# Patient Record
Sex: FEMALE | Race: WHITE | NOT HISPANIC OR LATINO | Employment: OTHER | URBAN - METROPOLITAN AREA
[De-identification: names, ages, dates, MRNs, and addresses within clinical notes are randomized per-mention and may not be internally consistent; named-entity substitution may affect disease eponyms.]

---

## 2017-01-03 ENCOUNTER — OFFICE VISIT (OUTPATIENT)
Dept: UROLOGY | Facility: CLINIC | Age: 82
End: 2017-01-03
Payer: COMMERCIAL

## 2017-01-03 VITALS — SYSTOLIC BLOOD PRESSURE: 132 MMHG | DIASTOLIC BLOOD PRESSURE: 86 MMHG | HEART RATE: 67 BPM | OXYGEN SATURATION: 98 %

## 2017-01-03 DIAGNOSIS — Z85.51 PERSONAL HISTORY OF MALIGNANT NEOPLASM OF BLADDER: Primary | ICD-10-CM

## 2017-01-03 PROCEDURE — 52000 CYSTOURETHROSCOPY: CPT | Performed by: UROLOGY

## 2017-01-03 NOTE — PATIENT INSTRUCTIONS
Your next cystoscopy is scheduled 1/2/2018 @ 8002 Please call  if you need to reschedule this appointment.

## 2017-01-03 NOTE — MR AVS SNAPSHOT
After Visit Summary   1/3/2017    Cydney Christiansen    MRN: [id number]           Patient Information     Date Of Birth          1935        Visit Information        Provider Department      1/3/2017 9:30 AM Ruben Hammond MD; [facility] PROC ROOM [facility]        Today's Diagnoses     Personal history of malignant neoplasm of bladder    -  1       Care Instructions    Your next cystoscopy is scheduled 1/2/2018 @ 0930 Please call  if you need to reschedule this appointment.            Follow-ups after your visit        Your next 10 appointments already scheduled     Mar 24, 2017  9:30 AM   New Visit with Kvng Garcia MD   [facility] ([facility])    [address] [facility] [address] [phone]            Apr 27, 2017  3:00 PM   LAB with LAB ONC [facility])    [address] [place] [address] [phone]           Patient must bring picture ID.  Patient should be prepared to give a urine specimen  Please do not eat 10-12 hours before your appointment if you are coming in fasting for labs on lipids, cholesterol, or glucose (sugar).  Pregnant women should follow their Care Team instructions. Water with medications is okay. Do not drink coffee or other fluids.   If you have concerns about taking  your medications, please ask at office or if scheduling via Ecinityhart, send a message by clicking on Secure Messaging, Message Your Care Team.            Apr 27, 2017  3:30 PM   Return Visit with Usha Salgado MD   [facility])    [address] [facility] [address] [phone]            Jun 26, 2017 11:15 AM   Return Visit with Qi Barba MD   [facility])    [address] [place] [address] [phone]              Who to  contact     If you have questions or need follow up information about today's clinic visit or your schedule please contact [facility] directly at [phone].  Normal or non-critical lab and imaging results will be communicated to you by MyChart, letter or phone within 4 business days after the clinic has received the results. If you do not hear from us within 7 days, please contact the clinic through [employer]oelectronics[employer] or phone. If you have a critical or abnormal lab result, we will notify you by phone as soon as possible.  Submit refill requests through Noteleaf or call your pharmacy and they will forward the refill request to us. Please allow 3 business days for your refill to be completed.          Additional Information About Your Visit        Noteleaf Information     Noteleaf gives you secure access to your electronic health record. If you see a primary care provider, you can also send messages to your care team and make appointments. If you have questions, please call your primary care clinic.  If you do not have a primary care provider, please call [phone] and they will assist you.        Your Vitals Were     Pulse Pulse Oximetry                67 98%           Blood Pressure from Last 3 Encounters:   01/03/17 132/86   12/22/16 130/79   08/25/16 124/80    Weight from Last 3 Encounters:   12/22/16 64.411 kg (142 lb)   08/25/16 65.772 kg (145 lb)   06/23/16 67.132 kg (148 lb)              We Performed the Following     Cystoscopy        Primary Care Provider Office Phone # Fax #    Kadi Sanchez [phone]       [address] [address]        Thank you!     Thank you for choosing [facility]  for your care. Our goal is always to provide you with excellent care. Hearing back from our patients is one way we can continue to improve our services. Please take a few minutes to complete the written survey that you may receive  in the mail after your visit with us. Thank you!             Your Updated Medication List - Protect others around you: Learn how to safely use, store and throw away your medicines at www.disposemymeds.org.          This list is accurate as of: 1/3/17  9:39 AM.  Always use your most recent med list.                   Brand Name Dispense Instructions for use    albuterol 108 (90 BASE) MCG/ACT Inhaler    PROAIR HFA/PROVENTIL HFA/VENTOLIN HFA    1 Inhaler    Inhale 2 puffs into the lungs every 6 hours as needed for shortness of breath / dyspnea Please place on hold       alendronate 70 MG tablet    FOSAMAX    12 tablet    Take 1 tablet (70 mg) by mouth every 7 days Take with over 8 ounces water and stay upright for at least 30 minutes after dose.  Take at least 60 minutes before breakfast       B-12 500 MCG Tabs     150 tablet    Take 500 mcg by mouth daily       calcium 600 MG tablet      1 daily       dorzolamide-timolol 2-0.5 % ophthalmic solution    COSOPT    10 mL    Place 1 drop into both eyes 2 times daily       ibuprofen 200 MG tablet    ADVIL/MOTRIN     Take 200 mg by mouth every 4 hours as needed.       latanoprost 0.005 % ophthalmic solution    XALATAN    1 Bottle    Place 1 drop into both eyes At Bedtime       losartan 25 MG tablet    COZAAR    90 tablet    Take 1 tablet (25 mg) by mouth daily       meclizine 25 MG tablet    ANTIVERT    30 tablet    Take 1 tablet (25 mg) by mouth every 6 hours as needed for dizziness       simvastatin 20 MG tablet    ZOCOR    90 tablet    Take 1 tablet (20 mg) by mouth At Bedtime       tamoxifen 20 MG tablet    NOLVADEX    90 tablet    Take 1 tablet (20 mg) by mouth daily       triamcinolone 0.1 % cream    KENALOG    15 g    Apply to affected area of the face once to twice a day.       VITAMIN D-1000 MAX ST 1000 UNITS Tabs   Generic drug:  cholecalciferol      1 daily

## 2017-01-03 NOTE — PROGRESS NOTES
S: Cydney Christiansen is a 81 year old female returns for bladder cancer surveillance.    she has history of bladder cancer without any recurrence since 1997.    She received 0 courses of BCG therapy.    Patient is draped and prepped.  Flexible cystoscopy placed under direct vision.      Cysto:  The anterior urethra is normal     In the bladder there is normal mucosa.    Assessment/Plan:  (Z85.51) Personal history of malignant neoplasm of bladder  (primary encounter diagnosis)  Comment:  No evidence of recurrence  Plan: BTR in one year

## 2017-01-03 NOTE — NURSING NOTE
"Chief Complaint   Patient presents with     Cystoscopy     btr       Initial /86 mmHg  Pulse 67  SpO2 98% Estimated body mass index is 27.27 kg/(m^2) as calculated from the following:    Height as of 12/22/16: 1.537 m (5' 0.51\").    Weight as of 12/22/16: 64.411 kg (142 lb).  BP completed using cuff size: regular  Nika Preciado CMA      "

## 2017-03-28 ENCOUNTER — OFFICE VISIT (OUTPATIENT)
Dept: OPHTHALMOLOGY | Facility: CLINIC | Age: 82
End: 2017-03-28
Payer: COMMERCIAL

## 2017-03-28 DIAGNOSIS — H43.812 PVD (POSTERIOR VITREOUS DETACHMENT), LEFT: ICD-10-CM

## 2017-03-28 DIAGNOSIS — Z01.01 ENCOUNTER FOR EXAMINATION OF EYES AND VISION WITH ABNORMAL FINDINGS: Primary | ICD-10-CM

## 2017-03-28 DIAGNOSIS — H25.13 NUCLEAR SCLEROSIS OF BOTH EYES: ICD-10-CM

## 2017-03-28 DIAGNOSIS — H40.1431: ICD-10-CM

## 2017-03-28 DIAGNOSIS — H52.12 MYOPIA, LEFT: ICD-10-CM

## 2017-03-28 DIAGNOSIS — H52.4 PRESBYOPIA: ICD-10-CM

## 2017-03-28 DIAGNOSIS — H26.8 PXF (PSEUDOEXFOLIATION OF LENS CAPSULE): ICD-10-CM

## 2017-03-28 PROCEDURE — 92015 DETERMINE REFRACTIVE STATE: CPT | Performed by: STUDENT IN AN ORGANIZED HEALTH CARE EDUCATION/TRAINING PROGRAM

## 2017-03-28 PROCEDURE — 92014 COMPRE OPH EXAM EST PT 1/>: CPT | Performed by: STUDENT IN AN ORGANIZED HEALTH CARE EDUCATION/TRAINING PROGRAM

## 2017-03-28 RX ORDER — DORZOLAMIDE HYDROCHLORIDE AND TIMOLOL MALEATE 20; 5 MG/ML; MG/ML
1 SOLUTION/ DROPS OPHTHALMIC 2 TIMES DAILY
Qty: 10 ML | Refills: 11 | Status: SHIPPED | OUTPATIENT
Start: 2017-03-28 | End: 2017-04-04

## 2017-03-28 RX ORDER — LATANOPROST 50 UG/ML
1 SOLUTION/ DROPS OPHTHALMIC AT BEDTIME
Qty: 1 BOTTLE | Refills: 11 | Status: SHIPPED | OUTPATIENT
Start: 2017-03-28 | End: 2017-04-11

## 2017-03-28 ASSESSMENT — REFRACTION_WEARINGRX
OS_SPHERE: -2.50
OD_CYLINDER: +0.75
OS_AXIS: 161
OS_ADD: +3.00
SPECS_TYPE: BIFOCAL
OS_CYLINDER: +0.25
OD_ADD: +3.25
OD_SPHERE: PLANO
OD_AXIS: 146

## 2017-03-28 ASSESSMENT — VISUAL ACUITY
OS_BAT_MED: 20/125
OS_CC+: -1
OS_CC: 2-
OD_CC: 2
OD_CC: 20/30
OS_CC: 20/40
CORRECTION_TYPE: GLASSES
OD_BAT_MED: 20/70
OD_CC+: -1
METHOD: SNELLEN - LINEAR
OD_BAT_HIGH: >20/400
OS_BAT_HIGH: >20/400
OS_PH_CC: 20/30

## 2017-03-28 ASSESSMENT — REFRACTION_MANIFEST
OD_CYLINDER: +0.50
OD_AXIS: 133
OD_ADD: +3.00
OS_SPHERE: -2.50
OS_ADD: +3.00
OD_SPHERE: -0.25
OS_CYLINDER: SPHERE

## 2017-03-28 ASSESSMENT — SLIT LAMP EXAM - LIDS
COMMENTS: NORMAL
COMMENTS: NORMAL

## 2017-03-28 ASSESSMENT — TONOMETRY
OS_IOP_MMHG: 18
IOP_METHOD: APPLANATION
OD_IOP_MMHG: 17

## 2017-03-28 ASSESSMENT — CONF VISUAL FIELD
OD_NORMAL: 1
OS_NORMAL: 1

## 2017-03-28 ASSESSMENT — CUP TO DISC RATIO
OS_RATIO: 0.5
OD_RATIO: 0.6

## 2017-03-28 ASSESSMENT — EXTERNAL EXAM - LEFT EYE: OS_EXAM: NORMAL

## 2017-03-28 ASSESSMENT — EXTERNAL EXAM - RIGHT EYE: OD_EXAM: NORMAL

## 2017-03-28 NOTE — MR AVS SNAPSHOT
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    Visually significant cataract that is interfering with daily activities of living. Plan for cataract extraction and intraocular lens implant left eye, then may consider the right eye.  Risks, benefits, complications, and alternatives discussed with patient including possibility of limitations from coexistent eye disease and loss of vision. Target refraction and lens options discussed.    Offered cataract surgery left eye at anytime. Call Anika HALL @ [phone] to schedule.   Continue same glasses  Otherwise Return visit in 6 months for Intraocular pressure check and OCT    Kvng Garcia MD  [phone]            Follow-ups after your visit        Follow-up notes from your care team     Return in about 6 months (around 9/28/2017) for IOP check, OCT optic nerve.      Your next 10 appointments already scheduled     Apr 27, 2017  3:00 PM CDT   LAB with LAB ONC [facility] ([facility])    [address] [place] [address] [phone]           Patient must bring picture ID.  Patient should be prepared to give a urine specimen  Please do not eat 10-12 hours before your appointment if you are coming in fasting for labs on  lipids, cholesterol, or glucose (sugar).  Pregnant women should follow their Care Team instructions. Water with medications is okay. Do not drink coffee or other fluids.   If you have concerns about taking  your medications, please ask at office or if scheduling via Lucky Sort, send a message by clicking on Secure Messaging, Message Your Care Team.            Apr 27, 2017  3:30 PM CDT   Return Visit with Usha Salgado MD   [facility])    [address] [place] [address] [phone]            Jun 27, 2017 12:00 PM CDT   Return Visit with Qi Barba MD   [facility])    [address] [place] [address] [phone]            Jan 09, 2018  9:30 AM CST   Return Visit with Ruben Hammond MD, [facility] CYSTO [facility] ROOM   [facility] [id number] [phone]              Who to contact     If you have questions or need follow up information about today's clinic visit or your schedule please contact [facility] directly at [phone].  Normal or non-critical lab and imaging results will be communicated to you by Klinqhart, letter or phone within 4 business days after the clinic has received the results. If you do not hear from us within 7 days, please contact the clinic through MyChart or phone. If you have a critical or abnormal lab result, we will notify you by phone as soon as possible.  Submit refill requests through [employer] or call your pharmacy and they will forward the refill request to us. Please allow 3 business days for your refill to be completed.          Additional Information About Your Visit        Lucky Sort Information     Lucky Sort gives you secure access to your electronic health record. If you see a primary care provider, you can also send messages to your care team and make appointments.  If you have questions, please call your primary care clinic.  If you do not have a primary care provider, please call [phone] and they will assist you.        Care EveryWhere ID     This is your Care EveryWhere ID. This could be used by other organizations to access your [facility] medical records  [id number]         Blood Pressure from Last 3 Encounters:   01/03/17 132/86   12/22/16 130/79   08/25/16 124/80    Weight from Last 3 Encounters:   12/22/16 64.4 kg (142 lb)   08/25/16 65.8 kg (145 lb)   06/23/16 67.1 kg (148 lb)              We Performed the Following     EYE EXAM (SIMPLE-NONBILLABLE)     REFRACTIVE STATUS          Where to get your medicines      These medications [id number] [place] - [address]  [address] [place] [address]     Phone:  [phone]     dorzolamide-timolol 2-0.5 % ophthalmic solution    latanoprost 0.005 % ophthalmic solution          Primary Care Provider Office Phone # Fax #    Kadi Sanchez [phone]       [facility] [address] [facility] [address]        Thank you!     Thank you for choosing [facility]  for your care. Our goal is always to provide you with excellent care. Hearing back from our patients is one way we can continue to improve our services. Please take a few minutes to complete the written survey that you may receive in the mail after your visit with us. Thank you!             Your Updated Medication List - Protect others around you: Learn how to safely use, store and throw away your medicines at www.disposemymeds.org.          This list is accurate as of: 3/28/17 10:45 AM.  Always use your most recent med list.                   Brand Name Dispense Instructions for use    albuterol 108 (90 BASE) MCG/ACT Inhaler    PROAIR HFA/PROVENTIL HFA/VENTOLIN HFA    1 Inhaler    Inhale 2 puffs into the lungs every 6 hours as needed for shortness of breath / dyspnea  Please place on hold       alendronate 70 MG tablet    FOSAMAX    12 tablet    Take 1 tablet (70 mg) by mouth every 7 days Take with over 8 ounces water and stay upright for at least 30 minutes after dose.  Take at least 60 minutes before breakfast       B-12 500 MCG Tabs     150 tablet    Take 500 mcg by mouth daily       calcium 600 MG tablet      1 daily       dorzolamide-timolol 2-0.5 % ophthalmic solution    COSOPT    10 mL    Place 1 drop into both eyes 2 times daily       ibuprofen 200 MG tablet    ADVIL/MOTRIN     Take 200 mg by mouth every 4 hours as needed.       latanoprost 0.005 % ophthalmic solution    XALATAN    1 Bottle    Place 1 drop into both eyes At Bedtime       losartan 25 MG tablet    COZAAR    90 tablet    Take 1 tablet (25 mg) by mouth daily       meclizine 25 MG tablet    ANTIVERT    30 tablet    Take 1 tablet (25 mg) by mouth every 6 hours as needed for dizziness       simvastatin 20 MG tablet    ZOCOR    90 tablet    Take 1 tablet (20 mg) by mouth At Bedtime       tamoxifen 20 MG tablet    NOLVADEX    90 tablet    Take 1 tablet (20 mg) by mouth daily       triamcinolone 0.1 % cream    KENALOG    15 g    Apply to affected area of the face once to twice a day.       VITAMIN D-1000 MAX ST 1000 UNITS Tabs   Generic drug:  cholecalciferol      1 daily

## 2017-03-28 NOTE — PATIENT INSTRUCTIONS
Continue Cosopt both eyes twice a day and Latanoprost both eyes every evening.    Visually significant cataract that is interfering with daily activities of living. Plan for cataract extraction and intraocular lens implant left eye, then may consider the right eye.  Risks, benefits, complications, and alternatives discussed with patient including possibility of limitations from coexistent eye disease and loss of vision. Target refraction and lens options discussed.    Offered cataract surgery left eye at anytime. Call Anika HALL @ [phone] to schedule.   Continue same glasses  Otherwise Return visit in 6 months for Intraocular pressure check and OCT    Kvng Garcia MD  [phone]

## 2017-03-28 NOTE — PROGRESS NOTES
Current Eye Medications:  cosopt both eyes twice a day, Latanoprost both eyes every evening.       Subjective:  Comprehensive Eye Exam.  She feels her distance and near vision are getting worse- especially at night, or when raining.   She wears an older prescription pair of glasses to see the computer screen.       Objective:  See Ophthalmology Exam.      Assessment:  Cydney Christiansen is a 81 year old female who presents with:     PVD (posterior vitreous detachment), left      Nuclear sclerosis of both eyes Visually significant both eyes, worse in left eye.      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    Visually significant cataract that is interfering with daily activities of living. Plan for cataract extraction and intraocular lens implant left eye, then may consider the right eye.  Risks, benefits, complications, and alternatives discussed with patient including possibility of limitations from coexistent eye disease and loss of vision. Target refraction and lens options discussed.  Patient understands and wishes to proceed with surgery.    Visually significant cataract, Left eye. Risks, benefits, alternatives of cataract surgery discussed; patient wishes to proceed with surgery.    Eye: Left, then right if patient desires  Pupil: 5.5  Pseudoexfoliation: Yes  Flomax: No  Diabetes mellitus: No  Glaucoma: Yes  Guttae: No  S/p refractive surgery or other eye surgery: No  Refractive target:  emmetropia   Anticoagulation: aspirin  Anesthesia: MAC/topical  Able to lay flat:  Yes  Additional concerns: prob M. Ring, no obvious donesis, a little nervous  Difficulty: 5      Kvng Garcia MD  [phone]

## 2017-04-04 DIAGNOSIS — H26.8 PXF (PSEUDOEXFOLIATION OF LENS CAPSULE): ICD-10-CM

## 2017-04-04 RX ORDER — DORZOLAMIDE HYDROCHLORIDE AND TIMOLOL MALEATE 20; 5 MG/ML; MG/ML
1 SOLUTION/ DROPS OPHTHALMIC 2 TIMES DAILY
Qty: 10 ML | Refills: 11 | Status: SHIPPED | OUTPATIENT
Start: 2017-04-04 | End: 2018-04-17

## 2017-04-11 DIAGNOSIS — H26.8 PXF (PSEUDOEXFOLIATION OF LENS CAPSULE): ICD-10-CM

## 2017-04-11 RX ORDER — LATANOPROST 50 UG/ML
1 SOLUTION/ DROPS OPHTHALMIC AT BEDTIME
Qty: 1 BOTTLE | Refills: 11 | Status: SHIPPED | OUTPATIENT
Start: 2017-04-11 | End: 2018-04-26

## 2017-04-13 DIAGNOSIS — C50.812 MALIGNANT NEOPLASM OF OVERLAPPING SITES OF LEFT FEMALE BREAST (H): ICD-10-CM

## 2017-04-13 RX ORDER — TAMOXIFEN CITRATE 20 MG/1
20 TABLET ORAL DAILY
Qty: 90 TABLET | Refills: 1 | Status: SHIPPED | OUTPATIENT
Start: 2017-04-13 | End: 2017-09-08

## 2017-04-13 NOTE — TELEPHONE ENCOUNTER
Pending Prescriptions:                       Disp   Refills    tamoxifen (NOLVADEX) 20 MG tablet         90 tab*1            Sig: Take 1 tablet (20 mg) by mouth daily    Last filled 1/16/17    April Cagle LPN

## 2017-04-26 NOTE — PROGRESS NOTES
Oncology Follow-up visit:  Date on this visit: 4/27/2017    Primary Care Physician: Kadi Herring   Surgeon: Dr. Ifeanyi Sunshine.     Diagnosis:  1. 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 She was not recommended in favor of adjuvant radiation or chemotherapy, and has been on adjuvant Tamoxifen (AI not chosen due to OP and hx of compression fractures), since 08/24/2015.    Oncologic History:  1. Breast Cancer - The patient underwent a screening mammogram on 5/29/2015, which demonstrated a 9 mm lesion around the 1 o'clock position in the left breast. She had an ultrasound which confirmed a lesion to be about 9 mm. She underwent a core needle biopsy in [facility], which demonstrated invasive ductal cancer, grade 2, estrogen receptor positive (99%, strong) and  progesterone receptor positive (91%, strong) by immunohistochemistry and HER2 not amplified by FISH (ratio 1.0).   She then met with Dr. Sunshine and underwent L lumpectomy and axillary sentinel LN biopsy. The pathology from the surgery showed a 10 mm invasive ductal carcinoma, [place] grade 2.  There was no associated DCIS. Closest surgical margin was 3 mm from the anterior margin. There was no lymphovascular invasion and all 3 sentinel lymph nodes were negative for malignancy.   She was not recommended in favor of adjuvant radiation or chemotherapy.  Due  compression fracture history felt to be osteoporotic fractures, Tamoxifen was chosen over an AI. She started on Tamoxifen on 8/24/2015    2. Compression Fractures- Patient sustained a compression fracture of T7  in July 2015. On T spine MRI there were also old compression fractures of T3, T4 and T8 noted. She was on Fosamax for 10 years and stopped it in 2012. She developed compression fracture in 07/2015 and was restarted on Fosamax.    3. She has a  remote history of TCC of the bladder (in 1993), s/p TURBT   4. 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         History Of Present Illness:  Ms. Christiansen is a 81 year old female who presents for f/up of breast cancer. She transferred  her care to us for convenience from Dr. Walter in 05/2016. She lives in [place]. She has been on Tamoxifen since 8/24/2015 and has tolerated it reasonably well.  She has been on weekly Fosamax as well.  She has a remote history of TCC of the bladder (in 1993), s/p TURBT and follows up with Dr. Juarez annually for cystoscopy, last in 01/2017. She had a cystoscopy at that time, and it showed no e/o recurrent bladder tumor.  She was noted to have mild anemia in Aug 2016. Hb was down to 11.2 g/dl. MCV was normal. She reports there is a Hx of anemia in her sister and mother, due to iron and B12 deficiency. Her diet is not rich in meat products. She had a colonoscopy in September 2014, which showed multiple colonic polyps which were removed. There was a 8 mm serrated adenoma and a couple tubular adenomas as well. Repeat colonoscopy was recommended in 3 years. At our last visit in 12/2016 she had workup for her anemia. TSH was normal. Serum folate was normal. Iron studies were normal including ferritin of 213. She had normal LFTs and creatinine.  Peripheral blood smear on 12/23/2016 showed  slight normochromic, normocytic anemia without increased   erythrocyte regeneration. The red blood cells appeared normochromic. Poikilocytosis was minimal.   Serum protein electrophoresis and serum immunofixation showed no M protein.  We'll recommend vitamin B12 supplementation and she has been on 500 mcg orally daily for the past 4 months. Her Hb is still 11.3 g/dl today. There has been no change in her energy level. B12 level pending from today. She follows up with Dr. Barba for NMSC. She was last seen in 09/2016. Follow-up was recommended in one year.   B/l screening mammogram with  tomosynthesis was negative in 08/2016.She is feeling well and has no complaints.  She denies any melena, blood in stool or BRBPR.  In addition, a complete 12 point  review of systems is negative.      Past Medical/Surgical History:  Past Medical History:   Diagnosis Date     Actinic keratosis      Basal cell cancer 02/2011    bcc of the L back.     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     Past Surgical History:   Procedure Laterality Date     C REMOVAL GALLBLADDER      open pan     COLONOSCOPY  5/2008, 5/13, 6/14    Q 3 years for advanced adenomatous polyp     CYSTOSCOPY  12/31/2008     D & C       GENITOURINARY SURGERY      TURBT     HC TRABECULOPLASTY BY LASER SURGERY Left 4/18/07    SLT #1 OS (inf 180)     HC TRABECULOPLASTY BY LASER SURGERY Right 5/4/11    SLT #1 OD (inf 180?)     HC TRABECULOPLASTY BY LASER SURGERY Left 3/17/15    SLT #2 OS (sup 180)     HC TRABECULOPLASTY BY LASER SURGERY Right 6/23/15    SLT #2 OD (sup 180?)     LASER ARGON TREATMENT      SLT left eye x 2     LUMPECTOMY BREAST WITH SENTINEL NODE, COMBINED Left 7/29/2015    Procedure: COMBINED LUMPECTOMY BREAST WITH SENTINEL NODE;  Surgeon: Ifeanyi Sunshine MD;  Location: [facility] OR     SURGICAL HISTORY OF -   9/11    squamous cell CA excised from back     TUBAL LIGATION     FHx and SocHx reviewed     Allergies:  Allergies as of 04/27/2017 - Juan Antonio as Reviewed  "03/28/2017   Allergen Reaction Noted     Lisinopril Cough 09/16/2014     Current Medications:  Current Outpatient Prescriptions   Medication Sig Dispense Refill     tamoxifen (NOLVADEX) 20 MG tablet Take 1 tablet (20 mg) by mouth daily 90 tablet 1     latanoprost (XALATAN) 0.005 % ophthalmic solution Place 1 drop into both eyes At Bedtime 1 Bottle 11     dorzolamide-timolol (COSOPT) 2-0.5 % ophthalmic solution Place 1 drop into both eyes 2 times daily 10 mL 11     Cyanocobalamin (B-12) 500 MCG TABS Take 500 mcg by mouth daily 150 tablet 3     triamcinolone (KENALOG) 0.1 % cream Apply to affected area of the face once to twice a day. 15 g 1     alendronate (FOSAMAX) 70 MG tablet Take 1 tablet (70 mg) by mouth every 7 days Take with over 8 ounces water and stay upright for at least 30 minutes after dose.  Take at least 60 minutes before breakfast 12 tablet 3     losartan (COZAAR) 25 MG tablet Take 1 tablet (25 mg) by mouth daily 90 tablet 4     simvastatin (ZOCOR) 20 MG tablet Take 1 tablet (20 mg) by mouth At Bedtime 90 tablet 4     albuterol (PROAIR HFA, PROVENTIL HFA, VENTOLIN HFA) 108 (90 BASE) MCG/ACT inhaler Inhale 2 puffs into the lungs every 6 hours as needed for shortness of breath / dyspnea Please place on hold 1 Inhaler 1     meclizine (ANTIVERT) 25 MG tablet Take 1 tablet (25 mg) by mouth every 6 hours as needed for dizziness 30 tablet 1     ibuprofen (ADVIL,MOTRIN) 200 MG tablet Take 200 mg by mouth every 4 hours as needed.       CALCIUM 600 MG OR TABS 1 daily       VITAMIN D-1000 MAX -1000 MG-UNIT OR TABS 1 daily          Physical Exam:  /80  Pulse 60  Temp 97.8  F (36.6  C) (Oral)  Resp 20  Ht 1.537 m (5' 0.51\")  Wt 65.3 kg (144 lb)  SpO2 98%  BMI 27.65 kg/m2      GENERAL APPEARANCE: healthy, alert and no distress      NECK: no adenopathy, no asymmetry or masses     LYMPHATICS: No cervical, supraclavicular, axillary  lymphadenopathy     RESP: lungs clear to auscultation - no rales, " rhonchi or wheezes     CARDIOVASCULAR: regular rates and rhythm, normal S1 S2, no S3 or S4 and no murmur.     ABDOMEN:  soft, nontender, no HSM or masses and bowel sounds normal     MUSCULOSKELETAL: extremities normal- no gross deformities noted, no evidence of inflammation in joints, FROM in all extremities. No edema b/l LE.     SKIN: no suspicious lesions or rashes     PSYCHIATRIC: mentation appears normal and affect normal  Breast: s/p L lumpectomy and axillary SLN biopsy. Incisions healed well. No masses b/l in the breasts. No axillary lymphadenopathy bilaterally.    Laboratory/Imaging Studies  Orders Only on 04/27/2017   Component Date Value Ref Range Status     Hemoglobin 04/27/2017 11.3* 11.7 - 15.7 g/dL Final     Bilirubin Direct 04/27/2017 <0.1  0.0 - 0.2 mg/dL Final     Bilirubin Total 04/27/2017 0.2  0.2 - 1.3 mg/dL Final     Albumin 04/27/2017 3.6  3.4 - 5.0 g/dL Final     Protein Total 04/27/2017 6.8  6.8 - 8.8 g/dL Final     Alkaline Phosphatase 04/27/2017 38* 40 - 150 U/L Final     ALT 04/27/2017 20  0 - 50 U/L Final     AST 04/27/2017 16  0 - 45 U/L Final       ASSESSMENT/PLAN:    Gera is a 81 year old woman with stage Ia, [id number], grade II, ER positive, AR positive, 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 She was not recommended in favor of adjuvant radiation or chemotherapy, and has been on adjuvant Tamoxifen (AI not chosen due to OP and hx of compression fractures), since 08/24/2015.    1. Breast cancer - continue daily Tamoxifen, tolerating well. F/up with us in 4 months. She will be due for a screening mammogram at that time.  2. Hx of OP with compression Fx- continue Fosamax, calcium and vitamin D supplementation.   3. Breast cancer screening- b/l screening mammogram with tomosynthesis negative in 08/2016. Next due in 08/2017.  4. Hx of TCC of bladder- s/p  s/p TURBT in 1993 and follows up with Dr. Juarez annually for  cystoscopy, next in 01/2018.   5. Colon cancer screening- has a Hx of multiple colonic polyps (serrated and tubular adenomas) on colonoscopy in 06/2014. Repeat colonoscopy in 3 years recommended (due in 06/2017)  6. Hx of NMSC- f/up with Dr. Barba annually, next  In 09/2017.    7. Mild normocytic anemia- f/up on B12 level. Ferritin normal but we'll start PO iron supplementation at 325 mg PO daily and recheck Hb and iron studies in 4 months, on her return visit. Proceed with colonoscopy as above and will also add endoscopy given anemia. (colonoscopy and endoscopy to be done at [facility] office in [place]- orders faxed and patient will call to schedule).    At the end of our visit patient verbalized understanding and concurred with the plan.

## 2017-04-27 ENCOUNTER — ONCOLOGY VISIT (OUTPATIENT)
Dept: ONCOLOGY | Facility: CLINIC | Age: 82
End: 2017-04-27
Payer: COMMERCIAL

## 2017-04-27 VITALS
HEIGHT: 61 IN | DIASTOLIC BLOOD PRESSURE: 80 MMHG | SYSTOLIC BLOOD PRESSURE: 138 MMHG | WEIGHT: 144 LBS | TEMPERATURE: 97.8 F | RESPIRATION RATE: 20 BRPM | OXYGEN SATURATION: 98 % | HEART RATE: 60 BPM | BODY MASS INDEX: 27.19 KG/M2

## 2017-04-27 DIAGNOSIS — Z12.31 VISIT FOR SCREENING MAMMOGRAM: ICD-10-CM

## 2017-04-27 DIAGNOSIS — D64.9 NORMOCYTIC ANEMIA: Primary | ICD-10-CM

## 2017-04-27 DIAGNOSIS — C50.812 MALIGNANT NEOPLASM OF OVERLAPPING SITES OF LEFT FEMALE BREAST (H): ICD-10-CM

## 2017-04-27 DIAGNOSIS — D64.9 NORMOCYTIC ANEMIA: ICD-10-CM

## 2017-04-27 LAB
ALBUMIN SERPL-MCNC: 3.6 G/DL (ref 3.4–5)
ALP SERPL-CCNC: 38 U/L (ref 40–150)
ALT SERPL W P-5'-P-CCNC: 20 U/L (ref 0–50)
AST SERPL W P-5'-P-CCNC: 16 U/L (ref 0–45)
BILIRUB DIRECT SERPL-MCNC: <0.1 MG/DL (ref 0–0.2)
BILIRUB SERPL-MCNC: 0.2 MG/DL (ref 0.2–1.3)
HGB BLD-MCNC: 11.3 G/DL (ref 11.7–15.7)
PROT SERPL-MCNC: 6.8 G/DL (ref 6.8–8.8)
VIT B12 SERPL-MCNC: 545 PG/ML (ref 193–986)

## 2017-04-27 PROCEDURE — 82607 VITAMIN B-12: CPT | Performed by: INTERNAL MEDICINE

## 2017-04-27 PROCEDURE — 80076 HEPATIC FUNCTION PANEL: CPT | Performed by: INTERNAL MEDICINE

## 2017-04-27 PROCEDURE — 99214 OFFICE O/P EST MOD 30 MIN: CPT | Performed by: INTERNAL MEDICINE

## 2017-04-27 PROCEDURE — 85018 HEMOGLOBIN: CPT | Performed by: INTERNAL MEDICINE

## 2017-04-27 PROCEDURE — 36415 COLL VENOUS BLD VENIPUNCTURE: CPT | Performed by: INTERNAL MEDICINE

## 2017-04-27 ASSESSMENT — PAIN SCALES - GENERAL: PAINLEVEL: NO PAIN (0)

## 2017-04-27 NOTE — MR AVS SNAPSHOT
After Visit Summary   4/27/2017    Cydney Christiansen    MRN: [id number]           Patient Information     Date Of Birth          1935        Visit Information        Provider Department      4/27/2017 3:30 PM Usha Salgado MD [facility]        Today's Diagnoses     Normocytic anemia    -  1    Malignant neoplasm of overlapping sites of left female breast (H)        Visit for screening mammogram           Follow-ups after your visit        Additional Services     GASTROENTEROLOGY ADULT REF PROCEDURE ONLY       Patient has anemia - Hb 11.3 g/dl.                  Your next 10 appointments already scheduled     Jun 27, 2017 12:00 PM CDT   Return Visit with Qi Barba MD   [facility] ([facility])    [address] [place] [id number] [phone]            Aug 28, 2017 10:30 AM CDT   MA SCREENING BILATERAL W/ JAMESON with ISAIAS  [facility] ([facility])    [address] [place] [id number] [phone]           Do not use any powder, lotion or deodorant under your arms or on your breast. If you do, we will ask you to remove it before your exam.  Wear comfortable, two-piece clothing.  If you have any allergies, tell your care team.  Bring any previous mammograms from other facilities or have them mailed to the breast center.            Aug 31, 2017 11:45 AM CDT   LAB with LAB ONC [facility] ([facility])    [address] [place] [id number] [phone]           Patient must bring picture ID.  Patient should be prepared to give a urine specimen  Please do not eat 10-12 hours before your appointment if you are coming in fasting for labs on lipids, cholesterol, or glucose (sugar).  Pregnant women should follow their Care Team instructions. Water with medications is okay. Do not drink coffee or other fluids.   If  you have concerns about taking  your medications, please ask at office or if scheduling via [employer], send a message by clicking on Secure Messaging, Message Your Care Team.            Aug 31, 2017 12:30 PM CDT   Return Visit with Usha Salgado MD   [facility]    [id number] [place] [id number]   [phone]            Sep 08, 2017  9:45 AM CDT   Return Visit with Kvng Garcia MD   [facility] ([facility])    [address] [facility] [address]   [phone]            Jan 09, 2018  9:30 AM CST   Return Visit with Ruben Hammond MD, [facility] CYSTO PROC ROOM   [facility] ([facility])    [address] [place] [address]   [phone]              Future tests that were ordered for you today     Open Future Orders        Priority Expected Expires Ordered    Hemoglobin Routine  4/27/2018 4/27/2017    Iron and iron binding capacity Routine  4/27/2018 4/27/2017    Ferritin Routine  4/27/2018 4/27/2017    GASTROENTEROLOGY ADULT REF PROCEDURE ONLY Routine  9/27/2017 4/27/2017    MA Screen Bilateral w/Jose Antonio Routine  4/27/2018 4/27/2017            Who to contact     If you have questions or need follow up information about today's clinic visit or your schedule please contact [facility] directly at [phone].  Normal or non-critical lab and imaging results will be communicated to you by MyChart, letter or phone within 4 business days after the clinic has received the results. If you do not hear from us within 7 days, please contact the clinic through [employer] or phone. If you have a critical or abnormal lab result, we will notify you by phone as soon as possible.  Submit refill requests through [employer] or call your pharmacy and they will forward the refill request to us. Please allow 3 business days for your refill to be completed.          Additional  "Information About Your Visit        [employer] Information     [employer] gives you secure access to your electronic health record. If you see a primary care provider, you can also send messages to your care team and make appointments. If you have questions, please call your primary care clinic.  If you do not have a primary care provider, please call [phone] and they will assist you.      [employer] is an electronic gateway that provides easy, online access to your medical records. With [employer], you can request a clinic appointment, read your test results, renew a prescription or communicate with your care team.     To access your existing account, please contact your [facility] or call [phone] for assistance.        Care EveryWhere ID     This is your Care EveryWhere ID. This could be used by other organizations to access your [facility] medical records  [id number]        Your Vitals Were     Pulse Temperature Respirations Height Pulse Oximetry BMI (Body Mass Index)    60 97.8  F (36.6  C) (Oral) 20 1.537 m (5' 0.51\") 98% 27.65 kg/m2       Blood Pressure from Last 3 Encounters:   04/27/17 138/80   01/03/17 132/86   12/22/16 130/79    Weight from Last 3 Encounters:   04/27/17 65.3 kg (144 lb)   12/22/16 64.4 kg (142 lb)   08/25/16 65.8 kg (145 lb)               Primary Care Provider Office Phone # Fax #    Kadi Sanchez [phone]       [address] [address]        Thank you!     Thank you for choosing [facility]  for your care. Our goal is always to provide you with excellent care. Hearing back from our patients is one way we can continue to improve our services. Please take a few minutes to complete the written survey that you may receive in the mail after your visit with us. Thank you!             Your Updated Medication List - Protect others around you: Learn how to safely use, store and " throw away your medicines at www.disposemymeds.org.          This list is accurate as of: 4/27/17  4:07 PM.  Always use your most recent med list.                   Brand Name Dispense Instructions for use    albuterol 108 (90 BASE) MCG/ACT Inhaler    PROAIR HFA/PROVENTIL HFA/VENTOLIN HFA    1 Inhaler    Inhale 2 puffs into the lungs every 6 hours as needed for shortness of breath / dyspnea Please place on hold       alendronate 70 MG tablet    FOSAMAX    12 tablet    Take 1 tablet (70 mg) by mouth every 7 days Take with over 8 ounces water and stay upright for at least 30 minutes after dose.  Take at least 60 minutes before breakfast       B-12 500 MCG Tabs     150 tablet    Take 500 mcg by mouth daily       calcium 600 MG tablet      1 daily       dorzolamide-timolol 2-0.5 % ophthalmic solution    COSOPT    10 mL    Place 1 drop into both eyes 2 times daily       ibuprofen 200 MG tablet    ADVIL/MOTRIN     Take 200 mg by mouth every 4 hours as needed.       latanoprost 0.005 % ophthalmic solution    XALATAN    1 Bottle    Place 1 drop into both eyes At Bedtime       losartan 25 MG tablet    COZAAR    90 tablet    Take 1 tablet (25 mg) by mouth daily       meclizine 25 MG tablet    ANTIVERT    30 tablet    Take 1 tablet (25 mg) by mouth every 6 hours as needed for dizziness       simvastatin 20 MG tablet    ZOCOR    90 tablet    Take 1 tablet (20 mg) by mouth At Bedtime       tamoxifen 20 MG tablet    NOLVADEX    90 tablet    Take 1 tablet (20 mg) by mouth daily       triamcinolone 0.1 % cream    KENALOG    15 g    Apply to affected area of the face once to twice a day.       VITAMIN D-1000 MAX ST 1000 UNITS Tabs   Generic drug:  cholecalciferol      1 daily

## 2017-04-27 NOTE — NURSING NOTE
"Oncology Rooming Note    April 27, 2017 3:33 PM   Cydney Christiansen is a 81 year old female who presents for: Oncology Clinic Visit    Initial Vitals: /80  Pulse 60  Temp 97.8  F (36.6  C) (Oral)  Resp 20  Ht 1.537 m (5' 0.51\")  Wt 65.3 kg (144 lb)  SpO2 98%  BMI 27.65 kg/m2 Estimated body mass index is 27.65 kg/(m^2) as calculated from the following:    Height as of this encounter: 1.537 m (5' 0.51\").    Weight as of this encounter: 65.3 kg (144 lb). Body surface area is 1.67 meters squared.  No Pain (0) Comment: Data Unavailable   No LMP recorded. Patient is postmenopausal.  Allergies reviewed: Yes  Medications reviewed: Yes    Medications: Medication refills not needed today.  Pharmacy name entered into [facility]: [id number] [place] [facility], [place] - [address]    Clinical concerns    8 minutes for nursing intake (face to face time)     LIBAN NELSON LPN                "

## 2017-06-13 NOTE — PROGRESS NOTES
"  SUBJECTIVE:                                                            Cydney Christiansen is a 81 year old female who presents for Preventive Visit.  {PVP to remind patient that this is not necessarily a physical exam; physical exam may or may not be done:448715::\"click delete button to remove this line now\"}  {PVP to inform patient that additional E&M charge may apply, if additional problems addressed:798886::\"click delete button to remove this line now\"}  Are you in the first 12 months of your Medicare Part B coverage?  {No Yes:870788::\"No\"}    Healthy Habits:    Do you get at least three servings of calcium containing foods daily (dairy, green leafy vegetables, etc.)? {YES/NO, DAIRY INTAKE:724699::\"yes\"}    Amount of exercise or daily activities, outside of work: {AMOUNT EXERCISE:764874}    Problems taking medications regularly {Yes /No default:304623::\"No\"}    Medication side effects: {Yes /No default.:340763::\"No\"}    Have you had an eye exam in the past two years? {YESNOBLANK:577727}    Do you see a dentist twice per year? {YESNOBLANK:280625}    Do you have sleep apnea, excessive snoring or daytime drowsiness?{YESNOBLANK:377601}    {AWV Cognitive Screenin}    {Outside tests to abstract? :690487}    {additional problems to add:831721}    Reviewed and updated as needed this visit by clinical staff         Reviewed and updated as needed this visit by Provider        Social History   Substance Use Topics     Smoking status: Former Smoker     Packs/day: 0.50     Years: 20.00     Types: Cigarettes     Quit date: 1976     Smokeless tobacco: Never Used     Alcohol use Yes      Comment: rare       {ETOH AUDIT:358310}    Today's PHQ-2 Score:   PHQ-2 (  [employer]) 2016   Q1: Little interest or pleasure in doing things 0 0   Q2: Feeling down, depressed or hopeless 0 0   PHQ-2 Score 0 0     {PHQ-2 LOOK IN ASSESSMENTS :156030}  Do you feel safe in your environment - {YES/NO/NA:375060}    Do you " "have a Health Care Directive?: {HEALTHCARE DIRECTIVE STATUS:403640}    Current providers sharing in care for this patient include:   Patient Care Team:  Kadi Sanchez MD as PCP - General  April Mahan RN as Nurse Coordinator (Breast Oncology)  Usha Salgado MD as MD (Hematology & Oncology)      Hearing impairment: {NO/YES:767736}    Ability to successfully perform activities of daily living: {YES/NO (MEDICARE):102026::\"Yes, no assistance needed\"}     Fall risk:  {Document Fall Risk in the Assessments Section of the Navigator:408955}    Home safety:  {IPPE SAFETY CONCERNS:005364::\"none identified\"}  {If any of the above assessments are answered yes, consider ordering appropriate referrals:247871::\"click delete button to remove this line now\"}    The following health maintenance items are reviewed in Epic and correct as of today:  Health Maintenance   Topic Date Due     ADVANCE DIRECTIVE PLANNING Q5 YRS  2016     COLONOSCOPY Q3 YR  2017     FALL RISK ASSESSMENT  2017     INFLUENZA VACCINE (SYSTEM ASSIGNED)  2017     EYE EXAM Q1 YEAR  2018     LIPID SCREEN Q5 YR FEMALE (SYSTEM ASSIGNED)  2021     TETANUS IMMUNIZATION (SYSTEM ASSIGNED)  10/05/2022     DEXA SCAN SCREENING (SYSTEM ASSIGNED)  Completed     PNEUMOCOCCAL  Completed         {Decision Support:133223}     ROS:  {ROS COMP:833944}    {CHRONICPROBDATA:290643}  OBJECTIVE:                                                            There were no vitals taken for this visit. Estimated body mass index is 27.65 kg/(m^2) as calculated from the following:    Height as of 17: 5' 0.51\" (1.537 m).    Weight as of 17: 144 lb (65.3 kg).  EXAM:   {Exam :935478}    ASSESSMENT / PLAN:                                                            {Diag Picklist:496864}    End of Life Planning:  Patient currently has an advanced directive: { :567838}    COUNSELING:  {Medicare Counselin}    {Blood Pressure - " "Adult Preventive:690358}    Estimated body mass index is 27.65 kg/(m^2) as calculated from the following:    Height as of 4/27/17: 5' 0.51\" (1.537 m).    Weight as of 4/27/17: 144 lb (65.3 kg).  {Weight Management Plan -- Delete if patient has a normal BMI:451168}   reports that she quit smoking about 41 years ago. Her smoking use included Cigarettes. She has a 10.00 pack-year smoking history. She has never used smokeless tobacco.  {Tobacco Cessation -- Delete if patient is a non-smoker:924105}    Appropriate preventive services were discussed with this patient, including applicable screening as appropriate for cardiovascular disease, diabetes, osteopenia/osteoporosis, and glaucoma.  As appropriate for age/gender, discussed screening for colorectal cancer, prostate cancer, breast cancer, and cervical cancer. Checklist reviewing preventive services available has been given to the patient.    Reviewed patients plan of care and provided an AVS. The {CarePlan:093605} for Cydney meets the Care Plan requirement. This Care Plan has been established and reviewed with the {PATIENT, FAMILY MEMBER, CAREGIVER:428121}.    Counseling Resources:  ATP IV Guidelines  Pooled Cohorts Equation Calculator  Breast Cancer Risk Calculator  FRAX Risk Assessment  ICSI Preventive Guidelines  Dietary Guidelines for Americans, 2010  USDA's MyPlate  ASA Prophylaxis  Lung CA Screening    LION SPAULDING MD  [facility]  "

## 2017-06-13 NOTE — PATIENT INSTRUCTIONS
Preventive Health Recommendations  Female Ages 65 +    Yearly exam:     See your health care provider every year in order to  o Review health changes.   o Discuss preventive care.    o Review your medicines if your doctor has prescribed any.      You no longer need a yearly Pap test unless you've had an abnormal Pap test in the past 10 years. If you have vaginal symptoms, such as bleeding or discharge, be sure to talk with your provider about a Pap test.      Every 1 to 2 years, have a mammogram.  If you are over 69, talk with your health care provider about whether or not you want to continue having screening mammograms.      Every 10 years, have a colonoscopy. Or, have a yearly FIT test (stool test). These exams will check for colon cancer.       Have a cholesterol test every 5 years, or more often if your doctor advises it.       Have a diabetes test (fasting glucose) every three years. If you are at risk for diabetes, you should have this test more often.       At age 65, have a bone density scan (DEXA) to check for osteoporosis (brittle bone disease).    Shots:    Get a flu shot each year.    Get a tetanus shot every 10 years.    Talk to your doctor about your pneumonia vaccines. There are now two you should receive - Pneumovax (PPSV 23) and Prevnar (PCV 13).    Talk to your doctor about the shingles vaccine.    Talk to your doctor about the hepatitis B vaccine.    Nutrition:     Eat at least 5 servings of fruits and vegetables each day.      Eat whole-grain bread, whole-wheat pasta and brown rice instead of white grains and rice.      Talk to your provider about Calcium and Vitamin D.     Lifestyle    Exercise at least 150 minutes a week (30 minutes a day, 5 days a week). This will help you control your weight and prevent disease.      Limit alcohol to one drink per day.      No smoking.       Wear sunscreen to prevent skin cancer.       See your dentist twice a year for an exam and cleaning.      See your  eye doctor every 1 to 2 years to screen for conditions such as glaucoma, macular degeneration, cataracts, etc     [facility]    If you have any questions regarding to your visit please contact your care team:       Team Purple:   Clinic Hours Telephone Number   Dr. Kadi Austin     7am-7pm  Monday - Thursday   7am-5pm  Fridays  [phone]  (Appointment scheduling available 24/7)    Questions about your Visit?   Team Line:  [phone]   Urgent Care - [place] and [place] - 11am-9pm Monday-Friday Saturday-Sunday- 9am-5pm   [place] - 5pm-9pm Monday-Friday Saturday-Sunday- 9am-5pm  [phone] - [facility]  [phone] - [place]       What options do I have for visits at the clinic other than the traditional office visit?  To expand how we care for you, many of our providers are utilizing electronic visits (e-visits) and telephone visits, when medically appropriate, for interactions with their patients rather than a visit in the clinic.   We also offer nurse visits for many medical concerns. Just like any other service, we will bill your insurance company for this type of visit based on time spent on the phone with your provider. Not all insurance companies cover these visits. Please check with your medical insurance if this type of visit is covered. You will be responsible for any charges that are not paid by your insurance.      E-visits via [employer]:  generally incur a $35.00 fee.  Telephone visits:  Time spent on the phone: *charged based on time that is spent on the phone in increments of 10 minutes. Estimated cost:   5-10 mins $30.00   11-20 mins. $59.00   21-30 mins. $85.00     Use [employer] (secure email communication and access to your chart) to send your primary care provider a message or make an appointment. Ask someone on your Team how to sign up for [employer]ipl.  For a Price Quote for your services, please call our Consumer Price  Line at [phone].  As always, Thank you for trusting us with your health care needs!

## 2017-06-16 ENCOUNTER — TRANSFERRED RECORDS (OUTPATIENT)
Dept: HEALTH INFORMATION MANAGEMENT | Facility: CLINIC | Age: 82
End: 2017-06-16

## 2017-06-19 DIAGNOSIS — M81.0 OSTEOPOROSIS: ICD-10-CM

## 2017-06-19 NOTE — TELEPHONE ENCOUNTER
alendronate (FOSAMAX) 70 MG tablet    Last Written Prescription Date: 6/23/16  Last Fill Quantity: 12, # refills: 3  Last Office Visit with [facility] or [facility] prescribing provider: 6/26/17  Next 5 appointments (look out 90 days)     Jun 26, 2017  9:15 AM CDT   PHYSICAL with Kadi Sanchez MD   [facility]    [address] [facility] [address]   [phone]            Jun 27, 2017 12:00 PM CDT   [facility] Visit with Qi Barba MD   [facility]    [address] [place] [address]   [phone]            Aug 31, 2017 12:30 PM CDT   Return Visit with Usha Salgado MD   [facility]    [address] [place] [address]   [phone]            Sep 08, 2017  9:45 AM CDT   Return Visit with Kvng Garcia MD   [facility] ([facility])    [address] [address] [phone]                   DEXA Scan:  Last order of DX HIP/PELVIS/SPINE was found on 7/20/2015 from [facility] Appointment on 7/20/2015     No order of DX HIP/PELVIS/SPINE W LAT FRACTION ANALYSIS is found.       Creatinine   Date Value Ref Range Status   12/22/2016 0.76 0.52 - 1.04 mg/dL Final

## 2017-06-20 RX ORDER — ALENDRONATE SODIUM 70 MG/1
70 TABLET ORAL
Qty: 12 TABLET | Refills: 3 | Status: SHIPPED | OUTPATIENT
Start: 2017-06-20 | End: 2017-06-26

## 2017-06-20 NOTE — TELEPHONE ENCOUNTER
Routing refill request to provider for review/approval because:  Due for Dexa Scan      Gina Grace RN - BC

## 2017-06-26 ENCOUNTER — OFFICE VISIT (OUTPATIENT)
Dept: FAMILY MEDICINE | Facility: CLINIC | Age: 82
End: 2017-06-26
Payer: COMMERCIAL

## 2017-06-26 VITALS
TEMPERATURE: 97.5 F | HEART RATE: 63 BPM | BODY MASS INDEX: 26.62 KG/M2 | OXYGEN SATURATION: 96 % | HEIGHT: 61 IN | SYSTOLIC BLOOD PRESSURE: 124 MMHG | DIASTOLIC BLOOD PRESSURE: 64 MMHG | WEIGHT: 141 LBS

## 2017-06-26 DIAGNOSIS — Z00.00 ENCOUNTER FOR ROUTINE ADULT HEALTH EXAMINATION WITHOUT ABNORMAL FINDINGS: Primary | ICD-10-CM

## 2017-06-26 DIAGNOSIS — C50.812 MALIGNANT NEOPLASM OF OVERLAPPING SITES OF LEFT FEMALE BREAST, UNSPECIFIED ESTROGEN RECEPTOR STATUS (H): ICD-10-CM

## 2017-06-26 DIAGNOSIS — I10 BENIGN ESSENTIAL HYPERTENSION: ICD-10-CM

## 2017-06-26 DIAGNOSIS — E78.5 HYPERLIPIDEMIA LDL GOAL <160: ICD-10-CM

## 2017-06-26 DIAGNOSIS — M81.0 OSTEOPOROSIS, UNSPECIFIED OSTEOPOROSIS TYPE, UNSPECIFIED PATHOLOGICAL FRACTURE PRESENCE: ICD-10-CM

## 2017-06-26 LAB
ALBUMIN SERPL-MCNC: 3.5 G/DL (ref 3.4–5)
ALP SERPL-CCNC: 32 U/L (ref 40–150)
ALT SERPL W P-5'-P-CCNC: 19 U/L (ref 0–50)
ANION GAP SERPL CALCULATED.3IONS-SCNC: 7 MMOL/L (ref 3–14)
AST SERPL W P-5'-P-CCNC: 14 U/L (ref 0–45)
BASOPHILS # BLD AUTO: 0.1 10E9/L (ref 0–0.2)
BASOPHILS NFR BLD AUTO: 1.3 %
BILIRUB SERPL-MCNC: 0.3 MG/DL (ref 0.2–1.3)
BUN SERPL-MCNC: 14 MG/DL (ref 7–30)
CALCIUM SERPL-MCNC: 8.4 MG/DL (ref 8.5–10.1)
CHLORIDE SERPL-SCNC: 105 MMOL/L (ref 94–109)
CHOLEST SERPL-MCNC: 126 MG/DL
CO2 SERPL-SCNC: 28 MMOL/L (ref 20–32)
CREAT SERPL-MCNC: 0.74 MG/DL (ref 0.52–1.04)
DIFFERENTIAL METHOD BLD: ABNORMAL
EOSINOPHIL # BLD AUTO: 0.4 10E9/L (ref 0–0.7)
EOSINOPHIL NFR BLD AUTO: 6.7 %
ERYTHROCYTE [DISTWIDTH] IN BLOOD BY AUTOMATED COUNT: 13.1 % (ref 10–15)
GFR SERPL CREATININE-BSD FRML MDRD: 75 ML/MIN/1.7M2
GLUCOSE SERPL-MCNC: 93 MG/DL (ref 70–99)
HCT VFR BLD AUTO: 36.1 % (ref 35–47)
HDLC SERPL-MCNC: 56 MG/DL
HGB BLD-MCNC: 11.6 G/DL (ref 11.7–15.7)
LDLC SERPL CALC-MCNC: 51 MG/DL
LYMPHOCYTES # BLD AUTO: 1.4 10E9/L (ref 0.8–5.3)
LYMPHOCYTES NFR BLD AUTO: 26.7 %
MCH RBC QN AUTO: 30 PG (ref 26.5–33)
MCHC RBC AUTO-ENTMCNC: 32.1 G/DL (ref 31.5–36.5)
MCV RBC AUTO: 93 FL (ref 78–100)
MONOCYTES # BLD AUTO: 0.5 10E9/L (ref 0–1.3)
MONOCYTES NFR BLD AUTO: 9 %
NEUTROPHILS # BLD AUTO: 3 10E9/L (ref 1.6–8.3)
NEUTROPHILS NFR BLD AUTO: 56.3 %
NONHDLC SERPL-MCNC: 70 MG/DL
PLATELET # BLD AUTO: 193 10E9/L (ref 150–450)
POTASSIUM SERPL-SCNC: 4.5 MMOL/L (ref 3.4–5.3)
PROT SERPL-MCNC: 6.7 G/DL (ref 6.8–8.8)
RBC # BLD AUTO: 3.87 10E12/L (ref 3.8–5.2)
SODIUM SERPL-SCNC: 140 MMOL/L (ref 133–144)
TRIGL SERPL-MCNC: 94 MG/DL
WBC # BLD AUTO: 5.2 10E9/L (ref 4–11)

## 2017-06-26 PROCEDURE — 36415 COLL VENOUS BLD VENIPUNCTURE: CPT | Performed by: FAMILY MEDICINE

## 2017-06-26 PROCEDURE — 80053 COMPREHEN METABOLIC PANEL: CPT | Performed by: FAMILY MEDICINE

## 2017-06-26 PROCEDURE — 80061 LIPID PANEL: CPT | Performed by: FAMILY MEDICINE

## 2017-06-26 PROCEDURE — 85025 COMPLETE CBC W/AUTO DIFF WBC: CPT | Performed by: FAMILY MEDICINE

## 2017-06-26 PROCEDURE — 99397 PER PM REEVAL EST PAT 65+ YR: CPT | Performed by: FAMILY MEDICINE

## 2017-06-26 RX ORDER — LOSARTAN POTASSIUM 25 MG/1
25 TABLET ORAL DAILY
Qty: 90 TABLET | Refills: 4 | Status: SHIPPED | OUTPATIENT
Start: 2017-06-26 | End: 2018-06-28

## 2017-06-26 RX ORDER — ALENDRONATE SODIUM 70 MG/1
70 TABLET ORAL
Qty: 12 TABLET | Refills: 3 | Status: SHIPPED | OUTPATIENT
Start: 2017-06-26 | End: 2018-05-14

## 2017-06-26 RX ORDER — SIMVASTATIN 20 MG
20 TABLET ORAL AT BEDTIME
Qty: 90 TABLET | Refills: 4 | Status: SHIPPED | OUTPATIENT
Start: 2017-06-26 | End: 2018-06-28

## 2017-06-26 NOTE — LETTER
[address] [place] [address]    June 27, 2017    Cydney Christiansen  [address] [facility]  [place] [address]          Dear Cydney,  Your lab tests are looking good, but your hemoglobin is a little low and your albumin is low. This means you may not be eating enough protein. Try to eat more fish, poultry, meat, beans, nuts, peanut butter, dairy, and peanut butter. Continue your healthy lifestyle.  Enclosed is a copy of your results.     Results for orders placed or performed in visit on 06/26/17   Comprehensive metabolic panel   Result Value Ref Range    Sodium 140 133 - 144 mmol/L    Potassium 4.5 3.4 - 5.3 mmol/L    Chloride 105 94 - 109 mmol/L    Carbon Dioxide 28 20 - 32 mmol/L    Anion Gap 7 3 - 14 mmol/L    Glucose 93 70 - 99 mg/dL    Urea Nitrogen 14 7 - 30 mg/dL    Creatinine 0.74 0.52 - 1.04 mg/dL    GFR Estimate 75 >60 mL/min/1.7m2    GFR Estimate If Black >90   GFR Calc   >60 mL/min/1.7m2    Calcium 8.4 (L) 8.5 - 10.1 mg/dL    Bilirubin Total 0.3 0.2 - 1.3 mg/dL    Albumin 3.5 3.4 - 5.0 g/dL    Protein Total 6.7 (L) 6.8 - 8.8 g/dL    Alkaline Phosphatase 32 (L) 40 - 150 U/L    ALT 19 0 - 50 U/L    AST 14 0 - 45 U/L   CBC with platelets differential   Result Value Ref Range    WBC 5.2 4.0 - 11.0 10e9/L    RBC Count 3.87 3.8 - 5.2 10e12/L    Hemoglobin 11.6 (L) 11.7 - 15.7 g/dL    Hematocrit 36.1 35.0 - 47.0 %    MCV 93 78 - 100 fl    MCH 30.0 26.5 - 33.0 pg    MCHC 32.1 31.5 - 36.5 g/dL    RDW 13.1 10.0 - 15.0 %    Platelet Count 193 150 - 450 10e9/L    Diff Method Automated Method     % Neutrophils 56.3 %    % Lymphocytes 26.7 %    % Monocytes 9.0 %    % Eosinophils 6.7 %    % Basophils 1.3 %    Absolute Neutrophil 3.0 1.6 - 8.3 10e9/L    Absolute Lymphocytes 1.4 0.8 - 5.3 10e9/L    Absolute Monocytes 0.5 0.0 - 1.3 10e9/L    Absolute Eosinophils 0.4 0.0 - 0.7 10e9/L    Absolute Basophils 0.1 0.0 - 0.2  10e9/L   Lipid panel reflex to direct LDL   Result Value Ref Range    Cholesterol 126 <200 mg/dL    Triglycerides 94 <150 mg/dL    HDL Cholesterol 56 >49 mg/dL    LDL Cholesterol Calculated 51 <100 mg/dL    Non HDL Cholesterol 70 <130 mg/dL       If you have any questions or concerns, please call myself or my nurse at [phone].      Sincerely,        Kadi Sanchez MD/pb

## 2017-06-26 NOTE — NURSING NOTE
"Chief Complaint   Patient presents with     Physical     Health Maintenance     FALL RISK       Initial /64 (BP Location: Left arm, Patient Position: Chair, Cuff Size: Adult Regular)  Pulse 63  Temp 97.5  F (36.4  C)  Ht 5' 0.5\" (1.537 m)  Wt 141 lb (64 kg)  SpO2 96%  BMI 27.08 kg/m2 Estimated body mass index is 27.08 kg/(m^2) as calculated from the following:    Height as of this encounter: 5' 0.5\" (1.537 m).    Weight as of this encounter: 141 lb (64 kg).  Medication Reconciliation: complete   Kristal Cummings MA      "

## 2017-06-26 NOTE — PROGRESS NOTES
SUBJECTIVE:                                                            Cydney Christiansen is a 81 year old female who presents for Preventive Visit.      Are you in the first 12 months of your Medicare coverage?  No    Physical   Annual:     Getting at least 3 servings of Calcium per day::  Yes    Bi-annual eye exam::  Yes    Dental care twice a year::  Yes    Sleep apnea or symptoms of sleep apnea::  None and Daytime drowsiness    Diet::  Regular (no restrictions)    Frequency of exercise::  2-3 days/week    Duration of exercise::  15-30 minutes    Taking medications regularly::  Yes    Medication side effects::  Not applicable    Additional concerns today::  No      COGNITIVE SCREEN  1) Repeat 3 items (Banana, Sunrise, Chair)    2) Clock draw: NORMAL  3) 3 item recall: Recalls 3 objects  Results: 3 items recalled: COGNITIVE IMPAIRMENT LESS LIKELY    Mini-CogTM Copyright S Pierce. Licensed by the author for use in [facility]; reprinted with permission ([email or web address]@[email or web address]). All rights reserved.        Reviewed and updated as needed this visit by clinical staff  Tobacco  Allergies  Meds  Problems  Med Hx  Surg Hx  Fam Hx  Soc Hx          Reviewed and updated as needed this visit by Provider  Allergies  Meds  Problems        Social History   Substance Use Topics     Smoking status: Former Smoker     Packs/day: 0.50     Years: 20.00     Types: Cigarettes     Quit date: 2/1/1976     Smokeless tobacco: Never Used     Alcohol use Yes      Comment: rare       The patient does not drink >3 drinks per day nor >7 drinks per week.             Today's PHQ-2 Score:   PHQ-2 ( 1999 [employer]) 6/26/2017   Q1: Little interest or pleasure in doing things 0   Q2: Feeling down, depressed or hopeless 0   PHQ-2 Score 0   Q1: Little interest or pleasure in doing things -   Q2: Feeling down, depressed or hopeless -   PHQ-2 Score -       Do you feel safe in your environment - Yes    Do you have a Health Care Directive?: No:  Advance care planning was reviewed with patient; patient declined at this time.    Current providers sharing in care for this patient include:   Patient Care Team:  Kadi Sanchez MD as PCP - General  April Mahan RN as Nurse Coordinator (Breast Oncology)  Usha Salgado MD as MD (Hematology & Oncology)      Hearing impairment: No    Ability to successfully perform activities of daily living: Yes, no assistance needed     Fall risk:  Fallen 2 or more times in the past year?: No  Any fall with injury in the past year?: No    Home safety:  none identified      The following health maintenance items are reviewed in Epic and correct as of today:  Health Maintenance   Topic Date Due     ADVANCE DIRECTIVE PLANNING Q5 YRS  06/09/2016     COLONOSCOPY Q3 YR  06/09/2017     FALL RISK ASSESSMENT  06/23/2017     INFLUENZA VACCINE (SYSTEM ASSIGNED)  09/01/2017     EYE EXAM Q1 YEAR  03/28/2018     LIPID SCREEN Q5 YR FEMALE (SYSTEM ASSIGNED)  06/23/2021     TETANUS IMMUNIZATION (SYSTEM ASSIGNED)  10/05/2022     DEXA SCAN SCREENING (SYSTEM ASSIGNED)  Completed     PNEUMOCOCCAL  Completed             Immunization History   Administered Date(s) Administered     Hepatitis A Vac Ped/Adol-2 Dose 02/23/1999, 02/25/2000     Influenza (H1N1) 02/15/2010     Influenza (High Dose) 3 valent vaccine 10/07/2014, 10/28/2016     Influenza (IIV3) 10/23/2008, 12/03/2009, 11/09/2010, 11/04/2011, 10/05/2012, 10/19/2013     Pneumococcal (PCV 13) 03/02/2015     Pneumococcal 23 valent 04/17/2002, 06/04/2010     TD (ADULT, 7+) 05/06/2004     Tdap (Adacel,Boostrix) 10/05/2012     Zoster vaccine, live 05/27/2008        ROS:  This 81 year old female is here today for annual female exam. She is  and still drives. She will drive to [place], [place] to visit sister and brother in 2 weeks. Her  is buried there and she likes to visit once a year. She eats healthy diet and stays active. All other review of systems are  negative  Personal, family, and social history reviewed with patient and revised.    C: NEGATIVE for fever, chills, change in weight  I: NEGATIVE for worrisome rashes, moles or lesions  E: NEGATIVE for vision changes or irritation  E/M: NEGATIVE for ear, mouth and throat problems  R: NEGATIVE for significant cough or SOB  B: NEGATIVE for masses, tenderness or discharge  CV: NEGATIVE for chest pain, palpitations or peripheral edema  GI: NEGATIVE for nausea, abdominal pain, heartburn, or change in bowel habits  : NEGATIVE for frequency, dysuria, or hematuria  M: NEGATIVE for significant arthralgias or myalgia, but still gets occasional left buttocks pain, worse in the morning. Gets better as day goes on. She is worried about getting another vertebral compression fracture. Takes her fosamax faithfully.   N: NEGATIVE for weakness, dizziness or paresthesias  E: NEGATIVE for temperature intolerance, skin/hair changes  H: NEGATIVE for bleeding problems  P: NEGATIVE for changes in mood or affect    Problem list, Medication list, Allergies, and Medical/Social/Surgical histories reviewed in [facility] and updated as appropriate.  Labs reviewed in EPIC  BP Readings from Last 3 Encounters:   06/26/17 124/64   04/27/17 138/80   01/03/17 132/86    Wt Readings from Last 3 Encounters:   06/26/17 141 lb (64 kg)   04/27/17 144 lb (65.3 kg)   12/22/16 142 lb (64.4 kg)                  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     Hypertension goal BP (blood pressure) < 140/90     Seborrheic keratosis      Malignant neoplasm of overlapping sites of left female breast (H)     Scoliosis     Compression fracture of thoracic vertebra (H)     Mass of left hand     Nuclear sclerosis of both eyes     Primary open angle glaucoma of both eyes, unspecified glaucoma stage     Osteoporosis, unspecified osteoporosis type, unspecified pathological fracture presence     Past Surgical History:   Procedure Laterality Date     COLONOSCOPY  5/2008, 5/13, 6/14, 6/17    Q 3 years for advanced adenomatous polyp     CYSTOSCOPY  12/31/2008     D & C       GENITOURINARY SURGERY      TURBT     HC REMOVAL GALLBLADDER      open pan     HC TRABECULOPLASTY BY LASER SURGERY Left 4/18/07    SLT #1 OS (inf 180)     HC TRABECULOPLASTY BY LASER SURGERY Right 5/4/11    SLT #1 OD (inf 180?)     HC TRABECULOPLASTY BY LASER SURGERY Left 3/17/15    SLT #2 OS (sup 180)     HC TRABECULOPLASTY BY LASER SURGERY Right 6/23/15    SLT #2 OD (sup 180?)     LASER ARGON TREATMENT      SLT left eye x 2     LUMPECTOMY BREAST WITH SENTINEL NODE, COMBINED Left 7/29/2015    Procedure: COMBINED LUMPECTOMY BREAST WITH SENTINEL NODE;  Surgeon: Ifeanyi Sunshine MD;  Location: [facility] OR     SURGICAL HISTORY OF -   9/11    squamous cell CA excised from back     TUBAL LIGATION         Social History   Substance Use Topics     Smoking status: Former Smoker     Packs/day: 0.50     Years: 20.00     Types: Cigarettes     Quit date: 2/1/1976     Smokeless tobacco: Never Used     Alcohol use Yes      Comment: rare     Family History   Problem Relation Age of Onset     DIABETES Mother      Breast Cancer Mother      Eye Disorder Mother      OSTEOPOROSIS Mother      Glaucoma Mother      Macular Degeneration Mother      Prostate Cancer Father      Prostate Cancer Brother      Glaucoma Brother      Macular Degeneration Brother      Thyroid Disease Sister      Blood Disease Sister      lupus     HEART DISEASE Sister      Glaucoma Sister      Asthma Son      Prostate Cancer Brother           Current Outpatient Prescriptions   Medication Sig Dispense Refill     Ferrous Sulfate (IRON SUPPLEMENT PO) Take 325 mg by mouth daily       alendronate (FOSAMAX) 70 MG tablet Take 1 tablet (70 mg) by mouth every 7 days Take with over 8 ounces water and stay upright for at least 30 minutes after dose.  Take at least 60 minutes before breakfast 12 tablet 3     losartan (COZAAR) 25 MG tablet Take 1 tablet (25 mg) by mouth daily 90 tablet 4     simvastatin (ZOCOR) 20 MG tablet Take 1 tablet (20 mg) by mouth At Bedtime 90 tablet 4     tamoxifen (NOLVADEX) 20 MG tablet Take 1 tablet (20 mg) by mouth daily 90 tablet 1     latanoprost (XALATAN) 0.005 % ophthalmic solution Place 1 drop into both eyes At Bedtime 1 Bottle 11     dorzolamide-timolol (COSOPT) 2-0.5 % ophthalmic solution Place 1 drop into both eyes 2 times daily 10 mL 11     Cyanocobalamin (B-12) 500 MCG TABS Take 500 mcg by mouth daily 150 tablet 3     triamcinolone (KENALOG) 0.1 % cream Apply to affected area of the face once to twice a day. 15 g 1     meclizine (ANTIVERT) 25 MG tablet Take 1 tablet (25 mg) by mouth every 6 hours as needed for dizziness 30 tablet 1     ibuprofen (ADVIL,MOTRIN) 200 MG tablet Take 200 mg by mouth every 4 hours as needed.       CALCIUM 600 MG OR TABS 1 daily       VITAMIN D-1000 MAX -1000 MG-UNIT OR TABS 1 daily       [DISCONTINUED] alendronate (FOSAMAX) 70 MG tablet Take 1 tablet (70 mg) by mouth every 7 days Take with over 8 ounces water and stay upright for at least 30 minutes after dose.  Take at least 60 minutes before breakfast 12 tablet 3     [DISCONTINUED] losartan (COZAAR) 25 MG tablet Take 1 tablet (25 mg) by mouth daily 90 tablet 4     [DISCONTINUED] simvastatin (ZOCOR) 20 MG tablet Take 1 tablet (20 mg) by mouth At Bedtime 90 tablet 4     Allergies   Allergen Reactions     Lisinopril Cough     OBJECTIVE:                                                            /64 (BP Location: Left arm, Patient  "Position: Chair, Cuff Size: Adult Regular)  Pulse 63  Temp 97.5  F (36.4  C)  Ht 5' 0.5\" (1.537 m)  Wt 141 lb (64 kg)  SpO2 96%  BMI 27.08 kg/m2 Estimated body mass index is 27.08 kg/(m^2) as calculated from the following:    Height as of this encounter: 5' 0.5\" (1.537 m).    Weight as of this encounter: 141 lb (64 kg).  EXAM:   GENERAL APPEARANCE: healthy, alert and no distress  EYES: Eyes grossly normal to inspection, PERRL and conjunctivae and sclerae normal  HENT: ear canals and TM's normal, nose and mouth without ulcers or lesions, oropharynx clear and oral mucous membranes moist  NECK: no adenopathy, no asymmetry, masses, or scars and thyroid normal to palpation  RESP: lungs clear to auscultation - no rales, rhonchi or wheezes  BREAST: normal without masses, tenderness or nipple discharge and no palpable axillary masses or adenopathy  CV: regular rate and rhythm, normal S1 S2, no S3 or S4, no murmur, click or rub, no peripheral edema and peripheral pulses strong  ABDOMEN: soft, nontender, no hepatosplenomegaly, no masses and bowel sounds normal  MS: no musculoskeletal defects are noted and gait is age appropriate without ataxia  SKIN: no suspicious lesions or rashes  NEURO: Normal strength and tone, sensory exam grossly normal, mentation intact and speech normal  PSYCH: mentation appears normal and affect normal/bright    ASSESSMENT / PLAN:                                                            1. Encounter for routine adult health examination without abnormal findings  Healthy lady     2. Benign essential hypertension  Good control   - losartan (COZAAR) 25 MG tablet; Take 1 tablet (25 mg) by mouth daily  Dispense: 90 tablet; Refill: 4    3. Hyperlipidemia LDL goal <160  Good control   - simvastatin (ZOCOR) 20 MG tablet; Take 1 tablet (20 mg) by mouth At Bedtime  Dispense: 90 tablet; Refill: 4  - Lipid panel reflex to direct LDL    4. Malignant neoplasm of overlapping sites of left female breast, " "unspecified estrogen receptor status (H)  Stable   - Comprehensive metabolic panel  - CBC with platelets differential    5. Osteoporosis, unspecified osteoporosis type, unspecified pathological fracture presence  Good control   - alendronate (FOSAMAX) 70 MG tablet; Take 1 tablet (70 mg) by mouth every 7 days Take with over 8 ounces water and stay upright for at least 30 minutes after dose.  Take at least 60 minutes before breakfast  Dispense: 12 tablet; Refill: 3    End of Life Planning:  Patient currently has an advanced directive: Yes.  Practitioner is supportive of decision.    COUNSELING:  Reviewed preventive health counseling, as reflected in patient instructions       Regular exercise       Healthy diet/nutrition        Estimated body mass index is 27.08 kg/(m^2) as calculated from the following:    Height as of this encounter: 5' 0.5\" (1.537 m).    Weight as of this encounter: 141 lb (64 kg).     reports that she quit smoking about 41 years ago. Her smoking use included Cigarettes. She has a 10.00 pack-year smoking history. She has never used smokeless tobacco.      Appropriate preventive services were discussed with this patient, including applicable screening as appropriate for cardiovascular disease, diabetes, osteopenia/osteoporosis, and glaucoma.  As appropriate for age/gender, discussed screening for colorectal cancer, prostate cancer, breast cancer, and cervical cancer. Checklist reviewing preventive services available has been given to the patient.    Reviewed patients plan of care and provided an AVS. The Basic Care Plan (routine screening as documented in Health Maintenance) for Cydney meets the Care Plan requirement. This Care Plan has been established and reviewed with the Patient.    Counseling Resources:  ATP IV Guidelines  Pooled Cohorts Equation Calculator  Breast Cancer Risk Calculator  FRAX Risk Assessment  ICSI Preventive Guidelines  Dietary Guidelines for Americans, 2010  [employer]'s MyPlate  ASA " Prophylaxis  Lung CA Screening    LION SPAULDING MD  [facility]

## 2017-06-26 NOTE — MR AVS SNAPSHOT
After Visit Summary   6/26/2017    Cydney Christiansen    MRN: [id number]           Patient Information     Date Of Birth          1935        Visit Information        Provider Department      6/26/2017 9:15 AM Kadi Sanchez MD [facility]        Today's Diagnoses     Encounter for routine adult health examination without abnormal findings    -  1    Benign essential hypertension        Hyperlipidemia LDL goal <160        Malignant neoplasm of overlapping sites of left female breast, unspecified estrogen receptor status (H)        Osteoporosis, unspecified osteoporosis type, unspecified pathological fracture presence          Care Instructions      Preventive Health Recommendations  Female Ages 65 +    Yearly exam:     See your health care provider every year in order to  o Review health changes.   o Discuss preventive care.    o Review your medicines if your doctor has prescribed any.      You no longer need a yearly Pap test unless you've had an abnormal Pap test in the past 10 years. If you have vaginal symptoms, such as bleeding or discharge, be sure to talk with your provider about a Pap test.      Every 1 to 2 years, have a mammogram.  If you are over 69, talk with your health care provider about whether or not you want to continue having screening mammograms.      Every 10 years, have a colonoscopy. Or, have a yearly FIT test (stool test). These exams will check for colon cancer.       Have a cholesterol test every 5 years, or more often if your doctor advises it.       Have a diabetes test (fasting glucose) every three years. If you are at risk for diabetes, you should have this test more often.       At age 65, have a bone density scan (DEXA) to check for osteoporosis (brittle bone disease).    Shots:    Get a flu shot each year.    Get a tetanus shot every 10 years.    Talk to your doctor about your pneumonia vaccines. There are now two you should receive - Pneumovax  (PPSV 23) and Prevnar (PCV 13).    Talk to your doctor about the shingles vaccine.    Talk to your doctor about the hepatitis B vaccine.    Nutrition:     Eat at least 5 servings of fruits and vegetables each day.      Eat whole-grain bread, whole-wheat pasta and brown rice instead of white grains and rice.      Talk to your provider about Calcium and Vitamin D.     Lifestyle    Exercise at least 150 minutes a week (30 minutes a day, 5 days a week). This will help you control your weight and prevent disease.      Limit alcohol to one drink per day.      No smoking.       Wear sunscreen to prevent skin cancer.       See your dentist twice a year for an exam and cleaning.      See your eye doctor every 1 to 2 years to screen for conditions such as glaucoma, macular degeneration, cataracts, etc     [facility]    If you have any questions regarding to your visit please contact your care team:       Team Purple:   Clinic Hours Telephone Number   Dr. Kadi Austin     7am-7pm  Monday - Thursday   7am-5pm  Fridays  [phone]  (Appointment scheduling available 24/7)    Questions about your Visit?   Team Line:  [phone]   Urgent Care - [place] [facility] - 11am-9pm Monday-Friday Saturday-Sunday- 9am-5pm   [place] - 5pm-9pm Monday-Friday Saturday-Sunday- 9am-5pm  [phone] - [place]   [phone] - [place]       What options do I have for visits at the clinic other than the traditional office visit?  To expand how we care for you, many of our providers are utilizing electronic visits (e-visits) and telephone visits, when medically appropriate, for interactions with their patients rather than a visit in the clinic.   We also offer nurse visits for many medical concerns. Just like any other service, we will bill your insurance company for this type of visit based on time spent on the phone with your provider. Not all insurance companies  cover these visits. Please check with your medical insurance if this type of visit is covered. You will be responsible for any charges that are not paid by your insurance.      E-visits via Surfwax Mediahart:  generally incur a $35.00 fee.  Telephone visits:  Time spent on the phone: *charged based on time that is spent on the phone in increments of 10 minutes. Estimated cost:   5-10 mins $30.00   11-20 mins. $59.00   21-30 mins. $85.00     Use Survmetrics (secure email communication and access to your chart) to send your primary care provider a message or make an appointment. Ask someone on your Team how to sign up for Survmetrics.  For a Price Quote for your services, please call our Consumer Price Line at [phone].  As always, Thank you for trusting us with your health care needs!              Follow-ups after your visit        Your next 10 appointments already scheduled     Jun 27, 2017 12:00 PM CDT   Return Visit with Qi Barba MD   [facility])    [address] [place] [address] [phone]            Aug 28, 2017 10:30 AM CDT   MA SCREENING BILATERAL W/ JAMESON with MGSOCORRO,  [place] [facility])    [address] [place] [address] [phone]           Do not use any powder, lotion or deodorant under your arms or on your breast. If you do, we will ask you to remove it before your exam.  Wear comfortable, two-piece clothing.  If you have any allergies, tell your care team.  Bring any previous mammograms from other facilities or have them mailed to the breast center.            Aug 31, 2017 11:45 AM CDT   LAB with LAB ONC [facility])    [address] [place] [address] [phone]           Patient must bring picture ID.  Patient should be prepared to give a urine specimen  Please do not eat 10-12 hours before your  appointment if you are coming in fasting for labs on lipids, cholesterol, or glucose (sugar).  Pregnant women should follow their Care Team instructions. Water with medications is okay. Do not drink coffee or other fluids.   If you have concerns about taking  your medications, please ask at office or if scheduling via BrightRoll, send a message by clicking on Secure Messaging, Message Your Care Team.            Aug 31, 2017 12:30 PM CDT   Return Visit with Usha Salgado MD   [facility] ([facility])    [address] [place] [address]   [phone]            Sep 08, 2017  9:45 AM CDT   Return Visit with Kvng Garcia MD   [facility] ([facility])    [address] [place] [address]   [phone]            Jan 09, 2018  9:30 AM CST   Return Visit with Ruben Hammond MD, [facility] CYSTO PROC ROOM   [facility] ([facility])    [address] [place] [address]   [phone]              Who to contact     If you have questions or need follow up information about today's clinic visit or your schedule please contact [facility] directly at [phone].  Normal or non-critical lab and imaging results will be communicated to you by LineMetricshart, letter or phone within 4 business days after the clinic has received the results. If you do not hear from us within 7 days, please contact the clinic through MyChart or phone. If you have a critical or abnormal lab result, we will notify you by phone as soon as possible.  Submit refill requests through BrightRoll or call your pharmacy and they will forward the refill request to us. Please allow 3 business days for your refill to be completed.          Additional Information About Your Visit        LineMetricshart Information     BrightRoll gives you secure access to your electronic health record. If you see a primary care provider, you can also send  "messages to your care team and make appointments. If you have questions, please call your primary care clinic.  If you do not have a primary care provider, please call [phone] and they will assist you.        Care EveryWhere ID     This is your Care EveryWhere ID. This could be used by other organizations to access your Industry medical records  [id number]        Your Vitals Were     Pulse Temperature Height Pulse Oximetry BMI (Body Mass Index)       63 97.5  F (36.4  C) 5' 0.5\" (1.537 m) 96% 27.08 kg/m2        Blood Pressure from Last 3 Encounters:   06/26/17 124/64   04/27/17 138/80   01/03/17 132/86    Weight from Last 3 Encounters:   06/26/17 141 lb (64 kg)   04/27/17 144 lb (65.3 kg)   12/22/16 142 lb (64.4 kg)              We Performed the Following     CBC with platelets differential     Comprehensive metabolic panel     Lipid panel reflex to direct LDL          Where to get your medicines      [id number] [place] - [address]  [address] [place] [address]     Phone:  [phone]     losartan 25 MG tablet    simvastatin 20 MG tablet         Some of these will need a paper prescription and others can be bought over the counter.  Ask your nurse if you have questions.     Bring a paper prescription for each of these medications     alendronate 70 MG tablet          Primary Care Provider Office Phone # Fax #    Kadi Sanchez [phone]       [place] [id number] [place] [address]        Equal Access to Services     [facility] AH: Hadii aad bernard hadasho Soomaali, waaxda luqadaha, qaybta kaalmada lyric, morgan puga. So [facility] [phone].    ATENCIÓN: Si habla español, tiene a chavez disposición servicios gratuitos de asistencia lingüística. Llame al [phone].    We comply with applicable federal civil rights laws and [place] laws. We do not discriminate on the " basis of race, color, national origin, age, disability sex, sexual orientation or gender identity.            Thank you!     Thank you for choosing [facility]  for your care. Our goal is always to provide you with excellent care. Hearing back from our patients is one way we can continue to improve our services. Please take a few minutes to complete the written survey that you may receive in the mail after your visit with us. Thank you!             Your Updated Medication List - Protect others around you: Learn how to safely use, store and throw away your medicines at www.disposemymeds.org.          This list is accurate as of: 6/26/17  9:36 AM.  Always use your most recent med list.                   Brand Name Dispense Instructions for use Diagnosis    alendronate 70 MG tablet    FOSAMAX    12 tablet    Take 1 tablet (70 mg) by mouth every 7 days Take with over 8 ounces water and stay upright for at least 30 minutes after dose.  Take at least 60 minutes before breakfast    Osteoporosis, unspecified osteoporosis type, unspecified pathological fracture presence       B-12 500 MCG Tabs     150 tablet    Take 500 mcg by mouth daily    B12 deficiency       calcium 600 MG tablet      1 daily        dorzolamide-timolol 2-0.5 % ophthalmic solution    COSOPT    10 mL    Place 1 drop into both eyes 2 times daily    PXF (pseudoexfoliation of lens capsule)       ibuprofen 200 MG tablet    ADVIL/MOTRIN     Take 200 mg by mouth every 4 hours as needed.        IRON SUPPLEMENT PO      Take 325 mg by mouth daily        latanoprost 0.005 % ophthalmic solution    XALATAN    1 Bottle    Place 1 drop into both eyes At Bedtime    PXF (pseudoexfoliation of lens capsule)       losartan 25 MG tablet    COZAAR    90 tablet    Take 1 tablet (25 mg) by mouth daily    Benign essential hypertension       meclizine 25 MG tablet    ANTIVERT    30 tablet    Take 1 tablet (25 mg) by mouth every 6 hours as needed for dizziness     Vertigo       simvastatin 20 MG tablet    ZOCOR    90 tablet    Take 1 tablet (20 mg) by mouth At Bedtime    Hyperlipidemia LDL goal <160       tamoxifen 20 MG tablet    NOLVADEX    90 tablet    Take 1 tablet (20 mg) by mouth daily    Malignant neoplasm of overlapping sites of left female breast (H)       triamcinolone 0.1 % cream    KENALOG    15 g    Apply to affected area of the face once to twice a day.    Granuloma annulare       VITAMIN D-1000 MAX ST 1000 UNITS Tabs   Generic drug:  cholecalciferol      1 daily

## 2017-06-27 ENCOUNTER — OFFICE VISIT (OUTPATIENT)
Dept: DERMATOLOGY | Facility: CLINIC | Age: 82
End: 2017-06-27
Payer: COMMERCIAL

## 2017-06-27 DIAGNOSIS — D18.01 CHERRY ANGIOMA: ICD-10-CM

## 2017-06-27 DIAGNOSIS — L92.0 GRANULOMA ANNULARE: Primary | ICD-10-CM

## 2017-06-27 DIAGNOSIS — L82.1 SEBORRHEIC KERATOSES: ICD-10-CM

## 2017-06-27 DIAGNOSIS — Z85.828 H/O NONMELANOMA SKIN CANCER: ICD-10-CM

## 2017-06-27 DIAGNOSIS — L57.0 ACTINIC KERATOSIS: ICD-10-CM

## 2017-06-27 DIAGNOSIS — D22.9 MULTIPLE NEVI: ICD-10-CM

## 2017-06-27 PROCEDURE — 99214 OFFICE O/P EST MOD 30 MIN: CPT | Mod: 25 | Performed by: DERMATOLOGY

## 2017-06-27 PROCEDURE — 17003 DESTRUCT PREMALG LES 2-14: CPT | Mod: GC | Performed by: DERMATOLOGY

## 2017-06-27 PROCEDURE — 17000 DESTRUCT PREMALG LESION: CPT | Mod: GC | Performed by: DERMATOLOGY

## 2017-06-27 ASSESSMENT — PAIN SCALES - GENERAL: PAINLEVEL: NO PAIN (0)

## 2017-06-27 NOTE — MR AVS SNAPSHOT
After Visit Summary   6/27/2017    Cydney Christiansen    MRN: [id number]           Patient Information     Date Of Birth          1935        Visit Information        Provider Department      6/27/2017 12:00 PM Davin Cole MD [facility]        Today's Diagnoses     Granuloma annulare    -  1    Seborrheic keratoses        Actinic keratosis        Cherry angioma        Multiple nevi        H/O nonmelanoma skin cancer          Care Instructions    For the spot on the right cheek: use the cream twice a day for up to 3 weeks and take a 1 week break.          Cryotherapy    What is it?    Use of a very cold liquid, such as liquid nitrogen, to freeze and destroy abnormal skin cells that need to be removed    What should I expect?    Tenderness and redness    A small blister that might grow and fill with dark purple blood. There may be crusting.    More than one treatment may be needed if the lesions do not go away.    How do I care for the treated area?    Gently wash the area with your hands when bathing.    Use a thin layer of Vaseline to help with healing. You may use a Band-Aid.     The area should heal within 7-10 days and may leave behind a pink or lighter color.     Do not use an antibiotic or Neosporin ointment.     You may take acetaminophen (Tylenol) for pain.     Call your Doctor if you have:    Severe pain    Signs of infection (warmth, redness, cloudy yellow drainage, and or a bad smell)    Questions or concerns    Who should I call with questions?       [facility]: [phone]       [facility]: [phone]       For urgent needs outside of business hours call the [facility] at [phone]        and ask for the dermatology resident on call            Follow-ups after your visit        Your next 10 appointments already scheduled     Aug 28, 2017 10:30 AM CDT   MA SCREENING BILATERAL W/ JAMESON  with MGMA2, MG [facility] ([facility])    [address] [address] [place] [address] [phone]           Do not use any powder, lotion or deodorant under your arms or on your breast. If you do, we will ask you to remove it before your exam.  Wear comfortable, two-piece clothing.  If you have any allergies, tell your care team.  Bring any previous mammograms from other facilities or have them mailed to the breast center.            Aug 31, 2017 11:45 AM CDT   LAB with LAB ONC [facility] ([facility])    [address] [place] [address] [phone]           Patient must bring picture ID.  Patient should be prepared to give a urine specimen  Please do not eat 10-12 hours before your appointment if you are coming in fasting for labs on lipids, cholesterol, or glucose (sugar).  Pregnant women should follow their Care Team instructions. Water with medications is okay. Do not drink coffee or other fluids.   If you have concerns about taking  your medications, please ask at office or if scheduling via Mobyko, send a message by clicking on Secure Messaging, Message Your Care Team.            Aug 31, 2017 12:30 PM CDT   Return Visit with Usha Salgado MD   [facility])    [address] [place] [address] [phone]            Sep 08, 2017  9:45 AM CDT   Return Visit with Kvng Garcia MD   [facility]    [address]  [place] [address] [phone]            Jan 09, 2018  9:30 AM CST   Return Visit with Ruben Hammond MD, FRIJIMMY CYSTO PROC ROOM   [facility])    [address]  [place] [address] [phone]              Who to contact     If you have questions or need follow up information about today's clinic visit or your  schedule please contact [facility] directly at [phone].  Normal or non-critical lab and imaging results will be communicated to you by Regalos Y Amigoshart, letter or phone within 4 business days after the clinic has received the results. If you do not hear from us within 7 days, please contact the clinic through Regalos Y Amigoshart or phone. If you have a critical or abnormal lab result, we will notify you by phone as soon as possible.  Submit refill requests through EXENDIS or call your pharmacy and they will forward the refill request to us. Please allow 3 business days for your refill to be completed.          Additional Information About Your Visit        EXENDIS Information     EXENDIS gives you secure access to your electronic health record. If you see a primary care provider, you can also send messages to your care team and make appointments. If you have questions, please call your primary care clinic.  If you do not have a primary care provider, please call [phone] and they will assist you.      EXENDIS is an electronic gateway that provides easy, online access to your medical records. With EXENDIS, you can request a clinic appointment, read your test results, renew a prescription or communicate with your care team.     To access your existing account, please contact your [facility] or call [phone] for assistance.        Care EveryWhere ID     This is your Care EveryWhere ID. This could be used by other organizations to access your [place] medical records  [id number]         Blood Pressure from Last 3 Encounters:   06/26/17 124/64   04/27/17 138/80   01/03/17 132/86    Weight from Last 3 Encounters:   06/26/17 141 lb (64 kg)   04/27/17 144 lb (65.3 kg)   12/22/16 142 lb (64.4 kg)              We Performed the Following     DESTRUCT PREMALIGNANT LESION, 2-14     DESTRUCT PREMALIGNANT LESION, FIRST        Primary Care Provider Office Phone # Fax #    Kadi ANGELES  MD Daniel [phone]       [address] [address]        Equal Access to Services     BARBARA CORCORAN : Hadwilton josy wagner ronak Kimble, walilyda luqlew, qaybta kaalmada lyric, morgan cedillo bhavnathanh geronimo laRamanorth puga. So [facility] [phone].    ATENCIÓN: Si habla español, tiene a chavez disposición servicios gratuitos de asistencia lingüística. Llame al [phone].    We comply with applicable federal civil rights laws and [place] laws. We do not discriminate on the basis of race, color, national origin, age, disability sex, sexual orientation or gender identity.            Thank you!     Thank you for choosing [facility]  for your care. Our goal is always to provide you with excellent care. Hearing back from our patients is one way we can continue to improve our services. Please take a few minutes to complete the written survey that you may receive in the mail after your visit with us. Thank you!             Your Updated Medication List - Protect others around you: Learn how to safely use, store and throw away your medicines at www.disposemymeds.org.          This list is accurate as of: 6/27/17 12:31 PM.  Always use your most recent med list.                   Brand Name Dispense Instructions for use Diagnosis    alendronate 70 MG tablet    FOSAMAX    12 tablet    Take 1 tablet (70 mg) by mouth every 7 days Take with over 8 ounces water and stay upright for at least 30 minutes after dose.  Take at least 60 minutes before breakfast    Osteoporosis, unspecified osteoporosis type, unspecified pathological fracture presence       B-12 500 MCG Tabs     150 tablet    Take 500 mcg by mouth daily    B12 deficiency       calcium 600 MG tablet      1 daily        dorzolamide-timolol 2-0.5 % ophthalmic solution    COSOPT    10 mL    Place 1 drop into both eyes 2 times daily    PXF (pseudoexfoliation of lens capsule)       ibuprofen 200 MG  tablet    ADVIL/MOTRIN     Take 200 mg by mouth every 4 hours as needed.        IRON SUPPLEMENT PO      Take 325 mg by mouth daily        latanoprost 0.005 % ophthalmic solution    XALATAN    1 Bottle    Place 1 drop into both eyes At Bedtime    PXF (pseudoexfoliation of lens capsule)       losartan 25 MG tablet    COZAAR    90 tablet    Take 1 tablet (25 mg) by mouth daily    Benign essential hypertension       meclizine 25 MG tablet    ANTIVERT    30 tablet    Take 1 tablet (25 mg) by mouth every 6 hours as needed for dizziness    Vertigo       simvastatin 20 MG tablet    ZOCOR    90 tablet    Take 1 tablet (20 mg) by mouth At Bedtime    Hyperlipidemia LDL goal <160       tamoxifen 20 MG tablet    NOLVADEX    90 tablet    Take 1 tablet (20 mg) by mouth daily    Malignant neoplasm of overlapping sites of left female breast (H)       triamcinolone 0.1 % cream    KENALOG    15 g    Apply to affected area of the face once to twice a day.    Granuloma annulare       VITAMIN D-1000 MAX ST 1000 UNITS Tabs   Generic drug:  cholecalciferol      1 daily

## 2017-06-27 NOTE — LETTER
6/27/2017       RE: Cydney Christiansen  [address] [place] [address]  [place] [address]     Dear Colleague,    Thank you for referring your patient, Cydney Christiansen, to the [facility]. Please see a copy of my visit note below.    [facility] Dermatology Note      Dermatology Problem List:  1. NMSC  -SCC, left popliteal fossa, well differentiated with focal perineural invasion but with clear margins on path, s/p excision by Dr. 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 HAK, left mid eyebrow, base not seen, 6/2014  4. Actinic keratosis  -s/p cryotherapy  5. GA R angle of jaw: resolves with triam cream    Encounter Date: Jun 27, 2017    CC:  Chief Complaint   Patient presents with     Derm Problem     skin check and recheck spot on rt cheek        History of Present Illness:  Ms. Cydney Christiansen is a 81 year old female with a history of multiple NMSC who presents for a 1-year skin check. She was last seen 9/30/2016 when a granuloma annulare of the right chin was resolving with triamcinolone 0.1% cream. Today she states that the area is less pruritic when using the triamcinolone 0.1% cream she will use this daily for 1.5 weeks at a time, will take 1 month off between using.     There are also some lesions on the left dorsal hand. She saw a surgeon who believes it is not a ganglionic cyst. No pain or pruritus. Pt has no other lesions of concern at this time.     Past Medical History:   Patient Active Problem List   Diagnosis     History of basal cell carcinoma     PXF  OU     Personal history of malignant neoplasm of bladder     Advanced  directives, counseling/discussion     Hyperlipidemia LDL goal <160     Family history of diabetes mellitus     Health Care Home     Squamous cell carcinoma     Basal cell carcinoma     Skin lesion of left leg     Viral warts     PVD (posterior vitreous detachment), OS     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     Hypertension goal BP (blood pressure) < 140/90     Seborrheic keratosis     Malignant neoplasm of overlapping sites of left female breast (H)     Scoliosis     Compression fracture of thoracic vertebra (H)     Mass of left hand     Nuclear sclerosis of both eyes     Primary open angle glaucoma of both eyes, unspecified glaucoma stage     Osteoporosis, unspecified osteoporosis type, unspecified pathological fracture presence     Past Medical History:   Diagnosis Date     Actinic keratosis      Basal cell cancer 02/2011    bcc of the L back.     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     Past Surgical History:   Procedure Laterality Date     COLONOSCOPY  5/2008, 5/13, 6/14, 6/17    Q 3 years for advanced adenomatous polyp     CYSTOSCOPY  12/31/2008     D & C       GENITOURINARY SURGERY      TURBT     HC REMOVAL GALLBLADDER      open pan     HC TRABECULOPLASTY BY LASER SURGERY Left 4/18/07    SLT #1 OS (inf 180)     HC TRABECULOPLASTY BY LASER SURGERY Right 5/4/11    SLT #1 OD (inf 180?)     HC TRABECULOPLASTY BY LASER  SURGERY Left 3/17/15    SLT #2 OS (sup 180)     HC TRABECULOPLASTY BY LASER SURGERY Right 6/23/15    SLT #2 OD (sup 180?)     LASER ARGON TREATMENT      SLT left eye x 2     LUMPECTOMY BREAST WITH SENTINEL NODE, COMBINED Left 7/29/2015    Procedure: COMBINED LUMPECTOMY BREAST WITH SENTINEL NODE;  Surgeon: Ifeanyi Sunshine MD;  Location: [facility] OR     SURGICAL HISTORY OF -   9/11    squamous cell CA excised from back     TUBAL LIGATION         Social History:  Reviewed and unchanged    Family History:  Reviewed and unchanged     Medications:  Current Outpatient Prescriptions   Medication Sig Dispense Refill     Ferrous Sulfate (IRON SUPPLEMENT PO) Take 325 mg by mouth daily       alendronate (FOSAMAX) 70 MG tablet Take 1 tablet (70 mg) by mouth every 7 days Take with over 8 ounces water and stay upright for at least 30 minutes after dose.  Take at least 60 minutes before breakfast 12 tablet 3     losartan (COZAAR) 25 MG tablet Take 1 tablet (25 mg) by mouth daily 90 tablet 4     simvastatin (ZOCOR) 20 MG tablet Take 1 tablet (20 mg) by mouth At Bedtime 90 tablet 4     tamoxifen (NOLVADEX) 20 MG tablet Take 1 tablet (20 mg) by mouth daily 90 tablet 1     latanoprost (XALATAN) 0.005 % ophthalmic solution Place 1 drop into both eyes At Bedtime 1 Bottle 11     dorzolamide-timolol (COSOPT) 2-0.5 % ophthalmic solution Place 1 drop into both eyes 2 times daily 10 mL 11     Cyanocobalamin (B-12) 500 MCG TABS Take 500 mcg by mouth daily 150 tablet 3     triamcinolone (KENALOG) 0.1 % cream Apply to affected area of the face once to twice a day. 15 g 1     meclizine (ANTIVERT) 25 MG tablet Take 1 tablet (25 mg) by mouth every 6 hours as needed for dizziness 30 tablet 1     ibuprofen (ADVIL,MOTRIN) 200 MG tablet Take 200 mg by mouth every 4 hours as needed.       CALCIUM 600 MG OR TABS 1 daily       VITAMIN D-1000 MAX -1000 MG-UNIT OR TABS 1 daily         Allergies   Allergen Reactions     Lisinopril Cough       Review of  Systems:  -Constitutional: The patient is otherwise feeling well.   -Skin Establ Pt: The patient denies any new rash, pruritus, or lesions that are symptomatic, changing or bleeding, except as per HPI.    Physical exam:  Vitals: There were no vitals taken for this visit.  GEN: This is a well-nourished, well developed female in no acute distress  NEURO: Alert and oriented  PSYCH: in a pleasant mood, appropriate affect  SKIN: Total skin excluding the undergarment areas was performed. The exam included the head/face, neck, both arms, chest, back, abdomen, both legs, digits and/or nails.   -two AKs on the face  -there is a pink slightly orange-tinted annular papule on the right jaw line  -on the left dorsal hand there are two-three firm, almost calcified nodules  -scattered SKs on the examined surfaces  -site of prior skin cancers are well-healed without recurrence  -There are bright red some shaped papules scattered on examined surfaces.   -No other lesions of concern on areas examined.       Impression/Plan:  1. History of nonmelanoma skin cancer, no clincial evidence of recurrence    2. Actinic keratosis, left dorsal hand x 3  Cryotherapy procedure note: After verbal consent and discussion of risks and benefits including but no limited to dyspigmentation/scar, blister, and pain, 3 were treated with 1-2mm freeze border for 2 cycles with liquid nitrogen. Post cryotherapy instructions were provided.     3. Seborrheic keratoses, solar lentigines, and multiple benign nevi    Sun precaution was advised including the use of sun screens of SPF 30 or higher, sun protective clothing, and avoidance of tanning beds.    Benign nature was discussed. No further intervention required at this time.     4. 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 No treatments necessary. Can biopsy to confirm. Excision likely to remove. Pt declined at this point until it starts bothering her.  5. Granuloma annulare on right chin,  resolving    Continue using triamcinolone 0.1% cream daily or BID x 3-4 week bursts.      Follow-up in 1 year for a skin check, earlier for new or changing lesions.       Staff Involved:  Resident (Nguyen)/Scribe/Staff    Scribe Disclosure:   I, Js Glenys, am serving as a scribe to document services personally performed by Dr. Davin Cole, based on data collection and the provider's statements to me.    Provider Disclosure:   I have reviewed the documentation recorded by the scribe and have edited it as needed. I have personally performed the services documented here and the documentation accurately represents those services and the decisions made by me.     Davin Cole MD, MS    Department of Dermatology  [facility]: Phone: [phone], Fax:[phone]  [facility]: Phone: [phone], Fax: [phone]        Again, thank you for allowing me to participate in the care of your patient.      Sincerely,    Davin Cole MD

## 2017-06-27 NOTE — PATIENT INSTRUCTIONS
For the spot on the right cheek: use the cream twice a day for up to 3 weeks and take a 1 week break.          Cryotherapy    What is it?    Use of a very cold liquid, such as liquid nitrogen, to freeze and destroy abnormal skin cells that need to be removed    What should I expect?    Tenderness and redness    A small blister that might grow and fill with dark purple blood. There may be crusting.    More than one treatment may be needed if the lesions do not go away.    How do I care for the treated area?    Gently wash the area with your hands when bathing.    Use a thin layer of Vaseline to help with healing. You may use a Band-Aid.     The area should heal within 7-10 days and may leave behind a pink or lighter color.     Do not use an antibiotic or Neosporin ointment.     You may take acetaminophen (Tylenol) for pain.     Call your Doctor if you have:    Severe pain    Signs of infection (warmth, redness, cloudy yellow drainage, and or a bad smell)    Questions or concerns    Who should I call with questions?       [facility]: [phone]       [facility]: [phone]       For urgent needs outside of business hours call the [facility] at [phone]        and ask for the dermatology resident on call

## 2017-06-27 NOTE — NURSING NOTE
Dermatology Rooming Note    Cydney Christiansen's goals for this visit include:   Chief Complaint   Patient presents with     Derm Problem     skin check and recheck spot on rt cheek        Is a scribe okay for this visit:YES    Are records needed for this visit(If yes, obtain release of information): No     Vitals: There were no vitals taken for this visit.    Referring Provider:  No referring provider defined for this encounter.

## 2017-06-27 NOTE — PROGRESS NOTES
[facility] Dermatology Note      Dermatology Problem List:  1. NMSC  -SCC, left popliteal fossa, well differentiated with focal perineural invasion but with clear margins on path, s/p excision by Dr. 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 HAK, left mid eyebrow, base not seen, 6/2014  4. Actinic keratosis  -s/p cryotherapy  5. GA R angle of jaw: resolves with triam cream    Encounter Date: Jun 27, 2017    CC:  Chief Complaint   Patient presents with     Derm Problem     skin check and recheck spot on rt cheek        History of Present Illness:  Ms. Cydney Christiansen is a 81 year old female with a history of multiple NMSC who presents for a 1-year skin check. She was last seen 9/30/2016 when a granuloma annulare of the right chin was resolving with triamcinolone 0.1% cream. Today she states that the area is less pruritic when using the triamcinolone 0.1% cream she will use this daily for 1.5 weeks at a time, will take 1 month off between using.     There are also some lesions on the left dorsal hand. She saw a surgeon who believes it is not a ganglionic cyst. No pain or pruritus. Pt has no other lesions of concern at this time.     Past Medical History:   Patient Active Problem List   Diagnosis     History of basal cell carcinoma     PXF  OU     Personal history of malignant neoplasm of bladder     Advanced directives, counseling/discussion     Hyperlipidemia LDL goal <160     Family history of diabetes mellitus     Health Care Home     Squamous cell carcinoma     Basal cell carcinoma     Skin lesion of left leg     Viral warts     PVD (posterior vitreous detachment), OS     Squamous cell  carcinoma in situ of skin of lower leg     Hypertension goal BP (blood pressure) < 140/90     Seborrheic keratosis     Malignant neoplasm of overlapping sites of left female breast (H)     Scoliosis     Compression fracture of thoracic vertebra (H)     Mass of left hand     Nuclear sclerosis of both eyes     Primary open angle glaucoma of both eyes, unspecified glaucoma stage     Osteoporosis, unspecified osteoporosis type, unspecified pathological fracture presence     Past Medical History:   Diagnosis Date     Actinic keratosis      Basal cell cancer 02/2011    bcc of the L back.     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     Past Surgical History:   Procedure Laterality Date     COLONOSCOPY  5/2008, 5/13, 6/14, 6/17    Q 3 years for advanced adenomatous polyp     CYSTOSCOPY  12/31/2008     D & C       GENITOURINARY SURGERY      TURBT     HC REMOVAL GALLBLADDER      open pan     HC TRABECULOPLASTY BY LASER SURGERY Left 4/18/07    SLT #1 OS (inf 180)     HC TRABECULOPLASTY BY LASER SURGERY Right 5/4/11    SLT #1 OD (inf 180?)     HC TRABECULOPLASTY BY LASER SURGERY Left 3/17/15    SLT #2 OS (sup 180)     HC TRABECULOPLASTY BY LASER SURGERY Right 6/23/15    SLT #2 OD (sup 180?)     LASER ARGON TREATMENT      SLT left eye x 2     LUMPECTOMY BREAST WITH SENTINEL NODE, COMBINED Left 7/29/2015    Procedure: COMBINED LUMPECTOMY BREAST WITH  SENTINEL NODE;  Surgeon: Ifeanyi Sunshine MD;  Location: [facility] OR     SURGICAL HISTORY OF -   9/11    squamous cell CA excised from back     TUBAL LIGATION         Social History:  Reviewed and unchanged    Family History:  Reviewed and unchanged     Medications:  Current Outpatient Prescriptions   Medication Sig Dispense Refill     Ferrous Sulfate (IRON SUPPLEMENT PO) Take 325 mg by mouth daily       alendronate (FOSAMAX) 70 MG tablet Take 1 tablet (70 mg) by mouth every 7 days Take with over 8 ounces water and stay upright for at least 30 minutes after dose.  Take at least 60 minutes before breakfast 12 tablet 3     losartan (COZAAR) 25 MG tablet Take 1 tablet (25 mg) by mouth daily 90 tablet 4     simvastatin (ZOCOR) 20 MG tablet Take 1 tablet (20 mg) by mouth At Bedtime 90 tablet 4     tamoxifen (NOLVADEX) 20 MG tablet Take 1 tablet (20 mg) by mouth daily 90 tablet 1     latanoprost (XALATAN) 0.005 % ophthalmic solution Place 1 drop into both eyes At Bedtime 1 Bottle 11     dorzolamide-timolol (COSOPT) 2-0.5 % ophthalmic solution Place 1 drop into both eyes 2 times daily 10 mL 11     Cyanocobalamin (B-12) 500 MCG TABS Take 500 mcg by mouth daily 150 tablet 3     triamcinolone (KENALOG) 0.1 % cream Apply to affected area of the face once to twice a day. 15 g 1     meclizine (ANTIVERT) 25 MG tablet Take 1 tablet (25 mg) by mouth every 6 hours as needed for dizziness 30 tablet 1     ibuprofen (ADVIL,MOTRIN) 200 MG tablet Take 200 mg by mouth every 4 hours as needed.       CALCIUM 600 MG OR TABS 1 daily       VITAMIN D-1000 MAX -1000 MG-UNIT OR TABS 1 daily         Allergies   Allergen Reactions     Lisinopril Cough       Review of Systems:  -Constitutional: The patient is otherwise feeling well.   -Skin Establ Pt: The patient denies any new rash, pruritus, or lesions that are symptomatic, changing or bleeding, except as per HPI.    Physical exam:  Vitals: There were no vitals taken for this visit.  GEN: This is a  well-nourished, well developed female in no acute distress  NEURO: Alert and oriented  PSYCH: in a pleasant mood, appropriate affect  SKIN: Total skin excluding the undergarment areas was performed. The exam included the head/face, neck, both arms, chest, back, abdomen, both legs, digits and/or nails.   -two AKs on the face  -there is a pink slightly orange-tinted annular papule on the right jaw line  -on the left dorsal hand there are two-three firm, almost calcified nodules  -scattered SKs on the examined surfaces  -site of prior skin cancers are well-healed without recurrence  -There are bright red some shaped papules scattered on examined surfaces.   -No other lesions of concern on areas examined.       Impression/Plan:  1. History of nonmelanoma skin cancer, no clincial evidence of recurrence    2. Actinic keratosis, left dorsal hand x 3  Cryotherapy procedure note: After verbal consent and discussion of risks and benefits including but no limited to dyspigmentation/scar, blister, and pain, 3 were treated with 1-2mm freeze border for 2 cycles with liquid nitrogen. Post cryotherapy instructions were provided.     3. Seborrheic keratoses, solar lentigines, and multiple benign nevi    Sun precaution was advised including the use of sun screens of SPF 30 or higher, sun protective clothing, and avoidance of tanning beds.    Benign nature was discussed. No further intervention required at this time.     4. 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 No treatments necessary. Can biopsy to confirm. Excision likely to remove. Pt declined at this point until it starts bothering her.  5. Granuloma annulare on right chin, resolving    Continue using triamcinolone 0.1% cream daily or BID x 3-4 week bursts.      Follow-up in 1 year for a skin check, earlier for new or changing lesions.       Staff Involved:  Resident (Nguyen)/Staff    I,Davin Cole, have seen, evaluated and discussed the patient with resident physician.  I  have reviewed the resident physicians note and agree with their clinical findings, assessment and plan.  Any appropriate changes to the resident's note have been made.    I was present during the key portion of the encounter and any procedures performed during this visit and I was immediately available for the entire procedure between opening and closing.       Davin Cole MD, MS    Department of Dermatology  [facility]: Phone: [phone], Fax:[phone]  [facility]: Phone: [phone], Fax: [phone]

## 2017-08-17 DIAGNOSIS — I10 BENIGN ESSENTIAL HYPERTENSION: ICD-10-CM

## 2017-08-17 DIAGNOSIS — E78.5 HYPERLIPIDEMIA LDL GOAL <160: ICD-10-CM

## 2017-08-17 NOTE — TELEPHONE ENCOUNTER
Losartan       Last Written Prescription Date: 06/26/2017  Last Fill Quantity: 90, # refills: 4    Last Office Visit with [facility], [facility] or [facility] prescribing provider:  06/26/2017   Future Office Visit:    Next 5 appointments (look out 90 days)     Sep 08, 2017  9:45 AM CDT   Return Visit with Kvng Garcia MD   [facility] ([facility])    [address] [facility] [address] [facility] [address]   [phone]            Sep 08, 2017  3:30 PM CDT   Return Visit with Usha Salgado MD   [facility])    [address] [facility] [id number] [phone]                    BP Readings from Last 3 Encounters:   06/26/17 124/64   04/27/17 138/80   01/03/17 132/86     Simvastatin      Last Written Prescription Date: 06/26/2017  Last Fill Quantity: 90, # refills: 4  Last Office Visit with [facility], [facility] or [facility] prescribing provider: 06/26/2017  Next 5 appointments (look out 90 days)     Sep 08, 2017  9:45 AM CDT   Return Visit with Kvng Garcia MD   [facility] ([facility])    [address] [facility] [address] [phone]            Sep 08, 2017  3:30 PM CDT   Return Visit with Usha Salgado MD   [facility])    [address] [facility] [id number] [phone]                   Lab Results   Component Value Date    CHOL 126 06/26/2017     Lab Results   Component Value Date    HDL 56 06/26/2017     Lab Results   Component Value Date    LDL 51 06/26/2017     Lab Results   Component Value Date    TRIG 94 06/26/2017     Lab Results   Component Value Date    CHOLHDLRATIO 2.9 06/22/2015

## 2017-08-22 RX ORDER — SIMVASTATIN 20 MG
TABLET ORAL
Qty: 90 TABLET | Refills: 3
Start: 2017-08-22

## 2017-08-22 RX ORDER — LOSARTAN POTASSIUM 25 MG/1
TABLET ORAL
Qty: 90 TABLET | Refills: 3
Start: 2017-08-22

## 2017-08-28 ENCOUNTER — RADIANT APPOINTMENT (OUTPATIENT)
Dept: MAMMOGRAPHY | Facility: CLINIC | Age: 82
End: 2017-08-28
Attending: INTERNAL MEDICINE
Payer: COMMERCIAL

## 2017-08-28 DIAGNOSIS — D64.9 NORMOCYTIC ANEMIA: ICD-10-CM

## 2017-08-28 DIAGNOSIS — C50.812 MALIGNANT NEOPLASM OF OVERLAPPING SITES OF LEFT FEMALE BREAST (H): ICD-10-CM

## 2017-08-28 DIAGNOSIS — Z12.31 VISIT FOR SCREENING MAMMOGRAM: ICD-10-CM

## 2017-08-28 PROCEDURE — G0202 SCR MAMMO BI INCL CAD: HCPCS | Performed by: RADIOLOGY

## 2017-08-28 PROCEDURE — 77063 BREAST TOMOSYNTHESIS BI: CPT | Performed by: RADIOLOGY

## 2017-09-06 ENCOUNTER — RADIANT APPOINTMENT (OUTPATIENT)
Dept: ULTRASOUND IMAGING | Facility: CLINIC | Age: 82
End: 2017-09-06
Attending: INTERNAL MEDICINE
Payer: COMMERCIAL

## 2017-09-06 DIAGNOSIS — R92.8 ABNORMAL MAMMOGRAM: ICD-10-CM

## 2017-09-06 PROCEDURE — 76882 US LMTD JT/FCL EVL NVASC XTR: CPT | Mod: LT | Performed by: STUDENT IN AN ORGANIZED HEALTH CARE EDUCATION/TRAINING PROGRAM

## 2017-09-08 ENCOUNTER — ONCOLOGY VISIT (OUTPATIENT)
Dept: ONCOLOGY | Facility: CLINIC | Age: 82
End: 2017-09-08
Payer: COMMERCIAL

## 2017-09-08 ENCOUNTER — RADIANT APPOINTMENT (OUTPATIENT)
Dept: ULTRASOUND IMAGING | Facility: CLINIC | Age: 82
End: 2017-09-08
Attending: INTERNAL MEDICINE
Payer: COMMERCIAL

## 2017-09-08 ENCOUNTER — OFFICE VISIT (OUTPATIENT)
Dept: OPHTHALMOLOGY | Facility: CLINIC | Age: 82
End: 2017-09-08
Payer: COMMERCIAL

## 2017-09-08 VITALS
TEMPERATURE: 97 F | HEIGHT: 61 IN | BODY MASS INDEX: 26.43 KG/M2 | SYSTOLIC BLOOD PRESSURE: 130 MMHG | WEIGHT: 140 LBS | HEART RATE: 63 BPM | OXYGEN SATURATION: 97 % | RESPIRATION RATE: 15 BRPM | DIASTOLIC BLOOD PRESSURE: 76 MMHG

## 2017-09-08 DIAGNOSIS — Z17.0 MALIGNANT NEOPLASM OF OVERLAPPING SITES OF LEFT BREAST IN FEMALE, ESTROGEN RECEPTOR POSITIVE (H): ICD-10-CM

## 2017-09-08 DIAGNOSIS — C50.912 INVASIVE DUCTAL CARCINOMA OF LEFT BREAST (H): Primary | ICD-10-CM

## 2017-09-08 DIAGNOSIS — C50.812 MALIGNANT NEOPLASM OF OVERLAPPING SITES OF LEFT FEMALE BREAST (H): ICD-10-CM

## 2017-09-08 DIAGNOSIS — C50.812 MALIGNANT NEOPLASM OF OVERLAPPING SITES OF LEFT BREAST IN FEMALE, ESTROGEN RECEPTOR POSITIVE (H): ICD-10-CM

## 2017-09-08 DIAGNOSIS — Z12.31 VISIT FOR SCREENING MAMMOGRAM: ICD-10-CM

## 2017-09-08 DIAGNOSIS — H43.812 PVD (POSTERIOR VITREOUS DETACHMENT), LEFT: ICD-10-CM

## 2017-09-08 DIAGNOSIS — H40.1431 PSEUDOEXFOLIATIVE GLAUCOMA, BOTH EYES, MILD STAGE: Primary | ICD-10-CM

## 2017-09-08 DIAGNOSIS — D64.9 NORMOCYTIC ANEMIA: ICD-10-CM

## 2017-09-08 DIAGNOSIS — H26.8 PXF (PSEUDOEXFOLIATION OF LENS CAPSULE): ICD-10-CM

## 2017-09-08 DIAGNOSIS — R92.8 ABNORMAL MAMMOGRAM: ICD-10-CM

## 2017-09-08 DIAGNOSIS — H25.13 NUCLEAR SCLEROSIS OF BOTH EYES: ICD-10-CM

## 2017-09-08 LAB
FERRITIN SERPL-MCNC: 228 NG/ML (ref 8–252)
HGB BLD-MCNC: 11.7 G/DL (ref 11.7–15.7)
IRON SATN MFR SERPL: 24 % (ref 15–46)
IRON SERPL-MCNC: 76 UG/DL (ref 35–180)
TIBC SERPL-MCNC: 319 UG/DL (ref 240–430)

## 2017-09-08 PROCEDURE — 82728 ASSAY OF FERRITIN: CPT | Performed by: INTERNAL MEDICINE

## 2017-09-08 PROCEDURE — 36415 COLL VENOUS BLD VENIPUNCTURE: CPT | Performed by: INTERNAL MEDICINE

## 2017-09-08 PROCEDURE — 92133 CPTRZD OPH DX IMG PST SGM ON: CPT | Performed by: STUDENT IN AN ORGANIZED HEALTH CARE EDUCATION/TRAINING PROGRAM

## 2017-09-08 PROCEDURE — 76942 ECHO GUIDE FOR BIOPSY: CPT | Performed by: INTERNAL MEDICINE

## 2017-09-08 PROCEDURE — 19000 PUNCTURE ASPIR CYST BREAST: CPT | Performed by: INTERNAL MEDICINE

## 2017-09-08 PROCEDURE — 83550 IRON BINDING TEST: CPT | Performed by: INTERNAL MEDICINE

## 2017-09-08 PROCEDURE — 92012 INTRM OPH EXAM EST PATIENT: CPT | Performed by: STUDENT IN AN ORGANIZED HEALTH CARE EDUCATION/TRAINING PROGRAM

## 2017-09-08 PROCEDURE — 99214 OFFICE O/P EST MOD 30 MIN: CPT | Mod: 25 | Performed by: INTERNAL MEDICINE

## 2017-09-08 PROCEDURE — 83540 ASSAY OF IRON: CPT | Performed by: INTERNAL MEDICINE

## 2017-09-08 PROCEDURE — 85018 HEMOGLOBIN: CPT | Performed by: INTERNAL MEDICINE

## 2017-09-08 RX ORDER — TAMOXIFEN CITRATE 20 MG/1
20 TABLET ORAL DAILY
Qty: 90 TABLET | Refills: 3 | Status: SHIPPED | OUTPATIENT
Start: 2017-09-08 | End: 2017-10-13

## 2017-09-08 RX ADMIN — Medication 1 MEQ: at 13:33

## 2017-09-08 ASSESSMENT — VISUAL ACUITY
OS_PH_CC: 50+1
OS_CC: 20/50
OD_CC+: +2
OD_PH_CC: 40-1
OD_CC: 20/50
METHOD: SNELLEN - LINEAR
CORRECTION_TYPE: GLASSES

## 2017-09-08 ASSESSMENT — TONOMETRY
IOP_METHOD: APPLANATION
OD_IOP_MMHG: 14
OS_IOP_MMHG: 13

## 2017-09-08 ASSESSMENT — CUP TO DISC RATIO
OS_RATIO: 0.5 UD
OD_RATIO: 0.6 UD

## 2017-09-08 ASSESSMENT — SLIT LAMP EXAM - LIDS
COMMENTS: NORMAL
COMMENTS: NORMAL

## 2017-09-08 ASSESSMENT — PAIN SCALES - GENERAL: PAINLEVEL: NO PAIN (0)

## 2017-09-08 ASSESSMENT — EXTERNAL EXAM - LEFT EYE: OS_EXAM: NORMAL

## 2017-09-08 ASSESSMENT — EXTERNAL EXAM - RIGHT EYE: OD_EXAM: NORMAL

## 2017-09-08 NOTE — NURSING NOTE
"Oncology Rooming Note    September 8, 2017 3:30 PM   Cydney Christiansen is a 81 year old female who presents for:    Chief Complaint   Patient presents with     Oncology Clinic Visit     follow up      Initial Vitals: /76  Pulse 63  Temp 97  F (36.1  C)  Resp 15  Ht 1.537 m (5' 0.51\")  Wt 63.5 kg (140 lb)  SpO2 97%  BMI 26.88 kg/m2 Estimated body mass index is 26.88 kg/(m^2) as calculated from the following:    Height as of this encounter: 1.537 m (5' 0.51\").    Weight as of this encounter: 63.5 kg (140 lb). Body surface area is 1.65 meters squared.  No Pain (0) Comment: Data Unavailable   No LMP recorded. Patient is postmenopausal.  Allergies reviewed: Yes  Medications reviewed: Yes    Medications: Medication refills not needed today.  Pharmacy name entered into [employer]: [id number] - [place], [place] - [address]        5 minutes for nursing intake (face to face time)     April Cagle LPN              "

## 2017-09-08 NOTE — MR AVS SNAPSHOT
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)    [address] [address] [place] [address] [phone]           Patient must bring picture ID. Patient should be prepared to give a urine specimen  Please do not eat 10-12 hours before your appointment if you are coming in fasting for labs on lipids, cholesterol, or glucose (sugar). Pregnant women should follow their Care Team instructions. Water with medications is okay. Do not drink coffee or other fluids. If you have concerns about taking  your medications, please ask at office or if scheduling via "[employer], send a message by clicking on Secure Messaging, Message Your Care Team.            Mar 08, 2018 12:30 PM CST   Return Visit with Usha Salgado MD   [facility])    [address] [facility] [id number] [phone]            Jun 28, 2018 11:15 AM CDT   Return Visit with Qi Barba MD   [facility])    [address] [facility] [address] [phone]              Future tests that were ordered for you today      Open Future Orders        Priority Expected Expires Ordered    CBC with platelets differential Routine  9/8/2018 9/8/2017    Hepatic panel Routine  9/8/2018 9/8/2017    Creatinine Routine  9/8/2018 9/8/2017            Who to contact     If you have questions or need follow up information about today's clinic visit or your schedule please contact [facility] directly at [phone].  Normal or non-critical lab and imaging results will be communicated to you by ImageVisionhart, letter or phone within 4 business days after the clinic has received the results. If you do not hear from us within 7 days, please contact the clinic through ImageVisionhart or phone. If you have a critical or abnormal lab result, we will notify you by phone as soon as possible.  Submit refill requests through [employer] or call your pharmacy and they will forward the refill request to us. Please allow 3 business days for your refill to be completed.          Additional Information About Your Visit        ImageVisionharQBuy Information     [employer] gives you secure access to your electronic health record. If you see a primary care provider, you can also send messages to your care team and make appointments. If you have questions, please call your primary care clinic.  If you do not have a primary care provider, please call [phone] and they will assist you.      B[employer]els is an electronic gateway that provides easy, online access to your medical records. With [employer], you can request a clinic appointment, read your test results, renew a prescription or communicate with your care team.     To access your existing account, please contact your [facility] or call [phone] for assistance.        Care EveryWhere ID     This is your Care EveryWhere ID. This could be used by other organizations to access your [place] medical records  [id number]        Your Vitals Were     Pulse Temperature Respirations Height Pulse  "Oximetry BMI (Body Mass Index)    63 97  F (36.1  C) 15 1.537 m (5' 0.51\") 97% 26.88 kg/m2       Blood Pressure from Last 3 Encounters:   09/08/17 130/76   06/26/17 124/64   04/27/17 138/80    Weight from Last 3 Encounters:   09/08/17 63.5 kg (140 lb)   06/26/17 64 kg (141 lb)   04/27/17 65.3 kg (144 lb)                 Where to get your medicines      These [id number] [facility] - [address]  [address] [place] [address]     Phone:  [phone]     tamoxifen 20 MG tablet          Primary Care Provider Office Phone # Fax #    Kadi Sanchez [phone]       [facility] [address] [facility] [address]        Equal Access to Services     [facility]: Hadii aad ku hadasho Soomaali, waaxda luqadaha, qaybta kaalmada adeegyada, waxay kaushal solomon . So [facility] [phone].    ATENCIÓN: Si oly espcarrington, tiene a chavez disposición servicios gratuitos de asistencia lingüística. Llame al [phone].    We comply with applicable federal civil rights laws and [place] laws. We do not discriminate on the basis of race, color, national origin, age, disability sex, sexual orientation or gender identity.            Thank you!     Thank you for choosing [facility]  for your care. Our goal is always to provide you with excellent care. Hearing back from our patients is one way we can continue to improve our services. Please take a few minutes to complete the written survey that you may receive in the mail after your visit with us. Thank you!             Your Updated Medication List - Protect others around you: Learn how to safely use, store and throw away your medicines at www.disposemymeds.org.          This list is accurate as of: 9/8/17  3:46 PM.  Always use your most recent med list.                   Brand Name Dispense Instructions for use Diagnosis    alendronate 70 MG tablet    FOSAMAX    " 12 tablet    Take 1 tablet (70 mg) by mouth every 7 days Take with over 8 ounces water and stay upright for at least 30 minutes after dose.  Take at least 60 minutes before breakfast    Osteoporosis, unspecified osteoporosis type, unspecified pathological fracture presence       B-12 500 MCG Tabs     150 tablet    Take 500 mcg by mouth daily    B12 deficiency       calcium 600 MG tablet      1 daily        dorzolamide-timolol 2-0.5 % ophthalmic solution    COSOPT    10 mL    Place 1 drop into both eyes 2 times daily    PXF (pseudoexfoliation of lens capsule)       ibuprofen 200 MG tablet    ADVIL/MOTRIN     Take 200 mg by mouth every 4 hours as needed.        IRON SUPPLEMENT PO      Take 325 mg by mouth daily        latanoprost 0.005 % ophthalmic solution    XALATAN    1 Bottle    Place 1 drop into both eyes At Bedtime    PXF (pseudoexfoliation of lens capsule)       losartan 25 MG tablet    COZAAR    90 tablet    Take 1 tablet (25 mg) by mouth daily    Benign essential hypertension       meclizine 25 MG tablet    ANTIVERT    30 tablet    Take 1 tablet (25 mg) by mouth every 6 hours as needed for dizziness    Vertigo       simvastatin 20 MG tablet    ZOCOR    90 tablet    Take 1 tablet (20 mg) by mouth At Bedtime    Hyperlipidemia LDL goal <160       tamoxifen 20 MG tablet    NOLVADEX    90 tablet    Take 1 tablet (20 mg) by mouth daily    Malignant neoplasm of overlapping sites of left breast in female, estrogen receptor positive (H)       triamcinolone 0.1 % cream    KENALOG    15 g    Apply to affected area of the face once to twice a day.    Granuloma annulare       VITAMIN D-1000 MAX ST 1000 UNITS Tabs   Generic drug:  cholecalciferol      1 daily

## 2017-09-08 NOTE — MR AVS SNAPSHOT
After Visit Summary   9/8/2017    Cydney Christiansen    MRN: [id number]           Patient Information     Date Of Birth          1935        Visit Information        Provider Department      9/8/2017 9:45 AM Kvng Garcia MD [facility]        Today's Diagnoses     PXF (PSEUDOEXFOLIATION OF LENS CAPSULE) OU    -  1    PVD (posterior vitreous detachment), left        Nuclear sclerosis of both eyes          Care Instructions    Continue Cosopt both eyes twice a day, Latanoprost both eyes every evening.  Offered cataract surgery left eye at anytime. Call Anika HALL @ [phone] to schedule.     Kvng Garcia MD  [phone]            Follow-ups after your visit        Follow-up notes from your care team     Return in about 6 months (around 3/8/2018) for IOP check, HVF.      Your next 10 appointments already scheduled     Sep 08, 2017  1:15 PM CDT   US BREAST ASPIRATION CYST INITIAL LEFT with MGMUS1, MG US TECH, MG BREAST RAD, MG BREAST NURSE   [facility] ([facility])    [address] [place] [address] [phone]           Tell us in advance if there s any chance you may be pregnant.  Bring a list of your medicines to the exam. Include vitamins, minerals and over-the-counter drugs.  If you take blood thinners, you may need to stop taking them a few days before treatment. Talk to your doctor before stopping these medicines. You will need a blood test the morning of your exam.   Stop taking Coumadin (warfarin) 3 days before your exam. Restart the day after your exam.   If you take aspirin, you may need to stop taking it 3 days before your scan.   If you take Plavix, Ticlid, Pletal or Persantine, you may need to stop taking them 5 days before your scan. Please talk to your doctor before stopping these medicines.  If you will receive sedation for this test (medicine to help you relax):   See your family doctor for an exam  within 30 days of treatment.   Plan for an adult to drive you home and stay with you for at least 6 hours.   Follow the eating and drinking guidelines checked below:   No eating or drinking for 4 hours before your test. You may take medicine with small sips of water.   If you have diabetes:If you take insulin, call your diabetes care team. Do not take diabetes pills on the morning of your test. If you take metformin (Avandamet, Glucophage, Glucovance, Metaglip) and received contrast, wait 48 hours before re-starting this medicine.  Please call the Imaging Department at your exam site with any questions.            Sep 08, 2017  1:30 PM CDT   US BIOPSY CORE LYMPH NODE with MGMUS1, MG BREAST RAD, MG BREAST NURSE, MG US TECH   [facility] ([facility])    [address] [place] [address] [phone]           Tell us in advance if there s any chance you may be pregnant.  Bring a list of your medicines to the exam. Include vitamins, minerals and over-the-counter drugs.  If you take blood thinners, you may need to stop taking them a few days before treatment. Talk to your doctor before stopping these medicines. You will need a blood test the morning of your exam.   Stop taking Coumadin (warfarin) 3 days before your exam. Restart the day after your exam.   If you take aspirin, you may need to stop taking it 3 days before your scan.   If you take Plavix, Ticlid, Pletal or Persantine, you may need to stop taking them 5 days before your scan. Please talk to your doctor before stopping these medicines.  If you will receive sedation for this test (medicine to help you relax):   See your family doctor for an exam within 30 days of treatment.   Plan for an adult to drive you home and stay with you for at least 6 hours.   Follow the eating and drinking guidelines checked below:   No eating or drinking for 4 hours before your test. You may take medicine with small sips of water.    If you have diabetes:If you take insulin, call your diabetes care team. Do not take diabetes pills on the morning of your test. If you take metformin (Avandamet, Glucophage, Glucovance, Metaglip) and received contrast, wait 48 hours before re-starting this medicine.  Please call the Imaging Department at your exam site with any questions.            Sep 08, 2017  2:45 PM CDT   LAB with LAB ONC [facility] ([facility])    [address] [address] [place] [address] [phone]           Patient must bring picture ID. Patient should be prepared to give a urine specimen  Please do not eat 10-12 hours before your appointment if you are coming in fasting for labs on lipids, cholesterol, or glucose (sugar). Pregnant women should follow their Care Team instructions. Water with medications is okay. Do not drink coffee or other fluids. If you have concerns about taking  your medications, please ask at office or if scheduling via [employer], send a message by clicking on Secure Messaging, Message Your Care Team.            Sep 08, 2017  3:30 PM CDT   Return Visit with Usha Salgado MD   [facility]    [address] [place] [address] [phone]              Who to contact     If you have questions or need follow up information about today's clinic visit or your schedule please contact [facility] [place] [facility]"" directly at [phone].  Normal or non-critical lab and imaging results will be communicated to you by MyChart, letter or phone within 4 business days after the clinic has received the results. If you do not hear from us within 7 days, please contact the clinic through MyChart or phone. If you have a critical or abnormal lab result, we will notify you by phone as soon as possible.  Submit refill requests through [employer] or call your pharmacy and they will forward the refill request to us. Please allow 3  business days for your refill to be completed.          Additional Information About Your Visit        MyChart Information     GeoIQhart gives you secure access to your electronic health record. If you see a primary care provider, you can also send messages to your care team and make appointments. If you have questions, please call your primary care clinic.  If you do not have a primary care provider, please call [phone] and they will assist you.        Care EveryWhere ID     This is your Care EveryWhere ID. This could be used by other organizations to access your [place] medical records  [id number]         Blood Pressure from Last 3 Encounters:   06/26/17 124/64   04/27/17 138/80   01/03/17 132/86    Weight from Last 3 Encounters:   06/26/17 64 kg (141 lb)   04/27/17 65.3 kg (144 lb)   12/22/16 64.4 kg (142 lb)              Today, you had the following     No orders found for display       Primary Care Provider Office Phone # Fax #    Kadi Sanchez [phone]       [address] [address]        Equal Access to Services     ESTELA CORCORAN : Hadii aad ku hadasho Soomaali, waaxda luqadaha, qaybta kaalmada adeegyada, morgan amadon day solomon . So [facility] [phone].    ATENCIÓN: Si habla español, tiene a chavez disposición servicios gratuitos de asistencia lingüística. Llame al [phone].    We comply with applicable federal civil rights laws and [place] laws. We do not discriminate on the basis of race, color, national origin, age, disability sex, sexual orientation or gender identity.            Thank you!     Thank you for choosing [facility]  for your care. Our goal is always to provide you with excellent care. Hearing back from our patients is one way we can continue to improve our services. Please take a few minutes to complete the written survey that you may receive in the mail after your visit with us.  Thank you!             Your Updated Medication List - Protect others around you: Learn how to safely use, store and throw away your medicines at www.disposemymeds.org.          This list is accurate as of: 9/8/17 10:50 AM.  Always use your most recent med list.                   Brand Name Dispense Instructions for use Diagnosis    alendronate 70 MG tablet    FOSAMAX    12 tablet    Take 1 tablet (70 mg) by mouth every 7 days Take with over 8 ounces water and stay upright for at least 30 minutes after dose.  Take at least 60 minutes before breakfast    Osteoporosis, unspecified osteoporosis type, unspecified pathological fracture presence       B-12 500 MCG Tabs     150 tablet    Take 500 mcg by mouth daily    B12 deficiency       calcium 600 MG tablet      1 daily        dorzolamide-timolol 2-0.5 % ophthalmic solution    COSOPT    10 mL    Place 1 drop into both eyes 2 times daily    PXF (pseudoexfoliation of lens capsule)       ibuprofen 200 MG tablet    ADVIL/MOTRIN     Take 200 mg by mouth every 4 hours as needed.        IRON SUPPLEMENT PO      Take 325 mg by mouth daily        latanoprost 0.005 % ophthalmic solution    XALATAN    1 Bottle    Place 1 drop into both eyes At Bedtime    PXF (pseudoexfoliation of lens capsule)       losartan 25 MG tablet    COZAAR    90 tablet    Take 1 tablet (25 mg) by mouth daily    Benign essential hypertension       meclizine 25 MG tablet    ANTIVERT    30 tablet    Take 1 tablet (25 mg) by mouth every 6 hours as needed for dizziness    Vertigo       simvastatin 20 MG tablet    ZOCOR    90 tablet    Take 1 tablet (20 mg) by mouth At Bedtime    Hyperlipidemia LDL goal <160       tamoxifen 20 MG tablet    NOLVADEX    90 tablet    Take 1 tablet (20 mg) by mouth daily    Malignant neoplasm of overlapping sites of left female breast (H)       triamcinolone 0.1 % cream    KENALOG    15 g    Apply to affected area of the face once to twice a day.    Granuloma annulare       VITAMIN  D-1000 MAX ST 1000 UNITS Tabs   Generic drug:  cholecalciferol      1 daily

## 2017-09-08 NOTE — PROGRESS NOTES
Current Eye Medications:  cosopt both eyes twice a day, Latanoprost both eyes every evening.  Last drops:  8am.     Subjective:  Follow up Pseudoexfoliation Glaucoma.  Patient is here for a pressure check and OCT.  She is aware of the blurred vision in her left eye and thinks she might schedule cataract surgery.       Objective:  See Ophthalmology Exam.      Assessment:  Cydney Christiansen is a 81 year old female who presents with:   Encounter Diagnoses   Name Primary?     Pseudoexfoliative glaucoma, both eyes, mild stage Intraocular pressure 14/13 today.  OCT: stable right eye, slight increase in thinning (89 to 78) inferiorly left eye.       Nuclear sclerosis of both eyes      PXF (PSEUDOEXFOLIATION OF LENS CAPSULE) OU      PVD (posterior vitreous detachment), left        Plan:  Continue Cosopt both eyes twice a day, Latanoprost both eyes every evening.  Offered cataract surgery left eye at anytime. Call Anika HALL @ [phone] to schedule.     Kvng Garcia MD  [phone]

## 2017-09-08 NOTE — PROGRESS NOTES
Oncology Follow-up visit:  Date on this visit: 9/8/2017      Primary Care Physician: Kadi Herring   Surgeon: Dr. Ifeanyi Sunshine.     Diagnosis:  1. 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 She was not recommended in favor of adjuvant radiation or chemotherapy, and has been on adjuvant Tamoxifen (AI not chosen due to OP and hx of compression fractures), since 08/24/2015.    Oncologic History:  1. Breast Cancer - The patient underwent a screening mammogram on 5/29/2015, which demonstrated a 9 mm lesion around the 1 o'clock position in the left breast. She had an ultrasound which confirmed a lesion to be about 9 mm. She underwent a core needle biopsy in [facility], which demonstrated invasive ductal cancer, grade 2, estrogen receptor positive (99%, strong) and  progesterone receptor positive (91%, strong) by immunohistochemistry and HER2 not amplified by FISH (ratio 1.0).   She then met with Dr. Sunshine and underwent L lumpectomy and axillary sentinel LN biopsy. The pathology from the surgery showed a 10 mm invasive ductal carcinoma, [place] grade 2.  There was no associated DCIS. Closest surgical margin was 3 mm from the anterior margin. There was no lymphovascular invasion and all 3 sentinel lymph nodes were negative for malignancy.   She was not recommended in favor of adjuvant radiation or chemotherapy.  Due  compression fracture history felt to be osteoporotic fractures, Tamoxifen was chosen over an AI. She started on Tamoxifen on 8/24/2015    2. Compression Fractures- Patient sustained a compression fracture of T7  in July 2015. On T spine MRI there were also old compression fractures of T3, T4 and T8 noted. She was on Fosamax for 10 years and stopped it in 2012. She developed compression fracture in 07/2015 and was restarted on Fosamax.    3. She has a  remote history of TCC of the bladder (in 1993), s/p TURBT   4. 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         History Of Present Illness:  Ms. Christiansen is a 81 year old female who presents for f/up of breast cancer. She transferred  her care to us for convenience from Dr. Walter in 05/2016. She lives in [place]. She has been on Tamoxifen since 8/24/2015 and has tolerated it reasonably well.  She has been on weekly Fosamax as well.  She has a remote history of TCC of the bladder (in 1993), s/p TURBT and follows up with Dr. Juarez annually for cystoscopy, last in 01/2017. She had a cystoscopy at that time, and it showed no e/o recurrent bladder tumor.  She was noted to have mild anemia in Aug 2016. Hb was down to 11.2 g/dl. MCV was normal. She reports there is a Hx of anemia in her sister and mother, due to iron and B12 deficiency. In 12/2016 she had workup for her anemia. TSH was normal. Serum folate was normal. Iron studies were normal including ferritin of 213. She had normal LFTs and creatinine. B12 level was normal at over 500.  Peripheral blood smear on 12/23/2016 showed  slight normochromic, normocytic anemia without increased   erythrocyte regeneration. The red blood cells appeared normochromic. Poikilocytosis was minimal.   Serum protein electrophoresis and serum immunofixation showed no M protein.  We'll recommend vitamin B12 supplementation and she has been on 500 mcg orally daily, and she has been on ferrous sulfate 325 mg PO daily as well.   Since our last visit, she underwent endoscopy and colonoscopy on 6/16/2017 by Dr. Harriet Jacobs at [facility]. On colonoscopy she was noted to have three sessile polyps in the ascending colon, resected. The endoscopy showed normal esophagus, stomach and duodenum. Gastric and small bowel biopsies were normal and there was no e/o celiac disease or H.Pylori. The colonic polyps on pathology showed one tubular adenoma and two serrated adenomas.  She  follows up with Dr. Cole for NMSC. She was last seen in 06/2017 with no e/o NMSC recurrence. Follow-up was recommended in one year.   B/l screening mammogram with tomosynthesis was negative in 08/2016.She is feeling well and has no complaints.  She denies any melena, blood in stool or BRBPR.  On 8/28/2017 she underwent a bilateral digital screening mammography and bilateral digital tomosynthesis with computer aided detection. The breast tissue was noted to be heterogeneously dense. There were breast conservation therapy changes on the left.  On MLO  view there was a round circumscribed mass in the left axilla.  On 9/6/2017 she underwent L axillary US which showed a 1.7 x 1.6 x 1.7 cm cyst with posterior acousticenhancement, no internal vascularity or solid component and located just above the axillary dissection scar that correlates with  mammographic finding. Benign appearing left axillary lymph nodes were  seen during exam. She underwent axillary cyst aspiration on 9/8/2017. Ultrasound of the left axilla demonstrates a fluid collection that  measures approximately 1.6 x 1.6 x 1.7 cm without internal complexity. Benign Finding(s).  Simple fluid collection compatible with post-operative seroma. She had it aspirated and no pathology was sent due to benign appearing cyst with clear fluid. She is relieved to hear of benign findings on the axillary US. She is feeling very well and has no health related complaints.  In addition, a complete 12 point  review of systems is negative.      Past Medical/Surgical History:  Past Medical History:   Diagnosis Date     Actinic keratosis      Basal cell cancer 02/2011    bcc of the L back.     Basal cell carcinoma 04' , 06'     Basal cell carcinoma 06/2011    L neck     Breast cancer (H)      Cataract      Colon polyps     Precancer     Glaucoma (increased eye pressure)      Hypertension goal BP (blood pressure) < 140/90 12/19/2013     Invasive ductal carcinoma of breast (H) 6/2015     left     Osteoporosis      Scoliosis      Skin cancer      Skin cancer 05/2013    sccis R cheek     Squamous cell carcinoma 09/2011    R upper back     Squamous cell carcinoma 10/2012    R dorsal hand     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 7/13    left leg     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     Past Surgical History:   Procedure Laterality Date     COLONOSCOPY  5/2008, 5/13, 6/14, 6/17    Q 3 years for advanced adenomatous polyp     CYSTOSCOPY  12/31/2008     D & C       GENITOURINARY SURGERY      TURBT     HC REMOVAL GALLBLADDER      open pan     HC TRABECULOPLASTY BY LASER SURGERY Left 4/18/07    SLT #1 OS (inf 180)     HC TRABECULOPLASTY BY LASER SURGERY Right 5/4/11    SLT #1 OD (inf 180?)     HC TRABECULOPLASTY BY LASER SURGERY Left 3/17/15    SLT #2 OS (sup 180)     HC TRABECULOPLASTY BY LASER SURGERY Right 6/23/15    SLT #2 OD (sup 180?)     LASER ARGON TREATMENT      SLT left eye x 2     LUMPECTOMY BREAST WITH SENTINEL NODE, COMBINED Left 7/29/2015    Procedure: COMBINED LUMPECTOMY BREAST WITH SENTINEL NODE;  Surgeon: Ifeanyi Sunshine MD;  Location: [facility] OR     SURGICAL HISTORY OF -   9/11    squamous cell CA excised from back     TUBAL LIGATION     FHx and SocHx reviewed     Allergies:  Allergies as of 09/08/2017 - Juan Antonio as Reviewed 09/08/2017   Allergen Reaction Noted     Lisinopril Cough 09/16/2014     Current Medications:  Current Outpatient Prescriptions   Medication Sig Dispense Refill     Ferrous Sulfate (IRON SUPPLEMENT PO) Take 325 mg by mouth daily       alendronate (FOSAMAX) 70 MG tablet Take 1 tablet (70 mg) by mouth every 7 days Take with over 8 ounces water and stay upright for at least 30 minutes after dose.  Take at least 60 minutes before breakfast 12 tablet 3     losartan (COZAAR) 25 MG tablet Take 1 tablet (25 mg) by mouth daily 90 tablet 4     simvastatin (ZOCOR) 20 MG tablet Take 1 tablet (20 mg) by mouth At Bedtime 90 tablet 4     tamoxifen (NOLVADEX)  "20 MG tablet Take 1 tablet (20 mg) by mouth daily 90 tablet 1     latanoprost (XALATAN) 0.005 % ophthalmic solution Place 1 drop into both eyes At Bedtime 1 Bottle 11     dorzolamide-timolol (COSOPT) 2-0.5 % ophthalmic solution Place 1 drop into both eyes 2 times daily 10 mL 11     Cyanocobalamin (B-12) 500 MCG TABS Take 500 mcg by mouth daily 150 tablet 3     triamcinolone (KENALOG) 0.1 % cream Apply to affected area of the face once to twice a day. 15 g 1     meclizine (ANTIVERT) 25 MG tablet Take 1 tablet (25 mg) by mouth every 6 hours as needed for dizziness 30 tablet 1     ibuprofen (ADVIL,MOTRIN) 200 MG tablet Take 200 mg by mouth every 4 hours as needed.       CALCIUM 600 MG OR TABS 1 daily       VITAMIN D-1000 MAX -1000 MG-UNIT OR TABS 1 daily          Physical Exam:    /76  Pulse 63  Temp 97  F (36.1  C)  Resp 15  Ht 1.537 m (5' 0.51\")  Wt 63.5 kg (140 lb)  SpO2 97%  BMI 26.88 kg/m2      GENERAL APPEARANCE: elderly, alert and no distress      NECK: no adenopathy, no asymmetry or masses     LYMPHATICS: No cervical, supraclavicular, axillary  lymphadenopathy     RESP: lungs clear to auscultation - no rales, rhonchi or wheezes     CARDIOVASCULAR: regular rates and rhythm, normal S1 S2, no S3 or S4 and no murmur.     ABDOMEN:  soft, nontender, no HSM or masses and bowel sounds normal     MUSCULOSKELETAL: extremities normal- no gross deformities noted, no evidence of inflammation in joints, FROM in all extremities. No edema b/l LE.     SKIN: no suspicious lesions or rashes     PSYCHIATRIC: mentation appears normal and affect normal      Laboratory/Imaging Studies    Orders Only on 09/08/2017   Component Date Value Ref Range Status     Hemoglobin 09/08/2017 11.7  11.7 - 15.7 g/dL Final     Iron 09/08/2017 76  35 - 180 ug/dL Final     Iron Binding Cap 09/08/2017 319  240 - 430 ug/dL Final     Iron Saturation Index 09/08/2017 24  15 - 46 % Final     Ferritin 09/08/2017 228  8 - 252 ng/mL Final "         ASSESSMENT/PLAN:    Gera is a 81 year old woman with stage Ia, [id number], grade II, ER positive, CT positive, 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 She was not recommended in favor of adjuvant radiation or chemotherapy, and has been on adjuvant Tamoxifen (AI not chosen due to OP and hx of compression fractures), since 08/24/2015.    1. Breast cancer - continue daily Tamoxifen, tolerating well. F/up with us in 6 months, with labs.    2. Hx of OP with compression Fx- continue Fosamax, calcium and vitamin D supplementation.   3. Breast cancer screening- b/l screening mammogram with tomosynthesis due in 08/2018.    4. Hx of TCC of bladder- s/p  s/p TURBT in 1993 and follows up with Dr. Juarez annually for cystoscopy, next in 01/2018.     5.Hx of NMSC- f/up with dermatology annually, next  In 06/2018.    6. Mild normocytic anemia- Hb now low normal. B12 level within normal limits. Continue on PO ferrous sulfate 325 mg daily. EGD and colonoscopy in June 2017 negative for source of blood loss. If recurrent/worsening anemia, consider capsule endoscopy.  At the end of our visit patient verbalized understanding and concurred with the plan.

## 2017-09-14 ENCOUNTER — TELEPHONE (OUTPATIENT)
Dept: OPHTHALMOLOGY | Facility: CLINIC | Age: 82
End: 2017-09-14

## 2017-09-14 DIAGNOSIS — H25.13 NUCLEAR SCLEROSIS OF BOTH EYES: Primary | ICD-10-CM

## 2017-09-14 RX ORDER — DEXAMETHASONE ACETATE, MICRO 100 %
1 POWDER (GRAM) MISCELLANEOUS 4 TIMES DAILY
Qty: 1 BOTTLE | Refills: 0 | Status: SHIPPED | OUTPATIENT
Start: 2017-11-06 | End: 2018-01-16

## 2017-09-14 RX ORDER — MONOCHLOROACETIC ACID
1 CRYSTALS MISCELLANEOUS 4 TIMES DAILY
Qty: 1 BOTTLE | Refills: 0 | Status: SHIPPED | OUTPATIENT
Start: 2017-11-06 | End: 2018-01-16

## 2017-09-14 RX ORDER — BROMFENAC SODIUM 100 %
1 POWDER (GRAM) MISCELLANEOUS 4 TIMES DAILY
Qty: 1 BOTTLE | Refills: 0 | Status: SHIPPED | OUTPATIENT
Start: 2017-11-06 | End: 2017-10-23

## 2017-10-11 NOTE — PATIENT INSTRUCTIONS
Before Your Surgery      Call your surgeon if there is any change in your health. This includes signs of a cold or flu (such as a sore throat, runny nose, cough, rash or fever).    Do not smoke, drink alcohol or take over the counter medicine (unless your surgeon or primary care doctor tells you to) for the 24 hours before and after surgery.    If you take prescribed drugs: Follow your doctor s orders about which medicines to take and which to stop until after surgery.    Eating and drinking prior to surgery: follow the instructions from your surgeon    Take a shower or bath the night before surgery. Use the soap your surgeon gave you to gently clean your skin. If you do not have soap from your surgeon, use your regular soap. Do not shave or scrub the surgery site.  Wear clean pajamas and have clean sheets on your bed.     [facility]    If you have any questions regarding to your visit please contact your care team:       Team Purple:   Clinic Hours Telephone Number   Dr. Kadi Rivera   7am-7pm  Monday - Thursday   7am-5pm  Fridays  [phone]  (Appointment scheduling available 24/7)    Questions about your Visit?   Team Line:  [phone]   Urgent Care - [place] [facility] - 11am-9pm Monday-Friday Saturday-Sunday- 9am-5pm   [place] - 5pm-9pm Monday-Friday Saturday-Sunday- 9am-5pm  [phone] - [place]   [phone] - [place]       What options do I have for visits at the clinic other than the traditional office visit?  To expand how we care for you, many of our providers are utilizing electronic visits (e-visits) and telephone visits, when medically appropriate, for interactions with their patients rather than a visit in the clinic.   We also offer nurse visits for many medical concerns. Just like any other service, we will bill your insurance company for this type of visit based on time spent on the phone  with your provider. Not all insurance companies cover these visits. Please check with your medical insurance if this type of visit is covered. You will be responsible for any charges that are not paid by your insurance.      E-visits via [employer]:  generally incur a $35.00 fee.  Telephone visits:  Time spent on the phone: *charged based on time that is spent on the phone in increments of 10 minutes. Estimated cost:   5-10 mins $30.00   11-20 mins. $59.00   21-30 mins. $85.00     Use [employer] (secure email communication and access to your chart) to send your primary care provider a message or make an appointment. Ask someone on your Team how to sign up for [employer].  For a Price Quote for your services, please call our Consumer Price Line at [phone].  As always, Thank you for trusting us with your health care needs!

## 2017-10-11 NOTE — PROGRESS NOTES
[facility]  [address] [address]  [phone]  Dept: [phone]    PRE-OP EVALUATION:  Today's date: 10/23/2017    Cydney Christiansen (: 1935) presents for pre-operative evaluation assessment as requested by Dr. Kvng Garcia.  She requires evaluation and anesthesia risk assessment prior to undergoing surgery/procedure for treatment of left cataract .  Proposed procedure: Left Eye Cataract     Date of Surgery/ Procedure: 2017  Time of Surgery/ Procedure: 12:15 PM  Hospital/Surgical Facility: [facility]   Fax number for surgical facility:   Primary Physician: Kadi Sanchez  Type of Anesthesia Anticipated: to be determined    Patient has a Health Care Directive or Living Will:  NO    Preop Questions 10/20/2017   1.  Do you have a history of heart attack, stroke, stent, bypass or surgery on an artery in the head, neck, heart or legs? No   2.  Do you ever have any pain or discomfort in your chest? No   3.  Do you have a history of  Heart Failure? No   4.   Are you troubled by shortness of breath when:  walking on a level surface, or up a slight hill, or at night? No   5.  Do you currently have a cold, bronchitis or other respiratory infection? No   6.  Do you have a cough, shortness of breath, or wheezing? No   7.  Do you sometimes get pains in the calves of your legs when you walk? No    8. Do you or anyone in your family have previous history of blood clots? No   9.  Do you or does anyone in your family have a serious bleeding problem such as prolonged bleeding following surgeries or cuts? No   10. Have you ever had problems with anemia or been told to take iron pills? YES -  Recently since her breast cancer   11. Have you had any abnormal blood loss such as black, tarry or bloody stools, or abnormal vaginal bleeding? No   12. Have you ever had a blood transfusion? No   13. Have you or any of your relatives ever had problems with  anesthesia? YES -  Hard to wake up.    14. Do you have sleep apnea, excessive snoring or daytime drowsiness? No   15. Do you have any prosthetic heart valves? No   16. Do you have prosthetic joints? No   17. Is there any chance that you may be pregnant? No           HPI:  Vision is poor in left eye due to cataract                                                       Brief HPI related to upcoming procedure: has left cataract       HYPERTENSION - Patient has longstanding history of mod-severe HTN , currently denies any symptoms referable to elevated blood pressure. Specifically denies chest pain, palpitations, dyspnea, orthopnea, PND or peripheral edema. Blood pressure readings have been in normal range. Current medication regimen is as listed below. Patient denies any side effects of medication.                                                                                                                                                                                          .  HYPERLIPIDEMIA - Patient has a long history of significant Hyperlipidemia requiring medication for treatment with recent good control. Patient reports no problems or side effects with the medication.                                                                                                                                                       .    MEDICAL HISTORY:                                                    Patient Active Problem List    Diagnosis Date Noted     Osteoporosis, unspecified osteoporosis type, unspecified pathological fracture presence 06/26/2017     Priority: Medium     Primary open angle glaucoma of both eyes, unspecified glaucoma stage 09/23/2016     Priority: Medium     Nuclear sclerosis of both eyes 03/25/2016     Priority: Medium     Mass of left hand 02/19/2016     Priority: Medium     Compression fracture of thoracic vertebra (H) 07/21/2015     Priority: Medium     Scoliosis      Priority: Medium      Malignant neoplasm of overlapping sites of left female breast (H) 06/08/2015     Priority: Medium     Seborrheic keratosis 06/17/2014     Priority: Medium     Hypertension goal BP (blood pressure) < 140/90 12/19/2013     Priority: Medium     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      Priority: Medium     left leg       PVD (posterior vitreous detachment), OS 08/06/2013     Priority: Medium     Viral warts 07/15/2013     Priority: Medium     Diagnosis updated by automated process. Provider to review and confirm.       Skin lesion of left leg 07/08/2013     Priority: Medium     Health Care Home 01/04/2013     Priority: Medium     Mike Cummings RN,C--[phone]   A / [facility]     DX V65.8 REPLACED WITH [facility] C[facility] (04/08/2013)       Squamous cell carcinoma 10/01/2012     Priority: Medium     R dorsal hand  Left popliteal fossa with perineural involvement s/p excision 7-2013       Hyperlipidemia LDL goal <160 06/10/2011     Priority: Medium     Family history of diabetes mellitus 06/10/2011     Priority: Medium     Advanced directives, counseling/discussion 06/09/2011     Priority: Medium     Parent voices understanding and acceptance of this advice and will call back if any further questions or concerns.       Basal cell carcinoma 06/01/2011     Priority: Medium     L neck       Personal history of malignant neoplasm of bladder 12/16/2010     Priority: Medium     PXF  OU 09/23/2010     Priority: Medium     History of basal cell carcinoma 02/18/2010     Priority: Medium      Past Medical History:   Diagnosis Date     Actinic keratosis      Basal cell cancer 02/2011    bcc of the L back.     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     Past Surgical History:   Procedure Laterality Date     COLONOSCOPY  5/2008, 5/13, 6/14, 6/17    Q 3 years for advanced adenomatous polyp     CYSTOSCOPY  12/31/2008     D & C       GENITOURINARY SURGERY      TURBT     HC REMOVAL GALLBLADDER      open pan     HC TRABECULOPLASTY BY LASER SURGERY Left 4/18/07    SLT #1 OS (inf 180)     HC TRABECULOPLASTY BY LASER SURGERY Right 5/4/11    SLT #1 OD (inf 180?)     HC TRABECULOPLASTY BY LASER SURGERY Left 3/17/15    SLT #2 OS (sup 180)     HC TRABECULOPLASTY BY LASER SURGERY Right 6/23/15    SLT #2 OD (sup 180?)     LASER ARGON TREATMENT      SLT left eye x 2     LUMPECTOMY BREAST WITH SENTINEL NODE, COMBINED Left 7/29/2015    Procedure: COMBINED LUMPECTOMY BREAST WITH SENTINEL NODE;  Surgeon: Ifeanyi Sunshine MD;  Location:  OR     SURGICAL HISTORY OF -   9/11    squamous cell CA excised from back     TUBAL LIGATION       Current Outpatient Prescriptions   Medication Sig Dispense Refill     tamoxifen (NOLVADEX) 20 MG tablet Take 1 tablet (20 mg) by mouth daily 90 tablet 1     [START ON 11/6/2017] Dexamethasone Acetate POWD 1 Bottle 4 times daily 1 drop four times a day into the operative eye starting 1 day before surgery. Use until bottle runs out. 1 Bottle 0     [START ON 11/6/2017] Moxifloxacin HCl POWD 1 Bottle 4 times daily 1 drop four times a day into the operative eye starting 1 day before surgery. Use until bottle runs out. 1 Bottle 0     Ferrous Sulfate (IRON SUPPLEMENT PO) Take 325 mg by mouth daily       alendronate (FOSAMAX) 70 MG tablet Take 1 tablet (70 mg) by mouth every 7 days Take with over 8 ounces water and  "stay upright for at least 30 minutes after dose.  Take at least 60 minutes before breakfast 12 tablet 3     losartan (COZAAR) 25 MG tablet Take 1 tablet (25 mg) by mouth daily 90 tablet 4     simvastatin (ZOCOR) 20 MG tablet Take 1 tablet (20 mg) by mouth At Bedtime 90 tablet 4     latanoprost (XALATAN) 0.005 % ophthalmic solution Place 1 drop into both eyes At Bedtime 1 Bottle 11     dorzolamide-timolol (COSOPT) 2-0.5 % ophthalmic solution Place 1 drop into both eyes 2 times daily 10 mL 11     Cyanocobalamin (B-12) 500 MCG TABS Take 500 mcg by mouth daily 150 tablet 3     triamcinolone (KENALOG) 0.1 % cream Apply to affected area of the face once to twice a day. 15 g 1     meclizine (ANTIVERT) 25 MG tablet Take 1 tablet (25 mg) by mouth every 6 hours as needed for dizziness 30 tablet 1     ibuprofen (ADVIL,MOTRIN) 200 MG tablet Take 200 mg by mouth every 4 hours as needed.       CALCIUM 600 MG OR TABS 1 daily       VITAMIN D-1000 MAX -1000 MG-UNIT OR TABS 1 daily       OTC products: None, except as noted above    Allergies   Allergen Reactions     Lisinopril Cough      Latex Allergy: NO    Social History   Substance Use Topics     Smoking status: Former Smoker     Packs/day: 0.50     Years: 20.00     Types: Cigarettes     Quit date: 2/1/1976     Smokeless tobacco: Never Used     Alcohol use Yes      Comment: rare     History   Drug Use No       REVIEW OF SYSTEMS:                                                    C: NEGATIVE for fever, chills, change in weight  E/M: NEGATIVE for ear, mouth and throat problems  R: NEGATIVE for significant cough or SOB  CV: NEGATIVE for chest pain, palpitations or peripheral edema    EXAM:                                                    /64  Pulse 74  Temp 97.2  F (36.2  C) (Oral)  Ht 5' 0.51\" (1.537 m)  Wt 144 lb 8 oz (65.5 kg)  SpO2 97%  BMI 27.75 kg/m2  GENERAL APPEARANCE: healthy, alert and no distress  HENT: ear canals and TM's normal and nose and mouth " without ulcers or lesions  RESP: lungs clear to auscultation - no rales, rhonchi or wheezes  CV: regular rate and rhythm, normal S1 S2, no S3 or S4 and no murmur, click or rub   ABDOMEN: soft, nontender, no HSM or masses and bowel sounds normal  NEURO: Normal strength and tone, sensory exam grossly normal, mentation intact and speech normal    DIAGNOSTICS:                                                    No labs or EKG required for low risk surgery (cataract, skin procedure, breast biopsy, etc)    Recent Labs   Lab Test  09/08/17   1433  06/26/17   0942   12/22/16   1138   06/23/16   1111   HGB  11.7  11.6*   < >  11.4*   < >   --    PLT   --   193   --   221   < >   --    NA   --   140   --    --    --   139   POTASSIUM   --   4.5   --    --    --   4.0   CR   --   0.74   --   0.76   < >  0.75    < > = values in this interval not displayed.        IMPRESSION:                                                    Reason for surgery/procedure: left cataract   Diagnosis/reason for consult: hypertension     The proposed surgical procedure is considered LOW risk.    REVISED CARDIAC RISK INDEX  The patient has the following serious cardiovascular risks for perioperative complications such as (MI, PE, VFib and 3  AV Block):  No serious cardiac risks  INTERPRETATION: 0 risks: Class I (very low risk - 0.4% complication rate)    The patient has the following additional risks for perioperative complications:  No identified additional risks      ICD-10-CM    1. Preop general physical exam Z01.818 PAF COMPLETED       RECOMMENDATIONS:                                                      --Consult hospital rounder / IM to assist post-op medical management    --Patient is to take all scheduled medications on the day of surgery EXCEPT for modifications listed below.    APPROVAL GIVEN to proceed with proposed procedure, without further diagnostic evaluation       Signed Electronically by: LION SPAULDING MD    Copy of this  evaluation report is provided to requesting physician.    [facility] Preop Guidelines

## 2017-10-13 DIAGNOSIS — C50.812 MALIGNANT NEOPLASM OF OVERLAPPING SITES OF LEFT BREAST IN FEMALE, ESTROGEN RECEPTOR POSITIVE (H): ICD-10-CM

## 2017-10-13 DIAGNOSIS — Z17.0 MALIGNANT NEOPLASM OF OVERLAPPING SITES OF LEFT BREAST IN FEMALE, ESTROGEN RECEPTOR POSITIVE (H): ICD-10-CM

## 2017-10-13 RX ORDER — TAMOXIFEN CITRATE 20 MG/1
20 TABLET ORAL DAILY
Qty: 90 TABLET | Refills: 1 | Status: SHIPPED | OUTPATIENT
Start: 2017-10-13 | End: 2018-03-08

## 2017-10-13 NOTE — TELEPHONE ENCOUNTER
Pending Prescriptions:                       Disp   Refills    tamoxifen (NOLVADEX) 20 MG tablet         90 tab*3            Sig: Take 1 tablet (20 mg) by mouth daily    Last filled 7/9/17  Mayda Green LPN

## 2017-10-23 ENCOUNTER — OFFICE VISIT (OUTPATIENT)
Dept: FAMILY MEDICINE | Facility: CLINIC | Age: 82
End: 2017-10-23
Payer: COMMERCIAL

## 2017-10-23 ENCOUNTER — OFFICE VISIT (OUTPATIENT)
Dept: OPHTHALMOLOGY | Facility: CLINIC | Age: 82
End: 2017-10-23
Payer: COMMERCIAL

## 2017-10-23 VITALS
SYSTOLIC BLOOD PRESSURE: 124 MMHG | OXYGEN SATURATION: 97 % | HEART RATE: 74 BPM | TEMPERATURE: 97.2 F | BODY MASS INDEX: 27.28 KG/M2 | WEIGHT: 144.5 LBS | DIASTOLIC BLOOD PRESSURE: 64 MMHG | HEIGHT: 61 IN

## 2017-10-23 DIAGNOSIS — Z01.818 PREOP GENERAL PHYSICAL EXAM: Primary | ICD-10-CM

## 2017-10-23 DIAGNOSIS — H40.1431 PSEUDOEXFOLIATIVE GLAUCOMA, BOTH EYES, MILD STAGE: ICD-10-CM

## 2017-10-23 DIAGNOSIS — H43.812 PVD (POSTERIOR VITREOUS DETACHMENT), LEFT: ICD-10-CM

## 2017-10-23 DIAGNOSIS — H25.13 NUCLEAR SCLEROSIS OF BOTH EYES: Primary | ICD-10-CM

## 2017-10-23 PROCEDURE — 99214 OFFICE O/P EST MOD 30 MIN: CPT | Performed by: FAMILY MEDICINE

## 2017-10-23 PROCEDURE — 92136 OPHTHALMIC BIOMETRY: CPT | Mod: TC | Performed by: STUDENT IN AN ORGANIZED HEALTH CARE EDUCATION/TRAINING PROGRAM

## 2017-10-23 PROCEDURE — 99207 C PAF COMPLETED  NO CHARGE: CPT | Performed by: FAMILY MEDICINE

## 2017-10-23 PROCEDURE — 92012 INTRM OPH EXAM EST PATIENT: CPT | Performed by: STUDENT IN AN ORGANIZED HEALTH CARE EDUCATION/TRAINING PROGRAM

## 2017-10-23 ASSESSMENT — REFRACTION_WEARINGRX
OD_ADD: +3.25
OS_SPHERE: -2.50
OS_AXIS: 161
SPECS_TYPE: BIFOCAL
OD_SPHERE: PLANO
OD_AXIS: 146
OS_CYLINDER: +0.25
OS_ADD: +3.00
OD_CYLINDER: +0.75

## 2017-10-23 ASSESSMENT — CUP TO DISC RATIO
OS_RATIO: 0.5 UD
OD_RATIO: 0.6 UD

## 2017-10-23 ASSESSMENT — VISUAL ACUITY
OD_CC: 20/60
OD_PH_CC: 20/50-1
METHOD: SNELLEN - LINEAR
OS_CC: 20/50
OD_CC+: -2
CORRECTION_TYPE: GLASSES

## 2017-10-23 ASSESSMENT — EXTERNAL EXAM - LEFT EYE: OS_EXAM: NORMAL

## 2017-10-23 ASSESSMENT — EXTERNAL EXAM - RIGHT EYE: OD_EXAM: NORMAL

## 2017-10-23 ASSESSMENT — SLIT LAMP EXAM - LIDS
COMMENTS: NORMAL
COMMENTS: NORMAL

## 2017-10-23 NOTE — PROGRESS NOTES
Current Eye Medications:  Latanoprost QHS both eyes, Dorz/tiimolol BID both eyes. Has received Catactive3 has at home.     Subjective:  Pre-op KPE left eye 11/7/17. Vision is OK right eyes, Hazy left eye. Unable to read signs left eye while driving. Zero pain, or discomfort both eyes.     Objective:  See Ophthalmology Exam.      Assessment:  Cydney Christiansen is a 81 year old female who presents with:     Nuclear sclerosis of both eyes preop left eye. Target mild myopia left eye.      Pseudoexfoliative glaucoma, both eyes, mild stage      PVD (posterior vitreous detachment), left        PRE-OP CATARACT INSTRUCTIONS  *Use the following drops in the left eye 4 times on the day before surgery and once the morning of surgery:      CatarActive3  *Please bring all your eyedrops to the surgery center.*  *If taking more than one drop, wait five minutes between drops.  *No solid food after midnight.  *Clear liquids: coffee (no cream), tea, water, and jello are OK before 7:15 A.M.  You may take your regular pills with this.  *If you are taking glaucoma drops, continue as usual.    Kvng Garcia MD  [phone]

## 2017-10-23 NOTE — NURSING NOTE
"Chief Complaint   Patient presents with     Pre-Op Exam     DOS: 11/07/2017 with Dr. Kvng Garcia @ [facility]       Initial /64  Pulse 74  Temp 97.2  F (36.2  C) (Oral)  Ht 5' 0.51\" (1.537 m)  Wt 144 lb 8 oz (65.5 kg)  SpO2 97%  BMI 27.75 kg/m2 Estimated body mass index is 27.75 kg/(m^2) as calculated from the following:    Height as of this encounter: 5' 0.51\" (1.537 m).    Weight as of this encounter: 144 lb 8 oz (65.5 kg).  Medication Reconciliation: complete     An SHANTANU Dan    "

## 2017-10-23 NOTE — PATIENT INSTRUCTIONS
PRE-OP CATARACT INSTRUCTIONS    *Use the following drops in the left eye 4 times on the day before surgery and once the morning of surgery:                                   CatarActive3    *Please bring all your eyedrops to the surgery center.*    *If taking more than one drop, wait five minutes between drops.    *No solid food after midnight.    *Clear liquids: coffee (no cream), tea, water, and jello are OK before 6:15 A.M.  You may take your regular pills with this.    *If you are taking glaucoma drops, continue as usual.    Kvng Garcia MD  [phone]

## 2017-10-23 NOTE — MR AVS SNAPSHOT
After Visit Summary   10/23/2017    Cydney Christiansen    MRN: [id number]           Patient Information     Date Of Birth          1935        Visit Information        Provider Department      10/23/2017 2:30 PM Kadi Sanchez MD [facility]        Today's Diagnoses     Preop general physical exam    -  1      Care Instructions      Before Your Surgery      Call your surgeon if there is any change in your health. This includes signs of a cold or flu (such as a sore throat, runny nose, cough, rash or fever).    Do not smoke, drink alcohol or take over the counter medicine (unless your surgeon or primary care doctor tells you to) for the 24 hours before and after surgery.    If you take prescribed drugs: Follow your doctor s orders about which medicines to take and which to stop until after surgery.    Eating and drinking prior to surgery: follow the instructions from your surgeon    Take a shower or bath the night before surgery. Use the soap your surgeon gave you to gently clean your skin. If you do not have soap from your surgeon, use your regular soap. Do not shave or scrub the surgery site.  Wear clean pajamas and have clean sheets on your bed.     [facility]    If you have any questions regarding to your visit please contact your care team:       Team Purple:   Clinic Hours Telephone Number   Dr. Kadi Rivera   7am-7pm  Monday - Thursday   7am-5pm  Fridays  [phone]  (Appointment scheduling available 24/7)    Questions about your Visit?   Team Line:  [phone]   [facility] - [place] - 11am-9pm Monday-Friday Saturday-Sunday- 9am-5pm   [place] - 5pm-9pm Monday-Friday Saturday-Sunday- 9am-5pm  [phone] - [place]   [phone] - [place]       What options do I have for visits at the clinic other than the traditional office visit?  To expand how we care  for you, many of our providers are utilizing electronic visits (e-visits) and telephone visits, when medically appropriate, for interactions with their patients rather than a visit in the clinic.   We also offer nurse visits for many medical concerns. Just like any other service, we will bill your insurance company for this type of visit based on time spent on the phone with your provider. Not all insurance companies cover these visits. Please check with your medical insurance if this type of visit is covered. You will be responsible for any charges that are not paid by your insurance.      E-visits via Centrohart:  generally incur a $35.00 fee.  Telephone visits:  Time spent on the phone: *charged based on time that is spent on the phone in increments of 10 minutes. Estimated cost:   5-10 mins $30.00   11-20 mins. $59.00   21-30 mins. $85.00     Use [email or web address] (secure email communication and access to your chart) to send your primary care provider a message or make an appointment. Ask someone on your Team how to sign up for HerBabyShower.  For a Price Quote for your services, please call our Consumer Price Line at [phone].  As always, Thank you for trusting us with your health care needs!              Follow-ups after your visit        Your next 10 appointments already scheduled     Nov 07, 2017   Procedure with Kvng Garcia MD   [facility] (--)    [address]  [facility] [address]   [phone]            Nov 08, 2017 12:45 PM CST   Return Visit with Kvng Garcia MD   [facility] ([facility])    [address] [facility] [address]   [phone]            Nov 13, 2017 12:45 PM CST   [facility]    [address] [facility] [address]   [phone]            Dec 11, 2017 12:45 PM CST   Return Visit with Kvng Garcia MD   [facility]  [place] ([facility] [place])    [address]  [place] [address]   [phone]            Jan 09, 2018  9:30 AM CST   Return Visit with Ruben Hammond MD, [place]ST[place] PROC ROOM   [facility] [place] ([facility] [place])    [address]  [place] [address]   [phone]            Mar 08, 2018 11:45 AM CST   LAB with LAB ONC [facility] ([facility])    [address] [place] [address] [phone]           Patient must bring picture ID. Patient should be prepared to give a urine specimen  Please do not eat 10-12 hours before your appointment if you are coming in fasting for labs on lipids, cholesterol, or glucose (sugar). Pregnant women should follow their Care Team instructions. Water with medications is okay. Do not drink coffee or other fluids. If you have concerns about taking  your medications, please ask at office or if scheduling via Moveline, send a message by clicking on Secure Messaging, Message Your Care Team.            Mar 08, 2018 12:30 PM CST   Return Visit with Usha Salgado MD   [facility] 2[facility]    [address] [facility] [address] [phone]            Jun 28, 2018 11:15 AM CDT   Return Visit with Qi Barba MD   [facility] 2[facility]    [address] [facility] [address] [phone]              Who to contact     If you have questions or need follow up information about today's clinic visit or your schedule please contact [facility] [place] directly at [phone].  Normal or non-critical lab and imaging results will be communicated to you by MyChart, letter or phone within 4 business days after the clinic has received the results. If you do not hear from us within 7 days, please contact the clinic through MyChart or phone. If you have a critical or abnormal lab  "result, we will notify you by phone as soon as possible.  Submit refill requests through [employer] or call your pharmacy and they will forward the refill request to us. Please allow 3 business days for your refill to be completed.          Additional Information About Your Visit        RetentionGridhart Information     [employer] gives you secure access to your electronic health record. If you see a primary care provider, you can also send messages to your care team and make appointments. If you have questions, please call your primary care clinic.  If you do not have a primary care provider, please call [phone] and they will assist you.        Care EveryWhere ID     This is your Care EveryWhere ID. This could be used by other organizations to access your [place] medical records  [id number]        Your Vitals Were     Pulse Temperature Height Pulse Oximetry BMI (Body Mass Index)       74 97.2  F (36.2  C) (Oral) 5' 0.51\" (1.537 m) 97% 27.75 kg/m2        Blood Pressure from Last 3 Encounters:   10/23/17 124/64   09/08/17 130/76   06/26/17 124/64    Weight from Last 3 Encounters:   10/23/17 144 lb 8 oz (65.5 kg)   09/08/17 140 lb (63.5 kg)   06/26/17 141 lb (64 kg)              We Performed the Following     PAF COMPLETED        Primary Care Provider Office Phone # Fax #    Kadi Sanchez [phone]       [address] [address]        Equal Access to Services     [facility] : Hadii aad ku hadasho Soomaali, waaxda luqadaha, qaybta kaalmada adeegyada, waxay kaushal solomon . So [facility] [phone].    ATENCIÓN: Si habla danutaañol, tiene a chavez disposición servicios gratuitos de asistencia lingüística. Llame al [phone].    We comply with applicable federal civil rights laws and [place] laws. We do not discriminate on the basis of race, color, national origin, age, disability, sex, sexual orientation, or gender identity.          "   Thank you!     Thank you for choosing [facility]  for your care. Our goal is always to provide you with excellent care. Hearing back from our patients is one way we can continue to improve our services. Please take a few minutes to complete the written survey that you may receive in the mail after your visit with us. Thank you!             Your Updated Medication List - Protect others around you: Learn how to safely use, store and throw away your medicines at www.disposemymeds.org.          This list is accurate as of: 10/23/17  3:12 PM.  Always use your most recent med list.                   Brand Name Dispense Instructions for use Diagnosis    alendronate 70 MG tablet    FOSAMAX    12 tablet    Take 1 tablet (70 mg) by mouth every 7 days Take with over 8 ounces water and stay upright for at least 30 minutes after dose.  Take at least 60 minutes before breakfast    Osteoporosis, unspecified osteoporosis type, unspecified pathological fracture presence       B-12 500 MCG Tabs     150 tablet    Take 500 mcg by mouth daily    B12 deficiency       calcium 600 MG tablet      1 daily        Dexamethasone Acetate Powd   Start taking on:  11/6/2017     1 Bottle    1 Bottle 4 times daily 1 drop four times a day into the operative eye starting 1 day before surgery. Use until bottle runs out.    Nuclear sclerosis of both eyes       dorzolamide-timolol 2-0.5 % ophthalmic solution    COSOPT    10 mL    Place 1 drop into both eyes 2 times daily    PXF (pseudoexfoliation of lens capsule)       ibuprofen 200 MG tablet    ADVIL/MOTRIN     Take 200 mg by mouth every 4 hours as needed.        IRON SUPPLEMENT PO      Take 325 mg by mouth daily        latanoprost 0.005 % ophthalmic solution    XALATAN    1 Bottle    Place 1 drop into both eyes At Bedtime    PXF (pseudoexfoliation of lens capsule)       losartan 25 MG tablet    COZAAR    90 tablet    Take 1 tablet (25 mg) by mouth daily    Benign essential hypertension        meclizine 25 MG tablet    ANTIVERT    30 tablet    Take 1 tablet (25 mg) by mouth every 6 hours as needed for dizziness    Vertigo       Moxifloxacin HCl Powd   Start taking on:  11/6/2017     1 Bottle    1 Bottle 4 times daily 1 drop four times a day into the operative eye starting 1 day before surgery. Use until bottle runs out.    Nuclear sclerosis of both eyes       simvastatin 20 MG tablet    ZOCOR    90 tablet    Take 1 tablet (20 mg) by mouth At Bedtime    Hyperlipidemia LDL goal <160       tamoxifen 20 MG tablet    NOLVADEX    90 tablet    Take 1 tablet (20 mg) by mouth daily    Malignant neoplasm of overlapping sites of left breast in female, estrogen receptor positive (H)       triamcinolone 0.1 % cream    KENALOG    15 g    Apply to affected area of the face once to twice a day.    Granuloma annulare       VITAMIN D-1000 MAX ST 1000 UNITS Tabs   Generic drug:  cholecalciferol      1 daily

## 2017-10-23 NOTE — MR AVS SNAPSHOT
After Visit Summary   10/23/2017    Cydney Christiansen    MRN: [id number]           Patient Information     Date Of Birth          1935        Visit Information        Provider Department      10/23/2017 1:15 PM Kvng Garcia MD [facility]        Today's Diagnoses     Nuclear sclerosis of both eyes    -  1    Pseudoexfoliative glaucoma, both eyes, mild stage        PVD (posterior vitreous detachment), left          Care Instructions    PRE-OP CATARACT INSTRUCTIONS    *Use the following drops in the left eye 4 times on the day before surgery and once the morning of surgery:                                 CatarActive3    *Please bring all your eyedrops to the surgery center.*    *If taking more than one drop, wait five minutes between drops.    *No solid food after midnight.    *Clear liquids: coffee (no cream), tea, water, and jello are OK before 7:15 A.M.  You may take your regular pills with this.    *If you are taking glaucoma drops, continue as usual.    Kvng Garcia MD  [phone]            Follow-ups after your visit        Follow-up notes from your care team     Return for as scheduled, PO1.      Your next 10 appointments already scheduled     Oct 23, 2017  2:30 PM CDT   Pre-Op physical with Kadi Sanchez MD   [facility] [place] ([facility])    [address] [facility] [address]   [phone]            Nov 07, 2017   Procedure with Kvng Garcia MD   [facility] (--)    [address]  [facility] [address]   [phone]            Nov 08, 2017 12:45 PM CST   Return Visit with MD [facility] [place] [facility]    [address] [facility] [address] [facility] [address] [facility] [address] [phone]            Nov 13, 2017 12:45 PM CST   [facility] [place] [facility]    [address] [facility] [address] [facility] [place]  [address] [phone]            Dec 11, 2017 12:45 PM CST   Return Visit with Kvng Garcia MD   [facility] [place] ([facility])    [address] [facility]  [place] [address]   [phone]            Jan 09, 2018  9:30 AM CST   Return Visit with Ruben Hammond MD, [place] PROC ROOM   [facility] [place] ([facility])    [address] [facility]  [place] [address]   [phone]            Mar 08, 2018 11:45 AM CST   LAB [facility])    [address] [place] [address] [phone]           Patient must bring picture ID. Patient should be prepared to give a urine specimen  Please do not eat 10-12 hours before your appointment if you are coming in fasting for labs on lipids, cholesterol, or glucose (sugar). Pregnant women should follow their Care Team instructions. Water with medications is okay. Do not drink coffee or other fluids. If you have concerns about taking  your medications, please ask at office or if scheduling via Bean[employer]d, send a message by clicking on Secure Messaging, Message Your Care Team.            Mar 08, 2018 12:30 PM CST   Return Visit with Usha Salgado MD   [facility])    [address] [facility] [address] [phone]            Jun 28, 2018 11:15 AM CDT   Return Visit with Qi Barba MD   [facility])    [address] [place] [address] [phone]              Who to contact     If you have questions or need follow up information about today's clinic visit or your schedule please contact [facility] directly at [phone].  Normal or non-critical lab and imaging results will be communicated to you by MyChart, letter or phone within 4 business days after the clinic has received the results. If you do not  hear from us within 7 days, please contact the clinic through Pergunter or phone. If you have a critical or abnormal lab result, we will notify you by phone as soon as possible.  Submit refill requests through Perg[employer] or call your pharmacy and they will forward the refill request to us. Please allow 3 business days for your refill to be completed.          Additional Information About Your Visit        Ugeniehart Information     Pergunter gives you secure access to your electronic health record. If you see a primary care provider, you can also send messages to your care team and make appointments. If you have questions, please call your primary care clinic.  If you do not have a primary care provider, please call [phone] and they will assist you.        Care EveryWhere ID     This is your Care EveryWhere ID. This could be used by other organizations to access your [place] medical records  [id number]         Blood Pressure from Last 3 Encounters:   09/08/17 130/76   06/26/17 124/64   04/27/17 138/80    Weight from Last 3 Encounters:   09/08/17 63.5 kg (140 lb)   06/26/17 64 kg (141 lb)   04/27/17 65.3 kg (144 lb)              Today, you had the following     No orders found for display       Primary Care Provider Office Phone # Fax #    Kadi Sanchez [phone]       [address] [address]        Equal Access to Services     [facility] : Hadii aad ku hadasho Soomaali, waaxda luqadaha, qaybta kaalmada adeegyada, morgan solomon . So [facility] [phone].    ATENCIÓN: Si habla español, tiene a chavez disposición servicios gratuitos de asistencia lingüística. Llame al [phone].    We comply with applicable federal civil rights laws and [place] laws. We do not discriminate on the basis of race, color, national origin, age, disability, sex, sexual orientation, or gender identity.            Thank you!     Thank you for  choosing [facility]  for your care. Our goal is always to provide you with excellent care. Hearing back from our patients is one way we can continue to improve our services. Please take a few minutes to complete the written survey that you may receive in the mail after your visit with us. Thank you!             Your Updated Medication List - Protect others around you: Learn how to safely use, store and throw away your medicines at www.disposemymeds.org.          This list is accurate as of: 10/23/17  1:51 PM.  Always use your most recent med list.                   Brand Name Dispense Instructions for use Diagnosis    alendronate 70 MG tablet    FOSAMAX    12 tablet    Take 1 tablet (70 mg) by mouth every 7 days Take with over 8 ounces water and stay upright for at least 30 minutes after dose.  Take at least 60 minutes before breakfast    Osteoporosis, unspecified osteoporosis type, unspecified pathological fracture presence       B-12 500 MCG Tabs     150 tablet    Take 500 mcg by mouth daily    B12 deficiency       Bromfenac Sodium Powd   Start taking on:  11/6/2017     1 Bottle    1 Bottle 4 times daily 1 drop four times a day into the operative eye starting 1 day before surgery. Use until bottle runs out.    Nuclear sclerosis of both eyes       calcium 600 MG tablet      1 daily        Dexamethasone Acetate Powd   Start taking on:  11/6/2017     1 Bottle    1 Bottle 4 times daily 1 drop four times a day into the operative eye starting 1 day before surgery. Use until bottle runs out.    Nuclear sclerosis of both eyes       dorzolamide-timolol 2-0.5 % ophthalmic solution    COSOPT    10 mL    Place 1 drop into both eyes 2 times daily    PXF (pseudoexfoliation of lens capsule)       ibuprofen 200 MG tablet    ADVIL/MOTRIN     Take 200 mg by mouth every 4 hours as needed.        IRON SUPPLEMENT PO      Take 325 mg by mouth daily        latanoprost 0.005 % ophthalmic solution    XALATAN    1 Bottle     Place 1 drop into both eyes At Bedtime    PXF (pseudoexfoliation of lens capsule)       losartan 25 MG tablet    COZAAR    90 tablet    Take 1 tablet (25 mg) by mouth daily    Benign essential hypertension       meclizine 25 MG tablet    ANTIVERT    30 tablet    Take 1 tablet (25 mg) by mouth every 6 hours as needed for dizziness    Vertigo       Moxifloxacin HCl Powd   Start taking on:  11/6/2017     1 Bottle    1 Bottle 4 times daily 1 drop four times a day into the operative eye starting 1 day before surgery. Use until bottle runs out.    Nuclear sclerosis of both eyes       simvastatin 20 MG tablet    ZOCOR    90 tablet    Take 1 tablet (20 mg) by mouth At Bedtime    Hyperlipidemia LDL goal <160       tamoxifen 20 MG tablet    NOLVADEX    90 tablet    Take 1 tablet (20 mg) by mouth daily    Malignant neoplasm of overlapping sites of left breast in female, estrogen receptor positive (H)       triamcinolone 0.1 % cream    KENALOG    15 g    Apply to affected area of the face once to twice a day.    Granuloma annulare       VITAMIN D-1000 MAX ST 1000 UNITS Tabs   Generic drug:  cholecalciferol      1 daily

## 2017-11-07 ENCOUNTER — NURSE TRIAGE (OUTPATIENT)
Dept: NURSING | Facility: CLINIC | Age: 82
End: 2017-11-07

## 2017-11-07 ENCOUNTER — TRANSFERRED RECORDS (OUTPATIENT)
Dept: HEALTH INFORMATION MANAGEMENT | Facility: CLINIC | Age: 82
End: 2017-11-07

## 2017-11-07 NOTE — TELEPHONE ENCOUNTER
Patient calling reporting she woke at 230 a.m. With vertigo. Reporting symptoms are ongoing this morning, holding onto walls to walk.  Nausea. Denies other symptoms.     Reason for Disposition    SEVERE dizziness (vertigo) (e.g., unable to walk without assistance)    Additional Information    Negative: [1] Weakness (i.e., paralysis, loss of muscle strength) of the face, arm or leg on one side of the body AND [2] sudden onset AND [3] present now    Negative: [1] Numbness (i.e., loss of sensation) of the face, arm or leg on one side of the body AND [2] sudden onset AND [3] present now    Negative: [1] Loss of speech or garbled speech AND [2] sudden onset AND [3] present now    Negative: Difficult to awaken or acting confused  (e.g., disoriented, slurred speech)    Negative: Sounds like a life-threatening emergency to the triager    Negative: Followed a head injury    Negative: Followed an ear injury    Negative: Localized weakness or numbness is main symptom    Negative: Dizziness relates to riding in a car, going to an amusement park, etc.    Negative: [1] Dizziness is main symptom AND [2] NO spinning sensation (i.e., vertigo)    Protocols used: DIZZINESS - VERTIGO-ADULT-

## 2017-11-08 NOTE — PROGRESS NOTES
"  SUBJECTIVE:   Cydney Christiansen is a 81 year old female who presents to clinic today for the following health issues:      ED/UC Followup:    Facility[facility]   Date of visit: 11/07/2017  Reason for visit: Vertigo  Current Status: slightly better        2 days ago symptoms of vertigo went to ED, did ekg and MRI that were normal.  Was treated with meclizine, which helps.  Today had dizziness when she sat up and felt sensation that the room was spinning.    Problem list and histories reviewed & adjusted, as indicated.  Additional history: as documented    BP Readings from Last 3 Encounters:   11/09/17 120/60   10/23/17 124/64   09/08/17 130/76    Wt Readings from Last 3 Encounters:   11/09/17 144 lb (65.3 kg)   10/23/17 144 lb 8 oz (65.5 kg)   09/08/17 140 lb (63.5 kg)         Reviewed and updated as needed this visit by clinical staff  Tobacco  Allergies  Meds  Problems  Med Hx  Surg Hx  Fam Hx  Soc Hx        Reviewed and updated as needed this visit by Provider  Allergies  Meds  Problems         ROS:  Constitutional, HEENT, cardiovascular, pulmonary, gi and gu systems are negative, except as otherwise noted.      OBJECTIVE:   /60  Pulse 75  Temp 97.3  F (36.3  C) (Oral)  Ht 5' 5.51\" (1.664 m)  Wt 144 lb (65.3 kg)  SpO2 97%  BMI 23.59 kg/m2  Body mass index is 23.59 kg/(m^2).  GENERAL: healthy, alert and no distress  EYES: Eyes grossly normal to inspection, PERRL and conjunctivae and sclerae normal  HENT: ear canals and TM's normal, nose and mouth without ulcers or lesions  NECK: no adenopathy, no asymmetry, masses, or scars and thyroid normal to palpation  RESP: lungs clear to auscultation - no rales, rhonchi or wheezes  CV: regular rate and rhythm, normal S1 S2, no S3 or S4, no murmur, click or rub, no peripheral edema and peripheral pulses strong  MS: no gross musculoskeletal defects noted, no edema  NEURO: Eply maneuver carried out, Normal strength and tone, mentation intact and speech " normal  PSYCH: mentation appears normal, affect normal/bright      ASSESSMENT/PLAN:     1. Benign paroxysmal positional vertigo, unspecified laterality  Continue meclizine for now, continue to perform vertigo exercises at home.      Follow up in 2 weeks.      Ruben Rivera MD  [facility]  .

## 2017-11-09 ENCOUNTER — OFFICE VISIT (OUTPATIENT)
Dept: FAMILY MEDICINE | Facility: CLINIC | Age: 82
End: 2017-11-09
Payer: COMMERCIAL

## 2017-11-09 VITALS
DIASTOLIC BLOOD PRESSURE: 60 MMHG | BODY MASS INDEX: 23.14 KG/M2 | OXYGEN SATURATION: 97 % | HEIGHT: 66 IN | SYSTOLIC BLOOD PRESSURE: 120 MMHG | HEART RATE: 75 BPM | WEIGHT: 144 LBS | TEMPERATURE: 97.3 F

## 2017-11-09 DIAGNOSIS — H81.10 BENIGN PAROXYSMAL POSITIONAL VERTIGO, UNSPECIFIED LATERALITY: Primary | ICD-10-CM

## 2017-11-09 PROCEDURE — 99213 OFFICE O/P EST LOW 20 MIN: CPT | Performed by: FAMILY MEDICINE

## 2017-11-09 NOTE — PATIENT INSTRUCTIONS
Managing Dizziness (Vertigo) with Medicines    Although medicines can't cure your problem, they can help control symptoms. Your doctor may prescribe medicines for a few weeks and then taper them off. Always take your medicine as prescribed. Never share your medicine with others.  Contact your healthcare provider right away if you have side effects from your medicines.   How medicines can help    Treat infection or inflammation. If you have an infection caused by bacteria, your doctor can prescribe antibiotics.    Limit conflicting balance signals. These medicines are often in pill form.    Ease nausea. Suppositories, pills, or shots can reduce vomiting.    Reduce pressure in the canals. Diuretics can be used to treat Meniere's disease. These medicines help your body get rid of extra fluid.    Ease other symptoms. Other medicines can help ease depression and anxiety caused by living with dizziness or fainting.  Date Last Reviewed: 11/1/2016 2000-2017 [employer]. [address] [place], [place] [id number]. All rights reserved. This information is not intended as a substitute for professional medical care. Always follow your healthcare professional's instructions.        Benign Paroxysmal Positional Vertigo     Your health care provider may move your head in certain ways to treat your BPPV.     Benign paroxysmal positional vertigo (BPPV) is a problem with the inner ear. The inner ear contains the vestibular system. This system is what helps you keep your balance. BPPV causes a feeling of spinning. It is a common problem of the vestibular system.  Understanding the vestibular system  The vestibular system of the ear is made up of very tiny parts. They include the utricle, saccule, and semicircular canals. The utricle is a tiny organ that contains calcium crystals. In some people, the crystals can move into the semicircular canals. When this happens, the system no longer works as it should. This causes  BPPV. Benign means it is not life threatening. Paroxysmal means it happens suddenly. Positional means that it happens when you move your head. Vertigo is a feeling of spinning.  What causes BPPV?  Causes include injury to your head or neck. Other problems with the vestibular system may cause BPPV. In many people, the cause of BPPV is not known.  Symptoms of BPPV  You many have repeated feelings of spinning (vertigo). The vertigo usually lasts less than 1 minute. Some movements, such as rolling over in bed, can bring on vertigo.  Diagnosing BPPV  Your primary healthcare provider may diagnose and treat your BPPV. Or you may see an ear, nose, and throat doctor (otolaryngologist). In some cases, you may see a nervous system doctor (neurologist).  The healthcare provider will ask about your symptoms and your medical history. He or she will examine you. You may have hearing and balance tests. As part of the exam, your healthcare provider may have you move your head and body in certain ways. If you have BPPV, the movements can bring on vertigo. Your provider will also look for abnormal movements of your eyes. You may have other tests to check your vestibular or nervous systems.  Treatment for BPPV  Your healthcare provider may try to move the calcium crystals. This is done by having you move your head and neck in certain ways. This treatment is safe and often works well. You may also be told to do these movements at home. You may still have vertigo for a few weeks. Your healthcare provider will recheck your symptoms, usually in about a month. Special physical therapy may also be part of treatment. In rare cases, surgery may be needed for BPPV that does not go away.     When to call the healthcare provider  Call your healthcare provider right away if you have any of these:    Symptoms that do not go away with treatment    Symptoms that get worse    New symptoms   Date Last Reviewed: 5/1/2017 2000-2017 [facility]  [employer]. [address] [place] [address]. All rights reserved. This information is not intended as a substitute for professional medical care. Always follow your healthcare professional's instructions.      [facility]    If you have any questions regarding to your visit please contact your care team:       Team Purple:   Clinic Hours Telephone Number   Dr. Kadi Rivera   7am-7pm  Monday - Thursday   7am-5pm  Fridays  [phone]  (Appointment scheduling available 24/7)    Questions about your Visit?   Team Line:  [phone]   [facility] [place] [facility] 11am-9pm Monday-Friday Saturday-Sunday- 9am-5pm   [place] - 5pm-9pm Monday-Friday Saturday-Sunday- 9am-5pm  [phone] - [place]   [phone] - [place]       What options do I have for visits at the clinic other than the traditional office visit?  To expand how we care for you, many of our providers are utilizing electronic visits (e-visits) and telephone visits, when medically appropriate, for interactions with their patients rather than a visit in the clinic.   We also offer nurse visits for many medical concerns. Just like any other service, we will bill your insurance company for this type of visit based on time spent on the phone with your provider. Not all insurance companies cover these visits. Please check with your medical insurance if this type of visit is covered. You will be responsible for any charges that are not paid by your insurance.      E-visits via [facility]:  generally incur a $35.00 fee.  Telephone visits:  Time spent on the phone: *charged based on time that is spent on the phone in increments of 10 minutes. Estimated cost:   5-10 mins $30.00   11-20 mins. $59.00   21-30 mins. $85.00     Use [facility] (secure email communication and access to your chart) to send your primary care provider a message or make an  appointment. Ask someone on your Team how to sign up for [employer].  For a Price Quote for your services, please call our Consumer Price Line at [phone].  As always, Thank you for trusting us with your health care needs!    Audra Camara MA

## 2017-11-09 NOTE — NURSING NOTE
"Chief Complaint   Patient presents with     ER F/U     was in [place] ER on 11/07/2017 for Vertigo       Initial /60  Pulse 75  Temp 97.3  F (36.3  C) (Oral)  Ht 5' 5.51\" (1.664 m)  Wt 144 lb (65.3 kg)  SpO2 97%  BMI 23.59 kg/m2 Estimated body mass index is 23.59 kg/(m^2) as calculated from the following:    Height as of this encounter: 5' 5.51\" (1.664 m).    Weight as of this encounter: 144 lb (65.3 kg).  Medication Reconciliation: complete     An SHANTANU Dan    "

## 2017-11-09 NOTE — MR AVS SNAPSHOT
After Visit Summary   11/9/2017    Cydney Christiansen    MRN: [id number]           Patient Information     Date Of Birth          1935        Visit Information        Provider Department      11/9/2017 2:20 PM Ruben Grant MD [facility] Instructions      Managing Dizziness (Vertigo) with Medicines    Although medicines can't cure your problem, they can help control symptoms. Your doctor may prescribe medicines for a few weeks and then taper them off. Always take your medicine as prescribed. Never share your medicine with others.  Contact your healthcare provider right away if you have side effects from your medicines.   How medicines can help    Treat infection or inflammation. If you have an infection caused by bacteria, your doctor can prescribe antibiotics.    Limit conflicting balance signals. These medicines are often in pill form.    Ease nausea. Suppositories, pills, or shots can reduce vomiting.    Reduce pressure in the canals. Diuretics can be used to treat Meniere's disease. These medicines help your body get rid of extra fluid.    Ease other symptoms. Other medicines can help ease depression and anxiety caused by living with dizziness or fainting.  Date Last Reviewed: 11/1/2016 2000-2017 [employer]. [address] [place] [address]. All rights reserved. This information is not intended as a substitute for professional medical care. Always follow your healthcare professional's instructions.        Benign Paroxysmal Positional Vertigo     Your health care provider may move your head in certain ways to treat your BPPV.     Benign paroxysmal positional vertigo (BPPV) is a problem with the inner ear. The inner ear contains the vestibular system. This system is what helps you keep your balance. BPPV causes a feeling of spinning. It is a common problem of the vestibular system.  Understanding the vestibular system  The  vestibular system of the ear is made up of very tiny parts. They include the utricle, saccule, and semicircular canals. The utricle is a tiny organ that contains calcium crystals. In some people, the crystals can move into the semicircular canals. When this happens, the system no longer works as it should. This causes BPPV. Benign means it is not life threatening. Paroxysmal means it happens suddenly. Positional means that it happens when you move your head. Vertigo is a feeling of spinning.  What causes BPPV?  Causes include injury to your head or neck. Other problems with the vestibular system may cause BPPV. In many people, the cause of BPPV is not known.  Symptoms of BPPV  You many have repeated feelings of spinning (vertigo). The vertigo usually lasts less than 1 minute. Some movements, such as rolling over in bed, can bring on vertigo.  Diagnosing BPPV  Your primary healthcare provider may diagnose and treat your BPPV. Or you may see an ear, nose, and throat doctor (otolaryngologist). In some cases, you may see a nervous system doctor (neurologist).  The healthcare provider will ask about your symptoms and your medical history. He or she will examine you. You may have hearing and balance tests. As part of the exam, your healthcare provider may have you move your head and body in certain ways. If you have BPPV, the movements can bring on vertigo. Your provider will also look for abnormal movements of your eyes. You may have other tests to check your vestibular or nervous systems.  Treatment for BPPV  Your healthcare provider may try to move the calcium crystals. This is done by having you move your head and neck in certain ways. This treatment is safe and often works well. You may also be told to do these movements at home. You may still have vertigo for a few weeks. Your healthcare provider will recheck your symptoms, usually in about a month. Special physical therapy may also be part of treatment. In rare  cases, surgery may be needed for BPPV that does not go away.     When to call the healthcare provider  Call your healthcare provider right away if you have any of these:    Symptoms that do not go away with treatment    Symptoms that get worse    New symptoms   Date Last Reviewed: 5/1/2017 2000-2017 [employer]. [address] [place] [address]. All rights reserved. This information is not intended as a substitute for professional medical care. Always follow your healthcare professional's instructions.      [facility]    If you have any questions regarding to your visit please contact your care team:       Team Purple:   Clinic Hours Telephone Number   Dr. Kadi Rivera   7am-7pm  Monday - Thursday   7am-5pm  Fridays  [phone]  (Appointment scheduling available 24/7)    Questions about your Visit?   Team Line:  [phone]   Urgent Care - [place] and [facility] - 11am-9pm Monday-Friday Saturday-Sunday- 9am-5pm   [place] - 5pm-9pm Monday-Friday Saturday-Sunday- 9am-5pm  [phone] - [place]   [phone] - [place]       What options do I have for visits at the clinic other than the traditional office visit?  To expand how we care for you, many of our providers are utilizing electronic visits (e-visits) and telephone visits, when medically appropriate, for interactions with their patients rather than a visit in the clinic.   We also offer nurse visits for many medical concerns. Just like any other service, we will bill your insurance company for this type of visit based on time spent on the phone with your provider. Not all insurance companies cover these visits. Please check with your medical insurance if this type of visit is covered. You will be responsible for any charges that are not paid by your insurance.      E-visits via [employer]:  generally incur a $35.00 fee.  Telephone  visits:  Time spent on the phone: *charged based on time that is spent on the phone in increments of 10 minutes. Estimated cost:   5-10 mins $30.00   11-20 mins. $59.00   21-30 mins. $85.00     Use [employer] (secure email communication and access to your chart) to send your primary care provider a message or make an appointment. Ask someone on your Team how to sign up for [employer].  For a Price Quote for your services, please call our Consumer Price Line at [phone].  As always, Thank you for trusting us with your health care needs!    Audra Camara MA            Follow-ups after your visit        Follow-up notes from your care team     Return in about 2 weeks (around 11/23/2017) for for pre-op.      Your next 10 appointments already scheduled     Dec 04, 2017 10:00 AM CST   Pre-Op physical with Kadi Sanchez MD   [facility]    [address] [place] [id number]   [phone]            Dec 09, 2017  9:30 AM CST   Return Visit with Kvng Garcia MD   [facility] ([facility])    [address] [place] [address]   [phone]            Dec 15, 2017 10:45 AM CST   Return Visit with Kvng Garcia MD   [facility] ([facility])    [address] [place] [address]   [phone]            Jan 09, 2018  9:30 AM CST   Return Visit with Ruben Hammond MD, [facility] CYSTO PROC ROOM   [facility] ([facility])    [address] [place] [address]   [phone]            Jan 16, 2018 10:45 AM CST   Return Visit with Kvng Garcia MD   [facility] ([facility])    [address] [place] [id number]   [phone]            Mar 08, 2018 11:45 AM CST   LAB with LAB ONC [facility] ([facility])    [address] [place] [address]   [phone]            Please do not eat 10-12 hours before your appointment if you are coming in fasting for labs on lipids, cholesterol, or glucose (sugar). This does not apply to pregnant women. Water, hot tea and black coffee (with nothing added) are okay. Do not drink other fluids, diet soda or chew gum.            Mar 08, 2018 12:30 PM CST   Return Visit with Usha Salgado MD   [facility] ([facility])    [address] [place] [address] [phone]            Jun 28, 2018 11:15 AM CDT   Return Visit with Qi Barba MD   [facility] ([facility])    [address] [place] [address] [phone]              Who to contact     If you have questions or need follow up information about today's clinic visit or your schedule please contact [facility] directly at [phone].  Normal or non-critical lab and imaging results will be communicated to you by for; to (do)hart, letter or phone within 4 business days after the clinic has received the results. If you do not hear from us within 7 days, please contact the clinic through for; to (do)hart or phone. If you have a critical or abnormal lab result, we will notify you by phone as soon as possible.  Submit refill requests through [employer] or call your pharmacy and they will forward the refill request to us. Please allow 3 business days for your refill to be completed.          Additional Information About Your Visit        for; to (do)harChannel [employer] Information     [employer] gives you secure access to your electronic health record. If you see a primary care provider, you can also send messages to your care team and make appointments. If you have questions, please call your primary care clinic.  If you do not have a primary care provider, please call [phone] and they will assist you.        Care EveryWhere ID     This is your Care EveryWhere ID. This could be used by other organizations to  "access your [place] medical records  [id number]        Your Vitals Were     Pulse Temperature Height Pulse Oximetry BMI (Body Mass Index)       75 97.3  F (36.3  C) (Oral) 5' 5.51\" (1.664 m) 97% 23.59 kg/m2        Blood Pressure from Last 3 Encounters:   11/09/17 120/60   10/23/17 124/64   09/08/17 130/76    Weight from Last 3 Encounters:   11/09/17 144 lb (65.3 kg)   10/23/17 144 lb 8 oz (65.5 kg)   09/08/17 140 lb (63.5 kg)              Today, you had the following     No orders found for display       Primary Care Provider Office Phone # Fax #    Kadi Sanchez [phone]       [address] [address]        Equal Access to Services     BARBARA CORCORAN : Hadii aad ku hadasho Soomaali, waaxda luqadaha, qaybta kaalmada adeegyada, waxay rosyin nguyen solomon . So [facility] [phone].    ATENCIÓN: Si habla español, tiene a chavez disposición servicios gratuitos de asistencia lingüística. Llame al [phone].    We comply with applicable federal civil rights laws and [place] laws. We do not discriminate on the basis of race, color, national origin, age, disability, sex, sexual orientation, or gender identity.            Thank you!     Thank you for choosing [facility]  for your care. Our goal is always to provide you with excellent care. Hearing back from our patients is one way we can continue to improve our services. Please take a few minutes to complete the written survey that you may receive in the mail after your visit with us. Thank you!             Your Updated Medication List - Protect others around you: Learn how to safely use, store and throw away your medicines at www.disposemymeds.org.          This list is accurate as of: 11/9/17  3:12 PM.  Always use your most recent med list.                   Brand Name Dispense Instructions for use Diagnosis    alendronate 70 MG tablet    FOSAMAX    12 tablet    Take 1 " tablet (70 mg) by mouth every 7 days Take with over 8 ounces water and stay upright for at least 30 minutes after dose.  Take at least 60 minutes before breakfast    Osteoporosis, unspecified osteoporosis type, unspecified pathological fracture presence       B-12 500 MCG Tabs     150 tablet    Take 500 mcg by mouth daily    B12 deficiency       calcium 600 MG tablet      1 daily        Dexamethasone Acetate Powd     1 Bottle    1 Bottle 4 times daily 1 drop four times a day into the operative eye starting 1 day before surgery. Use until bottle runs out.    Nuclear sclerosis of both eyes       dorzolamide-timolol 2-0.5 % ophthalmic solution    COSOPT    10 mL    Place 1 drop into both eyes 2 times daily    PXF (pseudoexfoliation of lens capsule)       ibuprofen 200 MG tablet    ADVIL/MOTRIN     Take 200 mg by mouth every 4 hours as needed.        IRON SUPPLEMENT PO      Take 325 mg by mouth daily        latanoprost 0.005 % ophthalmic solution    XALATAN    1 Bottle    Place 1 drop into both eyes At Bedtime    PXF (pseudoexfoliation of lens capsule)       losartan 25 MG tablet    COZAAR    90 tablet    Take 1 tablet (25 mg) by mouth daily    Benign essential hypertension       meclizine 25 MG tablet    ANTIVERT    30 tablet    Take 1 tablet (25 mg) by mouth every 6 hours as needed for dizziness    Vertigo       Moxifloxacin HCl Powd     1 Bottle    1 Bottle 4 times daily 1 drop four times a day into the operative eye starting 1 day before surgery. Use until bottle runs out.    Nuclear sclerosis of both eyes       simvastatin 20 MG tablet    ZOCOR    90 tablet    Take 1 tablet (20 mg) by mouth At Bedtime    Hyperlipidemia LDL goal <160       tamoxifen 20 MG tablet    NOLVADEX    90 tablet    Take 1 tablet (20 mg) by mouth daily    Malignant neoplasm of overlapping sites of left breast in female, estrogen receptor positive (H)       triamcinolone 0.1 % cream    KENALOG    15 g    Apply to affected area of the face once  to twice a day.    Granuloma annulare       VITAMIN D-[address] 1000 UNITS Tabs   Generic drug:  cholecalciferol      1 daily

## 2017-11-27 NOTE — PROGRESS NOTES
[facility]  [address]  [place] [address]  [phone]  Dept: [phone]    PRE-OP EVALUATION:  Today's date: 2017    Cydney Christiansen (: 1935) presents for pre-operative evaluation assessment as requested by Dr. Odell.  She requires evaluation and anesthesia risk assessment prior to undergoing surgery/procedure for treatment of left cataract .  Proposed procedure: left Cataract    Date of Surgery/ Procedure: 2017  Time of Surgery/ Procedure: 12:15pm  Hospital/Surgical Facility: [facility]     Primary Physician: Kadi Sanchez  Type of Anesthesia Anticipated: to be determined    Patient has a Health Care Directive or Living Will:  NO    1. NO - Do you have a history of heart attack, stroke, stent, bypass or surgery on an artery in the head, neck, heart or legs?  2. NO - Do you ever have any pain or discomfort in your chest?  3. NO - Do you have a history of  Heart Failure?  4. NO - Are you troubled by shortness of breath when: walking on the level, up a slight hill or at night?  5. NO - Do you currently have a cold, bronchitis or other respiratory infection?  6. NO - Do you have a cough, shortness of breath or wheezing?  7. NO - Do you sometimes get pains in the calves of your legs when you walk?  8. NO - Do you or anyone in your family have previous history of blood clots?  9. NO - Do you or does anyone in your family have a serious bleeding problem such as prolonged bleeding following surgeries or cuts?  10. YES - HAVE YOU EVER HAD PROBLEMS WITH ANEMIA OR BEEN TOLD TO TAKE IRON PILLS? On Iron Pills   11. NO - Have you had any abnormal blood loss such as black, tarry or bloody stools, or abnormal vaginal bleeding?  12. NO - Have you ever had a blood transfusion?  13. YES - HAVE YOU OR ANY OF YOUR RELATIVES EVER HAD PROBLEMS WITH ANESTHESIA? Problems waking patient up   14. NO - Do you have sleep apnea, excessive snoring or daytime  drowsiness?  15. NO - Do you have any prosthetic heart valves?  16. NO - Do you have prosthetic joints?  17. NO - Is there any chance that you may be pregnant?        HPI:  Patient has left cataract that clouds her vision                                                       Brief HPI related to upcoming procedure: needs surgery for left cataract       HYPERTENSION - Patient has longstanding history of mod-severe HTN , currently denies any symptoms referable to elevated blood pressure. Specifically denies chest pain, palpitations, dyspnea, orthopnea, PND or peripheral edema. Blood pressure readings have been in normal range. Current medication regimen is as listed below. Patient denies any side effects of medication.                                                                                                                                                                                          .  HYPERLIPIDEMIA - Patient has a long history of significant Hyperlipidemia requiring medication for treatment with recent good control. Patient reports no problems or side effects with the medication.                                                                                                                                                       .    MEDICAL HISTORY:                                                    Patient Active Problem List    Diagnosis Date Noted     Osteoporosis, unspecified osteoporosis type, unspecified pathological fracture presence 06/26/2017     Priority: Medium     Primary open angle glaucoma of both eyes, unspecified glaucoma stage 09/23/2016     Priority: Medium     Nuclear sclerosis of both eyes 03/25/2016     Priority: Medium     Mass of left hand 02/19/2016     Priority: Medium     Compression fracture of thoracic vertebra (H) 07/21/2015     Priority: Medium     Scoliosis      Priority: Medium     Malignant neoplasm of overlapping sites of left female breast (H) 06/08/2015      Priority: Medium     Seborrheic keratosis 06/17/2014     Priority: Medium     Hypertension goal BP (blood pressure) < 140/90 12/19/2013     Priority: Medium     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      Priority: Medium     left leg       PVD (posterior vitreous detachment), OS 08/06/2013     Priority: Medium     Viral warts 07/15/2013     Priority: Medium     Diagnosis updated by automated process. Provider to review and confirm.       Skin lesion of left leg 07/08/2013     Priority: Medium     Health Care Home 01/04/2013     Priority: Medium     Mike Cummings RN,C--[phone]   FPA / [facility]     DX V65.8 REPLACED WITH 7[facility]ALTH CARE HOME (04/08/2013)       Squamous cell carcinoma 10/01/2012     Priority: Medium     R dorsal hand  Left popliteal fossa with perineural involvement s/p excision 7-2013       Hyperlipidemia LDL goal <160 06/10/2011     Priority: Medium     Family history of diabetes mellitus 06/10/2011     Priority: Medium     Advanced directives, counseling/discussion 06/09/2011     Priority: Medium     Parent voices understanding and acceptance of this advice and will call back if any further questions or concerns.       Basal cell carcinoma 06/01/2011     Priority: Medium     L neck       Personal history of malignant neoplasm of bladder 12/16/2010     Priority: Medium     PXF  OU 09/23/2010     Priority: Medium     History of basal cell carcinoma 02/18/2010     Priority: Medium      Past Medical History:   Diagnosis Date     Actinic keratosis      Basal cell cancer 02/2011    bcc of the L back.     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     Past Surgical History:   Procedure Laterality Date     COLONOSCOPY  5/2008, 5/13, 6/14, 6/17    Q 3 years for advanced adenomatous polyp     CYSTOSCOPY  12/31/2008     D & C       GENITOURINARY SURGERY      TURBT     HC REMOVAL GALLBLADDER      open pan     HC TRABECULOPLASTY BY LASER SURGERY Left 4/18/07    SLT #1 OS (inf 180)     HC TRABECULOPLASTY BY LASER SURGERY Right 5/4/11    SLT #1 OD (inf 180?)     HC TRABECULOPLASTY BY LASER SURGERY Left 3/17/15    SLT #2 OS (sup 180)     HC TRABECULOPLASTY BY LASER SURGERY Right 6/23/15    SLT #2 OD (sup 180?)     LASER ARGON TREATMENT      SLT left eye x 2     LUMPECTOMY BREAST WITH SENTINEL NODE, COMBINED Left 7/29/2015    Procedure: COMBINED LUMPECTOMY BREAST WITH SENTINEL NODE;  Surgeon: Ifeanyi Sunshine MD;  Location: [facility] OR     SURGICAL HISTORY OF -   9/11    squamous cell CA excised from back     TUBAL LIGATION       Current Outpatient Prescriptions   Medication Sig Dispense Refill     meclizine (ANTIVERT) 25 MG tablet Take 1 tablet (25 mg) by mouth every 6 hours as needed for dizziness 30 tablet 1     Dexamethasone Acetate POWD 1 Bottle 4 times daily 1 drop four times a day into the operative eye starting 1 day before surgery. Use until bottle runs out. 1 Bottle 0     Moxifloxacin HCl POWD 1 Bottle 4 times daily 1 drop four times a day into the operative eye starting 1 day before surgery. Use until bottle runs out. 1 Bottle 0     Ferrous Sulfate (IRON SUPPLEMENT PO) Take 325 mg by mouth daily       alendronate (FOSAMAX) 70 MG tablet Take 1 tablet (70 mg) by mouth every 7 days Take with over 8 ounces water and stay upright for at least 30 minutes after dose.  Take at least 60 minutes before  breakfast 12 tablet 3     losartan (COZAAR) 25 MG tablet Take 1 tablet (25 mg) by mouth daily 90 tablet 4     simvastatin (ZOCOR) 20 MG tablet Take 1 tablet (20 mg) by mouth At Bedtime 90 tablet 4     latanoprost (XALATAN) 0.005 % ophthalmic solution Place 1 drop into both eyes At Bedtime 1 Bottle 11     dorzolamide-timolol (COSOPT) 2-0.5 % ophthalmic solution Place 1 drop into both eyes 2 times daily 10 mL 11     Cyanocobalamin (B-12) 500 MCG TABS Take 500 mcg by mouth daily 150 tablet 3     triamcinolone (KENALOG) 0.1 % cream Apply to affected area of the face once to twice a day. 15 g 1     ibuprofen (ADVIL,MOTRIN) 200 MG tablet Take 200 mg by mouth every 4 hours as needed.       CALCIUM 600 MG OR TABS 1 daily       VITAMIN D-1000 MAX -1000 MG-UNIT OR TABS 1 daily       tamoxifen (NOLVADEX) 20 MG tablet Take 1 tablet (20 mg) by mouth daily 90 tablet 1     OTC products: None, except as noted above    Allergies   Allergen Reactions     Lisinopril Cough      Latex Allergy: NO    Social History   Substance Use Topics     Smoking status: Former Smoker     Packs/day: 0.50     Years: 20.00     Types: Cigarettes     Quit date: 2/1/1976     Smokeless tobacco: Never Used     Alcohol use Yes      Comment: rare     History   Drug Use No     Immunization History   Administered Date(s) Administered     HEPA 02/23/1999, 02/25/2000     Influenza (H1N1) 02/15/2010     Influenza (High Dose) 3 valent vaccine 10/07/2014, 10/28/2016, 10/18/2017     Influenza (IIV3) PF 10/23/2008, 12/03/2009, 11/09/2010, 11/04/2011, 10/05/2012, 10/19/2013     Pneumococcal (PCV 13) 03/02/2015     Pneumococcal 23 valent 04/17/2002, 06/04/2010     TD (ADULT, 7+) 05/06/2004     Tdap (Adacel,Boostrix) 10/05/2012     Zoster vaccine, live 05/27/2008        REVIEW OF SYSTEMS:                                                    C: NEGATIVE for fever, chills, change in weight  E/M: NEGATIVE for ear, mouth and throat problems  R: NEGATIVE for significant  "cough or SOB  CV: NEGATIVE for chest pain, palpitations or peripheral edema    EXAM:                                                    /72  Pulse 69  Temp 97.3  F (36.3  C) (Oral)  Ht 5' 5.51\" (1.664 m)  Wt 145 lb (65.8 kg)  SpO2 97%  BMI 23.75 kg/m2  GENERAL APPEARANCE: healthy, alert and no distress  HENT: ear canals and TM's normal and nose and mouth without ulcers or lesions  RESP: lungs clear to auscultation - no rales, rhonchi or wheezes  CV: regular rate and rhythm, normal S1 S2, no S3 or S4 and no murmur, click or rub   ABDOMEN: soft, nontender, no HSM or masses and bowel sounds normal  NEURO: Normal strength and tone, sensory exam grossly normal, mentation intact and speech normal    DIAGNOSTICS:                                                    No labs or EKG required for low risk surgery (cataract, skin procedure, breast biopsy, etc)    Recent Labs   Lab Test  09/08/17   1433  06/26/17   0942   12/22/16   1138   06/23/16   1111   HGB  11.7  11.6*   < >  11.4*   < >   --    PLT   --   193   --   221   < >   --    NA   --   140   --    --    --   139   POTASSIUM   --   4.5   --    --    --   4.0   CR   --   0.74   --   0.76   < >  0.75    < > = values in this interval not displayed.        IMPRESSION:                                                    Reason for surgery/procedure: left cataract  Diagnosis/reason for consult: need for clearance     The proposed surgical procedure is considered LOW risk.    REVISED CARDIAC RISK INDEX  The patient has the following serious cardiovascular risks for perioperative complications such as (MI, PE, VFib and 3  AV Block):  No serious cardiac risks  INTERPRETATION: 0 risks: Class I (very low risk - 0.4% complication rate)    The patient has the following additional risks for perioperative complications:  No identified additional risks      ICD-10-CM    1. Preop general physical exam Z01.818        RECOMMENDATIONS:                                           "            --Consult hospital rounder / IM to assist post-op medical management    --Patient is to take all scheduled medications on the day of surgery EXCEPT for modifications listed below.    APPROVAL GIVEN to proceed with proposed procedure, without further diagnostic evaluation       Signed Electronically by: LION SPAULDING MD    Copy of this evaluation report is provided to requesting physician.    [facility] Preop Guidelines

## 2017-11-27 NOTE — PATIENT INSTRUCTIONS
Before Your Surgery      Call your surgeon if there is any change in your health. This includes signs of a cold or flu (such as a sore throat, runny nose, cough, rash or fever).    Do not smoke, drink alcohol or take over the counter medicine (unless your surgeon or primary care doctor tells you to) for the 24 hours before and after surgery.    If you take prescribed drugs: Follow your doctor s orders about which medicines to take and which to stop until after surgery.    Eating and drinking prior to surgery: follow the instructions from your surgeon    Take a shower or bath the night before surgery. Use the soap your surgeon gave you to gently clean your skin. If you do not have soap from your surgeon, use your regular soap. Do not shave or scrub the surgery site.  Wear clean pajamas and have clean sheets on your bed.     [facility]    If you have any questions regarding to your visit please contact your care team:       Team Purple:   Clinic Hours Telephone Number   Dr. Kadi Rivera   7am-7pm  Monday - Thursday   7am-5pm  Fridays  [phone]  (Appointment scheduling available 24/7)    Questions about your Visit?   Team Line:  [phone]   Urgent Care - [place] [facility] - 11am-9pm Monday-Friday Saturday-Sunday- 9am-5pm   [place] - 5pm-9pm Monday-Friday Saturday-Sunday- 9am-5pm  [phone] - [place]   [phone] - [place]       What options do I have for visits at the clinic other than the traditional office visit?  To expand how we care for you, many of our providers are utilizing electronic visits (e-visits) and telephone visits, when medically appropriate, for interactions with their patients rather than a visit in the clinic.   We also offer nurse visits for many medical concerns. Just like any other service, we will bill your insurance company for this type of visit based on time spent on the phone  with your provider. Not all insurance companies cover these visits. Please check with your medical insurance if this type of visit is covered. You will be responsible for any charges that are not paid by your insurance.      E-visits via [employer]:  generally incur a $35.00 fee.  Telephone visits:  Time spent on the phone: *charged based on time that is spent on the phone in increments of 10 minutes. Estimated cost:   5-10 mins $30.00   11-20 mins. $59.00   21-30 mins. $85.00     Use [employer] (secure email communication and access to your chart) to send your primary care provider a message or make an appointment. Ask someone on your Team how to sign up for [employer].  For a Price Quote for your services, please call our Consumer Price Line at [phone].  As always, Thank you for trusting us with your health care needs!

## 2017-12-04 ENCOUNTER — OFFICE VISIT (OUTPATIENT)
Dept: FAMILY MEDICINE | Facility: CLINIC | Age: 82
End: 2017-12-04
Payer: COMMERCIAL

## 2017-12-04 VITALS
HEIGHT: 66 IN | TEMPERATURE: 97.3 F | WEIGHT: 145 LBS | HEART RATE: 69 BPM | OXYGEN SATURATION: 97 % | SYSTOLIC BLOOD PRESSURE: 128 MMHG | BODY MASS INDEX: 23.3 KG/M2 | DIASTOLIC BLOOD PRESSURE: 72 MMHG

## 2017-12-04 DIAGNOSIS — Z01.818 PREOP GENERAL PHYSICAL EXAM: Primary | ICD-10-CM

## 2017-12-04 PROCEDURE — 99214 OFFICE O/P EST MOD 30 MIN: CPT | Performed by: FAMILY MEDICINE

## 2017-12-04 ASSESSMENT — PAIN SCALES - GENERAL: PAINLEVEL: NO PAIN (0)

## 2017-12-04 NOTE — NURSING NOTE
"Chief Complaint   Patient presents with     Pre-Op Exam       Initial /72  Pulse 69  Temp 97.3  F (36.3  C) (Oral)  Ht 5' 5.51\" (1.664 m)  Wt 145 lb (65.8 kg)  SpO2 97%  BMI 23.75 kg/m2 Estimated body mass index is 23.75 kg/(m^2) as calculated from the following:    Height as of this encounter: 5' 5.51\" (1.664 m).    Weight as of this encounter: 145 lb (65.8 kg).  Medication Reconciliation: complete     Danielle Llamas MA       "

## 2017-12-04 NOTE — MR AVS SNAPSHOT
After Visit Summary   12/4/2017    Cydney Christiansen    MRN: [id number]           Patient Information     Date Of Birth          1935        Visit Information        Provider Department      12/4/2017 10:00 AM Kadi Sanchez MD [facility]        Today's Diagnoses     Preop general physical exam    -  1      Care Instructions      Before Your Surgery      Call your surgeon if there is any change in your health. This includes signs of a cold or flu (such as a sore throat, runny nose, cough, rash or fever).    Do not smoke, drink alcohol or take over the counter medicine (unless your surgeon or primary care doctor tells you to) for the 24 hours before and after surgery.    If you take prescribed drugs: Follow your doctor s orders about which medicines to take and which to stop until after surgery.    Eating and drinking prior to surgery: follow the instructions from your surgeon    Take a shower or bath the night before surgery. Use the soap your surgeon gave you to gently clean your skin. If you do not have soap from your surgeon, use your regular soap. Do not shave or scrub the surgery site.  Wear clean pajamas and have clean sheets on your bed.     [facility]    If you have any questions regarding to your visit please contact your care team:       Team Purple:   Clinic Hours Telephone Number   Dr. Kadi Rivera   7am-7pm  Monday - Thursday   7am-5pm  Fridays  [phone]  (Appointment scheduling available 24/7)    Questions about your Visit?   Team Line:  [phone]   Urgent Care - [place] - 11am-9pm Monday-Friday Saturday-Sunday- 9am-5pm   [place] - 5pm-9pm Monday-Friday Saturday-Sunday- 9am-5pm  [phone] - [place]   [phone] - [place]       What options do I have for visits at the clinic other than the traditional office visit?  To expand how we care  for you, many of our providers are utilizing electronic visits (e-visits) and telephone visits, when medically appropriate, for interactions with their patients rather than a visit in the clinic.   We also offer nurse visits for many medical concerns. Just like any other service, we will bill your insurance company for this type of visit based on time spent on the phone with your provider. Not all insurance companies cover these visits. Please check with your medical insurance if this type of visit is covered. You will be responsible for any charges that are not paid by your insurance.      E-visits via Blink for iPhone and Androidhart:  generally incur a $35.00 fee.  Telephone visits:  Time spent on the phone: *charged based on time that is spent on the phone in increments of 10 minutes. Estimated cost:   5-10 mins $30.00   11-20 mins. $59.00   21-30 mins. $85.00     Use Parkzzzt (secure email communication and access to your chart) to send your primary care provider a message or make an appointment. Ask someone on your Team how to sign up for ScanScout.  For a Price Quote for your services, please call our Consumer Price Line at [phone].  As always, Thank you for trusting us with your health care needs!              Follow-ups after your visit        Your next 10 appointments already scheduled     Dec 08, 2017   Procedure with Kvng Garcia MD   [facility] (--)    [address]  [facility] [address]   [phone]            Dec 09, 2017  9:30 AM CST   [facility] Visit with Kvng Garcia MD   [facility]    [address] [facility] [address]   [phone]            Dec 15, 2017 10:45 AM CST   [facility] Visit [facility]ng [facility] MD   [facility] ([facility]    [address] [facility] [address] [facility] [address]   [phone]            Jan 09, 2018  9:30 AM CST   Return Visit with Ruben Hammond MD, CLAUDIA SIU PROC  ROOM   [facility] ([facility]    [address] [facility]  [place] [address]   [phone]            Jan 16, 2018 10:45 AM CST   Return Visit with Kvng Garcia MD   [facility] [place] ([facility])    [address] [place] [address]   [phone]            Mar 08, 2018 11:45 AM CST   LAB with [facility] ([facility])    [id number] [place] [id number] [phone]           Please do not eat 10-12 hours before your appointment if you are coming in fasting for labs on lipids, cholesterol, or glucose (sugar). This does not apply to pregnant women. Water, hot tea and black coffee (with nothing added) are okay. Do not drink other fluids, diet soda or chew gum.            Mar 08, 2018 12:30 PM CST   Return Visit with Usha Salgado MD   [facility] ([facility])    [address] [place] [id number] [phone]            Jun 28, 2018 11:15 AM CDT   Return Visit with Qi Barba MD   [facility])    [address] [place] [id number] [phone]              Who to contact     If you have questions or need follow up information about today's clinic visit or your schedule please contact [facility] directly at [phone].  Normal or non-critical lab and imaging results will be communicated to you by MyChart, letter or phone within 4 business days after the clinic has received the results. If you do not hear from us within 7 days, please contact the clinic through MyChart or phone. If you have a critical or abnormal lab result, we will notify you by phone as soon as possible.  Submit refill requests through [employer] or call your pharmacy and they will forward the refill request to us. Please allow 3 business days for your refill to be completed.           "Additional Information About Your Visit        MyChart Information     [employer] gives you secure access to your electronic health record. If you see a primary care provider, you can also send messages to your care team and make appointments. If you have questions, please call your primary care clinic.  If you do not have a primary care provider, please call [phone] and they will assist you.        Care EveryWhere ID     This is your Care EveryWhere ID. This could be used by other organizations to access your [facility] medical records  [id number]        Your Vitals Were     Pulse Temperature Height Pulse Oximetry BMI (Body Mass Index)       69 97.3  F (36.3  C) (Oral) 5' 5.51\" (1.664 m) 97% 23.75 kg/m2        Blood Pressure from Last 3 Encounters:   12/04/17 128/72   11/09/17 120/60   10/23/17 124/64    Weight from Last 3 Encounters:   12/04/17 145 lb (65.8 kg)   11/09/17 144 lb (65.3 kg)   10/23/17 144 lb 8 oz (65.5 kg)              Today, you had the following     No orders found for display       Primary Care Provider Office Phone # Fax #    Kadi Sanchez [phone]       [address] [address]        Equal Access to Services     BARBARA CORCORAN : Hadii aad ku hadasho Soomaali, waaxda luqadaha, qaybta kaalmada adeegyada, morgan puga. So [facility] [phone].    ATENCIÓN: Si habla español, tiene a chavez disposición servicios gratuitos de asistencia lingüística. Llame al [phone].    We comply with applicable federal civil rights laws and [place] laws. We do not discriminate on the basis of race, color, national origin, age, disability, sex, sexual orientation, or gender identity.            Thank you!     Thank you for choosing [facility]  for your care. Our goal is always to provide you with excellent care. Hearing back from our patients is one way we can continue to improve our services. Please " take a few minutes to complete the written survey that you may receive in the mail after your visit with us. Thank you!             Your Updated Medication List - Protect others around you: Learn how to safely use, store and throw away your medicines at www.disposemymeds.org.          This list is accurate as of: 12/4/17 10:17 AM.  Always use your most recent med list.                   Brand Name Dispense Instructions for use Diagnosis    alendronate 70 MG tablet    FOSAMAX    12 tablet    Take 1 tablet (70 mg) by mouth every 7 days Take with over 8 ounces water and stay upright for at least 30 minutes after dose.  Take at least 60 minutes before breakfast    Osteoporosis, unspecified osteoporosis type, unspecified pathological fracture presence       B-12 500 MCG Tabs     150 tablet    Take 500 mcg by mouth daily    B12 deficiency       calcium 600 MG tablet      1 daily        Dexamethasone Acetate Powd     1 Bottle    1 Bottle 4 times daily 1 drop four times a day into the operative eye starting 1 day before surgery. Use until bottle runs out.    Nuclear sclerosis of both eyes       dorzolamide-timolol 2-0.5 % ophthalmic solution    COSOPT    10 mL    Place 1 drop into both eyes 2 times daily    PXF (pseudoexfoliation of lens capsule)       ibuprofen 200 MG tablet    ADVIL/MOTRIN     Take 200 mg by mouth every 4 hours as needed.        IRON SUPPLEMENT PO      Take 325 mg by mouth daily        latanoprost 0.005 % ophthalmic solution    XALATAN    1 Bottle    Place 1 drop into both eyes At Bedtime    PXF (pseudoexfoliation of lens capsule)       losartan 25 MG tablet    COZAAR    90 tablet    Take 1 tablet (25 mg) by mouth daily    Benign essential hypertension       meclizine 25 MG tablet    ANTIVERT    30 tablet    Take 1 tablet (25 mg) by mouth every 6 hours as needed for dizziness    Vertigo       Moxifloxacin HCl Powd     1 Bottle    1 Bottle 4 times daily 1 drop four times a day into the operative eye  starting 1 day before surgery. Use until bottle runs out.    Nuclear sclerosis of both eyes       simvastatin 20 MG tablet    ZOCOR    90 tablet    Take 1 tablet (20 mg) by mouth At Bedtime    Hyperlipidemia LDL goal <160       tamoxifen 20 MG tablet    NOLVADEX    90 tablet    Take 1 tablet (20 mg) by mouth daily    Malignant neoplasm of overlapping sites of left breast in female, estrogen receptor positive (H)       triamcinolone 0.1 % cream    KENALOG    15 g    Apply to affected area of the face once to twice a day.    Granuloma annulare       VITAMIN D-1000 MAX ST 1000 UNITS Tabs   Generic drug:  cholecalciferol      1 daily

## 2017-12-07 NOTE — H&P (VIEW-ONLY)
[facility]  [address]  [place] [address]  [phone]  Dept: [phone]    PRE-OP EVALUATION:  Today's date: 2017    Cydney Christiansen (: 1935) presents for pre-operative evaluation assessment as requested by Dr. Odell.  She requires evaluation and anesthesia risk assessment prior to undergoing surgery/procedure for treatment of left cataract .  Proposed procedure: left Cataract    Date of Surgery/ Procedure: 2017  Time of Surgery/ Procedure: 12:15pm  Hospital/Surgical Facility: [facility]     Primary Physician: Kadi Sanchez  Type of Anesthesia Anticipated: to be determined    Patient has a Health Care Directive or Living Will:  NO    1. NO - Do you have a history of heart attack, stroke, stent, bypass or surgery on an artery in the head, neck, heart or legs?  2. NO - Do you ever have any pain or discomfort in your chest?  3. NO - Do you have a history of  Heart Failure?  4. NO - Are you troubled by shortness of breath when: walking on the level, up a slight hill or at night?  5. NO - Do you currently have a cold, bronchitis or other respiratory infection?  6. NO - Do you have a cough, shortness of breath or wheezing?  7. NO - Do you sometimes get pains in the calves of your legs when you walk?  8. NO - Do you or anyone in your family have previous history of blood clots?  9. NO - Do you or does anyone in your family have a serious bleeding problem such as prolonged bleeding following surgeries or cuts?  10. YES - HAVE YOU EVER HAD PROBLEMS WITH ANEMIA OR BEEN TOLD TO TAKE IRON PILLS? On Iron Pills   11. NO - Have you had any abnormal blood loss such as black, tarry or bloody stools, or abnormal vaginal bleeding?  12. NO - Have you ever had a blood transfusion?  13. YES - HAVE YOU OR ANY OF YOUR RELATIVES EVER HAD PROBLEMS WITH ANESTHESIA? Problems waking patient up   14. NO - Do you have sleep apnea, excessive snoring or daytime  drowsiness?  15. NO - Do you have any prosthetic heart valves?  16. NO - Do you have prosthetic joints?  17. NO - Is there any chance that you may be pregnant?        HPI:  Patient has left cataract that clouds her vision                                                       Brief HPI related to upcoming procedure: needs surgery for left cataract       HYPERTENSION - Patient has longstanding history of mod-severe HTN , currently denies any symptoms referable to elevated blood pressure. Specifically denies chest pain, palpitations, dyspnea, orthopnea, PND or peripheral edema. Blood pressure readings have been in normal range. Current medication regimen is as listed below. Patient denies any side effects of medication.                                                                                                                                                                                          .  HYPERLIPIDEMIA - Patient has a long history of significant Hyperlipidemia requiring medication for treatment with recent good control. Patient reports no problems or side effects with the medication.                                                                                                                                                       .    MEDICAL HISTORY:                                                    Patient Active Problem List    Diagnosis Date Noted     Osteoporosis, unspecified osteoporosis type, unspecified pathological fracture presence 06/26/2017     Priority: Medium     Primary open angle glaucoma of both eyes, unspecified glaucoma stage 09/23/2016     Priority: Medium     Nuclear sclerosis of both eyes 03/25/2016     Priority: Medium     Mass of left hand 02/19/2016     Priority: Medium     Compression fracture of thoracic vertebra (H) 07/21/2015     Priority: Medium     Scoliosis      Priority: Medium     Malignant neoplasm of overlapping sites of left female breast (H) 06/08/2015      Priority: Medium     Seborrheic keratosis 06/17/2014     Priority: Medium     Hypertension goal BP (blood pressure) < 140/90 12/19/2013     Priority: Medium     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      Priority: Medium     left leg       PVD (posterior vitreous detachment), OS 08/06/2013     Priority: Medium     Viral warts 07/15/2013     Priority: Medium     Diagnosis updated by automated process. Provider to review and confirm.       Skin lesion of left leg 07/08/2013     Priority: Medium     Health Care Home 01/04/2013     Priority: Medium     Mike Cummings RN,C--[phone]   FPA / [facility]     DX V65.8 REPLACED WITH 209[facility] HEALTH CARE HOME (04/08/2013)       Squamous cell carcinoma 10/01/2012     Priority: Medium     R dorsal hand  Left popliteal fossa with perineural involvement s/p excision 7-2013       Hyperlipidemia LDL goal <160 06/10/2011     Priority: Medium     Family history of diabetes mellitus 06/10/2011     Priority: Medium     Advanced directives, counseling/discussion 06/09/2011     Priority: Medium     Parent voices understanding and acceptance of this advice and will call back if any further questions or concerns.       Basal cell carcinoma 06/01/2011     Priority: Medium     L neck       Personal history of malignant neoplasm of bladder 12/16/2010     Priority: Medium     PXF  OU 09/23/2010     Priority: Medium     History of basal cell carcinoma 02/18/2010     Priority: Medium      Past Medical History:   Diagnosis Date     Actinic keratosis      Basal cell cancer 02/2011    bcc of the L back.     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     Past Surgical History:   Procedure Laterality Date     COLONOSCOPY  5/2008, 5/13, 6/14, 6/17    Q 3 years for advanced adenomatous polyp     CYSTOSCOPY  12/31/2008     D & C       GENITOURINARY SURGERY      TURBT     HC REMOVAL GALLBLADDER      open pan     HC TRABECULOPLASTY BY LASER SURGERY Left 4/18/07    SLT #1 OS (inf 180)     HC TRABECULOPLASTY BY LASER SURGERY Right 5/4/11    SLT #1 OD (inf 180?)     HC TRABECULOPLASTY BY LASER SURGERY Left 3/17/15    SLT #2 OS (sup 180)     HC TRABECULOPLASTY BY LASER SURGERY Right 6/23/15    SLT #2 OD (sup 180?)     LASER ARGON TREATMENT      SLT left eye x 2     LUMPECTOMY BREAST WITH SENTINEL NODE, COMBINED Left 7/29/2015    Procedure: COMBINED LUMPECTOMY BREAST WITH SENTINEL NODE;  Surgeon: Ifeanyi Sunshine MD;  Location: [facility] OR     SURGICAL HISTORY OF -   9/11    squamous cell CA excised from back     TUBAL LIGATION       Current Outpatient Prescriptions   Medication Sig Dispense Refill     meclizine (ANTIVERT) 25 MG tablet Take 1 tablet (25 mg) by mouth every 6 hours as needed for dizziness 30 tablet 1     Dexamethasone Acetate POWD 1 Bottle 4 times daily 1 drop four times a day into the operative eye starting 1 day before surgery. Use until bottle runs out. 1 Bottle 0     Moxifloxacin HCl POWD 1 Bottle 4 times daily 1 drop four times a day into the operative eye starting 1 day before surgery. Use until bottle runs out. 1 Bottle 0     Ferrous Sulfate (IRON SUPPLEMENT PO) Take 325 mg by mouth daily       alendronate (FOSAMAX) 70 MG tablet Take 1 tablet (70 mg) by mouth every 7 days Take with over 8 ounces water and stay upright for at least 30 minutes after dose.  Take at least 60 minutes before  breakfast 12 tablet 3     losartan (COZAAR) 25 MG tablet Take 1 tablet (25 mg) by mouth daily 90 tablet 4     simvastatin (ZOCOR) 20 MG tablet Take 1 tablet (20 mg) by mouth At Bedtime 90 tablet 4     latanoprost (XALATAN) 0.005 % ophthalmic solution Place 1 drop into both eyes At Bedtime 1 Bottle 11     dorzolamide-timolol (COSOPT) 2-0.5 % ophthalmic solution Place 1 drop into both eyes 2 times daily 10 mL 11     Cyanocobalamin (B-12) 500 MCG TABS Take 500 mcg by mouth daily 150 tablet 3     triamcinolone (KENALOG) 0.1 % cream Apply to affected area of the face once to twice a day. 15 g 1     ibuprofen (ADVIL,MOTRIN) 200 MG tablet Take 200 mg by mouth every 4 hours as needed.       CALCIUM 600 MG OR TABS 1 daily       VITAMIN D-1000 MAX -1000 MG-UNIT OR TABS 1 daily       tamoxifen (NOLVADEX) 20 MG tablet Take 1 tablet (20 mg) by mouth daily 90 tablet 1     OTC products: None, except as noted above    Allergies   Allergen Reactions     Lisinopril Cough      Latex Allergy: NO    Social History   Substance Use Topics     Smoking status: Former Smoker     Packs/day: 0.50     Years: 20.00     Types: Cigarettes     Quit date: 2/1/1976     Smokeless tobacco: Never Used     Alcohol use Yes      Comment: rare     History   Drug Use No     Immunization History   Administered Date(s) Administered     HEPA 02/23/1999, 02/25/2000     Influenza (H1N1) 02/15/2010     Influenza (High Dose) 3 valent vaccine 10/07/2014, 10/28/2016, 10/18/2017     Influenza (IIV3) PF 10/23/2008, 12/03/2009, 11/09/2010, 11/04/2011, 10/05/2012, 10/19/2013     Pneumococcal (PCV 13) 03/02/2015     Pneumococcal 23 valent 04/17/2002, 06/04/2010     TD (ADULT, 7+) 05/06/2004     Tdap (Adacel,Boostrix) 10/05/2012     Zoster vaccine, live 05/27/2008        REVIEW OF SYSTEMS:                                                    C: NEGATIVE for fever, chills, change in weight  E/M: NEGATIVE for ear, mouth and throat problems  R: NEGATIVE for significant  "cough or SOB  CV: NEGATIVE for chest pain, palpitations or peripheral edema    EXAM:                                                    /72  Pulse 69  Temp 97.3  F (36.3  C) (Oral)  Ht 5' 5.51\" (1.664 m)  Wt 145 lb (65.8 kg)  SpO2 97%  BMI 23.75 kg/m2  GENERAL APPEARANCE: healthy, alert and no distress  HENT: ear canals and TM's normal and nose and mouth without ulcers or lesions  RESP: lungs clear to auscultation - no rales, rhonchi or wheezes  CV: regular rate and rhythm, normal S1 S2, no S3 or S4 and no murmur, click or rub   ABDOMEN: soft, nontender, no HSM or masses and bowel sounds normal  NEURO: Normal strength and tone, sensory exam grossly normal, mentation intact and speech normal    DIAGNOSTICS:                                                    No labs or EKG required for low risk surgery (cataract, skin procedure, breast biopsy, etc)    Recent Labs   Lab Test  09/08/17   1433  06/26/17   0942   12/22/16   1138   06/23/16   1111   HGB  11.7  11.6*   < >  11.4*   < >   --    PLT   --   193   --   221   < >   --    NA   --   140   --    --    --   139   POTASSIUM   --   4.5   --    --    --   4.0   CR   --   0.74   --   0.76   < >  0.75    < > = values in this interval not displayed.        IMPRESSION:                                                    Reason for surgery/procedure: left cataract  Diagnosis/reason for consult: need for clearance     The proposed surgical procedure is considered LOW risk.    REVISED CARDIAC RISK INDEX  The patient has the following serious cardiovascular risks for perioperative complications such as (MI, PE, VFib and 3  AV Block):  No serious cardiac risks  INTERPRETATION: 0 risks: Class I (very low risk - 0.4% complication rate)    The patient has the following additional risks for perioperative complications:  No identified additional risks      ICD-10-CM    1. Preop general physical exam Z01.818        RECOMMENDATIONS:                                           "            --Consult hospital rounder / IM to assist post-op medical management    --Patient is to take all scheduled medications on the day of surgery EXCEPT for modifications listed below.    APPROVAL GIVEN to proceed with proposed procedure, without further diagnostic evaluation       Signed Electronically by: LION SPAULDING MD    Copy of this evaluation report is provided to requesting physician.    [place] Preop Guidelines

## 2017-12-08 ENCOUNTER — SURGERY (OUTPATIENT)
Age: 82
End: 2017-12-08

## 2017-12-08 ENCOUNTER — ANESTHESIA EVENT (OUTPATIENT)
Dept: SURGERY | Facility: CLINIC | Age: 82
End: 2017-12-08
Payer: COMMERCIAL

## 2017-12-08 ENCOUNTER — HOSPITAL ENCOUNTER (OUTPATIENT)
Facility: CLINIC | Age: 82
Discharge: HOME | End: 2017-12-08
Attending: STUDENT IN AN ORGANIZED HEALTH CARE EDUCATION/TRAINING PROGRAM | Admitting: STUDENT IN AN ORGANIZED HEALTH CARE EDUCATION/TRAINING PROGRAM
Payer: COMMERCIAL

## 2017-12-08 ENCOUNTER — ANESTHESIA (OUTPATIENT)
Dept: SURGERY | Facility: CLINIC | Age: 82
End: 2017-12-08
Payer: COMMERCIAL

## 2017-12-08 VITALS
OXYGEN SATURATION: 97 % | SYSTOLIC BLOOD PRESSURE: 132 MMHG | TEMPERATURE: 96.8 F | RESPIRATION RATE: 20 BRPM | DIASTOLIC BLOOD PRESSURE: 81 MMHG

## 2017-12-08 PROCEDURE — 37000009 ZZH ANESTHESIA TECHNICAL FEE, EACH ADDTL 15 MIN: Performed by: STUDENT IN AN ORGANIZED HEALTH CARE EDUCATION/TRAINING PROGRAM

## 2017-12-08 PROCEDURE — 66982 XCAPSL CTRC RMVL CPLX WO ECP: CPT | Mod: LT | Performed by: STUDENT IN AN ORGANIZED HEALTH CARE EDUCATION/TRAINING PROGRAM

## 2017-12-08 PROCEDURE — 71000028 ZZH EYE RECOVERY PHASE 2 EACH 15 MINS: Performed by: STUDENT IN AN ORGANIZED HEALTH CARE EDUCATION/TRAINING PROGRAM

## 2017-12-08 PROCEDURE — V2632 POST CHMBR INTRAOCULAR LENS: HCPCS | Performed by: STUDENT IN AN ORGANIZED HEALTH CARE EDUCATION/TRAINING PROGRAM

## 2017-12-08 PROCEDURE — 36000102 ZZH EYE SURGERY LEVEL 3 EA 15 ADDTL MIN: Performed by: STUDENT IN AN ORGANIZED HEALTH CARE EDUCATION/TRAINING PROGRAM

## 2017-12-08 PROCEDURE — 27210794 ZZH OR GENERAL SUPPLY STERILE: Performed by: STUDENT IN AN ORGANIZED HEALTH CARE EDUCATION/TRAINING PROGRAM

## 2017-12-08 PROCEDURE — 25000128 H RX IP 250 OP 636: Performed by: NURSE ANESTHETIST, CERTIFIED REGISTERED

## 2017-12-08 PROCEDURE — 40000170 ZZH STATISTIC PRE-PROCEDURE ASSESSMENT II: Performed by: STUDENT IN AN ORGANIZED HEALTH CARE EDUCATION/TRAINING PROGRAM

## 2017-12-08 PROCEDURE — 36000101 ZZH EYE SURGERY LEVEL 3 1ST 30 MIN: Performed by: STUDENT IN AN ORGANIZED HEALTH CARE EDUCATION/TRAINING PROGRAM

## 2017-12-08 PROCEDURE — 25000125 ZZHC RX 250: Performed by: STUDENT IN AN ORGANIZED HEALTH CARE EDUCATION/TRAINING PROGRAM

## 2017-12-08 PROCEDURE — 37000008 ZZH ANESTHESIA TECHNICAL FEE, 1ST 30 MIN: Performed by: STUDENT IN AN ORGANIZED HEALTH CARE EDUCATION/TRAINING PROGRAM

## 2017-12-08 PROCEDURE — 25000128 H RX IP 250 OP 636: Performed by: ANESTHESIOLOGY

## 2017-12-08 PROCEDURE — 25000128 H RX IP 250 OP 636: Performed by: STUDENT IN AN ORGANIZED HEALTH CARE EDUCATION/TRAINING PROGRAM

## 2017-12-08 DEVICE — EYE IMP IOL AMO PCL TECNIS ZCB00 20.5: Type: IMPLANTABLE DEVICE | Site: EYE | Status: FUNCTIONAL

## 2017-12-08 RX ORDER — TETRACAINE HYDROCHLORIDE 5 MG/ML
SOLUTION OPHTHALMIC PRN
Status: DISCONTINUED | OUTPATIENT
Start: 2017-12-08 | End: 2017-12-08 | Stop reason: HOSPADM

## 2017-12-08 RX ORDER — LIDOCAINE HYDROCHLORIDE 10 MG/ML
INJECTION, SOLUTION EPIDURAL; INFILTRATION; INTRACAUDAL; PERINEURAL PRN
Status: DISCONTINUED | OUTPATIENT
Start: 2017-12-08 | End: 2017-12-08 | Stop reason: HOSPADM

## 2017-12-08 RX ORDER — SODIUM CHLORIDE, SODIUM LACTATE, POTASSIUM CHLORIDE, CALCIUM CHLORIDE 600; 310; 30; 20 MG/100ML; MG/100ML; MG/100ML; MG/100ML
500 INJECTION, SOLUTION INTRAVENOUS CONTINUOUS
Status: DISCONTINUED | OUTPATIENT
Start: 2017-12-08 | End: 2017-12-08 | Stop reason: HOSPADM

## 2017-12-08 RX ORDER — FLURBIPROFEN SODIUM 0.3 MG/ML
1 SOLUTION/ DROPS OPHTHALMIC
Status: DISCONTINUED | OUTPATIENT
Start: 2017-12-08 | End: 2017-12-08 | Stop reason: HOSPADM

## 2017-12-08 RX ORDER — TROPICAMIDE 10 MG/ML
1 SOLUTION/ DROPS OPHTHALMIC
Status: COMPLETED | OUTPATIENT
Start: 2017-12-08 | End: 2017-12-08

## 2017-12-08 RX ORDER — PROPARACAINE HYDROCHLORIDE 5 MG/ML
1 SOLUTION/ DROPS OPHTHALMIC ONCE
Status: COMPLETED | OUTPATIENT
Start: 2017-12-08 | End: 2017-12-08

## 2017-12-08 RX ORDER — CYCLOPENTOLATE HYDROCHLORIDE 10 MG/ML
1 SOLUTION/ DROPS OPHTHALMIC
Status: COMPLETED | OUTPATIENT
Start: 2017-12-08 | End: 2017-12-08

## 2017-12-08 RX ORDER — PHENYLEPHRINE HYDROCHLORIDE 25 MG/ML
1 SOLUTION/ DROPS OPHTHALMIC
Status: COMPLETED | OUTPATIENT
Start: 2017-12-08 | End: 2017-12-08

## 2017-12-08 RX ORDER — BALANCED SALT SOLUTION 6.4; .75; .48; .3; 3.9; 1.7 MG/ML; MG/ML; MG/ML; MG/ML; MG/ML; MG/ML
SOLUTION OPHTHALMIC PRN
Status: DISCONTINUED | OUTPATIENT
Start: 2017-12-08 | End: 2017-12-08 | Stop reason: HOSPADM

## 2017-12-08 RX ADMIN — BALANCED SALT SOLUTION 15 ML: 6.4; .75; .48; .3; 3.9; 1.7 SOLUTION OPHTHALMIC at 13:15

## 2017-12-08 RX ADMIN — CYCLOPENTOLATE HYDROCHLORIDE 1 DROP: 10 SOLUTION/ DROPS OPHTHALMIC at 11:39

## 2017-12-08 RX ADMIN — FLURBIPROFEN SODIUM 1 DROP: 0.3 SOLUTION/ DROPS OPHTHALMIC at 11:39

## 2017-12-08 RX ADMIN — PHENYLEPHRINE HYDROCHLORIDE 1 DROP: 2.5 SOLUTION/ DROPS OPHTHALMIC at 11:11

## 2017-12-08 RX ADMIN — CYCLOPENTOLATE HYDROCHLORIDE 1 DROP: 10 SOLUTION/ DROPS OPHTHALMIC at 11:11

## 2017-12-08 RX ADMIN — EPINEPHRINE 500 ML: 1 INJECTION, SOLUTION, CONCENTRATE INTRAVENOUS at 13:17

## 2017-12-08 RX ADMIN — FLURBIPROFEN SODIUM 1 DROP: 0.3 SOLUTION/ DROPS OPHTHALMIC at 11:11

## 2017-12-08 RX ADMIN — MIDAZOLAM 1 MG: 1 INJECTION INTRAMUSCULAR; INTRAVENOUS at 13:03

## 2017-12-08 RX ADMIN — PHENYLEPHRINE HYDROCHLORIDE 1 DROP: 2.5 SOLUTION/ DROPS OPHTHALMIC at 11:39

## 2017-12-08 RX ADMIN — CYCLOPENTOLATE HYDROCHLORIDE 1 DROP: 10 SOLUTION/ DROPS OPHTHALMIC at 11:25

## 2017-12-08 RX ADMIN — PROPARACAINE HYDROCHLORIDE 1 DROP: 5 SOLUTION/ DROPS OPHTHALMIC at 11:11

## 2017-12-08 RX ADMIN — SODIUM CHLORIDE, POTASSIUM CHLORIDE, SODIUM LACTATE AND CALCIUM CHLORIDE 500 ML: 600; 310; 30; 20 INJECTION, SOLUTION INTRAVENOUS at 11:39

## 2017-12-08 RX ADMIN — SODIUM CHONDROITIN SULFATE / SODIUM HYALURONATE 1 ML: 0.55-0.5 INJECTION INTRAOCULAR at 13:17

## 2017-12-08 RX ADMIN — TROPICAMIDE 1 DROP: 10 SOLUTION/ DROPS OPHTHALMIC at 11:25

## 2017-12-08 RX ADMIN — PHENYLEPHRINE HYDROCHLORIDE 1 DROP: 2.5 SOLUTION/ DROPS OPHTHALMIC at 11:25

## 2017-12-08 RX ADMIN — LIDOCAINE HYDROCHLORIDE 1 ML: 10 INJECTION, SOLUTION EPIDURAL; INFILTRATION; INTRACAUDAL; PERINEURAL at 13:17

## 2017-12-08 RX ADMIN — TROPICAMIDE 1 DROP: 10 SOLUTION/ DROPS OPHTHALMIC at 11:11

## 2017-12-08 RX ADMIN — TETRACAINE HYDROCHLORIDE 2 DROP: 5 SOLUTION OPHTHALMIC at 13:17

## 2017-12-08 RX ADMIN — FLURBIPROFEN SODIUM 1 DROP: 0.3 SOLUTION/ DROPS OPHTHALMIC at 11:25

## 2017-12-08 RX ADMIN — TROPICAMIDE 1 DROP: 10 SOLUTION/ DROPS OPHTHALMIC at 11:39

## 2017-12-08 ASSESSMENT — COPD QUESTIONNAIRES: COPD: 0

## 2017-12-08 ASSESSMENT — LIFESTYLE VARIABLES: TOBACCO_USE: 0

## 2017-12-08 ASSESSMENT — ENCOUNTER SYMPTOMS: SEIZURES: 0

## 2017-12-08 NOTE — IP AVS SNAPSHOT
MRN:[id number]                      After Visit Summary   12/8/2017    Cydney Christiansen    MRN: [id number]           Thank you!     Thank you for choosing [facility] for your care. Our goal is always to provide you with excellent care. Hearing back from our patients is one way we can continue to improve our services. Please take a few minutes to complete the written survey that you may receive in the mail after you visit with us. Thank you!        Patient Information     Date Of Birth          1935        About your hospital stay     You were admitted on:  December 8, 2017 You last received care in the:  [facility]    You were discharged on:  December 8, 2017       Who to Call     For medical emergencies, please call 911.  For non-urgent questions about your medical care, please call your primary care provider or clinic, [phone]  For questions related to your surgery, please call your surgery clinic        Attending Provider     Provider Specialty    Kvng Garcia MD Ophthalmology       Primary Care Provider Office Phone # Fax #    Kadi ANGELES Sanchez [phone]      Your next 10 appointments already scheduled     Dec 09, 2017  9:30 AM CST   [facility] Visit with Kvng Garcia MD   [facility]    [address] [facility] [address] [phone]            Dec 15, 2017 10:45 AM CST   Return Visit with Kvng Garcia MD   [facility] ([facility]    [address] [address] [phone]            Jan 09, 2018  9:30 AM CST   Return Visit with Ruben Hammond MD, FR[facility] CYSTO PROC ROOM   [facility]    [address] [address] [phone]            Jan 16, 2018 10:45 AM CST   Return Visit with Kvng Garcia MD   [facility] ([facility])    [address] [facility] [address]  [place] [address]   [phone]            Mar 08, 2018 11:45 AM CST   LAB with LAB ONC [facility] ([facility])    [address] [place] [id number] [phone]           Please do not eat 10-12 hours before your appointment if you are coming in fasting for labs on lipids, cholesterol, or glucose (sugar). This does not apply to pregnant women. Water, hot tea and black coffee (with nothing added) are okay. Do not drink other fluids, diet soda or chew gum.            Mar 08, 2018 12:30 PM CST   Return Visit with Usha Salgado MD   [facility] ([facility])    [address] [place] [id number] [phone]            Jun 28, 2018 11:15 AM CDT   Return Visit with Qi Barba MD   [facility] ([facility])    [address] [place] [id number] [phone]              Further instructions from your care team         CATARACT SURGERY POST-OP INSTRUCTIONS  Dr. Kvng Garcia  [phone]      *Continue using CatarActive3 four times a day in the operative eye.  *You should get 3 doses in today and 4 doses daily starting tomorrow.      Keep the eye shield taped in place unless putting drops in. We will remove it for you in the office tomorrow.      Light sensitivity may be noticed. Sunglasses may be worn for comfort.      Do not rub the operated eye.      Keep the operated eye dry. You may wash your hair, bathe or shower, but keep the operated eye closed while doing so.       No swimming, hot tub, or sauna for 2 weeks.      No make up around eye for 5 days.      No bending at the waist or lifting more than 10 pounds for one week.      May take Tylenol (per directions on bottle) for mild pain.      Call the office at [phone] and ask to speak to the on-call ophthalmologist  if any of the following should occur:  o Any sudden vision  changes  o Nausea or severe headache  o Increase in pain not controlled  o Or signs of infection (pus, increasing redness or tenderness)        [facility] Discharge  Instructions  Anesthesia ([facility])   Adult Discharge Instructions    For 24 hours after surgery    1. Get plenty of rest.  Make arrangements to have a responsible adult stay with you for at least 6 hours after you leave the hospital.  2. Do not drive or use heavy equipment for 24 hours.    3. Do not drink alcohol for 24 hours.  4. Do not sign legal documents or make important decisions for 24 hours.  5. Avoid strenuous or risky activities. You may feel lightheaded.  If so, sit for a few minutes before standing.  Have someone help you get up.   6. Conscious sedation patients may resume a regular diet..  7. Any questions of medical nature, call your physician.      Pending Results     No orders found from 12/6/2017 to 12/9/2017.            Admission Information     Date & Time Provider Department Dept. Phone    12/8/2017 Kvng Garcia MD [facility] [phone]      Your Vitals Were     Blood Pressure Temperature Respirations Pulse Oximetry          133/71 96.8  F (36  C) (Temporal) 20 96%        MyChart Information     [employer] gives you secure access to your electronic health record. If you see a primary care provider, you can also send messages to your care team and make appointments. If you have questions, please call your primary care clinic.  If you do not have a primary care provider, please call [phone] and they will assist you.        Care EveryWhere ID     This is your Care EveryWhere ID. This could be used by other organizations to access your [place] medical records  [id number]        Equal Access to Services     [facility] NILO : Zuleyma Kimble, warosamaria lusoraya, qamorgan oleary. [facility]  [phone].    ATENCIÓN: Si oly goode, tiene a chavez disposición servicios gratuitos de asistencia lingüística. Kvng canales [phone].    We comply with applicable federal civil rights laws and [place] laws. We do not discriminate on the basis of race, color, national origin, age, disability, sex, sexual orientation, or gender identity.               Review of your medicines      CONTINUE these medicines which have NOT CHANGED        Dose / Directions    alendronate 70 MG tablet   Commonly known as:  FOSAMAX   Used for:  Osteoporosis, unspecified osteoporosis type, unspecified pathological fracture presence        Dose:  70 mg   Take 1 tablet (70 mg) by mouth every 7 days Take with over 8 ounces water and stay upright for at least 30 minutes after dose.  Take at least 60 minutes before breakfast   Quantity:  12 tablet   Refills:  3       B-12 500 MCG Tabs   Used for:  B12 deficiency        Dose:  500 mcg   Take 500 mcg by mouth daily   Quantity:  150 tablet   Refills:  3       calcium 600 MG tablet        1 daily   Refills:  0       Dexamethasone Acetate Powd   Used for:  Nuclear sclerosis of both eyes        Dose:  1 Bottle   1 Bottle 4 times daily 1 drop four times a day into the operative eye starting 1 day before surgery. Use until bottle runs out.   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)        Dose:  1 drop   Place 1  drop into both eyes At Bedtime   Quantity:  1 Bottle   Refills:  11       losartan 25 MG tablet   Commonly known as:  COZAAR   Used for:  Benign essential hypertension        Dose:  25 mg   Take 1 tablet (25 mg) by mouth daily   Quantity:  90 tablet   Refills:  4       meclizine 25 MG tablet   Commonly known as:  ANTIVERT   Used for:  Vertigo        Dose:  25 mg   Take 1 tablet (25 mg) by mouth every 6 hours as needed for dizziness   Quantity:  30 tablet   Refills:  1       Moxifloxacin HCl Powd   Used for:  Nuclear sclerosis of both eyes        Dose:  1 Bottle   1 Bottle 4 times daily 1 drop four times a day into the operative eye starting 1 day before surgery. Use until bottle runs out.   Quantity:  1 Bottle   Refills:  0       simvastatin 20 MG tablet   Commonly known as:  ZOCOR   Used for:  Hyperlipidemia LDL goal <160        Dose:  20 mg   Take 1 tablet (20 mg) by mouth At Bedtime   Quantity:  90 tablet   Refills:  4       tamoxifen 20 MG tablet   Commonly known as:  NOLVADEX   Used for:  Malignant neoplasm of overlapping sites of left breast in female, estrogen receptor positive (H)        Dose:  20 mg   Take 1 tablet (20 mg) by mouth daily   Quantity:  90 tablet   Refills:  1       triamcinolone 0.1 % cream   Commonly known as:  KENALOG   Used for:  Granuloma annulare        Apply to affected area of the face once to twice a day.   Quantity:  15 g   Refills:  1       VITAMIN D-1000 MAX ST 1000 UNITS Tabs   Generic drug:  cholecalciferol        1 daily   Refills:  0                Protect others around you: Learn how to safely use, store and throw away your medicines at www.disposemymeds.org.             Medication List: This is a list of all your medications and when to take them. Check marks below indicate your daily home schedule. Keep this list as a reference.      Medications           Morning Afternoon Evening Bedtime As Needed    alendronate 70 MG tablet   Commonly known as:  FOSAMAX   Take 1 tablet (70  mg) by mouth every 7 days Take with over 8 ounces water and stay upright for at least 30 minutes after dose.  Take at least 60 minutes before breakfast                                B-12 500 MCG Tabs   Take 500 mcg by mouth daily                                calcium 600 MG tablet   1 daily                                Dexamethasone Acetate Powd   1 Bottle 4 times daily 1 drop four times a day into the operative eye starting 1 day before surgery. Use until bottle runs out.                                dorzolamide-timolol 2-0.5 % ophthalmic solution   Commonly known as:  COSOPT   Place 1 drop into both eyes 2 times daily                                ibuprofen 200 MG tablet   Commonly known as:  ADVIL/MOTRIN   Take 200 mg by mouth every 4 hours as needed.                                IRON SUPPLEMENT PO   Take 325 mg by mouth daily                                latanoprost 0.005 % ophthalmic solution   Commonly known as:  XALATAN   Place 1 drop into both eyes At Bedtime                                losartan 25 MG tablet   Commonly known as:  COZAAR   Take 1 tablet (25 mg) by mouth daily                                meclizine 25 MG tablet   Commonly known as:  ANTIVERT   Take 1 tablet (25 mg) by mouth every 6 hours as needed for dizziness                                Moxifloxacin HCl Powd   1 Bottle 4 times daily 1 drop four times a day into the operative eye starting 1 day before surgery. Use until bottle runs out.                                simvastatin 20 MG tablet   Commonly known as:  ZOCOR   Take 1 tablet (20 mg) by mouth At Bedtime                                tamoxifen 20 MG tablet   Commonly known as:  NOLVADEX   Take 1 tablet (20 mg) by mouth daily                                triamcinolone 0.1 % cream   Commonly known as:  KENALOG   Apply to affected area of the face once to twice a day.                                VITAMIN D-1000 MAX ST 1000 UNITS Tabs   1 daily   Generic drug:   cholecalciferol

## 2017-12-08 NOTE — IP AVS SNAPSHOT
[facility]    [address]    [place] [address]    Phone:  [phone]    Fax:  [phone]                                       After Visit Summary   12/8/2017    Cydney Christiansen    MRN: [id number]           After Visit Summary Signature Page     I have received my discharge instructions, and my questions have been answered. I have discussed any challenges I see with this plan with the nurse or doctor.    ..........................................................................................................................................  Patient/Patient Representative Signature      ..........................................................................................................................................  Patient Representative Print Name and Relationship to Patient    ..................................................               ................................................  Date                                            Time    ..........................................................................................................................................  Reviewed by Signature/Title    ...................................................              ..............................................  Date                                                            Time

## 2017-12-08 NOTE — ANESTHESIA POSTPROCEDURE EVALUATION
Patient: Cydney Christiansen    Procedure(s):   COMPLEX LEFT PHACOEMULSIFICATION CLEAR CORNEA WITH STANDARD INTRAOCULAR LENS IMPLANT  - Wound Class: I-Clean    Diagnosis:LEFT EYE CATARACT  Diagnosis Additional Information: No value filed.    Anesthesia Type:  MAC    Note:  Anesthesia Post Evaluation    Patient location during evaluation: PACU  Patient participation: Able to fully participate in evaluation  Level of consciousness: awake and alert  Pain management: adequate  Airway patency: patent  Cardiovascular status: acceptable, blood pressure returned to baseline and hemodynamically stable  Respiratory status: acceptable  Hydration status: acceptable  PONV: none     Anesthetic complications: None          Last vitals:  Vitals:    12/08/17 1127 12/08/17 1337 12/08/17 1350   BP: 129/78 133/71 132/81   Resp: 16 20 20   Temp: 36  C (96.8  F)     SpO2: 95% 96% 97%         Electronically Signed By: Katelyn Whitmore MD  December 8, 2017  2:27 PM

## 2017-12-08 NOTE — ANESTHESIA PREPROCEDURE EVALUATION
Procedure: Procedure(s):  PHACOEMULSIFICATION CLEAR CORNEA WITH STANDARD INTRAOCULAR LENS IMPLANT  Preop diagnosis: LEFT EYE CATARACT    Allergies   Allergen Reactions     Lisinopril Cough     Past Medical History:   Diagnosis Date     Actinic keratosis      Basal cell cancer 02/2011    bcc of the L back.     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     Past Surgical History:   Procedure Laterality Date     COLONOSCOPY  5/2008, 5/13, 6/14, 6/17    Q 3 years for advanced adenomatous polyp     CYSTOSCOPY  12/31/2008     D & C       GENITOURINARY SURGERY      TURBT     HC REMOVAL GALLBLADDER      open pan     HC TRABECULOPLASTY BY LASER SURGERY Left 4/18/07    SLT #1 OS (inf 180)     HC TRABECULOPLASTY BY LASER SURGERY Right 5/4/11    SLT #1 OD (inf 180?)     HC TRABECULOPLASTY BY LASER SURGERY Left 3/17/15    SLT #2 OS (sup 180)     HC TRABECULOPLASTY BY LASER SURGERY Right 6/23/15    SLT #2 OD (sup 180?)     LASER ARGON TREATMENT      SLT left eye x 2     LUMPECTOMY BREAST WITH SENTINEL NODE, COMBINED Left 7/29/2015    Procedure: COMBINED LUMPECTOMY BREAST WITH SENTINEL NODE;  Surgeon: Ifeanyi Sunshine MD;  Location: [facility] OR     SURGICAL HISTORY OF - 9/11    squamous cell CA excised from back     TUBAL LIGATION       Prior to Admission medications    Medication Sig Start Date End Date Taking? Authorizing Provider   meclizine (ANTIVERT) 25 MG  tablet Take 1 tablet (25 mg) by mouth every 6 hours as needed for dizziness 11/13/17   Kadi Sanchez MD   tamoxifen (NOLVADEX) 20 MG tablet Take 1 tablet (20 mg) by mouth daily 10/13/17   Regine Esteves APRN CNP   Dexamethasone Acetate POWD 1 Bottle 4 times daily 1 drop four times a day into the operative eye starting 1 day before surgery. Use until bottle runs out. 11/6/17   Kvng Garcia MD   Moxifloxacin HCl POWD 1 Bottle 4 times daily 1 drop four times a day into the operative eye starting 1 day before surgery. Use until bottle runs out. 11/6/17   Kvng Garcia MD   Ferrous Sulfate (IRON SUPPLEMENT PO) Take 325 mg by mouth daily    Reported, Patient   alendronate (FOSAMAX) 70 MG tablet Take 1 tablet (70 mg) by mouth every 7 days Take with over 8 ounces water and stay upright for at least 30 minutes after dose.  Take at least 60 minutes before breakfast 6/26/17   Kadi Sanchez MD   losartan (COZAAR) 25 MG tablet Take 1 tablet (25 mg) by mouth daily 6/26/17   Kadi Sanchez MD   simvastatin (ZOCOR) 20 MG tablet Take 1 tablet (20 mg) by mouth At Bedtime 6/26/17   Kadi Sanchez MD   latanoprost (XALATAN) 0.005 % ophthalmic solution Place 1 drop into both eyes At Bedtime 4/11/17   Kvng Garcia MD   dorzolamide-timolol (COSOPT) 2-0.5 % ophthalmic solution Place 1 drop into both eyes 2 times daily 4/4/17   Kvng Garcia MD   Cyanocobalamin (B-12) 500 MCG TABS Take 500 mcg by mouth daily 12/24/16   Usha Salgado MD   triamcinolone (KENALOG) 0.1 % cream Apply to affected area of the face once to twice a day. 9/9/16   Davin Cole MD   ibuprofen (ADVIL,MOTRIN) 200 MG tablet Take 200 mg by mouth every 4 hours as needed.    Reported, Patient   CALCIUM 600 MG OR TABS 1 daily    Reported, Patient   VITAMIN D-1000 MAX -1000 MG-UNIT OR TABS 1 daily    Reported, Patient     No current Epic-ordered facility-administered medications on file.      No current  Epic-ordered outpatient prescriptions on file.     Wt Readings from Last 1 Encounters:   12/04/17 65.8 kg (145 lb)     Temp Readings from Last 1 Encounters:   12/04/17 36.3  C (97.3  F) (Oral)     BP Readings from Last 6 Encounters:   12/04/17 128/72   11/09/17 120/60   10/23/17 124/64   09/08/17 130/76   06/26/17 124/64   04/27/17 138/80     Pulse Readings from Last 4 Encounters:   12/04/17 69   11/09/17 75   10/23/17 74   09/08/17 63     Resp Readings from Last 1 Encounters:   09/08/17 15     SpO2 Readings from Last 1 Encounters:   12/04/17 97%     Recent Labs   Lab Test  06/26/17   0942  12/22/16   1138   06/23/16   1111   NA  140   --    --   139   POTASSIUM  4.5   --    --   4.0   CHLORIDE  105   --    --   106   CO2  28   --    --   27   ANIONGAP  7   --    --   6   GLC  93   --    --   88   BUN  14   --    --   17   CR  0.74  0.76   < >  0.75   SHERYL  8.4*   --    --   8.3*    < > = values in this interval not displayed.     Recent Labs   Lab Test  09/08/17   1433  06/26/17   0942   12/22/16   1138   WBC   --   5.2   --   5.9   HGB  11.7  11.6*   < >  11.4*   PLT   --   193   --   221    < > = values in this interval not displayed.     Echo 2015: Interpretation Summary     Global and regional left ventricular function is normal with an EF of 55-60%.  Right ventricular function, chamber size, wall motion, and thickness are  normal.  No pericardial effusion is present.  Sinuses of Valsalva 4 cm.  Ascending aorta 4.8 cm.  ______________________________________________________________________________  Anesthesia Evaluation     .             ROS/MED HX    ENT/Pulmonary:      (-) tobacco use, asthma, COPD and sleep apnea   Neurologic:      (-) seizures, CVA and migraines   Cardiovascular:     (+) Dyslipidemia, hypertension----. : . . . :. valvular problems/murmurs .      (-) FREITAS   METS/Exercise Tolerance:  >4 METS   Hematologic:        (-) history of blood clots, anemia and other hematologic disorder    Musculoskeletal:   (+) , , other musculoskeletal- osteoporosis      GI/Hepatic:        (-) GERD and liver disease   Renal/Genitourinary:      (-) renal disease and nephrolithiasis   Endo:      (-) Type I DM, Type II DM and thyroid disease   Psychiatric:         Infectious Disease:        (-) Recent Fever   Malignancy:   (+) Malignancy History of Breast, Skin and Other  Breast CA Remission status post. Skin CA Remission status post, Other CA Bladder Remission status post         Other:                     Physical Exam  Normal systems: cardiovascular, pulmonary and dental    Airway   Mallampati: II  TM distance: >3 FB  Neck ROM: full    Dental     Cardiovascular   Rhythm and rate: regular and normal  (+) murmur       Pulmonary    breath sounds clear to auscultation                    Anesthesia Plan      History & Physical Review      ASA Status:  2 .    NPO Status:  > 8 hours    Plan for MAC Reason for MAC:  Deep or markedly invasive procedure (G8)  PONV prophylaxis:  Ondansetron (or other 5HT-3)  Precedex  Midazolam if needed      Postoperative Care  Postoperative pain management:  Multi-modal analgesia.      Consents  Anesthetic plan, risks, benefits and alternatives discussed with:  Patient..                          .

## 2017-12-08 NOTE — DISCHARGE INSTRUCTIONS
CATARACT SURGERY POST-OP INSTRUCTIONS  Dr. Kvng Garcia  [phone]      *Continue using CatarActive3 four times a day in the operative eye.  *You should get 3 doses in today and 4 doses daily starting tomorrow.      Keep the eye shield taped in place unless putting drops in. We will remove it for you in the office tomorrow.      Light sensitivity may be noticed. Sunglasses may be worn for comfort.      Do not rub the operated eye.      Keep the operated eye dry. You may wash your hair, bathe or shower, but keep the operated eye closed while doing so.       No swimming, hot tub, or sauna for 2 weeks.      No make up around eye for 5 days.      No bending at the waist or lifting more than 10 pounds for one week.      May take Tylenol (per directions on bottle) for mild pain.      Call the office at [phone] and ask to speak to the on-call ophthalmologist  if any of the following should occur:  o Any sudden vision changes  o Nausea or severe headache  o Increase in pain not controlled  o Or signs of infection (pus, increasing redness or tenderness)        [facility] Discharge  Instructions  Anesthesia ([facility])   Adult Discharge Instructions    For 24 hours after surgery    1. Get plenty of rest.  Make arrangements to have a responsible adult stay with you for at least 6 hours after you leave the hospital.  2. Do not drive or use heavy equipment for 24 hours.    3. Do not drink alcohol for 24 hours.  4. Do not sign legal documents or make important decisions for 24 hours.  5. Avoid strenuous or risky activities. You may feel lightheaded.  If so, sit for a few minutes before standing.  Have someone help you get up.   6. Conscious sedation patients may resume a regular diet..  7. Any questions of medical nature, call your physician.

## 2017-12-08 NOTE — ANESTHESIA CARE TRANSFER NOTE
Patient: Cydney Christiansen    Procedure(s):   COMPLEX LEFT PHACOEMULSIFICATION CLEAR CORNEA WITH STANDARD INTRAOCULAR LENS IMPLANT  - Wound Class: I-Clean    Diagnosis: LEFT EYE CATARACT  Diagnosis Additional Information: No value filed.    Anesthesia Type:   MAC     Note:  Airway :Room Air  Patient transferred to:PACU  Comments: [facility] in recliner with armrests up, spontaneous respirations, O2 saturation maintained on RA. All monitors and alarms on and functioning, clinically stable vital signs. Report given to recovery RN and questions answered. Patient alert and following verbal directions.Handoff Report: Identifed the Patient, Identified the Reponsible Provider, Reviewed the pertinent medical history, Discussed the surgical course, Reviewed Intra-OP anesthesia mangement and issues during anesthesia, Set expectations for post-procedure period and Allowed opportunity for questions and acknowledgement of understanding      Vitals: (Last set prior to Anesthesia Care Transfer)    CRNA VITALS  12/8/2017 1303 - 12/8/2017 1333      12/8/2017             Pulse: 60    Ht Rate: 60    SpO2: 100 %    Resp Rate (set): 10                Electronically Signed By: KRISTIE Baumann CRNA  December 8, 2017  1:33 PM

## 2017-12-08 NOTE — OP NOTE
PreOp Diagnosis: Visually significant nuclear sclerotic cataract left eye, miosis left eye  PostOp Diagnosis: Same  Surgeon: Kvng Garcia MD  Implant: Technis [id number] 20.5D    Procedures:   1. Review of intraocular lens calculations, both eyes   2. Phacoemulsification and extraction of lens  left eye   3. Intraocular lens implantation left eye  Anesthesia: MAC/topical  Complications: None  EBL: <1cc    Cydney Christiansen suffers from a visually significant cataract of the left eye. This has caused problems with distance and reading vision, including glare. After discussing the risks, benefits, and alternatives, the patient wishes to proceed with cataract surgery.    The patient was identified in the pre-op area where the left eye was marked. The patient was then brought to the operating room where a time out was called, identifying the patient, the procedure, and the correct site. Tetracaine drops were applied to both eyes. The operative eye was then prepped and draped in the usual sterile ophthalmic fashion. An eyelid speculum was placed into the operative eye. Additional tetracaine drops were applied. A paracentesis was made inferior with a side port blade. 1% preservative-free lidocaine was injected into the anterior chamber.  Viscoat was injected to deepen the anterior chamber. A 2.4mm clear corneal wound was created with a keratome blade temporally. Viscoelastic was injected under the pupillary margin, and a 6.25mm Malyugin ring was injected into the anterior chamber and positioned onto the iris.  A continuous curvilinear capsulorrhexis was started with a bent cystitome and completed with Utrada forceps. Hydrodissection of the lens nucleus was performed with BSS on a cannula. The lens nucleus was rotated. Phacoemulsification of the lens nucleus was accomplished in a phacoemulsification stop and chop technique. Remaining cortex was removed with irrigation and aspiration. The lens capsule was noted to be intact.  Provisc was used to inflate the capsular bag.  The lens was injected into the capsular bag. The Malyugin ring was teased off the iris and removed from the eye.   Remaining viscoelastic was removed with irrigation and aspiration. The wounds were checked and found to be watertight after hydration. The eyelid speculum was removed and CatarActive3 drops were placed into the operative eye. The patient tolerated the procedure well and was in stable condition on the way to the recovery area.     Kvng Garcia MD

## 2017-12-09 ENCOUNTER — OFFICE VISIT (OUTPATIENT)
Dept: OPHTHALMOLOGY | Facility: CLINIC | Age: 82
End: 2017-12-09
Payer: COMMERCIAL

## 2017-12-09 DIAGNOSIS — H40.1431 PSEUDOEXFOLIATIVE GLAUCOMA, BOTH EYES, MILD STAGE: ICD-10-CM

## 2017-12-09 DIAGNOSIS — Z96.1 PSEUDOPHAKIA OF LEFT EYE: Primary | ICD-10-CM

## 2017-12-09 PROCEDURE — 99024 POSTOP FOLLOW-UP VISIT: CPT | Performed by: STUDENT IN AN ORGANIZED HEALTH CARE EDUCATION/TRAINING PROGRAM

## 2017-12-09 ASSESSMENT — TONOMETRY
OS_IOP_MMHG: 21
IOP_METHOD: APPLANATION

## 2017-12-09 ASSESSMENT — VISUAL ACUITY
METHOD: SNELLEN - LINEAR
OS_SC: 20/100

## 2017-12-09 ASSESSMENT — SLIT LAMP EXAM - LIDS: COMMENTS: NORMAL

## 2017-12-09 ASSESSMENT — EXTERNAL EXAM - LEFT EYE: OS_EXAM: NORMAL

## 2017-12-09 NOTE — PROGRESS NOTES
Current Eye Medications:  CatarActive3 four times a day left eye     Subjective:  PO1 left eye. Slept well, eye is comfortable. Vision is very blurry. She feels like she is looking through the shield even though it is not there.      Objective:  See Ophthalmology Exam.       Assessment:  Cydney Christiansen is a 82 year old female who presents with:   Encounter Diagnoses   Name Primary?     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 Mod k edema with d folds and some bullae.     Pseudoexfoliative glaucoma, both eyes, mild stage        Plan:  POST-OP CATARACT INSTRUCTIONS    *   Use the following drop(s) in the LEFT EYE four times a day:        CatarActive 3  *   If you are taking glaucoma drops, continue as usual.  *   Wear eye shield when sleeping for one week.  *   No eye rubbing or lifting more than 10 pounds for one week.  *   Keep water out of eye for two weeks.  *   OK to resume aspirin and/or other blood thinners.  *   Return as scheduled in about one week.  *   If your vision worsens, eye becomes increasingly red, or becomes painful, call [phone].     Kvng Garcia M.D.

## 2017-12-09 NOTE — MR AVS SNAPSHOT
After Visit Summary   12/9/2017    Cydney Christiansen    MRN: [id number]           Patient Information     Date Of Birth          1935        Visit Information        Provider Department      12/9/2017 9:30 AM Kvng Garcia MD [facility]        Today's Diagnoses     Pseudophakia of left eye    -  1    Pseudoexfoliative glaucoma, both eyes, mild stage          Care Instructions    POST-OP CATARACT INSTRUCTIONS    *   Use the following drop(s) in the LEFT EYE four times a day:        CatarActive 3    *   If you are taking glaucoma drops, continue as usual.    *   Wear eye shield when sleeping for one week.    *   No eye rubbing or lifting more than 10 pounds for one week.    *   Keep water out of eye for two weeks.    *   OK to resume aspirin and/or other blood thinners.    *   Return as scheduled in about one week.    *   If your vision worsens, eye becomes increasingly red, or becomes painful, call [phone].     Kvng Garcia M.D.            Follow-ups after your visit        Follow-up notes from your care team     Return for PO2, as scheduled.      Your next 10 appointments already scheduled     Dec 15, 2017 10:45 AM CST   Return Visit with Kvng Garcia MD   [facility] ([facility])    [address] [facility] [address] [phone]            Jan 09, 2018  9:30 AM CST   Return Visit with Ruben Hammond MD, [facility] CYSTO PROC ROOM   [facility] ([facility])    [address] [place] [address]   [phone]            Jan 16, 2018 10:45 AM CST   Return Visit with Kvng Garcia MD   [facility] ([facility])    [address] [facility] [address]   [phone]            Mar 08, 2018 11:45 AM CST   LAB with LAB ONC [facility] ([facility])    [address] [place] [id number] [phone]            Please do not eat 10-12 hours before your appointment if you are coming in fasting for labs on lipids, cholesterol, or glucose (sugar). This does not apply to pregnant women. Water, hot tea and black coffee (with nothing added) are okay. Do not drink other fluids, diet soda or chew gum.            Mar 08, 2018 12:30 PM CST   Return Visit with Usha Salgado MD   [facility] ([facility])    [address] [place] [address] [phone]            Jun 28, 2018 11:15 AM CDT   Return Visit with Qi Barba MD   [facility] ([facility])    [address] [place] [address] [phone]              Who to contact     If you have questions or need follow up information about today's clinic visit or your schedule please contact [facility] directly at [phone].  Normal or non-critical lab and imaging results will be communicated to you by Futubank[employer], letter or phone within 4 business days after the clinic has received the results. If you do not hear from us within 7 days, please contact the clinic through Futubank[employer] or phone. If you have a critical or abnormal lab result, we will notify you by phone as soon as possible.  Submit refill requests through [employer] or call your pharmacy and they will forward the refill request to us. Please allow 3 business days for your refill to be completed.          Additional Information About Your Visit        [employer] Information     [employer] gives you secure access to your electronic health record. If you see a primary care provider, you can also send messages to your care team and make appointments. If you have questions, please call your primary care clinic.  If you do not have a primary care provider, please call [phone] and they will assist you.        Care EveryWhere ID     This is your Care EveryWhere ID. This could be used by other organizations to  access your [place] medical records  [id number]         Blood Pressure from Last 3 Encounters:   12/08/17 132/81   12/04/17 128/72   11/09/17 120/60    Weight from Last 3 Encounters:   12/04/17 65.8 kg (145 lb)   11/09/17 65.3 kg (144 lb)   10/23/17 65.5 kg (144 lb 8 oz)              Today, you had the following     No orders found for display       Primary Care Provider Office Phone # Fax #    Kadi Sanchez [phone]       [facility] [address] [facility] [address]        Equal Access to Services     BARBARA CORCORAN : Hadii josy Kimble, warosamaria loja, qaybta kaalmada lyric, morgan puga. So [facility] [phone].    ATENCIÓN: Si habla español, tiene a chavez disposición servicios gratuitos de asistencia lingüística. Llame al [phone].    We comply with applicable federal civil rights laws and [place] laws. We do not discriminate on the basis of race, color, national origin, age, disability, sex, sexual orientation, or gender identity.            Thank you!     Thank you for choosing [facility]  for your care. Our goal is always to provide you with excellent care. Hearing back from our patients is one way we can continue to improve our services. Please take a few minutes to complete the written survey that you may receive in the mail after your visit with us. Thank you!             Your Updated Medication List - Protect others around you: Learn how to safely use, store and throw away your medicines at www.disposemymeds.org.          This list is accurate as of: 12/9/17  9:53 AM.  Always use your most recent med list.                   Brand Name Dispense Instructions for use Diagnosis    alendronate 70 MG tablet    FOSAMAX    12 tablet    Take 1 tablet (70 mg) by mouth every 7 days Take with over 8 ounces water and stay upright for at least 30 minutes after dose.  Take at least 60 minutes before  breakfast    Osteoporosis, unspecified osteoporosis type, unspecified pathological fracture presence       B-12 500 MCG Tabs     150 tablet    Take 500 mcg by mouth daily    B12 deficiency       calcium 600 MG tablet      1 daily        Dexamethasone Acetate Powd     1 Bottle    1 Bottle 4 times daily 1 drop four times a day into the operative eye starting 1 day before surgery. Use until bottle runs out.    Nuclear sclerosis of both eyes       dorzolamide-timolol 2-0.5 % ophthalmic solution    COSOPT    10 mL    Place 1 drop into both eyes 2 times daily    PXF (pseudoexfoliation of lens capsule)       ibuprofen 200 MG tablet    ADVIL/MOTRIN     Take 200 mg by mouth every 4 hours as needed.        IRON SUPPLEMENT PO      Take 325 mg by mouth daily        latanoprost 0.005 % ophthalmic solution    XALATAN    1 Bottle    Place 1 drop into both eyes At Bedtime    PXF (pseudoexfoliation of lens capsule)       losartan 25 MG tablet    COZAAR    90 tablet    Take 1 tablet (25 mg) by mouth daily    Benign essential hypertension       meclizine 25 MG tablet    ANTIVERT    30 tablet    Take 1 tablet (25 mg) by mouth every 6 hours as needed for dizziness    Vertigo       Moxifloxacin HCl Powd     1 Bottle    1 Bottle 4 times daily 1 drop four times a day into the operative eye starting 1 day before surgery. Use until bottle runs out.    Nuclear sclerosis of both eyes       simvastatin 20 MG tablet    ZOCOR    90 tablet    Take 1 tablet (20 mg) by mouth At Bedtime    Hyperlipidemia LDL goal <160       tamoxifen 20 MG tablet    NOLVADEX    90 tablet    Take 1 tablet (20 mg) by mouth daily    Malignant neoplasm of overlapping sites of left breast in female, estrogen receptor positive (H)       triamcinolone 0.1 % cream    KENALOG    15 g    Apply to affected area of the face once to twice a day.    Granuloma annulare       VITAMIN D-1000 MAX ST 1000 UNITS Tabs   Generic drug:  cholecalciferol      1 daily

## 2017-12-09 NOTE — PATIENT INSTRUCTIONS
POST-OP CATARACT INSTRUCTIONS    *   Use the following drop(s) in the LEFT EYE four times a day:        CatarActive 3    *   If you are taking glaucoma drops, continue as usual.    *   Wear eye shield when sleeping for one week.    *   No eye rubbing or lifting more than 10 pounds for one week.    *   Keep water out of eye for two weeks.    *   OK to resume aspirin and/or other blood thinners.    *   Return as scheduled in about one week.    *   If your vision worsens, eye becomes increasingly red, or becomes painful, call [phone].     Kvng Garcia M.D.

## 2017-12-09 NOTE — Clinical Note
Doing well post-operatively after cataract surgery. She has some corneal edema that is making the vision blurry, but that should resolve.

## 2017-12-15 ENCOUNTER — OFFICE VISIT (OUTPATIENT)
Dept: OPHTHALMOLOGY | Facility: CLINIC | Age: 82
End: 2017-12-15
Payer: COMMERCIAL

## 2017-12-15 DIAGNOSIS — Z96.1 PSEUDOPHAKIA OF LEFT EYE: Primary | ICD-10-CM

## 2017-12-15 DIAGNOSIS — H26.8 PXF (PSEUDOEXFOLIATION OF LENS CAPSULE): ICD-10-CM

## 2017-12-15 DIAGNOSIS — H43.812 PVD (POSTERIOR VITREOUS DETACHMENT), LEFT: ICD-10-CM

## 2017-12-15 DIAGNOSIS — H40.1431 PSEUDOEXFOLIATIVE GLAUCOMA, BOTH EYES, MILD STAGE: ICD-10-CM

## 2017-12-15 PROCEDURE — 99024 POSTOP FOLLOW-UP VISIT: CPT | Performed by: STUDENT IN AN ORGANIZED HEALTH CARE EDUCATION/TRAINING PROGRAM

## 2017-12-15 ASSESSMENT — TONOMETRY
OD_IOP_MMHG: 17
IOP_METHOD: APPLANATION
OS_IOP_MMHG: 17

## 2017-12-15 ASSESSMENT — SLIT LAMP EXAM - LIDS: COMMENTS: NORMAL

## 2017-12-15 ASSESSMENT — VISUAL ACUITY
OS_SC: 20/40
OD_CC: 20/30
CORRECTION_TYPE: GLASSES
METHOD: SNELLEN - LINEAR
OD_CC+: -1
OS_SC+: -2

## 2017-12-15 ASSESSMENT — REFRACTION_MANIFEST
OS_SPHERE: -0.50
OS_AXIS: 173
OS_CYLINDER: +0.25
OS_ADD: +2.50

## 2017-12-15 ASSESSMENT — EXTERNAL EXAM - LEFT EYE: OS_EXAM: NORMAL

## 2017-12-15 NOTE — PROGRESS NOTES
Current Eye Medications:  Latanoprost QHS both eyes, Dorz/timolol BID both eyes, CatarActive3 four times a day     Subjective:  Po2, KPE left eye 12/8/17. Vision is OK right eye. Vision is brighter and clear in distance left eye. Feels like something is in left eye, since surgery. Zero pain both eyes.     Objective:  See Ophthalmology Exam.      Assessment:  Cydney Christiansen is a 82 year old female who presents with:     Pseudophakia of left eye po2 left eye, doing well     Pseudoexfoliative glaucoma, both eyes, mild stage      PVD (posterior vitreous detachment), left      PXF (PSEUDOEXFOLIATION OF LENS CAPSULE) OU      Primary open angle glaucoma of both eyes, unspecified glaucoma stage      Plan:  *   For the left eye:   Continue CatarActive3 four times a day until the bottle runs out.  *   Stop wearing eye shield.  *   No eye rubbing. May resume heavy lifting.  *   If you are taking glaucoma drops, continue as usual.  *   Return one month for final exam with glasses check.  *   If your vision worsens, eye becomes increasingly red, or becomes painful, call [phone].    Kvng Garcia M.D.

## 2017-12-15 NOTE — PATIENT INSTRUCTIONS
*   For the left eye:     Continue CatarActive3 four times a day until the bottle runs out.    *   Stop wearing eye shield.    *   No eye rubbing. May resume heavy lifting.    *   If you are taking glaucoma drops, continue as usual.    *   Return one month for final exam with glasses check.    *   If your vision worsens, eye becomes increasingly red, or becomes painful, call [phone].    Kvng Garcia M.D.

## 2017-12-15 NOTE — MR AVS SNAPSHOT
After Visit Summary   12/15/2017    Cydney Christiansen    MRN: [id number]           Patient Information     Date Of Birth          1935        Visit Information        Provider Department      12/15/2017 10:45 AM Kvng Garcia MD [facility]        Today's Diagnoses     Pseudophakia of left eye    -  1    Pseudoexfoliative glaucoma, both eyes, mild stage        PVD (posterior vitreous detachment), left        PXF (PSEUDOEXFOLIATION OF LENS CAPSULE) OU        Primary open angle glaucoma of both eyes, unspecified glaucoma stage          Care Instructions    *   For the left eye:     Continue CatarActive3 four times a day until the bottle runs out.    *   Stop wearing eye shield.    *   No eye rubbing. May resume heavy lifting.    *   If you are taking glaucoma drops, continue as usual.    *   Return one month for final exam with glasses check.    *   If your vision worsens, eye becomes increasingly red, or becomes painful, call [phone].    Kvng Garcia M.D.            Follow-ups after your visit        Follow-up notes from your care team     Return for as scheduled, Final MR.      Your next 10 appointments already scheduled     Jan 09, 2018  9:30 AM CST   Return Visit with Ruben Hammond MD, [facility] CYSTO PROC ROOM   [facility])    [address] [facility] [address]   [phone]            Jan 16, 2018 10:45 AM CST   Return Visit with Kvng Garcia MD   [facility] ([facility])    [address] [facility] [address]   [phone]            Mar 08, 2018 11:45 AM CST   LAB with LAB ONC [facility] ([facility])    [address] [place] [address] [phone]           Please do not eat 10-12 hours before your appointment if you are coming in fasting for labs on lipids, cholesterol, or glucose (sugar). This does not  apply to pregnant women. Water, hot tea and black coffee (with nothing added) are okay. Do not drink other fluids, diet soda or chew gum.            Mar 08, 2018 12:30 PM CST   Return Visit with Usha Salgado MD   [facility] ([facility])    [address] [place] [address] [phone]            Jun 28, 2018 11:15 AM CDT   Return Visit with Qi Barba MD   [facility] ([facility])    [address] [place] [address] [phone]              Who to contact     If you have questions or need follow up information about today's clinic visit or your schedule please contact [facility] directly at [phone].  Normal or non-critical lab and imaging results will be communicated to you by MyChart, letter or phone within 4 business days after the clinic has received the results. If you do not hear from us within 7 days, please contact the clinic through [employer]hart or phone. If you have a critical or abnormal lab result, we will notify you by phone as soon as possible.  Submit refill requests through [employer] or call your pharmacy and they will forward the refill request to us. Please allow 3 business days for your refill to be completed.          Additional Information About Your Visit        MyChart Information     [employer] gives you secure access to your electronic health record. If you see a primary care provider, you can also send messages to your care team and make appointments. If you have questions, please call your primary care clinic.  If you do not have a primary care provider, please call [phone] and they will assist you.        Care EveryWhere ID     This is your Care EveryWhere ID. This could be used by other organizations to access your [place] medical records  [id number]         Blood Pressure from Last 3 Encounters:   12/08/17 132/81   12/04/17 128/72   11/09/17 120/60    Weight  from Last 3 Encounters:   12/04/17 65.8 kg (145 lb)   11/09/17 65.3 kg (144 lb)   10/23/17 65.5 kg (144 lb 8 oz)              Today, you had the following     No orders found for display       Primary Care Provider Office Phone # Fax #    Kadi Sanchez [phone]       [address] [address]        Equal Access to Services     BARBARA CORCORAN : Hadii aad ku hadasho Soomaali, waaxda luqadaha, qaybta kaalmada adeegyada, waxay idiin hayaan adeeg kharasemaj laalbert . So [facility] [phone].    ATENCIÓN: Si habla español, tiene a chavez disposición servicios gratuitos de asistencia lingüística. Llame al [phone].    We comply with applicable federal civil rights laws and [place] laws. We do not discriminate on the basis of race, color, national origin, age, disability, sex, sexual orientation, or gender identity.            Thank you!     Thank you for choosing [facility]  for your care. Our goal is always to provide you with excellent care. Hearing back from our patients is one way we can continue to improve our services. Please take a few minutes to complete the written survey that you may receive in the mail after your visit with us. Thank you!             Your Updated Medication List - Protect others around you: Learn how to safely use, store and throw away your medicines at www.disposemymeds.org.          This list is accurate as of: 12/15/17 11:20 AM.  Always use your most recent med list.                   Brand Name Dispense Instructions for use Diagnosis    alendronate 70 MG tablet    FOSAMAX    12 tablet    Take 1 tablet (70 mg) by mouth every 7 days Take with over 8 ounces water and stay upright for at least 30 minutes after dose.  Take at least 60 minutes before breakfast    Osteoporosis, unspecified osteoporosis type, unspecified pathological fracture presence       B-12 500 MCG Tabs     150 tablet    Take 500 mcg by mouth  daily    B12 deficiency       calcium 600 MG tablet      1 daily        Dexamethasone Acetate Powd     1 Bottle    1 Bottle 4 times daily 1 drop four times a day into the operative eye starting 1 day before surgery. Use until bottle runs out.    Nuclear sclerosis of both eyes       dorzolamide-timolol 2-0.5 % ophthalmic solution    COSOPT    10 mL    Place 1 drop into both eyes 2 times daily    PXF (pseudoexfoliation of lens capsule)       ibuprofen 200 MG tablet    ADVIL/MOTRIN     Take 200 mg by mouth every 4 hours as needed.        IRON SUPPLEMENT PO      Take 325 mg by mouth daily        latanoprost 0.005 % ophthalmic solution    XALATAN    1 Bottle    Place 1 drop into both eyes At Bedtime    PXF (pseudoexfoliation of lens capsule)       losartan 25 MG tablet    COZAAR    90 tablet    Take 1 tablet (25 mg) by mouth daily    Benign essential hypertension       meclizine 25 MG tablet    ANTIVERT    30 tablet    Take 1 tablet (25 mg) by mouth every 6 hours as needed for dizziness    Vertigo       Moxifloxacin HCl Powd     1 Bottle    1 Bottle 4 times daily 1 drop four times a day into the operative eye starting 1 day before surgery. Use until bottle runs out.    Nuclear sclerosis of both eyes       simvastatin 20 MG tablet    ZOCOR    90 tablet    Take 1 tablet (20 mg) by mouth At Bedtime    Hyperlipidemia LDL goal <160       tamoxifen 20 MG tablet    NOLVADEX    90 tablet    Take 1 tablet (20 mg) by mouth daily    Malignant neoplasm of overlapping sites of left breast in female, estrogen receptor positive (H)       triamcinolone 0.1 % cream    KENALOG    15 g    Apply to affected area of the face once to twice a day.    Granuloma annulare       VITAMIN D-1000 MAX ST 1000 UNITS Tabs   Generic drug:  cholecalciferol      1 daily

## 2018-01-09 ENCOUNTER — OFFICE VISIT (OUTPATIENT)
Dept: UROLOGY | Facility: CLINIC | Age: 83
End: 2018-01-09
Payer: COMMERCIAL

## 2018-01-09 VITALS — OXYGEN SATURATION: 98 % | DIASTOLIC BLOOD PRESSURE: 70 MMHG | SYSTOLIC BLOOD PRESSURE: 116 MMHG | HEART RATE: 65 BPM

## 2018-01-09 DIAGNOSIS — Z85.51 PERSONAL HISTORY OF MALIGNANT NEOPLASM OF BLADDER: Primary | ICD-10-CM

## 2018-01-09 PROCEDURE — 99207 ZZC DROP WITH A PROCEDURE: CPT | Performed by: UROLOGY

## 2018-01-09 PROCEDURE — 52000 CYSTOURETHROSCOPY: CPT | Performed by: UROLOGY

## 2018-01-09 NOTE — PROGRESS NOTES
S: Cydney Christiansen is a 82 year old female returns for bladder cancer surveillance.    she has history of bladder cancer without any recurrence since 1997.    She received 0 courses of BCG therapy.    Patient is draped and prepped.  Flexible cystoscopy placed under direct vision.      Cysto:  The anterior urethra is normal     In the bladder there is normal mucosa.    Assessment/Plan:  (Z85.51) Personal history of malignant neoplasm of bladder  (primary encounter diagnosis)  Comment:  No evidence of recurrence  Plan: BTR in one year

## 2018-01-09 NOTE — MR AVS SNAPSHOT
After Visit Summary   1/9/2018    Cydney Christiansen    MRN: [id number]           Patient Information     Date Of Birth          1935        Visit Information        Provider Department      1/9/2018 9:30 AM Ruben Hammond MD; [facility] PROC ROOM [facility] [place]        Today's Diagnoses     Personal history of malignant neoplasm of bladder    -  1       Follow-ups after your visit        Your next 10 appointments already scheduled     Jan 16, 2018 10:45 AM CST   Return Visit with Kvng Garcia MD   [facility] ([facility])    [address]  [place] [address]   [phone]            Mar 08, 2018 11:45 AM CST   LAB with LAB ONC [facility] ([facility])    [address] [place] [address] [phone]           Please do not eat 10-12 hours before your appointment if you are coming in fasting for labs on lipids, cholesterol, or glucose (sugar). This does not apply to pregnant women. Water, hot tea and black coffee (with nothing added) are okay. Do not drink other fluids, diet soda or chew gum.            Mar 08, 2018 12:30 PM CST   Return Visit with Usha Salgado MD   [facility])    [address] [place] [id number] [phone]            Jun 28, 2018 11:15 AM CDT   Return Visit with Qi Barba MD   [facility])    [address] [place] [address] [phone]            Jan 08, 2019  9:30 AM CST   Return Visit with Ruben Hammond MD, [facility] PROC ROOM   [facility] [place] ([facility])    [address]  [place] [address] [phone]              Who to contact     If you have questions or need follow up information about today's clinic visit or your schedule please contact [facility] [place] directly  at [phone].  Normal or non-critical lab and imaging results will be communicated to you by MyChart, letter or phone within 4 business days after the clinic has received the results. If you do not hear from us within 7 days, please contact the clinic through Seva Coffeehart or phone. If you have a critical or abnormal lab result, we will notify you by phone as soon as possible.  Submit refill requests through Dogeo or call your pharmacy and they will forward the refill request to us. Please allow 3 business days for your refill to be completed.          Additional Information About Your Visit        Seva Coffeehart Information     Dogeo gives you secure access to your electronic health record. If you see a primary care provider, you can also send messages to your care team and make appointments. If you have questions, please call your primary care clinic.  If you do not have a primary care provider, please call [phone] and they will assist you.        Care EveryWhere ID     This is your Care EveryWhere ID. This could be used by other organizations to access your [place] medical records  [id number]        Your Vitals Were     Pulse Pulse Oximetry                65 98%           Blood Pressure from Last 3 Encounters:   01/09/18 116/70   12/08/17 132/81   12/04/17 128/72    Weight from Last 3 Encounters:   12/04/17 65.8 kg (145 lb)   11/09/17 65.3 kg (144 lb)   10/23/17 65.5 kg (144 lb 8 oz)              We Performed the Following     CYSTOURETHROSCOPY        Primary Care Provider Office Phone # Fax #    Kadi Sanchez [phone]       [address] [address]        Equal Access to Services     ESTELA CORCORAN : Hadii josy Kimble, waillyda freedom, qaybmorgan woods. So [facility] [phone].    ATENCIÓN: Si habla español, tiene a chavez disposición servicios gratuitos de asistencia lingüística.  Kvng canales [phone].    We comply with applicable federal civil rights laws and [place] laws. We do not discriminate on the basis of race, color, national origin, age, disability, sex, sexual orientation, or gender identity.            Thank you!     Thank you for choosing [facility]  for your care. Our goal is always to provide you with excellent care. Hearing back from our patients is one way we can continue to improve our services. Please take a few minutes to complete the written survey that you may receive in the mail after your visit with us. Thank you!             Your Updated Medication List - Protect others around you: Learn how to safely use, store and throw away your medicines at www.disposemymeds.org.          This list is accurate as of: 1/9/18  9:48 AM.  Always use your most recent med list.                   Brand Name Dispense Instructions for use Diagnosis    alendronate 70 MG tablet    FOSAMAX    12 tablet    Take 1 tablet (70 mg) by mouth every 7 days Take with over 8 ounces water and stay upright for at least 30 minutes after dose.  Take at least 60 minutes before breakfast    Osteoporosis, unspecified osteoporosis type, unspecified pathological fracture presence       B-12 500 MCG Tabs     150 tablet    Take 500 mcg by mouth daily    B12 deficiency       calcium 600 MG tablet      1 daily        Dexamethasone Acetate Powd     1 Bottle    1 Bottle 4 times daily 1 drop four times a day into the operative eye starting 1 day before surgery. Use until bottle runs out.    Nuclear sclerosis of both eyes       dorzolamide-timolol 2-0.5 % ophthalmic solution    COSOPT    10 mL    Place 1 drop into both eyes 2 times daily    PXF (pseudoexfoliation of lens capsule)       ibuprofen 200 MG tablet    ADVIL/MOTRIN     Take 200 mg by mouth every 4 hours as needed.        IRON SUPPLEMENT PO      Take 325 mg by mouth daily        latanoprost 0.005 % ophthalmic solution    XALATAN    1 Bottle     Place 1 drop into both eyes At Bedtime    PXF (pseudoexfoliation of lens capsule)       losartan 25 MG tablet    COZAAR    90 tablet    Take 1 tablet (25 mg) by mouth daily    Benign essential hypertension       meclizine 25 MG tablet    ANTIVERT    30 tablet    Take 1 tablet (25 mg) by mouth every 6 hours as needed for dizziness    Vertigo       Moxifloxacin HCl Powd     1 Bottle    1 Bottle 4 times daily 1 drop four times a day into the operative eye starting 1 day before surgery. Use until bottle runs out.    Nuclear sclerosis of both eyes       simvastatin 20 MG tablet    ZOCOR    90 tablet    Take 1 tablet (20 mg) by mouth At Bedtime    Hyperlipidemia LDL goal <160       tamoxifen 20 MG tablet    NOLVADEX    90 tablet    Take 1 tablet (20 mg) by mouth daily    Malignant neoplasm of overlapping sites of left breast in female, estrogen receptor positive (H)       triamcinolone 0.1 % cream    KENALOG    15 g    Apply to affected area of the face once to twice a day.    Granuloma annulare       VITAMIN D-1000 MAX ST 1000 UNITS Tabs   Generic drug:  cholecalciferol      1 daily

## 2018-01-16 ENCOUNTER — APPOINTMENT (OUTPATIENT)
Dept: OPTOMETRY | Facility: CLINIC | Age: 83
End: 2018-01-16
Payer: COMMERCIAL

## 2018-01-16 ENCOUNTER — OFFICE VISIT (OUTPATIENT)
Dept: OPHTHALMOLOGY | Facility: CLINIC | Age: 83
End: 2018-01-16
Payer: COMMERCIAL

## 2018-01-16 DIAGNOSIS — H40.1431 PSEUDOEXFOLIATIVE GLAUCOMA, BOTH EYES, MILD STAGE: ICD-10-CM

## 2018-01-16 DIAGNOSIS — H25.12 NUCLEAR SCLEROSIS OF LEFT EYE: ICD-10-CM

## 2018-01-16 DIAGNOSIS — Z96.1 PSEUDOPHAKIA OF LEFT EYE: Primary | ICD-10-CM

## 2018-01-16 DIAGNOSIS — H43.812 PVD (POSTERIOR VITREOUS DETACHMENT), LEFT: ICD-10-CM

## 2018-01-16 PROCEDURE — V2025 EYEGLASSES DELUX FRAMES: HCPCS | Mod: GY | Performed by: STUDENT IN AN ORGANIZED HEALTH CARE EDUCATION/TRAINING PROGRAM

## 2018-01-16 PROCEDURE — 99024 POSTOP FOLLOW-UP VISIT: CPT | Performed by: STUDENT IN AN ORGANIZED HEALTH CARE EDUCATION/TRAINING PROGRAM

## 2018-01-16 PROCEDURE — V2020 VISION SVCS FRAMES PURCHASES: HCPCS | Performed by: STUDENT IN AN ORGANIZED HEALTH CARE EDUCATION/TRAINING PROGRAM

## 2018-01-16 PROCEDURE — V2200 LENS SPHER BIFOC PLANO 4.00D: HCPCS | Mod: LT | Performed by: STUDENT IN AN ORGANIZED HEALTH CARE EDUCATION/TRAINING PROGRAM

## 2018-01-16 PROCEDURE — V2784 LENS POLYCARB OR EQUAL: HCPCS | Mod: RT | Performed by: STUDENT IN AN ORGANIZED HEALTH CARE EDUCATION/TRAINING PROGRAM

## 2018-01-16 PROCEDURE — V2750 ANTI-REFLECTIVE COATING: HCPCS | Mod: RT | Performed by: STUDENT IN AN ORGANIZED HEALTH CARE EDUCATION/TRAINING PROGRAM

## 2018-01-16 ASSESSMENT — SLIT LAMP EXAM - LIDS
COMMENTS: NORMAL
COMMENTS: NORMAL

## 2018-01-16 ASSESSMENT — REFRACTION_MANIFEST
OS_AXIS: 173
OS_CYLINDER: +0.50
OS_SPHERE: -1.00
OD_SPHERE: PLANO
OS_ADD: +3.00
OD_ADD: +3.00
OD_CYLINDER: +0.50
OD_AXIS: 150

## 2018-01-16 ASSESSMENT — TONOMETRY
IOP_METHOD: APPLANATION
OD_IOP_MMHG: 16
OS_IOP_MMHG: 14

## 2018-01-16 ASSESSMENT — EXTERNAL EXAM - LEFT EYE: OS_EXAM: NORMAL

## 2018-01-16 ASSESSMENT — CUP TO DISC RATIO: OS_RATIO: 0.5

## 2018-01-16 ASSESSMENT — VISUAL ACUITY
OS_SC: 20/40+2
OD_CC: 20/40+2
METHOD: SNELLEN - LINEAR
CORRECTION_TYPE: GLASSES

## 2018-01-16 ASSESSMENT — EXTERNAL EXAM - RIGHT EYE: OD_EXAM: NORMAL

## 2018-01-16 NOTE — MR AVS SNAPSHOT
After Visit Summary   1/16/2018    Cydney Christiansen    MRN: [id number]           Patient Information     Date Of Birth          1935        Visit Information        Provider Department      1/16/2018 10:45 AM Kvng Garcia MD [facility]        Today's Diagnoses     Pseudophakia of left eye    -  1    Pseudoexfoliative glaucoma, both eyes, mild stage        PVD (posterior vitreous detachment), left        Nuclear sclerosis of left eye          Care Instructions    *  Discontinue all drops, unless used prior to cataract surgery.    *  Continue Cosopt twice a day both eyes and Latanoprost at bedtime both eyes as usual.    *  Fill prescription for new glasses or drugstore readers.    Kvng Garcia M.D.  [phone]          Follow-ups after your visit        Follow-up notes from your care team     Return in about 8 months (around 9/16/2018) for IOP check, HVF, OCT optic nerve.      Your next 10 appointments already scheduled     Mar 08, 2018 11:45 AM CST   LAB with LAB ONC [facility])    [address] [place] [address]   [phone]           Please do not eat 10-12 hours before your appointment if you are coming in fasting for labs on lipids, cholesterol, or glucose (sugar). This does not apply to pregnant women. Water, hot tea and black coffee (with nothing added) are okay. Do not drink other fluids, diet soda or chew gum.            Mar 08, 2018 12:30 PM CST   Return Visit with Usha Salgado MD   [facility])    [address] [place] [address]   [phone]            Jun 28, 2018 11:15 AM CDT   Return Visit with Qi Barba MD   [facility])    [address] [place] [address]   [phone]            Jan 08, 2019  9:30 AM CST   Return Visit with Ruben Hammond MD,  [facility] PROC ROOM   [facility] ([facility])    [address] [place] [address]  [place] [address] [phone]              Who to contact     If you have questions or need follow up information about today's clinic visit or your schedule please contact [facility] directly at [phone].  Normal or non-critical lab and imaging results will be communicated to you by MyChart, letter or phone within 4 business days after the clinic has received the results. If you do not hear from us within 7 days, please contact the clinic through Skemazhart or phone. If you have a critical or abnormal lab result, we will notify you by phone as soon as possible.  Submit refill requests through Anna Lozabai or call your pharmacy and they will forward the refill request to us. Please allow 3 business days for your refill to be completed.          Additional Information About Your Visit        Skemazhart Information     Anna Lozabai gives you secure access to your electronic health record. If you see a primary care provider, you can also send messages to your care team and make appointments. If you have questions, please call your primary care clinic.  If you do not have a primary care provider, please call [phone] and they will assist you.        Care EveryWhere ID     This is your Care EveryWhere ID. This could be used by other organizations to access your [place] medical records  [id number]         Blood Pressure from Last 3 Encounters:   01/09/18 116/70   12/08/17 132/81   12/04/17 128/72    Weight from Last 3 Encounters:   12/04/17 65.8 kg (145 lb)   11/09/17 65.3 kg (144 lb)   10/23/17 65.5 kg (144 lb 8 oz)              Today, you had the following     No orders found for display         Today's Medication Changes          These changes are accurate as of: 1/16/18 12:00 PM.  If you have any questions, ask your nurse or doctor.               Stop taking these medicines if you haven't already.  Please contact your care team if you have questions.     Dexamethasone Acetate Powd           Moxifloxacin HCl Powd                    Primary Care Provider Office Phone # Fax #    Kadi Sanchez [phone]       [address] [address]        Equal Access to Services     BARBARA CORCORAN : Hadii aad ku hadasho Soomaali, waaxda luqadaha, qaybta kaalmada adeegyada, waxay rosyin hayaan adethanh khlulisemaj laalbert . So [facility] [phone].    ATENCIÓN: Si habla español, tiene a chavez disposición servicios gratuitos de asistencia lingüística. Kvng al [phone].    We comply with applicable federal civil rights laws and [place] laws. We do not discriminate on the basis of race, color, national origin, age, disability, sex, sexual orientation, or gender identity.            Thank you!     Thank you for choosing [facility]  for your care. Our goal is always to provide you with excellent care. Hearing back from our patients is one way we can continue to improve our services. Please take a few minutes to complete the written survey that you may receive in the mail after your visit with us. Thank you!             Your Updated Medication List - Protect others around you: Learn how to safely use, store and throw away your medicines at www.disposemymeds.org.          This list is accurate as of: 1/16/18 12:00 PM.  Always use your most recent med list.                   Brand Name Dispense Instructions for use Diagnosis    alendronate 70 MG tablet    FOSAMAX    12 tablet    Take 1 tablet (70 mg) by mouth every 7 days Take with over 8 ounces water and stay upright for at least 30 minutes after dose.  Take at least 60 minutes before breakfast    Osteoporosis, unspecified osteoporosis type, unspecified pathological fracture presence       B-12 500 MCG Tabs     150 tablet    Take 500 mcg by mouth daily    B12 deficiency       calcium 600 MG tablet      1  daily        dorzolamide-timolol 2-0.5 % ophthalmic solution    COSOPT    10 mL    Place 1 drop into both eyes 2 times daily    PXF (pseudoexfoliation of lens capsule)       ibuprofen 200 MG tablet    ADVIL/MOTRIN     Take 200 mg by mouth every 4 hours as needed.        IRON SUPPLEMENT PO      Take 325 mg by mouth daily        latanoprost 0.005 % ophthalmic solution    XALATAN    1 Bottle    Place 1 drop into both eyes At Bedtime    PXF (pseudoexfoliation of lens capsule)       losartan 25 MG tablet    COZAAR    90 tablet    Take 1 tablet (25 mg) by mouth daily    Benign essential hypertension       meclizine 25 MG tablet    ANTIVERT    30 tablet    Take 1 tablet (25 mg) by mouth every 6 hours as needed for dizziness    Vertigo       simvastatin 20 MG tablet    ZOCOR    90 tablet    Take 1 tablet (20 mg) by mouth At Bedtime    Hyperlipidemia LDL goal <160       tamoxifen 20 MG tablet    NOLVADEX    90 tablet    Take 1 tablet (20 mg) by mouth daily    Malignant neoplasm of overlapping sites of left breast in female, estrogen receptor positive (H)       triamcinolone 0.1 % cream    KENALOG    15 g    Apply to affected area of the face once to twice a day.    Granuloma annulare       VITAMIN D-1000 MAX ST 1000 UNITS Tabs   Generic drug:  cholecalciferol      1 daily

## 2018-01-16 NOTE — LETTER
Cydnye Christiansen   [id number]    January 18, 2018    Dear Dr. Sanchez,    Your patient, Cydney Christiansen, was seen at the [facility] ophthalmology clinic on January 18, 2018. She is doing well after cataract surgery in the left eye. We will see her again in 8 months for ongoing glaucoma testing.    If you require any questions or concerns, please feel free to contact me.      Sincerely,    Kvng Garcia  [facility]  [address] [place] [address]  [phone]

## 2018-01-16 NOTE — PROGRESS NOTES
Current Eye Medications: generic cosopt bid ou(8:15 am) and latanoprost ou qhs (10:15pm)     Subjective: Final mr left eye  Pt reports she continues to see well and reports not other eye problems. Denies eye pain.      Objective:  See Ophthalmology Exam.      Assessment:  Cydney Christiansen is a 82 year old female who presents with:   Encounter Diagnoses   Name Primary?     Pseudophakia of left eye Final MR, doing well     Pseudoexfoliative glaucoma, both eyes, mild stage      PVD (posterior vitreous detachment), left        Plan:  *  Discontinue all drops, unless used prior to cataract surgery.  *  Continue Cosopt twice a day both eyes and Latanoprost at bedtime both eyes as usual.  *  Fill prescription for new glasses or drugstore readers.    Kvng Garcia M.D.  [phone]

## 2018-01-16 NOTE — PATIENT INSTRUCTIONS
*  Discontinue all drops, unless used prior to cataract surgery.    *  Continue Cosopt twice a day both eyes and Latanoprost at bedtime both eyes as usual.    *  Fill prescription for new glasses or drugstore readers.    Kvng Garcia M.D.  [phone]

## 2018-01-16 NOTE — Clinical Note
1/16/2018         RE: Cydney Christiansen  [address]  [place] [address]        Dear Colleague,    Thank you for referring your patient, Cydney Christiansen, to the [facility]. Please see a copy of my visit note below.     Current Eye Medications:  no     Subjective: Final mr left eye  Pt reports she continues to see good and reports not other eye problems.   Objective:  See Ophthalmology Exam.       Assessment:      Plan:   See Patient Instructions.         Again, thank you for allowing me to participate in the care of your patient.        Sincerely,        Kvng Garcia MD

## 2018-03-08 ENCOUNTER — ONCOLOGY VISIT (OUTPATIENT)
Dept: ONCOLOGY | Facility: CLINIC | Age: 83
End: 2018-03-08
Payer: COMMERCIAL

## 2018-03-08 VITALS
RESPIRATION RATE: 15 BRPM | HEART RATE: 61 BPM | SYSTOLIC BLOOD PRESSURE: 122 MMHG | WEIGHT: 143 LBS | TEMPERATURE: 97.9 F | OXYGEN SATURATION: 100 % | HEIGHT: 66 IN | BODY MASS INDEX: 22.98 KG/M2 | DIASTOLIC BLOOD PRESSURE: 76 MMHG

## 2018-03-08 DIAGNOSIS — C50.812 MALIGNANT NEOPLASM OF OVERLAPPING SITES OF LEFT BREAST IN FEMALE, ESTROGEN RECEPTOR POSITIVE (H): ICD-10-CM

## 2018-03-08 DIAGNOSIS — C50.912 INVASIVE DUCTAL CARCINOMA OF LEFT BREAST (H): Primary | ICD-10-CM

## 2018-03-08 DIAGNOSIS — C50.912 INVASIVE DUCTAL CARCINOMA OF LEFT BREAST (H): ICD-10-CM

## 2018-03-08 DIAGNOSIS — Z17.0 MALIGNANT NEOPLASM OF OVERLAPPING SITES OF LEFT BREAST IN FEMALE, ESTROGEN RECEPTOR POSITIVE (H): ICD-10-CM

## 2018-03-08 LAB
ALBUMIN SERPL-MCNC: 3.7 G/DL (ref 3.4–5)
ALP SERPL-CCNC: 32 U/L (ref 40–150)
ALT SERPL W P-5'-P-CCNC: 14 U/L (ref 0–50)
AST SERPL W P-5'-P-CCNC: 19 U/L (ref 0–45)
BASOPHILS # BLD AUTO: 0.1 10E9/L (ref 0–0.2)
BASOPHILS NFR BLD AUTO: 1.5 %
BILIRUB DIRECT SERPL-MCNC: 0.1 MG/DL (ref 0–0.2)
BILIRUB SERPL-MCNC: 0.3 MG/DL (ref 0.2–1.3)
CREAT SERPL-MCNC: 0.7 MG/DL (ref 0.52–1.04)
DIFFERENTIAL METHOD BLD: ABNORMAL
EOSINOPHIL # BLD AUTO: 0.2 10E9/L (ref 0–0.7)
EOSINOPHIL NFR BLD AUTO: 4.1 %
ERYTHROCYTE [DISTWIDTH] IN BLOOD BY AUTOMATED COUNT: 13 % (ref 10–15)
GFR SERPL CREATININE-BSD FRML MDRD: 81 ML/MIN/1.7M2
HCT VFR BLD AUTO: 36.9 % (ref 35–47)
HGB BLD-MCNC: 11.6 G/DL (ref 11.7–15.7)
IMM GRANULOCYTES # BLD: 0 10E9/L (ref 0–0.4)
IMM GRANULOCYTES NFR BLD: 0.2 %
LYMPHOCYTES # BLD AUTO: 1.4 10E9/L (ref 0.8–5.3)
LYMPHOCYTES NFR BLD AUTO: 25.3 %
MCH RBC QN AUTO: 29.4 PG (ref 26.5–33)
MCHC RBC AUTO-ENTMCNC: 31.4 G/DL (ref 31.5–36.5)
MCV RBC AUTO: 93 FL (ref 78–100)
MONOCYTES # BLD AUTO: 0.5 10E9/L (ref 0–1.3)
MONOCYTES NFR BLD AUTO: 9.2 %
NEUTROPHILS # BLD AUTO: 3.2 10E9/L (ref 1.6–8.3)
NEUTROPHILS NFR BLD AUTO: 59.7 %
PLATELET # BLD AUTO: 205 10E9/L (ref 150–450)
PROT SERPL-MCNC: 6.9 G/DL (ref 6.8–8.8)
RBC # BLD AUTO: 3.95 10E12/L (ref 3.8–5.2)
WBC # BLD AUTO: 5.4 10E9/L (ref 4–11)

## 2018-03-08 PROCEDURE — 85025 COMPLETE CBC W/AUTO DIFF WBC: CPT | Performed by: INTERNAL MEDICINE

## 2018-03-08 PROCEDURE — 82565 ASSAY OF CREATININE: CPT | Performed by: INTERNAL MEDICINE

## 2018-03-08 PROCEDURE — 99214 OFFICE O/P EST MOD 30 MIN: CPT | Performed by: INTERNAL MEDICINE

## 2018-03-08 PROCEDURE — 80076 HEPATIC FUNCTION PANEL: CPT | Performed by: INTERNAL MEDICINE

## 2018-03-08 PROCEDURE — 36415 COLL VENOUS BLD VENIPUNCTURE: CPT | Performed by: INTERNAL MEDICINE

## 2018-03-08 RX ORDER — TAMOXIFEN CITRATE 20 MG/1
20 TABLET ORAL DAILY
Qty: 90 TABLET | Refills: 1 | Status: SHIPPED | OUTPATIENT
Start: 2018-03-08 | End: 2018-09-13

## 2018-03-08 ASSESSMENT — PAIN SCALES - GENERAL: PAINLEVEL: NO PAIN (0)

## 2018-03-08 NOTE — NURSING NOTE
"Oncology Rooming Note    March 8, 2018 12:28 PM   Cydney Christiansen is a 82 year old female who presents for:    Chief Complaint   Patient presents with     Oncology Clinic Visit     6 month follow up      Initial Vitals: /76  Pulse 61  Temp 97.9  F (36.6  C)  Resp 15  Ht 1.664 m (5' 5.51\")  Wt 64.9 kg (143 lb)  SpO2 100%  BMI 23.43 kg/m2 Estimated body mass index is 23.43 kg/(m^2) as calculated from the following:    Height as of this encounter: 1.664 m (5' 5.51\").    Weight as of this encounter: 64.9 kg (143 lb). Body surface area is 1.73 meters squared.  No Pain (0) Comment: Data Unavailable   No LMP recorded. Patient is postmenopausal.  Allergies reviewed: Yes  Medications reviewed: Yes    Medications: Medication refills not needed today.  Pharmacy name entered into Emotify:    [id number] [place] [place] - [address]   DRU[facility] - [facility] [address] [facility]        5 minutes for nursing intake (face to face time)     April Cagle LPN              "

## 2018-03-08 NOTE — PROGRESS NOTES
Oncology Follow-up visit:  Date on this visit: Mar 8, 2018      Primary Care Physician: Kadi Herring   Surgeon: Dr. Ifeanyi Sunshine.     Diagnosis:  1. 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 She was not recommended in favor of adjuvant radiation or chemotherapy, and has been on adjuvant Tamoxifen (AI not chosen due to OP and hx of compression fractures), since 08/24/2015.    Oncologic History:  1. Breast Cancer - The patient underwent a screening mammogram on 5/29/2015, which demonstrated a 9 mm lesion around the 1 o'clock position in the left breast. She had an ultrasound which confirmed a lesion to be about 9 mm. She underwent a core needle biopsy in [facility], which demonstrated invasive ductal cancer, grade 2, estrogen receptor positive (99%, strong) and  progesterone receptor positive (91%, strong) by immunohistochemistry and HER2 not amplified by FISH (ratio 1.0).   She then met with Dr. Sunshine and underwent L lumpectomy and axillary sentinel LN biopsy. The pathology from the surgery showed a 10 mm invasive ductal carcinoma, Sal grade 2.  There was no associated DCIS. Closest surgical margin was 3 mm from the anterior margin. There was no lymphovascular invasion and all 3 sentinel lymph nodes were negative for malignancy.   She was not recommended in favor of adjuvant radiation or chemotherapy.  Due  compression fracture history felt to be osteoporotic fractures, Tamoxifen was chosen over an AI. She started on Tamoxifen on 8/24/2015    2. Compression Fractures- Patient sustained a compression fracture of T7  in July 2015. On T spine MRI there were also old compression fractures of T3, T4 and T8 noted. She was on Fosamax for 10 years and stopped it in 2012. She developed compression fracture in 07/2015 and was restarted on Fosamax.    3. She has a  remote history of TCC of the bladder (in 1993), s/p TURBT     4. 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     5. Normocytic anemia - She was noted to have mild anemia in Aug 2016. Hb was down to 11.2 g/dl. MCV was normal. She reports there is a Hx of anemia in her sister and mother, due to iron and B12 deficiency. In 12/2016 she had workup for her anemia. TSH was normal. Serum folate was normal. Iron studies were normal including ferritin of 213. She had normal LFTs and creatinine. B12 level was normal at over 500.  Peripheral blood smear on 12/23/2016 showed  slight normochromic, normocytic anemia without increased   erythrocyte regeneration. The red blood cells appeared normochromic. Poikilocytosis was minimal.   Serum protein electrophoresis and serum immunofixation showed no M protein.  We'll recommend vitamin B12 supplementation and she has been on 500 mcg orally daily, and she has been on ferrous sulfate 325 mg PO daily as well. Her Hb has improved to low normal.  Since our last visit, she underwent endoscopy and colonoscopy on 6/16/2017 by Dr. Harriet Jacobs at [facility]. On colonoscopy she was noted to have three sessile polyps in the ascending colon, resected. The endoscopy showed normal esophagus, stomach and duodenum. Gastric and small bowel biopsies were normal and there was no e/o celiac disease or H.Pylori. The colonic polyps on pathology showed one tubular adenoma and two serrated adenomas.      6. Breast Cancer Screening - On 8/28/2017 she underwent a bilateral digital screening mammography with tomosynthesis. The breast tissue was noted to be heterogeneously dense.  On MLO  view there was a round circumscribed mass in the left axilla.  On 9/6/2017 she underwent L axillary US which showed a 1.7 x 1.6 x 1.7 cm cyst  located just above the axillary dissection scar that correlated with mammographic finding. Benign appearing left axillary lymph nodes were seen during exam. She underwent  axillary cyst aspiration on 9/8/2017. It was felt to be simple fluid collection compatible with post-operative seroma. She had it aspirated and no pathology was sent due to benign appearing cyst with clear fluid.       History Of Present Illness:  Ms. 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 She transferred  her care to us for convenience from Dr. Walter in 05/2016. She lives in [place]. She has been on Tamoxifen since 8/24/2015 and has tolerated it reasonably well.  She has been on weekly Fosamax as well.  She has a remote history of TCC of the bladder (in 1993), s/p TURBT and follows up with Dr. Hammond annually for cystoscopy, last in 01/2018. She had a cystoscopy at that time, and it showed no e/o recurrent bladder tumor.  She follows up with dermatology for NMSC. She was last seen in 06/2017 with no e/o NMSC recurrence. Follow-up was recommended in one year.   She is feeling very well and has no health related complaints. She underwent cataract surgery on her left eye on 3/8/2018. She has no new breast related complaints.  In addition, a complete 12 point  review of systems is negative.      Past Medical/Surgical History:  Past Medical History:   Diagnosis Date     Actinic keratosis      Basal cell cancer 02/2011    bcc of the L back.     Basal cell carcinoma 04' , 06'     Basal cell carcinoma 06/2011    L neck     Breast cancer (H)      Cataract      Colon polyps     Precancer     Glaucoma (increased eye pressure)      Heart murmur      HLD (hyperlipidemia)      Hypertension goal BP (blood pressure) < 140/90 12/19/2013     Invasive ductal carcinoma of breast (H) 6/2015    left     Osteoporosis      Scoliosis      Skin cancer      Skin cancer 05/2013    sccis R cheek     Squamous cell carcinoma 09/2011    R upper back     Squamous cell carcinoma 10/2012    R dorsal hand      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 7/13    left leg     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     Past Surgical History:   Procedure Laterality Date     COLONOSCOPY  5/2008, 5/13, 6/14, 6/17    Q 3 years for advanced adenomatous polyp     CYSTOSCOPY  12/31/2008     D & C       GENITOURINARY SURGERY      TURBT     HC REMOVAL GALLBLADDER      open pan     HC TRABECULOPLASTY BY LASER SURGERY Left 4/18/07    SLT #1 OS (inf 180)     HC TRABECULOPLASTY BY LASER SURGERY Right 5/4/11    SLT #1 OD (inf 180?)     HC TRABECULOPLASTY BY LASER SURGERY Left 3/17/15    SLT #2 OS (sup 180)     HC TRABECULOPLASTY BY LASER SURGERY Right 6/23/15    SLT #2 OD (sup 180?)     LASER ARGON TREATMENT      SLT left eye x 2     LUMPECTOMY BREAST WITH SENTINEL NODE, COMBINED Left 7/29/2015    Procedure: COMBINED LUMPECTOMY BREAST WITH SENTINEL NODE;  Surgeon: Ifeanyi Sunshine MD;  Location: [facility] OR     PHACOEMULSIFICATION CLEAR CORNEA WITH STANDARD INTRAOCULAR LENS IMPLANT Left 12/8/2017    Procedure: PHACOEMULSIFICATION CLEAR CORNEA WITH STANDARD INTRAOCULAR LENS IMPLANT;   COMPLEX LEFT PHACOEMULSIFICATION CLEAR CORNEA WITH STANDARD INTRAOCULAR LENS IMPLANT ;  Surgeon: Kvng Garcia MD;  Location: [facility]     SURGICAL HISTORY OF -   9/11    squamous cell CA excised from back     TUBAL LIGATION     FHx and SocHx reviewed     Allergies:  Allergies as of 03/08/2018 - Juan Antonio as Reviewed 01/16/2018   Allergen Reaction Noted     Lisinopril Cough 09/16/2014     Current Medications:  Current Outpatient Prescriptions   Medication Sig Dispense Refill     meclizine (ANTIVERT) 25 MG tablet Take 1 tablet (25 mg) by mouth every 6 hours as needed for dizziness 30 tablet 1     tamoxifen (NOLVADEX) 20 MG tablet Take 1 tablet (20 mg) by mouth daily 90 tablet 1     Ferrous Sulfate (IRON SUPPLEMENT PO) Take 325 mg by mouth daily       alendronate (FOSAMAX)  "70 MG tablet Take 1 tablet (70 mg) by mouth every 7 days Take with over 8 ounces water and stay upright for at least 30 minutes after dose.  Take at least 60 minutes before breakfast 12 tablet 3     losartan (COZAAR) 25 MG tablet Take 1 tablet (25 mg) by mouth daily 90 tablet 4     simvastatin (ZOCOR) 20 MG tablet Take 1 tablet (20 mg) by mouth At Bedtime 90 tablet 4     latanoprost (XALATAN) 0.005 % ophthalmic solution Place 1 drop into both eyes At Bedtime 1 Bottle 11     dorzolamide-timolol (COSOPT) 2-0.5 % ophthalmic solution Place 1 drop into both eyes 2 times daily 10 mL 11     Cyanocobalamin (B-12) 500 MCG TABS Take 500 mcg by mouth daily 150 tablet 3     triamcinolone (KENALOG) 0.1 % cream Apply to affected area of the face once to twice a day. 15 g 1     ibuprofen (ADVIL,MOTRIN) 200 MG tablet Take 200 mg by mouth every 4 hours as needed.       CALCIUM 600 MG OR TABS 1 daily       VITAMIN D-1000 MAX -1000 MG-UNIT OR TABS 1 daily          Physical Exam:    /76  Pulse 61  Temp 97.9  F (36.6  C)  Resp 15  Ht 1.664 m (5' 5.51\")  Wt 64.9 kg (143 lb)  SpO2 100%  BMI 23.43 kg/m2        GENERAL APPEARANCE: elderly, alert and no distress      NECK: no adenopathy, no asymmetry or masses     LYMPHATICS: No cervical, supraclavicular, axillary  lymphadenopathy     RESP: lungs clear to auscultation - no rales, rhonchi or wheezes     CARDIOVASCULAR: regular rates and rhythm, normal S1 S2, no S3 or S4 and no murmur.     ABDOMEN:  soft, nontender, no HSM or masses and bowel sounds normal     MUSCULOSKELETAL: extremities normal- no gross deformities noted, no evidence of inflammation in joints, FROM in all extremities. No edema b/l LE.     SKIN: no suspicious lesions or rashes     PSYCHIATRIC: mentation appears normal and affect normal  Breast: S/p L lumpectomy. Incision in left upper outer quadrant healed well. No breast masses b/l. No axillary lymphadenopathy b/l      Laboratory/Imaging Studies  Orders " Only on 03/08/2018   Component Date Value Ref Range Status     WBC 03/08/2018 5.4  4.0 - 11.0 10e9/L Final     RBC Count 03/08/2018 3.95  3.8 - 5.2 10e12/L Final     Hemoglobin 03/08/2018 11.6* 11.7 - 15.7 g/dL Final     Hematocrit 03/08/2018 36.9  35.0 - 47.0 % Final     MCV 03/08/2018 93  78 - 100 fl Final     MCH 03/08/2018 29.4  26.5 - 33.0 pg Final     MCHC 03/08/2018 31.4* 31.5 - 36.5 g/dL Final     RDW 03/08/2018 13.0  10.0 - 15.0 % Final     Platelet Count 03/08/2018 205  150 - 450 10e9/L Final     Diff Method 03/08/2018 Automated Method   Final     % Neutrophils 03/08/2018 59.7  % Final     % Lymphocytes 03/08/2018 25.3  % Final     % Monocytes 03/08/2018 9.2  % Final     % Eosinophils 03/08/2018 4.1  % Final     % Basophils 03/08/2018 1.5  % Final     % Immature Granulocytes 03/08/2018 0.2  % Final     Absolute Neutrophil 03/08/2018 3.2  1.6 - 8.3 10e9/L Final     Absolute Lymphocytes 03/08/2018 1.4  0.8 - 5.3 10e9/L Final     Absolute Monocytes 03/08/2018 0.5  0.0 - 1.3 10e9/L Final     Absolute Eosinophils 03/08/2018 0.2  0.0 - 0.7 10e9/L Final     Absolute Basophils 03/08/2018 0.1  0.0 - 0.2 10e9/L Final     Abs Immature Granulocytes 03/08/2018 0.0  0 - 0.4 10e9/L Final     Bilirubin Direct 03/08/2018 0.1  0.0 - 0.2 mg/dL Final     Bilirubin Total 03/08/2018 0.3  0.2 - 1.3 mg/dL Final     Albumin 03/08/2018 3.7  3.4 - 5.0 g/dL Final     Protein Total 03/08/2018 6.9  6.8 - 8.8 g/dL Final     Alkaline Phosphatase 03/08/2018 32* 40 - 150 U/L Final     ALT 03/08/2018 14  0 - 50 U/L Final     AST 03/08/2018 19  0 - 45 U/L Final     Creatinine 03/08/2018 0.70  0.52 - 1.04 mg/dL Final     GFR Estimate 03/08/2018 81  >60 mL/min/1.7m2 Final    Non  GFR Calc     GFR Estimate If Black 03/08/2018 >90  >60 mL/min/1.7m2 Final    African American GFR Calc       Orders Only on 09/08/2017   Component Date Value Ref Range Status     Hemoglobin 09/08/2017 11.7  11.7 - 15.7 g/dL Final     Iron 09/08/2017 76   35 - 180 ug/dL Final     Iron Binding Cap 09/08/2017 319  240 - 430 ug/dL Final     Iron Saturation Index 09/08/2017 24  15 - 46 % Final     Ferritin 09/08/2017 228  8 - 252 ng/mL Final         ASSESSMENT/PLAN:    Gera is a 82 year old woman with stage Ia, [id number], grade II, ER positive, IN positive, 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 She was not recommended in favor of adjuvant radiation or chemotherapy, and has been on adjuvant Tamoxifen (AI not chosen due to OP and hx of compression fractures), since 08/24/2015.    1. Breast cancer - continue daily Tamoxifen, tolerating well. F/up with us in 6 months, with labs.    2. Hx of OP with compression Fx- continue Fosamax, calcium and vitamin D supplementation.   3. Breast cancer screening- b/l screening mammogram with tomosynthesis due in 08/2019. She will schedule with her next visit.    4. Hx of TCC of bladder- s/p  s/p TURBT in 1993 and follows up with Dr. Hammond  annually for cystoscopy, next in 01/2019.     5.Hx of NMSC- f/up with dermatology annually, next  In 06/2018.    6. Mild normocytic anemia, Hb borderline low, stable- . B12 level within normal limits. Ferritin 228 in 09/2017. Continue on PO ferrous sulfate 325 mg daily to QOD. EGD and colonoscopy in June 2017 negative for source of blood loss. Repeat CBCd in 6 months. If recurrent/worsening anemia, consider capsule endoscopy.  At the end of our visit patient verbalized understanding and concurred with the plan.

## 2018-03-08 NOTE — LETTER
3/8/2018         RE: Cydney Christiansen  [address]  [place] [address]        Dear Colleague,    Thank you for referring your patient, Cydney Christiansen, to the [facility]. Please see a copy of my visit note below.    Oncology Follow-up visit:  Date on this visit: Mar 8, 2018      Primary Care Physician: Kadi Herring   Surgeon: Dr. Ifeanyi Sunshine.     Diagnosis:  1. 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 She was not recommended in favor of adjuvant radiation or chemotherapy, and has been on adjuvant Tamoxifen (AI not chosen due to OP and hx of compression fractures), since 08/24/2015.    Oncologic History:  1. Breast Cancer - The patient underwent a screening mammogram on 5/29/2015, which demonstrated a 9 mm lesion around the 1 o'clock position in the left breast. She had an ultrasound which confirmed a lesion to be about 9 mm. She underwent a core needle biopsy in [facility], which demonstrated invasive ductal cancer, grade 2, estrogen receptor positive (99%, strong) and  progesterone receptor positive (91%, strong) by immunohistochemistry and HER2 not amplified by FISH (ratio 1.0).   She then met with Dr. Sunshine and underwent L lumpectomy and axillary sentinel LN biopsy. The pathology from the surgery showed a 10 mm invasive ductal carcinoma, Hamilton grade 2.  There was no associated DCIS. Closest surgical margin was 3 mm from the anterior margin. There was no lymphovascular invasion and all 3 sentinel lymph nodes were negative for malignancy.   She was not recommended in favor of adjuvant radiation or chemotherapy.  Due  compression fracture history felt to be osteoporotic fractures, Tamoxifen was chosen over an AI. She started on Tamoxifen on 8/24/2015    2. Compression Fractures- Patient sustained a compression fracture  of T7  in July 2015. On T spine MRI there were also old compression fractures of T3, T4 and T8 noted. She was on Fosamax for 10 years and stopped it in 2012. She developed compression fracture in 07/2015 and was restarted on Fosamax.    3. She has a remote history of TCC of the bladder (in 1993), s/p TURBT     4. 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     5. Normocytic anemia - She was noted to have mild anemia in Aug 2016. Hb was down to 11.2 g/dl. MCV was normal. She reports there is a Hx of anemia in her sister and mother, due to iron and B12 deficiency. In 12/2016 she had workup for her anemia. TSH was normal. Serum folate was normal. Iron studies were normal including ferritin of 213. She had normal LFTs and creatinine. B12 level was normal at over 500.  Peripheral blood smear on 12/23/2016 showed  slight normochromic, normocytic anemia without increased   erythrocyte regeneration. The red blood cells appeared normochromic. Poikilocytosis was minimal.   Serum protein electrophoresis and serum immunofixation showed no M protein.  We'll recommend vitamin B12 supplementation and she has been on 500 mcg orally daily, and she has been on ferrous sulfate 325 mg PO daily as well. Her Hb has improved to low normal.  Since our last visit, she underwent endoscopy and colonoscopy on 6/16/2017 by Dr. Harriet Jacobs at [facility]. On colonoscopy she was noted to have three sessile polyps in the ascending colon, resected. The endoscopy showed normal esophagus, stomach and duodenum. Gastric and small bowel biopsies were normal and there was no e/o celiac disease or H.Pylori. The colonic polyps on pathology showed one tubular adenoma and two serrated adenomas.      6. Breast Cancer Screening - On 8/28/2017 she underwent a bilateral digital screening mammography with tomosynthesis. The breast tissue was noted to be heterogeneously dense.  On MLO  view there was a round circumscribed mass in the left axilla.   On 9/6/2017 she underwent L axillary US which showed a 1.7 x 1.6 x 1.7 cm cyst  located just above the axillary dissection scar that correlated with mammographic finding. Benign appearing left axillary lymph nodes were seen during exam. She underwent axillary cyst aspiration on 9/8/2017. It was felt to be simple fluid collection compatible with post-operative seroma. She had it aspirated and no pathology was sent due to benign appearing cyst with clear fluid.       History Of Present Illness:  Ms. 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 She transferred  her care to us for convenience from Dr. Walter in 05/2016. She lives in [place]. She has been on Tamoxifen since 8/24/2015 and has tolerated it reasonably well.  She has been on weekly Fosamax as well.  She has a remote history of TCC of the bladder (in 1993), s/p TURBT and follows up with Dr. Hammond annually for cystoscopy, last in 01/2018. She had a cystoscopy at that time, and it showed no e/o recurrent bladder tumor.  She follows up with dermatology for NMSC. She was last seen in 06/2017 with no e/o NMSC recurrence. Follow-up was recommended in one year.   She is feeling very well and has no health related complaints. She underwent cataract surgery on her left eye on 3/8/2018. She has no new breast related complaints.  In addition, a complete 12 point  review of systems is negative.      Past Medical/Surgical History:  Past Medical History:   Diagnosis Date     Actinic keratosis      Basal cell cancer 02/2011    bcc of the L back.     Basal cell carcinoma 04' , 06'     Basal cell carcinoma 06/2011    L neck     Breast cancer (H)      Cataract      Colon polyps     Precancer     Glaucoma (increased eye pressure)      Heart murmur      HLD (hyperlipidemia)      Hypertension goal BP (blood pressure) < 140/90 12/19/2013      Invasive ductal carcinoma of breast (H) 6/2015    left     Osteoporosis      Scoliosis      Skin cancer      Skin cancer 05/2013    sccis R cheek     Squamous cell carcinoma 09/2011    R upper back     Squamous cell carcinoma 10/2012    R dorsal hand     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 7/13    left leg     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     Past Surgical History:   Procedure Laterality Date     COLONOSCOPY  5/2008, 5/13, 6/14, 6/17    Q 3 years for advanced adenomatous polyp     CYSTOSCOPY  12/31/2008     D & C       GENITOURINARY SURGERY      TURBT     HC REMOVAL GALLBLADDER      open pan     HC TRABECULOPLASTY BY LASER SURGERY Left 4/18/07    SLT #1 OS (inf 180)     HC TRABECULOPLASTY BY LASER SURGERY Right 5/4/11    SLT #1 OD (inf 180?)     HC TRABECULOPLASTY BY LASER SURGERY Left 3/17/15    SLT #2 OS (sup 180)     HC TRABECULOPLASTY BY LASER SURGERY Right 6/23/15    SLT #2 OD (sup 180?)     LASER ARGON TREATMENT      SLT left eye x 2     LUMPECTOMY BREAST WITH SENTINEL NODE, COMBINED Left 7/29/2015    Procedure: COMBINED LUMPECTOMY BREAST WITH SENTINEL NODE;  Surgeon: Ifeanyi Sunshine MD;  Location: [facility] OR     PHACOEMULSIFICATION CLEAR CORNEA WITH STANDARD INTRAOCULAR LENS IMPLANT Left 12/8/2017    Procedure: PHACOEMULSIFICATION CLEAR CORNEA WITH STANDARD INTRAOCULAR LENS IMPLANT;   COMPLEX LEFT PHACOEMULSIFICATION CLEAR CORNEA WITH STANDARD INTRAOCULAR LENS IMPLANT ;  Surgeon: Kvng Garcia MD;  [facility]     SURGICAL HISTORY OF -   9/11    squamous cell CA excised from back     TUBAL LIGATION     FHx and SocHx reviewed     Allergies:  Allergies as of 03/08/2018 - Juan Antonio as Reviewed 01/16/2018   Allergen Reaction Noted     Lisinopril Cough 09/16/2014     Current Medications:  Current Outpatient Prescriptions   Medication Sig Dispense Refill     meclizine (ANTIVERT) 25 MG tablet Take 1  "tablet (25 mg) by mouth every 6 hours as needed for dizziness 30 tablet 1     tamoxifen (NOLVADEX) 20 MG tablet Take 1 tablet (20 mg) by mouth daily 90 tablet 1     Ferrous Sulfate (IRON SUPPLEMENT PO) Take 325 mg by mouth daily       alendronate (FOSAMAX) 70 MG tablet Take 1 tablet (70 mg) by mouth every 7 days Take with over 8 ounces water and stay upright for at least 30 minutes after dose.  Take at least 60 minutes before breakfast 12 tablet 3     losartan (COZAAR) 25 MG tablet Take 1 tablet (25 mg) by mouth daily 90 tablet 4     simvastatin (ZOCOR) 20 MG tablet Take 1 tablet (20 mg) by mouth At Bedtime 90 tablet 4     latanoprost (XALATAN) 0.005 % ophthalmic solution Place 1 drop into both eyes At Bedtime 1 Bottle 11     dorzolamide-timolol (COSOPT) 2-0.5 % ophthalmic solution Place 1 drop into both eyes 2 times daily 10 mL 11     Cyanocobalamin (B-12) 500 MCG TABS Take 500 mcg by mouth daily 150 tablet 3     triamcinolone (KENALOG) 0.1 % cream Apply to affected area of the face once to twice a day. 15 g 1     ibuprofen (ADVIL,MOTRIN) 200 MG tablet Take 200 mg by mouth every 4 hours as needed.       CALCIUM 600 MG OR TABS 1 daily       VITAMIN D-1000 MAX -1000 MG-UNIT OR TABS 1 daily          Physical Exam:    /76  Pulse 61  Temp 97.9  F (36.6  C)  Resp 15  Ht 1.664 m (5' 5.51\")  Wt 64.9 kg (143 lb)  SpO2 100%  BMI 23.43 kg/m2        GENERAL APPEARANCE: elderly, alert and no distress      NECK: no adenopathy, no asymmetry or masses     LYMPHATICS: No cervical, supraclavicular, axillary  lymphadenopathy     RESP: lungs clear to auscultation - no rales, rhonchi or wheezes     CARDIOVASCULAR: regular rates and rhythm, normal S1 S2, no S3 or S4 and no murmur.     ABDOMEN:  soft, nontender, no HSM or masses and bowel sounds normal     MUSCULOSKELETAL: extremities normal- no gross deformities noted, no evidence of inflammation in joints, FROM in all extremities. No edema b/l LE.     SKIN: no " suspicious lesions or rashes     PSYCHIATRIC: mentation appears normal and affect normal  Breast: S/p L lumpectomy. Incision in left upper outer quadrant healed well. No breast masses b/l. No axillary lymphadenopathy b/l      Laboratory/Imaging Studies  Orders Only on 03/08/2018   Component Date Value Ref Range Status     WBC 03/08/2018 5.4  4.0 - 11.0 10e9/L Final     RBC Count 03/08/2018 3.95  3.8 - 5.2 10e12/L Final     Hemoglobin 03/08/2018 11.6* 11.7 - 15.7 g/dL Final     Hematocrit 03/08/2018 36.9  35.0 - 47.0 % Final     MCV 03/08/2018 93  78 - 100 fl Final     MCH 03/08/2018 29.4  26.5 - 33.0 pg Final     MCHC 03/08/2018 31.4* 31.5 - 36.5 g/dL Final     RDW 03/08/2018 13.0  10.0 - 15.0 % Final     Platelet Count 03/08/2018 205  150 - 450 10e9/L Final     Diff Method 03/08/2018 Automated Method   Final     % Neutrophils 03/08/2018 59.7  % Final     % Lymphocytes 03/08/2018 25.3  % Final     % Monocytes 03/08/2018 9.2  % Final     % Eosinophils 03/08/2018 4.1  % Final     % Basophils 03/08/2018 1.5  % Final     % Immature Granulocytes 03/08/2018 0.2  % Final     Absolute Neutrophil 03/08/2018 3.2  1.6 - 8.3 10e9/L Final     Absolute Lymphocytes 03/08/2018 1.4  0.8 - 5.3 10e9/L Final     Absolute Monocytes 03/08/2018 0.5  0.0 - 1.3 10e9/L Final     Absolute Eosinophils 03/08/2018 0.2  0.0 - 0.7 10e9/L Final     Absolute Basophils 03/08/2018 0.1  0.0 - 0.2 10e9/L Final     Abs Immature Granulocytes 03/08/2018 0.0  0 - 0.4 10e9/L Final     Bilirubin Direct 03/08/2018 0.1  0.0 - 0.2 mg/dL Final     Bilirubin Total 03/08/2018 0.3  0.2 - 1.3 mg/dL Final     Albumin 03/08/2018 3.7  3.4 - 5.0 g/dL Final     Protein Total 03/08/2018 6.9  6.8 - 8.8 g/dL Final     Alkaline Phosphatase 03/08/2018 32* 40 - 150 U/L Final     ALT 03/08/2018 14  0 - 50 U/L Final     AST 03/08/2018 19  0 - 45 U/L Final     Creatinine 03/08/2018 0.70  0.52 - 1.04 mg/dL Final     GFR Estimate 03/08/2018 81  >60 mL/min/1.7m2 Final    Non   American GFR Calc     GFR Estimate If Black 03/08/2018 >90  >60 mL/min/1.7m2 Final    African American GFR Calc       Orders Only on 09/08/2017   Component Date Value Ref Range Status     Hemoglobin 09/08/2017 11.7  11.7 - 15.7 g/dL Final     Iron 09/08/2017 76  35 - 180 ug/dL Final     Iron Binding Cap 09/08/2017 319  240 - 430 ug/dL Final     Iron Saturation Index 09/08/2017 24  15 - 46 % Final     Ferritin 09/08/2017 228  8 - 252 ng/mL Final         ASSESSMENT/PLAN:    Gera is a 82 year old woman with stage Ia, [id number], grade II, ER positive, OK positive, 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 She was not recommended in favor of adjuvant radiation or chemotherapy, and has been on adjuvant Tamoxifen (AI not chosen due to OP and hx of compression fractures), since 08/24/2015.    1. Breast cancer - continue daily Tamoxifen, tolerating well. F/up with us in 6 months, with labs.    2. Hx of OP with compression Fx- continue Fosamax, calcium and vitamin D supplementation.   3. Breast cancer screening- b/l screening mammogram with tomosynthesis due in 08/2019. She will schedule with her next visit.    4. Hx of TCC of bladder- s/p  s/p TURBT in 1993 and follows up with Dr. Hammond  annually for cystoscopy, next in 01/2019.     5.Hx of NMSC- f/up with dermatology annually, next  In 06/2018.    6. Mild normocytic anemia, Hb borderline low, stable- . B12 level within normal limits. Ferritin 228 in 09/2017. Continue on PO ferrous sulfate 325 mg daily to QOD. EGD and colonoscopy in June 2017 negative for source of blood loss. Repeat CBCd in 6 months. If recurrent/worsening anemia, consider capsule endoscopy.  At the end of our visit patient verbalized understanding and concurred with the plan.          Again, thank you for allowing me to participate in the care of your patient.        Sincerely,        Usha Salgado MD, MD

## 2018-03-08 NOTE — MR AVS SNAPSHOT
"              After Visit Summary   3/8/2018    Cydney Christiansen    MRN: [id number]           Patient Information     Date Of Birth          1935        Visit Information        Provider Department      3/8/2018 12:30 PM Usha Salgado MD [facility]        Today's Diagnoses     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)    -  1    Malignant neoplasm of overlapping sites of left breast in female, estrogen receptor positive (H)           Follow-ups after your visit        Your next 10 appointments already scheduled     Jun 28, 2018 11:15 AM CDT   Return Visit with Qi Barba MD   [facility] ([facility]    [address] [place] [address]   [phone]            Sep 10, 2018 10:00 AM CDT   (Arrive by 9:45 AM)   MA SCREENING BILATERAL W/ JAMESON with MGMA1, MG MA TECH   [facility] ([facility])    [address] [place] [address]   [phone]           Three-dimensional (3D) mammograms are available at [facility] locations in [facility], [facility], [facility], [place], [place], and [place]. [facility] locations include [place] and [facility] in [place]. Benefits of 3D mammograms include: - Improved rate of cancer detection - Decreases your chance of having to go back for more tests, which means fewer: - \"False-positive\" results (This means that there is an abnormal area but it isn't cancer.) - Invasive testing procedures, such as a biopsy or surgery - Can provide clearer images of the breast if you have dense breast tissue. 3D mammography is an optional exam that anyone can have with a 2D mammogram. It doesn't replace or take the place of a 2D mammogram. 2D mammograms remain an effective screening test for all women.  Not all insurance companies cover the cost of a 3D mammogram. Check with your insurance.            Sep 11, 2018 10:15 AM CDT   Return " Visit with Kvng Garcia MD   [facility]    [address]  [place] [address] [phone]            Sep 13, 2018 12:30 PM CDT   Return Visit with Usha Salgado MD   [facility] ([facility])    [address] [place] [address] [phone]            Jan 08, 2019  9:30 AM CST   Return Visit with Ruben Hammond MD, [facility] CYSTO PROC ROOM   [facility] ([facility])    [address] [address]   [phone]              Future tests that were ordered for you today     Open Future Orders        Priority Expected Expires Ordered    CBC with platelets differential Routine  3/8/2018 3/8/2018    Hepatic panel Routine  3/8/2018 3/8/2018    Creatinine Routine  3/8/2018 3/8/2018    MA Screen Bilateral w/Jose Antonio Routine  3/8/2019 3/8/2018            Who to contact     If you have questions or need follow up information about today's clinic visit or your schedule please contact [facility] directly at [phone].  Normal or non-critical lab and imaging results will be communicated to you by Doc[employer]Youhart, letter or phone within 4 business days after the clinic has received the results. If you do not hear from us within 7 days, please contact the clinic through [employer] or phone. If you have a critical or abnormal lab result, we will notify you by phone as soon as possible.  Submit refill requests through [employer] or call your pharmacy and they will forward the refill request to us. Please allow 3 business days for your refill to be completed.          Additional Information About Your Visit        [employer]You[facility]agoon Information     [employer] gives you secure access to your electronic health record. If you see a primary care provider, you can also send messages to your care team and make appointments. If you have questions, please call your primary care clinic.  If you  "do not have a primary care provider, please call [phone] and they will assist you.      [employer] is an electronic gateway that provides easy, online access to your medical records. With [employer], you can request a clinic appointment, read your test results, renew a prescription or communicate with your care team.     To access your existing account, please contact your [facility] or call [phone] for assistance.        Care EveryWhere ID     This is your Care EveryWhere ID. This could be used by other organizations to access your [place] medical records  [id number]        Your Vitals Were     Pulse Temperature Respirations Height Pulse Oximetry BMI (Body Mass Index)    61 97.9  F (36.6  C) 15 1.664 m (5' 5.51\") 100% 23.43 kg/m2       Blood Pressure from Last 3 Encounters:   03/08/18 122/76   01/09/18 116/70   12/08/17 132/81    Weight from Last 3 Encounters:   03/08/18 64.9 kg (143 lb)   12/04/17 65.8 kg (145 lb)   11/09/17 65.3 kg (144 lb)                 Where to get your medicines      These medications [facility] to [id number] [place] - [address]  [address] [place] [address]     Phone:  [phone]     tamoxifen 20 MG tablet          Primary Care Provider Office Phone # Fax #    Kadi Sanchez [phone]       [facility] [address] [facility] [address]        Equal Access to Services     BARBARA CORCORAN : Hadii aad ku hadasho Soomaali, waaxda luqadaha, qaybta kaalmada morgan gunter. So [facility] [phone].    ATENCIÓN: Si habla español, tiene a chavez disposición servicios gratuitos de asistencia lingüística. Llame al [phone].    We comply with applicable federal civil rights laws and [place] laws. We do not discriminate on the basis of race, color, national origin, age, disability, sex, sexual orientation, or gender identity.            Thank you!     " Thank you for choosing [facility]  for your care. Our goal is always to provide you with excellent care. Hearing back from our patients is one way we can continue to improve our services. Please take a few minutes to complete the written survey that you may receive in the mail after your visit with us. Thank you!             Your Updated Medication List - Protect others around you: Learn how to safely use, store and throw away your medicines at www.disposemymeds.org.          This list is accurate as of 3/8/18 12:59 PM.  Always use your most recent med list.                   Brand Name Dispense Instructions for use Diagnosis    alendronate 70 MG tablet    FOSAMAX    12 tablet    Take 1 tablet (70 mg) by mouth every 7 days Take with over 8 ounces water and stay upright for at least 30 minutes after dose.  Take at least 60 minutes before breakfast    Osteoporosis, unspecified osteoporosis type, unspecified pathological fracture presence       B-12 500 MCG Tabs     150 tablet    Take 500 mcg by mouth daily    B12 deficiency       calcium 600 MG tablet      1 daily        dorzolamide-timolol 2-0.5 % ophthalmic solution    COSOPT    10 mL    Place 1 drop into both eyes 2 times daily    PXF (pseudoexfoliation of lens capsule)       ibuprofen 200 MG tablet    ADVIL/MOTRIN     Take 200 mg by mouth every 4 hours as needed.        IRON SUPPLEMENT PO      Take 325 mg by mouth daily        latanoprost 0.005 % ophthalmic solution    XALATAN    1 Bottle    Place 1 drop into both eyes At Bedtime    PXF (pseudoexfoliation of lens capsule)       losartan 25 MG tablet    COZAAR    90 tablet    Take 1 tablet (25 mg) by mouth daily    Benign essential hypertension       meclizine 25 MG tablet    ANTIVERT    30 tablet    Take 1 tablet (25 mg) by mouth every 6 hours as needed for dizziness    Vertigo       simvastatin 20 MG tablet    ZOCOR    90 tablet    Take 1 tablet (20 mg) by mouth At Bedtime    Hyperlipidemia LDL  goal <160       tamoxifen 20 MG tablet    NOLVADEX    90 tablet    Take 1 tablet (20 mg) by mouth daily    Malignant neoplasm of overlapping sites of left breast in female, estrogen receptor positive (H), 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    Granuloma annulare       VITAMIN D-1000 MAX ST 1000 UNITS Tabs   Generic drug:  cholecalciferol      1 daily

## 2018-04-03 DIAGNOSIS — E53.8 B12 DEFICIENCY: ICD-10-CM

## 2018-04-04 RX ORDER — CYANOCOBALAMIN (VITAMIN B-12) 500 MCG
500 TABLET ORAL DAILY
Qty: 150 TABLET | Refills: 3 | Status: SHIPPED | OUTPATIENT
Start: 2018-04-04 | End: 2019-07-01

## 2018-04-17 DIAGNOSIS — H26.8 PXF (PSEUDOEXFOLIATION OF LENS CAPSULE): ICD-10-CM

## 2018-04-17 RX ORDER — DORZOLAMIDE HYDROCHLORIDE AND TIMOLOL MALEATE 20; 5 MG/ML; MG/ML
1 SOLUTION/ DROPS OPHTHALMIC 2 TIMES DAILY
Qty: 10 ML | Refills: 11 | Status: SHIPPED | OUTPATIENT
Start: 2018-04-17 | End: 2019-03-08

## 2018-04-17 NOTE — TELEPHONE ENCOUNTER
Reason for call:  Medication   If this is a refill request, has the caller requested the refill from the pharmacy already? Yes  Will the patient be using a [facility]? No  Name of the pharmacy and phone number for the current request: [facility]    Name of the medication requested: dorzolamide-timolol (COSOPT) 2-0.5 % ophthalmic solution    Other request: last filled 02/27/2018    Phone number to reach patient:  [phone]    Best Time:  n/a    Can we leave a detailed message on this number?  YES

## 2018-04-26 DIAGNOSIS — H26.8 PXF (PSEUDOEXFOLIATION OF LENS CAPSULE): ICD-10-CM

## 2018-04-26 RX ORDER — LATANOPROST 50 UG/ML
1 SOLUTION/ DROPS OPHTHALMIC AT BEDTIME
Qty: 1 BOTTLE | Refills: 11 | Status: SHIPPED | OUTPATIENT
Start: 2018-04-26 | End: 2019-03-08

## 2018-04-26 NOTE — TELEPHONE ENCOUNTER
Reason for call:  Medication   If this is a refill request, has the caller requested the refill from the pharmacy already? Yes  Will the patient be using a [facility]? No  Name of the pharmacy and phone number for the current request: [facility]    Name of the medication requested: latanoprost (XALATAN) 0.005 % ophthalmic solution    Other request: last filled 03/30/2018    Phone number to reach patient:  [phone]    Best Time:  n/a    Can we leave a detailed message on this number?  YES

## 2018-05-14 DIAGNOSIS — M81.0 OSTEOPOROSIS, UNSPECIFIED OSTEOPOROSIS TYPE, UNSPECIFIED PATHOLOGICAL FRACTURE PRESENCE: ICD-10-CM

## 2018-05-16 RX ORDER — ALENDRONATE SODIUM 70 MG/1
TABLET ORAL
Qty: 12 TABLET | Refills: 3 | Status: SHIPPED | OUTPATIENT
Start: 2018-05-16 | End: 2019-04-05

## 2018-05-16 NOTE — TELEPHONE ENCOUNTER
"Routing refill request to provider for review/approval because:  Last Dexa Scan: 7/20/15      Requested Prescriptions   Pending Prescriptions Disp Refills     alendronate (FOSAMAX) 70 MG tablet [Pharmacy Med Name: ALENDRONATE SODIUM 70 MG TAB]  Last Written Prescription Date:  6/26/17  Last Fill Quantity: 12,  # refills: 3   Last office visit: 12/4/2017 with prescribing provider:     Future Office Visit:   Next 5 appointments (look out 90 days)     Jun 28, 2018 11:15 AM CDT   Return Visit with Qi Barba MD   [facility] ([facility])    [id number] [place] [id number] [phone]                  12 tablet 3     Sig: TAKE 1 TAB BY MOUTH EVERY 7 DAYS WITH 8 OZ WATER 60 MIN BEFORE FOOD. SIT UPRIGHT FOR 30 MINUTES    Bisphosphonates Failed    5/14/2018  8:06 AM       Failed - Dexa on file within past 2 years    Please review last Dexa result.          Passed - Recent (12 mo) or future (30 days) visit within the authorizing provider's specialty    Patient had office visit in the last 12 months or has a visit in the next 30 days with authorizing provider or within the authorizing provider's specialty.  See \"Patient Info\" tab in inbasket, or \"Choose Columns\" in Meds & Orders section of the refill encounter.           Passed - Patient is age 18 or older       Passed - Normal serum creatinine on file within past 12 months    Recent Labs   Lab Test  03/08/18   1143   CR  0.70             Gina Grace RN - BC      "

## 2018-06-18 NOTE — PATIENT INSTRUCTIONS
Preventive Health Recommendations  Female Ages 65 +    Yearly exam:     See your health care provider every year in order to  o Review health changes.   o Discuss preventive care.    o Review your medicines if your doctor has prescribed any.      You no longer need a yearly Pap test unless you've had an abnormal Pap test in the past 10 years. If you have vaginal symptoms, such as bleeding or discharge, be sure to talk with your provider about a Pap test.      Every 1 to 2 years, have a mammogram.  If you are over 69, talk with your health care provider about whether or not you want to continue having screening mammograms.      Every 10 years, have a colonoscopy. Or, have a yearly FIT test (stool test). These exams will check for colon cancer.       Have a cholesterol test every 5 years, or more often if your doctor advises it.       Have a diabetes test (fasting glucose) every three years. If you are at risk for diabetes, you should have this test more often.       At age 65, have a bone density scan (DEXA) to check for osteoporosis (brittle bone disease).    Shots:    Get a flu shot each year.    Get a tetanus shot every 10 years.    Talk to your doctor about your pneumonia vaccines. There are now two you should receive - Pneumovax (PPSV 23) and Prevnar (PCV 13).    Talk to your doctor about the shingles vaccine.    Talk to your doctor about the hepatitis B vaccine.    Nutrition:     Eat at least 5 servings of fruits and vegetables each day.      Eat whole-grain bread, whole-wheat pasta and brown rice instead of white grains and rice.      Talk to your provider about Calcium and Vitamin D.     Lifestyle    Exercise at least 150 minutes a week (30 minutes a day, 5 days a week). This will help you control your weight and prevent disease.      Limit alcohol to one drink per day.      No smoking.       Wear sunscreen to prevent skin cancer.       See your dentist twice a year for an exam and cleaning.      See your  eye doctor every 1 to 2 years to screen for conditions such as glaucoma, macular degeneration, cataracts, etc     [facility]    If you have any questions regarding to your visit please contact your care team:       Team Purple:   Clinic Hours Telephone Number   Dr. Kadi Rivera   7am-7pm  Monday - Thursday   7am-5pm  Fridays  [phone]  (Appointment scheduling available 24/7)    Questions about your recent visit?   Team Line:  [phone]   Urgent Care - [place] and [facility] - 11am-9pm Monday-Friday Saturday-Sunday- 9am-5pm   [place] - 5pm-9pm Monday-Friday Saturday-Sunday- 9am-5pm  [phone] - [place]  [phone] - [place]       What options do I have for a visit other than an office visit? We offer electronic visits (e-visits) and telephone visits, when medically appropriate.  Please check with your medical insurance to see if these types of visits are covered, as you will be responsible for any charges that are not paid by your insurance.      You can use [employer] (secure electronic communication) to access to your chart, send your primary care provider a message, or make an appointment. Ask a team member how to get started.     For a price quote for your services, please call our Consumer Price Line at [phone] or our Imaging Cost estimation line at [phone] (for imaging tests).

## 2018-06-28 ENCOUNTER — OFFICE VISIT (OUTPATIENT)
Dept: FAMILY MEDICINE | Facility: CLINIC | Age: 83
End: 2018-06-28
Payer: COMMERCIAL

## 2018-06-28 ENCOUNTER — OFFICE VISIT (OUTPATIENT)
Dept: DERMATOLOGY | Facility: CLINIC | Age: 83
End: 2018-06-28
Payer: COMMERCIAL

## 2018-06-28 VITALS
HEIGHT: 60 IN | WEIGHT: 142.5 LBS | HEART RATE: 60 BPM | OXYGEN SATURATION: 97 % | TEMPERATURE: 97.6 F | BODY MASS INDEX: 27.98 KG/M2 | SYSTOLIC BLOOD PRESSURE: 124 MMHG | RESPIRATION RATE: 14 BRPM | DIASTOLIC BLOOD PRESSURE: 60 MMHG

## 2018-06-28 DIAGNOSIS — L57.0 AK (ACTINIC KERATOSIS): ICD-10-CM

## 2018-06-28 DIAGNOSIS — E78.5 HYPERLIPIDEMIA LDL GOAL <160: ICD-10-CM

## 2018-06-28 DIAGNOSIS — I10 BENIGN ESSENTIAL HYPERTENSION: ICD-10-CM

## 2018-06-28 DIAGNOSIS — Z00.00 ENCOUNTER FOR ROUTINE ADULT HEALTH EXAMINATION WITHOUT ABNORMAL FINDINGS: Primary | ICD-10-CM

## 2018-06-28 DIAGNOSIS — Z85.828 HISTORY OF NONMELANOMA SKIN CANCER: Primary | ICD-10-CM

## 2018-06-28 DIAGNOSIS — D48.5 NEOPLASM OF UNCERTAIN BEHAVIOR OF SKIN: ICD-10-CM

## 2018-06-28 LAB
ANION GAP SERPL CALCULATED.3IONS-SCNC: 6 MMOL/L (ref 3–14)
BUN SERPL-MCNC: 18 MG/DL (ref 7–30)
CALCIUM SERPL-MCNC: 8.5 MG/DL (ref 8.5–10.1)
CHLORIDE SERPL-SCNC: 105 MMOL/L (ref 94–109)
CHOLEST SERPL-MCNC: 122 MG/DL
CO2 SERPL-SCNC: 28 MMOL/L (ref 20–32)
CREAT SERPL-MCNC: 0.78 MG/DL (ref 0.52–1.04)
GFR SERPL CREATININE-BSD FRML MDRD: 71 ML/MIN/1.7M2
GLUCOSE SERPL-MCNC: 95 MG/DL (ref 70–99)
HDLC SERPL-MCNC: 67 MG/DL
LDLC SERPL CALC-MCNC: 42 MG/DL
NONHDLC SERPL-MCNC: 55 MG/DL
POTASSIUM SERPL-SCNC: 4.4 MMOL/L (ref 3.4–5.3)
SODIUM SERPL-SCNC: 139 MMOL/L (ref 133–144)
TRIGL SERPL-MCNC: 67 MG/DL

## 2018-06-28 PROCEDURE — 11100 HC BIOPSY SKIN/SUBQ/MUC MEM, SINGLE LESION: CPT | Mod: 59 | Performed by: DERMATOLOGY

## 2018-06-28 PROCEDURE — G0439 PPPS, SUBSEQ VISIT: HCPCS | Performed by: FAMILY MEDICINE

## 2018-06-28 PROCEDURE — 80061 LIPID PANEL: CPT | Performed by: FAMILY MEDICINE

## 2018-06-28 PROCEDURE — 17000 DESTRUCT PREMALG LESION: CPT | Performed by: DERMATOLOGY

## 2018-06-28 PROCEDURE — 88305 TISSUE EXAM BY PATHOLOGIST: CPT | Mod: TC | Performed by: DERMATOLOGY

## 2018-06-28 PROCEDURE — 11101 HC BIOPSY SKIN/SUBQ/MUC MEM, EACH ADDTL LESION: CPT | Performed by: DERMATOLOGY

## 2018-06-28 PROCEDURE — 80048 BASIC METABOLIC PNL TOTAL CA: CPT | Performed by: FAMILY MEDICINE

## 2018-06-28 PROCEDURE — 99213 OFFICE O/P EST LOW 20 MIN: CPT | Mod: 25 | Performed by: DERMATOLOGY

## 2018-06-28 PROCEDURE — 36415 COLL VENOUS BLD VENIPUNCTURE: CPT | Performed by: FAMILY MEDICINE

## 2018-06-28 RX ORDER — LOSARTAN POTASSIUM 25 MG/1
25 TABLET ORAL DAILY
Qty: 90 TABLET | Refills: 4 | Status: SHIPPED | OUTPATIENT
Start: 2018-06-28 | End: 2019-07-01

## 2018-06-28 RX ORDER — SIMVASTATIN 20 MG
20 TABLET ORAL AT BEDTIME
Qty: 90 TABLET | Refills: 4 | Status: SHIPPED | OUTPATIENT
Start: 2018-06-28 | End: 2019-07-01

## 2018-06-28 ASSESSMENT — ACTIVITIES OF DAILY LIVING (ADL)
CURRENT_FUNCTION: NO ASSISTANCE NEEDED
I_NEED_ASSISTANCE_FOR_THE_FOLLOWING_DAILY_ACTIVITIES:: NO ASSISTANCE IS NEEDED

## 2018-06-28 NOTE — PROGRESS NOTES
[facility] Dermatology Note      Dermatology Problem List:  1. NMSC  -SCC, left popliteal fossa, well differentiated with focal perineural invasion but with clear margins on path, s/p excision by Dr. 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 HAK, left mid eyebrow, base not seen, 6/2014  4. Actinic keratosis  -s/p cryotherapy  5. GA R angle of jaw: resolves with triam cream    Encounter Date: Jun 28, 2018    CC:  Chief Complaint   Patient presents with     Skin Check     skin check, lesion on right cheek, hx of multiple NMSC       History of Present Illness:  Ms. Cydney Christiansen is a 82 year old female with a history of multiple NMSC who presents for a 1-year skin check. She was last seen 6/27/2017 when the patient had lesions on the right cheek that she treated with triamcinolone. She reports that this worked for while, but now become bothersome and pruritic.      She also has a history of Aks treated with cryo.      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     Hypertension goal BP (blood pressure) < 140/90     Seborrheic keratosis     Malignant neoplasm of overlapping sites of left female breast (H)     Scoliosis     Compression fracture  of thoracic vertebra (H)     Mass of left hand     Primary open angle glaucoma of both eyes, unspecified glaucoma stage     Osteoporosis, unspecified osteoporosis type, unspecified pathological fracture presence     Nuclear sclerosis of left eye     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)     Past Medical History:   Diagnosis Date     Actinic keratosis      Basal cell cancer 02/2011    bcc of the L back.     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    left leg     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     Past Surgical History:   Procedure Laterality Date     COLONOSCOPY  5/2008, 5/13, 6/14, 6/17    Q 3 years for advanced adenomatous polyp     CYSTOSCOPY  12/31/2008     D & C       GENITOURINARY SURGERY      TURBT     HC REMOVAL GALLBLADDER      open pan     HC TRABECULOPLASTY BY LASER SURGERY Left 4/18/07    SLT #1 OS (inf 180)     HC TRABECULOPLASTY BY LASER SURGERY Right 5/4/11    SLT #1 OD (inf 180?)     HC TRABECULOPLASTY BY LASER SURGERY Left 3/17/15    SLT #2 OS (sup 180)     HC TRABECULOPLASTY BY LASER SURGERY Right 6/23/15    SLT #2 OD (sup 180?)     LASER ARGON TREATMENT      SLT left eye x 2     LUMPECTOMY BREAST WITH SENTINEL NODE, COMBINED Left 7/29/2015    Procedure: COMBINED LUMPECTOMY BREAST WITH SENTINEL NODE;  Surgeon: Ifeanyi Sunshine MD;  Location:   OR     PHACOEMULSIFICATION CLEAR CORNEA WITH STANDARD INTRAOCULAR LENS IMPLANT Left 12/8/2017    Procedure: PHACOEMULSIFICATION CLEAR CORNEA WITH STANDARD INTRAOCULAR LENS IMPLANT;   COMPLEX LEFT PHACOEMULSIFICATION CLEAR CORNEA WITH STANDARD INTRAOCULAR LENS IMPLANT ;  Surgeon: Kvng Garcia MD;  Location: [facility]     SURGICAL HISTORY OF -   9/11    squamous cell CA excised from back     TUBAL LIGATION         Social History:    [place].     Family History:  No family history of skin cancer.     Medications:  Current Outpatient Prescriptions   Medication Sig Dispense Refill     alendronate (FOSAMAX) 70 MG tablet TAKE 1 TAB BY MOUTH EVERY 7 DAYS WITH 8 OZ WATER 60 MIN BEFORE FOOD. SIT UPRIGHT FOR 30 MINUTES 12 tablet 3     CALCIUM 600 MG OR TABS 1 daily       Cyanocobalamin (B-12) 500 MCG TABS Take 500 mcg by mouth daily 150 tablet 3     dorzolamide-timolol (COSOPT) 2-0.5 % ophthalmic solution Place 1 drop into both eyes 2 times daily 10 mL 11     Ferrous Sulfate (IRON SUPPLEMENT PO) Take 325 mg by mouth daily       ibuprofen (ADVIL,MOTRIN) 200 MG tablet Take 200 mg by mouth every 4 hours as needed.       latanoprost (XALATAN) 0.005 % ophthalmic solution Place 1 drop into both eyes At Bedtime 1 Bottle 11     losartan (COZAAR) 25 MG tablet Take 1 tablet (25 mg) by mouth daily 90 tablet 4     meclizine (ANTIVERT) 25 MG tablet Take 1 tablet (25 mg) by mouth every 6 hours as needed for dizziness 30 tablet 1     simvastatin (ZOCOR) 20 MG tablet Take 1 tablet (20 mg) by mouth At Bedtime 90 tablet 4     tamoxifen (NOLVADEX) 20 MG tablet Take 1 tablet (20 mg) by mouth daily 90 tablet 1     triamcinolone (KENALOG) 0.1 % cream Apply to affected area of the face once to twice a day. 15 g 1     VITAMIN D-1000 MAX -1000 MG-UNIT OR TABS 1 daily       [DISCONTINUED] losartan (COZAAR) 25 MG tablet Take 1 tablet (25 mg) by mouth daily 90 tablet 4     [DISCONTINUED] simvastatin (ZOCOR) 20 MG tablet Take 1 tablet (20 mg) by  mouth At Bedtime 90 tablet 4       Allergies   Allergen Reactions     Lisinopril Cough       Review of Systems:  -Constitutional: The patient is otherwise feeling well.   -Skin Establ Pt: The patient denies any new rash, pruritus, or lesions that are symptomatic, changing or bleeding, except as per HPI.    Physical exam:  Vitals: There were no vitals taken for this visit.  GEN: This is a well-nourished, well developed female in no acute distress  NEURO: Alert and oriented  PSYCH: in a pleasant mood, appropriate affect  SKIN: Total skin excluding the undergarment areas was performed. The exam included the head/face, neck, both arms, chest, back, abdomen, both legs, digits and/or nails.   - There is no erythema, telangectasias, nodularity, or pigmentation on the sites of prior skin cancer.  - There is an erythematous macules with overyling adherent scale on the left medial cheek.   - On the left dorsal hand there are 2-3 firm, almost calcified nodules.   - On the right lower chin, there is a nodule with overlying erythema.   -No other lesions of concern on areas examined.       Impression/Plan:  1. History of nonmelanoma skin cancer, no clincial evidence of recurrence    Recommend sunscreens SPF #30 or greater, protective clothing and avoidance of tanning beds.    2. Actinic keratosis, left cheek   Cryotherapy procedure note: After verbal consent and discussion of risks and benefits including but no limited to dyspigmentation/scar, blister, and pain, 1 were treated with 1-2mm freeze border for 2 cycles with liquid nitrogen. Post cryotherapy instructions were provided.     3. Red 1 cm patch one the left forearm. NUB Ddx includes SK vs BCC    After discussion of benefits and risks including but not limited to bleeding, infection, scar, incomplete removal, and non-diagnostic biopsy, written consent and photographs were obtained. The area was cleaned with isopropyl alcohol. 0.5 mL of 1% lidocaine was injected to obtain  adequate anesthesia of the lesion on the left forearm. A shave biopsy was performed. Hemostasis was achieved with aluminium chloride. Vaseline and a sterile dressing were applied. The patient tolerated the procedure and no complications were noted. The patient was provided with verbal and written post care instructions.   4. On the right upper arm thereis 5 mm macule with telangiectasis. NUB. BCC vs other     After discussion of benefits and risks including but not limited to bleeding, infection, scar, incomplete removal, recurrence, and non-diagnostic biopsy, written consent and photographs were obtained. The area was cleaned with isopropyl alcohol. 0.5 mL of 1% lidocaine with epinephrine was injected to obtain adequate anesthesia of the lesion on the right upper arm. A shave biopsy was performed. Hemostasis was achieved with aluminium chloride. Vaseline and a sterile dressing were applied. The patient tolerated the procedure and no complications were noted. The patient was provided with verbal and written post care instructions.     5. Nodule with overlying erythema on the right lower chin. NUB. GA vs tumid lupus vs Jessner's s/p triamcinolone   After discussion of benefits and risks including but not limited to bleeding, infection, scar, incomplete removal, recurrence, and non-diagnostic biopsy, written consent and photographs were obtained. The area was cleaned with isopropyl alcohol. 0.5 mL of 1% lidocaine with epinephrine was injected to obtain adequate anesthesia of the lesion on the right lower chin. A 3 mm punch biopsy was performed.  5-0 Prolene sutures were utilized to approximate the epidermal edges.  White petroleum jelly/VaselineTM and a bandage was applied to the wound.  Explicit verbal and written wound care instructions were provided.  The patient left the Dermatology Clinic in good condition.        Follow-up in 6 months, earlier for new or changing lesions.       Staff Involved:  Scribe/Staff      Scribe Disclosure:   I, Isabel Paz, am serving as a scribe to document services personally performed by Dr. Qi Barba, based on data collection and the provider's statements to me.       Provider Disclosure:   The documentation recorded by the scribe accurately reflects the services I personally performed and the decisions made by me.    Qi Barba MD    Department of Dermatology  [facility]: Phone: [phone], Fax:[phone]  [facility]: Phone: [phone], Fax: [phone]

## 2018-06-28 NOTE — LETTER
6/28/2018         RE: Cydney Christiansen  [address]  [place] [address]        Dear Colleague,    Thank you for referring your patient, Cydney Christiansen, to the [facility]. Please see a copy of my visit note below.    [facility] Dermatology Note      Dermatology Problem List:  1. NMSC  -SCC, left popliteal fossa, well differentiated with focal perineural invasion but with clear margins on path, s/p excision by Dr. 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 HAK, left mid eyebrow, base not seen, 6/2014  4. Actinic keratosis  -s/p cryotherapy  5. GA R angle of jaw: resolves with triam cream    Encounter Date: Jun 28, 2018    CC:  Chief Complaint   Patient presents with     Skin Check     skin check, lesion on right cheek, hx of multiple NMSC       History of Present Illness:  Ms. Cydney Christiansen is a 82 year old female with a history of multiple NMSC who presents for a 1-year skin check. She was last seen 6/27/2017 when the patient had lesions on the right cheek that she treated with triamcinolone. She reports that this worked for while, but now become bothersome and pruritic.      She also has a history of Aks treated with cryo.      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    left leg     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     Past Surgical History:   Procedure Laterality Date     COLONOSCOPY  5/2008, 5/13, 6/14, 6/17    Q 3 years for advanced adenomatous polyp     CYSTOSCOPY  12/31/2008     D & C       GENITOURINARY SURGERY      TURBT     HC REMOVAL GALLBLADDER      open pan     HC TRABECULOPLASTY BY LASER SURGERY Left 4/18/07    SLT #1 OS (inf 180)     HC TRABECULOPLASTY BY LASER SURGERY Right 5/4/11    SLT #1 OD (inf 180?)     HC TRABECULOPLASTY BY LASER SURGERY Left 3/17/15    SLT #2 OS (sup 180)     HC TRABECULOPLASTY BY LASER  SURGERY Right 6/23/15    SLT #2 OD (sup 180?)     LASER ARGON TREATMENT      SLT left eye x 2     LUMPECTOMY BREAST WITH SENTINEL NODE, COMBINED Left 7/29/2015    Procedure: COMBINED LUMPECTOMY BREAST WITH SENTINEL NODE;  Surgeon: Ifeanyi Sunshine MD;  Location[facility]     PHACOEMULSIFICATION CLEAR CORNEA WITH STANDARD INTRAOCULAR LENS IMPLANT Left 12/8/2017    Procedure: PHACOEMULSIFICATION CLEAR CORNEA WITH STANDARD INTRAOCULAR LENS IMPLANT;   COMPLEX LEFT PHACOEMULSIFICATION CLEAR CORNEA WITH STANDARD INTRAOCULAR LENS IMPLANT ;  Surgeon: Kvng Garcia MD;  Location: [facility]     SURGICAL HISTORY OF -   9/11    squamous cell CA excised from back     TUBAL LIGATION         Social History:    [place].     Family History:  No family history of skin cancer.     Medications:  Current Outpatient Prescriptions   Medication Sig Dispense Refill     alendronate (FOSAMAX) 70 MG tablet TAKE 1 TAB BY MOUTH EVERY 7 DAYS WITH 8 OZ WATER 60 MIN BEFORE FOOD. SIT UPRIGHT FOR 30 MINUTES 12 tablet 3     CALCIUM 600 MG OR TABS 1 daily       Cyanocobalamin (B-12) 500 MCG TABS Take 500 mcg by mouth daily 150 tablet 3     dorzolamide-timolol (COSOPT) 2-0.5 % ophthalmic solution Place 1 drop into both eyes 2 times daily 10 mL 11     Ferrous Sulfate (IRON SUPPLEMENT PO) Take 325 mg by mouth daily       ibuprofen (ADVIL,MOTRIN) 200 MG tablet Take 200 mg by mouth every 4 hours as needed.       latanoprost (XALATAN) 0.005 % ophthalmic solution Place 1 drop into both eyes At Bedtime 1 Bottle 11     losartan (COZAAR) 25 MG tablet Take 1 tablet (25 mg) by mouth daily 90 tablet 4     meclizine (ANTIVERT) 25 MG tablet Take 1 tablet (25 mg) by mouth every 6 hours as needed for dizziness 30 tablet 1     simvastatin (ZOCOR) 20 MG tablet Take 1 tablet (20 mg) by mouth At Bedtime 90 tablet 4     tamoxifen (NOLVADEX) 20 MG tablet Take 1 tablet (20 mg) by mouth daily 90 tablet 1     triamcinolone (KENALOG) 0.1 % cream Apply to affected area of the face  once to twice a day. 15 g 1     VITAMIN D-1000 MAX -1000 MG-UNIT OR TABS 1 daily       [DISCONTINUED] losartan (COZAAR) 25 MG tablet Take 1 tablet (25 mg) by mouth daily 90 tablet 4     [DISCONTINUED] simvastatin (ZOCOR) 20 MG tablet Take 1 tablet (20 mg) by mouth At Bedtime 90 tablet 4       Allergies   Allergen Reactions     Lisinopril Cough       Review of Systems:  -Constitutional: The patient is otherwise feeling well.   -Skin Establ Pt: The patient denies any new rash, pruritus, or lesions that are symptomatic, changing or bleeding, except as per HPI.    Physical exam:  Vitals: There were no vitals taken for this visit.  GEN: This is a well-nourished, well developed female in no acute distress  NEURO: Alert and oriented  PSYCH: in a pleasant mood, appropriate affect  SKIN: Total skin excluding the undergarment areas was performed. The exam included the head/face, neck, both arms, chest, back, abdomen, both legs, digits and/or nails.   - There is no erythema, telangectasias, nodularity, or pigmentation on the sites of prior skin cancer.  - There is an erythematous macules with overyling adherent scale on the left medial cheek.   - On the left dorsal hand there are 2-3 firm, almost calcified nodules.   - On the right lower chin, there is a nodule with overlying erythema.   -No other lesions of concern on areas examined.       Impression/Plan:  1. History of nonmelanoma skin cancer, no clincial evidence of recurrence    Recommend sunscreens SPF #30 or greater, protective clothing and avoidance of tanning beds.    2. Actinic keratosis, left cheek   Cryotherapy procedure note: After verbal consent and discussion of risks and benefits including but no limited to dyspigmentation/scar, blister, and pain, 1 were treated with 1-2mm freeze border for 2 cycles with liquid nitrogen. Post cryotherapy instructions were provided.     3. Red 1 cm patch one the left forearm. NUB Ddx includes SK vs BCC    After discussion  of benefits and risks including but not limited to bleeding, infection, scar, incomplete removal, and non-diagnostic biopsy, written consent and photographs were obtained. The area was cleaned with isopropyl alcohol. 0.5 mL of 1% lidocaine was injected to obtain adequate anesthesia of the lesion on the left forearm. A shave biopsy was performed. Hemostasis was achieved with aluminium chloride. Vaseline and a sterile dressing were applied. The patient tolerated the procedure and no complications were noted. The patient was provided with verbal and written post care instructions.   4. On the right upper arm thereis 5 mm macule with telangiectasis. NUB. BCC vs other     After discussion of benefits and risks including but not limited to bleeding, infection, scar, incomplete removal, recurrence, and non-diagnostic biopsy, written consent and photographs were obtained. The area was cleaned with isopropyl alcohol. 0.5 mL of 1% lidocaine with epinephrine was injected to obtain adequate anesthesia of the lesion on the right upper arm. A shave biopsy was performed. Hemostasis was achieved with aluminium chloride. Vaseline and a sterile dressing were applied. The patient tolerated the procedure and no complications were noted. The patient was provided with verbal and written post care instructions.     5. Nodule with overlying erythema on the right lower chin. NUB. GA vs tumid lupus vs Jessner's s/p triamcinolone   After discussion of benefits and risks including but not limited to bleeding, infection, scar, incomplete removal, recurrence, and non-diagnostic biopsy, written consent and photographs were obtained. The area was cleaned with isopropyl alcohol. 0.5 mL of 1% lidocaine with epinephrine was injected to obtain adequate anesthesia of the lesion on the right lower chin. A 3 mm punch biopsy was performed.  5-0 Prolene sutures were utilized to approximate the epidermal edges.  White petroleum jelly/VaselineTM and a  bandage was applied to the wound.  Explicit verbal and written wound care instructions were provided.  The patient left the Dermatology Clinic in good condition.        Follow-up in 6 months, earlier for new or changing lesions.       Staff Involved:  Scribe/Staff     Scribe Disclosure:   I, Isabel Paz, am serving as a scribe to document services personally performed by Dr. Qi Barba, based on data collection and the provider's statements to me.       Provider Disclosure:   The documentation recorded by the scribe accurately reflects the services I personally performed and the decisions made by me.    Qi Barba MD    Department of Dermatology  [facility]: Phone: [phone], Fax:[phone]  [facility] [place]: Phone: [phone], Fax: [phone]        Again, thank you for allowing me to participate in the care of your patient.        Sincerely,        Qi Barba MD

## 2018-06-28 NOTE — PROGRESS NOTES
SUBJECTIVE:   Cydney Christiansen is a 82 year old female who presents for Preventive Visit.      Are you in the first 12 months of your Medicare coverage?  No    Physical   Annual:     Getting at least 3 servings of Calcium per day:  Yes    Bi-annual eye exam:  Yes    Dental care twice a year:  Yes    Sleep apnea or symptoms of sleep apnea:  None    Diet:  Regular (no restrictions)    Frequency of exercise:  2-3 days/week    Duration of exercise:  15-30 minutes    Taking medications regularly:  Yes    Medication side effects:  Not applicable    Additional concerns today:  No    Ability to successfully perform activities of daily living: no assistance needed    Home Safety:  Lack of grab bars in the bathroom    Hearing Impairment: difficulty following a conversation in a noisy restaurant or crowded room        Fall risk:  Fallen 2 or more times in the past year?: No  Any fall with injury in the past year?: No    COGNITIVE SCREEN  1) Repeat 3 items (Leader, Season, Table)    2) Clock draw: NORMAL  3) 3 item recall: Recalls 3 objects  Results: 3 items recalled: COGNITIVE IMPAIRMENT LESS LIKELY    Mini-CogTM Copyright CHELLE Pelayo. Licensed by the author for use in [facility]; reprinted with permission (cathy@[email or web address]). All rights reserved.        Reviewed and updated as needed this visit by clinical staff  Tobacco  Allergies  Meds  Med Hx  Surg Hx  Fam Hx  Soc Hx        Reviewed and updated as needed this visit by Provider        Social History   Substance Use Topics     Smoking status: Former Smoker     Packs/day: 0.50     Years: 20.00     Types: Cigarettes     Quit date: 2/1/1976     Smokeless tobacco: Never Used     Alcohol use Yes      Comment: rare       Alcohol Use 6/28/2018   If you drink alcohol do you typically have greater than 3 drinks per day OR greater than 7 drinks per week? No           Hyperlipidemia Follow-Up      Rate your low fat/cholesterol diet?: good    Taking statin?  Yes, no muscle  aches from statin    Other lipid medications/supplements?:  none    Hypertension Follow-up      Outpatient blood pressures are not being checked.    Low Salt Diet: cutting back on salt level intake      Today's PHQ-2 Score:   PHQ-2 ( 1999 Pfizer) 6/28/2018   Q1: Little interest or pleasure in doing things 0   Q2: Feeling down, depressed or hopeless 0   PHQ-2 Score 0   Q1: Little interest or pleasure in doing things Not at all   Q2: Feeling down, depressed or hopeless Not at all   PHQ-2 Score 0       Do you feel safe in your environment - Yes    Do you have a Health Care Directive?: No: Advance care planning was reviewed with patient; patient declined at this time.    Current providers sharing in care for this patient include:   Patient Care Team:  Kadi Sanchez MD as PCP - General  April Mahan RN as Nurse Coordinator (Breast Oncology)  Usha Salgado MD as MD (Hematology & Oncology)    The following health maintenance items are reviewed in Epic and correct as of today:  Health Maintenance   Topic Date Due     ADVANCE DIRECTIVE PLANNING Q5 YRS  06/09/2016     EYE EXAM Q1 YEAR  03/28/2018     FALL RISK ASSESSMENT  06/26/2018     PHQ-2 Q1 YR  06/26/2018     COLONOSCOPY Q3 YR  06/16/2020     TETANUS IMMUNIZATION (SYSTEM ASSIGNED)  10/05/2022     DEXA SCAN SCREENING (SYSTEM ASSIGNED)  Completed     PNEUMOCOCCAL  Completed     INFLUENZA VACCINE  Completed     Labs reviewed in EPIC  BP Readings from Last 3 Encounters:   06/28/18 124/60   03/08/18 122/76   01/09/18 116/70    Wt Readings from Last 3 Encounters:   06/28/18 142 lb 8 oz (64.6 kg)   03/08/18 143 lb (64.9 kg)   12/04/17 145 lb (65.8 kg)                  Patient Active Problem List   Diagnosis     History of basal cell carcinoma     PXF  OU     Personal history of malignant neoplasm of bladder     Advanced directives, counseling/discussion     Hyperlipidemia LDL goal <160     Family history of diabetes mellitus     Health Care Home      Squamous cell carcinoma     Basal cell carcinoma     Skin lesion of left leg     Viral warts     PVD (posterior vitreous detachment), OS     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     Hypertension goal BP (blood pressure) < 140/90     Seborrheic keratosis     Malignant neoplasm of overlapping sites of left female breast (H)     Scoliosis     Compression fracture of thoracic vertebra (H)     Mass of left hand     Primary open angle glaucoma of both eyes, unspecified glaucoma stage     Osteoporosis, unspecified osteoporosis type, unspecified pathological fracture presence     Nuclear sclerosis of left eye     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)     Past Surgical History:   Procedure Laterality Date     COLONOSCOPY  5/2008, 5/13, 6/14, 6/17    Q 3 years for advanced adenomatous polyp     CYSTOSCOPY  12/31/2008     D & C       GENITOURINARY SURGERY      TURBT     HC REMOVAL GALLBLADDER      open pan     HC TRABECULOPLASTY BY LASER SURGERY Left 4/18/07    SLT #1 OS (inf 180)     HC TRABECULOPLASTY BY LASER SURGERY Right 5/4/11    SLT #1 OD (inf 180?)     HC TRABECULOPLASTY BY LASER SURGERY Left 3/17/15    SLT #2 OS (sup 180)     HC TRABECULOPLASTY BY LASER SURGERY Right 6/23/15    SLT #2 OD (sup 180?)     LASER ARGON TREATMENT      SLT left eye x 2     LUMPECTOMY BREAST WITH SENTINEL NODE, COMBINED Left 7/29/2015    Procedure: COMBINED LUMPECTOMY BREAST WITH SENTINEL NODE;  Surgeon: Ifeanyi Sunshine MD;  Location:  OR     PHACOEMULSIFICATION CLEAR CORNEA WITH STANDARD INTRAOCULAR LENS IMPLANT Left 12/8/2017    Procedure: PHACOEMULSIFICATION CLEAR CORNEA WITH STANDARD INTRAOCULAR LENS IMPLANT;   COMPLEX LEFT PHACOEMULSIFICATION CLEAR CORNEA WITH STANDARD INTRAOCULAR LENS IMPLANT ;  Surgeon: Kvng Garcia MD;  Location: [facility]     SURGICAL HISTORY OF -   9/11    squamous cell CA excised from back     TUBAL LIGATION         Social History   Substance Use Topics     Smoking status: Former  Smoker     Packs/day: 0.50     Years: 20.00     Types: Cigarettes     Quit date: 2/1/1976     Smokeless tobacco: Never Used     Alcohol use Yes      Comment: rare     Family History   Problem Relation Age of Onset     Diabetes Mother      Breast Cancer Mother      Eye Disorder Mother      Osteoperosis Mother      Glaucoma Mother      Macular Degeneration Mother      Prostate Cancer Father      Prostate Cancer Brother      Glaucoma Brother      Macular Degeneration Brother      Thyroid Disease Sister      Blood Disease Sister      lupus     HEART DISEASE Sister      Glaucoma Sister      Asthma Son      Prostate Cancer Brother          Current Outpatient Prescriptions   Medication Sig Dispense Refill     alendronate (FOSAMAX) 70 MG tablet TAKE 1 TAB BY MOUTH EVERY 7 DAYS WITH 8 OZ WATER 60 MIN BEFORE FOOD. SIT UPRIGHT FOR 30 MINUTES 12 tablet 3     CALCIUM 600 MG OR TABS 1 daily       Cyanocobalamin (B-12) 500 MCG TABS Take 500 mcg by mouth daily 150 tablet 3     dorzolamide-timolol (COSOPT) 2-0.5 % ophthalmic solution Place 1 drop into both eyes 2 times daily 10 mL 11     Ferrous Sulfate (IRON SUPPLEMENT PO) Take 325 mg by mouth daily       ibuprofen (ADVIL,MOTRIN) 200 MG tablet Take 200 mg by mouth every 4 hours as needed.       latanoprost (XALATAN) 0.005 % ophthalmic solution Place 1 drop into both eyes At Bedtime 1 Bottle 11     losartan (COZAAR) 25 MG tablet Take 1 tablet (25 mg) by mouth daily 90 tablet 4     meclizine (ANTIVERT) 25 MG tablet Take 1 tablet (25 mg) by mouth every 6 hours as needed for dizziness 30 tablet 1     simvastatin (ZOCOR) 20 MG tablet Take 1 tablet (20 mg) by mouth At Bedtime 90 tablet 4     tamoxifen (NOLVADEX) 20 MG tablet Take 1 tablet (20 mg) by mouth daily 90 tablet 1     triamcinolone (KENALOG) 0.1 % cream Apply to affected area of the face once to twice a day. 15 g 1     VITAMIN D-1000 MAX -1000 MG-UNIT OR TABS 1 daily       [DISCONTINUED] losartan (COZAAR) 25 MG tablet Take  "1 tablet (25 mg) by mouth daily 90 tablet 4     [DISCONTINUED] simvastatin (ZOCOR) 20 MG tablet Take 1 tablet (20 mg) by mouth At Bedtime 90 tablet 4     Allergies   Allergen Reactions     Lisinopril Cough          Immunization History   Administered Date(s) Administered     HEPA 02/23/1999, 02/25/2000     Influenza (H1N1) 02/15/2010     Influenza (High Dose) 3 valent vaccine 10/07/2014, 10/28/2016, 10/18/2017     Influenza (IIV3) PF 10/23/2008, 12/03/2009, 11/09/2010, 11/04/2011, 10/05/2012, 10/19/2013     Pneumo Conj 13-V (2010&after) 03/02/2015     Pneumococcal 23 valent 04/17/2002, 06/04/2010     TD (ADULT, 7+) 05/06/2004     Tdap (Adacel,Boostrix) 10/05/2012     Zoster vaccine, live 05/27/2008      This 82 year old female is here today for annual male exam. She golfs twice a week and stays very active. Just got back from driving to see her sister in [facility]. No new concerns.   Review of Systems  CONSTITUTIONAL: NEGATIVE for fever, chills, change in weight  INTEGUMENTARY/SKIN: NEGATIVE for worrisome rashes, moles or lesions  EYES: NEGATIVE for vision changes or irritation  ENT/MOUTH: NEGATIVE for ear, mouth and throat problems  RESP: NEGATIVE for significant cough or SOB  BREAST: NEGATIVE for masses, tenderness or discharge  CV: NEGATIVE for chest pain, palpitations or peripheral edema  GI: NEGATIVE for nausea, abdominal pain, heartburn, or change in bowel habits  : NEGATIVE for frequency, dysuria, or hematuria  MUSCULOSKELETAL: NEGATIVE for significant arthralgias or myalgia  NEURO: NEGATIVE for weakness, dizziness or paresthesias  ENDOCRINE: NEGATIVE for temperature intolerance, skin/hair changes  HEME: NEGATIVE for bleeding problems  PSYCHIATRIC: NEGATIVE for changes in mood or affect    OBJECTIVE:   /60  Pulse 60  Temp 97.6  F (36.4  C) (Oral)  Resp 14  Ht 5' 0.43\" (1.535 m)  Wt 142 lb 8 oz (64.6 kg)  SpO2 97%  BMI 27.43 kg/m2 Estimated body mass index is 27.43 kg/(m^2) as calculated " "from the following:    Height as of this encounter: 5' 0.43\" (1.535 m).    Weight as of this encounter: 142 lb 8 oz (64.6 kg).  Physical Exam  GENERAL APPEARANCE: healthy, alert and no distress  EYES: Eyes grossly normal to inspection, PERRL and conjunctivae and sclerae normal  HENT: ear canals and TM's normal, nose and mouth without ulcers or lesions, oropharynx clear and oral mucous membranes moist  NECK: no adenopathy, no asymmetry, masses, or scars and thyroid normal to palpation  RESP: lungs clear to auscultation - no rales, rhonchi or wheezes  BREAST: normal without masses, tenderness or nipple discharge and no palpable axillary masses or adenopathy  CV: regular rate and rhythm, normal S1 S2, no S3 or S4, no murmur, click or rub, no peripheral edema and peripheral pulses strong  ABDOMEN: soft, nontender, no hepatosplenomegaly, no masses and bowel sounds normal  MS: no musculoskeletal defects are noted and gait is age appropriate without ataxia  SKIN: no suspicious lesions or rashes  NEURO: Normal strength and tone, sensory exam grossly normal, mentation intact and speech normal  PSYCH: mentation appears normal and affect normal/bright    Diagnostic Test Results:  none     ASSESSMENT / PLAN:   1. Encounter for routine adult health examination without abnormal findings  Healthy lady     2. Benign essential hypertension  Good control   - losartan (COZAAR) 25 MG tablet; Take 1 tablet (25 mg) by mouth daily  Dispense: 90 tablet; Refill: 4  - Basic metabolic panel    3. Hyperlipidemia LDL goal <160  Good control   - simvastatin (ZOCOR) 20 MG tablet; Take 1 tablet (20 mg) by mouth At Bedtime  Dispense: 90 tablet; Refill: 4  - Lipid panel reflex to direct LDL Fasting    End of Life Planning:  Patient currently has an advanced directive: Yes.  Practitioner is supportive of decision.    COUNSELING:  Reviewed preventive health counseling, as reflected in patient instructions       Regular exercise       Healthy " "diet/nutrition    BP Readings from Last 1 Encounters:   06/28/18 124/60     Estimated body mass index is 27.43 kg/(m^2) as calculated from the following:    Height as of this encounter: 5' 0.43\" (1.535 m).    Weight as of this encounter: 142 lb 8 oz (64.6 kg).           reports that she quit smoking about 42 years ago. Her smoking use included Cigarettes. She has a 10.00 pack-year smoking history. She has never used smokeless tobacco.      Appropriate preventive services were discussed with this patient, including applicable screening as appropriate for cardiovascular disease, diabetes, osteopenia/osteoporosis, and glaucoma.  As appropriate for age/gender, discussed screening for colorectal cancer, prostate cancer, breast cancer, and cervical cancer. Checklist reviewing preventive services available has been given to the patient.    Reviewed patients plan of care and provided an AVS. The Basic Care Plan (routine screening as documented in Health Maintenance) for Cydney meets the Care Plan requirement. This Care Plan has been established and reviewed with the Patient.    Counseling Resources:  ATP IV Guidelines  Pooled Cohorts Equation Calculator  Breast Cancer Risk Calculator  FRAX Risk Assessment  ICSI Preventive Guidelines  Dietary Guidelines for Americans, 2010  USDA's MyPlate  ASA Prophylaxis  Lung CA Screening    LION SPAULDING MD  [facility]  "

## 2018-06-28 NOTE — MR AVS SNAPSHOT
After Visit Summary   6/28/2018    Cydney Christiansen    MRN: [id number]           Patient Information     Date Of Birth          1935        Visit Information        Provider Department      6/28/2018 11:15 AM Qi Barba MD [facility]        Today's Diagnoses     History of nonmelanoma skin cancer    -  1    Neoplasm of uncertain behavior of skin        AK (actinic keratosis)          Care Instructions    Wound Care After a Biopsy    What is a skin biopsy?  A skin biopsy allows the doctor to examine a very small piece of tissue under the microscope to determine the diagnosis and the best treatment for the skin condition. A local anesthetic (numbing medicine)  is injected with a very small needle into the skin area to be tested. A small piece of skin is taken from the area. Sometimes a suture (stitch) is used.     What are the risks of a skin biopsy?  I will experience scar, bleeding, swelling, pain, crusting and redness. I may experience incomplete removal or recurrence. Risks of this procedure are excessive bleeding, bruising, infection, nerve damage, numbness, thick (hypertrophic or keloidal) scar and non-diagnostic biopsy.    How should I care for my wound for the first 24 hours?    Keep the wound dry and covered for 24 hours    If it bleeds, hold direct pressure on the area for 15 minutes. If bleeding does not stop then go to the emergency room    Avoid strenuous exercise the first 1-2 days or as your doctor instructs you    How should I care for the wound after 24 hours?    After 24 hours, remove the bandage    You may bathe or shower as normal    If you had a scalp biopsy, you can shampoo as usual and can use shower water to clean the biopsy site daily    Clean the wound twice a day with gentle soap and water    Do not scrub, be gentle    Apply white petroleum/Vaseline after cleaning the wound with a cotton swab or a clean finger, and keep the site covered with a  Bandaid /bandage. Bandages are not necessary with a scalp biopsy    If you are unable to cover the site with a Bandaid /bandage, re-apply ointment 2-3 times a day to keep the site moist. Moisture will help with healing    Avoid strenuous activity for first 1-2 days    Avoid lakes, rivers, pools, and oceans until the stitches are removed or the site is healed    How do I clean my wound?    Wash hands thoroughly with soap or use hand  before all wound care    Clean the wound with gentle soap and water    Apply white petroleum/Vaseline  to wound after it is clean    Replace the Bandaid /bandage to keep the wound covered for the first few days or as instructed by your doctor    If you had a scalp biopsy, warm shower water to the area on a daily basis should suffice    What should I use to clean my wound?     Cotton-tipped applicators (Qtips )    White petroleum jelly (Vaseline ). Use a clean new container and use Q-tips to apply.    Bandaids   as needed    Gentle soap     How should I care for my wound long term?    Do not get your wound dirty    Keep up with wound care for one week or until the area is healed.    A small scab will form and fall off by itself when the area is completely healed. The area will be red and will become pink in color as it heals. Sun protection is very important for how your scar will turn out. Sunscreen with an SPF 30 or greater is recommended once the area is healed.    If you have stitches, stitches need to be removed in 5-7 days. You may return to our clinic for this or you may have it done locally at your doctor s office.    You should have some soreness but it should be mild and slowly go away over several days. Talk to your doctor about using tylenol for pain,    When should I call my doctor?  If you have increased:     Pain or swelling    Pus or drainage (clear or slightly yellow drainage is ok)    Temperature over 100F    Spreading redness or warmth around wound    When will  I hear about my results?  The biopsy results can take 2-3 weeks to come back. The clinic will call you with the results, send you a InnoCCt message, or have you schedule a follow-up clinic or phone time to discuss the results. Contact our clinics if you do not hear from us in 3 weeks.     Who should I call with questions?    [facility]: [phone]     [facility]: [phone]    For urgent needs outside of business hours call the [facility] at [phone] and ask for the dermatology resident on call    Cryotherapy    What is it?    Use of a very cold liquid, such as liquid nitrogen, to freeze and destroy abnormal skin cells that need to be removed    What should I expect?    Tenderness and redness    A small blister that might grow and fill with dark purple blood. There may be crusting.    More than one treatment may be needed if the lesions do not go away.    How do I care for the treated area?    Gently wash the area with your hands when bathing.    Use a thin layer of Vaseline to help with healing. You may use a Band-Aid.     The area should heal within 7-10 days and may leave behind a pink or lighter color.     Do not use an antibiotic or Neosporin ointment.     You may take acetaminophen (Tylenol) for pain.     Call your Doctor if you have:    Severe pain    Signs of infection (warmth, redness, cloudy yellow drainage, and or a bad smell)    Questions or concerns    Who should I call with questions?       [facility]: [phone]       [facility]: [phone]       For urgent needs outside of business hours call the [facility] at [phone]        and ask for the dermatology resident on call            Follow-ups after your visit        Follow-up notes from your care team     Return in about 6 months (around 12/28/2018) for Skin Check - Hx of NMSC.      Your next 10  "appointments already scheduled     Jul 05, 2018 10:45 AM CDT   Nurse Only with NURSE ONLY MG DERM   [facility]    [address] [place] [id number] [phone]            Sep 10, 2018 10:00 AM CDT   MA SCREENING BILATERAL W/ JAMESON with MGMA1, MG MA TECH   [facility] ([facility])    [address] [place] [id number] [phone]           Three-dimensional (3D) mammograms are available at [facility] locations in [facility], [place], [place], [facility], [place], [place], and [place]. [facility] locations include [place] and [facility] in [place]. Benefits of 3D mammograms include: - Improved rate of cancer detection - Decreases your chance of having to go back for more tests, which means fewer: - \"False-positive\" results (This means that there is an abnormal area but it isn't cancer.) - Invasive testing procedures, such as a biopsy or surgery - Can provide clearer images of the breast if you have dense breast tissue. 3D mammography is an optional exam that anyone can have with a 2D mammogram. It doesn't replace or take the place of a 2D mammogram. 2D mammograms remain an effective screening test for all women.  Not all insurance companies cover the cost of a 3D mammogram. Check with your insurance.            Sep 10, 2018 10:30 AM CDT   LAB with LAB ONC [facility] ([facility])    [address] [place] [id number] [phone]           Please do not eat 10-12 hours before your appointment if you are coming in fasting for labs on lipids, cholesterol, or glucose (sugar). This does not apply to pregnant women. Water, hot tea and black coffee (with nothing added) are okay. Do not drink other fluids, diet soda or chew gum.            Sep 11, 2018 10:15 AM CDT   Return Visit with Kvng Garcia MD   [facility] [place] ([facility])    [address]  [place] [address]   [phone]            Sep 13, 2018 12:30 PM CDT   Return Visit with Usha Salgado MD   [facility] ([facility])    [address] [place] [address]   [phone]            Jan 08, 2019  9:30 AM CST   Return Visit with Ruben Hammond MD, [facility]ST[facility] PROC ROOM   [facility] ([facility])    [address] [place] [address]  [place] [address]   [phone]              Who to contact     If you have questions or need follow up information about today's clinic visit or your schedule please contact [facility] directly at [phone].  Normal or non-critical lab and imaging results will be communicated to you by AtomShockwavehart, letter or phone within 4 business days after the clinic has received the results. If you do not hear from us within 7 days, please contact the clinic through AtomShockwavehart or phone. If you have a critical or abnormal lab result, we will notify you by phone as soon as possible.  Submit refill requests through BearTail or call your pharmacy and they will forward the refill request to us. Please allow 3 business days for your refill to be completed.          Additional Information About Your Visit        AtomShockwavehart Information     BearTail gives you secure access to your electronic health record. If you see a primary care provider, you can also send messages to your care team and make appointments. If you have questions, please call your primary care clinic.  If you do not have a primary care provider, please call [phone] and they will assist you.      BearTail is an electronic gateway that provides easy, online access to your medical records. With BearTail, you can request a clinic appointment, read your test results, renew a prescription or communicate with your care team.     To access your existing account, please contact your  [facility] or call [phone] for assistance.        Care EveryWhere ID     This is your Care EveryWhere ID. This could be used by other organizations to access your [facility] medical records  [id number]         Blood Pressure from Last 3 Encounters:   06/28/18 124/60   03/08/18 122/76   01/09/18 116/70    Weight from Last 3 Encounters:   06/28/18 64.6 kg (142 lb 8 oz)   03/08/18 64.9 kg (143 lb)   12/04/17 65.8 kg (145 lb)              We Performed the Following     BIOPSY SKIN/SUBQ/MUC MEM, EACH ADDTL LESION     BIOPSY SKIN/SUBQ/MUC MEM, SINGLE LESION     Dermatological path order and indications     DESTRUCT PREMALIGNANT LESION, FIRST          Where to get your medicines      These medications [id number] [place] - [address]  [address], [place] [address]     Phone:  [phone]     losartan 25 MG tablet    simvastatin 20 MG tablet          Primary Care Provider Office Phone # Fax #    Kadi Sanchez [phone] [address] [address]        Equal Access to Services     BARBARA CORCORAN AH: Hadii josy wagner hadasho Soomaali, waaxda luqadaha, qaybta kaalmada adeegyada, morgan puga. So [facility] [phone].    ATENCIÓN: Si habla español, tiene a chavez disposición servicios gratuitos de asistencia lingüística. [place] al [phone].    We comply with applicable federal civil rights laws and [place] laws. We do not discriminate on the basis of race, color, national origin, age, disability, sex, sexual orientation, or gender identity.            Thank you!     Thank you for choosing [facility]  for your care. Our goal is always to provide you with excellent care. Hearing back from our patients is one way we can continue to improve our services. Please take a few minutes to complete the written survey that you may receive in the mail after your visit with us.  Thank you!             Your Updated Medication List - Protect others around you: Learn how to safely use, store and throw away your medicines at www.disposemymeds.org.          This list is accurate as of 6/28/18 12:07 PM.  Always use your most recent med list.                   Brand Name Dispense Instructions for use Diagnosis    alendronate 70 MG tablet    FOSAMAX    12 tablet    TAKE 1 TAB BY MOUTH EVERY 7 DAYS WITH 8 OZ WATER 60 MIN BEFORE FOOD. SIT UPRIGHT FOR 30 MINUTES    Osteoporosis, unspecified osteoporosis type, unspecified pathological fracture presence       B-12 500 MCG Tabs     150 tablet    Take 500 mcg by mouth daily    B12 deficiency       calcium 600 MG tablet      1 daily        dorzolamide-timolol 2-0.5 % ophthalmic solution    COSOPT    10 mL    Place 1 drop into both eyes 2 times daily    PXF (pseudoexfoliation of lens capsule)       ibuprofen 200 MG tablet    ADVIL/MOTRIN     Take 200 mg by mouth every 4 hours as needed.        IRON SUPPLEMENT PO      Take 325 mg by mouth daily        latanoprost 0.005 % ophthalmic solution    XALATAN    1 Bottle    Place 1 drop into both eyes At Bedtime    PXF (pseudoexfoliation of lens capsule)       losartan 25 MG tablet    COZAAR    90 tablet    Take 1 tablet (25 mg) by mouth daily    Benign essential hypertension       meclizine 25 MG tablet    ANTIVERT    30 tablet    Take 1 tablet (25 mg) by mouth every 6 hours as needed for dizziness    Vertigo       simvastatin 20 MG tablet    ZOCOR    90 tablet    Take 1 tablet (20 mg) by mouth At Bedtime    Hyperlipidemia LDL goal <160       tamoxifen 20 MG tablet    NOLVADEX    90 tablet    Take 1 tablet (20 mg) by mouth daily    Malignant neoplasm of overlapping sites of left breast in female, estrogen receptor positive (H), 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    Granuloma annulare       VITAMIN  D-1000 MAX ST 1000 units Tabs   Generic drug:  cholecalciferol      1 daily

## 2018-06-28 NOTE — PATIENT INSTRUCTIONS
Wound Care After a Biopsy    What is a skin biopsy?  A skin biopsy allows the doctor to examine a very small piece of tissue under the microscope to determine the diagnosis and the best treatment for the skin condition. A local anesthetic (numbing medicine)  is injected with a very small needle into the skin area to be tested. A small piece of skin is taken from the area. Sometimes a suture (stitch) is used.     What are the risks of a skin biopsy?  I will experience scar, bleeding, swelling, pain, crusting and redness. I may experience incomplete removal or recurrence. Risks of this procedure are excessive bleeding, bruising, infection, nerve damage, numbness, thick (hypertrophic or keloidal) scar and non-diagnostic biopsy.    How should I care for my wound for the first 24 hours?    Keep the wound dry and covered for 24 hours    If it bleeds, hold direct pressure on the area for 15 minutes. If bleeding does not stop then go to the emergency room    Avoid strenuous exercise the first 1-2 days or as your doctor instructs you    How should I care for the wound after 24 hours?    After 24 hours, remove the bandage    You may bathe or shower as normal    If you had a scalp biopsy, you can shampoo as usual and can use shower water to clean the biopsy site daily    Clean the wound twice a day with gentle soap and water    Do not scrub, be gentle    Apply white petroleum/Vaseline after cleaning the wound with a cotton swab or a clean finger, and keep the site covered with a Bandaid /bandage. Bandages are not necessary with a scalp biopsy    If you are unable to cover the site with a Bandaid /bandage, re-apply ointment 2-3 times a day to keep the site moist. Moisture will help with healing    Avoid strenuous activity for first 1-2 days    Avoid lakes, rivers, pools, and oceans until the stitches are removed or the site is healed    How do I clean my wound?    Wash hands thoroughly with soap or use hand  before all  wound care    Clean the wound with gentle soap and water    Apply white petroleum/Vaseline  to wound after it is clean    Replace the Bandaid /bandage to keep the wound covered for the first few days or as instructed by your doctor    If you had a scalp biopsy, warm shower water to the area on a daily basis should suffice    What should I use to clean my wound?     Cotton-tipped applicators (Qtips )    White petroleum jelly (Vaseline ). Use a clean new container and use Q-tips to apply.    Bandaids   as needed    Gentle soap     How should I care for my wound long term?    Do not get your wound dirty    Keep up with wound care for one week or until the area is healed.    A small scab will form and fall off by itself when the area is completely healed. The area will be red and will become pink in color as it heals. Sun protection is very important for how your scar will turn out. Sunscreen with an SPF 30 or greater is recommended once the area is healed.    If you have stitches, stitches need to be removed in 5-7 days. You may return to our clinic for this or you may have it done locally at your doctor s office.    You should have some soreness but it should be mild and slowly go away over several days. Talk to your doctor about using tylenol for pain,    When should I call my doctor?  If you have increased:     Pain or swelling    Pus or drainage (clear or slightly yellow drainage is ok)    Temperature over 100F    Spreading redness or warmth around wound    When will I hear about my results?  The biopsy results can take 2-3 weeks to come back. The clinic will call you with the results, send you a Eyeonix message, or have you schedule a follow-up clinic or phone time to discuss the results. Contact our clinics if you do not hear from us in 3 weeks.     Who should I call with questions?    [facility]: [phone]     [facility]: [phone]    For  urgent needs outside of business hours call the [facility] at [phone] and ask for the dermatology resident on call    Cryotherapy    What is it?    Use of a very cold liquid, such as liquid nitrogen, to freeze and destroy abnormal skin cells that need to be removed    What should I expect?    Tenderness and redness    A small blister that might grow and fill with dark purple blood. There may be crusting.    More than one treatment may be needed if the lesions do not go away.    How do I care for the treated area?    Gently wash the area with your hands when bathing.    Use a thin layer of Vaseline to help with healing. You may use a Band-Aid.     The area should heal within 7-10 days and may leave behind a pink or lighter color.     Do not use an antibiotic or Neosporin ointment.     You may take acetaminophen (Tylenol) for pain.     Call your Doctor if you have:    Severe pain    Signs of infection (warmth, redness, cloudy yellow drainage, and or a bad smell)    Questions or concerns    Who should I call with questions?       [facility]: [phone]       [facility]: [phone]       For urgent needs outside of business hours call the [facility] at [phone]        and ask for the dermatology resident on call

## 2018-06-28 NOTE — MR AVS SNAPSHOT
After Visit Summary   6/28/2018    Cydney Christiansen    MRN: [id number]           Patient Information     Date Of Birth          1935        Visit Information        Provider Department      6/28/2018 8:30 AM Kadi Sanchez MD [facility]        Today's Diagnoses     Encounter for routine adult health examination without abnormal findings    -  1    Benign essential hypertension        Hyperlipidemia LDL goal <160          Care Instructions      Preventive Health Recommendations  Female Ages 65 +    Yearly exam:     See your health care provider every year in order to  o Review health changes.   o Discuss preventive care.    o Review your medicines if your doctor has prescribed any.      You no longer need a yearly Pap test unless you've had an abnormal Pap test in the past 10 years. If you have vaginal symptoms, such as bleeding or discharge, be sure to talk with your provider about a Pap test.      Every 1 to 2 years, have a mammogram.  If you are over 69, talk with your health care provider about whether or not you want to continue having screening mammograms.      Every 10 years, have a colonoscopy. Or, have a yearly FIT test (stool test). These exams will check for colon cancer.       Have a cholesterol test every 5 years, or more often if your doctor advises it.       Have a diabetes test (fasting glucose) every three years. If you are at risk for diabetes, you should have this test more often.       At age 65, have a bone density scan (DEXA) to check for osteoporosis (brittle bone disease).    Shots:    Get a flu shot each year.    Get a tetanus shot every 10 years.    Talk to your doctor about your pneumonia vaccines. There are now two you should receive - Pneumovax (PPSV 23) and Prevnar (PCV 13).    Talk to your doctor about the shingles vaccine.    Talk to your doctor about the hepatitis B vaccine.    Nutrition:     Eat at least 5 servings of fruits and vegetables  each day.      Eat whole-grain bread, whole-wheat pasta and brown rice instead of white grains and rice.      Talk to your provider about Calcium and Vitamin D.     Lifestyle    Exercise at least 150 minutes a week (30 minutes a day, 5 days a week). This will help you control your weight and prevent disease.      Limit alcohol to one drink per day.      No smoking.       Wear sunscreen to prevent skin cancer.       See your dentist twice a year for an exam and cleaning.      See your eye doctor every 1 to 2 years to screen for conditions such as glaucoma, macular degeneration, cataracts, etc     [facility]    If you have any questions regarding to your visit please contact your care team:       Team Purple:   Clinic Hours Telephone Number   Dr. Kadi Rivera   7am-7pm  Monday - Thursday   7am-5pm  Fridays  [phone]  (Appointment scheduling available 24/7)    Questions about your recent visit?   Team Line:  [phone]   Urgent Care - [place] and [facility] - 11am-9pm Monday-Friday Saturday-Sunday- 9am-5pm   [place] - 5pm-9pm Monday-Friday Saturday-Sunday- 9am-5pm  [phone] - [place]  [phone] [facility]       What options do I have for a visit other than an office visit? We offer electronic visits (e-visits) and telephone visits, when medically appropriate.  Please check with your medical insurance to see if these types of visits are covered, as you will be responsible for any charges that are not paid by your insurance.      You can use [employer] (secure electronic communication) to access to your chart, send your primary care provider a message, or make an appointment. Ask a team member how to get started.     For a price quote for your services, please call our Consumer Price Line at [phone] or our Imaging Cost estimation line at [phone] (for imaging tests).              Follow-ups after your visit       "  Your next 10 appointments already scheduled     Jun 28, 2018 11:15 AM CDT   [facility]    [address] [place] [id number] [phone]            Sep 10, 2018 10:00 AM CDT   MA SCREENING BILATERAL W/ JAMESON with MGMA1, MG MA TECH   [facility]    [address] [place] [id number] [phone]           Three-dimensional (3D) mammograms are available at [facility] [place] locations in [place], [place], [place], [place], [facility], [place], [place], and [place]. [facility] locations include [place] and [facility] in [place]. Benefits of 3D mammograms include: - Improved rate of cancer detection - Decreases your chance of having to go back for more tests, which means fewer: - \"False-positive\" results (This means that there is an abnormal area but it isn't cancer.) - Invasive testing procedures, such as a biopsy or surgery - Can provide clearer images of the breast if you have dense breast tissue. 3D mammography is an optional exam that anyone can have with a 2D mammogram. It doesn't replace or take the place of a 2D mammogram. 2D mammograms remain an effective screening test for all women.  Not all insurance companies cover the cost of a 3D mammogram. Check with your insurance.            Sep 10, 2018 10:30 AM CDT   LAB with LAB ONC [facility] ([facility])    [address] [place] [id number] [phone]           Please do not eat 10-12 hours before your appointment if you are coming in fasting for labs on lipids, cholesterol, or glucose (sugar). This does not apply to pregnant women. Water, hot tea and black coffee (with nothing added) are okay. Do not drink other fluids, diet soda or chew gum.            Sep 11, 2018 10:15 AM CDT   Return Visit with Kvng Garcia MD "   [facility]    [address] [facility] [place] [address]   [phone]            Sep 13, 2018 12:30 PM CDT   Return Visit with Usha Salgado MD   [facility]    [address] [place] [address]   [phone]            Jan 08, 2019  9:30 AM CST   Return Visit with Ruben Hammond MD, [facility] CYSTO PROC ROOM   [facility] ([facility])    [address] [place] [address]   [phone]              Who to contact     If you have questions or need follow up information about today's clinic visit or your schedule please contact [facility] directly at [phone].  Normal or non-critical lab and imaging results will be communicated to you by MyChart, letter or phone within 4 business days after the clinic has received the results. If you do not hear from us within 7 days, please contact the clinic through [employer] or phone. If you have a critical or abnormal lab result, we will notify you by phone as soon as possible.  Submit refill requests through iLyngo or call your pharmacy and they will forward the refill request to us. Please allow 3 business days for your refill to be completed.          Additional Information About Your Visit        MyChart Information     iLyngo gives you secure access to your electronic health record. If you see a primary care provider, you can also send messages to your care team and make appointments. If you have questions, please call your primary care clinic.  If you do not have a primary care provider, please call [phone] and they will assist you.        Care EveryWhere ID     This is your Care EveryWhere ID. This could be used by other organizations to access your [place] medical records  [id number]        Your Vitals Were     Pulse Temperature Respirations Height Pulse Oximetry BMI (Body Mass Index)    60 97.6  " F (36.4  C) (Oral) 14 5' 0.43\" (1.535 m) 97% 27.43 kg/m2       Blood Pressure from Last 3 Encounters:   06/28/18 124/60   03/08/18 122/76   01/09/18 116/70    Weight from Last 3 Encounters:   06/28/18 142 lb 8 oz (64.6 kg)   03/08/18 143 lb (64.9 kg)   12/04/17 145 lb (65.8 kg)              We Performed the Following     Basic metabolic panel     Lipid panel reflex to direct LDL Fasting     PAF COMPLETED          Where to get your medicines      These [id number] - [address]  [address] [place] [address]     Phone:  [phone]     losartan 25 MG tablet    simvastatin 20 MG tablet          Primary Care Provider Office Phone # Fax #    Kadi Sanchez [phone] [address] [facility] [address]        Equal Access to Services     [facility] AH: Hadii aad ku hadasho Soomaali, waaxda luqadaha, qaybta kaalmada adeegyada, waxay idiin hayamadorn day perduearasemaj solomon . So [facility] [phone].    ATENCIÓN: Si habla español, tiene a chavez disposición servicios gratuitos de asistencia lingüística. [facility] [phone].    We comply with applicable federal civil rights laws and [place] laws. We do not discriminate on the basis of race, color, national origin, age, disability, sex, sexual orientation, or gender identity.            Thank you!     Thank you for choosing [facility]  for your care. Our goal is always to provide you with excellent care. Hearing back from our patients is one way we can continue to improve our services. Please take a few minutes to complete the written survey that you may receive in the mail after your visit with us. Thank you!             Your Updated Medication List - Protect others around you: Learn how to safely use, store and throw away your medicines at www.disposemymeds.org.          This list is accurate as of 6/28/18  9:02 AM.  Always use your most recent med list.             "       Brand Name Dispense Instructions for use Diagnosis    alendronate 70 MG tablet    FOSAMAX    12 tablet    TAKE 1 TAB BY MOUTH EVERY 7 DAYS WITH 8 OZ WATER 60 MIN BEFORE FOOD. SIT UPRIGHT FOR 30 MINUTES    Osteoporosis, unspecified osteoporosis type, unspecified pathological fracture presence       B-12 500 MCG Tabs     150 tablet    Take 500 mcg by mouth daily    B12 deficiency       calcium 600 MG tablet      1 daily        dorzolamide-timolol 2-0.5 % ophthalmic solution    COSOPT    10 mL    Place 1 drop into both eyes 2 times daily    PXF (pseudoexfoliation of lens capsule)       ibuprofen 200 MG tablet    ADVIL/MOTRIN     Take 200 mg by mouth every 4 hours as needed.        IRON SUPPLEMENT PO      Take 325 mg by mouth daily        latanoprost 0.005 % ophthalmic solution    XALATAN    1 Bottle    Place 1 drop into both eyes At Bedtime    PXF (pseudoexfoliation of lens capsule)       losartan 25 MG tablet    COZAAR    90 tablet    Take 1 tablet (25 mg) by mouth daily    Benign essential hypertension       meclizine 25 MG tablet    ANTIVERT    30 tablet    Take 1 tablet (25 mg) by mouth every 6 hours as needed for dizziness    Vertigo       simvastatin 20 MG tablet    ZOCOR    90 tablet    Take 1 tablet (20 mg) by mouth At Bedtime    Hyperlipidemia LDL goal <160       tamoxifen 20 MG tablet    NOLVADEX    90 tablet    Take 1 tablet (20 mg) by mouth daily    Malignant neoplasm of overlapping sites of left breast in female, estrogen receptor positive (H), 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    Granuloma annulare       VITAMIN D-1000 MAX ST 1000 units Tabs   Generic drug:  cholecalciferol      1 daily

## 2018-06-28 NOTE — NURSING NOTE
Dermatology Rooming Note    Cydney Christiansen's goals for this visit include:   Chief Complaint   Patient presents with     Skin Check     skin check, lesion on right cheek, hx of multiple NMSC       Is a scribe okay for this visit:YES    Are records needed for this visit(If yes, obtain release of information): YES     Vitals: There were no vitals taken for this visit.    Referring Provider:  ESTABLISHED PATIENT  No address on file      Sapna Deleon LPN

## 2018-06-28 NOTE — LETTER
[facility]  [address] [place] [address]    June 28, 2018    Cydney Christiansen  [address]  [place] [address]          Dear Cydney,  All of your blood tests are normal. Continue your healthy lifestyle.   Enclosed is a copy of your results.     Results for orders placed or performed in visit on 06/28/18   Lipid panel reflex to direct LDL Fasting   Result Value Ref Range    Cholesterol 122 <200 mg/dL    Triglycerides 67 <150 mg/dL    HDL Cholesterol 67 >49 mg/dL    LDL Cholesterol Calculated 42 <100 mg/dL    Non HDL Cholesterol 55 <130 mg/dL   Basic metabolic panel   Result Value Ref Range    Sodium 139 133 - 144 mmol/L    Potassium 4.4 3.4 - 5.3 mmol/L    Chloride 105 94 - 109 mmol/L    Carbon Dioxide 28 20 - 32 mmol/L    Anion Gap 6 3 - 14 mmol/L    Glucose 95 70 - 99 mg/dL    Urea Nitrogen 18 7 - 30 mg/dL    Creatinine 0.78 0.52 - 1.04 mg/dL    GFR Estimate 71 >60 mL/min/1.7m2    GFR Estimate If Black 86 >60 mL/min/1.7m2    Calcium 8.5 8.5 - 10.1 mg/dL     If you have any questions or concerns, please call myself or my nurse at [phone].  Sincerely,  Kadi Sanchez MD/pb

## 2018-07-05 ENCOUNTER — ALLIED HEALTH/NURSE VISIT (OUTPATIENT)
Dept: NURSING | Facility: CLINIC | Age: 83
End: 2018-07-05
Payer: COMMERCIAL

## 2018-07-05 DIAGNOSIS — Z48.02 ENCOUNTER FOR REMOVAL OF SUTURES: Primary | ICD-10-CM

## 2018-07-05 LAB — COPATH REPORT: NORMAL

## 2018-07-05 PROCEDURE — 99207 ZZC NO CHARGE NURSE ONLY: CPT

## 2018-07-05 NOTE — MR AVS SNAPSHOT
"              After Visit Summary   7/5/2018    Cydnye Christiansen    MRN: [id number]           Patient Information     Date Of Birth          1935        Visit Information        Provider Department      7/5/2018 10:45 AM NURSE ONLY MG DERM [facility]        Today's Diagnoses     Encounter for removal of sutures    -  1       Follow-ups after your visit        Your next 10 appointments already scheduled     Sep 10, 2018 10:00 AM CDT   MA SCREENING BILATERAL W/ JAMESON with MGMA1, MG MA TECH   [facility] ([facility])    [address] [place] [id number] [phone]           Three-dimensional (3D) mammograms are available at [facility] locations in [facility], [facility], [facility], [place], [place], and [place]. [facility] locations include [place] and [facility] in [place]. Benefits of 3D mammograms include: - Improved rate of cancer detection - Decreases your chance of having to go back for more tests, which means fewer: - \"False-positive\" results (This means that there is an abnormal area but it isn't cancer.) - Invasive testing procedures, such as a biopsy or surgery - Can provide clearer images of the breast if you have dense breast tissue. 3D mammography is an optional exam that anyone can have with a 2D mammogram. It doesn't replace or take the place of a 2D mammogram. 2D mammograms remain an effective screening test for all women.  Not all insurance companies cover the cost of a 3D mammogram. Check with your insurance.            Sep 10, 2018 10:30 AM CDT   LAB with LAB ONC [facility] ([facility])    [address] [place] [id number] [phone]           Please do not eat 10-12 hours before your appointment if you are coming in fasting for labs on lipids, cholesterol, or glucose (sugar). This does not apply to pregnant women. Water, hot " tea and black coffee (with nothing added) are okay. Do not drink other fluids, diet soda or chew gum.            Sep 11, 2018 10:15 AM CDT   Return Visit with Kvng Garcia MD   [facility]    [address] [facility] [address] [phone]            Sep 13, 2018 12:30 PM CDT   Return Visit with Usha Salgado MD   [facility])    [address] [place] [phone]            Jan 03, 2019  1:45 PM CST   Return Visit with Qi Barba MD   [facility])    [address] [place] [phone] [phone]            Jan 08, 2019  9:30 AM CST   Return Visit with Ruben Hammond MD, [facility] ROOM   [facility] ([facility]    [address] [facility] [address]   [phone]              Who to contact     If you have questions or need follow up information about today's clinic visit or your schedule please contact [facility] directly at [phone].  Normal or non-critical lab and imaging results will be communicated to you by Theranoshart, letter or phone within 4 business days after the clinic has received the results. If you do not hear from us within 7 days, please contact the clinic through MyChart or phone. If you have a critical or abnormal lab result, we will notify you by phone as soon as possible.  Submit refill requests through [employer] or call your pharmacy and they will forward the refill request to us. Please allow 3 business days for your refill to be completed.          Additional Information About Your Visit        Theranoshart Information     [employer] gives you secure access to your electronic health record. If you see a primary care provider, you can also send messages to your care team and make appointments. If you have questions, please call your  primary care clinic.  If you do not have a primary care provider, please call [phone] and they will assist you.      [employer]visor is an electronic gateway that provides easy, online access to your medical records. With [employer], you can request a clinic appointment, read your test results, renew a prescription or communicate with your care team.     To access your existing account, please contact your [facility] or call [phone] for assistance.        Care EveryWhere ID     This is your Care EveryWhere ID. This could be used by other organizations to access your [place] medical records  [id number]         Blood Pressure from Last 3 Encounters:   06/28/18 124/60   03/08/18 122/76   01/09/18 116/70    Weight from Last 3 Encounters:   06/28/18 64.6 kg (142 lb 8 oz)   03/08/18 64.9 kg (143 lb)   12/04/17 65.8 kg (145 lb)              Today, you had the following     No orders found for display       Primary Care Provider Office Phone # Fax #    Kadi Sanchez [phone] [address] [facility] [address]        Equal Access to Services     ESTELA CORCORAN : Hadii aad bernard hadasho Soyoannaali, waaxda luqadaha, qaybta kaalmada adeegyada, morgan puga. So [facility] [phone].    ATENCIÓN: Si habla español, tiene a chavez disposición servicios gratuitos de asistencia lingüística. [facility] [phone].    We comply with applicable federal civil rights laws and [place] laws. We do not discriminate on the basis of race, color, national origin, age, disability, sex, sexual orientation, or gender identity.            Thank you!     Thank you for choosing [facility]  for your care. Our goal is always to provide you with excellent care. Hearing back from our patients is one way we can continue to improve our services. Please take a few minutes to complete the written survey that you may receive in the mail after your  visit with us. Thank you!             Your Updated Medication List - Protect others around you: Learn how to safely use, store and throw away your medicines at www.disposemymeds.org.          This list is accurate as of 7/5/18 11:06 AM.  Always use your most recent med list.                   Brand Name Dispense Instructions for use Diagnosis    alendronate 70 MG tablet    FOSAMAX    12 tablet    TAKE 1 TAB BY MOUTH EVERY 7 DAYS WITH 8 OZ WATER 60 MIN BEFORE FOOD. SIT UPRIGHT FOR 30 MINUTES    Osteoporosis, unspecified osteoporosis type, unspecified pathological fracture presence       B-12 500 MCG Tabs     150 tablet    Take 500 mcg by mouth daily    B12 deficiency       calcium 600 MG tablet      1 daily        dorzolamide-timolol 2-0.5 % ophthalmic solution    COSOPT    10 mL    Place 1 drop into both eyes 2 times daily    PXF (pseudoexfoliation of lens capsule)       ibuprofen 200 MG tablet    ADVIL/MOTRIN     Take 200 mg by mouth every 4 hours as needed.        IRON SUPPLEMENT PO      Take 325 mg by mouth daily        latanoprost 0.005 % ophthalmic solution    XALATAN    1 Bottle    Place 1 drop into both eyes At Bedtime    PXF (pseudoexfoliation of lens capsule)       losartan 25 MG tablet    COZAAR    90 tablet    Take 1 tablet (25 mg) by mouth daily    Benign essential hypertension       meclizine 25 MG tablet    ANTIVERT    30 tablet    Take 1 tablet (25 mg) by mouth every 6 hours as needed for dizziness    Vertigo       simvastatin 20 MG tablet    ZOCOR    90 tablet    Take 1 tablet (20 mg) by mouth At Bedtime    Hyperlipidemia LDL goal <160       tamoxifen 20 MG tablet    NOLVADEX    90 tablet    Take 1 tablet (20 mg) by mouth daily    Malignant neoplasm of overlapping sites of left breast in female, estrogen receptor positive (H), 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    Granuloma annulare        VITAMIN D-1000 MAX ST 1000 units Tabs   Generic drug:  cholecalciferol      1 daily

## 2018-07-05 NOTE — NURSING NOTE
Cydney Christiansen comes into clinic today at the request of Dr. Barba Ordering Provider for suture removal.    Dermatology Suture Removal Record  S: Cydney presents to the clinic today for  removal of sutures.  The patient has had the sutures in place for 7 days.    There has been no history of infection or drainage.    O: 2 sutures are seen located on the right cheek.  The wound is healing well with no signs of infection.    A: Suture removal.    P:  All sutures were easily removed today.  Routine wound care discussed.  The patient will follow up as scheduled.    This service provided today was under the supervising provider of the day Dr. Barba, who was available if needed.    Quin Webber RN

## 2018-08-08 DIAGNOSIS — I10 BENIGN ESSENTIAL HYPERTENSION: ICD-10-CM

## 2018-08-08 DIAGNOSIS — E78.5 HYPERLIPIDEMIA LDL GOAL <160: ICD-10-CM

## 2018-08-08 RX ORDER — SIMVASTATIN 20 MG
TABLET ORAL
Qty: 90 TABLET | Refills: 3 | OUTPATIENT
Start: 2018-08-08

## 2018-08-08 RX ORDER — LOSARTAN POTASSIUM 25 MG/1
TABLET ORAL
Qty: 90 TABLET | Refills: 3 | OUTPATIENT
Start: 2018-08-08

## 2018-08-08 NOTE — TELEPHONE ENCOUNTER
Spoke to pharmacy and confirmed duplicate request.  Nika MAK CMA ([facility])

## 2018-08-08 NOTE — TELEPHONE ENCOUNTER
Refused Prescriptions:                       Disp   Refills    simvastatin (ZOCOR) 20 MG tablet [Pharmacy*90 tab*3        Sig: TAKE 1 TABLET (20 MG) BY MOUTH AT BEDTIME  Refused By: ANA M QUINONES  Reason for Refusal: Duplicate    losartan (COZAAR) 25 MG tablet [Pharmacy M*90 tab*3        Sig: TAKE 1 TABLET (25 MG) BY MOUTH DAILY  Refused By: ANA M QUINONES  Reason for Refusal: Duplicate    Refused duplicate request. Closing encounter.    Ana M Quinones, RN

## 2018-08-09 ENCOUNTER — OFFICE VISIT (OUTPATIENT)
Dept: DERMATOLOGY | Facility: CLINIC | Age: 83
End: 2018-08-09
Payer: COMMERCIAL

## 2018-08-09 VITALS — DIASTOLIC BLOOD PRESSURE: 79 MMHG | HEART RATE: 72 BPM | SYSTOLIC BLOOD PRESSURE: 126 MMHG | OXYGEN SATURATION: 97 %

## 2018-08-09 DIAGNOSIS — D09.9 SQUAMOUS CELL CARCINOMA IN SITU: Primary | ICD-10-CM

## 2018-08-09 PROCEDURE — 88305 TISSUE EXAM BY PATHOLOGIST: CPT | Mod: TC | Performed by: DERMATOLOGY

## 2018-08-09 PROCEDURE — 12032 INTMD RPR S/A/T/EXT 2.6-7.5: CPT | Mod: GC | Performed by: DERMATOLOGY

## 2018-08-09 PROCEDURE — 11602 EXC TR-EXT MAL+MARG 1.1-2 CM: CPT | Mod: GC | Performed by: DERMATOLOGY

## 2018-08-09 ASSESSMENT — PAIN SCALES - GENERAL: PAINLEVEL: NO PAIN (0)

## 2018-08-09 NOTE — MR AVS SNAPSHOT
After Visit Summary   8/9/2018    Cydney Christiansen    MRN: [id number]           Patient Information     Date Of Birth          1935        Visit Information        Provider Department      8/9/2018 3:00 PM Ian Haro MD [facility]        Today's Diagnoses     Squamous cell carcinoma in situ    -  1      Care Instructions    Excision Wound Care Instructions with Steri strips      I will experience scar, altered skin color, bleeding, swelling, pain, crusting and redness. I may experience altered sensation. Risks are excessive bleeding, infection, muscle weakness, thick (hypertrophic or keloidal) scar, and recurrence. A second procedure may be recommended to obtain the best cosmetic or functional result.    Possible complications of any surgical procedure are bleeding, infection, scarring, alteration in skin color and sensation, muscle weakness in the area, wound dehiscence or seperation, or recurrence of the lesion or disease. On occasion, after healing, a secondary procedure or revision may be recommended in order to obtain the best cosmetic or functional result.       After your surgery, a pressure bandage will be placed over the area that has sutures. This will help prevent bleeding. Please, follow these instructions as they will help you to prevent complications as your wound heals.        For the First 48 hours After Surgery:    1. Leave the pressure bandage on and keep it dry. If it should come loose, you may retape it, but do not take it off.    2. Relax and take it easy. Do not do any vigorous exercise, heavy lifting, or bending forward. This could cause the wound to bleed.    3. Post-operative pain is usually mild. You may take plain or extra strength Tylenol every 4 hours as needed (do not take more than 4,000mg in one day) if you have no liver problems and your doctor says it is okay. Do not take any medicine that contains aspirin, ibuprofen or motrin unless you  have been recommended these by a doctor.  Avoid alcohol and vitamin E as these may increase your tendency to bleed.    4. You may put an ice pack around the bandaged area for 20 minutes every 2-3 hours. This may help reduce swelling, bruising, and pain. Make sure the ice pack is waterproof so that the pressure bandage does not get wet.     5. You may see a small amount of drainage or blood on your pressure bandage. This is normal. However, if drainage or bleeding continues or saturates the bandage, you will need to apply firm pressure over the bandage with a washcloth for 15 minutes. If bleeding continues after applying pressure for 15 minutes then go to the nearest emergency room.        48 Hours After Surgery:    Carefully remove the outer pressure bandage but leave the steri strips in place for these will continue to give the wound edges extra support as they are healing. You may trim the edges of the steri strip that have curled up but do not remove steri strips until they fall off. You may also now shower but do not saturate the wound or steri strips for this will cause your steri strips to fall off. You should cover the steri strips and wound with a wash cloth while showering to protect the area from water. It is ok if the steri strips become a bit damp but do not saturate.     Daily Wound Care:    1. Once the steri strips fall off wash wound with a mild soap and water.  Use caution when washing the wound, be gentle and do not let the forceful shower stream hit the wound directly. DO NOT WASH WITH HYDROGEN PEROXIDE FOR THIS MIGHT CAUSE THE SUTURES TO DISSOLVE FASTER THAN WHAT WE WANT.     2. PAT DRY.    3. Once steri strips fall off apply Vaseline (from a new container or tube) over the suture line with a Q-tip until it is completely healed. It is very important to keep the wound continuously moist, as wounds heal best in a moist environment.    4. Keep the site covered until site is healed, you can cover it  "with a Telfa (non-stick) dressing and tape or a band-aid. While your steri strips are on you can use them as your bandage.    5. Once steri strips fall off if you are unable to keep wound covered, you must apply Vaseline every 2 - 3 hours (while awake) to ensure it is being kept moist for optimal healing until completely healed. A dressing overnight is recommended to keep the area moist.        Call Us If:    1. You have pain that is not controlled with Tylenol.    2. You have signs or symptoms of an infection, such as: fever over 100 degrees F, redness, warmth, or foul-smelling or yellow/creamy drainage from the wound.        Who should I call with questions?  [facility]: [phone]   [facility]: [phone]  For urgent needs outside of business hours call the [facility] at [phone] and ask for the dermatology resident on call              Follow-ups after your visit        Follow-up notes from your care team     Return in about 6 months (around 2/9/2019) for skin check hx NMSC.      Your next 10 appointments already scheduled     Sep 10, 2018 10:00 AM CDT   MA SCREENING BILATERAL W/ JAMESON with MGMA1, MG MA TECH   [facility] ([facility])    [address] [place] [id number] [phone]           Three-dimensional (3D) mammograms are available at [place] locations in [facility], [facility], [facility], [place], [place], and [place]. [facility] locations include [place] [facility] in [place]. Benefits of 3D mammograms include: - Improved rate of cancer detection - Decreases your chance of having to go back for more tests, which means fewer: - \"False-positive\" results (This means that there is an abnormal area but it isn't cancer.) - Invasive testing procedures, such as a biopsy or surgery - Can provide clearer images of the breast if you have " dense breast tissue. 3D mammography is an optional exam that anyone can have with a 2D mammogram. It doesn't replace or take the place of a 2D mammogram. 2D mammograms remain an effective screening test for all women.  Not all insurance companies cover the cost of a 3D mammogram. Check with your insurance.            Sep 10, 2018 10:30 AM CDT   LAB with LAB ONC [facility] ([facility])    [address] [place] [id number] [phone]           Please do not eat 10-12 hours before your appointment if you are coming in fasting for labs on lipids, cholesterol, or glucose (sugar). This does not apply to pregnant women. Water, hot tea and black coffee (with nothing added) are okay. Do not drink other fluids, diet soda or chew gum.            Sep 11, 2018 10:15 AM CDT   Return Visit with Kvng Garcia MD   [facility] ([facility])    [address]  [place] [address] [phone]            Sep 13, 2018 12:30 PM CDT   Return Visit with Usha Salgado MD   [facility] ([facility])    [address] [place] [id number] [phone]            Jan 03, 2019  1:45 PM CST   Return Visit with Qi Barba MD   [facility] ([facility])    [address] [place] [address] [phone]            Jan 08, 2019  9:30 AM CST   Return Visit with Ruben Hammond MD, CLAUDIA CYSTO PROC ROOM   [facility] ([facility])    [address] [facility] [id number] [phone]              Who to contact     If you have questions or need follow up information about today's clinic visit or your schedule please contact [facility] directly at [phone].  Normal or non-critical lab and imaging results will be communicated to you by MyChart, letter or phone within 4 business days after  the clinic has received the results. If you do not hear from us within 7 days, please contact the clinic through [employer] or phone. If you have a critical or abnormal lab result, we will notify you by phone as soon as possible.  Submit refill requests through [employer] or call your pharmacy and they will forward the refill request to us. Please allow 3 business days for your refill to be completed.          Additional Information About Your Visit        [employer] Information     [employer] gives you secure access to your electronic health record. If you see a primary care provider, you can also send messages to your care team and make appointments. If you have questions, please call your primary care clinic.  If you do not have a primary care provider, please call [phone] and they will assist you.      [employer] is an electronic gateway that provides easy, online access to your medical records. With [employer], you can request a clinic appointment, read your test results, renew a prescription or communicate with your care team.     To access your existing account, please contact your [facility] or call [phone] for assistance.        Care EveryWhere ID     This is your Care EveryWhere ID. This could be used by other organizations to access your [place] medical records  [id number]        Your Vitals Were     Pulse Pulse Oximetry                72 97%           Blood Pressure from Last 3 Encounters:   08/09/18 126/79   06/28/18 124/60   03/08/18 122/76    Weight from Last 3 Encounters:   06/28/18 64.6 kg (142 lb 8 oz)   03/08/18 64.9 kg (143 lb)   12/04/17 65.8 kg (145 lb)              Today, you had the following     No orders found for display       Primary Care Provider Office Phone # Fax #    Kadi Sanchez [phone]       [address] [address]        Equal Access to Services     BARBARA CORCORAN : jay Chavez  adonisbiju hackett waxay idiin hayaan adeeg kharash la'aan ah. So [facility] [phone].    ATENCIÓN: Si oly goode, tiene a chavez disposición servicios gratuitos de asistencia lingüística. Kvng al [phone].    We comply with applicable federal civil rights laws and [place] laws. We do not discriminate on the basis of race, color, national origin, age, disability, sex, sexual orientation, or gender identity.            Thank you!     Thank you for choosing [facility]  for your care. Our goal is always to provide you with excellent care. Hearing back from our patients is one way we can continue to improve our services. Please take a few minutes to complete the written survey that you may receive in the mail after your visit with us. Thank you!             Your Updated Medication List - Protect others around you: Learn how to safely use, store and throw away your medicines at www.disposemymeds.org.          This list is accurate as of 8/9/18  3:55 PM.  Always use your most recent med list.                   Brand Name Dispense Instructions for use Diagnosis    alendronate 70 MG tablet    FOSAMAX    12 tablet    TAKE 1 TAB BY MOUTH EVERY 7 DAYS WITH 8 OZ WATER 60 MIN BEFORE FOOD. SIT UPRIGHT FOR 30 MINUTES    Osteoporosis, unspecified osteoporosis type, unspecified pathological fracture presence       calcium 600 MG tablet      1 daily        cyanocobalamin 500 MCG tablet    Vitamin B-12    150 tablet    Take 500 mcg by mouth daily    B12 deficiency       dorzolamide-timolol 2-0.5 % ophthalmic solution    COSOPT    10 mL    Place 1 drop into both eyes 2 times daily    PXF (pseudoexfoliation of lens capsule)       ibuprofen 200 MG tablet    ADVIL/MOTRIN     Take 200 mg by mouth every 4 hours as needed.        IRON SUPPLEMENT PO      Take 325 mg by mouth daily        latanoprost 0.005 % ophthalmic solution    XALATAN    1 Bottle    Place 1 drop into both eyes At Bedtime    PXF  (pseudoexfoliation of lens capsule)       losartan 25 MG tablet    COZAAR    90 tablet    Take 1 tablet (25 mg) by mouth daily    Benign essential hypertension       meclizine 25 MG tablet    ANTIVERT    30 tablet    Take 1 tablet (25 mg) by mouth every 6 hours as needed for dizziness    Vertigo       simvastatin 20 MG tablet    ZOCOR    90 tablet    Take 1 tablet (20 mg) by mouth At Bedtime    Hyperlipidemia LDL goal <160       tamoxifen 20 MG tablet    NOLVADEX    90 tablet    Take 1 tablet (20 mg) by mouth daily    Malignant neoplasm of overlapping sites of left breast in female, estrogen receptor positive (H), 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    Granuloma annulare       VITAMIN D-1000 MAX ST 1000 units Tabs   Generic drug:  cholecalciferol      1 daily

## 2018-08-09 NOTE — NURSING NOTE
Steri strips and pressure dressing applied to excision site on right upper arm.  Wound care instructions reviewed with patient and AVS provided.  Patient verbalized understanding. No further questions or concerns at this time.      April Love LPN

## 2018-08-09 NOTE — PATIENT INSTRUCTIONS
Excision Wound Care Instructions with Steri strips      I will experience scar, altered skin color, bleeding, swelling, pain, crusting and redness. I may experience altered sensation. Risks are excessive bleeding, infection, muscle weakness, thick (hypertrophic or keloidal) scar, and recurrence. A second procedure may be recommended to obtain the best cosmetic or functional result.    Possible complications of any surgical procedure are bleeding, infection, scarring, alteration in skin color and sensation, muscle weakness in the area, wound dehiscence or seperation, or recurrence of the lesion or disease. On occasion, after healing, a secondary procedure or revision may be recommended in order to obtain the best cosmetic or functional result.       After your surgery, a pressure bandage will be placed over the area that has sutures. This will help prevent bleeding. Please, follow these instructions as they will help you to prevent complications as your wound heals.        For the First 48 hours After Surgery:    1. Leave the pressure bandage on and keep it dry. If it should come loose, you may retape it, but do not take it off.    2. Relax and take it easy. Do not do any vigorous exercise, heavy lifting, or bending forward. This could cause the wound to bleed.    3. Post-operative pain is usually mild. You may take plain or extra strength Tylenol every 4 hours as needed (do not take more than 4,000mg in one day) if you have no liver problems and your doctor says it is okay. Do not take any medicine that contains aspirin, ibuprofen or motrin unless you have been recommended these by a doctor.  Avoid alcohol and vitamin E as these may increase your tendency to bleed.    4. You may put an ice pack around the bandaged area for 20 minutes every 2-3 hours. This may help reduce swelling, bruising, and pain. Make sure the ice pack is waterproof so that the pressure bandage does not get wet.     5. You may see a small amount  of drainage or blood on your pressure bandage. This is normal. However, if drainage or bleeding continues or saturates the bandage, you will need to apply firm pressure over the bandage with a washcloth for 15 minutes. If bleeding continues after applying pressure for 15 minutes then go to the nearest emergency room.        48 Hours After Surgery:    Carefully remove the outer pressure bandage but leave the steri strips in place for these will continue to give the wound edges extra support as they are healing. You may trim the edges of the steri strip that have curled up but do not remove steri strips until they fall off. You may also now shower but do not saturate the wound or steri strips for this will cause your steri strips to fall off. You should cover the steri strips and wound with a wash cloth while showering to protect the area from water. It is ok if the steri strips become a bit damp but do not saturate.     Daily Wound Care:    1. Once the steri strips fall off wash wound with a mild soap and water.  Use caution when washing the wound, be gentle and do not let the forceful shower stream hit the wound directly. DO NOT WASH WITH HYDROGEN PEROXIDE FOR THIS MIGHT CAUSE THE SUTURES TO DISSOLVE FASTER THAN WHAT WE WANT.     2. PAT DRY.    3. Once steri strips fall off apply Vaseline (from a new container or tube) over the suture line with a Q-tip until it is completely healed. It is very important to keep the wound continuously moist, as wounds heal best in a moist environment.    4. Keep the site covered until site is healed, you can cover it with a Telfa (non-stick) dressing and tape or a band-aid. While your steri strips are on you can use them as your bandage.    5. Once steri strips fall off if you are unable to keep wound covered, you must apply Vaseline every 2 - 3 hours (while awake) to ensure it is being kept moist for optimal healing until completely healed. A dressing overnight is recommended to  keep the area moist.        Call Us If:    1. You have pain that is not controlled with Tylenol.    2. You have signs or symptoms of an infection, such as: fever over 100 degrees F, redness, warmth, or foul-smelling or yellow/creamy drainage from the wound.        Who should I call with questions?  [facility]: [phone]   [facility]: [phone]  For urgent needs outside of business hours call the [facility] at [phone] and ask for the dermatology resident on call

## 2018-08-09 NOTE — NURSING NOTE
Cydney Christiansen's goals for this visit include: excision right upper arm SCCIS and growth on central chest  She requests these members of her care team be copied on today's visit information:     PCP: Kadi Sanchez    Referring Provider:  Qi Barba MD  [address]  [place] [address]    /79 (BP Location: Right arm, Patient Position: Sitting, Cuff Size: Adult Regular)  Pulse 72  SpO2 97%    Do you need any medication refills at today's visit? No    Excision Intake                                                    /79 (BP Location: Right arm, Patient Position: Sitting, Cuff Size: Adult Regular)  Pulse 72  SpO2 97%    Do you take the following medications:  Coumadin, Eliquis, Pradaxa, Xarelto:   NO  -If on Coumadin, INR should be checked within 7 days of surgery.  Range is 3.5 or less or within therapeutic range.  Antibiotic Prophylaxis:  NO    Do you have an iodine allergy:  NO    Past Medical History                                                    Do you currently or have you previously had any of the following conditions:       HIV/AIDS:  NO    Hepatitis:  NO    Suppressed Immune System:  NO    Autoimmune disorder (eg RA, Lupus):  NO    Fever blisters/herpes, cold sores:  NO    Kidney disease:  NO  Creatinine   Date Value Ref Range Status   06/28/2018 0.78 0.52 - 1.04 mg/dL Final   ]    Pacemaker or Defibrillator:  NO.      History of artificial or heart valve replacement:  NO.      Endocarditis: NO    Organ transplant: NO.      Joint replacement within past 2 years: NO    Previous prosthetic joint infection: NO    Diabetes: NO    Pregnant:: NO    Tobacco use:  NO.    History   Smoking Status     Former Smoker     Packs/day: 0.50     Years: 20.00     Types: Cigarettes     Quit date: 2/1/1976   Smokeless Tobacco     Never Used     Reviewed by:  April Love LPN

## 2018-08-09 NOTE — PROGRESS NOTES
DERMATOLOGY EXCISION PROCEDURE NOTE    Dermatology Problem List:  1. 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 HAK, left mid eyebrow, base not seen, 6/2014  4. Actinic keratosis  -s/p cryotherapy  5. GA R angle of jaw: resolves with triam cream    NAME OF PROCEDURE: Excision intermediate layered linear closure  Staff surgeon: Ian Haro DO  Resident: Lizabeth Moyer MD   Scrub Nurse: April Love LPN     PRE-OPERATIVE DIAGNOSIS:  Squamous cell carcinoma in situ   POST-OPERATIVE DIAGNOSIS: same   FINAL EXCISION SIZE(DEFECT SIZE): 1.3 x 1.4 cm, with 4 mm margin   FINAL REPAIR LENGTH: 4.4 cm     INDICATIONS: This patient presented with a 0.5 x 0.6 cm squamous cell carcinoma in situ of the right upper arm. Excision was indicated. We discussed the principles of treatment and most likely complications including scarring, bleeding, infection, incomplete excision, wound dehiscence, pain, nerve damage, and recurrence. Informed consent was obtained and the patient underwent the procedure as follows:    PROCEDURE: The patient was taken to the operative suite. Time-out was performed.  The treatment area was anesthetized with 1% lidocaine and epinephrine (1:100,000). The area was prepped with Chlorhexidine and rinsed with sterile saline and draped with sterile towels. The lesion was delineated and excised down to subcutaneous fat in a elliptical manner. Hemostasis was obtained by electrocoagulation.     REPAIR: An intermediate layered  linear closure was selected as the procedure which would maximally preserve both function and cosmesis.    After the excision of the tumor, the area was carefully undermined. Hemostasis was obtained with electrocoagulation.  Closure was oriented so that the wound was in the patient's natural skin tension lines. The subcutaneous and dermal layers were then closed with 4-0 Vicryl sutures. The epidermis was then carefully approximated along the length of the wound using 4-0 Vicryl running subcuticular sutures.     The final wound length was 4.4 cm. A total of 9.0 ml of anesthesia was administered for all surgical sites. Estimated blood loss was less than 10 ml for all surgical sites. A sterile pressure dressing was applied and wound care instructions, with a written handout, were given. The [facility] alert and ambulatory.    Follow-up as needed for evaluation, otherwise as scheduled for skin exam.    Anatomic Pathology Results: pending      Staff Involved:    Scribe Disclosure  I, Bradley Jimenez, nghia serving as a scribe to document services personally performed by Dr. Ian Haro DO, based on data collection and the provider's statements to me.     Staff Physician Comments:   I saw and evaluated the patient with the resident (Dr. Lizabeth Moyer) and I agree with the above description of the procedures.   I was physically present for all key portions of the procedure today. I was immediately available at all times.    Ian Haro DO    Department of Dermatology  [facility]: Phone: [phone], Fax:[phone]  [facility]: Phone: [phone], Fax: [phone]

## 2018-08-14 LAB — COPATH REPORT: NORMAL

## 2018-08-15 ENCOUNTER — TELEPHONE (OUTPATIENT)
Dept: DERMATOLOGY | Facility: CLINIC | Age: 83
End: 2018-08-15

## 2018-08-15 NOTE — TELEPHONE ENCOUNTER
Notes Recorded by April Love LPN on 8/15/2018 at 9:50 AM  Called and informed patient of results. Patient had no further questions or concerns.     April Love LPN    ------    Notes Recorded by Ian Cantu MD on 8/14/2018 at 5:02 PM  Please call the patient with pathology results.   Excision margins were clear. As long as the wound is healing well, no additional surgery is necessary. Thank you.        6d ago       Copath Report Patient Name: MILLI PRIETO   MR#: [id number]   Specimen #: [id number]   Collected: 8/9/2018   Received: 8/10/2018   Reported: 8/14/2018 14:23   Ordering Phy(s): IAN CANTU     For improved result formatting, select 'View Enhanced Report Format' under    Linked Documents section.     SPECIMEN(S):   Skin, right upper arm, suture at proximal tip     FINAL DIAGNOSIS:   Skin, right upper arm, suture at proximal tip:   - Scar from the prior surgical procedure, with no evidence of residual   lesion - (see description)                  April Love LPN

## 2018-09-10 ENCOUNTER — RADIANT APPOINTMENT (OUTPATIENT)
Dept: MAMMOGRAPHY | Facility: CLINIC | Age: 83
End: 2018-09-10
Attending: INTERNAL MEDICINE
Payer: COMMERCIAL

## 2018-09-10 DIAGNOSIS — Z17.0 MALIGNANT NEOPLASM OF OVERLAPPING SITES OF LEFT BREAST IN FEMALE, ESTROGEN RECEPTOR POSITIVE (H): ICD-10-CM

## 2018-09-10 DIAGNOSIS — C50.912 INVASIVE DUCTAL CARCINOMA OF LEFT BREAST (H): ICD-10-CM

## 2018-09-10 DIAGNOSIS — C50.812 MALIGNANT NEOPLASM OF OVERLAPPING SITES OF LEFT BREAST IN FEMALE, ESTROGEN RECEPTOR POSITIVE (H): ICD-10-CM

## 2018-09-10 LAB
ALBUMIN SERPL-MCNC: 3.7 G/DL (ref 3.4–5)
ALP SERPL-CCNC: 34 U/L (ref 40–150)
ALT SERPL W P-5'-P-CCNC: 20 U/L (ref 0–50)
AST SERPL W P-5'-P-CCNC: 22 U/L (ref 0–45)
BASOPHILS # BLD AUTO: 0.1 10E9/L (ref 0–0.2)
BASOPHILS NFR BLD AUTO: 1.7 %
BILIRUB DIRECT SERPL-MCNC: 0.1 MG/DL (ref 0–0.2)
BILIRUB SERPL-MCNC: 0.4 MG/DL (ref 0.2–1.3)
CREAT SERPL-MCNC: 0.75 MG/DL (ref 0.52–1.04)
DIFFERENTIAL METHOD BLD: ABNORMAL
EOSINOPHIL # BLD AUTO: 0.3 10E9/L (ref 0–0.7)
EOSINOPHIL NFR BLD AUTO: 5.5 %
ERYTHROCYTE [DISTWIDTH] IN BLOOD BY AUTOMATED COUNT: 12.9 % (ref 10–15)
GFR SERPL CREATININE-BSD FRML MDRD: 74 ML/MIN/1.7M2
HCT VFR BLD AUTO: 35.9 % (ref 35–47)
HGB BLD-MCNC: 11.4 G/DL (ref 11.7–15.7)
IMM GRANULOCYTES # BLD: 0 10E9/L (ref 0–0.4)
IMM GRANULOCYTES NFR BLD: 0.3 %
LYMPHOCYTES # BLD AUTO: 1.5 10E9/L (ref 0.8–5.3)
LYMPHOCYTES NFR BLD AUTO: 25.8 %
MCH RBC QN AUTO: 29.6 PG (ref 26.5–33)
MCHC RBC AUTO-ENTMCNC: 31.8 G/DL (ref 31.5–36.5)
MCV RBC AUTO: 93 FL (ref 78–100)
MONOCYTES # BLD AUTO: 0.6 10E9/L (ref 0–1.3)
MONOCYTES NFR BLD AUTO: 9.9 %
NEUTROPHILS # BLD AUTO: 3.4 10E9/L (ref 1.6–8.3)
NEUTROPHILS NFR BLD AUTO: 56.8 %
PLATELET # BLD AUTO: 208 10E9/L (ref 150–450)
PROT SERPL-MCNC: 6.7 G/DL (ref 6.8–8.8)
RBC # BLD AUTO: 3.85 10E12/L (ref 3.8–5.2)
WBC # BLD AUTO: 6 10E9/L (ref 4–11)

## 2018-09-10 PROCEDURE — 36415 COLL VENOUS BLD VENIPUNCTURE: CPT | Performed by: INTERNAL MEDICINE

## 2018-09-10 PROCEDURE — 80076 HEPATIC FUNCTION PANEL: CPT | Performed by: INTERNAL MEDICINE

## 2018-09-10 PROCEDURE — 77067 SCR MAMMO BI INCL CAD: CPT | Performed by: RADIOLOGY

## 2018-09-10 PROCEDURE — 82565 ASSAY OF CREATININE: CPT | Performed by: INTERNAL MEDICINE

## 2018-09-10 PROCEDURE — 85025 COMPLETE CBC W/AUTO DIFF WBC: CPT | Performed by: INTERNAL MEDICINE

## 2018-09-10 PROCEDURE — 77063 BREAST TOMOSYNTHESIS BI: CPT | Performed by: RADIOLOGY

## 2018-09-11 ENCOUNTER — OFFICE VISIT (OUTPATIENT)
Dept: OPHTHALMOLOGY | Facility: CLINIC | Age: 83
End: 2018-09-11
Payer: COMMERCIAL

## 2018-09-11 DIAGNOSIS — H26.8 PXF (PSEUDOEXFOLIATION OF LENS CAPSULE): ICD-10-CM

## 2018-09-11 DIAGNOSIS — H40.1431 PSEUDOEXFOLIATIVE GLAUCOMA, BOTH EYES, MILD STAGE: Primary | ICD-10-CM

## 2018-09-11 DIAGNOSIS — H43.812 PVD (POSTERIOR VITREOUS DETACHMENT), LEFT: ICD-10-CM

## 2018-09-11 DIAGNOSIS — H25.11 NUCLEAR SCLEROSIS OF RIGHT EYE: ICD-10-CM

## 2018-09-11 DIAGNOSIS — Z96.1 PSEUDOPHAKIA OF LEFT EYE: ICD-10-CM

## 2018-09-11 PROCEDURE — 92083 EXTENDED VISUAL FIELD XM: CPT | Performed by: STUDENT IN AN ORGANIZED HEALTH CARE EDUCATION/TRAINING PROGRAM

## 2018-09-11 PROCEDURE — 92133 CPTRZD OPH DX IMG PST SGM ON: CPT | Performed by: STUDENT IN AN ORGANIZED HEALTH CARE EDUCATION/TRAINING PROGRAM

## 2018-09-11 PROCEDURE — 92012 INTRM OPH EXAM EST PATIENT: CPT | Performed by: STUDENT IN AN ORGANIZED HEALTH CARE EDUCATION/TRAINING PROGRAM

## 2018-09-11 ASSESSMENT — VISUAL ACUITY
OD_CC+: -1+1
CORRECTION_TYPE: GLASSES
OS_CC+: -1
OS_CC: 20/20
METHOD: SNELLEN - LINEAR
OD_CC: 20/40

## 2018-09-11 ASSESSMENT — EXTERNAL EXAM - LEFT EYE: OS_EXAM: NORMAL

## 2018-09-11 ASSESSMENT — TONOMETRY
IOP_METHOD: APPLANATION
OS_IOP_MMHG: 16
OD_IOP_MMHG: 19

## 2018-09-11 ASSESSMENT — PACHYMETRY
OS_CT(UM): .657
OD_CT(UM): .614

## 2018-09-11 ASSESSMENT — SLIT LAMP EXAM - LIDS
COMMENTS: NORMAL
COMMENTS: NORMAL

## 2018-09-11 ASSESSMENT — CUP TO DISC RATIO
OS_RATIO: 0.5 UD
OD_RATIO: 0.6 UD

## 2018-09-11 ASSESSMENT — EXTERNAL EXAM - RIGHT EYE: OD_EXAM: NORMAL

## 2018-09-11 NOTE — PROGRESS NOTES
Current Eye Medications:  Cosopt twice a day both eyes and Latanoprost at bedtime both eyes     Subjective:here for HVF/OCT and IOP today for glaucoma. Hasn't had pachymetry, will do today as well.  Starting to notice that everything looks yellowish in color in the right eye and more white in the left eye. Mother and brother with AMD, she does not take any multivitamin herself.     Brother with glaucoma who had surgical procedure done on his right eye to help get the pressure down.      Objective:  See Ophthalmology Exam.       Assessment:  Cydney Christiansen is a 82 year old female who presents with:   Encounter Diagnoses   Name Primary?     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  Brock visual field (HVF): stable both eyes      PXF (PSEUDOEXFOLIATION OF LENS CAPSULE) OU      Pseudophakia of left eye      Nuclear sclerosis of right eye Assess at next dilated exam.      PVD (posterior vitreous detachment), left        Plan:  Continue Cosopt twice a day both eyes   Continue Latanoprost (teal top) at bedtime  both eyes     Kvng Garcia MD  [phone]

## 2018-09-11 NOTE — LETTER
9/11/2018         RE: Cydney Christiansen  [address]  [place] [address]        Dear Colleague,    Thank you for referring your patient, Cydney Christiansen, to the [facility].    Her eye exam is stable.   Please see a copy of my visit note below.     Current Eye Medications:  Cosopt twice a day both eyes and Latanoprost at bedtime both eyes     Subjective:here for HVF/OCT and IOP today for glaucoma. Hasn't had pachymetry, will do today as well.  Starting to notice that everything looks yellowish in color in the right eye and more white in the left eye. Mother and brother with AMD, she does not take any multivitamin herself.     Brother with glaucoma who had surgical procedure done on his right eye to help get the pressure down.      Objective:  See Ophthalmology Exam.       Assessment:  Cydney Christiansen is a 82 year old female who presents with:   Encounter Diagnoses   Name Primary?     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  Brock visual field (HVF): stable both eyes      PXF (PSEUDOEXFOLIATION OF LENS CAPSULE) OU      Pseudophakia of left eye      Nuclear sclerosis of right eye Assess at next dilated exam.      PVD (posterior vitreous detachment), left        Plan:  Continue Cosopt twice a day both eyes   Continue Latanoprost (teal top) at bedtime  both eyes     Kvng Garcia MD  [phone]          Again, thank you for allowing me to participate in the care of your patient.        Sincerely,        Kvng Garcia MD

## 2018-09-11 NOTE — MR AVS SNAPSHOT
After Visit Summary   9/11/2018    Cydney Christiansen    MRN: [id number]           Patient Information     Date Of Birth          1935        Visit Information        Provider Department      9/11/2018 10:15 AM Kvng Garcia MD [facility]        Today's Diagnoses     PXF (PSEUDOEXFOLIATION OF LENS CAPSULE) OU    -  1    Pseudoexfoliative glaucoma, both eyes, mild stage        Pseudophakia of left eye        Nuclear sclerosis of right eye        PVD (posterior vitreous detachment), left          Care Instructions    Continue Cosopt twice a day both eyes   Continue Latanoprost (teal top) at bedtime  both eyes     Kvng Garcia MD  [phone]              Follow-ups after your visit        Follow-up notes from your care team     Return in about 6 months (around 3/11/2019) for Complete Exam.      Your next 10 appointments already scheduled     Sep 13, 2018 12:30 PM CDT   Return Visit with Usha Salgado MD   [facility])    [address] [place] [id number] [phone]            Jan 03, 2019  1:45 PM CST   Return Visit with Qi Barba MD   [facility])    [address] [place] [id number] [phone]            Jan 08, 2019  9:30 AM CST   Return Visit with Ruben Hammond MD, [facility] CYSTO PROC ROOM   [facility] ([facility] [address] [phone]              Who to contact     If you have questions or need follow up information about today's clinic visit or your schedule please contact [facility] directly at [phone].  Normal or non-critical lab and imaging results will be communicated to you by MyChart, letter or phone within 4 business days after the clinic has received the results. If you do not hear from us within 7 days, please contact the clinic  through [employer] or phone. If you have a critical or abnormal lab result, we will notify you by phone as soon as possible.  Submit refill requests through [employer] or call your pharmacy and they will forward the refill request to us. Please allow 3 business days for your refill to be completed.          Additional Information About Your Visit        [employer] Information     [employer] gives you secure access to your electronic health record. If you see a primary care provider, you can also send messages to your care team and make appointments. If you have questions, please call your primary care clinic.  If you do not have a primary care provider, please call [phone] and they will assist you.        Care EveryWhere ID     This is your Care EveryWhere ID. This could be used by other organizations to access your [place] medical records  [id number]         Blood Pressure from Last 3 Encounters:   08/09/18 126/79   06/28/18 124/60   03/08/18 122/76    Weight from Last 3 Encounters:   06/28/18 64.6 kg (142 lb 8 oz)   03/08/18 64.9 kg (143 lb)   12/04/17 65.8 kg (145 lb)              We Performed the Following     HC COMPUTERIZED OPHTHALMIC IMAGING OPTIC NERVE     PACHYMETRY     VISUAL FIELDS EXAM (EXTENDED)        Primary Care Provider Office Phone # Fax #    Kadi Sanchez [phone]       [address] [facility] [address]        Equal Access to Services     [facility] : Hadii josy wagner hadasho Soomaali, waaxda luqadaha, qaybta kaalmada adeegyada, morgan solomon . So [facility] [place] [phone].    ATENCIÓN: Si habla español, tiene a chavez disposición servicios gratuitos de asistencia lingüística. Llame al [phone].    We comply with applicable federal civil rights laws and [place] laws. We do not discriminate on the basis of race, color, national origin, age, disability, sex, sexual orientation, or gender identity.            Thank you!     Thank you for  choosing [facility]  for your care. Our goal is always to provide you with excellent care. Hearing back from our patients is one way we can continue to improve our services. Please take a few minutes to complete the written survey that you may receive in the mail after your visit with us. Thank you!             Your Updated Medication List - Protect others around you: Learn how to safely use, store and throw away your medicines at www.disposemymeds.org.          This list is accurate as of 9/11/18 11:14 AM.  Always use your most recent med list.                   Brand Name Dispense Instructions for use Diagnosis    alendronate 70 MG tablet    FOSAMAX    12 tablet    TAKE 1 TAB BY MOUTH EVERY 7 DAYS WITH 8 OZ WATER 60 MIN BEFORE FOOD. SIT UPRIGHT FOR 30 MINUTES    Osteoporosis, unspecified osteoporosis type, unspecified pathological fracture presence       calcium 600 MG tablet      1 daily        cyanocobalamin 500 MCG tablet    Vitamin B-12    150 tablet    Take 500 mcg by mouth daily    B12 deficiency       dorzolamide-timolol 2-0.5 % ophthalmic solution    COSOPT    10 mL    Place 1 drop into both eyes 2 times daily    PXF (pseudoexfoliation of lens capsule)       ibuprofen 200 MG tablet    ADVIL/MOTRIN     Take 200 mg by mouth every 4 hours as needed.        IRON SUPPLEMENT PO      Take 325 mg by mouth daily        latanoprost 0.005 % ophthalmic solution    XALATAN    1 Bottle    Place 1 drop into both eyes At Bedtime    PXF (pseudoexfoliation of lens capsule)       losartan 25 MG tablet    COZAAR    90 tablet    Take 1 tablet (25 mg) by mouth daily    Benign essential hypertension       meclizine 25 MG tablet    ANTIVERT    30 tablet    Take 1 tablet (25 mg) by mouth every 6 hours as needed for dizziness    Vertigo       simvastatin 20 MG tablet    ZOCOR    90 tablet    Take 1 tablet (20 mg) by mouth At Bedtime    Hyperlipidemia LDL goal <160       tamoxifen 20 MG tablet    NOLVADEX    90 tablet     Take 1 tablet (20 mg) by mouth daily    Malignant neoplasm of overlapping sites of left breast in female, estrogen receptor positive (H), 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    Granuloma annulare       VITAMIN D-1000 MAX ST 1000 units Tabs   Generic drug:  cholecalciferol      1 daily

## 2018-09-11 NOTE — PATIENT INSTRUCTIONS
Continue Cosopt twice a day both eyes   Continue Latanoprost (teal top) at bedtime  both eyes     Kvng Garcia MD  [phone]

## 2018-09-13 ENCOUNTER — ONCOLOGY VISIT (OUTPATIENT)
Dept: ONCOLOGY | Facility: CLINIC | Age: 83
End: 2018-09-13
Payer: COMMERCIAL

## 2018-09-13 VITALS
WEIGHT: 143.6 LBS | RESPIRATION RATE: 16 BRPM | SYSTOLIC BLOOD PRESSURE: 139 MMHG | TEMPERATURE: 98.4 F | BODY MASS INDEX: 27.64 KG/M2 | DIASTOLIC BLOOD PRESSURE: 83 MMHG | OXYGEN SATURATION: 97 % | HEART RATE: 68 BPM

## 2018-09-13 DIAGNOSIS — Z17.0 MALIGNANT NEOPLASM OF OVERLAPPING SITES OF LEFT BREAST IN FEMALE, ESTROGEN RECEPTOR POSITIVE (H): Primary | ICD-10-CM

## 2018-09-13 DIAGNOSIS — C50.812 MALIGNANT NEOPLASM OF OVERLAPPING SITES OF LEFT BREAST IN FEMALE, ESTROGEN RECEPTOR POSITIVE (H): Primary | ICD-10-CM

## 2018-09-13 DIAGNOSIS — C50.912 INVASIVE DUCTAL CARCINOMA OF LEFT BREAST (H): ICD-10-CM

## 2018-09-13 PROCEDURE — 99214 OFFICE O/P EST MOD 30 MIN: CPT | Performed by: INTERNAL MEDICINE

## 2018-09-13 RX ORDER — TAMOXIFEN CITRATE 20 MG/1
20 TABLET ORAL DAILY
Qty: 90 TABLET | Refills: 1 | Status: SHIPPED | OUTPATIENT
Start: 2018-09-13 | End: 2019-03-26

## 2018-09-13 ASSESSMENT — PAIN SCALES - GENERAL: PAINLEVEL: NO PAIN (0)

## 2018-09-13 NOTE — MR AVS SNAPSHOT
After Visit Summary   9/13/2018    Cydney Christiansen    MRN: [id number]           Patient Information     Date Of Birth          1935        Visit Information        Provider Department      9/13/2018 12:30 PM Usha Salgado MD [facility]        Today's Diagnoses     Malignant neoplasm of overlapping sites of left breast in female, estrogen receptor positive (H)    -  1    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)           Follow-ups after your visit        Your next 10 appointments already scheduled     Jan 03, 2019  1:45 PM CST   Return Visit with Qi Barba MD   [facility])    [address] [place] [phone]   [phone]            Jan 08, 2019  9:30 AM CST   Return Visit with Ruben Hammond MD, [facility] CYSTO PROC ROOM   [facility])    [address] [place] [phone]   [phone]            Mar 08, 2019 10:00 AM CST   New Visit with Kvng Garcia MD   [facility])    [address] [place] [phone]   [phone]            Mar 15, 2019  1:45 PM CDT   LAB with LAB ONC [facility])    [address] [place] [id number] [phone]           Please do not eat 10-12 hours before your appointment if you are coming in fasting for labs on lipids, cholesterol, or glucose (sugar). This does not apply to pregnant women. Water, hot tea and black coffee (with nothing added) are okay. Do not drink other fluids, diet soda or chew gum.            Mar 15, 2019  2:30 PM CDT   Return Visit with Usha Salgado MD   [facility])    [address] [place] [phone]              Future tests that were ordered for you today     Open Future Orders        Priority  Expected Expires Ordered    CBC with platelets Routine  9/13/2019 9/13/2018            Who to contact     If you have questions or need follow up information about today's clinic visit or your schedule please contact [facility] directly at [phone].  Normal or non-critical lab and imaging results will be communicated to you by [employer], letter or phone within 4 business days after the clinic has received the results. If you do not hear from us within 7 days, please contact the clinic through [employer] or phone. If you have a critical or abnormal lab result, we will notify you by phone as soon as possible.  Submit refill requests through [employer] or call your pharmacy and they will forward the refill request to us. Please allow 3 business days for your refill to be completed.          Additional Information About Your Visit        [employer] Information     [employer] gives you secure access to your electronic health record. If you see a primary care provider, you can also send messages to your care team and make appointments. If you have questions, please call your primary care clinic.  If you do not have a primary care provider, please call [phone] and they will assist you.      [employer] is an electronic gateway that provides easy, online access to your medical records. With [employer], you can request a clinic appointment, read your test results, renew a prescription or communicate with your care team.     To access your existing account, please contact your [facility] or call [phone] for assistance.        Care EveryWhere ID     This is your Care EveryWhere ID. This could be used by other organizations to access your [place] medical records  [id number]        Your Vitals Were     Pulse Temperature Respirations Pulse Oximetry BMI (Body Mass Index)       68 98.4  F (36.9  C) (Oral) 16 97% 27.64 kg/m2        Blood Pressure from Last 3 Encounters:   09/13/18 139/83    08/09/18 126/79   06/28/18 124/60    Weight from Last 3 Encounters:   09/13/18 65.1 kg (143 lb 9.6 oz)   06/28/18 64.6 kg (142 lb 8 oz)   03/08/18 64.9 kg (143 lb)                 Where to get your medicines      These medications [id number] [place] - [address]  [address] [place] [address]     Phone:  [phone]     tamoxifen 20 MG tablet          Primary Care Provider Office Phone # Fax #    Kadi Sanchez [phone] [address] [facility]  [place] [address]        Equal Access to Services     [facility] NILO : Zuleyma johnsono Sojocelin, waaxda luqadaha, qaybta kaalmada adeegyada, morgan solomon . So [facility] [phone].    ATENCIÓN: Si habla español, tiene a chavez disposición servicios gratuitos de asistencia lingüística. LlMount St. [facility] Hospital [phone].    We comply with applicable federal civil rights laws and [place] laws. We do not discriminate on the basis of race, color, national origin, age, disability, sex, sexual orientation, or gender identity.            Thank you!     Thank you for choosing [facility]  for your care. Our goal is always to provide you with excellent care. Hearing back from our patients is one way we can continue to improve our services. Please take a few minutes to complete the written survey that you may receive in the mail after your visit with us. Thank you!             Your Updated Medication List - Protect others around you: Learn how to safely use, store and throw away your medicines at www.disposemymeds.org.          This list is accurate as of 9/13/18 12:56 PM.  Always use your most recent med list.                   Brand Name Dispense Instructions for use Diagnosis    alendronate 70 MG tablet    FOSAMAX    12 tablet    TAKE 1 TAB BY MOUTH EVERY 7 DAYS WITH 8 OZ WATER 60 MIN BEFORE FOOD. SIT UPRIGHT FOR 30 MINUTES    Osteoporosis, unspecified osteoporosis type,  unspecified pathological fracture presence       calcium 600 MG tablet      1 daily        cyanocobalamin 500 MCG tablet    Vitamin B-12    150 tablet    Take 500 mcg by mouth daily    B12 deficiency       dorzolamide-timolol 2-0.5 % ophthalmic solution    COSOPT    10 mL    Place 1 drop into both eyes 2 times daily    PXF (pseudoexfoliation of lens capsule)       ibuprofen 200 MG tablet    ADVIL/MOTRIN     Take 200 mg by mouth every 4 hours as needed.        IRON SUPPLEMENT PO      Take 325 mg by mouth daily        latanoprost 0.005 % ophthalmic solution    XALATAN    1 Bottle    Place 1 drop into both eyes At Bedtime    PXF (pseudoexfoliation of lens capsule)       losartan 25 MG tablet    COZAAR    90 tablet    Take 1 tablet (25 mg) by mouth daily    Benign essential hypertension       meclizine 25 MG tablet    ANTIVERT    30 tablet    Take 1 tablet (25 mg) by mouth every 6 hours as needed for dizziness    Vertigo       simvastatin 20 MG tablet    ZOCOR    90 tablet    Take 1 tablet (20 mg) by mouth At Bedtime    Hyperlipidemia LDL goal <160       tamoxifen 20 MG tablet    NOLVADEX    90 tablet    Take 1 tablet (20 mg) by mouth daily    Malignant neoplasm of overlapping sites of left breast in female, estrogen receptor positive (H), 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    Granuloma annulare       VITAMIN D-1000 MAX ST 1000 units Tabs   Generic drug:  cholecalciferol      1 daily

## 2018-09-13 NOTE — PROGRESS NOTES
Oncology Follow-up visit:  Date on this visit: Sep 13, 2018      Primary Care Physician: Kadi Herring   Surgeon: Dr. Ifeanyi Sunshine.     Diagnosis:  1. 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 She was not recommended in favor of adjuvant radiation or chemotherapy, and has been on adjuvant Tamoxifen (AI not chosen due to OP and hx of compression fractures), since 08/24/2015.    Oncologic History:  1. Breast Cancer - The patient underwent a screening mammogram on 5/29/2015, which demonstrated a 9 mm lesion around the 1 o'clock position in the left breast. She had an ultrasound which confirmed a lesion to be about 9 mm. She underwent a core needle biopsy in [facility], which demonstrated invasive ductal cancer, grade 2, estrogen receptor positive (99%, strong) and  progesterone receptor positive (91%, strong) by immunohistochemistry and HER2 not amplified by FISH (ratio 1.0).   She then met with Dr. Sunshine and underwent L lumpectomy and axillary sentinel LN biopsy. The pathology from the surgery showed a 10 mm invasive ductal carcinoma, [place] grade 2.  There was no associated DCIS. Closest surgical margin was 3 mm from the anterior margin. There was no lymphovascular invasion and all 3 sentinel lymph nodes were negative for malignancy.   She was not recommended in favor of adjuvant radiation or chemotherapy.  Due  compression fracture history felt to be osteoporotic fractures, Tamoxifen was chosen over an AI. She started on Tamoxifen on 8/24/2015    2. Compression Fractures- Patient sustained a compression fracture of T7  in July 2015. On T spine MRI there were also old compression fractures of T3, T4 and T8 noted. She was on Fosamax for 10 years and stopped it in 2012. She developed compression fracture in 07/2015 and was restarted on Fosamax.    3. She has  a remote history of TCC of the bladder (in 1993), s/p TURBT     4. 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     5. Normocytic anemia - She was noted to have mild anemia in Aug 2016. Hb was down to 11.2 g/dl. MCV was normal. She reports there is a Hx of anemia in her sister and mother, due to iron and B12 deficiency. In 12/2016 she had workup for her anemia. TSH was normal. Serum folate was normal. Iron studies were normal including ferritin of 213. She had normal LFTs and creatinine. B12 level was normal at over 500.  Peripheral blood smear on 12/23/2016 showed  slight normochromic, normocytic anemia without increased   erythrocyte regeneration. The red blood cells appeared normochromic. Poikilocytosis was minimal.   Serum protein electrophoresis and serum immunofixation showed no M protein.  We'll recommend vitamin B12 supplementation and she has been on 500 mcg orally daily, and she has been on ferrous sulfate 325 mg PO daily as well. Her Hb has improved to low normal.  Since our last visit, she underwent endoscopy and colonoscopy on 6/16/2017 by Dr. Harriet Jacobs at [facility]. On colonoscopy she was noted to have three sessile polyps in the ascending colon, resected. The endoscopy showed normal esophagus, stomach and duodenum. Gastric and small bowel biopsies were normal and there was no e/o celiac disease or H.Pylori. The colonic polyps on pathology showed one tubular adenoma and two serrated adenomas.      6. Breast Cancer Screening - On 8/28/2017 she underwent a bilateral digital screening mammography with tomosynthesis. The breast tissue was noted to be heterogeneously dense.  On MLO  view there was a round circumscribed mass in the left axilla.  On 9/6/2017 she underwent L axillary US which showed a 1.7 x 1.6 x 1.7 cm cyst  located just above the axillary dissection scar that correlated with mammographic finding. Benign appearing left axillary lymph nodes were seen during exam. She  underwent axillary cyst aspiration on 9/8/2017. It was felt to be simple fluid collection compatible with post-operative seroma. She had it aspirated and no pathology was sent due to benign appearing cyst with clear fluid.       History Of Present Illness:  Ms. 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 She transferred  her care to us for convenience from Dr. Walter in 05/2016. She lives in [place]. She has been on Tamoxifen since 8/24/2015 and has tolerated it  well.  She has been on weekly Fosamax as well.  She has a remote history of TCC of the bladder (in 1993), s/p TURBT and follows up with Dr. Hammond annually for cystoscopy, last in 01/2018 when there was no e/o recurrent bladder tumor.  She had a b/l 3D mammogram on 9/10/2018 which showed no suspicious findings.   She follows up with dermatology for NMSC earlier this summer. She was diagnosed with SCCIS, right upper arm, s/p excision 8/9/18 .Follow-up was recommended in 6 months.   She is feeling very well and has no health related complaints. She has no new breast related complaints.  She remains on oral B12 and ferrous sulfate supplementation.  In addition, a complete 12 point  review of systems is negative.      Past Medical/Surgical History:  Past Medical History:   Diagnosis Date     Actinic keratosis      Basal cell cancer 02/2011    bcc of the L back.     Basal cell carcinoma 04' , 06'     Basal cell carcinoma 06/2011    L neck     Breast cancer (H)      Cataract      Colon polyps     Precancer     Glaucoma (increased eye pressure)      Heart murmur      HLD (hyperlipidemia)      Hypertension goal BP (blood pressure) < 140/90 12/19/2013     Invasive ductal carcinoma of breast (H) 6/2015    left     Osteoporosis      Scoliosis      Skin cancer      Skin cancer 05/2013    sccis R cheek     Squamous cell carcinoma 09/2011     R upper back     Squamous cell carcinoma 10/2012    R dorsal hand     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 7/13    left leg     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     Past Surgical History:   Procedure Laterality Date     COLONOSCOPY  5/2008, 5/13, 6/14, 6/17    Q 3 years for advanced adenomatous polyp     CYSTOSCOPY  12/31/2008     D & C       GENITOURINARY SURGERY      TURBT     HC REMOVAL GALLBLADDER      open pan     HC TRABECULOPLASTY BY LASER SURGERY Left 4/18/07    SLT #1 OS (inf 180)     HC TRABECULOPLASTY BY LASER SURGERY Right 5/4/11    SLT #1 OD (inf 180?)     HC TRABECULOPLASTY BY LASER SURGERY Left 3/17/15    SLT #2 OS (sup 180)     HC TRABECULOPLASTY BY LASER SURGERY Right 6/23/15    SLT #2 OD (sup 180?)     LASER ARGON TREATMENT      SLT left eye x 2     LUMPECTOMY BREAST WITH SENTINEL NODE, COMBINED Left 7/29/2015    Procedure: COMBINED LUMPECTOMY BREAST WITH SENTINEL NODE;  Surgeon: Ifeanyi Sunshine MD;  Location: [facility] OR     PHACOEMULSIFICATION CLEAR CORNEA WITH STANDARD INTRAOCULAR LENS IMPLANT Left 12/8/2017    Procedure: PHACOEMULSIFICATION CLEAR CORNEA WITH STANDARD INTRAOCULAR LENS IMPLANT;   COMPLEX LEFT PHACOEMULSIFICATION CLEAR CORNEA WITH STANDARD INTRAOCULAR LENS IMPLANT ;  Surgeon: Kvng Garcia MD;  Location[facility]     SURGICAL HISTORY OF -   9/11    squamous cell CA excised from back     TUBAL LIGATION     FHx and SocHx reviewed     Allergies:  Allergies as of 09/13/2018 - Review Complete 09/13/2018   Allergen Reaction Noted     Lisinopril Cough 09/16/2014     Current Medications:  Current Outpatient Prescriptions   Medication Sig Dispense Refill     alendronate (FOSAMAX) 70 MG tablet TAKE 1 TAB BY MOUTH EVERY 7 DAYS WITH 8 OZ WATER 60 MIN BEFORE FOOD. SIT UPRIGHT FOR 30 MINUTES 12 tablet 3     CALCIUM 600 MG OR TABS 1 daily       Cyanocobalamin (B-12) 500 MCG TABS Take  500 mcg by mouth daily 150 tablet 3     dorzolamide-timolol (COSOPT) 2-0.5 % ophthalmic solution Place 1 drop into both eyes 2 times daily 10 mL 11     Ferrous Sulfate (IRON SUPPLEMENT PO) Take 325 mg by mouth daily       ibuprofen (ADVIL,MOTRIN) 200 MG tablet Take 200 mg by mouth every 4 hours as needed.       latanoprost (XALATAN) 0.005 % ophthalmic solution Place 1 drop into both eyes At Bedtime 1 Bottle 11     losartan (COZAAR) 25 MG tablet Take 1 tablet (25 mg) by mouth daily 90 tablet 4     meclizine (ANTIVERT) 25 MG tablet Take 1 tablet (25 mg) by mouth every 6 hours as needed for dizziness 30 tablet 1     simvastatin (ZOCOR) 20 MG tablet Take 1 tablet (20 mg) by mouth At Bedtime 90 tablet 4     tamoxifen (NOLVADEX) 20 MG tablet Take 1 tablet (20 mg) by mouth daily 90 tablet 1     triamcinolone (KENALOG) 0.1 % cream Apply to affected area of the face once to twice a day. 15 g 1     VITAMIN D-1000 MAX -1000 MG-UNIT OR TABS 1 daily          Physical Exam:    /83  Pulse 68  Temp 98.4  F (36.9  C) (Oral)  Resp 16  Wt 65.1 kg (143 lb 9.6 oz)  SpO2 97%  BMI 27.64 kg/m2        GENERAL APPEARANCE: elderly, alert and no distress      NECK: no adenopathy, no asymmetry or masses     LYMPHATICS: No cervical, supraclavicular, axillary  lymphadenopathy     RESP: lungs clear to auscultation - no rales, rhonchi or wheezes     CARDIOVASCULAR: regular rates and rhythm, normal S1 S2, no S3 or S4 and no murmur.     ABDOMEN:  soft, nontender, no HSM or masses and bowel sounds normal     MUSCULOSKELETAL: extremities normal- no gross deformities noted, no evidence of inflammation in joints, FROM in all extremities. No edema b/l LE.     SKIN: multiple benign appearing  and age related skin moles. + incision from recent SCCIS excision RUE healed well.   PsychiatRIC: mentation appears normal and affect normal  Breast: S/p L lumpectomy. Incision in left upper outer quadrant healed well. No breast masses b/l. No  axillary lymphadenopathy b/l      Laboratory/Imaging Studies  Component      Latest Ref Rng & Units 9/10/2018   WBC      4.0 - 11.0 10e9/L 6.0   RBC Count      3.8 - 5.2 10e12/L 3.85   Hemoglobin      11.7 - 15.7 g/dL 11.4 (L)   Hematocrit      35.0 - 47.0 % 35.9   MCV      78 - 100 fl 93   MCH      26.5 - 33.0 pg 29.6   MCHC      31.5 - 36.5 g/dL 31.8   RDW      10.0 - 15.0 % 12.9   Platelet Count      150 - 450 10e9/L 208   % Neutrophils      % 56.8   % Lymphocytes      % 25.8   % Monocytes      % 9.9   % Eosinophils      % 5.5   % Basophils      % 1.7   % Immature Granulocytes      % 0.3   Absolute Neutrophil      1.6 - 8.3 10e9/L 3.4   Absolute Lymphocytes      0.8 - 5.3 10e9/L 1.5   Absolute Monocytes      0.0 - 1.3 10e9/L 0.6   Absolute Eosinophils      0.0 - 0.7 10e9/L 0.3   Absolute Basophils      0.0 - 0.2 10e9/L 0.1   Abs Immature Granulocytes      0 - 0.4 10e9/L 0.0   Diff Method       Automated Method   Bilirubin Direct      0.0 - 0.2 mg/dL 0.1   Bilirubin Total      0.2 - 1.3 mg/dL 0.4   Albumin      3.4 - 5.0 g/dL 3.7   Protein Total      6.8 - 8.8 g/dL 6.7 (L)   Alkaline Phosphatase      40 - 150 U/L 34 (L)   ALT      0 - 50 U/L 20   AST      0 - 45 U/L 22   Creatinine      0.52 - 1.04 mg/dL 0.75   GFR Estimate      >60 mL/min/1.7m2 74   GFR Estimate If Black      >60 mL/min/1.7m2 89     Results for MILLI PRIETO (MRN [id number]) as of 9/13/2018 12:45   Ref. 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 She was not recommended in favor of adjuvant radiation or chemotherapy, and has been on adjuvant Tamoxifen (AI not chosen due  to OP and hx of compression fractures), since 08/24/2015.    1. Breast cancer - continue daily Tamoxifen, tolerating well. F/up with us in 6 months, with labs.    2. Hx of OP with compression Fx- continue Fosamax, calcium and vitamin D supplementation.   3. Breast cancer screening- b/l screening mammogram with tomosynthesis negative 9/10/2018, next due in 09/2019. Breast tissue was still heterogeneously dense on her mammogram on 9/10/18.    4. Hx of TCC of bladder- s/p  s/p TURBT in 1993 and follows up with Dr. Hammond  annually for cystoscopy, next in 01/2019.     5.Hx of NMSC, most recently SCCIS, right upper arm, s/p excision 8/9/18 - f/up with dermatology in 6 months.    6. Mild normocytic anemia, Hb borderline low, stable- . B12 level within normal limits. Ferritin 228 in 09/2017. Continue on PO ferrous sulfate 325 mg  QOD. EGD and colonoscopy in June 2017 negative for source of blood loss. Repeat CBC in 6 months. If recurrent/worsening anemia, consider capsule endoscopy.  At the end of our visit patient verbalized understanding and concurred with the plan.

## 2018-09-13 NOTE — NURSING NOTE
"Oncology Rooming Note    September 13, 2018 12:37 PM   Cydney Christiansen is a 82 year old female who presents for:    Chief Complaint   Patient presents with     Oncology Clinic Visit     6 month follow up     Initial Vitals: /83  Pulse 68  Temp 98.4  F (36.9  C) (Oral)  Resp 16  Wt 65.1 kg (143 lb 9.6 oz)  SpO2 97%  BMI 27.64 kg/m2 Estimated body mass index is 27.64 kg/(m^2) as calculated from the following:    Height as of 6/28/18: 1.535 m (5' 0.43\").    Weight as of this encounter: 65.1 kg (143 lb 9.6 oz). Body surface area is 1.67 meters squared.  No Pain (0) Comment: Data Unavailable   No LMP recorded. Patient is postmenopausal.  Allergies reviewed: Yes  Medications reviewed: Yes    Medications: Medication refills not needed today.  Pharmacy name entered into [employer]:    [id number] - [place], [place] - [address]    5 minutes for nursing intake (face to face time)     Mattie Meyer RN    "

## 2018-09-13 NOTE — LETTER
9/13/2018         RE: Cydney Christiansen  [address]  [place] [address]        Dear Colleague,    Thank you for referring your patient, Cydney Christiansen, to the [facility]. Please see a copy of my visit note below.    Oncology Follow-up visit:  Date on this visit: Sep 13, 2018      Primary Care Physician: Kadi Herring   Surgeon: Dr. Ifeanyi Sunshine.     Diagnosis:  1. 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 She was not recommended in favor of adjuvant radiation or chemotherapy, and has been on adjuvant Tamoxifen (AI not chosen due to OP and hx of compression fractures), since 08/24/2015.    Oncologic History:  1. Breast Cancer - The patient underwent a screening mammogram on 5/29/2015, which demonstrated a 9 mm lesion around the 1 o'clock position in the left breast. She had an ultrasound which confirmed a lesion to be about 9 mm. She underwent a core needle biopsy in [facility], which demonstrated invasive ductal cancer, grade 2, estrogen receptor positive (99%, strong) and  progesterone receptor positive (91%, strong) by immunohistochemistry and HER2 not amplified by FISH (ratio 1.0).   She then met with Dr. Sunshine and underwent L lumpectomy and axillary sentinel LN biopsy. The pathology from the surgery showed a 10 mm invasive ductal carcinoma, [place] grade 2.  There was no associated DCIS. Closest surgical margin was 3 mm from the anterior margin. There was no lymphovascular invasion and all 3 sentinel lymph nodes were negative for malignancy.   She was not recommended in favor of adjuvant radiation or chemotherapy.  Due  compression fracture history felt to be osteoporotic fractures, Tamoxifen was chosen over an AI. She started on Tamoxifen on 8/24/2015    2. Compression Fractures- Patient sustained a compression fracture  of T7  in July 2015. On T spine MRI there were also old compression fractures of T3, T4 and T8 noted. She was on Fosamax for 10 years and stopped it in 2012. She developed compression fracture in 07/2015 and was restarted on Fosamax.    3. She has a remote history of TCC of the bladder (in 1993), s/p TURBT     4. 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     5. Normocytic anemia - She was noted to have mild anemia in Aug 2016. Hb was down to 11.2 g/dl. MCV was normal. She reports there is a Hx of anemia in her sister and mother, due to iron and B12 deficiency. In 12/2016 she had workup for her anemia. TSH was normal. Serum folate was normal. Iron studies were normal including ferritin of 213. She had normal LFTs and creatinine. B12 level was normal at over 500.  Peripheral blood smear on 12/23/2016 showed  slight normochromic, normocytic anemia without increased   erythrocyte regeneration. The red blood cells appeared normochromic. Poikilocytosis was minimal.   Serum protein electrophoresis and serum immunofixation showed no M protein.  We'll recommend vitamin B12 supplementation and she has been on 500 mcg orally daily, and she has been on ferrous sulfate 325 mg PO daily as well. Her Hb has improved to low normal.  Since our last visit, she underwent endoscopy and colonoscopy on 6/16/2017 by Dr. Harriet Jacobs at [facility]. On colonoscopy she was noted to have three sessile polyps in the ascending colon, resected. The endoscopy showed normal esophagus, stomach and duodenum. Gastric and small bowel biopsies were normal and there was no e/o celiac disease or H.Pylori. The colonic polyps on pathology showed one tubular adenoma and two serrated adenomas.      6. Breast Cancer Screening - On 8/28/2017 she underwent a bilateral digital screening mammography with tomosynthesis. The breast tissue was noted to be heterogeneously dense.  On MLO  view there was a round circumscribed mass in the left axilla.   On 9/6/2017 she underwent L axillary US which showed a 1.7 x 1.6 x 1.7 cm cyst  located just above the axillary dissection scar that correlated with mammographic finding. Benign appearing left axillary lymph nodes were seen during exam. She underwent axillary cyst aspiration on 9/8/2017. It was felt to be simple fluid collection compatible with post-operative seroma. She had it aspirated and no pathology was sent due to benign appearing cyst with clear fluid.       History Of Present Illness:  Ms. 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 She transferred  her care to us for convenience from Dr. Walter in 05/2016. She lives in [place]. She has been on Tamoxifen since 8/24/2015 and has tolerated it  well.  She has been on weekly Fosamax as well.  She has a remote history of TCC of the bladder (in 1993), s/p TURBT and follows up with Dr. Hammond annually for cystoscopy, last in 01/2018 when there was no e/o recurrent bladder tumor.  She had a b/l 3D mammogram on 9/10/2018 which showed no suspicious findings.   She follows up with dermatology for NMSC earlier this summer. She was diagnosed with SCCIS, right upper arm, s/p excision 8/9/18 .Follow-up was recommended in 6 months.   She is feeling very well and has no health related complaints. She has no new breast related complaints.  She remains on oral B12 and ferrous sulfate supplementation.  In addition, a complete 12 point  review of systems is negative.      Past Medical/Surgical History:  Past Medical History:   Diagnosis Date     Actinic keratosis      Basal cell cancer 02/2011    bcc of the L back.     Basal cell carcinoma 04' , 06'     Basal cell carcinoma 06/2011    L neck     Breast cancer (H)      Cataract      Colon polyps     Precancer     Glaucoma (increased eye pressure)      Heart murmur      HLD (hyperlipidemia)       Hypertension goal BP (blood pressure) < 140/90 12/19/2013     Invasive ductal carcinoma of breast (H) 6/2015    left     Osteoporosis      Scoliosis      Skin cancer      Skin cancer 05/2013    sccis R cheek     Squamous cell carcinoma 09/2011    R upper back     Squamous cell carcinoma 10/2012    R dorsal hand     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 7/13    left leg     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     Past Surgical History:   Procedure Laterality Date     COLONOSCOPY  5/2008, 5/13, 6/14, 6/17    Q 3 years for advanced adenomatous polyp     CYSTOSCOPY  12/31/2008     D & C       GENITOURINARY SURGERY      TURBT     HC REMOVAL GALLBLADDER      open pan     HC TRABECULOPLASTY BY LASER SURGERY Left 4/18/07    SLT #1 OS (inf 180)     HC TRABECULOPLASTY BY LASER SURGERY Right 5/4/11    SLT #1 OD (inf 180?)     HC TRABECULOPLASTY BY LASER SURGERY Left 3/17/15    SLT #2 OS (sup 180)     HC TRABECULOPLASTY BY LASER SURGERY Right 6/23/15    SLT #2 OD (sup 180?)     LASER ARGON TREATMENT      SLT left eye x 2     LUMPECTOMY BREAST WITH SENTINEL NODE, COMBINED Left 7/29/2015    Procedure: COMBINED LUMPECTOMY BREAST WITH SENTINEL NODE;  Surgeon: Ifeanyi Sunshine MD;  Location: [facility] OR     PHACOEMULSIFICATION CLEAR CORNEA WITH STANDARD INTRAOCULAR LENS IMPLANT Left 12/8/2017    Procedure: PHACOEMULSIFICATION CLEAR CORNEA WITH STANDARD INTRAOCULAR LENS IMPLANT;   COMPLEX LEFT PHACOEMULSIFICATION CLEAR CORNEA WITH STANDARD INTRAOCULAR LENS IMPLANT ;  Surgeon: Kvng Garcia MD;  Location: [facility]     SURGICAL HISTORY OF -   9/11    squamous cell CA excised from back     TUBAL LIGATION     FHx and SocHx reviewed     Allergies:  Allergies as of 09/13/2018 - Review Complete 09/13/2018   Allergen Reaction Noted     Lisinopril Cough 09/16/2014     Current Medications:  Current Outpatient Prescriptions   Medication Sig  Dispense Refill     alendronate (FOSAMAX) 70 MG tablet TAKE 1 TAB BY MOUTH EVERY 7 DAYS WITH 8 OZ WATER 60 MIN BEFORE FOOD. SIT UPRIGHT FOR 30 MINUTES 12 tablet 3     CALCIUM 600 MG OR TABS 1 daily       Cyanocobalamin (B-12) 500 MCG TABS Take 500 mcg by mouth daily 150 tablet 3     dorzolamide-timolol (COSOPT) 2-0.5 % ophthalmic solution Place 1 drop into both eyes 2 times daily 10 mL 11     Ferrous Sulfate (IRON SUPPLEMENT PO) Take 325 mg by mouth daily       ibuprofen (ADVIL,MOTRIN) 200 MG tablet Take 200 mg by mouth every 4 hours as needed.       latanoprost (XALATAN) 0.005 % ophthalmic solution Place 1 drop into both eyes At Bedtime 1 Bottle 11     losartan (COZAAR) 25 MG tablet Take 1 tablet (25 mg) by mouth daily 90 tablet 4     meclizine (ANTIVERT) 25 MG tablet Take 1 tablet (25 mg) by mouth every 6 hours as needed for dizziness 30 tablet 1     simvastatin (ZOCOR) 20 MG tablet Take 1 tablet (20 mg) by mouth At Bedtime 90 tablet 4     tamoxifen (NOLVADEX) 20 MG tablet Take 1 tablet (20 mg) by mouth daily 90 tablet 1     triamcinolone (KENALOG) 0.1 % cream Apply to affected area of the face once to twice a day. 15 g 1     VITAMIN D-1000 MAX -1000 MG-UNIT OR TABS 1 daily          Physical Exam:    /83  Pulse 68  Temp 98.4  F (36.9  C) (Oral)  Resp 16  Wt 65.1 kg (143 lb 9.6 oz)  SpO2 97%  BMI 27.64 kg/m2        GENERAL APPEARANCE: elderly, alert and no distress      NECK: no adenopathy, no asymmetry or masses     LYMPHATICS: No cervical, supraclavicular, axillary  lymphadenopathy     RESP: lungs clear to auscultation - no rales, rhonchi or wheezes     CARDIOVASCULAR: regular rates and rhythm, normal S1 S2, no S3 or S4 and no murmur.     ABDOMEN:  soft, nontender, no HSM or masses and bowel sounds normal     MUSCULOSKELETAL: extremities normal- no gross deformities noted, no evidence of inflammation in joints, FROM in all extremities. No edema b/l LE.     SKIN: multiple benign appearing  and  age related skin moles. + incision from recent SCCIS excision RUE healed well.   PsychiatRIC: mentation appears normal and affect normal  Breast: S/p L lumpectomy. Incision in left upper outer quadrant healed well. No breast masses b/l. No axillary lymphadenopathy b/l      Laboratory/Imaging Studies  Component      Latest Ref Rng & Units 9/10/2018   WBC      4.0 - 11.0 10e9/L 6.0   RBC Count      3.8 - 5.2 10e12/L 3.85   Hemoglobin      11.7 - 15.7 g/dL 11.4 (L)   Hematocrit      35.0 - 47.0 % 35.9   MCV      78 - 100 fl 93   MCH      26.5 - 33.0 pg 29.6   MCHC      31.5 - 36.5 g/dL 31.8   RDW      10.0 - 15.0 % 12.9   Platelet Count      150 - 450 10e9/L 208   % Neutrophils      % 56.8   % Lymphocytes      % 25.8   % Monocytes      % 9.9   % Eosinophils      % 5.5   % Basophils      % 1.7   % Immature Granulocytes      % 0.3   Absolute Neutrophil      1.6 - 8.3 10e9/L 3.4   Absolute Lymphocytes      0.8 - 5.3 10e9/L 1.5   Absolute Monocytes      0.0 - 1.3 10e9/L 0.6   Absolute Eosinophils      0.0 - 0.7 10e9/L 0.3   Absolute Basophils      0.0 - 0.2 10e9/L 0.1   Abs Immature Granulocytes      0 - 0.4 10e9/L 0.0   Diff Method       Automated Method   Bilirubin Direct      0.0 - 0.2 mg/dL 0.1   Bilirubin Total      0.2 - 1.3 mg/dL 0.4   Albumin      3.4 - 5.0 g/dL 3.7   Protein Total      6.8 - 8.8 g/dL 6.7 (L)   Alkaline Phosphatase      40 - 150 U/L 34 (L)   ALT      0 - 50 U/L 20   AST      0 - 45 U/L 22   Creatinine      0.52 - 1.04 mg/dL 0.75   GFR Estimate      >60 mL/min/1.7m2 74   GFR Estimate If Black      >60 mL/min/1.7m2 89     Results for MILLI PRIETO (MRN [id number]) as of 9/13/2018 12:45   Ref. Range 12/22/2016 11:38 4/27/2017 14:52 6/26/2017 09:42 9/8/2017 14:33 3/8/2018 11:43 9/10/2018 10:10   Hemoglobin Latest Ref Range: 11.7 - 15.7 g/dL 11.4 (L) 11.3 (L) 11.6 (L) 11.7 11.6 (L) 11.4 (L)     ASSESSMENT/PLAN:    Gera is a 82 year old woman with stage Ia, [id number], grade II, ER positive, CT  positive, 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 She was not recommended in favor of adjuvant radiation or chemotherapy, and has been on adjuvant Tamoxifen (AI not chosen due to OP and hx of compression fractures), since 08/24/2015.    1. Breast cancer - continue daily Tamoxifen, tolerating well. F/up with us in 6 months, with labs.    2. Hx of OP with compression Fx- continue Fosamax, calcium and vitamin D supplementation.   3. Breast cancer screening- b/l screening mammogram with tomosynthesis negative 9/10/2018, next due in 09/2019. Breast tissue was still heterogeneously dense on her mammogram on 9/10/18.    4. Hx of TCC of bladder- s/p  s/p TURBT in 1993 and follows up with Dr. Hammond  annually for cystoscopy, next in 01/2019.     5.Hx of NMSC, most recently SCCIS, right upper arm, s/p excision 8/9/18 - f/up with dermatology in 6 months.    6. Mild normocytic anemia, Hb borderline low, stable- . B12 level within normal limits. Ferritin 228 in 09/2017. Continue on PO ferrous sulfate 325 mg  QOD. EGD and colonoscopy in June 2017 negative for source of blood loss. Repeat CBC in 6 months. If recurrent/worsening anemia, consider capsule endoscopy.  At the end of our visit patient verbalized understanding and concurred with the plan.          Again, thank you for allowing me to participate in the care of your patient.        Sincerely,        Usha Salgado MD, MD

## 2019-01-03 ENCOUNTER — OFFICE VISIT (OUTPATIENT)
Dept: DERMATOLOGY | Facility: CLINIC | Age: 84
End: 2019-01-03
Payer: COMMERCIAL

## 2019-01-03 DIAGNOSIS — Z85.828 HISTORY OF NONMELANOMA SKIN CANCER: ICD-10-CM

## 2019-01-03 DIAGNOSIS — L82.1 SEBORRHEIC KERATOSIS: ICD-10-CM

## 2019-01-03 DIAGNOSIS — L57.0 ACTINIC KERATOSIS: Primary | ICD-10-CM

## 2019-01-03 PROCEDURE — 99213 OFFICE O/P EST LOW 20 MIN: CPT | Mod: 25 | Performed by: DERMATOLOGY

## 2019-01-03 PROCEDURE — 17000 DESTRUCT PREMALG LESION: CPT | Performed by: DERMATOLOGY

## 2019-01-03 ASSESSMENT — PAIN SCALES - GENERAL: PAINLEVEL: NO PAIN (0)

## 2019-01-03 NOTE — NURSING NOTE
Cydney Christiansen's goals for this visit include:   Chief Complaint   Patient presents with     RECHECK     Cydney is returning for a body recheck; one area on the chest and face needing to be assessed.       She requests these members of her care team be copied on today's visit information:     PCP: Kadi Sanchez    Referring Provider:  No referring provider defined for this encounter.    There were no vitals taken for this visit.    Do you need any medication refills at today's visit? No  Yasmin Cartagena LPN

## 2019-01-03 NOTE — PATIENT INSTRUCTIONS
Cryotherapy    What is it?    Use of a very cold liquid, such as liquid nitrogen, to freeze and destroy abnormal skin cells that need to be removed    What should I expect?    Tenderness and redness    A small blister that might grow and fill with dark purple blood. There may be crusting.    More than one treatment may be needed if the lesions do not go away.    How do I care for the treated area?    Gently wash the area with your hands when bathing.    Use a thin layer of Vaseline to help with healing. You may use a Band-Aid.     The area should heal within 7-10 days and may leave behind a pink or lighter color.     Do not use an antibiotic or Neosporin ointment.     You may take acetaminophen (Tylenol) for pain.     Call your Doctor if you have:    Severe pain    Signs of infection (warmth, redness, cloudy yellow drainage, and or a bad smell)    Questions or concerns    Who should I call with questions?       [facility]: [phone]       [facility]: [phone]       For urgent needs outside of business hours call the [facility] at [phone]        and ask for the dermatology resident on call

## 2019-01-03 NOTE — PROGRESS NOTES
[facility] Dermatology Note      Dermatology Problem List:  1. 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 HAK, left mid eyebrow, base not seen, 6/2014  4. Actinic keratosis  -s/p cryotherapy  5. GA R angle of jaw: resolves with triam cream    Encounter Date: Issa 3, 2019    CC:  Chief Complaint   Patient presents with     RECHECK     Cydney is returning for a body recheck; one area on the chest and face needing to be assessed.       History of Present Illness:  Ms. Cydney Christiansen is a 83 year old female with a history of multiple NMSC who presents for a 1-year skin check. She was last seen with Dr. Haro on 8/9/18 at which time an SCCIS on the right upper arm was excised. The patient has two new areas of concern to address today: itchy lesions on the face and chest. There was an AK that we biopsied in June on the left forearm that the patient says has not resolved.     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     Hypertension goal BP (blood pressure) < 140/90      Seborrheic keratosis     Malignant neoplasm of overlapping sites of left female breast (H)     Scoliosis     Compression fracture of thoracic vertebra (H)     Mass of left hand     Primary open angle glaucoma of both eyes, unspecified glaucoma stage     Osteoporosis, unspecified osteoporosis type, unspecified pathological fracture presence     Nuclear sclerosis of left eye     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)     Past Medical History:   Diagnosis Date     Actinic keratosis      Basal cell cancer 02/2011    bcc of the L back.     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    left leg     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     Past Surgical History:   Procedure Laterality Date     COLONOSCOPY  5/2008, 5/13, 6/14, 6/17    Q 3 years for advanced adenomatous polyp     CYSTOSCOPY  12/31/2008     D & C       GENITOURINARY SURGERY      TURBT     HC REMOVAL GALLBLADDER      open pan     HC TRABECULOPLASTY BY LASER SURGERY Left 4/18/07    SLT #1 OS (inf 180)     HC TRABECULOPLASTY BY LASER SURGERY Right 5/4/11    SLT #1 OD (inf 180?)     HC TRABECULOPLASTY BY LASER SURGERY Left 3/17/15    SLT #2 OS (sup 180)     HC TRABECULOPLASTY BY LASER SURGERY Right 6/23/15    SLT #2 OD (sup 180?)     LASER ARGON TREATMENT      SLT left eye x 2     LUMPECTOMY BREAST WITH SENTINEL  NODE, COMBINED Left 7/29/2015    Procedure: COMBINED LUMPECTOMY BREAST WITH SENTINEL NODE;  Surgeon: Ifeanyi Sunshine MD;  Location: [facility] OR     PHACOEMULSIFICATION CLEAR CORNEA WITH STANDARD INTRAOCULAR LENS IMPLANT Left 12/8/2017    Procedure: PHACOEMULSIFICATION CLEAR CORNEA WITH STANDARD INTRAOCULAR LENS IMPLANT;   COMPLEX LEFT PHACOEMULSIFICATION CLEAR CORNEA WITH STANDARD INTRAOCULAR LENS IMPLANT ;  Surgeon: Kvng Garcia MD;  Location: [facility]     SURGICAL HISTORY OF -   9/11    squamous cell CA excised from back     TUBAL LIGATION         Social History:    [employer].  Upcoming wedding shower    Family History:  No family history of skin cancer.     Medications:  Current Outpatient Medications   Medication Sig Dispense Refill     alendronate (FOSAMAX) 70 MG tablet TAKE 1 TAB BY MOUTH EVERY 7 DAYS WITH 8 OZ WATER 60 MIN BEFORE FOOD. SIT UPRIGHT FOR 30 MINUTES 12 tablet 3     CALCIUM 600 MG OR TABS 1 daily       Cyanocobalamin (B-12) 500 MCG TABS Take 500 mcg by mouth daily 150 tablet 3     dorzolamide-timolol (COSOPT) 2-0.5 % ophthalmic solution Place 1 drop into both eyes 2 times daily 10 mL 11     Ferrous Sulfate (IRON SUPPLEMENT PO) Take 325 mg by mouth daily       ibuprofen (ADVIL,MOTRIN) 200 MG tablet Take 200 mg by mouth every 4 hours as needed.       latanoprost (XALATAN) 0.005 % ophthalmic solution Place 1 drop into both eyes At Bedtime 1 Bottle 11     losartan (COZAAR) 25 MG tablet Take 1 tablet (25 mg) by mouth daily 90 tablet 4     meclizine (ANTIVERT) 25 MG tablet Take 1 tablet (25 mg) by mouth every 6 hours as needed for dizziness 30 tablet 1     simvastatin (ZOCOR) 20 MG tablet Take 1 tablet (20 mg) by mouth At Bedtime 90 tablet 4     tamoxifen (NOLVADEX) 20 MG tablet Take 1 tablet (20 mg) by mouth daily 90 tablet 1     triamcinolone (KENALOG) 0.1 % cream Apply to affected area of the face once to twice a day. 15 g 1     VITAMIN D-1000 MAX -1000 MG-UNIT OR TABS 1 daily         Allergies    Allergen Reactions     Lisinopril Cough       Review of Systems:  -Constitutional: The patient is otherwise feeling well.       Physical exam:  Vitals: There were no vitals taken for this visit.  GEN: This is a well-nourished, well developed female in no acute distress  NEURO: Alert and oriented  PSYCH: in a pleasant mood, appropriate affect  SKIN: Total skin excluding the undergarment areas was performed. The exam included the head/face, neck, both arms, chest, back, abdomen, both legs, digits and/or nails.Exam included the buttocks.    - On the right nasal sidewall, there is a 6 mm erythematous macule with overlying scale.   - There are waxy stuck on tan to brown papules on the left cheek and central chest.  - Erythematous macule with overlying adherent scale on the left forearm.   - There is a tan to brown waxy stuck on papule with surrounding erythema on the mid back.   -No other lesions of concern on areas examined.       Impression/Plan:  1. History of nonmelanoma skin cancer, no clincial evidence of recurrence    Recommend sunscreens SPF #30 or greater, protective clothing and avoidance of tanning beds.    2. Actinic keratosis, left forearm s/p biopsy- still present  Cryotherapy procedure note: After verbal consent and discussion of risks and benefits including but no limited to dyspigmentation/scar, blister, and pain, 1 were treated with 1-2mm freeze border for 2 cycles with liquid nitrogen. Post cryotherapy instructions were provided.     3. Seborrheic keratoses, left cheek, face back, central chest    No further intervention needed.     4. On the right nasal sidewall, there is a 6 mm erythematous macule with overlying scale.    Will plan for biopsy in February - pt has bridal shower to attend/is travelling to [place].     5. ISK, mid back    Will clinically monitor, will recheck at the pt's next appointment.     Follow-up in February for biopsy and spot check mid back    Staff Involved:  Scribe/Staff      Scribe Disclosure  I, Osmel Morgan, am serving as a scribe to document services personally performed by Dr. Qi Barba MD, based on data collection and the provider's statements to me.     Provider Disclosure:   The documentation recorded by the scribe accurately reflects the services I personally performed and the decisions made by me.    Qi Barba MD    Department of Dermatology  [facility]: Phone: [phone], Fax:[phone]  [facility]: Phone: [phone], Fax: [phone]

## 2019-01-03 NOTE — LETTER
1/3/2019         RE: Cydney Christiansen  [address]  [place] [address]        Dear Colleague,    Thank you for referring your patient, Cydney Christiansen, to the [facility]. Please see a copy of my visit note below.    [facility] Dermatology Note      Dermatology Problem List:  1. 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 HAK, left mid eyebrow, base not seen, 6/2014  4. Actinic keratosis  -s/p cryotherapy  5. GA R angle of jaw: resolves with triam cream    Encounter Date: Issa 3, 2019    CC:  Chief Complaint   Patient presents with     RECHECK     Cydney is returning for a body recheck; one area on the chest and face needing to be assessed.       History of Present Illness:  Ms. Cydney Christiansen is a 83 year old female with a history of multiple NMSC who presents for a 1-year skin check. She was last seen with Dr. Haro on 8/9/18 at which time an SCCIS on the right upper arm was excised. The patient has two new areas of concern to address today: itchy lesions on the face and chest. There was an AK that we biopsied in June on the left forearm that the patient says has not resolved.     Past Medical History:   Patient Active Problem List   Diagnosis     History of basal cell carcinoma     PXF  OU     Personal history of malignant neoplasm of bladder     Advanced directives, counseling/discussion     Hyperlipidemia LDL goal <160     Family history of diabetes mellitus     Health Care Home      Squamous cell carcinoma     Basal cell carcinoma     Skin lesion of left leg     Viral warts     PVD (posterior vitreous detachment), OS     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     Hypertension goal BP (blood pressure) < 140/90     Seborrheic keratosis     Malignant neoplasm of overlapping sites of left female breast (H)     Scoliosis     Compression fracture of thoracic vertebra (H)     Mass of left hand     Primary open angle glaucoma of both eyes, unspecified glaucoma stage     Osteoporosis, unspecified osteoporosis type, unspecified pathological fracture presence     Nuclear sclerosis of left eye     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)     Past Medical History:   Diagnosis Date     Actinic keratosis      Basal cell cancer 02/2011    bcc of the L back.     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    left leg     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     Past Surgical History:   Procedure Laterality Date     COLONOSCOPY  5/2008, 5/13, 6/14, 6/17    Q 3 years for advanced adenomatous polyp     CYSTOSCOPY  12/31/2008     D & C       GENITOURINARY SURGERY      TURBT     HC REMOVAL GALLBLADDER      open pan     HC TRABECULOPLASTY BY LASER SURGERY Left 4/18/07    SLT #1 OS (inf 180)     HC TRABECULOPLASTY BY LASER SURGERY Right 5/4/11    SLT #1 OD  (inf 180?)     HC TRABECULOPLASTY BY LASER SURGERY Left 3/17/15    SLT #2 OS (sup 180)     HC TRABECULOPLASTY BY LASER SURGERY Right 6/23/15    SLT #2 OD (sup 180?)     LASER ARGON TREATMENT      SLT left eye x 2     LUMPECTOMY BREAST WITH SENTINEL NODE, COMBINED Left 7/29/2015    Procedure: COMBINED LUMPECTOMY BREAST WITH SENTINEL NODE;  Surgeon: Ifeanyi Sunshine MD;  Location: [facility] OR     PHACOEMULSIFICATION CLEAR CORNEA WITH STANDARD INTRAOCULAR LENS IMPLANT Left 12/8/2017    Procedure: PHACOEMULSIFICATION CLEAR CORNEA WITH STANDARD INTRAOCULAR LENS IMPLANT;   COMPLEX LEFT PHACOEMULSIFICATION CLEAR CORNEA WITH STANDARD INTRAOCULAR LENS IMPLANT ;  Surgeon: Kvng Garcia MD;  Location: [facility]     SURGICAL HISTORY OF -   9/11    squamous cell CA excised from back     TUBAL LIGATION         Social History:    [employer].  Upcoming wedding shower    Family History:  No family history of skin cancer.     Medications:  Current Outpatient Medications   Medication Sig Dispense Refill     alendronate (FOSAMAX) 70 MG tablet TAKE 1 TAB BY MOUTH EVERY 7 DAYS WITH 8 OZ WATER 60 MIN BEFORE FOOD. SIT UPRIGHT FOR 30 MINUTES 12 tablet 3     CALCIUM 600 MG OR TABS 1 daily       Cyanocobalamin (B-12) 500 MCG TABS Take 500 mcg by mouth daily 150 tablet 3     dorzolamide-timolol (COSOPT) 2-0.5 % ophthalmic solution Place 1 drop into both eyes 2 times daily 10 mL 11     Ferrous Sulfate (IRON SUPPLEMENT PO) Take 325 mg by mouth daily       ibuprofen (ADVIL,MOTRIN) 200 MG tablet Take 200 mg by mouth every 4 hours as needed.       latanoprost (XALATAN) 0.005 % ophthalmic solution Place 1 drop into both eyes At Bedtime 1 Bottle 11     losartan (COZAAR) 25 MG tablet Take 1 tablet (25 mg) by mouth daily 90 tablet 4     meclizine (ANTIVERT) 25 MG tablet Take 1 tablet (25 mg) by mouth every 6 hours as needed for dizziness 30 tablet 1     simvastatin (ZOCOR) 20 MG tablet Take 1 tablet (20 mg) by mouth At Bedtime 90 tablet 4     tamoxifen  (NOLVADEX) 20 MG tablet Take 1 tablet (20 mg) by mouth daily 90 tablet 1     triamcinolone (KENALOG) 0.1 % cream Apply to affected area of the face once to twice a day. 15 g 1     VITAMIN D-1000 MAX -1000 MG-UNIT OR TABS 1 daily         Allergies   Allergen Reactions     Lisinopril Cough       Review of Systems:  -Constitutional: The patient is otherwise feeling well.       Physical exam:  Vitals: There were no vitals taken for this visit.  GEN: This is a well-nourished, well developed female in no acute distress  NEURO: Alert and oriented  PSYCH: in a pleasant mood, appropriate affect  SKIN: Total skin excluding the undergarment areas was performed. The exam included the head/face, neck, both arms, chest, back, abdomen, both legs, digits and/or nails.Exam included the buttocks.    - On the right nasal sidewall, there is a 6 mm erythematous macule with overlying scale.   - There are waxy stuck on tan to brown papules on the left cheek and central chest.  - Erythematous macule with overlying adherent scale on the left forearm.   - There is a tan to brown waxy stuck on papule with surrounding erythema on the mid back.   -No other lesions of concern on areas examined.       Impression/Plan:  1. History of nonmelanoma skin cancer, no clincial evidence of recurrence    Recommend sunscreens SPF #30 or greater, protective clothing and avoidance of tanning beds.    2. Actinic keratosis, left forearm s/p biopsy- still present  Cryotherapy procedure note: After verbal consent and discussion of risks and benefits including but no limited to dyspigmentation/scar, blister, and pain, 1 were treated with 1-2mm freeze border for 2 cycles with liquid nitrogen. Post cryotherapy instructions were provided.     3. Seborrheic keratoses, left cheek, face back, central chest    No further intervention needed.     4. On the right nasal sidewall, there is a 6 mm erythematous macule with overlying scale.    Will plan for biopsy in  February - pt has bridal shower to attend/is travelling to [place].     5. ISK, mid back    Will clinically monitor, will recheck at the pt's next appointment.     Follow-up in February for biopsy and spot check mid back    Staff Involved:  Scribe/Staff     Scribe Disclosure  I, Osmel Morgan, am serving as a scribe to document services personally performed by Dr. Qi Barba MD, based on data collection and the provider's statements to me.     Provider Disclosure:   The documentation recorded by the scribe accurately reflects the services I personally performed and the decisions made by me.    Qi Barba MD    Department of Dermatology  [facility]: Phone: [phone], Fax:[phone]  [facility]: Phone: [phone], Fax: [phone]          Again, thank you for allowing me to participate in the care of your patient.        Sincerely,        Qi Barba MD

## 2019-01-08 ENCOUNTER — OFFICE VISIT (OUTPATIENT)
Dept: UROLOGY | Facility: CLINIC | Age: 84
End: 2019-01-08
Payer: COMMERCIAL

## 2019-01-08 DIAGNOSIS — Z85.51 PERSONAL HISTORY OF MALIGNANT NEOPLASM OF BLADDER: Primary | ICD-10-CM

## 2019-01-08 PROCEDURE — 52000 CYSTOURETHROSCOPY: CPT | Performed by: UROLOGY

## 2019-01-08 NOTE — PROGRESS NOTES
S: Cydney Christiansen is a 83 year old female returns for bladder cancer surveillance.    she has history of bladder cancer without any recurrence since 1997.    She received 0 courses of BCG therapy.    Patient is draped and prepped.  Flexible cystoscopy placed under direct vision.      Cysto:  The anterior urethra is normal     In the bladder there is normal mucosa.    Assessment/Plan:  (Z85.51) Personal history of malignant neoplasm of bladder  (primary encounter diagnosis)  Comment:  No evidence of recurrence  Plan: BTR in one year

## 2019-01-31 ENCOUNTER — TELEPHONE (OUTPATIENT)
Dept: ONCOLOGY | Facility: CLINIC | Age: 84
End: 2019-01-31

## 2019-01-31 NOTE — TELEPHONE ENCOUNTER
rescheduled from 03/15/2019 moved to 03/29/2019 provider not in clinic, I spoke to the patient and mailed a letter appt date/time change-cap-01/31/2019

## 2019-03-08 ENCOUNTER — OFFICE VISIT (OUTPATIENT)
Dept: OPHTHALMOLOGY | Facility: CLINIC | Age: 84
End: 2019-03-08
Payer: COMMERCIAL

## 2019-03-08 DIAGNOSIS — H52.01 HYPERMETROPIA OF RIGHT EYE: ICD-10-CM

## 2019-03-08 DIAGNOSIS — H52.11 MYOPIA OF RIGHT EYE: ICD-10-CM

## 2019-03-08 DIAGNOSIS — Z96.1 PSEUDOPHAKIA OF LEFT EYE: ICD-10-CM

## 2019-03-08 DIAGNOSIS — H25.11 NUCLEAR SCLEROSIS OF RIGHT EYE: ICD-10-CM

## 2019-03-08 DIAGNOSIS — H52.223 REGULAR ASTIGMATISM OF BOTH EYES: ICD-10-CM

## 2019-03-08 DIAGNOSIS — H26.8 PXF (PSEUDOEXFOLIATION OF LENS CAPSULE): ICD-10-CM

## 2019-03-08 DIAGNOSIS — H43.812 PVD (POSTERIOR VITREOUS DETACHMENT), LEFT: ICD-10-CM

## 2019-03-08 DIAGNOSIS — H52.4 PRESBYOPIA: ICD-10-CM

## 2019-03-08 DIAGNOSIS — Z01.01 ENCOUNTER FOR EXAMINATION OF EYES AND VISION WITH ABNORMAL FINDINGS: Primary | ICD-10-CM

## 2019-03-08 DIAGNOSIS — H40.1431 PSEUDOEXFOLIATIVE GLAUCOMA, BOTH EYES, MILD STAGE: ICD-10-CM

## 2019-03-08 PROCEDURE — 92015 DETERMINE REFRACTIVE STATE: CPT | Performed by: STUDENT IN AN ORGANIZED HEALTH CARE EDUCATION/TRAINING PROGRAM

## 2019-03-08 PROCEDURE — 92014 COMPRE OPH EXAM EST PT 1/>: CPT | Performed by: STUDENT IN AN ORGANIZED HEALTH CARE EDUCATION/TRAINING PROGRAM

## 2019-03-08 RX ORDER — DORZOLAMIDE HYDROCHLORIDE AND TIMOLOL MALEATE 20; 5 MG/ML; MG/ML
1 SOLUTION/ DROPS OPHTHALMIC 2 TIMES DAILY
Qty: 10 ML | Refills: 11 | Status: SHIPPED | OUTPATIENT
Start: 2019-03-08 | End: 2020-03-23

## 2019-03-08 RX ORDER — LATANOPROST 50 UG/ML
1 SOLUTION/ DROPS OPHTHALMIC AT BEDTIME
Qty: 1 BOTTLE | Refills: 11 | Status: SHIPPED | OUTPATIENT
Start: 2019-03-08 | End: 2020-03-23

## 2019-03-08 ASSESSMENT — REFRACTION_MANIFEST
OD_AXIS: 150
OD_ADD: +2.50
OS_AXIS: 180
OD_SPHERE: +0.25
OS_CYLINDER: +0.75
OS_SPHERE: -0.75
OD_CYLINDER: +0.50
OS_ADD: +2.50

## 2019-03-08 ASSESSMENT — TONOMETRY
IOP_METHOD: APPLANATION
OD_IOP_MMHG: 17
OS_IOP_MMHG: 14

## 2019-03-08 ASSESSMENT — CONF VISUAL FIELD
OD_NORMAL: 1
METHOD: COUNTING FINGERS
OS_NORMAL: 1

## 2019-03-08 ASSESSMENT — CUP TO DISC RATIO
OD_RATIO: 0.6
OS_RATIO: 0.5

## 2019-03-08 ASSESSMENT — VISUAL ACUITY
CORRECTION_TYPE: GLASSES
METHOD: SNELLEN - LINEAR
OS_CC: 20/20
OD_BAT_MED: 20/60
OD_CC: 20/50
OD_BAT_LOW: 20/50-2
OD_CC+: -1
OD_BAT_HIGH: CF@2'
OS_CC+: -2

## 2019-03-08 ASSESSMENT — REFRACTION_WEARINGRX
OD_ADD: +3.25
OD_CYLINDER: +0.75
OD_AXIS: 146
OS_AXIS: 161
OS_CYLINDER: +0.25
SPECS_TYPE: BIFOCAL
OD_SPHERE: PLANO
OS_ADD: +3.00
OS_SPHERE: -2.50

## 2019-03-08 ASSESSMENT — EXTERNAL EXAM - RIGHT EYE: OD_EXAM: NORMAL

## 2019-03-08 ASSESSMENT — SLIT LAMP EXAM - LIDS
COMMENTS: NORMAL
COMMENTS: NORMAL

## 2019-03-08 ASSESSMENT — PACHYMETRY
OS_CT(UM): .657
OD_CT(UM): .614

## 2019-03-08 ASSESSMENT — EXTERNAL EXAM - LEFT EYE: OS_EXAM: NORMAL

## 2019-03-08 NOTE — LETTER
3/8/2019         RE: Cydney Christiansen  [address]  [place] [address]        Dear Colleague,    Thank you for referring your patient, Cydney Christiansen, to the [facility]. Please see a copy of my visit note below.     Current Eye Medications: Cosopt twice a day both eyes, last took at 8 am. Latanoprost at bedtime both eyes, last took at 10:15 pm.     Subjective:  Complete eye exam. Vision is OK both eyes, sometimes things look little blurry and little yellow right eye. No eye pain or discomfort in either eye.      Objective:  See Ophthalmology Exam.       Assessment:  Cydney Christiansen is a 83 year old female who presents with:     PXF (PSEUDOEXFOLIATION OF LENS CAPSULE) OU Intraocular pressure 17/14 today.      Pseudoexfoliative glaucoma, both eyes, mild stage      Nuclear sclerosis of right eye Recommend cataract surgery right eye.      Pseudophakia of left eye      PVD (posterior vitreous detachment), left        Plan:  Offered cataract surgery for the right eye at [facility] if you wish    Continue Cosopt (dorzolamide-timolol -- dark blue top) twice a day both eyes     Continue Latanoprost (teal top) at bedtime both eyes     Kvng Garcia MD  [phone]        Again, thank you for allowing me to participate in the care of your patient.        Sincerely,        Kvng Garcia MD

## 2019-03-08 NOTE — PATIENT INSTRUCTIONS
Offered cataract surgery for the right eye at [facility] [place] [facility] if you wish    Continue Cosopt (dorzolamide-timolol -- dark blue top) twice a day both eyes     Continue Latanoprost (teal top) at bedtime both eyes     Kvng Garcia MD  [phone]

## 2019-03-08 NOTE — PROGRESS NOTES
Current Eye Medications: Cosopt twice a day both eyes, last took at 8 am. Latanoprost at bedtime both eyes, last took at 10:15 pm.     Subjective:  Complete eye exam. Vision is OK both eyes, sometimes things look little blurry and little yellow right eye. No eye pain or discomfort in either eye.      Objective:  See Ophthalmology Exam.       Assessment:  Cydney Christianesn is a 83 year old female who presents with:     PXF (PSEUDOEXFOLIATION OF LENS CAPSULE) OU Intraocular pressure 17/14 today.      Pseudoexfoliative glaucoma, both eyes, mild stage      Nuclear sclerosis of right eye Recommend cataract surgery right eye.      Pseudophakia of left eye      PVD (posterior vitreous detachment), left        Plan:  Offered cataract surgery for the right eye at [facility] if you wish    Continue Cosopt (dorzolamide-timolol -- dark blue top) twice a day both eyes     Continue Latanoprost (teal top) at bedtime both eyes     Kvng Garcia MD  [phone]

## 2019-03-15 ENCOUNTER — OFFICE VISIT (OUTPATIENT)
Dept: DERMATOLOGY | Facility: CLINIC | Age: 84
End: 2019-03-15
Payer: COMMERCIAL

## 2019-03-15 DIAGNOSIS — D48.5 NEOPLASM OF UNCERTAIN BEHAVIOR OF SKIN: Primary | ICD-10-CM

## 2019-03-15 DIAGNOSIS — L57.0 AK (ACTINIC KERATOSIS): ICD-10-CM

## 2019-03-15 PROCEDURE — 17000 DESTRUCT PREMALG LESION: CPT | Mod: 59 | Performed by: DERMATOLOGY

## 2019-03-15 PROCEDURE — 17003 DESTRUCT PREMALG LES 2-14: CPT | Performed by: DERMATOLOGY

## 2019-03-15 PROCEDURE — 11103 TANGNTL BX SKIN EA SEP/ADDL: CPT | Performed by: DERMATOLOGY

## 2019-03-15 PROCEDURE — 11102 TANGNTL BX SKIN SINGLE LES: CPT | Performed by: DERMATOLOGY

## 2019-03-15 PROCEDURE — 88305 TISSUE EXAM BY PATHOLOGIST: CPT | Mod: TC | Performed by: DERMATOLOGY

## 2019-03-15 ASSESSMENT — PAIN SCALES - GENERAL: PAINLEVEL: NO PAIN (0)

## 2019-03-15 NOTE — PROGRESS NOTES
[facility] Dermatology Note      Dermatology Problem List:  1. 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 HAK, left mid eyebrow, base not seen, 6/2014  4. Actinic keratosis  -s/p cryotherapy  5. GA R angle of jaw: resolves with triam cream    Encounter Date: Mar 15, 2019    CC:  Chief Complaint   Patient presents with     RECHECK     Cydney is visiting for a biopsy of the right nasal wall       History of Present Illness:  Ms. Cydney Christiansen is a 83 year old female with a history of multiple NMSC who presents for biopsy of a lesion on the right nasal sidewall. Last seen 1/3/19 when this lesion was identified. Today, the patient reports that this lesion has gotten smaller since January. She cannot even remember which spot on the back we were following. No other concerns.     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     Hypertension goal BP (blood pressure) < 140/90     Seborrheic keratosis     Malignant neoplasm of overlapping sites of left female breast (H)      Scoliosis     Compression fracture of thoracic vertebra (H)     Mass of left hand     Primary open angle glaucoma of both eyes, unspecified glaucoma stage     Osteoporosis, unspecified osteoporosis type, unspecified pathological fracture presence     Nuclear sclerosis of left eye     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)     Past Medical History:   Diagnosis Date     Actinic keratosis      Basal cell cancer 02/2011    bcc of the L back.     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    left leg     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     Past Surgical History:   Procedure Laterality Date     COLONOSCOPY  5/2008, 5/13, 6/14, 6/17    Q 3 years for advanced adenomatous polyp     CYSTOSCOPY  12/31/2008     D & C       GENITOURINARY SURGERY      TURBT     HC REMOVAL GALLBLADDER      open pan     HC TRABECULOPLASTY BY LASER SURGERY Left 4/18/07    SLT #1 OS (inf 180)     HC TRABECULOPLASTY BY LASER SURGERY Right 5/4/11    SLT #1 OD (inf 180?)     HC TRABECULOPLASTY BY LASER SURGERY Left 3/17/15    SLT #2 OS (sup 180)     HC TRABECULOPLASTY BY LASER SURGERY Right 6/23/15    SLT #2 OD (sup 180?)     LASER ARGON TREATMENT      SLT left eye x 2     LUMPECTOMY BREAST WITH SENTINEL NODE, COMBINED Left 7/29/2015    Procedure: COMBINED LUMPECTOMY BREAST WITH SENTINEL NODE;   Surgeon: Ifeanyi Sunshine MD;  Location:  OR     PHACOEMULSIFICATION CLEAR CORNEA WITH STANDARD INTRAOCULAR LENS IMPLANT Left 12/8/2017    Procedure: PHACOEMULSIFICATION CLEAR CORNEA WITH STANDARD INTRAOCULAR LENS IMPLANT;   COMPLEX LEFT PHACOEMULSIFICATION CLEAR CORNEA WITH STANDARD INTRAOCULAR LENS IMPLANT ;  Surgeon: Kvng Garcia MD;  Location: [facility]     SURGICAL HISTORY OF -   9/11    squamous cell CA excised from back     TUBAL LIGATION         Social History:    [employer].  Upcoming wedding shower    Family History:  No family history of skin cancer.     Medications:  Current Outpatient Medications   Medication Sig Dispense Refill     alendronate (FOSAMAX) 70 MG tablet TAKE 1 TAB BY MOUTH EVERY 7 DAYS WITH 8 OZ WATER 60 MIN BEFORE FOOD. SIT UPRIGHT FOR 30 MINUTES 12 tablet 3     CALCIUM 600 MG OR TABS 1 daily       Cyanocobalamin (B-12) 500 MCG TABS Take 500 mcg by mouth daily 150 tablet 3     dorzolamide-timolol (COSOPT) 2-0.5 % ophthalmic solution Place 1 drop into both eyes 2 times daily 10 mL 11     Ferrous Sulfate (IRON SUPPLEMENT PO) Take 325 mg by mouth daily       ibuprofen (ADVIL,MOTRIN) 200 MG tablet Take 200 mg by mouth every 4 hours as needed.       latanoprost (XALATAN) 0.005 % ophthalmic solution Place 1 drop into both eyes At Bedtime 1 Bottle 11     losartan (COZAAR) 25 MG tablet Take 1 tablet (25 mg) by mouth daily 90 tablet 4     meclizine (ANTIVERT) 25 MG tablet Take 1 tablet (25 mg) by mouth every 6 hours as needed for dizziness 30 tablet 1     simvastatin (ZOCOR) 20 MG tablet Take 1 tablet (20 mg) by mouth At Bedtime 90 tablet 4     tamoxifen (NOLVADEX) 20 MG tablet Take 1 tablet (20 mg) by mouth daily 90 tablet 1     triamcinolone (KENALOG) 0.1 % cream Apply to affected area of the face once to twice a day. 15 g 1     VITAMIN D-1000 MAX -1000 MG-UNIT OR TABS 1 daily         Allergies   Allergen Reactions     Lisinopril Cough       Review of Systems:  -Constitutional: Otherwise  feeling well, in usual state of health.   Skin: As per HPI, no other concerns.     Physical exam:  Vitals: There were no vitals taken for this visit.  GEN: This is a well-nourished, well developed female in no acute distress  NEURO: Alert and oriented  PSYCH: in a pleasant mood, appropriate affect  SKIN: Focused examination of the forearms, hands, back, face was performed.  - On the right nasal sidewall, there is a 6 mm erythematous macule with overlying scale.   - There is a tan to brown waxy stuck on papule with surrounding erythema on the mid back.   - There are erythematous macules with overyling adherent scale on the right forearm, left dorsal hand.   - Erythematous patch, 1.5 cm, on the left forearm  -No other lesions of concern on areas examined.     Impression/Plan:  1. History of nonmelanoma skin cancer - not addressed today.     2. Actinic keratosis, right forearm, left dorsal hand.   Cryotherapy procedure note: After verbal consent and discussion of risks and benefits including but no limited to dyspigmentation/scar, blister, and pain, 3 were treated with 1-2mm freeze border for 2 cycles with liquid nitrogen. Post cryotherapy instructions were provided.     3. On the right nasal sidewall, there is a 6 mm erythematous macule with overlying scale. NUB. Differential includes AK vs acanthoma fissuratum vs SCC.     Shave biopsy:  After discussion of benefits and risks including but not limited to bleeding/bruising, pain/swelling, infection, scar, incomplete removal, nerve damage/numbness, recurrence, and non-diagnostic biopsy, written consent, verbal consent and photographs were obtained. Time-out was performed. The area was cleaned with isopropyl alcohol. 0.5mL of 1% lidocaine with epinephrine was injected to obtain adequate anesthesia of the lesion on the right nasal sidewall. . A shave biopsy was performed. Hemostasis was achieved with aluminium chloride. Vaseline and a sterile dressing were applied. The  patient tolerated the procedure and no complications were noted. The patient was provided with verbal and written post care instructions.     4. ISK, mid back - unchanged since last visit.     Rechecked, no issues today.    5. Erythematous patch, 1.5 cm, on the left forearm. Previously biopsied on 6/28/18 and classified as an actinic keratosis; treated with cryotherapy on 1/3/19. Re-biopsy today.     Shave biopsy:  After discussion of benefits and risks including but not limited to bleeding/bruising, pain/swelling, infection, scar, incomplete removal, nerve damage/numbness, recurrence, and non-diagnostic biopsy, written consent, verbal consent and photographs were obtained. Time-out was performed. The area was cleaned with isopropyl alcohol. 0.5mL of 1% lidocaine with epinephrine was injected to obtain adequate anesthesia of the lesion on the left forearm. A shave biopsy was performed. Hemostasis was achieved with aluminium chloride. Vaseline and a sterile dressing were applied. The patient tolerated the procedure and no complications were noted. The patient was provided with verbal and written post care instructions.     Follow-up in 4 months.     Staff Involved:  Scribe/Staff     Scribe Disclosure  I, Osmel Morgan, am serving as a scribe to document services personally performed by Dr. Qi Barba MD, based on data collection and the provider's statements to me.     Provider Disclosure:   The documentation recorded by the scribe accurately reflects the services I personally performed and the decisions made by me.    Qi Barba MD    Department of Dermatology  [facility]: Phone: [phone], Fax:[phone]  [facility]: Phone: [phone], Fax: [phone]

## 2019-03-15 NOTE — PATIENT INSTRUCTIONS
Cryotherapy    What is it?    Use of a very cold liquid, such as liquid nitrogen, to freeze and destroy abnormal skin cells that need to be removed    What should I expect?    Tenderness and redness    A small blister that might grow and fill with dark purple blood. There may be crusting.    More than one treatment may be needed if the lesions do not go away.    How do I care for the treated area?    Gently wash the area with your hands when bathing.    Use a thin layer of Vaseline to help with healing. You may use a Band-Aid.     The area should heal within 7-10 days and may leave behind a pink or lighter color.     Do not use an antibiotic or Neosporin ointment.     You may take acetaminophen (Tylenol) for pain.     Call your Doctor if you have:    Severe pain    Signs of infection (warmth, redness, cloudy yellow drainage, and or a bad smell)    Questions or concerns    Who should I call with questions?       [facility]: [phone]       [facility]: [phone]       For urgent needs outside of business hours call the [facility] at [phone]        and ask for the dermatology resident on call    Wound Care After a Biopsy    What is a skin biopsy?  A skin biopsy allows the doctor to examine a very small piece of tissue under the microscope to determine the diagnosis and the best treatment for the skin condition. A local anesthetic (numbing medicine)  is injected with a very small needle into the skin area to be tested. A small piece of skin is taken from the area. Sometimes a suture (stitch) is used.     What are the risks of a skin biopsy?  I will experience scar, bleeding, swelling, pain, crusting and redness. I may experience incomplete removal or recurrence. Risks of this procedure are excessive bleeding, bruising, infection, nerve damage, numbness, thick (hypertrophic or keloidal) scar and non-diagnostic biopsy.    How should I care  for my wound for the first 24 hours?    Keep the wound dry and covered for 24 hours    If it bleeds, hold direct pressure on the area for 15 minutes. If bleeding does not stop then go to the emergency room    Avoid strenuous exercise the first 1-2 days or as your doctor instructs you    How should I care for the wound after 24 hours?    After 24 hours, remove the bandage    You may bathe or shower as normal    If you had a scalp biopsy, you can shampoo as usual and can use shower water to clean the biopsy site daily    Clean the wound twice a day with gentle soap and water    Do not scrub, be gentle    Apply white petroleum/Vaseline after cleaning the wound with a cotton swab or a clean finger, and keep the site covered with a Bandaid /bandage. Bandages are not necessary with a scalp biopsy    If you are unable to cover the site with a Bandaid /bandage, re-apply ointment 2-3 times a day to keep the site moist. Moisture will help with healing    Avoid strenuous activity for first 1-2 days    Avoid lakes, rivers, pools, and oceans until the stitches are removed or the site is healed    How do I clean my wound?    Wash hands thoroughly with soap or use hand  before all wound care    Clean the wound with gentle soap and water    Apply white petroleum/Vaseline  to wound after it is clean    Replace the Bandaid /bandage to keep the wound covered for the first few days or as instructed by your doctor    If you had a scalp biopsy, warm shower water to the area on a daily basis should suffice    What should I use to clean my wound?     Cotton-tipped applicators (Qtips )    White petroleum jelly (Vaseline ). Use a clean new container and use Q-tips to apply.    Bandaids   as needed    Gentle soap     How should I care for my wound long term?    Do not get your wound dirty    Keep up with wound care for one week or until the area is healed.    A small scab will form and fall off by itself when the area is completely  healed. The area will be red and will become pink in color as it heals. Sun protection is very important for how your scar will turn out. Sunscreen with an SPF 30 or greater is recommended once the area is healed.        You should have some soreness but it should be mild and slowly go away over several days. Talk to your doctor about using tylenol for pain,    When should I call my doctor?  If you have increased:     Pain or swelling    Pus or drainage (clear or slightly yellow drainage is ok)    Temperature over 100F    Spreading redness or warmth around wound    When will I hear about my results?  The biopsy results can take 2-3 weeks to come back. The clinic will call you with the results, send you a [employer] message, or have you schedule a follow-up clinic or phone time to discuss the results. Contact our clinics if you do not hear from us in 3 weeks.     Who should I call with questions?    [facility]: [phone]     [facility]: [phone]    For urgent needs outside of business hours call the [facility] at [phone] and ask for the dermatology resident on call

## 2019-03-15 NOTE — LETTER
3/15/2019         RE: Cydney Christiansen  [address]  [place] [address]        Dear Colleague,    Thank you for referring your patient, Cydney Christiansen, to the [facility]. Please see a copy of my visit note below.    [facility] Dermatology Note      Dermatology Problem List:  1. 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 HAK, left mid eyebrow, base not seen, 6/2014  4. Actinic keratosis  -s/p cryotherapy  5. GA R angle of jaw: resolves with triam cream    Encounter Date: Mar 15, 2019    CC:  Chief Complaint   Patient presents with     RECHECK     Cydney is visiting for a biopsy of the right nasal wall       History of Present Illness:  Ms. Cydney Christiansen is a 83 year old female with a history of multiple NMSC who presents for biopsy of a lesion on the right nasal sidewall. Last seen 1/3/19 when this lesion was identified. Today, the patient reports that this lesion has gotten smaller since January. She cannot even remember which spot on the back we were following. No other concerns.     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    left leg     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     Past Surgical History:   Procedure Laterality Date     COLONOSCOPY  5/2008, 5/13, 6/14, 6/17    Q 3 years for advanced adenomatous polyp     CYSTOSCOPY  12/31/2008     D & C       GENITOURINARY SURGERY      TURBT     HC REMOVAL GALLBLADDER      open pan     HC TRABECULOPLASTY BY LASER SURGERY Left 4/18/07    SLT #1 OS (inf 180)     HC TRABECULOPLASTY BY LASER SURGERY Right 5/4/11    SLT #1 OD (inf 180?)     HC TRABECULOPLASTY BY LASER SURGERY Left 3/17/15    SLT #2 OS (sup 180)     HC  TRABECULOPLASTY BY LASER SURGERY Right 6/23/15    SLT #2 OD (sup 180?)     LASER ARGON TREATMENT      SLT left eye x 2     LUMPECTOMY BREAST WITH SENTINEL NODE, COMBINED Left 7/29/2015    Procedure: COMBINED LUMPECTOMY BREAST WITH SENTINEL NODE;  Surgeon: Ifeanyi Sunshine MD;  Location:  OR     PHACOEMULSIFICATION CLEAR CORNEA WITH STANDARD INTRAOCULAR LENS IMPLANT Left 12/8/2017    Procedure: PHACOEMULSIFICATION CLEAR CORNEA WITH STANDARD INTRAOCULAR LENS IMPLANT;   COMPLEX LEFT PHACOEMULSIFICATION CLEAR CORNEA WITH STANDARD INTRAOCULAR LENS IMPLANT ;  Surgeon: Kvng Garcia MD;  Location: [facility]     SURGICAL HISTORY OF -   9/11    squamous cell CA excised from back     TUBAL LIGATION         Social History:    [employer]ol[employer].  Upcoming wedding shower    Family History:  No family history of skin cancer.     Medications:  Current Outpatient Medications   Medication Sig Dispense Refill     alendronate (FOSAMAX) 70 MG tablet TAKE 1 TAB BY MOUTH EVERY 7 DAYS WITH 8 OZ WATER 60 MIN BEFORE FOOD. SIT UPRIGHT FOR 30 MINUTES 12 tablet 3     CALCIUM 600 MG OR TABS 1 daily       Cyanocobalamin (B-12) 500 MCG TABS Take 500 mcg by mouth daily 150 tablet 3     dorzolamide-timolol (COSOPT) 2-0.5 % ophthalmic solution Place 1 drop into both eyes 2 times daily 10 mL 11     Ferrous Sulfate (IRON SUPPLEMENT PO) Take 325 mg by mouth daily       ibuprofen (ADVIL,MOTRIN) 200 MG tablet Take 200 mg by mouth every 4 hours as needed.       latanoprost (XALATAN) 0.005 % ophthalmic solution Place 1 drop into both eyes At Bedtime 1 Bottle 11     losartan (COZAAR) 25 MG tablet Take 1 tablet (25 mg) by mouth daily 90 tablet 4     meclizine (ANTIVERT) 25 MG tablet Take 1 tablet (25 mg) by mouth every 6 hours as needed for dizziness 30 tablet 1     simvastatin (ZOCOR) 20 MG tablet Take 1 tablet (20 mg) by mouth At Bedtime 90 tablet 4     tamoxifen (NOLVADEX) 20 MG tablet Take 1 tablet (20 mg) by mouth daily 90 tablet 1     triamcinolone (KENALOG)  0.1 % cream Apply to affected area of the face once to twice a day. 15 g 1     VITAMIN D-1000 MAX -1000 MG-UNIT OR TABS 1 daily         Allergies   Allergen Reactions     Lisinopril Cough       Review of Systems:  -Constitutional: Otherwise feeling well, in usual state of health.   Skin: As per HPI, no other concerns.     Physical exam:  Vitals: There were no vitals taken for this visit.  GEN: This is a well-nourished, well developed female in no acute distress  NEURO: Alert and oriented  PSYCH: in a pleasant mood, appropriate affect  SKIN: Focused examination of the forearms, hands, back, face was performed.  - On the right nasal sidewall, there is a 6 mm erythematous macule with overlying scale.   - There is a tan to brown waxy stuck on papule with surrounding erythema on the mid back.   - There are erythematous macules with overyling adherent scale on the right forearm, left dorsal hand.   - Erythematous patch, 1.5 cm, on the left forearm  -No other lesions of concern on areas examined.     Impression/Plan:  1. History of nonmelanoma skin cancer - not addressed today.     2. Actinic keratosis, right forearm, left dorsal hand.   Cryotherapy procedure note: After verbal consent and discussion of risks and benefits including but no limited to dyspigmentation/scar, blister, and pain, 3 were treated with 1-2mm freeze border for 2 cycles with liquid nitrogen. Post cryotherapy instructions were provided.     3. On the right nasal sidewall, there is a 6 mm erythematous macule with overlying scale. NUB. Differential includes AK vs acanthoma fissuratum vs SCC.     Shave biopsy:  After discussion of benefits and risks including but not limited to bleeding/bruising, pain/swelling, infection, scar, incomplete removal, nerve damage/numbness, recurrence, and non-diagnostic biopsy, written consent, verbal consent and photographs were obtained. Time-out was performed. The area was cleaned with isopropyl alcohol. 0.5mL of 1%  lidocaine with epinephrine was injected to obtain adequate anesthesia of the lesion on the right nasal sidewall. . A shave biopsy was performed. Hemostasis was achieved with aluminium chloride. Vaseline and a sterile dressing were applied. The patient tolerated the procedure and no complications were noted. The patient was provided with verbal and written post care instructions.     4. ISK, mid back - unchanged since last visit.     Rechecked, no issues today.    5. Erythematous patch, 1.5 cm, on the left forearm. Previously biopsied on 6/28/18 and classified as an actinic keratosis; treated with cryotherapy on 1/3/19. Re-biopsy today.     Shave biopsy:  After discussion of benefits and risks including but not limited to bleeding/bruising, pain/swelling, infection, scar, incomplete removal, nerve damage/numbness, recurrence, and non-diagnostic biopsy, written consent, verbal consent and photographs were obtained. Time-out was performed. The area was cleaned with isopropyl alcohol. 0.5mL of 1% lidocaine with epinephrine was injected to obtain adequate anesthesia of the lesion on the left forearm. A shave biopsy was performed. Hemostasis was achieved with aluminium chloride. Vaseline and a sterile dressing were applied. The patient tolerated the procedure and no complications were noted. The patient was provided with verbal and written post care instructions.     Follow-up in 4 months.     Staff Involved:  Scribe/Staff     Scribe Disclosure  I, Osmel Morgan, am serving as a scribe to document services personally performed by Dr. Qi Barba MD, based on data collection and the provider's statements to me.     Provider Disclosure:   The documentation recorded by the scribe accurately reflects the services I personally performed and the decisions made by me.    Qi Barba MD    Department of Dermatology  [facility]: Phone: [phone],  Fax:[phone]  [facility]: Phone: [phone], Fax: [phone]        Again, thank you for allowing me to participate in the care of your patient.        Sincerely,        Qi Barba MD

## 2019-03-15 NOTE — NURSING NOTE
Cydney Christiansen's goals for this visit include: .  Chief Complaint   Patient presents with     RECHECK     Cydney is visiting for a biopsy of the right nasal wall         She requests these members of her care team be copied on today's visit information:     PCP: Carter Herbert    Referring Provider:  No referring provider defined for this encounter.    There were no vitals taken for this visit.    Do you need any medication refills at today's visit? No  Yasmin Cartagena LPN

## 2019-03-19 LAB — COPATH REPORT: NORMAL

## 2019-03-21 ENCOUNTER — TELEPHONE (OUTPATIENT)
Dept: DERMATOLOGY | Facility: CLINIC | Age: 84
End: 2019-03-21

## 2019-03-21 NOTE — TELEPHONE ENCOUNTER
MOHS previsit information                                                    Cydney Christiansen is a 83 year old female     Patient is being referred to Dr. Haro   Referring Provider: Jameson   For treatment of: squamous cell insitu carcinoma in situ  Site(s): Right nasal sidewall    -if site is groin or below the knee send to RN to initiate antibiotic prophylaxis protocol.  Previous Records received:  N/A    Medication & Allergy Information                                                    CURRENT MEDICATIONS:   Current Outpatient Medications   Medication Sig Dispense Refill     alendronate (FOSAMAX) 70 MG tablet TAKE 1 TAB BY MOUTH EVERY 7 DAYS WITH 8 OZ WATER 60 MIN BEFORE FOOD. SIT UPRIGHT FOR 30 MINUTES 12 tablet 3     CALCIUM 600 MG OR TABS 1 daily       Cyanocobalamin (B-12) 500 MCG TABS Take 500 mcg by mouth daily 150 tablet 3     dorzolamide-timolol (COSOPT) 2-0.5 % ophthalmic solution Place 1 drop into both eyes 2 times daily 10 mL 11     Ferrous Sulfate (IRON SUPPLEMENT PO) Take 325 mg by mouth daily       ibuprofen (ADVIL,MOTRIN) 200 MG tablet Take 200 mg by mouth every 4 hours as needed.       latanoprost (XALATAN) 0.005 % ophthalmic solution Place 1 drop into both eyes At Bedtime 1 Bottle 11     losartan (COZAAR) 25 MG tablet Take 1 tablet (25 mg) by mouth daily 90 tablet 4     meclizine (ANTIVERT) 25 MG tablet Take 1 tablet (25 mg) by mouth every 6 hours as needed for dizziness 30 tablet 1     simvastatin (ZOCOR) 20 MG tablet Take 1 tablet (20 mg) by mouth At Bedtime 90 tablet 4     tamoxifen (NOLVADEX) 20 MG tablet Take 1 tablet (20 mg) by mouth daily 90 tablet 1     triamcinolone (KENALOG) 0.1 % cream Apply to affected area of the face once to twice a day. 15 g 1     VITAMIN D-1000 MAX -1000 MG-UNIT OR TABS 1 daily         Do you take the following medications:  Aspirin:  NO  Ibuprofen/Advil/Motrin, Aleve/Naproxen:   NO  Coumadin, Eliquis, Pradaxa, Xarelto:   NO   -If on Coumadin, INR should  be checked within 7 days of surgery  Vitamin E:   NO  Plavix:   NO    Dalia's wart: NO   Garlic supplement:  NO   Fish Oil: NO  Antibiotic Prophylaxis:  NO    Do you have an ALLERGIC REACTION to any medications:  Lisinopril    Past Medical History                                                    Do you currently or have you previously had any of the following conditions:       HIV/AIDS:  NO    Hepatitis:  NO    Suppressed Immune System:  NO    Autoimmune disorder (eg RA, Lupus):  NO    Prolonged bleeding or bleeding disorder:  NO    Fever blisters/herpes, cold sores:  NO    Kidney disease:  NO  Creatinine   Date Value Ref Range Status   09/10/2018 0.75 0.52 - 1.04 mg/dL Final   ]    Pacemaker or Defibrillator:  NO.      History of artificial or heart valve replacement:  NO.      Endocarditis: NO    Organ transplant: NO.      Joint replacement within past 2 years: NO    Previous prosthetic joint infection: NO    Diabetes: NO    Pregnant:: NO    Keloids or Abnormal scars: NO    Mobility device (wheelchair, transfer difficulty): NO    Tobacco use:  NO.    History   Smoking Status     Former Smoker     Packs/day: 0.50     Years: 20.00     Types: Cigarettes     Quit date: 2/1/1976   Smokeless Tobacco     Never Used       If any positives, send to RN to initiate antibiotic prophylaxis protocol and/or anticoagulation management protocol    Reviewed by:  Esperanza Lozada

## 2019-03-26 DIAGNOSIS — C50.812 MALIGNANT NEOPLASM OF OVERLAPPING SITES OF LEFT BREAST IN FEMALE, ESTROGEN RECEPTOR POSITIVE (H): ICD-10-CM

## 2019-03-26 DIAGNOSIS — C50.912 INVASIVE DUCTAL CARCINOMA OF LEFT BREAST (H): ICD-10-CM

## 2019-03-26 DIAGNOSIS — Z17.0 MALIGNANT NEOPLASM OF OVERLAPPING SITES OF LEFT BREAST IN FEMALE, ESTROGEN RECEPTOR POSITIVE (H): ICD-10-CM

## 2019-03-26 RX ORDER — TAMOXIFEN CITRATE 20 MG/1
20 TABLET ORAL DAILY
Qty: 90 TABLET | Refills: 1 | Status: SHIPPED | OUTPATIENT
Start: 2019-03-26 | End: 2019-09-17

## 2019-03-29 ENCOUNTER — ONCOLOGY VISIT (OUTPATIENT)
Dept: ONCOLOGY | Facility: CLINIC | Age: 84
End: 2019-03-29
Payer: COMMERCIAL

## 2019-03-29 VITALS
SYSTOLIC BLOOD PRESSURE: 116 MMHG | DIASTOLIC BLOOD PRESSURE: 71 MMHG | RESPIRATION RATE: 16 BRPM | WEIGHT: 143.31 LBS | OXYGEN SATURATION: 95 % | BODY MASS INDEX: 28.13 KG/M2 | HEART RATE: 72 BPM | TEMPERATURE: 98 F | HEIGHT: 60 IN

## 2019-03-29 DIAGNOSIS — Z12.31 VISIT FOR SCREENING MAMMOGRAM: Primary | ICD-10-CM

## 2019-03-29 DIAGNOSIS — Z17.0 MALIGNANT NEOPLASM OF OVERLAPPING SITES OF LEFT BREAST IN FEMALE, ESTROGEN RECEPTOR POSITIVE (H): ICD-10-CM

## 2019-03-29 DIAGNOSIS — C50.812 MALIGNANT NEOPLASM OF OVERLAPPING SITES OF LEFT BREAST IN FEMALE, ESTROGEN RECEPTOR POSITIVE (H): ICD-10-CM

## 2019-03-29 DIAGNOSIS — C50.912 INVASIVE DUCTAL CARCINOMA OF LEFT BREAST (H): ICD-10-CM

## 2019-03-29 LAB
ERYTHROCYTE [DISTWIDTH] IN BLOOD BY AUTOMATED COUNT: 12.9 % (ref 10–15)
HCT VFR BLD AUTO: 34.3 % (ref 35–47)
HGB BLD-MCNC: 11.1 G/DL (ref 11.7–15.7)
MCH RBC QN AUTO: 30.2 PG (ref 26.5–33)
MCHC RBC AUTO-ENTMCNC: 32.4 G/DL (ref 31.5–36.5)
MCV RBC AUTO: 93 FL (ref 78–100)
PLATELET # BLD AUTO: 192 10E9/L (ref 150–450)
RBC # BLD AUTO: 3.68 10E12/L (ref 3.8–5.2)
WBC # BLD AUTO: 5.5 10E9/L (ref 4–11)

## 2019-03-29 PROCEDURE — 85027 COMPLETE CBC AUTOMATED: CPT | Performed by: INTERNAL MEDICINE

## 2019-03-29 PROCEDURE — 99214 OFFICE O/P EST MOD 30 MIN: CPT | Performed by: INTERNAL MEDICINE

## 2019-03-29 PROCEDURE — 36415 COLL VENOUS BLD VENIPUNCTURE: CPT | Performed by: INTERNAL MEDICINE

## 2019-03-29 ASSESSMENT — MIFFLIN-ST. JEOR: SCORE: 1043.43

## 2019-03-29 ASSESSMENT — PAIN SCALES - GENERAL: PAINLEVEL: NO PAIN (0)

## 2019-03-29 NOTE — LETTER
3/29/2019         RE: Cydney Christiansen  [address]  [place] [address]        Dear Colleague,    Thank you for referring your patient, Cydney Christiansen, to the [facility]. Please see a copy of my visit note below.    Oncology Follow-up visit:  Date on this visit: Mar 29, 2019      Primary Care Physician: Kadi Herring   Surgeon: Dr. Ifeanyi Sunshine.     Diagnosis:  1. 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 She was not recommended in favor of adjuvant radiation or chemotherapy, and has been on adjuvant Tamoxifen (AI not chosen due to OP and hx of compression fractures), since 08/24/2015.    Oncologic History:  1. Breast Cancer - The patient underwent a screening mammogram on 5/29/2015, which demonstrated a 9 mm lesion around the 1 o'clock position in the left breast. She had an ultrasound which confirmed a lesion to be about 9 mm. She underwent a core needle biopsy in [facility], which demonstrated invasive ductal cancer, grade 2, estrogen receptor positive (99%, strong) and  progesterone receptor positive (91%, strong) by immunohistochemistry and HER2 not amplified by FISH (ratio 1.0).   She then met with Dr. Sunshine and underwent L lumpectomy and axillary sentinel LN biopsy. The pathology from the surgery showed a 10 mm invasive ductal carcinoma, Antlers grade 2.  There was no associated DCIS. Closest surgical margin was 3 mm from the anterior margin. There was no lymphovascular invasion and all 3 sentinel lymph nodes were negative for malignancy.   She was not recommended in favor of adjuvant radiation or chemotherapy.  Due  compression fracture history felt to be osteoporotic fractures, Tamoxifen was chosen over an AI. She started on Tamoxifen on 8/24/2015    2. Compression Fractures- Patient sustained a compression fracture  of T7  in July 2015. On T spine MRI there were also old compression fractures of T3, T4 and T8 noted. She was on Fosamax for 10 years and stopped it in 2012. She developed compression fracture in 07/2015 and was restarted on Fosamax.    3. She has a remote history of TCC of the bladder (in 1993), s/p TURBT     4. 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     5. Normocytic anemia - She was noted to have mild anemia in Aug 2016. Hb was down to 11.2 g/dl. MCV was normal. She reports there is a Hx of anemia in her sister and mother, due to iron and B12 deficiency. In 12/2016 she had workup for her anemia. TSH was normal. Serum folate was normal. Iron studies were normal including ferritin of 213. She had normal LFTs and creatinine. B12 level was normal at over 500.  Peripheral blood smear on 12/23/2016 showed  slight normochromic, normocytic anemia without increased   erythrocyte regeneration. The red blood cells appeared normochromic. Poikilocytosis was minimal.   Serum protein electrophoresis and serum immunofixation showed no M protein.  We'll recommend vitamin B12 supplementation and she has been on 500 mcg orally daily, and she has been on ferrous sulfate 325 mg PO daily as well. Her Hb has improved to low normal.  Since our last visit, she underwent endoscopy and colonoscopy on 6/16/2017 by Dr. Harriet Jacobs at [facility]. On colonoscopy she was noted to have three sessile polyps in the ascending colon, resected. The endoscopy showed normal esophagus, stomach and duodenum. Gastric and small bowel biopsies were normal and there was no e/o celiac disease or H.Pylori. The colonic polyps on pathology showed one tubular adenoma and two serrated adenomas.      6. Breast Cancer Screening - On 8/28/2017 she underwent a bilateral digital screening mammography with tomosynthesis. The breast tissue was noted to be heterogeneously dense.  On MLO  view there was a round circumscribed mass in the left axilla.   On 9/6/2017 she underwent L axillary US which showed a 1.7 x 1.6 x 1.7 cm cyst  located just above the axillary dissection scar that correlated with mammographic finding. Benign appearing left axillary lymph nodes were seen during exam. She underwent axillary cyst aspiration on 9/8/2017. It was felt to be simple fluid collection compatible with post-operative seroma. She had it aspirated and no pathology was sent due to benign appearing cyst with clear fluid.       History Of Present Illness:  Ms. Christiansen is a 83 year old female who presents for f/up of stage Ia, [id number], grade II, ER positive, ID positive, HER2 non-amplified invasive ductal carcinoma of the left breast. She has been on Tamoxifen since 8/24/2015 and has tolerated it  well.  She has been on weekly Fosamax as well.  She has a remote history of TCC of the bladder (in 1993), s/p TURBT and follows up with Dr. Hammond annually for cystoscopy, last in 01/2019 when there was no e/o recurrent bladder tumor.  She had a b/l 3D mammogram on 9/10/2018 which showed no suspicious findings.   She follows up with dermatology for NMSC earlier this summer. She was diagnosed with SCCIS, right upper arm, s/p excision 8/9/18. 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 Haro. She is following up with dermatology closely.   She is feeling very well and has no health related complaints. She has no new breast related complaints.  She remains on oral B12 but stopped taking oral ferrous sulfate supplementation.  In addition, a complete 12 point  review of systems is negative.      Past Medical/Surgical History:  Past Medical History:   Diagnosis Date     Actinic keratosis      Basal cell cancer 02/2011    bcc of the L back.     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    left leg     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     Past Surgical History:   Procedure Laterality Date     COLONOSCOPY  5/2008, 5/13, 6/14, 6/17    Q 3 years for advanced adenomatous polyp     CYSTOSCOPY  12/31/2008     D & C       GENITOURINARY SURGERY      TURBT     HC REMOVAL GALLBLADDER      open pan     HC TRABECULOPLASTY BY LASER SURGERY Left 4/18/07    SLT #1 OS (inf 180)     HC TRABECULOPLASTY BY LASER SURGERY Right 5/4/11    SLT #1 OD (inf 180?)     HC TRABECULOPLASTY BY LASER SURGERY Left 3/17/15    SLT #2 OS (sup 180)     HC TRABECULOPLASTY BY LASER SURGERY Right 6/23/15    SLT #2 OD (sup 180?)     LASER ARGON TREATMENT      SLT left eye x 2     LUMPECTOMY BREAST WITH SENTINEL NODE, COMBINED Left 7/29/2015    Procedure: COMBINED LUMPECTOMY BREAST WITH SENTINEL NODE;  Surgeon: Ifeayni Sunshine MD;  Location:  OR     PHACOEMULSIFICATION CLEAR CORNEA WITH STANDARD INTRAOCULAR LENS IMPLANT Left 12/8/2017    Procedure: PHACOEMULSIFICATION CLEAR CORNEA WITH STANDARD INTRAOCULAR LENS IMPLANT;   COMPLEX LEFT PHACOEMULSIFICATION CLEAR CORNEA WITH STANDARD INTRAOCULAR LENS IMPLANT ;  Surgeon: Kvng Garcia MD;  Location: [facility]     SURGICAL HISTORY OF -   9/11    squamous cell CA excised from back     TUBAL LIGATION     FHx and SocHx reviewed     Allergies:  Allergies as of 03/29/2019 - Reviewed 03/29/2019   Allergen Reaction Noted     Lisinopril Cough 09/16/2014     Current Medications:  Current Outpatient  "Medications   Medication Sig Dispense Refill     alendronate (FOSAMAX) 70 MG tablet TAKE 1 TAB BY MOUTH EVERY 7 DAYS WITH 8 OZ WATER 60 MIN BEFORE FOOD. SIT UPRIGHT FOR 30 MINUTES 12 tablet 3     CALCIUM 600 MG OR TABS 1 daily       Cyanocobalamin (B-12) 500 MCG TABS Take 500 mcg by mouth daily 150 tablet 3     dorzolamide-timolol (COSOPT) 2-0.5 % ophthalmic solution Place 1 drop into both eyes 2 times daily 10 mL 11     Ferrous Sulfate (IRON SUPPLEMENT PO) Take 325 mg by mouth daily       ibuprofen (ADVIL,MOTRIN) 200 MG tablet Take 200 mg by mouth every 4 hours as needed.       latanoprost (XALATAN) 0.005 % ophthalmic solution Place 1 drop into both eyes At Bedtime 1 Bottle 11     losartan (COZAAR) 25 MG tablet Take 1 tablet (25 mg) by mouth daily 90 tablet 4     simvastatin (ZOCOR) 20 MG tablet Take 1 tablet (20 mg) by mouth At Bedtime 90 tablet 4     tamoxifen (NOLVADEX) 20 MG tablet Take 1 tablet (20 mg) by mouth daily 90 tablet 1     VITAMIN D-1000 MAX -1000 MG-UNIT OR TABS 1 daily       meclizine (ANTIVERT) 25 MG tablet Take 1 tablet (25 mg) by mouth every 6 hours as needed for dizziness (Patient not taking: Reported on 3/29/2019) 30 tablet 1     triamcinolone (KENALOG) 0.1 % cream Apply to affected area of the face once to twice a day. (Patient not taking: Reported on 3/29/2019) 15 g 1        Physical Exam:    /71 (BP Location: Right arm)   Pulse 72   Temp 98  F (36.7  C) (Oral)   Resp 16   Ht 1.535 m (5' 0.43\")   Wt 65 kg (143 lb 5 oz)   SpO2 95%   BMI 27.59 kg/m           GENERAL APPEARANCE: elderly, alert and no distress      NECK: no adenopathy, no asymmetry or masses     LYMPHATICS: No cervical, supraclavicular, axillary  lymphadenopathy     RESP: lungs clear to auscultation - no rales, rhonchi or wheezes     CARDIOVASCULAR: regular rates and rhythm, normal S1 S2, no S3 or S4 and no murmur.     ABDOMEN:  soft, nontender, no HSM or masses and bowel sounds normal     MUSCULOSKELETAL: " extremities normal- no gross deformities noted, no evidence of inflammation in joints, FROM in all extremities. No edema b/l LE.     SKIN: multiple benign appearing  and age related skin moles. + incision from recent SCCIS excision RUE healed well.   PsychiatRIC: mentation appears normal and affect normal  Breast: S/p L lumpectomy. Incision in left upper outer quadrant healed well. No breast masses b/l. No axillary lymphadenopathy b/l      Laboratory/Imaging Studies    Orders Only on 03/29/2019   Component Date Value Ref Range Status     WBC 03/29/2019 5.5  4.0 - 11.0 10e9/L Final     RBC Count 03/29/2019 3.68* 3.8 - 5.2 10e12/L Final     Hemoglobin 03/29/2019 11.1* 11.7 - 15.7 g/dL Final     Hematocrit 03/29/2019 34.3* 35.0 - 47.0 % Final     MCV 03/29/2019 93  78 - 100 fl Final     MCH 03/29/2019 30.2  26.5 - 33.0 pg Final     MCHC 03/29/2019 32.4  31.5 - 36.5 g/dL Final     RDW 03/29/2019 12.9  10.0 - 15.0 % Final     Platelet Count 03/29/2019 192  150 - 450 10e9/L Final     Results for MILLI PRIETO (MRN [id number]) as of 3/29/2019 15:32   Ref. Range 4/27/2017 14:52 6/26/2017 09:42 9/8/2017 14:33 3/8/2018 11:43 9/10/2018 10:10 3/29/2019 14:56   Hemoglobin Latest Ref Range: 11.7 - 15.7 g/dL 11.3 (L) 11.6 (L) 11.7 11.6 (L) 11.4 (L) 11.1 (L)       ASSESSMENT/PLAN:    Gera is a 83 year old woman with stage Ia, [id number], grade II, ER positive, RI positive, 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 She was not recommended in favor of adjuvant radiation or chemotherapy, and has been on adjuvant Tamoxifen (AI not chosen due to OP and hx of compression fractures), since 08/24/2015.    1. Breast cancer - continue daily Tamoxifen, tolerating well. F/up with us in 6 months, with labs.    2. Hx of OP with compression Fx- continue Fosamax, calcium and vitamin D supplementation.   3. Breast cancer screening- b/l screening mammogram with tomosynthesis  negative 9/10/2018, next due in 09/2019. Breast tissue was still heterogeneously dense on her mammogram on 9/10/18.    4. Hx of TCC of bladder- s/p  s/p TURBT in 1993 and follows up with Dr. Hammond  annually for cystoscopy, next in 01/2020.     5.Hx of NMSC, most recently SCCIS, right upper arm, s/p excision 8/9/18 - f/up with dermatology in 6 months.    6. Mild normocytic anemia, Hb borderline low, stable-  B12 level previously within normal limits. Ferritin 228 in 09/2017. She was recommended to stay on daily oral iron containing MVI.  EGD and colonoscopy in June 2017 negative for source of blood loss. Repeat CBC, ferritin and B12 levels in 6 months.  At the end of our visit patient verbalized understanding and concurred with the plan.          Again, thank you for allowing me to participate in the care of your patient.        Sincerely,        Usha Salgado MD, MD

## 2019-03-29 NOTE — NURSING NOTE
"Oncology Rooming Note    March 29, 2019 3:26 PM   Cydney Christiansen is a 83 year old female who presents for:    Chief Complaint   Patient presents with     Oncology Clinic Visit     6 month follow up     Initial Vitals: /71 (BP Location: Right arm)   Pulse 72   Temp 98  F (36.7  C) (Oral)   Resp 16   Ht 1.535 m (5' 0.43\")   Wt 65 kg (143 lb 5 oz)   SpO2 95%   BMI 27.59 kg/m   Estimated body mass index is 27.59 kg/m  as calculated from the following:    Height as of this encounter: 1.535 m (5' 0.43\").    Weight as of this encounter: 65 kg (143 lb 5 oz). Body surface area is 1.66 meters squared.  No Pain (0) Comment: Data Unavailable   No LMP recorded. Patient is postmenopausal.  Allergies reviewed: Yes  Medications reviewed: Yes    Medications: Medication refills not needed today.  Pharmacy name entered into [facility]: [id number] [place] [facility], [place] - [address]      Falguni Navas LPN            "

## 2019-03-29 NOTE — PROGRESS NOTES
Oncology Follow-up visit:  Date on this visit: Mar 29, 2019      Primary Care Physician: Kadi Herring   Surgeon: Dr. Ifeanyi Sunshine.     Diagnosis:  1. 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 She was not recommended in favor of adjuvant radiation or chemotherapy, and has been on adjuvant Tamoxifen (AI not chosen due to OP and hx of compression fractures), since 08/24/2015.    Oncologic History:  1. Breast Cancer - The patient underwent a screening mammogram on 5/29/2015, which demonstrated a 9 mm lesion around the 1 o'clock position in the left breast. She had an ultrasound which confirmed a lesion to be about 9 mm. She underwent a core needle biopsy in [facility], which demonstrated invasive ductal cancer, grade 2, estrogen receptor positive (99%, strong) and  progesterone receptor positive (91%, strong) by immunohistochemistry and HER2 not amplified by FISH (ratio 1.0).   She then met with Dr. Sunshine and underwent L lumpectomy and axillary sentinel LN biopsy. The pathology from the surgery showed a 10 mm invasive ductal carcinoma, House grade 2.  There was no associated DCIS. Closest surgical margin was 3 mm from the anterior margin. There was no lymphovascular invasion and all 3 sentinel lymph nodes were negative for malignancy.   She was not recommended in favor of adjuvant radiation or chemotherapy.  Due  compression fracture history felt to be osteoporotic fractures, Tamoxifen was chosen over an AI. She started on Tamoxifen on 8/24/2015    2. Compression Fractures- Patient sustained a compression fracture of T7  in July 2015. On T spine MRI there were also old compression fractures of T3, T4 and T8 noted. She was on Fosamax for 10 years and stopped it in 2012. She developed compression fracture in 07/2015 and was restarted on Fosamax.    3. She has  a remote history of TCC of the bladder (in 1993), s/p TURBT     4. 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     5. Normocytic anemia - She was noted to have mild anemia in Aug 2016. Hb was down to 11.2 g/dl. MCV was normal. She reports there is a Hx of anemia in her sister and mother, due to iron and B12 deficiency. In 12/2016 she had workup for her anemia. TSH was normal. Serum folate was normal. Iron studies were normal including ferritin of 213. She had normal LFTs and creatinine. B12 level was normal at over 500.  Peripheral blood smear on 12/23/2016 showed  slight normochromic, normocytic anemia without increased   erythrocyte regeneration. The red blood cells appeared normochromic. Poikilocytosis was minimal.   Serum protein electrophoresis and serum immunofixation showed no M protein.  We'll recommend vitamin B12 supplementation and she has been on 500 mcg orally daily, and she has been on ferrous sulfate 325 mg PO daily as well. Her Hb has improved to low normal.  Since our last visit, she underwent endoscopy and colonoscopy on 6/16/2017 by Dr. Harriet Jacobs at [facility]. On colonoscopy she was noted to have three sessile polyps in the ascending colon, resected. The endoscopy showed normal esophagus, stomach and duodenum. Gastric and small bowel biopsies were normal and there was no e/o celiac disease or H.Pylori. The colonic polyps on pathology showed one tubular adenoma and two serrated adenomas.      6. Breast Cancer Screening - On 8/28/2017 she underwent a bilateral digital screening mammography with tomosynthesis. The breast tissue was noted to be heterogeneously dense.  On MLO  view there was a round circumscribed mass in the left axilla.  On 9/6/2017 she underwent L axillary US which showed a 1.7 x 1.6 x 1.7 cm cyst  located just above the axillary dissection scar that correlated with mammographic finding. Benign appearing left axillary lymph nodes were seen during exam. She  underwent axillary cyst aspiration on 9/8/2017. It was felt to be simple fluid collection compatible with post-operative seroma. She had it aspirated and no pathology was sent due to benign appearing cyst with clear fluid.       History Of Present Illness:  Ms. Christiansen is a 83 year old female who presents for f/up of stage Ia, [id number], grade II, ER positive, MO positive, HER2 non-amplified invasive ductal carcinoma of the left breast. She has been on Tamoxifen since 8/24/2015 and has tolerated it  well.  She has been on weekly Fosamax as well.  She has a remote history of TCC of the bladder (in 1993), s/p TURBT and follows up with Dr. Hammond annually for cystoscopy, last in 01/2019 when there was no e/o recurrent bladder tumor.  She had a b/l 3D mammogram on 9/10/2018 which showed no suspicious findings.   She follows up with dermatology for NMSC earlier this summer. She was diagnosed with SCCIS, right upper arm, s/p excision 8/9/18. 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 Haro. She is following up with dermatology closely.   She is feeling very well and has no health related complaints. She has no new breast related complaints.  She remains on oral B12 but stopped taking oral ferrous sulfate supplementation.  In addition, a complete 12 point  review of systems is negative.      Past Medical/Surgical History:  Past Medical History:   Diagnosis Date     Actinic keratosis      Basal cell cancer 02/2011    bcc of the L back.     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    left leg     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     Past Surgical History:   Procedure Laterality Date     COLONOSCOPY  5/2008, 5/13, 6/14, 6/17    Q 3 years for advanced adenomatous polyp     CYSTOSCOPY  12/31/2008     D & C       GENITOURINARY SURGERY      TURBT     HC REMOVAL GALLBLADDER      open pan     HC TRABECULOPLASTY BY LASER SURGERY Left 4/18/07    SLT #1 OS (inf 180)     HC TRABECULOPLASTY BY LASER SURGERY Right 5/4/11    SLT #1 OD (inf 180?)     HC TRABECULOPLASTY BY LASER SURGERY Left 3/17/15    SLT #2 OS (sup 180)     HC TRABECULOPLASTY BY LASER SURGERY Right 6/23/15    SLT #2 OD (sup 180?)     LASER ARGON TREATMENT      SLT left eye x 2     LUMPECTOMY BREAST WITH SENTINEL NODE, COMBINED Left 7/29/2015    Procedure: COMBINED LUMPECTOMY BREAST WITH SENTINEL NODE;  Surgeon: Ifeanyi Sunshine MD;  Location: [facility] OR     PHACOEMULSIFICATION CLEAR CORNEA WITH STANDARD INTRAOCULAR LENS IMPLANT Left 12/8/2017    Procedure: PHACOEMULSIFICATION CLEAR CORNEA WITH STANDARD INTRAOCULAR LENS IMPLANT;   COMPLEX LEFT PHACOEMULSIFICATION CLEAR CORNEA WITH STANDARD INTRAOCULAR LENS IMPLANT ;  Surgeon: Kvng Garcia MD;  Location: [facility]     SURGICAL HISTORY OF -   9/11    squamous cell CA excised from back     TUBAL LIGATION     FHx and SocHx reviewed     Allergies:  Allergies as of 03/29/2019 - Reviewed 03/29/2019   Allergen Reaction Noted     Lisinopril Cough 09/16/2014     Current Medications:  Current Outpatient Medications   Medication Sig Dispense Refill     alendronate (FOSAMAX) 70 MG tablet TAKE 1 TAB BY MOUTH EVERY 7 DAYS WITH 8 OZ WATER 60 MIN BEFORE FOOD. SIT UPRIGHT FOR 30 MINUTES 12 tablet 3     CALCIUM 600 MG OR TABS 1 daily       Cyanocobalamin  "(B-12) 500 MCG TABS Take 500 mcg by mouth daily 150 tablet 3     dorzolamide-timolol (COSOPT) 2-0.5 % ophthalmic solution Place 1 drop into both eyes 2 times daily 10 mL 11     Ferrous Sulfate (IRON SUPPLEMENT PO) Take 325 mg by mouth daily       ibuprofen (ADVIL,MOTRIN) 200 MG tablet Take 200 mg by mouth every 4 hours as needed.       latanoprost (XALATAN) 0.005 % ophthalmic solution Place 1 drop into both eyes At Bedtime 1 Bottle 11     losartan (COZAAR) 25 MG tablet Take 1 tablet (25 mg) by mouth daily 90 tablet 4     simvastatin (ZOCOR) 20 MG tablet Take 1 tablet (20 mg) by mouth At Bedtime 90 tablet 4     tamoxifen (NOLVADEX) 20 MG tablet Take 1 tablet (20 mg) by mouth daily 90 tablet 1     VITAMIN D-1000 MAX -1000 MG-UNIT OR TABS 1 daily       meclizine (ANTIVERT) 25 MG tablet Take 1 tablet (25 mg) by mouth every 6 hours as needed for dizziness (Patient not taking: Reported on 3/29/2019) 30 tablet 1     triamcinolone (KENALOG) 0.1 % cream Apply to affected area of the face once to twice a day. (Patient not taking: Reported on 3/29/2019) 15 g 1        Physical Exam:    /71 (BP Location: Right arm)   Pulse 72   Temp 98  F (36.7  C) (Oral)   Resp 16   Ht 1.535 m (5' 0.43\")   Wt 65 kg (143 lb 5 oz)   SpO2 95%   BMI 27.59 kg/m          GENERAL APPEARANCE: elderly, alert and no distress      NECK: no adenopathy, no asymmetry or masses     LYMPHATICS: No cervical, supraclavicular, axillary  lymphadenopathy     RESP: lungs clear to auscultation - no rales, rhonchi or wheezes     CARDIOVASCULAR: regular rates and rhythm, normal S1 S2, no S3 or S4 and no murmur.     ABDOMEN:  soft, nontender, no HSM or masses and bowel sounds normal     MUSCULOSKELETAL: extremities normal- no gross deformities noted, no evidence of inflammation in joints, FROM in all extremities. No edema b/l LE.     SKIN: multiple benign appearing  and age related skin moles. + incision from recent SCCIS excision RUE healed well. "   PsychiatRIC: mentation appears normal and affect normal  Breast: S/p L lumpectomy. Incision in left upper outer quadrant healed well. No breast masses b/l. No axillary lymphadenopathy b/l      Laboratory/Imaging Studies    Orders Only on 03/29/2019   Component Date Value Ref Range Status     WBC 03/29/2019 5.5  4.0 - 11.0 10e9/L Final     RBC Count 03/29/2019 3.68* 3.8 - 5.2 10e12/L Final     Hemoglobin 03/29/2019 11.1* 11.7 - 15.7 g/dL Final     Hematocrit 03/29/2019 34.3* 35.0 - 47.0 % Final     MCV 03/29/2019 93  78 - 100 fl Final     MCH 03/29/2019 30.2  26.5 - 33.0 pg Final     MCHC 03/29/2019 32.4  31.5 - 36.5 g/dL Final     RDW 03/29/2019 12.9  10.0 - 15.0 % Final     Platelet Count 03/29/2019 192  150 - 450 10e9/L Final     Results for MILLI PRIETO (MRN [id number]) as of 3/29/2019 15:32   Ref. Range 4/27/2017 14:52 6/26/2017 09:42 9/8/2017 14:33 3/8/2018 11:43 9/10/2018 10:10 3/29/2019 14:56   Hemoglobin Latest Ref Range: 11.7 - 15.7 g/dL 11.3 (L) 11.6 (L) 11.7 11.6 (L) 11.4 (L) 11.1 (L)       ASSESSMENT/PLAN:    Gera is a 83 year old woman with stage Ia, [id number], grade II, ER positive, NC positive, 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 She was not recommended in favor of adjuvant radiation or chemotherapy, and has been on adjuvant Tamoxifen (AI not chosen due to OP and hx of compression fractures), since 08/24/2015.    1. Breast cancer - continue daily Tamoxifen, tolerating well. F/up with us in 6 months, with labs.    2. Hx of OP with compression Fx- continue Fosamax, calcium and vitamin D supplementation.   3. Breast cancer screening- b/l screening mammogram with tomosynthesis negative 9/10/2018, next due in 09/2019. Breast tissue was still heterogeneously dense on her mammogram on 9/10/18.    4. Hx of TCC of bladder- s/p  s/p TURBT in 1993 and follows up with Dr. Hammond  annually for cystoscopy, next in 01/2020.     5.Hx of  NMSC, most recently SCCIS, right upper arm, s/p excision 8/9/18 - f/up with dermatology in 6 months.    6. Mild normocytic anemia, Hb borderline low, stable-  B12 level previously within normal limits. Ferritin 228 in 09/2017. She was recommended to stay on daily oral iron containing MVI.  EGD and colonoscopy in June 2017 negative for source of blood loss. Repeat CBC, ferritin and B12 levels in 6 months.  At the end of our visit patient verbalized understanding and concurred with the plan.

## 2019-04-01 ENCOUNTER — OFFICE VISIT (OUTPATIENT)
Dept: DERMATOLOGY | Facility: CLINIC | Age: 84
End: 2019-04-01
Payer: COMMERCIAL

## 2019-04-01 VITALS — SYSTOLIC BLOOD PRESSURE: 137 MMHG | HEART RATE: 71 BPM | OXYGEN SATURATION: 99 % | DIASTOLIC BLOOD PRESSURE: 81 MMHG

## 2019-04-01 DIAGNOSIS — D04.39 SQUAMOUS CELL CARCINOMA IN SITU OF SKIN OF NOSE: Primary | ICD-10-CM

## 2019-04-01 PROCEDURE — 14060 TIS TRNFR E/N/E/L 10 SQ CM/<: CPT | Performed by: DERMATOLOGY

## 2019-04-01 PROCEDURE — 17311 MOHS 1 STAGE H/N/HF/G: CPT | Performed by: DERMATOLOGY

## 2019-04-01 RX ORDER — MUPIROCIN 20 MG/G
OINTMENT TOPICAL
Qty: 22 G | Refills: 0 | Status: SHIPPED | OUTPATIENT
Start: 2019-04-01 | End: 2020-02-18

## 2019-04-01 ASSESSMENT — PAIN SCALES - GENERAL: PAINLEVEL: NO PAIN (0)

## 2019-04-01 NOTE — NURSING NOTE
The following medication was given:     MEDICATION:  Lidocaine with epinephrine 1% 1:[id number]  ROUTE: SQ  SITE: see procedure note  DOSE: 5ml  LOT #: -[id number]  : Amando  EXPIRATION DATE: 1/1/2020  NDC#: 0875-8965-56   Was there drug waste? No  Multi-dose vial: Yes    April Love LPN  April 1, 2019

## 2019-04-01 NOTE — NURSING NOTE
Cydney Christiansen's goals for this visit include:   Chief Complaint   Patient presents with     Procedure     MOHS right nasal sidewall         She requests these members of her care team be copied on today's visit information:     PCP: No Ref-Primary, Physician    Referring Provider:  No referring provider defined for this encounter.    /81 (BP Location: Right arm, Patient Position: Sitting, Cuff Size: Adult Regular)   Pulse 71   SpO2 99%     Do you need any medication refills at today's visit? Jennifer Love LPN

## 2019-04-01 NOTE — PATIENT INSTRUCTIONS

## 2019-04-01 NOTE — LETTER
4/1/2019         RE: Cydney Christiansen  [address]  [place] [address]        Dear Colleague,    Thank you for referring your patient, Cydney Christiansen, to the [facility]. Please see a copy of my visit note below.    [facility] Mohs Dermatologic Surgery Procedure Note      Date of Service:  Apr 1, 2019  Surgery: Mohs micrographic surgery    Case 1  Repair Type: rhombic transposition flap  Repair Size: 2.5 x 2.0 cm  Suture Material: Fast Absorbing Gut 5-0, Monocryl 5-0   Tumor Type: SCCIS - Squamous cell carcinoma in situ  Location: right nasal sidewall  Derm-Path Accession #: [id number]  PreOp Size: 1.0 x 0.6 cm  PostOp Size: 1.2 x 1.2 cm  Mohs Accession #: [id number]  Level of Defect: fat      Procedure:  We discussed the principles of treatment and most likely complications including scarring, bleeding, infection, swelling, pain, crusting, nerve damage, large wound,  incomplete excision, wound dehiscence,  nerve damage, recurrence, and a second procedure may be recommended to obtain the best cosmetic or functional result.    Informed consent was obtained and the patient underwent the procedure as follows:  The patient was placed supine on the operating table.  The cancer was identified, outlined with a marker, and verified by the patient.  The entire surgical field was prepped with Hibiclens.  The surgical site was anesthetized using Lidocaine 1% with epi 1:100,000.    The area of clinically apparent tumor was debulked. The layer of tissue was then surgically excised using a #15 blade and was then transferred onto a specimen sheet maintaining the orientation of the specimen. Hemostasis was obtained using bipolar electrocoagulation. The wound site was then covered with a dressing while the tissue samples were processed for examination.    The excised tissue was transported to the Mohs histology laboratory maintaining the tissue orientation.  The tissue specimen was  relaxed so that the entire surgical margin was in a a single horizontal plane for sectioning and inked for precise mapping.  A precise reference map was drawn to reflect the sectioning of the specimen, colored inking of the margins, and orientation on the patient. The tissue was processed using horizontal sectioning of the base and continuous peripheral margins.  The histopathologic sections were reviewed in conjunction with the reference map.    Total blocks: 1    Total slides:  2    There were no cancer cells visualized on examination, therefore Mohs surgery was complete.    PROCEDURE: RHOMBIC TRANSPOSITION FLAP    Primary Surgeon: Dr. Ian Haro    Case(s) Specific Information:    CASE 1    The patient was taken to the operative suite and placed in a supine position on the operating room table. The wound was identified and infiltrated with Lidocaine 1% with epi 1:100,000 3.5 ml. The defect was then cleansed and prepped with Hibiclens and draped with sterile drapes. Using a marker, a rhomboid transposition flap repair was planned.  The wound edges were then debeveled and the wound was undermined bluntly in all directions.  The transposition flap was incised sharply to the level of fat. The flap was undermined from all surrounding tissue. Hemostasis was obtained with bipolar electrocoagulation. The flap was transposed into the primary defect. The secondary defect and flap closed with deep dermal sutures. Epidermal tissue was carefully approximated using simple running epidermal sutures throughout the length of the flap.      Redundant skin was excised by the triangulation technique and closed in similar fashion.  The wound was cleansed with sterile saline and polysporin was applied. A sterile non-adherent pressure dressing was placed.  The patient left the operating suite in stable condition.  The patient will for wound evaluation in 1 week. Wound care was reviewed verbally and in writing. Anticipate Dermabrasion  to be used as a second stage of this reconstruction.     Repair Size: 2.5 x 2.0 cm  Sutures Used:  Deep: Monocryl 5-0 Superficial: Fast Absorbing Gut 5-0   Anesthesia Visit Total (ml): 5 ml    The Attending Physician for the entire procedure and always immediately available.            Staff involved:    Scribe Disclosure  I, Bradley Hooper, am serving as a scribe to document services personally performed by Dr. Ian Haro DO, based on data collection and the provider's statements to me.     Provider Disclosure:   The documentation recorded by the scribe accurately reflects the services I personally performed and the decisions made by me.  I personally performed the procedures today.    Ian Haro DO    Department of Dermatology  [facility]: Phone: [phone], Fax:[phone]  [facility]: Phone: [phone], Fax: [phone]    Again, thank you for allowing me to participate in the care of your patient.        Sincerely,        Ian Haro MD

## 2019-04-01 NOTE — PROGRESS NOTES
[facility] Mohs Dermatologic Surgery Procedure Note      Date of Service:  Apr 1, 2019  Surgery: Mohs micrographic surgery    Case 1  Repair Type: rhombic transposition flap  Repair Size: 2.5 x 2.0 cm  Suture Material: Fast Absorbing Gut 5-0, Monocryl 5-0   Tumor Type: SCCIS - Squamous cell carcinoma in situ  Location: right nasal sidewall  Derm-Path Accession #: [id number]  PreOp Size: 1.0 x 0.6 cm  PostOp Size: 1.2 x 1.2 cm  Mohs Accession #: [id number]  Level of Defect: fat      Procedure:  We discussed the principles of treatment and most likely complications including scarring, bleeding, infection, swelling, pain, crusting, nerve damage, large wound,  incomplete excision, wound dehiscence,  nerve damage, recurrence, and a second procedure may be recommended to obtain the best cosmetic or functional result.    Informed consent was obtained and the patient underwent the procedure as follows:  The patient was placed supine on the operating table.  The cancer was identified, outlined with a marker, and verified by the patient.  The entire surgical field was prepped with Hibiclens.  The surgical site was anesthetized using Lidocaine 1% with epi 1:100,000.    The area of clinically apparent tumor was debulked. The layer of tissue was then surgically excised using a #15 blade and was then transferred onto a specimen sheet maintaining the orientation of the specimen. Hemostasis was obtained using bipolar electrocoagulation. The wound site was then covered with a dressing while the tissue samples were processed for examination.    The excised tissue was transported to the Mohs histology laboratory maintaining the tissue orientation.  The tissue specimen was relaxed so that the entire surgical margin was in a a single horizontal plane for sectioning and inked for precise mapping.  A precise reference map was drawn to reflect the sectioning of the specimen, colored inking of the margins, and orientation  on the patient. The tissue was processed using horizontal sectioning of the base and continuous peripheral margins.  The histopathologic sections were reviewed in conjunction with the reference map.    Total blocks: 1    Total slides:  2    There were no cancer cells visualized on examination, therefore Mohs surgery was complete.    PROCEDURE: RHOMBIC TRANSPOSITION FLAP    Primary Surgeon: Dr. Ian Haro    Case(s) Specific Information:    CASE 1    The patient was taken to the operative suite and placed in a supine position on the operating room table. The wound was identified and infiltrated with Lidocaine 1% with epi 1:100,000 3.5 ml. The defect was then cleansed and prepped with Hibiclens and draped with sterile drapes. Using a marker, a rhomboid transposition flap repair was planned.  The wound edges were then debeveled and the wound was undermined bluntly in all directions.  The transposition flap was incised sharply to the level of fat. The flap was undermined from all surrounding tissue. Hemostasis was obtained with bipolar electrocoagulation. The flap was transposed into the primary defect. The secondary defect and flap closed with deep dermal sutures. Epidermal tissue was carefully approximated using simple running epidermal sutures throughout the length of the flap.      Redundant skin was excised by the triangulation technique and closed in similar fashion.  The wound was cleansed with sterile saline and polysporin was applied. A sterile non-adherent pressure dressing was placed.  The patient left the operating suite in stable condition.  The patient will for wound evaluation in 1 week. Wound care was reviewed verbally and in writing. Anticipate Dermabrasion to be used as a second stage of this reconstruction.     Repair Size: 2.5 x 2.0 cm  Sutures Used:  Deep: Monocryl 5-0 Superficial: Fast Absorbing Gut 5-0   Anesthesia Visit Total (ml): 5 ml    The Attending Physician for the entire procedure and  always immediately available.            Staff involved:    Scribe Disclosure  I, Bradley Hooper, am serving as a scribe to document services personally performed by Dr. Ian Haro DO, based on data collection and the provider's statements to me.     Provider Disclosure:   The documentation recorded by the scribe accurately reflects the services I personally performed and the decisions made by me.  I personally performed the procedures today.    Ian Haro DO    Department of Dermatology  [facility]: Phone: [phone], Fax:[phone]  [facility]: Phone: [phone], Fax: [phone]

## 2019-04-01 NOTE — NURSING NOTE
Vaseline, Mupirocin and pressure dressing applied to Mohs site on Right nasal sidewall.  Wound care instructions reviewed with patient and AVS provided.  Patient verbalized understanding.  Patient will follow up in one week for wound and suture removal.  No further questions or concerns at this time.    April Love LPN on 4/1/2019 at 10:21 AM      The following medication was given:     MEDICATION:  Lidocaine with epinephrine 1% 1:[id number]  ROUTE: SQ  SITE: right nasal sidewall  DOSE: 5cc  LOT #: -[id number]  : Hospira  EXPIRATION DATE: 1-jan-2020  NDC#: 2032-8569-94   Was there drug waste? 1cc  Multi-dose vial: Yes    April Love LPN  April 1, 2019

## 2019-04-05 DIAGNOSIS — M81.0 OSTEOPOROSIS, UNSPECIFIED OSTEOPOROSIS TYPE, UNSPECIFIED PATHOLOGICAL FRACTURE PRESENCE: ICD-10-CM

## 2019-04-05 RX ORDER — ALENDRONATE SODIUM 70 MG/1
TABLET ORAL
Qty: 12 TABLET | Refills: 0 | Status: SHIPPED | OUTPATIENT
Start: 2019-04-05 | End: 2019-06-23

## 2019-04-05 NOTE — TELEPHONE ENCOUNTER
"Pending Prescriptions:                       Disp   Refills    alendronate (FOSAMAX) 70 MG tablet        12 tab*3          Routing refill request to provider for review/approval because:  No DEXA on file in last 2 years.    Requested Prescriptions   Pending Prescriptions Disp Refills     alendronate (FOSAMAX) 70 MG tablet 12 tablet 3    Bisphosphonates Failed - 4/5/2019  9:31 AM       Failed - Recent (12 mo) or future (30 days) visit within the authorizing provider's specialty    Patient had office visit in the last 12 months or has a visit in the next 30 days with authorizing provider or within the authorizing provider's specialty.  See \"Patient Info\" tab in inbasket, or \"Choose Columns\" in Meds & Orders section of the refill encounter.             Failed - Dexa on file within past 2 years    Please review last Dexa result.          Passed - Medication is active on med list       Passed - Patient is age 18 or older       Passed - Normal serum creatinine on file within past 12 months    Recent Labs   Lab Test 09/10/18  1010   CR 0.75             Falguni Norman RN    "

## 2019-04-08 ENCOUNTER — OFFICE VISIT (OUTPATIENT)
Dept: DERMATOLOGY | Facility: CLINIC | Age: 84
End: 2019-04-08
Payer: COMMERCIAL

## 2019-04-08 DIAGNOSIS — D04.39 SQUAMOUS CELL CARCINOMA IN SITU (SCCIS) OF SKIN OF NOSE: Primary | ICD-10-CM

## 2019-04-08 PROCEDURE — 99024 POSTOP FOLLOW-UP VISIT: CPT | Performed by: DERMATOLOGY

## 2019-04-08 NOTE — PROGRESS NOTES
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 The resulting defect was repaired with a rhombic transposition flap. Today the patient says the site is doing well and denies any bleeding, purulence, or excess pain/tenderness. She is surprised the site looks so good for only having been a week since the surgery. She asks if she should continue using her antibiotic ointment. The patient is otherwise feeling well. There are no other skin concerns at this time.   Objective: Focused examination of the prior surgical site was performed.  - Appropriately healing surgical site on the right nasal sidewall with no purulence, tenderness or surrounding erythema. Sutures intact.   Assessment and Plan: Appropriately healing surgical site without any signs of infection    Instructed to continue application of mupirocin ointment for about ~1 more week.     Patient will make appointment for scar revision if she would like to discuss options.     Recommended sunscreens SPF #50 or greater and protective clothing. Risk of scar dyspigmentation discussed.     Continue periodic self skin exams and report of any new or changing lesions.     Please refer to previous clinic note for details regarding treatment.  Follow up in 4 months as scheduled with Dr. Barba for skin exam.     Staff:    Scribe Disclosure  I, Bradley Hooper am serving as a scribe to document services personally performed by Dr. Ian Haro DO, based on data collection and the provider's statements to me.     Provider Disclosure:   The documentation recorded by the scribe accurately reflects the services I personally performed and the decisions made by me.    Ian Haro DO    Department of Dermatology  [facility]  [facility]: Phone: [phone], Fax:[phone]  [facility]: Phone: [phone], Fax: [phone]

## 2019-04-08 NOTE — LETTER
4/8/2019         RE: Cydney Christiansen  [address]  [place] [address]        Dear Colleague,    Thank you for referring your patient, Cydney Christiansen, to the [facility]. Please see a copy of my visit note below.    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 The resulting defect was repaired with a rhombic transposition flap. Today the patient says the site is doing well and denies any bleeding, purulence, or excess pain/tenderness. She is surprised the site looks so good for only having been a week since the surgery. She asks if she should continue using her antibiotic ointment. The patient is otherwise feeling well. There are no other skin concerns at this time.   Objective: Focused examination of the prior surgical site was performed.  - Appropriately healing surgical site on the right nasal sidewall with no purulence, tenderness or surrounding erythema. Sutures intact.   Assessment and Plan: Appropriately healing surgical site without any signs of infection    Instructed to continue application of mupirocin ointment for about ~1 more week.     Patient will make appointment for scar revision if she would like to discuss options.     Recommended sunscreens SPF #50 or greater and protective clothing. Risk of scar dyspigmentation discussed.     Continue periodic self skin exams and report of any new or changing lesions.     Please refer to previous clinic note for details regarding treatment.  Follow up in 4 months as scheduled with Dr. Barba for skin exam.     Staff:    Scribe Disclosure  I, Bradley Hooper am serving as a scribe to document services personally performed by Dr. Ian Haro DO, based on data collection and the provider's statements to me.     Provider Disclosure:    The documentation recorded by the scribe accurately reflects the services I personally performed and the decisions made by me.    Ian Haro DO    Department of Dermatology  [facility]: Phone: [phone], Fax:[phone]  [facility] [place]: Phone: [phone], Fax: [phone]    Again, thank you for allowing me to participate in the care of your patient.        Sincerely,        Ian Haro MD

## 2019-06-23 DIAGNOSIS — M81.0 OSTEOPOROSIS, UNSPECIFIED OSTEOPOROSIS TYPE, UNSPECIFIED PATHOLOGICAL FRACTURE PRESENCE: ICD-10-CM

## 2019-06-25 RX ORDER — ALENDRONATE SODIUM 70 MG/1
TABLET ORAL
Qty: 12 TABLET | Refills: 0 | Status: SHIPPED | OUTPATIENT
Start: 2019-06-25 | End: 2019-09-01

## 2019-06-25 NOTE — TELEPHONE ENCOUNTER
"Routing refill request to provider for review/approval because:  Failed [facility] refill protocol, see below:    Requested Prescriptions   Pending Prescriptions Disp Refills     alendronate (FOSAMAX) 70 MG tablet [Pharmacy Med Name: ALENDRONATE SODIUM 70 MG TAB]  Last Written Prescription Date:  4/5/19  Last Fill Quantity: 12,  # refills: 0   Last office visit: 6/28/2018 with prescribing provider:     Future Office Visit:   Next 5 appointments (look out 90 days)    Jul 01, 2019  8:40 AM CDT  PHYSICAL with Estrellita Hernandez MD  [facility] ([facility] [address] [place] [id number]  [phone]   Aug 08, 2019 11:15 AM CDT  [facility] Visit with Qi [facility]ba MD  [facility] [address] [place] [id number] [phone]   Sep 17, 2019  3:30 PM CDT  Return Visit with Usha Salgado MD  [facility] ([facility]) [address] [place] [id number] [phone]        12 tablet 0     Sig: TAKE 1 TABLET BY MOUTH EVERY 7 DAYS WITH 8OZ WATER 60 MIN BEFORE FOOD. SIT UPRIGHT FOR 30 MINUTES.       Bisphosphonates Failed - 6/24/2019  6:44 AM        Failed - Dexa on file within past 2 years     Please review last Dexa result.           Passed - Recent (12 mo) or future (30 days) visit within the authorizing provider's specialty     Patient had office visit in the last 12 months or has a visit in the next 30 days with authorizing provider or within the authorizing provider's specialty.  See \"Patient Info\" tab in inbasket, or \"Choose Columns\" in Meds & Orders section of the refill encounter.              Passed - Medication is active on med list        Passed - Patient is age 18 or older        Passed - Normal serum creatinine on file within past 12 months     Recent Labs   Lab Test 09/10/18  1010   CR 0.75             Gina Grace RN - [facility]      "

## 2019-06-25 NOTE — TELEPHONE ENCOUNTER
Fax prescription  Signed Prescriptions:                        Disp   Refills    alendronate (FOSAMAX) 70 MG tablet         12 tab*0        Sig: TAKE 1 TABLET BY MOUTH EVERY 7 DAYS WITH 8OZ WATER 60           MIN BEFORE FOOD. SIT UPRIGHT FOR 30 MINUTES. No           further refills until follow-up appointment  Authorizing Provider: DEVONTE GALLEGOS

## 2019-07-01 ENCOUNTER — OFFICE VISIT (OUTPATIENT)
Dept: FAMILY MEDICINE | Facility: CLINIC | Age: 84
End: 2019-07-01
Payer: COMMERCIAL

## 2019-07-01 VITALS
HEART RATE: 69 BPM | HEIGHT: 64 IN | WEIGHT: 142 LBS | TEMPERATURE: 97 F | DIASTOLIC BLOOD PRESSURE: 82 MMHG | SYSTOLIC BLOOD PRESSURE: 122 MMHG | OXYGEN SATURATION: 96 % | RESPIRATION RATE: 16 BRPM | BODY MASS INDEX: 24.24 KG/M2

## 2019-07-01 DIAGNOSIS — M81.0 OSTEOPOROSIS, UNSPECIFIED OSTEOPOROSIS TYPE, UNSPECIFIED PATHOLOGICAL FRACTURE PRESENCE: ICD-10-CM

## 2019-07-01 DIAGNOSIS — I10 BENIGN ESSENTIAL HYPERTENSION: ICD-10-CM

## 2019-07-01 DIAGNOSIS — C50.812 MALIGNANT NEOPLASM OF OVERLAPPING SITES OF LEFT BREAST IN FEMALE, ESTROGEN RECEPTOR POSITIVE (H): ICD-10-CM

## 2019-07-01 DIAGNOSIS — Z17.0 MALIGNANT NEOPLASM OF OVERLAPPING SITES OF LEFT BREAST IN FEMALE, ESTROGEN RECEPTOR POSITIVE (H): ICD-10-CM

## 2019-07-01 DIAGNOSIS — I10 HYPERTENSION GOAL BP (BLOOD PRESSURE) < 140/90: ICD-10-CM

## 2019-07-01 DIAGNOSIS — Z83.3 FAMILY HISTORY OF DIABETES MELLITUS: ICD-10-CM

## 2019-07-01 DIAGNOSIS — Z85.51 PERSONAL HISTORY OF MALIGNANT NEOPLASM OF BLADDER: ICD-10-CM

## 2019-07-01 DIAGNOSIS — Z00.00 ENCOUNTER FOR MEDICARE ANNUAL WELLNESS EXAM: Primary | ICD-10-CM

## 2019-07-01 DIAGNOSIS — E53.8 B12 DEFICIENCY: ICD-10-CM

## 2019-07-01 DIAGNOSIS — E78.5 HYPERLIPIDEMIA LDL GOAL <160: ICD-10-CM

## 2019-07-01 DIAGNOSIS — C50.912 INVASIVE DUCTAL CARCINOMA OF LEFT BREAST (H): ICD-10-CM

## 2019-07-01 DIAGNOSIS — H40.1130 PRIMARY OPEN ANGLE GLAUCOMA OF BOTH EYES, UNSPECIFIED GLAUCOMA STAGE: ICD-10-CM

## 2019-07-01 LAB
ANION GAP SERPL CALCULATED.3IONS-SCNC: 5 MMOL/L (ref 3–14)
BUN SERPL-MCNC: 14 MG/DL (ref 7–30)
CALCIUM SERPL-MCNC: 8.5 MG/DL (ref 8.5–10.1)
CHLORIDE SERPL-SCNC: 108 MMOL/L (ref 94–109)
CHOLEST SERPL-MCNC: 122 MG/DL
CO2 SERPL-SCNC: 28 MMOL/L (ref 20–32)
CREAT SERPL-MCNC: 0.66 MG/DL (ref 0.52–1.04)
GFR SERPL CREATININE-BSD FRML MDRD: 83 ML/MIN/{1.73_M2}
GLUCOSE SERPL-MCNC: 100 MG/DL (ref 70–99)
HDLC SERPL-MCNC: 61 MG/DL
LDLC SERPL CALC-MCNC: 46 MG/DL
NONHDLC SERPL-MCNC: 61 MG/DL
POTASSIUM SERPL-SCNC: 4.1 MMOL/L (ref 3.4–5.3)
SODIUM SERPL-SCNC: 141 MMOL/L (ref 133–144)
TRIGL SERPL-MCNC: 73 MG/DL

## 2019-07-01 PROCEDURE — 99213 OFFICE O/P EST LOW 20 MIN: CPT | Mod: 25 | Performed by: FAMILY MEDICINE

## 2019-07-01 PROCEDURE — 99397 PER PM REEVAL EST PAT 65+ YR: CPT | Performed by: FAMILY MEDICINE

## 2019-07-01 PROCEDURE — 80061 LIPID PANEL: CPT | Performed by: FAMILY MEDICINE

## 2019-07-01 PROCEDURE — 80048 BASIC METABOLIC PNL TOTAL CA: CPT | Performed by: FAMILY MEDICINE

## 2019-07-01 PROCEDURE — 36415 COLL VENOUS BLD VENIPUNCTURE: CPT | Performed by: FAMILY MEDICINE

## 2019-07-01 RX ORDER — SIMVASTATIN 20 MG
20 TABLET ORAL AT BEDTIME
Qty: 90 TABLET | Refills: 4 | Status: SHIPPED | OUTPATIENT
Start: 2019-07-01 | End: 2020-07-15

## 2019-07-01 RX ORDER — LOSARTAN POTASSIUM 25 MG/1
25 TABLET ORAL DAILY
Qty: 90 TABLET | Refills: 4 | Status: SHIPPED | OUTPATIENT
Start: 2019-07-01 | End: 2020-07-15

## 2019-07-01 RX ORDER — CYANOCOBALAMIN (VITAMIN B-12) 500 MCG
500 TABLET ORAL DAILY
Qty: 150 TABLET | Refills: 3 | Status: SHIPPED | OUTPATIENT
Start: 2019-07-01

## 2019-07-01 ASSESSMENT — ACTIVITIES OF DAILY LIVING (ADL): CURRENT_FUNCTION: NO ASSISTANCE NEEDED

## 2019-07-01 ASSESSMENT — ENCOUNTER SYMPTOMS
EYE PAIN: 0
SHORTNESS OF BREATH: 0
DIZZINESS: 1
BREAST MASS: 0
NAUSEA: 0
HEADACHES: 0
FEVER: 0
COUGH: 0
CHILLS: 0
CONSTIPATION: 0
DIARRHEA: 0
DIZZINESS: 0
PALPITATIONS: 0
ABDOMINAL PAIN: 0
HEMATURIA: 0
NERVOUS/ANXIOUS: 0
HEARTBURN: 0
MYALGIAS: 0
PARESTHESIAS: 0
DYSURIA: 0
JOINT SWELLING: 0
FREQUENCY: 0
SORE THROAT: 0
WEAKNESS: 0
ARTHRALGIAS: 0

## 2019-07-01 ASSESSMENT — MIFFLIN-ST. JEOR: SCORE: 1094.11

## 2019-07-01 NOTE — PROGRESS NOTES
"  SUBJECTIVE:   Cydney Christiansen is a 83 year old female who presents for Preventive Visit.  {PVP to remind patient that this is not necessarily a physical exam; physical exam may or may not be done:673969::\"click delete button to remove this line now\"}  {PVP to inform patient that additional E&M charge may apply, if additional problems addressed:427887::\"click delete button to remove this line now\"}  Are you in the first 12 months of your Medicare Part B coverage?  { :723668::\"No\"}    Physical Health:    In general, how would you rate your overall physical health? { :025642}    Outside of work, how many days during the week do you exercise? { :976759}    Outside of work, approximately how many minutes a day do you exercise?{ :453065}    If you drink alcohol do you typically have >3 drinks per day or >7 drinks per week? { :653807}    Do you usually eat at least 4 servings of fruit and vegetables a day, include whole grains & fiber and avoid regularly eating high fat or \"junk\" foods? { :799678::\"Yes\"}    Do you have any problems taking medications regularly?  { :674820::\"No\"}    Do you have any side effects from medications? { :413257}    Needs assistance for the following daily activities: { :656131}    Which of the following safety concerns are present in your home?  { :228875::\"none identified\"}     Hearing impairment: { :681073}    In the past 6 months, have you been bothered by leaking of urine? { :219691}    Mental Health:    In general, how would you rate your overall mental or emotional health? { :815140}  PHQ-2 Score:      Do you feel safe in your environment? { :999941}    Do you have a Health Care Directive? { :339623}    Additional concerns to address?  {If YES, notify patient they may be responsible for a copay, coinsurance or deductible if the visit today includes services such as checking on a sore throat, having an x-ray or lab test, or treating and evaluating a new or existing condition " ":613902::\"No\"}    Fall risk:  { :722195}  {If any of the above assessments are answered yes, consider ordering appropriate referrals (Optional):088747::\"click delete button to remove this line now\"}  Cognitive Screening: { :233427}    {Do you have sleep apnea, excessive snoring or daytime drowsiness? (Optional):915671}    {Outside tests to abstract? :974934}    {additional problems to add (Optional):939969}    Reviewed and updated as needed this visit by clinical staff  Tobacco  Allergies  Meds  Med Hx  Surg Hx  Fam Hx  Soc Hx        Reviewed and updated as needed this visit by Provider        Social History     Tobacco Use     Smoking status: Former Smoker     Packs/day: 0.50     Years: 20.00     Pack years: 10.00     Types: Cigarettes     Last attempt to quit: 1976     Years since quittin.4     Smokeless tobacco: Never Used   Substance Use Topics     Alcohol use: Yes     Comment: rare                           Current providers sharing in care for this patient include: {Rooming staff:  Please update Care Team in Rooming Activity, refresh this note and then delete this statement}  Patient Care Team:  No Ref-Primary, Physician as PCP - General  April Mahan RN as Nurse Coordinator (Breast Oncology)  Usha Salgado MD as MD (Hematology & Oncology)  Kadi Sanchez MD as Assigned PCP    The following health maintenance items are reviewed in Epic and correct as of today:  Health Maintenance   Topic Date Due     ZOSTER IMMUNIZATION (2 of 3) 2008     ADVANCE CARE PLANNING  2016     PHQ-2  2019     FALL RISK ASSESSMENT  2019     MEDICARE ANNUAL WELLNESS VISIT  2019     INFLUENZA VACCINE (1) 2019     EYE EXAM  2020     COLONOSCOPY  2020     DTAP/TDAP/TD IMMUNIZATION (2 - Td) 10/05/2022     DEXA  Completed     IPV IMMUNIZATION  Aged Out     MENINGITIS IMMUNIZATION  Aged Out     {Chronicprobdata (Optional):071316}  {Decision Support " "(Optional):203277}    ROS:  {ROS COMP:425837}    OBJECTIVE:   /82   Pulse 69   Temp 97  F (36.1  C) (Oral)   Resp 16   Ht 5' 4\" (1.626 m)   Wt 142 lb (64.4 kg)   SpO2 96%   Breastfeeding? No   BMI 24.37 kg/m   Estimated body mass index is 24.37 kg/m  as calculated from the following:    Height as of this encounter: 5' 4\" (1.626 m).    Weight as of this encounter: 142 lb (64.4 kg).  EXAM:   {Exam :552527}    {Diagnostic Test Results (Optional):021856::\"Diagnostic Test Results:\",\"Labs reviewed in Epic\"}    ASSESSMENT / PLAN:   {Diag Picklist:668594}    End of Life Planning:  Patient currently has an advanced directive: { :149653}    COUNSELING:  {Medicare Counselin}    Estimated body mass index is 24.37 kg/m  as calculated from the following:    Height as of this encounter: 5' 4\" (1.626 m).    Weight as of this encounter: 142 lb (64.4 kg).    {Weight Management Plan (ACO) Complete if BMI is abnormal-  Ages 18-64  BMI >24.9.  Age 65+ with BMI <23 or >30 (Optional):089265}     reports that she quit smoking about 43 years ago. Her smoking use included cigarettes. She has a 10.00 pack-year smoking history. She has never used smokeless tobacco.  {Tobacco Cessation -- Complete if patient is a smoker (Optional):882350}    Appropriate preventive services were discussed with this patient, including applicable screening as appropriate for cardiovascular disease, diabetes, osteopenia/osteoporosis, and glaucoma.  As appropriate for age/gender, discussed screening for colorectal cancer, prostate cancer, breast cancer, and cervical cancer. Checklist reviewing preventive services available has been given to the patient.    Reviewed patients plan of care and provided an AVS. The {CarePlan:135142} for Cydney meets the Care Plan requirement. This Care Plan has been established and reviewed with the {PATIENT, FAMILY MEMBER, CAREGIVER:967090}.    Counseling Resources:  ATP IV Guidelines  Pooled Cohorts Equation " Calculator  Breast Cancer Risk Calculator  FRAX Risk Assessment  ICSI Preventive Guidelines  Dietary Guidelines for Americans, 2010  [employer]'s MyPlate  ASA Prophylaxis  Lung CA Screening    Estrellita Hernandez MD  [facility]

## 2019-07-01 NOTE — PATIENT INSTRUCTIONS
Patient Education   Personalized Prevention Plan  You are due for the preventive services outlined below.  Your care team is available to assist you in scheduling these services.  If you have already completed any of these items, please share that information with your care team to update in your medical record.  Health Maintenance Due   Topic Date Due     Zoster (Shingles) Vaccine (2 of 3) 07/22/2008     Discuss Advance Directive Planning  06/09/2016     PHQ-2  01/01/2019     FALL RISK ASSESSMENT  06/28/2019     Annual Wellness Visit  06/28/2019

## 2019-07-01 NOTE — PROGRESS NOTES
"SUBJECTIVE:   Cydney Christiansen is a 83 year old female who presents for Preventive Visit.  Are you in the first 12 months of your Medicare coverage?  No    Healthy Habits:     In general, how would you rate your overall health?  Good    Frequency of exercise:  2-3 days/week    Duration of exercise:  30-45 minutes    Do you usually eat at least 4 servings of fruit and vegetables a day, include whole grains    & fiber and avoid regularly eating high fat or \"junk\" foods?  Yes    Taking medications regularly:  Yes    Medication side effects:  None    Ability to successfully perform activities of daily living:  No assistance needed    Home Safety:  Lack of grab bars in the bathroom    Hearing Impairment:  Feel that people are mumbling or not speaking clearly and no hearing concerns    In the past 6 months, have you been bothered by leaking of urine?  No    In general, how would you rate your overall mental or emotional health?  Good      PHQ-2 Total Score: 0    Additional concerns today:  No    Do you feel safe in your environment? Yes    Do you have a Health Care Directive? No: Advance care planning reviewed with patient; information given to patient to review.      Fall risk  Fallen 2 or more times in the past year?: No  Any fall with injury in the past year?: No    Cognitive Screening   1) Repeat 3 items (Leader, Season, Table)    2) Clock draw: NORMAL  3) 3 item recall: Recalls 1 object   Results: NORMAL clock, 1-2 items recalled: COGNITIVE IMPAIRMENT LESS LIKELY    Mini-CogTM Copyright CHELLE Pelayo. Licensed by the author for use in [facility]; reprinted with permission (cathy@.[facility]). All rights reserved.      Do you have sleep apnea, excessive snoring or daytime drowsiness?: no    Reviewed and updated as needed this visit by clinical staff  Tobacco  Allergies  Meds  Med Hx  Surg Hx  Fam Hx  Soc Hx        Reviewed and updated as needed this visit by Provider        Social History     Tobacco Use     " Smoking status: Former Smoker     Packs/day: 0.50     Years: 20.00     Pack years: 10.00     Types: Cigarettes     Last attempt to quit: 1976     Years since quittin.4     Smokeless tobacco: Never Used   Substance Use Topics     Alcohol use: Yes     Comment: rare       Hyperlipidemia Follow-Up      Are you having any of the following symptoms? (Select all that apply)  No complaints of shortness of breath, chest pain or pressure.  No increased sweating or nausea with activity.  No left-sided neck or arm pain.  No complaints of pain in calves when walking 1-2 blocks.    Are you regularly taking any medication or supplement to lower your cholesterol?   No    Are you having muscle aches or other side effects that you think could be caused by your cholesterol lowering medication?  No      Hypertension Follow-up      Do you check your blood pressure regularly outside of the clinic? Yes     Are you following a low salt diet? Yes    Are your blood pressures ever more than 140 on the top number (systolic) OR more   than 90 on the bottom number (diastolic), for example 140/90? No    Pt sees Oncology for her Breast cancer follow up     Pt had Bladder cancer many years ago and sees Urology-decided last year that no more follow up     Pt has Hearing Problems but does not want a referral to audiologist yet    Alcohol Use 2019   Prescreen: >3 drinks/day or >7 drinks/week? No   Prescreen: >3 drinks/day or >7 drinks/week? -         Current providers sharing in care for this patient include:   Patient Care Team:  No Ref-Primary, Physician as PCP - April Mckeon RN as Nurse Coordinator (Breast Oncology)  Usha Salgado MD as MD (Hematology & Oncology)  Kadi Sanchez MD as Assigned PCP    The following health maintenance items are reviewed in Epic and correct as of today:  Health Maintenance   Topic Date Due     ZOSTER IMMUNIZATION (2 of 3) 2008     ADVANCE CARE PLANNING  2016      PHQ-2  01/01/2019     FALL RISK ASSESSMENT  06/28/2019     MEDICARE ANNUAL WELLNESS VISIT  06/28/2019     INFLUENZA VACCINE (1) 09/01/2019     EYE EXAM  03/08/2020     COLONOSCOPY  06/16/2020     DTAP/TDAP/TD IMMUNIZATION (2 - Td) 10/05/2022     DEXA  Completed     IPV IMMUNIZATION  Aged Out     MENINGITIS IMMUNIZATION  Aged Out     Lab work is in process  Labs reviewed in EPIC  BP Readings from Last 3 Encounters:   07/01/19 122/82   04/01/19 137/81   03/29/19 116/71    Wt Readings from Last 3 Encounters:   07/01/19 64.4 kg (142 lb)   03/29/19 65 kg (143 lb 5 oz)   09/13/18 65.1 kg (143 lb 9.6 oz)                  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     Hypertension goal BP (blood pressure) < 140/90     Seborrheic keratosis     Malignant neoplasm of overlapping sites of left female breast (H)     Scoliosis     Compression fracture of thoracic vertebra (H)     Mass of left hand     Primary open angle glaucoma of both eyes, unspecified glaucoma stage     Osteoporosis, unspecified osteoporosis type, unspecified pathological fracture presence     Nuclear sclerosis of left eye     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)     Past Surgical History:   Procedure Laterality Date     COLONOSCOPY  5/2008, 5/13, 6/14, 6/17    Q 3 years for advanced adenomatous polyp     CYSTOSCOPY  12/31/2008     D & C       GENITOURINARY SURGERY      TURBT     HC REMOVAL GALLBLADDER      open pan     HC TRABECULOPLASTY BY LASER SURGERY Left 4/18/07    SLT #1 OS (inf 180)     HC TRABECULOPLASTY BY  LASER SURGERY Right 11    SLT #1 OD (inf 180?)     HC TRABECULOPLASTY BY LASER SURGERY Left 3/17/15    SLT #2 OS (sup 180)     HC TRABECULOPLASTY BY LASER SURGERY Right 6/23/15    SLT #2 OD (sup 180?)     LASER ARGON TREATMENT      SLT left eye x 2     LUMPECTOMY BREAST WITH SENTINEL NODE, COMBINED Left 2015    Procedure: COMBINED LUMPECTOMY BREAST WITH SENTINEL NODE;  Surgeon: Ifeanyi Sunshine MD;  Location: [facility] OR     PHACOEMULSIFICATION CLEAR CORNEA WITH STANDARD INTRAOCULAR LENS IMPLANT Left 2017    Procedure: PHACOEMULSIFICATION CLEAR CORNEA WITH STANDARD INTRAOCULAR LENS IMPLANT;   COMPLEX LEFT PHACOEMULSIFICATION CLEAR CORNEA WITH STANDARD INTRAOCULAR LENS IMPLANT ;  Surgeon: Kvng Garcia MD;  Location:  [facility]     SURGICAL HISTORY OF     squamous cell CA excised from back     TUBAL LIGATION         Social History     Tobacco Use     Smoking status: Former Smoker     Packs/day: 0.50     Years: 20.00     Pack years: 10.00     Types: Cigarettes     Last attempt to quit: 1976     Years since quittin.4     Smokeless tobacco: Never Used   Substance Use Topics     Alcohol use: Yes     Comment: rare     Family History   Problem Relation Age of Onset     Diabetes Mother      Breast Cancer Mother      Eye Disorder Mother      Osteoporosis Mother      Glaucoma Mother      Macular Degeneration Mother      Prostate Cancer Father      Prostate Cancer Brother      Glaucoma Brother      Macular Degeneration Brother      Thyroid Disease Sister      Blood Disease Sister         lupus     Heart Disease Sister      Glaucoma Sister      Asthma Son      Prostate Cancer Brother      Melanoma No family hx of      Skin Cancer No family hx of          Current Outpatient Medications   Medication Sig Dispense Refill     alendronate (FOSAMAX) 70 MG tablet TAKE 1 TABLET BY MOUTH EVERY 7 DAYS WITH 8OZ WATER 60 MIN BEFORE FOOD. SIT UPRIGHT FOR 30 MINUTES. No further refills until follow-up appointment 12  tablet 0     dorzolamide-timolol (COSOPT) 2-0.5 % ophthalmic solution Place 1 drop into both eyes 2 times daily 10 mL 11     ibuprofen (ADVIL,MOTRIN) 200 MG tablet Take 200 mg by mouth every 4 hours as needed.       latanoprost (XALATAN) 0.005 % ophthalmic solution Place 1 drop into both eyes At Bedtime 1 Bottle 11     losartan (COZAAR) 25 MG tablet Take 1 tablet (25 mg) by mouth daily 90 tablet 4     mupirocin (BACTROBAN) 2 % external ointment Use daily and apply to procedure site. 22 g 0     simvastatin (ZOCOR) 20 MG tablet Take 1 tablet (20 mg) by mouth At Bedtime 90 tablet 4     tamoxifen (NOLVADEX) 20 MG tablet Take 1 tablet (20 mg) by mouth daily 90 tablet 1     triamcinolone (KENALOG) 0.1 % cream Apply to affected area of the face once to twice a day. 15 g 1     CALCIUM 600 MG OR TABS 1 daily       Cyanocobalamin (B-12) 500 MCG TABS Take 500 mcg by mouth daily (Patient not taking: Reported on 7/1/2019) 150 tablet 3     Ferrous Sulfate (IRON SUPPLEMENT PO) Take 325 mg by mouth daily       meclizine (ANTIVERT) 25 MG tablet Take 1 tablet (25 mg) by mouth every 6 hours as needed for dizziness (Patient not taking: Reported on 7/1/2019) 30 tablet 1     VITAMIN D-1000 MAX -1000 MG-UNIT OR TABS 1 daily       Allergies   Allergen Reactions     Lisinopril Cough     Recent Labs   Lab Test 09/10/18  1010 06/28/18  0916 03/08/18  1143 06/26/17  0942  12/22/16  1138  06/23/16  1111  06/10/11  0932   LDL  --  42  --  51  --   --   --  57   < > 74   HDL  --  67  --  56  --   --   --  59   < > 64   TRIG  --  67  --  94  --   --   --  77   < > 93   ALT 20  --  14 19   < > 25   < >  --    < > 7   CR 0.75 0.78 0.70 0.74  --  0.76   < > 0.75   < > 0.84   GFRESTIMATED 74 71 81 75  --  74   < > 75   < > 66   GFRESTBLACK 89 86 >90 >90   GFR Calc    --  89   < > >90   GFR Calc     < > 80   POTASSIUM  --  4.4  --  4.5  --   --   --  4.0   < >  --    TSH  --   --   --   --   --  2.98  --   --   --   "1.93    < > = values in this interval not displayed.          Review of Systems   Constitutional: Negative for chills and fever.   HENT: Negative for congestion, ear pain, hearing loss and sore throat.    Eyes: Negative for pain and visual disturbance.   Respiratory: Negative for cough and shortness of breath.    Cardiovascular: Positive for peripheral edema. Negative for chest pain and palpitations.   Gastrointestinal: Negative for abdominal pain, constipation, diarrhea, heartburn and nausea.   Breasts:  Negative for tenderness, breast mass and discharge.   Genitourinary: Negative for dysuria, frequency, genital sores, hematuria, pelvic pain, urgency, vaginal bleeding and vaginal discharge.   Musculoskeletal: Negative for arthralgias, joint swelling and myalgias.   Skin: Negative for rash.   Neurological: Negative for dizziness, weakness, headaches and paresthesias.   Psychiatric/Behavioral: Negative for mood changes. The patient is not nervous/anxious.        OBJECTIVE:   /82   Pulse 69   Temp 97  F (36.1  C) (Oral)   Resp 16   Ht 1.626 m (5' 4\")   Wt 64.4 kg (142 lb)   SpO2 96%   Breastfeeding? No   BMI 24.37 kg/m   Estimated body mass index is 24.37 kg/m  as calculated from the following:    Height as of this encounter: 1.626 m (5' 4\").    Weight as of this encounter: 64.4 kg (142 lb).  Physical Exam  GENERAL APPEARANCE: healthy, alert and no distress  EYES: Eyes grossly normal to inspection, PERRL and conjunctivae and sclerae normal  HENT: ear canals and TM's normal, nose and mouth without ulcers or lesions, oropharynx clear and oral mucous membranes moist  NECK: no adenopathy, no asymmetry, masses, or scars and thyroid normal to palpation  RESP: lungs clear to auscultation - no rales, rhonchi or wheezes  BREAST: normal without masses, tenderness or nipple discharge and no palpable axillary masses or adenopathy  CV: regular rate and rhythm, normal S1 S2, no S3 or S4, no murmur, click or rub, no " peripheral edema and peripheral pulses strong  ABDOMEN: soft, nontender, no hepatosplenomegaly, no masses and bowel sounds normal  MS: no musculoskeletal defects are noted and gait is age appropriate without ataxia  SKIN: no suspicious lesions or rashes  NEURO: Normal strength and tone, sensory exam grossly normal, mentation intact and speech normal  PSYCH: mentation appears normal and affect normal/bright    Diagnostic Test Results:  Labs reviewed in Epic  Results for orders placed or performed in visit on 03/29/19   CBC with platelets   Result Value Ref Range    WBC 5.5 4.0 - 11.0 10e9/L    RBC Count 3.68 (L) 3.8 - 5.2 10e12/L    Hemoglobin 11.1 (L) 11.7 - 15.7 g/dL    Hematocrit 34.3 (L) 35.0 - 47.0 %    MCV 93 78 - 100 fl    MCH 30.2 26.5 - 33.0 pg    MCHC 32.4 31.5 - 36.5 g/dL    RDW 12.9 10.0 - 15.0 %    Platelet Count 192 150 - 450 10e9/L       ASSESSMENT / PLAN:   1. Encounter for Medicare annual wellness exam      2. Personal history of malignant neoplasm of bladder  Sees Urology-Notes reviewed     3. Hyperlipidemia LDL goal <160  Labs pending   Doing well  - Lipid panel reflex to direct LDL Fasting  - simvastatin (ZOCOR) 20 MG tablet; Take 1 tablet (20 mg) by mouth At Bedtime  Dispense: 90 tablet; Refill: 4    4. Family history of diabetes mellitus  Labs pending     5. Hypertension goal BP (blood pressure) < 140/90  controlled  - Basic metabolic panel    6. Malignant neoplasm of overlapping sites of left breast in female, estrogen receptor positive (H)  Sees Oncology-on Tamoxifen    7. Primary open angle glaucoma of both eyes, unspecified glaucoma stage  Sees Opthamlogy q 6 months    8. Osteoporosis, unspecified osteoporosis type, unspecified pathological fracture presence  . Take Vitamin D 1000 units daily with Calcium 1200 mg daily/Exercise.      9. 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)  A brandan    10. Benign essential hypertension  controlled  - losartan (COZAAR) 25 MG tablet; Take 1 tablet (25  "mg) by mouth daily  Dispense: 90 tablet; Refill: 4    11. B12 deficiency  refilled  - cyanocobalamin (VITAMIN B-12) 500 MCG tablet; Take 1 tablet (500 mcg) by mouth daily  Dispense: 150 tablet; Refill: 3    End of Life Planning:  Patient currently has an advanced directive: No.  I have verified the patient's ablity to prepare an advanced directive/make health care decisions.  Literature was provided to assist patient in preparing an advanced directive.    COUNSELING:  Reviewed preventive health counseling, as reflected in patient instructions       Regular exercise       Healthy diet/nutrition       Vision screening       Hearing screening       Dental care       Bladder control       Fall risk prevention       Osteoporosis Prevention/Bone Health       The ASCVD Risk score (Wauseon MIGUEL Jr., et al., 2013) failed to calculate for the following reasons:    The 2013 ASCVD risk score is only valid for ages 40 to 79       Advanced Planning     Estimated body mass index is 24.37 kg/m  as calculated from the following:    Height as of this encounter: 1.626 m (5' 4\").    Weight as of this encounter: 64.4 kg (142 lb).         reports that she quit smoking about 43 years ago. Her smoking use included cigarettes. She has a 10.00 pack-year smoking history. She has never used smokeless tobacco.      Appropriate preventive services were discussed with this patient, including applicable screening as appropriate for cardiovascular disease, diabetes, osteopenia/osteoporosis, and glaucoma.  As appropriate for age/gender, discussed screening for colorectal cancer, prostate cancer, breast cancer, and cervical cancer. Checklist reviewing preventive services available has been given to the patient.    Reviewed patients plan of care and provided an AVS. The Intermediate Care Plan ( asthma action plan, low back pain action plan, and migraine action plan) for Cydney meets the Care Plan requirement. This Care Plan has been established and reviewed " with the Patient.    Counseling Resources:  ATP IV Guidelines  Pooled Cohorts Equation Calculator  Breast Cancer Risk Calculator  FRAX Risk Assessment  ICSI Preventive Guidelines  Dietary Guidelines for Americans, 2010  USDA's MyPlate  ASA Prophylaxis  Lung CA Screening    Estrellita Hernandez MD  [facility]    Identified Health Risks:

## 2019-07-08 ENCOUNTER — DOCUMENTATION ONLY (OUTPATIENT)
Dept: OPHTHALMOLOGY | Facility: CLINIC | Age: 84
End: 2019-07-08

## 2019-07-15 ENCOUNTER — OFFICE VISIT (OUTPATIENT)
Dept: FAMILY MEDICINE | Facility: CLINIC | Age: 84
End: 2019-07-15
Payer: COMMERCIAL

## 2019-07-15 VITALS
RESPIRATION RATE: 16 BRPM | DIASTOLIC BLOOD PRESSURE: 80 MMHG | TEMPERATURE: 98.2 F | OXYGEN SATURATION: 98 % | HEART RATE: 72 BPM | WEIGHT: 142.2 LBS | BODY MASS INDEX: 27.92 KG/M2 | SYSTOLIC BLOOD PRESSURE: 122 MMHG | HEIGHT: 60 IN

## 2019-07-15 DIAGNOSIS — R42 VERTIGO: ICD-10-CM

## 2019-07-15 LAB
ANION GAP SERPL CALCULATED.3IONS-SCNC: 6 MMOL/L (ref 3–14)
BUN SERPL-MCNC: 11 MG/DL (ref 7–30)
CALCIUM SERPL-MCNC: 8.4 MG/DL (ref 8.5–10.1)
CHLORIDE SERPL-SCNC: 108 MMOL/L (ref 94–109)
CO2 SERPL-SCNC: 27 MMOL/L (ref 20–32)
CREAT SERPL-MCNC: 0.69 MG/DL (ref 0.52–1.04)
ERYTHROCYTE [DISTWIDTH] IN BLOOD BY AUTOMATED COUNT: 13.4 % (ref 10–15)
GFR SERPL CREATININE-BSD FRML MDRD: 81 ML/MIN/{1.73_M2}
GLUCOSE SERPL-MCNC: 91 MG/DL (ref 70–99)
HCT VFR BLD AUTO: 35.8 % (ref 35–47)
HGB BLD-MCNC: 11.4 G/DL (ref 11.7–15.7)
MCH RBC QN AUTO: 29.8 PG (ref 26.5–33)
MCHC RBC AUTO-ENTMCNC: 31.8 G/DL (ref 31.5–36.5)
MCV RBC AUTO: 94 FL (ref 78–100)
PLATELET # BLD AUTO: 177 10E9/L (ref 150–450)
POTASSIUM SERPL-SCNC: 4.4 MMOL/L (ref 3.4–5.3)
RBC # BLD AUTO: 3.82 10E12/L (ref 3.8–5.2)
SODIUM SERPL-SCNC: 141 MMOL/L (ref 133–144)
WBC # BLD AUTO: 6.3 10E9/L (ref 4–11)

## 2019-07-15 PROCEDURE — 85027 COMPLETE CBC AUTOMATED: CPT | Performed by: FAMILY MEDICINE

## 2019-07-15 PROCEDURE — 99213 OFFICE O/P EST LOW 20 MIN: CPT | Performed by: FAMILY MEDICINE

## 2019-07-15 PROCEDURE — 80048 BASIC METABOLIC PNL TOTAL CA: CPT | Performed by: FAMILY MEDICINE

## 2019-07-15 PROCEDURE — 36415 COLL VENOUS BLD VENIPUNCTURE: CPT | Performed by: FAMILY MEDICINE

## 2019-07-15 RX ORDER — MECLIZINE HYDROCHLORIDE 25 MG/1
25 TABLET ORAL EVERY 6 HOURS PRN
Qty: 30 TABLET | Refills: 1 | Status: SHIPPED | OUTPATIENT
Start: 2019-07-15 | End: 2020-07-15

## 2019-07-15 ASSESSMENT — MIFFLIN-ST. JEOR: SCORE: 1035.26

## 2019-07-15 NOTE — PATIENT INSTRUCTIONS
GO to Emergency Room if you have  headache, uncontrolled dizziness or new neurologic symptoms such as numbness/weakness, changes in speech, or change in vision.  Estrellita Hernandez MD

## 2019-07-15 NOTE — PROGRESS NOTES
SUBJECTIVE:                                                    Cydney Christiansen is a 83 year old female who presents to clinic today for the following health issues:      Dizziness  Onset: vertigo    Description:   Do you feel faint:  no   Does it feel like the surroundings (bed, room) are moving: no   Unsteady/off balance: no   Have you passed out or fallen: no     Intensity: mild    Progression of Symptoms:  same    Accompanying Signs & Symptoms:  Heart palpitations: no   Nausea, vomiting: no   Weakness in arms or legs: no   Fatigue: YES- some  Vision or speech changes: no   Ringing in ears (Tinnitus): no   Hearing Loss: no     History:   Head trauma/concussion hx: no   Previous similar symptoms: YES=--has had similar Vertigo before  Recent bleeding history: no     Precipitating factors:   Worse with activity or head movement: YES- with movement  Any new medications (BP?): no   Alcohol/drug abuse/withdrawal: no     Alleviating factors:   Does staying in a fixed position give relief:  YES    Therapies Tried and outcome:         Problem list and histories reviewed & adjusted, as indicated.  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     Hypertension goal BP (blood pressure) < 140/90     Seborrheic keratosis     Malignant neoplasm of overlapping sites of left female breast (H)     Scoliosis     Compression fracture of thoracic vertebra (H)     Mass of left hand     Primary open angle glaucoma of both eyes, unspecified glaucoma stage      Osteoporosis, unspecified osteoporosis type, unspecified pathological fracture presence     Nuclear sclerosis of left eye     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)     Past Surgical History:   Procedure Laterality Date     COLONOSCOPY  2008, , ,     Q 3 years for advanced adenomatous polyp     CYSTOSCOPY  2008     D & C       GENITOURINARY SURGERY      TURBT     HC REMOVAL GALLBLADDER      open pan     HC TRABECULOPLASTY BY LASER SURGERY Left 07    SLT #1 OS (inf 180)     HC TRABECULOPLASTY BY LASER SURGERY Right 11    SLT #1 OD (inf 180?)     HC TRABECULOPLASTY BY LASER SURGERY Left 3/17/15    SLT #2 OS (sup 180)     HC TRABECULOPLASTY BY LASER SURGERY Right 6/23/15    SLT #2 OD (sup 180?)     LASER ARGON TREATMENT      SLT left eye x 2     LUMPECTOMY BREAST WITH SENTINEL NODE, COMBINED Left 2015    Procedure: COMBINED LUMPECTOMY BREAST WITH SENTINEL NODE;  Surgeon: Ifeanyi Sunshine MD;  Location: [facility] OR     PHACOEMULSIFICATION CLEAR CORNEA WITH STANDARD INTRAOCULAR LENS IMPLANT Left 2017    Procedure: PHACOEMULSIFICATION CLEAR CORNEA WITH STANDARD INTRAOCULAR LENS IMPLANT;   COMPLEX LEFT PHACOEMULSIFICATION CLEAR CORNEA WITH STANDARD INTRAOCULAR LENS IMPLANT ;  Surgeon: Kvng Garcia MD;  Location: [facility]     SURGICAL HISTORY OF -       squamous cell CA excised from back     TUBAL LIGATION         Social History     Tobacco Use     Smoking status: Former Smoker     Packs/day: 0.50     Years: 20.00     Pack years: 10.00     Types: Cigarettes     Last attempt to quit: 1976     Years since quittin.4     Smokeless tobacco: Never Used   Substance Use Topics     Alcohol use: Yes     Comment: rare     Family History   Problem Relation Age of Onset     Diabetes Mother      Breast Cancer Mother      Eye Disorder Mother      Osteoporosis Mother      Glaucoma Mother      Macular Degeneration Mother      Prostate Cancer Father      Prostate Cancer Brother       Glaucoma Brother      Macular Degeneration Brother      Thyroid Disease Sister      Blood Disease Sister         lupus     Heart Disease Sister      Glaucoma Sister      Asthma Son      Prostate Cancer Brother      Melanoma No family hx of      Skin Cancer No family hx of          Current Outpatient Medications   Medication Sig Dispense Refill     alendronate (FOSAMAX) 70 MG tablet TAKE 1 TABLET BY MOUTH EVERY 7 DAYS WITH 8OZ WATER 60 MIN BEFORE FOOD. SIT UPRIGHT FOR 30 MINUTES. No further refills until follow-up appointment 12 tablet 0     CALCIUM 600 MG OR TABS 1 daily       cyanocobalamin (VITAMIN B-12) 500 MCG tablet Take 1 tablet (500 mcg) by mouth daily 150 tablet 3     dorzolamide-timolol (COSOPT) 2-0.5 % ophthalmic solution Place 1 drop into both eyes 2 times daily 10 mL 11     Ferrous Sulfate (IRON SUPPLEMENT PO) Take 325 mg by mouth daily       ibuprofen (ADVIL,MOTRIN) 200 MG tablet Take 200 mg by mouth every 4 hours as needed.       latanoprost (XALATAN) 0.005 % ophthalmic solution Place 1 drop into both eyes At Bedtime 1 Bottle 11     losartan (COZAAR) 25 MG tablet Take 1 tablet (25 mg) by mouth daily 90 tablet 4     meclizine (ANTIVERT) 25 MG tablet Take 1 tablet (25 mg) by mouth every 6 hours as needed for dizziness 30 tablet 1     mupirocin (BACTROBAN) 2 % external ointment Use daily and apply to procedure site. 22 g 0     simvastatin (ZOCOR) 20 MG tablet Take 1 tablet (20 mg) by mouth At Bedtime 90 tablet 4     tamoxifen (NOLVADEX) 20 MG tablet Take 1 tablet (20 mg) by mouth daily 90 tablet 1     triamcinolone (KENALOG) 0.1 % cream Apply to affected area of the face once to twice a day. 15 g 1     VITAMIN D-1000 MAX -1000 MG-UNIT OR TABS 1 daily       Allergies   Allergen Reactions     Lisinopril Cough     Recent Labs   Lab Test 07/01/19  0941 09/10/18  1010 06/28/18  0916 03/08/18  1143 06/26/17  0942  12/22/16  1138  06/10/11  0932   LDL 46  --  42  --  51  --   --    < > 74   HDL 61  --  67   --  56  --   --    < > 64   TRIG 73  --  67  --  94  --   --    < > 93   ALT  --  20  --  14 19   < > 25   < > 7   CR 0.66 0.75 0.78 0.70 0.74  --  0.76   < > 0.84   GFRESTIMATED 83 74 71 81 75  --  74   < > 66   GFRESTBLACK >90 89 86 >90 >90   GFR Calc    --  89   < > 80   POTASSIUM 4.1  --  4.4  --  4.5  --   --    < >  --    TSH  --   --   --   --   --   --  2.98  --  1.93    < > = values in this interval not displayed.      BP Readings from Last 3 Encounters:   07/15/19 122/80   07/01/19 122/82   04/01/19 137/81    Wt Readings from Last 3 Encounters:   07/15/19 64.5 kg (142 lb 3.2 oz)   07/01/19 64.4 kg (142 lb)   03/29/19 65 kg (143 lb 5 oz)                    Assessment & Plan     1. Vertigo  Labs pending   - CBC with platelets  - Basic metabolic panel  - meclizine (ANTIVERT) 25 MG tablet; Take 1 tablet (25 mg) by mouth every 6 hours as needed for dizziness  Dispense: 30 tablet; Refill: 1   follow up 1 week if not better-sooner if worse        Estrellita Hernandez MD  [facility]

## 2019-08-08 ENCOUNTER — OFFICE VISIT (OUTPATIENT)
Dept: DERMATOLOGY | Facility: CLINIC | Age: 84
End: 2019-08-08
Payer: COMMERCIAL

## 2019-08-08 DIAGNOSIS — L57.0 ACTINIC KERATOSIS: ICD-10-CM

## 2019-08-08 DIAGNOSIS — D48.5 NEOPLASM OF UNCERTAIN BEHAVIOR OF SKIN: ICD-10-CM

## 2019-08-08 DIAGNOSIS — Z85.828 HISTORY OF NONMELANOMA SKIN CANCER: Primary | ICD-10-CM

## 2019-08-08 PROCEDURE — 11102 TANGNTL BX SKIN SINGLE LES: CPT | Performed by: DERMATOLOGY

## 2019-08-08 PROCEDURE — 11103 TANGNTL BX SKIN EA SEP/ADDL: CPT | Performed by: DERMATOLOGY

## 2019-08-08 PROCEDURE — 88305 TISSUE EXAM BY PATHOLOGIST: CPT | Mod: TC | Performed by: DERMATOLOGY

## 2019-08-08 PROCEDURE — 99213 OFFICE O/P EST LOW 20 MIN: CPT | Mod: 25 | Performed by: DERMATOLOGY

## 2019-08-08 ASSESSMENT — PAIN SCALES - GENERAL: PAINLEVEL: NO PAIN (0)

## 2019-08-08 NOTE — LETTER
8/8/2019         RE: Cydney Christiansen  [address]  [place] [address]        Dear Colleague,    Thank you for referring your patient, Cydney Christiansen, to the [facility]. Please see a copy of my visit note below.    [facility] Dermatology Note      Dermatology Problem List:  1. 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 HAK, left mid eyebrow, base not seen, 6/2014  4. Actinic keratosis  -s/p cryotherapy  -left forearm, s/p biopsy 3/15/19   5. GA, right angle of jaw: resolves with triamcinolone cream  6. ISK, mid back    Encounter Date: Aug 8, 2019    CC:  Chief Complaint   Patient presents with     Skin Check     Hx of NMSC-SCC Mohs in April       History of Present Illness:  Ms. Cydney Christiansen is a 83 year old female with a history of multiple NMSC who presents for a skin check. The patient was last seen on 4/8/19 by Dr. Haro for a wound check following Mohs surgery of a SCCIS on the right nasal sidewall. Today she reports one rough spot on the right hand. Reports that Dr. Haro did a wonderful job on her previous Mohs surgery.       Past Medical History:   Patient Active Problem List   Diagnosis     History of basal cell carcinoma     PXF  OU     Personal history of malignant neoplasm of bladder     Advanced directives, counseling/discussion     Hyperlipidemia LDL goal <160     Family history of diabetes mellitus     Health Care  Home     Squamous cell carcinoma     Basal cell carcinoma     Skin lesion of left leg     Viral warts     PVD (posterior vitreous detachment), OS     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     Hypertension goal BP (blood pressure) < 140/90     Seborrheic keratosis     Malignant neoplasm of overlapping sites of left female breast (H)     Scoliosis     Compression fracture of thoracic vertebra (H)     Mass of left hand     Primary open angle glaucoma of both eyes, unspecified glaucoma stage     Osteoporosis, unspecified osteoporosis type, unspecified pathological fracture presence     Nuclear sclerosis of left eye     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)     Past Medical History:   Diagnosis Date     Actinic keratosis      Basal cell cancer 02/2011    bcc of the L back.     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    left leg     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     Past Surgical History:   Procedure Laterality Date     COLONOSCOPY  5/2008, 5/13, 6/14, 6/17    Q 3 years for advanced adenomatous polyp     CYSTOSCOPY  12/31/2008     D & C       GENITOURINARY SURGERY      TURBT     HC REMOVAL GALLBLADDER      open pan     HC TRABECULOPLASTY BY LASER SURGERY Left 4/18/07    SLT #1 OS (inf 180)     HC TRABECULOPLASTY BY LASER SURGERY Right 5/4/11    SLT  #1 OD (inf 180?)     HC TRABECULOPLASTY BY LASER SURGERY Left 3/17/15    SLT #2 OS (sup 180)     HC TRABECULOPLASTY BY LASER SURGERY Right 6/23/15    SLT #2 OD (sup 180?)     LASER ARGON TREATMENT      SLT left eye x 2     LUMPECTOMY BREAST WITH SENTINEL NODE, COMBINED Left 7/29/2015    Procedure: COMBINED LUMPECTOMY BREAST WITH SENTINEL NODE;  Surgeon: Ifeanyi Sunshine MD;  Location: [facility] OR     PHACOEMULSIFICATION CLEAR CORNEA WITH STANDARD INTRAOCULAR LENS IMPLANT Left 12/8/2017    Procedure: PHACOEMULSIFICATION CLEAR CORNEA WITH STANDARD INTRAOCULAR LENS IMPLANT;   COMPLEX LEFT PHACOEMULSIFICATION CLEAR CORNEA WITH STANDARD INTRAOCULAR LENS IMPLANT ;  Surgeon: Kvng Garcia MD;  Location: [facility]     SURGICAL HISTORY OF -   9/11    squamous cell CA excised from back     TUBAL LIGATION         Social History:    [place]. She is this summer.   Kept in chart for convenience.       Family History:  No family history of skin cancer.   Kept in chart for convenience.       Medications:  Current Outpatient Medications   Medication Sig Dispense Refill     alendronate (FOSAMAX) 70 MG tablet TAKE 1 TABLET BY MOUTH EVERY 7 DAYS WITH 8OZ WATER 60 MIN BEFORE FOOD. SIT UPRIGHT FOR 30 MINUTES. No further refills until follow-up appointment 12 tablet 0     CALCIUM 600 MG OR TABS 1 daily       cyanocobalamin (VITAMIN B-12) 500 MCG tablet Take 1 tablet (500 mcg) by mouth daily 150 tablet 3     dorzolamide-timolol (COSOPT) 2-0.5 % ophthalmic solution Place 1 drop into both eyes 2 times daily 10 mL 11     Ferrous Sulfate (IRON SUPPLEMENT PO) Take 325 mg by mouth daily       ibuprofen (ADVIL,MOTRIN) 200 MG tablet Take 200 mg by mouth every 4 hours as needed.       latanoprost (XALATAN) 0.005 % ophthalmic solution Place 1 drop into both eyes At Bedtime 1 Bottle 11     losartan (COZAAR) 25 MG tablet Take 1 tablet (25 mg) by mouth daily 90 tablet 4     meclizine (ANTIVERT) 25 MG tablet Take 1 tablet (25 mg) by mouth every 6 hours as  needed for dizziness 30 tablet 1     mupirocin (BACTROBAN) 2 % external ointment Use daily and apply to procedure site. 22 g 0     simvastatin (ZOCOR) 20 MG tablet Take 1 tablet (20 mg) by mouth At Bedtime 90 tablet 4     tamoxifen (NOLVADEX) 20 MG tablet Take 1 tablet (20 mg) by mouth daily 90 tablet 1     triamcinolone (KENALOG) 0.1 % cream Apply to affected area of the face once to twice a day. 15 g 1     VITAMIN D-1000 MAX -1000 MG-UNIT OR TABS 1 daily         Allergies   Allergen Reactions     Lisinopril Cough       Review of Systems:  -Constitutional: Otherwise feeling well, in usual state of health.   -Skin: As per HPI, no other concerns.     Physical exam:  Vitals: There were no vitals taken for this visit.  GEN: This is a well-nourished, well developed female in no acute distress  SKIN: Total skin excluding the undergarment areas was performed. The exam included the head/face, neck, both arms, chest, back, abdomen, both legs, digits and/or nails. Including buttocks.   - There are erythematous macules with overyling adherent scale on the right temple, left forehead, left brow, left preauricular, left cheek, right cheek, right upper cutaneous lip. Including the left forearm that was previously biopsied.   - There are waxy stuck on tan to brown papules on the trunk and extremities.  - 5 mm red macule on the left superior forearm  - 6 mm tan and erythematous macule with scale on the right temple  - No other lesions of concern on areas examined.     Impression/Plan:  1.   History of nonmelanoma skin cancer     Recommend sunscreens SPF #30 or greater, protective clothing and avoidance of tanning beds.     2.   Actinic keratoses: right temple, left forehead, left brow, left preauricular, left cheek, right cheek, right upper cutaneous lip   Cryotherapy procedure note: After verbal consent and discussion of risks and benefits including but no limited to dyspigmentation/scar, blister, and pain, 6 were treated  with 1-2mm freeze border for 2 cycles with liquid nitrogen. Post cryotherapy instructions were provided.     3.   NUB, 6 mm tan and erythematous macule with scale on the temple, AK or SCCIS     Shave biopsy:  After discussion of benefits and risks including but not limited to bleeding/bruising, pain/swelling, infection, scar, incomplete removal, nerve damage/numbness, recurrence, and non-diagnostic biopsy, written consent, verbal consent and photographs were obtained. Time-out was performed. The area was cleaned with isopropyl alcohol. 0.5mL of 1% lidocaine with epinephrine was injected to obtain adequate anesthesia of the lesion on the right temple. A shave biopsy was performed. Hemostasis was achieved with aluminium chloride. Vaseline and a sterile dressing were applied. The patient tolerated the procedure and no complications were noted. The patient was provided with verbal and written post care instructions.     4.   Seborrheic keratoses, trunk and extremities     No further intervention needed.    5.   NUB, 5 mm red macule on the left superior forearm, DDx BCC or other     Shave biopsy:  After discussion of benefits and risks including but not limited to bleeding/bruising, pain/swelling, infection, scar, incomplete removal, nerve damage/numbness, recurrence, and non-diagnostic biopsy, written consent, verbal consent and photographs were obtained. Time-out was performed. The area was cleaned with isopropyl alcohol. 0.5mL of 1% lidocaine with epinephrine was injected to obtain adequate anesthesia of the lesion on the left superior forearm. A shave biopsy was performed. Hemostasis was achieved with aluminium chloride. Vaseline and a sterile dressing were applied. The patient tolerated the procedure and no complications were noted. The patient was provided with verbal and written post care instructions.     6.   AK - left forearm, biopsied x2, still present today. Treated with cryo today  Cryotherapy procedure  note: After verbal consent and discussion of risks and benefits including but no limited to dyspigmentation/scar, blister, and pain, 1 was treated with 1-2mm freeze border for 2 cycles with liquid nitrogen. Post cryotherapy instructions were provided.       Follow-up in 6 months, earlier for new or changing lesions.    Staff Involved:  Scribe/Staff     Scribe Disclosure  I, Serena Agustin, am serving as a scribe to document services personally performed by Dr. Qi Barba MD, based on data collection and the provider's statements to me.     Provider Disclosure:   The documentation recorded by the scribe accurately reflects the services I personally performed and the decisions made by me.    Qi Barba MD    Department of Dermatology  [facility]: Phone: [phone], Fax:[phone]  [facility]: Phone: [phone], Fax: [phone]            Again, thank you for allowing me to participate in the care of your patient.        Sincerely,        Qi Barba MD

## 2019-08-08 NOTE — PATIENT INSTRUCTIONS
Wound Care After a Biopsy    What is a skin biopsy?  A skin biopsy allows the doctor to examine a very small piece of tissue under the microscope to determine the diagnosis and the best treatment for the skin condition. A local anesthetic (numbing medicine)  is injected with a very small needle into the skin area to be tested. A small piece of skin is taken from the area. Sometimes a suture (stitch) is used.     What are the risks of a skin biopsy?  I will experience scar, bleeding, swelling, pain, crusting and redness. I may experience incomplete removal or recurrence. Risks of this procedure are excessive bleeding, bruising, infection, nerve damage, numbness, thick (hypertrophic or keloidal) scar and non-diagnostic biopsy.    How should I care for my wound for the first 24 hours?    Keep the wound dry and covered for 24 hours    If it bleeds, hold direct pressure on the area for 15 minutes. If bleeding does not stop then go to the emergency room    Avoid strenuous exercise the first 1-2 days or as your doctor instructs you    How should I care for the wound after 24 hours?    After 24 hours, remove the bandage    You may bathe or shower as normal    If you had a scalp biopsy, you can shampoo as usual and can use shower water to clean the biopsy site daily    Clean the wound twice a day with gentle soap and water    Do not scrub, be gentle    Apply white petroleum/Vaseline after cleaning the wound with a cotton swab or a clean finger, and keep the site covered with a Bandaid /bandage. Bandages are not necessary with a scalp biopsy    If you are unable to cover the site with a Bandaid /bandage, re-apply ointment 2-3 times a day to keep the site moist. Moisture will help with healing    Avoid strenuous activity for first 1-2 days    Avoid lakes, rivers, pools, and oceans until the stitches are removed or the site is healed    How do I clean my wound?    Wash hands thoroughly with soap or use hand  before all  wound care    Clean the wound with gentle soap and water    Apply white petroleum/Vaseline  to wound after it is clean    Replace the Bandaid /bandage to keep the wound covered for the first few days or as instructed by your doctor    If you had a scalp biopsy, warm shower water to the area on a daily basis should suffice    What should I use to clean my wound?     Cotton-tipped applicators (Qtips )    White petroleum jelly (Vaseline ). Use a clean new container and use Q-tips to apply.    Bandaids   as needed    Gentle soap     How should I care for my wound long term?    Do not get your wound dirty    Keep up with wound care for one week or until the area is healed.    A small scab will form and fall off by itself when the area is completely healed. The area will be red and will become pink in color as it heals. Sun protection is very important for how your scar will turn out. Sunscreen with an SPF 30 or greater is recommended once the area is healed.    You should have some soreness but it should be mild and slowly go away over several days. Talk to your doctor about using tylenol for pain,    When should I call my doctor?  If you have increased:     Pain or swelling    Pus or drainage (clear or slightly yellow drainage is ok)    Temperature over 100F    Spreading redness or warmth around wound    When will I hear about my results?  The biopsy results can take 2-3 weeks to come back. The clinic will call you with the results, send you a [employer] message, or have you schedule a follow-up clinic or phone time to discuss the results. Contact our clinics if you do not hear from us in 3 weeks.     Who should I call with questions?    [facility]: [phone]     [facility]: [phone]    For urgent needs outside of business hours call the [facility] at [phone] and ask for the dermatology resident on call      Cryotherapy    What is it?    Use  of a very cold liquid, such as liquid nitrogen, to freeze and destroy abnormal skin cells that need to be removed    What should I expect?    Tenderness and redness    A small blister that might grow and fill with dark purple blood. There may be crusting.    More than one treatment may be needed if the lesions do not go away.    How do I care for the treated area?    Gently wash the area with your hands when bathing.    Use a thin layer of Vaseline to help with healing. You may use a Band-Aid.     The area should heal within 7-10 days and may leave behind a pink or lighter color.     Do not use an antibiotic or Neosporin ointment.     You may take acetaminophen (Tylenol) for pain.     Call your Doctor if you have:    Severe pain    Signs of infection (warmth, redness, cloudy yellow drainage, and or a bad smell)    Questions or concerns    Who should I call with questions?       [facility]: [phone]       [facility]: [phone]       For urgent needs outside of business hours call the [facility] at [phone]        and ask for the dermatology resident on call

## 2019-08-08 NOTE — PROGRESS NOTES
[facility] Dermatology Note      Dermatology Problem List:  1. 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 HAK, left mid eyebrow, base not seen, 6/2014  4. Actinic keratosis  -s/p cryotherapy  -left forearm, s/p biopsy 3/15/19   5. GA, right angle of jaw: resolves with triamcinolone cream  6. ISK, mid back    Encounter Date: Aug 8, 2019    CC:  Chief Complaint   Patient presents with     Skin Check     Hx of NMSC-SCC Mohs in April       History of Present Illness:  Ms. Cydney Christiansen is a 83 year old female with a history of multiple NMSC who presents for a skin check. The patient was last seen on 4/8/19 by Dr. Haro for a wound check following Mohs surgery of a SCCIS on the right nasal sidewall. Today she reports one rough spot on the right hand. Reports that Dr. Haro did a wonderful job on her previous Mohs surgery.       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    left leg     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     Past Surgical History:   Procedure Laterality Date     COLONOSCOPY  5/2008, 5/13, 6/14, 6/17    Q 3 years for advanced adenomatous polyp     CYSTOSCOPY  12/31/2008     D & C       GENITOURINARY SURGERY      TURBT     HC REMOVAL GALLBLADDER      open pan     HC TRABECULOPLASTY BY LASER SURGERY Left 4/18/07    SLT #1 OS (inf 180)     HC TRABECULOPLASTY BY LASER SURGERY Right 5/4/11    SLT #1 OD (inf 180?)     HC TRABECULOPLASTY BY LASER SURGERY Left 3/17/15    SLT #2 OS (sup 180)     HC TRABECULOPLASTY BY LASER SURGERY Right 6/23/15    SLT #2 OD (sup 180?)     LASER ARGON TREATMENT      SLT left eye x 2     LUMPECTOMY BREAST WITH  SENTINEL NODE, COMBINED Left 7/29/2015    Procedure: COMBINED LUMPECTOMY BREAST WITH SENTINEL NODE;  Surgeon: Ifeanyi Sunshine MD;  Location: [facility] OR     PHACOEMULSIFICATION CLEAR CORNEA WITH STANDARD INTRAOCULAR LENS IMPLANT Left 12/8/2017    Procedure: PHACOEMULSIFICATION CLEAR CORNEA WITH STANDARD INTRAOCULAR LENS IMPLANT;   COMPLEX LEFT PHACOEMULSIFICATION CLEAR CORNEA WITH STANDARD INTRAOCULAR LENS IMPLANT ;  Surgeon: Kvng Garcia MD;  Location: [facility]     SURGICAL HISTORY OF -   9/11    squamous cell CA excised from back     TUBAL LIGATION         Social History:    [place]. She is this summer.   Kept in chart for convenience.       Family History:  No family history of skin cancer.   Kept in chart for convenience.       Medications:  Current Outpatient Medications   Medication Sig Dispense Refill     alendronate (FOSAMAX) 70 MG tablet TAKE 1 TABLET BY MOUTH EVERY 7 DAYS WITH 8OZ WATER 60 MIN BEFORE FOOD. SIT UPRIGHT FOR 30 MINUTES. No further refills until follow-up appointment 12 tablet 0     CALCIUM 600 MG OR TABS 1 daily       cyanocobalamin (VITAMIN B-12) 500 MCG tablet Take 1 tablet (500 mcg) by mouth daily 150 tablet 3     dorzolamide-timolol (COSOPT) 2-0.5 % ophthalmic solution Place 1 drop into both eyes 2 times daily 10 mL 11     Ferrous Sulfate (IRON SUPPLEMENT PO) Take 325 mg by mouth daily       ibuprofen (ADVIL,MOTRIN) 200 MG tablet Take 200 mg by mouth every 4 hours as needed.       latanoprost (XALATAN) 0.005 % ophthalmic solution Place 1 drop into both eyes At Bedtime 1 Bottle 11     losartan (COZAAR) 25 MG tablet Take 1 tablet (25 mg) by mouth daily 90 tablet 4     meclizine (ANTIVERT) 25 MG tablet Take 1 tablet (25 mg) by mouth every 6 hours as needed for dizziness 30 tablet 1     mupirocin (BACTROBAN) 2 % external ointment Use daily and apply to procedure site. 22 g 0     simvastatin (ZOCOR) 20 MG tablet Take 1 tablet (20 mg) by mouth At Bedtime 90 tablet 4     tamoxifen (NOLVADEX) 20 MG  tablet Take 1 tablet (20 mg) by mouth daily 90 tablet 1     triamcinolone (KENALOG) 0.1 % cream Apply to affected area of the face once to twice a day. 15 g 1     VITAMIN D-1000 MAX -1000 MG-UNIT OR TABS 1 daily         Allergies   Allergen Reactions     Lisinopril Cough       Review of Systems:  -Constitutional: Otherwise feeling well, in usual state of health.   -Skin: As per HPI, no other concerns.     Physical exam:  Vitals: There were no vitals taken for this visit.  GEN: This is a well-nourished, well developed female in no acute distress  SKIN: Total skin excluding the undergarment areas was performed. The exam included the head/face, neck, both arms, chest, back, abdomen, both legs, digits and/or nails. Including buttocks.   - There are erythematous macules with overyling adherent scale on the right temple, left forehead, left brow, left preauricular, left cheek, right cheek, right upper cutaneous lip. Including the left forearm that was previously biopsied.   - There are waxy stuck on tan to brown papules on the trunk and extremities.  - 5 mm red macule on the left superior forearm  - 6 mm tan and erythematous macule with scale on the right temple  - No other lesions of concern on areas examined.     Impression/Plan:  1.   History of nonmelanoma skin cancer     Recommend sunscreens SPF #30 or greater, protective clothing and avoidance of tanning beds.     2.   Actinic keratoses: right temple, left forehead, left brow, left preauricular, left cheek, right cheek, right upper cutaneous lip   Cryotherapy procedure note: After verbal consent and discussion of risks and benefits including but no limited to dyspigmentation/scar, blister, and pain, 6 were treated with 1-2mm freeze border for 2 cycles with liquid nitrogen. Post cryotherapy instructions were provided.     3.   NUB, 6 mm tan and erythematous macule with scale on the temple, AK or SCCIS     Shave biopsy:  After discussion of benefits and risks  including but not limited to bleeding/bruising, pain/swelling, infection, scar, incomplete removal, nerve damage/numbness, recurrence, and non-diagnostic biopsy, written consent, verbal consent and photographs were obtained. Time-out was performed. The area was cleaned with isopropyl alcohol. 0.5mL of 1% lidocaine with epinephrine was injected to obtain adequate anesthesia of the lesion on the right temple. A shave biopsy was performed. Hemostasis was achieved with aluminium chloride. Vaseline and a sterile dressing were applied. The patient tolerated the procedure and no complications were noted. The patient was provided with verbal and written post care instructions.     4.   Seborrheic keratoses, trunk and extremities     No further intervention needed.    5.   NUB, 5 mm red macule on the left superior forearm, DDx BCC or other     Shave biopsy:  After discussion of benefits and risks including but not limited to bleeding/bruising, pain/swelling, infection, scar, incomplete removal, nerve damage/numbness, recurrence, and non-diagnostic biopsy, written consent, verbal consent and photographs were obtained. Time-out was performed. The area was cleaned with isopropyl alcohol. 0.5mL of 1% lidocaine with epinephrine was injected to obtain adequate anesthesia of the lesion on the left superior forearm. A shave biopsy was performed. Hemostasis was achieved with aluminium chloride. Vaseline and a sterile dressing were applied. The patient tolerated the procedure and no complications were noted. The patient was provided with verbal and written post care instructions.     6.   AK - left forearm, biopsied x2, still present today. Treated with cryo today  Cryotherapy procedure note: After verbal consent and discussion of risks and benefits including but no limited to dyspigmentation/scar, blister, and pain, 1 was treated with 1-2mm freeze border for 2 cycles with liquid nitrogen. Post cryotherapy instructions were provided.        Follow-up in 6 months, earlier for new or changing lesions.    Staff Involved:  Scribe/Staff     Scribe Disclosure  I, Serena Agustin, am serving as a scribe to document services personally performed by Dr. Qi Barba MD, based on data collection and the provider's statements to me.     Provider Disclosure:   The documentation recorded by the scribe accurately reflects the services I personally performed and the decisions made by me.    Qi Barba MD    Department of Dermatology  [facility]: Phone: [phone], Fax:[phone]  [facility]: Phone: [phone], Fax: [phone]

## 2019-08-08 NOTE — NURSING NOTE
Cydney Christiansen's goals for this visit include:   Chief Complaint   Patient presents with     Skin Check     Hx of NMSC-SCC Mohs in April       She requests these members of her care team be copied on today's visit information: Yes    PCP: Estrellita Hernandez    Referring Provider:  No referring provider defined for this encounter.    There were no vitals taken for this visit.    Do you need any medication refills at today's visit? NO    Gwen Bose LPN

## 2019-08-15 LAB — COPATH REPORT: NORMAL

## 2019-08-23 ENCOUNTER — TELEPHONE (OUTPATIENT)
Dept: DERMATOLOGY | Facility: CLINIC | Age: 84
End: 2019-08-23

## 2019-08-23 NOTE — LETTER
"August 23, 2019      Cdyney Christiansen  [address]  [place] [address]        Dear Cydney,    You have an upcoming appointment with Dr. Haro for Mohs surgery. It is scheduled for 9/30/19 at 8:00 AM. Please check-in 15 minutes prior to your appointment at check-in desk \"D\" on the 2nd floor. Our address is [address], [place].  Please review these important reminders:    1) Expect to be here the majority of the morning.  The Mohs surgical procedure can be an extensive surgery requiring multiple stages. Each stage may take 1-2 hours.     2) You may eat breakfast. You may bring snacks or beverages with you.  3) Take all normal medications morning of surgery.     4) You will need a  to be with you if your surgical site is close to your eyes. You can get swelling/bruising immediately after surgery and will go home with a big bulky bandage that could obstruct your view of driving safely.     5) Wear comfortable clothing -- preferably a button down shirt or a loose fitted shirt (to avoid pulling over pressure bandage off when you get home). If your surgery site is on your leg, try wearing loose fitted pants. If your surgery site is on your arm, try wearing a short-sleeve shirt.     6) Bring reading material or other items to help pass time. We do have internet access available if you own a laptop, iPad, etc.    7) Women - do NOT wear make-up. We will be washing it off anyone needing surgery on the face     8) Do NOT stop blood thinning medication unless instructed to do so by your surgeon. This will include: Warfarin, Coumadin, Eliquis, Jantoven, Aspirin, Plavix and Pradaxa. If you are taking Warfarin or Coumadin, please have your INR blood test results sent to our office no more than 7 days prior to your surgery.     9) Tylenol is a good alternative to take for headaches and pain, and can be taken throughout your surgery and postoperative recovery.    If you need to reschedule or have any questions " or concerns, please call me at [phone].    Sincerely,      Quin Webber RN

## 2019-08-23 NOTE — TELEPHONE ENCOUNTER
Notes recorded by Quin Webber RN on 8/23/2019 at 1:42 PM CDT  I spoke with Cydney and notified her of the results.  She verbalized understanding and agreed with the plan.  She did not have a preference on excision vs ED&C.  I notified her that a decision can be made at the time of her appointment.  Mohs scheduled for 9/30/19.  See 8/23/19 tel enc for Mohs previsit.  Quin Webber RN    ------    Notes recorded by Quin Webber RN on 8/22/2019 at 10:52 AM CDT  Dr. Haro - please review and advise on excision vs ED&C for part B.  Photos in chart.  Temple measured 6mm on exam and forearms measured 5mm on exam.  Quin Webber RN    ------    Notes recorded by Qi Barba MD on 8/21/2019 at 8:42 PM CDT  Part A mohs  Part B, recommend excision. ED and C is an option but I prefer excision unless Dr. Haro disagrees thank you    Both SCC skin cancers   Dermatological path order and indications   Order: [id number]   Status:  Final result   Visible to patient:  Yes (MyChart) Dx:  Neoplasm of uncertain behavior of skin   Component 2wk ago   Copath Report Patient Name: CYDNEY PRIETO   MR#: [id number]   Specimen #: [id number]   Collected: 8/8/2019   Received: 8/9/2019   Reported: 8/15/2019 13:14   Ordering Phy(s): QI BARBA     For improved result formatting, select 'View Enhanced Report Format' under    Linked Documents section.     SPECIMEN(S):   A: Shave, right temple   B: Shave, left forearm superior     FINAL DIAGNOSIS:   A. Shave, right temple:   - Squamous cell carcinoma in situ arising within actinic keratosis - (see   description)     B. Shave, left forearm superior:   - Squamous cell carcinoma in situ - (see description)

## 2019-08-23 NOTE — TELEPHONE ENCOUNTER
[facility] Dermatologic Surgery Pre-Surgery Screening Note     Pre-screening Information:  Derm Surgery Pre-Surgery Screening 8/23/2019   Transplant No   Immunocompromised No   Hepatitis No   HIV/AIDS No   Bleeding Disorder(s) No   Pacemaker No   Defibrillator No   Brain/Nerve Stimulator No   Patient wears CPAP mask No   Prosthetic Heart Valve No   Lesion on Leg/Groin No   Prophylactic Antibiotic Needed No   Hx of Skin Cancer Yes   Hx of Mohs Surgery Yes   Current Tobacco Use No   Current Anticoagulant(s) NSAIDS       Medications/Allergies  Medications and allergies review with patient: Yes     Current Outpatient Medications   Medication Sig Dispense Refill     alendronate (FOSAMAX) 70 MG tablet TAKE 1 TABLET BY MOUTH EVERY 7 DAYS WITH 8OZ WATER 60 MIN BEFORE FOOD. SIT UPRIGHT FOR 30 MINUTES. No further refills until follow-up appointment 12 tablet 0     CALCIUM 600 MG OR TABS 1 daily       cyanocobalamin (VITAMIN B-12) 500 MCG tablet Take 1 tablet (500 mcg) by mouth daily 150 tablet 3     dorzolamide-timolol (COSOPT) 2-0.5 % ophthalmic solution Place 1 drop into both eyes 2 times daily 10 mL 11     Ferrous Sulfate (IRON SUPPLEMENT PO) Take 325 mg by mouth daily       ibuprofen (ADVIL,MOTRIN) 200 MG tablet Take 200 mg by mouth every 4 hours as needed.       latanoprost (XALATAN) 0.005 % ophthalmic solution Place 1 drop into both eyes At Bedtime 1 Bottle 11     losartan (COZAAR) 25 MG tablet Take 1 tablet (25 mg) by mouth daily 90 tablet 4     meclizine (ANTIVERT) 25 MG tablet Take 1 tablet (25 mg) by mouth every 6 hours as needed for dizziness 30 tablet 1     mupirocin (BACTROBAN) 2 % external ointment Use daily and apply to procedure site. 22 g 0     simvastatin (ZOCOR) 20 MG tablet Take 1 tablet (20 mg) by mouth At Bedtime 90 tablet 4     tamoxifen (NOLVADEX) 20 MG tablet Take 1 tablet (20 mg) by mouth daily 90 tablet 1     triamcinolone (KENALOG) 0.1 % cream Apply to affected area of the face  once to twice a day. 15 g 1     VITAMIN D-1000 MAX -1000 MG-UNIT OR TABS 1 daily       Allergies   Allergen Reactions     Lisinopril Cough       Pertinent Labs:  Last Creatine:   Creatinine   Date Value Ref Range Status   07/15/2019 0.69 0.52 - 1.04 mg/dL Final     Last INR: No results found for: INR     Other Questions:     needed? No    Do you have any mobility issues: No    Do you use any assistive devices/DME? No    Do you have any issues with lying for long periods of time? No    Do you have any other health issue that we should know about: no    Reminded patient to take any order prophylactic antibiotics 1 hour prior to appointment: n/a    If on a prescribed anticoagulant, advised patient to continue or check with prescribing provider: n/a    If on an OTC anticoagulant/supplement, advised patient to hold these medications 10-14 days prior to surgery and resume 48 hrs after surgery: Yes    Quin Webber RN

## 2019-09-13 DIAGNOSIS — Z17.0 MALIGNANT NEOPLASM OF OVERLAPPING SITES OF LEFT BREAST IN FEMALE, ESTROGEN RECEPTOR POSITIVE (H): ICD-10-CM

## 2019-09-13 DIAGNOSIS — C50.812 MALIGNANT NEOPLASM OF OVERLAPPING SITES OF LEFT BREAST IN FEMALE, ESTROGEN RECEPTOR POSITIVE (H): ICD-10-CM

## 2019-09-13 DIAGNOSIS — C50.912 INVASIVE DUCTAL CARCINOMA OF LEFT BREAST (H): ICD-10-CM

## 2019-09-13 RX ORDER — TAMOXIFEN CITRATE 20 MG/1
20 TABLET ORAL DAILY
Qty: 90 TABLET | Refills: 1 | Status: CANCELLED | OUTPATIENT
Start: 2019-09-13

## 2019-09-17 ENCOUNTER — ANCILLARY PROCEDURE (OUTPATIENT)
Dept: MAMMOGRAPHY | Facility: CLINIC | Age: 84
End: 2019-09-17
Attending: INTERNAL MEDICINE
Payer: COMMERCIAL

## 2019-09-17 ENCOUNTER — ONCOLOGY VISIT (OUTPATIENT)
Dept: ONCOLOGY | Facility: CLINIC | Age: 84
End: 2019-09-17
Payer: COMMERCIAL

## 2019-09-17 VITALS
HEIGHT: 60 IN | DIASTOLIC BLOOD PRESSURE: 67 MMHG | HEART RATE: 66 BPM | RESPIRATION RATE: 18 BRPM | WEIGHT: 140.5 LBS | TEMPERATURE: 98.2 F | BODY MASS INDEX: 27.58 KG/M2 | SYSTOLIC BLOOD PRESSURE: 110 MMHG | OXYGEN SATURATION: 96 %

## 2019-09-17 DIAGNOSIS — Z17.0 MALIGNANT NEOPLASM OF OVERLAPPING SITES OF LEFT BREAST IN FEMALE, ESTROGEN RECEPTOR POSITIVE (H): ICD-10-CM

## 2019-09-17 DIAGNOSIS — Z12.31 VISIT FOR SCREENING MAMMOGRAM: ICD-10-CM

## 2019-09-17 DIAGNOSIS — D64.9 NORMOCYTIC ANEMIA: ICD-10-CM

## 2019-09-17 DIAGNOSIS — C50.812 MALIGNANT NEOPLASM OF OVERLAPPING SITES OF LEFT BREAST IN FEMALE, ESTROGEN RECEPTOR POSITIVE (H): ICD-10-CM

## 2019-09-17 DIAGNOSIS — C50.912 INVASIVE DUCTAL CARCINOMA OF LEFT BREAST (H): ICD-10-CM

## 2019-09-17 DIAGNOSIS — D64.9 NORMOCYTIC ANEMIA: Primary | ICD-10-CM

## 2019-09-17 LAB
ALBUMIN SERPL-MCNC: 3.4 G/DL (ref 3.4–5)
ALP SERPL-CCNC: 40 U/L (ref 40–150)
ALT SERPL W P-5'-P-CCNC: 20 U/L (ref 0–50)
AST SERPL W P-5'-P-CCNC: 25 U/L (ref 0–45)
BASOPHILS # BLD AUTO: 0.1 10E9/L (ref 0–0.2)
BASOPHILS NFR BLD AUTO: 1.2 %
BILIRUB DIRECT SERPL-MCNC: <0.1 MG/DL (ref 0–0.2)
BILIRUB SERPL-MCNC: 0.2 MG/DL (ref 0.2–1.3)
CREAT SERPL-MCNC: 0.65 MG/DL (ref 0.52–1.04)
DIFFERENTIAL METHOD BLD: ABNORMAL
EOSINOPHIL # BLD AUTO: 0.2 10E9/L (ref 0–0.7)
EOSINOPHIL NFR BLD AUTO: 4.2 %
ERYTHROCYTE [DISTWIDTH] IN BLOOD BY AUTOMATED COUNT: 13.2 % (ref 10–15)
FERRITIN SERPL-MCNC: 169 NG/ML (ref 8–252)
FOLATE SERPL-MCNC: 35.6 NG/ML
GFR SERPL CREATININE-BSD FRML MDRD: 83 ML/MIN/{1.73_M2}
HCT VFR BLD AUTO: 33 % (ref 35–47)
HGB BLD-MCNC: 10.7 G/DL (ref 11.7–15.7)
IMM GRANULOCYTES # BLD: 0 10E9/L (ref 0–0.4)
IMM GRANULOCYTES NFR BLD: 0.4 %
IRON SATN MFR SERPL: 26 % (ref 15–46)
IRON SERPL-MCNC: 81 UG/DL (ref 35–180)
LYMPHOCYTES # BLD AUTO: 1.5 10E9/L (ref 0.8–5.3)
LYMPHOCYTES NFR BLD AUTO: 29.1 %
MCH RBC QN AUTO: 30.4 PG (ref 26.5–33)
MCHC RBC AUTO-ENTMCNC: 32.4 G/DL (ref 31.5–36.5)
MCV RBC AUTO: 94 FL (ref 78–100)
MONOCYTES # BLD AUTO: 0.6 10E9/L (ref 0–1.3)
MONOCYTES NFR BLD AUTO: 11.5 %
NEUTROPHILS # BLD AUTO: 2.7 10E9/L (ref 1.6–8.3)
NEUTROPHILS NFR BLD AUTO: 53.6 %
PLATELET # BLD AUTO: 178 10E9/L (ref 150–450)
PROT SERPL-MCNC: 6.4 G/DL (ref 6.8–8.8)
RBC # BLD AUTO: 3.52 10E12/L (ref 3.8–5.2)
TIBC SERPL-MCNC: 316 UG/DL (ref 240–430)
VIT B12 SERPL-MCNC: 606 PG/ML (ref 193–986)
WBC # BLD AUTO: 5.1 10E9/L (ref 4–11)

## 2019-09-17 PROCEDURE — 82746 ASSAY OF FOLIC ACID SERUM: CPT | Performed by: INTERNAL MEDICINE

## 2019-09-17 PROCEDURE — 83540 ASSAY OF IRON: CPT | Performed by: INTERNAL MEDICINE

## 2019-09-17 PROCEDURE — 82565 ASSAY OF CREATININE: CPT | Performed by: INTERNAL MEDICINE

## 2019-09-17 PROCEDURE — 82607 VITAMIN B-12: CPT | Performed by: INTERNAL MEDICINE

## 2019-09-17 PROCEDURE — 80076 HEPATIC FUNCTION PANEL: CPT | Performed by: INTERNAL MEDICINE

## 2019-09-17 PROCEDURE — 77067 SCR MAMMO BI INCL CAD: CPT | Performed by: STUDENT IN AN ORGANIZED HEALTH CARE EDUCATION/TRAINING PROGRAM

## 2019-09-17 PROCEDURE — 82728 ASSAY OF FERRITIN: CPT | Performed by: INTERNAL MEDICINE

## 2019-09-17 PROCEDURE — 85025 COMPLETE CBC W/AUTO DIFF WBC: CPT | Performed by: INTERNAL MEDICINE

## 2019-09-17 PROCEDURE — 36415 COLL VENOUS BLD VENIPUNCTURE: CPT | Performed by: INTERNAL MEDICINE

## 2019-09-17 PROCEDURE — 77063 BREAST TOMOSYNTHESIS BI: CPT | Performed by: STUDENT IN AN ORGANIZED HEALTH CARE EDUCATION/TRAINING PROGRAM

## 2019-09-17 PROCEDURE — 83550 IRON BINDING TEST: CPT | Performed by: INTERNAL MEDICINE

## 2019-09-17 PROCEDURE — 99214 OFFICE O/P EST MOD 30 MIN: CPT | Performed by: INTERNAL MEDICINE

## 2019-09-17 RX ORDER — TAMOXIFEN CITRATE 20 MG/1
20 TABLET ORAL DAILY
Qty: 90 TABLET | Refills: 1 | Status: SHIPPED | OUTPATIENT
Start: 2019-09-17 | End: 2020-03-03

## 2019-09-17 RX ORDER — FERROUS SULFATE 325(65) MG
325 TABLET ORAL
Qty: 90 TABLET | Refills: 3 | Status: SHIPPED | OUTPATIENT
Start: 2019-09-17

## 2019-09-17 RX ORDER — FOLIC ACID/MULTIVIT,IRON,MINER 0.4MG-18MG
1 TABLET ORAL EVERY OTHER DAY
COMMUNITY
End: 2023-07-25

## 2019-09-17 ASSESSMENT — MIFFLIN-ST. JEOR: SCORE: 1027.55

## 2019-09-17 ASSESSMENT — PAIN SCALES - GENERAL: PAINLEVEL: NO PAIN (0)

## 2019-09-17 NOTE — NURSING NOTE
"Oncology Rooming Note    September 17, 2019 3:33 PM   Cydney Christiansen is a 83 year old female who presents for:    Chief Complaint   Patient presents with     Oncology Clinic Visit     6 month follow up     Initial Vitals: /67 (BP Location: Right arm)   Pulse 66   Temp 98.2  F (36.8  C) (Oral)   Resp 18   Ht 1.53 m (5' 0.24\")   Wt 63.7 kg (140 lb 8 oz)   SpO2 96%   BMI 27.22 kg/m   Estimated body mass index is 27.22 kg/m  as calculated from the following:    Height as of this encounter: 1.53 m (5' 0.24\").    Weight as of this encounter: 63.7 kg (140 lb 8 oz). Body surface area is 1.65 meters squared.  No Pain (0) Comment: Data Unavailable   No LMP recorded. Patient is postmenopausal.  Allergies reviewed: Yes  Medications reviewed: Yes    Medications: MEDICATION REFILLS NEEDED TODAY. Provider was notified.  Pharmacy name entered into [employer]: [id number] - [address]    April GODFREY Aquino              "

## 2019-09-17 NOTE — PROGRESS NOTES
Oncology Follow-up visit:  Date on this visit: Sep 17, 2019      Primary Care Physician: Kadi Herring   Surgeon: Dr. Ifeanyi Sunshine.     Diagnosis:  1. 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 She was not recommended in favor of adjuvant radiation or chemotherapy, and has been on adjuvant Tamoxifen (AI not chosen due to OP and hx of compression fractures), since 08/24/2015.    Oncologic History:  1. Breast Cancer - The patient underwent a screening mammogram on 5/29/2015, which demonstrated a 9 mm lesion around the 1 o'clock position in the left breast. She had an ultrasound which confirmed a lesion to be about 9 mm. She underwent a core needle biopsy in [facility], which demonstrated invasive ductal cancer, grade 2, estrogen receptor positive (99%, strong) and  progesterone receptor positive (91%, strong) by immunohistochemistry and HER2 not amplified by FISH (ratio 1.0).   She then met with Dr. Sunshine and underwent L lumpectomy and axillary sentinel LN biopsy. The pathology from the surgery showed a 10 mm invasive ductal carcinoma, Omar grade 2.  There was no associated DCIS. Closest surgical margin was 3 mm from the anterior margin. There was no lymphovascular invasion and all 3 sentinel lymph nodes were negative for malignancy.   She was not recommended in favor of adjuvant radiation or chemotherapy.  Due  compression fracture history felt to be osteoporotic fractures, Tamoxifen was chosen over an AI. She started on Tamoxifen on 8/24/2015.     2. Compression Fractures- Patient sustained a compression fracture of T7  in July 2015. On T spine MRI there were also old compression fractures of T3, T4 and T8 noted. She was on Fosamax for 10 years and stopped it in 2012. She developed compression fracture in 07/2015 and was restarted on Fosamax.    3. She  has a remote history of TCC of the bladder (in 1993), s/p TURBT     4. 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     5. Normocytic anemia - She was noted to have mild anemia in Aug 2016. Hb was down to 11.2 g/dl. MCV was normal. She reports there is a Hx of anemia in her sister and mother, due to iron and B12 deficiency. In 12/2016 she had workup for her anemia. TSH was normal. Serum folate was normal. Iron studies were normal including ferritin of 213. She had normal LFTs and creatinine. B12 level was normal at over 500.  Peripheral blood smear on 12/23/2016 showed  slight normochromic, normocytic anemia without increased   erythrocyte regeneration. The red blood cells appeared normochromic. Poikilocytosis was minimal.   Serum protein electrophoresis and serum immunofixation showed no M protein.  We'll recommend vitamin B12 supplementation and she has been on 500 mcg orally daily, and she has been on ferrous sulfate 325 mg PO daily as well. Her Hb has improved to low normal.  Since our last visit, she underwent endoscopy and colonoscopy on 6/16/2017 by Dr. Harriet Jacobs at [facility]. On colonoscopy she was noted to have three sessile polyps in the ascending colon, resected. The endoscopy showed normal esophagus, stomach and duodenum. Gastric and small bowel biopsies were normal and there was no e/o celiac disease or H.Pylori. The colonic polyps on pathology showed one tubular adenoma and two serrated adenomas.      6. Breast Cancer Screening - On 8/28/2017 she underwent a bilateral digital screening mammography with tomosynthesis. The breast tissue was noted to be heterogeneously dense.  On MLO  view there was a round circumscribed mass in the left axilla.  On 9/6/2017 she underwent L axillary US which showed a 1.7 x 1.6 x 1.7 cm cyst  located just above the axillary dissection scar that correlated with mammographic finding. Benign appearing left axillary lymph nodes were seen during exam. She  underwent axillary cyst aspiration on 9/8/2017. It was felt to be simple fluid collection compatible with post-operative seroma. She had it aspirated and no pathology was sent due to benign appearing cyst with clear fluid.       History Of Present Illness:  Ms. 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 She has been on Tamoxifen since 8/24/2015 and has tolerated it  well.  She has been on weekly Fosamax as well.  She has a remote history of TCC of the bladder (in 1993), s/p TURBT and follows up with Dr. Hammond annually for cystoscopy, last in 01/2019 when there was no e/o recurrent bladder tumor.  She had a b/l 3D mammogram on 9/16/2019 which showed no suspicious findings. She has been on supplemental B12 orally. However, she stopped oral iron supplementation.  She is still anemic, slightly more so than previously.  Results for MILLI PRIETO (MRN [id number]) as of 9/18/2019 11:43   Ref. Range 3/8/2018 11:43 9/10/2018 10:10 3/29/2019 14:56 7/15/2019 09:30 9/17/2019 14:42   Hemoglobin Latest Ref Range: 11.7 - 15.7 g/dL 11.6 (L) 11.4 (L) 11.1 (L) 11.4 (L) 10.7 (L)     She is feeling very well and has no health related complaints. She has no new breast related complaints.  In addition, a complete 12 point  review of systems is negative.      Past Medical/Surgical History:  Past Medical History:   Diagnosis Date     Actinic keratosis      Basal cell cancer 02/2011    bcc of the L back.     Basal cell carcinoma 04' , 06'     Basal cell carcinoma 06/2011    L neck     Breast cancer (H)      Cataract      Colon polyps     Precancer     Glaucoma (increased eye pressure)      Heart murmur      HLD (hyperlipidemia)      Hypertension goal BP (blood pressure) < 140/90 12/19/2013     Invasive ductal carcinoma of breast (H) 6/2015    left     Osteoporosis      Scoliosis      Skin cancer       Skin cancer 05/2013    sccis R cheek     Squamous cell carcinoma 09/2011    R upper back     Squamous cell carcinoma 10/2012    R dorsal hand     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 7/13    left leg     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     Past Surgical History:   Procedure Laterality Date     COLONOSCOPY  5/2008, 5/13, 6/14, 6/17    Q 3 years for advanced adenomatous polyp     CYSTOSCOPY  12/31/2008     D & C       GENITOURINARY SURGERY      TURBT     HC REMOVAL GALLBLADDER      open pan     HC TRABECULOPLASTY BY LASER SURGERY Left 4/18/07    SLT #1 OS (inf 180)     HC TRABECULOPLASTY BY LASER SURGERY Right 5/4/11    SLT #1 OD (inf 180?)     HC TRABECULOPLASTY BY LASER SURGERY Left 3/17/15    SLT #2 OS (sup 180)     HC TRABECULOPLASTY BY LASER SURGERY Right 6/23/15    SLT #2 OD (sup 180?)     LASER ARGON TREATMENT      SLT left eye x 2     LUMPECTOMY BREAST WITH SENTINEL NODE, COMBINED Left 7/29/2015    Procedure: COMBINED LUMPECTOMY BREAST WITH SENTINEL NODE;  Surgeon: Ifeanyi Sunshine MD;  Location: [facility] OR     PHACOEMULSIFICATION CLEAR CORNEA WITH STANDARD INTRAOCULAR LENS IMPLANT Left 12/8/2017    Procedure: PHACOEMULSIFICATION CLEAR CORNEA WITH STANDARD INTRAOCULAR LENS IMPLANT;   COMPLEX LEFT PHACOEMULSIFICATION CLEAR CORNEA WITH STANDARD INTRAOCULAR LENS IMPLANT ;  Surgeon: Kvng Garcia MD;  Location: [facility]     SURGICAL HISTORY OF -   9/11    squamous cell CA excised from back     TUBAL LIGATION     FHx and SocHx reviewed     Allergies:  Allergies as of 09/17/2019 - Reviewed 09/17/2019   Allergen Reaction Noted     Lisinopril Cough 09/16/2014     Current Medications:  Current Outpatient Medications   Medication Sig Dispense Refill     alendronate (FOSAMAX) 70 MG tablet TAKE 1 TAB BY MOUTH EVERY 7 DAYS WITH 8OZ OF WATER,60MIN BEFORE FOOD.SIT UPRIGHT FOR 30 MINUTES. 12 tablet 0     CALCIUM 600  "MG OR TABS 1 daily       cyanocobalamin (VITAMIN B-12) 500 MCG tablet Take 1 tablet (500 mcg) by mouth daily 150 tablet 3     dorzolamide-timolol (COSOPT) 2-0.5 % ophthalmic solution Place 1 drop into both eyes 2 times daily 10 mL 11     Ferrous Sulfate (IRON SUPPLEMENT PO) Take 325 mg by mouth daily       ibuprofen (ADVIL,MOTRIN) 200 MG tablet Take 200 mg by mouth every 4 hours as needed.       latanoprost (XALATAN) 0.005 % ophthalmic solution Place 1 drop into both eyes At Bedtime 1 Bottle 11     losartan (COZAAR) 25 MG tablet Take 1 tablet (25 mg) by mouth daily 90 tablet 4     meclizine (ANTIVERT) 25 MG tablet Take 1 tablet (25 mg) by mouth every 6 hours as needed for dizziness 30 tablet 1     mupirocin (BACTROBAN) 2 % external ointment Use daily and apply to procedure site. 22 g 0     simvastatin (ZOCOR) 20 MG tablet Take 1 tablet (20 mg) by mouth At Bedtime 90 tablet 4     tamoxifen (NOLVADEX) 20 MG tablet Take 1 tablet (20 mg) by mouth daily 90 tablet 1     triamcinolone (KENALOG) 0.1 % cream Apply to affected area of the face once to twice a day. 15 g 1     VITAMIN D-1000 MAX -1000 MG-UNIT OR TABS 1 daily          Physical Exam:    /67 (BP Location: Right arm)   Pulse 66   Temp 98.2  F (36.8  C) (Oral)   Resp 18   Ht 1.53 m (5' 0.24\")   Wt 63.7 kg (140 lb 8 oz)   SpO2 96%   BMI 27.22 kg/m          GENERAL APPEARANCE: elderly, alert and no distress      NECK: no adenopathy, no asymmetry or masses     LYMPHATICS: No cervical, supraclavicular, axillary  lymphadenopathy     RESP: lungs clear to auscultation - no rales, rhonchi or wheezes     CARDIOVASCULAR: regular rates and rhythm, normal S1 S2, no S3 or S4 and no murmur.     ABDOMEN:  soft, nontender, no HSM or masses and bowel sounds normal     MUSCULOSKELETAL: extremities normal- no gross deformities noted, no evidence of inflammation in joints, FROM in all extremities. No edema b/l LE.     SKIN: multiple benign appearing  and age related " skin moles. + incision from recent SCCIS excision RUE healed well.   PsychiatRIC: mentation appears normal and affect normal  Breast: S/p L lumpectomy. Incision in left upper outer quadrant healed well. No breast masses b/l. No axillary lymphadenopathy b/l      Laboratory/Imaging Studies  Orders Only on 09/17/2019   Component Date Value Ref Range Status     Ferritin 09/17/2019 169  8 - 252 ng/mL Final     Folate 09/17/2019 35.6  >5.4 ng/mL Final     Bilirubin Direct 09/17/2019 <0.1  0.0 - 0.2 mg/dL Final     Bilirubin Total 09/17/2019 0.2  0.2 - 1.3 mg/dL Final     Albumin 09/17/2019 3.4  3.4 - 5.0 g/dL Final     Protein Total 09/17/2019 6.4* 6.8 - 8.8 g/dL Final     Alkaline Phosphatase 09/17/2019 40  40 - 150 U/L Final     ALT 09/17/2019 20  0 - 50 U/L Final     AST 09/17/2019 25  0 - 45 U/L Final     Creatinine 09/17/2019 0.65  0.52 - 1.04 mg/dL Final     GFR Estimate 09/17/2019 83  >60 mL/min/[1.73_m2] Final    Comment: Non  GFR Calc  Starting 12/18/2018, serum creatinine based estimated GFR (eGFR) will be   calculated using the Chronic Kidney Disease Epidemiology Collaboration   (CKD-EPI) equation.       GFR Estimate If Black 09/17/2019 >90  >60 mL/min/[1.73_m2] Final    Comment:  GFR Calc  Starting 12/18/2018, serum creatinine based estimated GFR (eGFR) will be   calculated using the Chronic Kidney Disease Epidemiology Collaboration   (CKD-EPI) equation.       WBC 09/17/2019 5.1  4.0 - 11.0 10e9/L Final     RBC Count 09/17/2019 3.52* 3.8 - 5.2 10e12/L Final     Hemoglobin 09/17/2019 10.7* 11.7 - 15.7 g/dL Final     Hematocrit 09/17/2019 33.0* 35.0 - 47.0 % Final     MCV 09/17/2019 94  78 - 100 fl Final     MCH 09/17/2019 30.4  26.5 - 33.0 pg Final     MCHC 09/17/2019 32.4  31.5 - 36.5 g/dL Final     RDW 09/17/2019 13.2  10.0 - 15.0 % Final     Platelet Count 09/17/2019 178  150 - 450 10e9/L Final     Diff Method 09/17/2019 Automated Method   Final     % Neutrophils 09/17/2019  53.6  % Final     % Lymphocytes 09/17/2019 29.1  % Final     % Monocytes 09/17/2019 11.5  % Final     % Eosinophils 09/17/2019 4.2  % Final     % Basophils 09/17/2019 1.2  % Final     % Immature Granulocytes 09/17/2019 0.4  % Final     Absolute Neutrophil 09/17/2019 2.7  1.6 - 8.3 10e9/L Final     Absolute Lymphocytes 09/17/2019 1.5  0.8 - 5.3 10e9/L Final     Absolute Monocytes 09/17/2019 0.6  0.0 - 1.3 10e9/L Final     Absolute Eosinophils 09/17/2019 0.2  0.0 - 0.7 10e9/L Final     Absolute Basophils 09/17/2019 0.1  0.0 - 0.2 10e9/L Final     Abs Immature Granulocytes 09/17/2019 0.0  0 - 0.4 10e9/L Final     Component      Latest Ref Rng & Units 9/17/2019   Iron      35 - 180 ug/dL 81   Iron Binding Cap      240 - 430 ug/dL 316   Iron Saturation Index      15 - 46 % 26   Ferritin      8 - 252 ng/mL 169         ASSESSMENT/PLAN:    Gera is a 83 year old woman with stage Ia, [id number], grade II, ER positive, ND positive, 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 She was not recommended in favor of adjuvant radiation or chemotherapy, and has been on adjuvant Tamoxifen (AI not chosen due to OP and hx of compression fractures), since 08/24/2015.    1. Breast cancer - continue daily Tamoxifen, tolerating well. F/up with us in 6 months, with labs.  We will consider stopping tamoxifen in August 2020, at 5 year sangita of completion of adjuvant systemic endocrine therapy, given early stage disease, postmenopausal status and advanced age.     2. Hx of OP with compression Fx- continue Fosamax, calcium and vitamin D supplementation.   3. Breast cancer screening- b/l screening mammogram with tomosynthesis negative 9/2019, next due in 09/2020. Breast tissue was still heterogeneously dense on her mammogram.    4. Hx of TCC of bladder- s/p  s/p TURBT in 1993 and follows up with Dr. Hammond  annually for cystoscopy, next in 01/2020.     5.Hx of NMSC, most recently SCCIS,  right upper arm, s/p excision 8/9/18 - f/up with dermatology in 6 months.    6. Mild normocytic anemia, Hb slightly worse;possibly secondary to her stopping iron supplementation- She denies any constipation on PO iron previously and we'll resume oral ferrous sulfate tablet once daily    B12 level checked today and within normal limits. Folate normal. Iron studies normal. She was recommended to stay on daily oral iron containing MVI.  EGD and colonoscopy in June 2017 negative for source of blood loss. Repeat CBC with next visit.    At the end of our visit patient verbalized understanding and concurred with the plan.

## 2019-09-20 ENCOUNTER — OFFICE VISIT (OUTPATIENT)
Dept: OPHTHALMOLOGY | Facility: CLINIC | Age: 84
End: 2019-09-20
Payer: COMMERCIAL

## 2019-09-20 DIAGNOSIS — H40.1431 PSEUDOEXFOLIATIVE GLAUCOMA, BOTH EYES, MILD STAGE: Primary | ICD-10-CM

## 2019-09-20 DIAGNOSIS — H25.11 NUCLEAR SCLEROSIS OF RIGHT EYE: ICD-10-CM

## 2019-09-20 DIAGNOSIS — H26.8 PXF (PSEUDOEXFOLIATION OF LENS CAPSULE): ICD-10-CM

## 2019-09-20 PROCEDURE — 92083 EXTENDED VISUAL FIELD XM: CPT | Performed by: STUDENT IN AN ORGANIZED HEALTH CARE EDUCATION/TRAINING PROGRAM

## 2019-09-20 PROCEDURE — 92012 INTRM OPH EXAM EST PATIENT: CPT | Performed by: STUDENT IN AN ORGANIZED HEALTH CARE EDUCATION/TRAINING PROGRAM

## 2019-09-20 PROCEDURE — 92133 CPTRZD OPH DX IMG PST SGM ON: CPT | Performed by: STUDENT IN AN ORGANIZED HEALTH CARE EDUCATION/TRAINING PROGRAM

## 2019-09-20 ASSESSMENT — TONOMETRY
OD_IOP_MMHG: 19
OS_IOP_MMHG: 14
IOP_METHOD: APPLANATION

## 2019-09-20 ASSESSMENT — SLIT LAMP EXAM - LIDS
COMMENTS: NORMAL
COMMENTS: NORMAL

## 2019-09-20 ASSESSMENT — VISUAL ACUITY
CORRECTION_TYPE: GLASSES
OS_CC+: +1
OD_CC: 20/50
OD_CC+: -1
OS_CC: 20/30
METHOD: SNELLEN - LINEAR

## 2019-09-20 ASSESSMENT — EXTERNAL EXAM - LEFT EYE: OS_EXAM: NORMAL

## 2019-09-20 ASSESSMENT — CUP TO DISC RATIO
OS_RATIO: 0.5 UD
OD_RATIO: 0.6 UD

## 2019-09-20 ASSESSMENT — EXTERNAL EXAM - RIGHT EYE: OD_EXAM: NORMAL

## 2019-09-20 NOTE — LETTER
"    9/20/2019         RE: Cydney Christiansen  [address]  [place] [address]        Dear Colleague,    Thank you for referring your patient, Cydney Christiansen, to the [facility]. Please see a copy of my visit note below.     Current Eye Medications:  cosopt both eyes twice a day, Latanoprost both eyes every evening.  Last drops:  7am.       Subjective:  Follow up Open Angle Glaucoma.  Patient is here for a pressure check, OCT, and visual field.  Patient is ready for cataract surgery in her right eye, but she will be having a squamous cell lesion removed from her right temple area and wonders if she should wait on cataract surgery.  Last spring she had squamous cell removed from the bridge of her nose and near her right medial canthus area.  Vision is very blurry in her right eye and the vision appears \"yellow\".     Objective:  See Ophthalmology Exam.      Assessment:  Cydney Christiansen is a 83 year old female who presents with:   Encounter Diagnoses   Name Primary?     Pseudoexfoliative glaucoma, both eyes, mild stage Brock visual field (HVF): mild inf loss right eye, scattered loss sup>inf right eye.     OCT optic nerve: poor quality due to cataract right eye, stable left eye     Intraocular pressure 19/14 today. Watch right eye closely.       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     Visually significant cataract that is interfering with daily activities of living. Plan for cataract extraction and intraocular lens implant right eye.  Risks, benefits, complications, and alternatives discussed with patient including possibility of limitations from coexistent eye disease and loss of vision. Target refraction and lens options discussed.  Patient understands and wishes to proceed with surgery.     Plan:  Offered cataract surgery for the right eye at the [facility] after your skin cancer " removal has healed well.   Call Miguel Angel RUBI @ [phone] to schedule.     Continue Cosopt (dorzolamide-timolol -- dark blue top) twice a day both eyes      Continue Latanoprost (teal top) at bedtime both eyes      Kvng Garcia MD  [phone]      Again, thank you for allowing me to participate in the care of your patient.        Sincerely,        Kvng Garcia MD

## 2019-09-20 NOTE — PROGRESS NOTES
" Current Eye Medications:  cosopt both eyes twice a day, Latanoprost both eyes every evening.  Last drops:  7am.       Subjective:  Follow up Open Angle Glaucoma.  Patient is here for a pressure check, OCT, and visual field.  Patient is ready for cataract surgery in her right eye, but she will be having a squamous cell lesion removed from her right temple area and wonders if she should wait on cataract surgery.  Last spring she had squamous cell removed from the bridge of her nose and near her right medial canthus area.  Vision is very blurry in her right eye and the vision appears \"yellow\".     Objective:  See Ophthalmology Exam.      Assessment:  Cydney Christiansen is a 83 year old female who presents with:   Encounter Diagnoses   Name Primary?     Pseudoexfoliative glaucoma, both eyes, mild stage Brock visual field (HVF): mild inf loss right eye, scattered loss sup>inf right eye.     OCT optic nerve: poor quality due to cataract right eye, stable left eye     Intraocular pressure 19/14 today. Watch right eye closely.       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     Visually significant cataract that is interfering with daily activities of living. Plan for cataract extraction and intraocular lens implant right eye.  Risks, benefits, complications, and alternatives discussed with patient including possibility of limitations from coexistent eye disease and loss of vision. Target refraction and lens options discussed.  Patient understands and wishes to proceed with surgery.     Plan:  Offered cataract surgery for the right eye at the [facility] after your skin cancer removal has healed well.   Call Miguel Angel RUBI @ [phone] to schedule.     Continue Cosopt (dorzolamide-timolol -- dark blue top) twice a day both eyes      Continue Latanoprost (teal top) at bedtime both eyes      Kvng Garcia MD  [phone] " [phone]

## 2019-09-20 NOTE — PATIENT INSTRUCTIONS
Offered cataract surgery for the right eye at the [facility] after your skin cancer removal has healed well.   Call Miguel Angel GREYSON @ [phone] to schedule.     Continue Cosopt (dorzolamide-timolol -- dark blue top) twice a day both eyes      Continue Latanoprost (teal top) at bedtime both eyes      Kvng Garcia MD  [phone]

## 2019-09-30 ENCOUNTER — OFFICE VISIT (OUTPATIENT)
Dept: DERMATOLOGY | Facility: CLINIC | Age: 84
End: 2019-09-30
Payer: COMMERCIAL

## 2019-09-30 VITALS — DIASTOLIC BLOOD PRESSURE: 79 MMHG | SYSTOLIC BLOOD PRESSURE: 149 MMHG | TEMPERATURE: 62 F | OXYGEN SATURATION: 98 %

## 2019-09-30 DIAGNOSIS — D04.39 SQUAMOUS CELL CARCINOMA IN SITU (SCCIS) OF SKIN OF RIGHT TEMPLE REGION: Primary | ICD-10-CM

## 2019-09-30 DIAGNOSIS — D04.62 SQUAMOUS CELL CARCINOMA IN SITU (SCCIS) OF SKIN OF LEFT FOREARM: ICD-10-CM

## 2019-09-30 PROCEDURE — 17311 MOHS 1 STAGE H/N/HF/G: CPT | Performed by: DERMATOLOGY

## 2019-09-30 PROCEDURE — 17262 DSTRJ MAL LES T/A/L 1.1-2.0: CPT | Mod: 59 | Performed by: DERMATOLOGY

## 2019-09-30 PROCEDURE — 13132 CMPLX RPR F/C/C/M/N/AX/G/H/F: CPT | Mod: 59 | Performed by: DERMATOLOGY

## 2019-09-30 RX ORDER — MUPIROCIN 20 MG/G
OINTMENT TOPICAL ONCE
Status: COMPLETED | OUTPATIENT
Start: 2019-09-30 | End: 2019-09-30

## 2019-09-30 RX ADMIN — MUPIROCIN: 20 OINTMENT TOPICAL at 11:00

## 2019-09-30 NOTE — PROGRESS NOTES
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    Informed consent was obtained and the patient underwent the procedure as follows:  The patient was placed supine on the operating table.  The cancer was identified, outlined with a marker, and verified by the patient.  The entire surgical field was prepped with Hibiclens.  The surgical site was anesthetized using Lidocaine 1% with epi 1:100,000.    The area of clinically apparent tumor was debulked. The layer of tissue was then surgically excised using a #15 blade and was then transferred onto a specimen sheet maintaining the orientation of the specimen. Hemostasis was obtained using bipolar electrocoagulation. The wound site was then covered with a dressing while the tissue samples were processed for examination.    The excised tissue was transported to the Mohs histology laboratory maintaining the tissue orientation.  The tissue specimen was relaxed so that the entire surgical margin was in a a single horizontal plane for sectioning and inked for precise mapping.  A precise reference map was drawn to reflect the sectioning  of the specimen, colored inking of the margins, and orientation on the patient. The tissue was processed using horizontal sectioning of the base and continuous peripheral margins.  The histopathologic sections were reviewed in conjunction with the reference map.    Total blocks: 1    Total slides:  2    There were no cancer cells visualized on examination, therefore Mohs surgery was complete.    Reconstruction: Complex Closure    Primary Surgeon: Dr. Ian Haro     The patient was taken to the operative suite and placed on the operating room table. The defect was identified. Appropriate markings were made with a marking pen to plan the repair.  The area was infiltrated with Lidocaine 1% with epi 1:100,000, prepped with Hibiclens, and draped with sterile towels.     The wound was debeveled and undermined widely. Cones were excised within relaxed skin tension lines on both sides of the defect. Hemostasis was obtained using bipolar electrocoagulation. The wound was narrowed with SMAS placation and the dermis and subcutaneous tissue were then approximated using buried vertical mattress sutures. The wound edges were then approximated additional buried sutures were placed in a similar fashion where needed.  Percutaneous sutures were carefully placed for maximum eversion and meticulous approximation.    Repair Size: 3.7 cm  Sutures Used: Fast Absorbing Gut 5-0, Monocryl 5-0       The wound was cleansed with saline and ointment was applied along the wound surface.     A sterile pressure dressing was applied. Wound care instructions were given verbally and in writing. The patient left the operating suite in stable condition. Patient was informed that additional refinement of the resulting surgical scar may be used as a second stage of this reconstruction.             ---------------------------------------------------------------------------------------------------------------------      Dermatology Procedure Note:  Electrodesiccation and Curettage    PREOPERATIVE DIAGNOSIS: Squamous cell carcinoma in situ     POSTOPERATIVE DIAGNOSIS: same    LOCATION: left forearm superior     SIZE: 0.8 x 0.5 cm     Treatment options including electrodessiccation and curettage (ED and C), excision and topicals were reviewed.  The expected cure rates, healing times and anticipated scars of each option were discussed and the patient elects to proceed with ED and C.     The risks and benefits of the procedure were described to the patient.  These include but are not limited to bleeding, infection, scar, incomplete removal, and recurrence. Written informed consent was obtained. Time-out was performed. The above site was cleansed with and injected with 2.5 mL 1% lidocaine with epinephrine. Once anesthesia was obtained, the site was prepped with Chlorhexidine and rinsed with sterile saline. The lesion was curetted in 3 directions with a 3 mm margin and this was followed by electrodessication.  This process was repeated three times. The defect measured 1.4 x 1.1 cm. Vaseline and a bandage were applied to the wound. The patient tolerated the procedure well and was given post care instructions.    Follow up for wound check as needed.         Staff Involved:    Scribe Disclosure  I, Bradley Burrell, am serving as a scribe to document services personally performed by Dr. Ian Haro DO, based on data collection and the provider's statements to me.     Provider Disclosure:   The documentation recorded by the scribe accurately reflects the services I personally performed and the decisions made by me.  I personally performed the procedures today.    Ian Haro DO    Department of Dermatology  [facility]: Phone: [phone], Fax:[phone]  [facility]: Phone: [phone], Fax: [phone]      Performed at:   [facility]    [address] [address] [place] [address] [place] [address]

## 2019-09-30 NOTE — NURSING NOTE
Mupirocin, Vaseline and pressure dressing applied to Mohs site on right temple and Vaseline and pressure dressing applied to left forearm superior post ED&C.  Wound care instructions reviewed with patient and AVS provided.  Patient verbalized understanding. No further questions or concerns at this time.      The following medication was given:     MEDICATION:  Lidocaine with epinephrine 1% 1:[id number]  ROUTE: SQ  SITE: see procedure note  DOSE: 8cc  LOT #: -[id number]  : Hospira  EXPIRATION DATE: 1-7-2020  NDC#: 8656-3588-96   Was there drug waste? 1cc  Multi-dose vial: Yes    April Love LPN  September 30, 2019

## 2019-09-30 NOTE — PATIENT INSTRUCTIONS
Mohs Wound Care Instructions  I will experience scar, altered skin color, bleeding, swelling, pain, crusting and redness. I may experience altered sensation. Risks are excessive bleeding, infection, muscle weakness, thick (hypertrophic or keloidal) scar, and recurrence,. A second procedure may be recommended to obtain the best cosmetic or functional result.  Possible complications of any surgical procedure are bleeding, infection, scarring, alteration in skin color and sensation, muscle weakness in the area, wound dehiscence or seperation, or recurrence of the lesion or disease. On occasion, after healing, a secondary procedure or revision may be recommended in order to obtain the best cosmetic or functional result.   After your surgery, a pressure bandage will be placed over the area that has sutures. This will help prevent bleeding. Please follow these instructions, as they will help you to prevent complications as your wound heals.  For the First 48 hours After Surgery:  1. Leave the pressure bandage on and keep it dry. If it should come loose, you may retape it, but do not take it off.  2. Relax and take it easy. Do not do any vigorous exercise, heavy lifting, or bending forward. This could cause the wound to bleed.  3. Post-operative pain is usually mild. You may take plain or extra strength Tylenol every 4 hours as needed (do not take more than 4,000mg in one day). Do not take any medicine that contains aspirin, ibuprofen or motrin unless you have been recommended these by a doctor.  Avoid alcohol and vitamin E as these may increase your tendency to bleed.  4. You may put an ice pack around the bandaged area for 20 minutes every 2-3 hours. This may help reduce swelling, bruising, and pain. Make sure the ice pack is waterproof so that the pressure bandage does not get wet.   5. You may see a small amount of drainage or blood on your pressure bandage. This is normal. However, if drainage or bleeding continues or  saturates the bandage, you will need to apply firm pressure over the bandage with a washcloth for 15 minutes. If bleeding continues after applying pressure for 15 minutes then go to the nearest emergency room.  48 Hours After Surgery  Carefully remove the bandage and start daily wound care and dressing changes. You may also now shower and get the wound wet. Wash wound with a mild soap and water.  Use caution when washing the wound. Be gentle and do not let the forceful shower stream hit the wound directly.  PAT dry.  Daily Wound Care:  1. Wash wound with a mild soap and water.  Use caution when washing the wound, be gentle and do not let the forceful shower stream hit the wound directly.  2. PAT DRY.  3. Apply Vaseline (from a new container or tube) over the suture line with a Q-tip. It is very important to keep the wound continuously moist, as wounds heal best in a moist environment.  4.  Keep the site covered until sutures are removed, you can cover it with a Telfa (non-stick) dressing and tape or a band-aid.    5. If you are unable to keep wound covered, you must apply Vaseline every 2 - 3 hours (while awake) to ensure it is being kept moist for optimal healing. A dressing overnight is recommended to keep the area moist.   Call Us If:  1. You have pain that is not controlled with Tylenol.  2. You have signs or symptoms of an infection, such as: fever over 100 degrees F, redness, warmth, or foul-smelling or yellow/creamy drainage from the wound.  Who should I call with questions?    [facility]: [phone]     [facility]: [phone]    For urgent needs outside of business hours call the [facility] at [phone] and ask for the dermatology resident on call          Wound Care:  Electrodesiccation and Curettage     I will experience scar, altered skin color, bleeding, swelling, pain, crusting and redness. I may experience altered sensation.  Risks are excessive bleeding, infection, muscle weakness, thick (hypertrophic or keloidal) scar, and recurrence,. A second procedure may be recommended to obtain the best cosmetic or functional result.    What is electrodesiccation and curettage ?    Scraping off tissue (curettage)    Destroy tissue using electric current or cautery (electrodessication)    How do I perform wound care?    Keep dressing in place for two days. You may shower with the dressing in place(do not get wet)    After 2 days, wash hands and remove dressing. Clean wound with cotton-swab soaked in hydrogen peroxide to remove drainage and crust    Put on a thick layer of Vaseline on the wound using a cotton-swab     Cover the wound with a Band-AidTM to protect from dust and tight clothing    If wound is draining before two days, change your dressing as described above sooner    During wound care, do not allow the area to dry out or form a scab    What do I need?    Hydrogen peroxide     Cotton-swabs     Vaseline or petroleum jelly     Band-AidsTM or dressing supplies as needed     When should I call the doctor?  [facility]: [phone]  [facility]: [phone]  For urgent needs outside of business hours call the [facility] at [phone] and ask for the dermatology resident on call

## 2019-09-30 NOTE — LETTER
9/30/2019         RE: Cydney Christiansen  [address]  [place] [address]        Dear Colleague,    Thank you for referring your patient, Cydney Christiansen, to the [email or web address] Please see a copy of my visit note below.    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    Informed consent was obtained and the patient underwent the procedure as follows:  The patient was placed supine on the operating table.  The cancer was identified, outlined with a marker, and verified by the patient.  The entire surgical field was prepped with Hibiclens.  The surgical site was anesthetized using Lidocaine 1% with epi 1:100,000.    The area of clinically apparent tumor was debulked. The layer of tissue was then surgically excised using a #15 blade and was then transferred onto a specimen sheet maintaining the orientation of the specimen. Hemostasis was obtained using bipolar electrocoagulation. The wound site was then covered with a dressing while the tissue samples were processed for examination.    The excised tissue was transported to the Mohs histology  laboratory maintaining the tissue orientation.  The tissue specimen was relaxed so that the entire surgical margin was in a a single horizontal plane for sectioning and inked for precise mapping.  A precise reference map was drawn to reflect the sectioning of the specimen, colored inking of the margins, and orientation on the patient. The tissue was processed using horizontal sectioning of the base and continuous peripheral margins.  The histopathologic sections were reviewed in conjunction with the reference map.    Total blocks: 1    Total slides:  2    There were no cancer cells visualized on examination, therefore Mohs surgery was complete.    Reconstruction: Complex Closure    Primary Surgeon: Dr. Ian Haro     The patient was taken to the operative suite and placed on the operating room table. The defect was identified. Appropriate markings were made with a marking pen to plan the repair.  The area was infiltrated with Lidocaine 1% with epi 1:100,000, prepped with Hibiclens, and draped with sterile towels.     The wound was debeveled and undermined widely. Cones were excised within relaxed skin tension lines on both sides of the defect. Hemostasis was obtained using bipolar electrocoagulation. The wound was narrowed with SMAS placation and the dermis and subcutaneous tissue were then approximated using buried vertical mattress sutures. The wound edges were then approximated additional buried sutures were placed in a similar fashion where needed.  Percutaneous sutures were carefully placed for maximum eversion and meticulous approximation.    Repair Size: 3.7 cm  Sutures Used: Fast Absorbing Gut 5-0, Monocryl 5-0       The wound was cleansed with saline and ointment was applied along the wound surface.     A sterile pressure dressing was applied. Wound care instructions were given verbally and in writing. The patient left the operating suite in stable condition. Patient was informed that additional refinement  of the resulting surgical scar may be used as a second stage of this reconstruction.             ---------------------------------------------------------------------------------------------------------------------      Dermatology Procedure Note: Electrodesiccation and Curettage    PREOPERATIVE DIAGNOSIS: Squamous cell carcinoma in situ     POSTOPERATIVE DIAGNOSIS: same    LOCATION: left forearm superior     SIZE: 0.8 x 0.5 cm     Treatment options including electrodessiccation and curettage (ED and C), excision and topicals were reviewed.  The expected cure rates, healing times and anticipated scars of each option were discussed and the patient elects to proceed with ED and C.     The risks and benefits of the procedure were described to the patient.  These include but are not limited to bleeding, infection, scar, incomplete removal, and recurrence. Written informed consent was obtained. Time-out was performed. The above site was cleansed with and injected with 2.5 mL 1% lidocaine with epinephrine. Once anesthesia was obtained, the site was prepped with Chlorhexidine and rinsed with sterile saline. The lesion was curetted in 3 directions with a 3 mm margin and this was followed by electrodessication.  This process was repeated three times. The defect measured 1.4 x 1.1 cm. Vaseline and a bandage were applied to the wound. The patient tolerated the procedure well and was given post care instructions.    Follow up for wound check as needed.         Staff Involved:    Scribe Disclosure  I, Bradley Burrell, am serving as a scribe to document services personally performed by Dr. Ian Haro DO, based on data collection and the provider's statements to me.     Provider Disclosure:   The documentation recorded by the scribe accurately reflects the services I personally performed and the decisions made by me.  I personally performed the procedures today.    Ian Haro DO    Department of  Dermatology  [facility]: Phone: [phone], Fax:[phone]  [facility]: Phone: [phone], Fax: [phone]      Performed at:   [facility]    [address] [place] [address]             Again, thank you for allowing me to participate in the care of your patient.        Sincerely,        Ian Haro MD

## 2019-09-30 NOTE — NURSING NOTE
Cydney Christiansen's goals for this visit include:   Chief Complaint   Patient presents with     Procedure     ED&C and Mohs       She requests these members of her care team be copied on today's visit information: Yes    PCP: Estrellita Hernandez    Referring Provider:  Qi Barba MD  [address] [place] [address] [address]  [place] [address]    BP (!) 149/79 (BP Location: Left arm, Patient Position: Sitting, Cuff Size: Adult Regular)   Temp (!) 62  F (16.7  C)   SpO2 98%     Do you need any medication refills at today's visit? No    Gwen Bose LPN

## 2019-10-02 ENCOUNTER — ALLIED HEALTH/NURSE VISIT (OUTPATIENT)
Dept: NURSING | Facility: CLINIC | Age: 84
End: 2019-10-02
Payer: COMMERCIAL

## 2019-10-02 ENCOUNTER — TELEPHONE (OUTPATIENT)
Dept: DERMATOLOGY | Facility: CLINIC | Age: 84
End: 2019-10-02

## 2019-10-02 DIAGNOSIS — Z51.89 VISIT FOR WOUND CHECK: Primary | ICD-10-CM

## 2019-10-02 PROCEDURE — 99207 ZZC NO CHARGE NURSE ONLY: CPT | Performed by: DERMATOLOGY

## 2019-10-02 NOTE — TELEPHONE ENCOUNTER
"Cydney is 48 hour s/p Mohs to right temple.  She hasn't removed the pressure dressing yet, but is concerned about a \"1/4\" long blister\" near the corner of her eye.      She denies any pain, drainage, redness or vision changes.    She removed the pressure bandage while on the phone with me and states the wound looks good.  She doesn't see anymore areas of concern.  I recommended she wash the area with mild soap and water, apply Vaseline and cover with a bandage.    I scheduled her for a nurse visit this morning for evaluation of lesion near eye.    Quin Webber RN    "

## 2019-10-02 NOTE — TELEPHONE ENCOUNTER
[facility] Call Center    Phone Message    May a detailed message be left on voicemail: yes    Reason for Call: Symptoms or Concerns     If patient has red-flag symptoms, warm transfer to triage line    Current symptom or concern: Patient has Moh's procedure with Dr. Haro on 09/30/19 and today discovered a blister next to incision/ pressure bandage and would like to discuss this with Dr. Haro/care team. Patient would also like schedule a post op appointment.  Please advise.     Symptoms have been present for:  1 day(s)      Action Taken: Message routed to:  Adult Clinics: Dermatology p [id number]

## 2019-10-02 NOTE — NURSING NOTE
Cydney Christiansen comes into clinic today for a wound check       Pt is s/p mohs to right temple area on 9/30/19. Pt reported via phone a possible blister next to right temple area. RN assessed area of concern and noted a raised dried callous type lesion below right eye. Upon review of previous photos same area is present in those photos. Pt states that she thought it looked like a different color and RN noted that it may be lighter due to medication that was injected during procedure or from bruising that is present post procedure. Pt states understanding and agrees with this.  RN advised pt to monitor site and update clinic if any concerning changes occur. RN also checked mohs site and site is healing nicely with no areas of concern. Clean Vaseline and band aid applied to site.  Pt denies any further question or concerns at this time.    This service provided today was under the supervising provider of the day , who was available if needed.    Ruby Luong RN

## 2019-10-11 ENCOUNTER — TELEPHONE (OUTPATIENT)
Dept: DERMATOLOGY | Facility: CLINIC | Age: 84
End: 2019-10-11

## 2019-10-11 NOTE — TELEPHONE ENCOUNTER
"Pt called and states that on Wednesday she noticed blood on the dressing. Pt states \" not a lot but there was some\". Pt then states that she took the bandaid off today and noticed \" a little bit of redness on the bandage\". Pt denies any thick foul smelling drainage. RN asked pt if the redness around the site looks like its spreading or getting bigger and pt states that she does not think that its getting bigger. No increased warmth or pain to the site. RN advised pt that the healing can take weeks. Pt states that she was not aware of this and is happy to know this as she thought it should be healed after a week so she thought something was wrong. Pt asked about using the hydrogen peroxide and RN instructed pt to keep the wound clean with mild soap and water and apply vaseline and a bandaid continuously until the site is healed with fresh, pink skin. Pt advised to call back if increased redness ,thick green/yellow foul smelling drainage or increased pain occurs. Pt thanked RN for the information...Ruby Luong RN    "

## 2019-10-11 NOTE — TELEPHONE ENCOUNTER
Description:  Wound on arm from ED&C on 9/30/19. Active bleeding on Wednesday 10/9/19. ED&C site about the size of a nickel. Has been keeping vaseline on it and has been keeping it covered. No active bleeding since Wednesday, but will notice some blood on bandage after changing it. Looking pink around the site. Not painful.   Onset/duration: 2 days  Pain scale (0-10)   0/10  Last exam/Treatment/Date of surgery:  9/30/19    Taking medication(s) as prescribed? Yes - no blood thinners  Taking over the counter medication(s?) Yes  Any medication side effects? No significant side effects    Medication(s) improving/managing symptoms?  N/A    Will route this message to MARIEL Beltran to review    Mena Goodman LPN

## 2020-02-15 DIAGNOSIS — M81.0 OSTEOPOROSIS, UNSPECIFIED OSTEOPOROSIS TYPE, UNSPECIFIED PATHOLOGICAL FRACTURE PRESENCE: ICD-10-CM

## 2020-02-17 NOTE — TELEPHONE ENCOUNTER
"Routing refill request to provider for review/approval because:  No Dexa on file within past 2 years    Requested Prescriptions   Pending Prescriptions Disp Refills     alendronate (FOSAMAX) 70 MG tablet [Pharmacy Med Name: ALENDRONATE SODIUM 70 MG TAB] 12 tablet 0     Sig: TAKE 1 TAB BY MOUTH EVERY 7 DAYS WITH 8OZ OF WATER,60MIN BEFORE FOOD.SIT UPRIGHT FOR 30 MINUTES.       Bisphosphonates Failed - 2/17/2020 10:42 AM        Failed - Dexa on file within past 2 years     Please review last Dexa result.           Passed - Recent (12 mo) or future (30 days) visit within the authorizing provider's specialty     Patient has had an office visit with the authorizing provider or a provider within the authorizing providers department within the previous 12 mos or has a future within next 30 days. See \"Patient Info\" tab in inbasket, or \"Choose Columns\" in Meds & Orders section of the refill encounter.              Passed - Medication is active on med list        Passed - Patient is age 18 or older        Passed - Normal serum creatinine on file within past 12 months     Recent Labs   Lab Test 09/17/19  1442   CR 0.65             "

## 2020-02-18 ENCOUNTER — OFFICE VISIT (OUTPATIENT)
Dept: DERMATOLOGY | Facility: CLINIC | Age: 85
End: 2020-02-18
Payer: COMMERCIAL

## 2020-02-18 DIAGNOSIS — D48.5 NEOPLASM OF UNCERTAIN BEHAVIOR OF SKIN: Primary | ICD-10-CM

## 2020-02-18 DIAGNOSIS — L30.9 DERMATITIS: ICD-10-CM

## 2020-02-18 DIAGNOSIS — L82.1 SK (SEBORRHEIC KERATOSIS): ICD-10-CM

## 2020-02-18 DIAGNOSIS — Z85.828 HISTORY OF NONMELANOMA SKIN CANCER: ICD-10-CM

## 2020-02-18 PROCEDURE — 99213 OFFICE O/P EST LOW 20 MIN: CPT | Mod: 25 | Performed by: DERMATOLOGY

## 2020-02-18 PROCEDURE — 88305 TISSUE EXAM BY PATHOLOGIST: CPT | Mod: TC | Performed by: DERMATOLOGY

## 2020-02-18 PROCEDURE — 11102 TANGNTL BX SKIN SINGLE LES: CPT | Performed by: DERMATOLOGY

## 2020-02-18 PROCEDURE — 11103 TANGNTL BX SKIN EA SEP/ADDL: CPT | Performed by: DERMATOLOGY

## 2020-02-18 RX ORDER — ALENDRONATE SODIUM 70 MG/1
TABLET ORAL
Qty: 12 TABLET | Refills: 0 | Status: SHIPPED | OUTPATIENT
Start: 2020-02-18 | End: 2020-05-09

## 2020-02-18 RX ORDER — TRIAMCINOLONE ACETONIDE 1 MG/G
CREAM TOPICAL
Qty: 30 G | Refills: 0 | Status: SHIPPED | OUTPATIENT
Start: 2020-02-18 | End: 2022-01-10

## 2020-02-18 ASSESSMENT — PAIN SCALES - GENERAL: PAINLEVEL: NO PAIN (0)

## 2020-02-18 NOTE — LETTER
2/18/2020         RE: Cydney Christiansen  [address]  [place] [address]        Dear Colleague,    Thank you for referring your patient, Cydney Christiansen, to the [facility]. Please see a copy of my visit note below.    [facility] Dermatology Note      Dermatology Problem List:  1. NMSC  -SCCis Left superior forearm, s/p: ED and C 9/30/2019  -SCCis right temple, s/p:  Mohs 9/30/2019  -SCCIS, right nasal sidewall, s/p Mohs 4/1/19   -SCCIS, right upper arm, s/p excision 8/9/18   -SCC, left popliteal fossa, well differentiated with focal perineural invasion but with clear margins on path, s/p excision by Dr. Mcgee 7/2013  -SCCIS arising from solar keratosis, right cheek, 4/2013  -SCC, right dorsal hand, s/p excision with SCCIS extending to the margin 1/7/13  -SCC, bowenoid type, upper back, s/p excision 2011  -BCC, superficial, left back, 2/2011  -BCC, superficial, left neck, 2011, elected for ED&C    2. 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 HAK, left mid eyebrow, base not seen, 6/2014  4. Actinic keratosis  -s/p cryotherapy  -left forearm, s/p biopsy 3/15/19   5. GA, right angle of jaw: resolves with triamcinolone cream  6. ISK, mid back    Encounter Date: Feb 18, 2020    CC:  Chief Complaint   Patient presents with     Skin Check     Lesion on left forearm and right dorsal hand.       History of Present Illness:  Ms. Cydney Christiansen is a 84 year old female with a history of multiple NMSC who presents for a skin check. The patient was last seen on 9/30/2019 by Dr. Haro when an SCCis on the R temple underwent Mohs surgery and the SCCis on the left forearm underwent ED and C.    Today patient reports lesions of concern on the left forearm and right dorsal hand. Spot on right hand is itchy    No other concerns.       Past Medical History:   Patient Active Problem List   Diagnosis     History of basal cell carcinoma     PXF  OU     Personal history of malignant  neoplasm of bladder     Advanced directives, counseling/discussion     Hyperlipidemia LDL goal <160     Family history of diabetes mellitus     Health Care Home     Squamous cell carcinoma     Basal cell carcinoma     Skin lesion of left leg     Viral warts     PVD (posterior vitreous detachment), OS     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     Hypertension goal BP (blood pressure) < 140/90     Seborrheic keratosis     Malignant neoplasm of overlapping sites of left female breast (H)     Scoliosis     Compression fracture of thoracic vertebra (H)     Mass of left hand     Primary open angle glaucoma of both eyes, unspecified glaucoma stage     Osteoporosis, unspecified osteoporosis type, unspecified pathological fracture presence     Nuclear sclerosis of left eye     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)     Neoplasm of uncertain behavior of skin     Actinic keratosis     History of nonmelanoma skin cancer     Past Medical History:   Diagnosis Date     Actinic keratosis      Basal cell cancer 02/2011    bcc of the L back.     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    left leg     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     Past Surgical History:   Procedure Laterality Date     COLONOSCOPY  5/2008, 5/13, 6/14, 6/17    Q 3 years for advanced adenomatous  polyp     CYSTOSCOPY  12/31/2008     D & C       GENITOURINARY SURGERY      TURBT     HC REMOVAL GALLBLADDER      open pan     HC TRABECULOPLASTY BY LASER SURGERY Left 4/18/07    SLT #1 OS (inf 180)     HC TRABECULOPLASTY BY LASER SURGERY Right 5/4/11    SLT #1 OD (inf 180?)     HC TRABECULOPLASTY BY LASER SURGERY Left 3/17/15    SLT #2 OS (sup 180)     HC TRABECULOPLASTY BY LASER SURGERY Right 6/23/15    SLT #2 OD (sup 180?)     LASER ARGON TREATMENT      SLT left eye x 2     LUMPECTOMY BREAST WITH SENTINEL NODE, COMBINED Left 7/29/2015    Procedure: COMBINED LUMPECTOMY BREAST WITH SENTINEL NODE;  Surgeon: Ifeanyi Sunshine MD;  Location: [facility] OR     PHACOEMULSIFICATION CLEAR CORNEA WITH STANDARD INTRAOCULAR LENS IMPLANT Left 12/8/2017    Procedure: PHACOEMULSIFICATION CLEAR CORNEA WITH STANDARD INTRAOCULAR LENS IMPLANT;   COMPLEX LEFT PHACOEMULSIFICATION CLEAR CORNEA WITH STANDARD INTRAOCULAR LENS IMPLANT ;  Surgeon: Kvng Garcia MD;  Location: [facility]     SURGICAL HISTORY OF -   9/11    squamous cell CA excised from back     TUBAL LIGATION         Social History:    [place]. She is this summer.   Kept in chart for convenience.       Family History:  No family history of skin cancer.   Kept in chart for convenience.       Medications:  Current Outpatient Medications   Medication Sig Dispense Refill     alendronate (FOSAMAX) 70 MG tablet TAKE 1 TAB BY MOUTH EVERY 7 DAYS WITH 8OZ OF WATER,60MIN BEFORE FOOD.SIT UPRIGHT FOR 30 MINUTES. 12 tablet 0     CALCIUM 600 MG OR TABS 1 daily       cyanocobalamin (VITAMIN B-12) 500 MCG tablet Take 1 tablet (500 mcg) by mouth daily 150 tablet 3     dorzolamide-timolol (COSOPT) 2-0.5 % ophthalmic solution Place 1 drop into both eyes 2 times daily 10 mL 11     ferrous sulfate (FEROSUL) 325 (65 Fe) MG tablet Take 1 tablet (325 mg) by mouth daily (with breakfast) 90 tablet 3     ibuprofen (ADVIL,MOTRIN) 200 MG tablet Take 200 mg by mouth every 4 hours as needed.       latanoprost  (XALATAN) 0.005 % ophthalmic solution Place 1 drop into both eyes At Bedtime 1 Bottle 11     losartan (COZAAR) 25 MG tablet Take 1 tablet (25 mg) by mouth daily 90 tablet 4     meclizine (ANTIVERT) 25 MG tablet Take 1 tablet (25 mg) by mouth every 6 hours as needed for dizziness 30 tablet 1     Multiple Vitamins-Minerals (ONE DAILY ADULTS 50+) TABS        simvastatin (ZOCOR) 20 MG tablet Take 1 tablet (20 mg) by mouth At Bedtime 90 tablet 4     tamoxifen (NOLVADEX) 20 MG tablet Take 1 tablet (20 mg) by mouth daily 90 tablet 1     triamcinolone (KENALOG) 0.1 % cream Apply to affected area of the face once to twice a day. 15 g 1     VITAMIN D-1000 MAX -1000 MG-UNIT OR TABS 1 daily         Allergies   Allergen Reactions     Lisinopril Cough       Review of Systems:  -Constitutional: Otherwise feeling well, in usual state of health.   -Skin: As per HPI, no other concerns.     Physical exam:  Vitals: There were no vitals taken for this visit.  GEN: This is a well-nourished, well developed female in no acute distress  SKIN: Total skin excluding the undergarment areas was performed. The exam included the head/face, neck, both arms, chest, back, abdomen, both legs, digits and/or nails. Including buttocks.   -nails are painted  -There are waxy stuck on tan to brown papules on the trunk.  -1.7 cm  Stuck on plaque R abdomen  -keratotic brown papule, left forearm  -Eczematous patch R dorsal hand  -There is a well healed surgical scar without erythema, nodularity or telangiectasias on the sites of previous skin cancer.   - No other lesions of concern on areas examined.     Impression/Plan:  1.   History of nonmelanoma skin cancer     Recommend sunscreens SPF #30 or greater, protective clothing and avoidance of tanning beds.         2.   NUB, keratotic brown papule, left forearm Ddx: SK vs SCC  -Shave biopsy:  After discussion of benefits and risks including but not limited to bleeding/bruising, pain/swelling, infection,  scar, incomplete removal, nerve damage/numbness, recurrence, and non-diagnostic biopsy, written consent, verbal consent and photographs were obtained. Time-out was performed. The area was cleaned with isopropyl alcohol. 0.5mL of 1% lidocaine with epinephrine was injected to obtain adequate anesthesia of the lesion on the left forearm. A shave biopsy was performed. Hemostasis was achieved with aluminium chloride. Vaseline and a sterile dressing were applied. The patient tolerated the procedure and no complications were noted. The patient was provided with verbal and written post care instructions.     3. SKs, trunk  -No further intervention needed at this time. Patient to report changes.    4. Eczematous patch R dorsal hand  -Start triamcinolone 0.01% cream. Apply to affected area twice daily for two weeks.  -Apply Vaseline or other emollient on top of triamcinolone.    5. NUB, 1.7 cm  Stuck on plaque R abdomen Ddx: SK vs other  -Shave biopsy:  After discussion of benefits and risks including but not limited to bleeding/bruising, pain/swelling, infection, scar, incomplete removal, nerve damage/numbness, recurrence, and non-diagnostic biopsy, written consent, verbal consent and photographs were obtained. Time-out was performed. The area was cleaned with isopropyl alcohol. 0.5mL of 1% lidocaine with epinephrine was injected to obtain adequate anesthesia of the lesion on the right abdomen. A shave biopsy was performed. Hemostasis was achieved with aluminium chloride. Vaseline and a sterile dressing were applied. The patient tolerated the procedure and no complications were noted. The patient was provided with verbal and written post care instructions.       Follow-up within 2 months for spot check of hand, earlier pending biopsy results.    Staff Involved:  Scribe/Staff     Scribe Disclosure  I, Montana Lewis, am serving as a scribe to document services personally performed by Dr. Qi Barba MD, based on data  collection and the provider's statements to me.    Provider Disclosure:   The documentation recorded by the scribe accurately reflects the services I personally performed and the decisions made by me.    Qi Barba MD    Department of Dermatology  [facility]: Phone: [phone], Fax:[phone]  [facility]: Phone: [phone], Fax: [phone]                  Again, thank you for allowing me to participate in the care of your patient.        Sincerely,        Qi Barba MD

## 2020-02-18 NOTE — PATIENT INSTRUCTIONS

## 2020-02-18 NOTE — NURSING NOTE
Cydney Christiansen's goals for this visit include:   Chief Complaint   Patient presents with     Skin Check     Lesion on left forearm and right dorsal hand.       She requests these members of her care team be copied on today's visit information: Estrellita Hernandez      PCP: Estrellita Hernandez    Referring Provider:  Qi Barba MD  [address] [place] [address] [id number]  [place] [address]    Quin Webber RN

## 2020-02-18 NOTE — PROGRESS NOTES
[facility] Dermatology Note      Dermatology Problem List:  1. NMSC  -SCCis Left superior forearm, s/p: ED and C 9/30/2019  -SCCis right temple, s/p:  Mohs 9/30/2019  -SCCIS, right nasal sidewall, s/p Mohs 4/1/19   -SCCIS, right upper arm, s/p excision 8/9/18   -SCC, left popliteal fossa, well differentiated with focal perineural invasion but with clear margins on path, s/p excision by Dr. Mcgee 7/2013  -SCCIS arising from solar keratosis, right cheek, 4/2013  -SCC, right dorsal hand, s/p excision with SCCIS extending to the margin 1/7/13  -SCC, bowenoid type, upper back, s/p excision 2011  -BCC, superficial, left back, 2/2011  -BCC, superficial, left neck, 2011, elected for ED&C    2. 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 HAK, left mid eyebrow, base not seen, 6/2014  4. Actinic keratosis  -s/p cryotherapy  -left forearm, s/p biopsy 3/15/19   5. GA, right angle of jaw: resolves with triamcinolone cream  6. ISK, mid back    Encounter Date: Feb 18, 2020    CC:  Chief Complaint   Patient presents with     Skin Check     Lesion on left forearm and right dorsal hand.       History of Present Illness:  Ms. Cydney Christiansen is a 84 year old female with a history of multiple NMSC who presents for a skin check. The patient was last seen on 9/30/2019 by Dr. Haro when an SCCis on the R temple underwent Mohs surgery and the SCCis on the left forearm underwent ED and C.    Today patient reports lesions of concern on the left forearm and right dorsal hand. Spot on right hand is itchy    No other concerns.       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     Hypertension goal BP (blood pressure) < 140/90     Seborrheic keratosis     Malignant neoplasm of overlapping sites of left female breast (H)     Scoliosis     Compression fracture of thoracic vertebra (H)     Mass of left hand     Primary open angle glaucoma of both eyes, unspecified glaucoma stage     Osteoporosis, unspecified osteoporosis type, unspecified pathological fracture presence     Nuclear sclerosis of left eye     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)     Neoplasm of uncertain behavior of skin     Actinic keratosis     History of nonmelanoma skin cancer     Past Medical History:   Diagnosis Date     Actinic keratosis      Basal cell cancer 02/2011    bcc of the L back.     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    left leg     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     Past Surgical History:   Procedure Laterality Date     COLONOSCOPY  5/2008, 5/13, 6/14, 6/17    Q 3 years for advanced adenomatous polyp     CYSTOSCOPY  12/31/2008     D & C       GENITOURINARY SURGERY      TURBT     HC REMOVAL GALLBLADDER      open pan     HC TRABECULOPLASTY BY LASER SURGERY Left 4/18/07    SLT #1 OS (inf 180)     HC TRABECULOPLASTY BY LASER SURGERY Right  5/4/11    SLT #1 OD (inf 180?)     HC TRABECULOPLASTY BY LASER SURGERY Left 3/17/15    SLT #2 OS (sup 180)     HC TRABECULOPLASTY BY LASER SURGERY Right 6/23/15    SLT #2 OD (sup 180?)     LASER ARGON TREATMENT      SLT left eye x 2     LUMPECTOMY BREAST WITH SENTINEL NODE, COMBINED Left 7/29/2015    Procedure: COMBINED LUMPECTOMY BREAST WITH SENTINEL NODE;  Surgeon: Ifeanyi Sunshine MD;  Location: [facility] OR     PHACOEMULSIFICATION CLEAR CORNEA WITH STANDARD INTRAOCULAR LENS IMPLANT Left 12/8/2017    Procedure: PHACOEMULSIFICATION CLEAR CORNEA WITH STANDARD INTRAOCULAR LENS IMPLANT;   COMPLEX LEFT PHACOEMULSIFICATION CLEAR CORNEA WITH STANDARD INTRAOCULAR LENS IMPLANT ;  Surgeon: Kvng Garcia MD;  Location: [facility]     SURGICAL HISTORY OF - 9/11    squamous cell CA excised from back     TUBAL LIGATION         Social History:    [place]. She is this summer.   Kept in chart for convenience.       Family History:  No family history of skin cancer.   Kept in chart for convenience.       Medications:  Current Outpatient Medications   Medication Sig Dispense Refill     alendronate (FOSAMAX) 70 MG tablet TAKE 1 TAB BY MOUTH EVERY 7 DAYS WITH 8OZ OF WATER,60MIN BEFORE FOOD.SIT UPRIGHT FOR 30 MINUTES. 12 tablet 0     CALCIUM 600 MG OR TABS 1 daily       cyanocobalamin (VITAMIN B-12) 500 MCG tablet Take 1 tablet (500 mcg) by mouth daily 150 tablet 3     dorzolamide-timolol (COSOPT) 2-0.5 % ophthalmic solution Place 1 drop into both eyes 2 times daily 10 mL 11     ferrous sulfate (FEROSUL) 325 (65 Fe) MG tablet Take 1 tablet (325 mg) by mouth daily (with breakfast) 90 tablet 3     ibuprofen (ADVIL,MOTRIN) 200 MG tablet Take 200 mg by mouth every 4 hours as needed.       latanoprost (XALATAN) 0.005 % ophthalmic solution Place 1 drop into both eyes At Bedtime 1 Bottle 11     losartan (COZAAR) 25 MG tablet Take 1 tablet (25 mg) by mouth daily 90 tablet 4     meclizine (ANTIVERT) 25 MG tablet Take 1 tablet (25 mg) by mouth every  6 hours as needed for dizziness 30 tablet 1     Multiple Vitamins-Minerals (ONE DAILY ADULTS 50+) TABS        simvastatin (ZOCOR) 20 MG tablet Take 1 tablet (20 mg) by mouth At Bedtime 90 tablet 4     tamoxifen (NOLVADEX) 20 MG tablet Take 1 tablet (20 mg) by mouth daily 90 tablet 1     triamcinolone (KENALOG) 0.1 % cream Apply to affected area of the face once to twice a day. 15 g 1     VITAMIN D-1000 MAX -1000 MG-UNIT OR TABS 1 daily         Allergies   Allergen Reactions     Lisinopril Cough       Review of Systems:  -Constitutional: Otherwise feeling well, in usual state of health.   -Skin: As per HPI, no other concerns.     Physical exam:  Vitals: There were no vitals taken for this visit.  GEN: This is a well-nourished, well developed female in no acute distress  SKIN: Total skin excluding the undergarment areas was performed. The exam included the head/face, neck, both arms, chest, back, abdomen, both legs, digits and/or nails. Including buttocks.   -nails are painted  -There are waxy stuck on tan to brown papules on the trunk.  -1.7 cm  Stuck on plaque R abdomen  -keratotic brown papule, left forearm  -Eczematous patch R dorsal hand  -There is a well healed surgical scar without erythema, nodularity or telangiectasias on the sites of previous skin cancer.   - No other lesions of concern on areas examined.     Impression/Plan:  1.   History of nonmelanoma skin cancer     Recommend sunscreens SPF #30 or greater, protective clothing and avoidance of tanning beds.         2.   NUB, keratotic brown papule, left forearm Ddx: SK vs SCC  -Shave biopsy:  After discussion of benefits and risks including but not limited to bleeding/bruising, pain/swelling, infection, scar, incomplete removal, nerve damage/numbness, recurrence, and non-diagnostic biopsy, written consent, verbal consent and photographs were obtained. Time-out was performed. The area was cleaned with isopropyl alcohol. 0.5mL of 1% lidocaine with  epinephrine was injected to obtain adequate anesthesia of the lesion on the left forearm. A shave biopsy was performed. Hemostasis was achieved with aluminium chloride. Vaseline and a sterile dressing were applied. The patient tolerated the procedure and no complications were noted. The patient was provided with verbal and written post care instructions.     3. SKs, trunk  -No further intervention needed at this time. Patient to report changes.    4. Eczematous patch R dorsal hand  -Start triamcinolone 0.01% cream. Apply to affected area twice daily for two weeks.  -Apply Vaseline or other emollient on top of triamcinolone.    5. NUB, 1.7 cm  Stuck on plaque R abdomen Ddx: SK vs other  -Shave biopsy:  After discussion of benefits and risks including but not limited to bleeding/bruising, pain/swelling, infection, scar, incomplete removal, nerve damage/numbness, recurrence, and non-diagnostic biopsy, written consent, verbal consent and photographs were obtained. Time-out was performed. The area was cleaned with isopropyl alcohol. 0.5mL of 1% lidocaine with epinephrine was injected to obtain adequate anesthesia of the lesion on the right abdomen. A shave biopsy was performed. Hemostasis was achieved with aluminium chloride. Vaseline and a sterile dressing were applied. The patient tolerated the procedure and no complications were noted. The patient was provided with verbal and written post care instructions.       Follow-up within 2 months for spot check of hand, earlier pending biopsy results.    Staff Involved:  Scribe/Staff     Scribe Disclosure  I, Montana Lewis, am serving as a scribe to document services personally performed by Dr. Qi Barba MD, based on data collection and the provider's statements to me.    Provider Disclosure:   The documentation recorded by the scribe accurately reflects the services I personally performed and the decisions made by me.    Qi Barba MD    Department  of Dermatology  [facility]: Phone: [phone], Fax:[phone]  [facility]: Phone: [phone], Fax: [phone]

## 2020-02-21 LAB — COPATH REPORT: NORMAL

## 2020-02-24 ENCOUNTER — TELEPHONE (OUTPATIENT)
Dept: DERMATOLOGY | Facility: CLINIC | Age: 85
End: 2020-02-24

## 2020-02-24 NOTE — TELEPHONE ENCOUNTER
Excision Intake                                                    There were no vitals taken for this visit.    Do you take the following medications:  Coumadin, Eliquis, Pradaxa, Xarelto:   NO  -If on Coumadin, INR should be checked within 7 days of surgery.  Range is 3.5 or less or within therapeutic range.  Antibiotic Prophylaxis:  NO    Do you have an iodine allergy:  NO    Past Medical History                                                    Do you currently or have you previously had any of the following conditions:       HIV/AIDS:  NO    Hepatitis:  NO    Suppressed Immune System:  NO    Autoimmune disorder (eg RA, Lupus):  NO    Fever blisters/herpes, cold sores:  N/A    Kidney disease:  NO  Creatinine   Date Value Ref Range Status   09/17/2019 0.65 0.52 - 1.04 mg/dL Final   ]    Pacemaker or Defibrillator:  NO.      History of artificial or heart valve replacement:  NO.      Endocarditis: NO    Organ transplant: NO.      Joint replacement within past 2 years: NO    Previous prosthetic joint infection: N/A    Diabetes: NO    Pregnant:: NO    Tobacco use:  NO.    History   Smoking Status     Former Smoker     Packs/day: 0.50     Years: 20.00     Types: Cigarettes     Quit date: 2/1/1976   Smokeless Tobacco     Never Used     Reviewed by:  April Love LPN

## 2020-02-24 NOTE — TELEPHONE ENCOUNTER
[facility] Report 02/18/2020  2:05 PM 88   Patient Name: MILLI PRIETO   MR#: [id number]   Specimen #: [id number]   Collected: 2/18/2020   Received: 2/18/2020   Reported: 2/21/2020 14:05   Ordering Phy(s): DINESH GARCIA     For improved result formatting, select 'View Enhanced Report Format' under    Linked Documents section.     SPECIMEN(S):   A: Skin, left forearm, shave   B: Skin, right abdomen, shave     FINAL DIAGNOSIS:   A. Skin, left forearm, shave:   - Squamous cell carcinoma, at least in situ - (see comment)     B. Skin, right abdomen, shave:   - Seborrheic keratosis - (see description)        April Love LPN

## 2020-03-03 ENCOUNTER — ONCOLOGY VISIT (OUTPATIENT)
Dept: ONCOLOGY | Facility: CLINIC | Age: 85
End: 2020-03-03
Attending: INTERNAL MEDICINE
Payer: COMMERCIAL

## 2020-03-03 VITALS
BODY MASS INDEX: 27.41 KG/M2 | TEMPERATURE: 98.1 F | WEIGHT: 139.6 LBS | HEART RATE: 56 BPM | SYSTOLIC BLOOD PRESSURE: 135 MMHG | RESPIRATION RATE: 16 BRPM | HEIGHT: 60 IN | OXYGEN SATURATION: 96 % | DIASTOLIC BLOOD PRESSURE: 85 MMHG

## 2020-03-03 DIAGNOSIS — Z85.51 PERSONAL HISTORY OF MALIGNANT NEOPLASM OF BLADDER: ICD-10-CM

## 2020-03-03 DIAGNOSIS — C50.912 INVASIVE DUCTAL CARCINOMA OF LEFT BREAST (H): ICD-10-CM

## 2020-03-03 DIAGNOSIS — C50.812 MALIGNANT NEOPLASM OF OVERLAPPING SITES OF LEFT BREAST IN FEMALE, ESTROGEN RECEPTOR POSITIVE (H): ICD-10-CM

## 2020-03-03 DIAGNOSIS — D04.70 SQUAMOUS CELL CARCINOMA IN SITU OF SKIN OF LOWER LEG, UNSPECIFIED LATERALITY: Primary | ICD-10-CM

## 2020-03-03 DIAGNOSIS — Z12.31 VISIT FOR SCREENING MAMMOGRAM: ICD-10-CM

## 2020-03-03 DIAGNOSIS — D64.9 NORMOCYTIC ANEMIA: ICD-10-CM

## 2020-03-03 DIAGNOSIS — Z17.0 MALIGNANT NEOPLASM OF OVERLAPPING SITES OF LEFT BREAST IN FEMALE, ESTROGEN RECEPTOR POSITIVE (H): ICD-10-CM

## 2020-03-03 DIAGNOSIS — D50.9 IRON DEFICIENCY ANEMIA, UNSPECIFIED IRON DEFICIENCY ANEMIA TYPE: ICD-10-CM

## 2020-03-03 LAB
ERYTHROCYTE [DISTWIDTH] IN BLOOD BY AUTOMATED COUNT: 12.9 % (ref 10–15)
HCT VFR BLD AUTO: 33.7 % (ref 35–47)
HGB BLD-MCNC: 10.9 G/DL (ref 11.7–15.7)
MCH RBC QN AUTO: 29.9 PG (ref 26.5–33)
MCHC RBC AUTO-ENTMCNC: 32.3 G/DL (ref 31.5–36.5)
MCV RBC AUTO: 93 FL (ref 78–100)
PLATELET # BLD AUTO: 182 10E9/L (ref 150–450)
RBC # BLD AUTO: 3.64 10E12/L (ref 3.8–5.2)
WBC # BLD AUTO: 4.8 10E9/L (ref 4–11)

## 2020-03-03 PROCEDURE — 85027 COMPLETE CBC AUTOMATED: CPT | Performed by: INTERNAL MEDICINE

## 2020-03-03 PROCEDURE — 99214 OFFICE O/P EST MOD 30 MIN: CPT | Performed by: NURSE PRACTITIONER

## 2020-03-03 PROCEDURE — 36415 COLL VENOUS BLD VENIPUNCTURE: CPT | Performed by: INTERNAL MEDICINE

## 2020-03-03 RX ORDER — TAMOXIFEN CITRATE 20 MG/1
20 TABLET ORAL DAILY
Qty: 90 TABLET | Refills: 1 | Status: SHIPPED | OUTPATIENT
Start: 2020-03-03 | End: 2020-09-22

## 2020-03-03 ASSESSMENT — PAIN SCALES - GENERAL: PAINLEVEL: NO PAIN (0)

## 2020-03-03 ASSESSMENT — MIFFLIN-ST. JEOR: SCORE: 1018.47

## 2020-03-03 NOTE — NURSING NOTE
"Oncology Rooming Note    March 3, 2020 9:40 AM   Cydney Christiansen is a 84 year old female who presents for:    Chief Complaint   Patient presents with     Oncology Clinic Visit     Follow up     Initial Vitals: /85 (BP Location: Left arm)   Pulse 56   Temp 98.1  F (36.7  C) (Oral)   Resp 16   Ht 1.53 m (5' 0.24\")   Wt 63.3 kg (139 lb 9.6 oz)   SpO2 96%   BMI 27.05 kg/m   Estimated body mass index is 27.05 kg/m  as calculated from the following:    Height as of this encounter: 1.53 m (5' 0.24\").    Weight as of this encounter: 63.3 kg (139 lb 9.6 oz). Body surface area is 1.64 meters squared.  No Pain (0) Comment: Data Unavailable   No LMP recorded. Patient is postmenopausal.  Allergies reviewed: Yes  Medications reviewed: Yes    Medications: Medication refills not needed today.  Pharmacy name entered into [facility]: [id number] [place], [place] - [address]    Falguni Navas LPN              "

## 2020-03-03 NOTE — LETTER
3/3/2020         RE: Cydney Christiansen  [address]  [place] [address]        Dear Colleague,    Thank you for referring your patient, Cydney Christiansen, to the [facility]. Please see a copy of my visit note below.    Oncology Follow Up Visit: March 3, 2020    Oncologist: Dr Usha Salgado  PCP: Estrellita Hernandez    Diagnosis:  Stage 1A Left Breast Cancer  Cydney Christiansen is an 85 yo female who had screening mammogram on 5/29/2015, which demonstrated a 9 mm lesion around the 1 o'clock position in the left breast. Core needle biopsy in [facility] demonstrated invasive ductal cancer, grade 2, estrogen receptor positive (99%, strong) and progesterone receptor positive (91%, strong) by immunohistochemistry and HER2 not amplified by FISH (ratio 1.0).   Final pathology from surgery pathology showed a 10 mm invasive ductal carcinoma, [place] grade 2. No associated DCIS. Closest surgical margin was 3 mm from the anterior margin. There was no lymphovascular invasion and all 3 sentinel lymph nodes were negative for malignancy.   She was not recommended in favor of adjuvant radiation or chemotherapy.  Due to history of compression fracture felt to be osteoporotic fractures, Tamoxifen was chosen over an AI.   Treatment:   7/29/2015 L lumpectomy and axillary  SLN biopsy  8/24/2015 Began Tamoxifen    Interval History: Ms. Christiansen comes to clinic for follow up to her use of Tamoxifen for her breast cancer and anemia. Pt admits she has not been taking her iron recently as bottle said she should take without food- has not felt more tired though. She has continued use of tamoxifen and is aware of increased vaginal discharge but no hot flashes or other symptoms. She denies new issues with the breasts or chests. She has seen dermatology and has new squamous cell cancers- needs to return for further treatment for this finding. Also states she was able to travel to [place] for 12 days last  "month and feels very relaxed.   Rest of comprehensive and complete ROS is reviewed and is negative.   Past Medical History:   Diagnosis Date     Actinic keratosis      Basal cell cancer 02/2011    bcc of the L back.     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    left leg     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     Current Outpatient Medications   Medication     ALENDRONATE 70 MG PO tablet     CALCIUM 600 MG OR TABS     cyanocobalamin (VITAMIN B-12) 500 MCG tablet     dorzolamide-timolol (COSOPT) 2-0.5 % ophthalmic solution     ferrous sulfate (FEROSUL) 325 (65 Fe) MG tablet     ibuprofen (ADVIL,MOTRIN) 200 MG tablet     latanoprost (XALATAN) 0.005 % ophthalmic solution     losartan (COZAAR) 25 MG tablet     meclizine (ANTIVERT) 25 MG tablet     Multiple Vitamins-Minerals (ONE DAILY ADULTS 50+) TABS     simvastatin (ZOCOR) 20 MG tablet     tamoxifen (NOLVADEX) 20 MG tablet     triamcinolone (KENALOG) 0.1 % cream     triamcinolone 0.1 % EX external cream     VITAMIN D-1000 MAX -1000 MG-UNIT OR TABS     No current facility-administered medications for this visit.      Allergies   Allergen Reactions     Lisinopril Cough       Physical Exam:/85 (BP Location: Left arm)   Pulse 56   Temp 98.1  F (36.7  C) (Oral)   Resp 16   Ht 1.53 m (5' 0.24\")   Wt 63.3 kg (139 " lb 9.6 oz)   SpO2 96%   BMI 27.05 kg/m     ECOG PS- 0-1  Constitutional: Alert, cooperative, and in no distress.   ENT: Eyes bright- states she will get cataract surgery to right eye in near future, No mouth sores  Neck: Supple, No adenopathy.  Cardiac: Heart rate and rhythm is regular and strong without murmur  Respiratory: Breathing easy. Lung sounds clear to auscultation  Breasts:Bilaterally breasts have no new masses, discharge or tenderness.   GI: Abdomen is soft, non-tender, BS normal. No masses or organomegaly  MS: Muscle tone normal, extremities normal with no edema.   Skin: can see previously tested areas of the skin - note that she may be allergic to tape as seen to abdominal   Neuro: Sensory grossly WNL, gait normal.   Lymph: Normal ant/post cervical, axillary, supraclavicular nodes  Psych: Mentation appears normal and affect normal/bright with good conversation.     Laboratory Results:    Results for orders placed or performed in visit on 03/03/20   CBC with platelets     Status: Abnormal   Result Value Ref Range    WBC 4.8 4.0 - 11.0 10e9/L    RBC Count 3.64 (L) 3.8 - 5.2 10e12/L    Hemoglobin 10.9 (L) 11.7 - 15.7 g/dL    Hematocrit 33.7 (L) 35.0 - 47.0 %    MCV 93 78 - 100 fl    MCH 29.9 26.5 - 33.0 pg    MCHC 32.3 31.5 - 36.5 g/dL    RDW 12.9 10.0 - 15.0 %    Platelet Count 182 150 - 450 10e9/L     Assessment and Plan:   Stage 1A Left Breast Cancer-Pt has been using Tamoxifen since 8/2015 and is tolerating well with only note of increased vaginal discharge noted. No new sign of return of her cancer with review, lab or exam.   Pt will return in 6 months with mammogram prior for final review for 5 years of treatment post lumpectomy.   Breast Cancer Screening - On 9/17/2019 she underwent a bilateral digital screening mammography with tomosynthesis. The breast tissue was noted to be heterogeneously dense but benign findings only- we will repeat again in 9/2020..   Compression Fractures- Patient  sustained a compression fracture of T7  in July 2015. On T spine MRI there were also old compression fractures of T3, T4 and T8 noted. She was on Fosamax for 10 years and stopped it in 2012 and restarted with fractures.   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 Next review due 3/6/63780 with Dr Barba.   Normocytic anemia - She was noted to have mild anemia in Aug 2016 with Hgb = 11.2 g/dl.  Workup for her anemia was completed and was treated with vitamin B12 and iron. Most recent colonoscopy was 6/16/2017 with no new sign of bleeding or bruising noted. Pt admits she has stopped use of iron and Hgb is decreased=10.9 today. Since she responded previously to oral iron suggested she return to iron 375mg every other day. Recommend she have repeat Hgb and check of iron in 3 months when seeing PCP.   Remote history of TCC of the bladder (in 1993), s/p TURBT   This was a 25 min visit with > 50% in counseling and coordinating care including education and management of concerns.    Regine Esteves CNP      Again, thank you for allowing me to participate in the care of your patient.        Sincerely,        Regine Esteves, MARCELO, APRN CNP

## 2020-03-03 NOTE — PROGRESS NOTES
Oncology Follow Up Visit: March 3, 2020    Oncologist: Dr Usha Salgado  PCP: Estrellita Hernandez    Diagnosis:  Stage 1A Left Breast Cancer  Cydney Christiansen is an 85 yo female who had screening mammogram on 5/29/2015, which demonstrated a 9 mm lesion around the 1 o'clock position in the left breast. Core needle biopsy in [facility] demonstrated invasive ductal cancer, grade 2, estrogen receptor positive (99%, strong) and progesterone receptor positive (91%, strong) by immunohistochemistry and HER2 not amplified by FISH (ratio 1.0).   Final pathology from surgery pathology showed a 10 mm invasive ductal carcinoma, Sal grade 2. No associated DCIS. Closest surgical margin was 3 mm from the anterior margin. There was no lymphovascular invasion and all 3 sentinel lymph nodes were negative for malignancy.   She was not recommended in favor of adjuvant radiation or chemotherapy.  Due to history of compression fracture felt to be osteoporotic fractures, Tamoxifen was chosen over an AI.   Treatment:   7/29/2015 L lumpectomy and axillary  SLN biopsy  8/24/2015 Began Tamoxifen    Interval History: Ms. Christiansen comes to clinic for follow up to her use of Tamoxifen for her breast cancer and anemia. Pt admits she has not been taking her iron recently as bottle said she should take without food- has not felt more tired though. She has continued use of tamoxifen and is aware of increased vaginal discharge but no hot flashes or other symptoms. She denies new issues with the breasts or chests. She has seen dermatology and has new squamous cell cancers- needs to return for further treatment for this finding. Also states she was able to travel to [place] for 12 days last month and feels very relaxed.   Rest of comprehensive and complete ROS is reviewed and is negative.   Past Medical History:   Diagnosis Date     Actinic keratosis      Basal cell cancer 02/2011    bcc of the L back.     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 7/13    left leg     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     Current Outpatient Medications   Medication     ALENDRONATE 70 MG PO tablet     CALCIUM 600 MG OR TABS     cyanocobalamin (VITAMIN B-12) 500 MCG tablet     dorzolamide-timolol (COSOPT) 2-0.5 % ophthalmic solution     ferrous sulfate (FEROSUL) 325 (65 Fe) MG tablet     ibuprofen (ADVIL,MOTRIN) 200 MG tablet     latanoprost (XALATAN) 0.005 % ophthalmic solution     losartan (COZAAR) 25 MG tablet     meclizine (ANTIVERT) 25 MG tablet     Multiple Vitamins-Minerals (ONE DAILY ADULTS 50+) TABS     simvastatin (ZOCOR) 20 MG tablet     tamoxifen (NOLVADEX) 20 MG tablet     triamcinolone (KENALOG) 0.1 % cream     triamcinolone 0.1 % EX external cream     VITAMIN D-1000 MAX -1000 MG-UNIT OR TABS     No current facility-administered medications for this visit.      Allergies   Allergen Reactions     Lisinopril Cough       Physical Exam:/85 (BP Location: Left arm)   Pulse 56   Temp 98.1  F (36.7  C) (Oral)   Resp 16   Ht 1.53 m (5' 0.24\")   Wt 63.3 kg (139 lb 9.6 oz)   SpO2 96%   BMI 27.05 kg/m    ECOG PS- 0-1  Constitutional: Alert, cooperative, and in no distress.   ENT: Eyes bright- states she will get cataract surgery to right eye in near future, No mouth sores  Neck: Supple, No " adenopathy.  Cardiac: Heart rate and rhythm is regular and strong without murmur  Respiratory: Breathing easy. Lung sounds clear to auscultation  Breasts:Bilaterally breasts have no new masses, discharge or tenderness.   GI: Abdomen is soft, non-tender, BS normal. No masses or organomegaly  MS: Muscle tone normal, extremities normal with no edema.   Skin: can see previously tested areas of the skin - note that she may be allergic to tape as seen to abdominal   Neuro: Sensory grossly WNL, gait normal.   Lymph: Normal ant/post cervical, axillary, supraclavicular nodes  Psych: Mentation appears normal and affect normal/bright with good conversation.     Laboratory Results:    Results for orders placed or performed in visit on 03/03/20   CBC with platelets     Status: Abnormal   Result Value Ref Range    WBC 4.8 4.0 - 11.0 10e9/L    RBC Count 3.64 (L) 3.8 - 5.2 10e12/L    Hemoglobin 10.9 (L) 11.7 - 15.7 g/dL    Hematocrit 33.7 (L) 35.0 - 47.0 %    MCV 93 78 - 100 fl    MCH 29.9 26.5 - 33.0 pg    MCHC 32.3 31.5 - 36.5 g/dL    RDW 12.9 10.0 - 15.0 %    Platelet Count 182 150 - 450 10e9/L     Assessment and Plan:   Stage 1A Left Breast Cancer-Pt has been using Tamoxifen since 8/2015 and is tolerating well with only note of increased vaginal discharge noted. No new sign of return of her cancer with review, lab or exam.   Pt will return in 6 months with mammogram prior for final review for 5 years of treatment post lumpectomy.   Breast Cancer Screening - On 9/17/2019 she underwent a bilateral digital screening mammography with tomosynthesis. The breast tissue was noted to be heterogeneously dense but benign findings only- we will repeat again in 9/2020..   Compression Fractures- Patient sustained a compression fracture of T7  in July 2015. On T spine MRI there were also old compression fractures of T3, T4 and T8 noted. She was on Fosamax for 10 years and stopped it in 2012 and restarted with fractures.   Squamous caprice skin  cancer- pt is seeing dermatology regularly with recent visit with removal of suspicious lesions. Next review due 3/6/05575 with Dr Barba.   Normocytic anemia - She was noted to have mild anemia in Aug 2016 with Hgb = 11.2 g/dl.  Workup for her anemia was completed and was treated with vitamin B12 and iron. Most recent colonoscopy was 6/16/2017 with no new sign of bleeding or bruising noted. Pt admits she has stopped use of iron and Hgb is decreased=10.9 today. Since she responded previously to oral iron suggested she return to iron 375mg every other day. Recommend she have repeat Hgb and check of iron in 3 months when seeing PCP.   Remote history of TCC of the bladder (in 1993), s/p TURBT   This was a 25 min visit with > 50% in counseling and coordinating care including education and management of concerns.    Regine Esteves,CNP

## 2020-03-05 ENCOUNTER — PREP FOR PROCEDURE (OUTPATIENT)
Dept: OPHTHALMOLOGY | Facility: CLINIC | Age: 85
End: 2020-03-05

## 2020-03-05 ENCOUNTER — HOSPITAL ENCOUNTER (OUTPATIENT)
Facility: AMBULATORY SURGERY CENTER | Age: 85
End: 2020-03-05
Attending: STUDENT IN AN ORGANIZED HEALTH CARE EDUCATION/TRAINING PROGRAM
Payer: COMMERCIAL

## 2020-03-05 ENCOUNTER — TELEPHONE (OUTPATIENT)
Dept: OPHTHALMOLOGY | Facility: CLINIC | Age: 85
End: 2020-03-05

## 2020-03-05 DIAGNOSIS — H25.811 COMBINED FORMS OF AGE-RELATED CATARACT OF RIGHT EYE: ICD-10-CM

## 2020-03-05 DIAGNOSIS — H25.811 COMBINED FORMS OF AGE-RELATED CATARACT OF RIGHT EYE: Primary | ICD-10-CM

## 2020-03-05 NOTE — TELEPHONE ENCOUNTER
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 Garcia  Pre-Op Appt Date: 03/24/2020  Post-Op Appt Date: 04/07/2020   Packet sent out: Yes  Pre-cert/Authorization completed:  No pre cert needed for [employer].   Date: 03/06/2020     Thank you,   Sayra Rene   Referral Department  [phone]

## 2020-03-06 ENCOUNTER — OFFICE VISIT (OUTPATIENT)
Dept: DERMATOLOGY | Facility: CLINIC | Age: 85
End: 2020-03-06
Payer: COMMERCIAL

## 2020-03-06 VITALS
OXYGEN SATURATION: 98 % | SYSTOLIC BLOOD PRESSURE: 130 MMHG | RESPIRATION RATE: 20 BRPM | DIASTOLIC BLOOD PRESSURE: 73 MMHG | HEART RATE: 63 BPM

## 2020-03-06 DIAGNOSIS — D04.62 SQUAMOUS CELL CARCINOMA IN SITU (SCCIS) OF SKIN OF LEFT FOREARM: Primary | ICD-10-CM

## 2020-03-06 PROCEDURE — 11402 EXC TR-EXT B9+MARG 1.1-2 CM: CPT | Mod: GC | Performed by: DERMATOLOGY

## 2020-03-06 PROCEDURE — 88305 TISSUE EXAM BY PATHOLOGIST: CPT | Mod: TC | Performed by: DERMATOLOGY

## 2020-03-06 PROCEDURE — 12032 INTMD RPR S/A/T/EXT 2.6-7.5: CPT | Mod: GC | Performed by: DERMATOLOGY

## 2020-03-06 ASSESSMENT — PAIN SCALES - GENERAL: PAINLEVEL: NO PAIN (0)

## 2020-03-06 NOTE — PATIENT INSTRUCTIONS

## 2020-03-06 NOTE — PROCEDURES
DERMATOLOGIC EXCISION SURGERY PROCEDURE NOTE   Dermatology Problem List:  1. NMSC  -SCCIS, left forearm s/p biopsy 2/18/2020  -SCCis Left superior forearm, s/p: ED and C 9/30/2019  -SCCis right temple, s/p:  Mohs 9/30/2019  -SCCIS, right nasal sidewall, s/p Mohs 4/1/19   -SCCIS, right upper arm, s/p excision 8/9/18   -SCC, left popliteal fossa, well differentiated with focal perineural invasion but with clear margins on path, s/p excision by Dr. Mcgee 7/2013  -SCCIS arising from solar keratosis, right cheek, 4/2013  -SCC, right dorsal hand, s/p excision with SCCIS extending to the margin 1/7/13  -SCC, bowenoid type, upper back, s/p excision 2011  -BCC, superficial, left back, 2/2011  -BCC, superficial, left neck, 2011, elected for ED&C     2. 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 HAK, left mid eyebrow, base not seen, 6/2014  4. Actinic keratosis  -s/p cryotherapy  -left forearm, s/p biopsy 3/15/19   5. GA, right angle of jaw: resolves with triamcinolone cream  6. ISK, mid back  7. SK, right abdomen, s/p biopsy 2/18/2020    NAME OF PROCEDURE: Excision with complex linear closure  Staff surgeon: Dr. Qi Barba MD  Fellow: Dr. Abhilash Mackey MD  Scrub nurse: Esperanza Lozada CMA, Reanna Godinez LPN    PRE-OPERATIVE DIAGNOSIS:  Squamous cell carcinoma  POST-OPERATIVE DIAGNOSIS: Same   LOCATION: left forearm  FINAL EXCISION SIZE(DEFECT SIZE): 1.5 cm  MARGIN: 5 mm  FINAL REPAIR LENGTH: 4.0 cm   ANESTHESIA: 5 ml 1% lidocaine with 1:100,000 epinephrine    INDICATIONS: This patient presented with a 0.5 cm Squamous cell carcinoma.. Excision was indicated.   We discussed the principles of treatment and most likely complications including bleeding, infection, scarring, alteration in skin color and sensation, wound dehiscence,muscle weakness in the area, or recurrence of the lesion or disease. We reviewed that on occasion, after healing, a secondary procedure or revision may be recommended in order to obtain the best  cosmetic or functional result.   PROCEDURE: The patient was taken to the operative suite. Time-out was performed. The treatment area was anesthetized. The area was washed with Hibiclens, rinsed with saline and draped with sterile towels. The lesion was delineated and excised down to deep subcutaneous fat in a fusiform manner. Hemostasis was obtained by electrocoagulation.     REPAIR: A complex layered linear closure was selected as the procedure which would maximally preserve both function and cosmesis and for the following reasons: 1) the defect was widely undermined; 2) multiple deep plication and layered sutures placed; 3) wound size, depth, tension, and location.   After the excision of the tumor, the area was extensively and carefully undermined using blunt Metzenbaum scissors. Hemostasis was obtained with spot electrocautery and ligation of vessels where necessary. An initial deep plication sutures of 4-0 Monocryl sutures  were placed in the deep, subcutaneous and fascial planes to appose the lateral margins.  The subcutaneous and dermal layers were then closed with additional 4-0 Monocryl sutures. The epidermis was then carefully approximated along the length of the wound using 5-0 fast absorbing gut running sutures.    Estimated blood loss was less than 10 ml for all surgical sites. A sterile pressure dressing was applied and wound care instructions, with a written handout, were given. The [facility] and ambulatory.     dermatology clinic with MD in August or September for TBSE.      Dr. Barba staffed the patient and was present for the key portions of the above procedure.     Staff Involved:  Scribe/Staff/Fellow (Demer)  Scribe Disclosure  I, [place] Lewis, am serving as a scribe to document services personally performed by Dr. Qi Barba MD, based on data collection and the provider's statements to me.    Provider Disclosure:   The documentation  recorded by the scribe accurately reflects the services I personally performed and the decisions made by me. I was present for gomez portions.       Qi Barba MD    Department of Dermatology  [facility]: Phone: [phone], Fax:[phone]  [facility]: Phone: [phone], Fax: [phone]

## 2020-03-06 NOTE — LETTER
3/6/2020         RE: Cydney Christiansen  [address]  [place] [address]        Dear Colleague,    Thank you for referring your patient, Cydney Christiansen, to the [facility]. Please see a copy of my visit note below.    See procedure note.     Again, thank you for allowing me to participate in the care of your patient.        Sincerely,        Qi Barba MD

## 2020-03-06 NOTE — NURSING NOTE
Cydney Christiansen's goals for this visit include:   Chief Complaint   Patient presents with     Procedure     excision to left forearm     She requests these members of her care team be copied on today's visit information:     PCP: Estrellita Hernandez    Referring Provider:  No referring provider defined for this encounter.    /73 (BP Location: Right arm, Patient Position: Sitting, Cuff Size: Adult Regular)   Pulse 63   Resp 20   SpO2 98%     Do you need any medication refills at today's visit? No    Reanna Godinez LPN

## 2020-03-10 LAB — COPATH REPORT: NORMAL

## 2020-03-17 DIAGNOSIS — H26.8 PXF (PSEUDOEXFOLIATION OF LENS CAPSULE): ICD-10-CM

## 2020-03-17 RX ORDER — DORZOLAMIDE HYDROCHLORIDE AND TIMOLOL MALEATE 20; 5 MG/ML; MG/ML
1 SOLUTION/ DROPS OPHTHALMIC 2 TIMES DAILY
Qty: 10 ML | Refills: 11 | Status: CANCELLED | OUTPATIENT
Start: 2020-03-17

## 2020-03-17 RX ORDER — LATANOPROST 50 UG/ML
1 SOLUTION/ DROPS OPHTHALMIC AT BEDTIME
Qty: 1 BOTTLE | Refills: 11 | Status: CANCELLED | OUTPATIENT
Start: 2020-03-17

## 2020-03-23 DIAGNOSIS — H26.8 PXF (PSEUDOEXFOLIATION OF LENS CAPSULE): ICD-10-CM

## 2020-03-23 RX ORDER — DORZOLAMIDE HYDROCHLORIDE AND TIMOLOL MALEATE 20; 5 MG/ML; MG/ML
1 SOLUTION/ DROPS OPHTHALMIC 2 TIMES DAILY
Qty: 10 ML | Refills: 3 | Status: SHIPPED | OUTPATIENT
Start: 2020-03-23 | End: 2020-07-24

## 2020-03-23 RX ORDER — LATANOPROST 50 UG/ML
1 SOLUTION/ DROPS OPHTHALMIC AT BEDTIME
Qty: 1 BOTTLE | Refills: 3 | Status: SHIPPED | OUTPATIENT
Start: 2020-03-23 | End: 2020-07-24

## 2020-03-23 NOTE — TELEPHONE ENCOUNTER
Recommend refilling eye drops per last visit note with Dr. Garcia on 9/20/19, Continue Cosopt (dorzolamide-timolol -- dark blue top) twice a day both eyes      Continue Latanoprost (teal top) at bedtime both eyes.

## 2020-04-01 NOTE — TELEPHONE ENCOUNTER
Surgery cancelled due to COVID-19 protocol. Patient will be contacted to reschedule once clearance is given.

## 2020-05-08 DIAGNOSIS — M81.0 OSTEOPOROSIS, UNSPECIFIED OSTEOPOROSIS TYPE, UNSPECIFIED PATHOLOGICAL FRACTURE PRESENCE: ICD-10-CM

## 2020-05-09 RX ORDER — ALENDRONATE SODIUM 70 MG/1
TABLET ORAL
Qty: 12 TABLET | Refills: 0 | Status: SHIPPED | OUTPATIENT
Start: 2020-05-09 | End: 2020-07-14

## 2020-05-10 NOTE — TELEPHONE ENCOUNTER
Prescription approved per [facility] Refill Protocol.  Viktoria Shaw RN

## 2020-06-25 NOTE — TELEPHONE ENCOUNTER
Contact patient about rescheduling surgery. Patient states she would like to wait at this time due to concerns about COVID. Scheduled patient for Complete Exam in July per her request.

## 2020-06-29 ENCOUNTER — TRANSFERRED RECORDS (OUTPATIENT)
Dept: HEALTH INFORMATION MANAGEMENT | Facility: CLINIC | Age: 85
End: 2020-06-29

## 2020-06-29 ENCOUNTER — TELEPHONE (OUTPATIENT)
Dept: FAMILY MEDICINE | Facility: CLINIC | Age: 85
End: 2020-06-29

## 2020-07-14 NOTE — PROGRESS NOTES
"  SUBJECTIVE:   Cydney Christiansen is a 84 year old female who presents for Preventive Visit.    Are you in the first 12 months of your Medicare Part B coverage?  No    Physical Health:    In general, how would you rate your overall physical health? good    Outside of work, how many days during the week do you exercise? 2-3 days/week    Outside of work, approximately how many minutes a day do you exercise?45-60 minutes    If you drink alcohol do you typically have >3 drinks per day or >7 drinks per week? No    Do you usually eat at least 4 servings of fruit and vegetables a day, include whole grains & fiber and avoid regularly eating high fat or \"junk\" foods? Yes    Do you have any problems taking medications regularly?  No    Do you have any side effects from medications? none    Needs assistance for the following daily activities: no assistance needed    Which of the following safety concerns are present in your home?  none identified     Hearing impairment: No    In the past 6 months, have you been bothered by leaking of urine? yes    Mental Health:    In general, how would you rate your overall mental or emotional health? good  PHQ-2 Score:      Do you feel safe in your environment? Yes    Have you ever done Advance Care Planning? (For example, a Health Directive, POLST, or a discussion with a medical provider or your loved ones about your wishes): No, advance care planning information given to patient to review.  Patient plans to discuss their wishes with loved ones or provider.      Additional concerns to address?  none    Fall risk:  Fallen 2 or more times in the past year?: No  Any fall with injury in the past year?: No    Cognitive Screenin) Repeat 3 items (Leader, Season, Table)    2) Clock draw: NORMAL  3) 3 item recall: Recalls 3 objects  Results: 3 items recalled: COGNITIVE IMPAIRMENT LESS LIKELY    Mini-CogTM Copyright CHELLE Pelayo. Licensed by the author for use in [facility]; reprinted " with permission ([email or web address]@[email or web address]). All rights reserved.      Do you have sleep apnea, excessive snoring or daytime drowsiness?: no      Reviewed and updated as needed this visit by clinical staff  Tobacco  Allergies  Meds  Problems  Med Hx  Surg Hx  Fam Hx         Reviewed and updated as needed this visit by Provider  Tobacco  Allergies  Meds  Problems  Med Hx  Surg Hx  Fam Hx        Social History     Tobacco Use     Smoking status: Former Smoker     Packs/day: 0.50     Years: 20.00     Pack years: 10.00     Types: Cigarettes     Last attempt to quit: 1976     Years since quittin.4     Smokeless tobacco: Never Used   Substance Use Topics     Alcohol use: Yes     Comment: rare             Hyperlipidemia Follow-Up      Are you regularly taking any medication or supplement to lower your cholesterol?   Yes- statin    Are you having muscle aches or other side effects that you think could be caused by your cholesterol lowering medication?  No    Hypertension Follow-up      Do you check your blood pressure regularly outside of the clinic? No     Are you following a low salt diet? Yes    Are your blood pressures ever more than 140 on the top number (systolic) OR more   than 90 on the bottom number (diastolic), for example 140/90? No              Pt is doing well on meds  Current providers sharing in care for this patient include:   Patient Care Team:  Estrellita Hernandez MD as PCP - General (Family Practice)  April Mahan RN as Nurse Coordinator (Breast Oncology)  Usha Salgado MD as MD (Hematology & Oncology)  Estrellita Hernandez MD as Assigned PCP    The following health maintenance items are reviewed in Epic and correct as of today:  Health Maintenance   Topic Date Due     ZOSTER IMMUNIZATION (2 of 3) 2008     ADVANCE CARE PLANNING  2016     PHQ-2  2020     EYE EXAM  2020     LIPID  2020     FALL RISK ASSESSMENT  2020     BMP  07/15/2020     INFLUENZA VACCINE (1)  09/01/2020     MEDICARE ANNUAL WELLNESS VISIT  07/15/2021     DTAP/TDAP/TD IMMUNIZATION (2 - Td) 10/05/2022     COLORECTAL CANCER SCREENING  06/29/2023     DEXA  Completed     PNEUMOCOCCAL IMMUNIZATION 65+ LOW/MEDIUM RISK  Completed     IPV IMMUNIZATION  Aged Out     MENINGITIS IMMUNIZATION  Aged Out     HEPATITIS B IMMUNIZATION  Aged Out     Lab work is in process  Labs reviewed in EPIC  BP Readings from Last 3 Encounters:   07/15/20 112/72   03/06/20 130/73   03/03/20 135/85    Wt Readings from Last 3 Encounters:   07/15/20 64.2 kg (141 lb 10.1 oz)   03/03/20 63.3 kg (139 lb 9.6 oz)   09/17/19 63.7 kg (140 lb 8 oz)                  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     Hypertension goal BP (blood pressure) < 140/90     Seborrheic keratosis     Malignant neoplasm of overlapping sites of left female breast (H)     Scoliosis     Compression fracture of thoracic vertebra (H)     Mass of left hand     Primary open angle glaucoma of both eyes, unspecified glaucoma stage     Osteoporosis, unspecified osteoporosis type, unspecified pathological fracture presence     Nuclear sclerosis of left eye     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)     Neoplasm of uncertain behavior of skin     Actinic keratosis     History of nonmelanoma skin cancer     Combined forms of age-related cataract of right eye     Past Surgical History:   Procedure Laterality Date     COLONOSCOPY  5/2008, 5/13, 6/14, 6/17    Q 3 years for advanced adenomatous polyp     CYSTOSCOPY  12/31/2008     D & C        GENITOURINARY SURGERY      TURBT     HC REMOVAL GALLBLADDER      open pan     HC TRABECULOPLASTY BY LASER SURGERY Left 07    SLT #1 OS (inf 180)     HC TRABECULOPLASTY BY LASER SURGERY Right 11    SLT #1 OD (inf 180?)     HC TRABECULOPLASTY BY LASER SURGERY Left 3/17/15    SLT #2 OS (sup 180)     HC TRABECULOPLASTY BY LASER SURGERY Right 6/23/15    SLT #2 OD (sup 180?)     LASER ARGON TREATMENT      SLT left eye x 2     LUMPECTOMY BREAST WITH SENTINEL NODE, COMBINED Left 2015    Procedure: COMBINED LUMPECTOMY BREAST WITH SENTINEL NODE;  Surgeon: Ifeanyi Sunshine MD;  Location: [facility] OR     PHACOEMULSIFICATION CLEAR CORNEA WITH STANDARD INTRAOCULAR LENS IMPLANT Left 2017    Procedure: PHACOEMULSIFICATION CLEAR CORNEA WITH STANDARD INTRAOCULAR LENS IMPLANT;   COMPLEX LEFT PHACOEMULSIFICATION CLEAR CORNEA WITH STANDARD INTRAOCULAR LENS IMPLANT ;  Surgeon: Kvng Garcia MD;  Location:  [facility]     SURGICAL HISTORY OF     squamous cell CA excised from back     TUBAL LIGATION         Social History     Tobacco Use     Smoking status: Former Smoker     Packs/day: 0.50     Years: 20.00     Pack years: 10.00     Types: Cigarettes     Last attempt to quit: 1976     Years since quittin.4     Smokeless tobacco: Never Used   Substance Use Topics     Alcohol use: Yes     Comment: rare     Family History   Problem Relation Age of Onset     Diabetes Mother      Breast Cancer Mother      Eye Disorder Mother      Osteoporosis Mother      Glaucoma Mother      Macular Degeneration Mother      Prostate Cancer Father      Prostate Cancer Brother      Glaucoma Brother      Macular Degeneration Brother      Thyroid Disease Sister      Blood Disease Sister         lupus     Heart Disease Sister      Glaucoma Sister      Asthma Son      Prostate Cancer Brother      Melanoma No family hx of      Skin Cancer No family hx of          Current Outpatient Medications   Medication Sig Dispense Refill     CALCIUM  600 MG OR TABS 1 daily       cyanocobalamin (VITAMIN B-12) 500 MCG tablet Take 1 tablet (500 mcg) by mouth daily 150 tablet 3     dorzolamide-timolol (COSOPT) 2-0.5 % ophthalmic solution Place 1 drop into both eyes 2 times daily 10 mL 3     ferrous sulfate (FEROSUL) 325 (65 Fe) MG tablet Take 1 tablet (325 mg) by mouth daily (with breakfast) 90 tablet 3     ibuprofen (ADVIL,MOTRIN) 200 MG tablet Take 200 mg by mouth every 4 hours as needed.       latanoprost (XALATAN) 0.005 % ophthalmic solution Place 1 drop into both eyes At Bedtime 1 Bottle 3     losartan (COZAAR) 25 MG tablet Take 1 tablet (25 mg) by mouth daily 90 tablet 4     meclizine (ANTIVERT) 25 MG tablet Take 1 tablet (25 mg) by mouth every 6 hours as needed for dizziness 30 tablet 1     Multiple Vitamins-Minerals (ONE DAILY ADULTS 50+) TABS        simvastatin (ZOCOR) 20 MG tablet Take 1 tablet (20 mg) by mouth At Bedtime 90 tablet 3     tamoxifen (NOLVADEX) 20 MG tablet Take 1 tablet (20 mg) by mouth daily 90 tablet 1     triamcinolone (KENALOG) 0.1 % cream Apply to affected area of the face once to twice a day. 15 g 1     triamcinolone 0.1 % EX external cream Apply twice daily for 2 weeks to eczema on hand, take 2 weeks off, can repeat if needed 30 g 0     VITAMIN D-1000 MAX -1000 MG-UNIT OR TABS 1 daily       Allergies   Allergen Reactions     Lisinopril Cough         ROS:  CONSTITUTIONAL: NEGATIVE for fever, chills, change in weight  INTEGUMENTARY/SKIN: NEGATIVE for worrisome rashes, moles or lesions  EYES: NEGATIVE for vision changes or irritation  ENT/MOUTH: NEGATIVE for ear, mouth and throat problems  RESP: NEGATIVE for significant cough or SOB  BREAST: NEGATIVE for masses, tenderness or discharge  CV: NEGATIVE for chest pain, palpitations or peripheral edema  GI: NEGATIVE for nausea, abdominal pain, heartburn, or change in bowel habits  : NEGATIVE for frequency, dysuria, or hematuria  No vaginal Bleeding  MUSCULOSKELETAL: NEGATIVE for  "significant arthralgias or myalgia  NEURO: NEGATIVE for weakness, dizziness or paresthesias  ENDOCRINE: NEGATIVE for temperature intolerance, skin/hair changes  HEME: NEGATIVE for bleeding problems  PSYCHIATRIC: NEGATIVE for changes in mood or affect    OBJECTIVE:   /72   Pulse 75   Temp 97.4  F (36.3  C) (Oral)   Resp 16   Ht 1.53 m (5' 0.25\")   Wt 64.2 kg (141 lb 10.1 oz)   SpO2 98%   Breastfeeding No   BMI 27.43 kg/m   Estimated body mass index is 27.43 kg/m  as calculated from the following:    Height as of this encounter: 1.53 m (5' 0.25\").    Weight as of this encounter: 64.2 kg (141 lb 10.1 oz).  EXAM:   GENERAL APPEARANCE: healthy, alert and no distress  EYES: Eyes grossly normal to inspection, PERRL and conjunctivae and sclerae normal  HENT: ear canals and TM's normal, nose and mouth without ulcers or lesions, oropharynx clear and oral mucous membranes moist  NECK: no adenopathy, no asymmetry, masses, or scars and thyroid normal to palpation  RESP: lungs clear to auscultation - no rales, rhonchi or wheezes  BREAST: normal without masses, tenderness or nipple discharge and no palpable axillary masses or adenopathy  CV: regular rate and rhythm, normal S1 S2, no S3 or S4, no murmur, click or rub, no peripheral edema and peripheral pulses strong  ABDOMEN: soft, nontender, no hepatosplenomegaly, no masses and bowel sounds normal  MS: no musculoskeletal defects are noted and gait is age appropriate without ataxia  SKIN: no suspicious lesions or rashes  NEURO: Normal strength and tone, sensory exam grossly normal, mentation intact and speech normal  PSYCH: mentation appears normal and affect normal/bright    Diagnostic Test Results:  Labs reviewed in Epic  Pending     ASSESSMENT / PLAN:   1. Encounter for Medicare annual wellness exam    - OPHTHALMOLOGY ADULT REFERRAL    2. Osteoporosis, unspecified osteoporosis type, unspecified pathological fracture presence  Pt will give a DRUG holiday for her " "Fosamax  Pt states she has been on this for 5 years  Continue Exercise   Take Vitamin D 1000 units daily with Calcium 1200 mg daily.      3. Benign essential hypertension  Stable   - Lipid panel reflex to direct LDL Fasting  - losartan (COZAAR) 25 MG tablet; Take 1 tablet (25 mg) by mouth daily  Dispense: 90 tablet; Refill: 4    4. Vertigo  occasional    - meclizine (ANTIVERT) 25 MG tablet; Take 1 tablet (25 mg) by mouth every 6 hours as needed for dizziness  Dispense: 30 tablet; Refill: 1    5. Hyperlipidemia LDL goal <160  Labs pending   - simvastatin (ZOCOR) 20 MG tablet; Take 1 tablet (20 mg) by mouth At Bedtime  Dispense: 90 tablet; Refill: 3    6. Malignant neoplasm of overlapping sites of left breast in female, estrogen receptor positive (H)  Sees Oncologu and on tamoxifen    7. 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)  As above      COUNSELING:  Reviewed preventive health counseling, as reflected in patient instructions       Regular exercise       Healthy diet/nutrition       Vision screening       Hearing screening       Dental care       Bladder control       Fall risk prevention       Osteoporosis Prevention/Bone Health       The ASCVD Risk score ([place] DC Jr., et al., 2013) failed to calculate for the following reasons:    The 2013 ASCVD risk score is only valid for ages 40 to 79       Advanced Planning     Estimated body mass index is 27.43 kg/m  as calculated from the following:    Height as of this encounter: 1.53 m (5' 0.25\").    Weight as of this encounter: 64.2 kg (141 lb 10.1 oz).         reports that she quit smoking about 44 years ago. Her smoking use included cigarettes. She has a 10.00 pack-year smoking history. She has never used smokeless tobacco.      Appropriate preventive services were discussed with this patient, including applicable screening as appropriate for cardiovascular disease, diabetes, osteopenia/osteoporosis, and glaucoma.  As appropriate for age/gender, discussed " screening for colorectal cancer, prostate cancer, breast cancer, and cervical cancer. Checklist reviewing preventive services available has been given to the patient.    Reviewed patients plan of care and provided an AVS. The Intermediate Care Plan ( asthma action plan, low back pain action plan, and migraine action plan) for Cydney meets the Care Plan requirement. This Care Plan has been established and reviewed with the Patient.    Counseling Resources:  ATP IV Guidelines  Pooled Cohorts Equation Calculator  Breast Cancer Risk Calculator  FRAX Risk Assessment  ICSI Preventive Guidelines  Dietary Guidelines for Americans, 2010  USDA's MyPlate  ASA Prophylaxis  Lung CA Screening    Estrellita Hernandez MD  [facility]

## 2020-07-15 ENCOUNTER — OFFICE VISIT (OUTPATIENT)
Dept: FAMILY MEDICINE | Facility: CLINIC | Age: 85
End: 2020-07-15
Payer: COMMERCIAL

## 2020-07-15 VITALS
SYSTOLIC BLOOD PRESSURE: 112 MMHG | BODY MASS INDEX: 27.8 KG/M2 | TEMPERATURE: 97.4 F | HEART RATE: 75 BPM | HEIGHT: 60 IN | WEIGHT: 141.63 LBS | OXYGEN SATURATION: 98 % | RESPIRATION RATE: 16 BRPM | DIASTOLIC BLOOD PRESSURE: 72 MMHG

## 2020-07-15 DIAGNOSIS — I10 BENIGN ESSENTIAL HYPERTENSION: ICD-10-CM

## 2020-07-15 DIAGNOSIS — R42 VERTIGO: ICD-10-CM

## 2020-07-15 DIAGNOSIS — E78.5 HYPERLIPIDEMIA LDL GOAL <160: ICD-10-CM

## 2020-07-15 DIAGNOSIS — Z00.00 ENCOUNTER FOR MEDICARE ANNUAL WELLNESS EXAM: Primary | ICD-10-CM

## 2020-07-15 DIAGNOSIS — C50.912 INVASIVE DUCTAL CARCINOMA OF LEFT BREAST (H): ICD-10-CM

## 2020-07-15 DIAGNOSIS — C50.812 MALIGNANT NEOPLASM OF OVERLAPPING SITES OF LEFT BREAST IN FEMALE, ESTROGEN RECEPTOR POSITIVE (H): ICD-10-CM

## 2020-07-15 DIAGNOSIS — M81.0 OSTEOPOROSIS, UNSPECIFIED OSTEOPOROSIS TYPE, UNSPECIFIED PATHOLOGICAL FRACTURE PRESENCE: ICD-10-CM

## 2020-07-15 DIAGNOSIS — Z17.0 MALIGNANT NEOPLASM OF OVERLAPPING SITES OF LEFT BREAST IN FEMALE, ESTROGEN RECEPTOR POSITIVE (H): ICD-10-CM

## 2020-07-15 LAB
ANION GAP SERPL CALCULATED.3IONS-SCNC: 3 MMOL/L (ref 3–14)
BUN SERPL-MCNC: 13 MG/DL (ref 7–30)
CALCIUM SERPL-MCNC: 8.3 MG/DL (ref 8.5–10.1)
CHLORIDE SERPL-SCNC: 107 MMOL/L (ref 94–109)
CHOLEST SERPL-MCNC: 128 MG/DL
CO2 SERPL-SCNC: 28 MMOL/L (ref 20–32)
CREAT SERPL-MCNC: 0.64 MG/DL (ref 0.52–1.04)
GFR SERPL CREATININE-BSD FRML MDRD: 83 ML/MIN/{1.73_M2}
GLUCOSE SERPL-MCNC: 98 MG/DL (ref 70–99)
HDLC SERPL-MCNC: 78 MG/DL
LDLC SERPL CALC-MCNC: 39 MG/DL
NONHDLC SERPL-MCNC: 50 MG/DL
POTASSIUM SERPL-SCNC: 4.6 MMOL/L (ref 3.4–5.3)
SODIUM SERPL-SCNC: 138 MMOL/L (ref 133–144)
TRIGL SERPL-MCNC: 57 MG/DL

## 2020-07-15 PROCEDURE — 80048 BASIC METABOLIC PNL TOTAL CA: CPT | Performed by: FAMILY MEDICINE

## 2020-07-15 PROCEDURE — 36415 COLL VENOUS BLD VENIPUNCTURE: CPT | Performed by: FAMILY MEDICINE

## 2020-07-15 PROCEDURE — 80061 LIPID PANEL: CPT | Performed by: FAMILY MEDICINE

## 2020-07-15 PROCEDURE — 99213 OFFICE O/P EST LOW 20 MIN: CPT | Mod: 25 | Performed by: FAMILY MEDICINE

## 2020-07-15 PROCEDURE — 99397 PER PM REEVAL EST PAT 65+ YR: CPT | Performed by: FAMILY MEDICINE

## 2020-07-15 RX ORDER — ALENDRONATE SODIUM 70 MG/1
TABLET ORAL
Qty: 12 TABLET | Refills: 3 | Status: SHIPPED | OUTPATIENT
Start: 2020-07-15 | End: 2020-07-15

## 2020-07-15 RX ORDER — TAMOXIFEN CITRATE 20 MG/1
20 TABLET ORAL DAILY
Qty: 90 TABLET | Refills: 1 | Status: CANCELLED | OUTPATIENT
Start: 2020-07-15

## 2020-07-15 RX ORDER — LOSARTAN POTASSIUM 25 MG/1
25 TABLET ORAL DAILY
Qty: 90 TABLET | Refills: 4 | Status: SHIPPED | OUTPATIENT
Start: 2020-07-15 | End: 2021-07-21

## 2020-07-15 RX ORDER — MECLIZINE HYDROCHLORIDE 25 MG/1
25 TABLET ORAL EVERY 6 HOURS PRN
Qty: 30 TABLET | Refills: 1 | Status: SHIPPED | OUTPATIENT
Start: 2020-07-15 | End: 2022-01-10

## 2020-07-15 RX ORDER — SIMVASTATIN 20 MG
20 TABLET ORAL AT BEDTIME
Qty: 90 TABLET | Refills: 3 | Status: SHIPPED | OUTPATIENT
Start: 2020-07-15 | End: 2021-07-21

## 2020-07-15 ASSESSMENT — MIFFLIN-ST. JEOR: SCORE: 1027.9

## 2020-07-24 ENCOUNTER — OFFICE VISIT (OUTPATIENT)
Dept: OPHTHALMOLOGY | Facility: CLINIC | Age: 85
End: 2020-07-24
Payer: COMMERCIAL

## 2020-07-24 DIAGNOSIS — H52.4 PRESBYOPIA: ICD-10-CM

## 2020-07-24 DIAGNOSIS — H52.223 REGULAR ASTIGMATISM OF BOTH EYES: ICD-10-CM

## 2020-07-24 DIAGNOSIS — H52.01 HYPERMETROPIA OF RIGHT EYE: ICD-10-CM

## 2020-07-24 DIAGNOSIS — H26.8 PXF (PSEUDOEXFOLIATION OF LENS CAPSULE): Primary | ICD-10-CM

## 2020-07-24 DIAGNOSIS — H52.12 MYOPIA OF LEFT EYE: ICD-10-CM

## 2020-07-24 DIAGNOSIS — H25.811 COMBINED FORMS OF AGE-RELATED CATARACT OF RIGHT EYE: ICD-10-CM

## 2020-07-24 DIAGNOSIS — H40.1431 PSEUDOEXFOLIATIVE GLAUCOMA, BOTH EYES, MILD STAGE: ICD-10-CM

## 2020-07-24 DIAGNOSIS — H43.812 PVD (POSTERIOR VITREOUS DETACHMENT), LEFT: ICD-10-CM

## 2020-07-24 PROCEDURE — 92015 DETERMINE REFRACTIVE STATE: CPT | Performed by: STUDENT IN AN ORGANIZED HEALTH CARE EDUCATION/TRAINING PROGRAM

## 2020-07-24 PROCEDURE — 92014 COMPRE OPH EXAM EST PT 1/>: CPT | Performed by: STUDENT IN AN ORGANIZED HEALTH CARE EDUCATION/TRAINING PROGRAM

## 2020-07-24 RX ORDER — LATANOPROST 50 UG/ML
1 SOLUTION/ DROPS OPHTHALMIC AT BEDTIME
Qty: 3 BOTTLE | Refills: 4 | Status: SHIPPED | OUTPATIENT
Start: 2020-07-24 | End: 2021-10-12

## 2020-07-24 RX ORDER — DORZOLAMIDE HYDROCHLORIDE AND TIMOLOL MALEATE 20; 5 MG/ML; MG/ML
1 SOLUTION/ DROPS OPHTHALMIC 2 TIMES DAILY
Qty: 10 ML | Refills: 3 | Status: SHIPPED | OUTPATIENT
Start: 2020-07-24 | End: 2021-04-13

## 2020-07-24 ASSESSMENT — REFRACTION_MANIFEST
OS_ADD: +3.00
OD_AXIS: 149
OD_ADD: +3.00
OS_SPHERE: -0.75
OS_AXIS: 005
OS_CYLINDER: +0.75
OD_CYLINDER: +1.00
OD_SPHERE: +1.00

## 2020-07-24 ASSESSMENT — TONOMETRY
IOP_METHOD: APPLANATION
OS_IOP_MMHG: 13
OD_IOP_MMHG: 16

## 2020-07-24 ASSESSMENT — CONF VISUAL FIELD
OS_NORMAL: 1
OD_NORMAL: 1

## 2020-07-24 ASSESSMENT — VISUAL ACUITY
CORRECTION_TYPE: GLASSES
METHOD: SNELLEN - LINEAR
OS_CC: 20/25
OD_CC: 20/70
OS_CC+: -2
OD_PH_CC+: -1
OD_PH_CC: 20/40

## 2020-07-24 ASSESSMENT — SLIT LAMP EXAM - LIDS
COMMENTS: NORMAL
COMMENTS: NORMAL

## 2020-07-24 ASSESSMENT — REFRACTION_WEARINGRX
OS_ADD: +3.00
OD_AXIS: 145
SPECS_TYPE: BIFOCAL-LINED
OD_SPHERE: PLANO
OD_CYLINDER: +0.50
OS_AXIS: 179
OD_ADD: +3.25
OS_CYLINDER: +0.50
OS_SPHERE: -1.00

## 2020-07-24 ASSESSMENT — EXTERNAL EXAM - RIGHT EYE: OD_EXAM: NORMAL

## 2020-07-24 ASSESSMENT — CUP TO DISC RATIO
OS_RATIO: 0.5 UD
OD_RATIO: 0.6 UD

## 2020-07-24 ASSESSMENT — EXTERNAL EXAM - LEFT EYE: OS_EXAM: NORMAL

## 2020-07-24 NOTE — LETTER
7/24/2020         RE: Cydney Christiansen  [address]  [place] [address]        Dear Colleague,    Thank you for referring your patient, Cydney Christiansen, to the [facility]. Please see a copy of my visit note below.     Current Eye Medications:  Cosopt both eyes twice a day, Latanoprost both eyes every evening     Subjective:  Here for complete today, was offered cataract surgery right eye last visit. Just wasn't ready to schedule in July yet, so wanted to come in the meantime for an exam. She is thinking she is closer to ready after checking vision. Needs refills sent for drops.      Objective:  See Ophthalmology Exam.       Assessment:  Cydney Christiansen is a 84 year old female who presents with:   Encounter Diagnoses   Name Primary?     PXF (PSEUDOEXFOLIATION OF LENS CAPSULE) OU      Combined forms of age-related cataract of right eye Standing offer for cataract surgery of the right eye at the .       Pseudoexfoliative glaucoma, both eyes, mild stage Intraocular pressure 16/13 today.      PVD (posterior vitreous detachment), left      Hypermetropia of right eye        Myopia of left eye      Regular astigmatism of both eyes      Presbyopia        Plan:  Offered cataract surgery for the right eye at the [facility] if you wish   Call Miguel Angel RUBI @ [phone] to schedule.      Continue Cosopt (dorzolamide-timolol -- dark blue top) twice a day both eyes      Continue Latanoprost (teal top) at bedtime both eyes     Return for glaucoma tests in 3 months     Kvng Garcia MD  [phone]      Again, thank you for allowing me to participate in the care of your patient.        Sincerely,        Kvng Garcia MD

## 2020-07-24 NOTE — PATIENT INSTRUCTIONS
Offered cataract surgery for the right eye at the [facility] if you wish   Call Miguel Angel RUBI @ [phone] to schedule.      Continue Cosopt (dorzolamide-timolol -- dark blue top) twice a day both eyes      Continue Latanoprost (teal top) at bedtime both eyes     Return for glaucoma tests in 3 months     Kvng Garcia MD  [phone]

## 2020-07-24 NOTE — PROGRESS NOTES
Current Eye Medications:  Cosopt both eyes twice a day, Latanoprost both eyes every evening     Subjective:  Here for complete today, was offered cataract surgery right eye last visit. Just wasn't ready to schedule in July yet, so wanted to come in the meantime for an exam. She is thinking she is closer to ready after checking vision. Needs refills sent for drops.      Objective:  See Ophthalmology Exam.       Assessment:  Cydney Christiansen is a 84 year old female who presents with:   Encounter Diagnoses   Name Primary?     PXF (PSEUDOEXFOLIATION OF LENS CAPSULE) OU      Combined forms of age-related cataract of right eye Standing offer for cataract surgery of the right eye at the .       Pseudoexfoliative glaucoma, both eyes, mild stage Intraocular pressure 16/13 today.      PVD (posterior vitreous detachment), left      Hypermetropia of right eye        Myopia of left eye      Regular astigmatism of both eyes      Presbyopia        Plan:  Offered cataract surgery for the right eye at the [facility] if you wish   Call Miguel Angel RUBI @ [phone] to schedule.      Continue Cosopt (dorzolamide-timolol -- dark blue top) twice a day both eyes      Continue Latanoprost (teal top) at bedtime both eyes     Return for glaucoma tests in 3 months     Kvng Garcia MD  [phone]

## 2020-08-20 ENCOUNTER — PREP FOR PROCEDURE (OUTPATIENT)
Dept: OPHTHALMOLOGY | Facility: CLINIC | Age: 85
End: 2020-08-20

## 2020-08-20 ENCOUNTER — TELEPHONE (OUTPATIENT)
Dept: OPHTHALMOLOGY | Facility: CLINIC | Age: 85
End: 2020-08-20

## 2020-08-20 DIAGNOSIS — H25.811 COMBINED FORMS OF AGE-RELATED CATARACT OF RIGHT EYE: Primary | ICD-10-CM

## 2020-08-20 NOTE — TELEPHONE ENCOUNTER
Type of surgery: Cataract Extraction with Intraocular Lens Implant Right Eye  CPT 94325   Combined forms of age-related cataract of right eye H25.811    Location of surgery: [facility]  Date and time of surgery: 10/19/2020 @ 8:00am  Surgeon: Dr. Garcia  Pre-Op Appt Date: 10/12/2020  Post-Op Appt Date: 10/20/2020   Packet sent out: Yes  Pre-cert/Authorization completed:  No prior auth needed for are.   Date: 08/20/2020    Thank you,   Sayra Rene   Prior Authorization [facility]  [phone]

## 2020-08-21 DIAGNOSIS — Z11.59 ENCOUNTER FOR SCREENING FOR OTHER VIRAL DISEASES: Primary | ICD-10-CM

## 2020-09-11 ENCOUNTER — OFFICE VISIT (OUTPATIENT)
Dept: DERMATOLOGY | Facility: CLINIC | Age: 85
End: 2020-09-11
Payer: COMMERCIAL

## 2020-09-11 DIAGNOSIS — L57.0 AK (ACTINIC KERATOSIS): ICD-10-CM

## 2020-09-11 DIAGNOSIS — D48.5 NEOPLASM OF UNCERTAIN BEHAVIOR OF SKIN: Primary | ICD-10-CM

## 2020-09-11 PROCEDURE — 17000 DESTRUCT PREMALG LESION: CPT | Mod: 59 | Performed by: DERMATOLOGY

## 2020-09-11 PROCEDURE — 88305 TISSUE EXAM BY PATHOLOGIST: CPT | Mod: TC | Performed by: DERMATOLOGY

## 2020-09-11 PROCEDURE — 11103 TANGNTL BX SKIN EA SEP/ADDL: CPT | Performed by: DERMATOLOGY

## 2020-09-11 PROCEDURE — 11102 TANGNTL BX SKIN SINGLE LES: CPT | Performed by: DERMATOLOGY

## 2020-09-11 PROCEDURE — 99213 OFFICE O/P EST LOW 20 MIN: CPT | Mod: 25 | Performed by: DERMATOLOGY

## 2020-09-11 PROCEDURE — 88342 IMHCHEM/IMCYTCHM 1ST ANTB: CPT | Mod: TC | Performed by: DERMATOLOGY

## 2020-09-11 ASSESSMENT — PAIN SCALES - GENERAL: PAINLEVEL: NO PAIN (0)

## 2020-09-11 NOTE — PATIENT INSTRUCTIONS
[facility] Dermatology Visit    Thank you for allowing us to participate in your care.    When should I call my doctor?    If you are worsening or not improving, please, contact us or seek urgent care as noted below.     Who should I call with questions (adults)?    [facility] (adult and pediatric): [phone]     [facility] (adult): [phone]    For urgent needs outside of business hours call the [facility] at [phone] and ask for the dermatology resident on call    If this is a medical emergency and you are unable to reach an ER, Call 911      Who should I call with questions (pediatric)?  [facility]- Pediatric Dermatology  Dr. Bette Soto, Dr. Laine Frost, Dr. Fern Keenan, Mariama Uribe, CAPRICE Garza, Dr. Kathy Borja & Dr. Tyrone Vicente  Non Urgent  Nurse Triage Line; [phone]- Carola and Thao FLOYD Care Coordinatormatt Banuelos (/Complex ) [phone]    If you need a prescription refill, please contact your pharmacy. Refills are approved or denied by our Physicians during normal business hours, Monday through Fridays  Per office policy, refills will not be granted if you have not been seen within the past year (or sooner depending on your child's condition)    Scheduling Information:  Pediatric Appointment Scheduling and Call Center [phone]  Radiology Scheduling- [phone]  Sedation Unit Scheduling- [phone]  [facility] Scheduling- General [phone]; Pediatric Dermatology [phone]  Main  Services: [phone]  Bengali: [phone]  Latvian: [phone]  Hmong/Bruneian/Italian: [phone]  Preadmission Nursing Department Fax Number: [phone] (Fax all pre-operative paperwork to this number)    For urgent matters arising during evenings, weekends, or holidays that cannot wait for normal business hours  please call [phone] and ask for the Dermatology Resident On-Call to be paged.          Wound Care After a Biopsy    What is a skin biopsy?  A skin biopsy allows the doctor to examine a very small piece of tissue under the microscope to determine the diagnosis and the best treatment for the skin condition. A local anesthetic (numbing medicine)  is injected with a very small needle into the skin area to be tested. A small piece of skin is taken from the area. Sometimes a suture (stitch) is used.     What are the risks of a skin biopsy?  I will experience scar, bleeding, swelling, pain, crusting and redness. I may experience incomplete removal or recurrence. Risks of this procedure are excessive bleeding, bruising, infection, nerve damage, numbness, thick (hypertrophic or keloidal) scar and non-diagnostic biopsy.    How should I care for my wound for the first 24 hours?    Keep the wound dry and covered for 24 hours    If it bleeds, hold direct pressure on the area for 15 minutes. If bleeding does not stop then go to the emergency room    Avoid strenuous exercise the first 1-2 days or as your doctor instructs you    How should I care for the wound after 24 hours?    After 24 hours, remove the bandage    You may bathe or shower as normal    If you had a scalp biopsy, you can shampoo as usual and can use shower water to clean the biopsy site daily    Clean the wound twice a day with gentle soap and water    Do not scrub, be gentle    Apply white petroleum/Vaseline after cleaning the wound with a cotton swab or a clean finger, and keep the site covered with a Bandaid /bandage. Bandages are not necessary with a scalp biopsy    If you are unable to cover the site with a Bandaid /bandage, re-apply ointment 2-3 times a day to keep the site moist. Moisture will help with healing    Avoid strenuous activity for first 1-2 days    Avoid lakes, rivers, pools, and oceans until the stitches are removed or the site is  healed    How do I clean my wound?    Wash hands thoroughly with soap or use hand  before all wound care    Clean the wound with gentle soap and water    Apply white petroleum/Vaseline  to wound after it is clean    Replace the Bandaid /bandage to keep the wound covered for the first few days or as instructed by your doctor    If you had a scalp biopsy, warm shower water to the area on a daily basis should suffice    What should I use to clean my wound?     Cotton-tipped applicators (Qtips )    White petroleum jelly (Vaseline ). Use a clean new container and use Q-tips to apply.    Bandaids   as needed    Gentle soap     How should I care for my wound long term?    Do not get your wound dirty    Keep up with wound care for one week or until the area is healed.    A small scab will form and fall off by itself when the area is completely healed. The area will be red and will become pink in color as it heals. Sun protection is very important for how your scar will turn out. Sunscreen with an SPF 30 or greater is recommended once the area is healed.    You should have some soreness but it should be mild and slowly go away over several days. Talk to your doctor about using tylenol for pain,    When should I call my doctor?  If you have increased:     Pain or swelling    Pus or drainage (clear or slightly yellow drainage is ok)    Temperature over 100F    Spreading redness or warmth around wound    When will I hear about my results?  The biopsy results can take 2-3 weeks to come back. The clinic will call you with the results, send you a [employer] message, or have you schedule a follow-up clinic or phone time to discuss the results. Contact our clinics if you do not hear from us in 3 weeks.     Who should I call with questions?    [facility]: [phone]     [facility]: [phone]    For urgent needs outside of business hours call the [facility] at [phone] and ask for the dermatology resident on call      Cryotherapy    What is it?    Use of a very cold liquid, such as liquid nitrogen, to freeze and destroy abnormal skin cells that need to be removed    What should I expect?    Tenderness and redness    A small blister that might grow and fill with dark purple blood. There may be crusting.    More than one treatment may be needed if the lesions do not go away.    How do I care for the treated area?    Gently wash the area with your hands when bathing.    Use a thin layer of Vaseline to help with healing. You may use a Band-Aid.     The area should heal within 7-10 days and may leave behind a pink or lighter color.     Do not use an antibiotic or Neosporin ointment.     You may take acetaminophen (Tylenol) for pain.     Call your Doctor if you have:    Severe pain    Signs of infection (warmth, redness, cloudy yellow drainage, and or a bad smell)    Questions or concerns    Who should I call with questions?       [facility]: [phone]       [facility]: [phone]       For urgent needs outside of business hours call the [facility] at [phone]        and ask for the dermatology resident on call

## 2020-09-11 NOTE — LETTER
9/11/2020         RE: Cydney Christiansen  [address]  [place] [address]        Dear Colleague,    Thank you for referring your patient, Cydney Christiansen, to the [facility]. Please see a copy of my visit note below.    [facility] Dermatology Note      Dermatology Problem List:  1. 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 Mcgee 7/2013  -SCCIS arising from solar keratosis, right cheek, 4/2013  -SCC, right dorsal hand, s/p excision with SCCIS extending to the margin 1/7/13  -SCC, bowenoid type, upper back, s/p excision 2011  -BCC, superficial, left back, 2/2011  -BCC, superficial, left neck, 2011, elected for ED&C     2. 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 HAK, left mid eyebrow, base not seen, 6/2014  4. Actinic keratosis  -s/p cryotherapy  -left forearm, s/p biopsy 3/15/19   5. GA, right angle of jaw: resolves with triamcinolone cream  6. SK, right abdomen, s/p biopsy 2/18/2020  7. Eczematous patch R dorsal hand  -previous Tx: triamcinolone 0.01% cream, Vaseline     Encounter Date: Sep 11, 2020    CC:  Chief Complaint   Patient presents with     Derm Problem     Hx NMSC - skin check, no concerns        History of Present Illness:  Ms. Cydney Christiansen is a 84 year old female with a history of multiple NMSC who presents for an annual skin check. The patient was last seen on 3/6/2020 when a SCC on the left forearm was excised. Today, the patient reports one spot on the hand that is rough and has been there for a few weeks. No other concerns.       Past Medical History:   Patient Active Problem List   Diagnosis     History of basal  cell carcinoma     PXF  OU     Personal history of malignant neoplasm of bladder     Advanced directives, counseling/discussion     Hyperlipidemia LDL goal <160     Family history of diabetes mellitus     Health Care Home     Squamous cell carcinoma     Basal cell carcinoma     Skin lesion of left leg     Viral warts     PVD (posterior vitreous detachment), OS     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     Hypertension goal BP (blood pressure) < 140/90     Seborrheic keratosis     Malignant neoplasm of overlapping sites of left female breast (H)     Scoliosis     Compression fracture of thoracic vertebra (H)     Mass of left hand     Primary open angle glaucoma of both eyes, unspecified glaucoma stage     Osteoporosis, unspecified osteoporosis type, unspecified pathological fracture presence     Nuclear sclerosis of left eye     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)     Neoplasm of uncertain behavior of skin     Actinic keratosis     History of nonmelanoma skin cancer     Combined forms of 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    left leg     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     Past Surgical  History:   Procedure Laterality Date     CATARACT IOL, RT/LT       COLONOSCOPY  5/2008, 5/13, 6/14, 6/17    Q 3 years for advanced adenomatous polyp     CYSTOSCOPY  12/31/2008     D & C       GENITOURINARY SURGERY      TURBT     HC REMOVAL GALLBLADDER      open pan     HC TRABECULOPLASTY BY LASER SURGERY Left 4/18/07    SLT #1 OS (inf 180)     HC TRABECULOPLASTY BY LASER SURGERY Right 5/4/11    SLT #1 OD (inf 180?)     HC TRABECULOPLASTY BY LASER SURGERY Left 3/17/15    SLT #2 OS (sup 180)     HC TRABECULOPLASTY BY LASER SURGERY Right 6/23/15    SLT #2 OD (sup 180?)     LASER ARGON TREATMENT      SLT left eye x 2     LUMPECTOMY BREAST WITH SENTINEL NODE, COMBINED Left 7/29/2015    Procedure: COMBINED LUMPECTOMY BREAST WITH SENTINEL NODE;  Surgeon: Ifeanyi Sunshine MD;  Location: [facility]     PHACOEMULSIFICATION CLEAR CORNEA WITH STANDARD INTRAOCULAR LENS IMPLANT Left 12/8/2017    Procedure: PHACOEMULSIFICATION CLEAR CORNEA WITH STANDARD INTRAOCULAR LENS IMPLANT;   COMPLEX LEFT PHACOEMULSIFICATION CLEAR CORNEA WITH STANDARD INTRAOCULAR LENS IMPLANT ;  Surgeon: Kvng Garcia MD;  Location: [facility]     SURGICAL HISTORY OF -   9/11    squamous cell CA excised from back     TUBAL LIGATION         Social History:    [place]. She is this summer.   Kept in chart for convenience.       Family History:  No family history of skin cancer.   Kept in chart for convenience.       Medications:  Current Outpatient Medications   Medication Sig Dispense Refill     CALCIUM 600 MG OR TABS 1 daily       cyanocobalamin (VITAMIN B-12) 500 MCG tablet Take 1 tablet (500 mcg) by mouth daily 150 tablet 3     dorzolamide-timolol (COSOPT) 2-0.5 % ophthalmic solution Place 1 drop into both eyes 2 times daily 10 mL 3     ferrous sulfate (FEROSUL) 325 (65 Fe) MG tablet Take 1 tablet (325 mg) by mouth daily (with breakfast) 90 tablet 3     ibuprofen (ADVIL,MOTRIN) 200 MG tablet Take 200 mg by mouth every 4 hours as needed.       latanoprost  (XALATAN) 0.005 % ophthalmic solution Place 1 drop into both eyes At Bedtime 3 Bottle 4     losartan (COZAAR) 25 MG tablet Take 1 tablet (25 mg) by mouth daily 90 tablet 4     meclizine (ANTIVERT) 25 MG tablet Take 1 tablet (25 mg) by mouth every 6 hours as needed for dizziness 30 tablet 1     Multiple Vitamins-Minerals (ONE DAILY ADULTS 50+) TABS        simvastatin (ZOCOR) 20 MG tablet Take 1 tablet (20 mg) by mouth At Bedtime 90 tablet 3     tamoxifen (NOLVADEX) 20 MG tablet Take 1 tablet (20 mg) by mouth daily 90 tablet 1     triamcinolone (KENALOG) 0.1 % cream Apply to affected area of the face once to twice a day. 15 g 1     triamcinolone 0.1 % EX external cream Apply twice daily for 2 weeks to eczema on hand, take 2 weeks off, can repeat if needed 30 g 0     VITAMIN D-1000 MAX -1000 MG-UNIT OR TABS 1 daily         Allergies   Allergen Reactions     Lisinopril Cough       Review of Systems:  -Constitutional: Otherwise feeling well, in usual state of health.   -Skin: As per HPI, no other concerns.     Physical exam:  Vitals: There were no vitals taken for this visit.  GEN: This is a well-nourished, well developed female in no acute distress  SKIN: Total skin excluding the undergarment areas was performed. The exam included the head/face, neck, both arms, chest, back, abdomen, both legs, digits and/or nails. Including buttocks.   - 8 mm waxy papule with redness on the mid back  - 2.5 cm rough plaque on the right dorsal hand distal  - 1 cm rough plaque on the right dorsal hand proximal  - 7 mm bright red macule on the right upper forearm  - Bright red 4 mm macule on the left 4th digit  - 1 cm scaly red patch left posterior lower leg  -  Irregularly shaped brown papule on the right posterior lower leg, about 1cm  - There are erythematous macules with overlying adherent scale on the right nasal sidewall, left medial cheek, left lateral cheek  -There is a well healed surgical scar without erythema, nodularity  or telangiectasias on the sites of previous skin cancer.   - No other lesions of concern on areas examined.     Impression/Plan:  1.   History of nonmelanoma skin cancer     Recommend sunscreens SPF #30 or greater, protective clothing and avoidance of tanning beds.       2. NUB, 2.5 cm rough plaque on the right dorsal hand distal, DDx SK or SCC or other. Possible recurrence of SCC    Shave biopsy:  After discussion of benefits and risks including but not limited to bleeding/bruising, pain/swelling, infection, scar, incomplete removal, nerve damage/numbness, recurrence, and non-diagnostic biopsy, written consent, verbal consent and photographs were obtained. Time-out was performed. The area was cleaned with isopropyl alcohol. 0.5mL of 1% lidocaine with epinephrine was injected to obtain adequate anesthesia of the lesion on the right dorsal hand distal. A shave biopsy was performed. Hemostasis was achieved with aluminium chloride. Vaseline and a sterile dressing were applied. The patient tolerated the procedure and no complications were noted. The patient was provided with verbal and written post care instructions.     3. NUB, 1 cm rough plaque on the right dorsal hand proximal, DDx SK or SCC or other. Possible recurrence of SCC    Shave biopsy:  After discussion of benefits and risks including but not limited to bleeding/bruising, pain/swelling, infection, scar, incomplete removal, nerve damage/numbness, recurrence, and non-diagnostic biopsy, written consent, verbal consent and photographs were obtained. Time-out was performed. The area was cleaned with isopropyl alcohol. 0.5mL of 1% lidocaine with epinephrine was injected to obtain adequate anesthesia of the lesion on the right dorsal hand proximal. A shave biopsy was performed. Hemostasis was achieved with aluminium chloride. Vaseline and a sterile dressing were applied. The patient tolerated the procedure and no complications were noted. The patient was provided  with verbal and written post care instructions.     4. AK, left medial cheek, left lateral cheek, right nasal sidewall      Plan for Efudex on the left cheek and righ tcheek in the future- today we have many biopsy sites  Cryotherapy procedure note: After verbal consent and discussion of risks and benefits including but no limited to dyspigmentation/scar, blister, and pain, 1 was treated on the right nasal sidewall with 1-2mm freeze border for 2 cycles with liquid nitrogen. Post cryotherapy instructions were provided.     5. NUB, 8 mm waxy papule with redness on the mid back, DDx SK/SCCIS collision    Shave biopsy:  After discussion of benefits and risks including but not limited to bleeding/bruising, pain/swelling, infection, scar, incomplete removal, nerve damage/numbness, recurrence, and non-diagnostic biopsy, written consent, verbal consent and photographs were obtained. Time-out was performed. The area was cleaned with isopropyl alcohol. 0.5mL of 1% lidocaine with epinephrine was injected to obtain adequate anesthesia of the lesion on the mid back. A shave biopsy was performed. Hemostasis was achieved with aluminium chloride. Vaseline and a sterile dressing were applied. The patient tolerated the procedure and no complications were noted. The patient was provided with verbal and written post care instructions.      6. NUB, 7 mm bright red macule on the right upper forearm, DDx SCC    Shave biopsy:  After discussion of benefits and risks including but not limited to bleeding/bruising, pain/swelling, infection, scar, incomplete removal, nerve damage/numbness, recurrence, and non-diagnostic biopsy, written consent, verbal consent and photographs were obtained. Time-out was performed. The area was cleaned with isopropyl alcohol. 0.5mL of 1% lidocaine with epinephrine was injected to obtain adequate anesthesia of the lesion on the right upper forearm. A shave biopsy was performed. Hemostasis was achieved with  aluminium chloride. Vaseline and a sterile dressing were applied. The patient tolerated the procedure and no complications were noted. The patient was provided with verbal and written post care instructions.     7. Bright red 4 mm macule on the left 4th digit- plan for bipos if not resolved    Photodocumentation today    Schedule for biopsy    8. NUB, 1 cm scaly red patch left posterior lower leg, DDx SCC    Schedule for biopsy in the future with Dr. Barba or Dr. Haro    Photodocumentation     9. NUB, Irregularly shaped brown papule on the right posterior lower leg, DDx SK or SCC     Schedule for biopsy in the future with Dr. Barba or Dr. Haro     Photodocumentation         Pending biposy results biopsies on the right and left posterior lower leg, earlier for new or changing lesions.     Staff Involved:  Scribe/Staff     Scribe Disclosure  I, Serena Agustin, am serving as a scribe to document services personally performed by Dr. Qi Barba MD, based on data collection and the provider's statements to me.     Provider Disclosure:   The documentation recorded by the scribe accurately reflects the services I personally performed and the decisions made by me.    Qi Barba MD    Department of Dermatology  [facility]: Phone: [phone], Fax:[phone]  [facility]: Phone: [phone], Fax: [phone]                      Again, thank you for allowing me to participate in the care of your patient.        Sincerely,        Qi Barba MD

## 2020-09-11 NOTE — NURSING NOTE
The following medication was given:     MEDICATION:  Lidocaine with epinephrine 1% 1:[id number]  ROUTE: SQ  SITE: see procedure note  DOSE: 5cc  LOT #: -[id number]  : Amando  EXPIRATION DATE: 02/2021  NDC#: 3662-3404-45   Was there drug waste? 1cc  Multi-dose vial: Yes    Ellie Low LPN  September 11, 2020

## 2020-09-11 NOTE — NURSING NOTE
Cydney Christiansen's goals for this visit include:   Chief Complaint   Patient presents with     Derm Problem     Hx NMSC - skin check, no concerns        She requests these members of her care team be copied on today's visit information: Yes     PCP: Estrellita Hernandez    Referring Provider:  No referring provider defined for this encounter.    There were no vitals taken for this visit.    Do you need any medication refills at today's visit? No   [id number], MEDICAL ASSISTANT

## 2020-09-11 NOTE — PROGRESS NOTES
[facility] Dermatology Note      Dermatology Problem List:  1. 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 Mcgee 7/2013  -SCCIS arising from solar keratosis, right cheek, 4/2013  -SCC, right dorsal hand, s/p excision with SCCIS extending to the margin 1/7/13  -SCC, bowenoid type, upper back, s/p excision 2011  -BCC, superficial, left back, 2/2011  -BCC, superficial, left neck, 2011, elected for ED&C     2. 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 HAK, left mid eyebrow, base not seen, 6/2014  4. Actinic keratosis  -s/p cryotherapy  -left forearm, s/p biopsy 3/15/19   5. GA, right angle of jaw: resolves with triamcinolone cream  6. SK, right abdomen, s/p biopsy 2/18/2020  7. Eczematous patch R dorsal hand  -previous Tx: triamcinolone 0.01% cream, Vaseline     Encounter Date: Sep 11, 2020    CC:  Chief Complaint   Patient presents with     Derm Problem     Hx NMSC - skin check, no concerns        History of Present Illness:  Ms. Cydney Christiansen is a 84 year old female with a history of multiple NMSC who presents for an annual skin check. The patient was last seen on 3/6/2020 when a SCC on the left forearm was excised. Today, the patient reports one spot on the hand that is rough and has been there for a few weeks. No other concerns.       Past Medical History:   Patient Active Problem List   Diagnosis     History of basal cell carcinoma     PXF  OU     Personal history of malignant neoplasm of bladder     Advanced directives, counseling/discussion     Hyperlipidemia LDL goal <160     Family history of diabetes mellitus     Health Care Home     Squamous cell carcinoma      Basal cell carcinoma     Skin lesion of left leg     Viral warts     PVD (posterior vitreous detachment), OS     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     Hypertension goal BP (blood pressure) < 140/90     Seborrheic keratosis     Malignant neoplasm of overlapping sites of left female breast (H)     Scoliosis     Compression fracture of thoracic vertebra (H)     Mass of left hand     Primary open angle glaucoma of both eyes, unspecified glaucoma stage     Osteoporosis, unspecified osteoporosis type, unspecified pathological fracture presence     Nuclear sclerosis of left eye     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)     Neoplasm of uncertain behavior of skin     Actinic keratosis     History of nonmelanoma skin cancer     Combined forms of 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    left leg     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     Past Surgical History:   Procedure Laterality Date     CATARACT IOL, RT/LT       COLONOSCOPY  5/2008, 5/13, 6/14, 6/17    Q 3 years for advanced adenomatous polyp     CYSTOSCOPY  12/31/2008     D & C       GENITOURINARY SURGERY      TURBT     HC REMOVAL GALLBLADDER       open pan     HC TRABECULOPLASTY BY LASER SURGERY Left 4/18/07    SLT #1 OS (inf 180)     HC TRABECULOPLASTY BY LASER SURGERY Right 5/4/11    SLT #1 OD (inf 180?)     HC TRABECULOPLASTY BY LASER SURGERY Left 3/17/15    SLT #2 OS (sup 180)     HC TRABECULOPLASTY BY LASER SURGERY Right 6/23/15    SLT #2 OD (sup 180?)     LASER ARGON TREATMENT      SLT left eye x 2     LUMPECTOMY BREAST WITH SENTINEL NODE, COMBINED Left 7/29/2015    Procedure: COMBINED LUMPECTOMY BREAST WITH SENTINEL NODE;  Surgeon: Ifeanyi Sunshine MD;  Location: [facility] OR     PHACOEMULSIFICATION CLEAR CORNEA WITH STANDARD INTRAOCULAR LENS IMPLANT Left 12/8/2017    Procedure: PHACOEMULSIFICATION CLEAR CORNEA WITH STANDARD INTRAOCULAR LENS IMPLANT;   COMPLEX LEFT PHACOEMULSIFICATION CLEAR CORNEA WITH STANDARD INTRAOCULAR LENS IMPLANT ;  Surgeon: Kvng Garcia MD;  Location: [facility]     SURGICAL HISTORY OF -   9/11    squamous cell CA excised from back     TUBAL LIGATION         Social History:    [place]. She is this summer.   Kept in chart for convenience.       Family History:  No family history of skin cancer.   Kept in chart for convenience.       Medications:  Current Outpatient Medications   Medication Sig Dispense Refill     CALCIUM 600 MG OR TABS 1 daily       cyanocobalamin (VITAMIN B-12) 500 MCG tablet Take 1 tablet (500 mcg) by mouth daily 150 tablet 3     dorzolamide-timolol (COSOPT) 2-0.5 % ophthalmic solution Place 1 drop into both eyes 2 times daily 10 mL 3     ferrous sulfate (FEROSUL) 325 (65 Fe) MG tablet Take 1 tablet (325 mg) by mouth daily (with breakfast) 90 tablet 3     ibuprofen (ADVIL,MOTRIN) 200 MG tablet Take 200 mg by mouth every 4 hours as needed.       latanoprost (XALATAN) 0.005 % ophthalmic solution Place 1 drop into both eyes At Bedtime 3 Bottle 4     losartan (COZAAR) 25 MG tablet Take 1 tablet (25 mg) by mouth daily 90 tablet 4     meclizine (ANTIVERT) 25 MG tablet Take 1 tablet (25 mg) by mouth every 6 hours as  needed for dizziness 30 tablet 1     Multiple Vitamins-Minerals (ONE DAILY ADULTS 50+) TABS        simvastatin (ZOCOR) 20 MG tablet Take 1 tablet (20 mg) by mouth At Bedtime 90 tablet 3     tamoxifen (NOLVADEX) 20 MG tablet Take 1 tablet (20 mg) by mouth daily 90 tablet 1     triamcinolone (KENALOG) 0.1 % cream Apply to affected area of the face once to twice a day. 15 g 1     triamcinolone 0.1 % EX external cream Apply twice daily for 2 weeks to eczema on hand, take 2 weeks off, can repeat if needed 30 g 0     VITAMIN D-1000 MAX -1000 MG-UNIT OR TABS 1 daily         Allergies   Allergen Reactions     Lisinopril Cough       Review of Systems:  -Constitutional: Otherwise feeling well, in usual state of health.   -Skin: As per HPI, no other concerns.     Physical exam:  Vitals: There were no vitals taken for this visit.  GEN: This is a well-nourished, well developed female in no acute distress  SKIN: Total skin excluding the undergarment areas was performed. The exam included the head/face, neck, both arms, chest, back, abdomen, both legs, digits and/or nails. Including buttocks.   - 8 mm waxy papule with redness on the mid back  - 2.5 cm rough plaque on the right dorsal hand distal  - 1 cm rough plaque on the right dorsal hand proximal  - 7 mm bright red macule on the right upper forearm  - Bright red 4 mm macule on the left 4th digit  - 1 cm scaly red patch left posterior lower leg  -  Irregularly shaped brown papule on the right posterior lower leg, about 1cm  - There are erythematous macules with overlying adherent scale on the right nasal sidewall, left medial cheek, left lateral cheek  -There is a well healed surgical scar without erythema, nodularity or telangiectasias on the sites of previous skin cancer.   - No other lesions of concern on areas examined.     Impression/Plan:  1.   History of nonmelanoma skin cancer     Recommend sunscreens SPF #30 or greater, protective clothing and avoidance of tanning  beds.       2. NUB, 2.5 cm rough plaque on the right dorsal hand distal, DDx SK or SCC or other. Possible recurrence of SCC    Shave biopsy:  After discussion of benefits and risks including but not limited to bleeding/bruising, pain/swelling, infection, scar, incomplete removal, nerve damage/numbness, recurrence, and non-diagnostic biopsy, written consent, verbal consent and photographs were obtained. Time-out was performed. The area was cleaned with isopropyl alcohol. 0.5mL of 1% lidocaine with epinephrine was injected to obtain adequate anesthesia of the lesion on the right dorsal hand distal. A shave biopsy was performed. Hemostasis was achieved with aluminium chloride. Vaseline and a sterile dressing were applied. The patient tolerated the procedure and no complications were noted. The patient was provided with verbal and written post care instructions.     3. NUB, 1 cm rough plaque on the right dorsal hand proximal, DDx SK or SCC or other. Possible recurrence of SCC    Shave biopsy:  After discussion of benefits and risks including but not limited to bleeding/bruising, pain/swelling, infection, scar, incomplete removal, nerve damage/numbness, recurrence, and non-diagnostic biopsy, written consent, verbal consent and photographs were obtained. Time-out was performed. The area was cleaned with isopropyl alcohol. 0.5mL of 1% lidocaine with epinephrine was injected to obtain adequate anesthesia of the lesion on the right dorsal hand proximal. A shave biopsy was performed. Hemostasis was achieved with aluminium chloride. Vaseline and a sterile dressing were applied. The patient tolerated the procedure and no complications were noted. The patient was provided with verbal and written post care instructions.     4. AK, left medial cheek, left lateral cheek, right nasal sidewall      Plan for Efudex on the left cheek and righ tcheek in the future- today we have many biopsy sites  Cryotherapy procedure note: After  verbal consent and discussion of risks and benefits including but no limited to dyspigmentation/scar, blister, and pain, 1 was treated on the right nasal sidewall with 1-2mm freeze border for 2 cycles with liquid nitrogen. Post cryotherapy instructions were provided.     5. NUB, 8 mm waxy papule with redness on the mid back, DDx SK/SCCIS collision    Shave biopsy:  After discussion of benefits and risks including but not limited to bleeding/bruising, pain/swelling, infection, scar, incomplete removal, nerve damage/numbness, recurrence, and non-diagnostic biopsy, written consent, verbal consent and photographs were obtained. Time-out was performed. The area was cleaned with isopropyl alcohol. 0.5mL of 1% lidocaine with epinephrine was injected to obtain adequate anesthesia of the lesion on the mid back. A shave biopsy was performed. Hemostasis was achieved with aluminium chloride. Vaseline and a sterile dressing were applied. The patient tolerated the procedure and no complications were noted. The patient was provided with verbal and written post care instructions.      6. NUB, 7 mm bright red macule on the right upper forearm, DDx SCC    Shave biopsy:  After discussion of benefits and risks including but not limited to bleeding/bruising, pain/swelling, infection, scar, incomplete removal, nerve damage/numbness, recurrence, and non-diagnostic biopsy, written consent, verbal consent and photographs were obtained. Time-out was performed. The area was cleaned with isopropyl alcohol. 0.5mL of 1% lidocaine with epinephrine was injected to obtain adequate anesthesia of the lesion on the right upper forearm. A shave biopsy was performed. Hemostasis was achieved with aluminium chloride. Vaseline and a sterile dressing were applied. The patient tolerated the procedure and no complications were noted. The patient was provided with verbal and written post care instructions.     7. Bright red 4 mm macule on the left 4th digit-  plan for bipos if not resolved    Photodocumentation today    Schedule for biopsy    8. NUB, 1 cm scaly red patch left posterior lower leg, DDx SCC    Schedule for biopsy in the future with Dr. Barba or Dr. Haro    Photodocumentation     9. NUB, Irregularly shaped brown papule on the right posterior lower leg, DDx SK or SCC     Schedule for biopsy in the future with Dr. Barba or Dr. Haro     Photodocumentation         Pending biposy results biopsies on the right and left posterior lower leg, earlier for new or changing lesions.     Staff Involved:  Scribe/Staff     Scribe Disclosure  I, Serena Agustin, am serving as a scribe to document services personally performed by Dr. Qi Barba MD, based on data collection and the provider's statements to me.     Provider Disclosure:   The documentation recorded by the scribe accurately reflects the services I personally performed and the decisions made by me.    Qi Barba MD    Department of Dermatology  [facility]: Phone: [phone], Fax:[phone]  [facility]: Phone: [phone], Fax: [phone]

## 2020-09-15 LAB — COPATH REPORT: NORMAL

## 2020-09-18 ENCOUNTER — TELEPHONE (OUTPATIENT)
Dept: DERMATOLOGY | Facility: CLINIC | Age: 85
End: 2020-09-18

## 2020-09-18 ENCOUNTER — DOCUMENTATION ONLY (OUTPATIENT)
Dept: LAB | Facility: CLINIC | Age: 85
End: 2020-09-18

## 2020-09-18 NOTE — TELEPHONE ENCOUNTER
Excision Intake                                                    There were no vitals taken for this visit.    Do you take the following medications:  Coumadin, Eliquis, Pradaxa, Xarelto:   NO  -If on Coumadin, INR should be checked within 7 days of surgery.  Range is 3.5 or less or within therapeutic range.  Antibiotic Prophylaxis:  NO    Do you have an iodine allergy:  NO    Past Medical History                                                    Do you currently or have you previously had any of the following conditions:       HIV/AIDS:  NO    Hepatitis:  NO    Suppressed Immune System:  NO    Autoimmune disorder (eg RA, Lupus):  NO    Fever blisters/herpes, cold sores:  NO    Kidney disease:  NO  Creatinine   Date Value Ref Range Status   07/15/2020 0.64 0.52 - 1.04 mg/dL Final   ]    Pacemaker or Defibrillator:  NO.      History of artificial or heart valve replacement:  NO.      Endocarditis: NO    Organ transplant: NO.      Joint replacement within past 2 years: NO    Previous prosthetic joint infection: N/A    Diabetes: NO    Pregnant:: NO    Tobacco use:  NO.    History   Smoking Status     Former Smoker     Packs/day: 0.50     Years: 20.00     Types: Cigarettes     Quit date: 2/1/1976   Smokeless Tobacco     Never Used     Reviewed by:  GODFREY Meadows Sheryl, LPN   9/18/2020 10:20 AM     I spoke to Cydney Christiansen and gave results. Patient understood and had no further questions or concerns.       Patient scheduled for excision and Ed&C on Nov 5th due to patient having catarac surgery on Oct 19th. Routing to Dr. Haro to review and confirm.       GODFREY Meadows Ronda, MD   9/17/2020 12:42 PM     Recommend efudex (lower cure rate) or ED and C on back for superficial BCC. She needs an excision on her right upper forearm for SCCIS.  (so 2 skin cancers here)       Her hand spots are normal           Skin exam in 6 months.

## 2020-09-22 ENCOUNTER — ANCILLARY PROCEDURE (OUTPATIENT)
Dept: MAMMOGRAPHY | Facility: CLINIC | Age: 85
End: 2020-09-22
Attending: INTERNAL MEDICINE
Payer: COMMERCIAL

## 2020-09-22 ENCOUNTER — ONCOLOGY VISIT (OUTPATIENT)
Dept: ONCOLOGY | Facility: CLINIC | Age: 85
End: 2020-09-22
Attending: NURSE PRACTITIONER
Payer: COMMERCIAL

## 2020-09-22 VITALS
BODY MASS INDEX: 28.23 KG/M2 | HEART RATE: 63 BPM | WEIGHT: 143.8 LBS | DIASTOLIC BLOOD PRESSURE: 81 MMHG | TEMPERATURE: 97.6 F | OXYGEN SATURATION: 99 % | SYSTOLIC BLOOD PRESSURE: 133 MMHG | HEIGHT: 60 IN

## 2020-09-22 DIAGNOSIS — C50.812 MALIGNANT NEOPLASM OF OVERLAPPING SITES OF LEFT BREAST IN FEMALE, ESTROGEN RECEPTOR POSITIVE (H): ICD-10-CM

## 2020-09-22 DIAGNOSIS — D50.9 IRON DEFICIENCY ANEMIA, UNSPECIFIED IRON DEFICIENCY ANEMIA TYPE: ICD-10-CM

## 2020-09-22 DIAGNOSIS — C50.912 INVASIVE DUCTAL CARCINOMA OF LEFT BREAST (H): ICD-10-CM

## 2020-09-22 DIAGNOSIS — Z85.51 PERSONAL HISTORY OF MALIGNANT NEOPLASM OF BLADDER: ICD-10-CM

## 2020-09-22 DIAGNOSIS — D04.70 SQUAMOUS CELL CARCINOMA IN SITU OF SKIN OF LOWER LEG, UNSPECIFIED LATERALITY: ICD-10-CM

## 2020-09-22 DIAGNOSIS — Z12.31 VISIT FOR SCREENING MAMMOGRAM: ICD-10-CM

## 2020-09-22 DIAGNOSIS — C50.812 MALIGNANT NEOPLASM OF OVERLAPPING SITES OF LEFT BREAST IN FEMALE, ESTROGEN RECEPTOR POSITIVE (H): Primary | ICD-10-CM

## 2020-09-22 DIAGNOSIS — Z17.0 MALIGNANT NEOPLASM OF OVERLAPPING SITES OF LEFT BREAST IN FEMALE, ESTROGEN RECEPTOR POSITIVE (H): ICD-10-CM

## 2020-09-22 DIAGNOSIS — Z12.31 ENCOUNTER FOR SCREENING MAMMOGRAM FOR BREAST CANCER: ICD-10-CM

## 2020-09-22 DIAGNOSIS — Z17.0 MALIGNANT NEOPLASM OF OVERLAPPING SITES OF LEFT BREAST IN FEMALE, ESTROGEN RECEPTOR POSITIVE (H): Primary | ICD-10-CM

## 2020-09-22 LAB
ALBUMIN SERPL-MCNC: 3.4 G/DL (ref 3.4–5)
ALP SERPL-CCNC: 47 U/L (ref 40–150)
ALT SERPL W P-5'-P-CCNC: 21 U/L (ref 0–50)
ANION GAP SERPL CALCULATED.3IONS-SCNC: 3 MMOL/L (ref 3–14)
AST SERPL W P-5'-P-CCNC: 42 U/L (ref 0–45)
BASOPHILS # BLD AUTO: 0.1 10E9/L (ref 0–0.2)
BASOPHILS NFR BLD AUTO: 1.3 %
BILIRUB SERPL-MCNC: 0.2 MG/DL (ref 0.2–1.3)
BUN SERPL-MCNC: 19 MG/DL (ref 7–30)
CALCIUM SERPL-MCNC: 8.7 MG/DL (ref 8.5–10.1)
CHLORIDE SERPL-SCNC: 108 MMOL/L (ref 94–109)
CO2 SERPL-SCNC: 28 MMOL/L (ref 20–32)
CREAT SERPL-MCNC: 0.7 MG/DL (ref 0.52–1.04)
DIFFERENTIAL METHOD BLD: ABNORMAL
EOSINOPHIL # BLD AUTO: 0.2 10E9/L (ref 0–0.7)
EOSINOPHIL NFR BLD AUTO: 3.3 %
ERYTHROCYTE [DISTWIDTH] IN BLOOD BY AUTOMATED COUNT: 13.1 % (ref 10–15)
GFR SERPL CREATININE-BSD FRML MDRD: 80 ML/MIN/{1.73_M2}
GLUCOSE SERPL-MCNC: 99 MG/DL (ref 70–99)
HCT VFR BLD AUTO: 33.3 % (ref 35–47)
HGB BLD-MCNC: 10.7 G/DL (ref 11.7–15.7)
IMM GRANULOCYTES # BLD: 0 10E9/L (ref 0–0.4)
IMM GRANULOCYTES NFR BLD: 0.2 %
LYMPHOCYTES # BLD AUTO: 1.7 10E9/L (ref 0.8–5.3)
LYMPHOCYTES NFR BLD AUTO: 27.8 %
MCH RBC QN AUTO: 30 PG (ref 26.5–33)
MCHC RBC AUTO-ENTMCNC: 32.1 G/DL (ref 31.5–36.5)
MCV RBC AUTO: 93 FL (ref 78–100)
MONOCYTES # BLD AUTO: 0.6 10E9/L (ref 0–1.3)
MONOCYTES NFR BLD AUTO: 9.3 %
NEUTROPHILS # BLD AUTO: 3.5 10E9/L (ref 1.6–8.3)
NEUTROPHILS NFR BLD AUTO: 58.1 %
PLATELET # BLD AUTO: 189 10E9/L (ref 150–450)
POTASSIUM SERPL-SCNC: 4.1 MMOL/L (ref 3.4–5.3)
PROT SERPL-MCNC: 6.4 G/DL (ref 6.8–8.8)
RBC # BLD AUTO: 3.57 10E12/L (ref 3.8–5.2)
SODIUM SERPL-SCNC: 139 MMOL/L (ref 133–144)
WBC # BLD AUTO: 6.1 10E9/L (ref 4–11)

## 2020-09-22 PROCEDURE — 85025 COMPLETE CBC W/AUTO DIFF WBC: CPT | Performed by: NURSE PRACTITIONER

## 2020-09-22 PROCEDURE — 77067 SCR MAMMO BI INCL CAD: CPT | Performed by: RADIOLOGY

## 2020-09-22 PROCEDURE — 80053 COMPREHEN METABOLIC PANEL: CPT | Performed by: NURSE PRACTITIONER

## 2020-09-22 PROCEDURE — 99214 OFFICE O/P EST MOD 30 MIN: CPT | Performed by: NURSE PRACTITIONER

## 2020-09-22 PROCEDURE — 36415 COLL VENOUS BLD VENIPUNCTURE: CPT | Performed by: NURSE PRACTITIONER

## 2020-09-22 PROCEDURE — 77063 BREAST TOMOSYNTHESIS BI: CPT | Performed by: RADIOLOGY

## 2020-09-22 ASSESSMENT — MIFFLIN-ST. JEOR: SCORE: 1037.74

## 2020-09-22 ASSESSMENT — PAIN SCALES - GENERAL: PAINLEVEL: NO PAIN (0)

## 2020-09-22 NOTE — TELEPHONE ENCOUNTER
"Dr. Haro reviewed case.  \"My treatment recommendation is ED&C for the right upper forearm (path report states narrowly excised with the biopsy) and ED&C or Excision for the mid back. Excision may heal faster if she will have a hard time doing bandage changes on her back (could consider Duoderm).\"    I left a message for patient to call [facility].  No changes to date/time of procedure, just notify patient of his treatment recommendations.  Appointment note updated.  Quin Webber RN    "

## 2020-09-22 NOTE — NURSING NOTE
"Oncology Rooming Note    September 22, 2020 2:46 PM   Cydney Christiansen is a 84 year old female who presents for:    Chief Complaint   Patient presents with     Oncology Clinic Visit     follow up     Initial Vitals: /81 (BP Location: Right arm, Patient Position: Chair, Cuff Size: Adult Regular)   Pulse 63   Temp 97.6  F (36.4  C) (Oral)   Ht 1.53 m (5' 0.25\")   Wt 65.2 kg (143 lb 12.8 oz)   SpO2 99%   BMI 27.85 kg/m   Estimated body mass index is 27.85 kg/m  as calculated from the following:    Height as of this encounter: 1.53 m (5' 0.25\").    Weight as of this encounter: 65.2 kg (143 lb 12.8 oz). Body surface area is 1.66 meters squared.  No Pain (0) Comment: Data Unavailable   No LMP recorded. Patient is postmenopausal.  Allergies reviewed: Yes  Medications reviewed: Yes    Medications: Medication refills not needed today.  Pharmacy name entered into [employer]: [id number] - [place], [place] - [address]    Clinical concerns: NO Regine was notified.      Mariam Burton CMA              "

## 2020-09-22 NOTE — LETTER
9/22/2020         RE: Cydney Christiansen  [address]  [place] [address]        Dear Colleague,    Thank you for referring your patient, Cydney Christiansen, to the [facility]. Please see a copy of my visit note below.    Oncology Follow Up Visit: September 22, 2020     Oncologist: Dr Usha Salgado  PCP: Estrellita Hernadnez    Diagnosis:  Stage 1A Left Breast Cancer  Cydney Christiansen is an 83 yo female who had screening mammogram on 5/29/2015, which demonstrated a 9 mm lesion around the 1 o'clock position in the left breast. Core needle biopsy in [facility] demonstrated invasive ductal cancer, grade 2, estrogen receptor positive (99%, strong) and progesterone receptor positive (91%, strong) by immunohistochemistry and HER2 not amplified by FISH (ratio 1.0).   Final pathology from surgery pathology showed a 10 mm invasive ductal carcinoma, [place] grade 2. No associated DCIS. Closest surgical margin was 3 mm from the anterior margin. There was no lymphovascular invasion and all 3 sentinel lymph nodes were negative for malignancy.   She was not recommended in favor of adjuvant radiation or chemotherapy.  Due to history of compression fracture felt to be osteoporotic fractures, Tamoxifen was chosen over an AI.   Treatment:   7/29/2015 L lumpectomy and axillary  SLN biopsy  8/24/2015 Began Tamoxifen    Interval History: Ms. Christiansen comes to clinic for follow up to her use of Tamoxifen for her breast cancer and anemia. Pt patient shares she has been feeling very good and is continued on with the tamoxifen now meeting the five-year requirement with minimal if any side effects.  She also continues to take her iron tablet every other day and states she is not having significant fatigue but also relates some anxiety about not being able to get out for her usual activities such [place] [facility] [employer] or bowling but was able to get out for some golfing this summer.  She reports that she  will be having her second cataract surgery coming up in October and completed her mammogram today before visit.  Rest of comprehensive and complete ROS is reviewed and is negative.   Past Medical History:   Diagnosis Date     Actinic keratosis      Basal cell cancer 02/2011    bcc of the L back.     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    left leg     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     Current Outpatient Medications   Medication     CALCIUM 600 MG OR TABS     cyanocobalamin (VITAMIN B-12) 500 MCG tablet     dorzolamide-timolol (COSOPT) 2-0.5 % ophthalmic solution     ferrous sulfate (FEROSUL) 325 (65 Fe) MG tablet     ibuprofen (ADVIL,MOTRIN) 200 MG tablet     latanoprost (XALATAN) 0.005 % ophthalmic solution     losartan (COZAAR) 25 MG tablet     meclizine (ANTIVERT) 25 MG tablet     Multiple Vitamins-Minerals (ONE DAILY ADULTS 50+) TABS     simvastatin (ZOCOR) 20 MG tablet     tamoxifen (NOLVADEX) 20 MG tablet     triamcinolone (KENALOG) 0.1 % cream     triamcinolone 0.1 % EX external cream     VITAMIN D-1000 MAX -1000 MG-UNIT OR TABS     No current facility-administered medications for this visit.      Allergies   Allergen Reactions     Lisinopril Cough       Physical Exam:/81 (BP Location: Right arm, Patient Position: Chair, Cuff Size: Adult Regular)  "  Pulse 63   Temp 97.6  F (36.4  C) (Oral)   Ht 1.53 m (5' 0.25\")   Wt 65.2 kg (143 lb 12.8 oz)   SpO2 99%   BMI 27.85 kg/m     Constitutional: Alert, cooperative, and in no distress.   ENT: Eyes bright-reactive to light  Neck: Supple, No adenopathy.  Cardiac: Heart rate and rhythm is regular and strong without murmur  Respiratory: Breathing easy. Lung sounds clear to auscultation  Breasts:Bilaterally breasts have no new masses, discharge or tenderness.   GI: Abdomen is soft, non-tender, BS normal. No masses or organomegaly  MS: Muscle tone normal, extremities normal with no edema.   Skin: Seeing that patient has several healing areas to arms and back and reports she did see the dermatologist recently and did have several biopsies taken with 2 found to be a squamous cell cancer.  Neuro: Sensory grossly WNL, gait normal.   Lymph: Normal ant/post cervical, axillary, supraclavicular nodes  Psych: Mentation appears normal and affect normal/bright with good conversation.     Laboratory Results:    Results for orders placed or performed in visit on 09/22/20   MA Screen Bilateral w/Jameson     Status: None    Narrative    Examination:  MA SCREENING BILATERAL W/ JAMESON, COMPUTER AIDED DETECTION  9/22/2020 1:06 PM    History/Family History: No current or new breast symptoms, screening.   History of treated left breast cancer in 2015. Family history for  breast cancer in first-degree relative, diagnosed at age 38.     Breast Density: Heterogeneously dense.  Technique: Standard mammographic views were performed with  tomosynthesis and 2D reconstruction.    Comparison: 9/17/2019, 9/10/2018, 8/28/2017    Findings:  There has been no significant interval change or suspicious findings.      Impression    Impression: BI-RADS CATEGORY: 1 -  Negative.    Recommended Follow-up: Annual Mammography.    The results of the examination will be sent to the patient.    BEAN SEBASTIAN MD     Assessment and Plan:   Stage 1A Left Breast Cancer-Pt " has no new symptoms of concern relating to her breast or chest and the cancer diagnosed in 2015.  She has been using Tamoxifen since 8/2015 and is now met the minimum requirement for 5 years of treatment and will finish her current prescription and discontinue use.  She will be dismissed from oncology service at this time but would recommend that she continue to keep up screening with a yearly mammogram even though she is greater than 80 years of age since she did have breast cancer it is recommended to continue with screening at this time.  Breast Cancer Screening -bilateral mammogram taken today was found to be without concern-repeat 1 year  Squamous caprice skin cancer- pt is seeing dermatology regularly with recent visit with removal of suspicious lesions.  No sign of infection with recent biopsies.  She will follow-up with dermatology for new findings.  Normocytic anemia - Workup for anemia was completed in 8/20/2016 and found to be a microcytic anemia and was treated with daily oral iron and [id number] patient continues on iron supplement taken orally every other day with a very stable hemoglobin equaling 10.7 today.  Highly recommend that she follows up with PCP yearly with a blood count for follow-up for the anemia and share with her primary care if she becomes significantly tired or showing any other signs of anemia as reviewed today.  This was a 25 min visit with > 50% in counseling and coordinating care including education and management of concerns.    Regine Esteves CNP      Again, thank you for allowing me to participate in the care of your patient.        Sincerely,        Regine Esteves NP, APRN CNP

## 2020-09-22 NOTE — TELEPHONE ENCOUNTER
I spoke with Cydney and updated her on the treatment plan.  She verbalized understanding.  Quin Webber RN

## 2020-09-22 NOTE — PROGRESS NOTES
Oncology Follow Up Visit: September 22, 2020     Oncologist: Dr Usha Salgado  PCP: Estrellita Hernandez    Diagnosis:  Stage 1A Left Breast Cancer  Cydney Christiansen is an 85 yo female who had screening mammogram on 5/29/2015, which demonstrated a 9 mm lesion around the 1 o'clock position in the left breast. Core needle biopsy in [facility] demonstrated invasive ductal cancer, grade 2, estrogen receptor positive (99%, strong) and progesterone receptor positive (91%, strong) by immunohistochemistry and HER2 not amplified by FISH (ratio 1.0).   Final pathology from surgery pathology showed a 10 mm invasive ductal carcinoma, [place] grade 2. No associated DCIS. Closest surgical margin was 3 mm from the anterior margin. There was no lymphovascular invasion and all 3 sentinel lymph nodes were negative for malignancy.   She was not recommended in favor of adjuvant radiation or chemotherapy.  Due to history of compression fracture felt to be osteoporotic fractures, Tamoxifen was chosen over an AI.   Treatment:   7/29/2015 L lumpectomy and axillary  SLN biopsy  8/24/2015 Began Tamoxifen    Interval History: Ms. Christiansen comes to clinic for follow up to her use of Tamoxifen for her breast cancer and anemia. Pt patient shares she has been feeling very good and is continued on with the tamoxifen now meeting the five-year requirement with minimal if any side effects.  She also continues to take her iron tablet every other day and states she is not having significant fatigue but also relates some anxiety about not being able to get out for her usual activities [place] [facility] [employer] or [place] but was able to get out for some golfing this summer.  She reports that she will be having her second cataract surgery coming up in October and completed her mammogram today before visit.  Rest of comprehensive and complete ROS is reviewed and is negative.   Past Medical History:   Diagnosis Date     Actinic keratosis       "Basal cell cancer 02/2011    bcc of the L back.     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    left leg     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     Current Outpatient Medications   Medication     CALCIUM 600 MG OR TABS     cyanocobalamin (VITAMIN B-12) 500 MCG tablet     dorzolamide-timolol (COSOPT) 2-0.5 % ophthalmic solution     ferrous sulfate (FEROSUL) 325 (65 Fe) MG tablet     ibuprofen (ADVIL,MOTRIN) 200 MG tablet     latanoprost (XALATAN) 0.005 % ophthalmic solution     losartan (COZAAR) 25 MG tablet     meclizine (ANTIVERT) 25 MG tablet     Multiple Vitamins-Minerals (ONE DAILY ADULTS 50+) TABS     simvastatin (ZOCOR) 20 MG tablet     tamoxifen (NOLVADEX) 20 MG tablet     triamcinolone (KENALOG) 0.1 % cream     triamcinolone 0.1 % EX external cream     VITAMIN D-1000 MAX -1000 MG-UNIT OR TABS     No current facility-administered medications for this visit.      Allergies   Allergen Reactions     Lisinopril Cough       Physical Exam:/81 (BP Location: Right arm, Patient Position: Chair, Cuff Size: Adult Regular)   Pulse 63   Temp 97.6  F (36.4  C) (Oral)   Ht 1.53 m (5' 0.25\")   Wt 65.2 kg (143 lb 12.8 oz)   SpO2 99%   BMI 27.85 kg/m     Constitutional: Alert, cooperative, and in no distress.   ENT: Eyes bright-reactive to light  Neck: Supple, No " adenopathy.  Cardiac: Heart rate and rhythm is regular and strong without murmur  Respiratory: Breathing easy. Lung sounds clear to auscultation  Breasts:Bilaterally breasts have no new masses, discharge or tenderness.   GI: Abdomen is soft, non-tender, BS normal. No masses or organomegaly  MS: Muscle tone normal, extremities normal with no edema.   Skin: Seeing that patient has several healing areas to arms and back and reports she did see the dermatologist recently and did have several biopsies taken with 2 found to be a squamous cell cancer.  Neuro: Sensory grossly WNL, gait normal.   Lymph: Normal ant/post cervical, axillary, supraclavicular nodes  Psych: Mentation appears normal and affect normal/bright with good conversation.     Laboratory Results:    Results for orders placed or performed in visit on 09/22/20   MA Screen Bilateral w/Jameson     Status: None    Narrative    Examination:  MA SCREENING BILATERAL W/ JAMESON, COMPUTER AIDED DETECTION  9/22/2020 1:06 PM    History/Family History: No current or new breast symptoms, screening.   History of treated left breast cancer in 2015. Family history for  breast cancer in first-degree relative, diagnosed at age 38.     Breast Density: Heterogeneously dense.  Technique: Standard mammographic views were performed with  tomosynthesis and 2D reconstruction.    Comparison: 9/17/2019, 9/10/2018, 8/28/2017    Findings:  There has been no significant interval change or suspicious findings.      Impression    Impression: BI-RADS CATEGORY: 1 -  Negative.    Recommended Follow-up: Annual Mammography.    The results of the examination will be sent to the patient.    AN MD ROMULO     Assessment and Plan:   Stage 1A Left Breast Cancer-Pt has no new symptoms of concern relating to her breast or chest and the cancer diagnosed in 2015.  She has been using Tamoxifen since 8/2015 and is now met the minimum requirement for 5 years of treatment and will finish her current prescription  and discontinue use.  She will be dismissed from oncology service at this time but would recommend that she continue to keep up screening with a yearly mammogram even though she is greater than 80 years of age since she did have breast cancer it is recommended to continue with screening at this time.  Breast Cancer Screening -bilateral mammogram taken today was found to be without concern-repeat 1 year  Squamous caprice skin cancer- pt is seeing dermatology regularly with recent visit with removal of suspicious lesions.  No sign of infection with recent biopsies.  She will follow-up with dermatology for new findings.  Normocytic anemia - Workup for anemia was completed in 8/20/2016 and found to be a microcytic anemia and was treated with daily oral iron and [id number] patient continues on iron supplement taken orally every other day with a very stable hemoglobin equaling 10.7 today.  Highly recommend that she follows up with PCP yearly with a blood count for follow-up for the anemia and share with her primary care if she becomes significantly tired or showing any other signs of anemia as reviewed today.  This was a 25 min visit with > 50% in counseling and coordinating care including education and management of concerns.    Regine Esteves,CNP

## 2020-10-05 ENCOUNTER — OFFICE VISIT (OUTPATIENT)
Dept: OPHTHALMOLOGY | Facility: CLINIC | Age: 85
End: 2020-10-05
Payer: COMMERCIAL

## 2020-10-05 DIAGNOSIS — H25.811 COMBINED FORMS OF AGE-RELATED CATARACT OF RIGHT EYE: Primary | ICD-10-CM

## 2020-10-05 DIAGNOSIS — H26.8 PXF (PSEUDOEXFOLIATION OF LENS CAPSULE): ICD-10-CM

## 2020-10-05 DIAGNOSIS — H43.812 PVD (POSTERIOR VITREOUS DETACHMENT), LEFT: ICD-10-CM

## 2020-10-05 DIAGNOSIS — Z96.1 PSEUDOPHAKIA OF LEFT EYE: ICD-10-CM

## 2020-10-05 DIAGNOSIS — H40.1431 PSEUDOEXFOLIATIVE GLAUCOMA, BOTH EYES, MILD STAGE: ICD-10-CM

## 2020-10-05 PROCEDURE — 92012 INTRM OPH EXAM EST PATIENT: CPT | Performed by: STUDENT IN AN ORGANIZED HEALTH CARE EDUCATION/TRAINING PROGRAM

## 2020-10-05 RX ORDER — DEXAMETHASONE ACETATE, MICRO 100 %
1 POWDER (GRAM) MISCELLANEOUS 4 TIMES DAILY
Qty: 1 BOTTLE | Refills: 0 | Status: CANCELLED | OUTPATIENT
Start: 2020-10-18

## 2020-10-05 RX ORDER — MONOCHLOROACETIC ACID
1 CRYSTALS MISCELLANEOUS 4 TIMES DAILY
Qty: 1 BOTTLE | Refills: 0 | Status: SHIPPED | OUTPATIENT
Start: 2020-10-18 | End: 2020-11-23

## 2020-10-05 RX ORDER — DEXAMETHASONE ACETATE, MICRO 100 %
1 POWDER (GRAM) MISCELLANEOUS 4 TIMES DAILY
Qty: 1 BOTTLE | Refills: 0 | Status: SHIPPED | OUTPATIENT
Start: 2020-10-05 | End: 2020-11-23

## 2020-10-05 RX ORDER — BROMFENAC SODIUM 100 %
1 POWDER (GRAM) MISCELLANEOUS 4 TIMES DAILY
Qty: 1 BOTTLE | Refills: 0 | Status: SHIPPED | OUTPATIENT
Start: 2020-10-18 | End: 2020-11-23

## 2020-10-05 ASSESSMENT — VISUAL ACUITY
CORRECTION_TYPE: GLASSES
METHOD: SNELLEN - LINEAR
OD_CC: 20/60
OS_CC+: -1
OD_CC+: -2
OS_CC: 20/25

## 2020-10-05 ASSESSMENT — SLIT LAMP EXAM - LIDS
COMMENTS: NORMAL
COMMENTS: NORMAL

## 2020-10-05 ASSESSMENT — EXTERNAL EXAM - RIGHT EYE: OD_EXAM: NORMAL

## 2020-10-05 ASSESSMENT — EXTERNAL EXAM - LEFT EYE: OS_EXAM: NORMAL

## 2020-10-05 NOTE — PATIENT INSTRUCTIONS
PRE-OP CATARACT INSTRUCTIONS    *Use the following drops in the right eye 4 times on the day before surgery and once the morning of surgery:                                   CatarActive3    *Please bring all your eyedrops to the surgery center.    *Continue using Cosopt (dorzolamide-timolol -- dark blue top) both eyes twice daily, and Latanoprost (green top) both eyes at bedtime.     *If taking more than one drop, wait five minutes between drops.    *No solid food or drink after midnight. Please take the starred medications (see attached sheets) with a small sip of water the morning of surgery.    *If you are diabetic, please do not take any diabetic medications the morning of surgery.    Kvng Garcia MD  [phone]

## 2020-10-05 NOTE — LETTER
10/5/2020         RE: Cydney Christiansen  [address]  [place] [address]        Dear Colleague,    Thank you for referring your patient, Cydney Christiansen, to the [facility]. Please see a copy of my visit note below.     Current Eye Medications:  CoSopt bid both eyes (8am), Latanoprost every evening ou (10pm)     Subjective: Preop Kelman Phacoemulsification intraocular lens right eye.  Patient wants to proceed with surgery on the right eye for cataract.     Objective:  See Ophthalmology Exam.      Assessment:  Cydney Christiansen is a 84 year old female who presents with:     Combined forms of age-related cataract of right eye      Pseudophakia of left eye Pre-op cataract surgery right eye. Dil, not diabetic, on glaucoma drops, Pseudoexfoliation without obvious donesis. Possible ring. Dense lens.     PXF (PSEUDOEXFOLIATION OF LENS CAPSULE) OU      Pseudoexfoliative glaucoma, both eyes, mild stage      PVD (posterior vitreous detachment), left        Plan:  PRE-OP CATARACT INSTRUCTIONS    *Use the following drops in the right eye 4 times on the day before surgery and once the morning of surgery:                                   CatarActive3    *Please bring all your eyedrops to the surgery center.    *Continue using Cosopt (dorzolamide-timolol -- dark blue top) both eyes twice daily, and Latanoprost (green top) both eyes at bedtime.     *If taking more than one drop, wait five minutes between drops.    *No solid food or drink after midnight. Please take the starred medications (see attached sheets) with a small sip of water the morning of surgery.    *If you are diabetic, please do not take any diabetic medications the morning of surgery.    Kvng Garcia MD  [phone]               Again, thank you for allowing me to participate in the care of your patient.        Sincerely,        Kvng Garcia MD

## 2020-10-05 NOTE — PROGRESS NOTES
Current Eye Medications:  CoSopt bid both eyes (8am), Latanoprost every evening ou (10pm)     Subjective: Preop Kelman Phacoemulsification intraocular lens right eye.  Patient wants to proceed with surgery on the right eye for cataract.     Objective:  See Ophthalmology Exam.      Assessment:  Cydney Christiansen is a 84 year old female who presents with:     Combined forms of age-related cataract of right eye      Pseudophakia of left eye Pre-op cataract surgery right eye. Dil, not diabetic, on glaucoma drops, Pseudoexfoliation without obvious donesis. Possible ring. Dense lens.     PXF (PSEUDOEXFOLIATION OF LENS CAPSULE) OU      Pseudoexfoliative glaucoma, both eyes, mild stage      PVD (posterior vitreous detachment), left        Plan:  PRE-OP CATARACT INSTRUCTIONS    *Use the following drops in the right eye 4 times on the day before surgery and once the morning of surgery:                                   CatarActive3    *Please bring all your eyedrops to the surgery center.    *Continue using Cosopt (dorzolamide-timolol -- dark blue top) both eyes twice daily, and Latanoprost (green top) both eyes at bedtime.     *If taking more than one drop, wait five minutes between drops.    *No solid food or drink after midnight. Please take the starred medications (see attached sheets) with a small sip of water the morning of surgery.    *If you are diabetic, please do not take any diabetic medications the morning of surgery.    Kvng Garcia MD  [phone]

## 2020-10-12 ENCOUNTER — OFFICE VISIT (OUTPATIENT)
Dept: FAMILY MEDICINE | Facility: CLINIC | Age: 85
End: 2020-10-12
Payer: COMMERCIAL

## 2020-10-12 VITALS
TEMPERATURE: 98.4 F | BODY MASS INDEX: 28.27 KG/M2 | RESPIRATION RATE: 18 BRPM | SYSTOLIC BLOOD PRESSURE: 124 MMHG | DIASTOLIC BLOOD PRESSURE: 82 MMHG | HEIGHT: 60 IN | HEART RATE: 69 BPM | WEIGHT: 144 LBS | OXYGEN SATURATION: 97 %

## 2020-10-12 DIAGNOSIS — E78.5 HYPERLIPIDEMIA LDL GOAL <160: ICD-10-CM

## 2020-10-12 DIAGNOSIS — Z85.51 PERSONAL HISTORY OF MALIGNANT NEOPLASM OF BLADDER: ICD-10-CM

## 2020-10-12 DIAGNOSIS — M81.0 OSTEOPOROSIS, UNSPECIFIED OSTEOPOROSIS TYPE, UNSPECIFIED PATHOLOGICAL FRACTURE PRESENCE: ICD-10-CM

## 2020-10-12 DIAGNOSIS — I10 HYPERTENSION GOAL BP (BLOOD PRESSURE) < 140/90: ICD-10-CM

## 2020-10-12 DIAGNOSIS — C50.912 INVASIVE DUCTAL CARCINOMA OF LEFT BREAST (H): ICD-10-CM

## 2020-10-12 DIAGNOSIS — Z01.818 PREOP GENERAL PHYSICAL EXAM: Primary | ICD-10-CM

## 2020-10-12 DIAGNOSIS — M41.9 SCOLIOSIS, UNSPECIFIED SCOLIOSIS TYPE, UNSPECIFIED SPINAL REGION: ICD-10-CM

## 2020-10-12 PROCEDURE — 99214 OFFICE O/P EST MOD 30 MIN: CPT | Performed by: FAMILY MEDICINE

## 2020-10-12 ASSESSMENT — MIFFLIN-ST. JEOR: SCORE: 1038.65

## 2020-10-12 ASSESSMENT — PAIN SCALES - GENERAL: PAINLEVEL: NO PAIN (0)

## 2020-10-12 NOTE — PATIENT INSTRUCTIONS

## 2020-10-12 NOTE — PROGRESS NOTES
[facility]  [address]  [place] [address]  Phone: [phone]  Primary Provider: Ilda Calhoun  Pre-op Performing Provider: ILDA CALHOUN    PREOPERATIVE EVALUATION:  Today's date: 10/12/2020    Cydney Christiansen is a 84 year old female who presents for a preoperative evaluation.    Surgical Information:  Surgery Details 10/12/2020   Surgery/Procedure: Cataract right eye   Surgery Location: [facility]   Surgeon: Dr Garcia   Surgery Date: 10/19/2020   Time of Surgery: 6:30am   Where patient plans to recover: At home with family     Fax number for surgical facility: Note does not need to be faxed, will be available electronically in Epic.  Type of Anesthesia Anticipated: General    Subjective     HPI related to upcoming procedure: pt scheduled For cataract surgery  Preop Questions 10/12/2020   1. Have you ever had a heart attack or stroke? No   2. Have you ever had surgery on your heart or blood vessels, such as a stent placement, a coronary artery bypass, or surgery on an artery in your head, neck, heart, or legs? No   3. Do you have chest pain with activity? No   4. Do you have a history of  heart failure? No   5. Do you currently have a cold, bronchitis or symptoms of other infection? No   6. Do you have a cough, shortness of breath, or wheezing? No   7. Do you or anyone in your family have previous history of blood clots? No   8. Do you or does anyone in your family have a serious bleeding problem such as prolonged bleeding following surgeries or cuts? No   9. Have you ever had problems with anemia or been told to take iron pills? YES - taking iron pills   10. Have you had any abnormal blood loss such as black, tarry or bloody stools, or abnormal vaginal bleeding? No   11. Have you ever had a blood transfusion? No   Are you willing to have a blood transfusion if it is medically needed before, during, or after your surgery? Yes   13. Have you or  any of your relatives ever had problems with anesthesia? YES - sister   14. Do you have sleep apnea, excessive snoring or daytime drowsiness? No   15. Do you have any artifical heart valves or other implanted medical devices like a pacemaker, defibrillator, or continuous glucose monitor? No   16. Do you have artificial joints? No   17. Are you allergic to latex? No   18. Is there any chance that you may be pregnant? No     Patient does not have a Health Care Directive or Living Will: Advance Directive received and scanned. Click on Code in the patient header to view.    RX monitoring [place] ([place] reviewed:  -no concerns    HYPERLIPIDEMIA - Patient has a long history of significant Hyperlipidemia requiring medication for treatment with recent good control. Patient reports no problems or side effects with the medication.     HYPERTENSION - Patient has longstanding history of HTN , currently denies any symptoms referable to elevated blood pressure. Specifically denies chest pain, palpitations, dyspnea, orthopnea, PND or peripheral edema. Blood pressure readings have been in normal range. Current medication regimen is as listed below. Patient denies any side effects of medication.       Review of Systems  CONSTITUTIONAL: NEGATIVE for fever, chills, change in weight  INTEGUMENTARY/SKIN: NEGATIVE for worrisome rashes, moles or lesions  EYES: cataract  ENT/MOUTH: NEGATIVE for ear, mouth and throat problems  RESP: NEGATIVE for significant cough or SOB  BREAST: NEGATIVE for masses, tenderness or discharge  CV: NEGATIVE for chest pain, palpitations or peripheral edema  GI: NEGATIVE for nausea, abdominal pain, heartburn, or change in bowel habits  : NEGATIVE for frequency, dysuria, or hematuria  MUSCULOSKELETAL: NEGATIVE for significant arthralgias or myalgia  NEURO: NEGATIVE for weakness, dizziness or paresthesias  ENDOCRINE: NEGATIVE for temperature intolerance, skin/hair changes  HEME: NEGATIVE for bleeding  problems  PSYCHIATRIC: NEGATIVE for changes in mood or affect    Patient Active Problem List    Diagnosis Date Noted     Combined forms of age-related cataract of right eye 03/05/2020     Priority: Medium     Added automatically from request for surgery [id number]       Neoplasm of uncertain behavior of skin 08/08/2019     Priority: Medium     Actinic keratosis 08/08/2019     Priority: Medium     History of nonmelanoma skin cancer 08/08/2019     Priority: Medium     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) 03/08/2018     Priority: Medium     Nuclear sclerosis of left eye 01/16/2018     Priority: Medium     Osteoporosis, unspecified osteoporosis type, unspecified pathological fracture presence 06/26/2017     Priority: Medium     Primary open angle glaucoma of both eyes, unspecified glaucoma stage 09/23/2016     Priority: Medium     Mass of left hand 02/19/2016     Priority: Medium     Compression fracture of thoracic vertebra (H) 07/21/2015     Priority: Medium     Scoliosis      Priority: Medium     Malignant neoplasm of overlapping sites of left female breast (H) 06/08/2015     Priority: Medium     Seborrheic keratosis 06/17/2014     Priority: Medium     Hypertension goal BP (blood pressure) < 140/90 12/19/2013     Priority: Medium     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      Priority: Medium     left leg       PVD (posterior vitreous detachment), OS 08/06/2013     Priority: Medium     Viral warts 07/15/2013     Priority: Medium     Diagnosis updated by automated process. Provider to review and confirm.       Skin lesion of left leg 07/08/2013     Priority: Medium     Health Care Home 01/04/2013     Priority: Medium     Mike Cummings RN,C--[phone]   [facility] / [facility]     DX V65.8 REPLACED WITH [id number] HEALTH CARE HOME (04/08/2013)       Squamous cell carcinoma 10/01/2012     Priority: Medium     R dorsal hand  Left popliteal fossa with perineural involvement s/p  excision 7-2013       Hyperlipidemia LDL goal <160 06/10/2011     Priority: Medium     Family history of diabetes mellitus 06/10/2011     Priority: Medium     Advanced directives, counseling/discussion 06/09/2011     Priority: Medium     Parent voices understanding and acceptance of this advice and will call back if any further questions or concerns.       Basal cell carcinoma 06/01/2011     Priority: Medium     L neck       Personal history of malignant neoplasm of bladder 12/16/2010     Priority: Medium     PXF  OU 09/23/2010     Priority: Medium     History of basal cell carcinoma 02/18/2010     Priority: Medium      Past Medical History:   Diagnosis Date     Actinic keratosis      Basal cell cancer 02/2011    bcc of the L back.     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    left leg     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     Past Surgical History:   Procedure Laterality Date     CATARACT IOL, RT/LT       COLONOSCOPY  5/2008, 5/13, 6/14, 6/17    Q 3 years for advanced adenomatous polyp     CYSTOSCOPY  12/31/2008     D & C       GENITOURINARY SURGERY      TURBT     HC REMOVAL GALLBLADDER      open pan     HC TRABECULOPLASTY BY LASER SURGERY Left 4/18/07    SLT #1 OS (inf 180)     HC TRABECULOPLASTY BY LASER SURGERY Right 5/4/11    SLT #1 OD (inf 180?)     HC  TRABECULOPLASTY BY LASER SURGERY Left 3/17/15    SLT #2 OS (sup 180)     HC TRABECULOPLASTY BY LASER SURGERY Right 6/23/15    SLT #2 OD (sup 180?)     LASER ARGON TREATMENT      SLT left eye x 2     LUMPECTOMY BREAST WITH SENTINEL NODE, COMBINED Left 7/29/2015    Procedure: COMBINED LUMPECTOMY BREAST WITH SENTINEL NODE;  Surgeon: Ifeanyi Sunshine MD;  Location: [facility] OR     PHACOEMULSIFICATION CLEAR CORNEA WITH STANDARD INTRAOCULAR LENS IMPLANT Left 12/8/2017    Procedure: PHACOEMULSIFICATION CLEAR CORNEA WITH STANDARD INTRAOCULAR LENS IMPLANT;   COMPLEX LEFT PHACOEMULSIFICATION CLEAR CORNEA WITH STANDARD INTRAOCULAR LENS IMPLANT ;  Surgeon: Kvng Garcia MD;  Location: [facility]     SURGICAL HISTORY OF -   9/11    squamous cell CA excised from back     TUBAL LIGATION       Current Outpatient Medications   Medication Sig Dispense Refill     cyanocobalamin (VITAMIN B-12) 500 MCG tablet Take 1 tablet (500 mcg) by mouth daily 150 tablet 3     Dexamethasone Acetate POWD 1 Bottle 4 times daily 1 drop four times a day into the operative eye starting 1 day before surgery. Use until bottle runs out. 1 Bottle 0     dorzolamide-timolol (COSOPT) 2-0.5 % ophthalmic solution Place 1 drop into both eyes 2 times daily 10 mL 3     ferrous sulfate (FEROSUL) 325 (65 Fe) MG tablet Take 1 tablet (325 mg) by mouth daily (with breakfast) 90 tablet 3     ibuprofen (ADVIL,MOTRIN) 200 MG tablet Take 200 mg by mouth every 4 hours as needed.       latanoprost (XALATAN) 0.005 % ophthalmic solution Place 1 drop into both eyes At Bedtime 3 Bottle 4     losartan (COZAAR) 25 MG tablet Take 1 tablet (25 mg) by mouth daily 90 tablet 4     meclizine (ANTIVERT) 25 MG tablet Take 1 tablet (25 mg) by mouth every 6 hours as needed for dizziness 30 tablet 1     Multiple Vitamins-Minerals (ONE DAILY ADULTS 50+) TABS        simvastatin (ZOCOR) 20 MG tablet Take 1 tablet (20 mg) by mouth At Bedtime 90 tablet 3     triamcinolone (KENALOG) 0.1 % cream Apply to  "affected area of the face once to twice a day. 15 g 1     [START ON 10/18/2020] Bromfenac Sodium POWD Place 1 drop Into the left eye 4 times daily (Patient not taking: Reported on 10/12/2020) 1 Bottle 0     CALCIUM 600 MG OR TABS 1 daily       [START ON 10/18/2020] Moxifloxacin HCl POWD Apply 1 drop to eye 4 times daily (Patient not taking: Reported on 10/12/2020) 1 Bottle 0     triamcinolone 0.1 % EX external cream Apply twice daily for 2 weeks to eczema on hand, take 2 weeks off, can repeat if needed 30 g 0     VITAMIN D-1000 MAX -1000 MG-UNIT OR TABS 1 daily         Allergies   Allergen Reactions     Lisinopril Cough        Social History     Tobacco Use     Smoking status: Former Smoker     Packs/day: 0.50     Years: 20.00     Pack years: 10.00     Types: Cigarettes     Quit date: 1976     Years since quittin.7     Smokeless tobacco: Never Used   Substance Use Topics     Alcohol use: Yes     Comment: rare     Family History   Problem Relation Age of Onset     Diabetes Mother      Breast Cancer Mother      Eye Disorder Mother      Osteoporosis Mother      Glaucoma Mother      Macular Degeneration Mother      Prostate Cancer Father      Prostate Cancer Brother      Glaucoma Brother      Macular Degeneration Brother      Thyroid Disease Sister      Blood Disease Sister         lupus     Heart Disease Sister      Asthma Son      Prostate Cancer Brother      Glaucoma Other      Melanoma No family hx of      Skin Cancer No family hx of      History   Drug Use No            Objective   /82   Pulse 69   Temp 98.4  F (36.9  C) (Oral)   Resp 18   Ht 1.53 m (5' 0.25\")   Wt 65.3 kg (144 lb)   SpO2 97%   BMI 27.89 kg/m    Physical Exam    GENERAL APPEARANCE: healthy, alert and no distress     EYES: EOMI, PERRL     HENT: ear canals and TM's normal and nose and mouth without ulcers or lesions     NECK: no adenopathy, no asymmetry, masses, or scars and thyroid normal to palpation     RESP: lungs " clear to auscultation - no rales, rhonchi or wheezes     CV: regular rates and rhythm, normal S1 S2, no S3 or S4 and no murmur, click or rub     ABDOMEN:  soft, nontender, no HSM or masses and bowel sounds normal     MS: extremities normal- no gross deformities noted, no evidence of inflammation in joints, FROM in all extremities.     SKIN: no suspicious lesions or rashes     NEURO: Normal strength and tone, sensory exam grossly normal, mentation intact and speech normal     PSYCH: mentation appears normal. and affect normal/bright     LYMPHATICS: No cervical adenopathy    Recent Labs   Lab Test 09/22/20  1318 07/15/20  1014 03/03/20  0913   HGB 10.7*  --  10.9*     --  182    138  --    POTASSIUM 4.1 4.6  --    CR 0.70 0.64  --         PRE-OP Diagnostics:  No labs were ordered during this visit.    No EKG required for low risk surgery (cataract, skin procedure, breast biopsy, etc).         Assessment & Plan   The proposed surgical procedure is considered LOW risk.    REVISED CARDIAC RISK INDEX  The patient has the following serious cardiovascular risks for perioperative complications:  No serious cardiac risks = 0 points    INTERPRETATION: 0 points: Class I (very low risk - 0.4% complication rate)         ICD-10-CM    1. Preop general physical exam  Z01.818    2. Hyperlipidemia LDL goal <160  E78.5    3. Hypertension goal BP (blood pressure) < 140/90  I10    4. 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)  C50.912    5. Osteoporosis, unspecified osteoporosis type, unspecified pathological fracture presence  M81.0    6. Personal history of malignant neoplasm of bladder  Z85.51    7. Scoliosis, unspecified scoliosis type, unspecified spinal region  M41.9        The patient has the following additional risks and recommendations for perioperative complications:     - No identified additional risk factors other than previously addressed     MEDICATION INSTRUCTIONS:    Anticoagulant or Antiplatelet  Medication Use   - Bleeding risk is low for this procedure (e.g. dental, skin, cataract).    RECOMMENDATION:  APPROVAL GIVEN to proceed with proposed procedure, without further diagnostic evaluation.    Signed Electronically by: Estrellita Hernandez MD    Copy of this evaluation report is provided to requesting physician.    [facility] Guidelines    Revised Cardiac Risk Index

## 2020-10-15 DIAGNOSIS — H25.811 COMBINED FORMS OF AGE-RELATED CATARACT OF RIGHT EYE: ICD-10-CM

## 2020-10-15 PROCEDURE — U0003 INFECTIOUS AGENT DETECTION BY NUCLEIC ACID (DNA OR RNA); SEVERE ACUTE RESPIRATORY SYNDROME CORONAVIRUS 2 (SARS-COV-2) (CORONAVIRUS DISEASE [COVID-19]), AMPLIFIED PROBE TECHNIQUE, MAKING USE OF HIGH THROUGHPUT TECHNOLOGIES AS DESCRIBED BY CMS-2020-01-R: HCPCS | Performed by: STUDENT IN AN ORGANIZED HEALTH CARE EDUCATION/TRAINING PROGRAM

## 2020-10-16 ENCOUNTER — ANESTHESIA EVENT (OUTPATIENT)
Dept: SURGERY | Facility: AMBULATORY SURGERY CENTER | Age: 85
End: 2020-10-16

## 2020-10-16 LAB
SARS-COV-2 RNA SPEC QL NAA+PROBE: NOT DETECTED
SPECIMEN SOURCE: NORMAL

## 2020-10-19 ENCOUNTER — ANESTHESIA (OUTPATIENT)
Dept: SURGERY | Facility: AMBULATORY SURGERY CENTER | Age: 85
End: 2020-10-19

## 2020-10-19 ENCOUNTER — HOSPITAL ENCOUNTER (OUTPATIENT)
Facility: AMBULATORY SURGERY CENTER | Age: 85
Discharge: HOME | End: 2020-10-19
Attending: STUDENT IN AN ORGANIZED HEALTH CARE EDUCATION/TRAINING PROGRAM | Admitting: STUDENT IN AN ORGANIZED HEALTH CARE EDUCATION/TRAINING PROGRAM
Payer: COMMERCIAL

## 2020-10-19 VITALS
SYSTOLIC BLOOD PRESSURE: 125 MMHG | TEMPERATURE: 98.4 F | WEIGHT: 143 LBS | OXYGEN SATURATION: 100 % | DIASTOLIC BLOOD PRESSURE: 73 MMHG | RESPIRATION RATE: 16 BRPM | HEART RATE: 63 BPM | BODY MASS INDEX: 27 KG/M2 | HEIGHT: 61 IN

## 2020-10-19 DIAGNOSIS — H25.811 COMBINED FORMS OF AGE-RELATED CATARACT OF RIGHT EYE: Primary | ICD-10-CM

## 2020-10-19 PROCEDURE — 66982 XCAPSL CTRC RMVL CPLX WO ECP: CPT | Mod: RT

## 2020-10-19 DEVICE — EYE IMP IOL AMO PCL TECNIS ZCB00 21.5: Type: IMPLANTABLE DEVICE | Site: EYE | Status: FUNCTIONAL

## 2020-10-19 RX ORDER — ACETAMINOPHEN 325 MG/1
975 TABLET ORAL ONCE
Status: COMPLETED | OUTPATIENT
Start: 2020-10-19 | End: 2020-10-19

## 2020-10-19 RX ORDER — ONDANSETRON 4 MG/1
4 TABLET, ORALLY DISINTEGRATING ORAL EVERY 30 MIN PRN
Status: DISCONTINUED | OUTPATIENT
Start: 2020-10-19 | End: 2020-10-20 | Stop reason: HOSPADM

## 2020-10-19 RX ORDER — FENTANYL CITRATE 50 UG/ML
25-50 INJECTION, SOLUTION INTRAMUSCULAR; INTRAVENOUS
Status: DISCONTINUED | OUTPATIENT
Start: 2020-10-19 | End: 2020-10-20 | Stop reason: HOSPADM

## 2020-10-19 RX ORDER — NALOXONE HYDROCHLORIDE 0.4 MG/ML
.1-.4 INJECTION, SOLUTION INTRAMUSCULAR; INTRAVENOUS; SUBCUTANEOUS
Status: DISCONTINUED | OUTPATIENT
Start: 2020-10-19 | End: 2020-10-20 | Stop reason: HOSPADM

## 2020-10-19 RX ORDER — LIDOCAINE 40 MG/G
CREAM TOPICAL
Status: DISCONTINUED | OUTPATIENT
Start: 2020-10-19 | End: 2020-10-19 | Stop reason: HOSPADM

## 2020-10-19 RX ORDER — BALANCED SALT SOLUTION 6.4; .75; .48; .3; 3.9; 1.7 MG/ML; MG/ML; MG/ML; MG/ML; MG/ML; MG/ML
SOLUTION OPHTHALMIC PRN
Status: DISCONTINUED | OUTPATIENT
Start: 2020-10-19 | End: 2020-10-19 | Stop reason: HOSPADM

## 2020-10-19 RX ORDER — CYCLOPENTOLAT/TROPIC/PHENYLEPH 1%-1%-2.5%
1 DROPS (EA) OPHTHALMIC (EYE)
Status: COMPLETED | OUTPATIENT
Start: 2020-10-19 | End: 2020-10-19

## 2020-10-19 RX ORDER — SODIUM CHLORIDE, SODIUM LACTATE, POTASSIUM CHLORIDE, CALCIUM CHLORIDE 600; 310; 30; 20 MG/100ML; MG/100ML; MG/100ML; MG/100ML
INJECTION, SOLUTION INTRAVENOUS CONTINUOUS
Status: DISCONTINUED | OUTPATIENT
Start: 2020-10-19 | End: 2020-10-20 | Stop reason: HOSPADM

## 2020-10-19 RX ORDER — LIDOCAINE HYDROCHLORIDE 10 MG/ML
INJECTION, SOLUTION EPIDURAL; INFILTRATION; INTRACAUDAL; PERINEURAL PRN
Status: DISCONTINUED | OUTPATIENT
Start: 2020-10-19 | End: 2020-10-19 | Stop reason: HOSPADM

## 2020-10-19 RX ORDER — MOXIFLOXACIN IN NACL,ISO-OS/PF 0.3MG/0.3
SYRINGE (ML) INTRAOCULAR PRN
Status: DISCONTINUED | OUTPATIENT
Start: 2020-10-19 | End: 2020-10-19 | Stop reason: HOSPADM

## 2020-10-19 RX ORDER — PROPARACAINE HYDROCHLORIDE 5 MG/ML
1 SOLUTION/ DROPS OPHTHALMIC ONCE
Status: COMPLETED | OUTPATIENT
Start: 2020-10-19 | End: 2020-10-19

## 2020-10-19 RX ORDER — TETRACAINE HYDROCHLORIDE 5 MG/ML
SOLUTION OPHTHALMIC PRN
Status: DISCONTINUED | OUTPATIENT
Start: 2020-10-19 | End: 2020-10-19 | Stop reason: HOSPADM

## 2020-10-19 RX ORDER — ONDANSETRON 2 MG/ML
4 INJECTION INTRAMUSCULAR; INTRAVENOUS EVERY 30 MIN PRN
Status: DISCONTINUED | OUTPATIENT
Start: 2020-10-19 | End: 2020-10-20 | Stop reason: HOSPADM

## 2020-10-19 RX ORDER — SODIUM CHLORIDE, SODIUM LACTATE, POTASSIUM CHLORIDE, CALCIUM CHLORIDE 600; 310; 30; 20 MG/100ML; MG/100ML; MG/100ML; MG/100ML
500 INJECTION, SOLUTION INTRAVENOUS CONTINUOUS
Status: DISCONTINUED | OUTPATIENT
Start: 2020-10-19 | End: 2020-10-19 | Stop reason: HOSPADM

## 2020-10-19 RX ADMIN — Medication 1 DROP: at 07:35

## 2020-10-19 RX ADMIN — Medication 1 DROP: at 07:31

## 2020-10-19 RX ADMIN — PROPARACAINE HYDROCHLORIDE 1 DROP: 5 SOLUTION/ DROPS OPHTHALMIC at 07:14

## 2020-10-19 RX ADMIN — ACETAMINOPHEN 975 MG: 325 TABLET ORAL at 07:13

## 2020-10-19 RX ADMIN — SODIUM CHLORIDE, SODIUM LACTATE, POTASSIUM CHLORIDE, CALCIUM CHLORIDE 500 ML: 600; 310; 30; 20 INJECTION, SOLUTION INTRAVENOUS at 07:33

## 2020-10-19 RX ADMIN — Medication 1 DROP: at 07:14

## 2020-10-19 ASSESSMENT — LIFESTYLE VARIABLES: TOBACCO_USE: 0

## 2020-10-19 ASSESSMENT — MIFFLIN-ST. JEOR: SCORE: 1046.02

## 2020-10-19 NOTE — ANESTHESIA POSTPROCEDURE EVALUATION
Anesthesia POST Procedure Evaluation    Patient: Cydney Christiansen   MRN:     [id number] Gender:   female   Age:    84 year old :      1935        Preoperative Diagnosis: Combined forms of age-related cataract of right eye [H25.811]   Procedure(s):  RIGHT COMPLEX PHACOEMULSIFICATION, CATARACT, WITH INTRAOCULAR LENS IMPLANT    Postop Comments: No value filed.     Anesthesia Type: MAC       Disposition: Outpatient   Postop Pain Control: Uneventful            Sign Out: Well controlled pain   PONV: No   Neuro/Psych: Uneventful            Sign Out: Acceptable/Baseline neuro status   Airway/Respiratory: Uneventful            Sign Out: Acceptable/Baseline resp. status   CV/Hemodynamics: Uneventful            Sign Out: Acceptable CV status   Other NRE: NONE   DID A NON-ROUTINE EVENT OCCUR? No         Last Anesthesia Record Vitals:  CRNA VITALS  10/19/2020 0815 - 10/19/2020 0915      10/19/2020             Pulse:  59    Ht Rate:  68    SpO2:  100 %    Resp Rate (set):  10          Last PACU Vitals:  Vitals Value Taken Time   /74 10/19/20 0848   Temp 36.2  C (97.1  F) 10/19/20 0848   Pulse 75 10/19/20 0848   Resp 16 10/19/20 0848   SpO2 100 % 10/19/20 0848   Temp src     NIBP     Pulse 59 10/19/20 0845   SpO2 100 % 10/19/20 0845   Resp     Temp     Ht Rate 68 10/19/20 0845   Temp 2           Electronically Signed By: Js Cuevas DO, 2020, 3:03 PM

## 2020-10-19 NOTE — ANESTHESIA PREPROCEDURE EVALUATION
"Anesthesia Pre-Procedure Evaluation    Patient: Cydney Christiansen   MRN:     [id number] Gender:   female   Age:    84 year old :      1935        Preoperative Diagnosis: Combined forms of age-related cataract of right eye [H25.811]   Procedure(s):  RIGHT PHACOEMULSIFICATION, CATARACT, WITH INTRAOCULAR LENS IMPLANT      LABS:  CBC:   Lab Results   Component Value Date    WBC 6.1 2020    WBC 4.8 2020    HGB 10.7 (L) 2020    HGB 10.9 (L) 2020    HCT 33.3 (L) 2020    HCT 33.7 (L) 2020     2020     2020     BMP:   Lab Results   Component Value Date     2020     07/15/2020    POTASSIUM 4.1 2020    POTASSIUM 4.6 07/15/2020    CHLORIDE 108 2020    CHLORIDE 107 07/15/2020    CO2 28 2020    CO2 28 07/15/2020    BUN 19 2020    BUN 13 07/15/2020    CR 0.70 2020    CR 0.64 07/15/2020    GLC 99 2020    GLC 98 07/15/2020     COAGS: No results found for: PTT, INR, FIBR  POC: No results found for: BGM, HCG, HCGS  OTHER:   Lab Results   Component Value Date    SHERYL 8.7 2020    ALBUMIN 3.4 2020    PROTTOTAL 6.4 (L) 2020    ALT 21 2020    AST 42 2020    ALKPHOS 47 2020    BILITOTAL 0.2 2020    TSH 2.98 2016    T4 1.13 2010        Preop Vitals    BP Readings from Last 3 Encounters:   10/19/20 (!) 152/93   10/12/20 124/82   20 133/81    Pulse Readings from Last 3 Encounters:   10/19/20 65   10/12/20 69   20 63      Resp Readings from Last 3 Encounters:   10/19/20 18   10/12/20 18   07/15/20 16    SpO2 Readings from Last 3 Encounters:   10/19/20 100%   10/12/20 97%   20 99%      Temp Readings from Last 1 Encounters:   10/19/20 36.3  C (97.3  F) (Oral)    Ht Readings from Last 1 Encounters:   10/19/20 1.549 m (5' 1\")      Wt Readings from Last 1 Encounters:   10/19/20 64.9 kg (143 lb)    Estimated body mass index is 27.02 kg/m  as calculated from " "the following:    Height as of this encounter: 1.549 m (5' 1\").    Weight as of this encounter: 64.9 kg (143 lb).     LDA:  Peripheral IV 07/29/15 Right Lower forearm (Active)   Number of days: 1909        Past Medical History:   Diagnosis Date     Actinic keratosis      Basal cell cancer 02/2011    bcc of the L back.     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    left leg     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      Past Surgical History:   Procedure Laterality Date     CATARACT IOL, RT/LT       COLONOSCOPY  5/2008, 5/13, 6/14, 6/17    Q 3 years for advanced adenomatous polyp     CYSTOSCOPY  12/31/2008     D & C       GENITOURINARY SURGERY      TURBT     HC REMOVAL GALLBLADDER      open pan     HC TRABECULOPLASTY BY LASER SURGERY Left 4/18/07    SLT #1 OS (inf 180)     HC TRABECULOPLASTY BY LASER SURGERY Right 5/4/11    SLT #1 OD (inf 180?)     HC TRABECULOPLASTY BY LASER SURGERY Left 3/17/15    SLT #2 OS (sup 180)     HC TRABECULOPLASTY BY LASER SURGERY Right 6/23/15    SLT #2 OD (sup 180?)     LASER ARGON TREATMENT      SLT left eye x 2     LUMPECTOMY BREAST WITH SENTINEL NODE, COMBINED Left 7/29/2015    Procedure: COMBINED LUMPECTOMY BREAST WITH SENTINEL NODE;  Surgeon: Ifeanyi Sunshine MD;  Location: [facility] OR     PHACOEMULSIFICATION CLEAR CORNEA WITH STANDARD INTRAOCULAR " LENS IMPLANT Left 12/8/2017    Procedure: PHACOEMULSIFICATION CLEAR CORNEA WITH STANDARD INTRAOCULAR LENS IMPLANT;   COMPLEX LEFT PHACOEMULSIFICATION CLEAR CORNEA WITH STANDARD INTRAOCULAR LENS IMPLANT ;  Surgeon: Kvng Garcia MD;  Location: [facility]     SURGICAL HISTORY OF -   9/11    squamous cell CA excised from back     TUBAL LIGATION        Allergies   Allergen Reactions     Lisinopril Cough        Anesthesia Evaluation     . Pt has had prior anesthetic.     No history of anesthetic complications          ROS/MED HX    ENT/Pulmonary: Comment: cataract - neg pulmonary ROS    (-) tobacco use   Neurologic:  - neg neurologic ROS     Cardiovascular:     (+) Dyslipidemia, hypertension----. : . . . :. . Previous cardiac testing Echodate:2015results:Interpretation Summary     Global and regional left ventricular function is normal with an EF of 55-60%.  Right ventricular function, chamber size, wall motion, and thickness are  normal.  No pericardial effusion is present.  Sinuses of Valsalva 4 cm.  Ascending aorta 4.8 cm.date: results: date: results: date: results:          METS/Exercise Tolerance:  4 - Raking leaves, gardening   Hematologic:  - neg hematologic  ROS       Musculoskeletal:   (+)  other musculoskeletal (osteoprosis)-       GI/Hepatic:  - neg GI/hepatic ROS       Renal/Genitourinary:  - ROS Renal section negative       Endo:  - neg endo ROS       Psychiatric:  - neg psychiatric ROS       Infectious Disease:  - neg infectious disease ROS       Malignancy:   (+) Malignancy History of Skin, Breast and Other          Other:    - neg other ROS                     PHYSICAL EXAM:   Mental Status/Neuro: A/A/O   Airway: Facies: Feasible  Mallampati: I  Mouth/Opening: Full  TM distance: > 6 cm  Neck ROM: Full   Respiratory: Auscultation: CTAB     Resp. Rate: Normal     Resp. Effort: Normal      CV: Rhythm: Regular  Rate: Age appropriate  Heart: Normal Sounds  Edema: None   Comments:      Dental: Normal Dentition                 Assessment:   ASA SCORE: 3    H&P: History and physical reviewed and following examination; no interval change.   Smoking Status:  Non-Smoker/Unknown   NPO Status: NPO Appropriate     Plan:   Anes. Type:  MAC   Pre-Medication: Acetaminophen   Induction:  N/a   Airway: Native Airway   Access/Monitoring: PIV   Maintenance: N/a     Postop Plan:   Postop Pain: None  Postop Sedation/Airway: Not planned  Disposition: Outpatient     PONV Management:   Adult Risk Factors: Female, Non-Smoker   Prevention: Ondansetron, Dexamethasone     CONSENT: Direct conversation   Plan and risks discussed with: Patient   Blood Products: N/a                   Js Cuevas DO

## 2020-10-19 NOTE — ANESTHESIA CARE TRANSFER NOTE
Patient: Cydney Christiansen    Procedure(s):  RIGHT COMPLEX PHACOEMULSIFICATION, CATARACT, WITH INTRAOCULAR LENS IMPLANT     Diagnosis: Combined forms of age-related cataract of right eye [H25.811]  Diagnosis Additional Information: No value filed.    Anesthesia Type:   MAC     Note:  Airway :Room Air  Patient transferred to:Phase II  Comments: 133/74  95%  97.1-63-18  Handoff Report: Identifed the Patient, Identified the Reponsible Provider, Reviewed the pertinent medical history, Discussed the surgical course, Reviewed Intra-OP anesthesia mangement and issues during anesthesia, Set expectations for post-procedure period and Allowed opportunity for questions and acknowledgement of understanding      Vitals: (Last set prior to Anesthesia Care Transfer)    CRNA VITALS  10/19/2020 0815 - 10/19/2020 0850      10/19/2020             Pulse:  59    Ht Rate:  68    SpO2:  100 %    Resp Rate (set):  10                Electronically Signed By: KRISTIE Farrell CRNA  October 19, 2020  8:50 AM

## 2020-10-19 NOTE — DISCHARGE INSTRUCTIONS
CATARACT SURGERY POST-OP INSTRUCTIONS  Dr. Kvng Garcia  [phone]      Continue using CatarActive3 four times a day in the operative eye.    You should get 3 doses in today and 4 doses daily starting tomorrow.       Keep the eye shield taped in place unless putting drops in. We will remove it for you in the office tomorrow.      Light sensitivity may be noticed. Sunglasses may be worn for comfort.      Do not rub the operated eye.      Keep the operated eye dry. You may wash your hair, bathe or shower, but keep the operated eye closed while doing so.       No swimming, hot tub, or sauna for 2 weeks.      No make up around eye for 5 days.      No bending at the waist or lifting more than 10 pounds for one week.      May take Tylenol (per directions on bottle) for mild pain.      Call the office at [phone] and ask to speak to the on-call ophthalmologist  if any of the following should occur:  o Any sudden vision changes  o Nausea or severe headache  o Increase in pain not controlled  o Or signs of infection (pus, increasing redness or tenderness)       [facility]  Home Care Following Anesthesia  For 24 hours after surgery:  1. Get plenty of rest.  A responsible adult must stay with you for at least 24 hours after you leave the surgery center.  2. Do not drive or use heavy equipment.  If you have weakness or tingling, don't drive or use heavy equipment until this feeling goes away.   3. Do not drink alcohol.   4. Avoid strenuous or risky activities.  Ask for help when climbing stairs.  5. You may feel lightheaded.  IF so, sit for a few minutes before standing.  Have someone help you get up.   6. If you have nausea (feel sick to your stomach): Drink only clear liquids such as apple juice, ginger ale, broth or 7-Up.  Rest may also help.  Be sure to drink enough fluids.  Move to a regular diet as you feel able.   7. You may have a slight fever.  Call the doctor if your  fever is over 100 F (37.7 C) (taken under the tongue) or lasts longer than 24 hours.  8. You may have a dry mouth, a sore throat, muscle aches or trouble sleeping. These should go away after 24 hours.  9. Do not make important or legal decisions.               Tips for taking pain medications  To get the best pain relief possible, remember these points:    Take pain medications as directed, before pain becomes severe.    Pain medication can upset your stomach: taking it with food may help.    Constipation is a common side effect of pain medication. Drink plenty of  fluids.    Eat foods high in fiber. Take a stool softener if recommended by your doctor or pharmacist.    Do not drink alcohol, drive or operate machinery while taking pain medications.    Ask about other ways to control pain, such as with heat, ice or relaxation.    Tylenol/Acetaminophen Consumption  To help encourage the safe use of acetaminophen, the makers of TYLENOL  have lowered the maximum daily dose for single-ingredient Extra Strength TYLENOL  (acetaminophen) products sold in the [place] from 8 pills per day (4,000 mg) to 6 pills per day (3,000 mg). The dosing interval has also changed from 2 pills every 4-6 hours to 2 pills every 6 hours.    If you feel your pain relief is insufficient, you may take Tylenol/Acetaminophen in addition to your narcotic pain medication.     Be careful not to exceed 3,000 mg of Tylenol/Acetaminophen in a 24 hour period from all sources.    If you are taking extra strength Tylenol/acetaminophen (500 mg), the maximum dose is 6 tablets in 24 hours.    If you are taking regular strength acetaminophen (325 mg), the maximum dose is 9 tablets in 24 hours.    You received a dose of  975 mg of Tylenol @ 7:15 AM . You may take another dose of Tylenol @  1:15 PM.     Call a doctor for any of the followin. Signs of infection (fever, growing tenderness at the surgery site, a large amount of drainage or bleeding, severe pain,  foul-smelling drainage, redness, swelling).  2. It has been over 8 to 10 hours since surgery and you are still not able to urinate (pass water).  3. Headache for over 24 hours.  4. Signs of Covid-19 infection (temperature over 100 degrees, shortness of breath, cough, loss of taste/smell, generalized body aches, persistent headache, chills, sore throat, nausea/vomiting/diarrhea)  Your doctor is:  Dr. Kvng Garcia, Ophthalmology: [phone]                    Or dial [phone] and ask for the resident on call for:  Ophthalmology  For emergency care, call the:  [facility]:  [phone] (TTY for hearing impaired: [phone])

## 2020-10-19 NOTE — OP NOTE
PreOp Diagnosis: Visually significant nuclear sclerotic cataract right eye, miosis right eye  PostOp Diagnosis: Same  Surgeon: Kvng Garcia MD  Implant: Tecnis [id number] 21.5D    Procedures:   1. Review of intraocular lens calculations, both eyes   2. Phacoemulsification and extraction of lens  right eye   3. Intraocular lens implantation right eye  Anesthesia: MAC/topical  Complications: None  EBL: <1cc    Cydney Christiansen suffers from a visually significant cataract of the right eye. This has caused problems with distance and reading vision, including glare. After discussing the risks, benefits, and alternatives, the patient wishes to proceed with cataract surgery.    The patient was identified in the pre-op area where the right eye was marked. The patient was then brought to the operating room where a time out was called, identifying the patient, the procedure, and the correct site. Tetracaine drops were applied to the operative eye. The operative eye was then prepped and draped in the usual sterile ophthalmic fashion. An eyelid speculum was placed into the operative eye. A paracentesis was made superior with a side port blade. Due to poor red reflex, trypan blue was irrigated into the anterior chamber to enhance capsular visualization.  This was irrigated from the anterior chamber after 30 seconds with 1% preservative-free lidocaine and epinephrine was injected into the anterior chamber.  Viscoat was injected to deepen the anterior chamber. A 2.4mm clear corneal wound was created with a keratome blade temporally. Viscoelastic was injected under the pupillary margin, and a 6.25mm Malyugin ring was injected into the anterior chamber and positioned onto the iris. A continuous curvilinear capsulorrhexis was started with a bent cystitome and completed with Utrada forceps. Hydrodissection of the lens nucleus was performed with BSS on a cannula. The lens nucleus was rotated. Phacoemulsification of the lens nucleus was  accomplished in a phacoemulsification stop and chop technique. Remaining cortex was removed with irrigation and aspiration. The lens capsule was noted to be intact. Provisc was used to inflate the capsular bag.  The lens was injected into the capsular bag. The Malyugin ring was teased off the iris and removed from the eye.   Remaining viscoelastic was removed with irrigation and aspiration. The wounds were checked and found to be watertight after hydration. Intracameral moxifloxacin was injected into the anterior chamber. The eyelid speculum was removed. The patient tolerated the procedure well and was in stable condition on the way to the recovery area.     Kvng Garcia MD

## 2020-10-20 ENCOUNTER — OFFICE VISIT (OUTPATIENT)
Dept: OPHTHALMOLOGY | Facility: CLINIC | Age: 85
End: 2020-10-20
Payer: COMMERCIAL

## 2020-10-20 DIAGNOSIS — H43.812 PVD (POSTERIOR VITREOUS DETACHMENT), LEFT: ICD-10-CM

## 2020-10-20 DIAGNOSIS — Z96.1 PSEUDOPHAKIA: Primary | ICD-10-CM

## 2020-10-20 DIAGNOSIS — H26.8 PXF (PSEUDOEXFOLIATION OF LENS CAPSULE): ICD-10-CM

## 2020-10-20 DIAGNOSIS — H40.1431 PSEUDOEXFOLIATIVE GLAUCOMA, BOTH EYES, MILD STAGE: ICD-10-CM

## 2020-10-20 PROCEDURE — 99024 POSTOP FOLLOW-UP VISIT: CPT | Performed by: STUDENT IN AN ORGANIZED HEALTH CARE EDUCATION/TRAINING PROGRAM

## 2020-10-20 ASSESSMENT — VISUAL ACUITY
OD_SC: 20/100
OD_PH_SC+: +2
OD_PH_SC: 20/60
OD_SC+: -1
METHOD: SNELLEN - LINEAR

## 2020-10-20 ASSESSMENT — TONOMETRY
IOP_METHOD: APPLANATION
OD_IOP_MMHG: 29

## 2020-10-20 ASSESSMENT — EXTERNAL EXAM - RIGHT EYE: OD_EXAM: NORMAL

## 2020-10-20 ASSESSMENT — SLIT LAMP EXAM - LIDS
COMMENTS: NORMAL
COMMENTS: NORMAL

## 2020-10-20 ASSESSMENT — EXTERNAL EXAM - LEFT EYE: OS_EXAM: NORMAL

## 2020-10-20 NOTE — PATIENT INSTRUCTIONS
POST-OP CATARACT INSTRUCTIONS    *   Use the following drop(s) in the RIGHT EYE four times a day:        CatarActive 3    *   Continue Cosopt (dorzolamide-timolol -- dark blue top) twice a day both eyes and Latanoprost (green top) at bedtime both eyes      *   Wear eye shield when sleeping for one week. Do not rub the operated eye.     *   No bending or lifting more than 10 pounds for one week.    *   Keep water out of eye for two weeks.    *   OK to resume aspirin and/or other blood thinners if you stopped.     *   Return as scheduled in about one week.    *   If your vision worsens, eye becomes increasingly red, or becomes painful, call [phone].     Kvng Garcia M.D.

## 2020-10-20 NOTE — LETTER
10/20/2020         RE: Cydney Christiansen  [address]  [place] [address]        Dear Colleague,    Thank you for referring your patient, Cydney Christiansen, to the [facility]. Please see a copy of my visit note below.     Current Eye Medications:  CatarActive 3 right eye three times yesterday after getting home.    cosopt both eyes twice a day, Latanoprost both eyes every evening.  Last drops:  7am.     Subjective:  Status/Post Kelman Phacoemulsification with Posterior Chamber Lens, right eye:  10-19-20.  Right eye feels a little gritty and has been watering slightly.  Slept well.      Objective:  See Ophthalmology Exam.       Assessment:  Cydney Christiansen is a 84 year old female who presents with:   Encounter Diagnoses   Name Primary?     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      PXF (PSEUDOEXFOLIATION OF LENS CAPSULE) OU      Pseudoexfoliative glaucoma, both eyes, mild stage      PVD (posterior vitreous detachment), left        Plan:  POST-OP CATARACT INSTRUCTIONS    *   Use the following drop(s) in the RIGHT EYE four times a day:        CatarActive 3    *   Continue Cosopt (dorzolamide-timolol -- dark blue top) twice a day both eyes and Latanoprost (green top) at bedtime both eyes      *   Wear eye shield when sleeping for one week. Do not rub the operated eye.     *   No bending or lifting more than 10 pounds for one week.    *   Keep water out of eye for two weeks.    *   OK to resume aspirin and/or other blood thinners if you stopped.     *   Return as scheduled in about one week.    *   If your vision worsens, eye becomes increasingly red, or becomes painful, call [phone].     Kvng Garcia M.D.          Again, thank you for allowing me to participate in the care of your patient.        Sincerely,        Kvng Garcia MD

## 2020-10-20 NOTE — PROGRESS NOTES
Current Eye Medications:  CatarActive 3 right eye three times yesterday after getting home.    cosopt both eyes twice a day, Latanoprost both eyes every evening.  Last drops:  7am.     Subjective:  Status/Post Kelman Phacoemulsification with Posterior Chamber Lens, right eye:  10-19-20.  Right eye feels a little gritty and has been watering slightly.  Slept well.      Objective:  See Ophthalmology Exam.       Assessment:  Cydney Christiansen is a 84 year old female who presents with:   Encounter Diagnoses   Name Primary?     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      PXF (PSEUDOEXFOLIATION OF LENS CAPSULE) OU      Pseudoexfoliative glaucoma, both eyes, mild stage      PVD (posterior vitreous detachment), left        Plan:  POST-OP CATARACT INSTRUCTIONS    *   Use the following drop(s) in the RIGHT EYE four times a day:        CatarActive 3    *   Continue Cosopt (dorzolamide-timolol -- dark blue top) twice a day both eyes and Latanoprost (green top) at bedtime both eyes      *   Wear eye shield when sleeping for one week. Do not rub the operated eye.     *   No bending or lifting more than 10 pounds for one week.    *   Keep water out of eye for two weeks.    *   OK to resume aspirin and/or other blood thinners if you stopped.     *   Return as scheduled in about one week.    *   If your vision worsens, eye becomes increasingly red, or becomes painful, call [phone].     Kvng Garcia M.D.

## 2020-10-28 ENCOUNTER — OFFICE VISIT (OUTPATIENT)
Dept: OPHTHALMOLOGY | Facility: CLINIC | Age: 85
End: 2020-10-28
Payer: COMMERCIAL

## 2020-10-28 DIAGNOSIS — H40.1431 PSEUDOEXFOLIATIVE GLAUCOMA, BOTH EYES, MILD STAGE: ICD-10-CM

## 2020-10-28 DIAGNOSIS — Z96.1 PSEUDOPHAKIA: Primary | ICD-10-CM

## 2020-10-28 DIAGNOSIS — H26.8 PXF (PSEUDOEXFOLIATION OF LENS CAPSULE): ICD-10-CM

## 2020-10-28 PROCEDURE — 99024 POSTOP FOLLOW-UP VISIT: CPT | Performed by: STUDENT IN AN ORGANIZED HEALTH CARE EDUCATION/TRAINING PROGRAM

## 2020-10-28 ASSESSMENT — VISUAL ACUITY
OD_SC: 20/40+3
METHOD: SNELLEN - LINEAR

## 2020-10-28 ASSESSMENT — SLIT LAMP EXAM - LIDS
COMMENTS: NORMAL
COMMENTS: NORMAL

## 2020-10-28 ASSESSMENT — REFRACTION_MANIFEST
OD_CYLINDER: SPHERE
OD_SPHERE: -0.50

## 2020-10-28 ASSESSMENT — TONOMETRY
IOP_METHOD: APPLANATION
OD_IOP_MMHG: 22

## 2020-10-28 ASSESSMENT — EXTERNAL EXAM - LEFT EYE: OS_EXAM: NORMAL

## 2020-10-28 ASSESSMENT — EXTERNAL EXAM - RIGHT EYE: OD_EXAM: NORMAL

## 2020-10-28 NOTE — PROGRESS NOTES
Current Eye Medications:  cataractive3 four times a day right eye   Latanoprost at bedtime in both eyes and Cosopt twice a day in both eyes.      Subjective:  Po2 right eye  Pt reports that she sees well and this eye feels fine.     Objective:  See Ophthalmology Exam.      Assessment:  Cydney Christiansen is a 84 year old female who presents with:     Pseudophakia - Both Eyes POW1 s/p CE/IOL right eye, doing well.      PXF (PSEUDOEXFOLIATION OF LENS CAPSULE) OU      Pseudoexfoliative glaucoma, both eyes, mild stage        Plan:  *   For the right eye, continue CatarActive3 four times a day until the bottle runs out.    * Continue Latanoprost (green top) at bedtime both eyes and Continue Cosopt (dorzolamide-timolol -- dark blue top) twice a day both eyes     *   Stop wearing eye shield.    *   No eye rubbing. May resume heavy lifting.    *   If you are taking glaucoma drops, continue as usual.    *   Return one month for final exam with glasses check.    *   If your vision worsens, eye becomes increasingly red, or becomes painful, call [phone].    Kvng Garcia M.D.

## 2020-10-28 NOTE — PATIENT INSTRUCTIONS
*   For the right eye, continue CatarActive3 four times a day until the bottle runs out.    * Continue Latanoprost (green top) at bedtime both eyes and Continue Cosopt (dorzolamide-timolol -- dark blue top) twice a day both eyes     *   Stop wearing eye shield.    *   No eye rubbing. May resume heavy lifting.    *   If you are taking glaucoma drops, continue as usual.    *   Return one month for final exam with glasses check.    *   If your vision worsens, eye becomes increasingly red, or becomes painful, call [phone].    Kvng Garcia M.D.

## 2020-10-28 NOTE — LETTER
10/28/2020         RE: Cydney Christiansen  [address]  [place] [address]        Dear Colleague,    Thank you for referring your patient, Cydney Christiansen, to the [facility]. Please see a copy of my visit note below.     Current Eye Medications:  cataractive3 four times a day right eye   Latanoprost at bedtime in both eyes and Cosopt twice a day in both eyes.      Subjective:  Po2 right eye  Pt reports that she sees well and this eye feels fine.     Objective:  See Ophthalmology Exam.      Assessment:  Cydney Christiansen is a 84 year old female who presents with:     Pseudophakia - Both Eyes POW1 s/p CE/IOL right eye, doing well.      PXF (PSEUDOEXFOLIATION OF LENS CAPSULE) OU      Pseudoexfoliative glaucoma, both eyes, mild stage        Plan:  *   For the right eye, continue CatarActive3 four times a day until the bottle runs out.    * Continue Latanoprost (green top) at bedtime both eyes and Continue Cosopt (dorzolamide-timolol -- dark blue top) twice a day both eyes     *   Stop wearing eye shield.    *   No eye rubbing. May resume heavy lifting.    *   If you are taking glaucoma drops, continue as usual.    *   Return one month for final exam with glasses check.    *   If your vision worsens, eye becomes increasingly red, or becomes painful, call [phone].    Kvng Garcia M.D.        Again, thank you for allowing me to participate in the care of your patient.        Sincerely,        Kvng Garcia MD

## 2020-11-05 ENCOUNTER — OFFICE VISIT (OUTPATIENT)
Dept: DERMATOLOGY | Facility: CLINIC | Age: 85
End: 2020-11-05
Payer: COMMERCIAL

## 2020-11-05 DIAGNOSIS — D04.61 SQUAMOUS CELL CARCINOMA IN SITU (SCCIS) OF SKIN OF RIGHT UPPER ARM: Primary | ICD-10-CM

## 2020-11-05 DIAGNOSIS — C44.519 BCC (BASAL CELL CARCINOMA), BACK: ICD-10-CM

## 2020-11-05 PROCEDURE — 17262 DSTRJ MAL LES T/A/L 1.1-2.0: CPT | Mod: GC | Performed by: DERMATOLOGY

## 2020-11-05 ASSESSMENT — PAIN SCALES - GENERAL: PAINLEVEL: NO PAIN (0)

## 2020-11-05 NOTE — NURSING NOTE
Cydney Christiansen's goals for this visit include:   Chief Complaint   Patient presents with     Derm Problem     Cydney is here today for Ed&C procedure on right upper forearm SCCiS and ED&C on mid back BCC       She requests these members of her care team be copied on today's visit information:     PCP: Estrellita Hernandez    Referring Provider:  No referring provider defined for this encounter.    There were no vitals taken for this visit.    Do you need any medication refills at today's visit? No    Ellie Low LPN

## 2020-11-05 NOTE — PROGRESS NOTES
Dermatology Procedure Note: Electrodesiccation and Curettage    PREOPERATIVE DIAGNOSIS: SCCIS  POSTOPERATIVE DIAGNOSIS: same    LOCATION: right upper forearm    PRE-OP SIZE: 0.7 x 0.6 cm   POST-OP SIZE: 1.3x1.2cm    Treatment options including electrodessiccation and curettage (ED and C), excision and topicals were reviewed.  The expected cure rates, healing times and anticipated scars of each option were discussed and the patient elects to proceed with ED and C.     The risks and benefits of the procedure were described to the patient.  These include but are not limited to bleeding, infection, scar, incomplete removal, and recurrence. Written informed consent was obtained. Time-out was performed. The above site was cleansed with and injected with 3 mL1% lidocaine with epinephrine. Once anesthesia was obtained, the site was prepped with Chlorhexidine and rinsed with sterile saline. The lesion was curetted with a 3mm curette in 3 directions with a 3mm margin and this was followed by electrodessication.  This process was repeated three times. The defect measured 1.3x1.2 cm. Vaseline and a bandage were applied to the wound. The patient tolerated the procedure well and was given post care instructions.      Dermatology Procedure Note: Electrodesiccation and Curettage    PREOPERATIVE DIAGNOSIS: BCC  POSTOPERATIVE DIAGNOSIS: same    LOCATION: Mid back    PRE-OP SIZE: 1.3X0.8 cm   POST-OP SIZE: 1.9x1.4cm    Treatment options including electrodessiccation and curettage (ED and C), excision and topicals were reviewed.  The expected cure rates, healing times and anticipated scars of each option were discussed and the patient elects to proceed with ED and C.     The risks and benefits of the procedure were described to the patient.  These include but are not limited to bleeding, infection, scar, incomplete removal, and recurrence. Written informed consent was obtained. Time-out was performed. The above site was cleansed with  and injected with 3mL1% lidocaine with epinephrine. Once anesthesia was obtained, the site was prepped with Chlorhexidine and rinsed with sterile saline. The lesion was curetted with a 3mm curette in 3 directions with a 3mm margin and this was followed by electrodessication.  This process was repeated three times. The defect measured 1.9 x 1.4 cm. Vaseline and a bandage were applied to the wound. The patient tolerated the procedure well and was given post care instructions.      Clinical Follow-up: 3 months FBSE with Dr. Barba to address problems not addressed at last skin check    Dr. Haro staffed the patient.     Staff Involved:  Scribe Disclosure:   I, April Love, am serving as a scribe to document services personally performed by Dr. Haro, based on data collection and the provider's statements to me.     David Guerin MD  PGY-6    Micrographic Surgery and Dermatologic Oncology Fellow  November 5, 2020    Staff Physician Comments:   I saw and evaluated the patient with the Mohs Surgery Fellow (Dr. David Guerin) and I agree with the assessment and plan and the above description of the procedure as recorded by the scribe. I was present for the key portions of the procedure and entire exam.    Ian Haro DO    Department of Dermatology  [facility]: Phone: [phone], Fax:[phone]  [facility]: Phone: [phone], Fax: [phone]

## 2020-11-05 NOTE — LETTER
11/5/2020         RE: Cydney Christiansen  [address]  [place] [address]        Dear Colleague,    Thank you for referring your patient, Cydney Christiansen, to the [facility]. Please see a copy of my visit note below.    Dermatology Procedure Note: Electrodesiccation and Curettage    PREOPERATIVE DIAGNOSIS: SCCIS  POSTOPERATIVE DIAGNOSIS: same    LOCATION: right upper forearm    PRE-OP SIZE: 0.7 x 0.6 cm   POST-OP SIZE: 1.3x1.2cm    Treatment options including electrodessiccation and curettage (ED and C), excision and topicals were reviewed.  The expected cure rates, healing times and anticipated scars of each option were discussed and the patient elects to proceed with ED and C.     The risks and benefits of the procedure were described to the patient.  These include but are not limited to bleeding, infection, scar, incomplete removal, and recurrence. Written informed consent was obtained. Time-out was performed. The above site was cleansed with and injected with 3 mL1% lidocaine with epinephrine. Once anesthesia was obtained, the site was prepped with Chlorhexidine and rinsed with sterile saline. The lesion was curetted with a 3mm curette in 3 directions with a 3mm margin and this was followed by electrodessication.  This process was repeated three times. The defect measured 1.3x1.2 cm. Vaseline and a bandage were applied to the wound. The patient tolerated the procedure well and was given post care instructions.      Dermatology Procedure Note: Electrodesiccation and Curettage    PREOPERATIVE DIAGNOSIS: BCC  POSTOPERATIVE DIAGNOSIS: same    LOCATION: Mid back    PRE-OP SIZE: 1.3X0.8 cm   POST-OP SIZE: 1.9x1.4cm    Treatment options including electrodessiccation and curettage (ED and C), excision and topicals were reviewed.  The expected cure rates, healing times and anticipated scars of each option were discussed and the patient elects to proceed with ED and C.     The risks  and benefits of the procedure were described to the patient.  These include but are not limited to bleeding, infection, scar, incomplete removal, and recurrence. Written informed consent was obtained. Time-out was performed. The above site was cleansed with and injected with 3mL1% lidocaine with epinephrine. Once anesthesia was obtained, the site was prepped with Chlorhexidine and rinsed with sterile saline. The lesion was curetted with a 3mm curette in 3 directions with a 3mm margin and this was followed by electrodessication.  This process was repeated three times. The defect measured 1.9 x 1.4 cm. Vaseline and a bandage were applied to the wound. The patient tolerated the procedure well and was given post care instructions.      Clinical Follow-up: 3 months FBSE with Dr. Barba to address problems not addressed at last skin check    Dr. Haro staffed the patient.     Staff Involved:  Scribe Disclosure:   I, April Love, am serving as a scribe to document services personally performed by Dr. Haro, based on data collection and the provider's statements to me.     David Guerin MD  PGY-6    Micrographic Surgery and Dermatologic Oncology Fellow  November 5, 2020    Staff Physician Comments:   I saw and evaluated the patient with the Mohs Surgery Fellow (Dr. David Guerin) and I agree with the assessment and plan and the above description of the procedure as recorded by the scribe. I was present for the key portions of the procedure and entire exam.    Ian Haro DO    Department of Dermatology  [facility]: Phone: [phone], Fax:[phone]  [facility]: Phone: [phone], Fax: [phone]      Again, thank you for allowing me to participate in the care of your patient.        Sincerely,        Ian Haro MD

## 2020-11-05 NOTE — PATIENT INSTRUCTIONS
Wound Care:  Electrodesiccation and Curettage     I will experience scar, altered skin color, bleeding, swelling, pain, crusting and redness. I may experience altered sensation. Risks are excessive bleeding, infection, muscle weakness, thick (hypertrophic or keloidal) scar, and recurrence,. A second procedure may be recommended to obtain the best cosmetic or functional result.    What is electrodesiccation and curettage ?    Scraping off tissue (curettage)    Destroy tissue using electric current or cautery (electrodessication)    How do I perform wound care?    Keep dressing in place for two days. You may shower with the dressing in place(do not get wet)    After 2 days, wash hands and remove dressing. Clean wound with cotton-swab soaked in hydrogen peroxide to remove drainage and crust    Put on a thick layer of Vaseline on the wound using a cotton-swab     Cover the wound with a Band-AidTM to protect from dust and tight clothing    If wound is draining before two days, change your dressing as described above sooner    During wound care, do not allow the area to dry out or form a scab    What do I need?    Hydrogen peroxide     Cotton-swabs     Vaseline or petroleum jelly     Band-AidsTM or dressing supplies as needed     When should I call the doctor?  [facility]: [phone]  [facility]: [phone]  For urgent needs outside of business hours call the [facility] at [phone] and ask for the dermatology resident on call

## 2020-11-23 ENCOUNTER — OFFICE VISIT (OUTPATIENT)
Dept: OPHTHALMOLOGY | Facility: CLINIC | Age: 85
End: 2020-11-23
Payer: COMMERCIAL

## 2020-11-23 ENCOUNTER — APPOINTMENT (OUTPATIENT)
Dept: OPTOMETRY | Facility: CLINIC | Age: 85
End: 2020-11-23
Payer: COMMERCIAL

## 2020-11-23 DIAGNOSIS — H26.8 PXF (PSEUDOEXFOLIATION OF LENS CAPSULE): ICD-10-CM

## 2020-11-23 DIAGNOSIS — H40.1431 PSEUDOEXFOLIATIVE GLAUCOMA, BOTH EYES, MILD STAGE: ICD-10-CM

## 2020-11-23 DIAGNOSIS — Z96.1 PSEUDOPHAKIA: Primary | ICD-10-CM

## 2020-11-23 DIAGNOSIS — H43.812 PVD (POSTERIOR VITREOUS DETACHMENT), LEFT: ICD-10-CM

## 2020-11-23 PROCEDURE — V2784 LENS POLYCARB OR EQUAL: HCPCS | Mod: LT | Performed by: STUDENT IN AN ORGANIZED HEALTH CARE EDUCATION/TRAINING PROGRAM

## 2020-11-23 PROCEDURE — V2750 ANTI-REFLECTIVE COATING: HCPCS | Mod: LT | Performed by: STUDENT IN AN ORGANIZED HEALTH CARE EDUCATION/TRAINING PROGRAM

## 2020-11-23 PROCEDURE — V2020 VISION SVCS FRAMES PURCHASES: HCPCS | Performed by: STUDENT IN AN ORGANIZED HEALTH CARE EDUCATION/TRAINING PROGRAM

## 2020-11-23 PROCEDURE — V2203 LENS SPHCYL BIFOCAL 4.00D/.1: HCPCS | Mod: RT | Performed by: STUDENT IN AN ORGANIZED HEALTH CARE EDUCATION/TRAINING PROGRAM

## 2020-11-23 PROCEDURE — 99024 POSTOP FOLLOW-UP VISIT: CPT | Performed by: STUDENT IN AN ORGANIZED HEALTH CARE EDUCATION/TRAINING PROGRAM

## 2020-11-23 PROCEDURE — V2025 EYEGLASSES DELUX FRAMES: HCPCS | Performed by: STUDENT IN AN ORGANIZED HEALTH CARE EDUCATION/TRAINING PROGRAM

## 2020-11-23 ASSESSMENT — REFRACTION_WEARINGRX
OD_CYLINDER: +0.50
SPECS_TYPE: BIFOCAL-LINED
OS_CYLINDER: +0.50
OD_SPHERE: PLANO
OD_ADD: +3.25
OD_AXIS: 145
OS_ADD: +3.00
OS_SPHERE: -1.00
OS_AXIS: 179

## 2020-11-23 ASSESSMENT — VISUAL ACUITY
OD_SC: 20/80
METHOD: SNELLEN - LINEAR
OS_CC: 20/25
OD_SC+: -1
CORRECTION_TYPE: GLASSES

## 2020-11-23 ASSESSMENT — CUP TO DISC RATIO
OS_RATIO: 0.5
OD_RATIO: 0.6

## 2020-11-23 ASSESSMENT — REFRACTION_MANIFEST
OD_CYLINDER: +0.50
OS_ADD: +2.75
OS_SPHERE: -0.50
OD_ADD: +2.75
OS_AXIS: 170
OD_AXIS: 040
OS_CYLINDER: +0.75
OD_SPHERE: -1.25

## 2020-11-23 ASSESSMENT — EXTERNAL EXAM - LEFT EYE: OS_EXAM: NORMAL

## 2020-11-23 ASSESSMENT — SLIT LAMP EXAM - LIDS
COMMENTS: NORMAL
COMMENTS: NORMAL

## 2020-11-23 ASSESSMENT — TONOMETRY
OD_IOP_MMHG: 17
OS_IOP_MMHG: 15
IOP_METHOD: APPLANATION

## 2020-11-23 ASSESSMENT — EXTERNAL EXAM - RIGHT EYE: OD_EXAM: NORMAL

## 2020-11-23 NOTE — PROGRESS NOTES
Current Eye Medications:  Ran out of Cataractive 3 about 2 days ago. Dorz/timolol twice a day both eyes, last took at 8:30 am. Latanoprost at bedtime both eyes, last took 10:10 pm.     Subjective:  Final MR, KPE/IOL right eye 10/19/20. Vision is little blurry right eye, vision is doing well left eye. No eye pain or discomfort in either eye.      Objective:  See Ophthalmology Exam.       Assessment:  Cydney Christiansen is a 85 year old female who presents with:   Encounter Diagnoses   Name Primary?     Pseudophakia - Both Eyes Final MR right eye, doing well.        PXF (PSEUDOEXFOLIATION OF LENS CAPSULE) OU      Pseudoexfoliative glaucoma, both eyes, mild stage Intraocular pressure 17/15 today.      PVD (posterior vitreous detachment), left        Plan:    Continue Cosopt (dorzolamide-timolol -- dark blue top) twice a day both eyes and continue Latanoprost (green top) at bedtime both eyes        Fill prescription for new glasses given       Return to clinic in 3 months for glaucoma tests    Kvng Garcia M.D.  [phone]

## 2020-11-23 NOTE — PATIENT INSTRUCTIONS
Continue Cosopt (dorzolamide-timolol -- dark blue top) twice a day both eyes and continue Latanoprost (green top) at bedtime both eyes        Fill prescription for new glasses given       Return to clinic in 3 months for glaucoma tests    Kvng Garcia M.D.  [phone]

## 2020-11-23 NOTE — LETTER
11/23/2020         RE: Cydney Christiansen  [address]  [place] [address]        Dear Colleague,    Thank you for referring your patient, Cydney Christiansen, to the [facility]. Please see a copy of my visit note below.     Current Eye Medications:  Ran out of Cataractive 3 about 2 days ago. Dorz/timolol twice a day both eyes, last took at 8:30 am. Latanoprost at bedtime both eyes, last took 10:10 pm.     Subjective:  Final MR, KPE/IOL right eye 10/19/20. Vision is little blurry right eye, vision is doing well left eye. No eye pain or discomfort in either eye.      Objective:  See Ophthalmology Exam.       Assessment:  Cydney Christiansen is a 85 year old female who presents with:   Encounter Diagnoses   Name Primary?     Pseudophakia - Both Eyes Final MR right eye, doing well.        PXF (PSEUDOEXFOLIATION OF LENS CAPSULE) OU      Pseudoexfoliative glaucoma, both eyes, mild stage Intraocular pressure 17/15 today.      PVD (posterior vitreous detachment), left        Plan:    Continue Cosopt (dorzolamide-timolol -- dark blue top) twice a day both eyes and continue Latanoprost (green top) at bedtime both eyes        Fill prescription for new glasses given       Return to clinic in 3 months for glaucoma tests    Kvng Garcia M.D.  [phone]          Again, thank you for allowing me to participate in the care of your patient.        Sincerely,        Kvng Garcia MD

## 2021-01-13 NOTE — PROGRESS NOTES
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    Weight as of this encounter: 67.1 kg (148 lb).             Return in about 6 months (around 7/14/2021) for Physical Exam.    Estrellita Hernandez MD  [facility] CLAUDIA Lamas is a 85 year old who presents to clinic today for the following health issues     History of Present Illness       Hyperlipidemia:  She presents for follow up of hyperlipidemia.  She is taking medication to lower cholesterol. She is not having myalgia or other side effects to statin medications.    Hypertension: She presents for follow up of hypertension.  She does not check blood pressure  regularly outside of the clinic.  She does not follow a low salt diet.     She eats 2-3 servings of fruits and vegetables daily.She consumes 0 sweetened beverage(s) daily.She exercises with enough effort to increase her heart rate 20 to 29 minutes per day.  She exercises with enough effort to increase her heart rate 3 or less days per week.   She is taking medications regularly.        Review of Systems   CONSTITUTIONAL: NEGATIVE for fever, chills, change in weight  ENT/MOUTH: NEGATIVE for ear, mouth and throat problems  RESP: NEGATIVE for significant cough or SOB  CV: NEGATIVE for chest pain, palpitations or peripheral edema  GI: NEGATIVE for nausea, abdominal pain, heartburn, or change " "in bowel habits  MUSCULOSKELETAL: NEGATIVE for significant arthralgias or myalgia  ROS otherwise negative      Objective    /84   Pulse 75   Temp 98.7  F (37.1  C) (Oral)   Resp 18   Ht 1.53 m (5' 0.25\")   Wt 67.1 kg (148 lb)   SpO2 98%   BMI 28.66 kg/m    Body mass index is 28.66 kg/m .  Physical Exam   GENERAL: healthy, alert and no distress  NECK: no adenopathy, no asymmetry, masses, or scars and thyroid normal to palpation  RESP: lungs clear to auscultation - no rales, rhonchi or wheezes  CV: regular rate and rhythm, normal S1 S2, no S3 or S4, no murmur, click or rub, no peripheral edema and peripheral pulses strong  ABDOMEN: soft, nontender, no hepatosplenomegaly, no masses and bowel sounds normal  MS: no gross musculoskeletal defects noted, no edema  PSYCH: mentation appears normal, affect normal/bright    Pending labs            "

## 2021-01-14 ENCOUNTER — OFFICE VISIT (OUTPATIENT)
Dept: FAMILY MEDICINE | Facility: CLINIC | Age: 86
End: 2021-01-14
Payer: COMMERCIAL

## 2021-01-14 VITALS
TEMPERATURE: 98.7 F | RESPIRATION RATE: 18 BRPM | HEART RATE: 75 BPM | OXYGEN SATURATION: 98 % | HEIGHT: 60 IN | BODY MASS INDEX: 29.06 KG/M2 | SYSTOLIC BLOOD PRESSURE: 134 MMHG | DIASTOLIC BLOOD PRESSURE: 84 MMHG | WEIGHT: 148 LBS

## 2021-01-14 DIAGNOSIS — E78.5 HYPERLIPIDEMIA LDL GOAL <160: ICD-10-CM

## 2021-01-14 DIAGNOSIS — C50.912 INVASIVE DUCTAL CARCINOMA OF LEFT BREAST (H): ICD-10-CM

## 2021-01-14 DIAGNOSIS — Z83.3 FAMILY HISTORY OF DIABETES MELLITUS: ICD-10-CM

## 2021-01-14 DIAGNOSIS — Z78.0 ASYMPTOMATIC POSTMENOPAUSAL STATUS: ICD-10-CM

## 2021-01-14 DIAGNOSIS — I10 HYPERTENSION GOAL BP (BLOOD PRESSURE) < 140/90: ICD-10-CM

## 2021-01-14 PROCEDURE — 99213 OFFICE O/P EST LOW 20 MIN: CPT | Performed by: FAMILY MEDICINE

## 2021-01-14 RX ORDER — MECLIZINE HYDROCHLORIDE 25 MG/1
25 TABLET ORAL EVERY 6 HOURS PRN
Qty: 30 TABLET | Refills: 1 | Status: CANCELLED | OUTPATIENT
Start: 2021-01-14

## 2021-01-14 ASSESSMENT — PAIN SCALES - GENERAL: PAINLEVEL: NO PAIN (0)

## 2021-01-14 ASSESSMENT — MIFFLIN-ST. JEOR: SCORE: 1051.79

## 2021-02-18 ENCOUNTER — OFFICE VISIT (OUTPATIENT)
Dept: DERMATOLOGY | Facility: CLINIC | Age: 86
End: 2021-02-18
Payer: COMMERCIAL

## 2021-02-18 DIAGNOSIS — Z85.828 HISTORY OF NONMELANOMA SKIN CANCER: Primary | ICD-10-CM

## 2021-02-18 DIAGNOSIS — D48.5 NEOPLASM OF UNCERTAIN BEHAVIOR OF SKIN: ICD-10-CM

## 2021-02-18 PROCEDURE — 88312 SPECIAL STAINS GROUP 1: CPT | Performed by: DERMATOLOGY

## 2021-02-18 PROCEDURE — 11103 TANGNTL BX SKIN EA SEP/ADDL: CPT | Performed by: DERMATOLOGY

## 2021-02-18 PROCEDURE — 11102 TANGNTL BX SKIN SINGLE LES: CPT | Performed by: DERMATOLOGY

## 2021-02-18 PROCEDURE — 88305 TISSUE EXAM BY PATHOLOGIST: CPT | Performed by: DERMATOLOGY

## 2021-02-18 ASSESSMENT — PAIN SCALES - GENERAL: PAINLEVEL: NO PAIN (0)

## 2021-02-18 NOTE — PATIENT INSTRUCTIONS
[facility] Dermatology Visit    Thank you for allowing us to participate in your care. Your findings, instructions and follow-up plan are as follows:         When should I call my doctor?    If you are worsening or not improving, please, contact us or seek urgent care as noted below.     Who should I call with questions (adults)?    [facility] (adult and pediatric): [phone]     [facility] (adult): [phone]    For urgent needs outside of business hours call the [facility] at [phone] and ask for the dermatology resident on call    If this is a medical emergency and you are unable to reach an ER, Call 911      Who should I call with questions (pediatric)?  [facility]- Pediatric Dermatology  Dr. Bette Soto, Dr. Laine Frost, Dr. Fern Keenan, Mariama Uribe, PA  Dr. Tiffanie Garza, Dr. Kathy Borja & Dr. Tyrone Vicente  Non Urgent  Nurse Triage Line; [phone]- Carola and Thao RN Care Coordinators   Lakisha (/Complex ) [phone]    If you need a prescription refill, please contact your pharmacy. Refills are approved or denied by our Physicians during normal business hours, Monday through Fridays  Per office policy, refills will not be granted if you have not been seen within the past year (or sooner depending on your child's condition)    Scheduling Information:  Pediatric Appointment Scheduling and Call Center [phone]  Radiology Scheduling- [phone]  Sedation Unit Scheduling- [phone]  [place] Scheduling- General [phone]; Pediatric Dermatology [phone]  Main  Services: [phone]  Maori: [phone]  Trinidadian: [phone]  Hmong/Setswana/Yi: [phone]  Preadmission Nursing Department Fax Number: [phone] (Fax all pre-operative paperwork to this number)    For urgent matters arising during evenings,  weekends, or holidays that cannot wait for normal business hours please call [phone] and ask for the Dermatology Resident On-Call to be paged.      Wound Care After a Biopsy    What is a skin biopsy?  A skin biopsy allows the doctor to examine a very small piece of tissue under the microscope to determine the diagnosis and the best treatment for the skin condition. A local anesthetic (numbing medicine)  is injected with a very small needle into the skin area to be tested. A small piece of skin is taken from the area. Sometimes a suture (stitch) is used.     What are the risks of a skin biopsy?  I will experience scar, bleeding, swelling, pain, crusting and redness. I may experience incomplete removal or recurrence. Risks of this procedure are excessive bleeding, bruising, infection, nerve damage, numbness, thick (hypertrophic or keloidal) scar and non-diagnostic biopsy.    How should I care for my wound for the first 24 hours?    Keep the wound dry and covered for 24 hours    If it bleeds, hold direct pressure on the area for 15 minutes. If bleeding does not stop then go to the emergency room    Avoid strenuous exercise the first 1-2 days or as your doctor instructs you    How should I care for the wound after 24 hours?    After 24 hours, remove the bandage    You may bathe or shower as normal    If you had a scalp biopsy, you can shampoo as usual and can use shower water to clean the biopsy site daily    Clean the wound twice a day with gentle soap and water    Do not scrub, be gentle    Apply white petroleum/Vaseline after cleaning the wound with a cotton swab or a clean finger, and keep the site covered with a Bandaid /bandage. Bandages are not necessary with a scalp biopsy    If you are unable to cover the site with a Bandaid /bandage, re-apply ointment 2-3 times a day to keep the site moist. Moisture will help with healing    Avoid strenuous activity for first 1-2 days    Avoid lakes, rivers, pools, and  oceans until the stitches are removed or the site is healed    How do I clean my wound?    Wash hands thoroughly with soap or use hand  before all wound care    Clean the wound with gentle soap and water    Apply white petroleum/Vaseline  to wound after it is clean    Replace the Bandaid /bandage to keep the wound covered for the first few days or as instructed by your doctor    If you had a scalp biopsy, warm shower water to the area on a daily basis should suffice    What should I use to clean my wound?     Cotton-tipped applicators (Qtips )    White petroleum jelly (Vaseline ). Use a clean new container and use Q-tips to apply.    Bandaids   as needed    Gentle soap     How should I care for my wound long term?    Do not get your wound dirty    Keep up with wound care for one week or until the area is healed.    A small scab will form and fall off by itself when the area is completely healed. The area will be red and will become pink in color as it heals. Sun protection is very important for how your scar will turn out. Sunscreen with an SPF 30 or greater is recommended once the area is healed.    You should have some soreness but it should be mild and slowly go away over several days. Talk to your doctor about using tylenol for pain,    When should I call my doctor?  If you have increased:     Pain or swelling    Pus or drainage (clear or slightly yellow drainage is ok)    Temperature over 100F    Spreading redness or warmth around wound    When will I hear about my results?  The biopsy results can take 2-3 weeks to come back. The clinic will call you with the results, send you a GradFlyt message, or have you schedule a follow-up clinic or phone time to discuss the results. Contact our clinics if you do not hear from us in 3 weeks.     Who should I call with questions?    [facility]: [phone]     [facility]: [phone]    For urgent  needs outside of business hours call the [facility] at [phone] and ask for the dermatology resident on call

## 2021-02-18 NOTE — LETTER
2/18/2021         RE: Cydney Christiansen  [address]  [place] [address]        Dear Colleague,    Thank you for referring your patient, Cydney Christiansen, to the [facility]. Please see a copy of my visit note below.    [facility] Dermatology Note  Encounter Date: Feb 18, 2021  Office Visit     Dermatology Problem List:  1. NMSC  -BCC, mid back s/p ED and C 11/2020  -SCCIS, right upper arm s/p ED and C 11/2020  -SCCIS, left forearm s/p excision 3/6/2020  -SCCis Left superior forearm, s/p: ED and C 9/30/2019  -SCCis right temple, s/p:  Mohs 9/30/2019  -SCCIS, right nasal sidewall, s/p Mohs 4/1/19   -SCCIS, right upper arm, s/p excision 8/9/18   -SCC, left popliteal fossa, well differentiated with focal perineural invasion but with clear margins on path, s/p excision by Dr. Mcgee 7/2013  -SCCIS arising from solar keratosis, right cheek, 4/2013  -SCC, right dorsal hand, s/p excision with SCCIS extending to the margin 1/7/13  -SCC, bowenoid type, upper back, s/p excision 2011  -BCC, superficial, left back, 2/2011  -BCC, superficial, left neck, 2011, elected for ED&C  -SCCIS, right upper forearm 11/5/2020 MOHS     2. 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 HAK, left mid eyebrow, base not seen, 6/2014  4. Actinic keratosis  -s/p cryotherapy  -left forearm, s/p biopsy 3/15/19   5. GA, right angle of jaw: resolves with triamcinolone cream  6. SK, right abdomen, s/p biopsy 2/18/2020    7. Eczematous patch R dorsal hand  -previous Tx: triamcinolone 0.01% cream, Vaseline       ____________________________________________    Assessment & Plan:    1.   History of nonmelanoma skin cancer     Recommend sunscreens SPF #30 or greater, protective clothing and avoidance of tanning beds.         2.HX of NMSC, last biopsy 9/2020 with bcc mid back and SCC right upper arm      3. NUB - Bright red 4 mm macule on the left 4th digit  - Shave biopsy: Location(s) Left 4th digit.   After discussion of benefits and risks including but not limited to bleeding/bruising, pain/swelling, infection, scar, incomplete removal, nerve damage/numbness, recurrence, and non-diagnostic biopsy, written consent, verbal consent and photographs were obtained. Time-out was performed. The area was cleaned with isopropyl alcohol. 0.5mL of 1% lidocaine with epinephrine was injected to obtain adequate anesthesia of each lesion. Shave biopsy was performed. Hemostasis was achieved with aluminium chloride. Vaseline and a sterile dressing were applied. The patient tolerated the procedure and no complications were noted. The patient was provided with verbal and written post care instructions.        4. NUB, 1 cm scaly red patch left posterior lower leg, DDx SCC  - Shave biopsy: Location(s) Left posterior lower leg.  After discussion of benefits and risks including but not limited to bleeding/bruising, pain/swelling, infection, scar, incomplete removal, nerve damage/numbness, recurrence, and non-diagnostic biopsy, written consent, verbal consent and photographs were obtained. Time-out was performed. The area was cleaned with isopropyl alcohol. 0.5mL of 1% lidocaine with epinephrine was injected to obtain adequate anesthesia of each lesion. Shave biopsy was performed. Hemostasis was achieved with aluminium chloride. Vaseline and a sterile dressing were applied. The patient tolerated the procedure and no complications were noted. The patient was provided with verbal and written post care instructions.     5.  NUB, Irregularly shaped brown papule on the right posterior lower leg, DDx SK or SCC   Shave biopsy: Location(s) Right posterior lower leg.  After discussion of benefits and risks including but not limited to bleeding/bruising, pain/swelling, infection, scar, incomplete removal, nerve damage/numbness, recurrence, and non-diagnostic biopsy, written consent, verbal consent and photographs were obtained. Time-out was  performed. The area was cleaned with isopropyl alcohol. 0.5mL of 1% lidocaine with epinephrine was injected to obtain adequate anesthesia of each lesion. Shave biopsy was performed. Hemostasis was achieved with aluminium chloride. Vaseline and a sterile dressing were applied. The patient tolerated the procedure and no complications were noted. The patient was provided with verbal and written post care instructions.       6. NUB - Left forehead 5mm verrucous papule, most likely wart  -Shave biopsy: Location(s) Left forehead.  After discussion of benefits and risks including but not limited to bleeding/bruising, pain/swelling, infection, scar, incomplete removal, nerve damage/numbness, recurrence, and non-diagnostic biopsy, written consent, verbal consent and photographs were obtained. Time-out was performed. The area was cleaned with isopropyl alcohol. 0.5mL of 1% lidocaine with epinephrine was injected to obtain adequate anesthesia of each lesion. Shave biopsy was performed. Hemostasis was achieved with aluminium chloride. Vaseline and a sterile dressing were applied. The patient tolerated the procedure and no complications were noted. The patient was provided with verbal and written post care instructions.          Procedures Performed:   NA    Follow-up: 5 month(s) in-person, or earlier for new or changing lesions    Staff and Scribe:     Scribe Disclosure:   ITavo, am serving as a scribe to document services personally performed by Dr. Barba, based on data collection and the provider's statements to me.   [id number], MEDICAL ASSISTANT       Provider Disclosure:   The documentation recorded by the scribe accurately reflects the services I personally performed and the decisions made by me.    Qi Barba MD    Department of Dermatology  [facility]: Phone: [phone], Fax:[phone]  [facility]  [facility]: Phone: [phone], Fax: [phone]      ____________________________________________    CC: No chief complaint on file.    HPI  Ms. Cydney Christiansen is a(n) 85 year old female who presents today as a return patient for biopsies. At last visit had 2 skin cancers treated. Pending possibly biopsied today.     Patient is otherwise feeling well, without additional concerns. No changes in medical problems.     Today, patient reported left lateral cheek and right nasal sidewall recheck.        Labs:  FINAL DIAGNOSIS:   A. Skin, right dorsal hand, distal, shave:   - Macular seborrheic keratosis - (see description)     B. Skin, right dorsal hand, proximal, shave:   - Macular seborrheic keratosis - (see description)     C. Skin, mid back, shave:   - Basal cell carcinoma, superficial type, extending to the lateral and   deep margins - (see description)     D. Skin, right upper forearm, shave:   - Squamous cell carcinoma in situ - (see description)     Physical Exam:  Vitals: There were no vitals taken for this visit.  SKIN: Focused examination of 3 areas was performed was performed.  - Ugarte skin typeII  -1cm scaly patch left posterior lower leg  -Irregularly shaped brown papule on the right posterior lower leg  -left 4th digit with red macule  - Left forehead 5mm verrucous papule, most likely wart  - No other lesions of concern on areas examined.     Medications:  Current Outpatient Medications   Medication     CALCIUM 600 MG OR TABS     cyanocobalamin (VITAMIN B-12) 500 MCG tablet     dorzolamide-timolol (COSOPT) 2-0.5 % ophthalmic solution     ferrous sulfate (FEROSUL) 325 (65 Fe) MG tablet     ibuprofen (ADVIL,MOTRIN) 200 MG tablet     latanoprost (XALATAN) 0.005 % ophthalmic solution     losartan (COZAAR) 25 MG tablet     meclizine (ANTIVERT) 25 MG tablet     Multiple Vitamins-Minerals (ONE DAILY ADULTS 50+) TABS     simvastatin (ZOCOR) 20 MG tablet     triamcinolone (KENALOG) 0.1 % cream      triamcinolone 0.1 % EX external cream     VITAMIN D-1000 MAX -1000 MG-UNIT OR TABS     No current facility-administered medications for this visit.       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     Hypertension goal BP (blood pressure) < 140/90     Seborrheic keratosis     Malignant neoplasm of overlapping sites of left female breast (H)     Scoliosis     Compression fracture of thoracic vertebra (H)     Mass of left hand     Primary open angle glaucoma of both eyes, unspecified glaucoma stage     Osteoporosis, unspecified osteoporosis type, unspecified pathological fracture presence     Nuclear sclerosis of left eye     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)     Neoplasm of uncertain behavior of skin     Actinic keratosis     History of nonmelanoma skin cancer     Combined forms of 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 on close of this encounter.        Again, thank you for allowing me to participate in the care of your patient.        Sincerely,        Qi Barba MD

## 2021-02-18 NOTE — PROGRESS NOTES
[facility] Dermatology Note  Encounter Date: Feb 18, 2021  Office Visit     Dermatology Problem List:  1. NMSC  -BCC, mid back s/p ED and C 11/2020  -SCCIS, right upper arm s/p ED and C 11/2020  -SCCIS, left forearm s/p excision 3/6/2020  -SCCis Left superior forearm, s/p: ED and C 9/30/2019  -SCCis right temple, s/p:  Mohs 9/30/2019  -SCCIS, right nasal sidewall, s/p Mohs 4/1/19   -SCCIS, right upper arm, s/p excision 8/9/18   -SCC, left popliteal fossa, well differentiated with focal perineural invasion but with clear margins on path, s/p excision by Dr. Mcgee 7/2013  -SCCIS arising from solar keratosis, right cheek, 4/2013  -SCC, right dorsal hand, s/p excision with SCCIS extending to the margin 1/7/13  -SCC, bowenoid type, upper back, s/p excision 2011  -BCC, superficial, left back, 2/2011  -BCC, superficial, left neck, 2011, elected for ED&C  -SCCIS, right upper forearm 11/5/2020 MOHS     2. 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 HAK, left mid eyebrow, base not seen, 6/2014  4. Actinic keratosis  -s/p cryotherapy  -left forearm, s/p biopsy 3/15/19   5. GA, right angle of jaw: resolves with triamcinolone cream  6. SK, right abdomen, s/p biopsy 2/18/2020    7. Eczematous patch R dorsal hand  -previous Tx: triamcinolone 0.01% cream, Vaseline       ____________________________________________    Assessment & Plan:    1.   History of nonmelanoma skin cancer     Recommend sunscreens SPF #30 or greater, protective clothing and avoidance of tanning beds.         2.HX of NMSC, last biopsy 9/2020 with bcc mid back and SCC right upper arm      3. NUB - Bright red 4 mm macule on the left 4th digit  - Shave biopsy: Location(s) Left 4th digit.  After discussion of benefits and risks including but not limited to bleeding/bruising, pain/swelling, infection, scar, incomplete removal, nerve damage/numbness, recurrence, and non-diagnostic biopsy, written consent, verbal consent and photographs were  obtained. Time-out was performed. The area was cleaned with isopropyl alcohol. 0.5mL of 1% lidocaine with epinephrine was injected to obtain adequate anesthesia of each lesion. Shave biopsy was performed. Hemostasis was achieved with aluminium chloride. Vaseline and a sterile dressing were applied. The patient tolerated the procedure and no complications were noted. The patient was provided with verbal and written post care instructions.        4. NUB, 1 cm scaly red patch left posterior lower leg, DDx SCC  - Shave biopsy: Location(s) Left posterior lower leg.  After discussion of benefits and risks including but not limited to bleeding/bruising, pain/swelling, infection, scar, incomplete removal, nerve damage/numbness, recurrence, and non-diagnostic biopsy, written consent, verbal consent and photographs were obtained. Time-out was performed. The area was cleaned with isopropyl alcohol. 0.5mL of 1% lidocaine with epinephrine was injected to obtain adequate anesthesia of each lesion. Shave biopsy was performed. Hemostasis was achieved with aluminium chloride. Vaseline and a sterile dressing were applied. The patient tolerated the procedure and no complications were noted. The patient was provided with verbal and written post care instructions.     5.  NUB, Irregularly shaped brown papule on the right posterior lower leg, DDx SK or SCC   Shave biopsy: Location(s) Right posterior lower leg.  After discussion of benefits and risks including but not limited to bleeding/bruising, pain/swelling, infection, scar, incomplete removal, nerve damage/numbness, recurrence, and non-diagnostic biopsy, written consent, verbal consent and photographs were obtained. Time-out was performed. The area was cleaned with isopropyl alcohol. 0.5mL of 1% lidocaine with epinephrine was injected to obtain adequate anesthesia of each lesion. Shave biopsy was performed. Hemostasis was achieved with aluminium chloride. Vaseline and a sterile  dressing were applied. The patient tolerated the procedure and no complications were noted. The patient was provided with verbal and written post care instructions.       6. NUB - Left forehead 5mm verrucous papule, most likely wart  -Shave biopsy: Location(s) Left forehead.  After discussion of benefits and risks including but not limited to bleeding/bruising, pain/swelling, infection, scar, incomplete removal, nerve damage/numbness, recurrence, and non-diagnostic biopsy, written consent, verbal consent and photographs were obtained. Time-out was performed. The area was cleaned with isopropyl alcohol. 0.5mL of 1% lidocaine with epinephrine was injected to obtain adequate anesthesia of each lesion. Shave biopsy was performed. Hemostasis was achieved with aluminium chloride. Vaseline and a sterile dressing were applied. The patient tolerated the procedure and no complications were noted. The patient was provided with verbal and written post care instructions.          Procedures Performed:   NA    Follow-up: 5 month(s) in-person, or earlier for new or changing lesions    Staff and Scribe:     Scribe Disclosure:   I, Tavo, am serving as a scribe to document services personally performed by Dr. Barba, based on data collection and the provider's statements to me.   [id number], MEDICAL ASSISTANT       Provider Disclosure:   The documentation recorded by the scribe accurately reflects the services I personally performed and the decisions made by me.    Qi Barba MD    Department of Dermatology  [facility]: Phone: [phone], Fax:[phone]  [facility]: Phone: [phone], Fax: [phone]      ____________________________________________    CC: No chief complaint on file.    HPI  Ms. Cydney Christiansen is a(n) 85 year old female who presents today as a return patient for biopsies. At last visit  had 2 skin cancers treated. Pending possibly biopsied today.     Patient is otherwise feeling well, without additional concerns. No changes in medical problems.     Today, patient reported left lateral cheek and right nasal sidewall recheck.        Labs:  FINAL DIAGNOSIS:   A. Skin, right dorsal hand, distal, shave:   - Macular seborrheic keratosis - (see description)     B. Skin, right dorsal hand, proximal, shave:   - Macular seborrheic keratosis - (see description)     C. Skin, mid back, shave:   - Basal cell carcinoma, superficial type, extending to the lateral and   deep margins - (see description)     D. Skin, right upper forearm, shave:   - Squamous cell carcinoma in situ - (see description)     Physical Exam:  Vitals: There were no vitals taken for this visit.  SKIN: Focused examination of 3 areas was performed was performed.  - Ugarte skin typeII  -1cm scaly patch left posterior lower leg  -Irregularly shaped brown papule on the right posterior lower leg  -left 4th digit with red macule  - Left forehead 5mm verrucous papule, most likely wart  - No other lesions of concern on areas examined.     Medications:  Current Outpatient Medications   Medication     CALCIUM 600 MG OR TABS     cyanocobalamin (VITAMIN B-12) 500 MCG tablet     dorzolamide-timolol (COSOPT) 2-0.5 % ophthalmic solution     ferrous sulfate (FEROSUL) 325 (65 Fe) MG tablet     ibuprofen (ADVIL,MOTRIN) 200 MG tablet     latanoprost (XALATAN) 0.005 % ophthalmic solution     losartan (COZAAR) 25 MG tablet     meclizine (ANTIVERT) 25 MG tablet     Multiple Vitamins-Minerals (ONE DAILY ADULTS 50+) TABS     simvastatin (ZOCOR) 20 MG tablet     triamcinolone (KENALOG) 0.1 % cream     triamcinolone 0.1 % EX external cream     VITAMIN D-1000 MAX -1000 MG-UNIT OR TABS     No current facility-administered medications for this visit.       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     Hypertension goal BP (blood pressure) < 140/90     Seborrheic keratosis     Malignant neoplasm of overlapping sites of left female breast (H)     Scoliosis     Compression fracture of thoracic vertebra (H)     Mass of left hand     Primary open angle glaucoma of both eyes, unspecified glaucoma stage     Osteoporosis, unspecified osteoporosis type, unspecified pathological fracture presence     Nuclear sclerosis of left eye     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)     Neoplasm of uncertain behavior of skin     Actinic keratosis     History of nonmelanoma skin cancer     Combined forms of 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 on close of this encounter.

## 2021-02-23 ENCOUNTER — OFFICE VISIT (OUTPATIENT)
Dept: OPHTHALMOLOGY | Facility: CLINIC | Age: 86
End: 2021-02-23
Payer: COMMERCIAL

## 2021-02-23 DIAGNOSIS — H43.812 PVD (POSTERIOR VITREOUS DETACHMENT), LEFT: ICD-10-CM

## 2021-02-23 DIAGNOSIS — H26.8 PXF (PSEUDOEXFOLIATION OF LENS CAPSULE): ICD-10-CM

## 2021-02-23 DIAGNOSIS — Z96.1 PSEUDOPHAKIA: ICD-10-CM

## 2021-02-23 DIAGNOSIS — H40.1431 PSEUDOEXFOLIATIVE GLAUCOMA, BOTH EYES, MILD STAGE: Primary | ICD-10-CM

## 2021-02-23 PROCEDURE — 92083 EXTENDED VISUAL FIELD XM: CPT | Performed by: STUDENT IN AN ORGANIZED HEALTH CARE EDUCATION/TRAINING PROGRAM

## 2021-02-23 PROCEDURE — 92012 INTRM OPH EXAM EST PATIENT: CPT | Performed by: STUDENT IN AN ORGANIZED HEALTH CARE EDUCATION/TRAINING PROGRAM

## 2021-02-23 PROCEDURE — 92133 CPTRZD OPH DX IMG PST SGM ON: CPT | Performed by: STUDENT IN AN ORGANIZED HEALTH CARE EDUCATION/TRAINING PROGRAM

## 2021-02-23 ASSESSMENT — REFRACTION_MANIFEST
OD_ADD: +2.75
OS_AXIS: 172
OD_AXIS: 125
OS_CYLINDER: +0.75
OS_ADD: +2.75
OS_SPHERE: -0.50
OD_SPHERE: -1.25
OD_CYLINDER: +0.50

## 2021-02-23 ASSESSMENT — VISUAL ACUITY
OD_CC+: -1
OS_CC+: -1
OS_CC: 20/20
CORRECTION_TYPE: GLASSES
OD_CC: 20/30
METHOD: SNELLEN - LINEAR

## 2021-02-23 ASSESSMENT — REFRACTION_WEARINGRX
OS_SPHERE: -0.50
OD_AXIS: 040
OD_ADD: +2.75
OS_ADD: +2.75
OD_CYLINDER: +0.50
OD_SPHERE: -1.25
OS_CYLINDER: +0.75
SPECS_TYPE: BIFOCAL
OS_AXIS: 172

## 2021-02-23 ASSESSMENT — TONOMETRY
OD_IOP_MMHG: 22
OS_IOP_MMHG: 19
IOP_METHOD: APPLANATION

## 2021-02-23 ASSESSMENT — REFRACTION_FINALRX: OD_HPRISM: 2BI

## 2021-02-23 ASSESSMENT — CUP TO DISC RATIO
OD_RATIO: 0.6
OS_RATIO: 0.5

## 2021-02-23 ASSESSMENT — SLIT LAMP EXAM - LIDS
COMMENTS: NORMAL
COMMENTS: NORMAL

## 2021-02-23 ASSESSMENT — EXTERNAL EXAM - LEFT EYE: OS_EXAM: NORMAL

## 2021-02-23 ASSESSMENT — EXTERNAL EXAM - RIGHT EYE: OD_EXAM: NORMAL

## 2021-02-23 NOTE — PROGRESS NOTES
Current Eye Medications:  Dorz/timolol twice a day both eyes, last took at 8 am. Latanoprost at bedtime both eyes, last took at 10:15.     Subjective:  3 month follow up for VF, OCT, and IOP. Vision is blurry right eye, Vision is doing well left eye in distance. Thinks glasses may be wrong, would like it checked. Feels like left eye is pulling. No eye pain or discomfort in either eye.      Objective:  See Ophthalmology Exam.      Assessment:  Cydney Christiansen is a 85 year old female who presents with:   Encounter Diagnoses   Name Primary?     Pseudoexfoliative glaucoma, both eyes, mild stage Intraocular pressure 22/19 today with stable OCT and visual field both eyes. Continue same medications.    OCT optic nerve: avg retinal nerve fiber layer 66/69 - stable both eyes.     Brock visual field (HVF) 24-2: early inf arcuate defects both eyes.      PXF (PSEUDOEXFOLIATION OF LENS CAPSULE) OU      Pseudophakia - Both Eyes      PVD (posterior vitreous detachment), left        Plan:  Continue Latanoprost (green top) at bedtime both eyes     Continue Cosopt (dorzolamide-timolol -- dark blue top) twice a day both eyes    Updated glasses prescription given (recommend regrinding right lens with prism in it)    Kvng Garcia MD  [phone]

## 2021-02-23 NOTE — LETTER
2/23/2021         RE: Cydney Christiansen  [address]  [place] [address]        Dear Colleague,    Thank you for referring your patient, Cydney Christiansen, to the [facility]. Please see a copy of my visit note below.     Current Eye Medications:  Dorz/timolol twice a day both eyes, last took at 8 am. Latanoprost at bedtime both eyes, last took at 10:15.     Subjective:  3 month follow up for VF, OCT, and IOP. Vision is blurry right eye, Vision is doing well left eye in distance. Thinks glasses may be wrong, would like it checked. Feels like left eye is pulling. No eye pain or discomfort in either eye.      Objective:  See Ophthalmology Exam.      Assessment:  Cydney Christiansen is a 85 year old female who presents with:   Encounter Diagnoses   Name Primary?     Pseudoexfoliative glaucoma, both eyes, mild stage Intraocular pressure 22/19 today with stable OCT and visual field both eyes. Continue same medications.    OCT optic nerve: avg retinal nerve fiber layer 66/69 - stable both eyes.     Brock visual field (HVF) 24-2: early inf arcuate defects both eyes.      PXF (PSEUDOEXFOLIATION OF LENS CAPSULE) OU      Pseudophakia - Both Eyes      PVD (posterior vitreous detachment), left        Plan:  Continue Latanoprost (green top) at bedtime both eyes     Continue Cosopt (dorzolamide-timolol -- dark blue top) twice a day both eyes    Updated glasses prescription given (recommend regrinding right lens with prism in it)    Kvng Garcia MD  [phone]                 Again, thank you for allowing me to participate in the care of your patient.        Sincerely,        Kvng Garcia MD

## 2021-02-23 NOTE — PATIENT INSTRUCTIONS
Continue Latanoprost (green top) at bedtime both eyes     Continue Cosopt (dorzolamide-timolol -- dark blue top) twice a day both eyes    Updated glasses prescription given (recommend regrinding right lens with prism in it)    Kvng Garcia MD  [phone]

## 2021-02-24 ENCOUNTER — IMMUNIZATION (OUTPATIENT)
Dept: NURSING | Facility: CLINIC | Age: 86
End: 2021-02-24
Payer: COMMERCIAL

## 2021-02-24 LAB — COPATH REPORT: NORMAL

## 2021-02-24 PROCEDURE — 91300 PR COVID VAC PFIZER DIL RECON 30 MCG/0.3 ML IM: CPT

## 2021-02-24 PROCEDURE — 0001A PR COVID VAC PFIZER DIL RECON 30 MCG/0.3 ML IM: CPT

## 2021-03-09 ENCOUNTER — TELEPHONE (OUTPATIENT)
Dept: DERMATOLOGY | Facility: CLINIC | Age: 86
End: 2021-03-09

## 2021-03-09 NOTE — TELEPHONE ENCOUNTER
Results reviewed with Milli.  She states the finger has completely healed up.  I scheduled her for a phone visit on 3/11/21 to discuss treatment options for the AK on her left lower leg.  MARIEL Watkins, MD Qi sent to [facility] [place]             Please, schedule phone visit to review results. Precnacer on lower leg. Spot on finger is possibly a bug bite. And harmless keratosis on other sites     Please, make sure finger lesion is resolved.     Qi Garcia MD      Department of Dermatology   [facility]: Phone: [phone], Fax:[phone]   [facility]: Phone: [phone], Fax: [phone]    Associated Results    Dermatological path order and indications  Order: [id number]  Status:  Final result   Visible to patient:  Yes (MyChart) Dx:  Neoplasm of uncertain behavior of skin  Component 2wk ago   Copath Report Patient Name: MILLI PRIETO   MR#: [id number]   Specimen #: [id number]   Collected: 2/18/2021   Received: 2/19/2021   Reported: 2/24/2021 13:00   Ordering Phy(s): QI GARCIA     For improved result formatting, select 'View Enhanced Report Format' under    Linked Documents section.     SPECIMEN(S):   A: Skin, left posterior lower leg, shave   B: Skin, right posterior lower leg, shave   C: Skin, left 4th digit, shave   D: Skin, left forehead, shave     FINAL DIAGNOSIS:   A. Skin, left posterior lower leg, shave:   - Actinic keratosis, transected - (see description)     B. Skin, right posterior lower leg, shave:   - Seborrheic keratosis - (see description)     C. Skin, left 4th digit, shave:   - Serum-rich parakeratosis, acanthosis with spongiosis, and papillary   dermal fibrosis - (see comment and   description)     D. Skin, left forehead, shave:   - Verrucous keratosis

## 2021-03-11 ENCOUNTER — VIRTUAL VISIT (OUTPATIENT)
Dept: DERMATOLOGY | Facility: CLINIC | Age: 86
End: 2021-03-11
Payer: COMMERCIAL

## 2021-03-11 DIAGNOSIS — L57.0 ACTINIC KERATOSIS: Primary | ICD-10-CM

## 2021-03-11 PROCEDURE — 99207 PR NO BILLABLE SERVICE THIS VISIT: CPT | Mod: 95 | Performed by: DERMATOLOGY

## 2021-03-11 ASSESSMENT — PAIN SCALES - GENERAL: PAINLEVEL: NO PAIN (0)

## 2021-03-11 NOTE — PATIENT INSTRUCTIONS
[facility] Dermatology Visit    Thank you for allowing us to participate in your care. Your findings, instructions and follow-up plan are as follows:         When should I call my doctor?    If you are worsening or not improving, please, contact us or seek urgent care as noted below.     Who should I call with questions (adults)?    [facility] (adult and pediatric): [phone]     [facility] (adult): [phone]    For urgent needs outside of business hours call the [facility] at [phone] and ask for the dermatology resident on call    If this is a medical emergency and you are unable to reach an ER, Call 911      Who should I call with questions (pediatric)?  [facility]- Pediatric Dermatology  Dr. Bette Soto, Dr. Laine Frost, Dr. Fern Keenan, Mariama Uribe, PA  Dr. Tiffanie Garza, Dr. Kathy Borja & Dr. Tyrone Vicente  Non Urgent  Nurse Triage Line; [phone]- Carola and Thao RN Care Coordinators   Lakisha (/Complex ) [phone]    If you need a prescription refill, please contact your pharmacy. Refills are approved or denied by our Physicians during normal business hours, Monday through Fridays  Per office policy, refills will not be granted if you have not been seen within the past year (or sooner depending on your child's condition)    Scheduling Information:  Pediatric Appointment Scheduling and Call Center [phone]  Radiology Scheduling- [phone]  Sedation Unit Scheduling- [phone]  [place] Scheduling- General [phone]; Pediatric Dermatology [phone]  Main  Services: [phone]  Khmer: [phone]  Malian: [phone]  Hmong/Maltese/Bulgarian: [phone]  Preadmission Nursing Department Fax Number: [phone] (Fax all pre-operative paperwork to this number)    For urgent matters arising during evenings,  weekends, or holidays that cannot wait for normal business hours please call [phone] and ask for the Dermatology Resident On-Call to be paged.

## 2021-03-11 NOTE — NURSING NOTE
Teledermatology Nurse Call Patients:     Are you  in the [facility] at the time of the encounter? yes    Today's visit will be billed to you and your insurance.    A teledermatology visit is not as thorough as an in-person visit and the quality of the photograph sent may not be of the same quality as that taken by the dermatology clinic.    Chief Complaint   Patient presents with     Derm Problem     Discuss results with MD      Patient voices the site of digit is healed. The site of the left posterior lower leg is scabbed over. Patient will discuss a treatment plan with MD.         LXIONG3, MEDICAL ASSISTANT

## 2021-03-11 NOTE — LETTER
3/11/2021         RE: Cydney Christiansen  [address]  [place] [address]        Dear Colleague,    Thank you for referring your patient, Cydney Christiansen, to the [facility]. Please see a copy of my visit note below.     [facility] Dermatology Note  Encounter Date: Mar 11, 2021    Telephone ([phone]). Location of teledermatologist: [facility].  Start time: 12:25pm. End time: 12:28    Dermatology Problem List:  1. NMSC  -BCC, mid back s/p ED and C 11/2020  -SCCIS, right upper arm s/p ED and C 11/2020  -SCCIS, left forearm s/p excision 3/6/2020  -SCCis Left superior forearm, s/p: ED and C 9/30/2019  -SCCis right temple, s/p:  Mohs 9/30/2019  -SCCIS, right nasal sidewall, s/p Mohs 4/1/19   -SCCIS, right upper arm, s/p excision 8/9/18   -SCC, left popliteal fossa, well differentiated with focal perineural invasion but with clear margins on path, s/p excision by Dr. Mcgee 7/2013  -SCCIS arising from solar keratosis, right cheek, 4/2013  -SCC, right dorsal hand, s/p excision with SCCIS extending to the margin 1/7/13  -SCC, bowenoid type, upper back, s/p excision 2011  -BCC, superficial, left back, 2/2011  -BCC, superficial, left neck, 2011, elected for ED&C  -SCCIS, right upper forearm 11/5/2020 MOHS      2. 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 HAK, left mid eyebrow, base not seen, 6/2014  4. Actinic keratosis  -s/p cryotherapy  -left forearm, s/p biopsy 3/15/19   5. GA, right angle of jaw: resolves with triamcinolone cream  6. SK, right abdomen, s/p biopsy 2/18/2020    7. Eczematous patch R dorsal hand  -previous Tx: triamcinolone 0.01% cream, Vaseline     ____________________________________________    Assessment & Plan:     1.   History of nonmelanoma skin cancer     Recommend sunscreens SPF #30 or greater, protective clothing and avoidance of tanning beds.          2.HX of NMSC, last biopsy 9/2020 with bcc mid back and  SCC right upper arm        3 AK, let posterior lower leg, recheck at follow up, possibly cryo  -declines efudex    4. left 4th digit, shave:   - Serum-rich parakeratosis, acanthosis with spongiosis, and papillary   -patient reports resolved  Follow-up: within 6 months to check AK on the leg    Staff:     Qi Barba MD    Department of Dermatology  [facility]: Phone: [phone], Fax:[phone]  [facility]: Phone: [phone], Fax: [phone]      ____________________________________________    CC: Derm Problem (Discuss results with MD )    HPI:  Ms. Cydney Christiansen is a(n) 85 year old female who presents today as a return patient for AK . Finger is resolved    Patient is otherwise feeling well, without additional skin concerns.    Labs Reviewed:  FINAL DIAGNOSIS:   A. Skin, left posterior lower leg, shave:   - Actinic keratosis, transected - (see description)     B. Skin, right posterior lower leg, shave:   - Seborrheic keratosis - (see description)     C. Skin, left 4th digit, shave:   - Serum-rich parakeratosis, acanthosis with spongiosis, and papillary   dermal fibrosis - (see comment and   description)     D. Skin, left forehead, shave:   - Verrucous keratosis - (see description)     COMMENT:   C. The findings present in this specimen, from a solitary lesion on the   digit, raises the histologic   differential diagnosis of lichen simplex chronicus, an incompletely   sampled bite reaction, or the surface of   an incompletely sampled dermally based lesion. Clinical correlation and     Physical Exam:  Vitals: There were no vitals taken for this visit.  None  Medications:  Current Outpatient Medications   Medication     CALCIUM 600 MG OR TABS     cyanocobalamin (VITAMIN B-12) 500 MCG tablet     dorzolamide-timolol (COSOPT) 2-0.5 % ophthalmic solution     ferrous sulfate (FEROSUL)  325 (65 Fe) MG tablet     ibuprofen (ADVIL,MOTRIN) 200 MG tablet     latanoprost (XALATAN) 0.005 % ophthalmic solution     losartan (COZAAR) 25 MG tablet     meclizine (ANTIVERT) 25 MG tablet     Multiple Vitamins-Minerals (ONE DAILY ADULTS 50+) TABS     simvastatin (ZOCOR) 20 MG tablet     triamcinolone (KENALOG) 0.1 % cream     triamcinolone 0.1 % EX external cream     VITAMIN D-1000 MAX -1000 MG-UNIT OR TABS     No current facility-administered medications for this visit.       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     Hypertension goal BP (blood pressure) < 140/90     Seborrheic keratosis     Malignant neoplasm of overlapping sites of left female breast (H)     Scoliosis     Compression fracture of thoracic vertebra (H)     Mass of left hand     Primary open angle glaucoma of both eyes, unspecified glaucoma stage     Osteoporosis, unspecified osteoporosis type, unspecified pathological fracture presence     Nuclear sclerosis of left eye     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)     Neoplasm of uncertain behavior of skin     Actinic keratosis     History of nonmelanoma skin cancer     Combined forms of 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 on close of this encounter.        Again, thank you for allowing me to participate in the care of your patient.        Sincerely,        Qi Barba MD

## 2021-03-11 NOTE — PROGRESS NOTES
[facility] Dermatology Note  Encounter Date: Mar 11, 2021    Telephone ([phone]). Location of teledermatologist: [facility].  Start time: 12:25pm. End time: 12:28    Dermatology Problem List:  1. NMSC  -BCC, mid back s/p ED and C 11/2020  -SCCIS, right upper arm s/p ED and C 11/2020  -SCCIS, left forearm s/p excision 3/6/2020  -SCCis Left superior forearm, s/p: ED and C 9/30/2019  -SCCis right temple, s/p:  Mohs 9/30/2019  -SCCIS, right nasal sidewall, s/p Mohs 4/1/19   -SCCIS, right upper arm, s/p excision 8/9/18   -SCC, left popliteal fossa, well differentiated with focal perineural invasion but with clear margins on path, s/p excision by Dr. Mcgee 7/2013  -SCCIS arising from solar keratosis, right cheek, 4/2013  -SCC, right dorsal hand, s/p excision with SCCIS extending to the margin 1/7/13  -SCC, bowenoid type, upper back, s/p excision 2011  -BCC, superficial, left back, 2/2011  -BCC, superficial, left neck, 2011, elected for ED&C  -SCCIS, right upper forearm 11/5/2020 MOHS      2. 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 HAK, left mid eyebrow, base not seen, 6/2014  4. Actinic keratosis  -s/p cryotherapy  -left forearm, s/p biopsy 3/15/19   5. GA, right angle of jaw: resolves with triamcinolone cream  6. SK, right abdomen, s/p biopsy 2/18/2020    7. Eczematous patch R dorsal hand  -previous Tx: triamcinolone 0.01% cream, Vaseline     ____________________________________________    Assessment & Plan:     1.   History of nonmelanoma skin cancer     Recommend sunscreens SPF #30 or greater, protective clothing and avoidance of tanning beds.          2.HX of NMSC, last biopsy 9/2020 with bcc mid back and SCC right upper arm        3 AK, let posterior lower leg, recheck at follow up, possibly cryo  -declines efudex    4. left 4th digit, shave:   - Serum-rich parakeratosis, acanthosis with spongiosis, and papillary   -patient reports resolved  Follow-up: within  6 months to check AK on the leg    Staff:     Qi Barba MD    Department of Dermatology  [facility]: Phone: [phone], Fax:[phone]  [facility]: Phone: [phone], Fax: [phone]      ____________________________________________    CC: Derm Problem (Discuss results with MD )    HPI:  Ms. Cydney Christiansen is a(n) 85 year old female who presents today as a return patient for AK . Finger is resolved    Patient is otherwise feeling well, without additional skin concerns.    Labs Reviewed:  FINAL DIAGNOSIS:   A. Skin, left posterior lower leg, shave:   - Actinic keratosis, transected - (see description)     B. Skin, right posterior lower leg, shave:   - Seborrheic keratosis - (see description)     C. Skin, left 4th digit, shave:   - Serum-rich parakeratosis, acanthosis with spongiosis, and papillary   dermal fibrosis - (see comment and   description)     D. Skin, left forehead, shave:   - Verrucous keratosis - (see description)     COMMENT:   C. The findings present in this specimen, from a solitary lesion on the   digit, raises the histologic   differential diagnosis of lichen simplex chronicus, an incompletely   sampled bite reaction, or the surface of   an incompletely sampled dermally based lesion. Clinical correlation and     Physical Exam:  Vitals: There were no vitals taken for this visit.  None  Medications:  Current Outpatient Medications   Medication     CALCIUM 600 MG OR TABS     cyanocobalamin (VITAMIN B-12) 500 MCG tablet     dorzolamide-timolol (COSOPT) 2-0.5 % ophthalmic solution     ferrous sulfate (FEROSUL) 325 (65 Fe) MG tablet     ibuprofen (ADVIL,MOTRIN) 200 MG tablet     latanoprost (XALATAN) 0.005 % ophthalmic solution     losartan (COZAAR) 25 MG tablet     meclizine (ANTIVERT) 25 MG tablet     Multiple Vitamins-Minerals (ONE DAILY ADULTS 50+) TABS      simvastatin (ZOCOR) 20 MG tablet     triamcinolone (KENALOG) 0.1 % cream     triamcinolone 0.1 % EX external cream     VITAMIN D-1000 MAX -1000 MG-UNIT OR TABS     No current facility-administered medications for this visit.       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     Hypertension goal BP (blood pressure) < 140/90     Seborrheic keratosis     Malignant neoplasm of overlapping sites of left female breast (H)     Scoliosis     Compression fracture of thoracic vertebra (H)     Mass of left hand     Primary open angle glaucoma of both eyes, unspecified glaucoma stage     Osteoporosis, unspecified osteoporosis type, unspecified pathological fracture presence     Nuclear sclerosis of left eye     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)     Neoplasm of uncertain behavior of skin     Actinic keratosis     History of nonmelanoma skin cancer     Combined forms of 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 on close of this encounter.

## 2021-03-17 ENCOUNTER — IMMUNIZATION (OUTPATIENT)
Dept: NURSING | Facility: CLINIC | Age: 86
End: 2021-03-17
Attending: FAMILY MEDICINE
Payer: COMMERCIAL

## 2021-03-17 PROCEDURE — 91300 PR COVID VAC PFIZER DIL RECON 30 MCG/0.3 ML IM: CPT

## 2021-03-17 PROCEDURE — 0002A PR COVID VAC PFIZER DIL RECON 30 MCG/0.3 ML IM: CPT

## 2021-04-13 DIAGNOSIS — H26.8 PXF (PSEUDOEXFOLIATION OF LENS CAPSULE): ICD-10-CM

## 2021-04-13 RX ORDER — DORZOLAMIDE HYDROCHLORIDE AND TIMOLOL MALEATE 20; 5 MG/ML; MG/ML
1 SOLUTION/ DROPS OPHTHALMIC 2 TIMES DAILY
Qty: 10 ML | Refills: 3 | Status: SHIPPED | OUTPATIENT
Start: 2021-04-13 | End: 2021-04-13

## 2021-04-13 RX ORDER — DORZOLAMIDE HYDROCHLORIDE AND TIMOLOL MALEATE 20; 5 MG/ML; MG/ML
1 SOLUTION/ DROPS OPHTHALMIC 2 TIMES DAILY
Qty: 10 ML | Refills: 3 | Status: SHIPPED | OUTPATIENT
Start: 2021-04-13 | End: 2021-11-23

## 2021-04-13 NOTE — TELEPHONE ENCOUNTER
Recommend refilling drops per Dr. Garcia's last visit note on 02/23/21: Continue Latanoprost (green top) at bedtime both eyes      Continue Cosopt (dorzolamide-timolol -- dark blue top) twice a day both eyes

## 2021-05-03 ENCOUNTER — ANCILLARY PROCEDURE (OUTPATIENT)
Dept: GENERAL RADIOLOGY | Facility: CLINIC | Age: 86
End: 2021-05-03
Attending: PHYSICIAN ASSISTANT
Payer: COMMERCIAL

## 2021-05-03 ENCOUNTER — OFFICE VISIT (OUTPATIENT)
Dept: FAMILY MEDICINE | Facility: CLINIC | Age: 86
End: 2021-05-03
Payer: COMMERCIAL

## 2021-05-03 VITALS
WEIGHT: 151.8 LBS | DIASTOLIC BLOOD PRESSURE: 68 MMHG | OXYGEN SATURATION: 98 % | BODY MASS INDEX: 29.8 KG/M2 | HEIGHT: 60 IN | TEMPERATURE: 98.6 F | SYSTOLIC BLOOD PRESSURE: 122 MMHG | RESPIRATION RATE: 12 BRPM | HEART RATE: 60 BPM

## 2021-05-03 DIAGNOSIS — M54.50 ACUTE BILATERAL LOW BACK PAIN WITHOUT SCIATICA: Primary | ICD-10-CM

## 2021-05-03 DIAGNOSIS — R60.0 LEG EDEMA, LEFT: ICD-10-CM

## 2021-05-03 DIAGNOSIS — M54.50 ACUTE BILATERAL LOW BACK PAIN WITHOUT SCIATICA: ICD-10-CM

## 2021-05-03 LAB
ALBUMIN SERPL-MCNC: 3.8 G/DL (ref 3.4–5)
ALP SERPL-CCNC: 52 U/L (ref 40–150)
ALT SERPL W P-5'-P-CCNC: 25 U/L (ref 0–50)
ANION GAP SERPL CALCULATED.3IONS-SCNC: 3 MMOL/L (ref 3–14)
AST SERPL W P-5'-P-CCNC: 40 U/L (ref 0–45)
BILIRUB SERPL-MCNC: 0.6 MG/DL (ref 0.2–1.3)
BUN SERPL-MCNC: 16 MG/DL (ref 7–30)
CALCIUM SERPL-MCNC: 8.8 MG/DL (ref 8.5–10.1)
CHLORIDE SERPL-SCNC: 106 MMOL/L (ref 94–109)
CO2 SERPL-SCNC: 28 MMOL/L (ref 20–32)
CREAT SERPL-MCNC: 0.8 MG/DL (ref 0.52–1.04)
D DIMER PPP FEU-MCNC: 0.7 UG/ML FEU (ref 0–0.5)
GFR SERPL CREATININE-BSD FRML MDRD: 68 ML/MIN/{1.73_M2}
GLUCOSE SERPL-MCNC: 101 MG/DL (ref 70–99)
POTASSIUM SERPL-SCNC: 4.5 MMOL/L (ref 3.4–5.3)
PROT SERPL-MCNC: 7 G/DL (ref 6.8–8.8)
SODIUM SERPL-SCNC: 137 MMOL/L (ref 133–144)

## 2021-05-03 PROCEDURE — 99215 OFFICE O/P EST HI 40 MIN: CPT | Performed by: PHYSICIAN ASSISTANT

## 2021-05-03 PROCEDURE — 85379 FIBRIN DEGRADATION QUANT: CPT | Performed by: PHYSICIAN ASSISTANT

## 2021-05-03 PROCEDURE — 72100 X-RAY EXAM L-S SPINE 2/3 VWS: CPT | Performed by: RADIOLOGY

## 2021-05-03 PROCEDURE — 80053 COMPREHEN METABOLIC PANEL: CPT | Performed by: PHYSICIAN ASSISTANT

## 2021-05-03 PROCEDURE — 36415 COLL VENOUS BLD VENIPUNCTURE: CPT | Performed by: PHYSICIAN ASSISTANT

## 2021-05-03 ASSESSMENT — MIFFLIN-ST. JEOR: SCORE: 1069.03

## 2021-05-03 NOTE — PROGRESS NOTES
Assessment & Plan   Problem List Items Addressed This Visit     None      Visit Diagnoses     Acute bilateral low back pain without sciatica    -  Primary    Relevant Orders    XR Lumbar Spine 2/3 Views    [facility]    *UA reflex to Microscopic and Culture ([facility] (except [place] and [place])    Leg edema, left        Relevant Orders    D dimer, quantitative (Completed)    Comprehensive metabolic panel (BMP + Alb, Alk Phos, ALT, AST, Total. Bili, TP) (Completed)    *UA reflex to Microscopic and Culture ([facility] (except [place] and [place])         I do not believe a fracture to be the source of this patient's pain as there was no preceding trauma. X-rays show no clear fracture per my read, rad read pending.    I do not believe the pain is caused by an epidural abscess as the patient does not have fevers/chills and no recent procedures.    I do not believe this patient's pain is from an infiltrative vertebral tumor as the patient does not have weight loss or night sweats. Does have breast CA history, but is in remission.  I do not believe this patient's pain represents a rupture AAA.  There is no palpable, pulsatile mass on exam and patient does not have any ABD pain.    UA pending collection, but no signs of UTI.  Comp panel and dimer pending looking for hepatic/renal insufficiency and DVT, respectively as a cause of her LLE edema.    Based on history and exam, the most likely etiology of this patient's back pain is lumbosacral strain, less likely radiculopathy.  Emergent MRI is not indicated as this patient does not have new weakness or cauda equina syndrome.  Patient has no saddle anesthesia, bowel or bladder incontinence. Will send home with otc analgesics, rice/heat, and physical therapy referral.  Follow-up in 1 month if not improving.    Has an abnormal lesion to LLE which she will address with her dermatologist at upcoming follow  up.    Complete history and physical exam as below. AF with normal VS.    DDx and Dx discussed with and explained to the pt to their satisfaction.  All questions were answered at this time. Pt expressed understanding of and agreement with this dx, tx, and plan. No further workup warranted and standard medication warnings given. I have given the patient a list of pertinent indications for re-evaluation. Will go to the Emergency Department if symptoms worsen or new concerning symptoms arise. Patient left in no apparent distress.     43 minutes spent on the date of the encounter doing chart review, history and exam, documentation and further activities per the note    See Patient Instructions    Return in about 1 month (around 6/3/2021) for a recheck of your symptoms if not improving, or call 911/go to an ER anytime if worsening.    CAPRICE Collado  [facility] RICK Lamas is a 85 year old who presents for the following health issues     HPI   If active or standing for a long time she has low back pain. Yesterday went for a walk and afterwards felt worsened pain in lower back. Intermittent swelling to left foot for some time.  Back Pain  Onset/Duration: Months; worsened in last 2 days  Description:   Location of pain: low back bilateral  Character of pain: dull ache and intermittent  Pain radiation: none  New numbness or weakness in legs, not attributed to pain: YES- left foot occasionally swells  Intensity: Currently 4/10, At its worst 8/10  Progression of Symptoms: worsening (in past week)  History:   Specific cause: none  Pain interferes with job: not applicable  History of back problems: Compression fx thoracic vertebra ~6 years ago, Osteoporosis, scoliosis  Any previous MRI or X-rays: None  Sees a specialist for back pain: No  Alleviating factors:   Improved by: Ibu    Precipitating factors:  Worsened by: Bending and Sitting  Therapies tried and outcome: Ibu take edge off, lying  "flat    Denies: dysuria, hematuira    Accompanying Signs & Symptoms:  Risk of Fracture: Osteoporosis and Age >64  Risk of Cauda Equina: None  Risk of Infection: None  Risk of Cancer: History of cancer    Review of Systems   Constitutional, HEENT, cardiovascular, pulmonary, gi and gu systems are negative, except as otherwise noted.      Objective    /68   Pulse 60   Temp 98.6  F (37  C) (Tympanic)   Resp 12   Ht 1.53 m (5' 0.25\")   Wt 68.9 kg (151 lb 12.8 oz)   SpO2 98%   Breastfeeding No   BMI 29.40 kg/m    Body mass index is 29.4 kg/m .  Physical Exam  Vitals signs and nursing note reviewed.   Constitutional:       General: She is not in acute distress.     Appearance: She is not ill-appearing or diaphoretic.   HENT:      Head: Normocephalic and atraumatic.      Mouth/Throat:      Mouth: Mucous membranes are moist.   Eyes:      Conjunctiva/sclera: Conjunctivae normal.   Cardiovascular:      Rate and Rhythm: Normal rate and regular rhythm.      Heart sounds: Normal heart sounds. No murmur. No friction rub. No gallop.    Pulmonary:      Effort: Pulmonary effort is normal. No respiratory distress.      Breath sounds: Normal breath sounds. No stridor. No wheezing, rhonchi or rales.   Abdominal:      General: Bowel sounds are normal. There is no distension.      Palpations: Abdomen is soft. There is no mass.      Tenderness: There is no abdominal tenderness. There is no guarding or rebound.      Hernia: No hernia is present.   Musculoskeletal:      Comments: No CVA or midline spinal tenderness. No overlying signs of trauma or infection. Left pedal edema. Trace ankle edema bilaterally. Legs non-tender.    Skin:     General: Skin is warm and dry.      Comments: Scaling/scabbing 1cm diameter lesion to left medial leg.   Neurological:      General: No focal deficit present.      Mental Status: She is alert. Mental status is at baseline.      Comments: Able to toe lift, heel walk and knee bend.   Psychiatric:   "       Mood and Affect: Mood normal.         Behavior: Behavior normal.        Lumbar XR:  No clear fracture or dislocation when compared to previous film from 2013 per my read. Rad read pending.

## 2021-05-03 NOTE — PATIENT INSTRUCTIONS
Bettye Lamas,    Thank you for allowing [facility] to manage your care.    I am unsure of the cause of your symptoms, but your exam is consistent with a back strain or compression fracture. We will see what our workup shows.     If you develop worsening/changing symptoms at any time, please call 911 or go to the emergency department for evaluation.    I ordered some lab work, please go to the laboratory to get your studies.    I ordered some xrays, please go to our radiology department to get your xrays.    I made an as needed physical therapy referral, they will be calling in approximately 1 week to set up your appointment.  If you do not hear from them, please call the specialty number on your after visit summary.     Please allow 1-2 business days for our office to contact you in regards to your laboratory/radiological studies.  If not done so, I encourage you to login into [email or web address] ([email or web address] to review your results as well.     If you have any questions or concerns, please feel free to call us at [phone]    Sincerely,    Rony Gimenez PA-C    Did you know?      You can schedule a video visit for follow-up appointments as well as future appointments for certain conditions.  Please see the below link.     [email or web address]    If you have not already done so,  I encourage you to sign up for [email or web address] ([email or web address]).  This will allow you to review your results, securely communicate with a provider, and schedule virtual visits as well.

## 2021-05-04 ENCOUNTER — ANCILLARY PROCEDURE (OUTPATIENT)
Dept: ULTRASOUND IMAGING | Facility: CLINIC | Age: 86
End: 2021-05-04
Attending: PHYSICIAN ASSISTANT
Payer: COMMERCIAL

## 2021-05-04 DIAGNOSIS — R60.0 LEG EDEMA, LEFT: Primary | ICD-10-CM

## 2021-05-04 DIAGNOSIS — R60.0 LEG EDEMA, LEFT: ICD-10-CM

## 2021-05-04 NOTE — RESULT ENCOUNTER NOTE
Please call patient an inform her that she needs to have an US of her left leg to rule out DVT given her elevated d dimer.

## 2021-05-05 NOTE — PROGRESS NOTES
Assessment & Plan   Problem List Items Addressed This Visit     None      Visit Diagnoses     Bilateral leg edema    -  Primary    Relevant Orders    US Lower Extremity Venous Duplex Bilateral    *UA reflex to Microscopic and Culture ([place] and [facility] (except [place] and [place]) (Completed)    Urine Microscopic (Completed)         Lower extremity edema likely dependent or due to venous insufficiency. UA shows no signs of nephrotic syndrome. Will repeat US of LEs in one week to rule out DVT, but I have low suspicion for this. Has had edema intermittently historically. Encouraged to elevate and use compression. Watch salt intake.    Complete history and physical exam as below. AF with normal VS.    DDx and Dx discussed with and explained to the pt to their satisfaction.  All questions were answered at this time. Pt expressed understanding of and agreement with this dx, tx, and plan. No further workup warranted and standard medication warnings given. I have given the patient a list of pertinent indications for re-evaluation. Will go to the Emergency Department if symptoms worsen or new concerning symptoms arise. Patient left in no apparent distress.     21 minutes spent on the date of the encounter doing chart review, history and exam, documentation and further activities per the note    See Patient Instructions    Return for a recheck as needed, or call 911/go to an ER anytime if worsening.    CAPRICE Collado  [facility] RICK Lamas is a 85 year old who presents for the following health issues:    HPI     Concern - Swelling   Onset: can't say  Description: both ankles, left ankle more swollen   Intensity: moderate  Progression of Symptoms:  same and intermittent  Accompanying Signs & Symptoms: no  Previous history of similar problem: Yes  Precipitating factors:        Worsened by: sitting  Alleviating factors:        Improved by: elevate foot  Therapies tried and  outcome: elevating the foot    Review of Systems   Constitutional, HEENT, cardiovascular, pulmonary, gi and gu systems are negative, except as otherwise noted.      Objective    /70   Pulse 63   Temp 98.8  F (37.1  C) (Tympanic)   Resp 20   Wt 68.6 kg (151 lb 3.2 oz)   SpO2 96%   BMI 29.28 kg/m    Body mass index is 29.28 kg/m .  Physical Exam  Vitals signs and nursing note reviewed.   Constitutional:       General: She is not in acute distress.     Appearance: Normal appearance. She is not diaphoretic.   HENT:      Head: Normocephalic and atraumatic.      Nose: Nose normal.   Eyes:      Conjunctiva/sclera: Conjunctivae normal.   Pulmonary:      Effort: Pulmonary effort is normal. No respiratory distress.   Musculoskeletal:      Comments: BLE edema without tenderness.    Skin:     General: Skin is dry.      Coloration: Skin is not jaundiced or pale.   Neurological:      General: No focal deficit present.      Mental Status: She is alert. Mental status is at baseline.   Psychiatric:         Mood and Affect: Mood normal.         Behavior: Behavior normal.          Results for orders placed or performed in visit on 05/06/21   *UA reflex to Microscopic and Culture ([place] and [facility] (except [place] and [place])     Status: Abnormal    Specimen: Midstream Urine   Result Value Ref Range    Color Urine Yellow     Appearance Urine Clear     Glucose Urine Negative NEG^Negative mg/dL    Bilirubin Urine Negative NEG^Negative    Ketones Urine Negative NEG^Negative mg/dL    Specific Gravity Urine 1.020 1.003 - 1.035    Blood Urine Trace (A) NEG^Negative    pH Urine 6.0 5.0 - 7.0 pH    Protein Albumin Urine Negative NEG^Negative mg/dL    Urobilinogen Urine 0.2 0.2 - 1.0 EU/dL    Nitrite Urine Negative NEG^Negative    Leukocyte Esterase Urine Trace (A) NEG^Negative    Source Midstream Urine    Urine Microscopic     Status: Abnormal   Result Value Ref Range    WBC Urine 5-10 (A) OTO5^0 - 5 /HPF    RBC Urine  O - 2 OTO2^O - 2 /HPF    Squamous Epithelial /LPF Urine Moderate (A) FEW^Few /LPF    Bacteria Urine Moderate (A) NEG^Negative /HPF

## 2021-05-06 ENCOUNTER — OFFICE VISIT (OUTPATIENT)
Dept: FAMILY MEDICINE | Facility: CLINIC | Age: 86
End: 2021-05-06
Payer: COMMERCIAL

## 2021-05-06 VITALS
RESPIRATION RATE: 20 BRPM | OXYGEN SATURATION: 96 % | WEIGHT: 151.2 LBS | DIASTOLIC BLOOD PRESSURE: 70 MMHG | SYSTOLIC BLOOD PRESSURE: 132 MMHG | HEART RATE: 63 BPM | BODY MASS INDEX: 29.28 KG/M2 | TEMPERATURE: 98.8 F

## 2021-05-06 DIAGNOSIS — R60.0 BILATERAL LEG EDEMA: Primary | ICD-10-CM

## 2021-05-06 LAB

## 2021-05-06 PROCEDURE — 99213 OFFICE O/P EST LOW 20 MIN: CPT | Performed by: PHYSICIAN ASSISTANT

## 2021-05-06 PROCEDURE — 81001 URINALYSIS AUTO W/SCOPE: CPT | Performed by: PHYSICIAN ASSISTANT

## 2021-05-06 NOTE — PATIENT INSTRUCTIONS
Bettye Lamas,    Thank you for allowing [facility] to manage your care.    I ordered some lab work, please go to the laboratory to get your studies.    I ordered a repeat ultrasound next week, please call diagnostic imaging [phone] to schedule your test.    Please allow 1-2 business days for our office to contact you in regards to your laboratory/radiological studies.  If not done so, I encourage you to login into [employer] ([email or web address] to review your results as well.     If you have any questions or concerns, please feel free to call us at [phone]    Sincerely,    Rony Gimenez PA-C    Did you know?      You can schedule a video visit for follow-up appointments as well as future appointments for certain conditions.  Please see the below link.     [email or web address]    If you have not already done so,  I encourage you to sign up for [employer] ([email or web address]).  This will allow you to review your results, securely communicate with a provider, and schedule virtual visits as well.       English

## 2021-05-13 ENCOUNTER — ANCILLARY PROCEDURE (OUTPATIENT)
Dept: ULTRASOUND IMAGING | Facility: CLINIC | Age: 86
End: 2021-05-13
Attending: PHYSICIAN ASSISTANT
Payer: COMMERCIAL

## 2021-05-13 DIAGNOSIS — R60.0 BILATERAL LEG EDEMA: ICD-10-CM

## 2021-05-13 NOTE — PROGRESS NOTES
"Physical Therapy Initial Evaluation  Subjective:  The history is provided by the patient. No  was used.   Therapist Generated HPI Evaluation  Problem details: Pt reports has had off and on back for a while.  Seemed to get worse about two weeks ago.  Noticed it most after sitting down after doing some yardwork.  Referred to PT 05/03/2021.         Type of problem:  Lumbar.    This is a recurrent condition.  Condition occurred with:  Insidious onset.  Where condition occurred: for unknown reasons.  Patient reports pain:  Lumbar spine left and lumbar spine right.  Pain is described as aching and is intermittent.  Pain is worse in the A.M..  Since onset symptoms are gradually improving (pt reports pain isn't as intense as it used to be).  Exacerbated by: \"doing too much,\" change of position.  Relieved by: sitting, Tylenol.  Imaging testing: xray in chart 05/03/2021.  Past treatment: N/A.   Barriers include:  None as reported by patient.    Patient Health History  Cydney Christiansen being seen for lower back pain.       Problem occurred: not sure   Pain is reported as 5/10 on pain scale.  General health as reported by patient is good.  Pertinent medical history includes: cancer, history of fractures and smoking.   Red flags:  None as reported by patient.   Other medical allergies details: see chart.    Other surgery history details: bladder and breast cancer.    Current medications:  High blood pressure medication.    Current occupation is retired.                   Pt states her goal is to return to golfing.                    Objective:  System         Lumbar/SI Evaluation  ROM:      Strength: TA MMT 1/5  Lumbar Myotomes:    T12-L3 (Hip Flex):  Left: 4    Right: 4  L2-4 (Quads):  Left:  4    Right:  4  L4 (Ankle DF):  Left:  4    Right:  4            Neural Tension/Mobility:      Left side:SLR  negative.     Right side:   SLR  negative.   Lumbar Palpation:  normal        Lumbar Provocation:      Left " negative with:  PROM hip    Right negative with:  PROM hip    SI joint/Sacrum:    Negative Christian, thigh thrustOJ Lumbar Evaluation    Posture:  Sitting: fair  Standing: fair  Lordosis: Reduced  Lateral Shift: no  Correction of Posture: no effect    Movement Loss:  Flexion (Flex): mod  Extension (EXT): mod  Side Glide R (SG R): mod  Side Glide L (SG L): mod  Test Movements:  FIS: During: increases  After: no worse  Pretest Movements: across low back  Repeat FIS: During: increases  After: no worse    EIS: During: increases  After: no worse    Repeat EIS: During: increases  After: no worse                                                       ROS    Assessment/Plan:    Patient is a 85 year old female with lumbar complaints.    Patient has the following significant findings with corresponding treatment plan.                Diagnosis 1:  LBP  Pain -  hot/cold therapy  Decreased ROM/flexibility - manual therapy and therapeutic exercise  Decreased strength - therapeutic exercise and therapeutic activities  Decreased function - therapeutic activities  Impaired posture - neuro re-education    Therapy Evaluation Codes:   1) History comprised of:   Personal factors that impact the plan of care:      Age and Time since onset of symptoms.    Comorbidity factors that impact the plan of care are:      Cancer and Smoking.     Medications impacting care: None.  2) Examination of Body Systems comprised of:   Body structures and functions that impact the plan of care:      Lumbar spine.   Activity limitations that impact the plan of care are:      Lifting and Sitting.  3) Clinical presentation characteristics are:   Evolving/Changing.  4) Decision-Making    Moderate complexity using standardized patient assessment instrument and/or measureable assessment of functional outcome.  Cumulative Therapy Evaluation is: Moderate complexity.    Previous and current  functional limitations:  (See Goal Flow Sheet for this information)    Short term and Long term goals: (See Goal Flow Sheet for this information)     Communication ability:  Patient appears to be able to clearly communicate and understand verbal and written communication and follow directions correctly.  Treatment Explanation - The following has been discussed with the patient:   RX ordered/plan of care  Anticipated outcomes  Possible risks and side effects  This patient would benefit from PT intervention to resume normal activities.   Rehab potential is good.    Frequency:  1 X week, once daily  Duration:  for 6 weeks  Discharge Plan:  Achieve all LTG.  Independent in home treatment program.  Reach maximal therapeutic benefit.    Please refer to the daily flowsheet for treatment today, total treatment time and time spent performing 1:1 timed codes.

## 2021-05-14 ENCOUNTER — THERAPY VISIT (OUTPATIENT)
Dept: PHYSICAL THERAPY | Facility: CLINIC | Age: 86
End: 2021-05-14
Attending: PHYSICIAN ASSISTANT
Payer: COMMERCIAL

## 2021-05-14 DIAGNOSIS — M54.50 ACUTE BILATERAL LOW BACK PAIN WITHOUT SCIATICA: ICD-10-CM

## 2021-05-14 PROCEDURE — 97110 THERAPEUTIC EXERCISES: CPT | Mod: GP | Performed by: PHYSICAL THERAPIST

## 2021-05-14 PROCEDURE — 97162 PT EVAL MOD COMPLEX 30 MIN: CPT | Mod: GP | Performed by: PHYSICAL THERAPIST

## 2021-05-20 ENCOUNTER — OFFICE VISIT (OUTPATIENT)
Dept: FAMILY MEDICINE | Facility: CLINIC | Age: 86
End: 2021-05-20
Payer: COMMERCIAL

## 2021-05-20 VITALS
TEMPERATURE: 98.1 F | WEIGHT: 149 LBS | BODY MASS INDEX: 28.86 KG/M2 | SYSTOLIC BLOOD PRESSURE: 123 MMHG | DIASTOLIC BLOOD PRESSURE: 73 MMHG | HEART RATE: 61 BPM

## 2021-05-20 DIAGNOSIS — M51.369 DDD (DEGENERATIVE DISC DISEASE), LUMBAR: Primary | ICD-10-CM

## 2021-05-20 DIAGNOSIS — M41.9 SCOLIOSIS, UNSPECIFIED SCOLIOSIS TYPE, UNSPECIFIED SPINAL REGION: ICD-10-CM

## 2021-05-20 DIAGNOSIS — M81.0 AGE RELATED OSTEOPOROSIS, UNSPECIFIED PATHOLOGICAL FRACTURE PRESENCE: ICD-10-CM

## 2021-05-20 PROCEDURE — 99213 OFFICE O/P EST LOW 20 MIN: CPT | Performed by: FAMILY MEDICINE

## 2021-05-20 RX ORDER — ALENDRONATE SODIUM 70 MG/1
70 TABLET ORAL
Qty: 12 TABLET | Refills: 11 | Status: SHIPPED | OUTPATIENT
Start: 2021-05-20 | End: 2022-06-21

## 2021-05-20 NOTE — PROGRESS NOTES
Assessment & Plan        (M51.36) DDD (degenerative disc disease), lumbar  (primary encounter diagnosis)  Comment: advised continue PT since she is getting better  If any worse /no Improvement recommend MRI    Plan: follow up 1 month if not better/sooner if worse    (M81.0) Age related osteoporosis, unspecified pathological fracture presence  Comment: advised start  Plan: alendronate (FOSAMAX) 70 MG tablet          scoliosis           Return in about 1 month (around 6/20/2021) for recheck/Please schedule appointment./sooner if worse or new symptoms    Estrellita Hernandez MD  [facility] CLAUDIA Lamas is a 85 year old who presents for the following health issues HPI     Back Pain  Onset/Duration: 2.5 weeks   Description:   Location of pain: low back bilateral,L side worse -she is in PT and it is better  Character of pain: dull ache  Pain radiation: none  New numbness or weakness in legs, not attributed to pain: YES L ankle swells -now better  Intensity: Currently 4/10, At its worst 8/10  Progression of Symptoms: improving  History:   Specific cause: none  Pain interferes with job: YES  History of back problems: yes long time ago   Any previous MRI or X-rays: Yes- at [place].  Date 5-3-21   Sees a specialist for back pain: No  Alleviating factors:   Improved by: advil     Precipitating factors:  Worsened by: Walking  Therapies tried and outcome: advil , acetaminophen (Tylenol) and heat,physical therapy     Accompanying Signs & Symptoms:  Risk of Fracture: None  Risk of Cauda Equina: None  Risk of Infection: None  Risk of Cancer: None  Risk of Ankylosing Spondylitis: Onset at age <35, male, AND morning back stiffness  no     Pt does have scoliosis and DDD    Review of Systems   CONSTITUTIONAL: NEGATIVE for fever, chills, change in weight  RESP: NEGATIVE for significant cough or SOB  CV: NEGATIVE for chest pain, palpitations or peripheral edema  GI: NEGATIVE for nausea, abdominal pain,  heartburn, or change in bowel habits  : as above  PSYCHIATRIC: NEGATIVE for changes in mood or affect  MS as above  Pertinent negatives include no fever, no numbness, no abdominal pain, no abdominal swelling, no bowel incontinence, no perianal numbness, no bladder incontinence, no dysuria, no leg pain, no paresthesias, no paresis, no tingling and no weakness.  Rest of the ROS is Negative except see above and Problem list [stable]          Objective    /73   Pulse 61   Temp 98.1  F (36.7  C) (Tympanic)   Wt 67.6 kg (149 lb)   BMI 28.86 kg/m    Body mass index is 28.86 kg/m .  Physical Exam   GENERAL: healthy, alert and no distress  RESP: lungs clear to auscultation - no rales, rhonchi or wheezes  CV: regular rate and rhythm, normal S1 S2, no S3 or S4, no murmur, click or rub, no peripheral edema and peripheral pulses strong  ABDOMEN: soft, nontender, no hepatosplenomegaly, no masses and bowel sounds normal  MS: no gross musculoskeletal defects noted, no edema  SKIN: no suspicious lesions or rashes  NEURO: Normal strength and tone, mentation intact and speech normal  Comprehensive back pain exam:  No tenderness, Pain limits the following motions: some pain on flexion, Lower extremity strength functional and equal on both sides, Lower extremity reflexes within normal limits bilaterally and Straight leg raise negative bilaterally  PSYCH: mentation appears normal

## 2021-05-21 ENCOUNTER — THERAPY VISIT (OUTPATIENT)
Dept: PHYSICAL THERAPY | Facility: CLINIC | Age: 86
End: 2021-05-21
Payer: COMMERCIAL

## 2021-05-21 DIAGNOSIS — M54.50 ACUTE BILATERAL LOW BACK PAIN WITHOUT SCIATICA: ICD-10-CM

## 2021-05-21 PROCEDURE — 97110 THERAPEUTIC EXERCISES: CPT | Mod: GP | Performed by: PHYSICAL THERAPIST

## 2021-05-21 PROCEDURE — 97112 NEUROMUSCULAR REEDUCATION: CPT | Mod: GP | Performed by: PHYSICAL THERAPIST

## 2021-05-25 ENCOUNTER — TELEPHONE (OUTPATIENT)
Dept: OPHTHALMOLOGY | Facility: CLINIC | Age: 86
End: 2021-05-25

## 2021-05-25 NOTE — TELEPHONE ENCOUNTER
Patient called stating she had cataract surgery in her right eye about 6 months ago and now she is having issues in that same eye. She is stating that she sees film over her eye when she moves the eye. Almost like a cloud or a curtain over the eye. Its not constant and last for a few seconds. She would like a call back to discuss these symptoms at: [phone] as soon as possible.       Thank You,    Sam Barnard  Patient Rep

## 2021-05-25 NOTE — TELEPHONE ENCOUNTER
Spoke to Cydney and she is having some dryness issues with the eyes. She blinks and the vision goes blurry then better again, etc. She is not having any flashes or floaters in the vision, only the blurred vision off and on.  This has been going on for about a month.  She is taking glaucoma drops as always. Told her we should try the Refresh or Thera tears a few times a day and when she feels the problem is happening to see if this helps anything. She will do that and give us a call on Friday to let us know how she is doing.

## 2021-05-28 ENCOUNTER — THERAPY VISIT (OUTPATIENT)
Dept: PHYSICAL THERAPY | Facility: CLINIC | Age: 86
End: 2021-05-28
Payer: COMMERCIAL

## 2021-05-28 DIAGNOSIS — M54.50 ACUTE BILATERAL LOW BACK PAIN WITHOUT SCIATICA: ICD-10-CM

## 2021-05-28 PROCEDURE — 97110 THERAPEUTIC EXERCISES: CPT | Mod: GP | Performed by: PHYSICAL THERAPIST

## 2021-05-28 PROCEDURE — 97530 THERAPEUTIC ACTIVITIES: CPT | Mod: GP | Performed by: PHYSICAL THERAPIST

## 2021-05-28 NOTE — PROGRESS NOTES
"Subjective:  HPI  Physical Exam                    Objective:  System    Physical Exam    General     ROS    Assessment/Plan:    DISCHARGE REPORT    Progress reporting period is from 05/14/2021 to 05/28/2021.       SUBJECTIVE  Subjective: Pt reports \"just a little bit\" of pain but not as bad as it used to be.  She reports that getting in and out of bed is much easier.    Current Pain level: 1/10.     Initial Pain level: 5/10.   Changes in function:  Yes (See Goal flowsheet attached for changes in current functional level)  Adverse reaction to treatment or activity: None    OBJECTIVE  Objective: No discomfort stnding lumbar flexion and extension.  No tenderness to palpation.     ASSESSMENT/PLAN  Updated problem list and treatment plan: Diagnosis 1:  LBP -- home program  STG/LTGs have been met or progress has been made towards goals:  Yes (See Goal flow sheet completed today.)  Assessment of Progress: The patient's condition is improving.  Self Management Plans:  Patient has been instructed in a home treatment program.  I have re-evaluated this patient and find that the nature, scope, duration and intensity of the therapy is appropriate for the medical condition of the patient.  Cydney continues to require the following intervention to meet STG and LTG's:  PT intervention is no longer required to meet STG/LTG.    Recommendations:  Given progress, pt agrees discharge to [facility] but will let me know if there are further issues.    Please refer to the daily flowsheet for treatment today, total treatment time and time spent performing 1:1 timed codes.          "

## 2021-06-03 ENCOUNTER — OFFICE VISIT (OUTPATIENT)
Dept: FAMILY MEDICINE | Facility: CLINIC | Age: 86
End: 2021-06-03
Payer: COMMERCIAL

## 2021-06-03 VITALS
BODY MASS INDEX: 28.94 KG/M2 | SYSTOLIC BLOOD PRESSURE: 130 MMHG | RESPIRATION RATE: 16 BRPM | DIASTOLIC BLOOD PRESSURE: 70 MMHG | WEIGHT: 149.4 LBS | HEART RATE: 67 BPM | TEMPERATURE: 98 F | OXYGEN SATURATION: 96 %

## 2021-06-03 DIAGNOSIS — I83.893 VARICOSE VEINS OF BOTH LEGS WITH EDEMA: ICD-10-CM

## 2021-06-03 DIAGNOSIS — R60.0 PERIPHERAL EDEMA: Primary | ICD-10-CM

## 2021-06-03 PROCEDURE — 99215 OFFICE O/P EST HI 40 MIN: CPT | Performed by: PHYSICIAN ASSISTANT

## 2021-06-03 NOTE — PATIENT INSTRUCTIONS
Bettye Lamas,    Thank you for allowing [facility] to manage your care.    Your blood pressure was high today. Get a blood pressure cuff for use at home. If your blood pressure is greater than or equal to 140/90mm Hg more than half of the time, please schedule an appointment with us for a recheck.    Drink 8-10 glasses of fluid daily to stay well-hydrated.    If you have any questions or concerns, please feel free to call us at [phone]    Sincerely,    Rony Gimenez PA-C    Did you know?      You can schedule a video visit for follow-up appointments as well as future appointments for certain conditions.  Please see the below link.     [email or web address]    If you have not already done so,  I encourage you to sign up for [email or web address] ([email or web address]).  This will allow you to review your results, securely communicate with a provider, and schedule virtual visits as well.      Patient Education     Self-Care for Spider and Varicose Veins  Your healthcare provider may suggest that you try self-care. Exercising and maintaining a healthy weight may keep problem veins from getting worse. Wearing elastic stockings and elevating your legs can help improve blood flow. Taking breaks when you sit or stand helps, too.    Exercising  Exercising is good for your veins because it improves blood flow. Walking, cycling, or swimming are great exercises for vein health. But be sure to check with your healthcare provider before starting any exercise program. Also keep these hints in mind:    When exercising, start out slowly and try to build up to 30 minutes on most days.    Elevate your legs above heart level after exercise to keep blood from pooling in veins.  Staying at a healthy weight  Being overweight puts extra pressure on your veins. To stay at a healthy weight, try these tips:    Choose lean meats, fish, and skinless chicken.    Use low-fat dairy products.    Eat  foods high in fiber, such as whole grains, fruits, and vegetables.    Cut down on sugar, salt, and saturated and trans fats.    Exercise regularly.  Wearing elastic stockings  Elastic stockings gently squeeze veins so blood flows upward. If you need elastic stockings, your healthcare provider can prescribe them for you. Follow your healthcare provider s advice about how and when to wear them. Elastic stockings come in several different levels of pressure. Ask your healthcare provider which level of pressure would help you the most.   Elevating your legs  Raising your legs above heart level will help relieve swelling and keep blood from pooling in veins. Try to elevate your legs for 15 to 20 minutes at the end of the day, and whenever you re relaxing. To make sure your legs are raised above heart level, prop them up on cushions or large pillows.  When sitting and standing  To keep blood moving when you have to sit or stand for long periods, try these tips:    At work, take walking breaks instead of coffee breaks. Walk during your lunch hour. Or try flexing your feet up and down 10 times each hour.    When standing, raise yourself up and down on your toes, or rock back and forth on your heels.  [employer] last reviewed this educational content on 11/1/2019 2000-2021 [employer]. All rights reserved. This information is not intended as a substitute for professional medical care. Always follow your healthcare professional's instructions.

## 2021-06-03 NOTE — PROGRESS NOTES
Assessment & Plan   Problem List Items Addressed This Visit     None      Visit Diagnoses     Peripheral edema    -  Primary    Varicose veins of both legs with edema             Impression is idiopathic edema and varicosities of bilateral legs. Previous US negative for DVT and labs show no evidence of nephrotic syndrome, renal or hepatic insufficiency, or other cause. Recommended elevation and compression stockings (prescribed). She declined diuretic treatment and vascular referral at this time and I feel this is reasonable. Also had a mechanical fall with a RLE abrasion. This appears to be healing well with no signs of MSK injury. Tetanus UTD.     Complete history and physical exam as below. AF with normal VS.    DDx and Dx discussed with and explained to the pt to their satisfaction.  All questions were answered at this time. Pt expressed understanding of and agreement with this dx, tx, and plan. No further workup warranted and standard medication warnings given. I have given the patient a list of pertinent indications for re-evaluation. Will go to the Emergency Department if symptoms worsen or new concerning symptoms arise. Patient left in no apparent distress.     42 minutes spent on the date of the encounter doing chart review, history and exam, documentation and further activities per the note    See Patient Instructions    Return for your already scheduled appointment with your primary or as needed.    CAPRICE Collado  [facility] RICK Lamas is a 85 year old who presents for the following health issues     HPI     Bilateral Leg Edema- Follow Up  Reporting worsening in swelling last night but has since improved with elevation.   Noticed color changes in foot/ ankles- blue  Denies any pain but is having some numbness in left foot.     Fell 4 days ago and scraped her right shin on a door. superificial abrasion present.     Review of Systems   Constitutional, HEENT,  cardiovascular, pulmonary, gi and gu systems are negative, except as otherwise noted.      Objective    /70   Pulse 67   Temp 98  F (36.7  C) (Tympanic)   Resp 16   Wt 67.8 kg (149 lb 6.4 oz)   SpO2 96%   Breastfeeding No   BMI 28.94 kg/m    Body mass index is 28.94 kg/m .  Physical Exam  Vitals signs and nursing note reviewed.   Constitutional:       General: She is not in acute distress.     Appearance: Normal appearance. She is not diaphoretic.   HENT:      Head: Normocephalic and atraumatic.      Nose: Nose normal.   Eyes:      Conjunctiva/sclera: Conjunctivae normal.   Pulmonary:      Effort: Pulmonary effort is normal. No respiratory distress.   Musculoskeletal:      Comments: BLE: Trace ankle edema and extensive small vessel varicosities to the ankles and feet.    Skin:     General: Skin is dry.      Coloration: Skin is not jaundiced or pale.      Comments: Superficial linear abrasion and faint ecchymosis to the right anterior shin with no other overlying signs of trauma or infection. Distal CMS intact. Remainder of limb non-tender.    Neurological:      General: No focal deficit present.      Mental Status: She is alert. Mental status is at baseline.   Psychiatric:         Mood and Affect: Mood normal.         Behavior: Behavior normal.

## 2021-06-28 ENCOUNTER — VIRTUAL VISIT (OUTPATIENT)
Dept: FAMILY MEDICINE | Facility: CLINIC | Age: 86
End: 2021-06-28
Payer: COMMERCIAL

## 2021-06-28 DIAGNOSIS — M54.50 ACUTE BILATERAL LOW BACK PAIN WITHOUT SCIATICA: Primary | ICD-10-CM

## 2021-06-28 DIAGNOSIS — K59.00 CONSTIPATION, UNSPECIFIED CONSTIPATION TYPE: ICD-10-CM

## 2021-06-28 PROCEDURE — 99443 PR PHYSICIAN TELEPHONE EVALUATION 21-30 MIN: CPT | Mod: 95 | Performed by: PHYSICIAN ASSISTANT

## 2021-06-28 RX ORDER — POLYETHYLENE GLYCOL 3350 17 G/17G
1 POWDER, FOR SOLUTION ORAL 2 TIMES DAILY PRN
Qty: 507 G | Refills: 0 | Status: SHIPPED | OUTPATIENT
Start: 2021-06-28 | End: 2023-08-08

## 2021-06-28 NOTE — PATIENT INSTRUCTIONS
Bettye Lamas,    Thank you for allowing [facility] to manage your care.    I am unsure of the cause of your symptoms, but your exam is reassuring. We will see what our workup shows.     If you develop worsening/changing symptoms at any time, please call 911 or go to the emergency department for evaluation.    I sent your prescriptions to your pharmacy. Drink 8-10 glasses of fluid daily to stay well-hydrated.    For your pain, please use Ibuprofen 400mg four times daily with food. Between ibuprofen doses, you may use Tylenol 650mg.     Max acetaminophen (Tylenol) 3,000mg/24 hours  Max ibuprofen 2,400mg/24 hours    I ordered an MRI, please call diagnostic imaging [phone] to schedule your test IF YOU ARE NOT IMPROVING IN THE NEXT 2 WEEKS.    Please allow 1-2 business days for our office to contact you in regards to your laboratory/radiological studies.  If not done so, I encourage you to login into [employer] ([email or web address] to review your results as well.     If you have any questions or concerns, please feel free to call us at [phone]    Sincerely,    Rony Gimenez PA-C    Did you know?      You can schedule a video visit for follow-up appointments as well as future appointments for certain conditions.  Please see the below link.     [email or web address]    If you have not already done so,  I encourage you to sign up for Apparent ([email or web address]).  This will allow you to review your results, securely communicate with a provider, and schedule virtual visits as well.      Patient Education     Relieving Back Pain  Back pain is a common problem. You can strain back muscles by lifting too much weight or just by moving the wrong way. Back strain can be uncomfortable, even painful. And it can take weeks or months to improve. To help yourself feel better and prevent future back strains, try these tips.  Important: Don't give  aspirin to children or teens without first discussing it with your child's healthcare provider.  Ice    Ice reduces muscle pain and swelling. It helps most during the first 24 to 48 hours after an injury.    Wrap an ice pack or a bag of frozen peas in a thin towel. Never put ice directly on your skin.    Place the ice where your back hurts the most.    Don t ice for more than 20 minutes at a time.    You can use ice several times a day.  Medicines  Over-the-counter pain relievers include acetaminophen and anti-inflammatory medicines, which includes aspirin, naproxen, or ibuprofen. They can help ease discomfort. Some also reduce swelling.    Tell your healthcare provider about any medicines you are already taking.    Take medicines only as directed.  Manipulation and massage  Having manipulation by an osteopathic doctor or chiropractor may be helpful. Getting a massage also may help.   Heat  After the first 48 hours, heat can relax sore muscles and improve blood flow.    Try a warm bath or shower. Or use a heating pad set on low. To prevent a burn, keep a cloth between you and the heating pad.    Don t use a heating pad for more than 15 minutes at a time. Never sleep on a heating pad.  [employer] last reviewed this educational content on 6/1/2018 2000-2021 [employer]. All rights reserved. This information is not intended as a substitute for professional medical care. Always follow your healthcare professional's instructions.           Patient Education     Treating Constipation  Constipation is a common and often uncomfortable problem. Constipation means you have bowel movements fewer than 3 times per week. Or that you strain to pass hard, dry stool. It can last a short time. Or it can be a problem that never seems to go away. The good news is that it can often be treated and controlled.   Eat more fiber  One of the best ways to help treat constipation is to increase your fiber intake. You can do this  either through diet or by using fiber supplements. Fiber (in whole grains, fruits, and vegetables) adds bulk and absorbs water to soften the stool. This helps the stool pass through the colon more easily. When you increase your fiber intake, do it slowly to prevent side effects such as bloating. Also increase the amount of water that you drink. Eating more of these foods can add fiber to your diet:     High-fiber cereals    Whole grains, bran, and brown rice    Vegetables such as carrots, broccoli, and greens    Fresh fruits (especially apples, pears, and dried fruits such as raisins and apricots)    Nuts and legumes (especially beans such as lentils, kidney beans, and lima beans)  Get physically active  Exercise helps improve the working of your colon which helps ease constipation. Try to get some physical activity every day. If you haven t been active for a while, talk with your healthcare provider before starting again.     Laxatives  Your healthcare provider may suggest an over-the-counter product to help ease your constipation. He or she may suggest the use of bulk-forming agents or laxatives. Laxatives, if used as directed, are common and safe. Follow directions carefully when using them. See your provider for new-onset constipation, or long-term constipation, to rule out other causes such as medicines or thyroid disease.   [employer] last reviewed this educational content on 6/1/2019 2000-2021 [employer]. All rights reserved. This information is not intended as a substitute for professional medical care. Always follow your healthcare professional's instructions.

## 2021-06-28 NOTE — PROGRESS NOTES
Cydney is a 85 year old who is being evaluated via a billable telephone visit.      What phone number would you like to be contacted at? [phone]  How would you like to obtain your AVS? [facility]    Assessment & Plan   Problem List Items Addressed This Visit     None      Visit Diagnoses     Acute bilateral low back pain without sciatica    -  Primary    Relevant Orders    MR Lumbar Spine w/o Contrast    Constipation, unspecified constipation type        Relevant Medications    polyethylene glycol (MIRALAX) 17 GM/Dose powder         I do not believe a fracture to be the source of this patient's pain as there was no preceding trauma.  Based on this, no imaging of the spine was done.    I do not believe the pain is caused by an epidural abscess as the patient denies hx of IV drug use and does not have fevers/chills and no recent procedures.    I do not believe this patient's pain is from an infiltrative vertebral tumor as the patient does not have weight loss or night sweats. History of breast cancer.    I do not believe this patient's pain represents a rupture AAA.  There is no palpable, pulsatile mass on exam and patient does not have any ABD pain.      Based on history and exam, the most likely etiology of this patient's back pain is lumbosacral strain, less likely radiculopathy.  Emergent MRI is not indicated as this patient does not have new weakness or cauda equina syndrome.  Patient has no saddle anesthesia, bowel or bladder incontinence. Will send home with otc analgesics (declined muscle relaxant for breakthrough pain/spasm), rice/heat, follow-up in 2 weeks if not improving for face to face and MRI. Constipation from unknown etiology. Will treat with miralax and docusate. Will push fluids. Likely unrelated to back pain.    DDx and Dx discussed with and explained to the pt to their satisfaction.  All questions were answered at this time. Pt expressed understanding of and agreement with this dx, tx, and plan.  No further workup warranted and standard medication warnings given. I have given the patient a list of pertinent indications for re-evaluation. Will go to the Emergency Department if symptoms worsen or new concerning symptoms arise. Patient left the call in no apparent distress.     23 minutes spent on the date of the encounter doing chart review, history and exam, documentation and further activities per the note    See Patient Instructions    Return in about 2 weeks (around 7/12/2021) for a recheck of your symptoms if not improving, or call 911/go to an ER anytime if worsening.    CAPRICE Collado  [facility] RICK Lamas is a 85 year old who presents for the following health issues    HPI     Back Pain  Felt much improved from one month ago and was golfing without pain before her low back began hurting her again a few days ago.     Onset/Duration: 1.5 months; worse since Friday 06 /25/21  Description:   Location of pain: low back bilateral, R side worse than L  Character of pain: dull ache  Pain radiation: none  New numbness or weakness in legs, not attributed to pain: YES L ankle swells -now improved  Intensity: Currently 7/10, At its worst 8/10  Progression of Symptoms: worsening  History:   Specific cause: none  Pain interferes with job: YES  History of back problems: yes long time ago   Any previous MRI or X-rays: Yes- at [place].  Date 5-3-21   Sees a specialist for back pain: No  Alleviating factors:   Improved by: advil, laying flat on back   Precipitating factors:  Worsened by: Walking, going from laying to upright  Therapies tried and outcome: advil , acetaminophen (Tylenol) and heat, physical therapy       Where is your back pain located? (Select all that apply) low back, bilateral,L side worse    How would you describe your back pain?  dull ache    Where does your back pain spread? nowhere    Since your last clinic visit for back pain, how has your pain changed?  always present, but gets better and worse- now worse    Does your back pain interfere with your job?YES    Since your last visit, have you tried any new treatment? Yes -  Physical Therapy- she is now done with PT    *Notes constipation. Normal has BMs every day or every other day, but it has been a few days now without a BM. No saddle anesthesia or incontinence.    Romuloy DeShong CMA      Review of Systems   Constitutional, HEENT, cardiovascular, pulmonary, gi and gu systems are negative, except as otherwise noted.      Objective           Vitals:  No vitals were obtained today due to virtual visit.    Physical Exam   healthy, alert and no distress  PSYCH: Alert and oriented times 3; coherent speech, normal   rate and volume, able to articulate logical thoughts, able   to abstract reason, no tangential thoughts, no hallucinations   or delusions  Her affect is normal and pleasant  RESP: No cough, no audible wheezing, able to talk in full sentences  Remainder of exam unable to be completed due to telephone visits              Phone call duration: 9 minutes

## 2021-07-01 ENCOUNTER — TELEPHONE (OUTPATIENT)
Dept: FAMILY MEDICINE | Facility: CLINIC | Age: 86
End: 2021-07-01

## 2021-07-01 DIAGNOSIS — M54.50 ACUTE BILATERAL LOW BACK PAIN WITHOUT SCIATICA: Primary | ICD-10-CM

## 2021-07-01 RX ORDER — TIZANIDINE 2 MG/1
1-2 TABLET ORAL 3 TIMES DAILY PRN
Qty: 25 TABLET | Refills: 0 | Status: SHIPPED | OUTPATIENT
Start: 2021-07-01 | End: 2021-07-12

## 2021-07-01 NOTE — TELEPHONE ENCOUNTER
"Looks like this patient has been seeing Rony Gimenez quite a bit.    Had virtual visit for low back pain on 6/28 (Monday, 3 days ago).    Plan:    Will send home with otc analgesics (declined muscle relaxant for breakthrough pain/spasm), rice/heat, follow-up in 2 weeks if not improving for face to face and MRI. Constipation from unknown etiology. Will treat with miralax and docusate. Will push fluids. Likely unrelated to back pain.      Return in about 2 weeks (around 7/12/2021) for a recheck of your symptoms if not improving, or call 911/go to an ER anytime if worsening.     CAPRICE Collado  [facility] RICK      I called and spoke to patient, says her low back pain seems worse today, worse if walking, feels better if she is sitting or lying down.   Says she is using a heating pad to low back.   She says the miralax and stool softener \"did the trick\" but back pain remains.    She says imaging called on Monday to schedule MRI but she did not schedule as is was not going to do the MRI for sure.    She denies numbness or tingling to groin or legs.         Routed to Rony Gimenez to clarify plan.   Advised patient to try to keep moving around and do gentle stretches, to ER if pain rapidly increasing, legs or groin numb, loses bowel or bladder control, or unable to walk.    Patient verbalized understanding of and agreement with plan.    Routed to Rony Gimenez to advise, MRI now?    April Jack RN  [facility]        "

## 2021-07-01 NOTE — TELEPHONE ENCOUNTER
Please call patient. She should schedule the MRI and I will send a prescription for a low dose muscle relaxer to her pharmacy. To ER/This information has been abstracted from the  Chart. Care if worsening. I am happy to see her tomorrow if she would like.

## 2021-07-01 NOTE — TELEPHONE ENCOUNTER
Patient requesting states her back pain is worse. She had virtual visit with provider 6/28/2021. Please call patient.

## 2021-07-01 NOTE — TELEPHONE ENCOUNTER
I called and spoke to patient, advised her of Rx sent, watch for sedation/dizziness.         To ER if rapidly worsening.   She says she is about the same all day today.    Provided her with number to call to schedule MRI; she prefers to try to get the MRI first and then schedule for follow up with Rony.    April Jack RN  [facility]

## 2021-07-02 ENCOUNTER — TELEPHONE (OUTPATIENT)
Dept: FAMILY MEDICINE | Facility: CLINIC | Age: 86
End: 2021-07-02

## 2021-07-02 ENCOUNTER — OFFICE VISIT (OUTPATIENT)
Dept: FAMILY MEDICINE | Facility: CLINIC | Age: 86
End: 2021-07-02
Payer: COMMERCIAL

## 2021-07-02 VITALS
RESPIRATION RATE: 14 BRPM | BODY MASS INDEX: 28.59 KG/M2 | DIASTOLIC BLOOD PRESSURE: 88 MMHG | SYSTOLIC BLOOD PRESSURE: 150 MMHG | WEIGHT: 147.6 LBS | TEMPERATURE: 99.1 F | HEART RATE: 68 BPM | OXYGEN SATURATION: 98 %

## 2021-07-02 DIAGNOSIS — R20.2 PARESTHESIAS: ICD-10-CM

## 2021-07-02 DIAGNOSIS — M54.50 ACUTE BILATERAL LOW BACK PAIN WITHOUT SCIATICA: Primary | ICD-10-CM

## 2021-07-02 PROCEDURE — 99214 OFFICE O/P EST MOD 30 MIN: CPT | Performed by: PHYSICIAN ASSISTANT

## 2021-07-02 RX ORDER — PREDNISONE 20 MG/1
20 TABLET ORAL DAILY
Qty: 5 TABLET | Refills: 0 | Status: SHIPPED | OUTPATIENT
Start: 2021-07-02 | End: 2021-07-07

## 2021-07-02 NOTE — PROGRESS NOTES
Assessment & Plan   Problem List Items Addressed This Visit     None      Visit Diagnoses     Acute bilateral low back pain without sciatica    -  Primary    Relevant Medications    predniSONE (DELTASONE) 20 MG tablet    Other Relevant Orders    Spine Referral    Paresthesias             Based on history and exam, the most likely etiology of this patient's back pain is lumbosacral strain vs radiculopathy.  Emergent MRI is not indicated as this patient does not have new weakness or cauda equina syndrome. Has MRI schedule for next week. Patient has no saddle anesthesia, bowel or bladder incontinence. Will send home with otc analgesics, continued muscle relaxant for breakthrough pain/spasm, rice/heat, prednisone course and physical therapy referral.  Follow up with spine if not improving in the next 1-2 weeks.    Complete history and physical exam as below. AF with normal VS except for elevated bp, which they will monitor at home and contact us if >140/90mmHg greater than 50% of the time.    DDx and Dx discussed with and explained to the pt to their satisfaction.  All questions were answered at this time. Pt expressed understanding of and agreement with this dx, tx, and plan. No further workup warranted and standard medication warnings given. I have given the patient a list of pertinent indications for re-evaluation. Will go to the Emergency Department if symptoms worsen or new concerning symptoms arise. Patient left in no apparent distress.     32 minutes spent on the date of the encounter doing chart review, history and exam, documentation and further activities per the note    See Patient Instructions    Return in about 1 month (around 8/2/2021) for a recheck of your symptoms if not improving, or call 911/go to an ER anytime if worsening.    CAPRICE Collado  [facility] RICK Lamas is a 85 year old who presents for the following health issues     HPI     Back  Pain  Onset/Duration: May  2021  Description:   Location of pain: low back  Character of pain: dull ache and constant  Pain radiation: none  New numbness or weakness in legs, not attributed to pain: YES- fingers tingling  Intensity: Currently 8/10, moderate  Progression of Symptoms: same  History:   Specific cause: none  Pain interferes with job: not applicable  History of back problems: Yes, long time ago  Any previous MRI or X-rays: Yes- at [place].  Date 5/3/21  Sees a specialist for back pain: No  Alleviating factors:   Improved by: heat, Advil, laying flat on her back    Precipitating factors:  Worsened by: walking  Therapies tried and outcome: Flexeril, heat, Advil, laying flat on her back    Accompanying Signs & Symptoms:  Risk of Fracture: Age >64  Risk of Cauda Equina: None  Risk of Infection: None  Risk of Cancer: History of cancer  Risk of Ankylosing Spondylitis: Onset at age <35, male, AND morning back stiffness no    Review of Systems   Constitutional, HEENT, cardiovascular, pulmonary, gi and gu systems are negative, except as otherwise noted.      Objective    BP (!) 150/88   Pulse 68   Temp 99.1  F (37.3  C) (Tympanic)   Resp 14   Wt 67 kg (147 lb 9.6 oz)   SpO2 98%   BMI 28.59 kg/m    Body mass index is 28.59 kg/m .  Physical Exam  Vitals signs and nursing note reviewed.   Constitutional:       General: She is not in acute distress.     Appearance: She is not ill-appearing or diaphoretic.   HENT:      Head: Normocephalic and atraumatic.      Mouth/Throat:      Mouth: Mucous membranes are moist.   Eyes:      Conjunctiva/sclera: Conjunctivae normal.   Cardiovascular:      Rate and Rhythm: Normal rate and regular rhythm.      Heart sounds: Normal heart sounds. No murmur. No friction rub. No gallop.    Pulmonary:      Effort: Pulmonary effort is normal. No respiratory distress.      Breath sounds: Normal breath sounds. No stridor. No wheezing, rhonchi or rales.   Abdominal:      General: Bowel  sounds are normal. There is no distension.      Palpations: Abdomen is soft. There is no mass.      Tenderness: There is no abdominal tenderness. There is no guarding or rebound.      Hernia: No hernia is present.   Musculoskeletal:      Comments: No CVA or midline spinal tenderness. No overlying signs of trauma or infection.   Skin:     General: Skin is warm and dry.   Neurological:      General: No focal deficit present.      Mental Status: She is alert. Mental status is at baseline.      Comments: Able to toe lift, heel walk and knee bend. 1+ patellar and achilles DTRs.   Psychiatric:         Mood and Affect: Mood normal.         Behavior: Behavior normal.

## 2021-07-02 NOTE — TELEPHONE ENCOUNTER
I called and spoke to patient, finger tingling is not new, comes and goes.   Denies weakness to one side, blurry vision, trouble talking.    She says she has not yet tried the flexeril this AM.   I advised she can try a full tab if the 1/2 tab did not help.    She is not sure if it helped or not as she took it at HS and then went to sleep.    She is planning to try another 1/2 tab now, wait an hour, if not helping, will try another 1/2 tab and again monitoring for sedation, dizziness while using this med.    She would like to see Rony Gimenez again as she is concerned about having not felt right for over a week.   MRI not happening until 7/8/21.    Scheduled for this afternoon, see note from yesterday, Rony advised he could evaluate her again.    April Jack RN  [facility]

## 2021-07-02 NOTE — PATIENT INSTRUCTIONS
Bettye Lamas,    Thank you for allowing [facility] to manage your care.    I am unsure of the cause of your symptoms, but your exam is reassuring. If you develop worsening/changing symptoms at any time, please call 911 or go to the emergency department for evaluation.    Your blood pressure was high today. Get a blood pressure cuff for use at home. If your blood pressure is greater than or equal to 140/90mm Hg more than half of the time, please schedule an appointment with us for a recheck.    I sent your prescriptions to your pharmacy. Start the prednisone if your other medications are not helping your pain. Avoid ibuprofen while taking the prednisone.    Prednisone Discharge Instructions:    Please take the steroid, Prednisone, for the full course as prescribed.  Take Prednisone with food or milk to minimize stomach upset.      Side effects of Prednisone include difficulty sleeping, increased appetite, weight gain, and changes in mood.  If you are diabetic, please monitor your blood sugar regularly while taking this medicine as Prednisone can cause high blood sugar.    I made a referral to spine to be used as needed. Please call [phone] to schedule.    Please allow 1-2 business days for our office to contact you in regards to your laboratory/radiological studies.  If not done so, I encourage you to login into [employer] ([email or web address] to review your results as well.     If you have any questions or concerns, please feel free to call us at [phone]    Sincerely,    Rony Gimenez PA-C    Did you know?      You can schedule a video visit for follow-up appointments as well as future appointments for certain conditions.  Please see the below link.     [email or web address]    If you have not already done so,  I encourage you to sign up for [employer] ([email or web address]).  This will allow you to review your results, securely  communicate with a provider, and schedule virtual visits as well.

## 2021-07-02 NOTE — TELEPHONE ENCOUNTER
Patient states she is experiencing tingling in her fingers of left hand. She took 1/2 tab of muscle relaxant at 11 pm and feels it didn't change pain level.

## 2021-07-08 ENCOUNTER — ANCILLARY PROCEDURE (OUTPATIENT)
Dept: MRI IMAGING | Facility: CLINIC | Age: 86
End: 2021-07-08
Attending: PHYSICIAN ASSISTANT
Payer: COMMERCIAL

## 2021-07-08 ENCOUNTER — TELEPHONE (OUTPATIENT)
Dept: FAMILY MEDICINE | Facility: CLINIC | Age: 86
End: 2021-07-08

## 2021-07-08 DIAGNOSIS — M54.50 ACUTE BILATERAL LOW BACK PAIN WITHOUT SCIATICA: ICD-10-CM

## 2021-07-08 DIAGNOSIS — M54.50 ACUTE BILATERAL LOW BACK PAIN WITHOUT SCIATICA: Primary | ICD-10-CM

## 2021-07-08 PROCEDURE — 72148 MRI LUMBAR SPINE W/O DYE: CPT | Mod: TC | Performed by: RADIOLOGY

## 2021-07-08 NOTE — TELEPHONE ENCOUNTER
Spoke with patient, she took her last dose of Prednisone this am, but she does not feel that the prednisone helped.  Continued back pain  She does take 1 tab of Zanaflex tid which helps some.  She stopped Advil when started taking Prednisone and will take tylenol instead, which helps briefly.  She will try to do her PT exercises and she does use ice and heat, with a little relief.  She will have her MRI tonight and spine appt 7/15/21.  She is wondering if there is another medication she can try?  Would Diclofenac tabs be better than advil?  Pain worse in the morning, better in afternoon.  Please advise.  Kat Fenton RN  [facility]

## 2021-07-08 NOTE — TELEPHONE ENCOUNTER
Patient requesting a medication(non narcotic) for her back pain. Patient finished prednisone and did not help.

## 2021-07-08 NOTE — TELEPHONE ENCOUNTER
Diclofenac or naproxen is reasonable to try instead of ibuprofen. Naproxen is available over the counter, but  I will send a prescription for diclofenac to her pharmacy if she would like to use this. Please update.

## 2021-07-09 DIAGNOSIS — S32.030G COMPRESSION FRACTURE OF L3 VERTEBRA WITH DELAYED HEALING, SUBSEQUENT ENCOUNTER: Primary | ICD-10-CM

## 2021-07-09 NOTE — PROGRESS NOTES
Spoke with patient about MRI results. She will follow up with spine next week and get an MRI lumbar w contrast scheduled. Good pain control with current meds.

## 2021-07-12 ENCOUNTER — TELEPHONE (OUTPATIENT)
Dept: FAMILY MEDICINE | Facility: CLINIC | Age: 86
End: 2021-07-12

## 2021-07-12 DIAGNOSIS — M54.50 ACUTE BILATERAL LOW BACK PAIN WITHOUT SCIATICA: ICD-10-CM

## 2021-07-12 RX ORDER — TIZANIDINE 2 MG/1
1-2 TABLET ORAL 3 TIMES DAILY PRN
Qty: 25 TABLET | Refills: 0 | Status: SHIPPED | OUTPATIENT
Start: 2021-07-12 | End: 2021-07-28

## 2021-07-12 NOTE — TELEPHONE ENCOUNTER
Patient has appt with Dr. Bogoie [facility] on 7/15/2021. Patient asking if she should continue tiZANidine (ZANAFLEX) 2 MG tablet also she is taking tylenol in between. Is this ok? Also will need refill of  tiZANidine (ZANAFLEX) 2 MG tablet.

## 2021-07-12 NOTE — TELEPHONE ENCOUNTER
Tizandiine refill sent. It is ok for her to continue this regimen. Please call and update. Thanks.

## 2021-07-12 NOTE — TELEPHONE ENCOUNTER
Called patient.  She stated that her back pain is about the same, however, it does get a little bit better with the Zanaflex with Tylenol in between.  She is wondering if she should continue with this.  If so, she will need a refill on the Zanaflex.    See below message.    Kathy RN  Triage Nurse  [facility]: [facility]

## 2021-07-12 NOTE — TELEPHONE ENCOUNTER
Notified patient of the orders below per provider.    Patient stated understanding and agreeable with the plan of care.   Kathy LUN-RN.  [facility]

## 2021-07-15 ENCOUNTER — OFFICE VISIT (OUTPATIENT)
Dept: ORTHOPEDICS | Facility: CLINIC | Age: 86
End: 2021-07-15
Payer: COMMERCIAL

## 2021-07-15 VITALS — WEIGHT: 147 LBS | DIASTOLIC BLOOD PRESSURE: 70 MMHG | SYSTOLIC BLOOD PRESSURE: 120 MMHG | BODY MASS INDEX: 28.47 KG/M2

## 2021-07-15 DIAGNOSIS — M54.50 ACUTE BILATERAL LOW BACK PAIN WITHOUT SCIATICA: ICD-10-CM

## 2021-07-15 DIAGNOSIS — M48.56XA: Primary | ICD-10-CM

## 2021-07-15 PROCEDURE — 99204 OFFICE O/P NEW MOD 45 MIN: CPT | Performed by: FAMILY MEDICINE

## 2021-07-15 RX ORDER — CALCITONIN SALMON 200 [IU]/.09ML
1 SPRAY, METERED NASAL DAILY
Qty: 3.7 ML | Refills: 0 | Status: CANCELLED | OUTPATIENT
Start: 2021-07-15

## 2021-07-15 NOTE — PROGRESS NOTES
ASSESSMENT & PLAN    Cydney was seen today for pain.    Diagnoses and all orders for this visit:    Pathological compression fracture of lumbar vertebra, initial encounter (H)  -     [facility]; Future    Acute bilateral low back pain without sciatica  -     Spine Referral  -     [facility]; Future      This issue is acute on chronic and Worsening.    # Pathologic Osteoporotic Lumbar Fractures: Symptoms noted over the past 2 months in the upper lumbar region in the setting of a fall 2 to 3 weeks prior to this.  She does have a history of pathologic fractures in the past has been on Fosamax for years has been off of it but just recently restarted this.  Patient does have pain over the thoracolumbar junction with some paralumbar pain on palpation as well.  No radicular symptoms (sciatica) or signs on exam.  Reviewed previous MRI showing pathology fracture in the thoracolumbar region with concerns for possible malignancy hence the MRI with contrast tomorrow.  We will have her follow-up with her primary care physician to go over the results of this as if there is cancer she will need specialty referral for this.  Pain overall improving at this time.  I would have her restart physical therapy and treat as below.  Follow-up with me in 3 to 4 weeks if not improving sooner if worsening.  Image Findings: Reviewed lumbar MRI showing pathologic lumbar compression fractures as well as concern for malignancy (cancer)  Treatment: Activities as tolerated, referral to physical therapy placed today  Medications/Injections: Can continue pain regimen per primary care provider, will message for possible initiation of calcitonin spray, can take tylenol 1000 mg three times per day as well   Follow-up: In one month if symptoms do not improve, sooner if worsening         Mateo Boogie MD  [facility]    -----  Chief Complaint   Patient presents with     Lower Back - Pain        SUBJECTIVE  Cydney Christiansen is a/an 85 year old female who is seen in consultation at the request of  Jamel Gimenez PA-C for evaluation of low back pain.     The patient is seen with their son.  The patient is Right handed    Onset: 2 month(s) ago. Reports insidious onset without acute precipitating event. Saw PCP 7/2/21 and lumbar MRI was ordered.  MRI performed 7/8/21, radiologist recommended contrast-enhanced MRI. This is scheduled for tomorrow 7/16/21.  Pt had a fall around Memorial weekend about 2-3 weeks before back started hurting.  Has HO Zarfo for years went off then had a fracture, now back on it this year.    Location of Pain: bilateral low back pain, no radicular symptoms   Worsened by: getting up in morning out of bed, walking   Better with: heat, sitting    Treatments tried:  Prednisone, Flexeril, heat, advil, lying down   Associated symptoms: no distal numbness or tingling; denies swelling or warmth    Orthopedic/Surgical history: YES -waxing and wanning low back pain   Social History/Occupation: Retired     No family history pertinent to patient's problem today.     REVIEW OF SYSTEMS:  Review of Systems  Constitutional, HEENT, cardiovascular, pulmonary, GI, , musculoskeletal, neuro, skin, endocrine and psych systems are negative, except as otherwise noted.    OBJECTIVE:  /70   Wt 66.7 kg (147 lb)   BMI 28.47 kg/m     General: healthy, alert and in no distress  HEENT: no scleral icterus or conjunctival erythema  Skin: no suspicious lesions or rash. No jaundice.  CV: distal perfusion intact   Resp: normal respiratory effort without conversational dyspnea   Psych: normal mood and affect  Gait: normal steady gait with appropriate coordination and balance   Neuro: Normal light sensory exam of bilateral lower extremities    THORACIC/LUMBAR SPINE  Inspection:  Moderate scoliosis  Palpation:    Tender about the left para lumbar muscles and right para lumbar muscles. Otherwise  remainder of landmarks are nontender.  Range of Motion:     Lumbar flexion limited by tightness    Lumbar extension limited slightly by pain    Right side bend full    Left side bend full    Right rotation full    Left rotation full  Strength:    5/5 - quadriceps, hamstrings, tibialis anterior, gastrocsoleus, and extensor hallicus longus  Special Tests:    Positive: None  Negative: slump test (bilateral)           RADIOLOGY:  I independently ordered, visualized and reviewed these images with the patient    MRI LUMBAR SPINE WITHOUT CONTRAST   7/8/2021 7:05 PM      HISTORY: Low back pain, more than six weeks. Acute bilateral low back  pain without sciatica.     TECHNIQUE: Multiplanar multisequence MRI of the lumbar spine without  contrast.     COMPARISON: X-rays 5/3/2021      FINDINGS:  Acute/subacute T11 vertebral body compression fracture with  approximately 30% height loss and prominent fluid cleft coursing  through the vertebral body. T2 hyperintense signal is present  throughout the vertebral body extending into the bilateral pedicles.  Small volume T2 hyperintense signal and T1 hyperintensity CSF signal  is present within the anterior epidural space contributing to mild  spinal canal stenosis (series 5 image 8, series 3 image 10).     Acute/subacute L3 vertebral body compression fracture with  approximately 25% height loss. Slight buckling of the posterior  cortexes of the T11 and L3 vertebral bodies contributing to mild  spinal canal stenoses. Alignment is significant for levoconvex  curvature and multilevel mild grade 1 spondylolisthesis.     Bone marrow demonstrates background of mild marrow heterogeneity with  a few scattered areas of degenerative endplate change.     The inferior spinal cord demonstrates abnormal central T2 hyperintense  signal at the level of the T12-L1 disc, the posterior cord at the  level of the mid L1 vertebral body. Conus medullaris is low-lying,  terminating at the level of the L2  inferior endplate. Cauda equina is  unremarkable. T2 hyperintense right-sided renal lesions which are  incompletely characterized, statistically likely benign cysts.     Segmental Analysis:      T12-L1:  Mild disc height loss. No herniation. Mild bilateral facet  arthropathy. No foraminal or spinal canal stenosis.      L1-L2:  Mild disc height loss. Disc bulge with right central  protrusion component. Mild bilateral facet arthropathy. Mild right  foraminal stenosis. No left foraminal stenosis. Mild spinal canal  stenosis.       L2-L3:  Mild disc height loss. Minimal disc bulge. Moderate bilateral  facet arthropathy. No foraminal stenosis. Mild spinal canal stenosis.         L3-L4:  Mild disc height loss. Disc bulge, slightly asymmetric to the  right. Moderate bilateral facet arthropathy.  Mild right foraminal  stenosis. No left foraminal stenosis. Mild spinal canal stenosis.     L4-L5:  Mild disc height loss. No herniation. Mild right facet  arthropathy. Severe left facet arthropathy. No right foraminal  stenosis. Mild left foraminal stenosis. Minimal spinal canal stenosis.     L5-S1:  Mild disc height loss. No herniation. Mild right facet  arthropathy. Moderate left facet arthropathy. No right foraminal  stenosis. Mild left foraminal stenosis. Mild spinal canal stenosis.                                                                      IMPRESSION:    1. Acute/subacute T11 and L3 vertebral body compression fractures with  mild height loss.  2. Pathologic fractures cannot be excluded.  3. Signal abnormality within the anterior epidural space posterior to  the fractured T11 vertebral body. This may represent small volume  hemorrhage; however, extraosseous extension of a marrow replacement  process cannot be excluded. Contrast enhanced MRI would be typically  recommended.  4. Nonspecific T2 hyperintensities within the inferior cord.  Differential diagnosis includes sequela of old insult,  infectious/inflammatory  process, or demyelinating disease. Malignancy  cannot be completely excluded. Contrast-enhanced MRI would be  typically recommended.  5. Slightly low-lying conus medullaris terminating at the L2 inferior  endplate.     DAO ACKERMAN MD     Review of external notes as documented elsewhere in note  Review of the result(s) of each unique test - lumbar MRI on 7/8 and 7/16  Assessment requiring an independent historian(s) - family - son

## 2021-07-15 NOTE — PATIENT INSTRUCTIONS
# Pathologic Osteoporotic Lumbar Fractures: Symptoms noted over the past 2 months in the upper lumbar region in the setting of a fall 2 to 3 weeks prior to this.  She does have a history of pathologic fractures in the past has been on Fosamax for years has been off of it but just recently restarted this.  Patient does have pain over the thoracolumbar junction with some paralumbar pain on palpation as well.  No radicular symptoms (sciatica) or signs on exam.  Reviewed previous MRI showing pathology fracture in the thoracolumbar region with concerns for possible malignancy hence the MRI with contrast tomorrow.  We will have her follow-up with her primary care physician to go over the results of this as if there is cancer she will need specialty referral for this.  Pain overall improving at this time.  I would have her restart physical therapy and treat as below.  Follow-up with me in 3 to 4 weeks if not improving sooner if worsening.  Image Findings: Reviewed lumbar MRI showing pathologic lumbar compression fractures as well as concern for malignancy (cancer)  Treatment: Activities as tolerated, referral to physical therapy placed today  Medications/Injections: Can continue pain regimen per primary care provider, will message for possible initiation of calcitonin spray, can take tylenol 1000 mg three times per day as well   Follow-up: In one month if symptoms do not improve, sooner if worsening      Please call [phone]   Ask for my team if you have any questions or concerns    It was great seeing you today!    Mateo Boogie MD, [facility]

## 2021-07-15 NOTE — LETTER
7/15/2021         RE: Cydney Christiansen  [address]  [place] [address]        Dear Colleague,    Thank you for referring your patient, Cydney Christiansen, to the [facility]. Please see a copy of my visit note below.    ASSESSMENT & PLAN    Cydney was seen today for pain.    Diagnoses and all orders for this visit:    Pathological compression fracture of lumbar vertebra, initial encounter (H)  -     [facility]; Future    Acute bilateral low back pain without sciatica  -     Spine Referral  -     [facility]; Future      This issue is acute on chronic and Worsening.    # Pathologic Osteoporotic Lumbar Fractures: Symptoms noted over the past 2 months in the upper lumbar region in the setting of a fall 2 to 3 weeks prior to this.  She does have a history of pathologic fractures in the past has been on Fosamax for years has been off of it but just recently restarted this.  Patient does have pain over the thoracolumbar junction with some paralumbar pain on palpation as well.  No radicular symptoms (sciatica) or signs on exam.  Reviewed previous MRI showing pathology fracture in the thoracolumbar region with concerns for possible malignancy hence the MRI with contrast tomorrow.  We will have her follow-up with her primary care physician to go over the results of this as if there is cancer she will need specialty referral for this.  Pain overall improving at this time.  I would have her restart physical therapy and treat as below.  Follow-up with me in 3 to 4 weeks if not improving sooner if worsening.  Image Findings: Reviewed lumbar MRI showing pathologic lumbar compression fractures as well as concern for malignancy (cancer)  Treatment: Activities as tolerated, referral to physical therapy placed today  Medications/Injections: Can continue pain regimen per primary care provider, will message for possible initiation of calcitonin spray, can take  tylenol 1000 mg three times per day as well   Follow-up: In one month if symptoms do not improve, sooner if worsening         Mateo Boogie MD  [facility]    -----  Chief Complaint   Patient presents with     Lower Back - Pain       SUBJECTIVE  Cydney Christiansen is a/an 85 year old female who is seen in consultation at the request of  Jamel Gimenez PA-C for evaluation of low back pain.     The patient is seen with their son.  The patient is Right handed    Onset: 2 month(s) ago. Reports insidious onset without acute precipitating event. Saw PCP 7/2/21 and lumbar MRI was ordered.  MRI performed 7/8/21, radiologist recommended contrast-enhanced MRI. This is scheduled for tomorrow 7/16/21.  Pt had a fall around [place] weekend about 2-3 weeks before back started hurting.  Has HO Fosfamax for years went off then had a fracture, now back on it this year.    Location of Pain: bilateral low back pain, no radicular symptoms   Worsened by: getting up in morning out of bed, walking   Better with: heat, sitting    Treatments tried:  Prednisone, Flexeril, heat, advil, lying down   Associated symptoms: no distal numbness or tingling; denies swelling or warmth    Orthopedic/Surgical history: YES -waxing and wanning low back pain   Social History/Occupation: Retired     No family history pertinent to patient's problem today.     REVIEW OF SYSTEMS:  Review of Systems  Constitutional, HEENT, cardiovascular, pulmonary, GI, , musculoskeletal, neuro, skin, endocrine and psych systems are negative, except as otherwise noted.    OBJECTIVE:  /70   Wt 66.7 kg (147 lb)   BMI 28.47 kg/m     General: healthy, alert and in no distress  HEENT: no scleral icterus or conjunctival erythema  Skin: no suspicious lesions or rash. No jaundice.  CV: distal perfusion intact   Resp: normal respiratory effort without conversational dyspnea   Psych: normal mood and affect  Gait: normal steady gait  with appropriate coordination and balance   Neuro: Normal light sensory exam of bilateral lower extremities    THORACIC/LUMBAR SPINE  Inspection:  Moderate scoliosis  Palpation:    Tender about the left para lumbar muscles and right para lumbar muscles. Otherwise remainder of landmarks are nontender.  Range of Motion:     Lumbar flexion limited by tightness    Lumbar extension limited slightly by pain    Right side bend full    Left side bend full    Right rotation full    Left rotation full  Strength:    5/5 - quadriceps, hamstrings, tibialis anterior, gastrocsoleus, and extensor hallicus longus  Special Tests:    Positive: None  Negative: slump test (bilateral)           RADIOLOGY:  I independently ordered, visualized and reviewed these images with the patient    MRI LUMBAR SPINE WITHOUT CONTRAST   7/8/2021 7:05 PM      HISTORY: Low back pain, more than six weeks. Acute bilateral low back  pain without sciatica.     TECHNIQUE: Multiplanar multisequence MRI of the lumbar spine without  contrast.     COMPARISON: X-rays 5/3/2021      FINDINGS:  Acute/subacute T11 vertebral body compression fracture with  approximately 30% height loss and prominent fluid cleft coursing  through the vertebral body. T2 hyperintense signal is present  throughout the vertebral body extending into the bilateral pedicles.  Small volume T2 hyperintense signal and T1 hyperintensity CSF signal  is present within the anterior epidural space contributing to mild  spinal canal stenosis (series 5 image 8, series 3 image 10).     Acute/subacute L3 vertebral body compression fracture with  approximately 25% height loss. Slight buckling of the posterior  cortexes of the T11 and L3 vertebral bodies contributing to mild  spinal canal stenoses. Alignment is significant for levoconvex  curvature and multilevel mild grade 1 spondylolisthesis.     Bone marrow demonstrates background of mild marrow heterogeneity with  a few scattered areas of degenerative  endplate change.     The inferior spinal cord demonstrates abnormal central T2 hyperintense  signal at the level of the T12-L1 disc, the posterior cord at the  level of the mid L1 vertebral body. Conus medullaris is low-lying,  terminating at the level of the L2 inferior endplate. Cauda equina is  unremarkable. T2 hyperintense right-sided renal lesions which are  incompletely characterized, statistically likely benign cysts.     Segmental Analysis:      T12-L1:  Mild disc height loss. No herniation. Mild bilateral facet  arthropathy. No foraminal or spinal canal stenosis.      L1-L2:  Mild disc height loss. Disc bulge with right central  protrusion component. Mild bilateral facet arthropathy. Mild right  foraminal stenosis. No left foraminal stenosis. Mild spinal canal  stenosis.       L2-L3:  Mild disc height loss. Minimal disc bulge. Moderate bilateral  facet arthropathy. No foraminal stenosis. Mild spinal canal stenosis.         L3-L4:  Mild disc height loss. Disc bulge, slightly asymmetric to the  right. Moderate bilateral facet arthropathy.  Mild right foraminal  stenosis. No left foraminal stenosis. Mild spinal canal stenosis.     L4-L5:  Mild disc height loss. No herniation. Mild right facet  arthropathy. Severe left facet arthropathy. No right foraminal  stenosis. Mild left foraminal stenosis. Minimal spinal canal stenosis.     L5-S1:  Mild disc height loss. No herniation. Mild right facet  arthropathy. Moderate left facet arthropathy. No right foraminal  stenosis. Mild left foraminal stenosis. Mild spinal canal stenosis.                                                                      IMPRESSION:    1. Acute/subacute T11 and L3 vertebral body compression fractures with  mild height loss.  2. Pathologic fractures cannot be excluded.  3. Signal abnormality within the anterior epidural space posterior to  the fractured T11 vertebral body. This may represent small volume  hemorrhage; however, extraosseous  extension of a marrow replacement  process cannot be excluded. Contrast enhanced MRI would be typically  recommended.  4. Nonspecific T2 hyperintensities within the inferior cord.  Differential diagnosis includes sequela of old insult,  infectious/inflammatory process, or demyelinating disease. Malignancy  cannot be completely excluded. Contrast-enhanced MRI would be  typically recommended.  5. Slightly low-lying conus medullaris terminating at the L2 inferior  endplate.     DAO ACKERMAN MD     Review of external notes as documented elsewhere in note  Review of the result(s) of each unique test - lumbar MRI on 7/8 and 7/16  Assessment requiring an independent historian(s) - family - son         Again, thank you for allowing me to participate in the care of your patient.        Sincerely,        Mateo Boogie MD

## 2021-07-16 ENCOUNTER — ANCILLARY PROCEDURE (OUTPATIENT)
Dept: MRI IMAGING | Facility: CLINIC | Age: 86
End: 2021-07-16
Attending: PHYSICIAN ASSISTANT
Payer: COMMERCIAL

## 2021-07-16 DIAGNOSIS — S32.030G COMPRESSION FRACTURE OF L3 VERTEBRA WITH DELAYED HEALING, SUBSEQUENT ENCOUNTER: ICD-10-CM

## 2021-07-16 PROCEDURE — A9585 GADOBUTROL INJECTION: HCPCS | Performed by: RADIOLOGY

## 2021-07-16 PROCEDURE — 72158 MRI LUMBAR SPINE W/O & W/DYE: CPT | Mod: TC | Performed by: RADIOLOGY

## 2021-07-16 RX ORDER — GADOBUTROL 604.72 MG/ML
6.5 INJECTION INTRAVENOUS ONCE
Status: COMPLETED | OUTPATIENT
Start: 2021-07-16 | End: 2021-07-16

## 2021-07-16 RX ADMIN — GADOBUTROL 6.5 ML: 604.72 INJECTION INTRAVENOUS at 10:32

## 2021-07-19 ENCOUNTER — TELEPHONE (OUTPATIENT)
Dept: DERMATOLOGY | Facility: CLINIC | Age: 86
End: 2021-07-19

## 2021-07-19 ENCOUNTER — TELEPHONE (OUTPATIENT)
Dept: FAMILY MEDICINE | Facility: CLINIC | Age: 86
End: 2021-07-19

## 2021-07-19 DIAGNOSIS — M54.50 ACUTE BILATERAL LOW BACK PAIN WITHOUT SCIATICA: ICD-10-CM

## 2021-07-19 LAB
CREAT BLD-MCNC: 0.7 MG/DL (ref 0.5–1.2)
GFR SERPL CREATININE-BSD FRML MDRD: 80 ML/MIN/{1.73_M2}

## 2021-07-19 NOTE — TELEPHONE ENCOUNTER
[facility] Call Center    Phone Message    May a detailed message be left on voicemail: yes     Reason for Call: The patient called to reschedule 7/23/21 appointment due to illness. Writer scheduled to the next available 11/16/21. The patient is wondering if it is ok to wait that long? She stated the provider was going to potentially cut something off her leg. Writer also added patient to the wait list. Please advise. Thank you.    Action Taken: Message routed to:  Adult Clinics: Dermatology p [phone]    Travel Screening: Not Applicable

## 2021-07-19 NOTE — TELEPHONE ENCOUNTER
Patient requesting refill of diclofenac (VOLTAREN) 50 MG EC tablet. Patient also asking if she should still be taking tiZANidine (ZANAFLEX) 2 MG tablet.

## 2021-07-20 NOTE — TELEPHONE ENCOUNTER
Spoke with patient she has picked up the Tizanidine, does not need refilled a.  Notified diclofenac refilled today. She will call pharmacy to arrange .

## 2021-07-20 NOTE — TELEPHONE ENCOUNTER
Patient calling to check on status of refill. She state she only has 1 pill left. Writer informed patient of our refill protocol to allow at least 72hrs for request to be granted/denied.     Patient would like a call back to notify her of the refill status today.    Thank you,  Emi BURNS  [facility]  Team [place] Coordinator

## 2021-07-20 NOTE — TELEPHONE ENCOUNTER
Spoke with pt and pt states that due to health issues she would like to reschedule her appointment out. Appointment rescheduled to 9/28/21 at 2pm. Pt advised if she has any issues or concerns that arise prior to this appointment to call the clinic back. Pt thanked RN for the call..Ruby Luong RN

## 2021-07-21 DIAGNOSIS — I10 BENIGN ESSENTIAL HYPERTENSION: ICD-10-CM

## 2021-07-21 RX ORDER — LOSARTAN POTASSIUM 25 MG/1
TABLET ORAL
Qty: 90 TABLET | Refills: 4 | Status: SHIPPED | OUTPATIENT
Start: 2021-07-21 | End: 2022-03-01

## 2021-07-22 ENCOUNTER — OFFICE VISIT (OUTPATIENT)
Dept: FAMILY MEDICINE | Facility: CLINIC | Age: 86
End: 2021-07-22
Payer: COMMERCIAL

## 2021-07-22 VITALS
HEIGHT: 60 IN | OXYGEN SATURATION: 97 % | TEMPERATURE: 97.9 F | RESPIRATION RATE: 20 BRPM | HEART RATE: 75 BPM | SYSTOLIC BLOOD PRESSURE: 136 MMHG | WEIGHT: 145 LBS | DIASTOLIC BLOOD PRESSURE: 82 MMHG | BODY MASS INDEX: 28.47 KG/M2

## 2021-07-22 DIAGNOSIS — M41.9 SCOLIOSIS, UNSPECIFIED SCOLIOSIS TYPE, UNSPECIFIED SPINAL REGION: ICD-10-CM

## 2021-07-22 DIAGNOSIS — H40.1130 PRIMARY OPEN ANGLE GLAUCOMA OF BOTH EYES, UNSPECIFIED GLAUCOMA STAGE: ICD-10-CM

## 2021-07-22 DIAGNOSIS — Z00.00 ENCOUNTER FOR MEDICARE ANNUAL WELLNESS EXAM: Primary | ICD-10-CM

## 2021-07-22 DIAGNOSIS — M81.0 OSTEOPOROSIS, UNSPECIFIED OSTEOPOROSIS TYPE, UNSPECIFIED PATHOLOGICAL FRACTURE PRESENCE: ICD-10-CM

## 2021-07-22 DIAGNOSIS — S22.000S COMPRESSION FRACTURE OF THORACIC VERTEBRA, UNSPECIFIED THORACIC VERTEBRAL LEVEL, SEQUELA: ICD-10-CM

## 2021-07-22 DIAGNOSIS — C50.912 INVASIVE DUCTAL CARCINOMA OF LEFT BREAST (H): ICD-10-CM

## 2021-07-22 PROBLEM — D48.5 NEOPLASM OF UNCERTAIN BEHAVIOR OF SKIN: Status: RESOLVED | Noted: 2019-08-08 | Resolved: 2021-07-22

## 2021-07-22 LAB
CHOLEST SERPL-MCNC: 194 MG/DL
FASTING STATUS PATIENT QL REPORTED: YES
HDLC SERPL-MCNC: 63 MG/DL
LDLC SERPL CALC-MCNC: 112 MG/DL
NONHDLC SERPL-MCNC: 131 MG/DL
TRIGL SERPL-MCNC: 95 MG/DL

## 2021-07-22 PROCEDURE — 99397 PER PM REEVAL EST PAT 65+ YR: CPT | Performed by: FAMILY MEDICINE

## 2021-07-22 PROCEDURE — 80061 LIPID PANEL: CPT | Performed by: FAMILY MEDICINE

## 2021-07-22 PROCEDURE — 36415 COLL VENOUS BLD VENIPUNCTURE: CPT | Performed by: FAMILY MEDICINE

## 2021-07-22 ASSESSMENT — ENCOUNTER SYMPTOMS
ARTHRALGIAS: 0
DIZZINESS: 0
EYE PAIN: 0
CONSTIPATION: 0
ABDOMINAL PAIN: 0
DYSURIA: 0
SHORTNESS OF BREATH: 0
COUGH: 1
CHILLS: 0
PALPITATIONS: 0
NERVOUS/ANXIOUS: 0
DIARRHEA: 0
FEVER: 0
SORE THROAT: 0
HEMATOCHEZIA: 0
MYALGIAS: 0
HEMATURIA: 0
HEADACHES: 0
JOINT SWELLING: 0
HEARTBURN: 0
COUGH: 0
NAUSEA: 0
PARESTHESIAS: 0
FREQUENCY: 0
WEAKNESS: 0

## 2021-07-22 ASSESSMENT — ACTIVITIES OF DAILY LIVING (ADL): CURRENT_FUNCTION: NO ASSISTANCE NEEDED

## 2021-07-22 ASSESSMENT — MIFFLIN-ST. JEOR: SCORE: 1034.22

## 2021-07-22 NOTE — PROGRESS NOTES
"SUBJECTIVE:   Cydney Christiansen is a 85 year old female who presents for Preventive Visit.      Patient has been advised of split billing requirements and indicates understanding: Yes   Are you in the first 12 months of your Medicare coverage?  No    Healthy Habits:     In general, how would you rate your overall health?  Fair    Duration of exercise:  Less than 15 minutes    Do you usually eat at least 4 servings of fruit and vegetables a day, include whole grains    & fiber and avoid regularly eating high fat or \"junk\" foods?  Yes    Taking medications regularly:  0    Medication side effects:  None    Ability to successfully perform activities of daily living:  No assistance needed    Home Safety:  Lack of grab bars in the bathroom    Hearing Impairment:  No hearing concerns    In the past 6 months, have you been bothered by leaking of urine? Yes    In general, how would you rate your overall mental or emotional health?  Good      PHQ-2 Total Score: 2    Additional concerns today:  No  History of Present Illness       Hyperlipidemia:  She presents for follow up of hyperlipidemia.  She is taking medication to lower cholesterol. She is not having myalgia or other side effects to statin medications.    Hypertension: She presents for follow up of hypertension.  She does not check blood pressure  regularly outside of the clinic.  She does not follow a low salt diet.     She eats 2-3 servings of fruits and vegetables daily.She consumes 0 sweetened beverage(s) daily.She exercises with enough effort to increase her heart rate 9 or less minutes per day.  She exercises with enough effort to increase her heart rate 3 or less days per week.   She is taking medications regularly.    Do you feel safe in your environment? Yes    Have you ever done Advance Care Planning? (For example, a Health Directive, POLST, or a discussion with a medical provider or your loved ones about your wishes): No, advance care planning information " given to patient to review.  Patient declined advance care planning discussion at this time.       Fall risk  Fallen 2 or more times in the past year?: No  Any fall with injury in the past year?: No    Cognitive Screening   1) Repeat 3 items (Leader, Season, Table)    2) Clock draw: NORMAL  3) 3 item recall: Recalls 3 objects  Results: 3 items recalled: COGNITIVE IMPAIRMENT LESS LIKELY    Mini-CogTM Copyright CHELLE Pelayo. Licensed by the author for use in [facility]; reprinted with permission ([email or web address]). All rights reserved.      Do you have sleep apnea, excessive snoring or daytime drowsiness?: no    Reviewed and updated as needed this visit by clinical staff               Pt has compression Fracture back  Pain is better  She is not interested in vertebroplasty  She is seeing specialist  At [place]    Reviewed and updated as needed this visit by Provider                Social History     Tobacco Use     Smoking status: Former Smoker     Packs/day: 0.50     Years: 20.00     Pack years: 10.00     Types: Cigarettes     Quit date: 1976     Years since quittin.5     Smokeless tobacco: Never Used   Substance Use Topics     Alcohol use: Yes     Comment: rare     If you drink alcohol do you typically have >3 drinks per day or >7 drinks per week? No    Alcohol Use 2021   Prescreen: >3 drinks/day or >7 drinks/week? No   Prescreen: >3 drinks/day or >7 drinks/week? -   No flowsheet data found.            Current providers sharing in care for this patient include:   Patient Care Team:  Estrellita Hernandez MD as PCP - General (Family Practice)  April Mahan RN as Nurse Coordinator (Breast Oncology)  Usha Salgado MD as MD (Hematology & Oncology)  Estrellita Hernandez MD as Assigned PCP  Kvng Garcia MD as Assigned Surgical Provider  Regine Esteves APRN CNP as Assigned Cancer Care Provider    The following health maintenance items are reviewed in Epic and correct as of today:  Health  Maintenance Due   Topic Date Due     ZOSTER IMMUNIZATION (2 of 3) 07/22/2008     LIPID  07/15/2021     FALL RISK ASSESSMENT  07/15/2021     MEDICARE ANNUAL WELLNESS VISIT  07/15/2021     Lab work is in process  Labs reviewed in EPIC  BP Readings from Last 3 Encounters:   07/22/21 136/82   07/15/21 120/70   07/02/21 (!) 150/88    Wt Readings from Last 3 Encounters:   07/22/21 65.8 kg (145 lb)   07/15/21 66.7 kg (147 lb)   07/02/21 67 kg (147 lb 9.6 oz)                  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     Hypertension goal BP (blood pressure) < 140/90     Seborrheic keratosis     Malignant neoplasm of overlapping sites of left female breast (H)     Scoliosis     Compression fracture of thoracic vertebra (H)     Mass of left hand     Primary open angle glaucoma of both eyes, unspecified glaucoma stage     Osteoporosis, unspecified osteoporosis type, unspecified pathological fracture presence     Nuclear sclerosis of left eye     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)     Actinic keratosis     History of nonmelanoma skin cancer     Combined forms of age-related cataract of right eye     Past Surgical History:   Procedure Laterality Date     CATARACT IOL, RT/LT       COLONOSCOPY  5/2008, 5/13, 6/14, 6/17    Q 3 years for advanced adenomatous polyp     CYSTOSCOPY  12/31/2008     D & C       GENITOURINARY SURGERY      TURBT     HC REMOVAL GALLBLADDER      open pan     HC TRABECULOPLASTY BY LASER SURGERY Left 4/18/07    SLT #1 OS (inf 180)     HC TRABECULOPLASTY BY LASER  SURGERY Right 11    SLT #1 OD (inf 180?)     HC TRABECULOPLASTY BY LASER SURGERY Left 3/17/15    SLT #2 OS (sup 180)     HC TRABECULOPLASTY BY LASER SURGERY Right 6/23/15    SLT #2 OD (sup 180?)     LASER ARGON TREATMENT      SLT left eye x 2     LUMPECTOMY BREAST WITH SENTINEL NODE, COMBINED Left 2015    Procedure: COMBINED LUMPECTOMY BREAST WITH SENTINEL NODE;  Surgeon: Ifeanyi Sunshine MD;  Location: [facility] OR     PHACOEMULSIFICATION CLEAR CORNEA WITH STANDARD INTRAOCULAR LENS IMPLANT Left 2017    Procedure: PHACOEMULSIFICATION CLEAR CORNEA WITH STANDARD INTRAOCULAR LENS IMPLANT;   COMPLEX LEFT PHACOEMULSIFICATION CLEAR CORNEA WITH STANDARD INTRAOCULAR LENS IMPLANT ;  Surgeon: Kvng Garcia MD;  Location:  [facility]     PHACOEMULSIFICATION CLEAR CORNEA WITH STANDARD INTRAOCULAR LENS IMPLANT Right 10/19/2020    Procedure: RIGHT COMPLEX PHACOEMULSIFICATION, CATARACT, WITH INTRAOCULAR LENS IMPLANT ;  Surgeon: Kvng Garcia MD;  [place] [facility] [place]     SURGICAL HISTORY OF     squamous cell CA excised from back     TUBAL LIGATION         Social History     Tobacco Use     Smoking status: Former Smoker     Packs/day: 0.50     Years: 20.00     Pack years: 10.00     Types: Cigarettes     Quit date: 1976     Years since quittin.5     Smokeless tobacco: Never Used   Substance Use Topics     Alcohol use: Yes     Comment: rare     Family History   Problem Relation Age of Onset     Diabetes Mother      Breast Cancer Mother      Eye Disorder Mother      Osteoporosis Mother      Glaucoma Mother      Macular Degeneration Mother      Prostate Cancer Father      Prostate Cancer Brother      Glaucoma Brother      Macular Degeneration Brother      Thyroid Disease Sister      Blood Disease Sister         lupus     Heart Disease Sister      Asthma Son      Prostate Cancer Brother      Glaucoma Other      Melanoma No family hx of      Skin Cancer No family hx of          Current Outpatient Medications    Medication Sig Dispense Refill     alendronate (FOSAMAX) 70 MG tablet Take 1 tablet (70 mg) by mouth every 7 days 12 tablet 11     CALCIUM 600 MG OR TABS 1 daily       cyanocobalamin (VITAMIN B-12) 500 MCG tablet Take 1 tablet (500 mcg) by mouth daily 150 tablet 3     diclofenac (VOLTAREN) 50 MG [facility] tablet Take 1 tablet (50 mg) by mouth 2 times daily as needed for moderate pain 25 tablet 0     dorzolamide-timolol (COSOPT) 2-0.5 % ophthalmic solution Place 1 drop into both eyes 2 times daily 10 mL 3     ferrous sulfate (FEROSUL) 325 (65 Fe) MG tablet Take 1 tablet (325 mg) by mouth daily (with breakfast) 90 tablet 3     ibuprofen (ADVIL,MOTRIN) 200 MG tablet Take 200 mg by mouth every 4 hours as needed.       latanoprost (XALATAN) 0.005 % ophthalmic solution Place 1 drop into both eyes At Bedtime 3 Bottle 4     losartan (COZAAR) 25 MG tablet TAKE 1 TABLET BY MOUTH EVERY DAY 90 tablet 4     meclizine (ANTIVERT) 25 MG tablet Take 1 tablet (25 mg) by mouth every 6 hours as needed for dizziness 30 tablet 1     Multiple Vitamins-Minerals (ONE DAILY ADULTS 50+) TABS        polyethylene glycol (MIRALAX) 17 GM/Dose powder Take 17 g (1 capful) by mouth 2 times daily as needed for constipation 507 g 0     simvastatin (ZOCOR) 20 MG tablet Take 1 tablet (20 mg) by mouth At Bedtime Please tell Pt she is due to see MD 90 tablet 0     tiZANidine (ZANAFLEX) 2 MG tablet Take 0.5-1 tablets (1-2 mg) by mouth 3 times daily as needed for muscle spasms 25 tablet 0     triamcinolone (KENALOG) 0.1 % cream Apply to affected area of the face once to twice a day. 15 g 1     triamcinolone 0.1 % EX external cream Apply twice daily for 2 weeks to eczema on hand, take 2 weeks off, can repeat if needed 30 g 0     VITAMIN D-1000 MAX -1000 MG-UNIT OR TABS 1 daily       Allergies   Allergen Reactions     Lisinopril Cough     Recent Labs   Lab Test 05/03/21  1041 09/22/20  1318 07/15/20  1014 09/17/19  1442 07/01/19  0941 06/28/18  0916  "04/27/17  1452 12/22/16  1138   LDL  --   --  39  --  46 42   < >  --    HDL  --   --  78  --  61 67   < >  --    TRIG  --   --  57  --  73 67   < >  --    ALT 25 21  --  20  --   --   --  25   CR 0.80 0.70 0.64 0.65 0.66 0.78   < > 0.76   GFRESTIMATED 68 80 83 83 83 71   < > 74   GFRESTBLACK 79 >90 >90 >90 >90 86   < > 89   POTASSIUM 4.5 4.1 4.6  --  4.1 4.4   < >  --    TSH  --   --   --   --   --   --   --  2.98    < > = values in this interval not displayed.          Mammogram Screening - Patient over age 75, has elected to continue with screening.  Pertinent mammograms are reviewed under the imaging tab.    Review of Systems   Constitutional: Negative for chills and fever.   HENT: Negative for congestion, ear pain, hearing loss and sore throat.    Eyes: Negative for pain and visual disturbance.   Respiratory: Negative for cough and shortness of breath.    Cardiovascular: Negative for chest pain, palpitations and peripheral edema.   Gastrointestinal: Negative for abdominal pain, constipation, diarrhea, heartburn, hematochezia and nausea.   Genitourinary: Negative for dysuria, frequency, genital sores, hematuria and urgency.   Musculoskeletal: Negative for arthralgias, joint swelling and myalgias.   Skin: Negative for rash.   Neurological: Negative for dizziness, weakness, headaches and paresthesias.   Psychiatric/Behavioral: Negative for mood changes. The patient is not nervous/anxious.      Rest of the ROS is Negative except see above and Problem list [stable]      OBJECTIVE:   There were no vitals taken for this visit. Estimated body mass index is 28.47 kg/m  as calculated from the following:    Height as of 5/3/21: 1.53 m (5' 0.25\").    Weight as of 7/15/21: 66.7 kg (147 lb).  Physical Exam  GENERAL APPEARANCE: alert and no distress  EYES: Eyes grossly normal to inspection, PERRL and conjunctivae and sclerae normal  HENT: ear canals and TM's normal, nose and mouth without ulcers or lesions, oropharynx clear " and oral mucous membranes moist  NECK: no adenopathy, no asymmetry, masses, or scars and thyroid normal to palpation  RESP: lungs clear to auscultation - no rales, rhonchi or wheezes  BREAST: normal without masses, tenderness or nipple discharge and no palpable axillary masses or adenopathy  CV: regular rate and rhythm, normal S1 S2, no S3 or S4, no murmur, click or rub, no peripheral edema and peripheral pulses strong  ABDOMEN: soft, nontender, no hepatosplenomegaly, no masses and bowel sounds normal  MS: no musculoskeletal defects are noted, gait is age appropriate without ataxia, mild tenderness Lower back  SKIN: no suspicious lesions or rashes  NEURO: Normal strength and tone, sensory exam grossly normal, mentation intact and speech normal  PSYCH: mentation appears normal and affect normal/bright    Diagnostic Test Results:  Labs reviewed in Epic    ASSESSMENT / PLAN:   1. Encounter for Medicare annual wellness exam    - Lipid panel reflex to direct LDL Fasting; Future    2. Scoliosis, unspecified scoliosis type, unspecified spinal region      3. Compression fracture of thoracic vertebra, unspecified thoracic vertebral level, sequela  Sees ortho  - Spine Referral; Future    4. Primary open angle glaucoma of both eyes, unspecified glaucoma stage  Goes for eye Exam    5. Osteoporosis, unspecified osteoporosis type, unspecified pathological fracture presence  On Fosamax    6. 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)  Doing well  Gets he mammogram      Patient has been advised of split billing requirements and indicates understanding: Yes  COUNSELING:  Reviewed preventive health counseling, as reflected in patient instructions       Regular exercise       Healthy diet/nutrition       Vision screening       Hearing screening       Dental care       Bladder control       Fall risk prevention       Osteoporosis prevention/bone health       Advanced Planning     Estimated body mass index is 28.47 kg/m  as calculated  "from the following:    Height as of 5/3/21: 1.53 m (5' 0.25\").    Weight as of 7/15/21: 66.7 kg (147 lb).        She reports that she quit smoking about 45 years ago. Her smoking use included cigarettes. She has a 10.00 pack-year smoking history. She has never used smokeless tobacco.      Appropriate preventive services were discussed with this patient, including applicable screening as appropriate for cardiovascular disease, diabetes, osteopenia/osteoporosis, and glaucoma.  As appropriate for age/gender, discussed screening for colorectal cancer, prostate cancer, breast cancer, and cervical cancer. Checklist reviewing preventive services available has been given to the patient.    Reviewed patients plan of care and provided an AVS. The Intermediate Care Plan ( asthma action plan, low back pain action plan, and migraine action plan) for Cydney meets the Care Plan requirement. This Care Plan has been established and reviewed with the Patient.    Counseling Resources:  ATP IV Guidelines  Pooled Cohorts Equation Calculator  Breast Cancer Risk Calculator  Breast Cancer: Medication to Reduce Risk  FRAX Risk Assessment  ICSI Preventive Guidelines  Dietary Guidelines for Americans, 2010  [employer]'s MyPlate  ASA Prophylaxis  Lung CA Screening    Estrellita Hernandez MD  [facility]    Identified Health Risks:  "

## 2021-07-22 NOTE — PROGRESS NOTES
"    The patient was provided with suggestions to help her develop a healthy physical lifestyle.  Information on urinary incontinence and treatment options given to patient.  Answers for HPI/ROS submitted by the patient on 7/22/2021  In general, how would you rate your overall physical health?: fair  Do you usually eat at least 4 servings of fruit and vegetables a day, include whole grains & fiber, and avoid regularly eating high fat or \"junk\" foods? : Yes  Taking medications regularly:: Yes  Medication side effects:: None  Activities of Daily Living: no assistance needed  Home safety: lack of grab bars in the bathroom  Hearing Impairment:: no hearing concerns  In the past 6 months, have you been bothered by leaking of urine?: Yes  abdominal pain: No  Blood in stool: No  Blood in urine: No  chest pain: No  chills: No  congestion: No  constipation: No  cough: Yes  diarrhea: No  dizziness: No  ear pain: No  eye pain: No  nervous/anxious: No  fever: No  frequency: No  genital sores: No  headaches: No  hearing loss: No  heartburn: No  arthralgias: No  joint swelling: No  peripheral edema: No  mood changes: No  myalgias: No  nausea: No  dysuria: No  palpitations: No  Skin sensation changes: No  sore throat: No  urgency: No  rash: No  shortness of breath: No  visual disturbance: No  weakness: No  In general, how would you rate your overall mental or emotional health?: good  Additional concerns today:: No  Duration of exercise:: Less than 15 minutes      "

## 2021-07-22 NOTE — PATIENT INSTRUCTIONS
Patient Education   Personalized Prevention Plan  You are due for the preventive services outlined below.  Your care team is available to assist you in scheduling these services.  If you have already completed any of these items, please share that information with your care team to update in your medical record.  Health Maintenance Due   Topic Date Due     Zoster (Shingles) Vaccine (2 of 3) 07/22/2008     Cholesterol Lab  07/15/2021     FALL RISK ASSESSMENT  07/15/2021     Your Health Risk Assessment indicates you feel you are not in good health    A healthy lifestyle helps keep the body fit and the mind alert. It helps protect you from disease, helps you fight disease, and helps prevent chronic disease (disease that doesn't go away) from getting worse. This is important as you get older and begin to notice twinges in muscles and joints and a decline in the strength and stamina you once took for granted. A healthy lifestyle includes good healthcare, good nutrition, weight control, recreation, and regular exercise. Avoid harmful substances and do what you can to keep safe. Another part of a healthy lifestyle is stay mentally active and socially involved.    Good healthcare     Have a wellness visit every year.     If you have new symptoms, let us know right away. Don't wait until the next checkup.     Take medicines exactly as prescribed and keep your medicines in a safe place. Tell us if your medicine causes problems.   Healthy diet and weight control     Eat 3 or 4 small, nutritious, low-fat, high-fiber meals a day. Include a variety of fruits, vegetables, and whole-grain foods.     Make sure you get enough calcium in your diet. Calcium, vitamin D, and exercise help prevent osteoporosis (bone thinning).     If you live alone, try eating with others when you can. That way you get a good meal and have company while you eat it.     Try to keep a healthy weight. If you eat more calories than your body uses for  energy, it will be stored as fat and you will gain weight.     Recreation   Recreation is not limited to sports and team events. It includes any activity that provides relaxation, interest, enjoyment, and exercise. Recreation provides an outlet for physical, mental, and social energy. It can give a sense of worth and achievement. It can help you stay healthy.    Mental Exercise and Social Involvement  Mental and emotional health is as important as physical health. Keep in touch with friends and family. Stay as active as possible. Continue to learn and challenge yourself.   Things you can do to stay mentally active are:    Learn something new, like a foreign language or musical instrument.     Play SCRABBLE or do crossword puzzles. If you cannot find people to play these games with you at home, you can play them with others on your computer through the Internet.     Join a games club--anything from card games to chess or checkers or lawn bowling.     Start a new hobby.     Go back to school.     Volunteer.     Read.   Keep up with world events.    Urinary Incontinence, Female (Adult)   Urinary incontinence means loss of bladder control. This problem affects many women, especially as they get older. If you have incontinence, you may be embarrassed to ask for help. But know that this problem can be treated.   Types of Incontinence  There are different types of incontinence. Two of the main types are described here. You can have more than one type.     Stress incontinence. With this type, urine leaks when pressure (stress) is put on the bladder. This may happen when you cough, sneeze, or laugh. Stress incontinence most often occurs because the pelvic floor muscles that support the bladder and urethra are weak. This can happen after pregnancy and vaginal childbirth or a hysterectomy. It can also be due to excess body weight or hormone changes.    Urge incontinence (also called overactive bladder). With this type, a  sudden urge to urinate is felt often. This may happen even though there may not be much urine in the bladder. The need to urinate often during the night is common. Urge incontinence most often occurs because of bladder spasms. This may be due to bladder irritation or infection. Damage to bladder nerves or pelvic muscles, constipation, and certain medicines can also lead to urge incontinence.  Treatment depends on the cause. Further evaluation is needed to find the type you have. This will likely include an exam and certain tests. Based on the results, you and your healthcare provider can then plan treatment. Until a diagnosis is made, the home care tips below can help ease symptoms.   Home care    Do pelvic floor muscle exercises, if they are prescribed. The pelvic floor muscles help support the bladder and urethra. Many women find that their symptoms improve when doing special exercises that strengthen these muscles. To do the exercises, contract the muscles you would use to stop your stream of urine. But do this when you re not urinating. Hold for 10 seconds, then relax. Repeat 10 to 20 times in a row, at least 3 times a day. Your healthcare provider may give you other instructions for how to do the exercises and how often.    Keep a bladder diary. This helps track how often and how much you urinate over a set period of time. Bring this diary with you to your next visit with the provider. The information can help your provider learn more about your bladder problem.    Lose weight, if advised to by your provider. Extra weight puts pressure on the bladder. Your provider can help you create a weight-loss plan that s right for you. This may include exercising more and making certain diet changes.    Don't have foods and drinks that may irritate the bladder. These can include alcohol and caffeinated drinks.    Quit smoking. Smoking and other tobacco use can lead to a long-term (chronic) cough that strains the pelvic  floor muscles. Smoking may also damage the bladder and urethra. Talk with your provider about treatments or methods you can use to quit smoking.    If drinking large amounts of fluid makes you have symptoms, you may be advised to limit your fluid intake. You may also be advised to drink most of your fluids during the day and to limit fluids at night.    If you re worried about urine leakage or accidents, you may wear absorbent pads to catch urine. Change the pads often. This helps reduce discomfort. It may also reduce the risk of skin or bladder infections.    Follow-up care  Follow up with your healthcare provider, or as directed. It may take some to find the right treatment for your problem. But healthy lifestyle changes can be made right away. These include such things as exercising on a regular basis, eating a healthy diet, losing weight (if needed), and quitting smoking. Your treatment plan may include special therapies or medicines. Certain procedures or surgery may also be options. Talk about any questions you have with your provider.   When to seek medical advice  Call the healthcare provider right away if any of these occur:    Fever of 100.4 F (38 C) or higher, or as directed by your provider    Bladder pain or fullness    Belly swelling    Nausea or vomiting    Back pain    Weakness, dizziness, or fainting  [employer] last reviewed this educational content on 1/1/2020 2000-2021 [employer]. All rights reserved. This information is not intended as a substitute for professional medical care. Always follow your healthcare professional's instructions.

## 2021-07-27 DIAGNOSIS — M54.50 ACUTE BILATERAL LOW BACK PAIN WITHOUT SCIATICA: ICD-10-CM

## 2021-07-28 RX ORDER — TIZANIDINE 2 MG/1
1-2 TABLET ORAL 3 TIMES DAILY PRN
Qty: 25 TABLET | Refills: 0 | Status: SHIPPED | OUTPATIENT
Start: 2021-07-28 | End: 2022-08-03

## 2021-07-28 NOTE — TELEPHONE ENCOUNTER
Routing refill request to provider for review/approval because:    Prescribed by same day provider, routing to PCP  Drug not on the [facility] refill protocol     Flower Valenzuela RN

## 2021-08-02 DIAGNOSIS — M54.50 ACUTE BILATERAL LOW BACK PAIN WITHOUT SCIATICA: ICD-10-CM

## 2021-08-02 NOTE — TELEPHONE ENCOUNTER
Routing refill request to provider for review/approval because:  Failed protocol.  Kathy Cordon BSN-RN  [facility]

## 2021-08-03 NOTE — PROGRESS NOTES
"Physical Therapy Initial Evaluation  Subjective:  The history is provided by the patient. No  was used.   Therapist Generated HPI Evaluation  Problem details: Pt reports she was doing well after when last seen in PT at the end of May.  Had been playing golf without issue.  She reports falling at home in mid to late June and had back pain after that.  She reports there was \"excruciating\" pain initially but feels like she is \"healing now.\"  Referred to PT 07/15/2021.         Type of problem:  Lumbar.    This is a recurrent condition.  Condition occurred with:  A fall/slip.  Where condition occurred: at home.  Patient reports pain:  Central lumbar spine.  Pain is described as aching and is intermittent.  Pain is worse in the A.M..  Since onset symptoms are gradually improving.  Exacerbated by: standing (especially prolonged standing)  Relieved by: medication to sleep, sitting, walking with a shopping cart.  Imaging testing: see imaging in chart including compression fractures L11 and T3.    Barriers include:  None as reported by patient.    Patient Health History  Cydney Christiansen being seen for back.     Date of Onset: two months ago.      Pain is reported as 3/10 on pain scale.  General health as reported by patient is good.  Pertinent medical history includes: cancer and osteoporosis.   Red flags:  None as reported by patient.   Other medical allergies details: see chart.    Other surgery history details: bladder, lumpectomy.    Current medications:  Anti-inflammatory, muscle relaxants and high blood pressure medication.    Current occupation is retired.                   Pt states her goal is to return to bowling this fall and golfing next year.                    Objective:  System         Lumbar/SI Evaluation  ROM:      Strength: TA MMT 1/5  Lumbar Myotomes:    T12-L3 (Hip Flex):  Left: 4    Right: 4  L2-4 (Quads):  Left:  4    Right:  4  L4 (Ankle DF):  Left:  4    Right:  " 4            Neural Tension/Mobility:      Left side:SLR  negative.     Right side:   SLR  negative.   Lumbar Palpation:  normal        Lumbar Provocation:      Left negative with:  PROM hip    Right negative with:  PROM hip                                                   Emily Lumbar Evaluation    Posture:  Sitting: fair  Standing: fair  Lordosis: Reduced  Lateral Shift: no  Correction of Posture: no effect    Movement Loss:  Flexion (Flex): mod  Extension (EXT): major  Side Glide R (SG R): min  Side Glide L (SG L): min  Test Movements:  FIS: During: no effect  After: no effect  Pretest Movements: central low back  Repeat FIS: During: no effect  After: no effect    EIS: During: no effect  After: no effect    Repeat EIS: During: no effect  After: no effect              Conclusion: other                                         ROS    Assessment/Plan:    Patient is a 85 year old female with thoracolumbar complaints.    Patient has the following significant findings with corresponding treatment plan.                Diagnosis 1:  Back pain with h/o compression fractures  Pain -  hot/cold therapy  Decreased ROM/flexibility - manual therapy and therapeutic exercise  Decreased strength - therapeutic exercise and therapeutic activities  Decreased function - therapeutic activities  Impaired posture - neuro re-education    Therapy Evaluation Codes:   1) History comprised of:   Personal factors that impact the plan of care:      Age and Past/current experiences.    Comorbidity factors that impact the plan of care are:      Cancer and osteoporosis.     Medications impacting care: Anti-inflammatory and Muscle relaxant.  2) Examination of Body Systems comprised of:   Body structures and functions that impact the plan of care:      Lumbar spine and Thoracic Spine.   Activity limitations that impact the plan of care are:      Standing and Walking.  3) Clinical presentation characteristics  are:   Stable/Uncomplicated.  4) Decision-Making    Low complexity using standardized patient assessment instrument and/or measureable assessment of functional outcome.  Cumulative Therapy Evaluation is: Low complexity.    Previous and current functional limitations:  (See Goal Flow Sheet for this information)    Short term and Long term goals: (See Goal Flow Sheet for this information)     Communication ability:  Patient appears to be able to clearly communicate and understand verbal and written communication and follow directions correctly.  Treatment Explanation - The following has been discussed with the patient:   RX ordered/plan of care  Anticipated outcomes  Possible risks and side effects  This patient would benefit from PT intervention to resume normal activities.   Rehab potential is good.    Frequency:  1 X week, once daily  Duration:  for 6 weeks  Discharge Plan:  Achieve all LTG.  Independent in home treatment program.  Reach maximal therapeutic benefit.    Please refer to the daily flowsheet for treatment today, total treatment time and time spent performing 1:1 timed codes.

## 2021-08-04 ENCOUNTER — THERAPY VISIT (OUTPATIENT)
Dept: PHYSICAL THERAPY | Facility: CLINIC | Age: 86
End: 2021-08-04
Attending: FAMILY MEDICINE
Payer: COMMERCIAL

## 2021-08-04 DIAGNOSIS — M54.50 ACUTE MIDLINE LOW BACK PAIN WITHOUT SCIATICA: ICD-10-CM

## 2021-08-04 DIAGNOSIS — M54.50 ACUTE BILATERAL LOW BACK PAIN WITHOUT SCIATICA: ICD-10-CM

## 2021-08-04 PROCEDURE — 97110 THERAPEUTIC EXERCISES: CPT | Mod: GP | Performed by: PHYSICAL THERAPIST

## 2021-08-04 PROCEDURE — 97161 PT EVAL LOW COMPLEX 20 MIN: CPT | Mod: GP | Performed by: PHYSICAL THERAPIST

## 2021-08-08 NOTE — TELEPHONE ENCOUNTER
Left message for patient to call  [employer] [facility] in [place] back at [phone]    Please help reschedule patients upcoming appointment sooner than November due to long hx of NMSC and most recent in 11/2020    April Love LPN     No

## 2021-08-11 ENCOUNTER — THERAPY VISIT (OUTPATIENT)
Dept: PHYSICAL THERAPY | Facility: CLINIC | Age: 86
End: 2021-08-11
Payer: COMMERCIAL

## 2021-08-11 DIAGNOSIS — M54.50 ACUTE MIDLINE LOW BACK PAIN WITHOUT SCIATICA: ICD-10-CM

## 2021-08-11 PROCEDURE — 97530 THERAPEUTIC ACTIVITIES: CPT | Mod: GP | Performed by: PHYSICAL THERAPIST

## 2021-08-11 PROCEDURE — 97112 NEUROMUSCULAR REEDUCATION: CPT | Mod: GP | Performed by: PHYSICAL THERAPIST

## 2021-08-11 PROCEDURE — 97110 THERAPEUTIC EXERCISES: CPT | Mod: GP | Performed by: PHYSICAL THERAPIST

## 2021-08-17 ENCOUNTER — THERAPY VISIT (OUTPATIENT)
Dept: PHYSICAL THERAPY | Facility: CLINIC | Age: 86
End: 2021-08-17
Payer: COMMERCIAL

## 2021-08-17 DIAGNOSIS — M54.50 ACUTE MIDLINE LOW BACK PAIN WITHOUT SCIATICA: ICD-10-CM

## 2021-08-17 PROCEDURE — 97110 THERAPEUTIC EXERCISES: CPT | Mod: GP | Performed by: PHYSICAL THERAPIST

## 2021-08-17 PROCEDURE — 97530 THERAPEUTIC ACTIVITIES: CPT | Mod: GP | Performed by: PHYSICAL THERAPIST

## 2021-08-17 NOTE — PROGRESS NOTES
Subjective:  HPI  Physical Exam                    Objective:  System    Physical Exam    General     ROS    Assessment/Plan:    SUBJECTIVE  Subjective: Pt reports she is generally doing well.  Had one morning where she got out of bed without pain.  Not sure if doing HEP correctly.   Current Pain level: 2/10   Changes in function:  None     Adverse reaction to treatment or activity:  None    OBJECTIVE  Objective: Pt ambulates without device.  See gait observations 08/11.     ASSESSMENT  Cydney continues to require intervention to meet STG and LTG's: PT  Patient is progressing as expected.  Response to therapy has shown an improvement in subjective response.  Progress made towards STG/LTG?  None    PLAN  Continue current treatment plan until patient demonstrates readiness to progress to higher level exercises.    PTA/ATC plan:  N/A    Please refer to the daily flowsheet for treatment today, total treatment time and time spent performing 1:1 timed codes.

## 2021-08-26 ENCOUNTER — THERAPY VISIT (OUTPATIENT)
Dept: PHYSICAL THERAPY | Facility: CLINIC | Age: 86
End: 2021-08-26
Payer: COMMERCIAL

## 2021-08-26 DIAGNOSIS — M54.50 ACUTE MIDLINE LOW BACK PAIN WITHOUT SCIATICA: ICD-10-CM

## 2021-08-26 PROCEDURE — 97110 THERAPEUTIC EXERCISES: CPT | Mod: GP | Performed by: PHYSICAL THERAPIST

## 2021-08-26 PROCEDURE — 97140 MANUAL THERAPY 1/> REGIONS: CPT | Mod: GP | Performed by: PHYSICAL THERAPIST

## 2021-09-03 ENCOUNTER — THERAPY VISIT (OUTPATIENT)
Dept: PHYSICAL THERAPY | Facility: CLINIC | Age: 86
End: 2021-09-03
Payer: COMMERCIAL

## 2021-09-03 DIAGNOSIS — M54.50 ACUTE MIDLINE LOW BACK PAIN WITHOUT SCIATICA: ICD-10-CM

## 2021-09-03 PROCEDURE — 97110 THERAPEUTIC EXERCISES: CPT | Mod: GP | Performed by: PHYSICAL THERAPIST

## 2021-09-03 PROCEDURE — 97140 MANUAL THERAPY 1/> REGIONS: CPT | Mod: GP | Performed by: PHYSICAL THERAPIST

## 2021-09-03 NOTE — PROGRESS NOTES
Subjective:  HPI  Physical Exam                    Objective:  System    Physical Exam    General     ROS    Assessment/Plan:    SUBJECTIVE  Subjective: Pt reports feeling about the same.  Still feels similar stiffness first thing in the morning.   Current Pain level: 0/10   Changes in function:  Yes (See Goal flowsheet attached for changes in current functional level)     Adverse reaction to treatment or activity:  None    OBJECTIVE  Objective: Tender to palpation L paralumbars.  No discomfort standing lumbar flexion but sore with extension.     ASSESSMENT  Cydney continues to require intervention to meet STG and LTG's: PT  Patient is progressing as expected.  Response to therapy has shown an improvement in  function  Progress made towards STG/LTG?  Yes (See Goal flowsheet attached for updates on achievement of STG and LTG)    PLAN  Current treatment program is being advanced to more complex exercises.    PTA/ATC plan:  N/A    Please refer to the daily flowsheet for treatment today, total treatment time and time spent performing 1:1 timed codes.

## 2021-09-10 ENCOUNTER — THERAPY VISIT (OUTPATIENT)
Dept: PHYSICAL THERAPY | Facility: CLINIC | Age: 86
End: 2021-09-10
Payer: COMMERCIAL

## 2021-09-10 DIAGNOSIS — M54.50 ACUTE MIDLINE LOW BACK PAIN WITHOUT SCIATICA: ICD-10-CM

## 2021-09-10 PROCEDURE — 97110 THERAPEUTIC EXERCISES: CPT | Mod: GP | Performed by: PHYSICAL THERAPIST

## 2021-09-10 NOTE — PROGRESS NOTES
"Subjective:  HPI  Physical Exam                    Objective:  System    Physical Exam    General     ROS    Assessment/Plan:    PROGRESS  REPORT    Progress reporting period is from 08/04/2021 to 09/10/2021.       SUBJECTIVE  Subjective: Pt indicates she is feeling pretty good.  \"Some days it bothers me quicker than other days.\"  Recovers quickly by just sitting down.    Current Pain level: 0/10.     Initial Pain level: 3/10.   Changes in function:  Yes (See Goal flowsheet attached for changes in current functional level)  Adverse reaction to treatment or activity: None    OBJECTIVE  Objective: No tenderness to palpation. Posture as previously described.  No increased discomfort standing lumbar flexion and extension.     ASSESSMENT/PLAN  Updated problem list and treatment plan: Diagnosis 1:  Back pain -- see plan below  STG/LTGs have been met or progress has been made towards goals:  Yes (See Goal flow sheet completed today.)  Assessment of Progress: The patient's condition is improving.  Self Management Plans:  Patient has been instructed in a home treatment program.  I have re-evaluated this patient and find that the nature, scope, duration and intensity of the therapy is appropriate for the medical condition of the patient.  Cydney continues to require the following intervention to meet STG and LTG's:  PT    Recommendations:  Pt progressing.  Plan f/u in two weeks as she envisions traveling next week.  Agree with decreased frequency.  Two visits over four weeks.    Please refer to the daily flowsheet for treatment today, total treatment time and time spent performing 1:1 timed codes.          "

## 2021-09-23 ENCOUNTER — ANCILLARY PROCEDURE (OUTPATIENT)
Dept: MAMMOGRAPHY | Facility: CLINIC | Age: 86
End: 2021-09-23
Attending: NURSE PRACTITIONER
Payer: COMMERCIAL

## 2021-09-23 DIAGNOSIS — Z12.31 ENCOUNTER FOR SCREENING MAMMOGRAM FOR BREAST CANCER: ICD-10-CM

## 2021-09-23 PROCEDURE — 77067 SCR MAMMO BI INCL CAD: CPT | Mod: GC | Performed by: RADIOLOGY

## 2021-09-23 PROCEDURE — 77063 BREAST TOMOSYNTHESIS BI: CPT | Mod: GC | Performed by: RADIOLOGY

## 2021-09-24 NOTE — PATIENT INSTRUCTIONS
Patient Education   Personalized Prevention Plan  You are due for the preventive services outlined below.  Your care team is available to assist you in scheduling these services.  If you have already completed any of these items, please share that information with your care team to update in your medical record.  Health Maintenance Due   Topic Date Due     Zoster (Shingles) Vaccine (2 of 3) 07/22/2008     Discuss Advance Care Planning  06/09/2016     PHQ-2  01/01/2020     Eye Exam  03/08/2020     Cholesterol Lab  07/01/2020     FALL RISK ASSESSMENT  07/01/2020     Annual Wellness Visit  07/01/2020     Basic Metabolic Panel  07/15/2020         Peripheral IV  Inserted by: Curt Solano MD    Placement  Needle gauge: 4Fr Powerwand. Laterality: left  Location: antecubital  Local anesthetic: none  Site prep: alcohol  Placement technique:  In situ 20g PIV changed over guidewire for Powerwand catheter

## 2021-09-26 ENCOUNTER — HEALTH MAINTENANCE LETTER (OUTPATIENT)
Age: 86
End: 2021-09-26

## 2021-09-27 NOTE — PROGRESS NOTES
[facility] Dermatology Note  Encounter Date: Sep 28, 2021  Office Visit     Dermatology Problem List:  0. NUB x5  - left dorsal hand, bx 9/28/21  - left lateral forehead, bx 9/28/21  - right posterior shoulder, bx 9/28/21  - right cheek, punch bx 9/28/21  - right jaw line, punch bx 9/28/21  - left lateral thigh, return for bx  1. NMSC  -BCC, mid back s/p ED&C 11/2020  -SCCIS, right upper arm s/p ED&C 11/2020  -SCCIS, left forearm s/p excision 3/6/2020  -SCCis Left superior forearm, s/p: ED and C 9/30/2019  -SCCis right temple, s/p:  Mohs 9/30/2019  -SCCIS, right nasal sidewall, s/p Mohs 4/1/19   -SCCIS, right upper arm, s/p excision 8/9/18   -SCC, left popliteal fossa, well differentiated with focal perineural invasion but with clear margins on path, s/p excision by Dr. Mcgee 7/2013  -SCCIS arising from solar keratosis, right cheek, 4/2013  -SCC, right dorsal hand, s/p excision with SCCIS extending to the margin 1/7/13  -SCC, bowenoid type, upper back, s/p excision 2011  -BCC, superficial, left back, 2/2011  -BCC, superficial, left neck, 2011, elected for ED&C  -SCCIS, right upper forearm 11/5/2020 MOHS   2. 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 AK  - HAK, left mid eyebrow, base not seen, 6/2014  - left posterior lower leg, s/p bx 2/18/21, s/p cryo 9/28/21  -s/p cryotherapy  -left forearm, s/p biopsy 3/15/19   5. GA, right angle of jaw: resolves with triamcinolone cream  6. History of benign biopsy  - SK, right posterior lower leg, s/p bx 2/18/21  - verucca, left forehead, s/p bx 2/18/21  - Arthropod bite, left 4th digit, bx 2/18/21  - SK, right abdomen, s/p bx 2/18/2020  7. Eczematous patch R dorsal hand  -previous Tx: triamcinolone 0.01% cream, Vaseline   ____________________________________________    Assessment & Plan:    # History of NMSC. No evidence of recurrence.   - Recommend sunscreens SPF #30 or greater, protective clothing and avoidance of tanning beds.   - Recommended yearly  skin exam.    # Biopsy proven AK, left posterior lower leg. One also on the left dorsal hand today.  - See cryotherapy procedure note below.    # Filiform wart - left postauricular.  - See cryotherapy procedure note below.    # Neoplasm of uncertain behavior on the left dorsal hand. The differential diagnosis includes SK vs SCC.  - See shave biopsy procedure note below.     # Neoplasm of uncertain behavior on the left lateral forehead. The differential diagnosis includes SK vs SCC.  - See shave biopsy procedure note below.    # Neoplasm of uncertain behavior on the right posterior shoulder. The differential diagnosis includes SK vs SCC.   - See shave biopsy procedure note below.    # Neoplasm of uncertain behavior on the right cheek. The differential diagnosis includes scc or bcc or ak  - See punch biopsy procedure note below.    # Neoplasm of uncertain behavior on the right jaw line. The differential diagnosis includes scc or bcc or ak  - See punch biopsy procedure note below.    # Neoplasm of uncertain behavior on the left lateral thigh. The differential diagnosis includes iSK vs other.  - Will schedule for a shave biopsy at follow up visit. DO not want to remove too many lesions on patient today   # Seborrheic keratosis. - body.  - No further intervention needed.    Procedures Performed:   - Cryotherapy procedure note, locations: left posterior lower leg, left dorsal hand, and left postauricular. After verbal consent and discussion of risks and benefits including, but not limited to, dyspigmentation/scar, blister, and pain, 2 AKs and 1 filiform wart were treated with 1-2 mm freeze border for 1-2 cycles with liquid nitrogen. Post cryotherapy instructions were provided.    - Shave biopsy procedure note, locations: left dorsal hand, left lateral forehead, and right posterior shoulder. After discussion of benefits and risks including but not limited to bleeding, infection, scar, incomplete removal, recurrence, and  non-diagnostic biopsy, written consent and photographs were obtained. The area was cleaned with isopropyl alcohol. 0.5mL of 1% lidocaine with epinephrine was injected to obtain adequate anesthesia of lesions. Shave biopsy at sites performed. Hemostasis was achieved with aluminium chloride. Petrolatum ointment and a sterile dressing were applied. The patient tolerated the procedure and no complications were noted. The patient was provided with verbal and written post care instructions.     Punch biopsy procedure note, locations: right cheek and right jaw line After discussion of benefits and risks including but not limited to bleeding/bruising, pain/swelling, infection, scar, incomplete removal, nerve damage/numbness, recurrence, and non-diagnostic biopsy, written consent, verbal consent and photographs were obtained. Time-out was performed. The area was cleaned with isopropyl alcohol. 0.5mL of 1% lidocaine with epinephrine was injected to obtain adequate anesthesia of the lesion. A 2 mm punch biopsy was performed.  5-0 prolene sutures were utilized to approximate the epidermal edges.  White petroleum jelly/VaselineTM and a bandage was applied to the wound.  Explicit verbal and written wound care instructions were provided.  The patient left the Dermatology Clinic in good condition. The patient was counseled to follow up for suture removal in approximately 5-7 days.    Follow-up: within spot check thigh. And full skin exam 6 months    Staff and Scribe:     Scribe Disclosure:   IHortencia, am serving as a scribe to document services personally performed by this physician, Dr. Qi Barba, based on data collection and the provider's statements to me.     Provider Disclosure:   The documentation recorded by the scribe accurately reflects the services I personally performed and the decisions made by me.    Qi Barba MD    Department of Dermatology  [facility]  [facility]: Phone: [phone], Fax:[phone]  [facility]: Phone: [phone], Fax: [phone]      ____________________________________________    CC: Skin Check (areas of concern left postauricular hx of NMSC), RECHECK (left posterior lower leg biopsy proven AK), and Derm Problem (eczematous patches using triamcinolone cream on hand PRN with flares )    HPI:  Ms. Cydney Christiansen is a(n) 85 year old female who presents today as a return patient for skin check.    Last skin check was 2/18/21. At that time, biopsies determined AK (left posterior lower leg), SK (right posterior lower leg), verruca (left forehead), and arthropod bite (left 4th digit).    Patient has since had phone encounter with me to discuss biopsy results.    Today patient notes concern of the left cheek. Would like the left lower leg rechecked. Also has patches on the hands which she is using triamcinolone cream as needed for flares.    Patient is otherwise feeling well, without additional skin concerns.    Labs Reviewed:  FINAL DIAGNOSIS:   A. Skin, left posterior lower leg, shave:   - Actinic keratosis, transected - (see description)     B. Skin, right posterior lower leg, shave:   - Seborrheic keratosis - (see description)     C. Skin, left 4th digit, shave:   - Serum-rich parakeratosis, acanthosis with spongiosis, and papillary   dermal fibrosis - (see comment and description)     D. Skin, left forehead, shave:   - Verrucous keratosis - (see description    Physical Exam:  Vitals: There were no vitals taken for this visit.  SKIN: Total skin excluding the undergarment areas was performed. The exam included the head/face, neck, both arms, chest, back, abdomen, buttocks, both legs, digits and/or nails.   - Left 4th finger is clear.  - There is an erythematous macule with overyling adherent scale on the left posterior lower leg x1 and left dorsal hand x1.   - Filiform wart  on left postauricular.  - Left dorsal hand: ill defined 2.5cm scaly patch  - Left lateral forehead: 1.3cm ill defined scaly patch that is tender.  - Right posterior shoulder: 5mm keratotic papule.  - Right cheek: 1.3cm red patch  - Right jaw line: 1.3cm red patch  - Left lateral thigh: scaly papule  - There are waxy stuck on tan to brown papules on the body.  - No other lesions of concern on areas examined.     Medications:  Current Outpatient Medications   Medication     alendronate (FOSAMAX) 70 MG tablet     CALCIUM 600 MG OR TABS     cyanocobalamin (VITAMIN B-12) 500 MCG tablet     diclofenac (VOLTAREN) 50 MG EC tablet     dorzolamide-timolol (COSOPT) 2-0.5 % ophthalmic solution     ferrous sulfate (FEROSUL) 325 (65 Fe) MG tablet     ibuprofen (ADVIL,MOTRIN) 200 MG tablet     latanoprost (XALATAN) 0.005 % ophthalmic solution     losartan (COZAAR) 25 MG tablet     meclizine (ANTIVERT) 25 MG tablet     Multiple Vitamins-Minerals (ONE DAILY ADULTS 50+) TABS     polyethylene glycol (MIRALAX) 17 GM/Dose powder     simvastatin (ZOCOR) 20 MG tablet     tiZANidine (ZANAFLEX) 2 MG tablet     triamcinolone (KENALOG) 0.1 % cream     triamcinolone 0.1 % EX external cream     VITAMIN D-1000 MAX -1000 MG-UNIT OR TABS     No current facility-administered medications for this visit.      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     Hypertension goal BP (blood pressure) < 140/90     Seborrheic keratosis     Malignant neoplasm of overlapping sites of left female breast  (H)     Scoliosis     Compression fracture of thoracic vertebra (H)     Mass of left hand     Primary open angle glaucoma of both eyes, unspecified glaucoma stage     Osteoporosis, unspecified osteoporosis type, unspecified pathological fracture presence     Nuclear sclerosis of left eye     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)     Actinic keratosis     History of nonmelanoma skin cancer     Combined forms of age-related cataract of right eye     Acute midline low back pain without sciatica     Past Medical History:   Diagnosis Date     Actinic keratosis      Basal cell cancer 02/2011    bcc of the L back.     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 on close of this encounter.

## 2021-09-28 ENCOUNTER — OFFICE VISIT (OUTPATIENT)
Dept: DERMATOLOGY | Facility: CLINIC | Age: 86
End: 2021-09-28
Payer: COMMERCIAL

## 2021-09-28 DIAGNOSIS — D48.9 NEOPLASM OF UNCERTAIN BEHAVIOR: ICD-10-CM

## 2021-09-28 DIAGNOSIS — B07.9 FILIFORM WART: ICD-10-CM

## 2021-09-28 DIAGNOSIS — D48.5 NEOPLASM OF UNCERTAIN BEHAVIOR OF SKIN: ICD-10-CM

## 2021-09-28 DIAGNOSIS — L82.1 SK (SEBORRHEIC KERATOSIS): ICD-10-CM

## 2021-09-28 DIAGNOSIS — Z85.828 HISTORY OF NONMELANOMA SKIN CANCER: Primary | ICD-10-CM

## 2021-09-28 DIAGNOSIS — L57.0 AK (ACTINIC KERATOSIS): ICD-10-CM

## 2021-09-28 PROCEDURE — 11104 PUNCH BX SKIN SINGLE LESION: CPT | Performed by: DERMATOLOGY

## 2021-09-28 PROCEDURE — 17003 DESTRUCT PREMALG LES 2-14: CPT | Mod: XS | Performed by: DERMATOLOGY

## 2021-09-28 PROCEDURE — 88305 TISSUE EXAM BY PATHOLOGIST: CPT | Performed by: DERMATOLOGY

## 2021-09-28 PROCEDURE — 99214 OFFICE O/P EST MOD 30 MIN: CPT | Mod: 25 | Performed by: DERMATOLOGY

## 2021-09-28 PROCEDURE — 11103 TANGNTL BX SKIN EA SEP/ADDL: CPT | Mod: XS | Performed by: DERMATOLOGY

## 2021-09-28 PROCEDURE — 17110 DESTRUCTION B9 LES UP TO 14: CPT | Mod: XS | Performed by: DERMATOLOGY

## 2021-09-28 PROCEDURE — 17000 DESTRUCT PREMALG LESION: CPT | Mod: XS | Performed by: DERMATOLOGY

## 2021-09-28 PROCEDURE — 11105 PUNCH BX SKIN EA SEP/ADDL: CPT | Performed by: DERMATOLOGY

## 2021-09-28 ASSESSMENT — PAIN SCALES - GENERAL: PAINLEVEL: NO PAIN (0)

## 2021-09-28 NOTE — NURSING NOTE
The following medication was given:     MEDICATION:  Lidocaine with epinephrine 1% [id number]  ROUTE: SQ  SITE: see procedure note  DOSE: 2cc  LOT #: [id number]  : [place]  EXPIRATION DATE: 6/2022  NDC#: 3131-1546-89  Was there drug waste? 0cc  Multi-dose vial: Yes    April Love LPN  September 28, 2021       Walk in

## 2021-09-28 NOTE — PATIENT INSTRUCTIONS
[facility] Dermatology Visit    Thank you for allowing us to participate in your care. Your findings, instructions and follow-up plan are as follows:       Wound Care After a Biopsy    What is a skin biopsy?  A skin biopsy allows the doctor to examine a very small piece of tissue under the microscope to determine the diagnosis and the best treatment for the skin condition. A local anesthetic (numbing medicine)  is injected with a very small needle into the skin area to be tested. A small piece of skin is taken from the area. Sometimes a suture (stitch) is used.     What are the risks of a skin biopsy?  I will experience scar, bleeding, swelling, pain, crusting and redness. I may experience incomplete removal or recurrence. Risks of this procedure are excessive bleeding, bruising, infection, nerve damage, numbness, thick (hypertrophic or keloidal) scar and non-diagnostic biopsy.    How should I care for my wound for the first 24 hours?    Keep the wound dry and covered for 24 hours    If it bleeds, hold direct pressure on the area for 15 minutes. If bleeding does not stop then go to the emergency room    Avoid strenuous exercise the first 1-2 days or as your doctor instructs you    How should I care for the wound after 24 hours?    After 24 hours, remove the bandage    You may bathe or shower as normal    If you had a scalp biopsy, you can shampoo as usual and can use shower water to clean the biopsy site daily    Clean the wound twice a day with gentle soap and water    Do not scrub, be gentle    Apply white petroleum/Vaseline after cleaning the wound with a cotton swab or a clean finger, and keep the site covered with a Bandaid /bandage. Bandages are not necessary with a scalp biopsy    If you are unable to cover the site with a Bandaid /bandage, re-apply ointment 2-3 times a day to keep the site moist. Moisture will help with healing    Avoid strenuous activity for first 1-2 days    Avoid  lakes, rivers, pools, and oceans until the stitches are removed or the site is healed    How do I clean my wound?    Wash hands thoroughly with soap or use hand  before all wound care    Clean the wound with gentle soap and water    Apply white petroleum/Vaseline  to wound after it is clean    Replace the Bandaid /bandage to keep the wound covered for the first few days or as instructed by your doctor    If you had a scalp biopsy, warm shower water to the area on a daily basis should suffice    What should I use to clean my wound?     Cotton-tipped applicators (Qtips )    White petroleum jelly (Vaseline ). Use a clean new container and use Q-tips to apply.    Bandaids   as needed    Gentle soap     How should I care for my wound long term?    Do not get your wound dirty    Keep up with wound care for one week or until the area is healed.    A small scab will form and fall off by itself when the area is completely healed. The area will be red and will become pink in color as it heals. Sun protection is very important for how your scar will turn out. Sunscreen with an SPF 30 or greater is recommended once the area is healed.    If you have stitches, stitches need to be removed in 5-7 days. You may return to our clinic for this or you may have it done locally at your doctor s office.    You should have some soreness but it should be mild and slowly go away over several days. Talk to your doctor about using tylenol for pain,    When should I call my doctor?  If you have increased:     Pain or swelling    Pus or drainage (clear or slightly yellow drainage is ok)    Temperature over 100F    Spreading redness or warmth around wound    When will I hear about my results?  The biopsy results can take 2 weeks to come back.  Your results will automatically release to [employer] before your provider has even reviewed them.  The clinic will call you with the results, send you a [employer] message, or have you schedule a  follow-up clinic or phone time to discuss the results.  Contact our clinics if you do not hear from us in 2 weeks.    Who should I call with questions?    [facility]: [phone]    [facility]: [phone]    For urgent needs outside of business hours call the [facility] at [phone] and ask for the dermatology resident on call        Cryotherapy    What is it?    Use of a very cold liquid, such as liquid nitrogen, to freeze and destroy abnormal skin cells that need to be removed    What should I expect?    Tenderness and redness    A small blister that might grow and fill with dark purple blood. There may be crusting.    More than one treatment may be needed if the lesions do not go away.    How do I care for the treated area?    Gently wash the area with your hands when bathing.    Use a thin layer of Vaseline to help with healing. You may use a Band-Aid.     The area should heal within 7-10 days and may leave behind a pink or lighter color.     Do not use an antibiotic or Neosporin ointment.     You may take acetaminophen (Tylenol) for pain.     Call your doctor if you have:    Severe pain    Signs of infection (warmth, redness, cloudy yellow drainage, and or a bad smell)    Questions or concerns    Who should I call with questions?       [facility]: [phone]       [facility]: [phone]       For urgent needs outside of business hours call the [facility] at [phone] and ask for the dermatology resident on call      When should I call my doctor?    If you are worsening or not improving, please, contact us or seek urgent care as noted below.     Who should I call with questions (adults)?    [facility] (adult and pediatric): [phone]    [facility] (adult): [phone]    For urgent needs  outside of business hours call the [facility] at [phone] and ask for the dermatology resident on call    If this is a medical emergency and you are unable to reach an ER, Call [phone]    Who should I call with questions (pediatric)?  [facility]- Pediatric Dermatology  Dr. Bette Soto, Dr. Laine Frost, Dr. Fern Keenan, Mariama Uribe, CAPRICE Garza, Dr. Kathy Borja & Dr. Tyrone Vicente  Non Urgent  Nurse Triage Line; [phone]- Carola and Thao RN Care Coordinators   Lakisha (/Complex ) [phone]    If you need a prescription refill, please contact your pharmacy. Refills are approved or denied by our physicians during normal business hours, Monday through Fridays  Per office policy, refills will not be granted if you have not been seen within the past year (or sooner depending on your child's condition).    Scheduling Information:  Pediatric Appointment Scheduling and Call Center [phone]  Radiology Scheduling- [phone]  Sedation Unit Scheduling- [phone]  [place] Scheduling- General [phone]; Pediatric Dermatology [phone]  Main  Services: [phone]  Danish: [phone]  Montenegrin: [phone]  Hmong/Mauritanian/Telugu: [phone]  Preadmission Nursing Department Fax Number: [phone] (fax all pre-operative paperwork to this number)    For urgent matters arising during evenings, weekends, or holidays that cannot wait for normal business hours please call [phone] and ask for the dermatology resident on call to be paged.

## 2021-09-28 NOTE — NURSING NOTE
Cydney Christiansen's goals for this visit include:   Chief Complaint   Patient presents with     Skin Check     areas of concern left postauricular hx of NMSC     RECHECK     left posterior lower leg biopsy proven AK     Derm Problem     eczematous patches using triamcinolone cream on hand PRN with flares        She requests these members of her care team be copied on today's visit information:     PCP: Estrellita Hernandez    Referring Provider:  No referring provider defined for this encounter.    There were no vitals taken for this visit.    Do you need any medication refills at today's visit? Jennifer Love LPN

## 2021-09-28 NOTE — LETTER
9/28/2021         RE: Cydney Christiansen  [address]  [place] [address]        Dear Colleague,    Thank you for referring your patient, Cydney Christiansen, to the [facility]. Please see a copy of my visit note below.    [facility] Dermatology Note  Encounter Date: Sep 28, 2021  Office Visit     Dermatology Problem List:  0. NUB x5  - left dorsal hand, bx 9/28/21  - left lateral forehead, bx 9/28/21  - right posterior shoulder, bx 9/28/21  - right cheek, punch bx 9/28/21  - right jaw line, punch bx 9/28/21  - left lateral thigh, return for bx  1. NMSC  -BCC, mid back s/p ED&C 11/2020  -SCCIS, right upper arm s/p ED&C 11/2020  -SCCIS, left forearm s/p excision 3/6/2020  -SCCis Left superior forearm, s/p: ED and C 9/30/2019  -SCCis right temple, s/p:  Mohs 9/30/2019  -SCCIS, right nasal sidewall, s/p Mohs 4/1/19   -SCCIS, right upper arm, s/p excision 8/9/18   -SCC, left popliteal fossa, well differentiated with focal perineural invasion but with clear margins on path, s/p excision by Dr. Mcgee 7/2013  -SCCIS arising from solar keratosis, right cheek, 4/2013  -SCC, right dorsal hand, s/p excision with SCCIS extending to the margin 1/7/13  -SCC, bowenoid type, upper back, s/p excision 2011  -BCC, superficial, left back, 2/2011  -BCC, superficial, left neck, 2011, elected for ED&C  -SCCIS, right upper forearm 11/5/2020 MOHS   2. 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 AK  - HAK, left mid eyebrow, base not seen, 6/2014  - left posterior lower leg, s/p bx 2/18/21, s/p cryo 9/28/21  -s/p cryotherapy  -left forearm, s/p biopsy 3/15/19   5. GA, right angle of jaw: resolves with triamcinolone cream  6. History of benign biopsy  - SK, right posterior lower leg, s/p bx 2/18/21  - verucca, left forehead, s/p bx 2/18/21  - Arthropod bite, left 4th digit, bx 2/18/21  - SK, right abdomen, s/p bx 2/18/2020  7. Eczematous patch R dorsal hand  -previous Tx: triamcinolone  0.01% cream, Vaseline   ____________________________________________    Assessment & Plan:    # History of NMSC. No evidence of recurrence.   - Recommend sunscreens SPF #30 or greater, protective clothing and avoidance of tanning beds.   - Recommended yearly skin exam.    # Biopsy proven AK, left posterior lower leg. One also on the left dorsal hand today.  - See cryotherapy procedure note below.    # Filiform wart - left postauricular.  - See cryotherapy procedure note below.    # Neoplasm of uncertain behavior on the left dorsal hand. The differential diagnosis includes SK vs SCC.  - See shave biopsy procedure note below.     # Neoplasm of uncertain behavior on the left lateral forehead. The differential diagnosis includes SK vs SCC.  - See shave biopsy procedure note below.    # Neoplasm of uncertain behavior on the right posterior shoulder. The differential diagnosis includes SK vs SCC.   - See shave biopsy procedure note below.    # Neoplasm of uncertain behavior on the right cheek. The differential diagnosis includes scc or bcc or ak  - See punch biopsy procedure note below.    # Neoplasm of uncertain behavior on the right jaw line. The differential diagnosis includes scc or bcc or ak  - See punch biopsy procedure note below.    # Neoplasm of uncertain behavior on the left lateral thigh. The differential diagnosis includes iSK vs other.  - Will schedule for a shave biopsy at follow up visit. DO not want to remove too many lesions on patient today   # Seborrheic keratosis. - body.  - No further intervention needed.    Procedures Performed:   - Cryotherapy procedure note, locations: left posterior lower leg, left dorsal hand, and left postauricular. After verbal consent and discussion of risks and benefits including, but not limited to, dyspigmentation/scar, blister, and pain, 2 AKs and 1 filiform wart were treated with 1-2 mm freeze border for 1-2 cycles with liquid nitrogen. Post cryotherapy instructions were  provided.    - Shave biopsy procedure note, locations: left dorsal hand, left lateral forehead, and right posterior shoulder. After discussion of benefits and risks including but not limited to bleeding, infection, scar, incomplete removal, recurrence, and non-diagnostic biopsy, written consent and photographs were obtained. The area was cleaned with isopropyl alcohol. 0.5mL of 1% lidocaine with epinephrine was injected to obtain adequate anesthesia of lesions. Shave biopsy at sites performed. Hemostasis was achieved with aluminium chloride. Petrolatum ointment and a sterile dressing were applied. The patient tolerated the procedure and no complications were noted. The patient was provided with verbal and written post care instructions.     Punch biopsy procedure note, locations: right cheek and right jaw line After discussion of benefits and risks including but not limited to bleeding/bruising, pain/swelling, infection, scar, incomplete removal, nerve damage/numbness, recurrence, and non-diagnostic biopsy, written consent, verbal consent and photographs were obtained. Time-out was performed. The area was cleaned with isopropyl alcohol. 0.5mL of 1% lidocaine with epinephrine was injected to obtain adequate anesthesia of the lesion. A 2 mm punch biopsy was performed.  5-0 prolene sutures were utilized to approximate the epidermal edges.  White petroleum jelly/VaselineTM and a bandage was applied to the wound.  Explicit verbal and written wound care instructions were provided.  The patient left the Dermatology Clinic in good condition. The patient was counseled to follow up for suture removal in approximately 5-7 days.    Follow-up: within spot check thigh. And full skin exam 6 months    Staff and Scribe:     Scribe Disclosure:   Hortencia ARAUJO, am serving as a scribe to document services personally performed by this physician, Dr. Qi Barba, based on data collection and the provider's statements to me.      Provider Disclosure:   The documentation recorded by the scribe accurately reflects the services I personally performed and the decisions made by me.    Qi Barba MD    Department of Dermatology  [facility]: Phone: [phone], Fax:[phone]  [facility]: Phone: [phone], Fax: [phone]      ____________________________________________    CC: Skin Check (areas of concern left postauricular hx of NMSC), RECHECK (left posterior lower leg biopsy proven AK), and Derm Problem (eczematous patches using triamcinolone cream on hand PRN with flares )    HPI:  Ms. Cydney Christiansen is a(n) 85 year old female who presents today as a return patient for skin check.    Last skin check was 2/18/21. At that time, biopsies determined AK (left posterior lower leg), SK (right posterior lower leg), verruca (left forehead), and arthropod bite (left 4th digit).    Patient has since had phone encounter with me to discuss biopsy results.    Today patient notes concern of the left cheek. Would like the left lower leg rechecked. Also has patches on the hands which she is using triamcinolone cream as needed for flares.    Patient is otherwise feeling well, without additional skin concerns.    Labs Reviewed:  FINAL DIAGNOSIS:   A. Skin, left posterior lower leg, shave:   - Actinic keratosis, transected - (see description)     B. Skin, right posterior lower leg, shave:   - Seborrheic keratosis - (see description)     C. Skin, left 4th digit, shave:   - Serum-rich parakeratosis, acanthosis with spongiosis, and papillary   dermal fibrosis - (see comment and description)     D. Skin, left forehead, shave:   - Verrucous keratosis - (see description    Physical Exam:  Vitals: There were no vitals taken for this visit.  SKIN: Total skin excluding the undergarment areas was performed. The exam included the  head/face, neck, both arms, chest, back, abdomen, buttocks, both legs, digits and/or nails.   - Left 4th finger is clear.  - There is an erythematous macule with overyling adherent scale on the left posterior lower leg x1 and left dorsal hand x1.   - Filiform wart on left postauricular.  - Left dorsal hand: ill defined 2.5cm scaly patch  - Left lateral forehead: 1.3cm ill defined scaly patch that is tender.  - Right posterior shoulder: 5mm keratotic papule.  - Right cheek: 1.3cm red patch  - Right jaw line: 1.3cm red patch  - Left lateral thigh: scaly papule  - There are waxy stuck on tan to brown papules on the body.  - No other lesions of concern on areas examined.     Medications:  Current Outpatient Medications   Medication     alendronate (FOSAMAX) 70 MG tablet     CALCIUM 600 MG OR TABS     cyanocobalamin (VITAMIN B-12) 500 MCG tablet     diclofenac (VOLTAREN) 50 MG EC tablet     dorzolamide-timolol (COSOPT) 2-0.5 % ophthalmic solution     ferrous sulfate (FEROSUL) 325 (65 Fe) MG tablet     ibuprofen (ADVIL,MOTRIN) 200 MG tablet     latanoprost (XALATAN) 0.005 % ophthalmic solution     losartan (COZAAR) 25 MG tablet     meclizine (ANTIVERT) 25 MG tablet     Multiple Vitamins-Minerals (ONE DAILY ADULTS 50+) TABS     polyethylene glycol (MIRALAX) 17 GM/Dose powder     simvastatin (ZOCOR) 20 MG tablet     tiZANidine (ZANAFLEX) 2 MG tablet     triamcinolone (KENALOG) 0.1 % cream     triamcinolone 0.1 % EX external cream     VITAMIN D-1000 MAX -1000 MG-UNIT OR TABS     No current facility-administered medications for this visit.      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     Hypertension goal BP (blood pressure) < 140/90     Seborrheic keratosis     Malignant neoplasm of overlapping sites of left female breast (H)     Scoliosis     Compression fracture of thoracic vertebra (H)     Mass of left hand     Primary open angle glaucoma of both eyes, unspecified glaucoma stage     Osteoporosis, unspecified osteoporosis type, unspecified pathological fracture presence     Nuclear sclerosis of left eye     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)     Actinic keratosis     History of nonmelanoma skin cancer     Combined forms of age-related cataract of right eye     Acute midline low back pain without sciatica     Past Medical History:   Diagnosis Date     Actinic keratosis      Basal cell cancer 02/2011    bcc of the L back.     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 on close of this encounter.      Again, thank you for allowing me to participate in the care of your patient.        Sincerely,        Qi Barba MD

## 2021-09-29 ENCOUNTER — TELEPHONE (OUTPATIENT)
Dept: DERMATOLOGY | Facility: CLINIC | Age: 86
End: 2021-09-29

## 2021-09-29 NOTE — TELEPHONE ENCOUNTER
Patient has been scheduled.      Ruby Newton  Surgical Specialties Procedure   [facility]  11/17/2020 8:33 AM

## 2021-09-29 NOTE — TELEPHONE ENCOUNTER
[facility] Call Center    Phone Message    May a detailed message be left on voicemail: yes     Reason for Call: Other: Pt called and would like to schedule an appt to remove her stitches. Please call her back to schedule. Thanks     Action Taken: Message routed to:  Adult Clinics: Dermatology p [phone]    Travel Screening: Not Applicable

## 2021-10-05 ENCOUNTER — ALLIED HEALTH/NURSE VISIT (OUTPATIENT)
Dept: DERMATOLOGY | Facility: CLINIC | Age: 86
End: 2021-10-05
Payer: COMMERCIAL

## 2021-10-05 DIAGNOSIS — Z48.02 VISIT FOR SUTURE REMOVAL: Primary | ICD-10-CM

## 2021-10-05 PROCEDURE — 99207 PR NO CHARGE NURSE ONLY: CPT | Performed by: DERMATOLOGY

## 2021-10-05 NOTE — NURSING NOTE
Cydney Christiansen comes into clinic today at the request of Dr. Barba Ordering Provider for Suture Removal:  Incision was dry, clean and intact, incision cleansed with wound cleanser and sutures were removed. Pt tolerated the procedure.     Dermatology Suture Removal Record  S: Cydney presents to the clinic today for  removal of sutures.  The patient has had the sutures in place for 8 days.    There has been no history of infection or drainage.    O: 2 sutures are seen located on the right cheek and right jawline.  The wound is healing well with no signs of infection.    A: Suture removal.    P:  All sutures were easily removed today.  Routine wound care discussed.  The patient will follow up as needed.    This service provided today was under the supervising provider of the day Dr. Barba, who was available if needed.    Milena Renae RN

## 2021-10-06 LAB
PATH REPORT.COMMENTS IMP SPEC: NORMAL
PATH REPORT.FINAL DX SPEC: NORMAL
PATH REPORT.GROSS SPEC: NORMAL
PATH REPORT.MICROSCOPIC SPEC OTHER STN: NORMAL
PATH REPORT.RELEVANT HX SPEC: NORMAL

## 2021-10-12 DIAGNOSIS — H26.8 PXF (PSEUDOEXFOLIATION OF LENS CAPSULE): ICD-10-CM

## 2021-10-12 RX ORDER — LATANOPROST 50 UG/ML
1 SOLUTION/ DROPS OPHTHALMIC AT BEDTIME
Qty: 7.5 ML | Refills: 3 | Status: SHIPPED | OUTPATIENT
Start: 2021-10-12 | End: 2022-05-19

## 2021-10-16 DIAGNOSIS — E78.5 HYPERLIPIDEMIA LDL GOAL <160: ICD-10-CM

## 2021-10-19 RX ORDER — SIMVASTATIN 20 MG
20 TABLET ORAL AT BEDTIME
Qty: 90 TABLET | Refills: 2 | Status: SHIPPED | OUTPATIENT
Start: 2021-10-19 | End: 2022-08-03

## 2021-10-25 ENCOUNTER — ANCILLARY PROCEDURE (OUTPATIENT)
Dept: MAMMOGRAPHY | Facility: CLINIC | Age: 86
End: 2021-10-25
Attending: NURSE PRACTITIONER
Payer: COMMERCIAL

## 2021-10-25 ENCOUNTER — ANCILLARY PROCEDURE (OUTPATIENT)
Dept: ULTRASOUND IMAGING | Facility: CLINIC | Age: 86
End: 2021-10-25
Attending: NURSE PRACTITIONER
Payer: COMMERCIAL

## 2021-10-25 DIAGNOSIS — R92.8 ABNORMAL MAMMOGRAM: ICD-10-CM

## 2021-10-25 PROCEDURE — 77061 BREAST TOMOSYNTHESIS UNI: CPT | Mod: LT | Performed by: RADIOLOGY

## 2021-10-25 PROCEDURE — 77065 DX MAMMO INCL CAD UNI: CPT | Mod: LT | Performed by: RADIOLOGY

## 2021-10-25 PROCEDURE — 76642 ULTRASOUND BREAST LIMITED: CPT | Mod: LT | Performed by: RADIOLOGY

## 2021-10-26 ENCOUNTER — THERAPY VISIT (OUTPATIENT)
Dept: PHYSICAL THERAPY | Facility: CLINIC | Age: 86
End: 2021-10-26
Payer: COMMERCIAL

## 2021-10-26 DIAGNOSIS — M54.50 ACUTE MIDLINE LOW BACK PAIN WITHOUT SCIATICA: ICD-10-CM

## 2021-10-26 PROCEDURE — 97110 THERAPEUTIC EXERCISES: CPT | Mod: GP | Performed by: PHYSICAL THERAPIST

## 2021-10-26 NOTE — PROGRESS NOTES
"Subjective:  HPI  Physical Exam                    Objective:  System    Physical Exam    General     ROS    Assessment/Plan:    DISCHARGE REPORT    Progress reporting period is from 08/04/2021 to 10/26/2021.       SUBJECTIVE  Subjective: \"When I get up in the morning, it doesn't hurt so much.\"    Current Pain level: 0/10.     Initial Pain level: 3/10.   Changes in function:  Yes (See Goal flowsheet attached for changes in current functional level)  Adverse reaction to treatment or activity: None    OBJECTIVE  Objective: No tenderness to palpation.  No increased discomfort standing lumbar AROM all directions.  Posture as previously outlined.     ASSESSMENT/PLAN  Updated problem list and treatment plan: Diagnosis 1:  LBP -- home program  STG/LTGs have been met or progress has been made towards goals:  Yes (See Goal flow sheet completed today.)  Assessment of Progress: The patient's condition is improving.  Self Management Plans:  Patient has been instructed in a home treatment program.  I have re-evaluated this patient and find that the nature, scope, duration and intensity of the therapy is appropriate for the medical condition of the patient.  Cydney continues to require the following intervention to meet STG and LTG's:  PT intervention is no longer required to meet STG/LTG.    Recommendations:  Given progress, pt agrees discharge to [facility] but will let me know if there are further issues.    Please refer to the daily flowsheet for treatment today, total treatment time and time spent performing 1:1 timed codes.          "

## 2021-11-08 ENCOUNTER — OFFICE VISIT (OUTPATIENT)
Dept: DERMATOLOGY | Facility: CLINIC | Age: 86
End: 2021-11-08
Payer: COMMERCIAL

## 2021-11-08 DIAGNOSIS — L57.0 AK (ACTINIC KERATOSIS): ICD-10-CM

## 2021-11-08 DIAGNOSIS — Z12.83 SCREENING FOR SKIN CANCER: ICD-10-CM

## 2021-11-08 DIAGNOSIS — Z85.828 HISTORY OF NONMELANOMA SKIN CANCER: ICD-10-CM

## 2021-11-08 DIAGNOSIS — L82.1 SEBORRHEIC KERATOSES: Primary | ICD-10-CM

## 2021-11-08 PROCEDURE — 17110 DESTRUCTION B9 LES UP TO 14: CPT | Performed by: DERMATOLOGY

## 2021-11-08 NOTE — PROGRESS NOTES
Visit Date: 11/08/2021    SUBJECTIVE:  Cydney comes in for reexamination. Several weeks ago, she was in for some biopsies.  The biopsies were on her dorsal right hand, left lateral forehead, right posterior shoulder, right cheek and right jawline - according to her record.  Also a lesion on her left thigh was reportedly evaluated, but I cannot find the biopsy report for that lesion.  She is not aware of anything on the left thigh but  on the note from Dr. Barba, I was to check the left thigh area.    OBJECTIVE:  A healthy lady in no distress.  We examined her face and neck carefully, the left thigh also carefully and there were no lesions that showed evidence of recent treatment or biopsy healing areas on the left thigh. She does have literally dozens of seborrheic keratoses scattered about ,  Especially on her back, chest, shoulders.    ASSESSMENT AND PLAN:  We checked the dorsal right hand and that appears normal. On her  left forehead however, she did have 1 tiny 1 mm papule. This may be the site of the cryotherapy and I retreated it today with cryotherapy.       After finding no lesions that to me suggested skin cancers, I recommend that we do nothing today other than the forehead cryotherapy.  I advised that most lesions she pointed out today were either nonpigmented or lightly pigmented brown seborrheic keratoses.  In summary, numerous lesions but none worrisome today. She likely has numerous impending actinic keratoses because of the age, skin type, and previous documented history of actinic lesions, but I did not find any significant ones today.       I did mention 5-FU as a logical treatment at this point but she was not interested.     Mrs. Christiansen does have an appointment with Dr. Barba in 6 months and I advised that she should certainly keep that but, in the meantime, if she notices any lesions bleeding, tender, growing or causing her special concerns, she should call us for an earlier assessment.      MEDICATIONS AND ALLERGIES:  Reviewed.    H Perfecto Interiano MD        D: 2021   T: 2021   MT: XIMENA    Name:     MILLI PRIETO  MRN:      [id number]        Account:    [id number]   :      1935           Visit Date: 2021     Document: [id number]

## 2021-11-08 NOTE — NURSING NOTE
Cydney Christiansen's goals for this visit include:   Chief Complaint   Patient presents with     Derm Problem     spot check R posterior shoulder, R cheek, L forehead and possibly L lateral thigh     She requests these members of her care team be copied on today's visit information:     PCP: Estrellita Hernandez    Referring Provider:  No referring provider defined for this encounter.    There were no vitals taken for this visit.    Do you need any medication refills at today's visit? No    Leatha Reyes, EMILY on 11/8/2021 at 10:50 AM

## 2021-11-08 NOTE — LETTER
11/8/2021         RE: Cydney Christiansen  [address]  [place] [address]        Dear Colleague,    Thank you for referring your patient, Cydney Christiansen, to the [facility]. Please see a copy of my visit note below.    Visit Date: 11/08/2021    SUBJECTIVE:  Cydney comes in for reexamination. Several weeks ago, she was in for some biopsies.  The biopsies were on her dorsal right hand, left lateral forehead, right posterior shoulder, right cheek and right jawline - according to her record.  Also a lesion on her left thigh was reportedly evaluated, but I cannot find the biopsy report for that lesion.  She is not aware of anything on the left thigh but  on the note from Dr. Barba, I was to check the left thigh area.    OBJECTIVE:  A healthy lady in no distress.  We examined her face and neck carefully, the left thigh also carefully and there were no lesions that showed evidence of recent treatment or biopsy healing areas on the left thigh. She does have literally dozens of seborrheic keratoses scattered about ,  Especially on her back, chest, shoulders.    ASSESSMENT AND PLAN:  We checked the dorsal right hand and that appears normal. On her  left forehead however, she did have 1 tiny 1 mm papule. This may be the site of the cryotherapy and I retreated it today with cryotherapy.       After finding no lesions that to me suggested skin cancers, I recommend that we do nothing today other than the forehead cryotherapy.  I advised that most lesions she pointed out today were either nonpigmented or lightly pigmented brown seborrheic keratoses.  In summary, numerous lesions but none worrisome today. She likely has numerous impending actinic keratoses because of the age, skin type, and previous documented history of actinic lesions, but I did not find any significant ones today.       I did mention 5-FU as a logical treatment at this point but she was not interested.     Mrs. Christiansen does  have an appointment with Dr. Barba in 6 months and I advised that she should certainly keep that but, in the meantime, if she notices any lesions bleeding, tender, growing or causing her special concerns, she should call us for an earlier assessment.     MEDICATIONS AND ALLERGIES:  Reviewed.    GINO Interiano MD        D: 2021   T: 2021   MT: PAKMT    Name:     MILLI PRIETO  MRN:      [id number]        Account:    [id number]   :      1935           Visit Date: 2021     Document: [id number]        Again, thank you for allowing me to participate in the care of your patient.        Sincerely,        GINO Interiano MD

## 2021-11-23 ENCOUNTER — OFFICE VISIT (OUTPATIENT)
Dept: OPHTHALMOLOGY | Facility: CLINIC | Age: 86
End: 2021-11-23
Payer: COMMERCIAL

## 2021-11-23 DIAGNOSIS — H52.223 MYOPIA OF BOTH EYES WITH REGULAR ASTIGMATISM AND PRESBYOPIA: ICD-10-CM

## 2021-11-23 DIAGNOSIS — Z96.1 PSEUDOPHAKIA: ICD-10-CM

## 2021-11-23 DIAGNOSIS — Z01.00 EXAMINATION OF EYES AND VISION: Primary | ICD-10-CM

## 2021-11-23 DIAGNOSIS — H43.812 PVD (POSTERIOR VITREOUS DETACHMENT), LEFT: ICD-10-CM

## 2021-11-23 DIAGNOSIS — H40.1431 PSEUDOEXFOLIATIVE GLAUCOMA, BOTH EYES, MILD STAGE: ICD-10-CM

## 2021-11-23 DIAGNOSIS — H52.4 MYOPIA OF BOTH EYES WITH REGULAR ASTIGMATISM AND PRESBYOPIA: ICD-10-CM

## 2021-11-23 DIAGNOSIS — H52.13 MYOPIA OF BOTH EYES WITH REGULAR ASTIGMATISM AND PRESBYOPIA: ICD-10-CM

## 2021-11-23 DIAGNOSIS — H26.8 PXF (PSEUDOEXFOLIATION OF LENS CAPSULE): ICD-10-CM

## 2021-11-23 PROCEDURE — 92014 COMPRE OPH EXAM EST PT 1/>: CPT | Performed by: STUDENT IN AN ORGANIZED HEALTH CARE EDUCATION/TRAINING PROGRAM

## 2021-11-23 PROCEDURE — 92015 DETERMINE REFRACTIVE STATE: CPT | Performed by: STUDENT IN AN ORGANIZED HEALTH CARE EDUCATION/TRAINING PROGRAM

## 2021-11-23 RX ORDER — DORZOLAMIDE HYDROCHLORIDE AND TIMOLOL MALEATE 20; 5 MG/ML; MG/ML
1 SOLUTION/ DROPS OPHTHALMIC 2 TIMES DAILY
Qty: 10 ML | Refills: 3 | Status: SHIPPED | OUTPATIENT
Start: 2021-11-23 | End: 2022-05-19

## 2021-11-23 ASSESSMENT — REFRACTION_MANIFEST
OD_SPHERE: -1.00
OS_SPHERE: -0.50
OS_CYLINDER: +1.00
OD_CYLINDER: +0.50
OS_AXIS: 165
OD_ADD: +2.75
OD_AXIS: 127
OS_ADD: +2.75

## 2021-11-23 ASSESSMENT — REFRACTION_WEARINGRX
OD_AXIS: 134
OS_CYLINDER: +1.00
OS_SPHERE: -0.50
OS_AXIS: 170
SPECS_TYPE: BIFOCAL
OD_SPHERE: -1.25
OD_HPRISM: 2.0
OS_ADD: +3.00
OD_HBASE: IN
OD_CYLINDER: +0.50
OD_ADD: +3.00

## 2021-11-23 ASSESSMENT — EXTERNAL EXAM - LEFT EYE: OS_EXAM: NORMAL

## 2021-11-23 ASSESSMENT — CONF VISUAL FIELD
METHOD: COUNTING FINGERS
OD_NORMAL: 1
OS_NORMAL: 1

## 2021-11-23 ASSESSMENT — VISUAL ACUITY
METHOD: SNELLEN - LINEAR
OS_CC: 20/25
CORRECTION_TYPE: GLASSES
OD_CC: 20/20SLOW

## 2021-11-23 ASSESSMENT — TONOMETRY
OS_IOP_MMHG: 18
OD_IOP_MMHG: 20
IOP_METHOD: APPLANATION

## 2021-11-23 ASSESSMENT — EXTERNAL EXAM - RIGHT EYE: OD_EXAM: NORMAL

## 2021-11-23 ASSESSMENT — CUP TO DISC RATIO
OD_RATIO: 0.6
OS_RATIO: 0.5

## 2021-11-23 ASSESSMENT — REFRACTION_FINALRX
OD_HPRISM: 2.0
OD_HBASE: IN

## 2021-11-23 ASSESSMENT — SLIT LAMP EXAM - LIDS
COMMENTS: NORMAL
COMMENTS: NORMAL

## 2021-11-23 NOTE — LETTER
"    11/23/2021         RE: Cydney Christiansen  [address]  [place] [address]        Dear Colleague,    Thank you for referring your patient, Cydney Christiansen, to the [facility]. Please see a copy of my visit note below.     Current Eye Medications:  Dorz/timolol twice a day both eyes, last took at 8 am. Latanoprost at bedtime both eyes, last took at 10:15 pm.     Subjective:  Complete eye exam. Vision right eye sometimes gets blurry (like something floating over the vision for about 30 seconds) in right upper peripheral, when watching TV at night. Otherwise vision is OK both eyes in the distance. Not having any double vision. Can see books at near OK, but has some trouble with very small print. No eye pain or discomfort in either eye.      Objective:  See Ophthalmology Exam.      Assessment:  Cydney Christiansen is a 86 year old female who presents with:   Encounter Diagnoses   Name Primary?     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 Continue same medications.      PXF (PSEUDOEXFOLIATION OF LENS CAPSULE) OU      Pseudophakia - Both Eyes      PVD (posterior vitreous detachment), left        Plan:  Continue Latanoprost (green top) at bedtime both eyes     Continue Cosopt (dorzolamide-timolol -- dark blue top) twice a day both eyes    Use artificial tears up to four times a day (like Refresh Optive, Systane Balance, TheraTears, or generic artificial tears are ok. Avoid \"get the red out\" drops).       Glasses prescription given - optional to update    Kvng Garcia MD  [phone]                 Again, thank you for allowing me to participate in the care of your patient.        Sincerely,        Kvng Garcia MD    "

## 2021-11-23 NOTE — PATIENT INSTRUCTIONS
"Continue Latanoprost (green top) at bedtime both eyes     Continue Cosopt (dorzolamide-timolol -- dark blue top) twice a day both eyes    Use artificial tears up to four times a day (like Refresh Optive, Systane Balance, TheraTears, or generic artificial tears are ok. Avoid \"get the red out\" drops).     Glasses prescription given - optional to update    Kvng Garcia MD  [phone]    "

## 2021-11-23 NOTE — PROGRESS NOTES
" Current Eye Medications:  Dorz/timolol twice a day both eyes, last took at 8 am. Latanoprost at bedtime both eyes, last took at 10:15 pm.     Subjective:  Complete eye exam. Vision right eye sometimes gets blurry (like something floating over the vision for about 30 seconds) in right upper peripheral, when watching TV at night. Otherwise vision is OK both eyes in the distance. Not having any double vision. Can see books at near OK, but has some trouble with very small print. No eye pain or discomfort in either eye.      Objective:  See Ophthalmology Exam.      Assessment:  Cydney Christiansen is a 86 year old female who presents with:   Encounter Diagnoses   Name Primary?     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 Continue same medications.      PXF (PSEUDOEXFOLIATION OF LENS CAPSULE) OU      Pseudophakia - Both Eyes      PVD (posterior vitreous detachment), left        Plan:  Continue Latanoprost (green top) at bedtime both eyes     Continue Cosopt (dorzolamide-timolol -- dark blue top) twice a day both eyes    Use artificial tears up to four times a day (like Refresh Optive, Systane Balance, TheraTears, or generic artificial tears are ok. Avoid \"get the red out\" drops).       Glasses prescription given - optional to update    Kvng Garcia MD  [phone]             "

## 2021-12-02 ENCOUNTER — TELEPHONE (OUTPATIENT)
Dept: FAMILY MEDICINE | Facility: CLINIC | Age: 86
End: 2021-12-02
Payer: COMMERCIAL

## 2021-12-07 ENCOUNTER — OFFICE VISIT (OUTPATIENT)
Dept: FAMILY MEDICINE | Facility: CLINIC | Age: 86
End: 2021-12-07
Payer: COMMERCIAL

## 2021-12-07 VITALS
HEART RATE: 74 BPM | HEIGHT: 60 IN | TEMPERATURE: 97.8 F | WEIGHT: 145 LBS | OXYGEN SATURATION: 97 % | SYSTOLIC BLOOD PRESSURE: 132 MMHG | RESPIRATION RATE: 18 BRPM | DIASTOLIC BLOOD PRESSURE: 82 MMHG | BODY MASS INDEX: 28.47 KG/M2

## 2021-12-07 DIAGNOSIS — I10 HYPERTENSION GOAL BP (BLOOD PRESSURE) < 140/90: ICD-10-CM

## 2021-12-07 DIAGNOSIS — M79.10 MYALGIA: Primary | ICD-10-CM

## 2021-12-07 LAB — ERYTHROCYTE [SEDIMENTATION RATE] IN BLOOD BY WESTERGREN METHOD: 10 MM/HR (ref 0–30)

## 2021-12-07 PROCEDURE — 85652 RBC SED RATE AUTOMATED: CPT | Performed by: FAMILY MEDICINE

## 2021-12-07 PROCEDURE — 99213 OFFICE O/P EST LOW 20 MIN: CPT | Performed by: FAMILY MEDICINE

## 2021-12-07 PROCEDURE — 36415 COLL VENOUS BLD VENIPUNCTURE: CPT | Performed by: FAMILY MEDICINE

## 2021-12-07 PROCEDURE — 82550 ASSAY OF CK (CPK): CPT | Performed by: FAMILY MEDICINE

## 2021-12-07 ASSESSMENT — MIFFLIN-ST. JEOR: SCORE: 1029.22

## 2021-12-07 NOTE — PROGRESS NOTES
Assessment & Plan     Myalgia  Advised stop Exercising  With the band  Can  Do some mild stretching  Tylenol otc as recommended   Follow up if not better or any new symptoms  - ESR: Erythrocyte sedimentation rate; Future  - CK total; Future  - ESR: Erythrocyte sedimentation rate  - CK total  Can decrease zocor to 1-2 tablets a week for a couple of weeks till better    Hypertension goal BP (blood pressure) < 140/90  Stable     Return in about 1 month (around 1/7/2022) for recheck/Please schedule appointment.    Estrellita Hernandez MD  [facility] CLAUDIA Lamas is a 86 year old who presents for the following health issues     History of Present Illness       Hypertension: She presents for follow up of hypertension.  She does not check blood pressure  regularly outside of the clinic.  She does not follow a low salt diet.     She eats 2-3 servings of fruits and vegetables daily.She consumes 0 sweetened beverage(s) daily.She exercises with enough effort to increase her heart rate 10 to 19 minutes per day.  She exercises with enough effort to increase her heart rate 4 days per week.   She is taking medications regularly.       Pain History:  When did you first notice your pain? - 2 to 3 weeks   Have you seen any provider previously for this issue? No  How has your pain affected your ability to work? Not applicable  Where in your body do you have pain? bilateral arm pain - comes and goes not the elbow bone   Pt has aching Right arm and sometimes left arm  Has been Exercising with some bands and Not sure if That caused This  No chest pain  No sob  On fosamax and zocor  Arms hurt to move      Review of Systems   CONSTITUTIONAL: NEGATIVE for fever, chills, change in weight  RESP: NEGATIVE for significant cough or SOB  CV: NEGATIVE for chest pain, palpitations or peripheral edema  GI: NEGATIVE for nausea, abdominal pain, heartburn, or change in bowel habits  MUSCULOSKELETAL: as above  ROS otherwise  negative      Objective    /82   Pulse 74   Temp 97.8  F (36.6  C) (Oral)   Resp 18   Ht 1.524 m (5')   Wt 65.8 kg (145 lb)   SpO2 97%   BMI 28.32 kg/m    Body mass index is 28.32 kg/m .  Physical Exam   GENERAL: alert, no distress and elderly  NECK: no adenopathy, no asymmetry, masses, or scars and thyroid normal to palpation  RESP: lungs clear to auscultation - no rales, rhonchi or wheezes  CV: regular rate and rhythm, normal S1 S2, no S3 or S4, no murmur, click or rub, no peripheral edema and peripheral pulses strong  MS: no gross musculoskeletal defects noted, no edema  SKIN: no suspicious lesions or rashes  Mild tenderness Upper arms and pain with Movement  No swelling    Pending

## 2021-12-08 LAB — CK SERPL-CCNC: 43 U/L (ref 30–225)

## 2022-01-10 ENCOUNTER — OFFICE VISIT (OUTPATIENT)
Dept: FAMILY MEDICINE | Facility: CLINIC | Age: 87
End: 2022-01-10
Payer: COMMERCIAL

## 2022-01-10 VITALS
TEMPERATURE: 98 F | DIASTOLIC BLOOD PRESSURE: 88 MMHG | SYSTOLIC BLOOD PRESSURE: 126 MMHG | WEIGHT: 145 LBS | OXYGEN SATURATION: 99 % | RESPIRATION RATE: 20 BRPM | HEIGHT: 60 IN | BODY MASS INDEX: 28.47 KG/M2 | HEART RATE: 68 BPM

## 2022-01-10 DIAGNOSIS — M25.512 BILATERAL SHOULDER PAIN, UNSPECIFIED CHRONICITY: Primary | ICD-10-CM

## 2022-01-10 DIAGNOSIS — M79.602 PAIN IN BOTH UPPER EXTREMITIES: ICD-10-CM

## 2022-01-10 DIAGNOSIS — M79.601 PAIN IN BOTH UPPER EXTREMITIES: ICD-10-CM

## 2022-01-10 DIAGNOSIS — M25.511 BILATERAL SHOULDER PAIN, UNSPECIFIED CHRONICITY: Primary | ICD-10-CM

## 2022-01-10 DIAGNOSIS — C50.912 INVASIVE DUCTAL CARCINOMA OF LEFT BREAST (H): ICD-10-CM

## 2022-01-10 PROCEDURE — 99213 OFFICE O/P EST LOW 20 MIN: CPT | Performed by: FAMILY MEDICINE

## 2022-01-10 ASSESSMENT — MIFFLIN-ST. JEOR: SCORE: 1029.22

## 2022-01-10 NOTE — PROGRESS NOTES
Assessment & Plan     Bilateral shoulder pain, unspecified chronicity  Refer PT  - [facility]; Future    Pain in both upper extremities  Refer PT  Advised Ty;enol otc  Follow up if not better  - [facility]; Future    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)  Pt is done with Treatment with Tamoxifen and doing well-see Notes in epic    BMI:   Estimated body mass index is 28.32 kg/m  as calculated from the following:    Height as of this encounter: 1.524 m (5').    Weight as of this encounter: 65.8 kg (145 lb).           Return in about 1 month (around 2/10/2022) for recheck/ sooner if worse or New symptoms.    Estrellita Hernandez MD  [facility] CLAUDIA Lamas is a 86 year old who presents for the following health issues {    History of Present Illness       Hypertension: She presents for follow up of hypertension.  She does not check blood pressure  regularly outside of the clinic.  She does not follow a low salt diet.     She eats 2-3 servings of fruits and vegetables daily.She consumes 0 sweetened beverage(s) daily.She exercises with enough effort to increase her heart rate 20 to 29 minutes per day.  She exercises with enough effort to increase her heart rate 7 days per week.   She is taking medications regularly.     Pt continues to have pain in her arms and shoulder with Movement  No chest pain  No sob   was Pulling some bands    Review of Systems   CONSTITUTIONAL: NEGATIVE for fever, chills, change in weight  ENT/MOUTH: NEGATIVE for ear, mouth and throat problems  RESP: NEGATIVE for significant cough or SOB  CV: NEGATIVE for chest pain, palpitations or peripheral edema  GI: NEGATIVE for nausea, abdominal pain, heartburn, or change in bowel habits      Objective    /88   Pulse 68   Temp 98  F (36.7  C) (Oral)   Resp 20   Ht 1.524 m (5')   Wt 65.8 kg (145 lb)   SpO2 99%   BMI 28.32 kg/m    Body mass index is 28.32 kg/m .  Physical Exam   GENERAL:  healthy, alert and no distress  NECK: no adenopathy, no asymmetry, masses, or scars and thyroid normal to palpation  RESP: lungs clear to auscultation - no rales, rhonchi or wheezes  CV: regular rate and rhythm, normal S1 S2, no S3 or S4, no murmur, click or rub, no peripheral edema and peripheral pulses strong  ABDOMEN: soft, nontender, no hepatosplenomegaly, no masses and bowel sounds normal  MS: left shoulder Mild tenderness   Pain with external Rotation  Better ROM Right shoulder  Neck exam normal   Equal strength  Both UE  Trace edema Both legs which she has had for a while

## 2022-01-12 ENCOUNTER — THERAPY VISIT (OUTPATIENT)
Dept: PHYSICAL THERAPY | Facility: CLINIC | Age: 87
End: 2022-01-12
Attending: FAMILY MEDICINE
Payer: COMMERCIAL

## 2022-01-12 DIAGNOSIS — M79.601 PAIN IN BOTH UPPER EXTREMITIES: ICD-10-CM

## 2022-01-12 DIAGNOSIS — M25.511 ACUTE PAIN OF BOTH SHOULDERS: ICD-10-CM

## 2022-01-12 DIAGNOSIS — M25.512 ACUTE PAIN OF BOTH SHOULDERS: ICD-10-CM

## 2022-01-12 DIAGNOSIS — M79.602 PAIN IN BOTH UPPER EXTREMITIES: ICD-10-CM

## 2022-01-12 DIAGNOSIS — M25.512 BILATERAL SHOULDER PAIN, UNSPECIFIED CHRONICITY: ICD-10-CM

## 2022-01-12 DIAGNOSIS — M25.511 BILATERAL SHOULDER PAIN, UNSPECIFIED CHRONICITY: ICD-10-CM

## 2022-01-12 PROCEDURE — 97161 PT EVAL LOW COMPLEX 20 MIN: CPT | Mod: GP | Performed by: PHYSICAL THERAPIST

## 2022-01-12 PROCEDURE — 97110 THERAPEUTIC EXERCISES: CPT | Mod: GP | Performed by: PHYSICAL THERAPIST

## 2022-01-12 NOTE — PROGRESS NOTES
"Physical Therapy Initial Evaluation  Subjective:  The history is provided by the patient. No  was used.   Therapist Generated HPI Evaluation  Problem details: Pt reports \"the muscles in my arms just started aching\" in December 2021.  Just simply didn't get better.  Tried medication change and was not helpful.  Referred to PT 01/10/2022.         Type of problem:  Bilateral shoulders (L worse than R).    This is a new condition.  Condition occurred with:  Unknown cause.  Where condition occurred: for unknown reasons.  Patient reports pain:  Lateral.  Pain is described as aching and is intermittent.  Pain is worse in the P.M..  Since onset symptoms are gradually worsening.  Exacerbated by: raising the arm, doing hair, sleeping.  Relieved by: laying down, keeping the arms down.  Imaging testing: N/A.        Patient Health History  Cydney Christiansen being seen for muscles ache.          Pain is reported as 6/10 on pain scale.  General health as reported by patient is good.  Pertinent medical history includes: cancer, high blood pressure, history of fractures and osteoporosis.   Red flags:  None as reported by patient.     Surgeries include:  Cancer surgery (bladder, breast).    Current medications:  Bone density and high blood pressure medication.    Current occupation is retired.                   Pt is R dominant.  Pt states her goal is to return to golf this summer.            Objective:  Standing Alignment:    Cervical/Thoracic:  Forward head and Dowager's hump  Shoulder/UE:  Rounded shoulders  Lumbar deviations alignment: see previous notes re: spinal curvature.                                Cervical/Thoracic Evaluation  Arom wnl cervical: no symptom provocation with cervical AROM all directions.                                  Shoulder Evaluation:  ROM:  AROM:    Flexion:  Left:  93 positive    Right:  130 \"a little\"  Extension: Left: 49 negativeRight: 50 negative  Abduction:  Left: 110 " "positive   Right:  131 negative    Internal Rotation:  Left:  T9 positive    Right:  T9 \"just a bit\"  External Rotation:  Left:  48 negative    Right:  57 \"a little bit\"                PROM:    Flexion:  Left:  141 negative    Right: 145 negative      Abduction:  Left:  130 pain and capsular    Right:  148 capsular      External Rotation:  Left:  53 capsular                        Strength:    Flexion: Left:4/5    Pain: +    Right: 4/5      Pain:  -  Extension:  Left: 4/5     Pain:-    Right: 4/5     Pain:-  Abduction:  Left: 4/5   Pain:-    Right: 4/5      Pain:-    Internal Rotation:  Left:3+/5      Pain:-    Right: 4-/5      Pain:-  External Rotation:   Left:3+/5      Pain:-   Right:4-/5      Pain:-        Elbow Flexion:  Left:4+/5      Pain:-    Right:4+/5      Pain:-        Palpation:  Palpation assessed shoulder: diffuse tenderness to palpation surrounding B anterolateral shoulders without precision.      Mobility Tests:    Glenohumeral anterior left:  Normal  Glenohumeral anterior right:  Normal  Glenohumeral posterior left:  Normal  Glenohumeral posterior right:  Normal  Glenohumeral inferior left:  Normal  Glenohumeral inferior right:  Normal                                             General     ROS    Assessment/Plan:    Patient is a 86 year old female with both sides shoulder complaints.    Patient has the following significant findings with corresponding treatment plan.                Diagnosis 1:  B shoulder pain  Pain -  hot/cold therapy  Decreased ROM/flexibility - manual therapy and therapeutic exercise  Decreased strength - therapeutic exercise and therapeutic activities  Decreased function - therapeutic activities  Impaired posture - neuro re-education    Therapy Evaluation Codes:   1) History comprised of:   Personal factors that impact the plan of care:      Age.    Comorbidity factors that impact the plan of care are:      h/o cancer, osteoporosis, h/o fractures.     Medications impacting " care: Bone density.  2) Examination of Body Systems comprised of:   Body structures and functions that impact the plan of care:      Shoulder.   Activity limitations that impact the plan of care are:      Dressing, Lifting and Sleeping.  3) Clinical presentation characteristics are:   Evolving/Changing.  4) Decision-Making    Moderate complexity using standardized patient assessment instrument and/or measureable assessment of functional outcome.  Cumulative Therapy Evaluation is: Low complexity.    Previous and current functional limitations:  (See Goal Flow Sheet for this information)    Short term and Long term goals: (See Goal Flow Sheet for this information)     Communication ability:  Patient appears to be able to clearly communicate and understand verbal and written communication and follow directions correctly.  Treatment Explanation - The following has been discussed with the patient:   RX ordered/plan of care  Anticipated outcomes  Possible risks and side effects  This patient would benefit from PT intervention to resume normal activities.   Rehab potential is good.    Frequency:  1 X week, once daily  Duration:  for 4 weeks  Discharge Plan:  Achieve all LTG.  Independent in home treatment program.  Reach maximal therapeutic benefit.    Please refer to the daily flowsheet for treatment today, total treatment time and time spent performing 1:1 timed codes.

## 2022-01-12 NOTE — PROGRESS NOTES
[facility]    OUTPATIENT Physical Therapy ORTHOPEDIC EVALUATION  PLAN OF TREATMENT FOR OUTPATIENT REHABILITATION  (COMPLETE FOR INITIAL CLAIMS ONLY)  Patient's Last Name, First Name, M.I.  YOB: 1935  Cydney Christiansen    Provider s Name:  [facility]   Medical Record No.  [id number]   Start of Care Date:  01/12/22   Onset Date:   01/10/22   Type:     _X__PT   ___OT Medical Diagnosis:    Encounter Diagnoses   Name Primary?     Bilateral shoulder pain, unspecified chronicity      Pain in both upper extremities      Acute pain of both shoulders         Treatment Diagnosis:  B shoulder pain        Goals:     01/12/22 0500   Body Part   Goals listed below are for shoulder   Goal #1   Goal #1 reaching   Previous Functional Level No restrictions   Performance level See initial summary   STG Target Performance Reach ;overhead   Performance level regardless of discomfort   Rationale for dressing   Due date 02/02/22   LTG Target Performance Reach;overhead   Performance Level 1/10 pain   Rationale for dressing   Due date 02/23/22       Therapy Frequency:  once per week  Predicted Duration of Therapy Intervention:  four weeks    Colin Pandey, PT                 I CERTIFY THE NEED FOR THESE SERVICES FURNISHED UNDER        THIS PLAN OF TREATMENT AND WHILE UNDER MY CARE     (Physician attestation of this document indicates review and certification of the therapy plan).                       Certification Date From:  01/12/22   Certification Date To:  04/11/22    Referring Provider:  Estrellita Hernandez    Initial Assessment        See Epic Evaluation SOC Date: 01/12/22

## 2022-01-20 ENCOUNTER — THERAPY VISIT (OUTPATIENT)
Dept: PHYSICAL THERAPY | Facility: CLINIC | Age: 87
End: 2022-01-20
Payer: COMMERCIAL

## 2022-01-20 DIAGNOSIS — M25.512 ACUTE PAIN OF BOTH SHOULDERS: ICD-10-CM

## 2022-01-20 DIAGNOSIS — M25.511 ACUTE PAIN OF BOTH SHOULDERS: ICD-10-CM

## 2022-01-20 PROCEDURE — 97110 THERAPEUTIC EXERCISES: CPT | Mod: GP | Performed by: PHYSICAL THERAPIST

## 2022-01-20 PROCEDURE — 97140 MANUAL THERAPY 1/> REGIONS: CPT | Mod: GP | Performed by: PHYSICAL THERAPIST

## 2022-01-26 ENCOUNTER — THERAPY VISIT (OUTPATIENT)
Dept: PHYSICAL THERAPY | Facility: CLINIC | Age: 87
End: 2022-01-26
Payer: COMMERCIAL

## 2022-01-26 DIAGNOSIS — M25.511 ACUTE PAIN OF BOTH SHOULDERS: ICD-10-CM

## 2022-01-26 DIAGNOSIS — M25.512 ACUTE PAIN OF BOTH SHOULDERS: ICD-10-CM

## 2022-01-26 PROCEDURE — 97110 THERAPEUTIC EXERCISES: CPT | Mod: GP | Performed by: PHYSICAL THERAPIST

## 2022-01-26 PROCEDURE — 97140 MANUAL THERAPY 1/> REGIONS: CPT | Mod: GP | Performed by: PHYSICAL THERAPIST

## 2022-01-26 NOTE — PROGRESS NOTES
"Subjective:  HPI  Physical Exam                    Objective:  System    Physical Exam    General     ROS    Assessment/Plan:    SUBJECTIVE  Subjective: Pt reports shoulders are getting better.  Doesn't seem like there is as much pain with elevating the arms.  She reports a \"tweak\" in L axillary area after stubbing foot against underside of bed.  Has been getting better.   Current Pain level: 2/10   Changes in function:  Yes (See Goal flowsheet attached for changes in current functional level)     Adverse reaction to treatment or activity:  None    OBJECTIVE  Objective: AROM L shoulder flexion 130, abduction 133, ER 61, IR T8; all without pain.  AROM R shoulder IR T8, ER 63, abduction 139, flexion 137; all without discomfort.     ASSESSMENT  Cydney continues to require intervention to meet STG and LTG's: PT  Patient is progressing as expected.  Response to therapy has shown an improvement in  function  Progress made towards STG/LTG?  Yes (See Goal flowsheet attached for updates on achievement of STG and LTG)    PLAN  Current treatment program is being advanced to more complex exercises.    PTA/ATC plan:  N/A    Please refer to the daily flowsheet for treatment today, total treatment time and time spent performing 1:1 timed codes.        "

## 2022-03-01 ENCOUNTER — ANCILLARY PROCEDURE (OUTPATIENT)
Dept: GENERAL RADIOLOGY | Facility: CLINIC | Age: 87
End: 2022-03-01
Attending: FAMILY MEDICINE
Payer: COMMERCIAL

## 2022-03-01 ENCOUNTER — OFFICE VISIT (OUTPATIENT)
Dept: FAMILY MEDICINE | Facility: CLINIC | Age: 87
End: 2022-03-01
Payer: COMMERCIAL

## 2022-03-01 VITALS
SYSTOLIC BLOOD PRESSURE: 160 MMHG | WEIGHT: 144.5 LBS | HEART RATE: 64 BPM | OXYGEN SATURATION: 98 % | RESPIRATION RATE: 14 BRPM | DIASTOLIC BLOOD PRESSURE: 91 MMHG | HEIGHT: 60 IN | BODY MASS INDEX: 28.37 KG/M2 | TEMPERATURE: 98.1 F

## 2022-03-01 DIAGNOSIS — R05.9 COUGH: ICD-10-CM

## 2022-03-01 DIAGNOSIS — I10 HYPERTENSION GOAL BP (BLOOD PRESSURE) < 140/90: Primary | ICD-10-CM

## 2022-03-01 PROCEDURE — 71046 X-RAY EXAM CHEST 2 VIEWS: CPT | Performed by: RADIOLOGY

## 2022-03-01 PROCEDURE — 99214 OFFICE O/P EST MOD 30 MIN: CPT | Performed by: FAMILY MEDICINE

## 2022-03-01 RX ORDER — LOSARTAN POTASSIUM 50 MG/1
50 TABLET ORAL DAILY
Qty: 30 TABLET | Refills: 0 | Status: SHIPPED | OUTPATIENT
Start: 2022-03-01 | End: 2022-03-25

## 2022-03-01 NOTE — PROGRESS NOTES
Assessment & Plan     Hypertension goal BP (blood pressure) < 140/90  Increase cozaar  Follow up 3-4 weeks check BP  - losartan (COZAAR) 50 MG tablet; Take 1 tablet (50 mg) by mouth daily  Dash diet  Cough  Will check CXR  Advised symptomatic Treatment  Tylenol otc  - XR Chest 2 Views; Future  Pt has chest wall tenderness due to cough  Follow up if not better  Return in about 3 weeks (around 3/22/2022) for BP Recheck.    Estrellita Hernandez MD  [facility] CLAUDIA Lamas is a 86 year old who presents for the following health issues  accompanied by her none .    HPI   Pt has had pain Left upper back pain and left chest wall pain with coughing  No sob  Cough 3 weeks  No wheezing  No fever  Pt Thought she had covid 3 weeks ago-covid test neg  Pt  Has  had a cough  No fever Or chills    Hyperlipidemia Follow-Up      Are you regularly taking any medication or supplement to lower your cholesterol?   SIMVASTATIN 20 MG    Are you having muscle aches or other side effects that you think could be caused by your cholesterol lowering medication?  No    Hypertension Follow-up      Do you check your blood pressure regularly outside of the clinic? Yes Patient stated her blood pressure read 165/80 at home.     Are you following a low salt diet? No    Are your blood pressures ever more than 140 on the top number (systolic) OR more   than 90 on the bottom number (diastolic), for example 140/90? Yes    Review of Systems   CONSTITUTIONAL: NEGATIVE for fever, chills, change in weight  ENT/MOUTH: NEGATIVE for ear, mouth and throat problems  RESP:as above  CV: NEGATIVE for chest pain, palpitations or peripheral edema  GI: NEGATIVE for nausea, abdominal pain, heartburn, or change in bowel habits  PSYCHIATRIC: NEGATIVE for changes in mood or affect  ROS otherwise negative      Objective    BP (!) 160/91   Pulse 64   Temp 98.1  F (36.7  C) (Oral)   Resp 14   Ht 1.524 m (5')   Wt 65.5 kg (144 lb 8 oz)   LMP  (LMP  Unknown)   SpO2 98%   Breastfeeding No   BMI 28.22 kg/m    Body mass index is 28.22 kg/m .  Physical Exam   GENERAL: healthy, alert and no distress  EYES: Eyes grossly normal to inspection, PERRL and conjunctivae and sclerae normal  HENT: ear canals and TM's normal, nose and mouth without ulcers or lesions  NECK: no adenopathy, no asymmetry, masses, or scars and thyroid normal to palpation  RESP: lungs clear to auscultation - no rales, rhonchi or wheezes  CV: regular rate and rhythm, normal S1 S2, no S3 or S4, no murmur, click or rub, no peripheral edema and peripheral pulses strong  ABDOMEN: soft, nontender, no hepatosplenomegaly, no masses and bowel sounds normal  MS: no gross musculoskeletal defects noted, no edema  Tenderness Left chest wall area    Pending

## 2022-03-09 NOTE — PROGRESS NOTES
Pt last seen in PT 01/26.  She has since cancelled without rescheduling.  Left message at number listed as home requesting return call to [phone] to update status.

## 2022-03-21 NOTE — PROGRESS NOTES
"Pt left message for me while I was out on 03/09 and today is my first day back in clinic.  She indicated continuing HEP and \"doing OK.\"  Called pt today.  She notes doing quite well during the day but can be sore at night.  Will confer with Dr Hernandez at appt scheduled next week.  "

## 2022-03-23 DIAGNOSIS — I10 HYPERTENSION GOAL BP (BLOOD PRESSURE) < 140/90: ICD-10-CM

## 2022-03-25 RX ORDER — LOSARTAN POTASSIUM 50 MG/1
TABLET ORAL
Qty: 30 TABLET | Refills: 0 | Status: SHIPPED | OUTPATIENT
Start: 2022-03-25 | End: 2022-03-28

## 2022-03-25 NOTE — TELEPHONE ENCOUNTER
Medication is being filled for 1 time refill only due to:  Patient needs to be seen because needs hypertension follow up.   Patient scheduled for visit on 3/28/22  Halle Elmore RN

## 2022-03-28 ENCOUNTER — OFFICE VISIT (OUTPATIENT)
Dept: FAMILY MEDICINE | Facility: CLINIC | Age: 87
End: 2022-03-28
Payer: COMMERCIAL

## 2022-03-28 VITALS
WEIGHT: 145 LBS | HEART RATE: 62 BPM | OXYGEN SATURATION: 99 % | RESPIRATION RATE: 16 BRPM | SYSTOLIC BLOOD PRESSURE: 138 MMHG | BODY MASS INDEX: 28.47 KG/M2 | TEMPERATURE: 98.3 F | DIASTOLIC BLOOD PRESSURE: 82 MMHG | HEIGHT: 60 IN

## 2022-03-28 DIAGNOSIS — S22.000S COMPRESSION FRACTURE OF THORACIC VERTEBRA, UNSPECIFIED THORACIC VERTEBRAL LEVEL, SEQUELA: ICD-10-CM

## 2022-03-28 DIAGNOSIS — I10 HYPERTENSION GOAL BP (BLOOD PRESSURE) < 140/90: Primary | ICD-10-CM

## 2022-03-28 DIAGNOSIS — M81.0 AGE RELATED OSTEOPOROSIS, UNSPECIFIED PATHOLOGICAL FRACTURE PRESENCE: ICD-10-CM

## 2022-03-28 LAB
ANION GAP SERPL CALCULATED.3IONS-SCNC: 5 MMOL/L (ref 3–14)
BUN SERPL-MCNC: 15 MG/DL (ref 7–30)
CALCIUM SERPL-MCNC: 9.7 MG/DL (ref 8.5–10.1)
CHLORIDE BLD-SCNC: 104 MMOL/L (ref 94–109)
CO2 SERPL-SCNC: 28 MMOL/L (ref 20–32)
CREAT SERPL-MCNC: 0.66 MG/DL (ref 0.52–1.04)
GFR SERPL CREATININE-BSD FRML MDRD: 86 ML/MIN/1.73M2
GLUCOSE BLD-MCNC: 99 MG/DL (ref 70–99)
POTASSIUM BLD-SCNC: 4.7 MMOL/L (ref 3.4–5.3)
SODIUM SERPL-SCNC: 137 MMOL/L (ref 133–144)

## 2022-03-28 PROCEDURE — 99213 OFFICE O/P EST LOW 20 MIN: CPT | Performed by: FAMILY MEDICINE

## 2022-03-28 PROCEDURE — 36415 COLL VENOUS BLD VENIPUNCTURE: CPT | Performed by: FAMILY MEDICINE

## 2022-03-28 PROCEDURE — 80048 BASIC METABOLIC PNL TOTAL CA: CPT | Performed by: FAMILY MEDICINE

## 2022-03-28 RX ORDER — LOSARTAN POTASSIUM 50 MG/1
50 TABLET ORAL DAILY
Qty: 90 TABLET | Refills: 3 | Status: SHIPPED | OUTPATIENT
Start: 2022-03-28 | End: 2023-04-17

## 2022-03-28 NOTE — PROGRESS NOTES
Assessment & Plan     Hypertension goal BP (blood pressure) < 140/90  Stable   - Basic metabolic panel  (Ca, Cl, CO2, Creat, Gluc, K, Na, BUN); Future  - losartan (COZAAR) 50 MG tablet; Take 1 tablet (50 mg) by mouth daily  - Basic metabolic panel  (Ca, Cl, CO2, Creat, Gluc, K, Na, BUN)    Age related osteoporosis, unspecified pathological fracture presence  Discussed about Prolia  Pt not sure if her muscle aches are From Fosamax  She stopped Simvasatin and it did not change  She is going to try to Ho;d Fosamax and see if This will make a difference   Discussed Prolia can cause Muscle aches also  Follow up if not better 1 month    Compression fracture of thoracic vertebra, unspecified thoracic vertebral level, sequela  History    Return in about 3 months (around 6/28/2022) for Medicare wellness Exam.    Estrellita Hernandez MD  [facility] CLAUDIA Lamas is a 86 year old who presents for the following health issues     History of Present Illness       Hypertension: She presents for follow up of hypertension.  She does not check blood pressure  regularly outside of the clinic. Outside blood pressures have been over 140/90. She does not follow a low salt diet.     She eats 2-3 servings of fruits and vegetables daily.She consumes 0 sweetened beverage(s) daily.She exercises with enough effort to increase her heart rate 10 to 19 minutes per day.  She exercises with enough effort to increase her heart rate 6 days per week.   She is taking medications regularly.     Review of Systems   CONSTITUTIONAL: NEGATIVE for fever, chills, change in weight  ENT/MOUTH: NEGATIVE for ear, mouth and throat problems  RESP: NEGATIVE for significant cough or SOB  CV: NEGATIVE for chest pain, palpitations or peripheral edema  GI: NEGATIVE for nausea, abdominal pain, heartburn, or change in bowel habits  ROS otherwise negative      Objective    /82   Pulse 62   Temp 98.3  F (36.8  C) (Temporal)   Resp 16   Ht  1.524 m (5')   Wt 65.8 kg (145 lb)   LMP  (LMP Unknown)   SpO2 99%   BMI 28.32 kg/m    Body mass index is 28.32 kg/m .  Physical Exam   GENERAL: healthy, alert and no distress  NECK: no adenopathy, no asymmetry, masses, or scars and thyroid normal to palpation  RESP: lungs clear to auscultation - no rales, rhonchi or wheezes  CV: regular rate and rhythm, normal S1 S2, no S3 or S4, no murmur, click or rub, no peripheral edema and peripheral pulses strong  ABDOMEN: soft, nontender, no hepatosplenomegaly, no masses and bowel sounds normal  MS: no gross musculoskeletal defects noted, no edema    Office Visit on 12/07/2021   Component Date Value Ref Range Status     Erythrocyte Sedimentation Rate 12/07/2021 10  0 - 30 mm/hr Final     CK 12/07/2021 43  30 - 225 U/L Final

## 2022-04-01 ENCOUNTER — OFFICE VISIT (OUTPATIENT)
Dept: DERMATOLOGY | Facility: CLINIC | Age: 87
End: 2022-04-01
Payer: COMMERCIAL

## 2022-04-01 DIAGNOSIS — Z85.828 HISTORY OF NONMELANOMA SKIN CANCER: Primary | ICD-10-CM

## 2022-04-01 DIAGNOSIS — L30.9 DERMATITIS: ICD-10-CM

## 2022-04-01 DIAGNOSIS — L57.0 AK (ACTINIC KERATOSIS): ICD-10-CM

## 2022-04-01 DIAGNOSIS — D48.5 NEOPLASM OF UNCERTAIN BEHAVIOR OF SKIN: ICD-10-CM

## 2022-04-01 DIAGNOSIS — L82.1 SEBORRHEIC KERATOSES: ICD-10-CM

## 2022-04-01 DIAGNOSIS — L92.0 GRANULOMA ANNULARE: ICD-10-CM

## 2022-04-01 PROCEDURE — 11103 TANGNTL BX SKIN EA SEP/ADDL: CPT | Performed by: DERMATOLOGY

## 2022-04-01 PROCEDURE — 11102 TANGNTL BX SKIN SINGLE LES: CPT | Performed by: DERMATOLOGY

## 2022-04-01 PROCEDURE — 88305 TISSUE EXAM BY PATHOLOGIST: CPT | Performed by: PATHOLOGY

## 2022-04-01 PROCEDURE — 99213 OFFICE O/P EST LOW 20 MIN: CPT | Mod: 25 | Performed by: DERMATOLOGY

## 2022-04-01 RX ORDER — TRIAMCINOLONE ACETONIDE 1 MG/G
CREAM TOPICAL
Qty: 30 G | Refills: 0 | Status: SHIPPED | OUTPATIENT
Start: 2022-04-01 | End: 2023-07-04

## 2022-04-01 ASSESSMENT — PAIN SCALES - GENERAL: PAINLEVEL: NO PAIN (0)

## 2022-04-01 NOTE — PATIENT INSTRUCTIONS

## 2022-04-01 NOTE — PROGRESS NOTES
[facility] Dermatology Note  Encounter Date: Apr 1, 2022  Office Visit     Dermatology Problem List:  0. NUB - right posterior shoulder, right dorsal hand posterior - bx 4/1/22  1. NMSC  -BCC, mid back s/p ED&C 11/2020  -SCCIS, right upper arm s/p ED&C 11/2020  -SCCIS, left forearm s/p excision 3/6/2020  -SCCis Left superior forearm, s/p: ED and C 9/30/2019  -SCCis right temple, s/p:  Mohs 9/30/2019  -SCCIS, right nasal sidewall, s/p Mohs 4/1/19   -SCCIS, right upper arm, s/p excision 8/9/18   -SCC, left popliteal fossa, well differentiated with focal perineural invasion but with clear margins on path, s/p excision by Dr. Mcgee 7/2013  -SCCIS arising from solar keratosis, right cheek, 4/2013  -SCC, right dorsal hand, s/p excision with SCCIS extending to the margin 1/7/13  -SCC, bowenoid type, upper back, s/p excision 2011  -BCC, superficial, left back, 2/2011  -BCC, superficial, left neck, 2011, elected for ED&C  -SCCIS, right upper forearm 11/5/2020 MOHS   2. 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 AK  - HAK, left lateral forehead, bx 9/28/21  - AK, right cheek, s/p bx 9/28/21  - HAK, left mid eyebrow, base not seen, 6/2014  - left posterior lower leg, s/p bx 2/18/21, s/p cryo 9/28/21  -s/p cryotherapy  -left forearm, s/p biopsy 3/15/19   5. GA, right angle of jaw: resolves with triamcinolone cream  6. History of benign biopsy  - ISK, right dorsal hand, s/p bx 9/28/21  - C/w rosacea, right posterior shoulder, s/p bx 9/28/21  - SK, right posterior lower leg, s/p bx 2/18/21  - verucca, left forehead, s/p bx 2/18/21  - Arthropod bite, left 4th digit, bx 2/18/21  - SK, right abdomen, s/p bx 2/18/2020  7. Eczematous patch R dorsal hand  -previous Tx: triamcinolone 0.01% cream, Vaseline     ____________________________________________    Assessment & Plan:    # Bx proven AK on the right cheek and left lateral forehead (HAk biopsy proven). Right cheek is resolved, but left lateral forehead still  present, as well as an AK on the left cheek.   - Treatment with cryo deferred at this time, as patient is traveling next week.   - plan for cryo left lateral forehead and the left cheek    # Neoplasm of uncertain behavior on the right posterior shoulder. Biopsied previously. The differential diagnosis includes SK vs atypical squamous lesion.   - See procedure note.    # Neoplasm of uncertain behavior on the right dorsal hand posterior. The differential diagnosis includes SCC vs other.   - See procedure note.     # Eczematous dermatitis, lower back.   - Apply triamcinolone cream daily x 2 weeks    # Seborrheic keratosis, non irritated.   - No further intervention needed.       # hx of NMSC  -Recommend sunscreens SPF #30 or greater, protective clothing and avoidance of tanning beds.      Procedures Performed:   - Shave biopsy procedure note, location(s): right posterior shoulder, right dorsal hand posterior . After discussion of benefits and risks including but not limited to bleeding, infection, scar, incomplete removal, recurrence, and non-diagnostic biopsy, written consent and photographs were obtained. The area was cleaned with isopropyl alcohol. 0.5mL of 1% lidocaine with epinephrine was injected to obtain adequate anesthesia of lesion(s). Shave biopsy at site(s) performed. Hemostasis was achieved with aluminium chloride. Petrolatum ointment and a sterile dressing were applied. The patient tolerated the procedure and no complications were noted. The patient was provided with verbal and written post care instructions.       Follow-up: 1 month(s) for cryo left cheek of AK and HAK on left forehead (biopsy proven)  or earlier for new or changing lesions    Staff and Scribe:     Scribe Disclosure:   I, Jesús Tapia, am serving as a scribe to document services personally performed by this physician, Dr. Qi Barba, based on data collection and the provider's statements to me.     Provider Disclosure:   The  "documentation recorded by the scribe accurately reflects the services I personally performed and the decisions made by me.    Qi Barba MD    Department of Dermatology  [facility]: Phone: [phone], Fax:[phone]  [facility]: Phone: [phone], Fax: [phone]      ____________________________________________    CC: Derm Problem (Cydney is here today for skin check, pt states \"spots on back, left lower leg concerned of redness\")    HPI:  Ms. Cydney Christiansen is a(n) 86 year old female who presents today as a return patient for a skin check.    Last seen 9/28/21. 5 biopsies were taken at that time.    Today, she has a spot on the back and left lower leg that she would like seen.    Patient is otherwise feeling well, without additional skin concerns.    Labs Reviewed:  N/A    Physical Exam:  Vitals: LMP  (LMP Unknown)   SKIN: Total skin excluding the undergarment areas was performed. The exam included the head/face, neck, both arms, chest, back, abdomen, both legs, digits and/or nails. Declines genital and buttock exam.  - Right Posterior Shoulder: keratotic papule  - Right cheek is healed, no lesion seen  - Scaly patch on the mid back  - There is an erythematous macule with overyling adherent scale on the left lateral forehead and left cheek.   - Keratotic papule on the right dorsal hand posterior  - Left Lateral Thigh: waxy brown papule  - No other lesions of concern on areas examined.     Medications:  Current Outpatient Medications   Medication     alendronate (FOSAMAX) 70 MG tablet     CALCIUM 600 MG OR TABS     cyanocobalamin (VITAMIN B-12) 500 MCG tablet     dorzolamide-timolol (COSOPT) 2-0.5 % ophthalmic solution     ferrous sulfate (FEROSUL) 325 (65 Fe) MG tablet     ibuprofen (ADVIL,MOTRIN) 200 MG tablet     latanoprost (XALATAN) 0.005 % ophthalmic solution     losartan " (COZAAR) 50 MG tablet     Multiple Vitamins-Minerals (ONE DAILY ADULTS 50+) TABS     polyethylene glycol (MIRALAX) 17 GM/Dose powder     simvastatin (ZOCOR) 20 MG tablet     tiZANidine (ZANAFLEX) 2 MG tablet     triamcinolone (KENALOG) 0.1 % cream     VITAMIN D-1000 MAX -1000 MG-UNIT OR TABS     No current facility-administered medications for this visit.      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     Hypertension goal BP (blood pressure) < 140/90     Seborrheic keratosis     Malignant neoplasm of overlapping sites of left female breast (H)     Scoliosis     Compression fracture of thoracic vertebra (H)     Mass of left hand     Primary open angle glaucoma of both eyes, unspecified glaucoma stage     Osteoporosis, unspecified osteoporosis type, unspecified pathological fracture presence     Nuclear sclerosis of left eye     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)     Actinic keratosis     History of nonmelanoma skin cancer     Combined forms of age-related cataract of right eye     Acute pain of both shoulders     Past Medical History:   Diagnosis Date     Actinic keratosis      Basal cell cancer 02/2011    bcc of the L back.     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 on close of this encounter.

## 2022-04-01 NOTE — LETTER
4/1/2022         RE: Cydney Christiansen  [address]  [place] [address]        Dear Colleague,    Thank you for referring your patient, Cydney Christiansen, to the [facility]. Please see a copy of my visit note below.    [facility] Dermatology Note  Encounter Date: Apr 1, 2022  Office Visit     Dermatology Problem List:  0. NUB - right posterior shoulder, right dorsal hand posterior - bx 4/1/22  1. NMSC  -BCC, mid back s/p ED&C 11/2020  -SCCIS, right upper arm s/p ED&C 11/2020  -SCCIS, left forearm s/p excision 3/6/2020  -SCCis Left superior forearm, s/p: ED and C 9/30/2019  -SCCis right temple, s/p:  Mohs 9/30/2019  -SCCIS, right nasal sidewall, s/p Mohs 4/1/19   -SCCIS, right upper arm, s/p excision 8/9/18   -SCC, left popliteal fossa, well differentiated with focal perineural invasion but with clear margins on path, s/p excision by Dr. Mcgee 7/2013  -SCCIS arising from solar keratosis, right cheek, 4/2013  -SCC, right dorsal hand, s/p excision with SCCIS extending to the margin 1/7/13  -SCC, bowenoid type, upper back, s/p excision 2011  -BCC, superficial, left back, 2/2011  -BCC, superficial, left neck, 2011, elected for ED&C  -SCCIS, right upper forearm 11/5/2020 MOHS   2. 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 AK  - HAK, left lateral forehead, bx 9/28/21  - AK, right cheek, s/p bx 9/28/21  - HAK, left mid eyebrow, base not seen, 6/2014  - left posterior lower leg, s/p bx 2/18/21, s/p cryo 9/28/21  -s/p cryotherapy  -left forearm, s/p biopsy 3/15/19   5. GA, right angle of jaw: resolves with triamcinolone cream  6. History of benign biopsy  - ISK, right dorsal hand, s/p bx 9/28/21  - C/w rosacea, right posterior shoulder, s/p bx 9/28/21  - SK, right posterior lower leg, s/p bx 2/18/21  - verucca, left forehead, s/p bx 2/18/21  - Arthropod bite, left 4th digit, bx 2/18/21  - SK, right abdomen, s/p bx 2/18/2020  7. Eczematous patch R dorsal  hand  -previous Tx: triamcinolone 0.01% cream, Vaseline     ____________________________________________    Assessment & Plan:    # Bx proven AK on the right cheek and left lateral forehead (HAk biopsy proven). Right cheek is resolved, but left lateral forehead still present, as well as an AK on the left cheek.   - Treatment with cryo deferred at this time, as patient is traveling next week.   - plan for cryo left lateral forehead and the left cheek    # Neoplasm of uncertain behavior on the right posterior shoulder. Biopsied previously. The differential diagnosis includes SK vs atypical squamous lesion.   - See procedure note.    # Neoplasm of uncertain behavior on the right dorsal hand posterior. The differential diagnosis includes SCC vs other.   - See procedure note.     # Eczematous dermatitis, lower back.   - Apply triamcinolone cream daily x 2 weeks    # Seborrheic keratosis, non irritated.   - No further intervention needed.       # hx of NMSC  -Recommend sunscreens SPF #30 or greater, protective clothing and avoidance of tanning beds.      Procedures Performed:   - Shave biopsy procedure note, location(s): right posterior shoulder, right dorsal hand posterior . After discussion of benefits and risks including but not limited to bleeding, infection, scar, incomplete removal, recurrence, and non-diagnostic biopsy, written consent and photographs were obtained. The area was cleaned with isopropyl alcohol. 0.5mL of 1% lidocaine with epinephrine was injected to obtain adequate anesthesia of lesion(s). Shave biopsy at site(s) performed. Hemostasis was achieved with aluminium chloride. Petrolatum ointment and a sterile dressing were applied. The patient tolerated the procedure and no complications were noted. The patient was provided with verbal and written post care instructions.       Follow-up: 1 month(s) for cryo left cheek of AK and HAK on left forehead (biopsy proven)  or earlier for new or changing  "lesions    Staff and Scribe:     Scribe Disclosure:   I, Jesús Willie, am serving as a scribe to document services personally performed by this physician, Dr. Qi Barba, based on data collection and the provider's statements to me.     Provider Disclosure:   The documentation recorded by the scribe accurately reflects the services I personally performed and the decisions made by me.    Qi Barba MD    Department of Dermatology  [facility]: Phone: [phone], Fax:[phone]  [facility]: Phone: [phone], Fax: [phone]      ____________________________________________    CC: Derm Problem (Cydney is here today for skin check, pt states \"spots on back, left lower leg concerned of redness\")    HPI:  Ms. Cydney Christiansen is a(n) 86 year old female who presents today as a return patient for a skin check.    Last seen 9/28/21. 5 biopsies were taken at that time.    Today, she has a spot on the back and left lower leg that she would like seen.    Patient is otherwise feeling well, without additional skin concerns.    Labs Reviewed:  N/A    Physical Exam:  Vitals: LMP  (LMP Unknown)   SKIN: Total skin excluding the undergarment areas was performed. The exam included the head/face, neck, both arms, chest, back, abdomen, both legs, digits and/or nails. Declines genital and buttock exam.  - Right Posterior Shoulder: keratotic papule  - Right cheek is healed, no lesion seen  - Scaly patch on the mid back  - There is an erythematous macule with overyling adherent scale on the left lateral forehead and left cheek.   - Keratotic papule on the right dorsal hand posterior  - Left Lateral Thigh: waxy brown papule  - No other lesions of concern on areas examined.     Medications:  Current Outpatient Medications   Medication     alendronate (FOSAMAX) 70 MG tablet     CALCIUM 600 MG OR TABS     " cyanocobalamin (VITAMIN B-12) 500 MCG tablet     dorzolamide-timolol (COSOPT) 2-0.5 % ophthalmic solution     ferrous sulfate (FEROSUL) 325 (65 Fe) MG tablet     ibuprofen (ADVIL,MOTRIN) 200 MG tablet     latanoprost (XALATAN) 0.005 % ophthalmic solution     losartan (COZAAR) 50 MG tablet     Multiple Vitamins-Minerals (ONE DAILY ADULTS 50+) TABS     polyethylene glycol (MIRALAX) 17 GM/Dose powder     simvastatin (ZOCOR) 20 MG tablet     tiZANidine (ZANAFLEX) 2 MG tablet     triamcinolone (KENALOG) 0.1 % cream     VITAMIN D-1000 MAX -1000 MG-UNIT OR TABS     No current facility-administered medications for this visit.      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     Hypertension goal BP (blood pressure) < 140/90     Seborrheic keratosis     Malignant neoplasm of overlapping sites of left female breast (H)     Scoliosis     Compression fracture of thoracic vertebra (H)     Mass of left hand     Primary open angle glaucoma of both eyes, unspecified glaucoma stage     Osteoporosis, unspecified osteoporosis type, unspecified pathological fracture presence     Nuclear sclerosis of left eye     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)     Actinic keratosis     History of nonmelanoma skin cancer     Combined forms of age-related cataract of right eye     Acute pain of both shoulders     Past Medical History:   Diagnosis Date     Actinic keratosis      Basal cell cancer 02/2011    bcc of the L back.     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 on close of this encounter.      Again, thank you for allowing me to participate in the care of your patient.        Sincerely,        Qi Barba MD

## 2022-04-01 NOTE — NURSING NOTE
"Cydney Christiansen's goals for this visit include:   Chief Complaint   Patient presents with     Derm Problem     Cydney is here today for skin check, pt states \"spots on back, left lower leg concerned of redness\"       She requests these members of her care team be copied on today's visit information:     PCP: Estrellita Hernandez    Referring Provider:  No referring provider defined for this encounter.    LMP  (LMP Unknown)     Do you need any medication refills at today's visit? No    Ellie Low LPN      "

## 2022-04-05 LAB
PATH REPORT.COMMENTS IMP SPEC: NORMAL
PATH REPORT.COMMENTS IMP SPEC: NORMAL
PATH REPORT.FINAL DX SPEC: NORMAL
PATH REPORT.GROSS SPEC: NORMAL
PATH REPORT.MICROSCOPIC SPEC OTHER STN: NORMAL
PATH REPORT.RELEVANT HX SPEC: NORMAL

## 2022-04-06 ENCOUNTER — LAB (OUTPATIENT)
Dept: LAB | Facility: CLINIC | Age: 87
End: 2022-04-06
Payer: COMMERCIAL

## 2022-04-06 DIAGNOSIS — Z20.822 ENCOUNTER FOR LABORATORY TESTING FOR COVID-19 VIRUS: ICD-10-CM

## 2022-04-06 PROCEDURE — U0005 INFEC AGEN DETEC AMPLI PROBE: HCPCS

## 2022-04-06 PROCEDURE — U0003 INFECTIOUS AGENT DETECTION BY NUCLEIC ACID (DNA OR RNA); SEVERE ACUTE RESPIRATORY SYNDROME CORONAVIRUS 2 (SARS-COV-2) (CORONAVIRUS DISEASE [COVID-19]), AMPLIFIED PROBE TECHNIQUE, MAKING USE OF HIGH THROUGHPUT TECHNOLOGIES AS DESCRIBED BY CMS-2020-01-R: HCPCS

## 2022-04-07 LAB — SARS-COV-2 RNA RESP QL NAA+PROBE: NEGATIVE

## 2022-04-26 PROBLEM — M25.511 ACUTE PAIN OF BOTH SHOULDERS: Status: RESOLVED | Noted: 2022-01-12 | Resolved: 2022-04-26

## 2022-04-26 PROBLEM — M25.512 ACUTE PAIN OF BOTH SHOULDERS: Status: RESOLVED | Noted: 2022-01-12 | Resolved: 2022-04-26

## 2022-04-26 NOTE — PROGRESS NOTES
Pt last seen in PT 01/26.  See phone conversation with her 03/21.  No further PT is scheduled.  Consider note dated 01/26 to serve as final summary.

## 2022-05-04 ENCOUNTER — OFFICE VISIT (OUTPATIENT)
Dept: DERMATOLOGY | Facility: CLINIC | Age: 87
End: 2022-05-04
Payer: COMMERCIAL

## 2022-05-04 DIAGNOSIS — L57.0 ACTINIC KERATOSIS: Primary | ICD-10-CM

## 2022-05-04 DIAGNOSIS — L82.1 VERRUCOUS KERATOSIS: ICD-10-CM

## 2022-05-04 PROCEDURE — 17000 DESTRUCT PREMALG LESION: CPT | Mod: XS | Performed by: DERMATOLOGY

## 2022-05-04 PROCEDURE — 17110 DESTRUCTION B9 LES UP TO 14: CPT | Performed by: DERMATOLOGY

## 2022-05-04 RX ORDER — UBIDECARENONE 100 MG
200 CAPSULE ORAL EVERY MORNING
COMMUNITY
End: 2023-07-06

## 2022-05-04 NOTE — LETTER
5/4/2022         RE: Cydney Christiansen  [address]  [place] [address]        Dear Colleague,    Thank you for referring your patient, Cydney Christiansen, to the [facility]. Please see a copy of my visit note below.    [facility] Dermatology Note  Encounter Date: May 4, 2022  Office Visit     Dermatology Problem List:  0. NUB - R dorsal hand posterior - bx 4/1/22 (Verrucous Keratosis)        - Treated with LN2 5/4/22, follow up 4 weeks to confirm resolution.     1. NMSC  -BCC, mid back s/p ED&C 11/2020  -SCCIS, right upper arm s/p ED&C 11/2020  -SCCIS, left forearm s/p excision 3/6/2020  -SCCis Left superior forearm, s/p: ED and C 9/30/2019  -SCCis right temple, s/p:  Mohs 9/30/2019  -SCCIS, right nasal sidewall, s/p Mohs 4/1/19   -SCCIS, right upper arm, s/p excision 8/9/18   -SCC, left popliteal fossa, well differentiated with focal perineural invasion but with clear margins on path, s/p excision by Dr. Mcgee 7/2013  -SCCIS arising from solar keratosis, right cheek, 4/2013  -SCC, right dorsal hand, s/p excision with SCCIS extending to the margin 1/7/13  -SCC, bowenoid type, upper back, s/p excision 2011  -BCC, superficial, left back, 2/2011  -BCC, superficial, left neck, 2011, elected for ED&C  -SCCIS, right upper forearm 11/5/2020 MOHS   2. 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 AK  - HAK, left lateral forehead, bx 9/28/21. treated with LN2 5/4/22  - AK, right cheek, s/p bx 9/28/21 - treated with LN2 5/4/22  - HAK, left mid eyebrow, base not seen, 6/2014  - left posterior lower leg, s/p bx 2/18/21, s/p cryo 9/28/21  -s/p cryotherapy  -left forearm, s/p biopsy 3/15/19   5. GA, right angle of jaw: resolves with triamcinolone cream  6. History of benign biopsy  - ISK, right dorsal hand, s/p bx 9/28/21  - C/w rosacea, right posterior shoulder, s/p bx 9/28/21  - SK, right posterior lower leg, s/p bx 2/18/21  - verucca, left forehead, s/p bx 2/18/21  -  Arthropod bite, left 4th digit, bx 2/18/21  - SK, right abdomen, s/p bx 2/18/2020  7. Eczematous patch R dorsal hand  -previous Tx: triamcinolone 0.01% cream, Vaseline      ____________________________________________    Assessment & Plan:     # Actinic keratosis, left lateral forehead, right cheek.  Precancerous nature reviewed.  Both sites treated with liquid nitrogen as below.      # Verrucous keratosis, right dorsal hand.  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   -With recent biopsy confirming verrucous keratosis conservative treatment with liquid nitrogen was performed today.  -We will have patient follow-up in 4 weeks to confirm resolution.  If dome-shaped papule persists likely rebiopsy will be recommended.     Procedures Performed:   - Cryotherapy procedure note, location(s): Left lateral forehead, right cheek, right dorsal hand. After verbal consent and discussion of risks and benefits including, but not limited to, dyspigmentation/scar, blister, and pain, 3 AK's and one Irritated verrucous keratosis were treated with 1-2 mm freeze border for 1-2 cycles with liquid nitrogen. Post cryotherapy instructions were provided.      Staff:     Ian Haro DO    Department of Dermatology  [facility]: Phone: [phone], Fax:[phone]  [facility]: Phone: [phone], Fax: [phone]      ____________________________________________    CC: Cryotherapy (Cryo AK on right cheek and left lateral forehead.  Biopsies 9/2021/) and Derm Problem (Verrucous keratosis on right dorsal hand has grown and is tender.)    HPI:  Ms. Cydney Christiansen is a(n) 86 year old female who presents today for follow-up  And treatment of actinic keratosis x2, left lateral forehead and right cheek.  These spots have not  resolved since the biopsy in September.  They have not grown significantly but scaly crust remains.     She notes the biopsy site on her right dorsal hand has healed but has more of a dome shape than before.  The papule is painful when touched.  It has persistent scale on the surface.     Patient is otherwise feeling well, without additional skin concerns.     Labs Reviewed:  N/A    Physical Exam:  Vitals: LMP  (LMP Unknown)   SKIN: Focused examination of face and right dorsal hand was performed.  - Right dorsal hand with 5 mm smooth pink dome-shaped papule with central keratotic scale.   - There are erythematous macules with overyling adherent scale on the left lateral forehead x2, right cheek.     - No other lesions of concern on areas examined.     Medications:  Current Outpatient Medications   Medication     alendronate (FOSAMAX) 70 MG tablet     co-enzyme Q-10 100 MG CAPS capsule     cyanocobalamin (VITAMIN B-12) 500 MCG tablet     dorzolamide-timolol (COSOPT) 2-0.5 % ophthalmic solution     ferrous sulfate (FEROSUL) 325 (65 Fe) MG tablet     ibuprofen (ADVIL,MOTRIN) 200 MG tablet     latanoprost (XALATAN) 0.005 % ophthalmic solution     losartan (COZAAR) 50 MG tablet     Multiple Vitamins-Minerals (ONE DAILY ADULTS 50+) TABS     polyethylene glycol (MIRALAX) 17 GM/Dose powder     simvastatin (ZOCOR) 20 MG tablet     triamcinolone (KENALOG) 0.1 % external cream     VITAMIN D-1000 MAX -1000 MG-UNIT OR TABS     tiZANidine (ZANAFLEX) 2 MG tablet     No current facility-administered medications for this visit.      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     Hypertension goal BP (blood pressure) < 140/90     Seborrheic keratosis     Malignant neoplasm of overlapping sites of left female breast (H)     Scoliosis     Compression fracture of thoracic vertebra (H)     Mass of left hand     Primary open angle glaucoma of both eyes, unspecified glaucoma stage     Osteoporosis, unspecified osteoporosis type, unspecified pathological fracture presence     Nuclear sclerosis of left eye     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)     Actinic keratosis     History of nonmelanoma skin cancer     Combined forms of 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        Sincerely,        Ian Haro MD

## 2022-05-04 NOTE — NURSING NOTE
Cydney Christiansen's goals for this visit include:   Chief Complaint   Patient presents with     Cryotherapy     Cryo AK on right cheek and left lateral forehead.  Biopsies 9/2021       Derm Problem     Verrucous keratosis on right dorsal hand has grown and is tender.       She requests these members of her care team be copied on today's visit information: n/a    PCP: Estrellita Hernandez    Referring Provider:  No referring provider defined for this encounter.    LMP  (LMP Unknown)     Do you need any medication refills at today's visit? No  Quin Webber RN

## 2022-05-04 NOTE — PROGRESS NOTES
[facility] Dermatology Note  Encounter Date: May 4, 2022  Office Visit     Dermatology Problem List:  0. NUB - R dorsal hand posterior - bx 4/1/22 (Verrucous Keratosis)        - Treated with LN2 5/4/22, follow up 4 weeks to confirm resolution.     1. NMSC  -BCC, mid back s/p ED&C 11/2020  -SCCIS, right upper arm s/p ED&C 11/2020  -SCCIS, left forearm s/p excision 3/6/2020  -SCCis Left superior forearm, s/p: ED and C 9/30/2019  -SCCis right temple, s/p:  Mohs 9/30/2019  -SCCIS, right nasal sidewall, s/p Mohs 4/1/19   -SCCIS, right upper arm, s/p excision 8/9/18   -SCC, left popliteal fossa, well differentiated with focal perineural invasion but with clear margins on path, s/p excision by Dr. Mcgee 7/2013  -SCCIS arising from solar keratosis, right cheek, 4/2013  -SCC, right dorsal hand, s/p excision with SCCIS extending to the margin 1/7/13  -SCC, bowenoid type, upper back, s/p excision 2011  -BCC, superficial, left back, 2/2011  -BCC, superficial, left neck, 2011, elected for ED&C  -SCCIS, right upper forearm 11/5/2020 MOHS   2. 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 AK  - HAK, left lateral forehead, bx 9/28/21. treated with LN2 5/4/22  - AK, right cheek, s/p bx 9/28/21 - treated with LN2 5/4/22  - HAK, left mid eyebrow, base not seen, 6/2014  - left posterior lower leg, s/p bx 2/18/21, s/p cryo 9/28/21  -s/p cryotherapy  -left forearm, s/p biopsy 3/15/19   5. GA, right angle of jaw: resolves with triamcinolone cream  6. History of benign biopsy  - ISK, right dorsal hand, s/p bx 9/28/21  - C/w rosacea, right posterior shoulder, s/p bx 9/28/21  - SK, right posterior lower leg, s/p bx 2/18/21  - verucca, left forehead, s/p bx 2/18/21  - Arthropod bite, left 4th digit, bx 2/18/21  - SK, right abdomen, s/p bx 2/18/2020  7. Eczematous patch R dorsal hand  -previous Tx: triamcinolone 0.01% cream, Vaseline      ____________________________________________    Assessment & Plan:     # Actinic  keratosis, left lateral forehead, right cheek.  Precancerous nature reviewed.  Both sites treated with liquid nitrogen as below.      # Verrucous keratosis, right dorsal hand.  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   -With recent biopsy confirming verrucous keratosis conservative treatment with liquid nitrogen was performed today.  -We will have patient follow-up in 4 weeks to confirm resolution.  If dome-shaped papule persists likely rebiopsy will be recommended.     Procedures Performed:   - Cryotherapy procedure note, location(s): Left lateral forehead, right cheek, right dorsal hand. After verbal consent and discussion of risks and benefits including, but not limited to, dyspigmentation/scar, blister, and pain, 3 AK's and one Irritated verrucous keratosis were treated with 1-2 mm freeze border for 1-2 cycles with liquid nitrogen. Post cryotherapy instructions were provided.      Staff:     Ian Haro DO    Department of Dermatology  [facility]: Phone: [phone], Fax:[phone]  [facility]: Phone: [phone], Fax: [phone]      ____________________________________________    CC: Cryotherapy (Cryo AK on right cheek and left lateral forehead.  Biopsies 9/2021/) and Derm Problem (Verrucous keratosis on right dorsal hand has grown and is tender.)    HPI:  Ms. Cydney Christiansen is a(n) 86 year old female who presents today for follow-up  And treatment of actinic keratosis x2, left lateral forehead and right cheek.  These spots have not resolved since the biopsy in September.  They have not grown significantly but scaly crust remains.     She notes the biopsy site on her right dorsal hand has healed but has more of a dome shape than before.  The papule is painful when touched.  It has  persistent scale on the surface.     Patient is otherwise feeling well, without additional skin concerns.     Labs Reviewed:  N/A    Physical Exam:  Vitals: LMP  (LMP Unknown)   SKIN: Focused examination of face and right dorsal hand was performed.  - Right dorsal hand with 5 mm smooth pink dome-shaped papule with central keratotic scale.   - There are erythematous macules with overyling adherent scale on the left lateral forehead x2, right cheek.     - No other lesions of concern on areas examined.     Medications:  Current Outpatient Medications   Medication     alendronate (FOSAMAX) 70 MG tablet     co-enzyme Q-10 100 MG CAPS capsule     cyanocobalamin (VITAMIN B-12) 500 MCG tablet     dorzolamide-timolol (COSOPT) 2-0.5 % ophthalmic solution     ferrous sulfate (FEROSUL) 325 (65 Fe) MG tablet     ibuprofen (ADVIL,MOTRIN) 200 MG tablet     latanoprost (XALATAN) 0.005 % ophthalmic solution     losartan (COZAAR) 50 MG tablet     Multiple Vitamins-Minerals (ONE DAILY ADULTS 50+) TABS     polyethylene glycol (MIRALAX) 17 GM/Dose powder     simvastatin (ZOCOR) 20 MG tablet     triamcinolone (KENALOG) 0.1 % external cream     VITAMIN D-1000 MAX -1000 MG-UNIT OR TABS     tiZANidine (ZANAFLEX) 2 MG tablet     No current facility-administered medications for this visit.      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     Hypertension goal BP (blood pressure) < 140/90     Seborrheic keratosis     Malignant neoplasm of overlapping sites of left female breast (H)      Scoliosis     Compression fracture of thoracic vertebra (H)     Mass of left hand     Primary open angle glaucoma of both eyes, unspecified glaucoma stage     Osteoporosis, unspecified osteoporosis type, unspecified pathological fracture presence     Nuclear sclerosis of left eye     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)     Actinic keratosis     History of nonmelanoma skin cancer     Combined forms of 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

## 2022-05-19 ENCOUNTER — OFFICE VISIT (OUTPATIENT)
Dept: OPHTHALMOLOGY | Facility: CLINIC | Age: 87
End: 2022-05-19
Payer: COMMERCIAL

## 2022-05-19 DIAGNOSIS — H40.1431 PSEUDOEXFOLIATIVE GLAUCOMA, BOTH EYES, MILD STAGE: Primary | ICD-10-CM

## 2022-05-19 DIAGNOSIS — H26.8 PXF (PSEUDOEXFOLIATION OF LENS CAPSULE): ICD-10-CM

## 2022-05-19 PROCEDURE — 92012 INTRM OPH EXAM EST PATIENT: CPT | Performed by: STUDENT IN AN ORGANIZED HEALTH CARE EDUCATION/TRAINING PROGRAM

## 2022-05-19 PROCEDURE — 92133 CPTRZD OPH DX IMG PST SGM ON: CPT | Performed by: STUDENT IN AN ORGANIZED HEALTH CARE EDUCATION/TRAINING PROGRAM

## 2022-05-19 PROCEDURE — 92083 EXTENDED VISUAL FIELD XM: CPT | Performed by: STUDENT IN AN ORGANIZED HEALTH CARE EDUCATION/TRAINING PROGRAM

## 2022-05-19 RX ORDER — LATANOPROST 50 UG/ML
1 SOLUTION/ DROPS OPHTHALMIC AT BEDTIME
Qty: 7.5 ML | Refills: 3 | Status: SHIPPED | OUTPATIENT
Start: 2022-05-19 | End: 2023-08-07

## 2022-05-19 RX ORDER — DORZOLAMIDE HYDROCHLORIDE AND TIMOLOL MALEATE 20; 5 MG/ML; MG/ML
1 SOLUTION/ DROPS OPHTHALMIC 2 TIMES DAILY
Qty: 10 ML | Refills: 4 | Status: SHIPPED | OUTPATIENT
Start: 2022-05-19 | End: 2023-09-19

## 2022-05-19 ASSESSMENT — VISUAL ACUITY
OS_CC+: -2
CORRECTION_TYPE: GLASSES
METHOD: SNELLEN - LINEAR
OD_CC+: -2
OS_CC: 20/25
OD_CC: 20/25

## 2022-05-19 ASSESSMENT — TONOMETRY
IOP_METHOD: APPLANATION
OS_IOP_MMHG: 16
OD_IOP_MMHG: 19

## 2022-05-19 ASSESSMENT — EXTERNAL EXAM - LEFT EYE: OS_EXAM: NORMAL

## 2022-05-19 ASSESSMENT — CUP TO DISC RATIO
OD_RATIO: 0.6
OS_RATIO: 0.5

## 2022-05-19 ASSESSMENT — SLIT LAMP EXAM - LIDS
COMMENTS: NORMAL
COMMENTS: NORMAL

## 2022-05-19 ASSESSMENT — EXTERNAL EXAM - RIGHT EYE: OD_EXAM: NORMAL

## 2022-05-19 NOTE — PROGRESS NOTES
Current Eye Medications:  Dorz/timolol twice a day both eyes, last took at 8AM. Latanoprost at bedtime both eyes, last took at 9:30 pm.     Subjective:   6 month follow up for IOP/HVF/OCT. Vision is doing pretty well in the distance both eyes. Having trouble seeing some of the real small print in paper. Bought artificial tears, but keeps forgetting to use. No eye pain or discomfort in either eye.      Objective:  See Ophthalmology Exam.      Assessment:  Cydney Christiansen is a 86 year old female who presents with:   Encounter Diagnoses   Name Primary?     Pseudoexfoliative glaucoma, both eyes, mild stage Yes    Intraocular pressure 19/16 today. Stable OCT and visual field both eyes. Continue same medications.    OCT optic nerve: avg retinal nerve fiber layer 68/69; thin S&T right eye; borderline inf, thin T; stable both eyes.     Brock visual field (HVF) 24-2: inf arcuate defect right eye; scattered loss left eye.      PXF (PSEUDOEXFOLIATION OF LENS CAPSULE) OU        Plan:  Continue Latanoprost (green top) at bedtime both eyes     Continue Cosopt (dorzolamide-timolol -- dark blue top) twice a day both eyes    Kvng Garcia MD  [phone]

## 2022-05-19 NOTE — LETTER
5/19/2022         RE: Cydney Christiansen  [address]  [place] [address]        Dear Colleague,    Thank you for referring your patient, Cydney Christiansen, to the [facility]. Please see a copy of my visit note below.     Current Eye Medications:  Dorz/timolol twice a day both eyes, last took at 8AM. Latanoprost at bedtime both eyes, last took at 9:30 pm.     Subjective:   6 month follow up for IOP/HVF/OCT. Vision is doing pretty well in the distance both eyes. Having trouble seeing some of the real small print in paper. Bought artificial tears, but keeps forgetting to use. No eye pain or discomfort in either eye.      Objective:  See Ophthalmology Exam.      Assessment:  Cydney Christiansen is a 86 year old female who presents with:   Encounter Diagnoses   Name Primary?     Pseudoexfoliative glaucoma, both eyes, mild stage Yes    Intraocular pressure 19/16 today. Stable OCT and visual field both eyes. Continue same medications.    OCT optic nerve: avg retinal nerve fiber layer 68/69; thin S&T right eye; borderline inf, thin T; stable both eyes.     Brock visual field (HVF) 24-2: inf arcuate defect right eye; scattered loss left eye.      PXF (PSEUDOEXFOLIATION OF LENS CAPSULE) OU        Plan:  Continue Latanoprost (green top) at bedtime both eyes     Continue Cosopt (dorzolamide-timolol -- dark blue top) twice a day both eyes    Kvng Garcia MD  [phone]            Again, thank you for allowing me to participate in the care of your patient.        Sincerely,        Kvng Garcia MD

## 2022-05-19 NOTE — PATIENT INSTRUCTIONS
Continue Latanoprost (green top) at bedtime both eyes     Continue Cosopt (dorzolamide-timolol -- dark blue top) twice a day both eyes    Knvg Garcia MD  [phone]

## 2022-06-08 ENCOUNTER — OFFICE VISIT (OUTPATIENT)
Dept: DERMATOLOGY | Facility: CLINIC | Age: 87
End: 2022-06-08
Payer: COMMERCIAL

## 2022-06-08 DIAGNOSIS — L82.1 VERRUCOUS KERATOSIS: ICD-10-CM

## 2022-06-08 DIAGNOSIS — D48.5 NEOPLASM OF UNCERTAIN BEHAVIOR OF SKIN: ICD-10-CM

## 2022-06-08 PROCEDURE — 11104 PUNCH BX SKIN SINGLE LESION: CPT | Performed by: DERMATOLOGY

## 2022-06-08 PROCEDURE — 88305 TISSUE EXAM BY PATHOLOGIST: CPT | Performed by: DERMATOLOGY

## 2022-06-08 NOTE — NURSING NOTE
Cydney Christiansen's goals for this visit include:   Chief Complaint   Patient presents with     Derm Problem     R hand- still present and tender     She requests these members of her care team be copied on today's visit information:     PCP: Estrellita Hernandez    Referring Provider:  No referring provider defined for this encounter.    LMP  (LMP Unknown)     Do you need any medication refills at today's visit? No  Leatha Reyes, EMILY on 6/8/2022 at 11:11 AM

## 2022-06-08 NOTE — PROGRESS NOTES
[facility] Dermatology Note  Encounter Date: Jun 8, 2022  Office Visit     Dermatology Problem List:  0. NUB - R dorsal hand posterior - bx 4/1/22 (Verrucous Keratosis)        - Treated with LN2 5/4/22, follow up 4 weeks to confirm resolution.         - Lesion persisted, repeat punch biopsy 6/8/22     1. NMSC  -BCC, mid back s/p ED&C 11/2020  -SCCIS, right upper arm s/p ED&C 11/2020  -SCCIS, left forearm s/p excision 3/6/2020  -SCCis Left superior forearm, s/p: ED and C 9/30/2019  -SCCis right temple, s/p:  Mohs 9/30/2019  -SCCIS, right nasal sidewall, s/p Mohs 4/1/19   -SCCIS, right upper arm, s/p excision 8/9/18   -SCC, left popliteal fossa, well differentiated with focal perineural invasion but with clear margins on path, s/p excision by Dr. Mcgee 7/2013  -SCCIS arising from solar keratosis, right cheek, 4/2013  -SCC, right dorsal hand, s/p excision with SCCIS extending to the margin 1/7/13  -SCC, bowenoid type, upper back, s/p excision 2011  -BCC, superficial, left back, 2/2011  -BCC, superficial, left neck, 2011, elected for ED&C  -SCCIS, right upper forearm 11/5/2020 MOHS   2. 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 AK  - HAK, left lateral forehead, bx 9/28/21. treated with LN2 5/4/22  - AK, right cheek, s/p bx 9/28/21 - treated with LN2 5/4/22  - HAK, left mid eyebrow, base not seen, 6/2014  - left posterior lower leg, s/p bx 2/18/21, s/p cryo 9/28/21  -s/p cryotherapy  -left forearm, s/p biopsy 3/15/19   5. GA, right angle of jaw: resolves with triamcinolone cream  6. History of benign biopsy  - ISK, right dorsal hand, s/p bx 9/28/21  - C/w rosacea, right posterior shoulder, s/p bx 9/28/21  - SK, right posterior lower leg, s/p bx 2/18/21  - verucca, left forehead, s/p bx 2/18/21  - Arthropod bite, left 4th digit, bx 2/18/21  - SK, right abdomen, s/p bx 2/18/2020  7. Eczematous patch R dorsal hand  -previous Tx: triamcinolone 0.01%  cream, Vaseline      ____________________________________________    Assessment & Plan:     # Verrucous Keratosis vs NUB (SCCIS vs HAK); R dorsal hand.  The lesion was biopsied as verrucous keratosis, photo was reviewed and primary lesion was much flatter.  Cryotherapy at last visit seems to have reduced the papule size however it did not resolve.  Lesion remains painful to the patient.  Ultimately we decided to perform a punch biopsy in effort to remove a benign or residual verrucous keratosis but also confirm the diagnosis given her history of nonmelanoma skin cancer.     Punch biopsy:  After discussion of benefits and risks including but not limited to bleeding/bruising, pain/swelling, infection, scar, incomplete removal, nerve damage/numbness, recurrence, and non-diagnostic biopsy, written consent, verbal consent and photographs were obtained. Time-out was performed. The area was cleaned with isopropyl alcohol. 0.5mL of 1% lidocaine with epinephrine was injected to obtain adequate anesthesia of the lesion. A 6 mm punch biopsy was performed.  4-0 prolene sutures were utilized to approximate the epidermal edges.  White petroleum jelly/VaselineTM and a bandage was applied to the wound.  Explicit verbal and written wound care instructions were provided.  The patient left the Dermatology Clinic in good condition. The patient was counseled to follow up for suture removal in approximately 14 days.    Follow-up: pending biopsy results.     Staff:     Ian Haro DO    Department of Dermatology  [facility]: Phone: [phone], Fax:[phone]  [facility]: Phone: [phone], Fax: [phone]      ____________________________________________    CC: Derm Problem (R hand- still present and tender)    HPI:  Ms. Cydney Christiansen is a(n) 86 year old female who presents today as a return patient  for reevaluation of a painful scaly papule on the right dorsal hand.  2 months ago it was biopsied and found to be verrucous keratosis.  Since that time it has become more dome-shaped and scaly.  Last visit it was treated with cryotherapy.  It never resolved but seems a bit smaller today.  It is still painful to touch.  She wears a Band-Aid over it to protect it.     Patient is otherwise feeling well, without additional skin concerns.     Labs Reviewed:  Derm path report 4/1/22 reviewed; right dorsal hand posterior, verrucous keratosis    Physical Exam:  Vitals: LMP  (LMP Unknown)   SKIN: Focused examination of right dorsal hand was performed.  -6 mm pink to skin colored scaly verrucous papule  - No other lesions of concern on areas examined.     Medications:  Current Outpatient Medications   Medication     alendronate (FOSAMAX) 70 MG tablet     co-enzyme Q-10 100 MG CAPS capsule     cyanocobalamin (VITAMIN B-12) 500 MCG tablet     dorzolamide-timolol (COSOPT) 2-0.5 % ophthalmic solution     ferrous sulfate (FEROSUL) 325 (65 Fe) MG tablet     ibuprofen (ADVIL,MOTRIN) 200 MG tablet     latanoprost (XALATAN) 0.005 % ophthalmic solution     losartan (COZAAR) 50 MG tablet     Multiple Vitamins-Minerals (ONE DAILY ADULTS 50+) TABS     polyethylene glycol (MIRALAX) 17 GM/Dose powder     simvastatin (ZOCOR) 20 MG tablet     tiZANidine (ZANAFLEX) 2 MG tablet     triamcinolone (KENALOG) 0.1 % external cream     VITAMIN D-1000 MAX -1000 MG-UNIT OR TABS     No current facility-administered medications for this visit.      Past Medical History:   Patient Active Problem List   Diagnosis     History of basal cell carcinoma     PXF  OU     Personal history of malignant neoplasm of bladder     Advanced directives, counseling/discussion     Hyperlipidemia LDL goal <160     Family history of diabetes mellitus     Health Care Home     Squamous cell carcinoma     Basal cell carcinoma     Skin lesion of left leg     Viral warts      PVD (posterior vitreous detachment), OS     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     Hypertension goal BP (blood pressure) < 140/90     Seborrheic keratosis     Malignant neoplasm of overlapping sites of left female breast (H)     Scoliosis     Compression fracture of thoracic vertebra (H)     Mass of left hand     Primary open angle glaucoma of both eyes, unspecified glaucoma stage     Osteoporosis, unspecified osteoporosis type, unspecified pathological fracture presence     Nuclear sclerosis of left eye     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)     Actinic keratosis     History of nonmelanoma skin cancer     Combined forms of 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

## 2022-06-08 NOTE — LETTER
6/8/2022         RE: Cydney Christiansen  [address]  [place] [address]        Dear Colleague,    Thank you for referring your patient, Cydney Christiansen, to the [facility]. Please see a copy of my visit note below.    [facility] Dermatology Note  Encounter Date: Jun 8, 2022  Office Visit     Dermatology Problem List:  0. NUB - R dorsal hand posterior - bx 4/1/22 (Verrucous Keratosis)        - Treated with LN2 5/4/22, follow up 4 weeks to confirm resolution.         - Lesion persisted, repeat punch biopsy 6/8/22     1. NMSC  -BCC, mid back s/p ED&C 11/2020  -SCCIS, right upper arm s/p ED&C 11/2020  -SCCIS, left forearm s/p excision 3/6/2020  -SCCis Left superior forearm, s/p: ED and C 9/30/2019  -SCCis right temple, s/p:  Mohs 9/30/2019  -SCCIS, right nasal sidewall, s/p Mohs 4/1/19   -SCCIS, right upper arm, s/p excision 8/9/18   -SCC, left popliteal fossa, well differentiated with focal perineural invasion but with clear margins on path, s/p excision by Dr. Mcgee 7/2013  -SCCIS arising from solar keratosis, right cheek, 4/2013  -SCC, right dorsal hand, s/p excision with SCCIS extending to the margin 1/7/13  -SCC, bowenoid type, upper back, s/p excision 2011  -BCC, superficial, left back, 2/2011  -BCC, superficial, left neck, 2011, elected for ED&C  -SCCIS, right upper forearm 11/5/2020 MOHS   2. 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 AK  - HAK, left lateral forehead, bx 9/28/21. treated with LN2 5/4/22  - AK, right cheek, s/p bx 9/28/21 - treated with LN2 5/4/22  - HAK, left mid eyebrow, base not seen, 6/2014  - left posterior lower leg, s/p bx 2/18/21, s/p cryo 9/28/21  -s/p cryotherapy  -left forearm, s/p biopsy 3/15/19   5. GA, right angle of jaw: resolves with triamcinolone cream  6. History of benign biopsy  - ISK, right dorsal hand, s/p bx 9/28/21  - C/w rosacea, right posterior shoulder, s/p bx 9/28/21  - SK, right posterior lower leg, s/p bx  2/18/21  - verucca, left forehead, s/p bx 2/18/21  - Arthropod bite, left 4th digit, bx 2/18/21  - SK, right abdomen, s/p bx 2/18/2020  7. Eczematous patch R dorsal hand  -previous Tx: triamcinolone 0.01% cream, Vaseline      ____________________________________________    Assessment & Plan:     # Verrucous Keratosis vs NUB (SCCIS vs HAK); R dorsal hand.  The lesion was biopsied as verrucous keratosis, photo was reviewed and primary lesion was much flatter.  Cryotherapy at last visit seems to have reduced the papule size however it did not resolve.  Lesion remains painful to the patient.  Ultimately we decided to perform a punch biopsy in effort to remove a benign or residual verrucous keratosis but also confirm the diagnosis given her history of nonmelanoma skin cancer.     Punch biopsy:  After discussion of benefits and risks including but not limited to bleeding/bruising, pain/swelling, infection, scar, incomplete removal, nerve damage/numbness, recurrence, and non-diagnostic biopsy, written consent, verbal consent and photographs were obtained. Time-out was performed. The area was cleaned with isopropyl alcohol. 0.5mL of 1% lidocaine with epinephrine was injected to obtain adequate anesthesia of the lesion. A 6 mm punch biopsy was performed.  4-0 prolene sutures were utilized to approximate the epidermal edges.  White petroleum jelly/VaselineTM and a bandage was applied to the wound.  Explicit verbal and written wound care instructions were provided.  The patient left the Dermatology Clinic in good condition. The patient was counseled to follow up for suture removal in approximately 14 days.    Follow-up: pending biopsy results.     Staff:     Ian Haro DO    Department of Dermatology  [facility]: Phone: [phone], Fax:[phone]  [facility]: Phone: [phone], Fax:  [phone]      ____________________________________________    CC: Derm Problem (R hand- still present and tender)    HPI:  Ms. Cydney Christiansen is a(n) 86 year old female who presents today as a return patient for reevaluation of a painful scaly papule on the right dorsal hand.  2 months ago it was biopsied and found to be verrucous keratosis.  Since that time it has become more dome-shaped and scaly.  Last visit it was treated with cryotherapy.  It never resolved but seems a bit smaller today.  It is still painful to touch.  She wears a Band-Aid over it to protect it.     Patient is otherwise feeling well, without additional skin concerns.     Labs Reviewed:  Derm path report 4/1/22 reviewed; right dorsal hand posterior, verrucous keratosis    Physical Exam:  Vitals: LMP  (LMP Unknown)   SKIN: Focused examination of right dorsal hand was performed.  -6 mm pink to skin colored scaly verrucous papule  - No other lesions of concern on areas examined.     Medications:  Current Outpatient Medications   Medication     alendronate (FOSAMAX) 70 MG tablet     co-enzyme Q-10 100 MG CAPS capsule     cyanocobalamin (VITAMIN B-12) 500 MCG tablet     dorzolamide-timolol (COSOPT) 2-0.5 % ophthalmic solution     ferrous sulfate (FEROSUL) 325 (65 Fe) MG tablet     ibuprofen (ADVIL,MOTRIN) 200 MG tablet     latanoprost (XALATAN) 0.005 % ophthalmic solution     losartan (COZAAR) 50 MG tablet     Multiple Vitamins-Minerals (ONE DAILY ADULTS 50+) TABS     polyethylene glycol (MIRALAX) 17 GM/Dose powder     simvastatin (ZOCOR) 20 MG tablet     tiZANidine (ZANAFLEX) 2 MG tablet     triamcinolone (KENALOG) 0.1 % external cream     VITAMIN D-1000 MAX -1000 MG-UNIT OR TABS     No current facility-administered medications for this visit.      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     Hypertension goal BP (blood pressure) < 140/90     Seborrheic keratosis     Malignant neoplasm of overlapping sites of left female breast (H)     Scoliosis     Compression fracture of thoracic vertebra (H)     Mass of left hand     Primary open angle glaucoma of both eyes, unspecified glaucoma stage     Osteoporosis, unspecified osteoporosis type, unspecified pathological fracture presence     Nuclear sclerosis of left eye     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)     Actinic keratosis     History of nonmelanoma skin cancer     Combined forms of 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        Sincerely,        Ian Haro MD

## 2022-06-13 ENCOUNTER — TELEPHONE (OUTPATIENT)
Dept: DERMATOLOGY | Facility: CLINIC | Age: 87
End: 2022-06-13
Payer: COMMERCIAL

## 2022-06-13 NOTE — TELEPHONE ENCOUNTER
Result Notes     Ellie Low LPN   6/13/2022 10:52 AM CDT Back to Top        I spoke to Cydney Christiansen and gave results. Patient understood and had no further questions or concerns.     GODFREY Meadows MD   6/13/2022 10:44 AM CDT         Please call the patient with pathology results.     The biopsy showed a benign keratosis which was inflamed. Hopefully the biopsy removed it all this time. As long as the wound is healing well, no additional surgery is necessary.  Thank you.          Patient Communication     Add Comments

## 2022-06-20 DIAGNOSIS — M81.0 AGE RELATED OSTEOPOROSIS, UNSPECIFIED PATHOLOGICAL FRACTURE PRESENCE: ICD-10-CM

## 2022-06-21 RX ORDER — ALENDRONATE SODIUM 70 MG/1
TABLET ORAL
Qty: 12 TABLET | Refills: 11 | Status: SHIPPED | OUTPATIENT
Start: 2022-06-21 | End: 2023-07-04

## 2022-06-21 NOTE — TELEPHONE ENCOUNTER
Routing refill request to provider for review/approval because:  Last Dexa was 7/20/2015      Kiki Canseco RN  [facility]

## 2022-06-22 ENCOUNTER — ALLIED HEALTH/NURSE VISIT (OUTPATIENT)
Dept: DERMATOLOGY | Facility: CLINIC | Age: 87
End: 2022-06-22
Payer: COMMERCIAL

## 2022-06-22 DIAGNOSIS — Z48.02 VISIT FOR SUTURE REMOVAL: Primary | ICD-10-CM

## 2022-06-22 PROCEDURE — 99207 PR NO CHARGE NURSE ONLY: CPT | Performed by: DERMATOLOGY

## 2022-06-22 ASSESSMENT — PAIN SCALES - GENERAL: PAINLEVEL: NO PAIN (0)

## 2022-06-22 NOTE — NURSING NOTE
CHIEF COMPLAINT: A. Right dorsal hand:  - Verrucous keratosis, inflamed   Cydney Christiansen is a 86 year old female who is here for suture removal following biopsy of a A. Right dorsal hand:  - Verrucous keratosis, inflamed -The wound appears to be healing well and sutures were removed at this time.   The patient will return follow up as needed.  No concerns today about biopsy site.  Healing well with no signs of infection, and educated patient to continue wound care for the next 3-5 days until fully healed with new pink skin.  Pt verbalized understanding  Kate Chappell RN

## 2022-06-23 NOTE — NURSING NOTE
Cydney Christiansen comes into clinic today at the request of Dr Barba Ordering Provider for Suture removal.        This service provided today was under the supervising provider of the day Dr Haro, who was available if needed.    Kate Chappell RN

## 2022-08-03 ENCOUNTER — OFFICE VISIT (OUTPATIENT)
Dept: FAMILY MEDICINE | Facility: CLINIC | Age: 87
End: 2022-08-03
Payer: COMMERCIAL

## 2022-08-03 VITALS
HEIGHT: 59 IN | SYSTOLIC BLOOD PRESSURE: 134 MMHG | OXYGEN SATURATION: 97 % | HEART RATE: 72 BPM | BODY MASS INDEX: 28.83 KG/M2 | WEIGHT: 143 LBS | TEMPERATURE: 97.4 F | DIASTOLIC BLOOD PRESSURE: 76 MMHG

## 2022-08-03 DIAGNOSIS — Z85.51 PERSONAL HISTORY OF MALIGNANT NEOPLASM OF BLADDER: ICD-10-CM

## 2022-08-03 DIAGNOSIS — Z00.00 ENCOUNTER FOR MEDICARE ANNUAL WELLNESS EXAM: ICD-10-CM

## 2022-08-03 DIAGNOSIS — Z92.29 HISTORY OF BISPHOSPHONATE THERAPY: ICD-10-CM

## 2022-08-03 DIAGNOSIS — Z17.0 MALIGNANT NEOPLASM OF OVERLAPPING SITES OF LEFT BREAST IN FEMALE, ESTROGEN RECEPTOR POSITIVE (H): ICD-10-CM

## 2022-08-03 DIAGNOSIS — Z23 HIGH PRIORITY FOR 2019-NCOV VACCINE: ICD-10-CM

## 2022-08-03 DIAGNOSIS — H40.1130 PRIMARY OPEN ANGLE GLAUCOMA OF BOTH EYES, UNSPECIFIED GLAUCOMA STAGE: ICD-10-CM

## 2022-08-03 DIAGNOSIS — S22.000S COMPRESSION FRACTURE OF THORACIC VERTEBRA, UNSPECIFIED THORACIC VERTEBRAL LEVEL, SEQUELA: ICD-10-CM

## 2022-08-03 DIAGNOSIS — M81.0 AGE-RELATED OSTEOPOROSIS WITHOUT CURRENT PATHOLOGICAL FRACTURE: ICD-10-CM

## 2022-08-03 DIAGNOSIS — Z87.81 HISTORY OF VERTEBRAL FRACTURE: ICD-10-CM

## 2022-08-03 DIAGNOSIS — C50.812 MALIGNANT NEOPLASM OF OVERLAPPING SITES OF LEFT BREAST IN FEMALE, ESTROGEN RECEPTOR POSITIVE (H): ICD-10-CM

## 2022-08-03 DIAGNOSIS — Z12.31 VISIT FOR SCREENING MAMMOGRAM: ICD-10-CM

## 2022-08-03 DIAGNOSIS — I10 HYPERTENSION GOAL BP (BLOOD PRESSURE) < 140/90: ICD-10-CM

## 2022-08-03 DIAGNOSIS — E78.5 HYPERLIPIDEMIA LDL GOAL <160: ICD-10-CM

## 2022-08-03 LAB
CHOLEST SERPL-MCNC: 175 MG/DL
FASTING STATUS PATIENT QL REPORTED: YES
HDLC SERPL-MCNC: 76 MG/DL
LDLC SERPL CALC-MCNC: 82 MG/DL
NONHDLC SERPL-MCNC: 99 MG/DL
PHOSPHATE SERPL-MCNC: 4.2 MG/DL (ref 2.5–4.5)
PTH-INTACT SERPL-MCNC: 42 PG/ML (ref 15–65)
TRIGL SERPL-MCNC: 86 MG/DL

## 2022-08-03 PROCEDURE — G0439 PPPS, SUBSEQ VISIT: HCPCS | Performed by: FAMILY MEDICINE

## 2022-08-03 PROCEDURE — 82306 VITAMIN D 25 HYDROXY: CPT | Performed by: FAMILY MEDICINE

## 2022-08-03 PROCEDURE — 0054A COVID-19,PF,PFIZER (12+ YRS): CPT | Performed by: FAMILY MEDICINE

## 2022-08-03 PROCEDURE — 91305 COVID-19,PF,PFIZER (12+ YRS): CPT | Performed by: FAMILY MEDICINE

## 2022-08-03 PROCEDURE — 80061 LIPID PANEL: CPT | Performed by: FAMILY MEDICINE

## 2022-08-03 PROCEDURE — 36415 COLL VENOUS BLD VENIPUNCTURE: CPT | Performed by: FAMILY MEDICINE

## 2022-08-03 PROCEDURE — 84100 ASSAY OF PHOSPHORUS: CPT | Performed by: FAMILY MEDICINE

## 2022-08-03 PROCEDURE — 83970 ASSAY OF PARATHORMONE: CPT | Performed by: FAMILY MEDICINE

## 2022-08-03 RX ORDER — SIMVASTATIN 20 MG
20 TABLET ORAL AT BEDTIME
Qty: 90 TABLET | Refills: 3 | Status: SHIPPED | OUTPATIENT
Start: 2022-08-03 | End: 2023-08-21

## 2022-08-03 ASSESSMENT — ENCOUNTER SYMPTOMS
SHORTNESS OF BREATH: 0
ARTHRALGIAS: 1
NAUSEA: 0
PARESTHESIAS: 0
ABDOMINAL PAIN: 0
PALPITATIONS: 0
CONSTIPATION: 0
MYALGIAS: 1
HEMATOCHEZIA: 0
HEARTBURN: 0
DIZZINESS: 0
BREAST MASS: 0
WEAKNESS: 0
SORE THROAT: 0
EYE PAIN: 0
CHILLS: 0
HEADACHES: 0
JOINT SWELLING: 0
NERVOUS/ANXIOUS: 0
FREQUENCY: 0
FEVER: 0
DYSURIA: 0
DIARRHEA: 0
HEMATURIA: 0

## 2022-08-03 ASSESSMENT — ACTIVITIES OF DAILY LIVING (ADL): CURRENT_FUNCTION: NO ASSISTANCE NEEDED

## 2022-08-03 ASSESSMENT — PAIN SCALES - GENERAL: PAINLEVEL: NO PAIN (0)

## 2022-08-03 NOTE — PROGRESS NOTES
"SUBJECTIVE:   Cydney Christiansen is a 86 year old female who presents for Preventive Visit.    Patient has been advised of split billing requirements and indicates understanding: Yes  Are you in the first 12 months of your Medicare coverage?  No    Healthy Habits:     In general, how would you rate your overall health?  Good    Frequency of exercise:  2-3 days/week    Duration of exercise:  Less than 15 minutes    Do you usually eat at least 4 servings of fruit and vegetables a day, include whole grains    & fiber and avoid regularly eating high fat or \"junk\" foods?  Yes    Taking medications regularly:  Yes    Medication side effects:  Muscle aches    Ability to successfully perform activities of daily living:  No assistance needed    Home Safety:  Lack of grab bars in the bathroom    Hearing Impairment:  No hearing concerns    In the past 6 months, have you been bothered by leaking of urine? Yes    In general, how would you rate your overall mental or emotional health?  Good      PHQ-2 Total Score: 0    Additional concerns today:  No    Do you feel safe in your environment? Yes    Have you ever done Advance Care Planning? (For example, a Health Directive, POLST, or a discussion with a medical provider or your loved ones about your wishes): Yes, patient states has an Advance Care Planning document and will bring a copy to the clinic.       Fall risk  Fallen 2 or more times in the past year?: No  Any fall with injury in the past year?: No    Cognitive Screening   1) Repeat 3 items (Leader, Season, Table)    2) Clock draw: NORMAL  3) 3 item recall: Recalls 1 object   Results: NORMAL clock, 1-2 items recalled: COGNITIVE IMPAIRMENT LESS LIKELY    Mini-CogTM Copyright CHELLE Pelayo. Licensed by the author for use in [facility]; reprinted with permission ([email or web address]). All rights reserved.      Do you have sleep apnea, excessive snoring or daytime drowsiness?: no    Reviewed and updated as needed this visit by " clinical staff   Tobacco  Allergies  Meds  Problems  Med Hx  Surg Hx  Fam Hx            Reviewed and updated as needed this visit by Provider   Tobacco  Allergies  Meds  Problems  Med Hx  Surg Hx  Fam Hx           Social History     Tobacco Use     Smoking status: Former Smoker     Packs/day: 0.50     Years: 20.00     Pack years: 10.00     Types: Cigarettes     Quit date: 1976     Years since quittin.5     Smokeless tobacco: Never Used   Substance Use Topics     Alcohol use: Yes     Comment: rare         Alcohol Use 8/3/2022   Prescreen: >3 drinks/day or >7 drinks/week? No   Prescreen: >3 drinks/day or >7 drinks/week? -           Hyperlipidemia Follow-Up      Are you regularly taking any medication or supplement to lower your cholesterol?   Yes- statin    Are you having muscle aches or other side effects that you think could be caused by your cholesterol lowering medication?  No    Hypertension Follow-up      Do you check your blood pressure regularly outside of the clinic? No     Are you following a low salt diet? Yes    Are your blood pressures ever more than 140 on the top number (systolic) OR more   than 90 on the bottom number (diastolic), for example 140/90? No      Current providers sharing in care for this patient include:   Patient Care Team:  Estrellita Hernandez MD as PCP - General (Family Practice)  Usha Salgado MD as MD (Hematology & Oncology)  Estrellita Hernandez MD as Assigned PCP  Mateo Boogie MD as Assigned Musculoskeletal Provider  Qi Barba MD as Assigned Surgical Provider    The following health maintenance items are reviewed in Epic and correct as of today:  Health Maintenance Due   Topic Date Due     ZOSTER IMMUNIZATION (2 of 3) 2008     Lab work is in process  Labs reviewed in EPIC  BP Readings from Last 3 Encounters:   22 134/76   22 138/82   22 (!) 160/91    Wt Readings from Last 3 Encounters:   22 64.9 kg (143 lb)   22 65.8 kg  (145 lb)   03/01/22 65.5 kg (144 lb 8 oz)                  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     Hypertension goal BP (blood pressure) < 140/90     Seborrheic keratosis     Malignant neoplasm of overlapping sites of left female breast (H)     Scoliosis     Compression fracture of thoracic vertebra (H)     Mass of left hand     Primary open angle glaucoma of both eyes, unspecified glaucoma stage     Osteoporosis, unspecified osteoporosis type, unspecified pathological fracture presence     Nuclear sclerosis of left eye     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)     Actinic keratosis     History of nonmelanoma skin cancer     Combined forms of age-related cataract of right eye     Past Surgical History:   Procedure Laterality Date     BIOPSY BREAST       CATARACT IOL, RT/LT       COLONOSCOPY  5/2008, 5/13, 6/14, 6/17    Q 3 years for advanced adenomatous polyp     CYSTOSCOPY  12/31/2008     D & C       GENITOURINARY SURGERY      TURBT     HC REMOVAL GALLBLADDER      open pan     HC TRABECULOPLASTY BY LASER SURGERY Left 4/18/07    SLT #1 OS (inf 180)     HC TRABECULOPLASTY BY LASER SURGERY Right 5/4/11    SLT #1 OD (inf 180?)     HC TRABECULOPLASTY BY LASER SURGERY Left 3/17/15    SLT #2 OS (sup 180)     HC TRABECULOPLASTY BY LASER SURGERY Right 6/23/15    SLT #2 OD (sup 180?)     LASER ARGON TREATMENT      SLT left eye x 2     LUMPECTOMY BREAST       LUMPECTOMY BREAST WITH SENTINEL NODE, COMBINED Left 7/29/2015    Procedure: COMBINED LUMPECTOMY BREAST WITH SENTINEL NODE;   Surgeon: Ifeanyi Snushine MD;  Location:  OR     PHACOEMULSIFICATION CLEAR CORNEA WITH STANDARD INTRAOCULAR LENS IMPLANT Left 2017    Procedure: PHACOEMULSIFICATION CLEAR CORNEA WITH STANDARD INTRAOCULAR LENS IMPLANT;   COMPLEX LEFT PHACOEMULSIFICATION CLEAR CORNEA WITH STANDARD INTRAOCULAR LENS IMPLANT ;  Surgeon: Kvng Garcia MD;  Location:  [facility]     PHACOEMULSIFICATION CLEAR CORNEA WITH STANDARD INTRAOCULAR LENS IMPLANT Right 10/19/2020    Procedure: RIGHT COMPLEX PHACOEMULSIFICATION, CATARACT, WITH INTRAOCULAR LENS IMPLANT ;  Surgeon: Kvng Garcia MD;  Location: [facility] OR     SURGICAL HISTORY OF     squamous cell CA excised from back     TUBAL LIGATION         Social History     Tobacco Use     Smoking status: Former Smoker     Packs/day: 0.50     Years: 20.00     Pack years: 10.00     Types: Cigarettes     Quit date: 1976     Years since quittin.5     Smokeless tobacco: Never Used   Substance Use Topics     Alcohol use: Yes     Comment: rare     Family History   Problem Relation Age of Onset     Diabetes Mother      Breast Cancer Mother      Eye Disorder Mother      Osteoporosis Mother      Glaucoma Mother      Macular Degeneration Mother      Prostate Cancer Father      Prostate Cancer Brother      Glaucoma Brother      Macular Degeneration Brother      Thyroid Disease Sister      Blood Disease Sister         lupus     Heart Disease Sister      Asthma Son      Prostate Cancer Brother      Glaucoma Brother      Glaucoma Other      Melanoma No family hx of      Skin Cancer No family hx of          Current Outpatient Medications   Medication Sig Dispense Refill     alendronate (FOSAMAX) 70 MG tablet TAKE 1 TABLET BY MOUTH EVERY 7 DAYS 12 tablet 11     co-enzyme Q-10 100 MG CAPS capsule Take 200 mg by mouth daily       cyanocobalamin (VITAMIN B-12) 500 MCG tablet Take 1 tablet (500 mcg) by mouth daily 150 tablet 3     dorzolamide-timolol (COSOPT) 2-0.5 % ophthalmic solution Place  1 drop into both eyes 2 times daily 10 mL 4     ferrous sulfate (FEROSUL) 325 (65 Fe) MG tablet Take 1 tablet (325 mg) by mouth daily (with breakfast) 90 tablet 3     ibuprofen (ADVIL,MOTRIN) 200 MG tablet Take 200 mg by mouth every 4 hours as needed.       latanoprost (XALATAN) 0.005 % ophthalmic solution Place 1 drop into both eyes At Bedtime 7.5 mL 3     losartan (COZAAR) 50 MG tablet Take 1 tablet (50 mg) by mouth daily 90 tablet 3     Multiple Vitamins-Minerals (ONE DAILY ADULTS 50+) TABS        simvastatin (ZOCOR) 20 MG tablet Take 1 tablet (20 mg) by mouth At Bedtime 90 tablet 3     triamcinolone (KENALOG) 0.1 % external cream Apply twice daily for 2 weeks 30 g 0     VITAMIN D-1000 MAX -1000 MG-UNIT OR TABS 1 daily       polyethylene glycol (MIRALAX) 17 GM/Dose powder Take 17 g (1 capful) by mouth 2 times daily as needed for constipation (Patient not taking: Reported on 8/3/2022) 507 g 0     Allergies   Allergen Reactions     Lisinopril Cough     Pt gets mammogram annually  Review of Systems   Constitutional: Negative for chills and fever.   HENT: Negative for congestion, ear pain, hearing loss and sore throat.    Eyes: Negative for pain and visual disturbance.   Respiratory: Negative for shortness of breath.    Cardiovascular: Negative for chest pain, palpitations and peripheral edema.   Gastrointestinal: Negative for abdominal pain, constipation, diarrhea, heartburn, hematochezia and nausea.   Breasts:  Negative for tenderness, breast mass and discharge.   Genitourinary: Negative for dysuria, frequency, genital sores, hematuria, pelvic pain, urgency, vaginal bleeding and vaginal discharge.   Musculoskeletal: Positive for arthralgias and myalgias. Negative for joint swelling.   Skin: Negative for rash.   Neurological: Negative for dizziness, weakness, headaches and paresthesias.   Psychiatric/Behavioral: Negative for mood changes. The patient is not nervous/anxious.    Rest of the ROS is Negative  "except see above and Problem list [stable]      OBJECTIVE:   /76   Pulse 72   Temp 97.4  F (36.3  C) (Temporal)   Ht 1.502 m (4' 11.13\")   Wt 64.9 kg (143 lb)   LMP  (LMP Unknown)   SpO2 97%   BMI 28.75 kg/m   Estimated body mass index is 28.75 kg/m  as calculated from the following:    Height as of this encounter: 1.502 m (4' 11.13\").    Weight as of this encounter: 64.9 kg (143 lb).  Physical Exam  GENERAL APPEARANCE: healthy, alert and no distress  EYES: Eyes grossly normal to inspection, PERRL and conjunctivae and sclerae normal  HENT: ear canals and TM's normal, nose and mouth without ulcers or lesions, oropharynx clear and oral mucous membranes moist  NECK: no adenopathy, no asymmetry, masses, or scars and thyroid normal to palpation  RESP: lungs clear to auscultation - no rales, rhonchi or wheezes  BREAST: normal without masses, tenderness or nipple discharge and no palpable axillary masses or adenopathy  CV: regular rate and rhythm, normal S1 S2, no S3 or S4, no murmur, click or rub, no peripheral edema and peripheral pulses strong  ABDOMEN: soft, nontender, no hepatosplenomegaly, no masses and bowel sounds normal  MS: no musculoskeletal defects are noted and gait is age appropriate without ataxia  SKIN: no suspicious lesions or rashes  NEURO: Normal strength and tone, sensory exam grossly normal, mentation intact and speech normal  PSYCH: mentation appears normal and affect normal/bright    Diagnostic Test Results:  Labs reviewed in Epic    ASSESSMENT / PLAN:   (Z00.00) Encounter for Medicare annual wellness exam  Comment:   Plan:     (E78.5) Hyperlipidemia LDL goal <160  Comment: pending labs   Plan: Lipid panel reflex to direct LDL Non-fasting,         simvastatin (ZOCOR) 20 MG tablet, OFFICE/OUTPT         VISIT,EST,LEVL IV            (Z23) High priority for 2019-nCoV vaccine  Comment: discussed   Plan: COVID-19,PF,PFIZER (12+ Yrs GRAY LABEL)            (I10) Hypertension goal BP (blood " pressure) < 140/90  Comment: stable   Plan: OFFICE/OUTPT VISIT,EST,LEVL IV            (C50.812,  Z17.0) Malignant neoplasm of overlapping sites of left breast in female, estrogen receptor positive (H)  Comment: sees oncology-notes reviewed   Plan:     (M81.0) Age-related osteoporosis without current pathological fracture  Comment: discussed meds  Pt feels very achey with Fosamax  Discussed Prolia  SEE [facility] orders  The potential side effects of this medication have been discussed with the patient.  Call if any significant problems with these are experienced.    Plan: denosumab (PROLIA) injection 60 mg, Albumin         level (Order only if Calcium level is <8.5),         Calcium, Creatinine, Parathyroid Hormone         Intact, Phosphorus, Vitamin D Deficiency,         OFFICE/OUTPT VISIT,EST,LEVL IV        Pt is getting some Dental work  She will start Prolia after this    (Z92.29) History of bisphosphonate therapy  Comment: as above  Plan: denosumab (PROLIA) injection 60 mg, Albumin         level (Order only if Calcium level is <8.5),         Calcium, Creatinine, OFFICE/OUTPT         VISIT,EST,LEVL IV            (Z87.81) History of vertebral fracture  Comment: as above  Plan: denosumab (PROLIA) injection 60 mg, Albumin         level (Order only if Calcium level is <8.5),         Calcium, Creatinine, OFFICE/OUTPT         VISIT,EST,LEVL IV            (S22.000S) Compression fracture of thoracic vertebra, unspecified thoracic vertebral level, sequela  Comment: as above  Plan: OFFICE/OUTPT VISIT,EST,LEVL IV            (Z85.51) Personal history of malignant neoplasm of bladder  Comment: stable   Plan:     (H40.1130) Primary open angle glaucoma of both eyes, unspecified glaucoma stage  Comment: sees Opthamology      (Z12.31) Visit for screening mammogram  Comment: pt will scheule  Plan: MA Screening Digital Bilateral          COUNSELING:  Reviewed preventive health counseling, as reflected in patient instructions        "Regular exercise       Healthy diet/nutrition       Vision screening       Hearing screening       Dental care       Bladder control       Fall risk prevention       Osteoporosis prevention/bone health       Advanced Planning     Estimated body mass index is 28.75 kg/m  as calculated from the following:    Height as of this encounter: 1.502 m (4' 11.13\").    Weight as of this encounter: 64.9 kg (143 lb).        She reports that she quit smoking about 46 years ago. Her smoking use included cigarettes. She has a 10.00 pack-year smoking history. She has never used smokeless tobacco.      Appropriate preventive services were discussed with this patient, including applicable screening as appropriate for cardiovascular disease, diabetes, osteopenia/osteoporosis, and glaucoma.  As appropriate for age/gender, discussed screening for colorectal cancer, prostate cancer, breast cancer, and cervical cancer. Checklist reviewing preventive services available has been given to the patient.    Reviewed patients plan of care and provided an AVS. The Complex Care Plan (for patients with higher acuity and needing more deliberate coordination of services) for Cydney meets the Care Plan requirement. This Care Plan has been established and reviewed with the Patient.    Counseling Resources:  ATP IV Guidelines  Pooled Cohorts Equation Calculator  Breast Cancer Risk Calculator  Breast Cancer: Medication to Reduce Risk  FRAX Risk Assessment  ICSI Preventive Guidelines  Dietary Guidelines for Americans, 2010  [employer]'s MyPlate  ASA Prophylaxis  Lung CA Screening    Estrellita Hernandez MD  [facility]    Identified Health Risks:  "

## 2022-08-03 NOTE — PATIENT INSTRUCTIONS
Patient Education   Personalized Prevention Plan  You are due for the preventive services outlined below.  Your care team is available to assist you in scheduling these services.  If you have already completed any of these items, please share that information with your care team to update in your medical record.  Health Maintenance Due   Topic Date Due     Zoster (Shingles) Vaccine (2 of 3) 07/22/2008     ANNUAL REVIEW OF HM ORDERS  05/20/2022     Cholesterol Lab  07/22/2022       Urinary Incontinence, Female (Adult)   Urinary incontinence means loss of bladder control. This problem affects many women, especially as they get older. If you have incontinence, you may be embarrassed to ask for help. But know that this problem can be treated.   Types of Incontinence  There are different types of incontinence. Two of the main types are described here. You can have more than one type.     Stress incontinence. With this type, urine leaks when pressure (stress) is put on the bladder. This may happen when you cough, sneeze, or laugh. Stress incontinence most often occurs because the pelvic floor muscles that support the bladder and urethra are weak. This can happen after pregnancy and vaginal childbirth or a hysterectomy. It can also be due to excess body weight or hormone changes.    Urge incontinence (also called overactive bladder). With this type, a sudden urge to urinate is felt often. This may happen even though there may not be much urine in the bladder. The need to urinate often during the night is common. Urge incontinence most often occurs because of bladder spasms. This may be due to bladder irritation or infection. Damage to bladder nerves or pelvic muscles, constipation, and certain medicines can also lead to urge incontinence.  Treatment depends on the cause. Further evaluation is needed to find the type you have. This will likely include an exam and certain tests. Based on the results, you and your  healthcare provider can then plan treatment. Until a diagnosis is made, the home care tips below can help ease symptoms.   Home care    Do pelvic floor muscle exercises, if they are prescribed. The pelvic floor muscles help support the bladder and urethra. Many women find that their symptoms improve when doing special exercises that strengthen these muscles. To do the exercises, contract the muscles you would use to stop your stream of urine. But do this when you re not urinating. Hold for 10 seconds, then relax. Repeat 10 to 20 times in a row, at least 3 times a day. Your healthcare provider may give you other instructions for how to do the exercises and how often.    Keep a bladder diary. This helps track how often and how much you urinate over a set period of time. Bring this diary with you to your next visit with the provider. The information can help your provider learn more about your bladder problem.    Lose weight, if advised to by your provider. Extra weight puts pressure on the bladder. Your provider can help you create a weight-loss plan that s right for you. This may include exercising more and making certain diet changes.    Don't have foods and drinks that may irritate the bladder. These can include alcohol and caffeinated drinks.    Quit smoking. Smoking and other tobacco use can lead to a long-term (chronic) cough that strains the pelvic floor muscles. Smoking may also damage the bladder and urethra. Talk with your provider about treatments or methods you can use to quit smoking.    If drinking large amounts of fluid makes you have symptoms, you may be advised to limit your fluid intake. You may also be advised to drink most of your fluids during the day and to limit fluids at night.    If you re worried about urine leakage or accidents, you may wear absorbent pads to catch urine. Change the pads often. This helps reduce discomfort. It may also reduce the risk of skin or bladder  infections.    Follow-up care  Follow up with your healthcare provider, or as directed. It may take some to find the right treatment for your problem. But healthy lifestyle changes can be made right away. These include such things as exercising on a regular basis, eating a healthy diet, losing weight (if needed), and quitting smoking. Your treatment plan may include special therapies or medicines. Certain procedures or surgery may also be options. Talk about any questions you have with your provider.   When to seek medical advice  Call the healthcare provider right away if any of these occur:    Fever of 100.4 F (38 C) or higher, or as directed by your provider    Bladder pain or fullness    Belly swelling    Nausea or vomiting    Back pain    Weakness, dizziness, or fainting  Julisa last reviewed this educational content on 1/1/2020 2000-2021 [employer]. All rights reserved. This information is not intended as a substitute for professional medical care. Always follow your healthcare professional's instructions.

## 2022-08-03 NOTE — PROGRESS NOTES
"    Information on urinary incontinence and treatment options given to patient.  Answers for HPI/ROS submitted by the patient on 8/3/2022  In general, how would you rate your overall physical health?: good  Frequency of exercise:: 2-3 days/week  Do you usually eat at least 4 servings of fruit and vegetables a day, include whole grains & fiber, and avoid regularly eating high fat or \"junk\" foods? : Yes  Taking medications regularly:: Yes  Medication side effects:: Muscle aches  Activities of Daily Living: no assistance needed  Home safety: lack of grab bars in the bathroom  Hearing Impairment:: no hearing concerns  In the past 6 months, have you been bothered by leaking of urine?: Yes  abdominal pain: No  Blood in stool: No  Blood in urine: No  chest pain: No  chills: No  congestion: No  constipation: No  diarrhea: No  dizziness: No  ear pain: No  eye pain: No  nervous/anxious: No  fever: No  frequency: No  genital sores: No  headaches: No  hearing loss: No  heartburn: No  arthralgias: Yes  joint swelling: No  peripheral edema: No  mood changes: No  myalgias: Yes  nausea: No  dysuria: No  palpitations: No  Skin sensation changes: No  sore throat: No  urgency: No  rash: No  shortness of breath: No  visual disturbance: No  weakness: No  pelvic pain: No  vaginal bleeding: No  vaginal discharge: No  tenderness: No  breast mass: No  breast discharge: No  In general, how would you rate your overall mental or emotional health?: good  Additional concerns today:: No  Duration of exercise:: Less than 15 minutes        "

## 2022-08-04 LAB — DEPRECATED CALCIDIOL+CALCIFEROL SERPL-MC: 57 UG/L (ref 20–75)

## 2022-09-12 ENCOUNTER — NURSE TRIAGE (OUTPATIENT)
Dept: FAMILY MEDICINE | Facility: CLINIC | Age: 87
End: 2022-09-12

## 2022-09-12 ENCOUNTER — OFFICE VISIT (OUTPATIENT)
Dept: URGENT CARE | Facility: URGENT CARE | Age: 87
End: 2022-09-12
Payer: COMMERCIAL

## 2022-09-12 ENCOUNTER — TELEPHONE (OUTPATIENT)
Dept: FAMILY MEDICINE | Facility: CLINIC | Age: 87
End: 2022-09-12

## 2022-09-12 VITALS
DIASTOLIC BLOOD PRESSURE: 94 MMHG | OXYGEN SATURATION: 96 % | TEMPERATURE: 99 F | SYSTOLIC BLOOD PRESSURE: 172 MMHG | HEART RATE: 76 BPM | RESPIRATION RATE: 17 BRPM

## 2022-09-12 DIAGNOSIS — M79.661 PAIN OF RIGHT LOWER LEG: Primary | ICD-10-CM

## 2022-09-12 PROCEDURE — 99213 OFFICE O/P EST LOW 20 MIN: CPT | Performed by: FAMILY MEDICINE

## 2022-09-12 ASSESSMENT — PAIN SCALES - GENERAL: PAINLEVEL: SEVERE PAIN (6)

## 2022-09-12 NOTE — PROGRESS NOTES
(W82.028) Pain of right lower leg  (primary encounter diagnosis)  Comment:     Patient with 2-week history of right leg pain.    Plan:     I made arrangements for her to be seen at [facility] for DVT rule out.      CHIEF COMPLAINT    Right leg pain.      HISTORY    86-year-old woman complains of pain lateral aspect right leg for about 2 weeks.  Bothers her at night also.  Is not bothering her too much today walking around.  She has not noticed right leg swelling.    She has no history of DVT.    She was told by a nurse to come to urgent care.  We do not have stat ultrasound available here.      REVIEW OF SYSTEMS    No breathing problems or chest pains.      EXAM  BP (!) 172/94   Pulse 76   Temp 99  F (37.2  C) (Tympanic)   Resp 17   LMP  (LMP Unknown)   SpO2 96%     Right leg is actually about a centimeter less in diameter than left leg at the calf.  She has a mild right calf tenderness.  Walking about comfortably.

## 2022-09-12 NOTE — TELEPHONE ENCOUNTER
Reason for Call:  Medication questions    Detailed comments: Patient calling about questions about her medications prior to dental appointment    Phone Number Patient can be reached at: Home number on file [phone] (home)    Best Time: any time    Can we leave a detailed message on this number? YES    Call taken on 9/12/2022 at 8:49 AM by Jacqui Renner

## 2022-09-12 NOTE — TELEPHONE ENCOUNTER
Dr. Hernandez,     Spoke with pt.     Pt wanting to start prolia but needs to have a tooth pulled. Has a dental appt at the end of the month and needs this looked at to determine when it will be pulled out.     I edu pt that she will have to wait before having the prolia after removal of tooth. Per the protocol:  Patient denies any dental work involving the bone (e.g. tooth extraction or dental implants) in the past 4 weeks?  No.  Do not administer.  Inform provider.      How long would pt have to wait? I do not see an order for prolia, can you get a CAM started? Can she continue fosamax in the meantime?      Thanks,  MARIEL Jara  [facility]

## 2022-09-12 NOTE — TELEPHONE ENCOUNTER
"Patient concerned about calf pain times 1 week now. Stated that painful when laying down but does not note pain when up through out the day or sitting.     Per protocol to be seen and rule out potential for DVT. Pt agreeable to go to [facility].     Thanks,  MARIEL Jara  [facility]       Reason for Disposition    Thigh or calf pain in only one leg and present > 1 hour    Thigh, calf, or ankle swelling in only one leg    Additional Information    Negative: Chest pain    Negative: Difficulty breathing    Negative: Entire foot is cool or blue in comparison to other side    Negative: Unable to walk    Negative: Looks like a broken bone or dislocated joint (e.g., crooked or deformed)    Negative: Sounds like a life-threatening emergency to the triager    Answer Assessment - Initial Assessment Questions  1. ONSET: \"When did the pain start?\"      On week  2. LOCATION: \"Where is the pain located?\"       Right calf   3. PAIN: \"How bad is the pain?\"    (Scale 1-10; or mild, moderate, severe)    -  MILD (1-3): doesn't interfere with normal activities     -  MODERATE (4-7): interferes with normal activities (e.g., work or school) or awakens from sleep, limping     -  SEVERE (8-10): excruciating pain, unable to do any normal activities, unable to walk      Moderate   4. WORK OR EXERCISE: \"Has there been any recent work or exercise that involved this part of the body?\"       no  5. CAUSE: \"What do you think is causing the leg pain?\"      unsure  6. OTHER SYMPTOMS: \"Do you have any other symptoms?\" (e.g., chest pain, back pain, breathing difficulty, swelling, rash, fever, numbness, weakness)      Pain at night when laying down but none when up and sitting or walking around   7. PREGNANCY: \"Is there any chance you are pregnant?\" \"When was your last menstrual period?\"      no    Protocols used: LEG PAIN-A-OH      "

## 2022-09-13 ENCOUNTER — OFFICE VISIT (OUTPATIENT)
Dept: PEDIATRICS | Facility: CLINIC | Age: 87
End: 2022-09-13
Payer: COMMERCIAL

## 2022-09-13 ENCOUNTER — ANCILLARY PROCEDURE (OUTPATIENT)
Dept: ULTRASOUND IMAGING | Facility: CLINIC | Age: 87
End: 2022-09-13
Attending: NURSE PRACTITIONER
Payer: COMMERCIAL

## 2022-09-13 VITALS
SYSTOLIC BLOOD PRESSURE: 137 MMHG | DIASTOLIC BLOOD PRESSURE: 82 MMHG | HEART RATE: 67 BPM | OXYGEN SATURATION: 98 % | TEMPERATURE: 97.8 F

## 2022-09-13 DIAGNOSIS — M79.604 PAIN OF RIGHT LOWER EXTREMITY: Primary | ICD-10-CM

## 2022-09-13 PROCEDURE — 99214 OFFICE O/P EST MOD 30 MIN: CPT | Performed by: NURSE PRACTITIONER

## 2022-09-13 PROCEDURE — 93971 EXTREMITY STUDY: CPT | Mod: RT | Performed by: RADIOLOGY

## 2022-09-13 NOTE — PATIENT INSTRUCTIONS
Recommend close follow up with your primary care provider in 1-2 weeks. May take CALM at bedtime for supplement to help with restless leg syndrome symptoms.

## 2022-09-13 NOTE — PROGRESS NOTES
Assessment & Plan     Pain of right lower extremity    - US Lower Extremity Venous Duplex Right  Negative US for DVT per radiology discussed home care to include treatment for restless leg syndrome. Possible muscle skeletal stress she does have history of osteoporosis currently taking Fosamax for this.  We also discussed supplement with calm and increasing fluid intake, is a supplement that contains magnesium would help with restless leg syndrome she could take this at bedtime.  We also discussed wearing compression stockings during the day to see if this would help with circulation she was a employee at [employer] in past and had spent multiple weeks of time on her feet she does have some peripheral vascular changes here due to that and some areas of varicosities on the posterior side of her right leg.  Otherwise her CMS and pulses were within normal range.  There was no discoloration noted other than the vascularity changes.  Patient verbalized understanding and she will follow-up with her primary care provider in 1 to 2 weeks if symptoms have not improved.  She does states she drinks approximately 2 to 3 cups of coffee per day recommended increasing her water intake and discussed increased risk of dehydration secondary to caffeine intake.  Review of the result(s) of each unique test - us  Ordering of each unique test  34 minutes spent on the date of the encounter doing chart review, review of test results, interpretation of tests, patient visit and documentation        Patient Instructions   Recommend close follow up with your primary care provider in 1-2 weeks. May take CALM at bedtime for supplement to help with restless leg syndrome symptoms.       DDX: right lower ext pain, RLE PVD, RLE PAD, restless leg syndrome.   KRISTIE Crawford [facility]    Destiny Lamas is a 86 year old, presenting for the following health issues:  Musculoskeletal Problem      HPI     Pain  History:  When did you first notice your pain? - Acute Pain   Have you seen anyone else for your pain? Yes - [facility] urgent care yesterday   Where in your body do you have pain? Musculoskeletal problem/pain  Onset/Duration: x 2 weeks   Description  Location: leg - right  Joint Swelling: No  Redness: No  Pain: YES  Warmth: No  Intensity:  moderate  Progression of Symptoms:  same  Accompanying signs and symptoms:   Fevers: No  Numbness/tingling/weakness: No  History  Trauma to the area: No  Recent illness:  No  Previous similar problem: No  Previous evaluation:  No  Precipitating or alleviating factors:  Aggravating factors include: laying in bed   Therapies tried and outcome: acetaminophen      Patient presents to clinic with approximately 2-week history of right lower lateral leg pain that only occurs at night.  Patient states that she feels achiness and tenderness that will oftentimes wake her up.  She does not feel any warmth or swelling in this extremity.  Review of office visit from prior day indicated size of right lower extremity slightly smaller than left.  CMS normal pulses extremities with negative edema pulses are positive and normal.  No discoloration noted.  Patient does have a history of osteoporosis she is on Fosamax for this she also has history of hypercholesteremia she takes Zocor for this.  Denies family history of blood clots she does have family history of diabetes but no personal history.    Review of Systems   Constitutional, HEENT, cardiovascular, pulmonary, gi and gu systems are negative, except as otherwise noted.      Objective    LMP  (LMP Unknown)   There is no height or weight on file to calculate BMI.  Physical Exam   GENERAL: healthy, alert and no distress  NECK: no adenopathy, no asymmetry, masses, or scars and thyroid normal to palpation  RESP: lungs clear to auscultation - no rales, rhonchi or wheezes  CV: regular rate and rhythm, normal S1 S2, no S3 or S4, no murmur, click or rub, no  peripheral edema and peripheral pulses strong  ABDOMEN: soft, nontender, no hepatosplenomegaly, no masses and bowel sounds normal  MS: mild tenderness with palpation right lower lateral extremity negative pain with dorsi or plantar flexion or inversion expression of the right lower extremity foot.  CMS is normal pulses are strong and normal.  She does have some varicosities on the right lower foot and the right posterior calf has a small for varicose vein   SKIN: no suspicious lesions or rashes  NEURO: Normal strength and tone, mentation intact and speech normal  PSYCH: mentation appears normal, affect normal/bright                  Results for orders placed or performed in visit on 09/13/22   US Lower Extremity Venous Duplex Right     Status: None    Narrative    EXAMINATION: Right lower extremity venous Doppler ultrasound.    COMPARISON: 5/13/2021    HISTORY: Right calf pain    FINDINGS:   The right common femoral, femoral, and popliteal veins are fully  compressible with patent Doppler wave forms. No thrombus is identified  within them on grayscale imaging. Posterior tibial and peroneal veins  are patent.      Impression    IMPRESSION:  No DVT demonstrated in the right lower extremity.    ANNALISA JOHNSON MD         SYSTEM ID:  [id number]

## 2022-09-15 NOTE — TELEPHONE ENCOUNTER
Left message on answering machine for patient to call back to the nurse at [phone].    Nika Doan RN  [facility]

## 2022-09-15 NOTE — TELEPHONE ENCOUNTER
She should atleast wait for a month before starting Prolia  Please check with your dentist  No need for fosamax

## 2022-09-27 NOTE — TELEPHONE ENCOUNTER
Called patient and notified her of message from provider. She verbalized understanding and has no further questions. She has not had tooth extracted yet. She will call after extraction to schedule RN visit for Prolia.    Cassia Grayson RN   [facility]

## 2022-10-06 ENCOUNTER — ANCILLARY PROCEDURE (OUTPATIENT)
Dept: MAMMOGRAPHY | Facility: CLINIC | Age: 87
End: 2022-10-06
Attending: FAMILY MEDICINE
Payer: COMMERCIAL

## 2022-10-06 DIAGNOSIS — Z12.31 VISIT FOR SCREENING MAMMOGRAM: ICD-10-CM

## 2022-10-06 PROCEDURE — 77067 SCR MAMMO BI INCL CAD: CPT | Mod: GC | Performed by: STUDENT IN AN ORGANIZED HEALTH CARE EDUCATION/TRAINING PROGRAM

## 2022-10-06 PROCEDURE — 77063 BREAST TOMOSYNTHESIS BI: CPT | Mod: GC | Performed by: STUDENT IN AN ORGANIZED HEALTH CARE EDUCATION/TRAINING PROGRAM

## 2022-10-17 ENCOUNTER — OFFICE VISIT (OUTPATIENT)
Dept: FAMILY MEDICINE | Facility: CLINIC | Age: 87
End: 2022-10-17
Payer: COMMERCIAL

## 2022-10-17 VITALS
WEIGHT: 142.8 LBS | HEIGHT: 59 IN | TEMPERATURE: 98.8 F | SYSTOLIC BLOOD PRESSURE: 118 MMHG | BODY MASS INDEX: 28.79 KG/M2 | OXYGEN SATURATION: 97 % | HEART RATE: 71 BPM | DIASTOLIC BLOOD PRESSURE: 70 MMHG | RESPIRATION RATE: 16 BRPM

## 2022-10-17 DIAGNOSIS — M25.50 ARTHRALGIA, UNSPECIFIED JOINT: ICD-10-CM

## 2022-10-17 DIAGNOSIS — Z23 ENCOUNTER FOR ADMINISTRATION OF VACCINE: ICD-10-CM

## 2022-10-17 DIAGNOSIS — M79.604 PAIN OF RIGHT LOWER EXTREMITY: Primary | ICD-10-CM

## 2022-10-17 PROCEDURE — 36415 COLL VENOUS BLD VENIPUNCTURE: CPT | Performed by: STUDENT IN AN ORGANIZED HEALTH CARE EDUCATION/TRAINING PROGRAM

## 2022-10-17 PROCEDURE — G0008 ADMIN INFLUENZA VIRUS VAC: HCPCS | Performed by: STUDENT IN AN ORGANIZED HEALTH CARE EDUCATION/TRAINING PROGRAM

## 2022-10-17 PROCEDURE — 99214 OFFICE O/P EST MOD 30 MIN: CPT | Mod: 25 | Performed by: STUDENT IN AN ORGANIZED HEALTH CARE EDUCATION/TRAINING PROGRAM

## 2022-10-17 PROCEDURE — 82728 ASSAY OF FERRITIN: CPT | Performed by: STUDENT IN AN ORGANIZED HEALTH CARE EDUCATION/TRAINING PROGRAM

## 2022-10-17 PROCEDURE — 83735 ASSAY OF MAGNESIUM: CPT | Performed by: STUDENT IN AN ORGANIZED HEALTH CARE EDUCATION/TRAINING PROGRAM

## 2022-10-17 PROCEDURE — 80048 BASIC METABOLIC PNL TOTAL CA: CPT | Performed by: STUDENT IN AN ORGANIZED HEALTH CARE EDUCATION/TRAINING PROGRAM

## 2022-10-17 PROCEDURE — 90662 IIV NO PRSV INCREASED AG IM: CPT | Performed by: STUDENT IN AN ORGANIZED HEALTH CARE EDUCATION/TRAINING PROGRAM

## 2022-10-17 ASSESSMENT — PAIN SCALES - GENERAL: PAINLEVEL: MILD PAIN (2)

## 2022-10-17 NOTE — PROGRESS NOTES
"  Assessment & Plan     1. Pain of right lower extremity  DDX includes restless leg and electrolyte imbalance  Slightly improved.Ultrasound was negative for DVT. I will check here electrolyte.  - Magnesium; Future  - Basic metabolic panel  (Ca, Cl, CO2, Creat, Gluc, K, Na, BUN); Future  - Basic metabolic panel  (Ca, Cl, CO2, Creat, Gluc, K, Na, BUN)  - Magnesium  -Ferittin  I advised patient keep herself hydrated.  2. Encounter for administration of vaccine    - INFLUENZA, QUAD, HD, PF, 65+ (FLUZONE HD)      Return in about 1 week (around 10/24/2022) for Follow up, in person.    Liya Bryant MD  [facility] RICK Lamas is a 86 year old, presenting for the following health issues:  Urgent Care      History of Present Illness       Reason for visit:  Follow up after having ultrasound on right leg    She eats 2-3 servings of fruits and vegetables daily.She consumes 0 sweetened beverage(s) daily.She exercises with enough effort to increase her heart rate 9 or less minutes per day.  She exercises with enough effort to increase her heart rate 3 or less days per week.   She is taking medications regularly.       ED/UC Followup:    Facility:  [facility]   Date of visit: 9/12/2022  Reason for visit: Pain of right lower leg   Current Status: Patient reports leg is not as painful, but seems to flare up at bedtime. She is using compression stockings daily and taking extra strength Tylenol in the evening.         Review of Systems   Constitutional, HEENT, cardiovascular, pulmonary, gi and gu systems are negative, except as otherwise noted.      Objective    /70   Pulse 71   Temp 98.8  F (37.1  C) (Oral)   Resp 16   Ht 1.502 m (4' 11.13\")   Wt 64.8 kg (142 lb 12.8 oz)   LMP  (LMP Unknown)   SpO2 97%   BMI 28.71 kg/m    Body mass index is 28.71 kg/m .  Physical Exam   GENERAL: healthy, alert and no distress  RESP: lungs clear to auscultation - no rales, rhonchi or " wheezes  CV: regular rate and rhythm, normal S1 S2, no S3 or S4, no murmur, click or rub,  MS: no gross musculoskeletal defects noted, no edema

## 2022-10-17 NOTE — PATIENT INSTRUCTIONS
Soy Lamas,    Thank you for allowing [facility] to manage your care.    I ordered some blood work, please go to the laboratory to get your laboratory studies.  Call your insurance to know where you can get shingles vaccine      I made a referral, they will be calling in approximately 1 week to set up your appointment.  If you do not hear from them, please call the specialty number on your after visit.     For your convenience, test results are released as soon as they are available  Please allow 1-2 business days for me to send you a comment about your results.  If not done so, I encourage you to login into Uppidy ([email or web address] to review your results in real time.     If you have any questions or concerns, please feel free to call us at [phone].    Sincerely,    Dr. Bryant    Did you know?      You can schedule a video visit for follow-up appointments as well as future appointments for certain conditions.  Please see the below link.     [email or web address]    If you have not already done so,  I encourage you to sign up for DuXploret ([email or web address]).  This will allow you to review your results, securely communicate with a provider, and schedule virtual visits as well.

## 2022-10-18 LAB
ANION GAP SERPL CALCULATED.3IONS-SCNC: 3 MMOL/L (ref 3–14)
BUN SERPL-MCNC: 18 MG/DL (ref 7–30)
CALCIUM SERPL-MCNC: 9.6 MG/DL (ref 8.5–10.1)
CHLORIDE BLD-SCNC: 104 MMOL/L (ref 94–109)
CO2 SERPL-SCNC: 31 MMOL/L (ref 20–32)
CREAT SERPL-MCNC: 0.67 MG/DL (ref 0.52–1.04)
FERRITIN SERPL-MCNC: 166 NG/ML (ref 8–252)
GFR SERPL CREATININE-BSD FRML MDRD: 86 ML/MIN/1.73M2
GLUCOSE BLD-MCNC: 104 MG/DL (ref 70–99)
MAGNESIUM SERPL-MCNC: 2.6 MG/DL (ref 1.6–2.3)
POTASSIUM BLD-SCNC: 3.9 MMOL/L (ref 3.4–5.3)
SODIUM SERPL-SCNC: 138 MMOL/L (ref 133–144)

## 2022-10-24 ENCOUNTER — ANCILLARY PROCEDURE (OUTPATIENT)
Dept: GENERAL RADIOLOGY | Facility: CLINIC | Age: 87
End: 2022-10-24
Attending: STUDENT IN AN ORGANIZED HEALTH CARE EDUCATION/TRAINING PROGRAM
Payer: COMMERCIAL

## 2022-10-24 ENCOUNTER — OFFICE VISIT (OUTPATIENT)
Dept: FAMILY MEDICINE | Facility: CLINIC | Age: 87
End: 2022-10-24
Payer: COMMERCIAL

## 2022-10-24 VITALS
WEIGHT: 144.6 LBS | RESPIRATION RATE: 16 BRPM | SYSTOLIC BLOOD PRESSURE: 152 MMHG | HEART RATE: 67 BPM | BODY MASS INDEX: 29.15 KG/M2 | TEMPERATURE: 98.8 F | OXYGEN SATURATION: 96 % | DIASTOLIC BLOOD PRESSURE: 78 MMHG | HEIGHT: 59 IN

## 2022-10-24 DIAGNOSIS — M25.511 ACUTE PAIN OF RIGHT SHOULDER: Primary | ICD-10-CM

## 2022-10-24 DIAGNOSIS — M25.511 ACUTE PAIN OF RIGHT SHOULDER: ICD-10-CM

## 2022-10-24 PROCEDURE — 73030 X-RAY EXAM OF SHOULDER: CPT | Mod: TC | Performed by: RADIOLOGY

## 2022-10-24 PROCEDURE — 73060 X-RAY EXAM OF HUMERUS: CPT | Mod: TC | Performed by: RADIOLOGY

## 2022-10-24 PROCEDURE — 99214 OFFICE O/P EST MOD 30 MIN: CPT | Performed by: STUDENT IN AN ORGANIZED HEALTH CARE EDUCATION/TRAINING PROGRAM

## 2022-10-24 ASSESSMENT — PAIN SCALES - GENERAL: PAINLEVEL: SEVERE PAIN (6)

## 2022-10-24 NOTE — PATIENT INSTRUCTIONS
Soy Lamas,    Thank you for allowing [facility] to manage your care.    Alternate tylenol and Advil. Apply cold compress and biofreeze        I made a referral, they will be calling in approximately 1 week to set up your appointment.  If you do not hear from them, please call the specialty number on your after visit.     For your convenience, test results are released as soon as they are available  Please allow 1-2 business days for me to send you a comment about your results.  If not done so, I encourage you to login into Yoyo ([email or web address] to review your results in real time.     If you have any questions or concerns, please feel free to call us at [phone].    Sincerely,    Dr. Bryant    Did you know?      You can schedule a video visit for follow-up appointments as well as future appointments for certain conditions.  Please see the below link.     [email or web address]    If you have not already done so,  I encourage you to sign up for Yoyo ([email or web address]).  This will allow you to review your results, securely communicate with a provider, and schedule virtual visits as well.

## 2022-10-24 NOTE — PROGRESS NOTES
"  Assessment & Plan     1. Acute pain of right shoulder  Recommend RICE, alternating tylenol and Motrin, activity modification  - XR Shoulder Right G/E 3 Views; Future  - XR Humerus Right G/E 2 Views; Future      Return in about 1 week (around 10/31/2022) for Follow up, in person.    Liya Bryant MD  [facility] RICK Lamas is a 86 year old, presenting for the following health issues:  Musculoskeletal Problem      Musculoskeletal Problem    History of Present Illness       Reason for visit:  Follow up after having ultrasound on right leg    She eats 2-3 servings of fruits and vegetables daily.She consumes 0 sweetened beverage(s) daily.She exercises with enough effort to increase her heart rate 9 or less minutes per day.  She exercises with enough effort to increase her heart rate 3 or less days per week.   She is taking medications regularly.         Pain History:  When did you first notice your pain? - Post-Acute Pain   Have you seen any provider previously for this issue? Yes   How has your pain affected your ability to work? Not applicable  Where in your body do you have pain? Right leg has improved; denies pain today. She is using compression stockings during the day. She is taking Extra Strength Tylenol for pain at bedtime. Advil taken during the day for pain.      She complains of right upper arm and shoulder pain today. Pain has been golfing. She denies any recent injury      Review of Systems   Constitutional, HEENT, cardiovascular, pulmonary, gi and gu systems are negative, except as otherwise noted.      Objective    BP (!) 152/78   Pulse 67   Temp 98.8  F (37.1  C) (Oral)   Resp 16   Ht 1.502 m (4' 11.13\")   Wt 65.6 kg (144 lb 9.6 oz)   LMP  (LMP Unknown)   SpO2 96%   BMI 29.07 kg/m    Body mass index is 29.07 kg/m .  Physical Exam   GENERAL: healthy, alert and no distress  RESP: no audible wheezes  CV: regular rate and rhythm, normal S1 S2, no S3 or " S4,  MS:left shoulder no gross musculoskeletal defects noted, no edema, Right shoulder: right deltoid tenderness, restricted motion with right shoulder extension and flexion

## 2022-10-28 ENCOUNTER — OFFICE VISIT (OUTPATIENT)
Dept: DERMATOLOGY | Facility: CLINIC | Age: 87
End: 2022-10-28
Payer: COMMERCIAL

## 2022-10-28 DIAGNOSIS — Z85.828 HISTORY OF NONMELANOMA SKIN CANCER: ICD-10-CM

## 2022-10-28 DIAGNOSIS — D48.9 NEOPLASM OF UNCERTAIN BEHAVIOR: Primary | ICD-10-CM

## 2022-10-28 PROCEDURE — 11102 TANGNTL BX SKIN SINGLE LES: CPT | Performed by: DERMATOLOGY

## 2022-10-28 PROCEDURE — 11103 TANGNTL BX SKIN EA SEP/ADDL: CPT | Performed by: DERMATOLOGY

## 2022-10-28 PROCEDURE — 88305 TISSUE EXAM BY PATHOLOGIST: CPT | Performed by: PATHOLOGY

## 2022-10-28 NOTE — NURSING NOTE
Cydney Christiansen's goals for this visit include:   Chief Complaint   Patient presents with     Skin Check   She requests these members of her care team be copied on today's visit information:     PCP: Estrellita Hernandez    Referring Provider:  No referring provider defined for this encounter.    LMP  (LMP Unknown)     Do you need any medication refills at today's visit? no  Leatha Ryees, CMA on 10/28/2022 at 2:13 PM

## 2022-10-28 NOTE — PROGRESS NOTES
[facility] Dermatology Note  Encounter Date: Oct 28, 2022  Office Visit     Dermatology Problem List:  Last skin check 10/28/22, recommended yearly  0. NUB - crown and left mid forearm, s/p bx 10/28/22   1. 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 AK  - HAK, left lateral forehead, bx 9/28/21. treated with LN2 5/4/22  - AK, right cheek, s/p bx 9/28/21 - treated with LN2 5/4/22  - HAK, left mid eyebrow, base not seen, 6/2014  - left posterior lower leg, s/p bx 2/18/21, s/p cryo 9/28/21  - s/p cryotherapy  - left forearm, s/p biopsy 3/15/19   5. GA, right angle of jaw: resolves with triamcinolone cream  6. History of benign biopsy  - Verrucous keratosis - right dorsal hand posterior, s/p bx 4/1/22 and 6/8/22  - ISK, right dorsal hand, s/p bx 9/28/21  - C/w rosacea, right posterior shoulder, s/p bx 9/28/21  - SK, right posterior lower leg, s/p bx 2/18/21  - verucca, left forehead, s/p bx 2/18/21  - Arthropod bite, left 4th digit, bx 2/18/21  - SK, right abdomen, s/p bx 2/18/2020  7. Eczematous patch R dorsal hand  - previous Tx: triamcinolone 0.01%  cream, Vaseline     ____________________________________________    Assessment & Plan:    # History of nonmelanoma skin cancer, no clinical evidence of recurrence.  - Sun protection: Counseled SPF30+ sunscreen, UPF clothing, sun avoidance, tanning bed avoidance.   - Recommended yearly skin checks.     # History of actinic keratoses.  -as above    # Neoplasm of unspecified behavior of the skin (D49.2) on the crown. The differential diagnosis includes BCC vs.   - Photograph obtained.  - See procedure note.     # Neoplasm of unspecified behavior of the skin (D49.2) on the left mid forearm. The differential diagnosis includes most likely SCC.  - Photograph obtained.  - See procedure note.       Procedures Performed:   - Shave biopsy procedure note, location(s): crown and left mid forearm. After discussion of benefits and risks including but not limited to bleeding, infection, scar, incomplete removal, recurrence, and non-diagnostic biopsy, written consent and photographs were obtained. The area was cleaned with isopropyl alcohol. 0.5mL of 1% lidocaine with epinephrine was injected to obtain adequate anesthesia of lesion(s). Shave biopsy at site(s) performed. Hemostasis was achieved with aluminium chloride. Petrolatum ointment and a sterile dressing were applied. The patient tolerated the procedure and no complications were noted. The patient was provided with verbal and written post care instructions.       Follow-up: 1 year(s) in-person for a skin check, or earlier for new or changing lesions    Staff and Scribe:     Scribe Disclosure:   I, Dao Crabtree, am serving as a scribe to document services personally performed by this physician, Dr. Qi Barba, based on data collection and the provider's statements to me.     Provider Disclosure:   The documentation recorded by the scribe accurately reflects the services I personally performed and the decisions made by me.    Qi Barba MD    Department  "of Dermatology  [facility]: Phone: [phone], Fax:[phone]  [facility]: Phone: [phone], Fax: [phone]      ____________________________________________    CC: Skin Check (Spot on scalp/ hx nmsc)    HPI:  Ms. Cydney Christiansen is a(n) 86 year old female who presents today as a return patient for a skin check.    Last seen 6/8/22 by Dr. Haro for a spot check of the right hand. At that time, a previously biopsied lesion was rebiopsied in an effort to remove residual tissue.     Today, she notes a spot on the scalp. It has been present for \"quite a while\" and her comb catches it.    Patient is otherwise feeling well, without additional skin concerns.    Labs Reviewed:  N/A    Physical Exam:  Vitals: LMP  (LMP Unknown)   SKIN: Total skin excluding the undergarment areas was performed. The exam included the head/face, neck, both arms, chest, back, abdomen, both legs, digits and/or nails.   - Well healed scars at sites of previous NMSC, no repigmentation or nodularity noted.   - 1.3 cm patch with telangiectasias on the crown.   - 1.2 cm patch with perifollicular scale on the left mid forearm.  - No other lesions of concern on areas examined.     Medications:  Current Outpatient Medications   Medication     alendronate (FOSAMAX) 70 MG tablet     co-enzyme Q-10 100 MG CAPS capsule     cyanocobalamin (VITAMIN B-12) 500 MCG tablet     dorzolamide-timolol (COSOPT) 2-0.5 % ophthalmic solution     ferrous sulfate (FEROSUL) 325 (65 Fe) MG tablet     ibuprofen (ADVIL,MOTRIN) 200 MG tablet     latanoprost (XALATAN) 0.005 % ophthalmic solution     losartan (COZAAR) 50 MG tablet     Multiple Vitamins-Minerals (ONE DAILY ADULTS 50+) TABS     polyethylene glycol (MIRALAX) 17 GM/Dose powder     simvastatin (ZOCOR) 20 MG tablet     triamcinolone (KENALOG) 0.1 % external cream     VITAMIN D-1000 MAX -1000 " MG-UNIT OR TABS     Current Facility-Administered Medications   Medication     denosumab (PROLIA) injection 60 mg      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     Hypertension goal BP (blood pressure) < 140/90     Seborrheic keratosis     Malignant neoplasm of overlapping sites of left female breast (H)     Scoliosis     Compression fracture of thoracic vertebra (H)     Mass of left hand     Primary open angle glaucoma of both eyes, unspecified glaucoma stage     Osteoporosis, unspecified osteoporosis type, unspecified pathological fracture presence     Nuclear sclerosis of left eye     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)     Actinic keratosis     History of nonmelanoma skin cancer     Combined forms of 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 on close of this encounter.

## 2022-10-28 NOTE — PATIENT INSTRUCTIONS
Wound Care After a Biopsy    What is a skin biopsy?  A skin biopsy allows the doctor to examine a very small piece of tissue under the microscope to determine the diagnosis and the best treatment for the skin condition. A local anesthetic (numbing medicine)  is injected with a very small needle into the skin area to be tested. A small piece of skin is taken from the area. Sometimes a suture (stitch) is used.     What are the risks of a skin biopsy?  I will experience scar, bleeding, swelling, pain, crusting and redness. I may experience incomplete removal or recurrence. Risks of this procedure are excessive bleeding, bruising, infection, nerve damage, numbness, thick (hypertrophic or keloidal) scar and non-diagnostic biopsy.    How should I care for my wound for the first 24 hours?  Keep the wound dry and covered for 24 hours  If it bleeds, hold direct pressure on the area for 15 minutes. If bleeding does not stop then go to the emergency room  Avoid strenuous exercise the first 1-2 days or as your doctor instructs you    How should I care for the wound after 24 hours?  After 24 hours, remove the bandage  You may bathe or shower as normal  If you had a scalp biopsy, you can shampoo as usual and can use shower water to clean the biopsy site daily  Clean the wound twice a day with gentle soap and water  Do not scrub, be gentle  Apply white petroleum/Vaseline after cleaning the wound with a cotton swab or a clean finger, and keep the site covered with a Bandaid /bandage. Bandages are not necessary with a scalp biopsy  If you are unable to cover the site with a Bandaid /bandage, re-apply ointment 2-3 times a day to keep the site moist. Moisture will help with healing  Avoid strenuous activity for first 1-2 days  Avoid lakes, rivers, pools, and oceans until the stitches are removed or the site is healed    How do I clean my wound?  Wash hands thoroughly with soap or use hand  before all wound care  Clean the  wound with gentle soap and water  Apply white petroleum/Vaseline  to wound after it is clean  Replace the Bandaid /bandage to keep the wound covered for the first few days or as instructed by your doctor  If you had a scalp biopsy, warm shower water to the area on a daily basis should suffice    What should I use to clean my wound?   Cotton-tipped applicators (Qtips )  White petroleum jelly (Vaseline ). Use a clean new container and use Q-tips to apply.  Bandaids   as needed  Gentle soap     How should I care for my wound long term?  Do not get your wound dirty  Keep up with wound care for one week or until the area is healed.  A small scab will form and fall off by itself when the area is completely healed. The area will be red and will become pink in color as it heals. Sun protection is very important for how your scar will turn out. Sunscreen with an SPF 30 or greater is recommended once the area is healed.  You should have some soreness but it should be mild and slowly go away over several days. Talk to your doctor about using tylenol for pain,    When should I call my doctor?  If you have increased:   Pain or swelling  Pus or drainage (clear or slightly yellow drainage is ok)  Temperature over 100F  Spreading redness or warmth around wound    When will I hear about my results?  The biopsy results can take 2 weeks to come back.  Your results will automatically release to [employer] before your provider has even reviewed them.  The clinic will call you with the results, send you a travelfox message, or have you schedule a follow-up clinic or phone time to discuss the results.  Contact our clinics if you do not hear from us in 2 weeks.    Who should I call with questions?  [facility]: [phone]  [facility]: [phone]  For urgent needs outside of business hours call the [facility] at [phone] and ask for the dermatology resident on  call        Patient Education     Checking for Skin Cancer  You can find cancer early by checking your skin each month. There are 3 kinds of skin cancer. They are melanoma, basal cell carcinoma, and squamous cell carcinoma. Doing monthly skin checks is the best way to find new marks or skin changes. Follow the instructions below for checking your skin.   The ABCDEs of checking moles for melanoma   Check your moles or growths for signs of melanoma using ABCDE:   Asymmetry: the sides of the mole or growth don t match  Border: the edges are ragged, notched, or blurred  Color: the color within the mole or growth varies  Diameter: the mole or growth is larger than 6 mm (size of a pencil eraser)  Evolving: the size, shape, or color of the mole or growth is changing (evolving is not shown in the images below)    Checking for other types of skin cancer  Basal cell carcinoma or squamous cell carcinoma have symptoms such as:     A spot or mole that looks different from all other marks on your skin  Changes in how an area feels, such as itching, tenderness, or pain  Changes in the skin's surface, such as oozing, bleeding, or scaliness  A sore that does not heal  New swelling or redness beyond the border of a mole    Who s at risk?  Anyone can get skin cancer. But you are at greater risk if you have:   Fair skin, light-colored hair, or light-colored eyes  Many moles or abnormal moles on your skin  A history of sunburns from sunlight or tanning beds  A family history of skin cancer  A history of exposure to radiation or chemicals  A weakened immune system  If you have had skin cancer in the past, you are at risk for recurring skin cancer.   How to check your skin  Do your monthly skin checkups in front of a full-length mirror. Check all parts of your body, including your:   Head (ears, face, neck, and scalp)  Torso (front, back, and sides)  Arms (tops, undersides, upper, and lower armpits)  Hands (palms, backs, and fingers,  including under the nails)  Buttocks and genitals  Legs (front, back, and sides)  Feet (tops, soles, toes, including under the nails, and between toes)  If you have a lot of moles, take digital photos of them each month. Make sure to take photos both up close and from a distance. These can help you see if any moles change over time.   Most skin changes are not cancer. But if you see any changes in your skin, call your doctor right away. Only he or she can diagnose a problem. If you have skin cancer, seeing your doctor can be the first step toward getting the treatment that could save your life.   [employer] last reviewed this educational content on 4/1/2019 2000-2020 [employer]. [address] [place] [address], [place] [address]. All rights reserved. This information is not intended as a substitute for professional medical care. Always follow your healthcare professional's instructions.       When should I call my doctor?  If you are worsening or not improving, please, contact us or seek urgent care as noted below.     Who should I call with questions (adults)?  [facility] (adult and pediatric): [phone]  [facility] (adult): [phone]  For urgent needs outside of business hours call the [facility] at [phone] and ask for the dermatology resident on call to be paged  If this is a medical emergency and you are unable to reach an ER, Call [phone]    Who should I call with questions (pediatric)?  [facility]- Pediatric Dermatology  Dr. Bette Soto, Dr. Laine Frost, Dr. Fern Keenan, CAPRICE Booth, Dr. Tiffanie Garza, Dr. Kathy Borja & Dr. Tyrone Viecnte  Non-urgent nurse triage line; [phone]- Carola and Thao FLOYD Care Coordinatormatt Banuelos (/Complex ) [phone]    If you need a prescription refill, please contact your pharmacy. Refills are approved or denied by  our Physicians during normal business hours, Monday through Fridays  Per office policy, refills will not be granted if you have not been seen within the past year (or sooner depending on your child's condition)    Scheduling Information:  Pediatric Appointment Scheduling and Call Center [phone]  Radiology Scheduling- [phone]  Sedation Unit Scheduling- [phone]  [facility] Scheduling- General [phone]; Pediatric Dermatology [phone]  Main  Services: [phone]  Kenyan: [phone]  South African: [phone]  Hmong/Greek/Maori: [phone]  Preadmission Nursing Department Fax Number: [phone] (Fax all pre-operative paperwork to this number)    For urgent matters arising during evenings, weekends, or holidays that cannot wait for normal business hours please call [phone] and ask for the dermatology resident on call to be paged.

## 2022-10-28 NOTE — LETTER
10/28/2022         RE: Cydney Christiansen  [address]  [place] [address]        Dear Colleague,    Thank you for referring your patient, Cydney Christiansen, to the [facility]. Please see a copy of my visit note below.    [facility] Dermatology Note  Encounter Date: Oct 28, 2022  Office Visit     Dermatology Problem List:  Last skin check 10/28/22, recommended yearly  0. NUB - crown and left mid forearm, s/p bx 10/28/22   1. 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 AK  - HAK, left lateral forehead, bx 9/28/21. treated with LN2 5/4/22  - AK, right cheek, s/p bx 9/28/21 - treated with LN2 5/4/22  - HAK, left mid eyebrow, base not seen, 6/2014  - left posterior lower leg, s/p bx 2/18/21, s/p cryo 9/28/21  - s/p cryotherapy  - left forearm, s/p biopsy 3/15/19   5. GA, right angle of jaw: resolves with triamcinolone cream  6. History of benign biopsy  - Verrucous keratosis - right dorsal hand posterior, s/p bx 4/1/22 and 6/8/22  - ISK, right dorsal hand, s/p bx 9/28/21  - C/w rosacea, right posterior shoulder, s/p bx 9/28/21  - SK, right posterior lower  leg, s/p bx 2/18/21  - verucca, left forehead, s/p bx 2/18/21  - Arthropod bite, left 4th digit, bx 2/18/21  - SK, right abdomen, s/p bx 2/18/2020  7. Eczematous patch R dorsal hand  - previous Tx: triamcinolone 0.01% cream, Vaseline     ____________________________________________    Assessment & Plan:    # History of nonmelanoma skin cancer, no clinical evidence of recurrence.  - Sun protection: Counseled SPF30+ sunscreen, UPF clothing, sun avoidance, tanning bed avoidance.   - Recommended yearly skin checks.     # History of actinic keratoses.  -as above    # Neoplasm of unspecified behavior of the skin (D49.2) on the crown. The differential diagnosis includes BCC vs.   - Photograph obtained.  - See procedure note.     # Neoplasm of unspecified behavior of the skin (D49.2) on the left mid forearm. The differential diagnosis includes most likely SCC.  - Photograph obtained.  - See procedure note.       Procedures Performed:   - Shave biopsy procedure note, location(s): crown and left mid forearm. After discussion of benefits and risks including but not limited to bleeding, infection, scar, incomplete removal, recurrence, and non-diagnostic biopsy, written consent and photographs were obtained. The area was cleaned with isopropyl alcohol. 0.5mL of 1% lidocaine with epinephrine was injected to obtain adequate anesthesia of lesion(s). Shave biopsy at site(s) performed. Hemostasis was achieved with aluminium chloride. Petrolatum ointment and a sterile dressing were applied. The patient tolerated the procedure and no complications were noted. The patient was provided with verbal and written post care instructions.       Follow-up: 1 year(s) in-person for a skin check, or earlier for new or changing lesions    Staff and Scribe:     Scribe Disclosure:   Dao ARAUOJ, am serving as a scribe to document services personally performed by this physician, Dr. Qi Barba, based on data collection and the provider's  "statements to me.     Provider Disclosure:   The documentation recorded by the scribe accurately reflects the services I personally performed and the decisions made by me.    Qi Barba MD    Department of Dermatology  [facility]: Phone: [phone], Fax:[phone]  [facility]: Phone: [phone], Fax: [phone]      ____________________________________________    CC: Skin Check (Spot on scalp/ hx nmsc)    HPI:  Ms. Cydney Christiansen is a(n) 86 year old female who presents today as a return patient for a skin check.    Last seen 6/8/22 by Dr. Haro for a spot check of the right hand. At that time, a previously biopsied lesion was rebiopsied in an effort to remove residual tissue.     Today, she notes a spot on the scalp. It has been present for \"quite a while\" and her comb catches it.    Patient is otherwise feeling well, without additional skin concerns.    Labs Reviewed:  N/A    Physical Exam:  Vitals: LMP  (LMP Unknown)   SKIN: Total skin excluding the undergarment areas was performed. The exam included the head/face, neck, both arms, chest, back, abdomen, both legs, digits and/or nails.   - Well healed scars at sites of previous NMSC, no repigmentation or nodularity noted.   - 1.3 cm patch with telangiectasias on the crown.   - 1.2 cm patch with perifollicular scale on the left mid forearm.  - No other lesions of concern on areas examined.     Medications:  Current Outpatient Medications   Medication     alendronate (FOSAMAX) 70 MG tablet     co-enzyme Q-10 100 MG CAPS capsule     cyanocobalamin (VITAMIN B-12) 500 MCG tablet     dorzolamide-timolol (COSOPT) 2-0.5 % ophthalmic solution     ferrous sulfate (FEROSUL) 325 (65 Fe) MG tablet     ibuprofen (ADVIL,MOTRIN) 200 MG tablet     latanoprost (XALATAN) 0.005 % ophthalmic solution     losartan (COZAAR) 50 MG tablet     Multiple " Vitamins-Minerals (ONE DAILY ADULTS 50+) TABS     polyethylene glycol (MIRALAX) 17 GM/Dose powder     simvastatin (ZOCOR) 20 MG tablet     triamcinolone (KENALOG) 0.1 % external cream     VITAMIN D-1000 MAX -1000 MG-UNIT OR TABS     Current Facility-Administered Medications   Medication     denosumab (PROLIA) injection 60 mg      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     Hypertension goal BP (blood pressure) < 140/90     Seborrheic keratosis     Malignant neoplasm of overlapping sites of left female breast (H)     Scoliosis     Compression fracture of thoracic vertebra (H)     Mass of left hand     Primary open angle glaucoma of both eyes, unspecified glaucoma stage     Osteoporosis, unspecified osteoporosis type, unspecified pathological fracture presence     Nuclear sclerosis of left eye     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)     Actinic keratosis     History of nonmelanoma skin cancer     Combined forms of 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 on close of this encounter.       Again, thank you for allowing me to participate in the care of your patient.        Sincerely,        Qi Barba MD

## 2022-11-01 ENCOUNTER — OFFICE VISIT (OUTPATIENT)
Dept: FAMILY MEDICINE | Facility: CLINIC | Age: 87
End: 2022-11-01
Payer: COMMERCIAL

## 2022-11-01 ENCOUNTER — TELEPHONE (OUTPATIENT)
Dept: FAMILY MEDICINE | Facility: CLINIC | Age: 87
End: 2022-11-01

## 2022-11-01 VITALS
WEIGHT: 144 LBS | HEART RATE: 79 BPM | DIASTOLIC BLOOD PRESSURE: 84 MMHG | RESPIRATION RATE: 16 BRPM | BODY MASS INDEX: 29.03 KG/M2 | SYSTOLIC BLOOD PRESSURE: 138 MMHG | HEIGHT: 59 IN | TEMPERATURE: 98.4 F | OXYGEN SATURATION: 97 %

## 2022-11-01 DIAGNOSIS — S46.201A TRAUMATIC INJURY OF RIGHT BICEPS BRACHII MUSCLE: ICD-10-CM

## 2022-11-01 DIAGNOSIS — M25.511 ACUTE PAIN OF RIGHT SHOULDER: Primary | ICD-10-CM

## 2022-11-01 PROCEDURE — 99214 OFFICE O/P EST MOD 30 MIN: CPT | Performed by: STUDENT IN AN ORGANIZED HEALTH CARE EDUCATION/TRAINING PROGRAM

## 2022-11-01 ASSESSMENT — PAIN SCALES - GENERAL: PAINLEVEL: MILD PAIN (2)

## 2022-11-01 NOTE — PATIENT INSTRUCTIONS
[facility] Lam[facility],    Thank you for allowing [facility] to manage your care.  For your shoulder, I recommend rest, activity modification,cold compress, tylenol for pain and Physical therapy    I ordered a ultrasound, please call diagnostic imaging [phone] to schedule your test.    I made a referral, they will be calling in approximately 1 week to set up your appointment.  If you do not hear from them, please call the specialty number on your after visit.     For your convenience, test results are released as soon as they are available  Please allow 1-2 business days for me to send you a comment about your results.  If not done so, I encourage you to login into [employer] ([email or web address] to review your results in real time.     If you have any questions or concerns, please feel free to call us at [phone].    Sincerely,    Dr. Bryant    Did you know?      You can schedule a video visit for follow-up appointments as well as future appointments for certain conditions.  Please see the below link.     [email or web address]    If you have not already done so,  I encourage you to sign up for [employer] ([email or web address]).  This will allow you to review your results, securely communicate with a provider, and schedule virtual visits as well.

## 2022-11-01 NOTE — PROGRESS NOTES
"  Assessment & Plan   1. Acute pain of right shoulder    - US MSK Complete; Future  - Physical Therapy Referral; Future    2. Traumatic injury of right biceps brachii muscle  Exam as below, ddx include  bicep tear (most likely) vs tendinopathy. Recommend RICE and tylenol.   - US MSK Complete; Future will refer patient to ortho If there is distal biceps tendon tear  - Physical Therapy Referral; Future  Warning signs reviewed with patients.Patient is advised to call if she experiences any of the warning signs.      Return in about 1 week (around 11/8/2022) for Follow up, in person, bicep pain.    Liya Bryant MD  [facility] is a 86 year old, presenting for the following health issues:  Musculoskeletal Problem (/)      HPI     Pain History:  When did you first notice your pain? - Post-Acute Pain   Have you seen any provider previously for this issue? Yes   How has your pain affected your ability to work? Not applicable  Where in your body do you have pain? Musculoskeletal problem/pain  Onset/Duration: Follow up   Description  Location: Right  - Arm   Joint Swelling: YES- it was swelling after a recent injury last Wednesday. Patient went bowling and may have pulled \"something\" in the right arm.   Redness: YES and bruising. improving  Pain: YES  Warmth: No      Review of Systems   Constitutional, HEENT, cardiovascular, pulmonary, gi and gu systems are negative, except as otherwise noted.      Objective    /84   Pulse 79   Temp 98.4  F (36.9  C) (Temporal)   Resp 16   Ht 1.502 m (4' 11.13\")   Wt 65.3 kg (144 lb)   LMP  (LMP Unknown)   SpO2 97%   BMI 28.95 kg/m    Body mass index is 28.95 kg/m .  Physical Exam   GENERAL: healthy, alert and no distress  RESP: no audible wheezes  CV: regular rate and rhythm,  MS:right shoulder \"Mario\" deformity, positive speed test, sensation is intact,  There is mild ecchymosis of the bicep ,restricted motion with right " shoulder extension and flexion   left shoulder no gross musculoskeletal defects noted, no edema,  r

## 2022-11-01 NOTE — TELEPHONE ENCOUNTER
Spoke with patient; patient thought she was supposed to have an MRI after office visit today with Dr. Johnson and needed clarification.    Informed patient it is an ultrasound that Dr. Johnson ordered.  Patient confirmed understanding and agreement and has number to make the appointment.      Patient states she will call to make follow-up with Dr. Johnson when she finds out the date for ultrasound.    Deysi Arrieta RN

## 2022-11-04 ENCOUNTER — ANCILLARY PROCEDURE (OUTPATIENT)
Dept: ULTRASOUND IMAGING | Facility: CLINIC | Age: 87
End: 2022-11-04
Attending: STUDENT IN AN ORGANIZED HEALTH CARE EDUCATION/TRAINING PROGRAM
Payer: COMMERCIAL

## 2022-11-04 DIAGNOSIS — M25.511 ACUTE PAIN OF RIGHT SHOULDER: ICD-10-CM

## 2022-11-04 DIAGNOSIS — S46.211A BICEPS MUSCLE TEAR, RIGHT, INITIAL ENCOUNTER: Primary | ICD-10-CM

## 2022-11-04 DIAGNOSIS — S46.201A TRAUMATIC INJURY OF RIGHT BICEPS BRACHII MUSCLE: ICD-10-CM

## 2022-11-04 PROCEDURE — 76881 US COMPL JOINT R-T W/IMG: CPT | Performed by: RADIOLOGY

## 2022-11-07 ENCOUNTER — OFFICE VISIT (OUTPATIENT)
Dept: FAMILY MEDICINE | Facility: CLINIC | Age: 87
End: 2022-11-07
Payer: COMMERCIAL

## 2022-11-07 VITALS
OXYGEN SATURATION: 97 % | BODY MASS INDEX: 29.03 KG/M2 | RESPIRATION RATE: 16 BRPM | HEART RATE: 77 BPM | WEIGHT: 144.4 LBS | TEMPERATURE: 98.7 F | SYSTOLIC BLOOD PRESSURE: 122 MMHG | DIASTOLIC BLOOD PRESSURE: 54 MMHG

## 2022-11-07 DIAGNOSIS — S46.212D BICEPS TENDON RUPTURE, LEFT, SUBSEQUENT ENCOUNTER: Primary | ICD-10-CM

## 2022-11-07 PROCEDURE — 99213 OFFICE O/P EST LOW 20 MIN: CPT | Performed by: PHYSICIAN ASSISTANT

## 2022-11-07 PROCEDURE — 0124A COVID-19,PF,PFIZER BOOSTER BIVALENT: CPT | Performed by: PHYSICIAN ASSISTANT

## 2022-11-07 PROCEDURE — 91312 COVID-19,PF,PFIZER BOOSTER BIVALENT: CPT | Performed by: PHYSICIAN ASSISTANT

## 2022-11-07 ASSESSMENT — PAIN SCALES - GENERAL: PAINLEVEL: MILD PAIN (2)

## 2022-11-07 NOTE — PROGRESS NOTES
Assessment & Plan   Problem List Items Addressed This Visit    None  Visit Diagnoses     Biceps tendon rupture, left, subsequent encounter    -  Primary    Relevant Orders    Orthopedic  Referral         Complete tear of the proximal long head of the biceps tendon. She is right handed and enjoys bowling and golfing. Lives independently. Has PT scheduled. Will refer to ortho/sports med at [facility] as her previous referral was to the [facility]. We discussed how there was likely no need for surgery given she does continue to have significant strength in the limb, but would like ortho input.    Complete history and physical exam as below. AF with normal VS.    All questions were answered at this time. Pt expressed understanding of and agreement with this dx, tx, and plan. No further workup warranted and standard medication warnings given. I have given the patient a list of pertinent indications for re-evaluation. Will go to the Emergency Department if symptoms worsen or new concerning symptoms arise. Patient left in no apparent distress.      See Patient Instructions    Return in about 1 week (around 11/14/2022) for a recheck with orthopedics, a recheck as needed.    CAPRICE Collado  [facility] [place] Lamas is a 86 year old presenting for the following health issues:  Results      HPI     Discuss US results from 11/4/22 done at [facility] 10-14 days ago she injured her right arm golfing and bowling. Was seen by Dr. Johnson and US showed a rupture long biceps tendon. She is right handed.    Review of Systems   Constitutional, HEENT, cardiovascular, pulmonary, gi and gu systems are negative, except as otherwise noted.      Objective    /54   Pulse 77   Temp 98.7  F (37.1  C) (Tympanic)   Resp 16   Wt 65.5 kg (144 lb 6.4 oz)   LMP  (LMP Unknown)   SpO2 97%   BMI 29.03 kg/m    Body mass index is 29.03 kg/m .  Physical Exam  Vitals and nursing note reviewed.    Constitutional:       General: She is not in acute distress.     Appearance: Normal appearance. She is not diaphoretic.   HENT:      Head: Normocephalic and atraumatic.      Nose: Nose normal.   Eyes:      Conjunctiva/sclera: Conjunctivae normal.   Pulmonary:      Effort: Pulmonary effort is normal. No respiratory distress.   Musculoskeletal:      Comments: RUE: faint ecchymosis and tenderness to the anterior mid arm. Distal CMS intact. Remainder of limb non-tender.  No other overlying signs of trauma or infection.     Skin:     General: Skin is dry.      Coloration: Skin is not jaundiced or pale.   Neurological:      General: No focal deficit present.      Mental Status: She is alert. Mental status is at baseline.   Psychiatric:         Mood and Affect: Mood normal.         Behavior: Behavior normal.

## 2022-11-07 NOTE — PATIENT INSTRUCTIONS
Bettye Lamas,    Thank you for allowing [facility] to manage your care.    You have a torn muscle in your right arm that is unlikely to need surgery, but I would like you to see orthopedics to discuss this.    If you develop worsening/changing symptoms at any time, please call 911 or go to the emergency department for evaluation.    I made a referral, they will be calling in approximately 1 week to set up your appointment.  If you do not hear from them, please call the specialty number on your after visit summary.     If you have any questions or concerns, please feel free to call us at [phone]    Sincerely,    Rony Gimenez PA-C    Did you know?      You can schedule a video visit for follow-up appointments as well as future appointments for certain conditions.  Please see the below link.     [email or web address]    If you have not already done so,  I encourage you to sign up for [employer] ([email or web address]).  This will allow you to review your results, securely communicate with a provider, and schedule virtual visits as well.

## 2022-11-08 NOTE — PROGRESS NOTES
"CHIEF COMPLAINT:   Chief Complaint   Patient presents with     Right Shoulder - Pain     Right proximal biceps tendon rupture. Onset: 10/29/22 she was golfing without incident, but after the round had proximal shoulder pain/soreness. On 11/2 she was bowling and felt a \"pop\" in the shoulder and she was unable to continue bowling. Bruising and swelling occurred in the upper arm. Doing better now. Just a little sore with activity.      Cydney Christiansen is seen today in the [facility] for evaluation of right shoulder injury at the request of Jamel Gimenez       HISTORY:  Cydney Christiansen is a 86 year old female, right  -hand dominant, who is seen in consultation at the request of Jamel VASQUES for right shoulder injury that occurred almost 2 weeks ago. She was golfing 10/29/2022 without incident, but after the round she had pain in the front of the right shoulder. On 11/20/2022 she went bowling and felt a \"pop\" in the shoulder and was unable to continue to bowl. She had bruising and swelling in the upper arm.     Since then, feeling better. Just a little sore with activities.    She notes shoulder sore at night for a while.      Onset: Attributed to specific activity.  Activity: . Golfing, bowling.  Symptoms have been improving since that time.  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    She has no past medical history of Acne, Allergies, Amblyopia, Arthritis, Complication of anesthesia, Diabetes (H), Diabetes mellitus (H), Diabetic retinopathy (H), Eczema, Heart valve disorder, Macular degeneration, Malignant hyperthermia, Malignant melanoma nos, Pacemaker, Photosensitive contact dermatitis, Psoriasis, Retinal detachment, Senile macular degeneration, Strabismus, Type II or unspecified type diabetes mellitus without mention of complication, not stated as uncontrolled, Unspecified asthma(493.90), Urticaria, or Uveitis.  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     Hypertension goal BP (blood pressure) < 140/90     Seborrheic keratosis     Malignant neoplasm of overlapping sites of left female breast (H)     Scoliosis     Compression fracture of thoracic vertebra (H)     Mass of left hand     Primary open angle glaucoma of both eyes, unspecified glaucoma stage     Osteoporosis, unspecified osteoporosis type, unspecified pathological fracture presence     Nuclear sclerosis of left eye     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)     Actinic keratosis     History of nonmelanoma skin cancer     Combined forms of age-related cataract of right eye       Surgical Hx:  has a past surgical history that includes d & c;  tubal ligation; cystoscopy (12/31/2008); REMOVAL GALLBLADDER; colonoscopy (5/2008, 5/13, 6/14, 6/17); surgical history of -  (9/11); Laser argon treatment; Abdomen surgery; Lumpectomy breast with sentinel node, combined (Left, 7/29/2015); TRABECULOPLASTY BY LASER SURGERY (Left, 4/18/07); TRABECULOPLASTY BY LASER SURGERY (Right, 5/4/11); TRABECULOPLASTY BY LASER SURGERY (Left, 3/17/15); TRABECULOPLASTY BY LASER SURGERY (Right, 6/23/15); Phacoemulsification clear cornea with standard intraocular lens implant (Left, 12/8/2017); cataract iol, rt/lt; Phacoemulsification clear cornea with standard intraocular lens implant (Right, 10/19/2020); Lumpectomy breast; and Biopsy breast.    Medications:   Current Outpatient Medications:      alendronate (FOSAMAX) 70 MG tablet, TAKE 1 TABLET BY MOUTH EVERY 7 DAYS, Disp: 12 tablet, Rfl: 11     co-enzyme Q-10 100 MG CAPS capsule, Take 200 mg by mouth daily, Disp: , Rfl:      cyanocobalamin (VITAMIN B-12) 500 MCG tablet, Take 1 tablet (500 mcg) by mouth daily, Disp: 150 tablet, Rfl: 3     dorzolamide-timolol (COSOPT) 2-0.5 % ophthalmic solution, Place 1 drop into both eyes 2 times daily, Disp: 10 mL, Rfl: 4     ferrous sulfate (FEROSUL) 325 (65 Fe) MG tablet, Take 1 tablet (325 mg) by mouth daily (with breakfast), Disp: 90 tablet, Rfl: 3     ibuprofen (ADVIL,MOTRIN) 200 MG tablet, Take 200 mg by mouth every 4 hours as needed., Disp: , Rfl:      latanoprost (XALATAN) 0.005 % ophthalmic solution, Place 1 drop into both eyes At Bedtime, Disp: 7.5 mL, Rfl: 3     losartan (COZAAR) 50 MG tablet, Take 1 tablet (50 mg) by mouth daily, Disp: 90 tablet, Rfl: 3     Multiple Vitamins-Minerals (ONE DAILY ADULTS 50+) TABS, , Disp: , Rfl:      polyethylene glycol (MIRALAX) 17 GM/Dose powder, Take 17 g (1 capful) by mouth 2 times daily as needed for constipation, Disp: 507 g, Rfl: 0     simvastatin (ZOCOR) 20 MG tablet, Take 1 tablet (20 mg) by mouth At Bedtime, Disp: 90 tablet, Rfl: 3      "triamcinolone (KENALOG) 0.1 % external cream, Apply twice daily for 2 weeks, Disp: 30 g, Rfl: 0     VITAMIN D-1000 MAX -1000 MG-UNIT OR TABS, 1 daily, Disp: , Rfl:     Current Facility-Administered Medications:      denosumab (PROLIA) injection 60 mg, 60 mg, Subcutaneous, Q6 Months, Estrellita Hernandez MD    Allergies:   Allergies   Allergen Reactions     Lisinopril Cough       Social Hx: retired.   reports that she quit smoking about 46 years ago. Her smoking use included cigarettes. She has a 10.00 pack-year smoking history. She has never used smokeless tobacco. She reports current alcohol use. She reports that she does not use drugs.    Family Hx: family history includes Asthma in her son; Blood Disease in her sister; Breast Cancer in her mother; Diabetes in her mother; Eye Disorder in her mother; Glaucoma in her brother, brother, mother, and another family member; Heart Disease in her sister; Macular Degeneration in her brother and mother; Osteoporosis in her mother; Prostate Cancer in her brother, brother, and father; Thyroid Disease in her sister..    REVIEW OF SYSTEMS: 10 point ROS neg other than the symptoms noted above in the HPI and PMH. Notables include  CONSTITUTIONAL:NEGATIVE for fever, chills, change in weight  INTEGUMENTARY/SKIN: NEGATIVE for worrisome rashes, moles or lesions  MUSCULOSKELETAL:See HPI above  NEURO: NEGATIVE for weakness, dizziness or paresthesias    PHYSICAL EXAM:  BP (!) 147/84   Ht 1.501 m (4' 11.1\")   Wt 65.3 kg (144 lb)   LMP  (LMP Unknown)   BMI 28.99 kg/m     GENERAL APPEARANCE: elderly, alert, no distress  SKIN: no suspicious lesions or rashes  NEURO: decreased  strength and tone, mentation intact and speech normal  PSYCH:  mentation appears normal and affect normal, not anxious  RESPIRATORY: No increased work of breathing.  VASCULAR: Radial pulses 2+ and brisk cappillary refill       MUSCULOSKELETAL:    RIGHT UPPER EXTREMITY:  Sensation intact to light touch in median, " "radial, ulnar and axillary nerve distributions  Palpable 2+ radial pulse, brisk capillary refill to all fingers, wwp  Intact epl fpl fdp edc wrist flexion/extension biceps triceps deltoid    RIGHT SHOULDER:  Shoulder Inspection: no swelling, bruising, discoloration, or obvious deformity or asymmetry  There is palpable biceps deformity not well visible given upper arm tone  Tender: proximal bicep tendon and biceps muscle belly  Non-tender: AC joint and greater tuberosity  Range of Motion:   Active:all normal, symmetric to left shoulder.  Strength: forward flexion 5-/5, External rotation 4+/5  Impingement: equivocal.  Special tests: Empty Can: Negative      X-RAY INTERPRETATION:  right  Shoulder and humerus xrays obtained 1024/2022 were reviewed personally in clinic today with the patient. On my review, No acute fracture or dislocation. Osseous demineralization is noted.    [facility] ultrasound 11/4/2022: Complete tear of the proximal long head of the biceps tendon with distal tendinous retraction to the level of the proximal humeral shaft.          ASSESSMENT: Cydney Christiansen is a 86 year old female, right  -hand dominant with acute proximal biceps tendon tear      PLAN: nonsurgical management.  * consistent with rupture of the long head of the biceps, usually happens in cases of underlying chronic tendinosis or tendonitis  * likely why shoulder had been hurting recently.  * most patient's do well with nonsurgical treatment of this injury as there are 2 heads to biceps proximally. Usually surgery in younger patients, laborers but not frequently. Most patients will return most/all function with this injury without surgery. Biggest concern typically is cosmesis, or \"pau\" deformity, bulge of the arm.  * can have muscle cramps at times, usually early on.   * this will be treated nonoperatively, with activity modification, x4 weeks, then gradual return of activities, strengthening.  * consider Physical Therapy in future as " needed.  * gentle shoulder range of motion in all planes  * no lifting, pushing, pulling with right arm.  * return to clinic 4 weeks, sooner if needed.      Ac Bailey M.D., M.S.  Dept. of Orthopaedic Surgery  [facility]

## 2022-11-10 ENCOUNTER — OFFICE VISIT (OUTPATIENT)
Dept: ORTHOPEDICS | Facility: CLINIC | Age: 87
End: 2022-11-10
Payer: COMMERCIAL

## 2022-11-10 VITALS
DIASTOLIC BLOOD PRESSURE: 84 MMHG | SYSTOLIC BLOOD PRESSURE: 147 MMHG | HEIGHT: 59 IN | WEIGHT: 144 LBS | BODY MASS INDEX: 29.03 KG/M2

## 2022-11-10 DIAGNOSIS — S46.211A BICEPS RUPTURE, PROXIMAL, RIGHT, INITIAL ENCOUNTER: Primary | ICD-10-CM

## 2022-11-10 PROCEDURE — 99203 OFFICE O/P NEW LOW 30 MIN: CPT | Performed by: ORTHOPAEDIC SURGERY

## 2022-11-10 ASSESSMENT — PAIN SCALES - GENERAL: PAINLEVEL: MILD PAIN (2)

## 2022-11-10 NOTE — LETTER
"    11/10/2022         RE: Cydney Christiansen  [address] [facility] [address]        Dear Colleague,    Thank you for referring your patient, Cydney Christiansen, to the [facility]. Please see a copy of my visit note below.    CHIEF COMPLAINT:   Chief Complaint   Patient presents with     Right Shoulder - Pain     Right proximal biceps tendon rupture. Onset: 10/29/22 she was golfing without incident, but after the round had proximal shoulder pain/soreness. On 11/2 she was bowling and felt a \"pop\" in the shoulder and she was unable to continue bowling. Bruising and swelling occurred in the upper arm. Doing better now. Just a little sore with activity.      Cydney Christiansen is seen today in the [facility] for evaluation of right shoulder injury at the request of Jamel Gimenez       HISTORY:  Cydney Christiansen is a 86 year old female, right  -hand dominant, who is seen in consultation at the request of Jamel VASQUES for right shoulder injury that occurred almost 2 weeks ago. She was golfing 10/29/2022 without incident, but after the round she had pain in the front of the right shoulder. On 11/20/2022 she went bowling and felt a \"pop\" in the shoulder and was unable to continue to bowl. She had bruising and swelling in the upper arm.     Since then, feeling better. Just a little sore with activities.    She notes shoulder sore at night for a while.      Onset: Attributed to specific activity.  Activity: . Golfing, bowling.  Symptoms have been improving since that time.  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    She has no past medical history of Acne, Allergies, Amblyopia, Arthritis, Complication of anesthesia, Diabetes (H), Diabetes mellitus (H), Diabetic retinopathy (H), Eczema, Heart valve disorder, Macular degeneration, Malignant hyperthermia, Malignant melanoma nos, Pacemaker, Photosensitive contact dermatitis, Psoriasis, Retinal detachment, Senile macular degeneration, Strabismus, Type II or unspecified type diabetes mellitus without mention of complication, not stated as uncontrolled, Unspecified asthma(493.90), Urticaria, or Uveitis.  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     Hypertension goal BP (blood pressure) < 140/90     Seborrheic keratosis     Malignant neoplasm of overlapping sites of left female breast (H)     Scoliosis     Compression fracture of thoracic vertebra (H)     Mass of left hand     Primary open angle glaucoma of both eyes, unspecified glaucoma stage     Osteoporosis, unspecified osteoporosis type, unspecified pathological fracture presence      Nuclear sclerosis of left eye     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)     Actinic keratosis     History of nonmelanoma skin cancer     Combined forms of age-related cataract of right eye       Surgical Hx:  has a past surgical history that includes d & c; tubal ligation; cystoscopy (12/31/2008); REMOVAL GALLBLADDER; colonoscopy (5/2008, 5/13, 6/14, 6/17); surgical history of -  (9/11); Laser argon treatment; Abdomen surgery; Lumpectomy breast with sentinel node, combined (Left, 7/29/2015); TRABECULOPLASTY BY LASER SURGERY (Left, 4/18/07); TRABECULOPLASTY BY LASER SURGERY (Right, 5/4/11); TRABECULOPLASTY BY LASER SURGERY (Left, 3/17/15); TRABECULOPLASTY BY LASER SURGERY (Right, 6/23/15); Phacoemulsification clear cornea with standard intraocular lens implant (Left, 12/8/2017); cataract iol, rt/lt; Phacoemulsification clear cornea with standard intraocular lens implant (Right, 10/19/2020); Lumpectomy breast; and Biopsy breast.    Medications:   Current Outpatient Medications:      alendronate (FOSAMAX) 70 MG tablet, TAKE 1 TABLET BY MOUTH EVERY 7 DAYS, Disp: 12 tablet, Rfl: 11     co-enzyme Q-10 100 MG CAPS capsule, Take 200 mg by mouth daily, Disp: , Rfl:      cyanocobalamin (VITAMIN B-12) 500 MCG tablet, Take 1 tablet (500 mcg) by mouth daily, Disp: 150 tablet, Rfl: 3     dorzolamide-timolol (COSOPT) 2-0.5 % ophthalmic solution, Place 1 drop into both eyes 2 times daily, Disp: 10 mL, Rfl: 4     ferrous sulfate (FEROSUL) 325 (65 Fe) MG tablet, Take 1 tablet (325 mg) by mouth daily (with breakfast), Disp: 90 tablet, Rfl: 3     ibuprofen (ADVIL,MOTRIN) 200 MG tablet, Take 200 mg by mouth every 4 hours as needed., Disp: , Rfl:      latanoprost (XALATAN) 0.005 % ophthalmic solution, Place 1 drop into both eyes At Bedtime, Disp: 7.5 mL, Rfl: 3     losartan (COZAAR) 50 MG tablet, Take 1 tablet (50 mg) by mouth daily, Disp: 90 tablet, Rfl: 3     Multiple Vitamins-Minerals (ONE DAILY ADULTS 50+) TABS, , Disp: , Rfl:  "     polyethylene glycol (MIRALAX) 17 GM/Dose powder, Take 17 g (1 capful) by mouth 2 times daily as needed for constipation, Disp: 507 g, Rfl: 0     simvastatin (ZOCOR) 20 MG tablet, Take 1 tablet (20 mg) by mouth At Bedtime, Disp: 90 tablet, Rfl: 3     triamcinolone (KENALOG) 0.1 % external cream, Apply twice daily for 2 weeks, Disp: 30 g, Rfl: 0     VITAMIN D-1000 MAX -1000 MG-UNIT OR TABS, 1 daily, Disp: , Rfl:     Current Facility-Administered Medications:      denosumab (PROLIA) injection 60 mg, 60 mg, Subcutaneous, Q6 Months, Estrellita Hernandez MD    Allergies:   Allergies   Allergen Reactions     Lisinopril Cough       Social Hx: retired.   reports that she quit smoking about 46 years ago. Her smoking use included cigarettes. She has a 10.00 pack-year smoking history. She has never used smokeless tobacco. She reports current alcohol use. She reports that she does not use drugs.    Family Hx: family history includes Asthma in her son; Blood Disease in her sister; Breast Cancer in her mother; Diabetes in her mother; Eye Disorder in her mother; Glaucoma in her brother, brother, mother, and another family member; Heart Disease in her sister; Macular Degeneration in her brother and mother; Osteoporosis in her mother; Prostate Cancer in her brother, brother, and father; Thyroid Disease in her sister..    REVIEW OF SYSTEMS: 10 point ROS neg other than the symptoms noted above in the HPI and PMH. Notables include  CONSTITUTIONAL:NEGATIVE for fever, chills, change in weight  INTEGUMENTARY/SKIN: NEGATIVE for worrisome rashes, moles or lesions  MUSCULOSKELETAL:See HPI above  NEURO: NEGATIVE for weakness, dizziness or paresthesias    PHYSICAL EXAM:  BP (!) 147/84   Ht 1.501 m (4' 11.1\")   Wt 65.3 kg (144 lb)   LMP  (LMP Unknown)   BMI 28.99 kg/m     GENERAL APPEARANCE: elderly, alert, no distress  SKIN: no suspicious lesions or rashes  NEURO: decreased  strength and tone, mentation intact and speech normal  PSYCH:  " "mentation appears normal and affect normal, not anxious  RESPIRATORY: No increased work of breathing.  VASCULAR: Radial pulses 2+ and brisk cappillary refill       MUSCULOSKELETAL:    RIGHT UPPER EXTREMITY:  Sensation intact to light touch in median, radial, ulnar and axillary nerve distributions  Palpable 2+ radial pulse, brisk capillary refill to all fingers, wwp  Intact epl fpl fdp edc wrist flexion/extension biceps triceps deltoid    RIGHT SHOULDER:  Shoulder Inspection: no swelling, bruising, discoloration, or obvious deformity or asymmetry  There is palpable biceps deformity not well visible given upper arm tone  Tender: proximal bicep tendon and biceps muscle belly  Non-tender: AC joint and greater tuberosity  Range of Motion:   Active:all normal, symmetric to left shoulder.  Strength: forward flexion 5-/5, External rotation 4+/5  Impingement: equivocal.  Special tests: Empty Can: Negative      X-RAY INTERPRETATION:  right  Shoulder and humerus xrays obtained 1024/2022 were reviewed personally in clinic today with the patient. On my review, No acute fracture or dislocation. Osseous demineralization is noted.    [facility] ultrasound 11/4/2022: Complete tear of the proximal long head of the biceps tendon with distal tendinous retraction to the level of the proximal humeral shaft.          ASSESSMENT: Cydney Christiansen is a 86 year old female, right  -hand dominant with acute proximal biceps tendon tear      PLAN: nonsurgical management.  * consistent with rupture of the long head of the biceps, usually happens in cases of underlying chronic tendinosis or tendonitis  * likely why shoulder had been hurting recently.  * most patient's do well with nonsurgical treatment of this injury as there are 2 heads to biceps proximally. Usually surgery in younger patients, laborers but not frequently. Most patients will return most/all function with this injury without surgery. Biggest concern typically is cosmesis, or \"pau\" " deformity, bulge of the arm.  * can have muscle cramps at times, usually early on.   * this will be treated nonoperatively, with activity modification, x4 weeks, then gradual return of activities, strengthening.  * consider Physical Therapy in future as needed.  * gentle shoulder range of motion in all planes  * no lifting, pushing, pulling with right arm.  * return to clinic 4 weeks, sooner if needed.      Ac Bailey M.D., M.S.  Dept. of Orthopaedic Surgery  [facility]      Again, thank you for allowing me to participate in the care of your patient.        Sincerely,        Ac Bailey MD

## 2022-11-14 ENCOUNTER — TELEPHONE (OUTPATIENT)
Dept: DERMATOLOGY | Facility: CLINIC | Age: 87
End: 2022-11-14

## 2022-11-14 NOTE — TELEPHONE ENCOUNTER
Excision/Mohs previsit information                                                    Diagnosis: basal cell carcinoma  Site(s): crown    Over the counter Chlorhexidine surgical soap to wash all skin below the belly button twice before surgery should be recommended for the following:  - Surgical sites below the waist  - Immunosuppressed  - Previous surgical site infection  - Anticipated wound care challenges    Medication & Allergy Information                                                      Review and update allergy and medication list.    Do you take the following medications:    Coumadin, Eliquis, Pradaxa, Xarelto:  NO   -If on Coumadin, INR should be checked within 7 days of surgery.  Range should be 3.5 or less or within therapeutic range.    Past Medical History                                                    Do you currently or have you previously had any of the following conditions:      Hepatitis:  NO    HIV/AIDS:  NO    Prolonged bleeding or bleeding disorder:  NO    Pacemaker or Defibrillator:  NO.      History of artificial or heart valve replacement:  NO    Endocarditis (inflammation of the inner lining of the heart's chambers and valves):  NO    Have you ever had a prosthetic joint infection:  NO    Pregnant or Breastfeeding:  N/A    Mobility device (wheelchair, transfer difficulty): NO    Important Reminders:                                                        Ok to take all of their medications as prescribed    Patients can eat, no need to be fasting    Patient will not be able to get the site wet for 48 hrs    No submerging wound in standing water (lake, pool, bathtub, hot tub) for 2 weeks    No physical activity for 48 hrs (further restrictions will be discussed by MD at time of visit)    Quin Webber RN

## 2022-11-14 NOTE — TELEPHONE ENCOUNTER
Results and plan reviewed with Cydney.  Procedure scheduled for 12/12/22.     I advised patient to plan on being in clinic through the lunch hour.  I advised patient that they don't need to be fasting and they can take medications as scheduled.  I reviewed that they will have a pressure bandage on for 48 hrs following procedure and that we don't want this to get wet.  I reviewed that following the 48 hrs they will begin daily wound care for 1 week, but should not submerge the wound in standing water such as a bathtub, pool, hot tub, etc.     Patient verbalized understanding and agreed with the plan.     MARIEL Watkins MD   11/11/2022 11:38 PM CST       BCC, scalp, needs mohs, this is a skin cancer  AK, left mid forearm, recheck at that time, precancer   Please schedule     Final Diagnosis   A(1). Skin, crown, shave:  - Basal cell carcinoma, superficial and nodular types, extending to the lateral and deep margins - (see description)      B(2). Skin, L mid forearm, shave:  - Actinic keratosis - (see comment)

## 2022-11-18 ENCOUNTER — VIRTUAL VISIT (OUTPATIENT)
Dept: FAMILY MEDICINE | Facility: CLINIC | Age: 87
End: 2022-11-18
Payer: COMMERCIAL

## 2022-11-18 DIAGNOSIS — U07.1 COVID-19 VIRUS INFECTION: Primary | ICD-10-CM

## 2022-11-18 PROCEDURE — 99442 PR PHYSICIAN TELEPHONE EVALUATION 11-20 MIN: CPT | Mod: CS | Performed by: PHYSICIAN ASSISTANT

## 2022-11-18 NOTE — PROGRESS NOTES
"Cydney is a 87 year old who is being evaluated via a billable telephone visit.      What phone number would you like to be contacted at? [phone]  How would you like to obtain your AVS? [facility]    Assessment & Plan   Problem List Items Addressed This Visit    None  Visit Diagnoses     COVID-19 virus infection    -  Primary    Relevant Medications    nirmatrelvir and ritonavir (PAXLOVID) therapy pack         Impression is COVID-19. Positive home test. Appears well and non-toxic and I have low suspicion for impending airway obstruction or respiratory distress at this point.  She will push p.o. fluids, use over-the-counter meds for symptoms, complete a course of Paxlovid after discussing risks/benefits, and follow-up with us in 2 weeks if not improving or urgent care/the ER if symptoms worsen/change at any time.    DDx and Dx discussed with and explained to the pt to their satisfaction.  All questions were answered at this time. Pt expressed understanding of and agreement with this dx, tx, and plan. No further workup warranted and standard medication warnings given. I have given the patient a list of pertinent indications for re-evaluation. Will go to the Emergency Department if symptoms worsen or new concerning symptoms arise. Patient left the call in no apparent distress.     Prescription drug management  16 minutes spent on the date of the encounter doing chart review, history and exam, documentation and further activities per the note     See Patient Instructions  COVID-19 positive patient.  Encounter for consideration of medication intervention. Patient does qualify for a prescription. Full discussion with patient including medication options, risks and benefits. Potential drug interactions reviewed with patient.     Treatment Planned Paxlovid  Temporary change to home medications:  None     Estimated body mass index is 28.99 kg/m  as calculated from the following:    Height as of 11/10/22: 1.501 m (4' 11.1\").   "  Weight as of 11/10/22: 65.3 kg (144 lb).  GFR Estimate   Date Value Ref Range Status   10/17/2022 86 >60 mL/min/1.73m2 Final     Comment:     Effective December 21, 2021 eGFRcr in adults is calculated using the 2021 CKD-EPI creatinine equation which includes age and gender (Miranda stroud al., [place], DOI: 10.1056/NKEZmd0689114)   05/03/2021 68 >60 mL/min/[1.73_m2] Final     Comment:     Non  GFR Calc  Starting 12/18/2018, serum creatinine based estimated GFR (eGFR) will be   calculated using the Chronic Kidney Disease Epidemiology Collaboration   (CKD-EPI) equation.       Lab Results   Component Value Date    [id number] Negative 04/06/2022       Return in about 2 weeks (around 12/2/2022) for a recheck of your symptoms if not improving, or call 911/go to an ER anytime if worsening.    CAPRICE Collado  [facility] RICK Lamas is a 87 year old presenting for the following health issues:  No chief complaint on file.    HPI     COVID-19 Symptom Review  How many days ago did these symptoms start? 4 days on 11/14/22    Are any of the following symptoms significant for you?    New or worsening difficulty breathing? No    Worsening cough? No    Fever or chills? No    Headache: YES    Sore throat: YES    Chest pain: No    Diarrhea: No    Body aches? YES    What treatments has patient tried? Advil, Tylenol   Does patient live in a nursing home, group home, or shelter? No  Does patient have a way to get food/medications during quarantined? Yes     Review of Systems   Constitutional, HEENT, cardiovascular, pulmonary, gi and gu systems are negative, except as otherwise noted.      Objective       Vitals:  No vitals were obtained today due to virtual visit.    Physical Exam   healthy, alert and no distress  PSYCH: Alert and oriented times 3; coherent speech, normal   rate and volume, able to articulate logical thoughts, able   to abstract reason, no tangential thoughts, no  hallucinations   or delusions  Her affect is normal and pleasant  RESP: No cough, no audible wheezing, able to talk in full sentences  Remainder of exam unable to be completed due to telephone visits      Phone call duration: 12 minutes

## 2022-11-18 NOTE — PATIENT INSTRUCTIONS
Bettye Lamas,    Thank you for allowing [facility] to manage your care.    Discontinue your simvastatin for 10 days after beginning Paxlovid.    If you develop worsening/changing symptoms at any time, please call 911 or go to the emergency department for evaluation.    I sent your prescriptions to your pharmacy.    Drink 8-10 glasses of fluid daily to stay well-hydrated.    For your pain, please use Tylenol 650mg every 6 hours. Max acetaminophen (Tylenol) 3,000mg/24 hours    If you have any questions or concerns, please feel free to call us at [phone]    Sincerely,    Rony Gimenez PA-C    Did you know?      You can schedule a video visit for follow-up appointments as well as future appointments for certain conditions.  Please see the below link.     [email or web address]    If you have not already done so,  I encourage you to sign up for [facility] ([email or web address]).  This will allow you to review your results, securely communicate with a provider, and schedule virtual visits as well.

## 2022-11-21 ENCOUNTER — TELEPHONE (OUTPATIENT)
Dept: OPHTHALMOLOGY | Facility: CLINIC | Age: 87
End: 2022-11-21

## 2022-11-21 NOTE — TELEPHONE ENCOUNTER
[facility] Call Center    Phone Message    May a detailed message be left on voicemail: no     Reason for Call: Other: Pt tested positive for covid on 11/17 but has an appt for 11/25 and wants to know if she is okay to still come into the clinic. Pt states she doesn't currently have any symptoms and would like to clarify if the appt is okay to stay. Thank You!     Action Taken: Message routed to:  [facility] ([facility] eye    Travel Screening: Not Applicable

## 2022-11-25 ENCOUNTER — OFFICE VISIT (OUTPATIENT)
Dept: OPHTHALMOLOGY | Facility: CLINIC | Age: 87
End: 2022-11-25
Payer: COMMERCIAL

## 2022-11-25 DIAGNOSIS — H52.4 MYOPIA OF BOTH EYES WITH REGULAR ASTIGMATISM AND PRESBYOPIA: ICD-10-CM

## 2022-11-25 DIAGNOSIS — H40.1431 PSEUDOEXFOLIATIVE GLAUCOMA, BOTH EYES, MILD STAGE: ICD-10-CM

## 2022-11-25 DIAGNOSIS — Z96.1 PSEUDOPHAKIA: ICD-10-CM

## 2022-11-25 DIAGNOSIS — H43.812 PVD (POSTERIOR VITREOUS DETACHMENT), LEFT: ICD-10-CM

## 2022-11-25 DIAGNOSIS — Z01.00 EXAMINATION OF EYES AND VISION: Primary | ICD-10-CM

## 2022-11-25 DIAGNOSIS — H52.223 MYOPIA OF BOTH EYES WITH REGULAR ASTIGMATISM AND PRESBYOPIA: ICD-10-CM

## 2022-11-25 DIAGNOSIS — H52.13 MYOPIA OF BOTH EYES WITH REGULAR ASTIGMATISM AND PRESBYOPIA: ICD-10-CM

## 2022-11-25 DIAGNOSIS — H26.8 PXF (PSEUDOEXFOLIATION OF LENS CAPSULE): ICD-10-CM

## 2022-11-25 PROCEDURE — 92014 COMPRE OPH EXAM EST PT 1/>: CPT | Performed by: STUDENT IN AN ORGANIZED HEALTH CARE EDUCATION/TRAINING PROGRAM

## 2022-11-25 PROCEDURE — 92015 DETERMINE REFRACTIVE STATE: CPT | Performed by: STUDENT IN AN ORGANIZED HEALTH CARE EDUCATION/TRAINING PROGRAM

## 2022-11-25 ASSESSMENT — REFRACTION_MANIFEST
OS_SPHERE: -1.00
OS_CYLINDER: +0.75
OS_AXIS: 170
OD_ADD: +3.00
OD_CYLINDER: +0.50
OD_AXIS: 135
OS_ADD: +3.00
OD_SPHERE: -1.00

## 2022-11-25 ASSESSMENT — CONF VISUAL FIELD
OS_NORMAL: 1
OS_INFERIOR_NASAL_RESTRICTION: 0
OD_NORMAL: 1
OS_SUPERIOR_NASAL_RESTRICTION: 0
OD_INFERIOR_TEMPORAL_RESTRICTION: 0
OD_SUPERIOR_TEMPORAL_RESTRICTION: 0
OS_INFERIOR_TEMPORAL_RESTRICTION: 0
OS_SUPERIOR_TEMPORAL_RESTRICTION: 0
OD_INFERIOR_NASAL_RESTRICTION: 0
OD_SUPERIOR_NASAL_RESTRICTION: 0

## 2022-11-25 ASSESSMENT — REFRACTION_WEARINGRX
OD_AXIS: 134
OS_ADD: +3.00
OD_ADD: +3.00
OD_SPHERE: -1.25
OD_CYLINDER: +0.50
SPECS_TYPE: BIFOCAL
OS_CYLINDER: +1.00
OD_HPRISM: 2.0
OD_HBASE: IN
OS_AXIS: 170
OS_SPHERE: -0.50

## 2022-11-25 ASSESSMENT — REFRACTION_FINALRX
OD_HBASE: IN
OD_HPRISM: 2.0

## 2022-11-25 ASSESSMENT — EXTERNAL EXAM - RIGHT EYE: OD_EXAM: NORMAL

## 2022-11-25 ASSESSMENT — TONOMETRY
OS_IOP_MMHG: 17
IOP_METHOD: APPLANATION
OD_IOP_MMHG: 18

## 2022-11-25 ASSESSMENT — PACHYMETRY
OS_CT(UM): .657
OD_CT(UM): .614

## 2022-11-25 ASSESSMENT — CUP TO DISC RATIO
OS_RATIO: 0.5
OD_RATIO: 0.6

## 2022-11-25 ASSESSMENT — VISUAL ACUITY
OS_CC+: -1
OD_CC: 20/20
OS_CC: 20/30
METHOD: SNELLEN - LINEAR
CORRECTION_TYPE: GLASSES
OD_CC+: -2

## 2022-11-25 ASSESSMENT — SLIT LAMP EXAM - LIDS
COMMENTS: NORMAL
COMMENTS: NORMAL

## 2022-11-25 ASSESSMENT — EXTERNAL EXAM - LEFT EYE: OS_EXAM: NORMAL

## 2022-11-25 NOTE — PATIENT INSTRUCTIONS
Continue Cosopt (dorzolamide-timolol -- dark blue top) twice a day both eyes    Continue Latanoprost (green top) at bedtime both eyes     Continue artificial tears up to four times a day as needed     Glasses prescription given - optional to update    Kvng Garcia MD  [phone]

## 2022-11-25 NOTE — LETTER
11/25/2022         RE: Cydney Christiansen  [address]  [place] [address]        Dear Colleague,    Thank you for referring your patient, Cydney Christiansen, to the [facility]. Please see a copy of my visit note below.     Current Eye Medications:  Cosopt BID both eyes and Xalatan at bedtime both eyes     Subjective:  Here for complete eye exam today. Small print is sometimes difficult to see. Has art tears but hasn't really used them. When she watches TV sometimes has a film go over the eyes then goes away.      Objective:  See Ophthalmology Exam.       Assessment:  Cydney Christiansen is a 87 year old female who presents with:   Encounter Diagnoses   Name Primary?     Examination of eyes and vision Yes     Pseudoexfoliative glaucoma, both eyes, mild stage Intraocular pressure 18/17 today. Continue same medications.     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        Sincerely,        Kvng Garcia MD

## 2022-11-25 NOTE — PROGRESS NOTES
Current Eye Medications:  Cosopt BID both eyes and Xalatan at bedtime both eyes     Subjective:  Here for complete eye exam today. Small print is sometimes difficult to see. Has art tears but hasn't really used them. When she watches TV sometimes has a film go over the eyes then goes away.      Objective:  See Ophthalmology Exam.       Assessment:  Cydney Christiansen is a 87 year old female who presents with:   Encounter Diagnoses   Name Primary?     Examination of eyes and vision Yes     Pseudoexfoliative glaucoma, both eyes, mild stage Intraocular pressure 18/17 today. Continue same medications.     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

## 2022-12-06 NOTE — PROGRESS NOTES
"CHIEF COMPLAINT:   Chief Complaint   Patient presents with     Right Shoulder - Follow Up     Right proximal biceps rupture. DOI: 10/29/22. 6 weeks since injury. Doing well. No pain. Has been taking it slow, wondering about starting some therapy/HEP.       HISTORY:  Cydney Christiansen is a 87 year old female, right  -hand dominant, who is seen in followup for right shoulder injury that occurred almost 6 weeks ago. Sustained a proximal biceps tendon rupture. Returns today doing well, no pain. Taking it easy and slow. She'd like to start some therapy, home exercise program. She did therapy about a year ago and has exercises and she wonders if she can restart them.    She was golfing 10/29/2022 without incident, but after the round she had pain in the front of the right shoulder. On 11/20/2022 she went bowling and felt a \"pop\" in the shoulder and was unable to continue to bowl. She had bruising and swelling in the upper arm.     She notes shoulder sore at night for a while prior to incident, about a year. Feels better now than it has for the past year.      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    She has no past medical history of Acne, Allergies, Amblyopia, Arthritis, Complication of anesthesia, Diabetes (H), Diabetes mellitus (H), Diabetic retinopathy (H), Eczema, Heart valve disorder, Macular degeneration, Malignant " hyperthermia, Malignant melanoma nos, Pacemaker, Photosensitive contact dermatitis, Psoriasis, Retinal detachment, Senile macular degeneration, Strabismus, Type II or unspecified type diabetes mellitus without mention of complication, not stated as uncontrolled, Unspecified asthma(493.90), Urticaria, or Uveitis.  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     Hypertension goal BP (blood pressure) < 140/90     Seborrheic keratosis     Malignant neoplasm of overlapping sites of left female breast (H)     Scoliosis     Compression fracture of thoracic vertebra (H)     Mass of left hand     Primary open angle glaucoma of both eyes, unspecified glaucoma stage     Osteoporosis, unspecified osteoporosis type, unspecified pathological fracture presence     Nuclear sclerosis of left eye     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)     Actinic keratosis     History of nonmelanoma skin cancer     Combined forms of age-related cataract of right eye       Surgical Hx:  has a past surgical history that includes d & c; tubal ligation; cystoscopy (12/31/2008); REMOVAL GALLBLADDER; colonoscopy (5/2008, 5/13, 6/14, 6/17); surgical history of -  (09/2011); Laser argon treatment; Abdomen surgery; Lumpectomy breast with sentinel node, combined (Left, 07/29/2015); TRABECULOPLASTY BY LASER SURGERY (Left, 04/18/2007); TRABECULOPLASTY BY LASER SURGERY (Right, 05/04/2011); TRABECULOPLASTY BY LASER SURGERY (Left, 03/17/2015); TRABECULOPLASTY BY LASER SURGERY (Right, 06/23/2015); Phacoemulsification clear  cornea with standard intraocular lens implant (Left, 12/08/2017); cataract iol, rt/lt; Phacoemulsification clear cornea with standard intraocular lens implant (Right, 10/19/2020); Lumpectomy breast; and Biopsy breast.    Medications:   Current Outpatient Medications:      alendronate (FOSAMAX) 70 MG tablet, TAKE 1 TABLET BY MOUTH EVERY 7 DAYS, Disp: 12 tablet, Rfl: 11     co-enzyme Q-10 100 MG CAPS capsule, Take 200 mg by mouth daily, Disp: , Rfl:      cyanocobalamin (VITAMIN B-12) 500 MCG tablet, Take 1 tablet (500 mcg) by mouth daily, Disp: 150 tablet, Rfl: 3     dorzolamide-timolol (COSOPT) 2-0.5 % ophthalmic solution, Place 1 drop into both eyes 2 times daily, Disp: 10 mL, Rfl: 4     ferrous sulfate (FEROSUL) 325 (65 Fe) MG tablet, Take 1 tablet (325 mg) by mouth daily (with breakfast), Disp: 90 tablet, Rfl: 3     ibuprofen (ADVIL,MOTRIN) 200 MG tablet, Take 200 mg by mouth every 4 hours as needed., Disp: , Rfl:      latanoprost (XALATAN) 0.005 % ophthalmic solution, Place 1 drop into both eyes At Bedtime, Disp: 7.5 mL, Rfl: 3     losartan (COZAAR) 50 MG tablet, Take 1 tablet (50 mg) by mouth daily, Disp: 90 tablet, Rfl: 3     Multiple Vitamins-Minerals (ONE DAILY ADULTS 50+) TABS, , Disp: , Rfl:      polyethylene glycol (MIRALAX) 17 GM/Dose powder, Take 17 g (1 capful) by mouth 2 times daily as needed for constipation (Patient not taking: Reported on 11/18/2022), Disp: 507 g, Rfl: 0     simvastatin (ZOCOR) 20 MG tablet, Take 1 tablet (20 mg) by mouth At Bedtime, Disp: 90 tablet, Rfl: 3     triamcinolone (KENALOG) 0.1 % external cream, Apply twice daily for 2 weeks, Disp: 30 g, Rfl: 0     VITAMIN D-1000 MAX -1000 MG-UNIT OR TABS, 1 daily, Disp: , Rfl:     Current Facility-Administered Medications:      denosumab (PROLIA) injection 60 mg, 60 mg, Subcutaneous, Q6 Months, Estrellita Hernandez MD    Allergies:   Allergies   Allergen Reactions     Lisinopril Cough       Social Hx: retired.   reports that she quit  "smoking about 46 years ago. Her smoking use included cigarettes. She has a 10.00 pack-year smoking history. She has never used smokeless tobacco. She reports current alcohol use. She reports that she does not use drugs.    Family Hx: family history includes Asthma in her son; Blood Disease in her sister; Breast Cancer in her mother; Diabetes in her mother; Eye Disorder in her mother; Glaucoma in her brother, brother, mother, and another family member; Heart Disease in her sister; Macular Degeneration in her brother and mother; Osteoporosis in her mother; Prostate Cancer in her brother, brother, and father; Thyroid Disease in her sister..    REVIEW OF SYSTEMS: 10 point ROS neg other than the symptoms noted above in the HPI and PMH. Notables include  CONSTITUTIONAL:NEGATIVE for fever, chills, change in weight  INTEGUMENTARY/SKIN: NEGATIVE for worrisome rashes, moles or lesions  MUSCULOSKELETAL:See HPI above  NEURO: NEGATIVE for weakness, dizziness or paresthesias    PHYSICAL EXAM:  /83   Ht 1.499 m (4' 11\")   Wt 65.3 kg (144 lb)   LMP  (LMP Unknown)   BMI 29.08 kg/m     GENERAL APPEARANCE: elderly, alert, no distress  SKIN: no suspicious lesions or rashes  NEURO: decreased  strength and tone, mentation intact and speech normal  PSYCH:  mentation appears normal and affect normal, not anxious  RESPIRATORY: No increased work of breathing.  VASCULAR: Radial pulses 2+ and brisk cappillary refill       MUSCULOSKELETAL:    RIGHT UPPER EXTREMITY:  Sensation intact to light touch in median, radial, ulnar and axillary nerve distributions  Palpable 2+ radial pulse, brisk capillary refill to all fingers, wwp  Intact epl fpl fdp edc wrist flexion/extension biceps triceps deltoid    RIGHT SHOULDER:  Shoulder Inspection: no swelling, bruising, discoloration, or obvious deformity or asymmetry  There is palpable biceps deformity not well visible given upper arm tone  Tender: proximal bicep tendon and biceps muscle " belly  Non-tender: AC joint and greater tuberosity  Range of Motion:   Active:all normal, symmetric to left shoulder.  Strength: forward flexion 5-/5, External rotation 4+/5  Impingement: equivocal.  Special tests: Empty Can: equivocal.      X-RAY INTERPRETATION: no new images today.  right  Shoulder and humerus xrays obtained 1024/2022 -- No acute fracture or dislocation. Osseous demineralization is noted.    [facility] ultrasound 11/4/2022: Complete tear of the proximal long head of the biceps tendon with distal tendinous retraction to the level of the proximal humeral shaft.          ASSESSMENT: Cydney Christiansen is a 87 year old female, right  -hand dominant with acute proximal biceps tendon tear      PLAN: nonsurgical management.  * glad to hear overall feeling better. Expect continued improvements over the next few months.  * gradually start to use the arm more for everyday type activities, ADLs, etc. Nothing heavy or strenuous.  * we discussed resuming home exercise program, otherwise return to formal Physical Therapy. At this time, she still has all the hand outs for therapy and knows what to do, so she'll start there.  * gentle shoulder range of motion in all planes  * no heavy lifting, pushing, pulling with right arm for another couple of months, then gradual return of normal activities per comfort.  * return to clinic as needed    * All questions were addressed and answered prior to discharge from clinic today. The patient acknowledges an understanding of and agreement with the plan set forth during today's visit. Patient was advised to call our office or MyChart us if any further questions arise upon leaving our office today.        Ac Bailey M.D., M.S.  Dept. of Orthopaedic Surgery  [facility]

## 2022-12-08 ENCOUNTER — OFFICE VISIT (OUTPATIENT)
Dept: ORTHOPEDICS | Facility: CLINIC | Age: 87
End: 2022-12-08
Payer: COMMERCIAL

## 2022-12-08 VITALS
WEIGHT: 144 LBS | DIASTOLIC BLOOD PRESSURE: 83 MMHG | SYSTOLIC BLOOD PRESSURE: 137 MMHG | BODY MASS INDEX: 29.03 KG/M2 | HEIGHT: 59 IN

## 2022-12-08 DIAGNOSIS — S46.211D BICEPS RUPTURE, PROXIMAL, RIGHT, SUBSEQUENT ENCOUNTER: Primary | ICD-10-CM

## 2022-12-08 PROCEDURE — 99213 OFFICE O/P EST LOW 20 MIN: CPT | Performed by: ORTHOPAEDIC SURGERY

## 2022-12-08 ASSESSMENT — PAIN SCALES - GENERAL: PAINLEVEL: NO PAIN (0)

## 2022-12-08 NOTE — LETTER
"    12/8/2022         RE: Cydney Christiansen  [address]  [place] [address]        Dear Colleague,    Thank you for referring your patient, Cydney Christiansen, to the [facility]. Please see a copy of my visit note below.    CHIEF COMPLAINT:   Chief Complaint   Patient presents with     Right Shoulder - Follow Up     Right proximal biceps rupture. DOI: 10/29/22. 6 weeks since injury. Doing well. No pain. Has been taking it slow, wondering about starting some therapy/HEP.       HISTORY:  Cydney Christiansen is a 87 year old female, right  -hand dominant, who is seen in followup for right shoulder injury that occurred almost 6 weeks ago. Sustained a proximal biceps tendon rupture. Returns today doing well, no pain. Taking it easy and slow. She'd like to start some therapy, home exercise program. She did therapy about a year ago and has exercises and she wonders if she can restart them.    She was golfing 10/29/2022 without incident, but after the round she had pain in the front of the right shoulder. On 11/20/2022 she went bowling and felt a \"pop\" in the shoulder and was unable to continue to bowl. She had bruising and swelling in the upper arm.     She notes shoulder sore at night for a while prior to incident, about a year. Feels better now than it has for the past year.      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    She has no past medical history of Acne, Allergies, Amblyopia, Arthritis, Complication of anesthesia, Diabetes (H), Diabetes mellitus (H), Diabetic retinopathy (H), Eczema, Heart valve disorder, Macular degeneration, Malignant hyperthermia, Malignant melanoma nos, Pacemaker, Photosensitive contact dermatitis, Psoriasis, Retinal detachment, Senile macular degeneration, Strabismus, Type II or unspecified type diabetes mellitus without mention of complication, not stated as uncontrolled, Unspecified asthma(493.90), Urticaria, or Uveitis.  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     Hypertension goal BP (blood pressure) < 140/90     Seborrheic keratosis     Malignant neoplasm of overlapping sites of left female breast (H)     Scoliosis     Compression fracture of thoracic vertebra (H)     Mass of left hand     Primary open angle glaucoma of both eyes, unspecified glaucoma stage     Osteoporosis, unspecified osteoporosis type, unspecified pathological fracture presence     Nuclear sclerosis of left eye     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)     Actinic keratosis     History of nonmelanoma skin cancer     Combined forms of age-related cataract of right eye       Surgical Hx:  has a past surgical history that includes d & c; tubal ligation; cystoscopy (12/31/2008); REMOVAL GALLBLADDER; colonoscopy (5/2008, 5/13, 6/14, 6/17); surgical history of -  (09/2011); Laser argon treatment; Abdomen surgery; Lumpectomy breast with sentinel node, combined (Left,  07/29/2015); TRABECULOPLASTY BY LASER SURGERY (Left, 04/18/2007); TRABECULOPLASTY BY LASER SURGERY (Right, 05/04/2011); TRABECULOPLASTY BY LASER SURGERY (Left, 03/17/2015); TRABECULOPLASTY BY LASER SURGERY (Right, 06/23/2015); Phacoemulsification clear cornea with standard intraocular lens implant (Left, 12/08/2017); cataract iol, rt/lt; Phacoemulsification clear cornea with standard intraocular lens implant (Right, 10/19/2020); Lumpectomy breast; and Biopsy breast.    Medications:   Current Outpatient Medications:      alendronate (FOSAMAX) 70 MG tablet, TAKE 1 TABLET BY MOUTH EVERY 7 DAYS, Disp: 12 tablet, Rfl: 11     co-enzyme Q-10 100 MG CAPS capsule, Take 200 mg by mouth daily, Disp: , Rfl:      cyanocobalamin (VITAMIN B-12) 500 MCG tablet, Take 1 tablet (500 mcg) by mouth daily, Disp: 150 tablet, Rfl: 3     dorzolamide-timolol (COSOPT) 2-0.5 % ophthalmic solution, Place 1 drop into both eyes 2 times daily, Disp: 10 mL, Rfl: 4     ferrous sulfate (FEROSUL) 325 (65 Fe) MG tablet, Take 1 tablet (325 mg) by mouth daily (with breakfast), Disp: 90 tablet, Rfl: 3     ibuprofen (ADVIL,MOTRIN) 200 MG tablet, Take 200 mg by mouth every 4 hours as needed., Disp: , Rfl:      latanoprost (XALATAN) 0.005 % ophthalmic solution, Place 1 drop into both eyes At Bedtime, Disp: 7.5 mL, Rfl: 3     losartan (COZAAR) 50 MG tablet, Take 1 tablet (50 mg) by mouth daily, Disp: 90 tablet, Rfl: 3     Multiple Vitamins-Minerals (ONE DAILY ADULTS 50+) TABS, , Disp: , Rfl:      polyethylene glycol (MIRALAX) 17 GM/Dose powder, Take 17 g (1 capful) by mouth 2 times daily as needed for constipation (Patient not taking: Reported on 11/18/2022), Disp: 507 g, Rfl: 0     simvastatin (ZOCOR) 20 MG tablet, Take 1 tablet (20 mg) by mouth At Bedtime, Disp: 90 tablet, Rfl: 3     triamcinolone (KENALOG) 0.1 % external cream, Apply twice daily for 2 weeks, Disp: 30 g, Rfl: 0     VITAMIN D-1000 MAX -1000 MG-UNIT OR TABS, 1 daily, Disp: , Rfl:  "    Current Facility-Administered Medications:      denosumab (PROLIA) injection 60 mg, 60 mg, Subcutaneous, Q6 Months, Estrellita Hernandez MD    Allergies:   Allergies   Allergen Reactions     Lisinopril Cough       Social Hx: retired.   reports that she quit smoking about 46 years ago. Her smoking use included cigarettes. She has a 10.00 pack-year smoking history. She has never used smokeless tobacco. She reports current alcohol use. She reports that she does not use drugs.    Family Hx: family history includes Asthma in her son; Blood Disease in her sister; Breast Cancer in her mother; Diabetes in her mother; Eye Disorder in her mother; Glaucoma in her brother, brother, mother, and another family member; Heart Disease in her sister; Macular Degeneration in her brother and mother; Osteoporosis in her mother; Prostate Cancer in her brother, brother, and father; Thyroid Disease in her sister..    REVIEW OF SYSTEMS: 10 point ROS neg other than the symptoms noted above in the HPI and PMH. Notables include  CONSTITUTIONAL:NEGATIVE for fever, chills, change in weight  INTEGUMENTARY/SKIN: NEGATIVE for worrisome rashes, moles or lesions  MUSCULOSKELETAL:See HPI above  NEURO: NEGATIVE for weakness, dizziness or paresthesias    PHYSICAL EXAM:  /83   Ht 1.499 m (4' 11\")   Wt 65.3 kg (144 lb)   LMP  (LMP Unknown)   BMI 29.08 kg/m     GENERAL APPEARANCE: elderly, alert, no distress  SKIN: no suspicious lesions or rashes  NEURO: decreased  strength and tone, mentation intact and speech normal  PSYCH:  mentation appears normal and affect normal, not anxious  RESPIRATORY: No increased work of breathing.  VASCULAR: Radial pulses 2+ and brisk cappillary refill       MUSCULOSKELETAL:    RIGHT UPPER EXTREMITY:  Sensation intact to light touch in median, radial, ulnar and axillary nerve distributions  Palpable 2+ radial pulse, brisk capillary refill to all fingers, wwp  Intact epl fpl fdp edc wrist flexion/extension biceps triceps " deltoid    RIGHT SHOULDER:  Shoulder Inspection: no swelling, bruising, discoloration, or obvious deformity or asymmetry  There is palpable biceps deformity not well visible given upper arm tone  Tender: proximal bicep tendon and biceps muscle belly  Non-tender: AC joint and greater tuberosity  Range of Motion:   Active:all normal, symmetric to left shoulder.  Strength: forward flexion 5-/5, External rotation 4+/5  Impingement: equivocal.  Special tests: Empty Can: equivocal.      X-RAY INTERPRETATION: no new images today.  right  Shoulder and humerus xrays obtained 1024/2022 -- No acute fracture or dislocation. Osseous demineralization is noted.    [facility] ultrasound 11/4/2022: Complete tear of the proximal long head of the biceps tendon with distal tendinous retraction to the level of the proximal humeral shaft.          ASSESSMENT: Cydney Christiansen is a 87 year old female, right  -hand dominant with acute proximal biceps tendon tear      PLAN: nonsurgical management.  * glad to hear overall feeling better. Expect continued improvements over the next few months.  * gradually start to use the arm more for everyday type activities, ADLs, etc. Nothing heavy or strenuous.  * we discussed resuming home exercise program, otherwise return to formal Physical Therapy. At this time, she still has all the hand outs for therapy and knows what to do, so she'll start there.  * gentle shoulder range of motion in all planes  * no heavy lifting, pushing, pulling with right arm for another couple of months, then gradual return of normal activities per comfort.  * return to clinic as needed    * All questions were addressed and answered prior to discharge from clinic today. The patient acknowledges an understanding of and agreement with the plan set forth during today's visit. Patient was advised to call our office or MyChart us if any further questions arise upon leaving our office today.        Ac Bailey M.D., M.S.  Dept. of  Orthopaedic Surgery  [facility]      Again, thank you for allowing me to participate in the care of your patient.        Sincerely,        Ac Bailey MD

## 2022-12-12 ENCOUNTER — OFFICE VISIT (OUTPATIENT)
Dept: DERMATOLOGY | Facility: CLINIC | Age: 87
End: 2022-12-12
Payer: COMMERCIAL

## 2022-12-12 VITALS — SYSTOLIC BLOOD PRESSURE: 162 MMHG | DIASTOLIC BLOOD PRESSURE: 83 MMHG

## 2022-12-12 DIAGNOSIS — C44.41 BASAL CELL CARCINOMA (BCC) OF SCALP: ICD-10-CM

## 2022-12-12 DIAGNOSIS — L57.0 AK (ACTINIC KERATOSIS): Primary | ICD-10-CM

## 2022-12-12 PROCEDURE — 17311 MOHS 1 STAGE H/N/HF/G: CPT | Performed by: DERMATOLOGY

## 2022-12-12 PROCEDURE — 17000 DESTRUCT PREMALG LESION: CPT | Performed by: DERMATOLOGY

## 2022-12-12 PROCEDURE — 12032 INTMD RPR S/A/T/EXT 2.6-7.5: CPT | Mod: XS | Performed by: DERMATOLOGY

## 2022-12-12 ASSESSMENT — PAIN SCALES - GENERAL: PAINLEVEL: NO PAIN (0)

## 2022-12-12 NOTE — LETTER
12/12/2022         RE: Cydney Christiansen  [address]  [place] [address]        Dear Colleague,    Thank you for referring your patient, Cydney Christiansen, to the [facility]. Please see a copy of my visit note below.    [facility] [place] Procedure Note    Dermatology Problem List:  Last skin check 10/28/22, recommended yearly    1. 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 AK  - HAK, left lateral forehead, bx 9/28/21. treated with LN2 5/4/22  - AK, right cheek, s/p bx 9/28/21 - treated with LN2 5/4/22  - HAK, left mid eyebrow, base not seen, 6/2014  - left posterior lower leg, s/p bx 2/18/21, s/p cryo 9/28/21  - s/p cryotherapy  - left forearm, s/p biopsy 3/15/19   5. GA, right angle of jaw: resolves with triamcinolone cream  6. History of benign biopsy  - Verrucous keratosis - right dorsal hand posterior, s/p bx 4/1/22 and 6/8/22  - ISK, right dorsal hand, s/p bx 9/28/21  - C/w rosacea, right posterior shoulder, s/p bx 9/28/21  - SK, right posterior lower leg, s/p bx  2/18/21  - verucca, left forehead, s/p bx 2/18/21  - Arthropod bite, left 4th digit, bx 2/18/21  - SK, right abdomen, s/p bx 2/18/2020  7. Eczematous patch R dorsal hand  - previous Tx: triamcinolone 0.01% cream, Vaseline      ____________________________________________      Date of Service:  Dec 12, 2022  Surgery: Mohs micrographic surgery    Case 1  Repair Type: intermediate  Repair Size: 4.4 cm  Suture Material: Fast Absorbing Gut 5-0  Tumor Type: BCC - Basal cell carcinoma  Location: crown  Derm-Path Accession #: [id number]  PreOp Size: 1.5X1.3 cm  PostOp Size: 2.2X1.8 cm  Mohs Accession #: [id number]  Level of Defect: fat    Procedure:  We discussed the principles of treatment and most likely complications including scarring, bleeding, infection, swelling, pain, crusting, nerve damage, large wound,  incomplete excision, wound dehiscence,  nerve damage, recurrence, and a second procedure may be recommended to obtain the best cosmetic or functional result.    Informed consent was obtained and the patient underwent the procedure as follows:  The patient was placed supine on the operating table.  The cancer was identified, outlined with a marker, and verified by the patient.  The entire surgical field was prepped with Hibiclens.  The surgical site was anesthetized using Lidocaine 1% with epi 1:100,000.      The area of clinically apparent tumor was debulked. The layer of tissue was then surgically excised using a #15 blade and was then transferred onto a specimen sheet maintaining the orientation of the specimen. Hemostasis was obtained using bipolar electrocoagulation. The wound site was then covered with a dressing while the tissue samples were processed for examination.    The excised tissue was transported to the Mohs histology laboratory maintaining the tissue orientation.  The tissue specimen was relaxed so that the entire surgical margin was in a a single horizontal plane for sectioning and inked for precise  mapping.  A precise reference map was drawn to reflect the sectioning of the specimen, colored inking of the margins, and orientation on the patient. The tissue was processed using horizontal sectioning of the base and continuous peripheral margins.  The histopathologic sections were reviewed in conjunction with the reference map.    Total blocks: 1    Total slides:  3    There were no cancer cells visualized on examination, therefore Mohs surgery was complete.        REPAIR: An intermediate layered linear closure was selected as the procedure which would maximally preserve both function and cosmesis.    After the excision of the tumor, the area was undermined. Hemostasis was obtained with electrocoagulation. Closure was oriented so that the wound was in the patient's natural skin tension lines. The deep subcutaneous and dermal layers were then closed with 4-0 Monocryl buried vertical mattress sutures. The epidermis was then carefully approximated along the length of the wound using 5-0 Fast absorbing gut running subcuticular sutures.     The final wound length was 4.4 cm. Estimated blood loss was less than 10 ml for all surgical sites. A sterile pressure dressing was applied and wound care instructions, with a written handout, were given. The patient was discharged from the [facility] alert and ambulatory.    Follow-up: PRN    Additional Procedure  Actinic Keratosis, L mid forearm  Cryotherapy procedure note: left forearm After verbal consent and discussion of risks and benefits including but no limited to dyspigmentation/scar, blister, and pain, 1 was(were) treated with 1-2mm freeze border for 2 cycles with liquid nitrogen. Post cryotherapy instructions were provided.         Staff Involved:    Scribe Disclosure:   Dao ARAUJO, am serving as a scribe to document services personally performed by this physician, based on data collection and the provider's statements to me.       Provider  Disclosure:   The documentation recorded by the scribe accurately reflects the services I personally performed and the decisions made by me.  I personally performed the procedures today.    Ian Haro DO    Department of Dermatology  [facility]: Phone: [phone], Fax:[phone]  [facility]: Phone: [phone], Fax: [phone]    Care and Laboratory Testing Performed at:  [facility]   Dermatology Clinic  [address] [place] [address]          Again, thank you for allowing me to participate in the care of your patient.        Sincerely,        Ian Haro MD

## 2022-12-12 NOTE — NURSING NOTE
The following medication was given:     MEDICATION:  Lidocaine with epinephrine 1% 1:[id number]  ROUTE: SQ  SITE: see procedure note  DOSE: 5cc  LOT #: [id number]  : [employer]  EXPIRATION DATE: 11/2023  NDC#: 32907-557-82  Was there drug waste? 1cc  Multi-dose vial: Yes    April Love LPN  December 12, 2022      Vaseline and pressure dressing applied to Mohs site on crown.  Wound care instructions reviewed with patient and AVS provided.  Patient verbalized understanding. No further questions or concerns at this time.      April Love LPN

## 2022-12-12 NOTE — PATIENT INSTRUCTIONS
Cryotherapy- Left forearm    What is it?  Use of a very cold liquid, such as liquid nitrogen, to freeze and destroy abnormal skin cells that need to be removed    What should I expect?  Tenderness and redness  A small blister that might grow and fill with dark purple blood. There may be crusting.  More than one treatment may be needed if the lesions do not go away.    How do I care for the treated area?  Gently wash the area with your hands when bathing.  Use a thin layer of Vaseline to help with healing. You may use a Band-Aid.   The area should heal within 7-10 days and may leave behind a pink or lighter color.   Do not use an antibiotic or Neosporin ointment.   You may take acetaminophen (Tylenol) for pain.     Call your doctor if you have:  Severe pain  Signs of infection (warmth, redness, cloudy yellow drainage, and or a bad smell)  Questions or concerns    Who should I call with questions?      [facility]: [phone]      [facility]: [phone]      For urgent needs outside of business hours call the [facility] at [phone] and ask for the dermatology resident on call         Excision/Mohs Wound Care Instructions- scalp  I will experience scar, altered skin color, bleeding, swelling, pain, crusting and redness. I may experience altered sensation. Risks are excessive bleeding, infection, muscle weakness, thick (hypertrophic or keloidal) scar, and recurrence. A second procedure may be recommended to obtain the best cosmetic or functional result.  Possible complications of any surgical procedure are bleeding, infection, scarring, alteration in skin color and sensation, muscle weakness in the area, wound dehiscence or seperation, or recurrence of the lesion or disease. On occasion, after healing, a secondary procedure or revision may be recommended in order to obtain the best cosmetic or functional result.   After your surgery, a  pressure bandage will be placed over the area that has sutures. This will help prevent bleeding. Please follow these instructions as they will help you to prevent complications as your wound heals.  For the First 48 hours After Surgery:  Leave the pressure bandage on and keep it dry. If it should come loose, you may retape it, but do not take it off.  Relax and take it easy. Do not do any vigorous exercise, heavy lifting, or bending forward. This could cause the wound to bleed.  Post-operative pain is usually mild. You may alternate between 1000 mg of Tylenol (acetaminophen) and 400 mg of Ibuprofen every 4 hours.  Do not take more than 4,000mg of acetaminophen in a 24 hour period or 3200 mg of Ibuprofen in a 24 hr period.  Avoid alcohol and vitamin E as these may increase your tendency to bleed.  You may put an ice pack around the bandaged area for 20 minutes every 2-3 hours. This may help reduce swelling, bruising, and pain. Make sure the ice pack is waterproof so that the pressure bandage does not get wet.   You may see a small amount of drainage or blood on your pressure bandage. This is normal. However, if drainage or bleeding continues or saturates the bandage, you will need to apply firm pressure over the bandage with a washcloth for 15 minutes. If bleeding continues after applying pressure for 15 minutes then go to the nearest emergency room.  48 Hours After Surgery  Carefully remove the bandage and start daily wound care and dressing changes. You may also now shower and get the wound wet.  Daily Wound Care:  Wash wound with a mild soap and water.  Use caution when washing the wound, be gentle and do not let the forceful shower stream hit the wound directly.  Pat dry.  Apply Vaseline (from a new container or tube) over the suture line with a Q-tip. It is very important to keep the wound continuously moist, as wounds heal best in a moist environment.  Keep the site covered until sutures have dissolved.  You  can cover it with a Telfa (non-stick) dressing and tape or a band-aid.    If you are unable to keep wound covered, you must apply Vaseline every 2-3 hours (while awake) to ensure it is being kept moist for optimal healing. A dressing overnight is recommended to keep the area moist.  Call Us If:  You have pain that is not controlled with Tylenol/Ibuprofen  You have signs or symptoms of an infection, such as: fever over 100 degrees F, redness, warmth, or foul-smelling or yellow drainage from the wound.  Who should I call with questions?  [facility]: [phone]   [facility]: [phone]  For urgent needs outside of business hours call the [facility] at [phone] and ask to speak with the dermatology resident on call

## 2022-12-12 NOTE — NURSING NOTE
Cydney Christiansen's goals for this visit include:   Chief Complaint   Patient presents with     Procedure     MOHS crown      Cryotherapy     Left Forearm        She requests these members of her care team be copied on today's visit information:     PCP: Estrellita Hernandez    Referring Provider:  No referring provider defined for this encounter.    BP (!) 162/83   LMP  (LMP Unknown)     Do you need any medication refills at today's visit? No    April Love LPN

## 2022-12-12 NOTE — PROGRESS NOTES
[facility] [place] Procedure Note    Dermatology Problem List:  Last skin check 10/28/22, recommended yearly    1. 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 AK  - HAK, left lateral forehead, bx 9/28/21. treated with LN2 5/4/22  - AK, right cheek, s/p bx 9/28/21 - treated with LN2 5/4/22  - HAK, left mid eyebrow, base not seen, 6/2014  - left posterior lower leg, s/p bx 2/18/21, s/p cryo 9/28/21  - s/p cryotherapy  - left forearm, s/p biopsy 3/15/19   5. GA, right angle of jaw: resolves with triamcinolone cream  6. History of benign biopsy  - Verrucous keratosis - right dorsal hand posterior, s/p bx 4/1/22 and 6/8/22  - ISK, right dorsal hand, s/p bx 9/28/21  - C/w rosacea, right posterior shoulder, s/p bx 9/28/21  - SK, right posterior lower leg, s/p bx 2/18/21  - verucca, left forehead, s/p bx 2/18/21  - Arthropod bite, left 4th digit, bx 2/18/21  - SK, right abdomen, s/p bx 2/18/2020  7. Eczematous patch R dorsal hand  - previous Tx: triamcinolone 0.01%  cream, Vaseline      ____________________________________________      Date of Service:  Dec 12, 2022  Surgery: Mohs micrographic surgery    Case 1  Repair Type: intermediate  Repair Size: 4.4 cm  Suture Material: Fast Absorbing Gut 5-0  Tumor Type: BCC - Basal cell carcinoma  Location: crown  Derm-Path Accession #: [id number]  PreOp Size: 1.5X1.3 cm  PostOp Size: 2.2X1.8 cm  Mohs Accession #: [id number]  Level of Defect: fat    Procedure:  We discussed the principles of treatment and most likely complications including scarring, bleeding, infection, swelling, pain, crusting, nerve damage, large wound,  incomplete excision, wound dehiscence,  nerve damage, recurrence, and a second procedure may be recommended to obtain the best cosmetic or functional result.    Informed consent was obtained and the patient underwent the procedure as follows:  The patient was placed supine on the operating table.  The cancer was identified, outlined with a marker, and verified by the patient.  The entire surgical field was prepped with Hibiclens.  The surgical site was anesthetized using Lidocaine 1% with epi 1:100,000.      The area of clinically apparent tumor was debulked. The layer of tissue was then surgically excised using a #15 blade and was then transferred onto a specimen sheet maintaining the orientation of the specimen. Hemostasis was obtained using bipolar electrocoagulation. The wound site was then covered with a dressing while the tissue samples were processed for examination.    The excised tissue was transported to the Mohs histology laboratory maintaining the tissue orientation.  The tissue specimen was relaxed so that the entire surgical margin was in a a single horizontal plane for sectioning and inked for precise mapping.  A precise reference map was drawn to reflect the sectioning of the specimen, colored inking of the margins, and orientation on the patient. The tissue was processed using horizontal sectioning of  the base and continuous peripheral margins.  The histopathologic sections were reviewed in conjunction with the reference map.    Total blocks: 1    Total slides:  3    There were no cancer cells visualized on examination, therefore Mohs surgery was complete.        REPAIR: An intermediate layered linear closure was selected as the procedure which would maximally preserve both function and cosmesis.    After the excision of the tumor, the area was undermined. Hemostasis was obtained with electrocoagulation. Closure was oriented so that the wound was in the patient's natural skin tension lines. The deep subcutaneous and dermal layers were then closed with 4-0 Monocryl buried vertical mattress sutures. The epidermis was then carefully approximated along the length of the wound using 5-0 Fast absorbing gut running subcuticular sutures.     The final wound length was 4.4 cm. Estimated blood loss was less than 10 ml for all surgical sites. A sterile pressure dressing was applied and wound care instructions, with a written handout, were given. The patient was discharged from the [facility] alert and ambulatory.    Follow-up: PRN    Additional Procedure  Actinic Keratosis, L mid forearm  Cryotherapy procedure note: left forearm After verbal consent and discussion of risks and benefits including but no limited to dyspigmentation/scar, blister, and pain, 1 was(were) treated with 1-2mm freeze border for 2 cycles with liquid nitrogen. Post cryotherapy instructions were provided.         Staff Involved:    Scribe Disclosure:   I, Dao Crabtree, am serving as a scribe to document services personally performed by this physician, based on data collection and the provider's statements to me.       Provider Disclosure:   The documentation recorded by the scribe accurately reflects the services I personally performed and the decisions made by me.  I personally performed the procedures today.    Ian Haro,      Department of Dermatology  [facility]: Phone: [phone], Fax:[phone]  [facility]: Phone: [phone], Fax: [phone]    Care and Laboratory Testing Performed at:  [facility]   Dermatology Clinic  [address] [place] [address]

## 2022-12-15 ENCOUNTER — OFFICE VISIT (OUTPATIENT)
Dept: FAMILY MEDICINE | Facility: CLINIC | Age: 87
End: 2022-12-15
Payer: COMMERCIAL

## 2022-12-15 VITALS
TEMPERATURE: 98.8 F | RESPIRATION RATE: 28 BRPM | SYSTOLIC BLOOD PRESSURE: 152 MMHG | OXYGEN SATURATION: 97 % | BODY MASS INDEX: 29.13 KG/M2 | DIASTOLIC BLOOD PRESSURE: 84 MMHG | HEART RATE: 68 BPM | WEIGHT: 148.4 LBS | HEIGHT: 60 IN

## 2022-12-15 DIAGNOSIS — R06.02 SOB (SHORTNESS OF BREATH): Primary | ICD-10-CM

## 2022-12-15 DIAGNOSIS — R07.81 PLEURITIC PAIN: ICD-10-CM

## 2022-12-15 DIAGNOSIS — R06.82 TACHYPNEA: ICD-10-CM

## 2022-12-15 DIAGNOSIS — M54.6 ACUTE RIGHT-SIDED THORACIC BACK PAIN: ICD-10-CM

## 2022-12-15 PROCEDURE — 93000 ELECTROCARDIOGRAM COMPLETE: CPT | Performed by: PHYSICIAN ASSISTANT

## 2022-12-15 PROCEDURE — 99214 OFFICE O/P EST MOD 30 MIN: CPT | Performed by: PHYSICIAN ASSISTANT

## 2022-12-15 ASSESSMENT — ENCOUNTER SYMPTOMS
DIZZINESS: 0
SHORTNESS OF BREATH: 1
ABDOMINAL DISTENTION: 0
LIGHT-HEADEDNESS: 0
SINUS PAIN: 0
COUGH: 1
SORE THROAT: 0
SINUS PRESSURE: 0
RHINORRHEA: 0
DIARRHEA: 0
ABDOMINAL PAIN: 0
NAUSEA: 0

## 2022-12-15 ASSESSMENT — PAIN SCALES - GENERAL: PAINLEVEL: SEVERE PAIN (6)

## 2022-12-15 NOTE — PROGRESS NOTES
Assessment & Plan   Problem List Items Addressed This Visit    None  Visit Diagnoses     SOB (shortness of breath)    -  Primary    Relevant Orders    EKG 12-lead complete w/read - Clinics (Completed)    Tachypnea        Relevant Orders    EKG 12-lead complete w/read - Clinics (Completed)    Acute right-sided thoracic back pain        Pleuritic pain             87-year-old here with atraumatic right sided pleuritic thoracic back pain and tachypnea.  EKG shows no worrisome dysrhythmia or ischemia.  She is low risk for PE based on Wells criteria, however I cannot rule out pulmonary embolism or other worrisome cause of her symptoms out of the clinic.  We discussed the limitations of a clinic work-up and she agreed to go to the emergency department for evaluation today.  I spoke with the charge nurse at [facility] emergency department will expect her.  She will have her son transport her thereis reasonable.  They will call 911 for EMS transport if she worsens en route.     Complete history and physical exam as below. Afebrile with normal vital signs aside from tachypnea and elevated bp.    DDx discussed with and explained to the pt to their satisfaction.  All questions were answered at this time. Pt expressed understanding of and agreement with this plan. Patient left in no apparent distress.     Ordering of each unique test  30 minutes spent on the date of the encounter doing chart review, history and exam, documentation and further activities per the note     See Patient Instructions    No follow-ups on file.    CAPRICE Collado  [facility] RICK Lamas is a 87 year old presenting for the following health issues:  Musculoskeletal Problem      HPI Monday Cydney started having some back pain that was worse when she took a deep breath and when she coughed. Hot packs helped a little. Ibuprofen helped some. Pain is aching in character. 8/10 now. Sometimes position at times changes it. No  "injury.     Denies coughing up blood. No swelling in legs. Her back right side hurts the most.   She had covid in November.     Chest xray    EKG   She had a skin cancer treatment on Monday    Pain History:  When did you first notice your pain? - Acute Pain   Have you seen anyone else for your pain? No  Where in your body do you have pain? Back Pain  Onset/Duration: 12/12/22  Description:   Location of pain: middle  Character of pain: dull ache  Pain radiation: none  New numbness or weakness in legs, not attributed to pain: no   Intensity: Currently 6/10  Progression of Symptoms: same  History:   Specific cause: none  Pain interferes with job: N/A  History of back problems: Yes  Any previous MRI or X-rays: None  Sees a specialist for back pain: No  Alleviating factors:   Improved by: heat    Precipitating factors:  Worsened by: laying on her back  Therapies tried and outcome: heat, Tylenol, Advil    Accompanying Signs & Symptoms:  Risk of Fracture: Age >64  Risk of Cauda Equina: None  Risk of Infection: None  Risk of Cancer: None  Risk of Ankylosing Spondylitis: Onset at age <35, male, AND morning back stiffness No    Review of Systems   HENT: Negative for congestion, ear pain, rhinorrhea, sinus pressure, sinus pain and sore throat.    Respiratory: Positive for cough and shortness of breath.    Gastrointestinal: Negative for abdominal distention, abdominal pain, diarrhea and nausea.   Neurological: Negative for dizziness and light-headedness.      Constitutional, HEENT, cardiovascular, pulmonary, gi and gu systems are negative, except as otherwise noted.      Objective    BP (!) 152/84   Pulse 68   Temp 98.8  F (37.1  C) (Tympanic)   Resp 28   Ht 1.513 m (4' 11.57\")   Wt 67.3 kg (148 lb 6.4 oz)   LMP  (LMP Unknown)   SpO2 97%   Breastfeeding No   BMI 29.41 kg/m    Body mass index is 29.41 kg/m .  Physical Exam  Vitals and nursing note reviewed.   Constitutional:       General: She is not in acute " distress.     Appearance: She is not ill-appearing or diaphoretic.   HENT:      Head: Normocephalic and atraumatic.      Mouth/Throat:      Mouth: Mucous membranes are moist.   Eyes:      Conjunctiva/sclera: Conjunctivae normal.   Cardiovascular:      Rate and Rhythm: Normal rate and regular rhythm.      Heart sounds: Normal heart sounds. No murmur heard.    No friction rub. No gallop.      Comments: 2+ symmetric radial/PT pulses. Baseline BLE edema without tenderness.  Pulmonary:      Effort: Pulmonary effort is normal. No respiratory distress.      Breath sounds: Normal breath sounds. No stridor. No wheezing, rhonchi or rales.   Abdominal:      General: Bowel sounds are normal. There is no distension.      Palpations: Abdomen is soft. There is no mass.      Tenderness: There is no abdominal tenderness. There is no guarding or rebound.      Hernia: No hernia is present.   Musculoskeletal:      Comments:  No overlying signs of trauma or infection to the back.  Midline spine is nontender and I could not reproduce her pain on exam.   Skin:     General: Skin is warm and dry.   Neurological:      General: No focal deficit present.      Mental Status: She is alert. Mental status is at baseline.   Psychiatric:         Mood and Affect: Mood normal.         Behavior: Behavior normal.          EKG - Reviewed and interpreted by me appears normal, NSR, normal axis, normal intervals, no acute ST/T changes c/w ischemia, no LVH by voltage criteria, unchanged from previous tracings

## 2022-12-15 NOTE — PATIENT INSTRUCTIONS
PLEASE GO TO [facility] FOR [facility]. CALL 911 IF YOU HAVE WORSENING/CHANGING SYMPTOMS AT ANY TIME OR IF YOU CHANGE YOUR MIND ABOUT AMBULANCE TRANSPORT.

## 2022-12-27 ENCOUNTER — ALLIED HEALTH/NURSE VISIT (OUTPATIENT)
Dept: DERMATOLOGY | Facility: CLINIC | Age: 87
End: 2022-12-27
Payer: COMMERCIAL

## 2022-12-27 DIAGNOSIS — Z51.89 VISIT FOR WOUND CHECK: Primary | ICD-10-CM

## 2022-12-27 PROCEDURE — 99207 PR NO CHARGE NURSE ONLY: CPT | Performed by: DERMATOLOGY

## 2022-12-27 ASSESSMENT — PAIN SCALES - GENERAL: PAINLEVEL: NO PAIN (0)

## 2022-12-27 NOTE — NURSING NOTE
Cydney Christiansen comes into clinic today at the request of Dr. Haro Ordering Provider for wound check.    Cydney is 2 weeks s/p Mohs to crown of scalp.  She denies any issues with wound healing, bleeding, swelling or pain.      On exam there is a well healed surgical scar.   Wound edges are approximated.  There is no redness, swelling or drainage.    Wound was cleansed with Hibiclens and sterile saline.  Vaseline applied.  Patient will follow up as scheduled for next skin check.    This service provided today was under the supervising provider of the day Dr. Borja, who was available if needed.    Quin Webber RN

## 2022-12-28 ENCOUNTER — TELEPHONE (OUTPATIENT)
Dept: FAMILY MEDICINE | Facility: CLINIC | Age: 87
End: 2022-12-28

## 2022-12-28 NOTE — TELEPHONE ENCOUNTER
Patient calling in to follow up on her last visit and ER visit.     Patient was directed to go to the ER for shortness of breath and her back pain was not addressed. Patient is taking tylenol and ibuprofen as directed by the packaging and this helps some but she is wanting something more for pain (non opioid) and possibly imaging of her back.     Advised to call back with changing or worsening of symptoms. Advised to seek emergent care for severe symptoms.    Leatha Fleming RN

## 2022-12-29 ENCOUNTER — TELEPHONE (OUTPATIENT)
Dept: FAMILY MEDICINE | Facility: CLINIC | Age: 87
End: 2022-12-29

## 2022-12-29 ENCOUNTER — OFFICE VISIT (OUTPATIENT)
Dept: FAMILY MEDICINE | Facility: CLINIC | Age: 87
End: 2022-12-29
Payer: COMMERCIAL

## 2022-12-29 VITALS
BODY MASS INDEX: 28.73 KG/M2 | OXYGEN SATURATION: 98 % | SYSTOLIC BLOOD PRESSURE: 152 MMHG | WEIGHT: 145 LBS | HEART RATE: 74 BPM | TEMPERATURE: 97.8 F | DIASTOLIC BLOOD PRESSURE: 82 MMHG

## 2022-12-29 DIAGNOSIS — M54.6 ACUTE RIGHT-SIDED THORACIC BACK PAIN: Primary | ICD-10-CM

## 2022-12-29 PROCEDURE — 99213 OFFICE O/P EST LOW 20 MIN: CPT | Performed by: PHYSICIAN ASSISTANT

## 2022-12-29 RX ORDER — LIDOCAINE 4 G/G
1 PATCH TOPICAL EVERY 24 HOURS
Qty: 10 PATCH | Refills: 0 | Status: ON HOLD | OUTPATIENT
Start: 2022-12-29 | End: 2023-02-17

## 2022-12-29 ASSESSMENT — ENCOUNTER SYMPTOMS
WHEEZING: 0
BACK PAIN: 1
CHEST TIGHTNESS: 0
SHORTNESS OF BREATH: 0

## 2022-12-29 ASSESSMENT — PAIN SCALES - GENERAL: PAINLEVEL: SEVERE PAIN (6)

## 2022-12-29 NOTE — PATIENT INSTRUCTIONS
Your pain is likely due to inflammation and irritation of the muscles between the ribs at the right upper back. This may have started due to sitting in the same position for a long period of time for your procedure.  For pain relief you can use heat/ice, Tylenol/ibuprofen, and lidocaine patches.  I sent a prescription for lidocaine patches to your pharmacy.  If this is not covered, you can purchase them over-the-counter.  Make sure to use as directed on the box. Additionally, I have placed a referral to physical therapy.  The scheduling team will reach out to you to set up your appointment.  Your symptoms should improve over the next 4 to 6 weeks.      Additionally, make sure to complete your dental work so that you can start Prolia to help with bone health.    Reach out with questions or concerns.

## 2022-12-29 NOTE — TELEPHONE ENCOUNTER
Prior Authorization Retail Medication Request    Medication/Dose: Lidocaine (LIDOCARE) 4 % Patch    ICD code (if different than what is on RX):  Acute right-sided thoracic back pain [M54.6]    Previously Tried and Failed:    Rationale:      Insurance Name:  [employer]   Insurance ID: [id number]       Pharmacy Information (if different than what is on RX)  Name:    Phone:

## 2022-12-29 NOTE — PROGRESS NOTES
Assessment & Plan     Acute right-sided thoracic back pain  Patient is an 87-year-old female with past medical history significant for compression fractures of thoracic vertebrae who presents to clinic due to 2-3 weeks of right thoracic back pain that started without injury.  Patient notes she was sitting for a long period of time in a static position for Mohs procedure prior to pain starting.  Patient was initially evaluated in emergency department for symptoms and ischemia, arrhythmia, and PE ruled out.  Chest x-ray completed showing kyphosis, known compression fractures of thoracic vertebrae, and severe degenerative changes.  Patient notes she is supposed to switch to Prolia for bone health, but has not completed dental work necessary for making the switch from Fosamax.  Vital signs without fever or tachycardia.  Physical exam significant for tenderness located to right thoracic region and pain improvement with pressure/palpation.    Based on history and symptoms, pain is likely muscular and due to sitting in static position for long period of time.  Discussed treatment options including Tylenol, gentle exercise/stretching, prescribed lidocaine patches, and recommended physical therapy.  Referral placed.  Discussed expected course of recovery and return precautions.      - Physical Therapy Referral; Future  - Lidocaine (LIDOCARE) 4 % Patch; Place 1 patch onto the skin every 24 hours To prevent lidocaine toxicity, patient should be patch free for 12 hrs daily.       See Patient Instructions    Return if symptoms worsen or fail to improve.    Leora Poole PA-C  [facility] RICK Lamas is a 87 year old, presenting for the following health issues:  Musculoskeletal Problem      HPI     Concern - back pain  Onset: 2-3 weeks. Patient notes she is more short of breath than normal when walking from car to clinic. She has been walking less than usually and has noticed the the symptoms  outdoors. Patient notes back is bothering her on the upper right. No fall or injury that triggered symptoms. Patient notes that pain started after sitting in chair for a few hours for Mohs procedure. Patient notes she does wear compression socks a few hours each day. She is compliant for Fosamax for bone health. She was supposed to start Prolia, but needs to get dental work done first.   Description: mid back pain  Intensity: moderate  Progression of Symptoms:  same  Accompanying Signs & Symptoms: none  Previous history of similar problem: fractured back about 2 years ago  Precipitating factors:        Worsened by: standing for long periods makes the pain worse  Alleviating factors:        Improved by: heat is helpful   Therapies tried and outcome: heat, tylenol, ibuprofen/Advil    Per chart review, patient was evaluated in clinic 12/15/2022 due to right-sided pleuritic thoracic back pain and tachypnea.  EKG without dysrhythmia or ischemia.  Low risk for PE.  Patient is agreeable to emergency department evaluation.  D-dimer and troponin completed and WNL.  Chest x-ray completed and showing kyphosis, severe degenerative changes, and midthoracic spine compression fractures.  Labs reassuring.  Pain thought to be costochondritis.    Chest XR:  Lungs are hyperinflated. Mild enlargement of the cardiac silhouette. Normal pulmonary vasculature. No evidence of consolidation or pleural effusions. Severe degenerative changes of the thoracic spine with midthoracic spine compression fractures and dorsal kyphosis. Atherosclerosis.      Review of Systems   Respiratory: Negative for chest tightness, shortness of breath and wheezing.         Dyspnea intermittent with exertion   Cardiovascular: Negative for chest pain.   Musculoskeletal: Positive for back pain.            Objective    BP (!) 152/82   Pulse 74   Temp 97.8  F (36.6  C) (Temporal)   Wt 65.8 kg (145 lb)   LMP  (LMP Unknown)   SpO2 98%   BMI 28.73 kg/m    Body mass  index is 28.73 kg/m .  Physical Exam  Vitals and nursing note reviewed.   Constitutional:       General: She is not in acute distress.     Appearance: Normal appearance.   HENT:      Head: Normocephalic and atraumatic.   Eyes:      Extraocular Movements: Extraocular movements intact.      Pupils: Pupils are equal, round, and reactive to light.   Cardiovascular:      Rate and Rhythm: Normal rate and regular rhythm.      Heart sounds: Normal heart sounds.   Pulmonary:      Effort: Pulmonary effort is normal.      Breath sounds: Normal breath sounds.   Musculoskeletal:         General: Normal range of motion.      Cervical back: Normal range of motion.      Comments: Significant Kyphosis.  Tenderness located to right thoracic region without crepitus, deformity, swelling.  Patient notes pain improvement with pressure/palpation of area.   Skin:     General: Skin is warm and dry.   Neurological:      General: No focal deficit present.      Mental Status: She is alert.   Psychiatric:         Mood and Affect: Mood normal.         Behavior: Behavior normal.

## 2022-12-30 NOTE — TELEPHONE ENCOUNTER
Central Prior Authorization Team   Phone: [phone]      PRIOR AUTHORIZATION DENIED    Medication: Lidocaine (LIDOCARE) 4 % Patch - DENIED    Denial Date: 12/30/2022    Denial Rational: Medications available OTC are excluded from coverage under the pt's plan.        Appeal Information: N/A - no appeal available

## 2023-01-12 ENCOUNTER — ANCILLARY PROCEDURE (OUTPATIENT)
Dept: GENERAL RADIOLOGY | Facility: CLINIC | Age: 88
End: 2023-01-12
Attending: PHYSICIAN ASSISTANT
Payer: COMMERCIAL

## 2023-01-12 ENCOUNTER — OFFICE VISIT (OUTPATIENT)
Dept: FAMILY MEDICINE | Facility: CLINIC | Age: 88
End: 2023-01-12
Payer: COMMERCIAL

## 2023-01-12 VITALS
OXYGEN SATURATION: 98 % | RESPIRATION RATE: 20 BRPM | WEIGHT: 143.4 LBS | BODY MASS INDEX: 28.41 KG/M2 | SYSTOLIC BLOOD PRESSURE: 152 MMHG | DIASTOLIC BLOOD PRESSURE: 84 MMHG | TEMPERATURE: 98.7 F | HEART RATE: 74 BPM

## 2023-01-12 DIAGNOSIS — R06.09 DOE (DYSPNEA ON EXERTION): ICD-10-CM

## 2023-01-12 DIAGNOSIS — M54.50 ACUTE LEFT-SIDED LOW BACK PAIN WITHOUT SCIATICA: ICD-10-CM

## 2023-01-12 DIAGNOSIS — M54.50 ACUTE LEFT-SIDED LOW BACK PAIN WITHOUT SCIATICA: Primary | ICD-10-CM

## 2023-01-12 LAB
ANION GAP SERPL CALCULATED.3IONS-SCNC: 6 MMOL/L (ref 3–14)
BUN SERPL-MCNC: 13 MG/DL (ref 7–30)
CALCIUM SERPL-MCNC: 8.9 MG/DL (ref 8.5–10.1)
CHLORIDE BLD-SCNC: 103 MMOL/L (ref 94–109)
CO2 SERPL-SCNC: 29 MMOL/L (ref 20–32)
CREAT SERPL-MCNC: 0.56 MG/DL (ref 0.52–1.04)
ERYTHROCYTE [DISTWIDTH] IN BLOOD BY AUTOMATED COUNT: 12.9 % (ref 10–15)
GFR SERPL CREATININE-BSD FRML MDRD: 89 ML/MIN/1.73M2
GLUCOSE BLD-MCNC: 95 MG/DL (ref 70–99)
HCT VFR BLD AUTO: 36.6 % (ref 35–47)
HGB BLD-MCNC: 11.6 G/DL (ref 11.7–15.7)
MCH RBC QN AUTO: 30.1 PG (ref 26.5–33)
MCHC RBC AUTO-ENTMCNC: 31.7 G/DL (ref 31.5–36.5)
MCV RBC AUTO: 95 FL (ref 78–100)
NT-PROBNP SERPL-MCNC: 692 PG/ML (ref 0–1800)
PLATELET # BLD AUTO: 247 10E3/UL (ref 150–450)
POTASSIUM BLD-SCNC: 4.1 MMOL/L (ref 3.4–5.3)
RBC # BLD AUTO: 3.85 10E6/UL (ref 3.8–5.2)
SODIUM SERPL-SCNC: 138 MMOL/L (ref 133–144)
WBC # BLD AUTO: 4.4 10E3/UL (ref 4–11)

## 2023-01-12 PROCEDURE — 80048 BASIC METABOLIC PNL TOTAL CA: CPT | Performed by: PHYSICIAN ASSISTANT

## 2023-01-12 PROCEDURE — 36415 COLL VENOUS BLD VENIPUNCTURE: CPT | Performed by: PHYSICIAN ASSISTANT

## 2023-01-12 PROCEDURE — 99214 OFFICE O/P EST MOD 30 MIN: CPT | Performed by: PHYSICIAN ASSISTANT

## 2023-01-12 PROCEDURE — 72100 X-RAY EXAM L-S SPINE 2/3 VWS: CPT | Mod: TC | Performed by: RADIOLOGY

## 2023-01-12 PROCEDURE — 93000 ELECTROCARDIOGRAM COMPLETE: CPT | Performed by: PHYSICIAN ASSISTANT

## 2023-01-12 PROCEDURE — 71046 X-RAY EXAM CHEST 2 VIEWS: CPT | Mod: TC | Performed by: RADIOLOGY

## 2023-01-12 PROCEDURE — 85027 COMPLETE CBC AUTOMATED: CPT | Performed by: PHYSICIAN ASSISTANT

## 2023-01-12 PROCEDURE — 83880 ASSAY OF NATRIURETIC PEPTIDE: CPT | Performed by: PHYSICIAN ASSISTANT

## 2023-01-12 RX ORDER — ALBUTEROL SULFATE 90 UG/1
1-2 AEROSOL, METERED RESPIRATORY (INHALATION) EVERY 4 HOURS PRN
Qty: 6.7 G | Refills: 0 | Status: SHIPPED | OUTPATIENT
Start: 2023-01-12

## 2023-01-12 ASSESSMENT — PAIN SCALES - GENERAL: PAINLEVEL: MODERATE PAIN (5)

## 2023-01-12 NOTE — PATIENT INSTRUCTIONS
Bettye Lamas,    Thank you for allowing [facility] to manage your care.    I am unsure of the cause of your symptoms, but we will see what our workup shows. Please call to set up physical therapy if you are interested in trying this.    If you develop worsening/changing symptoms at any time, please call 911 or go to the emergency department for evaluation.    I ordered some lab work, please go to the laboratory to get your studies.    I ordered some xrays, please go to our radiology department to get your xrays.    I sent your prescriptions to your pharmacy.    I ordered an echocardiogram. Please call diagnostic imaging [phone] to schedule your test.    Please allow 1-2 business days for our office to contact you in regards to your laboratory/radiological studies.  If not done so, I encourage you to login into [employer] ([email or web address] to review your results as well.     For your pain, please use Tylenol 650mg every 6 hours. You may use 400mg of ibuprofen between doses of Tylenol.     Max acetaminophen (Tylenol) 3,000mg/24 hours  Max ibuprofen 2,000mg/24 hours    Your blood pressure was high today. Get a blood pressure cuff for use at home. Take and record your blood pressure daily for 2 weeks. If your blood pressure is greater than or equal to 140/90mm Hg more than half of the time, please schedule an appointment with us for a recheck.    If you have any questions or concerns, please feel free to call us at [phone]    Sincerely,    Rony Gimenez PA-C    Did you know?      You can schedule a video visit for follow-up appointments as well as future appointments for certain conditions.  Please see the below link.     [email or web address]    If you have not already done so,  I encourage you to sign up for [employer] ([email or web address]).  This will allow you to review your results, securely communicate with a provider, and  schedule virtual visits as well.

## 2023-01-12 NOTE — PROGRESS NOTES
Assessment & Plan   Problem List Items Addressed This Visit    None  Visit Diagnoses     Acute left-sided low back pain without sciatica    -  Primary    Relevant Orders    XR Lumbar Spine 2/3 Views (Completed)    FREITAS (dyspnea on exertion)        Relevant Medications    albuterol (PROAIR HFA/PROVENTIL HFA/VENTOLIN HFA) 108 (90 Base) MCG/ACT inhaler    Other Relevant Orders    Echocardiogram Complete    XR Chest 2 Views (Completed)    BNP-N terminal pro (Completed)    Basic metabolic panel  (Ca, Cl, CO2, Creat, Gluc, K, Na, BUN) (Completed)    CBC with platelets (Completed)    EKG 12-lead complete w/read - Clinics (Completed)         I do not believe a fracture to be the source of this patient's pain as there was no preceding trauma.  XRs reassuring for acute abnormalities. I do not believe the pain is caused by an epidural abscess as the patient denies hx of IV drug use and does not have fevers/chills and no recent procedures.    I do not believe this patient's pain is from an infiltrative vertebral tumor as the patient does not have weight loss or night sweats and no known history of cancer.    I do not believe this patient's pain represents a rupture AAA.  There is no palpable, pulsatile mass on exam and patient does not have any ABD pain.      As for her progressive FREITAS, CXR shows no pneumonia, pneumothorax, pleural effusion, edema, or other worrisome process. No worrisome anemia. Hgb stable. Basic 8 and BNP not concerning.    Based on history and exam, the most likely etiology of this patient's back pain is lumbosacral strain vs other MSK source. Unclear cause of FREITAS, but will get an echo to look for changes since 2015. Albuterol inhaler as this has helped her, but no wheezing on exam with normal breath sounds.  Emergent MRI is not indicated as this patient does not have new weakness or cauda equina syndrome.  Patient has no saddle anesthesia, bowel or bladder incontinence. Will send home with otc analgesics,   rice/heat, and follow-up in 1 month if not improving.    Complete history and physical exam as below. Afebrile with normal vital signs except for elevated bp, which they will monitor at home and contact us if >140/90mmHg greater than 50% of the time.    DDx and Dx discussed with and explained to the pt to their satisfaction.  All questions were answered at this time. Pt expressed understanding of and agreement with this dx, tx, and plan. No further workup warranted and standard medication warnings given. I have given the patient a list of pertinent indications for re-evaluation. Will go to the Emergency Department if symptoms worsen or new concerning symptoms arise. Patient left in no apparent distress.     Ordering of each unique test  Prescription drug management    See Patient Instructions    Return in about 1 month (around 2/12/2023) for a recheck of your symptoms if not improving, or call 911/go to an ER anytime if worsening.    CAPRICE Collado  [facility] RICK Lamas is a 87 year old presenting for the following health issues:  Back Pain and Shortness of Breath      HPI     Pain History:  When did you first notice your pain? - new back pain left lower region. Previous spot had been on the right.   Have you seen this provider for your pain in the past?   Yes   Where in your body do you have pain? Low back, mostly on the left  Are you seeing anyone else for your pain? No    Has had migrating back pain following Mohs surgery in mid 12/22  Location of pain: left low back for 3-4 days. No injury.  Analgesia/pain control: APAP and IBU   - Recent changes:  none    - Overall control: Tolerable with discomfort    - Current treatments: otc meds   No saddle anesthesia or incontinence.     Also has had FREITAS for the last month. Mild cough as well. Dry. No fevers/chills, n/v, other symptoms. Used to have an albuterol inhaler that was helpful.    PDMP Review     None        Last CSA  Agreement:   CSA -- Patient Level:    CSA: None found at the patient level.       Review of Systems   Constitutional, HEENT, cardiovascular, pulmonary, gi and gu systems are negative, except as otherwise noted.      Objective    BP (!) 152/84   Pulse 74   Temp 98.7  F (37.1  C) (Tympanic)   Resp 20   Wt 65 kg (143 lb 6.4 oz)   LMP  (LMP Unknown)   SpO2 98%   Breastfeeding No   BMI 28.41 kg/m    Body mass index is 28.41 kg/m .  Physical Exam  Vitals and nursing note reviewed.   Constitutional:       General: She is not in acute distress.     Appearance: She is not ill-appearing or diaphoretic.   HENT:      Head: Normocephalic and atraumatic.      Mouth/Throat:      Mouth: Mucous membranes are moist.   Eyes:      Conjunctiva/sclera: Conjunctivae normal.   Cardiovascular:      Rate and Rhythm: Normal rate and regular rhythm.      Heart sounds: Normal heart sounds. No murmur heard.    No friction rub. No gallop.   Pulmonary:      Effort: Pulmonary effort is normal. No respiratory distress.      Breath sounds: Normal breath sounds. No stridor. No wheezing, rhonchi or rales.   Abdominal:      General: Bowel sounds are normal. There is no distension.      Palpations: Abdomen is soft. There is no mass.      Tenderness: There is no abdominal tenderness. There is no guarding or rebound.      Hernia: No hernia is present.   Musculoskeletal:      Comments: No CVA or midline spinal tenderness. Tender along the left SI and lumbar paraspinal region. No overlying signs of trauma or infection.   Skin:     General: Skin is warm and dry.   Neurological:      General: No focal deficit present.      Mental Status: She is alert. Mental status is at baseline.      Comments: Able to toe lift, heel walk and knee bend.   Psychiatric:         Mood and Affect: Mood normal.         Behavior: Behavior normal.          Results for orders placed or performed in visit on 01/12/23   XR Lumbar Spine 2/3 Views     Status: None    Narrative     LUMBAR SPINE TWO TO THREE VIEWS 1/12/2023 11:05 AM     COMPARISON: Lumbar spine MRI 7/16/2021    HISTORY: New left lower back pain. No trauma. History of compression  fractures. Acute left-sided low back pain without sciatica.      Impression    IMPRESSION: 5 lumbar type vertebrae. Normal alignment. Compression  fracture deformity of the L3 vertebral body again noted. Vertebral  body heights and contours of the lumbar spine are otherwise normal.  Facet arthropathy at L3-L4, L4-L5 and L5-S1. Loss of disc space height  and degenerative endplate spurring at L5-S1.    JYOTI GILES MD         SYSTEM ID:  [id number]   Results for orders placed or performed in visit on 01/12/23   XR Chest 2 Views     Status: None    Narrative    CHEST 2 VIEWS   1/12/2023 11:05 AM     HISTORY: FREITAS (dyspnea on exertion).    COMPARISON: Chest x-ray on 3/1/2022.      Impression    IMPRESSION: PA and lateral views of the chest were obtained.  Cardiomediastinal silhouette is within normal limits. No suspicious  focal pulmonary opacities. No significant pleural effusion or  pneumothorax. Diffuse osteopenia and multilevel degenerative changes  of the spine. Multiple compression deformity of the thoracic spine,  not significantly changed as compared to 3/1/2022 exam.    ÁNGEL SMITH MD         SYSTEM ID:  [id number]   Results for orders placed or performed in visit on 01/12/23   CBC with platelets     Status: Abnormal   Result Value Ref Range    WBC Count 4.4 4.0 - 11.0 10e3/uL    RBC Count 3.85 3.80 - 5.20 10e6/uL    Hemoglobin 11.6 (L) 11.7 - 15.7 g/dL    Hematocrit 36.6 35.0 - 47.0 %    MCV 95 78 - 100 fL    MCH 30.1 26.5 - 33.0 pg    MCHC 31.7 31.5 - 36.5 g/dL    RDW 12.9 10.0 - 15.0 %    Platelet Count 247 150 - 450 10e3/uL   Basic metabolic panel  (Ca, Cl, CO2, Creat, Gluc, K, Na, BUN)     Status: Normal   Result Value Ref Range    Sodium 138 133 - 144 mmol/L    Potassium 4.1 3.4 - 5.3 mmol/L    Chloride 103 94 - 109 mmol/L    Carbon  Dioxide (CO2) 29 20 - 32 mmol/L    Anion Gap 6 3 - 14 mmol/L    Urea Nitrogen 13 7 - 30 mg/dL    Creatinine 0.56 0.52 - 1.04 mg/dL    Calcium 8.9 8.5 - 10.1 mg/dL    Glucose 95 70 - 99 mg/dL    GFR Estimate 89 >60 mL/min/1.73m2   BNP-N terminal pro     Status: Normal   Result Value Ref Range    N Terminal Pro BNP Outpatient 692 0 - 1,800 pg/mL

## 2023-01-18 ENCOUNTER — ANCILLARY PROCEDURE (OUTPATIENT)
Dept: CARDIOLOGY | Facility: CLINIC | Age: 88
End: 2023-01-18
Attending: PHYSICIAN ASSISTANT
Payer: COMMERCIAL

## 2023-01-18 DIAGNOSIS — R06.09 DOE (DYSPNEA ON EXERTION): ICD-10-CM

## 2023-01-18 LAB — LVEF ECHO: NORMAL

## 2023-01-18 PROCEDURE — 99207 PR STATISTIC IV PUSH SINGLE INITIAL SUBSTANCE: CPT | Performed by: INTERNAL MEDICINE

## 2023-01-18 PROCEDURE — 93306 TTE W/DOPPLER COMPLETE: CPT | Performed by: INTERNAL MEDICINE

## 2023-01-18 RX ADMIN — Medication 3 ML: at 16:31

## 2023-01-20 ENCOUNTER — TELEPHONE (OUTPATIENT)
Dept: FAMILY MEDICINE | Facility: CLINIC | Age: 88
End: 2023-01-20
Payer: COMMERCIAL

## 2023-01-20 DIAGNOSIS — R06.09 DOE (DYSPNEA ON EXERTION): Primary | ICD-10-CM

## 2023-01-20 NOTE — TELEPHONE ENCOUNTER
Spoke with patient, relayed Jamel Gimenez's message and patient verbalized understanding and agreement.    Patient denies any new/serious symptoms; same FREITAS noted per last office visit; no dyspnea at rest.    Deysi Arrieta RN

## 2023-01-20 NOTE — TELEPHONE ENCOUNTER
Echo results are not yet available. We will contact her when I can see the read. Please update. To ER if worsening/changing symptoms. Thanks.

## 2023-01-20 NOTE — TELEPHONE ENCOUNTER
Patient calling(worried) to request interpretation of echocardiogram done this past Wednesday (1/18/2023).    Patient concerned because a tech mentioned an aneurysm.    Patient would like call back asap; states no changes since last office visit with Jamel Gimenez on 1/12/2023 (same/continuing FREITAS).    Informed patient no results were noted in chart yet at this time (still in process) and I would get message to Jamel Gimenez to review; nurse or provider would get back to her.    Patient verbalized understanding; routing to Jamel Gimenez.     Deysi Arrieta, RN

## 2023-01-21 NOTE — PROGRESS NOTES
I sent the following message to patient and ordered cardiology referral.    Dear Cydney,      Here are your recent results. Your echocardiogram shows moderate to severe narrowing of your aortic valve and decreased pumping of the blood out of your heart that are new from the previous echocardiogram in 2015. These could be contributing to your symptoms. I am going to refer you to cardiology to discuss management of these issues. [facility] will call you to coordinate your care with a cardiologist. If you don't hear from a representative within 2 business days, please call [phone]. If your symptoms worsen/change, please go to urgent care or the ER in the meantime.     Please let us know if you have any questions or concerns.     Regards,  CAPRICE Collado

## 2023-01-31 NOTE — PROGRESS NOTES
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  Her severe aortic stenosis is characterized with an area of 0.6 cm2, mean gradient 32 mmHg and peak velocity of 3.4 m/sec with LVEF 40-45% by echocardiogram on 1/12/2023. Also noted to have ascending aortic aneurysm 5.1 cm on recent echo, which was 4.8 cm in 2015. Additional notable medical history of compression fractures of thoracic vertebrae.     Since about December she has noticed that she is more tired and short of breath with exertion. Her sons have also noticed that she doesn't have as much energy. She noticed the other day she was short of breath walking from her car into clinic. This is a change from a year ago.  No chest pain, lightheadedness, syncope, presyncope, orthopnea, PND, has sock ring edema, no weight gain.     Sister has a bicuspid aortic valve and aortic aneurysm s/p thoracic aneurysm repair about 10 year ago.      PAST MEDICAL HISTORY:     Past Medical History:   Diagnosis Date     Actinic keratosis      Basal cell cancer 02/2011    bcc of the L back.     Basal cell carcinoma 04' , 06'     Basal cell carcinoma 06/2011    L neck     Breast cancer (H)      Cataract      Colon polyps     Precancer     Glaucoma (increased eye pressure)      Heart murmur      HLD (hyperlipidemia)      Hypertension goal BP (blood pressure) < 140/90 12/19/2013     Invasive ductal carcinoma of breast (H) 6/2015    left     Osteoporosis      Scoliosis      Skin cancer      Skin cancer 05/2013    sccis R cheek     Squamous cell carcinoma 09/2011    R upper back     Squamous cell carcinoma 10/2012    R dorsal  hand     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 7/13    left leg     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        PAST SURGICAL HISTORY:     Past Surgical History:   Procedure Laterality Date     BIOPSY BREAST       CATARACT IOL, RT/LT       COLONOSCOPY  5/2008, 5/13, 6/14, 6/17    Q 3 years for advanced adenomatous polyp     CYSTOSCOPY  12/31/2008     D & C       GENITOURINARY SURGERY      TURBT     HC REMOVAL GALLBLADDER      open pan     HC TRABECULOPLASTY BY LASER SURGERY Left 04/18/2007    SLT #1 OS (inf 180)     HC TRABECULOPLASTY BY LASER SURGERY Right 05/04/2011    SLT #1 OD (inf 180?)     HC TRABECULOPLASTY BY LASER SURGERY Left 03/17/2015    SLT #2 OS (sup 180)     HC TRABECULOPLASTY BY LASER SURGERY Right 06/23/2015    SLT #2 OD (sup 180?)     LASER ARGON TREATMENT      SLT left eye x 2     LUMPECTOMY BREAST       LUMPECTOMY BREAST WITH SENTINEL NODE, COMBINED Left 07/29/2015    Procedure: COMBINED LUMPECTOMY BREAST WITH SENTINEL NODE;  Surgeon: Ifeanyi Sunshine MD;  Location:  OR     PHACOEMULSIFICATION CLEAR CORNEA WITH STANDARD INTRAOCULAR LENS IMPLANT Left 12/08/2017    Procedure: PHACOEMULSIFICATION CLEAR CORNEA WITH STANDARD INTRAOCULAR LENS IMPLANT;   COMPLEX LEFT PHACOEMULSIFICATION CLEAR CORNEA WITH STANDARD INTRAOCULAR LENS IMPLANT ;  Surgeon: Kvng Garcia MD;  Location: [facility]     PHACOEMULSIFICATION CLEAR CORNEA WITH STANDARD INTRAOCULAR LENS IMPLANT Right 10/19/2020    Procedure: RIGHT COMPLEX PHACOEMULSIFICATION, CATARACT, WITH INTRAOCULAR LENS IMPLANT ;  Surgeon: Kvng Garcia MD;  Location: [facility] OR     SURGICAL HISTORY OF -   09/2011    squamous cell CA excised from back     TUBAL LIGATION          CURRENT MEDICATIONS:     Current Outpatient Medications   Medication Sig Dispense Refill     albuterol (PROAIR HFA/PROVENTIL HFA/VENTOLIN HFA) 108 (90 Base) MCG/ACT inhaler  Inhale 1-2 puffs into the lungs every 4 hours as needed for shortness of breath or wheezing 6.7 g 0     alendronate (FOSAMAX) 70 MG tablet TAKE 1 TABLET BY MOUTH EVERY 7 DAYS 12 tablet 11     co-enzyme Q-10 100 MG CAPS capsule Take 200 mg by mouth daily       cyanocobalamin (VITAMIN B-12) 500 MCG tablet Take 1 tablet (500 mcg) by mouth daily 150 tablet 3     dorzolamide-timolol (COSOPT) 2-0.5 % ophthalmic solution Place 1 drop into both eyes 2 times daily 10 mL 4     ferrous sulfate (FEROSUL) 325 (65 Fe) MG tablet Take 1 tablet (325 mg) by mouth daily (with breakfast) 90 tablet 3     ibuprofen (ADVIL,MOTRIN) 200 MG tablet Take 200 mg by mouth every 4 hours as needed.       latanoprost (XALATAN) 0.005 % ophthalmic solution Place 1 drop into both eyes At Bedtime 7.5 mL 3     Lidocaine (LIDOCARE) 4 % Patch Place 1 patch onto the skin every 24 hours To prevent lidocaine toxicity, patient should be patch free for 12 hrs daily. (Patient not taking: Reported on 1/12/2023) 10 patch 0     losartan (COZAAR) 50 MG tablet Take 1 tablet (50 mg) by mouth daily 90 tablet 3     Multiple Vitamins-Minerals (ONE DAILY ADULTS 50+) TABS        polyethylene glycol (MIRALAX) 17 GM/Dose powder Take 17 g (1 capful) by mouth 2 times daily as needed for constipation (Patient not taking: Reported on 1/12/2023) 507 g 0     simvastatin (ZOCOR) 20 MG tablet Take 1 tablet (20 mg) by mouth At Bedtime 90 tablet 3     triamcinolone (KENALOG) 0.1 % external cream Apply twice daily for 2 weeks 30 g 0     VITAMIN D-1000 MAX -1000 MG-UNIT OR TABS 1 daily          ALLERGIES:     Allergies   Allergen Reactions     Lisinopril Cough        FAMILY HISTORY:     Family History   Problem Relation Age of Onset     Diabetes Mother      Breast Cancer Mother      Eye Disorder Mother      Osteoporosis Mother      Glaucoma Mother      Macular Degeneration Mother      Prostate Cancer Father      Prostate Cancer Brother      Glaucoma Brother      Macular  "Degeneration Brother      Thyroid Disease Sister      Blood Disease Sister         lupus     Heart Disease Sister      Asthma Son      Prostate Cancer Brother      Glaucoma Brother      Glaucoma Other      Melanoma No family hx of      Skin Cancer No family hx of         SOCIAL HISTORY:     Social History     Socioeconomic History     Marital status:      Number of children: 2   Occupational History     Employer: [employer]     Employer: RETIRED   Tobacco Use     Smoking status: Former     Packs/day: 0.50     Years: 20.00     Pack years: 10.00     Types: Cigarettes     Quit date: 1976     Years since quittin.0     Smokeless tobacco: Never   Vaping Use     Vaping Use: Never used   Substance and Sexual Activity     Alcohol use: Yes     Comment: rare     Drug use: No     Sexual activity: Never        REVIEW OF SYSTEMS:     Constitutional: No fevers or chills  Skin: No new rash or itching  Eyes: No acute change in vision  Ears/Nose/Throat: No purulent rhinorrhea, new hearing loss, or new vertigo  Respiratory: No cough or hemoptysis  Cardiovascular: See HPI  Gastrointestinal: No change in appetite, vomiting, hematemesis or diarrhea  Genitourinary: No dysuria or hematuria  Musculoskeletal: No new back pain, neck pain or muscle pain  Neurologic: No new headaches, focal weakness or behavior changes  Psychiatric: No hallucinations, excessive alcohol consumption or illegal drug usage  Hematologic/Lymphatic/Immunologic: No bleeding, chills, fever, night sweats or weight loss  Endocrine: No new cold intolerance, heat intolerance, polyphagia, polydipsia or polyuria      PHYSICAL EXAMINATION:     BP (!) 155/94 (BP Location: Right arm, Patient Position: Chair, Cuff Size: Adult Regular)   Pulse 74   Ht 1.489 m (4' 10.62\")   Wt 63.7 kg (140 lb 6.4 oz)   LMP  (LMP Unknown)   SpO2 98%   BMI 28.72 kg/m      GENERAL: No acute distress.  HEENT: EOMI. Sclerae white, not injected. Nares clear. Pharynx without erythema " or exudate.   Neck: No adenopathy. No thyromegaly. Symmetrical.   Heart: Regular rate and rhythm. 2/6 crescendo decrescendo late peaking systolic murmur.  Abdomen: Soft, nontender, nondistended. Bowel sounds present.  Extremities: No clubbing, cyanosis, or edema.   Neurologic: Alert and oriented to person/place/time, normal speech and affect. No focal deficits.  Skin: No petechiae, purpura or rash.     LABORATORY DATA:     LIPID RESULTS:  Lab Results   Component Value Date    CHOL 175 2022    CHOL 128 07/15/2020    HDL 76 2022    HDL 78 07/15/2020    LDL 82 2022    LDL 39 07/15/2020    TRIG 86 2022    TRIG 57 07/15/2020    CHOLHDLRATIO 2.9 2015       LIVER ENZYME RESULTS:  Lab Results   Component Value Date    AST 40 2021    ALT 25 2021       CBC RESULTS:  Lab Results   Component Value Date    WBC 4.4 2023    WBC 6.1 2020    RBC 3.85 2023    RBC 3.57 (L) 2020    HGB 11.6 (L) 2023    HGB 10.7 (L) 2020    HCT 36.6 2023    HCT 33.3 (L) 2020    MCV 95 2023    MCV 93 2020    MCH 30.1 2023    MCH 30.0 2020    MCHC 31.7 2023    MCHC 32.1 2020    RDW 12.9 2023    RDW 13.1 2020     2023     2020       BMP RESULTS:  Lab Results   Component Value Date     2023     2021    POTASSIUM 4.1 2023    POTASSIUM 4.5 2021    CHLORIDE 103 2023    CHLORIDE 106 2021    CO2 29 2023    CO2 28 2021    ANIONGAP 6 2023    ANIONGAP 3 2021    GLC 95 2023     (H) 2021    BUN 13 2023    BUN 16 2021    CR 0.56 2023    CR 0.80 2021    GFRESTIMATED 89 2023    GFRESTIMATED 68 2021    GFRESTBLACK 79 2021    SHERYL 8.9 2023    SHERYL 8.8 2021         PROCEDURES & FURTHER ASSESSMENTS:     EC2023        Echocardiogram:  2023  Left ventricular  function is decreased. The ejection fraction is 45-50%  (mildly reduced).     Global right ventricular function is normal.     Moderate to severe aortic stenosis is present. The peak aortic velocity is 3.4  m/sec. The mean gradient across the aortic valve is 32 mmHg. Valve area is 0.6  cm2. Stroke volume is reduced - consider low-flow low-gradient severe aortic  stenosis.     Pulmonary artery systolic pressure is normal. The estimated mean right atrial  pressure is normal.     Moderate dilatation of the aorta is present. Ascending aorta 5.1 cm. Sinuses  of Valsalva 4.1 cm.     No pericardial effusion is present.      CT TAVR: pending     Coronary Angiogram and Right Heart Catheterization: pending     PFTs: pending   Carotid Ultrasound: pending       STS RISK SCORE: 2.5%  FRAILTY SCORE: 2/5   NYHA CLASS: II     CLINICAL IMPRESSION:     Cydney Christiansen is a 87 year old female with symptomatic low flow low gradient severe aortic stenosis who is an appropriate candidate for transcatheter aortic valve replacement based on age, frailty, co-morbidities and overall surgical mortality risk estimation of 2.5% based on the STS score. She does have an ascending aortic aneurysm measured at 5.1 cm that will need to be taken into consideration when decided TAVR vs SAVR; however, appears to be slow growing as it was 4.8 cm in 2015. She is likely higher surgical risk given age, nor does patient sound particularly interested in surgery; therefore, could consider TAVR and ongoing monitoring her aneurysm. Will ask our surgical colleagues to evaluate surgical candidacy, will then present at valve conference for consensus decision.    Plan Summary:  1) [facility] Severe Aortic Stenosis:  - CT TAVR to assess for TAVR evaluation and to assess size of ascending aortic aneurysm   - Coronary angiogram, RHC, LHC  - Presentation of data to the Heart team to assess the optimal treatment of her severe aortic stenosis and thoracic aortic  aneurysm    Patient was evaluated in clinic with Dr. Jo of interventional cardiology.    KRISTIE Patiño, CNP  Structural Heart Nurse Practitioner  [facility]  Clinic: [phone]  Pager: [phone]    CC  Patient Care Team:  Estrellita Hernandez MD as PCP - General (Family Practice)  Usha Salgado MD as MD (Hematology & Oncology)  Estrellita Hernandez MD as Assigned PCP  Ac Bailey MD as Assigned Musculoskeletal Provider  Kvng Garcia MD as MD (Ophthalmology)  Ian Haro MD as Assigned Surgical Provider

## 2023-02-02 ENCOUNTER — OFFICE VISIT (OUTPATIENT)
Dept: CARDIOLOGY | Facility: CLINIC | Age: 88
End: 2023-02-02
Attending: INTERNAL MEDICINE
Payer: COMMERCIAL

## 2023-02-02 VITALS
HEART RATE: 74 BPM | WEIGHT: 140.4 LBS | OXYGEN SATURATION: 98 % | DIASTOLIC BLOOD PRESSURE: 94 MMHG | BODY MASS INDEX: 28.3 KG/M2 | SYSTOLIC BLOOD PRESSURE: 155 MMHG | HEIGHT: 59 IN

## 2023-02-02 DIAGNOSIS — I35.0 SEVERE AORTIC STENOSIS: Primary | ICD-10-CM

## 2023-02-02 DIAGNOSIS — I51.89 OTHER ILL-DEFINED HEART DISEASES: ICD-10-CM

## 2023-02-02 PROCEDURE — 99205 OFFICE O/P NEW HI 60 MIN: CPT | Performed by: INTERNAL MEDICINE

## 2023-02-02 PROCEDURE — G0463 HOSPITAL OUTPT CLINIC VISIT: HCPCS

## 2023-02-02 PROCEDURE — G0463 HOSPITAL OUTPT CLINIC VISIT: HCPCS | Performed by: INTERNAL MEDICINE

## 2023-02-02 RX ORDER — ASPIRIN 325 MG
325 TABLET ORAL ONCE
Status: CANCELLED | OUTPATIENT
Start: 2023-02-02 | End: 2023-02-02

## 2023-02-02 RX ORDER — ASPIRIN 81 MG/1
243 TABLET, CHEWABLE ORAL ONCE
Status: CANCELLED | OUTPATIENT
Start: 2023-02-02

## 2023-02-02 RX ORDER — SODIUM CHLORIDE 9 MG/ML
INJECTION, SOLUTION INTRAVENOUS CONTINUOUS
Status: CANCELLED | OUTPATIENT
Start: 2023-02-02

## 2023-02-02 RX ORDER — LIDOCAINE 40 MG/G
CREAM TOPICAL
Status: CANCELLED | OUTPATIENT
Start: 2023-02-02

## 2023-02-02 ASSESSMENT — PAIN SCALES - GENERAL: PAINLEVEL: NO PAIN (0)

## 2023-02-02 NOTE — PROGRESS NOTES
Structural Heart Coordinator visit:  Provided additional education regarding TAVR/MitraClip procedure, after being present for discussion with physician. Explained the work-up process and next steps for patient. Patient provided our direct contact number and instructed to call with any questions.     Frailty Score: 2/5    Completed frailty testing and KCCQ.       5 meter walk: 6  Trial 1:6  Trail 2: 6  Trial 3: 6    : 35.3  Albumin: 3.8  Eye ball test: Pass  ADL: 6/6    Dental Clearance: Pass      KCCQ Results:   1a. 4  1b. 4  1c. 2  2. 2  3. 3  4. 3  5. 5  6. 4  7. 3  8a. 4  8b. 3  8c. 4       Trisha Gurrola CMA  Structural Heart   [facility] Heart [facility]

## 2023-02-02 NOTE — PATIENT INSTRUCTIONS
You were seen today in the Cardiovascular Clinic at the [facility].      Cardiology Providers you saw during your visit:  Dr. Jo    Recommendations:  Have your CT TAVR and angiogram with right and left heart cath.    We are going to schedule you for a CT angiogram of the chest, abdomen, and pelvis (CT TAVR):  -Please drink plenty of water the day before the test  -No caffeine, alcohol or smoking 12 hours prior to test  -Nothing by mouth 3 hours prior, however it is OK to take your medications with a sip of water.  -If you are on oral diabetes medications:  Do not take on the day of the procedure.  If you take Glucophage or Metformin:  Do not take 48 hours post procedure.    -Report to the [facility] Waiting room in the hospital      You will/have be scheduled for a coronary angiogram on at the [facility].     Address:   [address] [place] [id number]     Instructions:   1. Please make arrangements to have a  as you will not be allowed to drive following the procedure. [employer] is available at no additional cost in front of the building.   2. 8 hours prior to arrival stop all food, drink, milk and chewing tobacco.   3. Take a 325 mg aspirin the day before and the day of your procedure.  4. Make arrangements to have someone stay with you the night after your procedure.     Please arrive at the [facility] Waiting Room at.     You will be escorted back to the pre procedure area called 2A. Here they will insert an IV, draw labs, and obtain a short medical history. Please bring an updated list of your current medications.     A physician will come and talk with you about the procedure and obtain consent.     A nurse from the Cardiac Catheterization Lab will then escort you to the procedure area. You will be receiving sedation during the procedure so you will need someone to drive you to and from the hospital.     After the procedure you will recover for a short period (2 - 6  hours). You will be discharged with instructions for post procedure care.     However, depending on what the angiogram shows you may have to have stents placed. Most patient are able to go home the same day but sometimes it might require an overnight stay. We ask that you bring a small bag of necessities for your comfort if you would need to stay overnight. DO NOT BRING ANY VALUABLES!      Nursing questions:  MARIEL Alvarado;  [phone]  For emergencies call 911.      Thank you for your visit today!     Jenny Covarrubias RN  Lead Structural Heart RN Specialists  TAVR, MitraClip and Watchman Programs  [facility]    Genny Gurrola, Lead [facility] Office: [phone]

## 2023-02-02 NOTE — NURSING NOTE
Chief Complaint   Patient presents with     New Patient      88 yo FM, here for evaluation of severe aortic valve stenosis.       Vitals were taken and medications reconciled. Frailty testing performed.    Mahesh Sweet, EMT  3:36 PM

## 2023-02-02 NOTE — LETTER
2/2/2023      RE: Cydney Christiansen  [address]  [place] [address]       Dear Colleague,    Thank you for the opportunity to participate in the care of your patient, Cydney Christiansen, at the [facility] [place] at [facility]. Please see a copy of my visit note below.        [facility] HEART CARE  CARDIOVASCULAR DIVISION    VALVE CLINIC INITIAL CONSULTATION    REFERRING PROVIDER: CAPRICE Collado       PERTINENT CLINICAL HISTORY:     Cydney Christiansen is a very pleasant 87 year old female with paradoxical low flow low gradient severe aortic valvular stenosis referred to our clinic for evaluation and consideration for potential transcatheter aortic valve replacement (TAVR).  Her severe aortic stenosis is characterized with an area of 0.6 cm2, mean gradient 32 mmHg and peak velocity of 3.4 m/sec with LVEF 40-45% by echocardiogram on 1/12/2023. Also noted to have ascending aortic aneurysm 5.1 cm on recent echo, which was 4.8 cm in 2015. Additional notable medical history of compression fractures of thoracic vertebrae.     Since about December she has noticed that she is more tired and short of breath with exertion. Her sons have also noticed that she doesn't have as much energy. She noticed the other day she was short of breath walking from her car into clinic. This is a change from a year ago.  No chest pain, lightheadedness, syncope, presyncope, orthopnea, PND, has sock ring edema, no weight gain.     Sister has a bicuspid aortic valve and aortic aneurysm s/p thoracic aneurysm repair about 10 year ago.      PAST MEDICAL HISTORY:     Past Medical History:   Diagnosis Date     Actinic keratosis      Basal cell cancer 02/2011    bcc of the L back.     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    left leg     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        PAST SURGICAL HISTORY:     Past Surgical History:   Procedure Laterality Date     BIOPSY BREAST       CATARACT IOL, RT/LT       COLONOSCOPY  5/2008, 5/13, 6/14, 6/17    Q 3 years for advanced adenomatous polyp     CYSTOSCOPY  12/31/2008     D & C       GENITOURINARY SURGERY      TURBT     HC REMOVAL GALLBLADDER      open pan     HC TRABECULOPLASTY BY LASER SURGERY Left 04/18/2007    SLT #1 OS (inf 180)     HC TRABECULOPLASTY BY LASER SURGERY Right 05/04/2011    SLT #1 OD (inf 180?)     HC TRABECULOPLASTY BY LASER SURGERY Left 03/17/2015    SLT #2 OS (sup 180)     HC TRABECULOPLASTY BY LASER SURGERY Right 06/23/2015    SLT #2 OD (sup 180?)     LASER ARGON TREATMENT      SLT left eye x 2     LUMPECTOMY BREAST       LUMPECTOMY BREAST WITH SENTINEL NODE, COMBINED Left 07/29/2015    Procedure: COMBINED LUMPECTOMY BREAST WITH SENTINEL NODE;  Surgeon: Ifeanyi Sunshine MD;  Location: [facility] OR     PHACOEMULSIFICATION CLEAR CORNEA WITH STANDARD INTRAOCULAR LENS IMPLANT Left 12/08/2017    Procedure: PHACOEMULSIFICATION CLEAR CORNEA WITH STANDARD INTRAOCULAR LENS IMPLANT;   COMPLEX LEFT PHACOEMULSIFICATION CLEAR CORNEA WITH STANDARD INTRAOCULAR LENS IMPLANT ;  Surgeon: Kvng Garcia MD;  Location: [facility]     PHACOEMULSIFICATION CLEAR CORNEA WITH STANDARD INTRAOCULAR LENS IMPLANT Right 10/19/2020    Procedure: RIGHT COMPLEX PHACOEMULSIFICATION, CATARACT, WITH INTRAOCULAR  LENS IMPLANT ;  Surgeon: Kvng Garcia MD;  Location: [facility] OR     SURGICAL HISTORY OF -   09/2011    squamous cell CA excised from back     TUBAL LIGATION          CURRENT MEDICATIONS:     Current Outpatient Medications   Medication Sig Dispense Refill     albuterol (PROAIR HFA/PROVENTIL HFA/VENTOLIN HFA) 108 (90 Base) MCG/ACT inhaler Inhale 1-2 puffs into the lungs every 4 hours as needed for shortness of breath or wheezing 6.7 g 0     alendronate (FOSAMAX) 70 MG tablet TAKE 1 TABLET BY MOUTH EVERY 7 DAYS 12 tablet 11     co-enzyme Q-10 100 MG CAPS capsule Take 200 mg by mouth daily       cyanocobalamin (VITAMIN B-12) 500 MCG tablet Take 1 tablet (500 mcg) by mouth daily 150 tablet 3     dorzolamide-timolol (COSOPT) 2-0.5 % ophthalmic solution Place 1 drop into both eyes 2 times daily 10 mL 4     ferrous sulfate (FEROSUL) 325 (65 Fe) MG tablet Take 1 tablet (325 mg) by mouth daily (with breakfast) 90 tablet 3     ibuprofen (ADVIL,MOTRIN) 200 MG tablet Take 200 mg by mouth every 4 hours as needed.       latanoprost (XALATAN) 0.005 % ophthalmic solution Place 1 drop into both eyes At Bedtime 7.5 mL 3     Lidocaine (LIDOCARE) 4 % Patch Place 1 patch onto the skin every 24 hours To prevent lidocaine toxicity, patient should be patch free for 12 hrs daily. (Patient not taking: Reported on 1/12/2023) 10 patch 0     losartan (COZAAR) 50 MG tablet Take 1 tablet (50 mg) by mouth daily 90 tablet 3     Multiple Vitamins-Minerals (ONE DAILY ADULTS 50+) TABS        polyethylene glycol (MIRALAX) 17 GM/Dose powder Take 17 g (1 capful) by mouth 2 times daily as needed for constipation (Patient not taking: Reported on 1/12/2023) 507 g 0     simvastatin (ZOCOR) 20 MG tablet Take 1 tablet (20 mg) by mouth At Bedtime 90 tablet 3     triamcinolone (KENALOG) 0.1 % external cream Apply twice daily for 2 weeks 30 g 0     VITAMIN D-1000 MAX -1000 MG-UNIT OR TABS 1 daily          ALLERGIES:     Allergies   Allergen Reactions      Lisinopril Cough        FAMILY HISTORY:     Family History   Problem Relation Age of Onset     Diabetes Mother      Breast Cancer Mother      Eye Disorder Mother      Osteoporosis Mother      Glaucoma Mother      Macular Degeneration Mother      Prostate Cancer Father      Prostate Cancer Brother      Glaucoma Brother      Macular Degeneration Brother      Thyroid Disease Sister      Blood Disease Sister         lupus     Heart Disease Sister      Asthma Son      Prostate Cancer Brother      Glaucoma Brother      Glaucoma Other      Melanoma No family hx of      Skin Cancer No family hx of         SOCIAL HISTORY:     Social History     Socioeconomic History     Marital status:      Number of children: 2   Occupational History     Employer: [employer]     Employer: RETIRED   Tobacco Use     Smoking status: Former     Packs/day: 0.50     Years: 20.00     Pack years: 10.00     Types: Cigarettes     Quit date: 1976     Years since quittin.0     Smokeless tobacco: Never   Vaping Use     Vaping Use: Never used   Substance and Sexual Activity     Alcohol use: Yes     Comment: rare     Drug use: No     Sexual activity: Never        REVIEW OF SYSTEMS:     Constitutional: No fevers or chills  Skin: No new rash or itching  Eyes: No acute change in vision  Ears/Nose/Throat: No purulent rhinorrhea, new hearing loss, or new vertigo  Respiratory: No cough or hemoptysis  Cardiovascular: See HPI  Gastrointestinal: No change in appetite, vomiting, hematemesis or diarrhea  Genitourinary: No dysuria or hematuria  Musculoskeletal: No new back pain, neck pain or muscle pain  Neurologic: No new headaches, focal weakness or behavior changes  Psychiatric: No hallucinations, excessive alcohol consumption or illegal drug usage  Hematologic/Lymphatic/Immunologic: No bleeding, chills, fever, night sweats or weight loss  Endocrine: No new cold intolerance, heat intolerance, polyphagia, polydipsia or polyuria      PHYSICAL  "EXAMINATION:     BP (!) 155/94 (BP Location: Right arm, Patient Position: Chair, Cuff Size: Adult Regular)   Pulse 74   Ht 1.489 m (4' 10.62\")   Wt 63.7 kg (140 lb 6.4 oz)   LMP  (LMP Unknown)   SpO2 98%   BMI 28.72 kg/m      GENERAL: No acute distress.  HEENT: EOMI. Sclerae white, not injected. Nares clear. Pharynx without erythema or exudate.   Neck: No adenopathy. No thyromegaly. Symmetrical.   Heart: Regular rate and rhythm. 2/6 crescendo decrescendo late peaking systolic murmur.  Abdomen: Soft, nontender, nondistended. Bowel sounds present.  Extremities: No clubbing, cyanosis, or edema.   Neurologic: Alert and oriented to person/place/time, normal speech and affect. No focal deficits.  Skin: No petechiae, purpura or rash.     LABORATORY DATA:     LIPID RESULTS:  Lab Results   Component Value Date    CHOL 175 08/03/2022    CHOL 128 07/15/2020    HDL 76 08/03/2022    HDL 78 07/15/2020    LDL 82 08/03/2022    LDL 39 07/15/2020    TRIG 86 08/03/2022    TRIG 57 07/15/2020    CHOLHDLRATIO 2.9 06/22/2015       LIVER ENZYME RESULTS:  Lab Results   Component Value Date    AST 40 05/03/2021    ALT 25 05/03/2021       CBC RESULTS:  Lab Results   Component Value Date    WBC 4.4 01/12/2023    WBC 6.1 09/22/2020    RBC 3.85 01/12/2023    RBC 3.57 (L) 09/22/2020    HGB 11.6 (L) 01/12/2023    HGB 10.7 (L) 09/22/2020    HCT 36.6 01/12/2023    HCT 33.3 (L) 09/22/2020    MCV 95 01/12/2023    MCV 93 09/22/2020    MCH 30.1 01/12/2023    MCH 30.0 09/22/2020    MCHC 31.7 01/12/2023    MCHC 32.1 09/22/2020    RDW 12.9 01/12/2023    RDW 13.1 09/22/2020     01/12/2023     09/22/2020       BMP RESULTS:  Lab Results   Component Value Date     01/12/2023     05/03/2021    POTASSIUM 4.1 01/12/2023    POTASSIUM 4.5 05/03/2021    CHLORIDE 103 01/12/2023    CHLORIDE 106 05/03/2021    CO2 29 01/12/2023    CO2 28 05/03/2021    ANIONGAP 6 01/12/2023    ANIONGAP 3 05/03/2021    GLC 95 01/12/2023     (H) " 2021    BUN 13 2023    BUN 16 2021    CR 0.56 2023    CR 0.80 2021    GFRESTIMATED 89 2023    GFRESTIMATED 68 2021    GFRESTBLACK 79 2021    SHERYL 8.9 2023    SHERYL 8.8 2021         PROCEDURES & FURTHER ASSESSMENTS:     EC2023        Echocardiogram:  2023  Left ventricular function is decreased. The ejection fraction is 45-50%  (mildly reduced).     Global right ventricular function is normal.     Moderate to severe aortic stenosis is present. The peak aortic velocity is 3.4  m/sec. The mean gradient across the aortic valve is 32 mmHg. Valve area is 0.6  cm2. Stroke volume is reduced - consider low-flow low-gradient severe aortic  stenosis.     Pulmonary artery systolic pressure is normal. The estimated mean right atrial  pressure is normal.     Moderate dilatation of the aorta is present. Ascending aorta 5.1 cm. Sinuses  of Valsalva 4.1 cm.     No pericardial effusion is present.      CT TAVR: pending     Coronary Angiogram and Right Heart Catheterization: pending     PFTs: pending   Carotid Ultrasound: pending       STS RISK SCORE: 2.5%  FRAILTY SCORE: 2/5   NYHA CLASS: II     CLINICAL IMPRESSION:     Cydney Christiansen is a 87 year old female with symptomatic low flow low gradient severe aortic stenosis who is an appropriate candidate for transcatheter aortic valve replacement based on age, frailty, co-morbidities and overall surgical mortality risk estimation of 2.5% based on the STS score. She does have an ascending aortic aneurysm measured at 5.1 cm that will need to be taken into consideration when decided TAVR vs SAVR; however, appears to be slow growing as it was 4.8 cm in 2015. She is likely higher surgical risk given age, nor does patient sound particularly interested in surgery; therefore, could consider TAVR and ongoing monitoring her aneurysm. Will ask our surgical colleagues to evaluate surgical candidacy, will then present at valve  conference for consensus decision.    Plan Summary:  1) LFLG Severe Aortic Stenosis:  - CT TAVR to assess for TAVR evaluation and to assess size of ascending aortic aneurysm   - Coronary angiogram, RHC, LHC  - Presentation of data to the Heart team to assess the optimal treatment of her severe aortic stenosis and thoracic aortic aneurysm    Patient was evaluated in clinic with Dr. Jo of interventional cardiology.    Yarely Martin APRN, CNP  Structural Heart Nurse Practitioner  [facility]  Clinic: [phone]  Pager: [phone]    CC  Patient Care Team:  Estrellita Hernandez MD as PCP - General (Family Practice)  Usha Salgado MD as MD (Hematology & Oncology)  Estrellita Hernandez MD as Assigned PCP  Ac Bailey MD as Assigned Musculoskeletal Provider  Kvng Garcia MD as MD (Ophthalmology)  Ian Haro MD as Assigned Surgical Provider          Structural Heart Coordinator visit:  Provided additional education regarding TAVR/MitraClip procedure, after being present for discussion with physician. Explained the work-up process and next steps for patient. Patient provided our direct contact number and instructed to call with any questions.     Frailty Score: 2/5    Completed frailty testing and KCCQ.       5 meter walk: 6  Trial 1:6  Trail 2: 6  Trial 3: 6    : 35.3  Albumin: 3.8  Eye ball test: Pass  ADL: 6/6    Dental Clearance: Pass      KCCQ Results:   1a. 4  1b. 4  1c. 2  2. 2  3. 3  4. 3  5. 5  6. 4  7. 3  8a. 4  8b. 3  8c. 4       Trisha Gurrola CMA  Structural Heart   [facility]          Please do not hesitate to contact me if you have any questions/concerns.     Sincerely,     James Jo MD

## 2023-02-15 ENCOUNTER — TELEPHONE (OUTPATIENT)
Dept: CARDIOLOGY | Facility: CLINIC | Age: 88
End: 2023-02-15
Payer: COMMERCIAL

## 2023-02-15 NOTE — TELEPHONE ENCOUNTER
Call complete for pre procedure reminder and updated visitor policy.  Patient aware of need for  and that someone needs to stay with them after.

## 2023-02-16 ENCOUNTER — ANCILLARY ORDERS (OUTPATIENT)
Dept: CARDIOLOGY | Facility: CLINIC | Age: 88
End: 2023-02-16

## 2023-02-16 ENCOUNTER — CARE COORDINATION (OUTPATIENT)
Dept: CARDIOLOGY | Facility: CLINIC | Age: 88
End: 2023-02-16
Payer: COMMERCIAL

## 2023-02-16 DIAGNOSIS — I35.0 SEVERE AORTIC STENOSIS: ICD-10-CM

## 2023-02-16 NOTE — PROGRESS NOTES
Called Cydney to discuss upcoming TAVR CT and angiogram on Friday.  Reviewed all medication instructions with Cydney and she asked appropriate questions about her medications and ride.      Also discussed upcoming surgeon visit. Discussed that all TAVR's need an evaluation with a CVTS surgeon.  However, I also discussed with Cydney that she has an ascending aortic aneurysm, albeit it has been very stable for several years.  The heart team wanted surgery to have evaluated Cydney before presentation at the Valve clinic.  Cydney voiced understanding to the explanation of the plan. Contact information was re-shared with her.

## 2023-02-17 ENCOUNTER — HOSPITAL ENCOUNTER (OUTPATIENT)
Dept: CT IMAGING | Facility: CLINIC | Age: 88
Discharge: HOME | End: 2023-02-17
Attending: INTERNAL MEDICINE | Admitting: INTERNAL MEDICINE
Payer: COMMERCIAL

## 2023-02-17 ENCOUNTER — HOSPITAL ENCOUNTER (OUTPATIENT)
Facility: CLINIC | Age: 88
Discharge: HOME | End: 2023-02-17
Attending: INTERNAL MEDICINE | Admitting: INTERNAL MEDICINE
Payer: COMMERCIAL

## 2023-02-17 ENCOUNTER — APPOINTMENT (OUTPATIENT)
Dept: LAB | Facility: CLINIC | Age: 88
End: 2023-02-17
Attending: INTERNAL MEDICINE
Payer: COMMERCIAL

## 2023-02-17 ENCOUNTER — APPOINTMENT (OUTPATIENT)
Dept: MEDSURG UNIT | Facility: CLINIC | Age: 88
End: 2023-02-17
Attending: INTERNAL MEDICINE
Payer: COMMERCIAL

## 2023-02-17 VITALS
TEMPERATURE: 98 F | WEIGHT: 137.5 LBS | SYSTOLIC BLOOD PRESSURE: 113 MMHG | BODY MASS INDEX: 28.13 KG/M2 | DIASTOLIC BLOOD PRESSURE: 80 MMHG | HEART RATE: 74 BPM | RESPIRATION RATE: 14 BRPM | OXYGEN SATURATION: 95 %

## 2023-02-17 DIAGNOSIS — I51.89 OTHER ILL-DEFINED HEART DISEASES: ICD-10-CM

## 2023-02-17 DIAGNOSIS — I35.0 SEVERE AORTIC STENOSIS: Primary | ICD-10-CM

## 2023-02-17 DIAGNOSIS — I35.0 SEVERE AORTIC STENOSIS: ICD-10-CM

## 2023-02-17 PROBLEM — Z98.890 STATUS POST CORONARY ANGIOGRAM: Status: ACTIVE | Noted: 2023-02-17

## 2023-02-17 LAB
ACT BLD: 177 SECONDS (ref 74–150)
ANION GAP SERPL CALCULATED.3IONS-SCNC: 7 MMOL/L (ref 7–15)
APTT PPP: 24 SECONDS (ref 22–38)
BUN SERPL-MCNC: 12.8 MG/DL (ref 8–23)
CALCIUM SERPL-MCNC: 8.8 MG/DL (ref 8.8–10.2)
CHLORIDE SERPL-SCNC: 100 MMOL/L (ref 98–107)
CREAT SERPL-MCNC: 0.66 MG/DL (ref 0.51–0.95)
DEPRECATED HCO3 PLAS-SCNC: 27 MMOL/L (ref 22–29)
ERYTHROCYTE [DISTWIDTH] IN BLOOD BY AUTOMATED COUNT: 13.1 % (ref 10–15)
GFR SERPL CREATININE-BSD FRML MDRD: 85 ML/MIN/1.73M2
GLUCOSE SERPL-MCNC: 99 MG/DL (ref 70–99)
HCT VFR BLD AUTO: 40.4 % (ref 35–47)
HGB BLD-MCNC: 12.1 G/DL (ref 11.7–15.7)
HGB BLD-MCNC: 12.7 G/DL (ref 11.7–15.7)
INR PPP: 1.09 (ref 0.85–1.15)
MCH RBC QN AUTO: 29.7 PG (ref 26.5–33)
MCHC RBC AUTO-ENTMCNC: 31.4 G/DL (ref 31.5–36.5)
MCV RBC AUTO: 95 FL (ref 78–100)
OXYHGB MFR BLDV: 74 % (ref 92–100)
PLATELET # BLD AUTO: 227 10E3/UL (ref 150–450)
POTASSIUM SERPL-SCNC: 4.3 MMOL/L (ref 3.4–5.3)
RBC # BLD AUTO: 4.27 10E6/UL (ref 3.8–5.2)
SODIUM SERPL-SCNC: 134 MMOL/L (ref 136–145)
WBC # BLD AUTO: 4.4 10E3/UL (ref 4–11)

## 2023-02-17 PROCEDURE — C1887 CATHETER, GUIDING: HCPCS | Performed by: INTERNAL MEDICINE

## 2023-02-17 PROCEDURE — 85730 THROMBOPLASTIN TIME PARTIAL: CPT | Performed by: INTERNAL MEDICINE

## 2023-02-17 PROCEDURE — 36415 COLL VENOUS BLD VENIPUNCTURE: CPT | Performed by: INTERNAL MEDICINE

## 2023-02-17 PROCEDURE — 999N000134 HC STATISTIC PP CARE STAGE 3

## 2023-02-17 PROCEDURE — 272N000001 HC OR GENERAL SUPPLY STERILE: Performed by: INTERNAL MEDICINE

## 2023-02-17 PROCEDURE — 93460 R&L HRT ART/VENTRICLE ANGIO: CPT | Mod: 26 | Performed by: INTERNAL MEDICINE

## 2023-02-17 PROCEDURE — 85347 COAGULATION TIME ACTIVATED: CPT

## 2023-02-17 PROCEDURE — 250N000011 HC RX IP 250 OP 636: Performed by: INTERNAL MEDICINE

## 2023-02-17 PROCEDURE — 99152 MOD SED SAME PHYS/QHP 5/>YRS: CPT | Performed by: INTERNAL MEDICINE

## 2023-02-17 PROCEDURE — 250N000013 HC RX MED GY IP 250 OP 250 PS 637: Performed by: INTERNAL MEDICINE

## 2023-02-17 PROCEDURE — 85018 HEMOGLOBIN: CPT | Mod: 91

## 2023-02-17 PROCEDURE — 74174 CTA ABD&PLVS W/CONTRAST: CPT

## 2023-02-17 PROCEDURE — 71275 CT ANGIOGRAPHY CHEST: CPT | Mod: 26 | Performed by: INTERNAL MEDICINE

## 2023-02-17 PROCEDURE — 82810 BLOOD GASES O2 SAT ONLY: CPT

## 2023-02-17 PROCEDURE — 999N000142 HC STATISTIC PROCEDURE PREP ONLY

## 2023-02-17 PROCEDURE — 85610 PROTHROMBIN TIME: CPT | Performed by: INTERNAL MEDICINE

## 2023-02-17 PROCEDURE — 93010 ELECTROCARDIOGRAM REPORT: CPT | Performed by: INTERNAL MEDICINE

## 2023-02-17 PROCEDURE — 93460 R&L HRT ART/VENTRICLE ANGIO: CPT | Performed by: INTERNAL MEDICINE

## 2023-02-17 PROCEDURE — 99152 MOD SED SAME PHYS/QHP 5/>YRS: CPT | Mod: GC | Performed by: INTERNAL MEDICINE

## 2023-02-17 PROCEDURE — 99153 MOD SED SAME PHYS/QHP EA: CPT | Performed by: INTERNAL MEDICINE

## 2023-02-17 PROCEDURE — C1769 GUIDE WIRE: HCPCS | Performed by: INTERNAL MEDICINE

## 2023-02-17 PROCEDURE — 85014 HEMATOCRIT: CPT | Performed by: INTERNAL MEDICINE

## 2023-02-17 PROCEDURE — 999N000054 HC STATISTIC EKG NON-CHARGEABLE

## 2023-02-17 PROCEDURE — 250N000009 HC RX 250: Performed by: INTERNAL MEDICINE

## 2023-02-17 PROCEDURE — 82310 ASSAY OF CALCIUM: CPT | Performed by: INTERNAL MEDICINE

## 2023-02-17 PROCEDURE — 93005 ELECTROCARDIOGRAM TRACING: CPT

## 2023-02-17 PROCEDURE — 258N000003 HC RX IP 258 OP 636: Performed by: INTERNAL MEDICINE

## 2023-02-17 PROCEDURE — 74174 CTA ABD&PLVS W/CONTRAST: CPT | Mod: 26 | Performed by: INTERNAL MEDICINE

## 2023-02-17 PROCEDURE — C1894 INTRO/SHEATH, NON-LASER: HCPCS | Performed by: INTERNAL MEDICINE

## 2023-02-17 PROCEDURE — 82374 ASSAY BLOOD CARBON DIOXIDE: CPT | Performed by: INTERNAL MEDICINE

## 2023-02-17 RX ORDER — OXYCODONE HYDROCHLORIDE 5 MG/1
5 TABLET ORAL EVERY 4 HOURS PRN
Status: DISCONTINUED | OUTPATIENT
Start: 2023-02-17 | End: 2023-02-17 | Stop reason: HOSPADM

## 2023-02-17 RX ORDER — ATROPINE SULFATE 0.1 MG/ML
0.5 INJECTION INTRAVENOUS
Status: DISCONTINUED | OUTPATIENT
Start: 2023-02-17 | End: 2023-02-17 | Stop reason: HOSPADM

## 2023-02-17 RX ORDER — NICARDIPINE HYDROCHLORIDE 2.5 MG/ML
INJECTION INTRAVENOUS
Status: DISCONTINUED | OUTPATIENT
Start: 2023-02-17 | End: 2023-02-17 | Stop reason: HOSPADM

## 2023-02-17 RX ORDER — NALOXONE HYDROCHLORIDE 0.4 MG/ML
0.2 INJECTION, SOLUTION INTRAMUSCULAR; INTRAVENOUS; SUBCUTANEOUS
Status: DISCONTINUED | OUTPATIENT
Start: 2023-02-17 | End: 2023-02-17 | Stop reason: HOSPADM

## 2023-02-17 RX ORDER — HEPARIN SODIUM 1000 [USP'U]/ML
INJECTION, SOLUTION INTRAVENOUS; SUBCUTANEOUS
Status: DISCONTINUED | OUTPATIENT
Start: 2023-02-17 | End: 2023-02-17 | Stop reason: HOSPADM

## 2023-02-17 RX ORDER — FENTANYL CITRATE 50 UG/ML
25 INJECTION, SOLUTION INTRAMUSCULAR; INTRAVENOUS
Status: DISCONTINUED | OUTPATIENT
Start: 2023-02-17 | End: 2023-02-17 | Stop reason: HOSPADM

## 2023-02-17 RX ORDER — SODIUM CHLORIDE 9 MG/ML
INJECTION, SOLUTION INTRAVENOUS CONTINUOUS
Status: DISCONTINUED | OUTPATIENT
Start: 2023-02-17 | End: 2023-02-17 | Stop reason: HOSPADM

## 2023-02-17 RX ORDER — IOPAMIDOL 755 MG/ML
INJECTION, SOLUTION INTRAVASCULAR
Status: DISCONTINUED | OUTPATIENT
Start: 2023-02-17 | End: 2023-02-17 | Stop reason: HOSPADM

## 2023-02-17 RX ORDER — IOPAMIDOL 755 MG/ML
120 INJECTION, SOLUTION INTRAVASCULAR ONCE
Status: COMPLETED | OUTPATIENT
Start: 2023-02-17 | End: 2023-02-17

## 2023-02-17 RX ORDER — NALOXONE HYDROCHLORIDE 0.4 MG/ML
0.4 INJECTION, SOLUTION INTRAMUSCULAR; INTRAVENOUS; SUBCUTANEOUS
Status: DISCONTINUED | OUTPATIENT
Start: 2023-02-17 | End: 2023-02-17 | Stop reason: HOSPADM

## 2023-02-17 RX ORDER — ASPIRIN 81 MG/1
243 TABLET, CHEWABLE ORAL ONCE
Status: COMPLETED | OUTPATIENT
Start: 2023-02-17 | End: 2023-02-17

## 2023-02-17 RX ORDER — ASPIRIN 325 MG
325 TABLET ORAL ONCE
Status: COMPLETED | OUTPATIENT
Start: 2023-02-17 | End: 2023-02-17

## 2023-02-17 RX ORDER — LIDOCAINE 40 MG/G
CREAM TOPICAL
Status: DISCONTINUED | OUTPATIENT
Start: 2023-02-17 | End: 2023-02-17 | Stop reason: HOSPADM

## 2023-02-17 RX ORDER — FENTANYL CITRATE 50 UG/ML
INJECTION, SOLUTION INTRAMUSCULAR; INTRAVENOUS
Status: DISCONTINUED | OUTPATIENT
Start: 2023-02-17 | End: 2023-02-17 | Stop reason: HOSPADM

## 2023-02-17 RX ORDER — OXYCODONE HYDROCHLORIDE 10 MG/1
10 TABLET ORAL EVERY 4 HOURS PRN
Status: DISCONTINUED | OUTPATIENT
Start: 2023-02-17 | End: 2023-02-17 | Stop reason: HOSPADM

## 2023-02-17 RX ORDER — FLUMAZENIL 0.1 MG/ML
0.2 INJECTION, SOLUTION INTRAVENOUS
Status: DISCONTINUED | OUTPATIENT
Start: 2023-02-17 | End: 2023-02-17 | Stop reason: HOSPADM

## 2023-02-17 RX ADMIN — SODIUM CHLORIDE: 9 INJECTION, SOLUTION INTRAVENOUS at 13:18

## 2023-02-17 RX ADMIN — ASPIRIN 325 MG ORAL TABLET 325 MG: 325 PILL ORAL at 12:37

## 2023-02-17 RX ADMIN — IOPAMIDOL 120 ML: 755 INJECTION, SOLUTION INTRAVENOUS at 10:03

## 2023-02-17 ASSESSMENT — ACTIVITIES OF DAILY LIVING (ADL)
ADLS_ACUITY_SCORE: 35

## 2023-02-17 NOTE — Clinical Note
RCA Cine(s)  injected and visualized utilizing power injector system.Rate (mL/sec) 3 Total Volume (mL) 6

## 2023-02-17 NOTE — PROGRESS NOTES
D/I/A: Manual pressure held for 10 minutes.  Sheath, size 7FR,  pulled by MARIEL Eduardo.  Site CDI, no hematoma.  Stasis achieved at 1620.  A+O x4 and making needs known.  Denies pain or sob.  VSSA.  Tolerated sheath removal well.  P: Continue to monitor status.  Discharge to home once meeting criteria.

## 2023-02-17 NOTE — Clinical Note
dry, intact, no bleeding and no hematoma. 6fr sheath removed from R rad artery. TR band applied see flowsheet  7fr sheath remains in place RIJ.

## 2023-02-17 NOTE — DISCHARGE INSTRUCTIONS
Going Home after an Angioplasty or Stent Placement (Cardiac)  ______________________________________________      After you go home:  Have an adult stay with you for 24 hours.  Drink plenty of fluids.  You may eat your normal diet, unless your doctor tells you otherwise.  For 24 hours:  Relax and take it easy.  Do NOT smoke.  Do NOT make any important or legal decisions.  Do NOT drive or operate machines at home or at work.  Do NOT drink alcohol.  Remove the Band-Aid after 24 hours. If there is minor oozing, apply another Band-aid and remove it after 12 hours.  For 2 days, do NOT have sex or do any heavy exercise.  Do NOT take a bath, or use a hot tub or pool for at least 3 days. You may shower.  Monitor neck site for bleeding, swelling, or voice changes. If you notice bleeding or swelling immediately apply pressure to the site and call number below to speak with Cardiology Fellow.  If you experience any changes in your breathing, you should call your doctor immediately or come to the closest Emergency Department.  Do not drive yourself.  Do Not scrub the procedure sites vigorously.  No lotion or powder to the puncture sites for 3 days.      Care of wrist or arm site  It is normal to have soreness at the puncture site and mild tingling in your hand for up to 3 days.  For 2 days, do not use your hand or arm to support your weight (such as rising from a chair) or bend your wrist (such as lifting a garage door).  For 2 days, do not lift more than 5 pounds or exercise your arm (tennis, golf or bowling).    If you start bleeding from the site in your arm:  Sit down and press firmly on the site with your fingers for 10 minutes. Call your doctor as soon as you can.  If the bleeding stops, sit still and keep your wrist straight for 2 hours.    Medicines  If you have started taking Plavix or Effient, do not stop taking it until you talk to your heart doctor (cardiologist).  If you are on metformin (Glucophage), do not  restart it until you have blood tests (within 2 to 3 days after discharge). When your doctor tells you it is safe, you may restart the metformin.  If you have stopped any other medicines, check with your nurse or provider about when to restart them.    Call 911 right away if you have bleeding that is heavy or does not stop.    Call your doctor if:  You have a large or growing hard lump around the site.  The site is red, swollen, hot or tender.  Blood or fluid is draining from the site.  You have chills or a fever greater than 101 F (38 C).  Your leg or arm feels numb or cool.  You have hives, a rash or unusual itching.      [facility] at [place]:  [phone] (7 days a week)

## 2023-02-17 NOTE — PROGRESS NOTES
D/I/A: Pt roomed on 3C in bay 35.  Arrived via litter and accompanied by CL RN. On/Off: Off monitor.  VSSA.  Rhythm upon arrival SR on monitor.  Denies pain or sob.  Reviewed activity restrictions and when to notify RN, ie-changes to breathing or increased chest pressure or chest pain.  CCL access:  RIJV sheath in/secure. R TR band with 12 ml of air.   P: Continue to monitor status.  Discharge to home once meeting criteria.

## 2023-02-17 NOTE — Clinical Note
aorta Cine(s)  injected and visualized utilizing power injector system.Rate (mL/sec) 13 Total Volume (mL) 40

## 2023-02-18 LAB — RADIOLOGIST FLAGS: ABNORMAL

## 2023-02-18 NOTE — PROGRESS NOTES
D/I/A:  Patient is tolerating liquids and foods, ambulating, urinating, puncture sites are stable (no bleeding and no hematoma) and patient has a .  A+O x4 and making needs known.  CCL access sites C/D/I; no bleeding or hematoma; CMS intact.  VSSA.  SR on monitor.  IV access removed.  Education completed and outlined in AVS or handout: medications reviewed with patient.  Questions answered prior to discharge.  Belongings returned to patient at discharge.    P: Discharged to self care.  Patient to follow up with appts as per discharge instruction.

## 2023-02-20 LAB
ATRIAL RATE - MUSE: 69 BPM
DIASTOLIC BLOOD PRESSURE - MUSE: NORMAL MMHG
INTERPRETATION ECG - MUSE: NORMAL
P AXIS - MUSE: 12 DEGREES
PR INTERVAL - MUSE: 184 MS
QRS DURATION - MUSE: 76 MS
QT - MUSE: 386 MS
QTC - MUSE: 413 MS
R AXIS - MUSE: -15 DEGREES
SYSTOLIC BLOOD PRESSURE - MUSE: NORMAL MMHG
T AXIS - MUSE: 19 DEGREES
VENTRICULAR RATE- MUSE: 69 BPM

## 2023-02-21 ENCOUNTER — PATIENT OUTREACH (OUTPATIENT)
Dept: ONCOLOGY | Facility: CLINIC | Age: 88
End: 2023-02-21
Payer: COMMERCIAL

## 2023-02-21 ENCOUNTER — CARE COORDINATION (OUTPATIENT)
Dept: CARDIOLOGY | Facility: CLINIC | Age: 88
End: 2023-02-21
Payer: COMMERCIAL

## 2023-02-21 DIAGNOSIS — Z85.51 PERSONAL HISTORY OF MALIGNANT NEOPLASM OF BLADDER: Primary | ICD-10-CM

## 2023-02-21 DIAGNOSIS — R91.8 PULMONARY NODULES: Primary | ICD-10-CM

## 2023-02-21 NOTE — PROGRESS NOTES
Date: 2/21/2023    Time of Call: 12:55 PM     Diagnosis:  Lung nodule     [ TORB ] Ordering provider: James Jo MD  Order:   Referral to lung nodule clinic     Order received by: Deandra Covarrubias RN     Follow-up/additional notes:   Lung nodule noted on recent CT Scan. A referral to the lung nodule clinic has been made.    The CT reports and nagiogram were reivewed with Cydney. A referral has been made to the lung nodule clinic for evaluation of the incidental find. Cydney verbalized understanding to this information and is expecting a call. She said she would call if she had further questions.

## 2023-02-21 NOTE — PROGRESS NOTES
Lung Nodule Clinic Referral    Patient referred to lung nodule clinic by Dr Jo (Cardiology).    2/17/23 Radiology Read of CT Angiogram    IMPRESSION:  1. Moderate atherosclerotic calcifications of a mildly tortuous  abdominal aorta. No significant atherosclerotic calcifications of the  common iliacs, external iliacs, or proximal superficial femoral  arteries. Mild atherosclerotic calcifications of the thoracic aorta.  2. Ascending thoracic aortic aneurysm measuring 4.9 cm on this 2-D  transaxial image.  3. 1.4 x 1.2 cm lobulated solid nodule in the left upper lobe. Not  convincingly seen on a chest x-ray of 3/1/2022. No older imaging to  assess stability. Consider follow-up in 3 months to assess for  interval change or PET/CT.  4. Multilevel severe compression deformities of the spine as described  above.  5. Please refer to separate cardiology report for further information.     [Access Center: Left upper lobe pulmonary nodule. Consider PET/CT or  short-term follow-up.]        Pt is former smoker, quit 1976, 20 pk/years.      Will offer next available Lung Nodule consult with PFTs and dedicated CT chest w/o per protocol.        Fabrizio Og RN  Nurse Navigator  Lung Nodule Clinic  [facility]  [phone]

## 2023-02-27 ENCOUNTER — OFFICE VISIT (OUTPATIENT)
Dept: CARDIOLOGY | Facility: CLINIC | Age: 88
End: 2023-02-27
Attending: SURGERY
Payer: COMMERCIAL

## 2023-02-27 VITALS
WEIGHT: 142.4 LBS | OXYGEN SATURATION: 97 % | HEART RATE: 72 BPM | HEIGHT: 59 IN | BODY MASS INDEX: 28.71 KG/M2 | SYSTOLIC BLOOD PRESSURE: 157 MMHG | DIASTOLIC BLOOD PRESSURE: 94 MMHG

## 2023-02-27 DIAGNOSIS — I35.0 SEVERE AORTIC STENOSIS: ICD-10-CM

## 2023-02-27 PROCEDURE — G0463 HOSPITAL OUTPT CLINIC VISIT: HCPCS | Performed by: SURGERY

## 2023-02-27 PROCEDURE — 99207 PR NON-BILLABLE SERV PER CHARTING: CPT | Performed by: SURGERY

## 2023-02-27 ASSESSMENT — PAIN SCALES - GENERAL: PAINLEVEL: NO PAIN (0)

## 2023-02-27 NOTE — LETTER
2/27/2023      RE: Cydney Christiansen  [address]  [place] [address]       Dear Colleague,    Thank you for the opportunity to participate in the care of your patient, Cydney Christiansen, at the [facility] [place] [facility]. Please see a copy of my visit note below.     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  Her severe aortic stenosis is characterized with an area of 0.6 cm2, mean gradient 32 mmHg and peak velocity of 3.4 m/sec with LVEF 40-45% by echocardiogram on 1/12/2023. Also noted to have ascending aortic aneurysm 5.1 cm on recent echo, which was 4.8 cm in 2015. Additional notable medical history of compression fractures of thoracic vertebrae.      Since about December she has noticed that she is more tired and short of breath with exertion. Her sons have also noticed that she doesn't have as much energy. She noticed the other day she was short of breath walking from her car into clinic. This is a change from a year ago.  No chest pain, lightheadedness, syncope, presyncope, orthopnea, PND, has sock ring edema, no weight gain.      Sister has a bicuspid aortic valve and aortic aneurysm s/p thoracic aneurysm repair about 10 year ago.       PAST MEDICAL HISTORY:      Past Medical History        Past Medical History:   Diagnosis Date     Actinic keratosis       Basal cell cancer 02/2011     bcc of the L back.     Basal cell carcinoma 04' , 06'     Basal cell carcinoma 06/2011     L neck     Breast cancer (H)       Cataract       Colon polyps       Precancer     Glaucoma (increased eye pressure)       Heart murmur       HLD (hyperlipidemia)       Hypertension goal BP (blood pressure) < 140/90 12/19/2013     Invasive ductal carcinoma of breast (H) 6/2015     left      Osteoporosis       Scoliosis       Skin cancer       Skin cancer 05/2013     sccis R cheek     Squamous cell carcinoma 09/2011     R upper back     Squamous cell carcinoma 10/2012     R dorsal hand     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 7/13     left leg     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     Vertigo       takes meclizine prn when she ocassionally has bouts of vertigo             PAST SURGICAL HISTORY:      Past Surgical History         Past Surgical History:   Procedure Laterality Date     BIOPSY BREAST         CATARACT IOL, RT/LT         COLONOSCOPY   5/2008, 5/13, 6/14, 6/17     Q 3 years for advanced adenomatous polyp     CYSTOSCOPY   12/31/2008     D & C         GENITOURINARY SURGERY         TURBT     HC REMOVAL GALLBLADDER         open pan     HC TRABECULOPLASTY BY LASER SURGERY Left 04/18/2007     SLT #1 OS (inf 180)     HC TRABECULOPLASTY BY LASER SURGERY Right 05/04/2011     SLT #1 OD (inf 180?)     HC TRABECULOPLASTY BY LASER SURGERY Left 03/17/2015     SLT #2 OS (sup 180)     HC TRABECULOPLASTY BY LASER SURGERY Right 06/23/2015     SLT #2 OD (sup 180?)     LASER ARGON TREATMENT         SLT left eye x 2     LUMPECTOMY BREAST         LUMPECTOMY BREAST WITH SENTINEL NODE, COMBINED Left 07/29/2015     Procedure: COMBINED LUMPECTOMY BREAST WITH SENTINEL NODE;  Surgeon: Ifeanyi Sunshine MD;  Location:  OR     PHACOEMULSIFICATION CLEAR CORNEA WITH STANDARD INTRAOCULAR LENS IMPLANT Left 12/08/2017     Procedure: PHACOEMULSIFICATION CLEAR CORNEA WITH STANDARD INTRAOCULAR LENS IMPLANT;   COMPLEX LEFT PHACOEMULSIFICATION CLEAR CORNEA WITH STANDARD INTRAOCULAR LENS IMPLANT ;  Surgeon: Kvng Garcia MD;  Location: [facility]     PHACOEMULSIFICATION CLEAR CORNEA WITH STANDARD INTRAOCULAR LENS IMPLANT Right 10/19/2020     Procedure: RIGHT COMPLEX PHACOEMULSIFICATION, CATARACT, WITH INTRAOCULAR LENS IMPLANT ;  Surgeon: Kvng Garcia MD;  Location: [facility]  HISTORY OF -    09/2011     squamous cell CA excised from back     TUBAL LIGATION                 CURRENT MEDICATIONS:      Current Outpatient Prescriptions          Current Outpatient Medications   Medication Sig Dispense Refill     albuterol (PROAIR HFA/PROVENTIL HFA/VENTOLIN HFA) 108 (90 Base) MCG/ACT inhaler Inhale 1-2 puffs into the lungs every 4 hours as needed for shortness of breath or wheezing 6.7 g 0     alendronate (FOSAMAX) 70 MG tablet TAKE 1 TABLET BY MOUTH EVERY 7 DAYS 12 tablet 11     co-enzyme Q-10 100 MG CAPS capsule Take 200 mg by mouth daily         cyanocobalamin (VITAMIN B-12) 500 MCG tablet Take 1 tablet (500 mcg) by mouth daily 150 tablet 3     dorzolamide-timolol (COSOPT) 2-0.5 % ophthalmic solution Place 1 drop into both eyes 2 times daily 10 mL 4     ferrous sulfate (FEROSUL) 325 (65 Fe) MG tablet Take 1 tablet (325 mg) by mouth daily (with breakfast) 90 tablet 3     ibuprofen (ADVIL,MOTRIN) 200 MG tablet Take 200 mg by mouth every 4 hours as needed.         latanoprost (XALATAN) 0.005 % ophthalmic solution Place 1 drop into both eyes At Bedtime 7.5 mL 3     Lidocaine (LIDOCARE) 4 % Patch Place 1 patch onto the skin every 24 hours To prevent lidocaine toxicity, patient should be patch free for 12 hrs daily. (Patient not taking: Reported on 1/12/2023) 10 patch 0     losartan (COZAAR) 50 MG tablet Take 1 tablet (50 mg) by mouth daily 90 tablet 3     Multiple Vitamins-Minerals (ONE DAILY ADULTS 50+) TABS           polyethylene glycol (MIRALAX) 17 GM/Dose powder Take 17 g (1 capful) by mouth 2 times daily as needed for constipation (Patient not taking: Reported on 1/12/2023) 507 g 0     simvastatin (ZOCOR) 20 MG tablet Take 1 tablet (20 mg) by mouth At Bedtime 90 tablet 3     triamcinolone (KENALOG) 0.1 % external cream Apply twice daily for 2 weeks 30 g 0     VITAMIN D-1000 MAX -1000 MG-UNIT OR TABS 1 daily                 ALLERGIES:           Allergies   Allergen Reactions      "Lisinopril Cough          FAMILY HISTORY:      Family History         Family History   Problem Relation Age of Onset     Diabetes Mother       Breast Cancer Mother       Eye Disorder Mother       Osteoporosis Mother       Glaucoma Mother       Macular Degeneration Mother       Prostate Cancer Father       Prostate Cancer Brother       Glaucoma Brother       Macular Degeneration Brother       Thyroid Disease Sister       Blood Disease Sister           lupus     Heart Disease Sister       Asthma Son       Prostate Cancer Brother       Glaucoma Brother       Glaucoma Other       Melanoma No family hx of       Skin Cancer No family hx of               SOCIAL HISTORY:      Social History            Socioeconomic History     Marital status:      Number of children: 2   Occupational History       Employer: [employer]       Employer: RETIRED   Tobacco Use     Smoking status: Former       Packs/day: 0.50       Years: 20.00       Pack years: 10.00       Types: Cigarettes       Quit date: 1976       Years since quittin.0     Smokeless tobacco: Never   Vaping Use     Vaping Use: Never used   Substance and Sexual Activity     Alcohol use: Yes       Comment: rare     Drug use: No     Sexual activity: Never          REVIEW OF SYSTEMS:      10 point review of systems within normal other than mentioned in history and physical.       PHYSICAL EXAMINATION:      BP (!) 155/94 (BP Location: Right arm, Patient Position: Chair, Cuff Size: Adult Regular)   Pulse 74   Ht 1.489 m (4' 10.62\")   Wt 63.7 kg (140 lb 6.4 oz)   LMP  (LMP Unknown)   SpO2 98%   BMI 28.72 kg/m       GENERAL: No acute distress.  HEENT: EOMI. Sclerae white, not injected. Nares clear. Pharynx without erythema or exudate.   Neck: No adenopathy. No thyromegaly. Symmetrical.   Heart: Regular rate and rhythm. 2/6 crescendo decrescendo late peaking systolic murmur.  Abdomen: Soft, nontender, nondistended. Bowel sounds present.  Extremities: No clubbing, " cyanosis, or edema.   Neurologic: Alert and oriented to person/place/time, normal speech and affect. No focal deficits.  Skin: No petechiae, purpura or rash.      LABORATORY DATA:      LIPID RESULTS:        Lab Results   Component Value Date     CHOL 175 2022     CHOL 128 07/15/2020     HDL 76 2022     HDL 78 07/15/2020     LDL 82 2022     LDL 39 07/15/2020     TRIG 86 2022     TRIG 57 07/15/2020     CHOLHDLRATIO 2.9 2015         LIVER ENZYME RESULTS:        Lab Results   Component Value Date     AST 40 2021     ALT 25 2021         CBC RESULTS:        Lab Results   Component Value Date     WBC 4.4 2023     WBC 6.1 2020     RBC 3.85 2023     RBC 3.57 (L) 2020     HGB 11.6 (L) 2023     HGB 10.7 (L) 2020     HCT 36.6 2023     HCT 33.3 (L) 2020     MCV 95 2023     MCV 93 2020     MCH 30.1 2023     MCH 30.0 2020     MCHC 31.7 2023     MCHC 32.1 2020     RDW 12.9 2023     RDW 13.1 2020      2023      2020         BMP RESULTS:        Lab Results   Component Value Date      2023      2021     POTASSIUM 4.1 2023     POTASSIUM 4.5 2021     CHLORIDE 103 2023     CHLORIDE 106 2021     CO2 29 2023     CO2 28 2021     ANIONGAP 6 2023     ANIONGAP 3 2021     GLC 95 2023      (H) 2021     BUN 13 2023     BUN 16 2021     CR 0.56 2023     CR 0.80 2021     GFRESTIMATED 89 2023     GFRESTIMATED 68 2021     GFRESTBLACK 79 2021     SHERYL 8.9 2023     SHERYL 8.8 2021          PROCEDURES & FURTHER ASSESSMENTS:      EC2023          Echocardiogram:  2023  Left ventricular function is decreased. The ejection fraction is 45-50%  (mildly reduced).     Global right ventricular function is normal.     Moderate to severe aortic stenosis  is present. The peak aortic velocity is 3.4  m/sec. The mean gradient across the aortic valve is 32 mmHg. Valve area is 0.6  cm2. Stroke volume is reduced - consider low-flow low-gradient severe aortic  stenosis.     Pulmonary artery systolic pressure is normal. The estimated mean right atrial  pressure is normal.     Moderate dilatation of the aorta is present. Ascending aorta 5.1 cm. Sinuses  of Valsalva 4.1 cm.     No pericardial effusion is present.      IMPRESSION:     1.  See below for TAVR related aortic annular and vascular  measurements.   2.  No acute aortic dissection, intramural hematoma or contained  rupture noted.  3.  Please review detailed radiology report, to follow separately, for  incidental non-cardiovascular findings.     FINDINGS:     1.  Moderately calcified bicuspid aortic valve. There is a raphe  between the left and right cusps (Amanda type 1 R-L.) Aortic valve  calcium score is 641 and the aortic valve area by CT planimetry is  1.38 cm2; both of these parameters are consistent with moderate aortic  stenosis. The LVOT is not calcified. The ascending aorta is not  calcified, aortic arch is  mildly calcified, the descending thoracic  aorta is mildly calcified. There is mild mitral annular calcification.  2.  There is a calcified raphe between the left and right coronary  cusps. The left main-annulus distance is 7.9 mm. The RCA-annulus  distance is 9.6 mm.  3.  Severely dilated ascending aorta measuring up to 52.2 mm.  4.  Mildly dilated aortic root measuring up to 41.5 mm (cusp to  commissure.)   5.  The aortic arch and all arch vessels are severely tortuous. There  is also focal kinking in the mid-descending aorta.   6.  There are mild coronary calcifications.   7.  The arch vessel branching pattern is normal.   8.  The suprarenal abdominal aorta is moderately calcified; infrarenal  abdominal aorta is severely calcified  9.  Widely patent origins of celiac trunk, superior mesenteric  artery  and inferior mesenteric artery.  There is an accessory right renal  artery. All renal arteries have widely patent origins.   10.  There is no acute aortic pathology, such as dissection,  intramural hematoma, or contained rupture.      MEASUREMENTS:   Representative dimensions of the thoracoabdominal aorta are as  follows:     1. AORTIC ANNULUS MEASUREMENTS:     1.  The average aortic annulus diameter is 25.7 mm, (long diameter is  28.2 mm and short diameter is 24.6 mm)  2.  Aortic annulus area is 5.17 cm2  3.  Aortic annulus perimeter is 81.8 mm  4.  Suggested 3-cusp coaxial angle for aortic valve is (RAO8 , CAU 5)  and alternate A-P coaxial angle is (LAO0 , CRA20 ), these  angles will  vary depending upon the patient's body position  5.  Aortic root angle is 73.9d degrees  6.  Left main - Annulus distance: 7.9 mm, RCA - Annulus distance: 9.6  mm     2. LVOT MEASUREMENTS:     1.  The average LVOT diameter (measured 4 mm below annular plane) is  27.2 mm  2.  LVOT area is 5.82 cm2  3.  LVOT perimeter is 87.4 mm     3. AORTA MEASUREMENTS     1.  The aortic root at the sinuses of Valsalva (left cusp, right cusp,  non cusp): 39.1 mm x  39.6 mm x 41.5 mm  2.  The sinotubular junction:  40.4 mm x 36.2 mm. The sinotubular  junction is partially effaced.  3.  Sinotubular junction height: 13.0 mm x 17.1 mm  4.  Proximal ascending aorta: 52.2 mm x 49.9 mm  5.  Distal abdominal aorta proximal to the bifurcation: 15.7 mm x 14.8  mm  6.  Innominate artery 3 cm from ostium: 7.4 mm x 6.0 mm. The  innominate artery is severely tortuous.   7.  Left common carotid artery (minimal proximal dimension, occurs at  an area of focal kinking in a tortuous segment): 8.0 mm x 4.8 mm.The  left common carotid artery is severely tortuous.      4. ILIOFEMORAL MEASUREMENTS:     RIGHT:  1.  Right common iliac artery: 11.2 mm x 10.1 mm   2.  Right external iliac artery: 8.5 mm x 8.3 mm   3.  Right common femoral artery: 9.2 mm x 8.5 mm   4.   Tortuosity: severe   5.  Calcification: mild      LEFT:  1.  Left common iliac artery: 12.8 mm x  11.4 mm  2.  Left external iliac artery: 9.3 mm x 8.8 mm  3.  Left common femoral artery: 9.2 mm x 8.7 mm  4.  Tortuosity: severe   5.  Calcification: mild      5. SUBCLAVIAN MEASUREMENTS:     RIGHT:  1. Minimal luminal diameter: 6.0 mm x  4.3 mm. This occurs at an area  of focal kinking in a tortuous segment.   2. Tortuosity: severe   3. Calcification: mild      LEFT:  1. Minimal luminal diameter: 5.6 mm x  5.5 mm  2. Tortuosity: severe   3. Calcification: mild      OTHER FINDINGS:   1.  Please review radiology review for incidental non-cardiovascular  findings including lungs, abdominal organs, bone, soft tissue, nodes  to follow separately     SCOTT SOLORZANO MD        Coronary Angiogram and Right Heart Catheterization:   Conclusion         Right sided filling pressures are normal.    Normal PA pressures.    Left sided filling pressures are normal.    Left ventricular filling pressures are normal.    Normal cardiac output level.     Extreme ortuosity of R subclavian requiring a Destinathion catheter.  Normal coronary angiogram.  Normal right heart catheterizations.  Severe aortic stenosis, with a mean gradient of 37 mmHg , aortic valve area 0.7 cm2 in favor of severe aortic stenosis.       PFTs: pending   Carotid Ultrasound: pending         STS RISK SCORE: 2.5%  FRAILTY SCORE: 2/5   NYHA CLASS: II      CLINICAL IMPRESSION:      Cydney Christiansen is a 87 year old female with symptomatic low flow low gradient severe aortic stenosis with an overall surgical mortality risk estimation of 2.5% based on the STS score. She does have an ascending aortic aneurysm measured at 5.1 cm that will need to be taken into consideration when decided TAVR vs SAVR; however, appears to be slow growing as it was 4.8 cm in 2015.   Given her age, TAVR would be preferred. However, with the aortic aneurysm measured at 5.2 cm, she could be a  candidate for surgical AVR and repair of aortic aneurysm. I discussed the risks and benefits of surgery with patient and family including the risks of death, bleeding, stroke, infection, renal failure and arrhythmias. She is going to think about all the options. If she is not interested in having open heart surgery, then she should have a TAVR alone.      Please do not hesitate to contact me if you have any questions/concerns.     Sincerely,     Gm Solis MD

## 2023-02-27 NOTE — PROGRESS NOTES
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  Her severe aortic stenosis is characterized with an area of 0.6 cm2, mean gradient 32 mmHg and peak velocity of 3.4 m/sec with LVEF 40-45% by echocardiogram on 1/12/2023. Also noted to have ascending aortic aneurysm 5.1 cm on recent echo, which was 4.8 cm in 2015. Additional notable medical history of compression fractures of thoracic vertebrae.      Since about December she has noticed that she is more tired and short of breath with exertion. Her sons have also noticed that she doesn't have as much energy. She noticed the other day she was short of breath walking from her car into clinic. This is a change from a year ago.  No chest pain, lightheadedness, syncope, presyncope, orthopnea, PND, has sock ring edema, no weight gain.      Sister has a bicuspid aortic valve and aortic aneurysm s/p thoracic aneurysm repair about 10 year ago.       PAST MEDICAL HISTORY:      Past Medical History        Past Medical History:   Diagnosis Date     Actinic keratosis       Basal cell cancer 02/2011     bcc of the L back.     Basal cell carcinoma 04' , 06'     Basal cell carcinoma 06/2011     L neck     Breast cancer (H)       Cataract       Colon polyps       Precancer     Glaucoma (increased eye pressure)       Heart murmur       HLD (hyperlipidemia)       Hypertension goal BP (blood pressure) < 140/90 12/19/2013     Invasive ductal carcinoma of breast (H) 6/2015     left     Osteoporosis       Scoliosis       Skin cancer       Skin cancer 05/2013     sccis R cheek     Squamous cell carcinoma 09/2011     R upper back     Squamous cell carcinoma 10/2012     R dorsal hand     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 7/13     left leg     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     Vertigo        takes meclizine prn when she ocassionally has bouts of vertigo             PAST SURGICAL HISTORY:      Past Surgical History         Past Surgical History:   Procedure Laterality Date     BIOPSY BREAST         CATARACT IOL, RT/LT         COLONOSCOPY   5/2008, 5/13, 6/14, 6/17     Q 3 years for advanced adenomatous polyp     CYSTOSCOPY   12/31/2008     D & C         GENITOURINARY SURGERY         TURBT     HC REMOVAL GALLBLADDER         open pan     HC TRABECULOPLASTY BY LASER SURGERY Left 04/18/2007     SLT #1 OS (inf 180)     HC TRABECULOPLASTY BY LASER SURGERY Right 05/04/2011     SLT #1 OD (inf 180?)     HC TRABECULOPLASTY BY LASER SURGERY Left 03/17/2015     SLT #2 OS (sup 180)     HC TRABECULOPLASTY BY LASER SURGERY Right 06/23/2015     SLT #2 OD (sup 180?)     LASER ARGON TREATMENT         SLT left eye x 2     LUMPECTOMY BREAST         LUMPECTOMY BREAST WITH SENTINEL NODE, COMBINED Left 07/29/2015     Procedure: COMBINED LUMPECTOMY BREAST WITH SENTINEL NODE;  Surgeon: Ifeanyi Sunshine MD;  Location:  OR     PHACOEMULSIFICATION CLEAR CORNEA WITH STANDARD INTRAOCULAR LENS IMPLANT Left 12/08/2017     Procedure: PHACOEMULSIFICATION CLEAR CORNEA WITH STANDARD INTRAOCULAR LENS IMPLANT;   COMPLEX LEFT PHACOEMULSIFICATION CLEAR CORNEA WITH STANDARD INTRAOCULAR LENS IMPLANT ;  Surgeon: Kvng Garcia MD;  Location:  [facility]     PHACOEMULSIFICATION CLEAR CORNEA WITH STANDARD INTRAOCULAR LENS IMPLANT Right 10/19/2020     Procedure: RIGHT COMPLEX PHACOEMULSIFICATION, CATARACT, WITH INTRAOCULAR LENS IMPLANT ;  Surgeon: Kvng Garcia MD;  [place] [facility] [place]     SURGICAL HISTORY OF -    09/2011     squamous cell CA excised from back     TUBAL LIGATION                 CURRENT MEDICATIONS:      Current Outpatient Prescriptions          Current Outpatient Medications   Medication Sig Dispense Refill     albuterol (PROAIR HFA/PROVENTIL HFA/VENTOLIN HFA) 108 (90 Base) MCG/ACT inhaler Inhale 1-2 puffs into the lungs every  4 hours as needed for shortness of breath or wheezing 6.7 g 0     alendronate (FOSAMAX) 70 MG tablet TAKE 1 TABLET BY MOUTH EVERY 7 DAYS 12 tablet 11     co-enzyme Q-10 100 MG CAPS capsule Take 200 mg by mouth daily         cyanocobalamin (VITAMIN B-12) 500 MCG tablet Take 1 tablet (500 mcg) by mouth daily 150 tablet 3     dorzolamide-timolol (COSOPT) 2-0.5 % ophthalmic solution Place 1 drop into both eyes 2 times daily 10 mL 4     ferrous sulfate (FEROSUL) 325 (65 Fe) MG tablet Take 1 tablet (325 mg) by mouth daily (with breakfast) 90 tablet 3     ibuprofen (ADVIL,MOTRIN) 200 MG tablet Take 200 mg by mouth every 4 hours as needed.         latanoprost (XALATAN) 0.005 % ophthalmic solution Place 1 drop into both eyes At Bedtime 7.5 mL 3     Lidocaine (LIDOCARE) 4 % Patch Place 1 patch onto the skin every 24 hours To prevent lidocaine toxicity, patient should be patch free for 12 hrs daily. (Patient not taking: Reported on 1/12/2023) 10 patch 0     losartan (COZAAR) 50 MG tablet Take 1 tablet (50 mg) by mouth daily 90 tablet 3     Multiple Vitamins-Minerals (ONE DAILY ADULTS 50+) TABS           polyethylene glycol (MIRALAX) 17 GM/Dose powder Take 17 g (1 capful) by mouth 2 times daily as needed for constipation (Patient not taking: Reported on 1/12/2023) 507 g 0     simvastatin (ZOCOR) 20 MG tablet Take 1 tablet (20 mg) by mouth At Bedtime 90 tablet 3     triamcinolone (KENALOG) 0.1 % external cream Apply twice daily for 2 weeks 30 g 0     VITAMIN D-1000 MAX -1000 MG-UNIT OR TABS 1 daily                 ALLERGIES:           Allergies   Allergen Reactions     Lisinopril Cough          FAMILY HISTORY:      Family History         Family History   Problem Relation Age of Onset     Diabetes Mother       Breast Cancer Mother       Eye Disorder Mother       Osteoporosis Mother       Glaucoma Mother       Macular Degeneration Mother       Prostate Cancer Father       Prostate Cancer Brother       Glaucoma Brother        "Macular Degeneration Brother       Thyroid Disease Sister       Blood Disease Sister           lupus     Heart Disease Sister       Asthma Son       Prostate Cancer Brother       Glaucoma Brother       Glaucoma Other       Melanoma No family hx of       Skin Cancer No family hx of               SOCIAL HISTORY:      Social History            Socioeconomic History     Marital status:      Number of children: 2   Occupational History       Employer: [employer]       Employer: RETIRED   Tobacco Use     Smoking status: Former       Packs/day: 0.50       Years: 20.00       Pack years: 10.00       Types: Cigarettes       Quit date: 1976       Years since quittin.0     Smokeless tobacco: Never   Vaping Use     Vaping Use: Never used   Substance and Sexual Activity     Alcohol use: Yes       Comment: rare     Drug use: No     Sexual activity: Never          REVIEW OF SYSTEMS:      10 point review of systems within normal other than mentioned in history and physical.       PHYSICAL EXAMINATION:      BP (!) 155/94 (BP Location: Right arm, Patient Position: Chair, Cuff Size: Adult Regular)   Pulse 74   Ht 1.489 m (4' 10.62\")   Wt 63.7 kg (140 lb 6.4 oz)   LMP  (LMP Unknown)   SpO2 98%   BMI 28.72 kg/m       GENERAL: No acute distress.  HEENT: EOMI. Sclerae white, not injected. Nares clear. Pharynx without erythema or exudate.   Neck: No adenopathy. No thyromegaly. Symmetrical.   Heart: Regular rate and rhythm. 2/6 crescendo decrescendo late peaking systolic murmur.  Abdomen: Soft, nontender, nondistended. Bowel sounds present.  Extremities: No clubbing, cyanosis, or edema.   Neurologic: Alert and oriented to person/place/time, normal speech and affect. No focal deficits.  Skin: No petechiae, purpura or rash.      LABORATORY DATA:      LIPID RESULTS:        Lab Results   Component Value Date     CHOL 175 2022     CHOL 128 07/15/2020     HDL 76 2022     HDL 78 07/15/2020     LDL 82 2022     " LDL 39 07/15/2020     TRIG 86 2022     TRIG 57 07/15/2020     CHOLHDLRATIO 2.9 2015         LIVER ENZYME RESULTS:        Lab Results   Component Value Date     AST 40 2021     ALT 25 2021         CBC RESULTS:        Lab Results   Component Value Date     WBC 4.4 2023     WBC 6.1 2020     RBC 3.85 2023     RBC 3.57 (L) 2020     HGB 11.6 (L) 2023     HGB 10.7 (L) 2020     HCT 36.6 2023     HCT 33.3 (L) 2020     MCV 95 2023     MCV 93 2020     MCH 30.1 2023     MCH 30.0 2020     MCHC 31.7 2023     MCHC 32.1 2020     RDW 12.9 2023     RDW 13.1 2020      2023      2020         BMP RESULTS:        Lab Results   Component Value Date      2023      2021     POTASSIUM 4.1 2023     POTASSIUM 4.5 2021     CHLORIDE 103 2023     CHLORIDE 106 2021     CO2 29 2023     CO2 28 2021     ANIONGAP 6 2023     ANIONGAP 3 2021     GLC 95 2023      (H) 2021     BUN 13 2023     BUN 16 2021     CR 0.56 2023     CR 0.80 2021     GFRESTIMATED 89 2023     GFRESTIMATED 68 2021     GFRESTBLACK 79 2021     SHERYL 8.9 2023     SHERYL 8.8 2021          PROCEDURES & FURTHER ASSESSMENTS:      [facility]2023          Echocardiogram:  2023  Left ventricular function is decreased. The ejection fraction is 45-50%  (mildly reduced).     Global right ventricular function is normal.     Moderate to severe aortic stenosis is present. The peak aortic velocity is 3.4  m/sec. The mean gradient across the aortic valve is 32 mmHg. Valve area is 0.6  cm2. Stroke volume is reduced - consider low-flow low-gradient severe aortic  stenosis.     Pulmonary artery systolic pressure is normal. The estimated mean right atrial  pressure is normal.     Moderate dilatation of the aorta is  present. Ascending aorta 5.1 cm. Sinuses  of Valsalva 4.1 cm.     No pericardial effusion is present.      IMPRESSION:     1.  See below for TAVR related aortic annular and vascular  measurements.   2.  No acute aortic dissection, intramural hematoma or contained  rupture noted.  3.  Please review detailed radiology report, to follow separately, for  incidental non-cardiovascular findings.     FINDINGS:     1.  Moderately calcified bicuspid aortic valve. There is a raphe  between the left and right cusps (Amanda type 1 R-L.) Aortic valve  calcium score is 641 and the aortic valve area by CT planimetry is  1.38 cm2; both of these parameters are consistent with moderate aortic  stenosis. The LVOT is not calcified. The ascending aorta is not  calcified, aortic arch is  mildly calcified, the descending thoracic  aorta is mildly calcified. There is mild mitral annular calcification.  2.  There is a calcified raphe between the left and right coronary  cusps. The left main-annulus distance is 7.9 mm. The RCA-annulus  distance is 9.6 mm.  3.  Severely dilated ascending aorta measuring up to 52.2 mm.  4.  Mildly dilated aortic root measuring up to 41.5 mm (cusp to  commissure.)   5.  The aortic arch and all arch vessels are severely tortuous. There  is also focal kinking in the mid-descending aorta.   6.  There are mild coronary calcifications.   7.  The arch vessel branching pattern is normal.   8.  The suprarenal abdominal aorta is moderately calcified; infrarenal  abdominal aorta is severely calcified  9.  Widely patent origins of celiac trunk, superior mesenteric artery  and inferior mesenteric artery.  There is an accessory right renal  artery. All renal arteries have widely patent origins.   10.  There is no acute aortic pathology, such as dissection,  intramural hematoma, or contained rupture.      MEASUREMENTS:   Representative dimensions of the thoracoabdominal aorta are as  follows:     1. AORTIC ANNULUS  MEASUREMENTS:     1.  The average aortic annulus diameter is 25.7 mm, (long diameter is  28.2 mm and short diameter is 24.6 mm)  2.  Aortic annulus area is 5.17 cm2  3.  Aortic annulus perimeter is 81.8 mm  4.  Suggested 3-cusp coaxial angle for aortic valve is (RAO8 , CAU 5)  and alternate A-P coaxial angle is (LAO0 , CRA20 ), these  angles will  vary depending upon the patient's body position  5.  Aortic root angle is 73.9d degrees  6.  Left main - Annulus distance: 7.9 mm, RCA - Annulus distance: 9.6  mm     2. LVOT MEASUREMENTS:     1.  The average LVOT diameter (measured 4 mm below annular plane) is  27.2 mm  2.  LVOT area is 5.82 cm2  3.  LVOT perimeter is 87.4 mm     3. AORTA MEASUREMENTS     1.  The aortic root at the sinuses of Valsalva (left cusp, right cusp,  non cusp): 39.1 mm x  39.6 mm x 41.5 mm  2.  The sinotubular junction:  40.4 mm x 36.2 mm. The sinotubular  junction is partially effaced.  3.  Sinotubular junction height: 13.0 mm x 17.1 mm  4.  Proximal ascending aorta: 52.2 mm x 49.9 mm  5.  Distal abdominal aorta proximal to the bifurcation: 15.7 mm x 14.8  mm  6.  Innominate artery 3 cm from ostium: 7.4 mm x 6.0 mm. The  innominate artery is severely tortuous.   7.  Left common carotid artery (minimal proximal dimension, occurs at  an area of focal kinking in a tortuous segment): 8.0 mm x 4.8 mm.The  left common carotid artery is severely tortuous.      4. ILIOFEMORAL MEASUREMENTS:     RIGHT:  1.  Right common iliac artery: 11.2 mm x 10.1 mm   2.  Right external iliac artery: 8.5 mm x 8.3 mm   3.  Right common femoral artery: 9.2 mm x 8.5 mm   4.  Tortuosity: severe   5.  Calcification: mild      LEFT:  1.  Left common iliac artery: 12.8 mm x  11.4 mm  2.  Left external iliac artery: 9.3 mm x 8.8 mm  3.  Left common femoral artery: 9.2 mm x 8.7 mm  4.  Tortuosity: severe   5.  Calcification: mild      5. SUBCLAVIAN MEASUREMENTS:     RIGHT:  1. Minimal luminal diameter: 6.0 mm x  4.3 mm. This  occurs at an area  of focal kinking in a tortuous segment.   2. Tortuosity: severe   3. Calcification: mild      LEFT:  1. Minimal luminal diameter: 5.6 mm x  5.5 mm  2. Tortuosity: severe   3. Calcification: mild      OTHER FINDINGS:   1.  Please review radiology review for incidental non-cardiovascular  findings including lungs, abdominal organs, bone, soft tissue, nodes  to follow separately     SCOTT SOLORZANO MD        Coronary Angiogram and Right Heart Catheterization:   Conclusion         Right sided filling pressures are normal.    Normal PA pressures.    Left sided filling pressures are normal.    Left ventricular filling pressures are normal.    Normal cardiac output level.     Extreme ortuosity of R subclavian requiring a Destinathion catheter.  Normal coronary angiogram.  Normal right heart catheterizations.  Severe aortic stenosis, with a mean gradient of 37 mmHg , aortic valve area 0.7 cm2 in favor of severe aortic stenosis.       PFTs: pending   Carotid Ultrasound: pending         STS RISK SCORE: 2.5%  FRAILTY SCORE: 2/5   NYHA CLASS: II      CLINICAL IMPRESSION:      Cydney Christiansen is a 87 year old female with symptomatic low flow low gradient severe aortic stenosis with an overall surgical mortality risk estimation of 2.5% based on the STS score. She does have an ascending aortic aneurysm measured at 5.1 cm that will need to be taken into consideration when decided TAVR vs SAVR; however, appears to be slow growing as it was 4.8 cm in 2015.   Given her age, TAVR would be preferred. However, with the aortic aneurysm measured at 5.2 cm, she could be a candidate for surgical AVR and repair of aortic aneurysm. I discussed the risks and benefits of surgery with patient and family including the risks of death, bleeding, stroke, infection, renal failure and arrhythmias. She is going to think about all the options. If she is not interested in having open heart surgery, then she should have a TAVR  alone.

## 2023-02-27 NOTE — NURSING NOTE
Chief Complaint   Patient presents with     New Patient     Savr vs tavr       Vitals were taken and medications reconciled.    Mahesh Sweet, EMT  3:16 PM

## 2023-02-27 NOTE — NURSING NOTE
Patient seen today for consultation for ascending aortic aneurysm repair, aortic valve repair vs TAVR.     Surgery procedure explained to patient and son and all questions and concerns were answered and addressed.     Valve choices were discussed with the patient, bioprosthetic due to her age if she has open heart surgery. Patient is undecided and will take some time to discuss with family wether she would want open heart surgery.    Reviewed pre surgery tests and procedures needed and will call patient to schedule.      TAVR conference will be done on Thursday 3/2/23.     Patient and son verbalized understanding of all instructions and will call with any questions or concerns.       Romeo Sahu RNCC  Cardiothoracic Surgery   [facility]  O) [phone]  F) [phone]

## 2023-03-01 ENCOUNTER — ANCILLARY PROCEDURE (OUTPATIENT)
Dept: CT IMAGING | Facility: CLINIC | Age: 88
End: 2023-03-01
Attending: CLINICAL NURSE SPECIALIST
Payer: COMMERCIAL

## 2023-03-01 ENCOUNTER — OFFICE VISIT (OUTPATIENT)
Dept: NURSING | Facility: CLINIC | Age: 88
End: 2023-03-01
Payer: COMMERCIAL

## 2023-03-01 VITALS — OXYGEN SATURATION: 97 % | HEART RATE: 80 BPM | BODY MASS INDEX: 28.29 KG/M2 | WEIGHT: 142 LBS

## 2023-03-01 DIAGNOSIS — R91.8 PULMONARY NODULES: ICD-10-CM

## 2023-03-01 PROCEDURE — 94375 RESPIRATORY FLOW VOLUME LOOP: CPT | Performed by: INTERNAL MEDICINE

## 2023-03-01 PROCEDURE — 94729 DIFFUSING CAPACITY: CPT | Performed by: INTERNAL MEDICINE

## 2023-03-01 PROCEDURE — 94726 PLETHYSMOGRAPHY LUNG VOLUMES: CPT | Performed by: INTERNAL MEDICINE

## 2023-03-01 PROCEDURE — 71250 CT THORAX DX C-: CPT | Mod: GC | Performed by: RADIOLOGY

## 2023-03-03 LAB
DLCOCOR-%PRED-PRE: 91 %
DLCOCOR-PRE: 14.38 ML/MIN/MMHG
DLCOUNC-%PRED-PRE: 89 %
DLCOUNC-PRE: 14.06 ML/MIN/MMHG
DLCOUNC-PRED: 15.78 ML/MIN/MMHG
ERV-%PRED-PRE: 71 %
ERV-PRE: 0.2 L
ERV-PRED: 0.28 L
EXPTIME-PRE: 6.59 SEC
FEF2575-%PRED-PRE: 90 %
FEF2575-PRE: 1.1 L/SEC
FEF2575-PRED: 1.22 L/SEC
FEFMAX-%PRED-PRE: 95 %
FEFMAX-PRE: 3.46 L/SEC
FEFMAX-PRED: 3.62 L/SEC
FEV1-%PRED-PRE: 76 %
FEV1-PRE: 1.11 L
FEV1FEV6-PRE: 80 %
FEV1FEV6-PRED: 77 %
FEV1FVC-PRE: 81 %
FEV1FVC-PRED: 78 %
FEV1SVC-PRE: 74 %
FEV1SVC-PRED: 67 %
FIFMAX-PRE: 2.55 L/SEC
FRCPLETH-%PRED-PRE: 89 %
FRCPLETH-PRE: 2.22 L
FRCPLETH-PRED: 2.47 L
FVC-%PRED-PRE: 72 %
FVC-PRE: 1.37 L
FVC-PRED: 1.88 L
IC-%PRED-PRE: 68 %
IC-PRE: 1.3 L
IC-PRED: 1.9 L
RVPLETH-%PRED-PRE: 96 %
RVPLETH-PRE: 2.02 L
RVPLETH-PRED: 2.1 L
TLCPLETH-%PRED-PRE: 84 %
TLCPLETH-PRE: 3.52 L
TLCPLETH-PRED: 4.18 L
VA-%PRED-PRE: 74 %
VA-PRE: 2.8 L
VC-%PRED-PRE: 68 %
VC-PRE: 1.5 L
VC-PRED: 2.17 L

## 2023-03-09 ENCOUNTER — CARE COORDINATION (OUTPATIENT)
Dept: CARDIOLOGY | Facility: CLINIC | Age: 88
End: 2023-03-09
Payer: COMMERCIAL

## 2023-03-09 NOTE — PROGRESS NOTES
Patient's case was discussed at Valve conference, attended by structural heart cardiologists, CV surgeons, CNP's, and structural heart care coordinators, on March 9, 2023    Special considerations:  All images were reviewed, including angiogram, echo and CT.  There were several adverse features that would cause a potential TAVR case to be high risk:  --Very low coronary artery to annulus distance, both on the left and the right side.  --Low STJ height  --Calcified raphe  --Tortuosity throughout the aorta  --Focal kinking in the mid-descending aorta  --A steep or horizontal angle of the arch of 74 degrees.    It was a group consensus that if an intervention was needed that surgery is the better option.     Contacted patient to review discussion at Valve Conference.     The surgery team was contacted to follow-up with the patient regarding next steps.

## 2023-03-10 ENCOUNTER — TELEPHONE (OUTPATIENT)
Dept: CARDIOLOGY | Facility: CLINIC | Age: 88
End: 2023-03-10
Payer: COMMERCIAL

## 2023-03-10 NOTE — TELEPHONE ENCOUNTER
Called and spoke with patient regarding her surgery options; she will talk to her family to decide if SAVR/aortic aneurysm repair is an option for her. Plan is for her to get in contact with Romeo RNCC on Monday.

## 2023-03-13 ENCOUNTER — OFFICE VISIT (OUTPATIENT)
Dept: CARDIOLOGY | Facility: CLINIC | Age: 88
End: 2023-03-13
Attending: SURGERY
Payer: COMMERCIAL

## 2023-03-13 VITALS
OXYGEN SATURATION: 98 % | BODY MASS INDEX: 28.27 KG/M2 | DIASTOLIC BLOOD PRESSURE: 104 MMHG | WEIGHT: 141.9 LBS | HEART RATE: 74 BPM | SYSTOLIC BLOOD PRESSURE: 182 MMHG

## 2023-03-13 DIAGNOSIS — I35.0 SEVERE AORTIC STENOSIS: Primary | ICD-10-CM

## 2023-03-13 PROCEDURE — G0463 HOSPITAL OUTPT CLINIC VISIT: HCPCS | Performed by: SURGERY

## 2023-03-13 PROCEDURE — 99207 PR NON-BILLABLE SERV PER CHARTING: CPT | Performed by: SURGERY

## 2023-03-13 ASSESSMENT — PAIN SCALES - GENERAL: PAINLEVEL: NO PAIN (0)

## 2023-03-13 NOTE — LETTER
3/13/2023      RE: Cydney Christiansen  [address]  [place] [address]       Dear Colleague,    Thank you for the opportunity to participate in the care of your patient, Cydney Christiansen, at the [facility] [place] [facility]. Please see a copy of my visit note below.    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  Her severe aortic stenosis is characterized with an area of 0.6 cm2, mean gradient 32 mmHg and peak velocity of 3.4 m/sec with LVEF 40-45% by echocardiogram on 1/12/2023. Also noted to have ascending aortic aneurysm 5.1 cm on recent echo, which was 4.8 cm in 2015. Additional notable medical history of compression fractures of thoracic vertebrae.      Since about December she has noticed that she is more tired and short of breath with exertion. Her sons have also noticed that she doesn't have as much energy. She noticed the other day she was short of breath walking from her car into clinic. This is a change from a year ago.  No chest pain, lightheadedness, syncope, presyncope, orthopnea, PND, has sock ring edema, no weight gain.      Sister has a bicuspid aortic valve and aortic aneurysm s/p thoracic aneurysm repair about 10 year ago.       PAST MEDICAL HISTORY:      Past Medical History           Past Medical History:   Diagnosis Date     Actinic keratosis       Basal cell cancer 02/2011     bcc of the L back.     Basal cell carcinoma 04' , 06'     Basal cell carcinoma 06/2011     L neck     Breast cancer (H)       Cataract       Colon polyps       Precancer     Glaucoma (increased eye pressure)       Heart murmur       HLD (hyperlipidemia)       Hypertension goal BP (blood pressure) < 140/90 12/19/2013     Invasive ductal carcinoma of breast (H) 6/2015     left      Osteoporosis       Scoliosis       Skin cancer       Skin cancer 05/2013     sccis R cheek     Squamous cell carcinoma 09/2011     R upper back     Squamous cell carcinoma 10/2012     R dorsal hand     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 7/13     left leg     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     Vertigo       takes meclizine prn when she ocassionally has bouts of vertigo             PAST SURGICAL HISTORY:      Past Surgical History             Past Surgical History:   Procedure Laterality Date     BIOPSY BREAST         CATARACT IOL, RT/LT         COLONOSCOPY   5/2008, 5/13, 6/14, 6/17     Q 3 years for advanced adenomatous polyp     CYSTOSCOPY   12/31/2008     D & C         GENITOURINARY SURGERY         TURBT     HC REMOVAL GALLBLADDER         open pan     HC TRABECULOPLASTY BY LASER SURGERY Left 04/18/2007     SLT #1 OS (inf 180)     HC TRABECULOPLASTY BY LASER SURGERY Right 05/04/2011     SLT #1 OD (inf 180?)     HC TRABECULOPLASTY BY LASER SURGERY Left 03/17/2015     SLT #2 OS (sup 180)     HC TRABECULOPLASTY BY LASER SURGERY Right 06/23/2015     SLT #2 OD (sup 180?)     LASER ARGON TREATMENT         SLT left eye x 2     LUMPECTOMY BREAST         LUMPECTOMY BREAST WITH SENTINEL NODE, COMBINED Left 07/29/2015     Procedure: COMBINED LUMPECTOMY BREAST WITH SENTINEL NODE;  Surgeon: Ifeanyi Sunshine MD;  Location:  OR     PHACOEMULSIFICATION CLEAR CORNEA WITH STANDARD INTRAOCULAR LENS IMPLANT Left 12/08/2017     Procedure: PHACOEMULSIFICATION CLEAR CORNEA WITH STANDARD INTRAOCULAR LENS IMPLANT;   COMPLEX LEFT PHACOEMULSIFICATION CLEAR CORNEA WITH STANDARD INTRAOCULAR LENS IMPLANT ;  Surgeon: Kvng Garcia MD;  Location: [facility]     PHACOEMULSIFICATION CLEAR CORNEA WITH STANDARD INTRAOCULAR LENS IMPLANT Right 10/19/2020     Procedure: RIGHT COMPLEX PHACOEMULSIFICATION, CATARACT, WITH INTRAOCULAR LENS IMPLANT ;  Surgeon: Kvng Garcia MD;  Location: [facility]  HISTORY OF -    09/2011     squamous cell CA excised from back     TUBAL LIGATION                 CURRENT MEDICATIONS:      Current Outpatient Prescriptions               Current Outpatient Medications   Medication Sig Dispense Refill     albuterol (PROAIR HFA/PROVENTIL HFA/VENTOLIN HFA) 108 (90 Base) MCG/ACT inhaler Inhale 1-2 puffs into the lungs every 4 hours as needed for shortness of breath or wheezing 6.7 g 0     alendronate (FOSAMAX) 70 MG tablet TAKE 1 TABLET BY MOUTH EVERY 7 DAYS 12 tablet 11     co-enzyme Q-10 100 MG CAPS capsule Take 200 mg by mouth daily         cyanocobalamin (VITAMIN B-12) 500 MCG tablet Take 1 tablet (500 mcg) by mouth daily 150 tablet 3     dorzolamide-timolol (COSOPT) 2-0.5 % ophthalmic solution Place 1 drop into both eyes 2 times daily 10 mL 4     ferrous sulfate (FEROSUL) 325 (65 Fe) MG tablet Take 1 tablet (325 mg) by mouth daily (with breakfast) 90 tablet 3     ibuprofen (ADVIL,MOTRIN) 200 MG tablet Take 200 mg by mouth every 4 hours as needed.         latanoprost (XALATAN) 0.005 % ophthalmic solution Place 1 drop into both eyes At Bedtime 7.5 mL 3     Lidocaine (LIDOCARE) 4 % Patch Place 1 patch onto the skin every 24 hours To prevent lidocaine toxicity, patient should be patch free for 12 hrs daily. (Patient not taking: Reported on 1/12/2023) 10 patch 0     losartan (COZAAR) 50 MG tablet Take 1 tablet (50 mg) by mouth daily 90 tablet 3     Multiple Vitamins-Minerals (ONE DAILY ADULTS 50+) TABS           polyethylene glycol (MIRALAX) 17 GM/Dose powder Take 17 g (1 capful) by mouth 2 times daily as needed for constipation (Patient not taking: Reported on 1/12/2023) 507 g 0     simvastatin (ZOCOR) 20 MG tablet Take 1 tablet (20 mg) by mouth At Bedtime 90 tablet 3     triamcinolone (KENALOG) 0.1 % external cream Apply twice daily for 2 weeks 30 g 0     VITAMIN D-1000 MAX -1000 MG-UNIT OR TABS 1 daily                 ALLERGIES:              Allergies   Allergen Reactions      "Lisinopril Cough          FAMILY HISTORY:      Family History             Family History   Problem Relation Age of Onset     Diabetes Mother       Breast Cancer Mother       Eye Disorder Mother       Osteoporosis Mother       Glaucoma Mother       Macular Degeneration Mother       Prostate Cancer Father       Prostate Cancer Brother       Glaucoma Brother       Macular Degeneration Brother       Thyroid Disease Sister       Blood Disease Sister           lupus     Heart Disease Sister       Asthma Son       Prostate Cancer Brother       Glaucoma Brother       Glaucoma Other       Melanoma No family hx of       Skin Cancer No family hx of               SOCIAL HISTORY:      Social History                Socioeconomic History     Marital status:      Number of children: 2   Occupational History       Employer: [employer]       Employer: RETIRED   Tobacco Use     Smoking status: Former       Packs/day: 0.50       Years: 20.00       Pack years: 10.00       Types: Cigarettes       Quit date: 1976       Years since quittin.0     Smokeless tobacco: Never   Vaping Use     Vaping Use: Never used   Substance and Sexual Activity     Alcohol use: Yes       Comment: rare     Drug use: No     Sexual activity: Never          REVIEW OF SYSTEMS:      10 point review of systems within normal other than mentioned in history and physical.       PHYSICAL EXAMINATION:      BP (!) 155/94 (BP Location: Right arm, Patient Position: Chair, Cuff Size: Adult Regular)   Pulse 74   Ht 1.489 m (4' 10.62\")   Wt 63.7 kg (140 lb 6.4 oz)   LMP  (LMP Unknown)   SpO2 98%   BMI 28.72 kg/m       GENERAL: No acute distress.  HEENT: EOMI. Sclerae white, not injected. Nares clear. Pharynx without erythema or exudate.   Neck: No adenopathy. No thyromegaly. Symmetrical.   Heart: Regular rate and rhythm. 2/6 crescendo decrescendo late peaking systolic murmur.  Abdomen: Soft, nontender, nondistended. Bowel sounds present.  Extremities: No " clubbing, cyanosis, or edema.   Neurologic: Alert and oriented to person/place/time, normal speech and affect. No focal deficits.  Skin: No petechiae, purpura or rash.      LABORATORY DATA:      LIPID RESULTS:            Lab Results   Component Value Date     CHOL 175 2022     CHOL 128 07/15/2020     HDL 76 2022     HDL 78 07/15/2020     LDL 82 2022     LDL 39 07/15/2020     TRIG 86 2022     TRIG 57 07/15/2020     CHOLHDLRATIO 2.9 2015         LIVER ENZYME RESULTS:            Lab Results   Component Value Date     AST 40 2021     ALT 25 2021         CBC RESULTS:            Lab Results   Component Value Date     WBC 4.4 2023     WBC 6.1 2020     RBC 3.85 2023     RBC 3.57 (L) 2020     HGB 11.6 (L) 2023     HGB 10.7 (L) 2020     HCT 36.6 2023     HCT 33.3 (L) 2020     MCV 95 2023     MCV 93 2020     MCH 30.1 2023     MCH 30.0 2020     MCHC 31.7 2023     MCHC 32.1 2020     RDW 12.9 2023     RDW 13.1 2020      2023      2020         BMP RESULTS:            Lab Results   Component Value Date      2023      2021     POTASSIUM 4.1 2023     POTASSIUM 4.5 2021     CHLORIDE 103 2023     CHLORIDE 106 2021     CO2 29 2023     CO2 28 2021     ANIONGAP 6 2023     ANIONGAP 3 2021     GLC 95 2023      (H) 2021     BUN 13 2023     BUN 16 2021     CR 0.56 2023     CR 0.80 2021     GFRESTIMATED 89 2023     GFRESTIMATED 68 2021     GFRESTBLACK 79 2021     SHERYL 8.9 2023     SHERYL 8.8 2021          PROCEDURES & FURTHER ASSESSMENTS:      EC2023          Echocardiogram:  2023  Left ventricular function is decreased. The ejection fraction is 45-50%  (mildly reduced).     Global right ventricular function is normal.     Moderate  to severe aortic stenosis is present. The peak aortic velocity is 3.4  m/sec. The mean gradient across the aortic valve is 32 mmHg. Valve area is 0.6  cm2. Stroke volume is reduced - consider low-flow low-gradient severe aortic  stenosis.     Pulmonary artery systolic pressure is normal. The estimated mean right atrial  pressure is normal.     Moderate dilatation of the aorta is present. Ascending aorta 5.1 cm. Sinuses  of Valsalva 4.1 cm.     No pericardial effusion is present.      IMPRESSION:     1.  See below for TAVR related aortic annular and vascular  measurements.   2.  No acute aortic dissection, intramural hematoma or contained  rupture noted.  3.  Please review detailed radiology report, to follow separately, for  incidental non-cardiovascular findings.     FINDINGS:     1.  Moderately calcified bicuspid aortic valve. There is a raphe  between the left and right cusps (Amanda type 1 R-L.) Aortic valve  calcium score is 641 and the aortic valve area by CT planimetry is  1.38 cm2; both of these parameters are consistent with moderate aortic  stenosis. The LVOT is not calcified. The ascending aorta is not  calcified, aortic arch is  mildly calcified, the descending thoracic  aorta is mildly calcified. There is mild mitral annular calcification.  2.  There is a calcified raphe between the left and right coronary  cusps. The left main-annulus distance is 7.9 mm. The RCA-annulus  distance is 9.6 mm.  3.  Severely dilated ascending aorta measuring up to 52.2 mm.  4.  Mildly dilated aortic root measuring up to 41.5 mm (cusp to  commissure.)   5.  The aortic arch and all arch vessels are severely tortuous. There  is also focal kinking in the mid-descending aorta.   6.  There are mild coronary calcifications.   7.  The arch vessel branching pattern is normal.   8.  The suprarenal abdominal aorta is moderately calcified; infrarenal  abdominal aorta is severely calcified  9.  Widely patent origins of celiac trunk,  superior mesenteric artery  and inferior mesenteric artery.  There is an accessory right renal  artery. All renal arteries have widely patent origins.   10.  There is no acute aortic pathology, such as dissection,  intramural hematoma, or contained rupture.      MEASUREMENTS:   Representative dimensions of the thoracoabdominal aorta are as  follows:     1. AORTIC ANNULUS MEASUREMENTS:     1.  The average aortic annulus diameter is 25.7 mm, (long diameter is  28.2 mm and short diameter is 24.6 mm)  2.  Aortic annulus area is 5.17 cm2  3.  Aortic annulus perimeter is 81.8 mm  4.  Suggested 3-cusp coaxial angle for aortic valve is (RAO8 , CAU 5)  and alternate A-P coaxial angle is (LAO0 , CRA20 ), these  angles will  vary depending upon the patient's body position  5.  Aortic root angle is 73.9d degrees  6.  Left main - Annulus distance: 7.9 mm, RCA - Annulus distance: 9.6  mm     2. LVOT MEASUREMENTS:     1.  The average LVOT diameter (measured 4 mm below annular plane) is  27.2 mm  2.  LVOT area is 5.82 cm2  3.  LVOT perimeter is 87.4 mm     3. AORTA MEASUREMENTS     1.  The aortic root at the sinuses of Valsalva (left cusp, right cusp,  non cusp): 39.1 mm x  39.6 mm x 41.5 mm  2.  The sinotubular junction:  40.4 mm x 36.2 mm. The sinotubular  junction is partially effaced.  3.  Sinotubular junction height: 13.0 mm x 17.1 mm  4.  Proximal ascending aorta: 52.2 mm x 49.9 mm  5.  Distal abdominal aorta proximal to the bifurcation: 15.7 mm x 14.8  mm  6.  Innominate artery 3 cm from ostium: 7.4 mm x 6.0 mm. The  innominate artery is severely tortuous.   7.  Left common carotid artery (minimal proximal dimension, occurs at  an area of focal kinking in a tortuous segment): 8.0 mm x 4.8 mm.The  left common carotid artery is severely tortuous.      4. ILIOFEMORAL MEASUREMENTS:     RIGHT:  1.  Right common iliac artery: 11.2 mm x 10.1 mm   2.  Right external iliac artery: 8.5 mm x 8.3 mm   3.  Right common femoral artery:  9.2 mm x 8.5 mm   4.  Tortuosity: severe   5.  Calcification: mild      LEFT:  1.  Left common iliac artery: 12.8 mm x  11.4 mm  2.  Left external iliac artery: 9.3 mm x 8.8 mm  3.  Left common femoral artery: 9.2 mm x 8.7 mm  4.  Tortuosity: severe   5.  Calcification: mild      5. SUBCLAVIAN MEASUREMENTS:     RIGHT:  1. Minimal luminal diameter: 6.0 mm x  4.3 mm. This occurs at an area  of focal kinking in a tortuous segment.   2. Tortuosity: severe   3. Calcification: mild      LEFT:  1. Minimal luminal diameter: 5.6 mm x  5.5 mm  2. Tortuosity: severe   3. Calcification: mild      OTHER FINDINGS:   1.  Please review radiology review for incidental non-cardiovascular  findings including lungs, abdominal organs, bone, soft tissue, nodes  to follow separately     SCOTT SOLORZANO MD         Coronary Angiogram and Right Heart Catheterization:   Conclusion          Right sided filling pressures are normal.    Normal PA pressures.    Left sided filling pressures are normal.    Left ventricular filling pressures are normal.    Normal cardiac output level.     Extreme ortuosity of R subclavian requiring a Destinathion catheter.  Normal coronary angiogram.  Normal right heart catheterizations.  Severe aortic stenosis, with a mean gradient of 37 mmHg , aortic valve area 0.7 cm2 in favor of severe aortic stenosis.        PFTs: pending   Carotid Ultrasound: pending         STS RISK SCORE: 2.5%  FRAILTY SCORE: 2/5   NYHA CLASS: II      CLINICAL IMPRESSION:      Cydney Christiansen is a 87 year old female with symptomatic low flow low gradient severe aortic stenosis with an overall surgical mortality risk estimation of 2.5% based on the STS score. She does have an ascending aortic aneurysm measured at 5.1 cm that will need to be taken into consideration when decided TAVR vs SAVR; however, appears to be slow growing as it was 4.8 cm in 2015.   Given her age, TAVR would be preferred. However, with the aortic aneurysm measured  at 5.2 cm, she could be a candidate for surgical AVR and repair of aortic aneurysm.   However, with low coronary height she is not a candidate for TAVR. I discussed the risks and benefits of aortic valve replacement and aortic aneurysm repair including the risks of death, bleeding, stroke, infection, renal failure and arrhythmias with her and her sons. She understands and is willing to think about this and will let us know.    Please do not hesitate to contact me if you have any questions/concerns.     Sincerely,     Gm Solis MD

## 2023-03-13 NOTE — NURSING NOTE
Chief Complaint   Patient presents with     New Patient     New CV Surgery- SAVR possible ascending aortic aneurysm     Vitals were taken and medications reconciled.    Richie Dorman, EMT  4:04 PM

## 2023-03-15 ENCOUNTER — VIRTUAL VISIT (OUTPATIENT)
Dept: PULMONOLOGY | Facility: CLINIC | Age: 88
End: 2023-03-15
Attending: INTERNAL MEDICINE
Payer: COMMERCIAL

## 2023-03-15 DIAGNOSIS — Z85.51 PERSONAL HISTORY OF MALIGNANT NEOPLASM OF BLADDER: ICD-10-CM

## 2023-03-15 PROCEDURE — 99203 OFFICE O/P NEW LOW 30 MIN: CPT | Mod: 95 | Performed by: INTERNAL MEDICINE

## 2023-03-15 NOTE — LETTER
3/15/2023       RE: Cydney Christiansen  [address]  [place] [address]     Dear Colleague,    Thank you for referring your patient, [facility]ydney Christiansen[facility]. Please see a copy of my visit note below.    This was changed to a phone visit due to patient factors.    87-year-old woman being evaluated for valve replacement with incidental finding of lung nodule during her preoperative evaluation.  Unfortunately because of what sounds like aneurysm she does not qualify for TAVR.  She is currently considering whether she is going to have open heart surgery.    Given her issues with symptomatic aortic stenosis her lung nodule has not really a clinical issue.  We cannot triage it fully but it is a very localized small process and will not affect her quality or length of life in a way that she be taken into consideration for valve replacement.    If she decides to proceed with open heart surgery we can consider establishing follow-up imaging as needed postoperatively.    If she elects to not have her aortic stenosis intervened on I do not think this lung nodule needs further follow-up.    We will be available for any questions as needed going forward.  No plan follow-up at this time until a decision is made regarding surgery.    12 minutes spent on this telephone visit.    Again, thank you for allowing me to participate in the care of your patient.      Sincerely,    Jamel Yeager MD

## 2023-03-15 NOTE — PROGRESS NOTES
This was changed to a phone visit due to patient factors.    87-year-old woman being evaluated for valve replacement with incidental finding of lung nodule during her preoperative evaluation.  Unfortunately because of what sounds like aneurysm she does not qualify for TAVR.  She is currently considering whether she is going to have open heart surgery.    Given her issues with symptomatic aortic stenosis her lung nodule has not really a clinical issue.  We cannot triage it fully but it is a very localized small process and will not affect her quality or length of life in a way that she be taken into consideration for valve replacement.    If she decides to proceed with open heart surgery we can consider establishing follow-up imaging as needed postoperatively.    If she elects to not have her aortic stenosis intervened on I do not think this lung nodule needs further follow-up.    We will be available for any questions as needed going forward.  No plan follow-up at this time until a decision is made regarding surgery.    12 minutes spent on this telephone visit.

## 2023-03-15 NOTE — NURSING NOTE
Is the patient currently in the state of [place]? YES    Visit mode:VIDEO     If the visit is dropped, the patient can be reconnected by: TELEPHONE VISIT: Phone number: [phone]    Will anyone else be joining the visit? NO      How would you like to obtain your AVS? MyChart    Are changes needed to the allergy or medication list? NO    Patient declined individual allergy and medication review by support staff because pt states nothing has changed since last reviewed on March 13th 2023.    Reason for visit: New Patient Visit    Aster TOURE

## 2023-03-24 ENCOUNTER — PREP FOR PROCEDURE (OUTPATIENT)
Dept: CARDIOLOGY | Facility: CLINIC | Age: 88
End: 2023-03-24
Payer: COMMERCIAL

## 2023-03-24 ENCOUNTER — TELEPHONE (OUTPATIENT)
Dept: CARDIOLOGY | Facility: CLINIC | Age: 88
End: 2023-03-24
Payer: COMMERCIAL

## 2023-03-24 DIAGNOSIS — I72.9 ANEURYSM (H): ICD-10-CM

## 2023-03-24 DIAGNOSIS — I35.0 AS (AORTIC STENOSIS): Primary | ICD-10-CM

## 2023-03-24 NOTE — TELEPHONE ENCOUNTER
Per task, pt needs to schedule surgery with Dr. Solis. Talked with pt and scheduled her for 4/25. Will call if anything changes

## 2023-03-26 NOTE — PROGRESS NOTES
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  Her severe aortic stenosis is characterized with an area of 0.6 cm2, mean gradient 32 mmHg and peak velocity of 3.4 m/sec with LVEF 40-45% by echocardiogram on 1/12/2023. Also noted to have ascending aortic aneurysm 5.1 cm on recent echo, which was 4.8 cm in 2015. Additional notable medical history of compression fractures of thoracic vertebrae.      Since about December she has noticed that she is more tired and short of breath with exertion. Her sons have also noticed that she doesn't have as much energy. She noticed the other day she was short of breath walking from her car into clinic. This is a change from a year ago.  No chest pain, lightheadedness, syncope, presyncope, orthopnea, PND, has sock ring edema, no weight gain.      Sister has a bicuspid aortic valve and aortic aneurysm s/p thoracic aneurysm repair about 10 year ago.       PAST MEDICAL HISTORY:      Past Medical History           Past Medical History:   Diagnosis Date     Actinic keratosis       Basal cell cancer 02/2011     bcc of the L back.     Basal cell carcinoma 04' , 06'     Basal cell carcinoma 06/2011     L neck     Breast cancer (H)       Cataract       Colon polyps       Precancer     Glaucoma (increased eye pressure)       Heart murmur       HLD (hyperlipidemia)       Hypertension goal BP (blood pressure) < 140/90 12/19/2013     Invasive ductal carcinoma of breast (H) 6/2015     left     Osteoporosis       Scoliosis       Skin cancer       Skin cancer 05/2013     sccis R cheek     Squamous cell carcinoma 09/2011     R upper back     Squamous cell carcinoma 10/2012     R dorsal hand     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 7/13     left leg     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     Vertigo        takes meclizine prn when she ocassionally has bouts of vertigo             PAST SURGICAL HISTORY:      Past Surgical History             Past Surgical History:   Procedure Laterality Date     BIOPSY BREAST         CATARACT IOL, RT/LT         COLONOSCOPY   5/2008, 5/13, 6/14, 6/17     Q 3 years for advanced adenomatous polyp     CYSTOSCOPY   12/31/2008     D & C         GENITOURINARY SURGERY         TURBT     HC REMOVAL GALLBLADDER         open pan     HC TRABECULOPLASTY BY LASER SURGERY Left 04/18/2007     SLT #1 OS (inf 180)     HC TRABECULOPLASTY BY LASER SURGERY Right 05/04/2011     SLT #1 OD (inf 180?)     HC TRABECULOPLASTY BY LASER SURGERY Left 03/17/2015     SLT #2 OS (sup 180)     HC TRABECULOPLASTY BY LASER SURGERY Right 06/23/2015     SLT #2 OD (sup 180?)     LASER ARGON TREATMENT         SLT left eye x 2     LUMPECTOMY BREAST         LUMPECTOMY BREAST WITH SENTINEL NODE, COMBINED Left 07/29/2015     Procedure: COMBINED LUMPECTOMY BREAST WITH SENTINEL NODE;  Surgeon: Ifeanyi Sunshine MD;  Location:  OR     PHACOEMULSIFICATION CLEAR CORNEA WITH STANDARD INTRAOCULAR LENS IMPLANT Left 12/08/2017     Procedure: PHACOEMULSIFICATION CLEAR CORNEA WITH STANDARD INTRAOCULAR LENS IMPLANT;   COMPLEX LEFT PHACOEMULSIFICATION CLEAR CORNEA WITH STANDARD INTRAOCULAR LENS IMPLANT ;  Surgeon: Kvng Garcia MD;  Location:  [facility]     PHACOEMULSIFICATION CLEAR CORNEA WITH STANDARD INTRAOCULAR LENS IMPLANT Right 10/19/2020     Procedure: RIGHT COMPLEX PHACOEMULSIFICATION, CATARACT, WITH INTRAOCULAR LENS IMPLANT ;  Surgeon: Kvng Garcia MD;  Location: [facility] [place]     SURGICAL HISTORY OF -    09/2011     squamous cell CA excised from back     TUBAL LIGATION                 CURRENT MEDICATIONS:      Current Outpatient Prescriptions               Current Outpatient Medications   Medication Sig Dispense Refill     albuterol (PROAIR HFA/PROVENTIL HFA/VENTOLIN HFA) 108 (90 Base) MCG/ACT inhaler Inhale 1-2 puffs into  the lungs every 4 hours as needed for shortness of breath or wheezing 6.7 g 0     alendronate (FOSAMAX) 70 MG tablet TAKE 1 TABLET BY MOUTH EVERY 7 DAYS 12 tablet 11     co-enzyme Q-10 100 MG CAPS capsule Take 200 mg by mouth daily         cyanocobalamin (VITAMIN B-12) 500 MCG tablet Take 1 tablet (500 mcg) by mouth daily 150 tablet 3     dorzolamide-timolol (COSOPT) 2-0.5 % ophthalmic solution Place 1 drop into both eyes 2 times daily 10 mL 4     ferrous sulfate (FEROSUL) 325 (65 Fe) MG tablet Take 1 tablet (325 mg) by mouth daily (with breakfast) 90 tablet 3     ibuprofen (ADVIL,MOTRIN) 200 MG tablet Take 200 mg by mouth every 4 hours as needed.         latanoprost (XALATAN) 0.005 % ophthalmic solution Place 1 drop into both eyes At Bedtime 7.5 mL 3     Lidocaine (LIDOCARE) 4 % Patch Place 1 patch onto the skin every 24 hours To prevent lidocaine toxicity, patient should be patch free for 12 hrs daily. (Patient not taking: Reported on 1/12/2023) 10 patch 0     losartan (COZAAR) 50 MG tablet Take 1 tablet (50 mg) by mouth daily 90 tablet 3     Multiple Vitamins-Minerals (ONE DAILY ADULTS 50+) TABS           polyethylene glycol (MIRALAX) 17 GM/Dose powder Take 17 g (1 capful) by mouth 2 times daily as needed for constipation (Patient not taking: Reported on 1/12/2023) 507 g 0     simvastatin (ZOCOR) 20 MG tablet Take 1 tablet (20 mg) by mouth At Bedtime 90 tablet 3     triamcinolone (KENALOG) 0.1 % external cream Apply twice daily for 2 weeks 30 g 0     VITAMIN D-1000 MAX -1000 MG-UNIT OR TABS 1 daily                 ALLERGIES:              Allergies   Allergen Reactions     Lisinopril Cough          FAMILY HISTORY:      Family History             Family History   Problem Relation Age of Onset     Diabetes Mother       Breast Cancer Mother       Eye Disorder Mother       Osteoporosis Mother       Glaucoma Mother       Macular Degeneration Mother       Prostate Cancer Father       Prostate Cancer Brother        "Glaucoma Brother       Macular Degeneration Brother       Thyroid Disease Sister       Blood Disease Sister           lupus     Heart Disease Sister       Asthma Son       Prostate Cancer Brother       Glaucoma Brother       Glaucoma Other       Melanoma No family hx of       Skin Cancer No family hx of               SOCIAL HISTORY:      Social History                Socioeconomic History     Marital status:      Number of children: 2   Occupational History       Employer: [employer]       Employer: RETIRED   Tobacco Use     Smoking status: Former       Packs/day: 0.50       Years: 20.00       Pack years: 10.00       Types: Cigarettes       Quit date: 1976       Years since quittin.0     Smokeless tobacco: Never   Vaping Use     Vaping Use: Never used   Substance and Sexual Activity     Alcohol use: Yes       Comment: rare     Drug use: No     Sexual activity: Never          REVIEW OF SYSTEMS:      10 point review of systems within normal other than mentioned in history and physical.       PHYSICAL EXAMINATION:      BP (!) 155/94 (BP Location: Right arm, Patient Position: Chair, Cuff Size: Adult Regular)   Pulse 74   Ht 1.489 m (4' 10.62\")   Wt 63.7 kg (140 lb 6.4 oz)   LMP  (LMP Unknown)   SpO2 98%   BMI 28.72 kg/m       GENERAL: No acute distress.  HEENT: EOMI. Sclerae white, not injected. Nares clear. Pharynx without erythema or exudate.   Neck: No adenopathy. No thyromegaly. Symmetrical.   Heart: Regular rate and rhythm. 2/6 crescendo decrescendo late peaking systolic murmur.  Abdomen: Soft, nontender, nondistended. Bowel sounds present.  Extremities: No clubbing, cyanosis, or edema.   Neurologic: Alert and oriented to person/place/time, normal speech and affect. No focal deficits.  Skin: No petechiae, purpura or rash.      LABORATORY DATA:      LIPID RESULTS:            Lab Results   Component Value Date     CHOL 175 2022     CHOL 128 07/15/2020     HDL 76 2022     HDL 78 " 07/15/2020     LDL 82 2022     LDL 39 07/15/2020     TRIG 86 2022     TRIG 57 07/15/2020     CHOLHDLRATIO 2.9 2015         LIVER ENZYME RESULTS:            Lab Results   Component Value Date     AST 40 2021     ALT 25 2021         CBC RESULTS:            Lab Results   Component Value Date     WBC 4.4 2023     WBC 6.1 2020     RBC 3.85 2023     RBC 3.57 (L) 2020     HGB 11.6 (L) 2023     HGB 10.7 (L) 2020     HCT 36.6 2023     HCT 33.3 (L) 2020     MCV 95 2023     MCV 93 2020     MCH 30.1 2023     MCH 30.0 2020     MCHC 31.7 2023     MCHC 32.1 2020     RDW 12.9 2023     RDW 13.1 2020      2023      2020         BMP RESULTS:            Lab Results   Component Value Date      2023      2021     POTASSIUM 4.1 2023     POTASSIUM 4.5 2021     CHLORIDE 103 2023     CHLORIDE 106 2021     CO2 29 2023     CO2 28 2021     ANIONGAP 6 2023     ANIONGAP 3 2021     GLC 95 2023      (H) 2021     BUN 13 2023     BUN 16 2021     CR 0.56 2023     CR 0.80 2021     GFRESTIMATED 89 2023     GFRESTIMATED 68 2021     GFRESTBLACK 79 2021     SHERYL 8.9 2023     SHERYL 8.8 2021          PROCEDURES & FURTHER ASSESSMENTS:      [facility]2023          Echocardiogram:  2023  Left ventricular function is decreased. The ejection fraction is 45-50%  (mildly reduced).     Global right ventricular function is normal.     Moderate to severe aortic stenosis is present. The peak aortic velocity is 3.4  m/sec. The mean gradient across the aortic valve is 32 mmHg. Valve area is 0.6  cm2. Stroke volume is reduced - consider low-flow low-gradient severe aortic  stenosis.     Pulmonary artery systolic pressure is normal. The estimated mean right atrial  pressure is  normal.     Moderate dilatation of the aorta is present. Ascending aorta 5.1 cm. Sinuses  of Valsalva 4.1 cm.     No pericardial effusion is present.      IMPRESSION:     1.  See below for TAVR related aortic annular and vascular  measurements.   2.  No acute aortic dissection, intramural hematoma or contained  rupture noted.  3.  Please review detailed radiology report, to follow separately, for  incidental non-cardiovascular findings.     FINDINGS:     1.  Moderately calcified bicuspid aortic valve. There is a raphe  between the left and right cusps (Amanda type 1 R-L.) Aortic valve  calcium score is 641 and the aortic valve area by CT planimetry is  1.38 cm2; both of these parameters are consistent with moderate aortic  stenosis. The LVOT is not calcified. The ascending aorta is not  calcified, aortic arch is  mildly calcified, the descending thoracic  aorta is mildly calcified. There is mild mitral annular calcification.  2.  There is a calcified raphe between the left and right coronary  cusps. The left main-annulus distance is 7.9 mm. The RCA-annulus  distance is 9.6 mm.  3.  Severely dilated ascending aorta measuring up to 52.2 mm.  4.  Mildly dilated aortic root measuring up to 41.5 mm (cusp to  commissure.)   5.  The aortic arch and all arch vessels are severely tortuous. There  is also focal kinking in the mid-descending aorta.   6.  There are mild coronary calcifications.   7.  The arch vessel branching pattern is normal.   8.  The suprarenal abdominal aorta is moderately calcified; infrarenal  abdominal aorta is severely calcified  9.  Widely patent origins of celiac trunk, superior mesenteric artery  and inferior mesenteric artery.  There is an accessory right renal  artery. All renal arteries have widely patent origins.   10.  There is no acute aortic pathology, such as dissection,  intramural hematoma, or contained rupture.      MEASUREMENTS:   Representative dimensions of the thoracoabdominal aorta  are as  follows:     1. AORTIC ANNULUS MEASUREMENTS:     1.  The average aortic annulus diameter is 25.7 mm, (long diameter is  28.2 mm and short diameter is 24.6 mm)  2.  Aortic annulus area is 5.17 cm2  3.  Aortic annulus perimeter is 81.8 mm  4.  Suggested 3-cusp coaxial angle for aortic valve is (RAO8 , CAU 5)  and alternate A-P coaxial angle is (LAO0 , CRA20 ), these  angles will  vary depending upon the patient's body position  5.  Aortic root angle is 73.9d degrees  6.  Left main - Annulus distance: 7.9 mm, RCA - Annulus distance: 9.6  mm     2. LVOT MEASUREMENTS:     1.  The average LVOT diameter (measured 4 mm below annular plane) is  27.2 mm  2.  LVOT area is 5.82 cm2  3.  LVOT perimeter is 87.4 mm     3. AORTA MEASUREMENTS     1.  The aortic root at the sinuses of Valsalva (left cusp, right cusp,  non cusp): 39.1 mm x  39.6 mm x 41.5 mm  2.  The sinotubular junction:  40.4 mm x 36.2 mm. The sinotubular  junction is partially effaced.  3.  Sinotubular junction height: 13.0 mm x 17.1 mm  4.  Proximal ascending aorta: 52.2 mm x 49.9 mm  5.  Distal abdominal aorta proximal to the bifurcation: 15.7 mm x 14.8  mm  6.  Innominate artery 3 cm from ostium: 7.4 mm x 6.0 mm. The  innominate artery is severely tortuous.   7.  Left common carotid artery (minimal proximal dimension, occurs at  an area of focal kinking in a tortuous segment): 8.0 mm x 4.8 mm.The  left common carotid artery is severely tortuous.      4. ILIOFEMORAL MEASUREMENTS:     RIGHT:  1.  Right common iliac artery: 11.2 mm x 10.1 mm   2.  Right external iliac artery: 8.5 mm x 8.3 mm   3.  Right common femoral artery: 9.2 mm x 8.5 mm   4.  Tortuosity: severe   5.  Calcification: mild      LEFT:  1.  Left common iliac artery: 12.8 mm x  11.4 mm  2.  Left external iliac artery: 9.3 mm x 8.8 mm  3.  Left common femoral artery: 9.2 mm x 8.7 mm  4.  Tortuosity: severe   5.  Calcification: mild      5. SUBCLAVIAN MEASUREMENTS:     RIGHT:  1. Minimal  luminal diameter: 6.0 mm x  4.3 mm. This occurs at an area  of focal kinking in a tortuous segment.   2. Tortuosity: severe   3. Calcification: mild      LEFT:  1. Minimal luminal diameter: 5.6 mm x  5.5 mm  2. Tortuosity: severe   3. Calcification: mild      OTHER FINDINGS:   1.  Please review radiology review for incidental non-cardiovascular  findings including lungs, abdominal organs, bone, soft tissue, nodes  to follow separately     SCOTT SOLORZANO MD         Coronary Angiogram and Right Heart Catheterization:   Conclusion          Right sided filling pressures are normal.    Normal PA pressures.    Left sided filling pressures are normal.    Left ventricular filling pressures are normal.    Normal cardiac output level.     Extreme ortuosity of R subclavian requiring a Destinathion catheter.  Normal coronary angiogram.  Normal right heart catheterizations.  Severe aortic stenosis, with a mean gradient of 37 mmHg , aortic valve area 0.7 cm2 in favor of severe aortic stenosis.        PFTs: pending   Carotid Ultrasound: pending         STS RISK SCORE: 2.5%  FRAILTY SCORE: 2/5   NYHA CLASS: II      CLINICAL IMPRESSION:      Cydney Christiansen is a 87 year old female with symptomatic low flow low gradient severe aortic stenosis with an overall surgical mortality risk estimation of 2.5% based on the STS score. She does have an ascending aortic aneurysm measured at 5.1 cm that will need to be taken into consideration when decided TAVR vs SAVR; however, appears to be slow growing as it was 4.8 cm in 2015.   Given her age, TAVR would be preferred. However, with the aortic aneurysm measured at 5.2 cm, she could be a candidate for surgical AVR and repair of aortic aneurysm.   However, with low coronary height she is not a candidate for TAVR. I discussed the risks and benefits of aortic valve replacement and aortic aneurysm repair including the risks of death, bleeding, stroke, infection, renal failure and arrhythmias  with her and her sons. She understands and is willing to think about this and will let us know.

## 2023-03-27 DIAGNOSIS — I99.8 OTHER DISORDER OF CIRCULATORY SYSTEM: ICD-10-CM

## 2023-03-27 DIAGNOSIS — R79.9 ABNORMAL FINDING OF BLOOD CHEMISTRY, UNSPECIFIED: ICD-10-CM

## 2023-03-27 DIAGNOSIS — Z01.810 PRE-OPERATIVE CARDIOVASCULAR EXAMINATION: ICD-10-CM

## 2023-03-27 DIAGNOSIS — R09.89 OTHER SPECIFIED SYMPTOMS AND SIGNS INVOLVING THE CIRCULATORY AND RESPIRATORY SYSTEMS: ICD-10-CM

## 2023-03-27 DIAGNOSIS — I35.0 SEVERE AORTIC STENOSIS: Primary | ICD-10-CM

## 2023-03-30 ENCOUNTER — TELEPHONE (OUTPATIENT)
Dept: CARDIOLOGY | Facility: CLINIC | Age: 88
End: 2023-03-30
Payer: COMMERCIAL

## 2023-03-30 NOTE — TELEPHONE ENCOUNTER
Spoke with pt and went over pre op appt schedule. Pt aware of location and times and agreed to the plan

## 2023-03-31 ENCOUNTER — TELEPHONE (OUTPATIENT)
Dept: CARDIOLOGY | Facility: CLINIC | Age: 88
End: 2023-03-31
Payer: COMMERCIAL

## 2023-03-31 NOTE — TELEPHONE ENCOUNTER
FUTURE VISIT INFORMATION        SURGERY INFORMATION:    Date: 4/25/23    Location: [facility] or    Surgeon:  Gm Solis MD    Anesthesia Type:  general    Procedure: AORTIC VALVE REPLACEMENT ASCENDING AORTA  ANEURYSM REPAIR AND ASSOCIATED PROCEDURES    Consult: ov 3/13/23     RECORDS REQUESTED FROM:         Primary Care Provider: [facility]     Pertinent Medical History: hypertension     Most recent EKG+ Tracing: 3/27/23     Most recent ECHO: 1/18/23     Most recent PFT's: 3/1/23

## 2023-04-12 LAB
ABO/RH(D): NORMAL
ANTIBODY SCREEN: NEGATIVE
SPECIMEN EXPIRATION DATE: NORMAL

## 2023-04-13 ENCOUNTER — LAB (OUTPATIENT)
Dept: LAB | Facility: CLINIC | Age: 88
End: 2023-04-13
Payer: COMMERCIAL

## 2023-04-13 ENCOUNTER — OFFICE VISIT (OUTPATIENT)
Dept: SURGERY | Facility: CLINIC | Age: 88
End: 2023-04-13
Payer: COMMERCIAL

## 2023-04-13 ENCOUNTER — PRE VISIT (OUTPATIENT)
Dept: SURGERY | Facility: CLINIC | Age: 88
End: 2023-04-13

## 2023-04-13 ENCOUNTER — ANCILLARY PROCEDURE (OUTPATIENT)
Dept: ULTRASOUND IMAGING | Facility: CLINIC | Age: 88
End: 2023-04-13
Attending: SURGERY
Payer: COMMERCIAL

## 2023-04-13 ENCOUNTER — ANCILLARY PROCEDURE (OUTPATIENT)
Dept: GENERAL RADIOLOGY | Facility: CLINIC | Age: 88
End: 2023-04-13
Attending: SURGERY
Payer: COMMERCIAL

## 2023-04-13 ENCOUNTER — ANESTHESIA EVENT (OUTPATIENT)
Dept: SURGERY | Facility: CLINIC | Age: 88
DRG: 219 | End: 2023-04-13
Payer: COMMERCIAL

## 2023-04-13 VITALS
HEIGHT: 59 IN | OXYGEN SATURATION: 98 % | RESPIRATION RATE: 16 BRPM | DIASTOLIC BLOOD PRESSURE: 94 MMHG | BODY MASS INDEX: 28.32 KG/M2 | SYSTOLIC BLOOD PRESSURE: 145 MMHG | HEART RATE: 67 BPM | WEIGHT: 140.5 LBS | TEMPERATURE: 98.1 F

## 2023-04-13 DIAGNOSIS — R09.89 OTHER SPECIFIED SYMPTOMS AND SIGNS INVOLVING THE CIRCULATORY AND RESPIRATORY SYSTEMS: ICD-10-CM

## 2023-04-13 DIAGNOSIS — I35.0 SEVERE AORTIC STENOSIS: ICD-10-CM

## 2023-04-13 DIAGNOSIS — I35.0 AORTIC VALVE STENOSIS, ETIOLOGY OF CARDIAC VALVE DISEASE UNSPECIFIED: ICD-10-CM

## 2023-04-13 DIAGNOSIS — Z01.810 PRE-OPERATIVE CARDIOVASCULAR EXAMINATION: ICD-10-CM

## 2023-04-13 DIAGNOSIS — Z01.818 PREOP EXAMINATION: Primary | ICD-10-CM

## 2023-04-13 DIAGNOSIS — R79.9 ABNORMAL FINDING OF BLOOD CHEMISTRY, UNSPECIFIED: ICD-10-CM

## 2023-04-13 DIAGNOSIS — I99.8 OTHER DISORDER OF CIRCULATORY SYSTEM: ICD-10-CM

## 2023-04-13 DIAGNOSIS — I71.21 ANEURYSM OF ASCENDING AORTA WITHOUT RUPTURE (H): ICD-10-CM

## 2023-04-13 LAB
ALBUMIN SERPL BCG-MCNC: 4.2 G/DL (ref 3.5–5.2)
ALBUMIN UR-MCNC: NEGATIVE MG/DL
ALP SERPL-CCNC: 52 U/L (ref 35–104)
ALT SERPL W P-5'-P-CCNC: 13 U/L (ref 10–35)
ANION GAP SERPL CALCULATED.3IONS-SCNC: 8 MMOL/L (ref 7–15)
APPEARANCE UR: CLEAR
APTT PPP: 26 SECONDS (ref 22–38)
AST SERPL W P-5'-P-CCNC: 26 U/L (ref 10–35)
BACTERIA #/AREA URNS HPF: ABNORMAL /HPF
BILIRUB SERPL-MCNC: 0.4 MG/DL
BILIRUB UR QL STRIP: NEGATIVE
BUN SERPL-MCNC: 16 MG/DL (ref 8–23)
CALCIUM SERPL-MCNC: 9.2 MG/DL (ref 8.8–10.2)
CHLORIDE SERPL-SCNC: 102 MMOL/L (ref 98–107)
COLOR UR AUTO: ABNORMAL
CREAT SERPL-MCNC: 0.63 MG/DL (ref 0.51–0.95)
DEPRECATED HCO3 PLAS-SCNC: 28 MMOL/L (ref 22–29)
ERYTHROCYTE [DISTWIDTH] IN BLOOD BY AUTOMATED COUNT: 13.2 % (ref 10–15)
GFR SERPL CREATININE-BSD FRML MDRD: 86 ML/MIN/1.73M2
GLUCOSE SERPL-MCNC: 98 MG/DL (ref 70–99)
GLUCOSE UR STRIP-MCNC: NEGATIVE MG/DL
HBA1C MFR BLD: 6 %
HCT VFR BLD AUTO: 38.2 % (ref 35–47)
HGB BLD-MCNC: 12.2 G/DL (ref 11.7–15.7)
HGB UR QL STRIP: NEGATIVE
INR PPP: 1.11 (ref 0.85–1.15)
KETONES UR STRIP-MCNC: NEGATIVE MG/DL
LEUKOCYTE ESTERASE UR QL STRIP: ABNORMAL
MCH RBC QN AUTO: 29.8 PG (ref 26.5–33)
MCHC RBC AUTO-ENTMCNC: 31.9 G/DL (ref 31.5–36.5)
MCV RBC AUTO: 93 FL (ref 78–100)
MUCOUS THREADS #/AREA URNS LPF: PRESENT /LPF
NITRATE UR QL: NEGATIVE
PH UR STRIP: 7 [PH] (ref 5–7)
PLATELET # BLD AUTO: 210 10E3/UL (ref 150–450)
POTASSIUM SERPL-SCNC: 4.3 MMOL/L (ref 3.4–5.3)
PREALB SERPL IA-MCNC: 27 MG/DL (ref 15–45)
PROT SERPL-MCNC: 7 G/DL (ref 6.4–8.3)
RBC # BLD AUTO: 4.1 10E6/UL (ref 3.8–5.2)
RBC URINE: 3 /HPF
SODIUM SERPL-SCNC: 138 MMOL/L (ref 136–145)
SP GR UR STRIP: 1.01 (ref 1–1.03)
SQUAMOUS EPITHELIAL: 3 /HPF
TRANSITIONAL EPI: 1 /HPF
UROBILINOGEN UR STRIP-MCNC: NORMAL MG/DL
WBC # BLD AUTO: 5.1 10E3/UL (ref 4–11)
WBC URINE: 12 /HPF

## 2023-04-13 PROCEDURE — 83036 HEMOGLOBIN GLYCOSYLATED A1C: CPT | Mod: 90 | Performed by: PATHOLOGY

## 2023-04-13 PROCEDURE — 85027 COMPLETE CBC AUTOMATED: CPT | Performed by: PATHOLOGY

## 2023-04-13 PROCEDURE — 99000 SPECIMEN HANDLING OFFICE-LAB: CPT | Performed by: PATHOLOGY

## 2023-04-13 PROCEDURE — 85730 THROMBOPLASTIN TIME PARTIAL: CPT | Performed by: PATHOLOGY

## 2023-04-13 PROCEDURE — 93880 EXTRACRANIAL BILAT STUDY: CPT | Mod: GC | Performed by: STUDENT IN AN ORGANIZED HEALTH CARE EDUCATION/TRAINING PROGRAM

## 2023-04-13 PROCEDURE — 85610 PROTHROMBIN TIME: CPT | Performed by: PATHOLOGY

## 2023-04-13 PROCEDURE — 81001 URINALYSIS AUTO W/SCOPE: CPT | Performed by: PATHOLOGY

## 2023-04-13 PROCEDURE — 84134 ASSAY OF PREALBUMIN: CPT | Mod: 90 | Performed by: PATHOLOGY

## 2023-04-13 PROCEDURE — 86900 BLOOD TYPING SEROLOGIC ABO: CPT | Mod: 90 | Performed by: PATHOLOGY

## 2023-04-13 PROCEDURE — 36415 COLL VENOUS BLD VENIPUNCTURE: CPT | Performed by: PATHOLOGY

## 2023-04-13 PROCEDURE — 80053 COMPREHEN METABOLIC PANEL: CPT | Performed by: PATHOLOGY

## 2023-04-13 PROCEDURE — 86850 RBC ANTIBODY SCREEN: CPT | Mod: 90 | Performed by: PATHOLOGY

## 2023-04-13 PROCEDURE — 71046 X-RAY EXAM CHEST 2 VIEWS: CPT | Mod: GC | Performed by: RADIOLOGY

## 2023-04-13 PROCEDURE — 99205 OFFICE O/P NEW HI 60 MIN: CPT

## 2023-04-13 PROCEDURE — 87086 URINE CULTURE/COLONY COUNT: CPT | Mod: 90 | Performed by: PATHOLOGY

## 2023-04-13 PROCEDURE — 86901 BLOOD TYPING SEROLOGIC RH(D): CPT | Mod: 90 | Performed by: PATHOLOGY

## 2023-04-13 ASSESSMENT — ENCOUNTER SYMPTOMS
SEIZURES: 0
ORTHOPNEA: 0

## 2023-04-13 ASSESSMENT — PAIN SCALES - GENERAL: PAINLEVEL: NO PAIN (0)

## 2023-04-13 ASSESSMENT — COPD QUESTIONNAIRES: COPD: 0

## 2023-04-13 NOTE — PATIENT INSTRUCTIONS
Preparing for Your Surgery      Name:  Cydney Christiansen   MRN:  [id number]   :  1935   Today's Date:  2023       Arriving for surgery:  Surgery date: 23  Arrival time:  (To be determined, you will receive a call from the surgical team. Usually you will need to arrive at least 2.5 hours before your surgery.)     Surgeries and procedures: Adult patients can have 2 visitors all through the surgery process.     Visiting hours: 8 a.m. to 8:30 p.m.     Hospital: Adult patients and children under age 18 can have 4 visitor at a time     No visitors under the age of 5 are allowed for hospital patients.  Double occupancy rooms: Patients can have only two visitors at a time.     Patients with disabilities: Can have a support person with them (family member, service provider     Or someone well informed about their needs) plus the allowed number of visitors     Patients confirmed or suspected to have symptoms of COVID 19 or flu:     No visitors allowed for adult patients.   Children (under age 18) can have 1 named visitor.     People who are sick or showing symptoms of COVID 19 or flu:    Are not allowed to visit patients--we can only make exceptions in special situations.       Please follow these guidelines for your visit:   Arrive wearing a mask over your mouth and nose; we will give you a medical mask to wear    If you arrive wearing a cloth mask.   Keep it on during your entire visit, even when in patient's room.   If you don't wear a mask we'll ask you to leave.     Clean your hands with alcohol hand . Do this when you arrive at and leave the building and patient room,    And again after you touch your mask or anything in the room.     You can t visit if you have a fever, cough, shortness of breath, muscle aches, headaches, sore throat    Or diarrhea      Stay 6 feet away from others during your visit and between visits     Go directly to and from the room you are visiting.     Stay in the  patient s room during your visit. Limit going to other places in the hospital as much as possible     Leave bags and jackets at home or in the car.     For everyone s health, please don t come and go during your visit. That includes for smoking   during your visit.     Please come to:     [facility] [address]  [address] [place]  [address]    -   Parking is available in the Patient Visitor Ramp on [address] and [facility].     -   When entering the hospital you will be asked COVID screening questions, you will then be directed to Registration.  Registration will direct you to the 3rd floor Surgery waiting room.     -   Please ask if you need an escort or a wheelchair to the Surgery Waiting Room.  Preop number- [phone] ?     What can I eat or drink?  -  You may eat and drink normally up to 8 hours prior to arrival time.   -  You may have clear liquids until 2 hours prior to arrival time.     Examples of clear liquids:  Water  Clear broth  Juices (apple, white grape, white cranberry  and cider) without pulp  Noncarbonated, powder based beverages  (lemonade and Ryder-Aid)  Sodas (Sprite, 7-Up, ginger ale and seltzer)  Coffee or tea (without milk or cream)  Gatorade    -  No Alcohol for at least 24 hours before surgery.     Which medicines can I take?    Hold Aspirin for 7 days before surgery.   Hold Multivitamins for 7 days before surgery. (Vitamin B12, Vitamin D)  Hold Supplements for 10 days before surgery. (Co-Q-10,  Ferosul (Iron))  Hold Ibuprofen (Advil, Motrin) for 10 day(s) before surgery--unless otherwise directed by surgeon.  Hold Naproxen (Aleve) for 4 days before surgery.    -  DO NOT take these medications the day of surgery:  Fosamax, Losartan (Cozaar),     -  PLEASE TAKE these medications the day of surgery:  Eye drops Cosopt,     How do I prepare myself?  - Please take 2 showers (one the night prior to surgery and one the morning of  surgery) using Scrubcare or Hibiclens soap.    Use this soap only from the neck to your toes.     Leave the soap on your skin for one minute--then rinse thoroughly.      You may use your own shampoo and conditioner. No other hair products.   - Please remove all jewelry and body piercings.  - No lotions, deodorants or fragrance.  - No makeup or fingernail polish.   - Bring your ID and insurance card.    -If you have a Deep Brain Stimulator, Spinal Cord Stimulator, or any Neuro Stimulator device---you must bring the remote control to the hospital.      ALL PATIENTS GOING HOME THE SAME DAY OF SURGERY ARE REQUIRED TO HAVE A RESPONSIBLE ADULT TO DRIVE AND BE IN ATTENDANCE WITH THEM FOR 24 HOURS FOLLOWING SURGERY.    Covid testing policy as of 12/06/2022  Your surgeon will notify and schedule you for a COVID test if one is needed before surgery--please direct any questions or COVID symptoms to your surgeon      Questions or Concerns:    - For any questions regarding the day of surgery or your hospital stay, please contact the Pre Admission Nursing Office at [phone].       - If you have health changes between today and your surgery, please call your surgeon.       - For questions after surgery, please call your surgeons office.

## 2023-04-13 NOTE — H&P
Pre-Operative H & P     CC:  Preoperative exam to assess for increased cardiopulmonary risk while undergoing surgery and anesthesia.    Date of Encounter: 4/13/2023  Primary Care Physician:  Estrellita Hernandez     Reason for visit:   Encounter Diagnoses   Name Primary?     Preop examination Yes     Aortic valve stenosis, etiology of cardiac valve disease unspecified      Aneurysm of ascending aorta without rupture (H)        HPI  Cydney Christiansen is a 87 year old female who presents for pre-operative H & P in preparation for  Procedure Information     Case: [id number] Date: 04/25/23    Procedures:       AORTIC VALVE REPLACEMENT (Chest)      ASCENDING AORTA  ANEURYSM REPAIR AND ASSOCIATED PROCEDURES (Chest)    Anesthesia type: General    Diagnosis:       AS (aortic stenosis) [I35.0]      Aneurysm (H) [I72.9]    Pre-op diagnosis:       AS (aortic stenosis) [I35.0]      Aneurysm (H) [I72.9]    Location:  OR    Providers: Gm Solis MD          Cydney Christiansen is a 88 y/o female with a PMH significant for HTN, HLD, bilateral glaucoma, scoliosis, compression fracture of thoracic vertebrae, osteoporosis, history of bladder cancer,  invasive ductal carcinoma of left breast s/p lumpectomy (2015), and multiple other skin cancer sites s/p removal who has severe aortic stenosis and an ascending aortic aneurysm. She has noticed increasing fatigue and shortness of breath with exertion over the past year. Her sons have also noticed that she doesn't have as much energy. She consulted with Dr. Solis on 3/13/23 and the above surgery was recommended for further management.     History is obtained from the patient and chart review    Hx of abnormal bleeding or anti-platelet use: None.    Menstrual history: No LMP recorded (lmp unknown). Patient is postmenopausal.:     Past Medical History  Past Medical History:   Diagnosis Date     Actinic keratosis      Basal cell cancer 02/2011    bcc of the L back.     Basal cell carcinoma 04' ,  06'     Basal cell carcinoma 06/2011    L neck     Breast cancer (H)      Cataract      Colon polyps     Precancer     Glaucoma (increased eye pressure)      Heart murmur      HLD (hyperlipidemia)      Hypertension goal BP (blood pressure) < 140/90 12/19/2013     Invasive ductal carcinoma of breast (H) 6/2015    left     Osteoporosis      Scoliosis      Skin cancer      Skin cancer 05/2013    sccis R cheek     Squamous cell carcinoma 09/2011    R upper back     Squamous cell carcinoma 10/2012    R dorsal hand     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 7/13    left leg     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       Past Surgical History  Past Surgical History:   Procedure Laterality Date     BIOPSY BREAST       CATARACT IOL, RT/LT       COLONOSCOPY  5/2008, 5/13, 6/14, 6/17    Q 3 years for advanced adenomatous polyp     CV CORONARY ANGIOGRAM N/A 2/17/2023    Procedure: Coronary Angiogram [[id number]];  Surgeon: James Jo MD;  Location: [facility]     CV LEFT HEART CATH N/A 2/17/2023    Procedure: Left Heart Cath;  Surgeon: James Jo MD;  Location: [facility]     CV RIGHT HEART CATH MEASUREMENTS RECORDED N/A 2/17/2023    Procedure: Right Heart Cath;  Surgeon: James Jo MD;  Location: [facility]     CYSTOSCOPY  12/31/2008     D & C       GENITOURINARY SURGERY      TURBT     HC REMOVAL GALLBLADDER      open pan     HC TRABECULOPLASTY BY LASER SURGERY Left 04/18/2007    SLT #1 OS (inf 180)     HC TRABECULOPLASTY BY LASER SURGERY Right 05/04/2011    SLT #1 OD (inf 180?)     HC TRABECULOPLASTY BY LASER SURGERY Left 03/17/2015    SLT #2 OS (sup 180)     HC TRABECULOPLASTY BY LASER SURGERY Right 06/23/2015    SLT #2 OD (sup 180?)     LASER ARGON TREATMENT      SLT left eye x 2     LUMPECTOMY BREAST       LUMPECTOMY BREAST WITH SENTINEL NODE,  COMBINED Left 07/29/2015    Procedure: COMBINED LUMPECTOMY BREAST WITH SENTINEL NODE;  Surgeon: Ifeanyi Sunshine MD;  Location:  OR     PHACOEMULSIFICATION CLEAR CORNEA WITH STANDARD INTRAOCULAR LENS IMPLANT Left 12/08/2017    Procedure: PHACOEMULSIFICATION CLEAR CORNEA WITH STANDARD INTRAOCULAR LENS IMPLANT;   COMPLEX LEFT PHACOEMULSIFICATION CLEAR CORNEA WITH STANDARD INTRAOCULAR LENS IMPLANT ;  Surgeon: Kvng Garcia MD;  Location:  [facility]     PHACOEMULSIFICATION CLEAR CORNEA WITH STANDARD INTRAOCULAR LENS IMPLANT Right 10/19/2020    Procedure: RIGHT COMPLEX PHACOEMULSIFICATION, CATARACT, WITH INTRAOCULAR LENS IMPLANT ;  Surgeon: Kvng Garcia MD;  Location: [facility] [place] [facility] OR     SURGICAL HISTORY OF -   09/2011    squamous cell CA excised from back     TUBAL LIGATION         Prior to Admission Medications  Current Outpatient Medications   Medication Sig Dispense Refill     alendronate (FOSAMAX) 70 MG tablet TAKE 1 TABLET BY MOUTH EVERY 7 DAYS (Patient taking differently: Take 70 mg by mouth every 7 days Monday) 12 tablet 11     co-enzyme Q-10 100 MG CAPS capsule Take 200 mg by mouth every morning       dorzolamide-timolol (COSOPT) 2-0.5 % ophthalmic solution Place 1 drop into both eyes 2 times daily 10 mL 4     ferrous sulfate (FEROSUL) 325 (65 Fe) MG tablet Take 1 tablet (325 mg) by mouth daily (with breakfast) 90 tablet 3     ibuprofen (ADVIL,MOTRIN) 200 MG tablet Take 200 mg by mouth every 4 hours as needed.       latanoprost (XALATAN) 0.005 % ophthalmic solution Place 1 drop into both eyes At Bedtime 7.5 mL 3     losartan (COZAAR) 50 MG tablet Take 1 tablet (50 mg) by mouth daily (Patient taking differently: Take 50 mg by mouth every morning) 90 tablet 3     Multiple Vitamins-Minerals (ONE DAILY ADULTS 50+) TABS        simvastatin (ZOCOR) 20 MG tablet Take 1 tablet (20 mg) by mouth At Bedtime 90 tablet 3     triamcinolone (KENALOG) 0.1 % external cream Apply twice daily for 2 weeks 30 g 0     albuterol  (PROAIR HFA/PROVENTIL HFA/VENTOLIN HFA) 108 (90 Base) MCG/ACT inhaler Inhale 1-2 puffs into the lungs every 4 hours as needed for shortness of breath or wheezing (Patient not taking: Reported on 2023) 6.7 g 0     cyanocobalamin (VITAMIN B-12) 500 MCG tablet Take 1 tablet (500 mcg) by mouth daily (Patient not taking: Reported on 2023) 150 tablet 3     polyethylene glycol (MIRALAX) 17 GM/Dose powder Take 17 g (1 capful) by mouth 2 times daily as needed for constipation (Patient not taking: Reported on 2023) 507 g 0     VITAMIN D-1000 MAX -1000 MG-UNIT OR TABS 1 daily (Patient not taking: Reported on 2023)         Allergies  Allergies   Allergen Reactions     Lisinopril Cough       Social History  Social History     Socioeconomic History     Marital status:      Spouse name: Not on file     Number of children: 2     Years of education: Not on file     Highest education level: Not on file   Occupational History     Employer: [employer]     Employer: RETIRED   Tobacco Use     Smoking status: Former     Packs/day: 0.50     Years: 20.00     Pack years: 10.00     Types: Cigarettes     Quit date: 1976     Years since quittin.2     Smokeless tobacco: Never   Vaping Use     Vaping status: Never Used     Passive vaping exposure: Yes   Substance and Sexual Activity     Alcohol use: Yes     Comment: Weekends 4 drink total per week     Drug use: No     Sexual activity: Never   Other Topics Concern     Parent/sibling w/ CABG, MI or angioplasty before 65F 55M? Not Asked   Social History Narrative     Not on file     Social Determinants of Health     Financial Resource Strain: Not on file   Food Insecurity: Not on file   Transportation Needs: Not on file   Physical Activity: Not on file   Stress: Not on file   Social Connections: Not on file   Intimate Partner Violence: Not on file   Housing Stability: Not on file       Family History  Family History   Problem Relation Age of Onset     Diabetes  Mother      Breast Cancer Mother      Eye Disorder Mother      Osteoporosis Mother      Glaucoma Mother      Macular Degeneration Mother      Prostate Cancer Father      Anesthesia Reaction Sister         PONV     Thyroid Disease Sister      Blood Disease Sister         lupus     Heart Disease Sister      Prostate Cancer Brother      Glaucoma Brother      Macular Degeneration Brother      Prostate Cancer Brother      Glaucoma Brother      Asthma Son      Glaucoma Other      Melanoma No family hx of      Skin Cancer No family hx of      Bleeding Disorder No family hx of      Venous thrombosis No family hx of        Review of Systems  The complete review of systems is negative other than noted in the HPI or here.   Anesthesia Evaluation   Pt has had prior anesthetic. Type: General.    History of anesthetic complications   slow to awaken.    ROS/MED HX  ENT/Pulmonary: Comment: - Bilateral glaucoma  - Hx of bilateral cataracts s/p removal    (-) asthma, COPD, sleep apnea and recent URI   Neurologic:  - neg neurologic ROS  (-) no seizures and no CVA   Cardiovascular: Comment: -Ascending aortic aneurysm (5.1cm)     (+) Dyslipidemia hypertension-----FREITAS. valvular problems/murmurs type: AS Previous cardiac testing   Echo: Date: 1/18/23 Results:  Interpretation Summary     Left ventricular function is decreased. The ejection fraction is 45-50%  (mildly reduced).     Global right ventricular function is normal.     Moderate to severe aortic stenosis is present. The peak aortic velocity is 3.4  m/sec. The mean gradient across the aortic valve is 32 mmHg. Valve area is 0.6  cm2. Stroke volume is reduced - consider low-flow low-gradient severe aortic  stenosis.     Pulmonary artery systolic pressure is normal. The estimated mean right atrial  pressure is normal.     Moderate dilatation of the aorta is present. Ascending aorta 5.1 cm. Sinuses  of Valsalva 4.1 cm.     No pericardial effusion is present.  Stress Test: Date:  Results:    ECG Reviewed: Date: 4/13/23 Results:  Sinus rhythm   Minimal voltage criteria for LVH, may be normal variant ( R in aVL )   Borderline ECG   Cath:  Date: 2/17/23 Results:  ? Right sided filling pressures are normal.  ? Normal PA pressures.  ? Left sided filling pressures are normal.  ? Left ventricular filling pressures are normal.  ? Normal cardiac output level.     Extreme ortuosity of R subclavian requiring a Destinathion catheter.  Normal coronary angiogram.  Normal right heart catheterizations.  Severe aortic stenosis, with a mean gradient of 37 mmHg , aortic valve area 0.7 cm2 in favor of severe aortic stenosis.   (-) CAD and orthopnea/PND   METS/Exercise Tolerance: 3 - Able to walk 1-2 blocks without stopping Comment: Reports she is able to walk continuously while pushing a cart x 30 minutes in the grocery store with SOB. She denies any chest discomfort with exertion. She is able to climb 1 flight of stairs continuously but does have SOB.    Hematologic:  - neg hematologic  ROS  (-) history of blood clots, anemia and history of blood transfusion   Musculoskeletal: Comment: - Scoliosis   - Hx of compression fracture of thoracic vertebrae       GI/Hepatic:  - neg GI/hepatic ROS  (-) GERD, inflamatory bowel disease and liver disease   Renal/Genitourinary:  - neg Renal ROS  (-) renal disease   Endo: Comment: -Osteoporosis    (-) Type II DM, thyroid disease and chronic steroid usage   Psychiatric/Substance Use:  - neg psychiatric ROS  (-) psychiatric history   Infectious Disease:  - neg infectious disease ROS  (-) Recent Fever   Malignancy:   (+) Malignancy, History of Skin, Breast and Other.Breast CA Remission status post Surgery.  Skin CA Remission status post.  Other CA Bladder cancer (1990's) Remission status post Surgery.    Other:             BP (!) 145/94 (BP Location: Right arm, Patient Position: Sitting, Cuff Size: Adult Regular)   Pulse 67   Temp 98.1  F (36.7  C) (Oral)   Resp 16   Ht  "1.509 m (4' 11.4\")   Wt 63.7 kg (140 lb 8 oz)   LMP  (LMP Unknown)   SpO2 98%   Breastfeeding No   BMI 28.00 kg/m      Physical Exam   Constitutional: Awake, alert, cooperative, no apparent distress, and appears stated age.  Eyes: Pupils equal, round and reactive to light, extra ocular muscles intact, sclera clear, conjunctiva normal.  HENT: Normocephalic, oral pharynx with moist mucus membranes. With partial upper denture.  No goiter appreciated.   Respiratory: Clear to auscultation bilaterally, no crackles or wheezing.  Cardiovascular: Regular rate and rhythm, S1 and S2 with systolic murmur noted.  Carotids +2, no bruits. Trace LE edema. Palpable pulses to radial and PT arteries.   GI: Normal bowel sounds, soft, non-distended, non-tender, no masses palpated, no hepatosplenomegaly.    Lymph/Hematologic: No cervical lymphadenopathy and no supraclavicular lymphadenopathy.  Genitourinary:  Deferred.   Skin: Warm and dry.     Musculoskeletal: Full ROM of neck. There is no redness, warmth, or swelling of the joints. Gross motor strength is normal.    Neurologic: Awake, alert, oriented to name, place and time. Cranial nerves II-XII are grossly intact. Gait is normal.   Neuropsychiatric: Calm, cooperative. Normal affect.     Prior Labs/Diagnostic Studies   All labs and imaging personally reviewed     Component      Latest Ref Rng 4/13/2023  11:39 AM   Sodium      136 - 145 mmol/L 138    Potassium      3.4 - 5.3 mmol/L 4.3    Chloride      98 - 107 mmol/L 102    Carbon Dioxide (CO2)      22 - 29 mmol/L 28    Anion Gap      7 - 15 mmol/L 8    Urea Nitrogen      8.0 - 23.0 mg/dL 16.0    Creatinine      0.51 - 0.95 mg/dL 0.63    Calcium      8.8 - 10.2 mg/dL 9.2    Glucose      70 - 99 mg/dL 98    Alkaline Phosphatase      35 - 104 U/L 52    AST      10 - 35 U/L 26    ALT      10 - 35 U/L 13    Protein Total      6.4 - 8.3 g/dL 7.0    Albumin      3.5 - 5.2 g/dL 4.2    Bilirubin Total      <=1.2 mg/dL 0.4    GFR " Estimate      >60 mL/min/1.73m2 86    WBC      4.0 - 11.0 10e3/uL 5.1    RBC Count      3.80 - 5.20 10e6/uL 4.10    Hemoglobin      11.7 - 15.7 g/dL 12.2    Hematocrit      35.0 - 47.0 % 38.2    MCV      78 - 100 fL 93    MCH      26.5 - 33.0 pg 29.8    MCHC      31.5 - 36.5 g/dL 31.9    RDW      10.0 - 15.0 % 13.2    Platelet Count      150 - 450 10e3/uL 210    Hemoglobin A1C      <5.7 % 6.0 (H)    INR      0.85 - 1.15  1.11    PTT      22 - 38 Seconds 26    Prealbumin      15 - 45 mg/dL 27       Legend:  (H) High      CXR 4/13/23:  Impression:   1. Clear lungs.  2. Aortic atherosclerosis.    EKG/ stress test - if available please see in ROS above         Latest Ref Rng & Units 3/1/2023     2:04 PM   PFT   FVC L 1.37     FEV1 L 1.11     FVC% % 72     FEV1% % 76           The patient's records and results personally reviewed by this provider.     Outside records reviewed from: Care Everywhere    LAB/DIAGNOSTIC STUDIES TODAY:  None.    Assessment      Cydney Christiansen is a 87 year old female seen as a PAC referral for risk assessment and optimization for anesthesia.    Plan/Recommendations  Pt will be optimized for the proposed procedure.  See below for details on the assessment, risk, and preoperative recommendations    NEUROLOGY  - No history of TIA, CVA or seizure  -Post Op delirium risk factors:  No risk identified    HEENT  - No current airway concerns.  Will need to be reassessed day of surgery.  Mallampati: III  TM: > 3  - With partial upper dentures  - Bilateral glaucoma    CARDIAC  - No history of CAD and Afib  - Hypertension managed with losartan. BP today is 145/94.  Hold losartan on DOS per Dr. Sousa.    - Hyperlipidemia  Continue simvastatin leading up to DOS.  - Ascending aortic aneurysm (5.1cm) and moderate to severe aortic stenosis - surgery planned as above.     - METS (Metabolic Equivalents)  Patient CANNOT perform 4 METS exercise without symptoms            Total Score: 1    Functional Capacity:  "Unable to complete 4 METS      RCRI-Very low risk: Class 1 0.4% complication rate            Total Score: -        Criteria with incomplete data:    RCRI: High Risk Surgery    RCRI: CAD    RCRI: CHF    RCRI: Cerebrovascular Disease     RCRI: Diabetes    RCRI: Elevated Creatinine      - This score is not calculated because of inadequate data       PULMONARY  NAIMA Low Risk            Total Score: 2    NAIMA: Hypertension    NAIMA: Over 50 ys old      - Denies asthma or inhaler use  - Tobacco History      History   Smoking Status     Former     Packs/day: 0.50     Years: 20.00     Types: Cigarettes     Quit date: 2/1/1976   Smokeless Tobacco     Never       GI  PONV High Risk  Total Score: 3           1 AN PONV: Pt is Female    1 AN PONV: Patient is not a current smoker    1 AN PONV: Intended Post Op Opioids        /RENAL  - Baseline Creatinine  0.66    ENDOCRINE    - BMI: Estimated body mass index is 28 kg/m  as calculated from the following:    Height as of this encounter: 1.509 m (4' 11.4\").    Weight as of this encounter: 63.7 kg (140 lb 8 oz).  Overweight (BMI 25.0-29.9)  - No history of Diabetes Mellitus   - Osteoporosis on Fosamax.   Hold Fosamax on DOS.      HEME  VTE Low Risk 0.26%            Total Score: 1    VTE: Greater than 59 yrs old      - No history of abnormal bleeding or antiplatelet use.    MSK  - Scoliosis with chronic low back pain. Consider careful positioning in the post-op period.   - Hx of compression fracture of thoracic vertebrae. She completed PT with improvement in pain.  Patient IS Frail            Total Score: 4    Frailty: Slower walking speed    Frailty: Decrease in strength    Frailty: Increased exhaustion    Frailty: Overall lack of energy      Frailty score 4/5. Surgery is scheduled in 12 days. Would recommend cardiac rehab after surgery per Dr. Solis's discretion.     MALIGNANCY  - History of bladder cancer in 1990's- s/p resection. Has followed-up with regular cystoscopies which have " all been normal.   - Left-sided breast cancer s/p lumpectomy (2015). Was on tamoxifen x 5 years. No chemo/radiation.  - Multiple SCC and BCC sites s/p removal. Following with dermatology.     Different anesthesia methods/types have been discussed with the patient, but they are aware that the final plan will be decided by the assigned anesthesia provider on the date of service.  Patient was discussed with Dr. Sousa     The patient is optimized for their procedure. AVS with information on surgery time/arrival time, meds and NPO status given by nursing staff. No further diagnostic testing indicated.      On the day of service:     Prep time: 20 minutes  Visit time: 20 minutes  Documentation time: 20 minutes  ------------------------------------------  Total time: 60 minutes      KRISTIE Quezada CNP  Preoperative Assessment Center  [facility]  [facility]  Phone: [phone]  Fax: [phone]

## 2023-04-14 DIAGNOSIS — I10 HYPERTENSION GOAL BP (BLOOD PRESSURE) < 140/90: ICD-10-CM

## 2023-04-14 LAB — BACTERIA UR CULT: NORMAL

## 2023-04-16 LAB
ATRIAL RATE - MUSE: 62 BPM
DIASTOLIC BLOOD PRESSURE - MUSE: NORMAL MMHG
INTERPRETATION ECG - MUSE: NORMAL
P AXIS - MUSE: 17 DEGREES
PR INTERVAL - MUSE: 182 MS
QRS DURATION - MUSE: 74 MS
QT - MUSE: 388 MS
QTC - MUSE: 393 MS
R AXIS - MUSE: -15 DEGREES
SYSTOLIC BLOOD PRESSURE - MUSE: NORMAL MMHG
T AXIS - MUSE: 26 DEGREES
VENTRICULAR RATE- MUSE: 62 BPM

## 2023-04-17 RX ORDER — LOSARTAN POTASSIUM 50 MG/1
TABLET ORAL
Qty: 90 TABLET | Refills: 0 | Status: ON HOLD | OUTPATIENT
Start: 2023-04-17 | End: 2023-05-15

## 2023-04-21 ENCOUNTER — LAB (OUTPATIENT)
Dept: LAB | Facility: CLINIC | Age: 88
End: 2023-04-21
Attending: SURGERY
Payer: COMMERCIAL

## 2023-04-21 DIAGNOSIS — I35.0 SEVERE AORTIC STENOSIS: ICD-10-CM

## 2023-04-21 DIAGNOSIS — Z01.810 PRE-OPERATIVE CARDIOVASCULAR EXAMINATION: ICD-10-CM

## 2023-04-21 PROCEDURE — U0005 INFEC AGEN DETEC AMPLI PROBE: HCPCS

## 2023-04-21 PROCEDURE — U0003 INFECTIOUS AGENT DETECTION BY NUCLEIC ACID (DNA OR RNA); SEVERE ACUTE RESPIRATORY SYNDROME CORONAVIRUS 2 (SARS-COV-2) (CORONAVIRUS DISEASE [COVID-19]), AMPLIFIED PROBE TECHNIQUE, MAKING USE OF HIGH THROUGHPUT TECHNOLOGIES AS DESCRIBED BY CMS-2020-01-R: HCPCS

## 2023-04-22 LAB — SARS-COV-2 RNA RESP QL NAA+PROBE: NEGATIVE

## 2023-04-24 ASSESSMENT — ENCOUNTER SYMPTOMS
ORTHOPNEA: 0
SEIZURES: 0

## 2023-04-24 ASSESSMENT — COPD QUESTIONNAIRES: COPD: 0

## 2023-04-24 NOTE — TELEPHONE ENCOUNTER
Recommend refilling drops per Dr. Garcia's last visit note on 2/23/21: Continue Latanoprost (green top) at bedtime both eyes.  Patient has follow up scheduled 11/23/21 with Dr. Garcia.   108

## 2023-04-24 NOTE — ANESTHESIA PREPROCEDURE EVALUATION
Anesthesia Pre-Procedure Evaluation    Patient: Cydney Christiansen   MRN: [id number] : 1935        Procedure : Procedure(s):  AORTIC VALVE REPLACEMENT  ASCENDING AORTA  ANEURYSM REPAIR AND ASSOCIATED PROCEDURES          Past Medical History:   Diagnosis Date     Actinic keratosis      Basal cell cancer 2011    bcc of the L back.     Basal cell carcinoma ' , '     Basal cell carcinoma 2011    L neck     Breast cancer (H)      Cataract      Colon polyps     Precancer     Glaucoma (increased eye pressure)      Heart murmur      HLD (hyperlipidemia)      Hypertension goal BP (blood pressure) < 140/90 2013     Invasive ductal carcinoma of breast (H) 2015    left     Osteoporosis      Scoliosis      Skin cancer      Skin cancer 2013    sccis R cheek     Squamous cell carcinoma 2011    R upper back     Squamous cell carcinoma 10/2012    R dorsal hand     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     left leg     Transitional cell carcinoma of the bladder      Vertigo     takes meclizine prn when she ocassionally has bouts of vertigo      Past Surgical History:   Procedure Laterality Date     BIOPSY BREAST       CATARACT IOL, RT/LT       COLONOSCOPY  2008, , ,     Q 3 years for advanced adenomatous polyp     CV CORONARY ANGIOGRAM N/A 2023    Procedure: Coronary Angiogram [[id number]];  Surgeon: James Jo MD;  Location: [facility]     CV LEFT HEART CATH N/A 2023    Procedure: Left Heart Cath;  Surgeon: James Jo MD;  Location: [facility]     CV RIGHT HEART CATH MEASUREMENTS RECORDED N/A 2023    Procedure: Right Heart Cath;  Surgeon: James Jo MD;  [facility]     CYSTOSCOPY  2008     D & C       GENITOURINARY SURGERY      TURBT     HC REMOVAL GALLBLADDER      open pan     HC TRABECULOPLASTY BY LASER SURGERY Left 2007    SLT #1 OS (inf 180)     HC  TRABECULOPLASTY BY LASER SURGERY Right 2011    SLT #1 OD (inf 180?)     HC TRABECULOPLASTY BY LASER SURGERY Left 2015    SLT #2 OS (sup 180)     HC TRABECULOPLASTY BY LASER SURGERY Right 2015    SLT #2 OD (sup 180?)     LASER ARGON TREATMENT      SLT left eye x 2     LUMPECTOMY BREAST       LUMPECTOMY BREAST WITH SENTINEL NODE, COMBINED Left 2015    Procedure: COMBINED LUMPECTOMY BREAST WITH SENTINEL NODE;  Surgeon: Ifeanyi Sunshine MD;  Location: [facility]     PHACOEMULSIFICATION CLEAR CORNEA WITH STANDARD INTRAOCULAR LENS IMPLANT Left 2017    Procedure: PHACOEMULSIFICATION CLEAR CORNEA WITH STANDARD INTRAOCULAR LENS IMPLANT;   COMPLEX LEFT PHACOEMULSIFICATION CLEAR CORNEA WITH STANDARD INTRAOCULAR LENS IMPLANT ;  Surgeon: Kvng Garcia MD;  Location:  [facility]     PHACOEMULSIFICATION CLEAR CORNEA WITH STANDARD INTRAOCULAR LENS IMPLANT Right 10/19/2020    Procedure: RIGHT COMPLEX PHACOEMULSIFICATION, CATARACT, WITH INTRAOCULAR LENS IMPLANT ;  Surgeon: Kvng Garcia MD;  [place] [facility] [place]     SURGICAL HISTORY OF -   2011    squamous cell CA excised from back     TUBAL LIGATION        Allergies   Allergen Reactions     Lisinopril Cough      Social History     Tobacco Use     Smoking status: Former     Packs/day: 0.50     Years: 20.00     Pack years: 10.00     Types: Cigarettes     Quit date: 1976     Years since quittin.2     Smokeless tobacco: Never   Vaping Use     Vaping status: Never Used     Passive vaping exposure: Yes   Substance Use Topics     Alcohol use: Yes     Comment: Weekends 4 drink total per week      Wt Readings from Last 1 Encounters:   23 63.7 kg (140 lb 8 oz)        Anesthesia Evaluation   Pt has had prior anesthetic. Type: General.    History of anesthetic complications   slow to awaken.    ROS/MED HX  ENT/Pulmonary: Comment: - Bilateral glaucoma  - Hx of bilateral cataracts s/p removal    (-) asthma, COPD, sleep apnea and recent URI    Neurologic:  - neg neurologic ROS  (-) no seizures and no CVA   Cardiovascular: Comment: -Ascending aortic aneurysm (5.1cm)     (+) Dyslipidemia hypertension-----FREITAS. valvular problems/murmurs type: AS Previous cardiac testing   Echo: Date: 1/18/23 Results:  Interpretation Summary     Left ventricular function is decreased. The ejection fraction is 45-50%  (mildly reduced).     Global right ventricular function is normal.     Moderate to severe aortic stenosis is present. The peak aortic velocity is 3.4  m/sec. The mean gradient across the aortic valve is 32 mmHg. Valve area is 0.6  cm2. Stroke volume is reduced - consider low-flow low-gradient severe aortic  stenosis.     Pulmonary artery systolic pressure is normal. The estimated mean right atrial  pressure is normal.     Moderate dilatation of the aorta is present. Ascending aorta 5.1 cm. Sinuses  of Valsalva 4.1 cm.     No pericardial effusion is present.  Stress Test: Date: Results:    ECG Reviewed: Date: 4/13/23 Results:  Sinus rhythm   Minimal voltage criteria for LVH, may be normal variant ( R in aVL )   Borderline ECG   Cath:  Date: 2/17/23 Results:  ? Right sided filling pressures are normal.  ? Normal PA pressures.  ? Left sided filling pressures are normal.  ? Left ventricular filling pressures are normal.  ? Normal cardiac output level.     Extreme ortuosity of R subclavian requiring a Destinathion catheter.  Normal coronary angiogram.  Normal right heart catheterizations.  Severe aortic stenosis, with a mean gradient of 37 mmHg , aortic valve area 0.7 cm2 in favor of severe aortic stenosis.   (-) CAD and orthopnea/PND   METS/Exercise Tolerance: 3 - Able to walk 1-2 blocks without stopping Comment: Reports she is able to walk continuously while pushing a cart x 30 minutes in the grocery store with SOB. She denies any chest discomfort with exertion. She is able to climb 1 flight of stairs continuously but does have SOB.    Hematologic:  - neg  hematologic  ROS  (-) history of blood clots, anemia and history of blood transfusion   Musculoskeletal: Comment: - Scoliosis   - Hx of compression fracture of thoracic vertebrae       GI/Hepatic:  - neg GI/hepatic ROS  (-) GERD, inflamatory bowel disease and liver disease   Renal/Genitourinary:  - neg Renal ROS  (-) renal disease   Endo: Comment: -Osteoporosis    (-) Type II DM, thyroid disease and chronic steroid usage   Psychiatric/Substance Use:  - neg psychiatric ROS  (-) psychiatric history   Infectious Disease:  - neg infectious disease ROS  (-) Recent Fever   Malignancy:   (+) Malignancy, History of Skin, Breast and Other.Breast CA Remission status post Surgery.  Skin CA Remission status post.  Other CA Bladder cancer (1990's) Remission status post Surgery.    Other:            Physical Exam    Airway        Mallampati: III   TM distance: > 3 FB   Neck ROM: full   Mouth opening: > 3 cm    Respiratory Devices and Support         Dental       (+) Multiple crowns, permanant bridges      Cardiovascular          Rhythm and rate: regular and normal     Pulmonary   pulmonary exam normal                OUTSIDE LABS:  CBC:   Lab Results   Component Value Date    WBC 5.1 04/13/2023    WBC 4.4 02/17/2023    HGB 12.2 04/13/2023    HGB 12.1 02/17/2023    HCT 38.2 04/13/2023    HCT 40.4 02/17/2023     04/13/2023     02/17/2023     BMP:   Lab Results   Component Value Date     04/13/2023     (L) 02/17/2023    POTASSIUM 4.3 04/13/2023    POTASSIUM 4.3 02/17/2023    CHLORIDE 102 04/13/2023    CHLORIDE 100 02/17/2023    CO2 28 04/13/2023    CO2 27 02/17/2023    BUN 16.0 04/13/2023    BUN 12.8 02/17/2023    CR 0.63 04/13/2023    CR 0.66 02/17/2023    GLC 98 04/13/2023    GLC 99 02/17/2023     COAGS:   Lab Results   Component Value Date    PTT 26 04/13/2023    INR 1.11 04/13/2023     POC: No results found for: BGM, HCG, HCGS  HEPATIC:   Lab Results   Component Value Date    ALBUMIN 4.2 04/13/2023     PROTTOTAL 7.0 04/13/2023    ALT 13 04/13/2023    AST 26 04/13/2023    ALKPHOS 52 04/13/2023    BILITOTAL 0.4 04/13/2023     OTHER:   Lab Results   Component Value Date    A1C 6.0 (H) 04/13/2023    SHERYL 9.2 04/13/2023    PHOS 4.2 08/03/2022    MAG 2.6 (H) 10/17/2022    TSH 2.98 12/22/2016    T4 1.13 06/04/2010    SED 10 12/07/2021       Anesthesia Plan    ASA Status:  4   NPO Status:  NPO Appropriate    Anesthesia Type: General.     - Airway: ETT   Induction: Intravenous.      Techniques and Equipment:     - Lines/Monitors: Arterial Line, Central Line, PAC, CVP, BIS, NIRS, CAL            CAL Absolute Contra-indication: NONE            CAL Relative Contra-indication: NONE     - Blood: Blood in Room     Consents    Anesthesia Plan(s) and associated risks, benefits, and realistic alternatives discussed. Questions answered and patient/representative(s) expressed understanding.     - Discussed: Risks, Benefits and Alternatives for BOTH SEDATION and the PROCEDURE were discussed     - Discussed with:  Patient (son x2)      - Extended Intubation/Ventilatory Support Discussed: Yes.    Use of blood products discussed: Yes.     - Discussed with: Patient.     Postoperative Care    Pain management: IV analgesics, Peripheral nerve block (Continuous), Multi-modal analgesia.   PONV prophylaxis: Ondansetron (or other 5HT-3)     Comments:                Rob Gonzalez MD

## 2023-04-25 ENCOUNTER — APPOINTMENT (OUTPATIENT)
Dept: GENERAL RADIOLOGY | Facility: CLINIC | Age: 88
DRG: 219 | End: 2023-04-25
Attending: STUDENT IN AN ORGANIZED HEALTH CARE EDUCATION/TRAINING PROGRAM
Payer: COMMERCIAL

## 2023-04-25 ENCOUNTER — ANESTHESIA (OUTPATIENT)
Dept: SURGERY | Facility: CLINIC | Age: 88
DRG: 219 | End: 2023-04-25
Payer: COMMERCIAL

## 2023-04-25 ENCOUNTER — HOSPITAL ENCOUNTER (INPATIENT)
Facility: CLINIC | Age: 88
LOS: 20 days | Discharge: NURSING, REHAB OR LONG-TERM CARE | DRG: 219 | End: 2023-05-15
Attending: SURGERY | Admitting: SURGERY
Payer: COMMERCIAL

## 2023-04-25 ENCOUNTER — APPOINTMENT (OUTPATIENT)
Dept: GENERAL RADIOLOGY | Facility: CLINIC | Age: 88
DRG: 219 | End: 2023-04-25
Attending: PHYSICIAN ASSISTANT
Payer: COMMERCIAL

## 2023-04-25 DIAGNOSIS — I35.0 AORTIC VALVE STENOSIS: Primary | ICD-10-CM

## 2023-04-25 DIAGNOSIS — Z95.2 S/P AVR (AORTIC VALVE REPLACEMENT): ICD-10-CM

## 2023-04-25 DIAGNOSIS — Z86.79 S/P ASCENDING AORTIC ANEURYSM REPAIR: ICD-10-CM

## 2023-04-25 DIAGNOSIS — Z98.890 S/P ASCENDING AORTIC ANEURYSM REPAIR: ICD-10-CM

## 2023-04-25 LAB
ACT BLD: 131 SECONDS (ref 74–150)
ALBUMIN SERPL BCG-MCNC: 3.1 G/DL (ref 3.5–5.2)
ALLEN'S TEST: ABNORMAL
ALLEN'S TEST: ABNORMAL
ALLEN'S TEST: NORMAL
ALP SERPL-CCNC: 36 U/L (ref 35–104)
ALT SERPL W P-5'-P-CCNC: 59 U/L (ref 10–35)
ANION GAP SERPL CALCULATED.3IONS-SCNC: 12 MMOL/L (ref 7–15)
ANION GAP SERPL CALCULATED.3IONS-SCNC: 13 MMOL/L (ref 7–15)
APTT PPP: 45 SECONDS (ref 22–38)
APTT PPP: 78 SECONDS (ref 22–38)
APTT PPP: 95 SECONDS (ref 22–38)
AST SERPL W P-5'-P-CCNC: 120 U/L (ref 10–35)
BASE EXCESS BLDA CALC-SCNC: -0.2 MMOL/L (ref -9–1.8)
BASE EXCESS BLDA CALC-SCNC: -0.9 MMOL/L (ref -9.6–2)
BASE EXCESS BLDA CALC-SCNC: -1.2 MMOL/L (ref -9.6–2)
BASE EXCESS BLDA CALC-SCNC: -1.2 MMOL/L (ref -9.6–2)
BASE EXCESS BLDA CALC-SCNC: -1.4 MMOL/L (ref -9.6–2)
BASE EXCESS BLDA CALC-SCNC: -2.3 MMOL/L (ref -9–1.8)
BASE EXCESS BLDA CALC-SCNC: -4.1 MMOL/L (ref -9.6–2)
BASE EXCESS BLDA CALC-SCNC: -4.6 MMOL/L (ref -9–1.8)
BASE EXCESS BLDA CALC-SCNC: 0.6 MMOL/L (ref -9.6–2)
BASE EXCESS BLDA CALC-SCNC: 0.9 MMOL/L (ref -9.6–2)
BASE EXCESS BLDA CALC-SCNC: 1.2 MMOL/L (ref -9.6–2)
BASE EXCESS BLDA CALC-SCNC: 1.7 MMOL/L (ref -9.6–2)
BASE EXCESS BLDV CALC-SCNC: -0.3 MMOL/L (ref -7.7–1.9)
BASE EXCESS BLDV CALC-SCNC: -1.5 MMOL/L (ref -8.1–1.9)
BASE EXCESS BLDV CALC-SCNC: -1.7 MMOL/L (ref -7.7–1.9)
BASE EXCESS BLDV CALC-SCNC: 1.5 MMOL/L (ref -8.1–1.9)
BASOPHILS # BLD AUTO: 0 10E3/UL (ref 0–0.2)
BASOPHILS NFR BLD AUTO: 0 %
BILIRUB SERPL-MCNC: 1.1 MG/DL
BLD PROD TYP BPU: NORMAL
BLOOD COMPONENT TYPE: NORMAL
BUN SERPL-MCNC: 10.2 MG/DL (ref 8–23)
BUN SERPL-MCNC: 12 MG/DL (ref 8–23)
CA-I BLD-MCNC: 3.8 MG/DL (ref 4.4–5.2)
CA-I BLD-MCNC: 4 MG/DL (ref 4.4–5.2)
CA-I BLD-MCNC: 4.1 MG/DL (ref 4.4–5.2)
CA-I BLD-MCNC: 4.2 MG/DL (ref 4.4–5.2)
CA-I BLD-MCNC: 4.5 MG/DL (ref 4.4–5.2)
CA-I BLD-MCNC: 4.5 MG/DL (ref 4.4–5.2)
CA-I BLD-MCNC: 4.6 MG/DL (ref 4.4–5.2)
CA-I BLD-MCNC: 4.8 MG/DL (ref 4.4–5.2)
CA-I BLD-MCNC: 5.1 MG/DL (ref 4.4–5.2)
CA-I BLD-MCNC: 5.2 MG/DL (ref 4.4–5.2)
CALCIUM SERPL-MCNC: 8.6 MG/DL (ref 8.8–10.2)
CALCIUM SERPL-MCNC: 9 MG/DL (ref 8.8–10.2)
CF REDUC 30M P MA P HEP LENFR BLD TEG: 1.1 % (ref 0–8)
CF REDUC 60M P MA P HEPASE LENFR BLD TEG: 4.4 % (ref 0–15)
CFT P HPASE BLD TEG: 1.2 MINUTE (ref 1–3)
CHLORIDE SERPL-SCNC: 105 MMOL/L (ref 98–107)
CHLORIDE SERPL-SCNC: 106 MMOL/L (ref 98–107)
CI (COAGULATION INDEX)(Z): 1.6 (ref -3–3)
CLOT ANGLE P HPASE BLD TEG: 72 DEGREES (ref 53–72)
CLOT INIT KAOL IND TO POST HEP NEUT TRTO: 1 {RATIO}
CLOT INIT KAOL IND TO POST HEP NEUT TRTO: 1.1 {RATIO}
CLOT INIT KAOL IND TO POST HEP NEUT TRTO: 1.3 {RATIO}
CLOT INIT KAOL IND TO POST HEP NEUT TRTO: 1.5 {RATIO}
CLOT INIT KAOLIN IND BLD US: 109 SEC (ref 113–166)
CLOT INIT KAOLIN IND BLD US: 133 SEC (ref 113–166)
CLOT INIT KAOLIN IND BLD US: 197 SEC (ref 113–166)
CLOT INIT KAOLIN IND BLD US: 198 SEC (ref 113–166)
CLOT INIT KAOLIN IND P HEP NEUT BLD US: 114 SEC (ref 103–153)
CLOT INIT KAOLIN IND P HEP NEUT BLD US: 122 SEC (ref 103–153)
CLOT INIT KAOLIN IND P HEP NEUT BLD US: 131 SEC (ref 103–153)
CLOT INIT KAOLIN IND P HEP NEUT BLD US: 150 SEC (ref 103–153)
CLOT INIT P HPASE BLD TEG: 5.3 MINUTE (ref 5–10)
CLOT STIFF PLT CONT BLD CALC: 13 HPA (ref 11.9–29.8)
CLOT STIFF PLT CONT BLD CALC: 14.9 HPA (ref 11.9–29.8)
CLOT STIFF PLT CONT BLD CALC: 16.1 HPA (ref 11.9–29.8)
CLOT STIFF PLT CONT BLD CALC: 9.8 HPA (ref 11.9–29.8)
CLOT STIFF TF IND P HEP NEUT BLD US: 11.3 HPA (ref 13–33.2)
CLOT STIFF TF IND P HEP NEUT BLD US: 14.6 HPA (ref 13–33.2)
CLOT STIFF TF IND P HEP NEUT BLD US: 16.9 HPA (ref 13–33.2)
CLOT STIFF TF IND P HEP NEUT BLD US: 18.1 HPA (ref 13–33.2)
CLOT STIFF TF IND+IIB-IIIA INH P HEP NEU: 1.5 HPA (ref 1–3.7)
CLOT STIFF TF IND+IIB-IIIA INH P HEP NEU: 1.6 HPA (ref 1–3.7)
CLOT STIFF TF IND+IIB-IIIA INH P HEP NEU: 2 HPA (ref 1–3.7)
CLOT STIFF TF IND+IIB-IIIA INH P HEP NEU: 2 HPA (ref 1–3.7)
CLOT STRENGTH P HPASE BLD TEG: 9.1 KD/SC (ref 4.5–11)
CODING SYSTEM: NORMAL
CREAT SERPL-MCNC: 0.57 MG/DL (ref 0.51–0.95)
CREAT SERPL-MCNC: 0.64 MG/DL (ref 0.51–0.95)
CROSSMATCH: NORMAL
DEPRECATED HCO3 PLAS-SCNC: 19 MMOL/L (ref 22–29)
DEPRECATED HCO3 PLAS-SCNC: 20 MMOL/L (ref 22–29)
EOSINOPHIL # BLD AUTO: 0 10E3/UL (ref 0–0.7)
EOSINOPHIL NFR BLD AUTO: 0 %
ERYTHROCYTE [DISTWIDTH] IN BLOOD BY AUTOMATED COUNT: 14.5 % (ref 10–15)
ERYTHROCYTE [DISTWIDTH] IN BLOOD BY AUTOMATED COUNT: 14.6 % (ref 10–15)
ERYTHROCYTE [DISTWIDTH] IN BLOOD BY AUTOMATED COUNT: 14.9 % (ref 10–15)
FIBRINOGEN PPP-MCNC: 149 MG/DL (ref 170–490)
FIBRINOGEN PPP-MCNC: 212 MG/DL (ref 170–490)
FIBRINOGEN PPP-MCNC: 216 MG/DL (ref 170–490)
GFR SERPL CREATININE-BSD FRML MDRD: 86 ML/MIN/1.73M2
GFR SERPL CREATININE-BSD FRML MDRD: 89 ML/MIN/1.73M2
GLUCOSE BLD-MCNC: 107 MG/DL (ref 70–99)
GLUCOSE BLD-MCNC: 120 MG/DL (ref 70–99)
GLUCOSE BLD-MCNC: 140 MG/DL (ref 70–99)
GLUCOSE BLD-MCNC: 141 MG/DL (ref 70–99)
GLUCOSE BLD-MCNC: 142 MG/DL (ref 70–99)
GLUCOSE BLD-MCNC: 143 MG/DL (ref 70–99)
GLUCOSE BLD-MCNC: 148 MG/DL (ref 70–99)
GLUCOSE BLD-MCNC: 171 MG/DL (ref 70–99)
GLUCOSE BLD-MCNC: 180 MG/DL (ref 70–99)
GLUCOSE BLD-MCNC: 192 MG/DL (ref 70–99)
GLUCOSE BLD-MCNC: 201 MG/DL (ref 70–99)
GLUCOSE BLDC GLUCOMTR-MCNC: 104 MG/DL (ref 70–99)
GLUCOSE BLDC GLUCOMTR-MCNC: 134 MG/DL (ref 70–99)
GLUCOSE BLDC GLUCOMTR-MCNC: 145 MG/DL (ref 70–99)
GLUCOSE BLDC GLUCOMTR-MCNC: 150 MG/DL (ref 70–99)
GLUCOSE BLDC GLUCOMTR-MCNC: 154 MG/DL (ref 70–99)
GLUCOSE BLDC GLUCOMTR-MCNC: 158 MG/DL (ref 70–99)
GLUCOSE SERPL-MCNC: 159 MG/DL (ref 70–99)
GLUCOSE SERPL-MCNC: 163 MG/DL (ref 70–99)
HCO3 BLD-SCNC: 19 MMOL/L (ref 21–28)
HCO3 BLD-SCNC: 22 MMOL/L (ref 21–28)
HCO3 BLD-SCNC: 23 MMOL/L (ref 21–28)
HCO3 BLDA-SCNC: 22 MMOL/L (ref 21–28)
HCO3 BLDA-SCNC: 22 MMOL/L (ref 21–28)
HCO3 BLDA-SCNC: 23 MMOL/L (ref 21–28)
HCO3 BLDA-SCNC: 23 MMOL/L (ref 21–28)
HCO3 BLDA-SCNC: 24 MMOL/L (ref 21–28)
HCO3 BLDA-SCNC: 24 MMOL/L (ref 21–28)
HCO3 BLDA-SCNC: 25 MMOL/L (ref 21–28)
HCO3 BLDA-SCNC: 26 MMOL/L (ref 21–28)
HCO3 BLDA-SCNC: 26 MMOL/L (ref 21–28)
HCO3 BLDV-SCNC: 24 MMOL/L (ref 21–28)
HCO3 BLDV-SCNC: 24 MMOL/L (ref 21–28)
HCO3 BLDV-SCNC: 25 MMOL/L (ref 21–28)
HCO3 BLDV-SCNC: 26 MMOL/L (ref 21–28)
HCT VFR BLD AUTO: 24 % (ref 35–47)
HCT VFR BLD AUTO: 30.6 % (ref 35–47)
HCT VFR BLD AUTO: 30.9 % (ref 35–47)
HGB BLD-MCNC: 10 G/DL (ref 11.7–15.7)
HGB BLD-MCNC: 10.1 G/DL (ref 11.7–15.7)
HGB BLD-MCNC: 10.1 G/DL (ref 11.7–15.7)
HGB BLD-MCNC: 10.3 G/DL (ref 11.7–15.7)
HGB BLD-MCNC: 10.8 G/DL (ref 11.7–15.7)
HGB BLD-MCNC: 7.2 G/DL (ref 11.7–15.7)
HGB BLD-MCNC: 7.5 G/DL (ref 11.7–15.7)
HGB BLD-MCNC: 7.7 G/DL (ref 11.7–15.7)
HGB BLD-MCNC: 7.8 G/DL (ref 11.7–15.7)
HGB BLD-MCNC: 7.9 G/DL (ref 11.7–15.7)
HGB BLD-MCNC: 7.9 G/DL (ref 11.7–15.7)
HGB BLD-MCNC: 8 G/DL (ref 11.7–15.7)
HGB BLD-MCNC: 8.1 G/DL (ref 11.7–15.7)
HGB BLD-MCNC: 8.3 G/DL (ref 11.7–15.7)
IMM GRANULOCYTES # BLD: 0.1 10E3/UL
IMM GRANULOCYTES NFR BLD: 1 %
INR PPP: 1.29 (ref 0.85–1.15)
INR PPP: 1.29 (ref 0.85–1.15)
INR PPP: 2.01 (ref 0.85–1.15)
ISSUE DATE AND TIME: NORMAL
LACTATE BLD-SCNC: 0.9 MMOL/L
LACTATE BLD-SCNC: 0.9 MMOL/L
LACTATE BLD-SCNC: 1 MMOL/L
LACTATE BLD-SCNC: 1 MMOL/L
LACTATE BLD-SCNC: 1.1 MMOL/L
LACTATE BLD-SCNC: 1.2 MMOL/L
LACTATE BLD-SCNC: 1.8 MMOL/L
LACTATE BLD-SCNC: 1.9 MMOL/L
LACTATE BLD-SCNC: 1.9 MMOL/L
LACTATE BLD-SCNC: 2.1 MMOL/L
LACTATE BLD-SCNC: 2.2 MMOL/L
LACTATE SERPL-SCNC: 1.4 MMOL/L (ref 0.7–2)
LACTATE SERPL-SCNC: 1.8 MMOL/L (ref 0.7–2)
LYMPHOCYTES # BLD AUTO: 1.1 10E3/UL (ref 0.8–5.3)
LYMPHOCYTES NFR BLD AUTO: 15 %
MAGNESIUM SERPL-MCNC: 2.4 MG/DL (ref 1.7–2.3)
MAGNESIUM SERPL-MCNC: 2.8 MG/DL (ref 1.7–2.3)
MCF P HPASE BLD TEG: 64.4 MM (ref 50–70)
MCH RBC QN AUTO: 29.1 PG (ref 26.5–33)
MCH RBC QN AUTO: 29.4 PG (ref 26.5–33)
MCH RBC QN AUTO: 29.5 PG (ref 26.5–33)
MCHC RBC AUTO-ENTMCNC: 32.5 G/DL (ref 31.5–36.5)
MCHC RBC AUTO-ENTMCNC: 33 G/DL (ref 31.5–36.5)
MCHC RBC AUTO-ENTMCNC: 33.3 G/DL (ref 31.5–36.5)
MCV RBC AUTO: 88 FL (ref 78–100)
MCV RBC AUTO: 88 FL (ref 78–100)
MCV RBC AUTO: 91 FL (ref 78–100)
MONOCYTES # BLD AUTO: 0.2 10E3/UL (ref 0–1.3)
MONOCYTES NFR BLD AUTO: 2 %
MRSA DNA SPEC QL NAA+PROBE: NEGATIVE
NEUTROPHILS # BLD AUTO: 5.9 10E3/UL (ref 1.6–8.3)
NEUTROPHILS NFR BLD AUTO: 82 %
NRBC # BLD AUTO: 0 10E3/UL
NRBC BLD AUTO-RTO: 0 /100
O2/TOTAL GAS SETTING VFR VENT: 100 %
O2/TOTAL GAS SETTING VFR VENT: 30 %
O2/TOTAL GAS SETTING VFR VENT: 46 %
O2/TOTAL GAS SETTING VFR VENT: 46 %
O2/TOTAL GAS SETTING VFR VENT: 50 %
O2/TOTAL GAS SETTING VFR VENT: 50 %
O2/TOTAL GAS SETTING VFR VENT: 70 %
O2/TOTAL GAS SETTING VFR VENT: 80 %
O2/TOTAL GAS SETTING VFR VENT: 82 %
OXYHGB MFR BLD: 96 % (ref 92–100)
OXYHGB MFR BLD: 97 % (ref 92–100)
OXYHGB MFR BLD: 98 % (ref 92–100)
OXYHGB MFR BLDA: 99 % (ref 92–100)
OXYHGB MFR BLDV: 48 % (ref 70–75)
OXYHGB MFR BLDV: 68 % (ref 70–75)
OXYHGB MFR BLDV: 87 % (ref 92–100)
PCO2 BLD: 30 MM HG (ref 35–45)
PCO2 BLD: 31 MM HG (ref 35–45)
PCO2 BLD: 36 MM HG (ref 35–45)
PCO2 BLDA: 31 MM HG (ref 35–45)
PCO2 BLDA: 33 MM HG (ref 35–45)
PCO2 BLDA: 35 MM HG (ref 35–45)
PCO2 BLDA: 35 MM HG (ref 35–45)
PCO2 BLDA: 38 MM HG (ref 35–45)
PCO2 BLDA: 41 MM HG (ref 35–45)
PCO2 BLDA: 41 MM HG (ref 35–45)
PCO2 BLDA: 43 MM HG (ref 35–45)
PCO2 BLDA: 48 MM HG (ref 35–45)
PCO2 BLDV: 35 MM HG (ref 40–50)
PCO2 BLDV: 40 MM HG (ref 40–50)
PCO2 BLDV: 42 MM HG (ref 40–50)
PCO2 BLDV: 49 MM HG (ref 40–50)
PH BLD: 7.4 [PH] (ref 7.35–7.45)
PH BLD: 7.41 [PH] (ref 7.35–7.45)
PH BLD: 7.47 [PH] (ref 7.35–7.45)
PH BLDA: 7.28 [PH] (ref 7.35–7.45)
PH BLDA: 7.36 [PH] (ref 7.35–7.45)
PH BLDA: 7.38 [PH] (ref 7.35–7.45)
PH BLDA: 7.41 [PH] (ref 7.35–7.45)
PH BLDA: 7.43 [PH] (ref 7.35–7.45)
PH BLDA: 7.43 [PH] (ref 7.35–7.45)
PH BLDA: 7.44 [PH] (ref 7.35–7.45)
PH BLDA: 7.47 [PH] (ref 7.35–7.45)
PH BLDA: 7.49 [PH] (ref 7.35–7.45)
PH BLDV: 7.31 [PH] (ref 7.32–7.43)
PH BLDV: 7.38 [PH] (ref 7.32–7.43)
PH BLDV: 7.41 [PH] (ref 7.32–7.43)
PH BLDV: 7.44 [PH] (ref 7.32–7.43)
PHOSPHATE SERPL-MCNC: 2.2 MG/DL (ref 2.5–4.5)
PHOSPHATE SERPL-MCNC: 4.6 MG/DL (ref 2.5–4.5)
PLATELET # BLD AUTO: 102 10E3/UL (ref 150–450)
PLATELET # BLD AUTO: 162 10E3/UL (ref 150–450)
PLATELET # BLD AUTO: 175 10E3/UL (ref 150–450)
PO2 BLD: 109 MM HG (ref 80–105)
PO2 BLD: 157 MM HG (ref 80–105)
PO2 BLD: 89 MM HG (ref 80–105)
PO2 BLDA: 107 MM HG (ref 80–105)
PO2 BLDA: 195 MM HG (ref 80–105)
PO2 BLDA: 464 MM HG (ref 80–105)
PO2 BLDA: 469 MM HG (ref 80–105)
PO2 BLDA: 475 MM HG (ref 80–105)
PO2 BLDA: 476 MM HG (ref 80–105)
PO2 BLDA: 529 MM HG (ref 80–105)
PO2 BLDA: 94 MM HG (ref 80–105)
PO2 BLDA: >600 MM HG (ref 80–105)
PO2 BLDV: 29 MM HG (ref 25–47)
PO2 BLDV: 33 MM HG (ref 25–47)
PO2 BLDV: 35 MM HG (ref 25–47)
PO2 BLDV: 52 MM HG (ref 25–47)
POTASSIUM BLD-SCNC: 3.5 MMOL/L (ref 3.5–5)
POTASSIUM BLD-SCNC: 3.5 MMOL/L (ref 3.5–5)
POTASSIUM BLD-SCNC: 3.7 MMOL/L (ref 3.5–5)
POTASSIUM BLD-SCNC: 3.7 MMOL/L (ref 3.5–5)
POTASSIUM BLD-SCNC: 3.8 MMOL/L (ref 3.5–5)
POTASSIUM BLD-SCNC: 3.8 MMOL/L (ref 3.5–5)
POTASSIUM BLD-SCNC: 4.1 MMOL/L (ref 3.4–5.3)
POTASSIUM BLD-SCNC: 4.2 MMOL/L (ref 3.5–5)
POTASSIUM BLD-SCNC: 4.5 MMOL/L (ref 3.5–5)
POTASSIUM BLD-SCNC: 4.5 MMOL/L (ref 3.5–5)
POTASSIUM BLD-SCNC: 4.7 MMOL/L (ref 3.5–5)
POTASSIUM BLD-SCNC: 5.1 MMOL/L (ref 3.5–5)
POTASSIUM SERPL-SCNC: 4 MMOL/L (ref 3.4–5.3)
POTASSIUM SERPL-SCNC: 4.2 MMOL/L (ref 3.4–5.3)
PROT SERPL-MCNC: 4.7 G/DL (ref 6.4–8.3)
RBC # BLD AUTO: 2.64 10E6/UL (ref 3.8–5.2)
RBC # BLD AUTO: 3.47 10E6/UL (ref 3.8–5.2)
RBC # BLD AUTO: 3.5 10E6/UL (ref 3.8–5.2)
SA TARGET DNA: NEGATIVE
SODIUM BLD-SCNC: 133 MMOL/L (ref 133–144)
SODIUM BLD-SCNC: 134 MMOL/L (ref 133–144)
SODIUM BLD-SCNC: 135 MMOL/L (ref 133–144)
SODIUM BLD-SCNC: 135 MMOL/L (ref 133–144)
SODIUM BLD-SCNC: 136 MMOL/L (ref 133–144)
SODIUM BLD-SCNC: 137 MMOL/L (ref 133–144)
SODIUM BLD-SCNC: 138 MMOL/L (ref 133–144)
SODIUM BLD-SCNC: 138 MMOL/L (ref 133–144)
SODIUM SERPL-SCNC: 137 MMOL/L (ref 136–145)
SODIUM SERPL-SCNC: 138 MMOL/L (ref 136–145)
UNIT ABO/RH: NORMAL
UNIT NUMBER: NORMAL
UNIT STATUS: NORMAL
UNIT TYPE ISBT: 5100
UNIT TYPE ISBT: 7300
UNIT TYPE ISBT: 7300
WBC # BLD AUTO: 7.2 10E3/UL (ref 4–11)
WBC # BLD AUTO: 8.4 10E3/UL (ref 4–11)
WBC # BLD AUTO: 8.5 10E3/UL (ref 4–11)

## 2023-04-25 PROCEDURE — 250N000013 HC RX MED GY IP 250 OP 250 PS 637: Performed by: PHYSICIAN ASSISTANT

## 2023-04-25 PROCEDURE — 258N000003 HC RX IP 258 OP 636: Performed by: SURGERY

## 2023-04-25 PROCEDURE — 83605 ASSAY OF LACTIC ACID: CPT

## 2023-04-25 PROCEDURE — C1768 GRAFT, VASCULAR: HCPCS | Performed by: SURGERY

## 2023-04-25 PROCEDURE — 250N000011 HC RX IP 250 OP 636: Performed by: SURGERY

## 2023-04-25 PROCEDURE — P9016 RBC LEUKOCYTES REDUCED: HCPCS

## 2023-04-25 PROCEDURE — 370N000017 HC ANESTHESIA TECHNICAL FEE, PER MIN: Performed by: SURGERY

## 2023-04-25 PROCEDURE — 84100 ASSAY OF PHOSPHORUS: CPT | Performed by: PHYSICIAN ASSISTANT

## 2023-04-25 PROCEDURE — 82805 BLOOD GASES W/O2 SATURATION: CPT | Performed by: SURGERY

## 2023-04-25 PROCEDURE — 999N000065 XR CHEST PORT 1 VIEW

## 2023-04-25 PROCEDURE — 85018 HEMOGLOBIN: CPT | Performed by: STUDENT IN AN ORGANIZED HEALTH CARE EDUCATION/TRAINING PROGRAM

## 2023-04-25 PROCEDURE — 84132 ASSAY OF SERUM POTASSIUM: CPT

## 2023-04-25 PROCEDURE — C1763 CONN TISS, NON-HUMAN: HCPCS | Performed by: SURGERY

## 2023-04-25 PROCEDURE — 272N000088 HC PUMP APP ADULT PERFUSION: Performed by: SURGERY

## 2023-04-25 PROCEDURE — 272N000085 HC PACK CELL SAVER CSP: Performed by: SURGERY

## 2023-04-25 PROCEDURE — 85018 HEMOGLOBIN: CPT | Performed by: SURGERY

## 2023-04-25 PROCEDURE — 88304 TISSUE EXAM BY PATHOLOGIST: CPT | Mod: 26 | Performed by: PATHOLOGY

## 2023-04-25 PROCEDURE — 84100 ASSAY OF PHOSPHORUS: CPT | Performed by: SURGERY

## 2023-04-25 PROCEDURE — 87641 MR-STAPH DNA AMP PROBE: CPT | Performed by: SURGERY

## 2023-04-25 PROCEDURE — 83605 ASSAY OF LACTIC ACID: CPT | Performed by: PHYSICIAN ASSISTANT

## 2023-04-25 PROCEDURE — 410N000003 HC PER-PERFUSION 1ST 30 MIN: Performed by: SURGERY

## 2023-04-25 PROCEDURE — 88304 TISSUE EXAM BY PATHOLOGIST: CPT | Mod: TC | Performed by: SURGERY

## 2023-04-25 PROCEDURE — 85730 THROMBOPLASTIN TIME PARTIAL: CPT | Performed by: PHYSICIAN ASSISTANT

## 2023-04-25 PROCEDURE — 250N000024 HC ISOFLURANE, PER MIN: Performed by: SURGERY

## 2023-04-25 PROCEDURE — 82810 BLOOD GASES O2 SAT ONLY: CPT

## 2023-04-25 PROCEDURE — 82330 ASSAY OF CALCIUM: CPT | Performed by: PHYSICIAN ASSISTANT

## 2023-04-25 PROCEDURE — 84132 ASSAY OF SERUM POTASSIUM: CPT | Performed by: SURGERY

## 2023-04-25 PROCEDURE — 250N000011 HC RX IP 250 OP 636

## 2023-04-25 PROCEDURE — 93010 ELECTROCARDIOGRAM REPORT: CPT | Performed by: INTERNAL MEDICINE

## 2023-04-25 PROCEDURE — 258N000003 HC RX IP 258 OP 636: Performed by: STUDENT IN AN ORGANIZED HEALTH CARE EDUCATION/TRAINING PROGRAM

## 2023-04-25 PROCEDURE — 85347 COAGULATION TIME ACTIVATED: CPT

## 2023-04-25 PROCEDURE — 93005 ELECTROCARDIOGRAM TRACING: CPT

## 2023-04-25 PROCEDURE — 410N000004: Performed by: SURGERY

## 2023-04-25 PROCEDURE — 85384 FIBRINOGEN ACTIVITY: CPT | Performed by: STUDENT IN AN ORGANIZED HEALTH CARE EDUCATION/TRAINING PROGRAM

## 2023-04-25 PROCEDURE — 85396 CLOTTING ASSAY WHOLE BLOOD: CPT

## 2023-04-25 PROCEDURE — 84295 ASSAY OF SERUM SODIUM: CPT

## 2023-04-25 PROCEDURE — 85610 PROTHROMBIN TIME: CPT | Performed by: STUDENT IN AN ORGANIZED HEALTH CARE EDUCATION/TRAINING PROGRAM

## 2023-04-25 PROCEDURE — C1898 LEAD, PMKR, OTHER THAN TRANS: HCPCS | Performed by: SURGERY

## 2023-04-25 PROCEDURE — 83735 ASSAY OF MAGNESIUM: CPT | Performed by: SURGERY

## 2023-04-25 PROCEDURE — 999N000141 HC STATISTIC PRE-PROCEDURE NURSING ASSESSMENT: Performed by: SURGERY

## 2023-04-25 PROCEDURE — 83605 ASSAY OF LACTIC ACID: CPT | Performed by: SURGERY

## 2023-04-25 PROCEDURE — 999N000157 HC STATISTIC RCP TIME EA 10 MIN

## 2023-04-25 PROCEDURE — 250N000009 HC RX 250: Performed by: STUDENT IN AN ORGANIZED HEALTH CARE EDUCATION/TRAINING PROGRAM

## 2023-04-25 PROCEDURE — 99291 CRITICAL CARE FIRST HOUR: CPT | Performed by: STUDENT IN AN ORGANIZED HEALTH CARE EDUCATION/TRAINING PROGRAM

## 2023-04-25 PROCEDURE — 250N000009 HC RX 250: Performed by: PHYSICIAN ASSISTANT

## 2023-04-25 PROCEDURE — 360N000079 HC SURGERY LEVEL 6, PER MIN: Performed by: SURGERY

## 2023-04-25 PROCEDURE — 94640 AIRWAY INHALATION TREATMENT: CPT | Mod: 76

## 2023-04-25 PROCEDURE — 86923 COMPATIBILITY TEST ELECTRIC: CPT

## 2023-04-25 PROCEDURE — 250N000009 HC RX 250: Performed by: ANESTHESIOLOGY

## 2023-04-25 PROCEDURE — 85610 PROTHROMBIN TIME: CPT | Performed by: PHYSICIAN ASSISTANT

## 2023-04-25 PROCEDURE — 250N000009 HC RX 250: Performed by: SURGERY

## 2023-04-25 PROCEDURE — 85027 COMPLETE CBC AUTOMATED: CPT | Performed by: PHYSICIAN ASSISTANT

## 2023-04-25 PROCEDURE — 85384 FIBRINOGEN ACTIVITY: CPT | Performed by: SURGERY

## 2023-04-25 PROCEDURE — 85730 THROMBOPLASTIN TIME PARTIAL: CPT | Performed by: SURGERY

## 2023-04-25 PROCEDURE — 88305 TISSUE EXAM BY PATHOLOGIST: CPT | Mod: 26 | Performed by: PATHOLOGY

## 2023-04-25 PROCEDURE — 84295 ASSAY OF SERUM SODIUM: CPT | Performed by: SURGERY

## 2023-04-25 PROCEDURE — 85610 PROTHROMBIN TIME: CPT | Performed by: SURGERY

## 2023-04-25 PROCEDURE — 71045 X-RAY EXAM CHEST 1 VIEW: CPT | Mod: 26 | Performed by: STUDENT IN AN ORGANIZED HEALTH CARE EDUCATION/TRAINING PROGRAM

## 2023-04-25 PROCEDURE — 272N000001 HC OR GENERAL SUPPLY STERILE: Performed by: SURGERY

## 2023-04-25 PROCEDURE — 250N000011 HC RX IP 250 OP 636: Performed by: STUDENT IN AN ORGANIZED HEALTH CARE EDUCATION/TRAINING PROGRAM

## 2023-04-25 PROCEDURE — 82805 BLOOD GASES W/O2 SATURATION: CPT

## 2023-04-25 PROCEDURE — 84132 ASSAY OF SERUM POTASSIUM: CPT | Performed by: PHYSICIAN ASSISTANT

## 2023-04-25 PROCEDURE — 94002 VENT MGMT INPAT INIT DAY: CPT

## 2023-04-25 PROCEDURE — 271N000002 HC RX 271

## 2023-04-25 PROCEDURE — 33405 REPLACEMENT AORTIC VALVE OPN: CPT | Mod: 51 | Performed by: SURGERY

## 2023-04-25 PROCEDURE — 83735 ASSAY OF MAGNESIUM: CPT | Performed by: PHYSICIAN ASSISTANT

## 2023-04-25 PROCEDURE — 85396 CLOTTING ASSAY WHOLE BLOOD: CPT | Performed by: SURGERY

## 2023-04-25 PROCEDURE — 82330 ASSAY OF CALCIUM: CPT

## 2023-04-25 PROCEDURE — 250N000011 HC RX IP 250 OP 636: Performed by: PHYSICIAN ASSISTANT

## 2023-04-25 PROCEDURE — 250N000013 HC RX MED GY IP 250 OP 250 PS 637: Performed by: SURGERY

## 2023-04-25 PROCEDURE — 85025 COMPLETE CBC W/AUTO DIFF WBC: CPT

## 2023-04-25 PROCEDURE — C1889 IMPLANT/INSERT DEVICE, NOC: HCPCS | Performed by: SURGERY

## 2023-04-25 PROCEDURE — 200N000002 HC R&B ICU UMMC

## 2023-04-25 PROCEDURE — 85396 CLOTTING ASSAY WHOLE BLOOD: CPT | Performed by: STUDENT IN AN ORGANIZED HEALTH CARE EDUCATION/TRAINING PROGRAM

## 2023-04-25 PROCEDURE — 33859 AS-AORT GRF F/DS OTH/THN DSJ: CPT | Performed by: SURGERY

## 2023-04-25 PROCEDURE — P9037 PLATE PHERES LEUKOREDU IRRAD: HCPCS

## 2023-04-25 PROCEDURE — 82805 BLOOD GASES W/O2 SATURATION: CPT | Performed by: PHYSICIAN ASSISTANT

## 2023-04-25 PROCEDURE — 85730 THROMBOPLASTIN TIME PARTIAL: CPT | Performed by: STUDENT IN AN ORGANIZED HEALTH CARE EDUCATION/TRAINING PROGRAM

## 2023-04-25 DEVICE — GRAFT PERICARDIUM 6X8CM BOVINE E6P8: Type: IMPLANTABLE DEVICE | Site: CHEST | Status: FUNCTIONAL

## 2023-04-25 DEVICE — GELWEAVE GELATIN IMPREGNATED WOVEN VASCULAR PROSTHESIS STRAIGHT
Type: IMPLANTABLE DEVICE | Site: CHEST | Status: FUNCTIONAL
Brand: GELWEAVE™

## 2023-04-25 DEVICE — VALVE AORTIC INSPIRIS RESILLA 11500A21 SZ 21MM: Type: IMPLANTABLE DEVICE | Site: CHEST | Status: FUNCTIONAL

## 2023-04-25 RX ORDER — FIBRINOGEN (HUMAN) 700-1300MG
1 KIT INTRAVENOUS
Status: DISCONTINUED | OUTPATIENT
Start: 2023-04-25 | End: 2023-04-26

## 2023-04-25 RX ORDER — FLUMAZENIL 0.1 MG/ML
0.2 INJECTION, SOLUTION INTRAVENOUS
Status: DISCONTINUED | OUTPATIENT
Start: 2023-04-25 | End: 2023-04-25 | Stop reason: HOSPADM

## 2023-04-25 RX ORDER — OXYCODONE HYDROCHLORIDE 5 MG/1
5 TABLET ORAL EVERY 4 HOURS PRN
Status: DISCONTINUED | OUTPATIENT
Start: 2023-04-25 | End: 2023-04-26

## 2023-04-25 RX ORDER — LIDOCAINE 40 MG/G
CREAM TOPICAL
Status: DISCONTINUED | OUTPATIENT
Start: 2023-04-25 | End: 2023-05-04

## 2023-04-25 RX ORDER — CALCIUM CHLORIDE 100 MG/ML
INJECTION INTRAVENOUS; INTRAVENTRICULAR PRN
Status: DISCONTINUED | OUTPATIENT
Start: 2023-04-25 | End: 2023-04-25

## 2023-04-25 RX ORDER — AMOXICILLIN 250 MG
1 CAPSULE ORAL 2 TIMES DAILY
Status: DISCONTINUED | OUTPATIENT
Start: 2023-04-25 | End: 2023-05-04

## 2023-04-25 RX ORDER — HYDROMORPHONE HYDROCHLORIDE 1 MG/ML
0.4 INJECTION, SOLUTION INTRAMUSCULAR; INTRAVENOUS; SUBCUTANEOUS
Status: DISCONTINUED | OUTPATIENT
Start: 2023-04-25 | End: 2023-05-01

## 2023-04-25 RX ORDER — NALOXONE HYDROCHLORIDE 0.4 MG/ML
0.2 INJECTION, SOLUTION INTRAMUSCULAR; INTRAVENOUS; SUBCUTANEOUS
Status: DISCONTINUED | OUTPATIENT
Start: 2023-04-25 | End: 2023-05-15 | Stop reason: HOSPADM

## 2023-04-25 RX ORDER — DEXMEDETOMIDINE HYDROCHLORIDE 4 UG/ML
.2-1.2 INJECTION, SOLUTION INTRAVENOUS CONTINUOUS
Status: DISCONTINUED | OUTPATIENT
Start: 2023-04-25 | End: 2023-04-25 | Stop reason: HOSPADM

## 2023-04-25 RX ORDER — CHLORHEXIDINE GLUCONATE ORAL RINSE 1.2 MG/ML
10 SOLUTION DENTAL ONCE
Status: COMPLETED | OUTPATIENT
Start: 2023-04-25 | End: 2023-04-25

## 2023-04-25 RX ORDER — ACETAMINOPHEN 325 MG/1
975 TABLET ORAL ONCE
Status: COMPLETED | OUTPATIENT
Start: 2023-04-25 | End: 2023-04-25

## 2023-04-25 RX ORDER — BUPIVACAINE HYDROCHLORIDE 2.5 MG/ML
INJECTION, SOLUTION EPIDURAL; INFILTRATION; INTRACAUDAL PRN
Status: DISCONTINUED | OUTPATIENT
Start: 2023-04-25 | End: 2023-04-25

## 2023-04-25 RX ORDER — PHENYLEPHRINE HCL IN 0.9% NACL 50MG/250ML
.1-6 PLASTIC BAG, INJECTION (ML) INTRAVENOUS CONTINUOUS
Status: DISCONTINUED | OUTPATIENT
Start: 2023-04-25 | End: 2023-04-25 | Stop reason: HOSPADM

## 2023-04-25 RX ORDER — ACETAMINOPHEN 325 MG/1
650 TABLET ORAL EVERY 4 HOURS PRN
Status: DISCONTINUED | OUTPATIENT
Start: 2023-04-28 | End: 2023-05-15 | Stop reason: HOSPADM

## 2023-04-25 RX ORDER — FAMOTIDINE 20 MG/1
20 TABLET, FILM COATED ORAL
Status: COMPLETED | OUTPATIENT
Start: 2023-04-25 | End: 2023-04-25

## 2023-04-25 RX ORDER — CEFAZOLIN SODIUM/WATER 2 G/20 ML
2 SYRINGE (ML) INTRAVENOUS
Status: COMPLETED | OUTPATIENT
Start: 2023-04-25 | End: 2023-04-25

## 2023-04-25 RX ORDER — NITROGLYCERIN 10 MG/100ML
INJECTION INTRAVENOUS PRN
Status: DISCONTINUED | OUTPATIENT
Start: 2023-04-25 | End: 2023-04-25

## 2023-04-25 RX ORDER — SODIUM CHLORIDE, SODIUM GLUCONATE, SODIUM ACETATE, POTASSIUM CHLORIDE AND MAGNESIUM CHLORIDE 526; 502; 368; 37; 30 MG/100ML; MG/100ML; MG/100ML; MG/100ML; MG/100ML
INJECTION, SOLUTION INTRAVENOUS CONTINUOUS PRN
Status: DISCONTINUED | OUTPATIENT
Start: 2023-04-25 | End: 2023-04-25

## 2023-04-25 RX ORDER — DEXMEDETOMIDINE HYDROCHLORIDE 4 UG/ML
.2-.7 INJECTION, SOLUTION INTRAVENOUS CONTINUOUS
Status: DISCONTINUED | OUTPATIENT
Start: 2023-04-25 | End: 2023-04-26

## 2023-04-25 RX ORDER — CALCIUM GLUCONATE 20 MG/ML
1 INJECTION, SOLUTION INTRAVENOUS
Status: DISPENSED | OUTPATIENT
Start: 2023-04-25 | End: 2023-05-01

## 2023-04-25 RX ORDER — NALOXONE HYDROCHLORIDE 0.4 MG/ML
0.4 INJECTION, SOLUTION INTRAMUSCULAR; INTRAVENOUS; SUBCUTANEOUS
Status: DISCONTINUED | OUTPATIENT
Start: 2023-04-25 | End: 2023-05-15 | Stop reason: HOSPADM

## 2023-04-25 RX ORDER — NICARDIPINE HCL-0.9% SOD CHLOR 1 MG/10 ML
SYRINGE (ML) INTRAVENOUS PRN
Status: DISCONTINUED | OUTPATIENT
Start: 2023-04-25 | End: 2023-04-25

## 2023-04-25 RX ORDER — MUPIROCIN 20 MG/G
0.5 OINTMENT TOPICAL 2 TIMES DAILY
Status: DISCONTINUED | OUTPATIENT
Start: 2023-04-25 | End: 2023-04-26

## 2023-04-25 RX ORDER — DEXTROSE MONOHYDRATE 25 G/50ML
25-50 INJECTION, SOLUTION INTRAVENOUS
Status: DISCONTINUED | OUTPATIENT
Start: 2023-04-25 | End: 2023-05-11

## 2023-04-25 RX ORDER — GABAPENTIN 100 MG/1
100 CAPSULE ORAL
Status: COMPLETED | OUTPATIENT
Start: 2023-04-25 | End: 2023-04-25

## 2023-04-25 RX ORDER — FENTANYL CITRATE 50 UG/ML
INJECTION, SOLUTION INTRAMUSCULAR; INTRAVENOUS PRN
Status: DISCONTINUED | OUTPATIENT
Start: 2023-04-25 | End: 2023-04-25

## 2023-04-25 RX ORDER — CALCIUM GLUCONATE 20 MG/ML
2 INJECTION, SOLUTION INTRAVENOUS
Status: ACTIVE | OUTPATIENT
Start: 2023-04-25 | End: 2023-05-01

## 2023-04-25 RX ORDER — NOREPINEPHRINE BITARTRATE 0.06 MG/ML
.01-.4 INJECTION, SOLUTION INTRAVENOUS CONTINUOUS
Status: DISCONTINUED | OUTPATIENT
Start: 2023-04-25 | End: 2023-04-26

## 2023-04-25 RX ORDER — VASOPRESSIN IN 0.9 % NACL 2 UNIT/2ML
SYRINGE (ML) INTRAVENOUS PRN
Status: DISCONTINUED | OUTPATIENT
Start: 2023-04-25 | End: 2023-04-25

## 2023-04-25 RX ORDER — VANCOMYCIN HYDROCHLORIDE 1 G/200ML
1000 INJECTION, SOLUTION INTRAVENOUS EVERY 12 HOURS
Status: COMPLETED | OUTPATIENT
Start: 2023-04-25 | End: 2023-04-26

## 2023-04-25 RX ORDER — BISACODYL 10 MG
10 SUPPOSITORY, RECTAL RECTAL DAILY PRN
Status: DISCONTINUED | OUTPATIENT
Start: 2023-04-25 | End: 2023-05-15 | Stop reason: HOSPADM

## 2023-04-25 RX ORDER — CEFAZOLIN SODIUM/WATER 2 G/20 ML
2 SYRINGE (ML) INTRAVENOUS SEE ADMIN INSTRUCTIONS
Status: DISCONTINUED | OUTPATIENT
Start: 2023-04-25 | End: 2023-04-25 | Stop reason: HOSPADM

## 2023-04-25 RX ORDER — NICOTINE POLACRILEX 4 MG
15-30 LOZENGE BUCCAL
Status: DISCONTINUED | OUTPATIENT
Start: 2023-04-25 | End: 2023-05-11

## 2023-04-25 RX ORDER — LIDOCAINE HYDROCHLORIDE 10 MG/ML
INJECTION, SOLUTION INFILTRATION; PERINEURAL
Status: COMPLETED | OUTPATIENT
Start: 2023-04-25 | End: 2023-04-25

## 2023-04-25 RX ORDER — IPRATROPIUM BROMIDE AND ALBUTEROL SULFATE 2.5; .5 MG/3ML; MG/3ML
3 SOLUTION RESPIRATORY (INHALATION)
Status: DISPENSED | OUTPATIENT
Start: 2023-04-25 | End: 2023-04-27

## 2023-04-25 RX ORDER — CEFAZOLIN SODIUM 1 G/3ML
1 INJECTION, POWDER, FOR SOLUTION INTRAMUSCULAR; INTRAVENOUS EVERY 8 HOURS
Status: COMPLETED | OUTPATIENT
Start: 2023-04-25 | End: 2023-04-26

## 2023-04-25 RX ORDER — HYDRALAZINE HYDROCHLORIDE 20 MG/ML
10 INJECTION INTRAMUSCULAR; INTRAVENOUS EVERY 30 MIN PRN
Status: DISCONTINUED | OUTPATIENT
Start: 2023-04-25 | End: 2023-04-26

## 2023-04-25 RX ORDER — PROCHLORPERAZINE MALEATE 5 MG
5 TABLET ORAL EVERY 6 HOURS PRN
Status: DISCONTINUED | OUTPATIENT
Start: 2023-04-25 | End: 2023-05-04

## 2023-04-25 RX ORDER — HYDROMORPHONE HYDROCHLORIDE 1 MG/ML
0.2 INJECTION, SOLUTION INTRAMUSCULAR; INTRAVENOUS; SUBCUTANEOUS
Status: DISCONTINUED | OUTPATIENT
Start: 2023-04-25 | End: 2023-05-01

## 2023-04-25 RX ORDER — PROPOFOL 10 MG/ML
INJECTION, EMULSION INTRAVENOUS PRN
Status: DISCONTINUED | OUTPATIENT
Start: 2023-04-25 | End: 2023-04-25

## 2023-04-25 RX ORDER — OXYCODONE HYDROCHLORIDE 10 MG/1
10 TABLET ORAL EVERY 4 HOURS PRN
Status: DISCONTINUED | OUTPATIENT
Start: 2023-04-25 | End: 2023-04-26

## 2023-04-25 RX ORDER — PANTOPRAZOLE SODIUM 40 MG/1
40 TABLET, DELAYED RELEASE ORAL DAILY
Status: DISCONTINUED | OUTPATIENT
Start: 2023-04-26 | End: 2023-05-01

## 2023-04-25 RX ORDER — ONDANSETRON 4 MG/1
4 TABLET, ORALLY DISINTEGRATING ORAL EVERY 6 HOURS PRN
Status: DISCONTINUED | OUTPATIENT
Start: 2023-04-25 | End: 2023-05-15 | Stop reason: HOSPADM

## 2023-04-25 RX ORDER — FIBRINOGEN (HUMAN) 700-1300MG
1 KIT INTRAVENOUS
Status: COMPLETED | OUTPATIENT
Start: 2023-04-25 | End: 2023-04-25

## 2023-04-25 RX ORDER — NALOXONE HYDROCHLORIDE 0.4 MG/ML
0.4 INJECTION, SOLUTION INTRAMUSCULAR; INTRAVENOUS; SUBCUTANEOUS
Status: DISCONTINUED | OUTPATIENT
Start: 2023-04-25 | End: 2023-04-25 | Stop reason: HOSPADM

## 2023-04-25 RX ORDER — HEPARIN SODIUM 1000 [USP'U]/ML
INJECTION, SOLUTION INTRAVENOUS; SUBCUTANEOUS PRN
Status: DISCONTINUED | OUTPATIENT
Start: 2023-04-25 | End: 2023-04-25

## 2023-04-25 RX ORDER — HEPARIN SODIUM 5000 [USP'U]/.5ML
5000 INJECTION, SOLUTION INTRAVENOUS; SUBCUTANEOUS EVERY 8 HOURS
Status: DISCONTINUED | OUTPATIENT
Start: 2023-04-26 | End: 2023-05-01

## 2023-04-25 RX ORDER — POLYETHYLENE GLYCOL 3350 17 G/17G
17 POWDER, FOR SOLUTION ORAL DAILY
Status: DISCONTINUED | OUTPATIENT
Start: 2023-04-26 | End: 2023-05-04

## 2023-04-25 RX ORDER — LIDOCAINE HYDROCHLORIDE 20 MG/ML
INJECTION, SOLUTION INFILTRATION; PERINEURAL PRN
Status: DISCONTINUED | OUTPATIENT
Start: 2023-04-25 | End: 2023-04-25

## 2023-04-25 RX ORDER — VANCOMYCIN HYDROCHLORIDE 1 G/200ML
1000 INJECTION, SOLUTION INTRAVENOUS
Status: COMPLETED | OUTPATIENT
Start: 2023-04-25 | End: 2023-04-25

## 2023-04-25 RX ORDER — NALOXONE HYDROCHLORIDE 0.4 MG/ML
0.2 INJECTION, SOLUTION INTRAMUSCULAR; INTRAVENOUS; SUBCUTANEOUS
Status: DISCONTINUED | OUTPATIENT
Start: 2023-04-25 | End: 2023-04-25 | Stop reason: HOSPADM

## 2023-04-25 RX ORDER — PROTAMINE SULFATE 10 MG/ML
INJECTION, SOLUTION INTRAVENOUS PRN
Status: DISCONTINUED | OUTPATIENT
Start: 2023-04-25 | End: 2023-04-25

## 2023-04-25 RX ORDER — ONDANSETRON 2 MG/ML
4 INJECTION INTRAMUSCULAR; INTRAVENOUS EVERY 6 HOURS PRN
Status: DISCONTINUED | OUTPATIENT
Start: 2023-04-25 | End: 2023-05-15 | Stop reason: HOSPADM

## 2023-04-25 RX ORDER — ACETAMINOPHEN 325 MG/1
975 TABLET ORAL EVERY 8 HOURS
Status: COMPLETED | OUTPATIENT
Start: 2023-04-25 | End: 2023-04-28

## 2023-04-25 RX ORDER — NOREPINEPHRINE BITARTRATE 0.06 MG/ML
.01-.1 INJECTION, SOLUTION INTRAVENOUS CONTINUOUS
Status: DISCONTINUED | OUTPATIENT
Start: 2023-04-25 | End: 2023-04-25 | Stop reason: HOSPADM

## 2023-04-25 RX ORDER — ASPIRIN 81 MG/1
81 TABLET, CHEWABLE ORAL DAILY
Status: DISCONTINUED | OUTPATIENT
Start: 2023-04-26 | End: 2023-05-13

## 2023-04-25 RX ORDER — LIDOCAINE 40 MG/G
CREAM TOPICAL
Status: DISCONTINUED | OUTPATIENT
Start: 2023-04-25 | End: 2023-04-25 | Stop reason: HOSPADM

## 2023-04-25 RX ORDER — SODIUM CHLORIDE, SODIUM LACTATE, POTASSIUM CHLORIDE, CALCIUM CHLORIDE 600; 310; 30; 20 MG/100ML; MG/100ML; MG/100ML; MG/100ML
INJECTION, SOLUTION INTRAVENOUS CONTINUOUS PRN
Status: DISCONTINUED | OUTPATIENT
Start: 2023-04-25 | End: 2023-04-25

## 2023-04-25 RX ORDER — FENTANYL CITRATE 50 UG/ML
25-50 INJECTION, SOLUTION INTRAMUSCULAR; INTRAVENOUS
Status: DISCONTINUED | OUTPATIENT
Start: 2023-04-25 | End: 2023-04-25 | Stop reason: HOSPADM

## 2023-04-25 RX ADMIN — SODIUM CHLORIDE 1000 MCG: 9 INJECTION, SOLUTION INTRAVENOUS at 14:07

## 2023-04-25 RX ADMIN — Medication 0.5 UNITS: at 10:03

## 2023-04-25 RX ADMIN — Medication 7 ML/HR: at 15:02

## 2023-04-25 RX ADMIN — NITROGLYCERIN 40 MCG: 10 INJECTION INTRAVENOUS at 13:18

## 2023-04-25 RX ADMIN — SODIUM CHLORIDE, POTASSIUM CHLORIDE, SODIUM LACTATE AND CALCIUM CHLORIDE 500 ML: 600; 310; 30; 20 INJECTION, SOLUTION INTRAVENOUS at 17:13

## 2023-04-25 RX ADMIN — SODIUM CHLORIDE, POTASSIUM CHLORIDE, SODIUM LACTATE AND CALCIUM CHLORIDE: 600; 310; 30; 20 INJECTION, SOLUTION INTRAVENOUS at 09:03

## 2023-04-25 RX ADMIN — Medication 20 MG: at 12:30

## 2023-04-25 RX ADMIN — NOREPINEPHRINE BITARTRATE 0.05 MCG/KG/MIN: 1 INJECTION, SOLUTION, CONCENTRATE INTRAVENOUS at 08:55

## 2023-04-25 RX ADMIN — SENNOSIDES AND DOCUSATE SODIUM 1 TABLET: 50; 8.6 TABLET ORAL at 20:20

## 2023-04-25 RX ADMIN — FENTANYL CITRATE 50 MCG: 50 INJECTION, SOLUTION INTRAMUSCULAR; INTRAVENOUS at 06:26

## 2023-04-25 RX ADMIN — Medication 1 UNITS: at 14:04

## 2023-04-25 RX ADMIN — HEPARIN SODIUM 18000 UNITS: 1000 INJECTION INTRAVENOUS; SUBCUTANEOUS at 09:42

## 2023-04-25 RX ADMIN — NOREPINEPHRINE BITARTRATE 0.03 MCG/KG/MIN: 1 INJECTION, SOLUTION, CONCENTRATE INTRAVENOUS at 08:51

## 2023-04-25 RX ADMIN — PHENYLEPHRINE HYDROCHLORIDE 200 MCG: 10 INJECTION INTRAVENOUS at 08:23

## 2023-04-25 RX ADMIN — VANCOMYCIN HYDROCHLORIDE 1000 MG: 1 INJECTION, SOLUTION INTRAVENOUS at 22:04

## 2023-04-25 RX ADMIN — Medication 1 UNITS: at 10:14

## 2023-04-25 RX ADMIN — PROTAMINE SULFATE 90 MG: 10 INJECTION, SOLUTION INTRAVENOUS at 13:08

## 2023-04-25 RX ADMIN — ACETAMINOPHEN 975 MG: 325 TABLET ORAL at 06:12

## 2023-04-25 RX ADMIN — FIBRINOGEN (HUMAN) 1 G: KIT INTRAVENOUS at 13:40

## 2023-04-25 RX ADMIN — IPRATROPIUM BROMIDE AND ALBUTEROL SULFATE 3 ML: .5; 3 SOLUTION RESPIRATORY (INHALATION) at 20:53

## 2023-04-25 RX ADMIN — SODIUM CHLORIDE 1000 MCG: 9 INJECTION, SOLUTION INTRAVENOUS at 14:14

## 2023-04-25 RX ADMIN — FENTANYL CITRATE 250 MCG: 50 INJECTION, SOLUTION INTRAMUSCULAR; INTRAVENOUS at 13:15

## 2023-04-25 RX ADMIN — CHLORHEXIDINE GLUCONATE 10 ML: 1.2 SOLUTION ORAL at 07:24

## 2023-04-25 RX ADMIN — FENTANYL CITRATE 250 MCG: 50 INJECTION, SOLUTION INTRAMUSCULAR; INTRAVENOUS at 09:23

## 2023-04-25 RX ADMIN — BUPIVACAINE HYDROCHLORIDE 20 MG: 2.5 INJECTION, SOLUTION EPIDURAL; INFILTRATION; INTRACAUDAL at 14:57

## 2023-04-25 RX ADMIN — FENTANYL CITRATE 150 MCG: 50 INJECTION, SOLUTION INTRAMUSCULAR; INTRAVENOUS at 10:27

## 2023-04-25 RX ADMIN — CALCIUM CHLORIDE 500 MG: 100 INJECTION INTRAVENOUS; INTRAVENTRICULAR at 13:58

## 2023-04-25 RX ADMIN — SODIUM CHLORIDE 1000 MCG: 9 INJECTION, SOLUTION INTRAVENOUS at 14:03

## 2023-04-25 RX ADMIN — FENTANYL CITRATE 100 MCG: 50 INJECTION, SOLUTION INTRAMUSCULAR; INTRAVENOUS at 12:59

## 2023-04-25 RX ADMIN — PHENYLEPHRINE HYDROCHLORIDE 200 MCG: 10 INJECTION INTRAVENOUS at 08:54

## 2023-04-25 RX ADMIN — FAMOTIDINE 20 MG: 20 TABLET ORAL at 06:13

## 2023-04-25 RX ADMIN — Medication 2 G: at 09:01

## 2023-04-25 RX ADMIN — EPINEPHRINE 0.03 MCG/KG/MIN: 1 INJECTION INTRAMUSCULAR; INTRAVENOUS; SUBCUTANEOUS at 12:37

## 2023-04-25 RX ADMIN — CALCIUM CHLORIDE 500 MG: 100 INJECTION INTRAVENOUS; INTRAVENTRICULAR at 14:33

## 2023-04-25 RX ADMIN — FENTANYL CITRATE 150 MCG: 50 INJECTION, SOLUTION INTRAMUSCULAR; INTRAVENOUS at 13:04

## 2023-04-25 RX ADMIN — AMINOCAPROIC ACID 1.25 G/HR: 250 INJECTION, SOLUTION INTRAVENOUS at 10:11

## 2023-04-25 RX ADMIN — HEPARIN SODIUM 5000 UNITS: 1000 INJECTION INTRAVENOUS; SUBCUTANEOUS at 09:57

## 2023-04-25 RX ADMIN — FENTANYL CITRATE 100 MCG: 50 INJECTION, SOLUTION INTRAMUSCULAR; INTRAVENOUS at 08:13

## 2023-04-25 RX ADMIN — HUMAN INSULIN 0.5 UNITS/HR: 100 INJECTION, SOLUTION SUBCUTANEOUS at 17:14

## 2023-04-25 RX ADMIN — PHENYLEPHRINE HYDROCHLORIDE 200 MCG: 10 INJECTION INTRAVENOUS at 08:47

## 2023-04-25 RX ADMIN — LIDOCAINE HYDROCHLORIDE 100 MG: 20 INJECTION, SOLUTION INFILTRATION; PERINEURAL at 08:12

## 2023-04-25 RX ADMIN — VASOPRESSIN 2 UNITS/HR: 20 INJECTION, SOLUTION INTRAMUSCULAR; SUBCUTANEOUS at 10:43

## 2023-04-25 RX ADMIN — PROPOFOL 20 MG: 10 INJECTION, EMULSION INTRAVENOUS at 08:23

## 2023-04-25 RX ADMIN — Medication 0.5 UNITS: at 10:04

## 2023-04-25 RX ADMIN — SODIUM CHLORIDE, POTASSIUM CHLORIDE, SODIUM LACTATE AND CALCIUM CHLORIDE 500 ML: 600; 310; 30; 20 INJECTION, SOLUTION INTRAVENOUS at 21:24

## 2023-04-25 RX ADMIN — Medication 100 MG: at 08:24

## 2023-04-25 RX ADMIN — SODIUM CHLORIDE, SODIUM GLUCONATE, SODIUM ACETATE, POTASSIUM CHLORIDE AND MAGNESIUM CHLORIDE: 526; 502; 368; 37; 30 INJECTION, SOLUTION INTRAVENOUS at 08:59

## 2023-04-25 RX ADMIN — PROTAMINE SULFATE 10 MG: 10 INJECTION, SOLUTION INTRAVENOUS at 13:02

## 2023-04-25 RX ADMIN — FENTANYL CITRATE 250 MCG: 50 INJECTION, SOLUTION INTRAMUSCULAR; INTRAVENOUS at 13:51

## 2023-04-25 RX ADMIN — ACETAMINOPHEN 975 MG: 325 TABLET ORAL at 16:42

## 2023-04-25 RX ADMIN — FENTANYL CITRATE 250 MCG: 50 INJECTION, SOLUTION INTRAMUSCULAR; INTRAVENOUS at 13:27

## 2023-04-25 RX ADMIN — PROTHROMBIN, COAGULATION FACTOR VII HUMAN, COAGULATION FACTOR IX HUMAN, COAGULATION FACTOR X HUMAN, PROTEIN C, PROTEIN S HUMAN, AND WATER 1154 UNITS: KIT at 14:56

## 2023-04-25 RX ADMIN — DEXMEDETOMIDINE HYDROCHLORIDE 3 MCG/KG/HR: 400 INJECTION INTRAVENOUS at 13:29

## 2023-04-25 RX ADMIN — Medication 7.5 G: at 09:06

## 2023-04-25 RX ADMIN — PHENYLEPHRINE HYDROCHLORIDE 200 MCG: 10 INJECTION INTRAVENOUS at 08:38

## 2023-04-25 RX ADMIN — PROTAMINE SULFATE 20 MG: 10 INJECTION, SOLUTION INTRAVENOUS at 13:35

## 2023-04-25 RX ADMIN — GABAPENTIN 100 MG: 100 CAPSULE ORAL at 06:13

## 2023-04-25 RX ADMIN — SODIUM CHLORIDE, POTASSIUM CHLORIDE, SODIUM LACTATE AND CALCIUM CHLORIDE 500 ML: 600; 310; 30; 20 INJECTION, SOLUTION INTRAVENOUS at 16:00

## 2023-04-25 RX ADMIN — Medication 0.01 MCG/KG/MIN: at 15:52

## 2023-04-25 RX ADMIN — PROTHROMBIN, COAGULATION FACTOR VII HUMAN, COAGULATION FACTOR IX HUMAN, COAGULATION FACTOR X HUMAN, PROTEIN C, PROTEIN S HUMAN, AND WATER 574 UNITS: KIT at 13:33

## 2023-04-25 RX ADMIN — PHENYLEPHRINE HYDROCHLORIDE 200 MCG: 10 INJECTION INTRAVENOUS at 08:26

## 2023-04-25 RX ADMIN — LIDOCAINE HYDROCHLORIDE 2 ML: 10 INJECTION, SOLUTION INFILTRATION; PERINEURAL at 08:08

## 2023-04-25 RX ADMIN — Medication 100 MCG: at 14:18

## 2023-04-25 RX ADMIN — SUGAMMADEX 400 MG: 100 INJECTION, SOLUTION INTRAVENOUS at 14:50

## 2023-04-25 RX ADMIN — POTASSIUM PHOSPHATE, MONOBASIC POTASSIUM PHOSPHATE, DIBASIC 9 MMOL: 224; 236 INJECTION, SOLUTION, CONCENTRATE INTRAVENOUS at 17:26

## 2023-04-25 RX ADMIN — HUMAN INSULIN 1 UNITS/HR: 100 INJECTION, SOLUTION SUBCUTANEOUS at 11:43

## 2023-04-25 RX ADMIN — FENTANYL CITRATE 250 MCG: 50 INJECTION, SOLUTION INTRAMUSCULAR; INTRAVENOUS at 09:04

## 2023-04-25 RX ADMIN — PROTHROMBIN, COAGULATION FACTOR VII HUMAN, COAGULATION FACTOR IX HUMAN, COAGULATION FACTOR X HUMAN, PROTEIN C, PROTEIN S HUMAN, AND WATER 574 UNITS: KIT at 13:35

## 2023-04-25 RX ADMIN — VANCOMYCIN HYDROCHLORIDE 1000 MG: 1 INJECTION, SOLUTION INTRAVENOUS at 09:07

## 2023-04-25 RX ADMIN — HYDROMORPHONE HYDROCHLORIDE 0.4 MG: 1 INJECTION, SOLUTION INTRAMUSCULAR; INTRAVENOUS; SUBCUTANEOUS at 20:18

## 2023-04-25 RX ADMIN — PROPOFOL 40 MG: 10 INJECTION, EMULSION INTRAVENOUS at 08:26

## 2023-04-25 RX ADMIN — FENTANYL CITRATE 250 MCG: 50 INJECTION, SOLUTION INTRAMUSCULAR; INTRAVENOUS at 13:17

## 2023-04-25 RX ADMIN — FENTANYL CITRATE 250 MCG: 50 INJECTION, SOLUTION INTRAMUSCULAR; INTRAVENOUS at 08:23

## 2023-04-25 RX ADMIN — CEFAZOLIN 1 G: 1 INJECTION, POWDER, FOR SOLUTION INTRAMUSCULAR; INTRAVENOUS at 16:42

## 2023-04-25 ASSESSMENT — ACTIVITIES OF DAILY LIVING (ADL)
ADLS_ACUITY_SCORE: 26
ADLS_ACUITY_SCORE: 20
ADLS_ACUITY_SCORE: 24
ADLS_ACUITY_SCORE: 20
ADLS_ACUITY_SCORE: 26
ADLS_ACUITY_SCORE: 26
ADLS_ACUITY_SCORE: 20

## 2023-04-25 NOTE — H&P
CV ICU H&P  4/25/2023      CO-MORBIDITIES:   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     Hypertension goal BP (blood pressure) < 140/90     Seborrheic keratosis     Malignant neoplasm of overlapping sites of left female breast (H)     Scoliosis     Compression fracture of thoracic vertebra (H)     Mass of left hand     Primary open angle glaucoma of both eyes, unspecified glaucoma stage     Osteoporosis, unspecified osteoporosis type, unspecified pathological fracture presence     Nuclear sclerosis of left eye     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)     Actinic keratosis     History of nonmelanoma skin cancer     Combined forms of age-related cataract of right eye     Status post coronary angiogram     Aortic valve stenosis       ASSESSMENT: Cydney Christiansen is a 88 y/o female with a PMH significant for HTN, HLD, bilateral glaucoma, scoliosis, compression fracture of thoracic vertebrae, osteoporosis, history of bladder cancer, invasive ductal carcinoma of left breast s/p lumpectomy (2015), and multiple other skin cancer sites s/p removal who has severe aortic stenosis with bicuspid aortic valve and an ascending aortic aneurysm who presents 4/25 for ascending aortic arch repair with tissue graft and AVR with Dr. Solis. Patient required repair stitches around valve d/t bleeding post-op. Additionally, noted to have moderate drop in platelet count coming off bypass requiring 2000 Kcentra, 1g fibryga.  Received 2pRBC, 2 platelets, 325 cell saver, 1.5L fluid. Arrived to CV ICU  for further hemodynamic monitoring/management on precedex infusion.       UPDATES and EVENTS TODAY:   -- Arrived at 1520   -- Send stat labs, send ACT and TEG   -- 500cc LR bolus for MAP goal >65  -- Continue precedex infusion   -- ETT at [facility] on CXR; Retract 3cm and repeat   -- OG tube OK     PLAN:  Neuro/ pain/ sedation:  # Acute Postoperative pain  - Monitor neurological status. Notify the MD for any acute changes in exam.  - Pain: fentanyl gtt. Scheduled tylenol. PRN tylenol, oxycodone, dilaudid.  - Sedation: Precedex gtt  - Bilateral JAY Catheters present; Continue infusions   - Wean sedation for fast-track status     Pulmonary care:   # Postoperative ventilation management  Vent Mode: CMV/AC  (Continuous Mandatory Ventilation/ Assist Control)  FiO2 (%): 50 %  Resp Rate (Set): 14 breaths/min  Tidal Volume (Set, mL): 420 mL  PEEP (cm H2O): 5 cmH2O  Resp: 12  - Titrate supplemental oxygen to maintain saturation above 92% upon extubation.  - Pulmonary hygiene: Incentive spirometer every 15- 30 minutes when awake, flutter valve, C&DB  - Hold PTA albuterol   - Continue full vent support  - Wean to PS once able for possible extubation   - Daily vent weaning   - Monitor CT output closely  - Post-op CXR, then daily   - Add duonebs x 1 day pending extubation     Cardiovascular:    # Severe aortic stenosis 2/2 bicuspid aortic S/P AVR 4/25   # Ascending aortic aneurysm s/p graft repair 4/25   # Hx HTN, HLD  PreCBP: Normal BiV Fx. Trace TR/MR/PI, no AI. Severe Aortic Stenosis estimated at 0.68cm^2 by continuity LVOT/AV VTI. Partial effacement of Sinus. Max diameter 5.2cm. Distortion of overall CAL greater than would expect with 5.2 cm aneurysm.  - Recent echo on 1/18/23 with LVEF of 45-50%  - PostCBP: Good biventricular function, no wall motion abnormalities.   - Monitor hemodynamic status.    - Goal MAP>65, SBP<110, though ideally < 100 per Dr Solis    - Levo gtt for MAP > 65    - Cardene gtt for SBP < 110   - Hold PTA  losartan, simvastatin  - V-wires in place with back-up rate of 55   - ASA, HSQ to start tomorrow   - S/p 3mg milrinone load intra-op @ 2pm; Monitor for additional doses tonight     GI care/ Nutrition:   # Elevated LFTs; likely 2/2 shock liver  # NPO status    - NPO   - PPI  - Continue bowel regimen: miralax, senna  - KUB for OG tube upon arrival   - Trend LFTs daily   - RD consult for TF recs if unable to extubate today     Renal/ Fluid Balance/ Electrolytes:   BL creat appears to be ~ 0.6  - Strict I/O, daily weights  - Avoid/limit nephrotoxins as able  - Replete lytes PRN per protocol  - Trend lactate q4h   - Continue traylor     Endocrine:    # Stress induced hyperglycemia  # Osteoporosis c/b compression fracture of spine  Preop A1c 6.0  - Insulin gtt  - Goal BG <180 for optimal healing  - PTA meds alendronate    ID/ Antibiotics:  # Stress induced leukocytosis  - To complete perioperative regimen  - Continue to monitor fever curve, WBC and inflammatory markers as appropriate    Heme:     # Stress induced leukocytosis  # Acute blood loss anemia  # thrombocytopenia 2/2 bypass   # C/f coagulopathy 2/2 abnormal quantra intra-op   Monitor for s/sx of bleeding  - CBC q4h today given bleeding   - Trend q4h coags until normalized   - Hemoglobin goal >7   - Consider further products if worsening bleeding   - Clot time 197 post-op from 133;   - Follow up STAT TEG     MSK/ Skin:  # Sternotomy  # Surgical Incision  - Sternal precautions  - Postoperative incision management per protocol  - PT/OT/CR    Prophylaxis:    - Mechanical prophylaxis for DVT  - Chemical DVT prophylaxis - start subcutaneous heparin tomorrow   - PPI     Lines/ tubes/ drains:  - ETT  - MAC with PA cath   - Traylor   - OG tube   - 2 med CTs, 1 vipul     Disposition:  - CVICU      Patient seen, findings and plan discussed with CVICU staff and CVTS fellow, attending.     I spent a total of 45 minutes providing critical care services at the bedside, and  on the critical care unit, evaluating the patient, directing care and reviewing laboratory values and radiologic reports for the patient. Time spent separate from procedural time.     Jamel Ahn PA-C  04/25/23  4:22 PM      ====================================    HPI:   Cydney Christiansen is a 86 y/o female with a PMH significant for HTN, HLD, bilateral glaucoma, scoliosis, compression fracture of thoracic vertebrae, osteoporosis, history of bladder cancer, invasive ductal carcinoma of left breast s/p lumpectomy (2015), and multiple other skin cancer sites s/p removal who has severe aortic stenosis with bicuspid aortic valve and an ascending aortic aneurysm who presents 4/25 for ascending aortic arch repair with tissue graft and AVR with Dr. Solis    PAST MEDICAL HISTORY:   Past Medical History:   Diagnosis Date     Actinic keratosis      Aortic valve stenosis 4/25/2023     Basal cell cancer 02/2011    bcc of the L back.     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    left leg     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       PAST SURGICAL HISTORY:   Past Surgical History:   Procedure Laterality Date     BIOPSY BREAST       CATARACT IOL, RT/LT       COLONOSCOPY  5/2008, 5/13, 6/14, 6/17    Q 3 years for advanced adenomatous polyp     CV CORONARY ANGIOGRAM N/A 2/17/2023     Procedure: Coronary Angiogram [[id number]];  Surgeon: James Jo MD;  Location:  [facility]     CV LEFT HEART CATH N/A 2/17/2023    Procedure: Left Heart Cath;  Surgeon: James Jo MD;  Location:  [facility]     CV RIGHT HEART CATH MEASUREMENTS RECORDED N/A 2/17/2023    Procedure: Right Heart Cath;  Surgeon: James Jo MD;  Location:  [facility]     CYSTOSCOPY  12/31/2008     D & C       GENITOURINARY SURGERY      TURBT     HC REMOVAL GALLBLADDER      open pan     HC TRABECULOPLASTY BY LASER SURGERY Left 04/18/2007    SLT #1 OS (inf 180)     HC TRABECULOPLASTY BY LASER SURGERY Right 05/04/2011    SLT #1 OD (inf 180?)     HC TRABECULOPLASTY BY LASER SURGERY Left 03/17/2015    SLT #2 OS (sup 180)     HC TRABECULOPLASTY BY LASER SURGERY Right 06/23/2015    SLT #2 OD (sup 180?)     LASER ARGON TREATMENT      SLT left eye x 2     LUMPECTOMY BREAST       LUMPECTOMY BREAST WITH SENTINEL NODE, COMBINED Left 07/29/2015    Procedure: COMBINED LUMPECTOMY BREAST WITH SENTINEL NODE;  Surgeon: Ifeanyi Sunshine MD;  Location:  OR     PHACOEMULSIFICATION CLEAR CORNEA WITH STANDARD INTRAOCULAR LENS IMPLANT Left 12/08/2017    Procedure: PHACOEMULSIFICATION CLEAR CORNEA WITH STANDARD INTRAOCULAR LENS IMPLANT;   COMPLEX LEFT PHACOEMULSIFICATION CLEAR CORNEA WITH STANDARD INTRAOCULAR LENS IMPLANT ;  Surgeon: Kvng Garcia MD;  Location: [facility]     PHACOEMULSIFICATION CLEAR CORNEA WITH STANDARD INTRAOCULAR LENS IMPLANT Right 10/19/2020    Procedure: RIGHT COMPLEX PHACOEMULSIFICATION, CATARACT, WITH INTRAOCULAR LENS IMPLANT ;  Surgeon: Kvng Garcia MD;  [place] [facility] [place]     SURGICAL HISTORY OF -   09/2011    squamous cell CA excised from back     TUBAL LIGATION         FAMILY HISTORY:   Family History   Problem Relation Age of Onset     Diabetes Mother      Breast Cancer Mother      Eye Disorder Mother      Osteoporosis Mother      Glaucoma Mother       Macular Degeneration Mother      Prostate Cancer Father      Anesthesia Reaction Sister         PONV     Thyroid Disease Sister      Blood Disease Sister         lupus     Heart Disease Sister      Prostate Cancer Brother      Glaucoma Brother      Macular Degeneration Brother      Prostate Cancer Brother      Glaucoma Brother      Asthma Son      Glaucoma Other      Melanoma No family hx of      Skin Cancer No family hx of      Bleeding Disorder No family hx of      Venous thrombosis No family hx of        SOCIAL HISTORY:   Social History     Tobacco Use     Smoking status: Former     Packs/day: 0.50     Years: 20.00     Pack years: 10.00     Types: Cigarettes     Quit date: 1976     Years since quittin.2     Smokeless tobacco: Never   Vaping Use     Vaping status: Never Used     Passive vaping exposure: Yes   Substance Use Topics     Alcohol use: Yes     Comment: Weekends 4 drink total per week         OBJECTIVE:   1. VITAL SIGNS:          2. INTAKE/ OUTPUT:        3. PHYSICAL EXAMINATION:     General: sedated, appears acutely ill   Neuro: sedated, does not open eyes upon arrival given sedation meds. PERRL 3mm.   Resp: intubated, ventilated. Tolerating full vent support. BS equal and CTA bilaterally. NO wheezing.   CV: S1, S2, RRR, no m/r/g   Abdomen: Soft, non-distended, non-tender  Incisions: c/d/i. No drainage or oozing.   Extremities: warm and well perfused, ~1+ edema in BLE.   CT: To suction, serosang output, no airleak, crepitus    4. INVESTIGATIONS:   Arterial Blood Gases   Recent Labs   Lab 23  1531 23  1444 23  1348 23  1316   PH 7.47* 7.44 7.28* 7.43   PCO2 31* 33* 48* 35   PO2 157* 107* 94 195*   HCO3 23 22 22 23     Complete Blood Count   Recent Labs   Lab 23  1529 23  1444 23  1431 23  1348 23  1329   WBC 8.5  --   --   --  7.2   HGB 10.1* 10.1* 10.0* 7.7* 7.8*     --   --   --  102*     Basic Metabolic Panel  Recent Labs   Lab  04/25/23  1534 04/25/23  1529 04/25/23  1444 04/25/23  1431 04/25/23  1348   NA  --  138 137 138 138   POTASSIUM  --  4.0 3.8 3.8 3.5   CHLORIDE  --  105  --   --   --    CO2  --  20*  --   --   --    BUN  --  10.2  --   --   --    CR  --  0.57  --   --   --    * 163* 141* 140* 120*     Liver Function Tests  Recent Labs   Lab 04/25/23  1529 04/25/23  1329   *  --    ALT 59*  --    ALKPHOS 36  --    BILITOTAL 1.1  --    ALBUMIN 3.1*  --    INR 1.29* 2.01*     Pancreatic Enzymes  No lab results found in last 7 days.  Coagulation Profile  Recent Labs   Lab 04/25/23  1529 04/25/23  1329   INR 1.29* 2.01*   PTT 78* 95*         5. RADIOLOGY:   Recent Results (from the past 24 hour(s))   POC US Guidance Needle Placement    Impression    Bilateral JAY catheters       =========================================

## 2023-04-25 NOTE — ANESTHESIA CARE TRANSFER NOTE
Patient: Cydney Christiansen    Procedure: Procedure(s):  MEDIAN STERNOTOMY, CARDIOPULMONARY BYPASS, AORTIC VALVE REPLACEMENT USING PIERRE RESILA 21 MM VALVE, ASCENDING AORTA  ANEURYSM REPAIR USING 32 MM HEMASHIELD GRAFT, TRANSESOPHAGEAL ECHOCARDIOGRAM PERFORMED BY ANESTHESIA STAFF       Diagnosis: AS (aortic stenosis) [I35.0]  Aneurysm (H) [I72.9]  Diagnosis Additional Information: No value filed.    Anesthesia Type:   General     Note:    Oropharynx: endotracheal tube in place  Level of Consciousness: iatrogenic sedation  Oxygen Supplementation: nasal cannula    Independent Airway: airway patency satisfactory and stable  Dentition: dentition unchanged  Vital Signs Stable: post-procedure vital signs reviewed and stable  Report to RN Given: handoff report given  Patient transferred to: ICU    ICU Handoff: Call for PAUSE to initiate/utilize ICU HANDOFF, Identified Patient, Identified Responsible Provider, Reviewed the Pertinent Medical History, Discussed Surgical Course, Reviewed Intra-OP Anesthesia Management and Issues during Anesthesia, Set Expectations for Post Procedure Period and Allowed Opportunity for Questions and Acknowledgement of Understanding      Vitals:  Vitals Value Taken Time   BP     Temp     Pulse 59 04/25/23 1549   Resp 19 04/25/23 1549   SpO2 100 % 04/25/23 1549   Vitals shown include unvalidated device data.    Electronically Signed By: Rob Gonzalez MD  April 25, 2023  3:51 PM

## 2023-04-25 NOTE — LETTER
Transition Communication Hand-off for Care Transitions to Next Level of Care Provider    Name: Cydney Christiansen  : 1935  MRN #: [id number]  Primary Care Provider: Estrellita Hernandez     Primary Clinic: [address] [place] [address]  [place] [address]     Reason for Hospitalization:  AS (aortic stenosis) [I35.0]  Aneurysm (H) [I72.9]  Aortic valve stenosis [I35.0]  Admit Date/Time: 2023  5:19 AM  Discharge Date: ***  Payor Source: Payor: [facility] / Plan: [facility] MEDICARE / Product Type: HMO /     Readmission Assessment Measure (NAKITA) Risk Score/category: ***    Plan of Care Goals/Milestone Events:   Patient Concerns: ***   Patient Goals:   Short-term ***   Long-term ***   Medical Goals   Short-term ***   Long-term ***         Reason for Communication Hand-off Referral: {CCREASONCMCTNHANDOFFREFERRALCTS:52449}    Discharge Plan:       Concern for non-adherence with plan of care:   Y/N ***  Discharge Needs Assessment:  Needs      Flowsheet Row Most Recent Value   Equipment Currently Used at Home none            Already enrolled in Tele-monitoring program and name of program:  ***  Follow-up specialty is recommended: {YES / NO:69584}    Follow-up plan:    Future Appointments   Date Time Provider Department Center   2023 10:00 AM 2, Ur Cardiac Rehab [facility]       Any outstanding tests or procedures:        Referrals       Future Labs/Procedures    CARDIAC REHAB REFERRAL     Process Instructions:    Advance to Wellness and Exercise for Life (WEL) Program or to another maintenance exercise program upon completion of phase 2 cardiac rehab.    Weight mgt program is only offered at [facility].    *This therapy referral will be filtered to a centralized scheduling office at [facility] and the patient will receive a call to schedule an appointment at a [place] location most convenient for them. *        Comments:    Please be aware that coverage of these services is subject to the terms and limitations of  your health insurance plan.  Call member services at your health plan with any benefit or coverage questions.     Order is sent electronically to central rehab scheduling. Call [phone] if you haven't been contacted regarding these appointments within 2 business days of discharge.  If you have not heard from the scheduling office within 2 business days, please call [phone] for [facility], [phone] for [place] and [phone] for [facility].              Key Recommendations:      Erich Domínguez RN    AVS/Discharge Summary is the source of truth; this is a helpful guide for improved communication of patient story

## 2023-04-25 NOTE — ANESTHESIA PROCEDURE NOTES
Arterial Line Procedure Note    Pre-Procedure   Staff -        Anesthesiologist:  Rajat Adorno MD       Resident/Fellow: Rob Gonzalez MD       Performed By: resident       Location: OR       Pre-Anesthestic Checklist: patient identified, IV checked, risks and benefits discussed, informed consent, monitors and equipment checked, pre-op evaluation and at physician/surgeon's request  Timeout:       Correct Patient: Yes        Correct Procedure: Yes        Correct Site: Yes        Correct Position: Yes   Line Placement:   This line was placed Pre Induction starting at 4/25/2023 8:08 AM and ending at 4/25/2023 8:11 AM  Procedure   Procedure: arterial line       Laterality: right       Insertion Site: radial.  Sterile Prep        Standard elements of sterile barrier followed       Skin prep: Chloraprep  Insertion/Injection        Technique: ultrasound guided        1. Ultrasound was used to evaluate the access site.       2. Artery evaluated via ultrasound for patency/adequacy.       3. Using real-time ultrasound the needle/catheter was observed entering the artery/vein.       Catheter Type/Size: 20 G, 12 cm  Narrative         Secured by: suture       Tegaderm dressing used.       Complications: None apparent,        Arterial waveform: Yes

## 2023-04-25 NOTE — PROGRESS NOTES
Pt arrived to  from OR at 1520. Weaned off Precedex gtt to obtain neuro status. Pt was able to wake up to voice, move all extremities, strong hand  bilaterally and moves feet. Pt denies any pain; scheduled Tylenol and erector spinae x2 in place. Vent weaned to 12/420 30% 5. Clear lung sounds. Heart rate bradycardic 45-60. No ectopy. Phosphorus replaced. SBP goal <100. Temp pacer VVI backup rate 40. When attempting to pace at 50 bpm, pt dropped her SBP to 70-80s. Pt received 1L LR for hypotension. Chest tubes with total of 120cc serosanguinous output from 8760-9598. OG placed and okay to use. Helms in place w/ 35-60 urine output.     Access: Right radial Lohn, Right double lumen MAC w/ Thicket at 53, Left arm 20g PIV.     Family Sons: Tess at bedside and updated with plan          Admitted/transferred from: OR   Reason for admission/transfer: Aortic aneursym and aortic valve repair   Patient status upon admission/transfer: stable on precedex and minimal Norepinephrine. HR 50-60s.   Interventions: gave 1 L LR for hypotension  Plan: maintain SBP<100, wake patient and fast track if able  2 RN skin assessment: completed by Kristan RN  Result of skin assessment and interventions/actions: Vac Ulta to midsternal incision, chest tubes.   Height, weight, drug calc weight: done  Patient belongings: None at bedside at this time.

## 2023-04-25 NOTE — ANESTHESIA PROCEDURE NOTES
Airway       Patient location during procedure: OR       Procedure Start/Stop Times: 4/25/2023 8:32 AM  Staff -        Anesthesiologist:  Rajat Adorno MD       Resident/Fellow: Rob Gonzalez MD       Performed By: anesthesiologist and with residents       Procedure performed by resident/fellow/CRNA in presence of a teaching physician.    Consent for Airway        Urgency: elective  Indications and Patient Condition       Indications for airway management: adarsh-procedural       Induction type:intravenous       Mask difficulty assessment: 1 - vent by mask    Final Airway Details       Final airway type: endotracheal airway       Successful airway: ETT - single  Endotracheal Airway Details        ETT size (mm): 8.0       Cuffed: yes       Successful intubation technique: direct laryngoscopy       DL Blade Type: MAC 3       Grade View of Cords: 2 (Grade 2b view)       Adjucts: stylet       Position: Right       Measured from: lips       Secured at (cm): 23       Bite block used: None    Post intubation assessment        Number of attempts at approach: 2       Secured with: pink tape       Ease of procedure: easy       Dentition: Intact and Unchanged    Medication(s) Administered   Medication Administration Time: 4/25/2023 8:32 AM

## 2023-04-25 NOTE — ANESTHESIA PROCEDURE NOTES
"Erector spinae Procedure Note    Pre-Procedure   Staff -        Anesthesiologist:  Marcos Bernal MD       Resident/Fellow: Elizabet Spangler MD       Performed By: resident       Location: pre-op       Pre-Anesthestic Checklist: patient identified, IV checked, site marked, risks and benefits discussed, informed consent, monitors and equipment checked, pre-op evaluation, at physician/surgeon's request and post-op pain management  Timeout:       Correct Patient: Yes        Correct Procedure: Yes        Correct Site: Yes        Correct Position: Yes        Correct Laterality: Yes        Site Marked: Yes  Procedure Documentation  Procedure: Erector spinae       Diagnosis: ANALGESIA       Laterality: bilateral       Patient Position: prone       Patient Prep/Sterile Barriers: sterile gloves, mask, patient draped       Skin prep: Chloraprep       Local skin infiltrated with 5 mL of 1% lidocaine.        Insertion Site: T6-7.       Needle Type: Touhy needle       Needle Gauge: 17.        Needle Length (Inches): 3.13        Catheter: 19 G.          Catheter threaded easily.           Threaded 11 cm at skin.         Ultrasound guided       1. Ultrasound was used to identify targeted nerve, plexus, vascular marker, or fascial plane and place a needle adjacent to it in real-time.       2. Ultrasound was used to visualize the spread of anesthetic in close proximity to the above referenced structure.       3. A permanent image is entered into the patient's record.    Assessment/Narrative         The placement was negative for: blood aspirated, painful injection and site bleeding       Paresthesias: No.       Bolus given via catheter. no blood aspirated via catheter.        Secured via Tegaderm and Dermabond.        Insertion/Infusion Method: Continuous Infusion       Complications: none      FOR [facility] ([facility] ONLY:   Pain Team Contact information: please page the Pain Team Via [employer]. Search \"Pain\". During daytime hours, " please page the attending first. At night please page the resident first.       5

## 2023-04-25 NOTE — ANESTHESIA PROCEDURE NOTES
Perioperative CAL Procedure Note    Staff -        Anesthesiologist:  Rajat Adorno MD       Performed By: resident  Preanesthesia Checklist:  Patient identified, IV assessed, risks and benefits discussed, monitors and equipment assessed, procedure being performed at surgeon's request and anesthesia consent obtained.    CAL Probe Insertion    Probe Status PRE Insertion: NO obvious damage  Probe type:  Adult 3D  Bite block used:   Molar  Insertion Technique: Jaw Lift  Insertion complications: None obvious  Billing Report:CAL report by Anesthesiologist (See Separate Report note)  Probe Status POST Removal: NO obvious damage    CAL Report  General Procedure Information    Diagnostic indications for CAL:   Aortic stenosis    Post Intervention Findings  Procedure(s) performed:  Aortic Valve Repair/Replace and Ascending Aorta Repair.  Regional wall motion:. Surgeon(s) notified of all postintervention findings: Yes.                 Echocardiogram Comments  Echocardiogram comments: Pre: Normal BiV Fx. Trace TR/MR/PI, no AI. Severe Aortic Stenosis estimated at 0.68cm^2 by continuity LVOT/AV VTI. Partial effacement of Sinus. Max diameter 5.2cm. Distortion of overall CAL greater than would expect with 5.2 cm aneurysm.   Post: Normal BiV Fx with milrinone load. Trace TR/MR/PI. Good working Aortic valve replacement. Low gradient. No PVL. Graft visualized with no turbulence or leak where visible. .

## 2023-04-25 NOTE — ANESTHESIA PROCEDURE NOTES
Central Line/PA Catheter Placement    Pre-Procedure   Staff -        Anesthesiologist:  Rajat Adorno MD       Resident/Fellow: Rob Gonzalez MD       Performed By: resident       Location: OR       Pre-Anesthestic Checklist: patient identified, IV checked, site marked, risks and benefits discussed, informed consent, monitors and equipment checked, pre-op evaluation and at physician/surgeon's request  Timeout:       Correct Patient: Yes        Correct Procedure: Yes        Correct Site: Yes        Correct Position: Yes        Correct Laterality: Yes   Line Placement:   This line was placed Post Induction    Procedure   Procedure: central line       Laterality: right       Insertion Site: internal jugular.       Patient Position: Trendelenburg  Sterile Prep        All elements of maximal sterile barrier technique followed       Patient Prep/Sterile Barriers: draped, hand hygiene, gloves , hat , mask , draped, gown, sterile gel and probe cover       Skin prep: Chloraprep  Insertion/Injection        Technique: ultrasound guided and Seldinger Technique        1. Ultrasound was used to evaluate the access site.       2. Vein evaluated via ultrasound for patency/adequacy.       3. Using real-time ultrasound the needle/catheter was observed entering the artery/vein.       Introducer Type: 9 Fr, 2-lumen MAC        Type: PA/CVC with Introducer       Number of Lumens: double lumen       PA Catheter Type: CCO         Appropriate RV, RA and PA waveforms noted:  Yes            Distance to Wedge Position cm: 55  Narrative         Secured by: suture       Tegaderm and Biopatch dressing used.       Complications: None apparent,        Verification method: Placement to be verified post-op

## 2023-04-25 NOTE — LETTER
Transition Communication Hand-off for Care Transitions to Next Level of Care Provider    Name: Cydney Christiansen  : 1935  MRN #: [id number]  Primary Care Provider: Estrellita Hernandez     Primary Clinic: [address] [place] [address]  [place] [address]     Reason for Hospitalization:  AS (aortic stenosis) [I35.0]  Aneurysm (H) [I72.9]  Aortic valve stenosis [I35.0]  Admit Date/Time: 2023  5:19 AM  Discharge Date: ***  Payor Source: Payor: [facility] / Plan: [facility] MEDICARE / Product Type: HMO /     Readmission Assessment Measure (NAKITA) Risk Score/category: ***    Plan of Care Goals/Milestone Events:   Patient Concerns: ***   Patient Goals:   Short-term ***   Long-term ***   Medical Goals   Short-term ***   Long-term ***         Reason for Communication Hand-off Referral: {CCREASONCMCTNHANDOFFREFERRALCTS:65663}    Discharge Plan:       Concern for non-adherence with plan of care:   Y/N ***  Discharge Needs Assessment:  Needs      Flowsheet Row Most Recent Value   Equipment Currently Used at Home none            Already enrolled in Tele-monitoring program and name of program:  ***  Follow-up specialty is recommended: {YES / NO:31181}    Follow-up plan:    Future Appointments   Date Time Provider Department Center   2023 10:00 AM 2, Ur Cardiac Rehab [facility]       Any outstanding tests or procedures:        Referrals       Future Labs/Procedures    CARDIAC REHAB REFERRAL     Process Instructions:    Advance to Wellness and Exercise for Life (WEL) Program or to another maintenance exercise program upon completion of phase 2 cardiac rehab.    Weight mgt program is only offered at [facility].    *This therapy referral will be filtered to a centralized scheduling office at [facility] and the patient will receive a call to schedule an appointment at a [place] location most convenient for them. *        Comments:    Please be aware that coverage of these services is subject to the terms and limitations of  your health insurance plan.  Call member services at your health plan with any benefit or coverage questions.     Order is sent electronically to central rehab scheduling. Call [phone] if you haven't been contacted regarding these appointments within 2 business days of discharge.  If you have not heard from the scheduling office within 2 business days, please call [phone] for [facility], [phone] for [place] and [phone] for [facility].              Key Recommendations:      Erich Domínguez RN    AVS/Discharge Summary is the source of truth; this is a helpful guide for improved communication of patient story

## 2023-04-26 ENCOUNTER — APPOINTMENT (OUTPATIENT)
Dept: SPEECH THERAPY | Facility: CLINIC | Age: 88
DRG: 219 | End: 2023-04-26
Attending: STUDENT IN AN ORGANIZED HEALTH CARE EDUCATION/TRAINING PROGRAM
Payer: COMMERCIAL

## 2023-04-26 ENCOUNTER — APPOINTMENT (OUTPATIENT)
Dept: CARDIOLOGY | Facility: CLINIC | Age: 88
DRG: 219 | End: 2023-04-26
Attending: STUDENT IN AN ORGANIZED HEALTH CARE EDUCATION/TRAINING PROGRAM
Payer: COMMERCIAL

## 2023-04-26 ENCOUNTER — APPOINTMENT (OUTPATIENT)
Dept: OCCUPATIONAL THERAPY | Facility: CLINIC | Age: 88
DRG: 219 | End: 2023-04-26
Attending: PHYSICIAN ASSISTANT
Payer: COMMERCIAL

## 2023-04-26 ENCOUNTER — APPOINTMENT (OUTPATIENT)
Dept: GENERAL RADIOLOGY | Facility: CLINIC | Age: 88
DRG: 219 | End: 2023-04-26
Attending: PHYSICIAN ASSISTANT
Payer: COMMERCIAL

## 2023-04-26 DIAGNOSIS — Z95.2 S/P AVR (AORTIC VALVE REPLACEMENT): Primary | ICD-10-CM

## 2023-04-26 LAB
ALBUMIN SERPL BCG-MCNC: 2.9 G/DL (ref 3.5–5.2)
ALLEN'S TEST: NORMAL
ALLEN'S TEST: NORMAL
ALP SERPL-CCNC: 33 U/L (ref 35–104)
ALT SERPL W P-5'-P-CCNC: 40 U/L (ref 10–35)
ANION GAP SERPL CALCULATED.3IONS-SCNC: 9 MMOL/L (ref 7–15)
APTT PPP: 38 SECONDS (ref 22–38)
APTT PPP: 39 SECONDS (ref 22–38)
AST SERPL W P-5'-P-CCNC: 75 U/L (ref 10–35)
ATRIAL RATE - MUSE: 54 BPM
ATRIAL RATE - MUSE: 60 BPM
BASE EXCESS BLDA CALC-SCNC: -2.9 MMOL/L (ref -9–1.8)
BASE EXCESS BLDA CALC-SCNC: -3.5 MMOL/L (ref -9–1.8)
BASE EXCESS BLDV CALC-SCNC: -2.8 MMOL/L (ref -7.7–1.9)
BASE EXCESS BLDV CALC-SCNC: -3.3 MMOL/L (ref -7.7–1.9)
BASE EXCESS BLDV CALC-SCNC: -4.5 MMOL/L (ref -7.7–1.9)
BASE EXCESS BLDV CALC-SCNC: 0 MMOL/L (ref -7.7–1.9)
BILIRUB SERPL-MCNC: 0.7 MG/DL
BUN SERPL-MCNC: 14.7 MG/DL (ref 8–23)
CA-I BLD-MCNC: 4.7 MG/DL (ref 4.4–5.2)
CALCIUM SERPL-MCNC: 8.2 MG/DL (ref 8.8–10.2)
CHLORIDE SERPL-SCNC: 106 MMOL/L (ref 98–107)
CLOT INIT KAOL IND TO POST HEP NEUT TRTO: 1.2 {RATIO}
CLOT INIT KAOLIN IND BLD US: 153 SEC (ref 113–166)
CLOT INIT KAOLIN IND P HEP NEUT BLD US: 130 SEC (ref 103–153)
CLOT STIFF PLT CONT BLD CALC: NORMAL HPA
CLOT STIFF TF IND P HEP NEUT BLD US: NORMAL HPA
CLOT STIFF TF IND+IIB-IIIA INH P HEP NEU: 1.9 HPA (ref 1–3.7)
CREAT SERPL-MCNC: 0.73 MG/DL (ref 0.51–0.95)
DEPRECATED HCO3 PLAS-SCNC: 20 MMOL/L (ref 22–29)
DIASTOLIC BLOOD PRESSURE - MUSE: NORMAL MMHG
DIASTOLIC BLOOD PRESSURE - MUSE: NORMAL MMHG
ERYTHROCYTE [DISTWIDTH] IN BLOOD BY AUTOMATED COUNT: 15.1 % (ref 10–15)
ERYTHROCYTE [DISTWIDTH] IN BLOOD BY AUTOMATED COUNT: 15.3 % (ref 10–15)
FIBRINOGEN PPP-MCNC: 228 MG/DL (ref 170–490)
FIBRINOGEN PPP-MCNC: 247 MG/DL (ref 170–490)
GFR SERPL CREATININE-BSD FRML MDRD: 80 ML/MIN/1.73M2
GLUCOSE BLDC GLUCOMTR-MCNC: 109 MG/DL (ref 70–99)
GLUCOSE BLDC GLUCOMTR-MCNC: 122 MG/DL (ref 70–99)
GLUCOSE BLDC GLUCOMTR-MCNC: 126 MG/DL (ref 70–99)
GLUCOSE BLDC GLUCOMTR-MCNC: 128 MG/DL (ref 70–99)
GLUCOSE BLDC GLUCOMTR-MCNC: 130 MG/DL (ref 70–99)
GLUCOSE BLDC GLUCOMTR-MCNC: 137 MG/DL (ref 70–99)
GLUCOSE BLDC GLUCOMTR-MCNC: 185 MG/DL (ref 70–99)
GLUCOSE SERPL-MCNC: 137 MG/DL (ref 70–99)
HCO3 BLD-SCNC: 22 MMOL/L (ref 21–28)
HCO3 BLD-SCNC: 22 MMOL/L (ref 21–28)
HCO3 BLDV-SCNC: 21 MMOL/L (ref 21–28)
HCO3 BLDV-SCNC: 23 MMOL/L (ref 21–28)
HCO3 BLDV-SCNC: 23 MMOL/L (ref 21–28)
HCO3 BLDV-SCNC: 25 MMOL/L (ref 21–28)
HCT VFR BLD AUTO: 28.6 % (ref 35–47)
HCT VFR BLD AUTO: 28.6 % (ref 35–47)
HCT VFR BLD AUTO: 28.7 % (ref 35–47)
HCT VFR BLD AUTO: 29.4 % (ref 35–47)
HGB BLD-MCNC: 9.3 G/DL (ref 11.7–15.7)
HGB BLD-MCNC: 9.5 G/DL (ref 11.7–15.7)
HGB BLD-MCNC: 9.6 G/DL (ref 11.7–15.7)
HGB BLD-MCNC: 9.8 G/DL (ref 11.7–15.7)
INR PPP: 1.49 (ref 0.85–1.15)
INR PPP: 1.53 (ref 0.85–1.15)
INTERPRETATION ECG - MUSE: NORMAL
INTERPRETATION ECG - MUSE: NORMAL
LACTATE SERPL-SCNC: 1.2 MMOL/L (ref 0.7–2)
LACTATE SERPL-SCNC: 1.2 MMOL/L (ref 0.7–2)
LACTATE SERPL-SCNC: 1.5 MMOL/L (ref 0.7–2)
LACTATE SERPL-SCNC: 1.8 MMOL/L (ref 0.7–2)
LACTATE SERPL-SCNC: 1.8 MMOL/L (ref 0.7–2)
LVEF ECHO: NORMAL
MAGNESIUM SERPL-MCNC: 2.2 MG/DL (ref 1.7–2.3)
MCH RBC QN AUTO: 29.1 PG (ref 26.5–33)
MCH RBC QN AUTO: 29.5 PG (ref 26.5–33)
MCH RBC QN AUTO: 29.5 PG (ref 26.5–33)
MCH RBC QN AUTO: 29.7 PG (ref 26.5–33)
MCHC RBC AUTO-ENTMCNC: 32.5 G/DL (ref 31.5–36.5)
MCHC RBC AUTO-ENTMCNC: 33.2 G/DL (ref 31.5–36.5)
MCHC RBC AUTO-ENTMCNC: 33.3 G/DL (ref 31.5–36.5)
MCHC RBC AUTO-ENTMCNC: 33.4 G/DL (ref 31.5–36.5)
MCV RBC AUTO: 89 FL (ref 78–100)
O2/TOTAL GAS SETTING VFR VENT: 2 %
O2/TOTAL GAS SETTING VFR VENT: 25 %
O2/TOTAL GAS SETTING VFR VENT: 30 %
O2/TOTAL GAS SETTING VFR VENT: 32 %
OXYHGB MFR BLD: 96 % (ref 92–100)
OXYHGB MFR BLD: 96 % (ref 92–100)
OXYHGB MFR BLDV: 47 % (ref 70–75)
OXYHGB MFR BLDV: 49 % (ref 70–75)
OXYHGB MFR BLDV: 50 % (ref 70–75)
OXYHGB MFR BLDV: 52 % (ref 70–75)
P AXIS - MUSE: 0 DEGREES
P AXIS - MUSE: 22 DEGREES
PCO2 BLD: 37 MM HG (ref 35–45)
PCO2 BLD: 38 MM HG (ref 35–45)
PCO2 BLDV: 38 MM HG (ref 40–50)
PCO2 BLDV: 39 MM HG (ref 40–50)
PCO2 BLDV: 44 MM HG (ref 40–50)
PCO2 BLDV: 44 MM HG (ref 40–50)
PH BLD: 7.36 [PH] (ref 7.35–7.45)
PH BLD: 7.38 [PH] (ref 7.35–7.45)
PH BLDV: 7.32 [PH] (ref 7.32–7.43)
PH BLDV: 7.33 [PH] (ref 7.32–7.43)
PH BLDV: 7.35 [PH] (ref 7.32–7.43)
PH BLDV: 7.41 [PH] (ref 7.32–7.43)
PHOSPHATE SERPL-MCNC: 4.3 MG/DL (ref 2.5–4.5)
PLATELET # BLD AUTO: 122 10E3/UL (ref 150–450)
PLATELET # BLD AUTO: 141 10E3/UL (ref 150–450)
PLATELET # BLD AUTO: 141 10E3/UL (ref 150–450)
PLATELET # BLD AUTO: 161 10E3/UL (ref 150–450)
PO2 BLD: 94 MM HG (ref 80–105)
PO2 BLD: 98 MM HG (ref 80–105)
PO2 BLDV: 27 MM HG (ref 25–47)
PO2 BLDV: 29 MM HG (ref 25–47)
PO2 BLDV: 30 MM HG (ref 25–47)
PO2 BLDV: 30 MM HG (ref 25–47)
POTASSIUM SERPL-SCNC: 4 MMOL/L (ref 3.4–5.3)
PR INTERVAL - MUSE: 180 MS
PR INTERVAL - MUSE: 204 MS
PROT SERPL-MCNC: 4.5 G/DL (ref 6.4–8.3)
QRS DURATION - MUSE: 72 MS
QRS DURATION - MUSE: 78 MS
QT - MUSE: 462 MS
QT - MUSE: 468 MS
QTC - MUSE: 438 MS
QTC - MUSE: 468 MS
R AXIS - MUSE: -11 DEGREES
R AXIS - MUSE: -4 DEGREES
RBC # BLD AUTO: 3.2 10E6/UL (ref 3.8–5.2)
RBC # BLD AUTO: 3.22 10E6/UL (ref 3.8–5.2)
RBC # BLD AUTO: 3.23 10E6/UL (ref 3.8–5.2)
RBC # BLD AUTO: 3.32 10E6/UL (ref 3.8–5.2)
SODIUM SERPL-SCNC: 135 MMOL/L (ref 136–145)
SYSTOLIC BLOOD PRESSURE - MUSE: NORMAL MMHG
SYSTOLIC BLOOD PRESSURE - MUSE: NORMAL MMHG
T AXIS - MUSE: -12 DEGREES
T AXIS - MUSE: 20 DEGREES
VENTRICULAR RATE- MUSE: 54 BPM
VENTRICULAR RATE- MUSE: 60 BPM
WBC # BLD AUTO: 10.5 10E3/UL (ref 4–11)
WBC # BLD AUTO: 11 10E3/UL (ref 4–11)
WBC # BLD AUTO: 11.3 10E3/UL (ref 4–11)
WBC # BLD AUTO: 8.1 10E3/UL (ref 4–11)

## 2023-04-26 PROCEDURE — 200N000002 HC R&B ICU UMMC

## 2023-04-26 PROCEDURE — 82805 BLOOD GASES W/O2 SATURATION: CPT | Performed by: SURGERY

## 2023-04-26 PROCEDURE — 99232 SBSQ HOSP IP/OBS MODERATE 35: CPT | Performed by: NURSE PRACTITIONER

## 2023-04-26 PROCEDURE — 250N000013 HC RX MED GY IP 250 OP 250 PS 637: Performed by: STUDENT IN AN ORGANIZED HEALTH CARE EDUCATION/TRAINING PROGRAM

## 2023-04-26 PROCEDURE — 250N000011 HC RX IP 250 OP 636: Performed by: STUDENT IN AN ORGANIZED HEALTH CARE EDUCATION/TRAINING PROGRAM

## 2023-04-26 PROCEDURE — 250N000012 HC RX MED GY IP 250 OP 636 PS 637: Performed by: STUDENT IN AN ORGANIZED HEALTH CARE EDUCATION/TRAINING PROGRAM

## 2023-04-26 PROCEDURE — 97110 THERAPEUTIC EXERCISES: CPT | Mod: GO | Performed by: OCCUPATIONAL THERAPIST

## 2023-04-26 PROCEDURE — 85384 FIBRINOGEN ACTIVITY: CPT | Performed by: STUDENT IN AN ORGANIZED HEALTH CARE EDUCATION/TRAINING PROGRAM

## 2023-04-26 PROCEDURE — 82805 BLOOD GASES W/O2 SATURATION: CPT

## 2023-04-26 PROCEDURE — 258N000003 HC RX IP 258 OP 636: Performed by: SURGERY

## 2023-04-26 PROCEDURE — 999N000157 HC STATISTIC RCP TIME EA 10 MIN

## 2023-04-26 PROCEDURE — 93010 ELECTROCARDIOGRAM REPORT: CPT | Performed by: INTERNAL MEDICINE

## 2023-04-26 PROCEDURE — 93005 ELECTROCARDIOGRAM TRACING: CPT

## 2023-04-26 PROCEDURE — 85610 PROTHROMBIN TIME: CPT | Performed by: STUDENT IN AN ORGANIZED HEALTH CARE EDUCATION/TRAINING PROGRAM

## 2023-04-26 PROCEDURE — 258N000003 HC RX IP 258 OP 636: Performed by: STUDENT IN AN ORGANIZED HEALTH CARE EDUCATION/TRAINING PROGRAM

## 2023-04-26 PROCEDURE — 82330 ASSAY OF CALCIUM: CPT | Performed by: PHYSICIAN ASSISTANT

## 2023-04-26 PROCEDURE — 99291 CRITICAL CARE FIRST HOUR: CPT | Mod: 24 | Performed by: STUDENT IN AN ORGANIZED HEALTH CARE EDUCATION/TRAINING PROGRAM

## 2023-04-26 PROCEDURE — 92610 EVALUATE SWALLOWING FUNCTION: CPT | Mod: GN

## 2023-04-26 PROCEDURE — 85027 COMPLETE CBC AUTOMATED: CPT | Performed by: STUDENT IN AN ORGANIZED HEALTH CARE EDUCATION/TRAINING PROGRAM

## 2023-04-26 PROCEDURE — 92526 ORAL FUNCTION THERAPY: CPT | Mod: GN

## 2023-04-26 PROCEDURE — 71045 X-RAY EXAM CHEST 1 VIEW: CPT | Mod: 26 | Performed by: RADIOLOGY

## 2023-04-26 PROCEDURE — 85730 THROMBOPLASTIN TIME PARTIAL: CPT | Performed by: STUDENT IN AN ORGANIZED HEALTH CARE EDUCATION/TRAINING PROGRAM

## 2023-04-26 PROCEDURE — 94640 AIRWAY INHALATION TREATMENT: CPT | Mod: 76

## 2023-04-26 PROCEDURE — 999N000045 HC STATISTIC DAILY SWAN MONITORING

## 2023-04-26 PROCEDURE — 250N000011 HC RX IP 250 OP 636: Performed by: PHYSICIAN ASSISTANT

## 2023-04-26 PROCEDURE — 71045 X-RAY EXAM CHEST 1 VIEW: CPT

## 2023-04-26 PROCEDURE — 999N000155 HC STATISTIC RAPCV CVP MONITORING

## 2023-04-26 PROCEDURE — 97530 THERAPEUTIC ACTIVITIES: CPT | Mod: GO | Performed by: OCCUPATIONAL THERAPIST

## 2023-04-26 PROCEDURE — 84100 ASSAY OF PHOSPHORUS: CPT | Performed by: SURGERY

## 2023-04-26 PROCEDURE — 94640 AIRWAY INHALATION TREATMENT: CPT

## 2023-04-26 PROCEDURE — 82805 BLOOD GASES W/O2 SATURATION: CPT | Performed by: PHYSICIAN ASSISTANT

## 2023-04-26 PROCEDURE — 250N000013 HC RX MED GY IP 250 OP 250 PS 637: Performed by: PHYSICIAN ASSISTANT

## 2023-04-26 PROCEDURE — 94003 VENT MGMT INPAT SUBQ DAY: CPT

## 2023-04-26 PROCEDURE — 97165 OT EVAL LOW COMPLEX 30 MIN: CPT | Mod: GO | Performed by: OCCUPATIONAL THERAPIST

## 2023-04-26 PROCEDURE — 999N000015 HC STATISTIC ARTERIAL MONITORING DAILY

## 2023-04-26 PROCEDURE — 93306 TTE W/DOPPLER COMPLETE: CPT

## 2023-04-26 PROCEDURE — 93306 TTE W/DOPPLER COMPLETE: CPT | Mod: 26 | Performed by: INTERNAL MEDICINE

## 2023-04-26 PROCEDURE — 83735 ASSAY OF MAGNESIUM: CPT | Performed by: PHYSICIAN ASSISTANT

## 2023-04-26 PROCEDURE — 85014 HEMATOCRIT: CPT | Performed by: STUDENT IN AN ORGANIZED HEALTH CARE EDUCATION/TRAINING PROGRAM

## 2023-04-26 PROCEDURE — 83605 ASSAY OF LACTIC ACID: CPT | Performed by: STUDENT IN AN ORGANIZED HEALTH CARE EDUCATION/TRAINING PROGRAM

## 2023-04-26 PROCEDURE — 80053 COMPREHEN METABOLIC PANEL: CPT | Performed by: PHYSICIAN ASSISTANT

## 2023-04-26 PROCEDURE — 250N000009 HC RX 250: Performed by: STUDENT IN AN ORGANIZED HEALTH CARE EDUCATION/TRAINING PROGRAM

## 2023-04-26 RX ORDER — HYDRALAZINE HYDROCHLORIDE 20 MG/ML
10 INJECTION INTRAMUSCULAR; INTRAVENOUS
Status: DISCONTINUED | OUTPATIENT
Start: 2023-04-26 | End: 2023-04-28

## 2023-04-26 RX ORDER — SIMVASTATIN 20 MG
20 TABLET ORAL AT BEDTIME
Status: DISCONTINUED | OUTPATIENT
Start: 2023-04-26 | End: 2023-05-15 | Stop reason: HOSPADM

## 2023-04-26 RX ORDER — DORZOLAMIDE HYDROCHLORIDE AND TIMOLOL MALEATE 20; 5 MG/ML; MG/ML
1 SOLUTION/ DROPS OPHTHALMIC 2 TIMES DAILY
Status: DISCONTINUED | OUTPATIENT
Start: 2023-04-26 | End: 2023-05-15 | Stop reason: HOSPADM

## 2023-04-26 RX ORDER — NICOTINE POLACRILEX 4 MG
15-30 LOZENGE BUCCAL
Status: DISCONTINUED | OUTPATIENT
Start: 2023-04-26 | End: 2023-04-26

## 2023-04-26 RX ORDER — METHOCARBAMOL 500 MG/1
500 TABLET, FILM COATED ORAL 3 TIMES DAILY PRN
Status: DISCONTINUED | OUTPATIENT
Start: 2023-04-26 | End: 2023-04-27

## 2023-04-26 RX ORDER — ALBUTEROL SULFATE 90 UG/1
1-2 AEROSOL, METERED RESPIRATORY (INHALATION) EVERY 4 HOURS PRN
Status: DISCONTINUED | OUTPATIENT
Start: 2023-04-26 | End: 2023-05-09

## 2023-04-26 RX ORDER — LATANOPROST 50 UG/ML
1 SOLUTION/ DROPS OPHTHALMIC AT BEDTIME
Status: DISCONTINUED | OUTPATIENT
Start: 2023-04-26 | End: 2023-05-15 | Stop reason: HOSPADM

## 2023-04-26 RX ORDER — DEXTROSE MONOHYDRATE 25 G/50ML
25-50 INJECTION, SOLUTION INTRAVENOUS
Status: DISCONTINUED | OUTPATIENT
Start: 2023-04-26 | End: 2023-04-26

## 2023-04-26 RX ADMIN — ACETAMINOPHEN 975 MG: 325 TABLET ORAL at 23:07

## 2023-04-26 RX ADMIN — SIMVASTATIN 20 MG: 20 TABLET, FILM COATED ORAL at 23:07

## 2023-04-26 RX ADMIN — LATANOPROST 1 DROP: 50 SOLUTION OPHTHALMIC at 23:08

## 2023-04-26 RX ADMIN — SENNOSIDES AND DOCUSATE SODIUM 1 TABLET: 50; 8.6 TABLET ORAL at 07:39

## 2023-04-26 RX ADMIN — IPRATROPIUM BROMIDE AND ALBUTEROL SULFATE 3 ML: .5; 3 SOLUTION RESPIRATORY (INHALATION) at 08:18

## 2023-04-26 RX ADMIN — OXYCODONE HYDROCHLORIDE 5 MG: 5 TABLET ORAL at 07:38

## 2023-04-26 RX ADMIN — IPRATROPIUM BROMIDE AND ALBUTEROL SULFATE 3 ML: .5; 3 SOLUTION RESPIRATORY (INHALATION) at 11:56

## 2023-04-26 RX ADMIN — SODIUM CHLORIDE, POTASSIUM CHLORIDE, SODIUM LACTATE AND CALCIUM CHLORIDE 500 ML: 600; 310; 30; 20 INJECTION, SOLUTION INTRAVENOUS at 02:23

## 2023-04-26 RX ADMIN — CEFAZOLIN 1 G: 1 INJECTION, POWDER, FOR SOLUTION INTRAMUSCULAR; INTRAVENOUS at 08:26

## 2023-04-26 RX ADMIN — SODIUM CHLORIDE, POTASSIUM CHLORIDE, SODIUM LACTATE AND CALCIUM CHLORIDE 500 ML: 600; 310; 30; 20 INJECTION, SOLUTION INTRAVENOUS at 23:34

## 2023-04-26 RX ADMIN — METHOCARBAMOL 500 MG: 500 TABLET ORAL at 16:19

## 2023-04-26 RX ADMIN — ACETAMINOPHEN 975 MG: 325 TABLET ORAL at 15:44

## 2023-04-26 RX ADMIN — SENNOSIDES AND DOCUSATE SODIUM 1 TABLET: 50; 8.6 TABLET ORAL at 20:24

## 2023-04-26 RX ADMIN — DORZOLAMIDE HYDROCHLORIDE AND TIMOLOL MALEATE 1 DROP: 22.3; 6.8 SOLUTION/ DROPS OPHTHALMIC at 20:26

## 2023-04-26 RX ADMIN — INSULIN ASPART 1 UNITS: 100 INJECTION, SOLUTION INTRAVENOUS; SUBCUTANEOUS at 15:39

## 2023-04-26 RX ADMIN — ACETAMINOPHEN 975 MG: 325 TABLET ORAL at 01:19

## 2023-04-26 RX ADMIN — PANTOPRAZOLE SODIUM 40 MG: 40 TABLET, DELAYED RELEASE ORAL at 07:38

## 2023-04-26 RX ADMIN — ACETAMINOPHEN 975 MG: 325 TABLET ORAL at 07:38

## 2023-04-26 RX ADMIN — HYDROMORPHONE HYDROCHLORIDE 0.4 MG: 1 INJECTION, SOLUTION INTRAMUSCULAR; INTRAVENOUS; SUBCUTANEOUS at 03:34

## 2023-04-26 RX ADMIN — HEPARIN SODIUM 5000 UNITS: 5000 INJECTION, SOLUTION INTRAVENOUS; SUBCUTANEOUS at 12:08

## 2023-04-26 RX ADMIN — SODIUM CHLORIDE, POTASSIUM CHLORIDE, SODIUM LACTATE AND CALCIUM CHLORIDE 500 ML: 600; 310; 30; 20 INJECTION, SOLUTION INTRAVENOUS at 10:35

## 2023-04-26 RX ADMIN — VANCOMYCIN HYDROCHLORIDE 1000 MG: 1 INJECTION, SOLUTION INTRAVENOUS at 09:17

## 2023-04-26 RX ADMIN — ASPIRIN 81 MG CHEWABLE TABLET 81 MG: 81 TABLET CHEWABLE at 07:39

## 2023-04-26 RX ADMIN — SODIUM CHLORIDE, POTASSIUM CHLORIDE, SODIUM LACTATE AND CALCIUM CHLORIDE 500 ML: 600; 310; 30; 20 INJECTION, SOLUTION INTRAVENOUS at 16:02

## 2023-04-26 RX ADMIN — POLYETHYLENE GLYCOL 3350 17 G: 17 POWDER, FOR SOLUTION ORAL at 07:39

## 2023-04-26 RX ADMIN — HEPARIN SODIUM 5000 UNITS: 5000 INJECTION, SOLUTION INTRAVENOUS; SUBCUTANEOUS at 20:24

## 2023-04-26 RX ADMIN — HYDRALAZINE HYDROCHLORIDE 10 MG: 20 INJECTION INTRAMUSCULAR; INTRAVENOUS at 15:35

## 2023-04-26 RX ADMIN — CEFAZOLIN 1 G: 1 INJECTION, POWDER, FOR SOLUTION INTRAMUSCULAR; INTRAVENOUS at 01:21

## 2023-04-26 RX ADMIN — OXYCODONE HYDROCHLORIDE 10 MG: 10 TABLET ORAL at 01:21

## 2023-04-26 RX ADMIN — ONDANSETRON 4 MG: 2 INJECTION INTRAMUSCULAR; INTRAVENOUS at 16:31

## 2023-04-26 ASSESSMENT — ACTIVITIES OF DAILY LIVING (ADL)
ADLS_ACUITY_SCORE: 26

## 2023-04-26 NOTE — PLAN OF CARE
Neuro: PERRL. HERNANDEZ. A&Ox4. Lethargic. Intermittently forgetful.   Cardiac: Sinus brandon/SR. Intermittently hypertensive SBP max 140. PRN hydralazine x1   Respiratory: Extubated. Now sat >92% on 2L NC  GI/: Helms in place. Poor UOP 10-20 ml/hr. Team aware. Small soft BM x1  Diet/appetite: Tolerating reg diet. Poor appetite.  Activity:  Min/mod Ax2 stand and pivot to chair w/OT.  Pain: At acceptable level on current regimen.   Skin: No new deficits noted.  LDA's: MAC/Barnum @ 53cm.3x CT, midline woundvac   L PIV R brachial art line. Helms.     Major Shift Events: Pt extubated @ 0845. Initial dysphagia screen with wet vocal quality. SLP consult placed and cleared for regular diet with supervision. OT assessment, stand and pivot up to chair with Ax2. SBP to 140's. PRN hydralazine x1 with good effect. UOP low. CT output minimal.    Plan: Continue with POC. Notify primary team with changes.  Goal Outcome Evaluation:  Extubted. Pressors weaned to off. SLP cleared for regular diet

## 2023-04-26 NOTE — PROGRESS NOTES
CLINICAL NUTRITION SERVICES - BRIEF NOTE    Received provider consult for nutrition education with comments post op cardiovascular surgery (automatic consult on post-op order set). S/p ascending aortic arch repair with tissue graft and AVR on 4/25. Nutrition education not indicated.    RD will follow per LOS protocol or if re-consulted.     Danyelle Cummings MS, RD, LD  4E (CVICU) RD pager: [phone]  Ascom: [id number]  Weekend/Holiday RD pager: [phone]

## 2023-04-26 NOTE — PROGRESS NOTES
"   04/26/23 1200   Living Environment   People in Home child(stacie), adult   Current Living Arrangements house   Home Accessibility stairs to enter home;stairs within home   Number of Stairs, Main Entrance 3   Stair Railings, Main Entrance railings safe and in good condition   Number of Stairs, Within Home, Primary   (one flihg to basemen, pt uses for laundry)   Transportation Anticipated family or friend will provide;car, drives self   Self-Care   Usual Activity Tolerance good   Current Activity Tolerance moderate   Equipment Currently Used at Home none   Fall history within last six months no   Activity/Exercise/Self-Care Comment Pt ind at baseline   General Information   Onset of Illness/Injury or Date of Surgery 04/25/23   Additional Occupational Profile Info/Pertinent History of Current Problem per chart \": Cydney Christiansen is a 86 y/o female with a PMH significant for HTN, HLD, bilateral glaucoma, scoliosis, compression fracture of thoracic vertebrae, osteoporosis, history of bladder cancer, invasive ductal carcinoma of left breast s/p lumpectomy (2015), and multiple other skin cancer sites s/p removal who has severe aortic stenosis with bicuspid aortic valve and an ascending aortic aneurysm who presents 4/25 for ascending aortic arch repair with tissue graft and AVR with Dr. Solis. Patient required repair stitches around valve d/t bleeding post-op. Additionally, noted to have moderate drop in platelet count coming off bypass requiring 2000 Kcentra, 1g fibryga.  Received 2pRBC, 2 platelets, 325 cell saver, 1.5L fluid. Arrived to CV ICU for further hemodynamic monitoring/management on precedex infusion. '   Existing Precautions/Restrictions fall;sternal;weight bearing;other (see comments)  (SBP less than 100, MAP above 65)   Left Upper Extremity (Weight-bearing Status)   (10#)   Right Upper Extremity (Weight-bearing Status)   (10#)   Cognitive Status Examination   Cognitive Status Comments no acute changes noted, " will continue to assess prn   Visual Perception   Impact of Vision Impairment on Function (Vision) wears glasses no acute changes noted   Range of Motion Comprehensive   Comment, General Range of Motion WFL w/in precautions   Strength Comprehensive (MMT)   Comment, General Manual Muscle Testing (MMT) Assessment overall deconditioned   Bed Mobility   Comment (Bed Mobility) mod A rolling   Transfers   Transfer Comments mod A STS   Bathing Assessment/Intervention   Comment, (Bathing) per clinical judgement anticiapte mod A overall   Upper Body Dressing Assessment/Training   Comment, (Upper Body Dressing) per clinical judgement anticiapte mod A   Lower Body Dressing Assessment/Training   Comment, (Lower Body Dressing) per clinical judgement anticiapte mod A   Toileting   Comment, (Toileting) per clinical judgement anticiapte max A   Clinical Impression   Criteria for Skilled Therapeutic Interventions Met (OT) Yes, treatment indicated   OT Diagnosis decreased ind in I/ADLS   OT Problem List-Impairments impacting ADL problems related to;activity tolerance impaired;balance;strength;pain;post-surgical precautions;mobility   ADL comments/analysis decreased ind in I/ADLS   Assessment of Occupational Performance 1-3 Performance Deficits   Planned Therapy Interventions (OT) ADL retraining;IADL retraining;balance training;bed mobility training;strengthening;transfer training;home program guidelines;progressive activity/exercise;risk factor education   Clinical Decision Making Complexity (OT) low complexity   Risk & Benefits of therapy have been explained evaluation/treatment results reviewed;care plan/treatment goals reviewed;patient   OT Total Evaluation Time   OT Eval, Low Complexity Minutes (80926) 7

## 2023-04-26 NOTE — ANESTHESIA POSTPROCEDURE EVALUATION
Patient: Cydney Christiansen    Procedure: Procedure(s):  MEDIAN STERNOTOMY, CARDIOPULMONARY BYPASS, AORTIC VALVE REPLACEMENT USING PIERRE RESILA 21 MM VALVE, ASCENDING AORTA  ANEURYSM REPAIR USING 32 MM HEMASHIELD GRAFT, TRANSESOPHAGEAL ECHOCARDIOGRAM PERFORMED BY ANESTHESIA STAFF       Anesthesia Type:  General    Note:  Disposition: ICU            ICU Sign Out: Anesthesiologist/ICU physician sign out WAS performed   Postop Pain Control: Uneventful            Sign Out: Well controlled pain   PONV: No   Neuro/Psych: Uneventful            Sign Out: Acceptable/Baseline neuro status   Airway/Respiratory: Uneventful            Sign Out: AIRWAY IN SITU/Resp. Support   CV/Hemodynamics: Uneventful            Sign Out: Acceptable CV status; No obvious hypovolemia; No obvious fluid overload   Other NRE: NONE   DID A NON-ROUTINE EVENT OCCUR? No           Last vitals:  Vitals:    04/26/23 0700 04/26/23 0715 04/26/23 0730   BP:      Pulse: 56 59 56   Resp: 20 25 16   Temp:      SpO2: 96% 97% 96%       Electronically Signed By: Rajat Adorno MD  April 26, 2023  7:39 AM

## 2023-04-26 NOTE — PLAN OF CARE
Nights:  STEPAN  Overnight pt still intubated.  Alert and oriented nods head appropriately.  Strong hands and legs. HR 50's SB Temp pacer sensing but set to 40 bpm.  SBP maintained  <110.  Trying for less than 100 but pt BP drifts up and down.  Levo gtt at times low dose (see MAR). Pt grimacing at times with pain.  IV dilaudid and oxycodone helpful. CT output 10-20 ml/hr [place].  Hgb drifted to 9.5 this am.  Creat and Lactic normal.  Urine 30cc/hr throughout shift.  1 liter total of LR this shift for fluid volume given. Failed pressure support trials several times with RT.  Low minute volumes and apnea alarm.  Low CI index 2, SVO2 49%. CVP 14, PA 28/11. BG <150 with insulin 1 unit /hr. Updated Surgery overnight and wants pt to rest on vent overnight and try to pressure support again in AM.   P  Work to extubation.  Keep SBP<110 or 100 if possible.  Update MD with changes.

## 2023-04-26 NOTE — PROGRESS NOTES
Pain Service Progress Note  [facility]  Date: 04/26/2023       Patient Name: Cydney Christiansen  MRN: [id number]  Age: 87 year old  Sex: female      Assessment:  Cydney Christiansen is a 87 year old female with PMH significant for HTN, HLD, bilateral glaucoma, scoliosis, compression fracture of thoracic spine, osteoporosis, history of bladder cancer, invasive ductal carcinoma of left breast s/p lumpectomy in 2015, and multiple other skin cancer sites s/p removal, and has severe aortic stenosis with bicuspid aortic valve and an ascending aortic aneurysm    Procedure: s/p ascending aortic arch repair with tissue graft and AVR, and required repair stitches around valve d/t bleeding postop    Date of Surgery: 4/25/23     Date of Catheter Placement: 4/25/23     Plan/Recommendations:  1. Regional Anesthesia/Analgesia  -Continuous Catheter Type/Site: bilateral erector spinae (ES) T6-7  Infusate: 0.2% ropivacaine  Programmed Intermittent Bolus (PIB) at 7 mL Q60 min via each catheter, total infusion rate of 14 mL/hr    Plan to maintain catheters, max of 7 days    2. Anticoagulation  -Please contact Inpatient Pain Service before ordering or making any anticoagulation changes       3. Multimodal Analgesia  - recommend add low dose prn muscle relaxant such as methocarbamol, and reduce opioid analgesia. Start low and go slow with either oxycodone 2.5-5 mg Q 4 hr prn pain OR hydromorphone 1-2 mg PO Q 4 hr prn pain.     Pain Service will continue to follow.    Discussed with attending anesthesiologist    KRISTIE Siddiqui CNP  04/26/2023     Overnight Events: No acute events    Tubes/Drains: Yes  3 chest tubes    Subjective:  I have such a dry mouth.  Awake, extubated this morning. Reports anterior chest wall pain at chest tubes sites 0-1/10 at rest, has not been OOB yet.   Nausea: No  Symptoms of LAST: No      Diet: Advance Diet as Tolerated: Regular Diet Adult    Relevant Labs:  Recent Labs  "  Lab Test 04/26/23  1209 04/26/23  0758 04/26/23  0400 04/26/23  0359   INR  --  1.53*   < >  --    * 161  --  141*   PTT  --  38   < >  --    BUN  --   --   --  14.7    < > = values in this interval not displayed.       Physical Exam:  Vitals: BP 93/50   Pulse 65   Temp 97.8  F (36.6  C) (Oral)   Resp 14   Ht 1.499 m (4' 11\")   Wt 68 kg (149 lb 14.6 oz)   LMP  (LMP Unknown)   SpO2 93%   BMI 30.28 kg/m      Physical Exam:   Orientation:  Alert, oriented, and in no acute distress: Yes  Sedation: No    Motor Examination:  5/5 Strength in lower extremities: Yes    Catheter Site:   Catheter entry site is clean/dry/intact: Yes    Tender: No      Relevant Medications:  Current Pain Medications:  Medications related to Pain Management (From now, onward)    Start     Dose/Rate Route Frequency Ordered Stop    04/28/23 0000  acetaminophen (TYLENOL) tablet 650 mg         650 mg Oral EVERY 4 HOURS PRN 04/25/23 1522      04/26/23 0800  polyethylene glycol (MIRALAX) Packet 17 g         17 g Oral or Feeding Tube DAILY 04/25/23 1522      04/26/23 0800  aspirin (ASA) chewable tablet 81 mg         81 mg Oral or NG Tube DAILY 04/25/23 1522      04/25/23 2000  senna-docusate (SENOKOT-S/PERICOLACE) 8.6-50 MG per tablet 1 tablet         1 tablet Oral or Feeding Tube 2 TIMES DAILY 04/25/23 1522      04/25/23 1600  acetaminophen (TYLENOL) tablet 975 mg         975 mg Oral or Feeding Tube EVERY 8 HOURS 04/25/23 1522 04/28/23 1559    04/25/23 1522  magnesium hydroxide (MILK OF MAGNESIA) suspension 30 mL         30 mL Oral DAILY PRN 04/25/23 1522      04/25/23 1522  bisacodyl (DULCOLAX) suppository 10 mg         10 mg Rectal DAILY PRN 04/25/23 1522      04/25/23 1522  HYDROmorphone (PF) (DILAUDID) injection 0.2 mg        [facility] for full Linked Orders Report.    0.2 mg Intravenous EVERY 2 HOURS PRN 04/25/23 1522      04/25/23 1522  HYDROmorphone (PF) (DILAUDID) injection 0.4 mg        See [facility] for full Linked " "Orders Report.    0.4 mg Intravenous EVERY 2 HOURS PRN 04/25/23 1522      04/25/23 1522  oxyCODONE (ROXICODONE) tablet 5 mg        See [facility] for full Linked Orders Report.    5 mg Oral EVERY 4 HOURS PRN 04/25/23 1522      04/25/23 1522  oxyCODONE IR (ROXICODONE) tablet 10 mg        See [facility] for full Linked Orders Report.    10 mg Oral EVERY 4 HOURS PRN 04/25/23 1522      04/25/23 1522  lidocaine 1 % 0.1-1 mL         0.1-1 mL Other EVERY 1 HOUR PRN 04/25/23 1522      04/25/23 1522  lidocaine (LMX4) kit          Topical EVERY 1 HOUR PRN 04/25/23 1522      04/25/23 0600  ropivacaine 0.2% in NS perineural infusion (simple)          Perineural Continuous Nerve Block 04/25/23 0543      04/25/23 0600  ropivacaine 0.2% in NS perineural infusion (simple)          Perineural Continuous Nerve Block 04/25/23 0543            Primary Service Contacted with Recommendations? No      Please see A&P for additional details of medical decision making.      Acute Inpatient Pain Service [facility]  Hours of pain coverage 24/7   Page via [employer]- Please Page the Pain Team Via [employer]: \"PAIN MANAGEMENT ACUTE INPATIENT/ [facility] [place]\"             "

## 2023-04-26 NOTE — PROGRESS NOTES
Pt extubated at 0845, placed on oxiplus mask at 3 lpm. Will continue to monitor respiratory status.

## 2023-04-26 NOTE — PROGRESS NOTES
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     Hypertension goal BP (blood pressure) < 140/90     Seborrheic keratosis     Malignant neoplasm of overlapping sites of left female breast (H)     Scoliosis     Compression fracture of thoracic vertebra (H)     Mass of left hand     Primary open angle glaucoma of both eyes, unspecified glaucoma stage     Osteoporosis, unspecified osteoporosis type, unspecified pathological fracture presence     Nuclear sclerosis of left eye     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)     Actinic keratosis     History of nonmelanoma skin cancer     Combined forms of age-related cataract of right eye     Status post coronary angiogram     Aortic valve stenosis       ASSESSMENT: Cydney Christiansen is a 88 y/o female with a PMH significant for HTN, HLD, bilateral glaucoma, scoliosis, compression fracture of thoracic vertebrae, osteoporosis, history of bladder cancer, invasive ductal carcinoma of left breast s/p lumpectomy (2015), and multiple other skin cancer sites s/p removal who has severe aortic stenosis with bicuspid aortic valve and an ascending aortic aneurysm who presents 4/25 for ascending aortic arch repair with tissue graft and AVR with Dr. Solis. Patient required repair stitches around valve d/t bleeding post-op. Additionally, noted to have moderate drop in platelet count coming off bypass requiring 2000 Kcentra, 1g fibryga.  Received 2pRBC, 2 platelets, 325 cell saver, 1.5L fluid. Arrived to  CV ICU for further hemodynamic monitoring/management on precedex infusion.       OVERNIGHT EVENTS:   -- Failed PST d/t low volumes; remained intubated overnight   -- total 1L LR boluses     UPDATES and EVENTS TODAY:   -- Wean sedation; transition to PS this am   -- Extubated at 9am   -- Query bedside swallow eval; SLP consult per RN   -- Stop insulin drip  -- Diet per SLP recs   -- Pain control     PLAN:  Neuro/ pain/ sedation:  # Acute Postoperative pain  - Monitor neurological status. Notify the MD for any acute changes in exam.  - Pain: Scheduled tylenol. PRN tylenol, oxycodone, dilaudid.  - Bilateral JAY Catheters present; Continue infusions   - Wean sedation for fast-track status     Pulmonary care:   # Postoperative ventilation management  Vent Mode: CMV/AC  (Continuous Mandatory Ventilation/ Assist Control)  FiO2 (%): 30 %  Resp Rate (Set): 12 breaths/min  Tidal Volume (Set, mL): 420 mL  PEEP (cm H2O): 5 cmH2O  Resp: 23  - Titrate supplemental oxygen to maintain saturation above 92% upon extubation.  - Pulmonary hygiene: Incentive spirometer every 15- 30 minutes when awake, flutter valve, C&DB  - Hold PTA albuterol   - Continue full vent support  - Wean to PS once able for possible extubation   - Daily vent weaning   - Monitor CT output closely  - Post-op CXR, then daily   - Add duonebs x 1 day pending extubation     Cardiovascular:    # Severe aortic stenosis 2/2 bicuspid aortic S/P AVR 4/25   # Ascending aortic aneurysm s/p graft repair 4/25   # Hx HTN, HLD  PreCBP: Normal BiV Fx. Trace TR/MR/PI, no AI. Severe Aortic Stenosis estimated at 0.68cm^2 by continuity LVOT/AV VTI. Partial effacement of Sinus. Max diameter 5.2cm. Distortion of overall CAL greater than would expect with 5.2 cm aneurysm.  - Recent echo on 1/18/23 with LVEF of 45-50%  - PostCBP: Good biventricular function, no wall motion abnormalities.   - Monitor hemodynamic status.    - Goal MAP>65, SBP<110, though ideally < 100 per Dr Solis    -  Levo gtt for MAP > 65    - Cardene gtt for SBP < 110   - Hold PTA losartan, simvastatin  - V-wires in place with back-up rate of 55   - ASA, HSQ to start today   - S/p 3mg milrinone load intra-op @ 2pm    GI care/ Nutrition:   # Elevated LFTs; likely 2/2 shock liver  # Query dysphagia post-extubation     - SLP consult   - PPI  - Continue bowel regimen: miralax, senna  - Trend LFTs daily     Renal/ Fluid Balance/ Electrolytes:   BL creat appears to be ~ 0.6  - Strict I/O, daily weights  - Avoid/limit nephrotoxins as able  - Replete lytes PRN per protocol  - Trend lactate q8h   - Continue traylor     Endocrine:    # Stress induced hyperglycemia  # Osteoporosis c/b compression fracture of spine  Preop A1c 6.0  - Insulin gtt --> sliding scale insulin today   - Goal BG <180 for optimal healing  - PTA meds alendronate    ID/ Antibiotics:  # Stress induced leukocytosis  - To complete perioperative regimen  - Continue to monitor fever curve, WBC and inflammatory markers as appropriate    Heme:     # Stress induced leukocytosis  # Acute blood loss anemia  # thrombocytopenia 2/2 bypass   # C/f coagulopathy 2/2 abnormal quantra intra-op   Monitor for s/sx of bleeding  - CBC q8h  - Trend coags daily   - Hemoglobin goal >7   - HSQ today     MSK/ Skin:  # Sternotomy  # Surgical Incision  - Sternal precautions  - Postoperative incision management per protocol  - PT/OT/CR    Prophylaxis:    - Mechanical prophylaxis for DVT  - Chemical DVT prophylaxis - start subcutaneous heparin   - PPI     Lines/ tubes/ drains:  - MAC with PA cath   - Traylor   - 2 med CTs, 1 vipul     Disposition:  - CVICU      Patient seen, findings and plan discussed with CVICU staff and CVTS fellow, attending.     I spent a total of 40 minutes providing critical care services at the bedside, and on the critical care unit, evaluating the patient, directing care and reviewing laboratory values and radiologic reports for the patient. Time spent separate from  "procedural time.     Jamel Ahn PA-C  04/26/23  10:49 AM    ====================================    Interval updates: Remained intubated overnight. Coags and serial CBC stable. Ongoing volume replacement. Extubated successfully this am. SLP consult. Stop insulin drip.     OBJECTIVE:   1. VITAL SIGNS:   BP Readings from Last 1 Encounters:   04/26/23 96/50      Pulse Readings from Last 1 Encounters:   04/26/23 59      Resp Readings from Last 1 Encounters:   04/26/23 23      Temp Readings from Last 1 Encounters:   04/26/23 97.8  F (36.6  C) (Oral)      SpO2 Readings from Last 1 Encounters:   04/26/23 96%      Wt Readings from Last 1 Encounters:   04/26/23 68 kg (149 lb 14.6 oz)      Ht Readings from Last 1 Encounters:   04/25/23 1.499 m (4' 11\")    There were no vitals filed for this visit.      2. INTAKE/ OUTPUT:      Intake/Output Summary (Last 24 hours) at 4/26/2023 1052  Last data filed at 4/26/2023 1030  Gross per 24 hour   Intake 5062.95 ml   Output 2095 ml   Net 2967.95 ml         3. PHYSICAL EXAMINATION:     General: AO x 4. No distress.   Neuro: AO x 4. EOMI. HERNANDEZ equally. 5/5 x 4.   Resp: BS equal and CTA bilaterally. NO wheezing. On oxymask.   CV: S1, S2, RRR, no m/r/g   Abdomen: Soft, non-distended, non-tender  Incisions: c/d/i. No drainage or oozing.   Extremities: warm and well perfused, ~1+ edema in BLE.   CT: To suction, serosang output, no airleak, crepitus    4. INVESTIGATIONS:   Arterial Blood Gases   Recent Labs   Lab 04/26/23  0820 04/26/23  0401 04/25/23  2026 04/25/23  1728   PH 7.36 7.38 7.40 7.41   PCO2 38 37 36 30*   PO2 98 94 89 109*   HCO3 22 22 22 19*     Complete Blood Count   Recent Labs   Lab 04/26/23  0758 04/26/23  0359 04/25/23  2221 04/25/23  1529   WBC 10.5 8.1 8.4 8.5   HGB 9.8* 9.5* 10.3* 10.1*    141* 162 175     Basic Metabolic Panel  Recent Labs   Lab 04/26/23  1004 04/26/23  0804 04/26/23  0553 04/26/23  0414 04/26/23  0359 04/25/23  2234 04/25/23 2224 " 04/25/23 2031 04/25/23 2026 04/25/23  1534 04/25/23  1529 04/25/23  1444   0000   NA  --   --   --   --  135*  --  137  --   --   --  138 137  --    POTASSIUM  --   --   --   --  4.0  --  4.2  --  4.1  --  4.0 3.8  --    CHLORIDE  --   --   --   --  106  --  106  --   --   --  105  --   --    CO2  --   --   --   --  20*  --  19*  --   --   --  20*  --   --    BUN  --   --   --   --  14.7  --  12.0  --   --   --  10.2  --   --    CR  --   --   --   --  0.73  --  0.64  --   --   --  0.57  --   --    * 109* 128* 126* 137*   < > 159*   < >  --    < > 163* 141*   < >    < > = values in this interval not displayed.     Liver Function Tests  Recent Labs   Lab 04/26/23 0758 04/26/23 0400 04/26/23 0359 04/25/23 2221 04/25/23  1529   AST  --   --  75*  --  120*   ALT  --   --  40*  --  59*   ALKPHOS  --   --  33*  --  36   BILITOTAL  --   --  0.7  --  1.1   ALBUMIN  --   --  2.9*  --  3.1*   INR 1.53* 1.49*  --  1.29* 1.29*     Pancreatic Enzymes  No lab results found in last 7 days.  Coagulation Profile  Recent Labs   Lab 04/26/23 0758 04/26/23 0400 04/25/23 2221 04/25/23  1529   INR 1.53* 1.49* 1.29* 1.29*   PTT 38 39* 45* 78*         5. RADIOLOGY:   Recent Results (from the past 24 hour(s))   XR Chest Port 1 View    Narrative    EXAM: XR CHEST PORT 1 VIEW  4/25/2023 4:28 PM     HISTORY:  Post Op CVTS Surgery       COMPARISON:  4/13/2023    FINDINGS:   AP portable supine view of the chest. Postprocedural changes of aortic  valve replacement. Median sternotomy wires. Endotracheal tube tip  projects at the level of the williams. [place]-Chris catheter tip projects  in the distal left pulmonary artery. There are 3 mediastinal drains.  Enteric tube tip and sidehole projected over the stomach. No  appreciable pneumothorax. Trace right pleural effusion. Perihilar  predominant streaky opacities. Visualized upper abdomen is  unremarkable.      Impression    IMPRESSION:     1. New postprocedural changes of aortic valve  replacement.  2. Endotracheal tube tip projects at the level of the williams,  recommend retracting.  3. Right IJ [place]-Chris catheter tip projects in the distal left  pulmonary artery, recommend repositioning.  4. Low lung volumes with streaky perihilar opacities, likely  atelectasis and/or pulmonary edema.    Findings discussed with Dr. Ahn by Dr. Pablo at 4:35 PM on  4/25/2023.    I have personally reviewed the examination and initial interpretation  and I agree with the findings.    GRANT DOMINIQUE MD         SYSTEM ID:  [id number]   XR Chest Port 1 View    Narrative    Exam: XR CHEST PORT 1 VIEW, 4/25/2023 5:43 PM    Indication: s/p ETT retraction    Comparison: Same-day chest radiograph at 1/6/2005    Findings: Portable AP chest radiograph at 30 degrees. Interval  retraction of the ET tube now measures 3 cm from the tip of the  williams. Cardiac silhouette is stable.  Postprocedure changes of aortic  valve replacement. Median sternotomy wires. [place]-Chris catheter tip  projects in the distal left pulmonary artery. 3x mediastinal drains,  stable. Enteric tube tip and sidehole project over the area of the  stomach. No appreciable pneumothorax. Trace right pleural effusion.  Perihilar predominant streaky opacities. Visualized upper abdomen is  unremarkable.      Impression    Impression:   1. Interval retraction of the ET tube now measuring approximately 3 cm  from the tip to the williams.  2. Right IJ [place]-Chris catheter tip projects in the distal left  pulmonary artery, consider repositioning.  3. Additional findings are similar to prior.    I have personally reviewed the examination and initial interpretation  and I agree with the findings.    GRANT DOMINIQUE MD         SYSTEM ID:  [id number]   XR Chest Port 1 View    Narrative    Exam: XR CHEST PORT 1 VIEW, 4/26/2023 1:01 AM    Indication: Post Op CVTS Surgery    Comparison: Chest radiograph 4/25/2023    Findings:   Endotracheal tube terminates over the mid  thoracic trachea. Right  internal jugular approach [place]-Chris catheter terminates over the  distal left pulmonary artery, unchanged. Chest drains remain in place.  Gastric tube terminates over the stomach.    Stable low lung volumes. Stable perihilar and bibasilar opacities.      Impression    Impression:   1. Stable support devices.  2. Stable pulmonary opacities.    I have personally reviewed the examination and initial interpretation  and I agree with the findings.    ADONIS TROTTER MD         SYSTEM ID:  [id number]       =========================================

## 2023-04-26 NOTE — PROGRESS NOTES
04/26/23 0970   Appointment Info   Signing Clinician's Name / Credentials (SLP) Holly Quintanilla MA CCC-SLP   General Information   Onset of Illness/Injury or Date of Surgery 04/25/23   Referring Physician Bradley Alvarado MD   Patient/Family Therapy Goal Statement (SLP) None stated   Pertinent History of Current Problem Pt is a 88 y/o female with a PMH significant for HTN, HLD, bilateral glaucoma, scoliosis, compression fracture of thoracic vertebrae, osteoporosis, history of bladder cancer, invasive ductal carcinoma of left breast s/p lumpectomy (2015), and multiple other skin cancer sites s/p removal who has severe aortic stenosis with bicuspid aortic valve and an ascending aortic aneurysm who presents 4/25 for ascending aortic arch repair with tissue graft and AVR with Dr. Solis. Patient required repair stitches around valve d/t bleeding post-op. Additionally, noted to have moderate drop in platelet count coming off bypass requiring 2000 Kcentra, 1g fibryga.  Received 2pRBC, 2 platelets, 325 cell saver, 1.5L fluid. Arrived to CV ICU for further hemodynamic monitoring/management on precedex infusion. Clinical swallow eval completed per MD order.   General Observations Upon SLP arrival, pt positioned upright in chair, alert, and willing to participate. Pt's son present.   Type of Evaluation   Type of Evaluation Swallow Evaluation   Oral Motor   Oral Musculature generally intact   Structural Abnormalities none present   Mucosal Quality dry;cracked   Dentition (Oral Motor)   Dentition (Oral Motor) natural dentition;dental appliance/dentures;some missing teeth   Dental Appliance/Denture (Oral Motor) dentures, partial   Facial Symmetry (Oral Motor)   Facial Symmetry (Oral Motor) WNL   Lip Function (Oral Motor)   Lip Range of Motion (Oral Motor) WNL   Tongue Function (Oral Motor)   Tongue ROM (Oral Motor) WNL   Jaw Function (Oral Motor)   Jaw Function (Oral Motor) WNL   Cough/Swallow/Gag Reflex (Oral Motor)   Volitional  Throat Clear/Cough (Oral Motor) reduced strength   Vocal Quality/Secretion Management (Oral Motor)   Vocal Quality (Oral Motor) WFL   Secretion Management (Oral Motor) WNL   General Swallowing Observations   Past History of Dysphagia No hx of dysphagia per chart review and pt report.   Respiratory Support (General Swallowing Observations) nasal cannula  (1L O2)   Current Diet/Method of Nutritional Intake (General Swallowing Observations, NIS) regular diet;thin liquids (level 0)   Swallowing Evaluation Clinical swallow evaluation   Clinical Swallow Evaluation   Feeding Assistance minimal assistance required   Clinical Swallow Evaluation Textures Trialed thin liquids;pureed;soft & bite-sized;solid foods   Clinical Swallow Eval: Thin Liquid Texture Trial   Mode of Presentation, Thin Liquids cup;straw;self-fed;fed by clinician   Volume of Liquid or Food Presented 6 oz   Oral Phase of Swallow WFL   Pharyngeal Phase of Swallow throat clearing  (Throat clearing w/ consecutive sips via straw)   Diagnostic Statement Overt s/sx of aspiration evident w/ consecutive sips via straw.   Clinical Swallow Evaluation: Puree Solid Texture Trial   Mode of Presentation, Puree spoon;self-fed;fed by clinician   Volume of Puree Presented 2 oz   Oral Phase, Puree WFL   Pharyngeal Phase, Puree intact   Diagnostic Statement No overt s/sx of aspiration evident.   Clinical Swallow Eval: Soft & Bite Sized   Mode of Presentation spoon;self-fed;fed by clinician   Volume Presented 4 bites   Oral Phase WFL   Pharyngeal Phase intact   Diagnostic Statement No overt s/sx of aspiration evident.   Clinical Swallow Evaluation: Solid Food Texture Trial   Mode of Presentation self-fed   Volume Presented 1/2 cracker   Oral Phase   (Prolonged, but functional mastication)   Oral Residue mid posterior tongue   Pharyngeal Phase intact   Diagnostic Statement No overt s/sx of aspiration evident.   Esophageal Phase of Swallow   Patient reports or presents with  symptoms of esophageal dysphagia No   Swallowing Recommendations   Diet Consistency Recommendations regular diet;thin liquids (level 0)   Supervision Level for Intake close supervision needed   Mode of Delivery Recommendations bolus size, small;no straws;slow rate of intake   Swallowing Maneuver Recommendations alternate food and liquid intake   Monitoring/Assistance Required (Eating/Swallowing) stop eating activities when fatigue is present;monitor for cough or change in vocal quality with intake   Recommended Feeding/Eating Techniques (Swallow Eval) maintain upright sitting position for eating;set-up and prepare tray   Medication Administration Recommendations, Swallowing (SLP) As tolerated   Instrumental Assessment Recommendations instrumental evaluation not recommended at this time   General Therapy Interventions   Planned Therapy Interventions Dysphagia Treatment   Dysphagia treatment Oropharyngeal exercise training;Modified diet education;Instruction of safe swallow strategies;Compensatory strategies for swallowing   Clinical Impression   Criteria for Skilled Therapeutic Interventions Met (SLP Eval) Yes, treatment indicated   SLP Diagnosis Mild oropharyngeal dysphagia   Problem List (SLP) Below baseline swallow mechanism   Risks & Benefits of therapy have been explained evaluation/treatment results reviewed;care plan/treatment goals reviewed;risks/benefits reviewed;current/potential barriers reviewed;participants voiced agreement with care plan;participants included;patient   Clinical Impression Comments Clinical swallow eval completed per MD order. Pt presents w/ mild oropharyngeal dysphagia s/p cardiac surgery. Oral mech exam remarkable for some missing teeth (pt has partials) and reduced strength voltional cough. Pt assessed w/ thin liquids via cup and straw, puree, semisolids, and solids. Oral phase chracterized by prolonged, but functional mastication. Mild oral residue noted. Pharyngeal phase remarkable  for overt s/sx of aspiration w/ consecutive sips via straw evidence by throat clearing. Pt denies globus sensation. Recommend regular diet and thin liquids, no straws, w/ close supervision. Meds OK as tolerated. Ensure pt is fully upright and alert, taking small sips/bites at a slow rate. Discussed w/ RN.   SLP Total Evaluation Time   Eval: oral/pharyngeal swallow function, clinical swallow Minutes (81028) 14   SLP Discharge Planning   SLP Plan Diet tolerance, strategies   SLP Discharge Recommendation home with assist;Transitional Care Facility   SLP Rationale for DC Rec Dysphagia   SLP Brief overview of current status  Recommend regular diet and thin liquids, no straws, w/ close supervision. Meds OK as tolerated. Ensure pt is fully upright and alert, taking small sips/bites at a slow rate. Discussed w/ RN   Total Session Time   Total Session Time (sum of timed and untimed services) 19

## 2023-04-26 NOTE — CONSULTS
Care Management Initial Consult    General Information  Assessment completed with: Patient, Children, pt and son Hair  Type of CM/SW Visit: Initial Assessment    Primary Care Provider verified and updated as needed: Yes   Readmission within the last 30 days: no previous admission in last 30 days      Reason for Consult: discharge planning  Advance Care Planning: Advance Care Planning Reviewed: no concerns identified          Communication Assessment  Patient's communication style: spoken language (English or Bilingual)    Hearing Difficulty or Deaf: no   Wear Glasses or Blind: yes    Cognitive  Cognitive/Neuro/Behavioral: .WDL except  Level of Consciousness: alert  Arousal Level: opens eyes spontaneously  Orientation: oriented x 4  Mood/Behavior: cooperative, calm  Best Language: 0 - No aphasia  Speech: hoarse, spontaneous, logical    Living Environment:   People in home: child(stacie), adult     Current living Arrangements: house      Able to return to prior arrangements: yes  Living Arrangement Comments: Pt lived with escobar Arndt prior to hospitalization and plans to do so once she returns home    Family/Social Support:  Care provided by: child(stacie)  Provides care for: no one  Marital Status:   Children          Description of Support System: Supportive, Involved    Support Assessment: Adequate family and caregiver support, Adequate social supports    Current Resources:   Patient receiving home care services: No     Community Resources: None  Equipment currently used at home: none  Supplies currently used at home: None    Employment/Financial:  Employment Status: retired        Financial Concerns: insurance, none   Referral to Financial Worker: No       Does the patient's insurance plan have a 3 day qualifying hospital stay waiver?  No    Lifestyle & Psychosocial Needs:  Social Determinants of Health     Tobacco Use: Medium Risk (4/26/2023)    Patient History      Smoking Tobacco Use: Former      Smokeless  Tobacco Use: Never      Passive Exposure: Not on file   Alcohol Use: Not on file   Financial Resource Strain: Not on file   Food Insecurity: Not on file   Transportation Needs: Not on file   Physical Activity: Not on file   Stress: Not on file   Social Connections: Not on file   Intimate Partner Violence: Not on file   Depression: Not at risk (4/13/2023)    PHQ-2      PHQ-2 Score: 0   Housing Stability: Not on file       Functional Status:  Prior to admission patient needed assistance:         Assesssment of Functional Status: Not at baseline with ADL Functioning    Mental Health Status:  Mental Health Status: No Current Concerns       Chemical Dependency Status:  Chemical Dependency Status: No Current Concerns             Values/Beliefs:  Spiritual, Cultural Beliefs, Synagogue Practices, Values that affect care: no               Additional Information:    SW met with pt and son bedside. Pt was alert and able to participate. Pt states she was independent, had son living with her prior to hospitalization and only experienced shortness of breath when walking long distances or taking stairs. SW discussed HCD, pt stated she didn't have one, SW explained its purpose and provided blank copy for their review. Pt and son had questions about what neccessary medical equipment pt would need once she returns home (wondering about riser to get in and out of high bed, walker and commode). SW will pass this along to RNCC.     Initial assessment completed due to high risk readmission score. See details above. Care management will follow for any discharge needs.      WILLARD Watson, KRIS  4A & 4E ICU   Pager: [phone]  Phone: [phone]

## 2023-04-27 ENCOUNTER — APPOINTMENT (OUTPATIENT)
Dept: SPEECH THERAPY | Facility: CLINIC | Age: 88
DRG: 219 | End: 2023-04-27
Attending: SURGERY
Payer: COMMERCIAL

## 2023-04-27 ENCOUNTER — APPOINTMENT (OUTPATIENT)
Dept: CARDIOLOGY | Facility: CLINIC | Age: 88
DRG: 219 | End: 2023-04-27
Attending: STUDENT IN AN ORGANIZED HEALTH CARE EDUCATION/TRAINING PROGRAM
Payer: COMMERCIAL

## 2023-04-27 ENCOUNTER — APPOINTMENT (OUTPATIENT)
Dept: GENERAL RADIOLOGY | Facility: CLINIC | Age: 88
DRG: 219 | End: 2023-04-27
Attending: SURGERY
Payer: COMMERCIAL

## 2023-04-27 LAB
ALBUMIN SERPL BCG-MCNC: 3.1 G/DL (ref 3.5–5.2)
ALLEN'S TEST: ABNORMAL
ALP SERPL-CCNC: 42 U/L (ref 35–104)
ALT SERPL W P-5'-P-CCNC: 40 U/L (ref 10–35)
ANION GAP SERPL CALCULATED.3IONS-SCNC: 10 MMOL/L (ref 7–15)
ANION GAP SERPL CALCULATED.3IONS-SCNC: 9 MMOL/L (ref 7–15)
APTT PPP: 42 SECONDS (ref 22–38)
AST SERPL W P-5'-P-CCNC: 76 U/L (ref 10–35)
ATRIAL RATE - MUSE: 62 BPM
BASE EXCESS BLDA CALC-SCNC: -3 MMOL/L (ref -9–1.8)
BASE EXCESS BLDV CALC-SCNC: -1.6 MMOL/L (ref -7.7–1.9)
BASE EXCESS BLDV CALC-SCNC: -1.9 MMOL/L (ref -7.7–1.9)
BASE EXCESS BLDV CALC-SCNC: -2 MMOL/L (ref -7.7–1.9)
BILIRUB SERPL-MCNC: 0.5 MG/DL
BUN SERPL-MCNC: 26.7 MG/DL (ref 8–23)
BUN SERPL-MCNC: 34.4 MG/DL (ref 8–23)
CA-I BLD-MCNC: 4.8 MG/DL (ref 4.4–5.2)
CALCIUM SERPL-MCNC: 8.4 MG/DL (ref 8.8–10.2)
CALCIUM SERPL-MCNC: 9 MG/DL (ref 8.8–10.2)
CHLORIDE SERPL-SCNC: 105 MMOL/L (ref 98–107)
CHLORIDE SERPL-SCNC: 105 MMOL/L (ref 98–107)
CREAT SERPL-MCNC: 0.82 MG/DL (ref 0.51–0.95)
CREAT SERPL-MCNC: 1.01 MG/DL (ref 0.51–0.95)
DEPRECATED HCO3 PLAS-SCNC: 20 MMOL/L (ref 22–29)
DEPRECATED HCO3 PLAS-SCNC: 20 MMOL/L (ref 22–29)
DIASTOLIC BLOOD PRESSURE - MUSE: NORMAL MMHG
ERYTHROCYTE [DISTWIDTH] IN BLOOD BY AUTOMATED COUNT: 15.4 % (ref 10–15)
ERYTHROCYTE [DISTWIDTH] IN BLOOD BY AUTOMATED COUNT: 15.5 % (ref 10–15)
FIBRINOGEN PPP-MCNC: 362 MG/DL (ref 170–490)
GFR SERPL CREATININE-BSD FRML MDRD: 54 ML/MIN/1.73M2
GFR SERPL CREATININE-BSD FRML MDRD: 70 ML/MIN/1.73M2
GLUCOSE BLDC GLUCOMTR-MCNC: 135 MG/DL (ref 70–99)
GLUCOSE BLDC GLUCOMTR-MCNC: 135 MG/DL (ref 70–99)
GLUCOSE BLDC GLUCOMTR-MCNC: 144 MG/DL (ref 70–99)
GLUCOSE BLDC GLUCOMTR-MCNC: 146 MG/DL (ref 70–99)
GLUCOSE BLDC GLUCOMTR-MCNC: 149 MG/DL (ref 70–99)
GLUCOSE BLDC GLUCOMTR-MCNC: 157 MG/DL (ref 70–99)
GLUCOSE BLDC GLUCOMTR-MCNC: 168 MG/DL (ref 70–99)
GLUCOSE BLDC GLUCOMTR-MCNC: 169 MG/DL (ref 70–99)
GLUCOSE SERPL-MCNC: 152 MG/DL (ref 70–99)
GLUCOSE SERPL-MCNC: 174 MG/DL (ref 70–99)
HCO3 BLD-SCNC: 22 MMOL/L (ref 21–28)
HCO3 BLDV-SCNC: 24 MMOL/L (ref 21–28)
HCT VFR BLD AUTO: 28.4 % (ref 35–47)
HCT VFR BLD AUTO: 28.7 % (ref 35–47)
HGB BLD-MCNC: 9.2 G/DL (ref 11.7–15.7)
INR PPP: 1.65 (ref 0.85–1.15)
INTERPRETATION ECG - MUSE: NORMAL
LACTATE SERPL-SCNC: 1.3 MMOL/L (ref 0.7–2)
LVEF ECHO: NORMAL
MAGNESIUM SERPL-MCNC: 2.2 MG/DL (ref 1.7–2.3)
MAGNESIUM SERPL-MCNC: 2.3 MG/DL (ref 1.7–2.3)
MCH RBC QN AUTO: 29.2 PG (ref 26.5–33)
MCH RBC QN AUTO: 29.6 PG (ref 26.5–33)
MCHC RBC AUTO-ENTMCNC: 32.1 G/DL (ref 31.5–36.5)
MCHC RBC AUTO-ENTMCNC: 32.4 G/DL (ref 31.5–36.5)
MCV RBC AUTO: 90 FL (ref 78–100)
MCV RBC AUTO: 92 FL (ref 78–100)
O2/TOTAL GAS SETTING VFR VENT: 2 %
O2/TOTAL GAS SETTING VFR VENT: 2 %
O2/TOTAL GAS SETTING VFR VENT: 29 %
O2/TOTAL GAS SETTING VFR VENT: 32 %
OXYHGB MFR BLD: 94 % (ref 92–100)
OXYHGB MFR BLDV: 42 % (ref 70–75)
OXYHGB MFR BLDV: 43 % (ref 70–75)
OXYHGB MFR BLDV: 50 % (ref 70–75)
P AXIS - MUSE: 7 DEGREES
PATH REPORT.COMMENTS IMP SPEC: NORMAL
PATH REPORT.COMMENTS IMP SPEC: NORMAL
PATH REPORT.FINAL DX SPEC: NORMAL
PATH REPORT.GROSS SPEC: NORMAL
PATH REPORT.RELEVANT HX SPEC: NORMAL
PCO2 BLD: 38 MM HG (ref 35–45)
PCO2 BLDV: 42 MM HG (ref 40–50)
PCO2 BLDV: 44 MM HG (ref 40–50)
PCO2 BLDV: 45 MM HG (ref 40–50)
PH BLD: 7.37 [PH] (ref 7.35–7.45)
PH BLDV: 7.34 [PH] (ref 7.32–7.43)
PH BLDV: 7.34 [PH] (ref 7.32–7.43)
PH BLDV: 7.36 [PH] (ref 7.32–7.43)
PHOSPHATE SERPL-MCNC: 4.3 MG/DL (ref 2.5–4.5)
PHOSPHATE SERPL-MCNC: 4.3 MG/DL (ref 2.5–4.5)
PHOTO IMAGE: NORMAL
PLATELET # BLD AUTO: 125 10E3/UL (ref 150–450)
PLATELET # BLD AUTO: 147 10E3/UL (ref 150–450)
PO2 BLD: 77 MM HG (ref 80–105)
PO2 BLDV: 25 MM HG (ref 25–47)
PO2 BLDV: 26 MM HG (ref 25–47)
PO2 BLDV: 29 MM HG (ref 25–47)
POTASSIUM SERPL-SCNC: 4.8 MMOL/L (ref 3.4–5.3)
POTASSIUM SERPL-SCNC: 4.9 MMOL/L (ref 3.4–5.3)
PR INTERVAL - MUSE: 320 MS
PROT SERPL-MCNC: 5 G/DL (ref 6.4–8.3)
QRS DURATION - MUSE: 110 MS
QT - MUSE: 434 MS
QTC - MUSE: 440 MS
R AXIS - MUSE: -17 DEGREES
RBC # BLD AUTO: 3.11 10E6/UL (ref 3.8–5.2)
RBC # BLD AUTO: 3.15 10E6/UL (ref 3.8–5.2)
SODIUM SERPL-SCNC: 134 MMOL/L (ref 136–145)
SODIUM SERPL-SCNC: 135 MMOL/L (ref 136–145)
SYSTOLIC BLOOD PRESSURE - MUSE: NORMAL MMHG
T AXIS - MUSE: -2 DEGREES
TROPONIN T SERPL HS-MCNC: 245 NG/L
TROPONIN T SERPL HS-MCNC: 265 NG/L
TROPONIN T SERPL HS-MCNC: 271 NG/L
VENTRICULAR RATE- MUSE: 62 BPM
WBC # BLD AUTO: 10.4 10E3/UL (ref 4–11)
WBC # BLD AUTO: 14.1 10E3/UL (ref 4–11)

## 2023-04-27 PROCEDURE — 999N000045 HC STATISTIC DAILY SWAN MONITORING

## 2023-04-27 PROCEDURE — 92526 ORAL FUNCTION THERAPY: CPT | Mod: GN | Performed by: SPEECH-LANGUAGE PATHOLOGIST

## 2023-04-27 PROCEDURE — 250N000009 HC RX 250: Performed by: STUDENT IN AN ORGANIZED HEALTH CARE EDUCATION/TRAINING PROGRAM

## 2023-04-27 PROCEDURE — 99233 SBSQ HOSP IP/OBS HIGH 50: CPT | Performed by: NURSE PRACTITIONER

## 2023-04-27 PROCEDURE — 94640 AIRWAY INHALATION TREATMENT: CPT | Mod: 76

## 2023-04-27 PROCEDURE — 250N000011 HC RX IP 250 OP 636: Performed by: STUDENT IN AN ORGANIZED HEALTH CARE EDUCATION/TRAINING PROGRAM

## 2023-04-27 PROCEDURE — 83735 ASSAY OF MAGNESIUM: CPT | Performed by: STUDENT IN AN ORGANIZED HEALTH CARE EDUCATION/TRAINING PROGRAM

## 2023-04-27 PROCEDURE — 999N000127 HC STATISTIC PERIPHERAL IV START W US GUIDANCE

## 2023-04-27 PROCEDURE — 36592 COLLECT BLOOD FROM PICC: CPT | Performed by: STUDENT IN AN ORGANIZED HEALTH CARE EDUCATION/TRAINING PROGRAM

## 2023-04-27 PROCEDURE — 82805 BLOOD GASES W/O2 SATURATION: CPT

## 2023-04-27 PROCEDURE — 36569 INSJ PICC 5 YR+ W/O IMAGING: CPT

## 2023-04-27 PROCEDURE — 71045 X-RAY EXAM CHEST 1 VIEW: CPT

## 2023-04-27 PROCEDURE — 250N000013 HC RX MED GY IP 250 OP 250 PS 637: Performed by: STUDENT IN AN ORGANIZED HEALTH CARE EDUCATION/TRAINING PROGRAM

## 2023-04-27 PROCEDURE — 200N000002 HC R&B ICU UMMC

## 2023-04-27 PROCEDURE — 84100 ASSAY OF PHOSPHORUS: CPT | Performed by: STUDENT IN AN ORGANIZED HEALTH CARE EDUCATION/TRAINING PROGRAM

## 2023-04-27 PROCEDURE — 94640 AIRWAY INHALATION TREATMENT: CPT

## 2023-04-27 PROCEDURE — 83605 ASSAY OF LACTIC ACID: CPT | Performed by: STUDENT IN AN ORGANIZED HEALTH CARE EDUCATION/TRAINING PROGRAM

## 2023-04-27 PROCEDURE — 85027 COMPLETE CBC AUTOMATED: CPT | Performed by: STUDENT IN AN ORGANIZED HEALTH CARE EDUCATION/TRAINING PROGRAM

## 2023-04-27 PROCEDURE — 85730 THROMBOPLASTIN TIME PARTIAL: CPT | Performed by: STUDENT IN AN ORGANIZED HEALTH CARE EDUCATION/TRAINING PROGRAM

## 2023-04-27 PROCEDURE — 85610 PROTHROMBIN TIME: CPT | Performed by: STUDENT IN AN ORGANIZED HEALTH CARE EDUCATION/TRAINING PROGRAM

## 2023-04-27 PROCEDURE — 36415 COLL VENOUS BLD VENIPUNCTURE: CPT | Performed by: STUDENT IN AN ORGANIZED HEALTH CARE EDUCATION/TRAINING PROGRAM

## 2023-04-27 PROCEDURE — 82330 ASSAY OF CALCIUM: CPT | Performed by: SURGERY

## 2023-04-27 PROCEDURE — 80053 COMPREHEN METABOLIC PANEL: CPT | Performed by: STUDENT IN AN ORGANIZED HEALTH CARE EDUCATION/TRAINING PROGRAM

## 2023-04-27 PROCEDURE — 02UX0JZ SUPPLEMENT THORACIC AORTA, ASCENDING/ARCH WITH SYNTHETIC SUBSTITUTE, OPEN APPROACH: ICD-10-PCS | Performed by: SURGERY

## 2023-04-27 PROCEDURE — 84484 ASSAY OF TROPONIN QUANT: CPT | Performed by: SURGERY

## 2023-04-27 PROCEDURE — 258N000003 HC RX IP 258 OP 636: Performed by: STUDENT IN AN ORGANIZED HEALTH CARE EDUCATION/TRAINING PROGRAM

## 2023-04-27 PROCEDURE — 999N000208 ECHOCARDIOGRAM COMPLETE

## 2023-04-27 PROCEDURE — 93010 ELECTROCARDIOGRAM REPORT: CPT | Mod: 59 | Performed by: INTERNAL MEDICINE

## 2023-04-27 PROCEDURE — 999N000157 HC STATISTIC RCP TIME EA 10 MIN

## 2023-04-27 PROCEDURE — 85014 HEMATOCRIT: CPT | Performed by: STUDENT IN AN ORGANIZED HEALTH CARE EDUCATION/TRAINING PROGRAM

## 2023-04-27 PROCEDURE — 71045 X-RAY EXAM CHEST 1 VIEW: CPT | Mod: 26 | Performed by: RADIOLOGY

## 2023-04-27 PROCEDURE — 272N000451 HC KIT SHRLOCK 5FR POWER PICC DOUBLE LUMEN

## 2023-04-27 PROCEDURE — 250N000011 HC RX IP 250 OP 636: Performed by: PHYSICIAN ASSISTANT

## 2023-04-27 PROCEDURE — 93005 ELECTROCARDIOGRAM TRACING: CPT

## 2023-04-27 PROCEDURE — 99291 CRITICAL CARE FIRST HOUR: CPT | Mod: 24 | Performed by: STUDENT IN AN ORGANIZED HEALTH CARE EDUCATION/TRAINING PROGRAM

## 2023-04-27 PROCEDURE — 84484 ASSAY OF TROPONIN QUANT: CPT | Performed by: STUDENT IN AN ORGANIZED HEALTH CARE EDUCATION/TRAINING PROGRAM

## 2023-04-27 PROCEDURE — 82805 BLOOD GASES W/O2 SATURATION: CPT | Performed by: PHYSICIAN ASSISTANT

## 2023-04-27 PROCEDURE — 271N000002 HC RX 271: Performed by: PHYSICIAN ASSISTANT

## 2023-04-27 PROCEDURE — 250N000013 HC RX MED GY IP 250 OP 250 PS 637: Performed by: PHYSICIAN ASSISTANT

## 2023-04-27 PROCEDURE — 999N000015 HC STATISTIC ARTERIAL MONITORING DAILY

## 2023-04-27 PROCEDURE — 85384 FIBRINOGEN ACTIVITY: CPT | Performed by: STUDENT IN AN ORGANIZED HEALTH CARE EDUCATION/TRAINING PROGRAM

## 2023-04-27 PROCEDURE — 93306 TTE W/DOPPLER COMPLETE: CPT | Mod: 26 | Performed by: INTERNAL MEDICINE

## 2023-04-27 PROCEDURE — 82805 BLOOD GASES W/O2 SATURATION: CPT | Performed by: SURGERY

## 2023-04-27 PROCEDURE — 5A1221Z PERFORMANCE OF CARDIAC OUTPUT, CONTINUOUS: ICD-10-PCS | Performed by: SURGERY

## 2023-04-27 PROCEDURE — 255N000002 HC RX 255 OP 636: Performed by: INTERNAL MEDICINE

## 2023-04-27 PROCEDURE — 999N000155 HC STATISTIC RAPCV CVP MONITORING

## 2023-04-27 PROCEDURE — 02RF08Z REPLACEMENT OF AORTIC VALVE WITH ZOOPLASTIC TISSUE, OPEN APPROACH: ICD-10-PCS | Performed by: SURGERY

## 2023-04-27 RX ORDER — LOSARTAN POTASSIUM 25 MG/1
50 TABLET ORAL DAILY
Status: DISCONTINUED | OUTPATIENT
Start: 2023-04-27 | End: 2023-05-01

## 2023-04-27 RX ORDER — FUROSEMIDE 10 MG/ML
10 INJECTION INTRAMUSCULAR; INTRAVENOUS ONCE
Status: COMPLETED | OUTPATIENT
Start: 2023-04-27 | End: 2023-04-27

## 2023-04-27 RX ORDER — LIDOCAINE 40 MG/G
CREAM TOPICAL
Status: ACTIVE | OUTPATIENT
Start: 2023-04-27 | End: 2023-04-30

## 2023-04-27 RX ORDER — CALCIUM GLUCONATE 20 MG/ML
2 INJECTION, SOLUTION INTRAVENOUS ONCE
Status: DISCONTINUED | OUTPATIENT
Start: 2023-04-27 | End: 2023-05-01

## 2023-04-27 RX ADMIN — INSULIN ASPART 1 UNITS: 100 INJECTION, SOLUTION INTRAVENOUS; SUBCUTANEOUS at 00:16

## 2023-04-27 RX ADMIN — ACETAMINOPHEN 975 MG: 325 TABLET ORAL at 17:08

## 2023-04-27 RX ADMIN — LATANOPROST 1 DROP: 50 SOLUTION OPHTHALMIC at 21:52

## 2023-04-27 RX ADMIN — INSULIN ASPART 1 UNITS: 100 INJECTION, SOLUTION INTRAVENOUS; SUBCUTANEOUS at 03:48

## 2023-04-27 RX ADMIN — HYDRALAZINE HYDROCHLORIDE 10 MG: 20 INJECTION INTRAMUSCULAR; INTRAVENOUS at 00:16

## 2023-04-27 RX ADMIN — HEPARIN SODIUM 5000 UNITS: 5000 INJECTION, SOLUTION INTRAVENOUS; SUBCUTANEOUS at 03:46

## 2023-04-27 RX ADMIN — SODIUM CHLORIDE, POTASSIUM CHLORIDE, SODIUM LACTATE AND CALCIUM CHLORIDE 500 ML: 600; 310; 30; 20 INJECTION, SOLUTION INTRAVENOUS at 11:09

## 2023-04-27 RX ADMIN — INSULIN ASPART 1 UNITS: 100 INJECTION, SOLUTION INTRAVENOUS; SUBCUTANEOUS at 12:43

## 2023-04-27 RX ADMIN — INSULIN ASPART 1 UNITS: 100 INJECTION, SOLUTION INTRAVENOUS; SUBCUTANEOUS at 23:11

## 2023-04-27 RX ADMIN — HUMAN ALBUMIN MICROSPHERES AND PERFLUTREN 5 ML: 10; .22 INJECTION, SOLUTION INTRAVENOUS at 14:29

## 2023-04-27 RX ADMIN — SIMVASTATIN 20 MG: 20 TABLET, FILM COATED ORAL at 21:52

## 2023-04-27 RX ADMIN — Medication 500 MG: at 01:35

## 2023-04-27 RX ADMIN — SODIUM CHLORIDE, POTASSIUM CHLORIDE, SODIUM LACTATE AND CALCIUM CHLORIDE 500 ML: 600; 310; 30; 20 INJECTION, SOLUTION INTRAVENOUS at 11:11

## 2023-04-27 RX ADMIN — HEPARIN SODIUM 5000 UNITS: 5000 INJECTION, SOLUTION INTRAVENOUS; SUBCUTANEOUS at 23:10

## 2023-04-27 RX ADMIN — DORZOLAMIDE HYDROCHLORIDE AND TIMOLOL MALEATE 1 DROP: 22.3; 6.8 SOLUTION/ DROPS OPHTHALMIC at 08:46

## 2023-04-27 RX ADMIN — ASPIRIN 81 MG CHEWABLE TABLET 81 MG: 81 TABLET CHEWABLE at 08:45

## 2023-04-27 RX ADMIN — PANTOPRAZOLE SODIUM 40 MG: 40 TABLET, DELAYED RELEASE ORAL at 08:45

## 2023-04-27 RX ADMIN — LIDOCAINE HYDROCHLORIDE ANHYDROUS 4 ML: 10 INJECTION, SOLUTION INFILTRATION at 16:52

## 2023-04-27 RX ADMIN — DORZOLAMIDE HYDROCHLORIDE AND TIMOLOL MALEATE 1 DROP: 22.3; 6.8 SOLUTION/ DROPS OPHTHALMIC at 20:29

## 2023-04-27 RX ADMIN — INSULIN ASPART 1 UNITS: 100 INJECTION, SOLUTION INTRAVENOUS; SUBCUTANEOUS at 20:29

## 2023-04-27 RX ADMIN — EPINEPHRINE 0.01 MCG/KG/MIN: 1 INJECTION INTRAMUSCULAR; INTRAVENOUS; SUBCUTANEOUS at 18:19

## 2023-04-27 RX ADMIN — INSULIN ASPART 1 UNITS: 100 INJECTION, SOLUTION INTRAVENOUS; SUBCUTANEOUS at 08:46

## 2023-04-27 RX ADMIN — IPRATROPIUM BROMIDE AND ALBUTEROL SULFATE 3 ML: .5; 3 SOLUTION RESPIRATORY (INHALATION) at 08:11

## 2023-04-27 RX ADMIN — FUROSEMIDE 10 MG: 10 INJECTION, SOLUTION INTRAVENOUS at 18:47

## 2023-04-27 RX ADMIN — HYDROMORPHONE HYDROCHLORIDE 0.2 MG: 1 INJECTION, SOLUTION INTRAMUSCULAR; INTRAVENOUS; SUBCUTANEOUS at 23:11

## 2023-04-27 RX ADMIN — ACETAMINOPHEN 975 MG: 325 TABLET ORAL at 08:45

## 2023-04-27 RX ADMIN — ACETAMINOPHEN 975 MG: 325 TABLET ORAL at 23:10

## 2023-04-27 RX ADMIN — SODIUM CHLORIDE, POTASSIUM CHLORIDE, SODIUM LACTATE AND CALCIUM CHLORIDE 500 ML: 600; 310; 30; 20 INJECTION, SOLUTION INTRAVENOUS at 12:57

## 2023-04-27 RX ADMIN — NOREPINEPHRINE BITARTRATE 0.05 MCG/KG/MIN: 1 INJECTION, SOLUTION, CONCENTRATE INTRAVENOUS at 11:50

## 2023-04-27 RX ADMIN — HEPARIN SODIUM 5000 UNITS: 5000 INJECTION, SOLUTION INTRAVENOUS; SUBCUTANEOUS at 17:08

## 2023-04-27 RX ADMIN — IPRATROPIUM BROMIDE AND ALBUTEROL SULFATE 3 ML: .5; 3 SOLUTION RESPIRATORY (INHALATION) at 11:55

## 2023-04-27 RX ADMIN — CALCIUM GLUCONATE 2 G: 20 INJECTION, SOLUTION INTRAVENOUS at 11:27

## 2023-04-27 ASSESSMENT — ACTIVITIES OF DAILY LIVING (ADL)
ADLS_ACUITY_SCORE: 26
ADLS_ACUITY_SCORE: 29
ADLS_ACUITY_SCORE: 29
ADLS_ACUITY_SCORE: 26

## 2023-04-27 NOTE — PROGRESS NOTES
EPW removed 4/27. Rhythm stable in NSR with HR in the 60s. Hemoglobin and platelets stable. No signs of bleeding/ or change in status following removal, VSS.    Areli Saenz PA-C   Cardiothoracic Surgery   Pager #[phone]  April 27, 2023 at 12:46 PM

## 2023-04-27 NOTE — PROGRESS NOTES
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     Hypertension goal BP (blood pressure) < 140/90     Seborrheic keratosis     Malignant neoplasm of overlapping sites of left female breast (H)     Scoliosis     Compression fracture of thoracic vertebra (H)     Mass of left hand     Primary open angle glaucoma of both eyes, unspecified glaucoma stage     Osteoporosis, unspecified osteoporosis type, unspecified pathological fracture presence     Nuclear sclerosis of left eye     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)     Actinic keratosis     History of nonmelanoma skin cancer     Combined forms of age-related cataract of right eye     Status post coronary angiogram     Aortic valve stenosis       ASSESSMENT: Cydney Christiansen is a 86 y/o female with a PMH significant for HTN, HLD, bilateral glaucoma, scoliosis, compression fracture of thoracic vertebrae, osteoporosis, history of bladder cancer, invasive ductal carcinoma of left breast s/p lumpectomy (2015), and multiple other skin cancer sites s/p removal who has severe aortic stenosis with bicuspid aortic valve and an ascending aortic aneurysm who presents 4/25 for ascending aortic arch repair with tissue graft and AVR with Dr. Solis. Patient required repair stitches around valve d/t bleeding post-op. Additionally, noted to have moderate drop in platelet count coming off bypass requiring 2000 Kcentra, 1g fibryga.  Received 2pRBC, 2 platelets, 325 cell saver, 1.5L fluid. Arrived to  CV ICU for further hemodynamic monitoring/management on precedex infusion.       OVERNIGHT EVENTS:   -- COLTON     UPDATES and EVENTS TODAY:   -- Remove swan, art line, traylor and transfer to floor   -- Remove cardiac pacer wires   -- This afternoon, patient hypotensive with MAP in mid-50s; Peripheral levo started but quickly weaned after 1L total boluses given   -- RN concerned for EKG changes on bedside monitor; EKG done showing NSR with 1st degree block and nonspecific t wave abnormalities; Troponin's 245.   -- Given hypotension and mild EKG changes, order STAT echo     -- Resume PTA statin   -- Follow up echo     PLAN:  Neuro/ pain/ sedation:  # Acute Postoperative pain  - Monitor neurological status. Notify the MD for any acute changes in exam.  - Pain: Scheduled tylenol. PRN tylenol, oxycodone, dilaudid.  - Bilateral JAY Catheters present; Continue infusions   - Wean sedation for fast-track status     Pulmonary care:   # Hx asthma   - Titrate supplemental oxygen to maintain saturation above 92%   - Pulmonary hygiene: Incentive spirometer every 15- 30 minutes when awake, flutter valve, C&DB  - PTA albuterol   - Monitor CT output closely  - CXR daily   - Add duonebs x 1 day     Cardiovascular:    # Severe aortic stenosis 2/2 bicuspid aortic S/P AVR 4/25   # Ascending aortic aneurysm s/p graft repair 4/25   # Hx HTN, HLD  PreCBP: Normal BiV Fx. Trace TR/MR/PI, no AI. Severe Aortic Stenosis estimated at 0.68cm^2 by continuity LVOT/AV VTI. Partial effacement of Sinus. Max diameter 5.2cm. Distortion of overall CAL greater than would expect with 5.2 cm aneurysm.  - Recent echo on 1/18/23 with LVEF of 45-50%  - PostCBP: Good biventricular function, no wall motion abnormalities.   - Monitor hemodynamic status.    - Goal MAP>65, SBP<110, though ideally < 100 per Dr Solis   - Hold PTA losartan  - Resume PTA simvastatin  - V-wires removed today   - ASA, HSQ daily    - S/p 3mg milrinone load intra-op    - Trend troponins today    - Follow up ECHO today     GI care/ Nutrition:   # Elevated LFTs; resolved   - SLP consult   - PPI  - Continue bowel regimen: miralax, senna  - Trend LFTs daily     Renal/ Fluid Balance/ Electrolytes:   BL creat appears to be ~ 0.6  - Strict I/O, daily weights  - Avoid/limit nephrotoxins as able  - Replete lytes PRN per protocol    Endocrine:    # Stress induced hyperglycemia  # Osteoporosis c/b compression fracture of spine  Preop A1c 6.0  - sliding scale insulin today   - Goal BG <180 for optimal healing  - PTA meds alendronate    ID/ Antibiotics:  # Stress induced leukocytosis  - To complete perioperative regimen  - Continue to monitor fever curve, WBC and inflammatory markers as appropriate    Heme:     # Stress induced leukocytosis  # Acute blood loss anemia  # thrombocytopenia 2/2 bypass   # C/f coagulopathy 2/2 abnormal quantra intra-op   Monitor for s/sx of bleeding  - CBC q12h  - Trend coags daily   - Hemoglobin goal >7   - HSQ     MSK/ Skin:  # Sternotomy  # Surgical Incision  - Sternal precautions  - Postoperative incision management per protocol  - PT/OT/CR    Prophylaxis:    - Mechanical prophylaxis for DVT  - Chemical DVT prophylaxis - subcutaneous heparin   - PPI     Lines/ tubes/ drains:  - 2 med CTs, 1 vipul     Disposition:  - [facility]ICU      Patient seen, findings and plan discussed with CVICU staff and CVTS fellow, attending.     I spent a total of 40 minutes providing critical care services at the bedside, and on the critical care unit, evaluating the patient, directing care and reviewing laboratory values and radiologic reports for the patient. Time spent separate from procedural time.     Jamel Ahn PA-C  04/27/23  12:34 PM    ====================================    Interval updates: Stable course overnight. Hypotension this afternoon requiring peripheral run levo. T wave changes on EKG, follow up repeat ECHO. Continue volume replacement given poor PO intake.     OBJECTIVE:   1. VITAL  "SIGNS:   BP Readings from Last 1 Encounters:   04/27/23 107/65      Pulse Readings from Last 1 Encounters:   04/27/23 66      Resp Readings from Last 1 Encounters:   04/27/23 (!) 36      Temp Readings from Last 1 Encounters:   04/27/23 97.7  F (36.5  C) (Axillary)      SpO2 Readings from Last 1 Encounters:   04/27/23 99%      Wt Readings from Last 1 Encounters:   04/27/23 73.1 kg (161 lb 2.5 oz)      Ht Readings from Last 1 Encounters:   04/25/23 1.499 m (4' 11\")    There were no vitals filed for this visit.      2. INTAKE/ OUTPUT:      Intake/Output Summary (Last 24 hours) at 4/26/2023 1052  Last data filed at 4/26/2023 1030  Gross per 24 hour   Intake 5062.95 ml   Output 2095 ml   Net 2967.95 ml         3. PHYSICAL EXAMINATION:     General: AO x 4. No distress.   Neuro: AO x 4. EOMI. HERNANDEZ equally. 5/5 x 4.   Resp: BS equal and CTA bilaterally. NO wheezing. On NC.   CV: S1, S2, RRR, no m/r/g   Abdomen: Soft, non-distended, non-tender  Incisions: c/d/i. No drainage or oozing.   Extremities: warm and well perfused, no edema.    CT: To suction, serosang output, no airleak, crepitus    4. INVESTIGATIONS:   Arterial Blood Gases   Recent Labs   Lab 04/27/23  0344 04/26/23  0820 04/26/23  0401 04/25/23 2026   PH 7.37 7.36 7.38 7.40   PCO2 38 38 37 36   PO2 77* 98 94 89   HCO3 22 22 22 22     Complete Blood Count   Recent Labs   Lab 04/27/23  0344 04/26/23  2021 04/26/23  1209 04/26/23  0758   WBC 10.4 11.0 11.3* 10.5   HGB 9.2* 9.3* 9.6* 9.8*   * 122* 141* 161     Basic Metabolic Panel  Recent Labs   Lab 04/27/23  1024 04/27/23  0844 04/27/23  0350 04/27/23  0344 04/26/23  0414 04/26/23  0359 04/25/23 2234 04/25/23 2224 04/25/23 2031 04/25/23 2026 04/25/23  1534 04/25/23  1529   NA  --   --   --  135*  --  135*  --  137  --   --   --  138   POTASSIUM  --   --   --  4.8  --  4.0  --  4.2  --  4.1  --  4.0   CHLORIDE  --   --   --  105  --  106  --  106  --   --   --  105   CO2  --   --   --  20*  --  20*  --  " 19*  --   --   --  20*   BUN  --   --   --  26.7*  --  14.7  --  12.0  --   --   --  10.2   CR  --   --   --  0.82  --  0.73  --  0.64  --   --   --  0.57   * 157* 169* 174*   < > 137*   < > 159*   < >  --    < > 163*    < > = values in this interval not displayed.     Liver Function Tests  Recent Labs   Lab 23  0359 23  1529   AST 76*  --   --  75*  --  120*   ALT 40*  --   --  40*  --  59*   ALKPHOS 42  --   --  33*  --  36   BILITOTAL 0.5  --   --  0.7  --  1.1   ALBUMIN 3.1*  --   --  2.9*  --  3.1*   INR 1.65* 1.53* 1.49*  --  1.29* 1.29*     Pancreatic Enzymes  No lab results found in last 7 days.  Coagulation Profile  Recent Labs   Lab 23   INR 1.65* 1.53* 1.49* 1.29*   PTT 42* 38 39* 45*         5. RADIOLOGY:   Recent Results (from the past 24 hour(s))   Echo Complete   Result Value    LVEF  50-55%    Narrative    [id number]  [id number]  [id number]  [id number]^ANDER^SAHIL^WENDY     [facility],[place]  Echocardiography Laboratory  [address] [place] [id number]     Name: MILLI PRIETO  MRN: [id number]  : 1935  Study Date: 2023 03:13 PM  Age: 87 yrs  Gender: Female  Patient Location: [facility]  Reason For Study: Aortic Valve Repair  Ordering Physician: SAHIL WORTHINGTON  Performed By: Javier Rivas     BSA: 1.6 m2  Height: 59 in  Weight: 149 lb  HR: 64  ______________________________________________________________________________  Procedure  Complete Portable Echo Adult. TDS - very limited PLAX/SAX due to surgical  sites and dressings. Technically difficult study.Extremely poor acoustic  windows.  ______________________________________________________________________________  Interpretation Summary  S/P AORTIC VALVE REPLACEMENT USING PIERRE RESILA 21 MM VALVE, ASCENDING AORTA  ANEURYSM REPAIR USING 32 MM HEMASHIELD  GRAFT, on 4/25/2023.  Technically difficult study.Extremely poor acoustic windows.  Left ventricular size is normal.  The visual ejection fraction is 50-55%.  Global right ventricular function is mildly reduced.  A bioprosthetic aortic valve is present.  The mean gradient across the aortic valve is 19 mmHg.  No pericardial effusion is present.  ______________________________________________________________________________  Left Ventricle  S/P AORTIC VALVE REPLACEMENT USING PIERRE RESILA 21 MM VALVE, ASCENDING AORTA  ANEURYSM REPAIR USING 32 MM HEMASHIELD GRAFT, on 4/25/2023. Left ventricular  size is normal. Left ventricular wall thickness is normal. The visual ejection  fraction is 50-55%. Grade I or early diastolic dysfunction. No regional wall  motion abnormalities are seen.     Right Ventricle  The right ventricle is normal size. Global right ventricular function is  mildly reduced.     Atria  Both atria appear normal.     Mitral Valve  Trace to mild mitral insufficiency is present.     Aortic Valve  The mean gradient across the aortic valve is 19 mmHg. A bioprosthetic aortic  valve is present.     Tricuspid Valve  The tricuspid valve is normal. Mild tricuspid insufficiency is present.  Pulmonary artery systolic pressure cannot be assessed.     Vessels  Sinuses of Valsalva 3.7 cm.     Pericardium  No pericardial effusion is present.     ______________________________________________________________________________  Report approved by: Diana Akers 04/26/2023 03:50 PM     ______________________________________________________________________________      XR Chest Port 1 View    Narrative    Portable chest    INDICATION: Chest tubes post aortic valve replacement and ascending  aortic aneurysm repair    COMPARISON: Yesterday    FINDINGS: Heart remains enlarged. Left and right hemidiaphragms are  obscured with bibasilar haziness and costophrenic angle region  blunting.  Interval extubation. The right IJ  Hannibal-Chris catheter tip is again in  the left main and branch pulmonary artery. Mediastinal drains again  present. Median sternotomy again present. Other right paramedian chest  drain again present. No new ectopic air collection. No significant new  opacity.      Impression    IMPRESSION: Postoperative chest including aortic valve replacement  with continued bibasilar edema/effusions with atelectasis.    ADONIS TROTTER MD         SYSTEM ID:  [id number]       =========================================

## 2023-04-27 NOTE — PROGRESS NOTES
"Pain Service Progress Note  [facility]  Date: 04/27/2023       Patient Name: Cydney Christiansen  MRN: [id number]  Age: 87 year old  Sex: female      Assessment:  Cydney Christiansen is a 87 year old female with PMH significant for HTN, HLD, bilateral glaucoma, scoliosis, compression fracture of thoracic spine, osteoporosis, history of bladder cancer, invasive ductal carcinoma of left breast s/p lumpectomy in 2015, and multiple other skin cancer sites s/p removal, and has severe aortic stenosis with bicuspid aortic valve and an ascending aortic aneurysm    Procedure: s/p ascending aortic arch repair with tissue graft and AVR, and required repair stitches around valve d/t bleeding postop    Date of Surgery: 4/25/23     Date of Catheter Placement: 4/25/23     Plan/Recommendations:  1. Regional Anesthesia/Analgesia  -Continuous Catheter Type/Site: bilateral erector spinae (ES) T6-7  Infusate: 0.2% ropivacaine  Low BP, MAP 60, RN awaiting arrival of [facility] gtt.   1130 AM. Decreased Programmed Intermittent Bolus (PIB) rate to 4mL Q60 min via each catheter, total infusion rate of 8 mL/hr    Plan to maintain catheters, max of 7 days    2. Anticoagulation  -Please contact Inpatient Pain Service before ordering or making any anticoagulation changes       3. Multimodal Analgesia  - per primary service       Pain Service will continue to follow.    Discussed with attending anesthesiologist    KRISTIE Siddiqui CNP  04/27/2023     Overnight Events: None    Tubes/Drains: Yes  3 chest tubes    Interval History: Low BP, on pressors. Resting, appears comfortable  Symptoms of LAST: No    Diet: Advance Diet as Tolerated: Regular Diet Adult    Relevant Labs:  Recent Labs   Lab Test 04/27/23  0344   INR 1.65*   *   PTT 42*   BUN 26.7*       Physical Exam:  Vitals: BP (!) 86/53   Pulse 58   Temp 97.7  F (36.5  C) (Axillary)   Resp 18   Ht 1.499 m (4' 11\")   Wt 73.1 kg (161 lb 2.5 oz)   LMP  " (LMP Unknown)   SpO2 92%   BMI 32.55 kg/m      Physical Exam:   Orientation:  no acute distress: Yes  Sedation: No    Motor Examination:  5/5 Strength in lower extremities: moves all extremities    Catheter Site:   Catheter entry site is clean/dry/intact: Yes    Tender: No      Relevant Medications:  Current Pain Medications:  Medications related to Pain Management (From now, onward)    Start     Dose/Rate Route Frequency Ordered Stop    04/28/23 0000  acetaminophen (TYLENOL) tablet 650 mg         650 mg Oral EVERY 4 HOURS PRN 04/25/23 1522      04/26/23 1610  oxyCODONE IR (ROXICODONE) half-tab 2.5 mg         2.5 mg Oral EVERY 4 HOURS PRN 04/26/23 1611      04/26/23 1610  methocarbamol (ROBAXIN) tablet 500 mg         500 mg Oral 3 TIMES DAILY PRN 04/26/23 1611 04/26/23 0800  polyethylene glycol (MIRALAX) Packet 17 g         17 g Oral or Feeding Tube DAILY 04/25/23 1522      04/26/23 0800  aspirin (ASA) chewable tablet 81 mg         81 mg Oral or NG Tube DAILY 04/25/23 1522      04/25/23 2000  senna-docusate (SENOKOT-S/PERICOLACE) 8.6-50 MG per tablet 1 tablet         1 tablet Oral or Feeding Tube 2 TIMES DAILY 04/25/23 1522      04/25/23 1600  acetaminophen (TYLENOL) tablet 975 mg         975 mg Oral or Feeding Tube EVERY 8 HOURS 04/25/23 1522 04/28/23 1559    04/25/23 1522  magnesium hydroxide (MILK OF MAGNESIA) suspension 30 mL         30 mL Oral DAILY PRN 04/25/23 1522      04/25/23 1522  bisacodyl (DULCOLAX) suppository 10 mg         10 mg Rectal DAILY PRN 04/25/23 1522      04/25/23 1522  HYDROmorphone (PF) (DILAUDID) injection 0.2 mg        See Hyperspace for full Linked Orders Report.    0.2 mg Intravenous EVERY 2 HOURS PRN 04/25/23 1522      04/25/23 1522  HYDROmorphone (PF) (DILAUDID) injection 0.4 mg        See Hyperspace for full Linked Orders Report.    0.4 mg Intravenous EVERY 2 HOURS PRN 04/25/23 1522      04/25/23 1522  lidocaine 1 % 0.1-1 mL         0.1-1 mL Other EVERY 1 HOUR PRN 04/25/23  "1522      04/25/23 1522  lidocaine (LMX4) kit          Topical EVERY 1 HOUR PRN 04/25/23 1522      04/25/23 0600  ropivacaine 0.2% in NS perineural infusion (simple)          Perineural Continuous Nerve Block 04/25/23 0543      04/25/23 0600  ropivacaine 0.2% in NS perineural infusion (simple)          Perineural Continuous Nerve Block 04/25/23 0543            Primary Service Contacted with Recommendations? No      Please see A&P for additional details of medical decision making.      Acute Inpatient Pain Service [facility]  Hours of pain coverage 24/7   Page via [employer]- Please Page the Pain Team Via [facility]: \"PAIN MANAGEMENT ACUTE INPATIENT/ [facility] EAST/[facility] [place]\"             "

## 2023-04-27 NOTE — PROCEDURES
[facility]    Double Lumen Midline Placement    Date/Time: 4/27/2023 4:42 PM    Performed by: Marni London RN  Authorized by: Jamel Ahn PA-C  Indications: vascular access      UNIVERSAL PROTOCOL   Site Marked: Yes  Prior Images Obtained and Reviewed:  Yes  Required items: Required blood products, implants, devices and special equipment available    Patient identity confirmed:  Verbally with patient, arm band, provided demographic data and hospital-assigned identification number  Patient was reevaluated immediately before administering moderate or deep sedation or anesthesia  Confirmation Checklist:  Patient's identity using two indicators, relevant allergies, procedure was appropriate and matched the consent or emergent situation and correct equipment/implants were available  Time out: Immediately prior to the procedure a time out was called    Universal Protocol: the Joint Commission Universal Protocol was followed    Preparation: Patient was prepped and draped in usual sterile fashion       ANESTHESIA    Anesthesia: See MAR for details  Local Anesthetic:  Lidocaine 1% without epinephrine  Anesthetic Total (mL):  4      SEDATION    Patient Sedated: No        Preparation: skin prepped with ChloraPrep  Skin prep agent: skin prep agent completely dried prior to procedure  Sterile barriers: maximum sterile barriers were used: cap, mask, sterile gown, sterile gloves, and large sterile sheet  Hand hygiene: hand hygiene performed prior to central venous catheter insertion  Type of line used: Midline  Catheter type: double lumen  Lumen type: non-valved  Lumen Identification: Purple and Red  Catheter size: 5 Fr  Brand: Bard  Lot number: [id number]  Placement method: venipuncture, MST and ultrasound  Number of attempts: 2  Difficulty threading catheter: yes  Successful placement: yes  Orientation: right  Catheter to Vein (%): 35  Location: brachial vein  (lateral) (vein diameter-0.52)  Site rationale: left lumpec zuly with lymph node removed  Arm circumference: adults 10 cm  Extremity circumference: 34  Visible catheter length: 5  Total catheter length: 20  Dressing and securement: alcohol impregnated caps, blood cleaned with CHG, chlorhexidine disc applied, glue, site cleansed, statlock, sterile dressing applied and transparent dressing  Post procedure assessment: blood return through all ports and free fluid flow  PROCEDURE Describe Procedure: Failed PICC attempt on the right upper arm. First attempt on basilic vein, unable to advance the wire, second attempt on brachial lateral able to advance wire only until 15cm, midline placed instead. RN made aware. Left arm has history of lumpectomy with lymph nodes removed. Midline okay to use.

## 2023-04-27 NOTE — PLAN OF CARE
ICU End of Shift Summary. See flowsheets for vital signs and detailed assessment.    Changes this shift: Alert. Oriented to self and time. Intermittently disoriented to place and situation. Confused and forgetful overnight. Pain adequately managed with scheduled meds. Hydralazine administered x1 to meet BP goal. CO 3.7, CI 2.2, CVP 11, SvO2 50. 2L NC/oxymask overnight. Denies SOB. Able to cough up some secretions. Oliguric. 500cc slow bolus administered. Family at bedside in the evening, supportive of patient and participating in cares.     Plan: Potential transfer to  after line removal.

## 2023-04-28 ENCOUNTER — APPOINTMENT (OUTPATIENT)
Dept: SPEECH THERAPY | Facility: CLINIC | Age: 88
DRG: 219 | End: 2023-04-28
Attending: SURGERY
Payer: COMMERCIAL

## 2023-04-28 ENCOUNTER — APPOINTMENT (OUTPATIENT)
Dept: GENERAL RADIOLOGY | Facility: CLINIC | Age: 88
DRG: 219 | End: 2023-04-28
Attending: STUDENT IN AN ORGANIZED HEALTH CARE EDUCATION/TRAINING PROGRAM
Payer: COMMERCIAL

## 2023-04-28 ENCOUNTER — APPOINTMENT (OUTPATIENT)
Dept: GENERAL RADIOLOGY | Facility: CLINIC | Age: 88
DRG: 219 | End: 2023-04-28
Attending: SURGERY
Payer: COMMERCIAL

## 2023-04-28 LAB
ALBUMIN SERPL BCG-MCNC: 2.8 G/DL (ref 3.5–5.2)
ALP SERPL-CCNC: 38 U/L (ref 35–104)
ALT SERPL W P-5'-P-CCNC: 58 U/L (ref 10–35)
ANION GAP SERPL CALCULATED.3IONS-SCNC: 11 MMOL/L (ref 7–15)
ANION GAP SERPL CALCULATED.3IONS-SCNC: 8 MMOL/L (ref 7–15)
APTT PPP: 36 SECONDS (ref 22–38)
AST SERPL W P-5'-P-CCNC: 80 U/L (ref 10–35)
ATRIAL RATE - MUSE: 66 BPM
BILIRUB SERPL-MCNC: 0.3 MG/DL
BUN SERPL-MCNC: 41.3 MG/DL (ref 8–23)
BUN SERPL-MCNC: 45.1 MG/DL (ref 8–23)
CA-I BLD-MCNC: 4.9 MG/DL (ref 4.4–5.2)
CALCIUM SERPL-MCNC: 8.8 MG/DL (ref 8.8–10.2)
CALCIUM SERPL-MCNC: 8.8 MG/DL (ref 8.8–10.2)
CHLORIDE SERPL-SCNC: 105 MMOL/L (ref 98–107)
CHLORIDE SERPL-SCNC: 105 MMOL/L (ref 98–107)
CREAT SERPL-MCNC: 0.91 MG/DL (ref 0.51–0.95)
CREAT SERPL-MCNC: 1.06 MG/DL (ref 0.51–0.95)
CYSTATIN C (ROCHE): 1.4 MG/L (ref 0.6–1)
DEPRECATED HCO3 PLAS-SCNC: 21 MMOL/L (ref 22–29)
DEPRECATED HCO3 PLAS-SCNC: 21 MMOL/L (ref 22–29)
DIASTOLIC BLOOD PRESSURE - MUSE: NORMAL MMHG
ERYTHROCYTE [DISTWIDTH] IN BLOOD BY AUTOMATED COUNT: 15.1 % (ref 10–15)
ERYTHROCYTE [DISTWIDTH] IN BLOOD BY AUTOMATED COUNT: 15.2 % (ref 10–15)
FIBRINOGEN PPP-MCNC: 451 MG/DL (ref 170–490)
GFR SERPL CREATININE-BSD FRML MDRD: 42 ML/MIN/1.73M2
GFR SERPL CREATININE-BSD FRML MDRD: 51 ML/MIN/1.73M2
GFR SERPL CREATININE-BSD FRML MDRD: 62 ML/MIN/1.73M2
GLUCOSE BLDC GLUCOMTR-MCNC: 114 MG/DL (ref 70–99)
GLUCOSE BLDC GLUCOMTR-MCNC: 126 MG/DL (ref 70–99)
GLUCOSE BLDC GLUCOMTR-MCNC: 126 MG/DL (ref 70–99)
GLUCOSE BLDC GLUCOMTR-MCNC: 129 MG/DL (ref 70–99)
GLUCOSE BLDC GLUCOMTR-MCNC: 140 MG/DL (ref 70–99)
GLUCOSE BLDC GLUCOMTR-MCNC: 148 MG/DL (ref 70–99)
GLUCOSE SERPL-MCNC: 142 MG/DL (ref 70–99)
GLUCOSE SERPL-MCNC: 142 MG/DL (ref 70–99)
HCT VFR BLD AUTO: 25.8 % (ref 35–47)
HCT VFR BLD AUTO: 27.2 % (ref 35–47)
HGB BLD-MCNC: 8.1 G/DL (ref 11.7–15.7)
HGB BLD-MCNC: 8.5 G/DL (ref 11.7–15.7)
INR PPP: 1.49 (ref 0.85–1.15)
INTERPRETATION ECG - MUSE: NORMAL
MAGNESIUM SERPL-MCNC: 2.3 MG/DL (ref 1.7–2.3)
MAGNESIUM SERPL-MCNC: 2.3 MG/DL (ref 1.7–2.3)
MCH RBC QN AUTO: 29.3 PG (ref 26.5–33)
MCH RBC QN AUTO: 29.3 PG (ref 26.5–33)
MCHC RBC AUTO-ENTMCNC: 31.3 G/DL (ref 31.5–36.5)
MCHC RBC AUTO-ENTMCNC: 31.4 G/DL (ref 31.5–36.5)
MCV RBC AUTO: 94 FL (ref 78–100)
MCV RBC AUTO: 94 FL (ref 78–100)
P AXIS - MUSE: 20 DEGREES
PHOSPHATE SERPL-MCNC: 3.4 MG/DL (ref 2.5–4.5)
PHOSPHATE SERPL-MCNC: 4.2 MG/DL (ref 2.5–4.5)
PLATELET # BLD AUTO: 143 10E3/UL (ref 150–450)
PLATELET # BLD AUTO: 150 10E3/UL (ref 150–450)
POTASSIUM SERPL-SCNC: 4.6 MMOL/L (ref 3.4–5.3)
POTASSIUM SERPL-SCNC: 5.2 MMOL/L (ref 3.4–5.3)
PR INTERVAL - MUSE: 312 MS
PROT SERPL-MCNC: 5 G/DL (ref 6.4–8.3)
QRS DURATION - MUSE: 112 MS
QT - MUSE: 404 MS
QTC - MUSE: 423 MS
R AXIS - MUSE: -6 DEGREES
RBC # BLD AUTO: 2.76 10E6/UL (ref 3.8–5.2)
RBC # BLD AUTO: 2.9 10E6/UL (ref 3.8–5.2)
SODIUM SERPL-SCNC: 134 MMOL/L (ref 136–145)
SODIUM SERPL-SCNC: 137 MMOL/L (ref 136–145)
SYSTOLIC BLOOD PRESSURE - MUSE: NORMAL MMHG
T AXIS - MUSE: 5 DEGREES
VENTRICULAR RATE- MUSE: 66 BPM
WBC # BLD AUTO: 10.7 10E3/UL (ref 4–11)
WBC # BLD AUTO: 9.8 10E3/UL (ref 4–11)

## 2023-04-28 PROCEDURE — 82610 CYSTATIN C: CPT | Performed by: STUDENT IN AN ORGANIZED HEALTH CARE EDUCATION/TRAINING PROGRAM

## 2023-04-28 PROCEDURE — 92611 MOTION FLUOROSCOPY/SWALLOW: CPT | Mod: GN

## 2023-04-28 PROCEDURE — 74230 X-RAY XM SWLNG FUNCJ C+: CPT | Mod: 26 | Performed by: RADIOLOGY

## 2023-04-28 PROCEDURE — 82330 ASSAY OF CALCIUM: CPT | Performed by: SURGERY

## 2023-04-28 PROCEDURE — 250N000009 HC RX 250: Performed by: SURGERY

## 2023-04-28 PROCEDURE — 93005 ELECTROCARDIOGRAM TRACING: CPT

## 2023-04-28 PROCEDURE — 83735 ASSAY OF MAGNESIUM: CPT | Performed by: STUDENT IN AN ORGANIZED HEALTH CARE EDUCATION/TRAINING PROGRAM

## 2023-04-28 PROCEDURE — 99291 CRITICAL CARE FIRST HOUR: CPT | Mod: 24 | Performed by: STUDENT IN AN ORGANIZED HEALTH CARE EDUCATION/TRAINING PROGRAM

## 2023-04-28 PROCEDURE — 94799 UNLISTED PULMONARY SVC/PX: CPT

## 2023-04-28 PROCEDURE — 92526 ORAL FUNCTION THERAPY: CPT | Mod: GN

## 2023-04-28 PROCEDURE — 85610 PROTHROMBIN TIME: CPT | Performed by: STUDENT IN AN ORGANIZED HEALTH CARE EDUCATION/TRAINING PROGRAM

## 2023-04-28 PROCEDURE — 250N000009 HC RX 250: Performed by: STUDENT IN AN ORGANIZED HEALTH CARE EDUCATION/TRAINING PROGRAM

## 2023-04-28 PROCEDURE — 85027 COMPLETE CBC AUTOMATED: CPT | Performed by: STUDENT IN AN ORGANIZED HEALTH CARE EDUCATION/TRAINING PROGRAM

## 2023-04-28 PROCEDURE — 71045 X-RAY EXAM CHEST 1 VIEW: CPT | Mod: 26 | Performed by: RADIOLOGY

## 2023-04-28 PROCEDURE — 85730 THROMBOPLASTIN TIME PARTIAL: CPT | Performed by: STUDENT IN AN ORGANIZED HEALTH CARE EDUCATION/TRAINING PROGRAM

## 2023-04-28 PROCEDURE — 94640 AIRWAY INHALATION TREATMENT: CPT | Mod: 76

## 2023-04-28 PROCEDURE — 200N000002 HC R&B ICU UMMC

## 2023-04-28 PROCEDURE — 999N000157 HC STATISTIC RCP TIME EA 10 MIN

## 2023-04-28 PROCEDURE — 74230 X-RAY XM SWLNG FUNCJ C+: CPT

## 2023-04-28 PROCEDURE — 85384 FIBRINOGEN ACTIVITY: CPT | Performed by: STUDENT IN AN ORGANIZED HEALTH CARE EDUCATION/TRAINING PROGRAM

## 2023-04-28 PROCEDURE — 250N000013 HC RX MED GY IP 250 OP 250 PS 637: Performed by: PHYSICIAN ASSISTANT

## 2023-04-28 PROCEDURE — 93010 ELECTROCARDIOGRAM REPORT: CPT | Performed by: INTERNAL MEDICINE

## 2023-04-28 PROCEDURE — 85014 HEMATOCRIT: CPT | Performed by: STUDENT IN AN ORGANIZED HEALTH CARE EDUCATION/TRAINING PROGRAM

## 2023-04-28 PROCEDURE — 36415 COLL VENOUS BLD VENIPUNCTURE: CPT | Performed by: STUDENT IN AN ORGANIZED HEALTH CARE EDUCATION/TRAINING PROGRAM

## 2023-04-28 PROCEDURE — 250N000011 HC RX IP 250 OP 636: Performed by: STUDENT IN AN ORGANIZED HEALTH CARE EDUCATION/TRAINING PROGRAM

## 2023-04-28 PROCEDURE — 250N000011 HC RX IP 250 OP 636: Performed by: PHYSICIAN ASSISTANT

## 2023-04-28 PROCEDURE — 99232 SBSQ HOSP IP/OBS MODERATE 35: CPT | Performed by: NURSE PRACTITIONER

## 2023-04-28 PROCEDURE — 80053 COMPREHEN METABOLIC PANEL: CPT | Performed by: STUDENT IN AN ORGANIZED HEALTH CARE EDUCATION/TRAINING PROGRAM

## 2023-04-28 PROCEDURE — 71045 X-RAY EXAM CHEST 1 VIEW: CPT

## 2023-04-28 PROCEDURE — 94640 AIRWAY INHALATION TREATMENT: CPT

## 2023-04-28 PROCEDURE — 250N000013 HC RX MED GY IP 250 OP 250 PS 637: Performed by: STUDENT IN AN ORGANIZED HEALTH CARE EDUCATION/TRAINING PROGRAM

## 2023-04-28 PROCEDURE — 84100 ASSAY OF PHOSPHORUS: CPT | Performed by: STUDENT IN AN ORGANIZED HEALTH CARE EDUCATION/TRAINING PROGRAM

## 2023-04-28 RX ORDER — FUROSEMIDE 10 MG/ML
20 INJECTION INTRAMUSCULAR; INTRAVENOUS ONCE
Status: COMPLETED | OUTPATIENT
Start: 2023-04-28 | End: 2023-04-28

## 2023-04-28 RX ORDER — ACETYLCYSTEINE 200 MG/ML
2 SOLUTION ORAL; RESPIRATORY (INHALATION) EVERY 4 HOURS
Status: DISCONTINUED | OUTPATIENT
Start: 2023-04-28 | End: 2023-04-28

## 2023-04-28 RX ORDER — ACETYLCYSTEINE 200 MG/ML
2 SOLUTION ORAL; RESPIRATORY (INHALATION) 4 TIMES DAILY
Status: DISCONTINUED | OUTPATIENT
Start: 2023-04-28 | End: 2023-05-01

## 2023-04-28 RX ORDER — BARIUM SULFATE 400 MG/ML
SUSPENSION ORAL ONCE
Status: COMPLETED | OUTPATIENT
Start: 2023-04-28 | End: 2023-04-28

## 2023-04-28 RX ORDER — DEXTROSE MONOHYDRATE 25 G/50ML
25-50 INJECTION, SOLUTION INTRAVENOUS
Status: DISCONTINUED | OUTPATIENT
Start: 2023-04-28 | End: 2023-04-28

## 2023-04-28 RX ORDER — FUROSEMIDE 10 MG/ML
40 INJECTION INTRAMUSCULAR; INTRAVENOUS ONCE
Status: DISCONTINUED | OUTPATIENT
Start: 2023-04-28 | End: 2023-04-28

## 2023-04-28 RX ORDER — IPRATROPIUM BROMIDE AND ALBUTEROL SULFATE 2.5; .5 MG/3ML; MG/3ML
3 SOLUTION RESPIRATORY (INHALATION) EVERY 4 HOURS PRN
Status: DISCONTINUED | OUTPATIENT
Start: 2023-04-28 | End: 2023-05-11

## 2023-04-28 RX ORDER — HYDRALAZINE HYDROCHLORIDE 20 MG/ML
10 INJECTION INTRAMUSCULAR; INTRAVENOUS
Status: DISCONTINUED | OUTPATIENT
Start: 2023-04-28 | End: 2023-05-04

## 2023-04-28 RX ORDER — NICOTINE POLACRILEX 4 MG
15-30 LOZENGE BUCCAL
Status: DISCONTINUED | OUTPATIENT
Start: 2023-04-28 | End: 2023-04-28

## 2023-04-28 RX ADMIN — HEPARIN SODIUM 5000 UNITS: 5000 INJECTION, SOLUTION INTRAVENOUS; SUBCUTANEOUS at 15:40

## 2023-04-28 RX ADMIN — SENNOSIDES AND DOCUSATE SODIUM 1 TABLET: 50; 8.6 TABLET ORAL at 20:36

## 2023-04-28 RX ADMIN — ACETYLCYSTEINE 2 ML: 200 SOLUTION ORAL; RESPIRATORY (INHALATION) at 19:28

## 2023-04-28 RX ADMIN — ASPIRIN 81 MG CHEWABLE TABLET 81 MG: 81 TABLET CHEWABLE at 08:05

## 2023-04-28 RX ADMIN — FUROSEMIDE 20 MG: 10 INJECTION, SOLUTION INTRAVENOUS at 12:14

## 2023-04-28 RX ADMIN — DORZOLAMIDE HYDROCHLORIDE AND TIMOLOL MALEATE 1 DROP: 22.3; 6.8 SOLUTION/ DROPS OPHTHALMIC at 08:07

## 2023-04-28 RX ADMIN — HYDROMORPHONE HYDROCHLORIDE 0.4 MG: 1 INJECTION, SOLUTION INTRAMUSCULAR; INTRAVENOUS; SUBCUTANEOUS at 01:20

## 2023-04-28 RX ADMIN — IPRATROPIUM BROMIDE AND ALBUTEROL SULFATE 3 ML: .5; 3 SOLUTION RESPIRATORY (INHALATION) at 10:53

## 2023-04-28 RX ADMIN — SIMVASTATIN 20 MG: 20 TABLET, FILM COATED ORAL at 22:03

## 2023-04-28 RX ADMIN — ACETYLCYSTEINE 2 ML: 200 SOLUTION ORAL; RESPIRATORY (INHALATION) at 16:11

## 2023-04-28 RX ADMIN — PANTOPRAZOLE SODIUM 40 MG: 40 TABLET, DELAYED RELEASE ORAL at 08:05

## 2023-04-28 RX ADMIN — BARIUM SULFATE 5 ML: 400 SUSPENSION ORAL at 14:15

## 2023-04-28 RX ADMIN — INSULIN ASPART 1 UNITS: 100 INJECTION, SOLUTION INTRAVENOUS; SUBCUTANEOUS at 08:11

## 2023-04-28 RX ADMIN — INSULIN ASPART 1 UNITS: 100 INJECTION, SOLUTION INTRAVENOUS; SUBCUTANEOUS at 11:49

## 2023-04-28 RX ADMIN — HEPARIN SODIUM 5000 UNITS: 5000 INJECTION, SOLUTION INTRAVENOUS; SUBCUTANEOUS at 08:05

## 2023-04-28 RX ADMIN — LATANOPROST 1 DROP: 50 SOLUTION OPHTHALMIC at 21:33

## 2023-04-28 RX ADMIN — FUROSEMIDE 20 MG: 10 INJECTION, SOLUTION INTRAVENOUS at 17:59

## 2023-04-28 RX ADMIN — ACETAMINOPHEN 975 MG: 325 TABLET ORAL at 08:05

## 2023-04-28 RX ADMIN — IPRATROPIUM BROMIDE AND ALBUTEROL SULFATE 3 ML: .5; 3 SOLUTION RESPIRATORY (INHALATION) at 16:11

## 2023-04-28 RX ADMIN — IPRATROPIUM BROMIDE AND ALBUTEROL SULFATE 3 ML: .5; 3 SOLUTION RESPIRATORY (INHALATION) at 19:28

## 2023-04-28 RX ADMIN — DORZOLAMIDE HYDROCHLORIDE AND TIMOLOL MALEATE 1 DROP: 22.3; 6.8 SOLUTION/ DROPS OPHTHALMIC at 20:36

## 2023-04-28 RX ADMIN — HYDRALAZINE HYDROCHLORIDE 10 MG: 20 INJECTION INTRAMUSCULAR; INTRAVENOUS at 06:30

## 2023-04-28 ASSESSMENT — ACTIVITIES OF DAILY LIVING (ADL)
ADLS_ACUITY_SCORE: 29
ADLS_ACUITY_SCORE: 25
ADLS_ACUITY_SCORE: 29

## 2023-04-28 NOTE — PROGRESS NOTES
"SPIRITUAL HEALTH SERVICES  SPIRITUAL ASSESSMENT Progress Note  [facility] ([place] 4C     REFERRAL SOURCE: Staff request     Visit with pt Cydney Christiansen.  Per RN, pt has been saying she does not want any \"extraordinary measures\" and she \"wants to die.\"  Cydney shared with  that she is tired, and has not been able to sleep well the last few nights.      She said she is not uncomfortable or in pain, and is not particularly anxious but she is still not able to sleep.  Cydney said that if she was able to sleep, she is not sure if she would still feel she \"wants to let go.\"  Cydney shared that her sons \"don't want me to give up\" and that she knows they want her to \"keep fighting.\"  Cydney has interests/hobbies including bowling, golf and spending time with friends and she expressed hope/desire to get back to those activities.  She acknowledged that she is normally a very active person so spending time in a hospital bed is difficult for her.  She said that she wants to \"be comfortable,\" and that she does not mind being in the hospital but wants to focus on her comfort.      Cydney was clear in her wishes to be comfortable and get more sleep, but it was not clear from the visit whether her views on code status or her desire to die would change if she got more rest.   spoke with RN after visit who agrees with the importance of assessing goals of care both with pt and with family, and  will remain available for family and pt.     PLAN: [facility] will remain available for ongoing visits/support     Mattie Stanford  Pager: [phone]      "

## 2023-04-28 NOTE — PROGRESS NOTES
"Pain Service Progress Note  [facility]  Date: 04/28/2023       Patient Name: Cydney Christiansen  MRN: [id number]  Age: 87 year old  Sex: female      Assessment:  Cydney Christiansen is a 87 year old female with PMH significant for HTN, HLD, bilateral glaucoma, scoliosis, compression fracture of thoracic spine, osteoporosis, history of bladder cancer, invasive ductal carcinoma of left breast s/p lumpectomy in 2015, and multiple other skin cancer sites s/p removal, and has severe aortic stenosis with bicuspid aortic valve and an ascending aortic aneurysm    Procedure: s/p ascending aortic arch repair with tissue graft and AVR, and required repair stitches around valve d/t bleeding postop    Date of Surgery: 4/25/23     Date of Catheter Placement: 4/25/23     Plan/Recommendations:  1. Regional Anesthesia/Analgesia  -Continuous Catheter Type/Site: bilateral erector spinae (ES) T6-7  Infusate: 0.2% ropivacaine  Continue Programmed Intermittent Bolus (PIB) rate at 4mL Q60 min via each catheter, total infusion rate of 8 mL/hr    Plan to maintain catheters while chest tubes in place, max of 7 days    2. Anticoagulation  -Please contact Inpatient Pain Service before ordering or making any anticoagulation changes       3. Multimodal Analgesia  - per primary service     Pain Service will continue to follow.    Discussed with attending anesthesiologist    KRISTIE Siddiqui CNP  04/28/2023     Overnight Events: None    Tubes/Drains: Yes  3 chest tubes, Helms catheter    Subjective:  I feel a little better today.  Denies pain at chest tube site pain at rest and with movement.  Nausea: Yes  Symptoms of LAST: No    Diet: Advance Diet as Tolerated: Regular Diet Adult    Relevant Labs:  Recent Labs   Lab Test 04/28/23  0344   INR 1.49*   *   PTT 36   BUN 41.3*       Physical Exam:  Vitals: BP 99/57   Pulse 65   Temp 97.7  F (36.5  C) (Oral)   Resp 18   Ht 1.499 m (4' 11\")   Wt 73.6 kg " (162 lb 4.1 oz)   LMP  (LMP Unknown)   SpO2 96%   BMI 32.77 kg/m      Physical Exam:   Orientation:  Alert, oriented, and in no acute distress: Yes  Sedation: No    Motor Examination:  5/5 Strength in lower extremities: Yes    Catheter Site:   Catheter entry site is clean/dry/intact: Yes    Tender: No      Relevant Medications:  Current Pain Medications:  Medications related to Pain Management (From now, onward)    Start     Dose/Rate Route Frequency Ordered Stop    04/28/23 0000  acetaminophen (TYLENOL) tablet 650 mg         650 mg Oral EVERY 4 HOURS PRN 04/25/23 1522      04/27/23 1529  lidocaine 1 % 0.1-5 mL         0.1-5 mL Other EVERY 1 HOUR PRN 04/27/23 1530 04/30/23 1528    04/27/23 1529  lidocaine (LMX4) cream          Topical EVERY 1 HOUR PRN 04/27/23 1530 04/30/23 1528    04/27/23 1200  ropivacaine 0.2% in NS perineural infusion (simple)          Perineural Continuous Nerve Block 04/27/23 1149      04/27/23 1200  ropivacaine 0.2% in NS perineural infusion (simple)          Perineural Continuous Nerve Block 04/27/23 1149      04/26/23 1610  oxyCODONE IR (ROXICODONE) half-tab 2.5 mg         2.5 mg Oral EVERY 4 HOURS PRN 04/26/23 1611      04/26/23 0800  polyethylene glycol (MIRALAX) Packet 17 g         17 g Oral or Feeding Tube DAILY 04/25/23 1522      04/26/23 0800  aspirin (ASA) chewable tablet 81 mg         81 mg Oral or NG Tube DAILY 04/25/23 1522      04/25/23 2000  senna-docusate (SENOKOT-S/PERICOLACE) 8.6-50 MG per tablet 1 tablet         1 tablet Oral or Feeding Tube 2 TIMES DAILY 04/25/23 1522      04/25/23 1522  magnesium hydroxide (MILK OF MAGNESIA) suspension 30 mL         30 mL Oral DAILY PRN 04/25/23 1522      04/25/23 1522  bisacodyl (DULCOLAX) suppository 10 mg         10 mg Rectal DAILY PRN 04/25/23 1522      04/25/23 1522  HYDROmorphone (PF) (DILAUDID) injection 0.2 mg        See Hyperspace for full Linked Orders Report.    0.2 mg Intravenous EVERY 2 HOURS PRN 04/25/23 1522      04/25/23  "1522  HYDROmorphone (PF) (DILAUDID) injection 0.4 mg        See Hyperspace for full Linked Orders Report.    0.4 mg Intravenous EVERY 2 HOURS PRN 04/25/23 1522      04/25/23 1522  lidocaine 1 % 0.1-1 mL         0.1-1 mL Other EVERY 1 HOUR PRN 04/25/23 1522      04/25/23 1522  lidocaine (LMX4) kit          Topical EVERY 1 HOUR PRN 04/25/23 1522            Primary Service Contacted with Recommendations? Yes      Please see A&P for additional details of medical decision making.      Acute Inpatient Pain Service [facility]  Hours of pain coverage 24/7   Page via [employer]- Please Page the Pain Team Via [facility]: \"PAIN MANAGEMENT ACUTE INPATIENT/ [facility] EAST/[facility] [place]\"             "

## 2023-04-28 NOTE — OP NOTE
Procedure Date: 04/25/2023    PREOPERATIVE DIAGNOSES:    1.  Severe aortic stenosis.  2.  Ascending aortic aneurysm.     POSTOPERATIVE DIAGNOSES:  1.  Severe aortic stenosis.  2.  Ascending aortic aneurysm.     PROCEDURE:   1.  Aortic valve replacement using Aponte Resilia 21 mm valve.  2.  Ascending aortic aneurysm repair using 32 mm Hemashield graft.    SURGEON:  Gm Solis MD    ASSISTANTS:  Elizabet Rojas PA-C    OPERATIVE INDICATIONS:  The patient is an 87-year-old female with a past medical history of osteoporosis with severe aortic stenosis with an ascending aortic aneurysm, decision was made to proceed with aortic valve replacement and ascending aortic aneurysm repair as the patient was not a candidate for transcatheter valve replacement options.  I discussed risks, benefits of surgery with the patient's family including risk of death, stroke, bleeding, wound failure, renal failure, arrhythmias.  They understood and are willing to proceed with surgery.    OPERATIVE FINDINGS:  The patient's sternum was mildly osteoporotic.  Pericardial space was free of any adhesions.  The aortic valve was severely calcified and fused between the left and the right leaflets.  The ascending aortic aneurysm was enlarged.  The root was relatively normal.    DESCRIPTION OF PROCEDURE:  The patient was brought to the operating room in stable condition under general anesthesia.  The patient's chest, abdomen, lower extremities were prepped and draped in usual manner.  Median sternotomy was performed.  Preparation for cardiopulmonary bypass included ACT-guided heparinization and the admission of Amicar.  Aorta was scanned with a 20-Brazilian arterial cannula via 3 O tevdek pursestring pledgeted sutures x2 in the distal ascending aorta.  Venous cannula was inserted in right atrium.  Antegrade and retrograde cardioplegia cannulae were placed.  Full cardiopulmonary bypass was initiated.  Left ventricular vent was also placed.  Aortic  pressure was temporarily reduced and the aorta was crossclamped.  One liter of cold blood cardioplegia was administered via the antegrade and retrograde routes.  Subsequent doses of cardioplegia were given at no greater than 20-minute intervals, via the retrograde route.  The mid portion of the ascending aorta was excised from the level of sinotubular junction to about 2 cm proximal to the origin of the innominate artery.  The aortic valve was sharply excised at the annulus and the annulus thoroughly debrided of all calcium.  Two O tevdek  mattress sutures were placed on the annulus with pledgets on the ventricular side.  These sutures were then passed through the sewing ring of a 21 mm Aponte Resilia valve, which was seated and tied without difficulty using the Cor-Knot device.  Next, the proximal and distal aortic margins were reinforced with bovine pericardial strips placed inside and the outside of the aortic cuff.  A 32 mm Hemashield graft was sewn end-to-side to both the proximal and the distal aortic suture margins using continuous 4-0 Vicryl suture.  Warm dose of retrograde hotshot cardioplegia was given and with high suction on both vents, crossclamp was released, organized rhythm resumed without the need for defibrillations.  Rewarming reperfusion was allowed.  De-airing was confirmed by echography.  The patient gradually weaned off cardiopulmonary bypass using low dose epinephrine.  Following termination of cardiopulmonary bypass, LV function within normal limits, and prosthetic valve was normal function with no leak.  Protamine was given.  All cannulas removed.  Careful hemostasis was obtained.  Two anterior mediastinal and 1 posterior Raymundo drain were placed.  Sternum was closed with surgical steel wires after 2 right ventricular pacing wires were placed.  Fascia, subcutaneous tissue, skin of chest were closed in layers.  The patient went to ICU in stable, but critical condition.    Gm Solis  MD        D: 2023   T: 2023   MT: heaven    Name:     MILLI PRIETO  MRN:      [id number]        Account:        [id number]   :      1935           Procedure Date: 2023     Document: [id number]

## 2023-04-28 NOTE — PROGRESS NOTES
"Care Management Follow Up    Length of Stay (days): 3    Expected Discharge Date: 05/01/2023     Concerns to be Addressed: all concerns addressed in this encounter     Patient plan of care discussed at interdisciplinary rounds: Yes    Anticipated Discharge Disposition: Home     Anticipated Discharge DME: Linda Valenzuela, Shower Chair  Additional Information:  Per Multidisciplinary rounds and  notes patient has been expressing that she \"Wants to die\" but her sons \"Don't want her to give up\".   Paged Cardiothoracic #[phone] for Palliative Care orders.     Rahel Parra, MSN, MA, CCM, RN  4C/4A ICU Care Coordinator  Phone:[phone]  Pager: [phone]    For Weekend & Holiday on call RN Care Coordinator:  [facility] ([phone]) Saturday & Sunday; [phone]) [facility] Recognized Holidays   Pager: [phone] Units: 4A, 4C, 4E, 5A & 5B         "

## 2023-04-28 NOTE — PROGRESS NOTES
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     Hypertension goal BP (blood pressure) < 140/90     Seborrheic keratosis     Malignant neoplasm of overlapping sites of left female breast (H)     Scoliosis     Compression fracture of thoracic vertebra (H)     Mass of left hand     Primary open angle glaucoma of both eyes, unspecified glaucoma stage     Osteoporosis, unspecified osteoporosis type, unspecified pathological fracture presence     Nuclear sclerosis of left eye     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)     Actinic keratosis     History of nonmelanoma skin cancer     Combined forms of age-related cataract of right eye     Status post coronary angiogram     Aortic valve stenosis       ASSESSMENT: Cydney Christiansen is a 86 y/o female with a PMH significant for HTN, HLD, bilateral glaucoma, scoliosis, compression fracture of thoracic vertebrae, osteoporosis, history of bladder cancer, invasive ductal carcinoma of left breast s/p lumpectomy (2015), and multiple other skin cancer sites s/p removal who has severe aortic stenosis with bicuspid aortic valve and an ascending aortic aneurysm who presents 4/25 for ascending aortic arch repair with tissue graft and AVR with Dr. Solis. Patient required repair stitches around valve d/t bleeding post-op. Additionally, noted to have moderate drop in platelet count coming off bypass requiring 2000 Kcentra, 1g fibryga.  Received 2pRBC, 2 platelets, 325 cell saver, 1.5L fluid. Arrived to  CV ICU for further hemodynamic monitoring/management on precedex infusion.       OVERNIGHT EVENTS:   -- COLTON   -- Epi weaned to 0.01   -- Given lasix per Dr Solis ON     UPDATES and EVENTS TODAY:   -- epi weaned off this am    -- Ensure PO intake   -- Give additional lasix this am   -- Change SBP goal to less than 140 per Dr Solis   -- Remove 2 med CT per Dr Solis   -- Update family     PLAN:  Neuro/ pain/ sedation:  # Acute Postoperative pain  - Monitor neurological status. Notify the MD for any acute changes in exam.  - Pain: Scheduled tylenol. PRN tylenol, oxycodone, dilaudid.  - Bilateral JAY Catheters present; Continue infusions     Pulmonary care:   # Hx asthma   - Titrate supplemental oxygen to maintain saturation above 92%   - Pulmonary hygiene: Incentive spirometer every 15- 30 minutes when awake, flutter valve, C&DB  - PTA albuterol   - Monitor CT output closely   - Remove both meds today   - CXR daily     Cardiovascular:    # Severe aortic stenosis 2/2 bicuspid aortic S/P AVR 4/25   # Ascending aortic aneurysm s/p graft repair 4/25   # Hx HTN, HLD  PreCBP: Normal BiV Fx. Trace TR/MR/PI, no AI. Severe Aortic Stenosis estimated at 0.68cm^2 by continuity LVOT/AV VTI. Partial effacement of Sinus. Max diameter 5.2cm. Distortion of overall CAL greater than would expect with 5.2 cm aneurysm.  - Recent echo on 1/18/23 with LVEF of 45-50%  - PostCBP: Good biventricular function, no wall motion abnormalities.   - Monitor hemodynamic status.    - Goal MAP>65, SBP<140 per Dr Solis   - Hold PTA losartan  - PTA simvastatin  - ASA, HSQ daily    - S/p 3mg milrinone load intra-op    - 4/27: ECHO with 45-50% with new severe TI   - Weaned off epi ON and into this AM   - Lasix x 1 again today     GI care/ Nutrition:   # Elevated LFTs; resolved   - PPI  - Continue bowel regimen: miralax, senna  - Trend LFTs daily   - Encourage regular PO diet     Renal/ Fluid Balance/ Electrolytes:   # HELEN   BL creat appears to be ~ 0.6  - Strict  I/O, daily weights  - Avoid/limit nephrotoxins as able  - Replete lytes PRN per protocol  - Mild bump in creatinine today; Continue diuresis c/f volume overload   - Trend UOP and Cr closely     Endocrine:    # Stress induced hyperglycemia  # Osteoporosis c/b compression fracture of spine  Preop A1c 6.0  - sliding scale insulin   - Goal BG <180 for optimal healing  - PTA meds alendronate    ID/ Antibiotics:  # Stress induced leukocytosis  - To complete perioperative regimen  - Continue to monitor fever curve, WBC and inflammatory markers as appropriate    Heme:     # Stress induced leukocytosis  # Acute blood loss anemia  # thrombocytopenia 2/2 bypass   # C/f coagulopathy 2/2 abnormal quantra intra-op   Monitor for s/sx of bleeding  - CBC q12h  - Trend coags daily   - Hemoglobin goal >7   - HSQ     MSK/ Skin:  # Sternotomy  # Surgical Incision  - Sternal precautions  - Postoperative incision management per protocol  - PT/OT/CR    Prophylaxis:    - Mechanical prophylaxis for DVT  - Chemical DVT prophylaxis - subcutaneous heparin   - PPI     Lines/ tubes/ drains:  - 2 med CTs, 1 vipul     Disposition:  - CVICU -- >Floor if stable later today       Patient seen, findings and plan discussed with CVICU staff and CVTS fellow, attending.     I spent a total of 40 minutes providing critical care services at the bedside, and on the critical care unit, evaluating the patient, directing care and reviewing laboratory values and radiologic reports for the patient. Time spent separate from procedural time.     Jamel Ahn PA-C  04/28/23  10:28 AM      ====================================    Interval updates: Stable course overnight. Weaned off Epi, levo.  Lasix again today. SBP < 140. Remove 2 med CT.  Update family again today.      OBJECTIVE:   1. VITAL SIGNS:   BP Readings from Last 1 Encounters:   04/28/23 109/67      Pulse Readings from Last 1 Encounters:   04/28/23 61      Resp Readings from Last 1 Encounters:  "  04/28/23 25      Temp Readings from Last 1 Encounters:   04/28/23 97.9  F (36.6  C) (Oral)      SpO2 Readings from Last 1 Encounters:   04/28/23 97%      Wt Readings from Last 1 Encounters:   04/28/23 73.6 kg (162 lb 4.1 oz)      Ht Readings from Last 1 Encounters:   04/25/23 1.499 m (4' 11\")    There were no vitals filed for this visit.      2. INTAKE/ OUTPUT:      Intake/Output Summary (Last 24 hours) at 4/26/2023 1052  Last data filed at 4/26/2023 1030  Gross per 24 hour   Intake 5062.95 ml   Output 2095 ml   Net 2967.95 ml         3. PHYSICAL EXAMINATION:     General: AO x 4. No distress.   Neuro: Sleepy again today, though arouses easily/quickly. AO x 4. EOMI. HERNANDEZ equally. 5/5 x 4.   Resp: BS equal and CTA bilaterally. NO wheezing. On NC.   CV: S1, S2, RRR, no m/r/g   Abdomen: Soft, non-distended, non-tender  Incisions: c/d/i. No drainage or oozing.   Extremities: warm and well perfused, no edema.    CT: To suction, serosang output, no airleak, crepitus    4. INVESTIGATIONS:   Arterial Blood Gases   Recent Labs   Lab 04/27/23  0344 04/26/23  0820 04/26/23  0401 04/25/23 2026   PH 7.37 7.36 7.38 7.40   PCO2 38 38 37 36   PO2 77* 98 94 89   HCO3 22 22 22 22     Complete Blood Count   Recent Labs   Lab 04/28/23  0344 04/27/23  1541 04/27/23  0344 04/26/23 2021   WBC 10.7 14.1* 10.4 11.0   HGB 8.1* 9.2*  9.2* 9.2* 9.3*   * 147* 125* 122*     Basic Metabolic Panel  Recent Labs   Lab 04/28/23  0810 04/28/23  0353 04/28/23  0344 04/27/23  2310 04/27/23  2029 04/27/23  1653 04/27/23  0350 04/27/23  0344 04/26/23  0414 04/26/23  0359   NA  --   --  134*  --   --  134*  --  135*  --  135*   POTASSIUM  --   --  5.2  --   --  4.9  --  4.8  --  4.0   CHLORIDE  --   --  105  --   --  105  --  105  --  106   CO2  --   --  21*  --   --  20*  --  20*  --  20*   BUN  --   --  41.3*  --   --  34.4*  --  26.7*  --  14.7   CR  --   --  1.06*  --   --  1.01*  --  0.82  --  0.73   * 126* 142* 149*   < > 135*  152* "   < > 174*   < > 137*    < > = values in this interval not displayed.     Liver Function Tests  Recent Labs   Lab 23  0344 23  0344 23  0758 23  0400 23  0359 23  2221 23  1529   AST 80* 76*  --   --  75*  --  120*   ALT 58* 40*  --   --  40*  --  59*   ALKPHOS 38 42  --   --  33*  --  36   BILITOTAL 0.3 0.5  --   --  0.7  --  1.1   ALBUMIN 2.8* 3.1*  --   --  2.9*  --  3.1*   INR 1.49* 1.65* 1.53* 1.49*  --    < > 1.29*    < > = values in this interval not displayed.     Pancreatic Enzymes  No lab results found in last 7 days.  Coagulation Profile  Recent Labs   Lab 23  0344 23  0344 23  0758 23  0400   INR 1.49* 1.65* 1.53* 1.49*   PTT 36 42* 38 39*         5. RADIOLOGY:   Recent Results (from the past 24 hour(s))   Echo Complete   Result Value    LVEF  45-50% (mildly reduced)    Narrative    [id number]  [facility]  [id number]  [id number]^ANDER^SAHIL^WENDY     [facility],[place]  Echocardiography Laboratory  [address] [place] [id number]     Name: MILLI PRIETO  MRN: [id number]  : 1935  Study Date: 2023 02:00 PM  Age: 87 yrs  Gender: Female  Patient Location: [facility] [place]  Reason For Study: Abn EKG  Ordering Physician: SAHIL WORTHINGTON  Performed By: Carmen Bolivar     BSA: 1.7 m2  Height: 59 in  Weight: 161 lb  HR: 64  BP: 109/63 mmHg  ______________________________________________________________________________  Procedure  Echocardiogram with two-dimensional, color and spectral Doppler performed.  Contrast Optison. Patient was given 5 ml mixture of 3 ml Optison and 6 ml  saline. 4 ml wasted.  ______________________________________________________________________________  Interpretation Summary  S/P aortic valve replacement with 21mm [place] and ascending aorta  aneurysm repair with 32mm Hemashield graft on 23.     Left ventricular function is mildly reduced. The ejection  fraction is 45-50%.  Mild right ventricular dilation with normal global right ventricular function.  A bioprosthetic aortic valve with PV of 2.2m/s and MG of 12mmHg.  Severe tricuspid insufficiency.     Compared with previous studies, there is no change in LVEF. TR is better  characterized in today's study than prior studies, and severity was likely  underestimated. However, it is worse on today's study.  ______________________________________________________________________________  Left Ventricle  Left ventricular wall thickness is normal. Left ventricular size is normal.  Left ventricular function is decreased. The ejection fraction is 45-50%  (mildly reduced). Mild diffuse hypokinesis is present.     Right Ventricle  Global right ventricular function is normal. Mild right ventricular dilation  is present.     Mitral Valve  The mitral valve is normal. Trace mitral insufficiency is present.     Aortic Valve  Trace aortic insufficiency is present. A bioprosthetic aortic valve is  present. The peak velocity across the aortic valve is 2.2 m/sec. The mean  gradient is 12 mmHg.     Tricuspid Valve  Severe tricuspid insufficiency is present. Estimated pulmonary artery systolic  pressure is 21 mmHg plus right atrial pressure.     Vessels  Normal diameter aortic arch. Ascending aorta graft measures 32mm. The inferior  vena cava cannot be assessed.     Pericardium  Cannot assess pericardial effusion.     Compared to Previous Study  This study was compared with the study from 4/26/23 .     ______________________________________________________________________________  MMode/2D Measurements & Calculations  IVSd: 1.2 cm  LVIDd: 3.6 cm  LVIDs: 3.0 cm  LVPWd: 1.3 cm  FS: 14.6 %  LV mass(C)d: 146.4 grams  LV mass(C)dI: 87.1 grams/m2  Ao root diam: 3.3 cm  asc Aorta Diam: 3.1 cm     LVOT diam: 2.1 cm  LVOT area: 3.5 cm2  RWT: 0.72     Doppler Measurements & Calculations  MV E max nancy: 73.9 cm/sec  MV A max nancy: 84.4 cm/sec  MV  E/A: 0.88  MV dec slope: 405.0 cm/sec2  MV dec time: 0.18 sec  Ao V2 max: 220.0 cm/sec  Ao max P.4 mmHg  Ao V2 mean: 163.0 cm/sec  Ao mean P.0 mmHg  Ao V2 VTI: 36.9 cm  TR max nancy: 231.0 cm/sec  TR max P.3 mmHg     ______________________________________________________________________________  Report approved by: Diana Shah 2023 03:25 PM         XR Chest Port 1 View    Narrative    Exam: XR CHEST PORT 1 VIEW, 2023 6:33 AM    Indication: chest tubes s/p AVR and ascending aortic aneurysm repair    Comparison: Chest radiograph 2023, 2023, 2023    Findings:   A right upper extremity catheter terminates over the axilla.  Mediastinal drains remain. Aortic valve prosthesis. [place] catheter  removed. Stable mediastinal widening. Bibasilar pulmonary opacities  appear increased since 2023 but unchanged since 2023. Stable  perihilar streaky opacities. Likely bilateral pleural effusions.      Impression    Impression:   1. Continued bibasilar pulmonary opacities, likely representing a  combination of atelectasis, edema, and pleural effusions.  2. New right midline terminates over the axillary vein.     I have personally reviewed the examination and initial interpretation  and I agree with the findings.    JOSIAH HYLTON DO         SYSTEM ID:  [id number]       =========================================

## 2023-04-28 NOTE — PROGRESS NOTES
Nebulizers performed with aerobika, pt presents with strong cough. Suctioning moderate secretions post aerobika  Pt tolerating and performing flutter valve (aerobika) tx well    Per Respiratory RCAT protocol, and pt's ability to perform aerobika well, will discontinue CPT

## 2023-04-28 NOTE — PLAN OF CARE
Admitted/transferred from: Lateral transfer from 4E  Reason for admission/transfer: Lateral transfer from 4E  Patient status upon admission/transfer: Lethargic between cares, but arrestable and oriented x3. Requiring 1-3 lpm NC. Denies pain.  Interventions: EKG notable for ST elevated, 12 lead ordered but was unremarkable. Norepinephrine transitioned to epinephrine based on surgeon preference. Echo completed. PICC line placement failed. Helms reinserted to monitor low UO. Lasix given. Sons at bedside.  2 RN skin assessment: completed by Mauri   Sexual Orientation and Gender Identity (SOGI) smartfom completed: Done  Result of skin assessment and interventions/actions: blanchable erythema on coccyx, mild skin break down in adarsh area, moisture but no breakdown under bilateral breasts.  Patient belongings (see Flowsheet - Adult Profile for details): cell phone, clothes, shoes. Dentures, and glasses  MDRO education (if applicable): n/a

## 2023-04-28 NOTE — PLAN OF CARE
ICU End of Shift Summary. See flowsheets for vital signs and detailed assessment.    Changes this shift: VSS on 2 lpm NC. Patient has been off vasopressors since this AM. Denies pain. Video swallow study completed for concerns of aspiration. Patient passed study but was found to have a large amount of respiratory secretions. Pulmonary toilet encouraged. Patient tolerated the chair for ~2 hours.     Plan: Continue to monitor and follow POC. Transfer to  pending bed availability.     Goal Outcome Evaluation:      Plan of Care Reviewed With: patient    Overall Patient Progress: improvingOverall Patient Progress: improving    Outcome Evaluation: Patient received transfer order for the floor.

## 2023-04-28 NOTE — PROVIDER NOTIFICATION
"On 4/27 patient woke up from nap and expressed her wishes that she does not want any \"extraordinary measures\" in order to keep her alive. Bedside RN and patients son Patel were at bedside for this conversation. Patient repeatedly said she did not want \"extraordinary measures\" and does not want to be \"thumped on\" if her heart stops. RN briefly discussed code status with patient and son. Patient agreed to sleep on it and revisit the topic in the AM.     This morning 4/28 patient continues to express her wishes. She clearly stated that she wanted does not want to be revived if she dies. She called her son Patel and demanded he come to the hospital immediately so that she could talk to him about it. When discussing with RN the patient said she would not chest compressions if her heart stopped and she would not want to be on a ventilator again.     Medical team notified of these events and they agreed to come to bedside when Patel arrives to discuss goals of care.   "

## 2023-04-28 NOTE — PLAN OF CARE
"ICU End of Shift Summary. See flowsheets for vital signs and detailed assessment.    Changes this shift: A/O x 4, intermittently forgetful of current month and year. Complained of right shoulder pain, repositioning and PRNs utilized with adequate relief. Epi running, Per night team \"epi is supposed to be straight rate at 0.01\", asked for order to be updated since it is currently a titratable order, order was not updated by the end of the night. SBP goal <110 per CSI notes, PRN hydralazine given x1. 4L nasal cannula while sleeping. No BM this shift. Low UOP via traylor. CTs remain on -20 with minimal output. Wound vac in place -75 continuous.     Pt stated multiple times throughout the night that its \"her time to go\", and that she would like to die. Writer comforted pt and said we can discuss more in the AM. Pt may benefit from a palliative consult/GOC discussion when sons get here?    Plan:  Address current GOC. Maintain SBP <110 and MAP >65. Epi straight rate vs titratable?    Goal Outcome Evaluation:      Plan of Care Reviewed With: patient    Overall Patient Progress: no changeOverall Patient Progress: no change    Outcome Evaluation: 4L NC, Epi on, hard time sleeping      "

## 2023-04-29 ENCOUNTER — APPOINTMENT (OUTPATIENT)
Dept: GENERAL RADIOLOGY | Facility: CLINIC | Age: 88
DRG: 219 | End: 2023-04-29
Attending: SURGERY
Payer: COMMERCIAL

## 2023-04-29 ENCOUNTER — APPOINTMENT (OUTPATIENT)
Dept: PHYSICAL THERAPY | Facility: CLINIC | Age: 88
DRG: 219 | End: 2023-04-29
Attending: PHYSICIAN ASSISTANT
Payer: COMMERCIAL

## 2023-04-29 LAB
ALBUMIN SERPL BCG-MCNC: 3 G/DL (ref 3.5–5.2)
ALP SERPL-CCNC: 41 U/L (ref 35–104)
ALT SERPL W P-5'-P-CCNC: 91 U/L (ref 10–35)
ANION GAP SERPL CALCULATED.3IONS-SCNC: 11 MMOL/L (ref 7–15)
APTT PPP: 72 SECONDS (ref 22–38)
AST SERPL W P-5'-P-CCNC: 98 U/L (ref 10–35)
BILIRUB SERPL-MCNC: 0.3 MG/DL
BUN SERPL-MCNC: 40.8 MG/DL (ref 8–23)
CA-I BLD-MCNC: 4.6 MG/DL (ref 4.4–5.2)
CALCIUM SERPL-MCNC: 8.7 MG/DL (ref 8.8–10.2)
CHLORIDE SERPL-SCNC: 103 MMOL/L (ref 98–107)
CREAT SERPL-MCNC: 0.8 MG/DL (ref 0.51–0.95)
DEPRECATED HCO3 PLAS-SCNC: 24 MMOL/L (ref 22–29)
ERYTHROCYTE [DISTWIDTH] IN BLOOD BY AUTOMATED COUNT: 14.8 % (ref 10–15)
FIBRINOGEN PPP-MCNC: 492 MG/DL (ref 170–490)
GFR SERPL CREATININE-BSD FRML MDRD: 72 ML/MIN/1.73M2
GLUCOSE BLDC GLUCOMTR-MCNC: 105 MG/DL (ref 70–99)
GLUCOSE BLDC GLUCOMTR-MCNC: 116 MG/DL (ref 70–99)
GLUCOSE BLDC GLUCOMTR-MCNC: 118 MG/DL (ref 70–99)
GLUCOSE BLDC GLUCOMTR-MCNC: 132 MG/DL (ref 70–99)
GLUCOSE BLDC GLUCOMTR-MCNC: 158 MG/DL (ref 70–99)
GLUCOSE SERPL-MCNC: 122 MG/DL (ref 70–99)
HCT VFR BLD AUTO: 27 % (ref 35–47)
HGB BLD-MCNC: 8.3 G/DL (ref 11.7–15.7)
INR PPP: 1.32 (ref 0.85–1.15)
MAGNESIUM SERPL-MCNC: 2.1 MG/DL (ref 1.7–2.3)
MCH RBC QN AUTO: 29.2 PG (ref 26.5–33)
MCHC RBC AUTO-ENTMCNC: 30.7 G/DL (ref 31.5–36.5)
MCV RBC AUTO: 95 FL (ref 78–100)
PHOSPHATE SERPL-MCNC: 3.2 MG/DL (ref 2.5–4.5)
PLATELET # BLD AUTO: 156 10E3/UL (ref 150–450)
POTASSIUM SERPL-SCNC: 4 MMOL/L (ref 3.4–5.3)
PROT SERPL-MCNC: 5.3 G/DL (ref 6.4–8.3)
RBC # BLD AUTO: 2.84 10E6/UL (ref 3.8–5.2)
SODIUM SERPL-SCNC: 138 MMOL/L (ref 136–145)
WBC # BLD AUTO: 9.1 10E3/UL (ref 4–11)

## 2023-04-29 PROCEDURE — 97530 THERAPEUTIC ACTIVITIES: CPT | Mod: GP | Performed by: STUDENT IN AN ORGANIZED HEALTH CARE EDUCATION/TRAINING PROGRAM

## 2023-04-29 PROCEDURE — 85014 HEMATOCRIT: CPT | Performed by: STUDENT IN AN ORGANIZED HEALTH CARE EDUCATION/TRAINING PROGRAM

## 2023-04-29 PROCEDURE — 250N000011 HC RX IP 250 OP 636: Performed by: STUDENT IN AN ORGANIZED HEALTH CARE EDUCATION/TRAINING PROGRAM

## 2023-04-29 PROCEDURE — 71045 X-RAY EXAM CHEST 1 VIEW: CPT

## 2023-04-29 PROCEDURE — 36415 COLL VENOUS BLD VENIPUNCTURE: CPT | Performed by: SURGERY

## 2023-04-29 PROCEDURE — 82330 ASSAY OF CALCIUM: CPT | Performed by: SURGERY

## 2023-04-29 PROCEDURE — 250N000011 HC RX IP 250 OP 636: Performed by: PHYSICIAN ASSISTANT

## 2023-04-29 PROCEDURE — 99231 SBSQ HOSP IP/OBS SF/LOW 25: CPT | Mod: GC | Performed by: ANESTHESIOLOGY

## 2023-04-29 PROCEDURE — 97161 PT EVAL LOW COMPLEX 20 MIN: CPT | Mod: GP | Performed by: STUDENT IN AN ORGANIZED HEALTH CARE EDUCATION/TRAINING PROGRAM

## 2023-04-29 PROCEDURE — 250N000009 HC RX 250: Performed by: SURGERY

## 2023-04-29 PROCEDURE — 250N000009 HC RX 250

## 2023-04-29 PROCEDURE — 250N000013 HC RX MED GY IP 250 OP 250 PS 637: Performed by: PHYSICIAN ASSISTANT

## 2023-04-29 PROCEDURE — 271N000002 HC RX 271: Performed by: NURSE PRACTITIONER

## 2023-04-29 PROCEDURE — 93005 ELECTROCARDIOGRAM TRACING: CPT

## 2023-04-29 PROCEDURE — 94640 AIRWAY INHALATION TREATMENT: CPT

## 2023-04-29 PROCEDURE — 94640 AIRWAY INHALATION TREATMENT: CPT | Mod: 76

## 2023-04-29 PROCEDURE — 85384 FIBRINOGEN ACTIVITY: CPT | Performed by: STUDENT IN AN ORGANIZED HEALTH CARE EDUCATION/TRAINING PROGRAM

## 2023-04-29 PROCEDURE — 94799 UNLISTED PULMONARY SVC/PX: CPT

## 2023-04-29 PROCEDURE — 85730 THROMBOPLASTIN TIME PARTIAL: CPT | Performed by: STUDENT IN AN ORGANIZED HEALTH CARE EDUCATION/TRAINING PROGRAM

## 2023-04-29 PROCEDURE — 258N000003 HC RX IP 258 OP 636: Performed by: STUDENT IN AN ORGANIZED HEALTH CARE EDUCATION/TRAINING PROGRAM

## 2023-04-29 PROCEDURE — 999N000157 HC STATISTIC RCP TIME EA 10 MIN

## 2023-04-29 PROCEDURE — 85610 PROTHROMBIN TIME: CPT | Performed by: STUDENT IN AN ORGANIZED HEALTH CARE EDUCATION/TRAINING PROGRAM

## 2023-04-29 PROCEDURE — 250N000011 HC RX IP 250 OP 636: Performed by: NURSE PRACTITIONER

## 2023-04-29 PROCEDURE — 99232 SBSQ HOSP IP/OBS MODERATE 35: CPT | Mod: 24 | Performed by: STUDENT IN AN ORGANIZED HEALTH CARE EDUCATION/TRAINING PROGRAM

## 2023-04-29 PROCEDURE — 71045 X-RAY EXAM CHEST 1 VIEW: CPT | Mod: 26 | Performed by: STUDENT IN AN ORGANIZED HEALTH CARE EDUCATION/TRAINING PROGRAM

## 2023-04-29 PROCEDURE — 83735 ASSAY OF MAGNESIUM: CPT | Performed by: STUDENT IN AN ORGANIZED HEALTH CARE EDUCATION/TRAINING PROGRAM

## 2023-04-29 PROCEDURE — 250N000013 HC RX MED GY IP 250 OP 250 PS 637: Performed by: STUDENT IN AN ORGANIZED HEALTH CARE EDUCATION/TRAINING PROGRAM

## 2023-04-29 PROCEDURE — 93010 ELECTROCARDIOGRAM REPORT: CPT | Performed by: INTERNAL MEDICINE

## 2023-04-29 PROCEDURE — 80053 COMPREHEN METABOLIC PANEL: CPT | Performed by: STUDENT IN AN ORGANIZED HEALTH CARE EDUCATION/TRAINING PROGRAM

## 2023-04-29 PROCEDURE — 200N000002 HC R&B ICU UMMC

## 2023-04-29 PROCEDURE — 84100 ASSAY OF PHOSPHORUS: CPT | Performed by: STUDENT IN AN ORGANIZED HEALTH CARE EDUCATION/TRAINING PROGRAM

## 2023-04-29 RX ORDER — METOPROLOL TARTRATE 1 MG/ML
5 INJECTION, SOLUTION INTRAVENOUS EVERY 4 HOURS PRN
Status: DISCONTINUED | OUTPATIENT
Start: 2023-04-29 | End: 2023-05-04

## 2023-04-29 RX ORDER — FUROSEMIDE 10 MG/ML
20 INJECTION INTRAMUSCULAR; INTRAVENOUS ONCE
Status: COMPLETED | OUTPATIENT
Start: 2023-04-29 | End: 2023-04-29

## 2023-04-29 RX ORDER — METOPROLOL TARTRATE 1 MG/ML
5 INJECTION, SOLUTION INTRAVENOUS ONCE
Status: COMPLETED | OUTPATIENT
Start: 2023-04-29 | End: 2023-04-29

## 2023-04-29 RX ADMIN — AMIODARONE HYDROCHLORIDE 150 MG: 1.5 INJECTION, SOLUTION INTRAVENOUS at 21:50

## 2023-04-29 RX ADMIN — POLYETHYLENE GLYCOL 3350 17 G: 17 POWDER, FOR SOLUTION ORAL at 08:17

## 2023-04-29 RX ADMIN — IPRATROPIUM BROMIDE AND ALBUTEROL SULFATE 3 ML: .5; 3 SOLUTION RESPIRATORY (INHALATION) at 19:25

## 2023-04-29 RX ADMIN — IPRATROPIUM BROMIDE AND ALBUTEROL SULFATE 3 ML: .5; 3 SOLUTION RESPIRATORY (INHALATION) at 12:23

## 2023-04-29 RX ADMIN — METOPROLOL TARTRATE 5 MG: 5 INJECTION INTRAVENOUS at 21:15

## 2023-04-29 RX ADMIN — ASPIRIN 81 MG CHEWABLE TABLET 81 MG: 81 TABLET CHEWABLE at 08:17

## 2023-04-29 RX ADMIN — IPRATROPIUM BROMIDE AND ALBUTEROL SULFATE 3 ML: .5; 3 SOLUTION RESPIRATORY (INHALATION) at 16:41

## 2023-04-29 RX ADMIN — HEPARIN SODIUM 5000 UNITS: 5000 INJECTION, SOLUTION INTRAVENOUS; SUBCUTANEOUS at 00:29

## 2023-04-29 RX ADMIN — Medication 500 MG: at 09:55

## 2023-04-29 RX ADMIN — ACETYLCYSTEINE 2 ML: 200 SOLUTION ORAL; RESPIRATORY (INHALATION) at 12:23

## 2023-04-29 RX ADMIN — Medication 5 MG: at 22:14

## 2023-04-29 RX ADMIN — FUROSEMIDE 20 MG: 10 INJECTION, SOLUTION INTRAVENOUS at 02:47

## 2023-04-29 RX ADMIN — ACETYLCYSTEINE 2 ML: 200 SOLUTION ORAL; RESPIRATORY (INHALATION) at 08:25

## 2023-04-29 RX ADMIN — SIMVASTATIN 20 MG: 20 TABLET, FILM COATED ORAL at 22:14

## 2023-04-29 RX ADMIN — ACETYLCYSTEINE 2 ML: 200 SOLUTION ORAL; RESPIRATORY (INHALATION) at 16:41

## 2023-04-29 RX ADMIN — SENNOSIDES AND DOCUSATE SODIUM 1 TABLET: 50; 8.6 TABLET ORAL at 08:17

## 2023-04-29 RX ADMIN — LATANOPROST 1 DROP: 50 SOLUTION OPHTHALMIC at 22:15

## 2023-04-29 RX ADMIN — AMIODARONE HYDROCHLORIDE 1 MG/MIN: 50 INJECTION, SOLUTION INTRAVENOUS at 21:52

## 2023-04-29 RX ADMIN — IPRATROPIUM BROMIDE AND ALBUTEROL SULFATE 3 ML: .5; 3 SOLUTION RESPIRATORY (INHALATION) at 08:25

## 2023-04-29 RX ADMIN — ACETYLCYSTEINE 2 ML: 200 SOLUTION ORAL; RESPIRATORY (INHALATION) at 19:25

## 2023-04-29 RX ADMIN — HEPARIN SODIUM 5000 UNITS: 5000 INJECTION, SOLUTION INTRAVENOUS; SUBCUTANEOUS at 18:17

## 2023-04-29 RX ADMIN — HEPARIN SODIUM 5000 UNITS: 5000 INJECTION, SOLUTION INTRAVENOUS; SUBCUTANEOUS at 08:17

## 2023-04-29 RX ADMIN — METOPROLOL TARTRATE 5 MG: 5 INJECTION INTRAVENOUS at 14:07

## 2023-04-29 RX ADMIN — DORZOLAMIDE HYDROCHLORIDE AND TIMOLOL MALEATE 1 DROP: 22.3; 6.8 SOLUTION/ DROPS OPHTHALMIC at 20:21

## 2023-04-29 RX ADMIN — DORZOLAMIDE HYDROCHLORIDE AND TIMOLOL MALEATE 1 DROP: 22.3; 6.8 SOLUTION/ DROPS OPHTHALMIC at 08:18

## 2023-04-29 RX ADMIN — FUROSEMIDE 20 MG: 10 INJECTION, SOLUTION INTRAVENOUS at 09:55

## 2023-04-29 RX ADMIN — PANTOPRAZOLE SODIUM 40 MG: 40 TABLET, DELAYED RELEASE ORAL at 08:17

## 2023-04-29 ASSESSMENT — ACTIVITIES OF DAILY LIVING (ADL)
ADLS_ACUITY_SCORE: 31
ADLS_ACUITY_SCORE: 25
ADLS_ACUITY_SCORE: 31
ADLS_ACUITY_SCORE: 31
ADLS_ACUITY_SCORE: 25
ADLS_ACUITY_SCORE: 31
ADLS_ACUITY_SCORE: 25
ADLS_ACUITY_SCORE: 31
ADLS_ACUITY_SCORE: 31
ADLS_ACUITY_SCORE: 25

## 2023-04-29 NOTE — PLAN OF CARE
ICU End of Shift Summary. See flowsheets for vital signs and detailed assessment.    Changes this shift: Pt A/Ox4, forgetful but able to make needs known, neuro's intact. Afebrile, BP's stable. Pt rhythm changed from Afib back to previous rhythm of sinus with 1st degree AV block with BBB around 10pm. 2L NC. 1 smear BM, no appetite but ate a few bites of food. Helms in place, had low output so team notified and ordered 20 of lasix which pt had great response to. Nerve block gtt in place. Chest tubes in place. Wound vac.    Plan: Remove chest tubes, transfer to floor when available.       Goal Outcome Evaluation:      Plan of Care Reviewed With: patient    Overall Patient Progress: improvingOverall Patient Progress: improving    Outcome Evaluation: VSS on 2L NC. lasix given for low urine output.

## 2023-04-29 NOTE — PROGRESS NOTES
"Pain Service Progress Note  [facility]  Date: 04/29/2023       Patient Name: Cydney Christiansen  MRN: [id number]  Age: 87 year old  Sex: female      Assessment:  Cydney Christiansen is a 87 year old female with PMH significant for HTN, HLD, bilateral glaucoma, scoliosis, compression fracture of thoracic spine, osteoporosis, history of bladder cancer, invasive ductal carcinoma of left breast s/p lumpectomy in 2015, and multiple other skin cancer sites s/p removal, and has severe aortic stenosis with bicuspid aortic valve and an ascending aortic aneurysm    Procedure: s/p ascending aortic arch repair with tissue graft and AVR, and required repair stitches around valve d/t bleeding postop    Date of Surgery: 4/25/23     Date of Catheter Placement: 4/25/23     Plan/Recommendations:  1. Regional Anesthesia/Analgesia  - Continuous Catheter Type/Site: bilateral erector spinae (ES) T6-7  Infusate: 0.2% ropivacaine  Continue Programmed Intermittent Bolus (PIB) rate at 4mL Q60 min via each catheter, total infusion rate of 8 mL/hr    Plan to maintain catheters while chest tubes in place, max of 7 days    2. Anticoagulation  - Please contact Inpatient Pain Service before ordering or making any anticoagulation changes       3. Multimodal Analgesia  - Per primary service     Pain Service will continue to follow.    Discussed with attending anesthesiologist    Raghu Perez MD  Anesthesiology, CA-3, PGY4    Overnight Events: None    Tubes/Drains: Yes  3 chest tubes, Helms catheter    Subjective: \"Pain doing okay.\"  Nausea: Yes  Symptoms of LAST: No    Diet: Advance Diet as Tolerated: Regular Diet Adult  Snacks/Supplements Adult: Ensure Clear; Between Meals    Relevant Labs:  Recent Labs   Lab Test 04/28/23  0344   INR 1.49*   *   PTT 36   BUN 41.3*       Physical Exam:  Vitals: /67   Pulse 112   Temp 36.7  C (98  F) (Oral)   Resp 18   Ht 1.499 m (4' 11\")   Wt 71.2 kg (156 lb 15.5 oz)   " LMP  (LMP Unknown)   SpO2 97%   BMI 31.70 kg/m      Physical Exam:   Orientation:  Alert, oriented, and in no acute distress: Yes  Sedation: No    Motor Examination:  5/5 Strength in lower extremities: Yes    Catheter Site:   Catheter entry site is clean/dry/intact: Yes    Tender: No      Relevant Medications:  Current Pain Medications:  Medications related to Pain Management (From now, onward)    Start     Dose/Rate Route Frequency Ordered Stop    04/28/23 0000  acetaminophen (TYLENOL) tablet 650 mg         650 mg Oral EVERY 4 HOURS PRN 04/25/23 1522      04/27/23 1529  lidocaine 1 % 0.1-5 mL         0.1-5 mL Other EVERY 1 HOUR PRN 04/27/23 1530 04/30/23 1528    04/27/23 1529  lidocaine (LMX4) cream          Topical EVERY 1 HOUR PRN 04/27/23 1530 04/30/23 1528    04/27/23 1200  ropivacaine 0.2% in NS perineural infusion (simple)          Perineural Continuous Nerve Block 04/27/23 1149      04/27/23 1200  ropivacaine 0.2% in NS perineural infusion (simple)          Perineural Continuous Nerve Block 04/27/23 1149      04/26/23 1610  oxyCODONE IR (ROXICODONE) half-tab 2.5 mg         2.5 mg Oral EVERY 4 HOURS PRN 04/26/23 1611      04/26/23 0800  polyethylene glycol (MIRALAX) Packet 17 g         17 g Oral or Feeding Tube DAILY 04/25/23 1522      04/26/23 0800  aspirin (ASA) chewable tablet 81 mg         81 mg Oral or NG Tube DAILY 04/25/23 1522      04/25/23 2000  senna-docusate (SENOKOT-S/PERICOLACE) 8.6-50 MG per tablet 1 tablet         1 tablet Oral or Feeding Tube 2 TIMES DAILY 04/25/23 1522      04/25/23 1522  magnesium hydroxide (MILK OF MAGNESIA) suspension 30 mL         30 mL Oral DAILY PRN 04/25/23 1522      04/25/23 1522  bisacodyl (DULCOLAX) suppository 10 mg         10 mg Rectal DAILY PRN 04/25/23 1522      04/25/23 1522  HYDROmorphone (PF) (DILAUDID) injection 0.2 mg        See Hyperspace for full Linked Orders Report.    0.2 mg Intravenous EVERY 2 HOURS PRN 04/25/23 1522      04/25/23 1522   "HYDROmorphone (PF) (DILAUDID) injection 0.4 mg        See Hyperspace for full Linked Orders Report.    0.4 mg Intravenous EVERY 2 HOURS PRN 04/25/23 1522      04/25/23 1522  lidocaine 1 % 0.1-1 mL         0.1-1 mL Other EVERY 1 HOUR PRN 04/25/23 1522      04/25/23 1522  lidocaine (LMX4) kit          Topical EVERY 1 HOUR PRN 04/25/23 1522            Primary Service Contacted with Recommendations? Yes      Please see A&P for additional details of medical decision making.      Acute Inpatient Pain Service [facility]  Hours of pain coverage 24/7   Page via [employer]- Please Page the Pain Team Via [employer]: \"PAIN MANAGEMENT ACUTE INPATIENT/ [facility]\"             "

## 2023-04-29 NOTE — PROGRESS NOTES
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     Hypertension goal BP (blood pressure) < 140/90     Seborrheic keratosis     Malignant neoplasm of overlapping sites of left female breast (H)     Scoliosis     Compression fracture of thoracic vertebra (H)     Mass of left hand     Primary open angle glaucoma of both eyes, unspecified glaucoma stage     Osteoporosis, unspecified osteoporosis type, unspecified pathological fracture presence     Nuclear sclerosis of left eye     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)     Actinic keratosis     History of nonmelanoma skin cancer     Combined forms of age-related cataract of right eye     Status post coronary angiogram     Aortic valve stenosis       ASSESSMENT: Cydney Christiansen is a 88 y/o female with a PMH significant for HTN, HLD, bilateral glaucoma, scoliosis, compression fracture of thoracic vertebrae, osteoporosis, history of bladder cancer, invasive ductal carcinoma of left breast s/p lumpectomy (2015), and multiple other skin cancer sites s/p removal who has severe aortic stenosis with bicuspid aortic valve and an ascending aortic aneurysm who presents 4/25 for ascending aortic arch repair with tissue graft and AVR with Dr. Solis. Patient required repair stitches around valve d/t bleeding post-op. Additionally, noted to have moderate drop in platelet count coming off bypass requiring 2000 Kcentra, 1g fibryga.  Received 2pRBC, 2 platelets, 325 cell saver, 1.5L fluid. Arrived to  CV ICU for further hemodynamic monitoring/management on precedex infusion.       OVERNIGHT EVENTS:   -- COLTON     UPDATES and EVENTS TODAY:   -- Remove 2 med CT today    -- Repeat lasix   -- Awaiting floor bed     PLAN:  Neuro/ pain/ sedation:  # Acute Postoperative pain  - Monitor neurological status. Notify the MD for any acute changes in exam.  - Pain: Scheduled tylenol. PRN tylenol, oxycodone, dilaudid.  - Bilateral JAY Catheters present; Continue infusions     Pulmonary care:   # Hx asthma   - Titrate supplemental oxygen to maintain saturation above 92%   - Pulmonary hygiene: Incentive spirometer every 15- 30 minutes when awake, flutter valve, C&DB  - PTA albuterol   - Monitor CT output closely   - Remove both meds today   - CXR daily     Cardiovascular:    # Severe aortic stenosis 2/2 bicuspid aortic S/P AVR 4/25   # Ascending aortic aneurysm s/p graft repair 4/25   # Hx HTN, HLD  PreCBP: Normal BiV Fx. Trace TR/MR/PI, no AI. Severe Aortic Stenosis estimated at 0.68cm^2 by continuity LVOT/AV VTI. Partial effacement of Sinus. Max diameter 5.2cm. Distortion of overall CAL greater than would expect with 5.2 cm aneurysm.  - Recent echo on 1/18/23 with LVEF of 45-50%  - PostCBP: Good biventricular function, no wall motion abnormalities.   - Monitor hemodynamic status.    - Goal MAP>65, SBP<140 per Dr Solis   - Hold PTA losartan  - PTA simvastatin  - ASA, HSQ daily    - S/p 3mg milrinone load intra-op    - 4/27: ECHO with 45-50% with new severe TI   - Lasix x 1 again today     GI care/ Nutrition:   # Elevated LFTs; resolved   - PPI  - Continue bowel regimen: miralax, senna  - Trend LFTs daily   - Encourage regular PO diet     Renal/ Fluid Balance/ Electrolytes:   # HELEN   BL creat appears to be ~ 0.6  - Strict I/O, daily weights  - Avoid/limit nephrotoxins as able  - Replete lytes PRN per protocol  - Lasix x 1 again today     Endocrine:    # Stress induced hyperglycemia  # Osteoporosis c/b compression fracture of  "spine  Preop A1c 6.0  - sliding scale insulin   - Goal BG <180 for optimal healing  - PTA meds alendronate    ID/ Antibiotics:  # Stress induced leukocytosis  - To complete perioperative regimen  - Continue to monitor fever curve, WBC and inflammatory markers as appropriate    Heme:     # Stress induced leukocytosis  # Acute blood loss anemia  # thrombocytopenia 2/2 bypass   Monitor for s/sx of bleeding  - CBC q12h  - Trend coags daily   - Hemoglobin goal >7   - HSQ     MSK/ Skin:  # Sternotomy  # Surgical Incision  - Sternal precautions  - Postoperative incision management per protocol  - PT/OT/CR    Prophylaxis:    - Mechanical prophylaxis for DVT  - Chemical DVT prophylaxis - subcutaneous heparin   - PPI     Lines/ tubes/ drains:  - 1 vipul   - Midline peripheral IV   - Helms     Disposition:  - [facility]ICU -- > Floor       Patient seen, findings and plan discussed with CVICU staff and CVTS fellow, attending.     I spent a total of 40 minutes providing critical care services at the bedside, and on the critical care unit, evaluating the patient, directing care and reviewing laboratory values and radiologic reports for the patient. Time spent separate from procedural time.     Jamel Ahn PA-C  04/29/23  10:28 AM      ====================================    Interval updates: Stable course overnight. Remove 2 med CT. Re-dose lasix.       OBJECTIVE:   1. VITAL SIGNS:   BP Readings from Last 1 Encounters:   04/29/23 132/74      Pulse Readings from Last 1 Encounters:   04/29/23 62      Resp Readings from Last 1 Encounters:   04/29/23 18      Temp Readings from Last 1 Encounters:   04/29/23 98  F (36.7  C) (Oral)      SpO2 Readings from Last 1 Encounters:   04/29/23 100%      Wt Readings from Last 1 Encounters:   04/29/23 71.2 kg (156 lb 15.5 oz)      Ht Readings from Last 1 Encounters:   04/25/23 1.499 m (4' 11\")    There were no vitals filed for this visit.      2. INTAKE/ OUTPUT:      Intake/Output Summary (Last 24 " hours) at 4/26/2023 1052  Last data filed at 4/26/2023 1030  Gross per 24 hour   Intake 5062.95 ml   Output 2095 ml   Net 2967.95 ml         3. PHYSICAL EXAMINATION:     General: AO x 4. No distress.   Neuro: More awake today. AO x 4. EOMI. HERNANDEZ equally. 5/5 x 4.   Resp: BS equal and CTA bilaterally. NO wheezing. On NC.   CV: S1, S2, RRR, no m/r/g   Abdomen: Soft, non-distended, non-tender  Incisions: c/d/i. No drainage or oozing.   Extremities: warm and well perfused, no edema.    CT: To suction, serosang output, no airleak, crepitus    4. INVESTIGATIONS:   Arterial Blood Gases   Recent Labs   Lab 04/27/23  0344 04/26/23  0820 04/26/23  0401 04/25/23 2026   PH 7.37 7.36 7.38 7.40   PCO2 38 38 37 36   PO2 77* 98 94 89   HCO3 22 22 22 22     Complete Blood Count   Recent Labs   Lab 04/29/23  0536 04/28/23  1553 04/28/23  0344 04/27/23  1541   WBC 9.1 9.8 10.7 14.1*   HGB 8.3* 8.5* 8.1* 9.2*  9.2*    150 143* 147*     Basic Metabolic Panel  Recent Labs   Lab 04/29/23  1134 04/29/23  0829 04/29/23  0536 04/29/23  0408 04/28/23  1612 04/28/23  1553 04/28/23  0353 04/28/23  0344 04/27/23 2029 04/27/23  1653   NA  --   --  138  --   --  137  --  134*  --  134*   POTASSIUM  --   --  4.0  --   --  4.6  --  5.2  --  4.9   CHLORIDE  --   --  103  --   --  105  --  105  --  105   CO2  --   --  24  --   --  21*  --  21*  --  20*   BUN  --   --  40.8*  --   --  45.1*  --  41.3*  --  34.4*   CR  --   --  0.80  --   --  0.91  --  1.06*  --  1.01*   * 105* 122* 118*   < > 142*   < > 142*   < > 135*  152*    < > = values in this interval not displayed.     Liver Function Tests  Recent Labs   Lab 04/29/23  0536 04/28/23  0344 04/27/23  0344 04/26/23  0758 04/26/23  0400 04/26/23  0359   AST 98* 80* 76*  --   --  75*   ALT 91* 58* 40*  --   --  40*   ALKPHOS 41 38 42  --   --  33*   BILITOTAL 0.3 0.3 0.5  --   --  0.7   ALBUMIN 3.0* 2.8* 3.1*  --   --  2.9*   INR 1.32* 1.49* 1.65* 1.53*   < >  --     < > = values in  this interval not displayed.     Pancreatic Enzymes  No lab results found in last 7 days.  Coagulation Profile  Recent Labs   Lab 04/29/23  0536 04/28/23  0344 04/27/23  0344 04/26/23  0758   INR 1.32* 1.49* 1.65* 1.53*   PTT 72* 36 42* 38         5. RADIOLOGY:   Recent Results (from the past 24 hour(s))   XR Video Swallow with SLP or OT    Narrative    EXAMINATION: XR VIDEO SWALLOW WITH SLP OR OT 4/28/2023 2:32 PM    COMPARISON: None available    HISTORY: 87 years Female dysphagia    Fluoroscopy time: 1.9 minutes.    FINDINGS: Under fluoroscopic guidance, the patient was given orally  administered barium of varying consistencies in the presence of the  speech pathology service.     The oral phase was normal. There is no delay in swallowing or pooling  of contrast. No tracheal aspiration with any consistency barium  tested. Multiple episodes of penetration with thin and mildly  thickened liquid. No tracheal aspiration or deep penetration of the  right or solid bolus. Mild oral and pharyngeal residue.      Impression    IMPRESSION:   1.  No tracheal aspiration of any administered barium consistencies.  Intermittent penetration of thin and mildly thickened barium.   2.  Please refer to the speech pathologist report for further details.    I have personally reviewed the examination and initial interpretation  and I agree with the findings.    ADONIS TROTTER MD         SYSTEM ID:  [id number]   XR Chest Port 1 View    Narrative    Exam: XR CHEST PORT 1 VIEW, 4/29/2023 6:41 AM    Indication: chest tubes s/p AVR and ascending aortic aneurysm repair    Comparison: Chest radiograph 4/28/2023, 4/27/2023, 4/26/2023    Findings:   Right midline and terminates over the right axilla. Mediastinal drain  remains in place. Aortic valve prosthesis. Stable cardiomediastinal  silhouette. Stable dense bibasilar opacities and streaky perihilar  opacities and silhouetting of the hemidiaphragms.      Impression    Impression:   Stable  pulmonary opacities, likely combination of atelectasis, edema,  and pleural effusions.    I have personally reviewed the examination and initial interpretation  and I agree with the findings.    GRANT DOMINIQUE MD         SYSTEM ID:  [id number]       =========================================

## 2023-04-29 NOTE — PROGRESS NOTES
04/29/23 1100   Appointment Info   Signing Clinician's Name / Credentials (PT) Khushbu Cordon   Living Environment   People in Home child(stacie), adult   Current Living Arrangements house   Home Accessibility stairs to enter home;stairs within home   Number of Stairs, Main Entrance 2   Stair Railings, Main Entrance railings safe and in good condition   Number of Stairs, Within Home, Primary   (one flihg to basemen, pt uses for laundry)   Transportation Anticipated family or friend will provide   Living Environment Comments Patient reports there are 2 steps in the back of the house with a rail and 3 without in the front- mainly uses the back door.   Self-Care   Usual Activity Tolerance good   Current Activity Tolerance moderate   Equipment Currently Used at Home none   Fall history within last six months no   Activity/Exercise/Self-Care Comment Patient was independent at baseline with all ADLS and IADLs. No AD for mobility.   General Information   Onset of Illness/Injury or Date of Surgery 04/25/23   Referring Physician Elizabet Rojas   Patient/Family Therapy Goals Statement (PT) Get stronger   Pertinent History of Current Problem (include personal factors and/or comorbidities that impact the POC) 88 y/o female with a PMH significant for HTN, HLD, bilateral glaucoma, scoliosis, compression fracture of thoracic vertebrae, osteoporosis, history of bladder cancer, invasive ductal carcinoma of left breast s/p lumpectomy (2015), and multiple other skin cancer sites s/p removal who has severe aortic stenosis with bicuspid aortic valve and an ascending aortic aneurysm who presents 4/25 for ascending aortic arch repair with tissue graft and AVR with Dr. Solis. Patient required repair stitches around valve d/t bleeding post-op. Additionally, noted to have moderate drop in platelet count coming off bypass requiring 2000 Kcentra, 1g fibryga.  Received 2pRBC, 2 platelets, 325 cell saver, 1.5L fluid. Arrived to [facility] ICU for further  hemodynamic monitoring/management on precedex infusion.   Existing Precautions/Restrictions sternal   Heart Disease Risk Factors Age;Medical history;Overweight;High blood pressure;Dislipidemia   General Observations Patient with very weak cough with poor inspiratory capacity   Cognition   Affect/Mental Status (Cognition) WNL   Cognitive Status Comments No deficits   Integumentary/Edema   Integumentary/Edema Comments Multiple moles/deposits on upper back. Wound vac over sternal wound   Posture    Posture Comments Thoracic kyphosis present   Range of Motion (ROM)   ROM Comment WNL in BLES. Limited by pain in BUEs   Strength (Manual Muscle Testing)   Strength Comments At least 3/5 in BLEs   Bed Mobility   Comment, (Bed Mobility) Rolls with min A and requies mod A at the trunk for supine to sit and max A to scoot to EOB   Transfers   Comment, (Transfers) Sit to stand with mod A x1- unable to achieve fully upright   Gait/Stairs (Locomotion)   Comment, (Gait/Stairs) NT   Balance   Balance Comments Fair- CGA to SBA at EOB in sitting   Sensory Examination   Sensory Perception Comments NT   Clinical Impression   Criteria for Skilled Therapeutic Intervention Yes, treatment indicated   PT Diagnosis (PT) Impaired functional mobility   Influenced by the following impairments pain, weakness, balance   Functional limitations due to impairments Bed mobility, transfers, gait and stairs   Clinical Presentation (PT Evaluation Complexity) Stable/Uncomplicated   Clinical Presentation Rationale Clinical judgment and vitals response   Clinical Decision Making (Complexity) low complexity   Planned Therapy Interventions (PT) balance training;bed mobility training;gait training;neuromuscular re-education;patient/family education;stair training;strengthening;transfer training;risk factor education;home program guidelines;progressive activity/exercise   Risk & Benefits of therapy have been explained evaluation/treatment results reviewed;care  plan/treatment goals reviewed;risks/benefits reviewed;current/potential barriers reviewed;participants voiced agreement with care plan;participants included;patient;son   Clinical Impression Comments Patient presents with typical post op pain and acquired weakness leading to difficulty with functional independence. Anticipate will require TCU stay to return to independent baseline but patient is motivated and agreeable to this plan.   PT Total Evaluation Time   PT Eval, Low Complexity Minutes (89748) 8   Physical Therapy Goals   PT Frequency 5x/week   PT Predicted Duration/Target Date for Goal Attainment 05/05/23   PT Goals Bed Mobility;Transfers;Gait;Stairs   PT: Bed Mobility Independent;Supine to/from sit;Within precautions   PT: Transfers Modified independent;Sit to/from stand;Assistive device;Within precautions   PT: Gait Supervision/stand-by assist;Assistive device;Within precautions;100 feet   PT: Stairs Supervision/stand-by assist;2 stairs;Within precautions   Interventions   Interventions Quick Adds Therapeutic Activity   Therapeutic Activity   Therapeutic Activities: dynamic activities to improve functional performance Minutes (20474) 40   Symptoms Noted During/After Treatment None   Treatment Detail/Skilled Intervention PT: Patient greeted supine in bed on 4 L via NC. Patient educated on role of PT in the hospital and in discharge planning- explained role of SW to help with TCU placement. Patient with poor cough throughout session with very poor inspiratory capacity on IS- (<250 mL). Instructed to use IS hourly and educated son on cueing. patient instructed in cough training with focus on best inhale and some thoracic flexion- attempted cardoza training but not following technique well. Some secretions and coughing noted with all position changes today. Patient rolled with min A then performed supine to sit with mod A x1- max A  to scoot to EOB- too short to stand from ICU bed, thus sling placed and  dependently transfered to chair. Stood x1 with mod A x1 but unable to get fully upright despite cues to pelvis and requesting to stop. To improve pulmonary hygiene continued with aerobika instruction x 10 reps and secretions mobilized immediatley following. Discussed current O2 status and needing to work on breathing and sititng upright to improve O2- verbalized understanding. Repositioned for comfort, skin integrity and pulmonary hygiene. VSS throughout most of session- does desaturate to 92% when O2 switched to oxymask for trial (did not like). BP stable with position changes and ended in sitting at 129/92   PT Discharge Planning   PT Plan PT; Sit to stands. LE strengthening, cough training and breathing exercises   PT Discharge Recommendation (DC Rec) Transitional Care Facility   PT Rationale for DC Rec Patient presents with typical post op pain and acquired weakness leading to difficulty with functional independence. Anticipate will require TCU stay to return to independent baseline but patient is motivated and agreeable to this plan.   PT Brief overview of current status Mod A for transfers, use OH lift for transitions currently   Total Session Time   Timed Code Treatment Minutes 40   Total Session Time (sum of timed and untimed services) 48

## 2023-04-29 NOTE — PLAN OF CARE
ICU End of Shift Summary. See flowsheets for vital signs and detailed assessment.    Changes this shift: Patient HR continues to vary widely.  At shift start is was in a 1st degree BBB, she converted back into a-fib mid-day, asymptomatic with HR >140's, metoprolol was ordered and given, rate dropped back to 70's.  This evening has been anywhere from BBB in 30-40's and back up to 120's-130's back in a-fib.  CVTS notified and OK as long as rate is not sustained above 140. No insulin given.  Poor appetite, ate 25% of ordered     Plan: Notify team if patient stops tolerating heart rate or rhythm. Utilize PRNs as needed.  Monitor CT dressings.  Monitor UOP w/purewick in place.    Goal Outcome Evaluation:

## 2023-04-30 ENCOUNTER — APPOINTMENT (OUTPATIENT)
Dept: GENERAL RADIOLOGY | Facility: CLINIC | Age: 88
DRG: 219 | End: 2023-04-30
Attending: SURGERY
Payer: COMMERCIAL

## 2023-04-30 LAB
ALBUMIN SERPL BCG-MCNC: 3.1 G/DL (ref 3.5–5.2)
ALP SERPL-CCNC: 48 U/L (ref 35–104)
ALT SERPL W P-5'-P-CCNC: 159 U/L (ref 10–35)
ANION GAP SERPL CALCULATED.3IONS-SCNC: 11 MMOL/L (ref 7–15)
APTT PPP: 58 SECONDS (ref 22–38)
AST SERPL W P-5'-P-CCNC: 169 U/L (ref 10–35)
BILIRUB SERPL-MCNC: 0.5 MG/DL
BUN SERPL-MCNC: 38.8 MG/DL (ref 8–23)
CA-I BLD-MCNC: 4.4 MG/DL (ref 4.4–5.2)
CALCIUM SERPL-MCNC: 8.3 MG/DL (ref 8.8–10.2)
CHLORIDE SERPL-SCNC: 104 MMOL/L (ref 98–107)
CREAT SERPL-MCNC: 0.63 MG/DL (ref 0.51–0.95)
DEPRECATED HCO3 PLAS-SCNC: 24 MMOL/L (ref 22–29)
ERYTHROCYTE [DISTWIDTH] IN BLOOD BY AUTOMATED COUNT: 14.9 % (ref 10–15)
FIBRINOGEN PPP-MCNC: 517 MG/DL (ref 170–490)
GFR SERPL CREATININE-BSD FRML MDRD: 86 ML/MIN/1.73M2
GLUCOSE BLDC GLUCOMTR-MCNC: 121 MG/DL (ref 70–99)
GLUCOSE BLDC GLUCOMTR-MCNC: 123 MG/DL (ref 70–99)
GLUCOSE BLDC GLUCOMTR-MCNC: 127 MG/DL (ref 70–99)
GLUCOSE BLDC GLUCOMTR-MCNC: 128 MG/DL (ref 70–99)
GLUCOSE BLDC GLUCOMTR-MCNC: 128 MG/DL (ref 70–99)
GLUCOSE SERPL-MCNC: 142 MG/DL (ref 70–99)
HCT VFR BLD AUTO: 28.1 % (ref 35–47)
HGB BLD-MCNC: 8.7 G/DL (ref 11.7–15.7)
INR PPP: 1.39 (ref 0.85–1.15)
MAGNESIUM SERPL-MCNC: 2.1 MG/DL (ref 1.7–2.3)
MCH RBC QN AUTO: 29.5 PG (ref 26.5–33)
MCHC RBC AUTO-ENTMCNC: 31 G/DL (ref 31.5–36.5)
MCV RBC AUTO: 95 FL (ref 78–100)
PHOSPHATE SERPL-MCNC: 3.1 MG/DL (ref 2.5–4.5)
PLATELET # BLD AUTO: 199 10E3/UL (ref 150–450)
POTASSIUM SERPL-SCNC: 4.2 MMOL/L (ref 3.4–5.3)
PROT SERPL-MCNC: 5.6 G/DL (ref 6.4–8.3)
RBC # BLD AUTO: 2.95 10E6/UL (ref 3.8–5.2)
SODIUM SERPL-SCNC: 139 MMOL/L (ref 136–145)
WBC # BLD AUTO: 10.1 10E3/UL (ref 4–11)

## 2023-04-30 PROCEDURE — 84100 ASSAY OF PHOSPHORUS: CPT | Performed by: STUDENT IN AN ORGANIZED HEALTH CARE EDUCATION/TRAINING PROGRAM

## 2023-04-30 PROCEDURE — 80053 COMPREHEN METABOLIC PANEL: CPT | Performed by: STUDENT IN AN ORGANIZED HEALTH CARE EDUCATION/TRAINING PROGRAM

## 2023-04-30 PROCEDURE — 85384 FIBRINOGEN ACTIVITY: CPT | Performed by: STUDENT IN AN ORGANIZED HEALTH CARE EDUCATION/TRAINING PROGRAM

## 2023-04-30 PROCEDURE — 250N000011 HC RX IP 250 OP 636: Performed by: STUDENT IN AN ORGANIZED HEALTH CARE EDUCATION/TRAINING PROGRAM

## 2023-04-30 PROCEDURE — 200N000002 HC R&B ICU UMMC

## 2023-04-30 PROCEDURE — 94640 AIRWAY INHALATION TREATMENT: CPT

## 2023-04-30 PROCEDURE — 99231 SBSQ HOSP IP/OBS SF/LOW 25: CPT | Mod: GC | Performed by: ANESTHESIOLOGY

## 2023-04-30 PROCEDURE — 94640 AIRWAY INHALATION TREATMENT: CPT | Mod: 76

## 2023-04-30 PROCEDURE — 83735 ASSAY OF MAGNESIUM: CPT | Performed by: STUDENT IN AN ORGANIZED HEALTH CARE EDUCATION/TRAINING PROGRAM

## 2023-04-30 PROCEDURE — 85730 THROMBOPLASTIN TIME PARTIAL: CPT | Performed by: STUDENT IN AN ORGANIZED HEALTH CARE EDUCATION/TRAINING PROGRAM

## 2023-04-30 PROCEDURE — 71045 X-RAY EXAM CHEST 1 VIEW: CPT | Mod: 26 | Performed by: RADIOLOGY

## 2023-04-30 PROCEDURE — 250N000011 HC RX IP 250 OP 636: Performed by: PHYSICIAN ASSISTANT

## 2023-04-30 PROCEDURE — 71045 X-RAY EXAM CHEST 1 VIEW: CPT

## 2023-04-30 PROCEDURE — 36415 COLL VENOUS BLD VENIPUNCTURE: CPT | Performed by: SURGERY

## 2023-04-30 PROCEDURE — 99232 SBSQ HOSP IP/OBS MODERATE 35: CPT | Mod: 24 | Performed by: STUDENT IN AN ORGANIZED HEALTH CARE EDUCATION/TRAINING PROGRAM

## 2023-04-30 PROCEDURE — 250N000013 HC RX MED GY IP 250 OP 250 PS 637: Performed by: STUDENT IN AN ORGANIZED HEALTH CARE EDUCATION/TRAINING PROGRAM

## 2023-04-30 PROCEDURE — 999N000157 HC STATISTIC RCP TIME EA 10 MIN

## 2023-04-30 PROCEDURE — 94799 UNLISTED PULMONARY SVC/PX: CPT

## 2023-04-30 PROCEDURE — 93010 ELECTROCARDIOGRAM REPORT: CPT | Performed by: INTERNAL MEDICINE

## 2023-04-30 PROCEDURE — 250N000009 HC RX 250: Performed by: SURGERY

## 2023-04-30 PROCEDURE — 250N000013 HC RX MED GY IP 250 OP 250 PS 637

## 2023-04-30 PROCEDURE — 85610 PROTHROMBIN TIME: CPT | Performed by: STUDENT IN AN ORGANIZED HEALTH CARE EDUCATION/TRAINING PROGRAM

## 2023-04-30 PROCEDURE — 82330 ASSAY OF CALCIUM: CPT | Performed by: SURGERY

## 2023-04-30 PROCEDURE — 250N000013 HC RX MED GY IP 250 OP 250 PS 637: Performed by: PHYSICIAN ASSISTANT

## 2023-04-30 PROCEDURE — 85014 HEMATOCRIT: CPT | Performed by: STUDENT IN AN ORGANIZED HEALTH CARE EDUCATION/TRAINING PROGRAM

## 2023-04-30 PROCEDURE — 99222 1ST HOSP IP/OBS MODERATE 55: CPT | Mod: 24 | Performed by: INTERNAL MEDICINE

## 2023-04-30 RX ORDER — FUROSEMIDE 10 MG/ML
20 INJECTION INTRAMUSCULAR; INTRAVENOUS ONCE
Status: COMPLETED | OUTPATIENT
Start: 2023-04-30 | End: 2023-04-30

## 2023-04-30 RX ORDER — QUETIAPINE FUMARATE 25 MG/1
25 TABLET, FILM COATED ORAL AT BEDTIME
Status: COMPLETED | OUTPATIENT
Start: 2023-04-30 | End: 2023-04-30

## 2023-04-30 RX ORDER — FUROSEMIDE 10 MG/ML
30 INJECTION INTRAMUSCULAR; INTRAVENOUS ONCE
Status: DISCONTINUED | OUTPATIENT
Start: 2023-04-30 | End: 2023-04-30

## 2023-04-30 RX ORDER — METHOCARBAMOL 500 MG/1
500 TABLET, FILM COATED ORAL EVERY 8 HOURS PRN
Status: DISCONTINUED | OUTPATIENT
Start: 2023-04-30 | End: 2023-05-02

## 2023-04-30 RX ADMIN — IPRATROPIUM BROMIDE AND ALBUTEROL SULFATE 3 ML: .5; 3 SOLUTION RESPIRATORY (INHALATION) at 16:30

## 2023-04-30 RX ADMIN — Medication 12.5 MG: at 09:00

## 2023-04-30 RX ADMIN — ACETYLCYSTEINE 2 ML: 200 SOLUTION ORAL; RESPIRATORY (INHALATION) at 13:08

## 2023-04-30 RX ADMIN — FUROSEMIDE 20 MG: 10 INJECTION, SOLUTION INTRAVENOUS at 09:15

## 2023-04-30 RX ADMIN — ACETYLCYSTEINE 2 ML: 200 SOLUTION ORAL; RESPIRATORY (INHALATION) at 21:00

## 2023-04-30 RX ADMIN — LATANOPROST 1 DROP: 50 SOLUTION OPHTHALMIC at 21:32

## 2023-04-30 RX ADMIN — PANTOPRAZOLE SODIUM 40 MG: 40 TABLET, DELAYED RELEASE ORAL at 09:00

## 2023-04-30 RX ADMIN — HEPARIN SODIUM 5000 UNITS: 5000 INJECTION, SOLUTION INTRAVENOUS; SUBCUTANEOUS at 23:58

## 2023-04-30 RX ADMIN — QUETIAPINE FUMARATE 25 MG: 25 TABLET ORAL at 21:32

## 2023-04-30 RX ADMIN — ACETYLCYSTEINE 2 ML: 200 SOLUTION ORAL; RESPIRATORY (INHALATION) at 16:30

## 2023-04-30 RX ADMIN — DORZOLAMIDE HYDROCHLORIDE AND TIMOLOL MALEATE 1 DROP: 22.3; 6.8 SOLUTION/ DROPS OPHTHALMIC at 10:04

## 2023-04-30 RX ADMIN — HYDRALAZINE HYDROCHLORIDE 10 MG: 20 INJECTION INTRAMUSCULAR; INTRAVENOUS at 18:50

## 2023-04-30 RX ADMIN — HEPARIN SODIUM 5000 UNITS: 5000 INJECTION, SOLUTION INTRAVENOUS; SUBCUTANEOUS at 00:18

## 2023-04-30 RX ADMIN — Medication 12.5 MG: at 19:57

## 2023-04-30 RX ADMIN — SENNOSIDES AND DOCUSATE SODIUM 1 TABLET: 50; 8.6 TABLET ORAL at 09:00

## 2023-04-30 RX ADMIN — IPRATROPIUM BROMIDE AND ALBUTEROL SULFATE 3 ML: .5; 3 SOLUTION RESPIRATORY (INHALATION) at 21:00

## 2023-04-30 RX ADMIN — IPRATROPIUM BROMIDE AND ALBUTEROL SULFATE 3 ML: .5; 3 SOLUTION RESPIRATORY (INHALATION) at 13:08

## 2023-04-30 RX ADMIN — IPRATROPIUM BROMIDE AND ALBUTEROL SULFATE 3 ML: .5; 3 SOLUTION RESPIRATORY (INHALATION) at 08:06

## 2023-04-30 RX ADMIN — HEPARIN SODIUM 5000 UNITS: 5000 INJECTION, SOLUTION INTRAVENOUS; SUBCUTANEOUS at 15:44

## 2023-04-30 RX ADMIN — FUROSEMIDE 20 MG: 10 INJECTION, SOLUTION INTRAVENOUS at 01:56

## 2023-04-30 RX ADMIN — ASPIRIN 81 MG CHEWABLE TABLET 81 MG: 81 TABLET CHEWABLE at 09:00

## 2023-04-30 RX ADMIN — ACETYLCYSTEINE 2 ML: 200 SOLUTION ORAL; RESPIRATORY (INHALATION) at 08:06

## 2023-04-30 RX ADMIN — DORZOLAMIDE HYDROCHLORIDE AND TIMOLOL MALEATE 1 DROP: 22.3; 6.8 SOLUTION/ DROPS OPHTHALMIC at 20:08

## 2023-04-30 RX ADMIN — HEPARIN SODIUM 5000 UNITS: 5000 INJECTION, SOLUTION INTRAVENOUS; SUBCUTANEOUS at 09:00

## 2023-04-30 RX ADMIN — HYDRALAZINE HYDROCHLORIDE 10 MG: 20 INJECTION INTRAMUSCULAR; INTRAVENOUS at 10:20

## 2023-04-30 RX ADMIN — SIMVASTATIN 20 MG: 20 TABLET, FILM COATED ORAL at 21:32

## 2023-04-30 ASSESSMENT — ACTIVITIES OF DAILY LIVING (ADL)
ADLS_ACUITY_SCORE: 31
ADLS_ACUITY_SCORE: 26
ADLS_ACUITY_SCORE: 31
ADLS_ACUITY_SCORE: 31
ADLS_ACUITY_SCORE: 26
ADLS_ACUITY_SCORE: 31
ADLS_ACUITY_SCORE: 26
ADLS_ACUITY_SCORE: 26
ADLS_ACUITY_SCORE: 31
ADLS_ACUITY_SCORE: 26
ADLS_ACUITY_SCORE: 26
ADLS_ACUITY_SCORE: 31

## 2023-04-30 NOTE — PROGRESS NOTES
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     Hypertension goal BP (blood pressure) < 140/90     Seborrheic keratosis     Malignant neoplasm of overlapping sites of left female breast (H)     Scoliosis     Compression fracture of thoracic vertebra (H)     Mass of left hand     Primary open angle glaucoma of both eyes, unspecified glaucoma stage     Osteoporosis, unspecified osteoporosis type, unspecified pathological fracture presence     Nuclear sclerosis of left eye     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)     Actinic keratosis     History of nonmelanoma skin cancer     Combined forms of age-related cataract of right eye     Status post coronary angiogram     Aortic valve stenosis       ASSESSMENT: Cydney Christiansen is a 88 y/o female with a PMH significant for HTN, HLD, bilateral glaucoma, scoliosis, compression fracture of thoracic vertebrae, osteoporosis, history of bladder cancer, invasive ductal carcinoma of left breast s/p lumpectomy (2015), and multiple other skin cancer sites s/p removal who has severe aortic stenosis with bicuspid aortic valve and an ascending aortic aneurysm who presents 4/25 for ascending aortic arch repair with tissue graft and AVR with Dr. Solis. Patient required repair stitches around valve d/t bleeding post-op. Additionally, noted to have moderate drop in platelet count coming off bypass requiring 2000 Kcentra, 1g fibryga.  Received 2pRBC, 2 platelets, 325 cell saver, 1.5L fluid. Arrived to  [facility] ICU for further hemodynamic monitoring/management on precedex infusion.       OVERNIGHT EVENTS:   -- Afib RVR 140s ON; started on amio      UPDATES and EVENTS TODAY:   -- Repeat lasix   -- Awaiting floor bed   -- Continue Amio drip   -- Discuss anticoag   -- Bump in LFTs; Trend   -- Repeat EKG this am   -- CHADsVASc score 6 (9.7% stroke risk per year)   -- EP consult   -- Given risk of 1st degree block and continuation of Amio; Transition to scheduled metoprolol    -- Encourage OOB today  -- JAY to remove today     PLAN:  Neuro/ pain/ sedation:  # Acute Postoperative pain  - Monitor neurological status. Notify the MD for any acute changes in exam.  - Pain: Scheduled tylenol. PRN tylenol, oxycodone, dilaudid.  - Bilateral JAY Catheters present; Continue infusions    - Remove ESPs today     Pulmonary care:   # Hx asthma   - Titrate supplemental oxygen to maintain saturation above 92%   - Pulmonary hygiene: Incentive spirometer every 15- 30 minutes when awake, flutter valve, C&DB  - PTA albuterol   - S/p CT and VAC removal 4/29    - Continue Mucomyst, IS regularly     Cardiovascular:    # Severe aortic stenosis 2/2 bicuspid aortic S/P AVR 4/25   # Ascending aortic aneurysm s/p graft repair 4/25   # Hx HTN, HLD  # New Afib RVR; now rate controlled   PreCBP: Normal BiV Fx. Trace TR/MR/PI, no AI. Severe Aortic Stenosis estimated at 0.68cm^2 by continuity LVOT/AV VTI. Partial effacement of Sinus. Max diameter 5.2cm. Distortion of overall CAL greater than would expect with 5.2 cm aneurysm.  - Recent echo on 1/18/23 with LVEF of 45-50%  - PostCBP: Good biventricular function, no wall motion abnormalities.   - Monitor hemodynamic status.    - Goal MAP>65, SBP<140 per Dr Solis   - [place] PTA losartan  - PTA simvastatin  - ASA, HSQ daily    - S/p 3mg milrinone load intra-op    - 4/27: ECHO with 45-50% with new severe TI   - Lasix x 1 again today   - Afib RVR ON s/p amio load and infusion   - Discuss anticoagulation need; EP to  give recs per phone discussion    - CHADsVASc score of 6   - Transition to metoprolol BID  - EP consulted   - EKG this am NSR     GI care/ Nutrition:   # Elevated LFTs; resolved   - PPI  - Continue bowel regimen: miralax, senna  - Trend LFTs daily   - Encourage regular PO diet     Renal/ Fluid Balance/ Electrolytes:   # HELEN   BL creat appears to be ~ 0.6  - Strict I/O, daily weights  - Avoid/limit nephrotoxins as able  - Replete lytes PRN per protocol  - Lasix x 1 again today   - Helms removal     Endocrine:    # Stress induced hyperglycemia  # Osteoporosis c/b compression fracture of spine  Preop A1c 6.0  - sliding scale insulin   - Goal BG <180 for optimal healing  - PTA meds alendronate    ID/ Antibiotics:  # Stress induced leukocytosis  - To complete perioperative regimen  - Continue to monitor fever curve, WBC and inflammatory markers as appropriate    Heme:     # Stress induced leukocytosis  # Acute blood loss anemia  # thrombocytopenia 2/2 bypass   Monitor for s/sx of bleeding  - CBC daily   - Trend coags daily   - Hemoglobin goal >7   - HSQ     MSK/ Skin:  # Sternotomy  # Surgical Incision  - Sternal precautions  - Postoperative incision management per protocol  - PT/OT/CR    Prophylaxis:    - Mechanical prophylaxis for DVT  - Chemical DVT prophylaxis - subcutaneous heparin   - PPI     Lines/ tubes/ drains:  - Midline peripheral IV   - Helms     Disposition:  - CVICU -- > Floor       Patient seen, findings and plan discussed with CVICU staff and CVTS fellow, attending.     I spent a total of 40 minutes providing critical care services at the bedside, and on the critical care unit, evaluating the patient, directing care and reviewing laboratory values and radiologic reports for the patient. Time spent separate from procedural time.     Jamel Ahn PA-C  04/30/23  10:28 AM      ====================================    Interval updates: Afib RVR overnight.  Re-dose lasix. NSR this am, proceed with EP  "consult. Discuss anticoag needs.       OBJECTIVE:   1. VITAL SIGNS:   BP Readings from Last 1 Encounters:   04/30/23 (!) 137/105      Pulse Readings from Last 1 Encounters:   04/30/23 66      Resp Readings from Last 1 Encounters:   04/30/23 20      Temp Readings from Last 1 Encounters:   04/30/23 98  F (36.7  C) (Oral)      SpO2 Readings from Last 1 Encounters:   04/30/23 94%      Wt Readings from Last 1 Encounters:   04/29/23 71.2 kg (156 lb 15.5 oz)      Ht Readings from Last 1 Encounters:   04/25/23 1.499 m (4' 11\")    There were no vitals filed for this visit.      2. INTAKE/ OUTPUT:      Intake/Output Summary (Last 24 hours) at 4/26/2023 1052  Last data filed at 4/26/2023 1030  Gross per 24 hour   Intake 5062.95 ml   Output 2095 ml   Net 2967.95 ml         3. PHYSICAL EXAMINATION:     General: AO x 4. No distress.   Neuro: More awake today. AO x 4. EOMI. HERNANDEZ equally. 5/5 x 4.   Resp: BS equal and CTA bilaterally. NO wheezing. On NC.   CV: S1, S2, RRR, no m/r/g   Abdomen: Soft, non-distended, non-tender  Incisions: c/d/i. Mild leakage from previous CT sites.   Extremities: warm and well perfused, no edema.      4. INVESTIGATIONS:   Arterial Blood Gases   Recent Labs   Lab 04/27/23  0344 04/26/23  0820 04/26/23  0401 04/25/23 2026   PH 7.37 7.36 7.38 7.40   PCO2 38 38 37 36   PO2 77* 98 94 89   HCO3 22 22 22 22     Complete Blood Count   Recent Labs   Lab 04/30/23  0546 04/29/23  0536 04/28/23  1553 04/28/23  0344   WBC 10.1 9.1 9.8 10.7   HGB 8.7* 8.3* 8.5* 8.1*    156 150 143*     Basic Metabolic Panel  Recent Labs   Lab 04/30/23  1022 04/30/23  0546 04/29/23  2214 04/29/23  1829 04/29/23  0829 04/29/23  0536 04/28/23  1612 04/28/23  1553 04/28/23  0353 04/28/23  0344   NA  --  139  --   --   --  138  --  137  --  134*   POTASSIUM  --  4.2  --   --   --  4.0  --  4.6  --  5.2   CHLORIDE  --  104  --   --   --  103  --  105  --  105   CO2  --  24  --   --   --  24  --  21*  --  21*   BUN  --  38.8*  --   " --   --  40.8*  --  45.1*  --  41.3*   CR  --  0.63  --   --   --  0.80  --  0.91  --  1.06*   * 142* 158* 116*   < > 122*   < > 142*   < > 142*    < > = values in this interval not displayed.     Liver Function Tests  Recent Labs   Lab 04/30/23  0546 04/29/23  0536 04/28/23  0344 04/27/23  0344   * 98* 80* 76*   * 91* 58* 40*   ALKPHOS 48 41 38 42   BILITOTAL 0.5 0.3 0.3 0.5   ALBUMIN 3.1* 3.0* 2.8* 3.1*   INR 1.39* 1.32* 1.49* 1.65*     Pancreatic Enzymes  No lab results found in last 7 days.  Coagulation Profile  Recent Labs   Lab 04/30/23  0546 04/29/23  0536 04/28/23  0344 04/27/23  0344   INR 1.39* 1.32* 1.49* 1.65*   PTT 58* 72* 36 42*         5. RADIOLOGY:   Recent Results (from the past 24 hour(s))   XR Chest Port 1 View    Narrative    Exam: XR CHEST PORT 1 VIEW, 4/30/2023 6:50 AM    Indication: chest tubes s/p AVR and ascending aortic aneurysm repair    Comparison: Chest radiograph 4/29/2020, 4/28/2023    Findings:   Mediastinal drain has been removed. Right midline terminates over the  axilla. Aortic valve prosthesis. Spinal analgesia catheters. Stable  perihilar/basilar opacities.      Impression    Impression:   Stable pulmonary opacities, likely combination of atelectasis, edema,  and pleural effusions.    I have personally reviewed the examination and initial interpretation  and I agree with the findings.    CHERYL VILLARREAL MD         SYSTEM ID:  [id number]       =========================================

## 2023-04-30 NOTE — PLAN OF CARE
ICU End of Shift Summary. See flowsheets for vital signs and detailed assessment.    Changes this shift: A/Ox4, forgetful. Amio gtt started ~10pm d/t HR maintaining above 140 (see previous note for details). Pressers stable throughout shift. Weaned to 1L NC. Small BM this shift. Purewick in place, lasix given x1 for low UOP. Pt slept very little this night.     Plan: Transfer to  when bed available, wean O2, add sleep meds.       Goal Outcome Evaluation:      Plan of Care Reviewed With: patient    Overall Patient Progress: no changeOverall Patient Progress: no change    Outcome Evaluation: Amio gtt started with bolus, 1L NC, lasix given for low UOP

## 2023-04-30 NOTE — PROGRESS NOTES
"Pain Service Progress Note  [facility]  Date: 04/30/2023       Patient Name: Cydney Christiansen  MRN: [id number]  Age: 87 year old  Sex: female      Assessment:  Cydney Christiansen is a 87 year old female with PMH significant for HTN, HLD, bilateral glaucoma, scoliosis, compression fracture of thoracic spine, osteoporosis, history of bladder cancer, invasive ductal carcinoma of left breast s/p lumpectomy in 2015, and multiple other skin cancer sites s/p removal, and has severe aortic stenosis with bicuspid aortic valve and an ascending aortic aneurysm    Procedure: s/p ascending aortic arch repair with tissue graft and AVR, and required repair stitches around valve d/t bleeding postop    Date of Surgery: 4/25/23     Date of Catheter Placement: 4/25/23     Plan/Recommendations:  1. Regional Anesthesia/Analgesia  - Continuous Catheter Type/Site: bilateral erector spinae (ES) T6-7  Infusate: 0.2% ropivacaine  Continue Programmed Intermittent Bolus (PIB) rate at 4mL Q60 min via each catheter, total infusion rate of 8 mL/hr    Plan to maintain catheters while chest tubes in place, max of 7 days    2. Anticoagulation  - Please contact Inpatient Pain Service before ordering or making any anticoagulation changes       3. Multimodal Analgesia  - Per primary service     Pain Service will continue to follow.    Discussed with attending anesthesiologist    Chapo Bailey DO [facility]  Department of Anesthesiology  [phone]    Overnight Events: None, good pain control today, no bolus    Tubes/Drains: Yes  3 chest tubes, Helms catheter    Subjective: \"Pain doing okay.\"  Nausea: Yes  Symptoms of LAST: No    Diet: Advance Diet as Tolerated: Regular Diet Adult  Snacks/Supplements Adult: Ensure Clear; Between Meals    Relevant Labs:  Recent Labs   Lab Test 04/28/23  0344   INR 1.49*   *   PTT 36   BUN 41.3*       Physical Exam:  Vitals: /80   Pulse 63   Temp 36.8  C (98.2  F) (Oral)   Resp 28  " " Ht 1.499 m (4' 11\")   Wt 71.2 kg (156 lb 15.5 oz)   LMP  (LMP Unknown)   SpO2 (!) 85%   BMI 31.70 kg/m      Physical Exam:   Orientation:  Alert, oriented, and in no acute distress: Yes  Sedation: No    Motor Examination:  5/5 Strength in lower extremities: Yes    Catheter Site:   Catheter entry site is clean/dry/intact: Yes    Tender: No      Relevant Medications:  Current Pain Medications:  Medications related to Pain Management (From now, onward)    Start     Dose/Rate Route Frequency Ordered Stop    04/28/23 0000  acetaminophen (TYLENOL) tablet 650 mg         650 mg Oral EVERY 4 HOURS PRN 04/25/23 1522      04/27/23 1529  lidocaine 1 % 0.1-5 mL         0.1-5 mL Other EVERY 1 HOUR PRN 04/27/23 1530 04/30/23 1528    04/27/23 1529  lidocaine (LMX4) cream          Topical EVERY 1 HOUR PRN 04/27/23 1530 04/30/23 1528    04/27/23 1200  ropivacaine 0.2% in NS perineural infusion (simple)          Perineural Continuous Nerve Block 04/27/23 1149      04/27/23 1200  ropivacaine 0.2% in NS perineural infusion (simple)          Perineural Continuous Nerve Block 04/27/23 1149      04/26/23 1610  oxyCODONE IR (ROXICODONE) half-tab 2.5 mg         2.5 mg Oral EVERY 4 HOURS PRN 04/26/23 1611      04/26/23 0800  polyethylene glycol (MIRALAX) Packet 17 g         17 g Oral or Feeding Tube DAILY 04/25/23 1522      04/26/23 0800  aspirin (ASA) chewable tablet 81 mg         81 mg Oral or NG Tube DAILY 04/25/23 1522      04/25/23 2000  senna-docusate (SENOKOT-S/PERICOLACE) 8.6-50 MG per tablet 1 tablet         1 tablet Oral or Feeding Tube 2 TIMES DAILY 04/25/23 1522      04/25/23 1522  magnesium hydroxide (MILK OF MAGNESIA) suspension 30 mL         30 mL Oral DAILY PRN 04/25/23 1522      04/25/23 1522  bisacodyl (DULCOLAX) suppository 10 mg         10 mg Rectal DAILY PRN 04/25/23 1522      04/25/23 1522  HYDROmorphone (PF) (DILAUDID) injection 0.2 mg        See Hyperspace for full Linked Orders Report.    0.2 mg Intravenous " "EVERY 2 HOURS PRN 04/25/23 1522      04/25/23 1522  HYDROmorphone (PF) (DILAUDID) injection 0.4 mg        See Hyperspace for full Linked Orders Report.    0.4 mg Intravenous EVERY 2 HOURS PRN 04/25/23 1522      04/25/23 1522  lidocaine 1 % 0.1-1 mL         0.1-1 mL Other EVERY 1 HOUR PRN 04/25/23 1522      04/25/23 1522  lidocaine (LMX4) kit          Topical EVERY 1 HOUR PRN 04/25/23 1522            Primary Service Contacted with Recommendations? Yes      Please see A&P for additional details of medical decision making.      Acute Inpatient Pain Service [facility]  Hours of pain coverage 24/7   Page via [employer]- Please Page the Pain Team Via [employer]: \"PAIN MANAGEMENT ACUTE INPATIENT/ [facility] [place]\"             "

## 2023-04-30 NOTE — PROGRESS NOTES
Pt HR sustained over 140 bpm for 1-2min. PRN metoprolol given with little effect. Team notified, EKG ordered. EKG showed new A-Flutter. Team ordered amiodarone bolus plus drip. Pt HR stabilized between  bmp.

## 2023-04-30 NOTE — CONSULTS
Cardiac Electrophysiology Consult                                                               2023  Cydney Christiansen MRN: [id number]  Age: 87 year old, : 1935        Reason for consult:      AF/AFl      Assessment and Recommendation:     Cydney Christiansen is a 87 year old female with a PMHx of HTN and severe aortic stenosis and BAV s/p AVR and ascending aorta aneurysm repair with 32mm Hemashield graft on 23 with course c/b post-operative AF/AFl. EP consulted for management of AF.     No prior hx of AF. AF first noted overnight  with HRs 140's. Received amiodarone bolus and gtt and converted to SR (total duration of AF/AFl around 6 hours). Remains in SR today. Amiodarone has since been stopped by surgical team and currently on metoprolol tartrate 12.5 mg BID, which can be increased as needed if recurrent AF with RVR.    From an AC standpoint, CHADs-VASc is 4 (HTN, age, female), as such AC is indicated when deemed safe from a post-surgical standpoint.     She can f/u with EP with patch monitor in 3-6 months. The need for long-term AC can be addressed at follow up.      Patient discussed with staff attending, Dr. Tavarez and the note reflects our joint plan. Thank you for consulting the cardiac electrophysiology services at the [facility]. Please do not hesitate to call with questions or concerns.     Rony Vasquez MD  PGY-7, EP Fellow  Pager: [phone]        History of Present Illness:     Cydney Christiansen is a 87 year old female with a PMHx of HTN and severe aortic stenosis and BAV s/p AVR and ascending aorta aneurysm repair with 32mm Hemashield graft on 23 with course c/b pos-operative AF/AFl. EP consulted for management of AF/AF.    Per chart review, onset of atrial fibrillation was around 7 PM last night with heart rates around 140 bpm.  The patient was started on amiodarone and the rhythm changed to atrial flutter and later converted to  sinus rhythm.  The patient notes she feels fatigued and anxious since surgery however the symptoms were not worse or new with the onset of atrial fibrillation.  Denies any palpitations, lightheadedness, chest pain or shortness of breath.  Prior to admission, notes intermittent episodes of palpitations however no known diagnosis of atrial fibrillation.    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     Hypertension goal BP (blood pressure) < 140/90     Seborrheic keratosis     Malignant neoplasm of overlapping sites of left female breast (H)     Scoliosis     Compression fracture of thoracic vertebra (H)     Mass of left hand     Primary open angle glaucoma of both eyes, unspecified glaucoma stage     Osteoporosis, unspecified osteoporosis type, unspecified pathological fracture presence     Nuclear sclerosis of left eye     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)     Actinic keratosis     History of nonmelanoma skin cancer     Combined forms of age-related cataract of right eye     Status post coronary angiogram     Aortic valve stenosis         Past Surgical History:      Past Surgical History:   Procedure Laterality Date     BIOPSY BREAST       CATARACT IOL, RT/LT       COLONOSCOPY  5/2008, 5/13, 6/14, 6/17    Q 3 years for advanced adenomatous polyp     CV CORONARY ANGIOGRAM N/A 02/17/2023    Procedure: Coronary Angiogram [[id number]];  Surgeon: James Jo MD;  Location: [facility] CARDIAC  CATH LAB     CV LEFT HEART CATH N/A 02/17/2023    Procedure: Left Heart Cath;  Surgeon: James Jo MD;  Location:  HEART CARDIAC CATH LAB     CV RIGHT HEART CATH MEASUREMENTS RECORDED N/A 02/17/2023    Procedure: Right Heart Cath;  Surgeon: James Jo MD;  Location:  HEART CARDIAC CATH LAB     CYSTOSCOPY  12/31/2008     D & C       GENITOURINARY SURGERY      TURBT     H STATISTIC PICC LINE INSERTION >5YR, FAILED Right 04/27/2023    Unable to advance guidewire. Left with lumpectomy with lymph nodes removed.     HC REMOVAL GALLBLADDER      open pan     HC TRABECULOPLASTY BY LASER SURGERY Left 04/18/2007    SLT #1 OS (inf 180)     HC TRABECULOPLASTY BY LASER SURGERY Right 05/04/2011    SLT #1 OD (inf 180?)     HC TRABECULOPLASTY BY LASER SURGERY Left 03/17/2015    SLT #2 OS (sup 180)     HC TRABECULOPLASTY BY LASER SURGERY Right 06/23/2015    SLT #2 OD (sup 180?)     LASER ARGON TREATMENT      SLT left eye x 2     LUMPECTOMY BREAST       LUMPECTOMY BREAST WITH SENTINEL NODE, COMBINED Left 07/29/2015    Procedure: COMBINED LUMPECTOMY BREAST WITH SENTINEL NODE;  Surgeon: Ifeanyi Sunshine MD;  Location:  OR     PHACOEMULSIFICATION CLEAR CORNEA WITH STANDARD INTRAOCULAR LENS IMPLANT Left 12/08/2017    Procedure: PHACOEMULSIFICATION CLEAR CORNEA WITH STANDARD INTRAOCULAR LENS IMPLANT;   COMPLEX LEFT PHACOEMULSIFICATION CLEAR CORNEA WITH STANDARD INTRAOCULAR LENS IMPLANT ;  Surgeon: Kvng Garcia MD;  Location: [facility]     PHACOEMULSIFICATION CLEAR CORNEA WITH STANDARD INTRAOCULAR LENS IMPLANT Right 10/19/2020    Procedure: RIGHT COMPLEX PHACOEMULSIFICATION, CATARACT, WITH INTRAOCULAR LENS IMPLANT ;  Surgeon: Kvng Garcia MD;  Location: [facility] OR     REPAIR ANEURYSM ASCENDING AORTA N/A 04/25/2023    Procedure: MEDIAN STERNOTOMY, CARDIOPULMONARY BYPASS, AORTIC VALVE REPLACEMENT USING PIERRE RESILA 21 MM VALVE, ASCENDING AORTA  ANEURYSM REPAIR USING 32 MM HEMASHIELD GRAFT, TRANSESOPHAGEAL  ECHOCARDIOGRAM PERFORMED BY ANESTHESIA STAFF;  Surgeon: Gm Solis MD;  Location: [facility] OR     SURGICAL HISTORY OF 2011    squamous cell CA excised from back     TUBAL LIGATION           Social History:     Social History     Socioeconomic History     Marital status:      Spouse name: Not on file     Number of children: 2     Years of education: Not on file     Highest education level: Not on file   Occupational History     Employer: [employer]     Employer: RETIRED   Tobacco Use     Smoking status: Former     Packs/day: 0.50     Years: 20.00     Pack years: 10.00     Types: Cigarettes     Quit date: 1976     Years since quittin.2     Smokeless tobacco: Never   Vaping Use     Vaping status: Never Used     Passive vaping exposure: Yes   Substance and Sexual Activity     Alcohol use: Yes     Comment: Weekends 4 drink total per week     Drug use: No     Sexual activity: Never   Other Topics Concern     Parent/sibling w/ CABG, MI or angioplasty before 65F 55M? Not Asked   Social History Narrative     Not on file     Social Determinants of Health     Financial Resource Strain: Not on file   Food Insecurity: Not on file   Transportation Needs: Not on file   Physical Activity: Not on file   Stress: Not on file   Social Connections: Not on file   Intimate Partner Violence: Not on file   Housing Stability: Not on file         Family History:     Family History   Problem Relation Age of Onset     Diabetes Mother      Breast Cancer Mother      Eye Disorder Mother      Osteoporosis Mother      Glaucoma Mother      Macular Degeneration Mother      Prostate Cancer Father      Anesthesia Reaction Sister         PONV     Thyroid Disease Sister      Blood Disease Sister         lupus     Heart Disease Sister      Prostate Cancer Brother      Glaucoma Brother      Macular Degeneration Brother      Prostate Cancer Brother      Glaucoma Brother      Asthma Son      Glaucoma Other      Melanoma No family hx of       Skin Cancer No family hx of      Bleeding Disorder No family hx of      Venous thrombosis No family hx of          Allergies:     Allergies   Allergen Reactions     Lisinopril Cough         Medications:     No current facility-administered medications on file prior to encounter.  albuterol (PROAIR HFA/PROVENTIL HFA/VENTOLIN HFA) 108 (90 Base) MCG/ACT inhaler, Inhale 1-2 puffs into the lungs every 4 hours as needed for shortness of breath or wheezing (Patient not taking: Reported on 2/2/2023)  alendronate (FOSAMAX) 70 MG tablet, TAKE 1 TABLET BY MOUTH EVERY 7 DAYS (Patient taking differently: Take 70 mg by mouth every 7 days Monday)  co-enzyme Q-10 100 MG CAPS capsule, Take 200 mg by mouth every morning  cyanocobalamin (VITAMIN B-12) 500 MCG tablet, Take 1 tablet (500 mcg) by mouth daily (Patient not taking: Reported on 4/13/2023)  dorzolamide-timolol (COSOPT) 2-0.5 % ophthalmic solution, Place 1 drop into both eyes 2 times daily  ferrous sulfate (FEROSUL) 325 (65 Fe) MG tablet, Take 1 tablet (325 mg) by mouth daily (with breakfast)  ibuprofen (ADVIL,MOTRIN) 200 MG tablet, Take 200 mg by mouth every 4 hours as needed.  latanoprost (XALATAN) 0.005 % ophthalmic solution, Place 1 drop into both eyes At Bedtime  Multiple Vitamins-Minerals (ONE DAILY ADULTS 50+) TABS,   polyethylene glycol (MIRALAX) 17 GM/Dose powder, Take 17 g (1 capful) by mouth 2 times daily as needed for constipation (Patient not taking: Reported on 2/2/2023)  simvastatin (ZOCOR) 20 MG tablet, Take 1 tablet (20 mg) by mouth At Bedtime  triamcinolone (KENALOG) 0.1 % external cream, Apply twice daily for 2 weeks  VITAMIN D-1000 MAX -1000 MG-UNIT OR TABS, 1 daily (Patient not taking: Reported on 4/13/2023)            Physical Exam:     B/P: 117/70, T: 98.2, P: 62, R: 28    Wt Readings from Last 4 Encounters:   04/29/23 71.2 kg (156 lb 15.5 oz)   04/13/23 63.7 kg (140 lb 8 oz)   03/13/23 64.4 kg (141 lb 14.4 oz)   03/01/23 64.4 kg (142 lb)          Intake/Output Summary (Last 24 hours) at 4/30/2023 1424  Last data filed at 4/30/2023 1300  Gross per 24 hour   Intake 1033.88 ml   Output 710 ml   Net 323.88 ml       Gen: AA&Ox3, no acute distress  HEENT:AT/ NC, EOM grossly intact.   PULM/THORAX: Clear to auscultation bilaterally, no rales/rhonchi/wheezes  CV:RRR, S1 and S2 appreciated, no extra heart sounds, murmurs or rub auscultated. No JVD  ABD: Soft, nontender, nondistended. Normoactive bowel sounds  EXT: Warm. No edema, clubbing or cyanosis.   NEURO: No focal deficits on limited exam       Data:     Labs Reviewed on Admission    Troponin No results found for: TROPI, TROPONIN, TROPR, TROPN  BMP  Recent Labs   Lab 04/30/23  1313 04/30/23  1022 04/30/23  0546 04/29/23  2214 04/29/23  0829 04/29/23  0536 04/28/23  1612 04/28/23  1553 04/28/23  0353 04/28/23  0344   NA  --   --  139  --   --  138  --  137  --  134*   POTASSIUM  --   --  4.2  --   --  4.0  --  4.6  --  5.2   CHLORIDE  --   --  104  --   --  103  --  105  --  105   SHERYL  --   --  8.3*  --   --  8.7*  --  8.8  --  8.8   CO2  --   --  24  --   --  24  --  21*  --  21*   BUN  --   --  38.8*  --   --  40.8*  --  45.1*  --  41.3*   CR  --   --  0.63  --   --  0.80  --  0.91  --  1.06*   * 121* 142* 158*   < > 122*   < > 142*   < > 142*    < > = values in this interval not displayed.     CBC  Recent Labs   Lab 04/30/23  0546 04/29/23  0536 04/28/23  1553 04/28/23  0344   WBC 10.1 9.1 9.8 10.7   RBC 2.95* 2.84* 2.90* 2.76*   HGB 8.7* 8.3* 8.5* 8.1*   HCT 28.1* 27.0* 27.2* 25.8*   MCV 95 95 94 94   MCH 29.5 29.2 29.3 29.3   MCHC 31.0* 30.7* 31.3* 31.4*   RDW 14.9 14.8 15.1* 15.2*    156 150 143*     INR  Recent Labs   Lab 04/30/23  0546 04/29/23  0536 04/28/23  0344 04/27/23  0344   INR 1.39* 1.32* 1.49* 1.65*      Hepatic Panel   Lab Results   Component Value Date     04/30/2023    AST 40 05/03/2021     Lab Results   Component Value Date     04/30/2023    ALT 25  05/03/2021     Lab Results   Component Value Date    BILICONJ 0.0 06/10/2011      Lab Results   Component Value Date    BILITOTAL 0.5 04/30/2023    BILITOTAL 0.6 05/03/2021     Lab Results   Component Value Date    ALBUMIN 3.1 04/30/2023    ALBUMIN 3.8 05/03/2021     Lab Results   Component Value Date    PROTTOTAL 5.6 04/30/2023    PROTTOTAL 7.0 05/03/2021      Lab Results   Component Value Date    ALKPHOS 48 04/30/2023    ALKPHOS 52 05/03/2021           Most Recent Imaging:     ECG:  AF/AFl, SR with LBBB       TTE 4/27/23:   Interpretation Summary  S/P aortic valve replacement with 21mm Aponte Resilia and ascending aorta  aneurysm repair with 32mm Hemashield graft on 4/25/23.     Left ventricular function is mildly reduced. The ejection fraction is 45-50%.  Mild right ventricular dilation with normal global right ventricular function.  A bioprosthetic aortic valve with PV of 2.2m/s and MG of 12mmHg.  Severe tricuspid insufficiency.     Compared with previous studies, there is no change in LVEF. TR is better  characterized in today's study than prior studies, and severity was likely  underestimated. However, it is worse on today's study.         I very much appreciated the opportunity to  assess Cydney Christiansen in the hospital with CVEP Fellow Dr Vasquez and resident.  We were asked to see  regarding a first episode of atrial fibrillation postoperatively.  Patient is subsequently returned to sinus rhythm.  Amiodarone was initiated but has now been stopped.  Anticoagulation is recommended as discussed above for a period of time postoperatively to be assessed by whether the patient has recurrences of AF during follow-up.  I agree with the note above which summarizes my findings and current recommendations.  Please do not hesitate to contact my office if you have any questions or concerns.      Tyrone Tavarez MD  Cardiac Arrhythmia Service  [facility]  [phone]

## 2023-05-01 ENCOUNTER — APPOINTMENT (OUTPATIENT)
Dept: GENERAL RADIOLOGY | Facility: CLINIC | Age: 88
DRG: 219 | End: 2023-05-01
Attending: SURGERY
Payer: COMMERCIAL

## 2023-05-01 ENCOUNTER — APPOINTMENT (OUTPATIENT)
Dept: SPEECH THERAPY | Facility: CLINIC | Age: 88
DRG: 219 | End: 2023-05-01
Attending: SURGERY
Payer: COMMERCIAL

## 2023-05-01 ENCOUNTER — APPOINTMENT (OUTPATIENT)
Dept: OCCUPATIONAL THERAPY | Facility: CLINIC | Age: 88
DRG: 219 | End: 2023-05-01
Attending: SURGERY
Payer: COMMERCIAL

## 2023-05-01 ENCOUNTER — APPOINTMENT (OUTPATIENT)
Dept: PHYSICAL THERAPY | Facility: CLINIC | Age: 88
DRG: 219 | End: 2023-05-01
Attending: SURGERY
Payer: COMMERCIAL

## 2023-05-01 LAB
ALBUMIN SERPL BCG-MCNC: 3.1 G/DL (ref 3.5–5.2)
ALP SERPL-CCNC: 50 U/L (ref 35–104)
ALT SERPL W P-5'-P-CCNC: 141 U/L (ref 10–35)
ANION GAP SERPL CALCULATED.3IONS-SCNC: 12 MMOL/L (ref 7–15)
APTT PPP: 55 SECONDS (ref 22–38)
AST SERPL W P-5'-P-CCNC: ABNORMAL U/L
ATRIAL RATE - MUSE: NORMAL BPM
ATRIAL RATE - MUSE: NORMAL BPM
BILIRUB SERPL-MCNC: 0.4 MG/DL
BUN SERPL-MCNC: 38.7 MG/DL (ref 8–23)
CA-I BLD-MCNC: 4 MG/DL (ref 4.4–5.2)
CA-I BLD-MCNC: 4.3 MG/DL (ref 4.4–5.2)
CALCIUM SERPL-MCNC: 8.4 MG/DL (ref 8.8–10.2)
CHLORIDE SERPL-SCNC: 103 MMOL/L (ref 98–107)
CREAT SERPL-MCNC: 0.54 MG/DL (ref 0.51–0.95)
DEPRECATED HCO3 PLAS-SCNC: 22 MMOL/L (ref 22–29)
DIASTOLIC BLOOD PRESSURE - MUSE: NORMAL MMHG
DIASTOLIC BLOOD PRESSURE - MUSE: NORMAL MMHG
ERYTHROCYTE [DISTWIDTH] IN BLOOD BY AUTOMATED COUNT: 15 % (ref 10–15)
FIBRINOGEN PPP-MCNC: 513 MG/DL (ref 170–490)
GFR SERPL CREATININE-BSD FRML MDRD: 90 ML/MIN/1.73M2
GLUCOSE BLDC GLUCOMTR-MCNC: 106 MG/DL (ref 70–99)
GLUCOSE BLDC GLUCOMTR-MCNC: 106 MG/DL (ref 70–99)
GLUCOSE BLDC GLUCOMTR-MCNC: 114 MG/DL (ref 70–99)
GLUCOSE BLDC GLUCOMTR-MCNC: 118 MG/DL (ref 70–99)
GLUCOSE SERPL-MCNC: 116 MG/DL (ref 70–99)
HCT VFR BLD AUTO: 29.4 % (ref 35–47)
HGB BLD-MCNC: 9 G/DL (ref 11.7–15.7)
INR PPP: 1.34 (ref 0.85–1.15)
INTERPRETATION ECG - MUSE: NORMAL
INTERPRETATION ECG - MUSE: NORMAL
MAGNESIUM SERPL-MCNC: 2.3 MG/DL (ref 1.7–2.3)
MCH RBC QN AUTO: 29.3 PG (ref 26.5–33)
MCHC RBC AUTO-ENTMCNC: 30.6 G/DL (ref 31.5–36.5)
MCV RBC AUTO: 96 FL (ref 78–100)
P AXIS - MUSE: NORMAL DEGREES
P AXIS - MUSE: NORMAL DEGREES
PHOSPHATE SERPL-MCNC: 2.6 MG/DL (ref 2.5–4.5)
PLATELET # BLD AUTO: 217 10E3/UL (ref 150–450)
POTASSIUM SERPL-SCNC: 4.1 MMOL/L (ref 3.4–5.3)
PR INTERVAL - MUSE: NORMAL MS
PR INTERVAL - MUSE: NORMAL MS
PROT SERPL-MCNC: 5.6 G/DL (ref 6.4–8.3)
QRS DURATION - MUSE: 108 MS
QRS DURATION - MUSE: 88 MS
QT - MUSE: 324 MS
QT - MUSE: 356 MS
QTC - MUSE: 415 MS
QTC - MUSE: 445 MS
R AXIS - MUSE: -5 DEGREES
R AXIS - MUSE: -8 DEGREES
RBC # BLD AUTO: 3.07 10E6/UL (ref 3.8–5.2)
SODIUM SERPL-SCNC: 137 MMOL/L (ref 136–145)
SYSTOLIC BLOOD PRESSURE - MUSE: NORMAL MMHG
SYSTOLIC BLOOD PRESSURE - MUSE: NORMAL MMHG
T AXIS - MUSE: 64 DEGREES
T AXIS - MUSE: 8 DEGREES
VENTRICULAR RATE- MUSE: 94 BPM
VENTRICULAR RATE- MUSE: 99 BPM
WBC # BLD AUTO: 10.2 10E3/UL (ref 4–11)

## 2023-05-01 PROCEDURE — 93005 ELECTROCARDIOGRAM TRACING: CPT

## 2023-05-01 PROCEDURE — 250N000009 HC RX 250: Performed by: SURGERY

## 2023-05-01 PROCEDURE — 250N000013 HC RX MED GY IP 250 OP 250 PS 637: Performed by: STUDENT IN AN ORGANIZED HEALTH CARE EDUCATION/TRAINING PROGRAM

## 2023-05-01 PROCEDURE — 85610 PROTHROMBIN TIME: CPT | Performed by: STUDENT IN AN ORGANIZED HEALTH CARE EDUCATION/TRAINING PROGRAM

## 2023-05-01 PROCEDURE — 36415 COLL VENOUS BLD VENIPUNCTURE: CPT | Performed by: SURGERY

## 2023-05-01 PROCEDURE — 250N000013 HC RX MED GY IP 250 OP 250 PS 637: Performed by: SURGERY

## 2023-05-01 PROCEDURE — 94640 AIRWAY INHALATION TREATMENT: CPT

## 2023-05-01 PROCEDURE — 999N000157 HC STATISTIC RCP TIME EA 10 MIN

## 2023-05-01 PROCEDURE — 250N000011 HC RX IP 250 OP 636: Performed by: STUDENT IN AN ORGANIZED HEALTH CARE EDUCATION/TRAINING PROGRAM

## 2023-05-01 PROCEDURE — 93010 ELECTROCARDIOGRAM REPORT: CPT | Performed by: INTERNAL MEDICINE

## 2023-05-01 PROCEDURE — 85384 FIBRINOGEN ACTIVITY: CPT | Performed by: STUDENT IN AN ORGANIZED HEALTH CARE EDUCATION/TRAINING PROGRAM

## 2023-05-01 PROCEDURE — 85730 THROMBOPLASTIN TIME PARTIAL: CPT | Performed by: STUDENT IN AN ORGANIZED HEALTH CARE EDUCATION/TRAINING PROGRAM

## 2023-05-01 PROCEDURE — 250N000013 HC RX MED GY IP 250 OP 250 PS 637

## 2023-05-01 PROCEDURE — 83735 ASSAY OF MAGNESIUM: CPT | Performed by: STUDENT IN AN ORGANIZED HEALTH CARE EDUCATION/TRAINING PROGRAM

## 2023-05-01 PROCEDURE — 85027 COMPLETE CBC AUTOMATED: CPT | Performed by: STUDENT IN AN ORGANIZED HEALTH CARE EDUCATION/TRAINING PROGRAM

## 2023-05-01 PROCEDURE — 99232 SBSQ HOSP IP/OBS MODERATE 35: CPT | Mod: 24 | Performed by: STUDENT IN AN ORGANIZED HEALTH CARE EDUCATION/TRAINING PROGRAM

## 2023-05-01 PROCEDURE — 92526 ORAL FUNCTION THERAPY: CPT | Mod: GN | Performed by: SPEECH-LANGUAGE PATHOLOGIST

## 2023-05-01 PROCEDURE — 71045 X-RAY EXAM CHEST 1 VIEW: CPT

## 2023-05-01 PROCEDURE — 84155 ASSAY OF PROTEIN SERUM: CPT | Performed by: STUDENT IN AN ORGANIZED HEALTH CARE EDUCATION/TRAINING PROGRAM

## 2023-05-01 PROCEDURE — 82330 ASSAY OF CALCIUM: CPT | Performed by: SURGERY

## 2023-05-01 PROCEDURE — 97110 THERAPEUTIC EXERCISES: CPT | Mod: GP

## 2023-05-01 PROCEDURE — 97530 THERAPEUTIC ACTIVITIES: CPT | Mod: GO

## 2023-05-01 PROCEDURE — 250N000011 HC RX IP 250 OP 636: Performed by: NURSE PRACTITIONER

## 2023-05-01 PROCEDURE — 97535 SELF CARE MNGMENT TRAINING: CPT | Mod: GO

## 2023-05-01 PROCEDURE — 250N000011 HC RX IP 250 OP 636: Performed by: PHYSICIAN ASSISTANT

## 2023-05-01 PROCEDURE — 71045 X-RAY EXAM CHEST 1 VIEW: CPT | Mod: 26 | Performed by: RADIOLOGY

## 2023-05-01 PROCEDURE — 250N000013 HC RX MED GY IP 250 OP 250 PS 637: Performed by: PHYSICIAN ASSISTANT

## 2023-05-01 PROCEDURE — 84100 ASSAY OF PHOSPHORUS: CPT | Performed by: STUDENT IN AN ORGANIZED HEALTH CARE EDUCATION/TRAINING PROGRAM

## 2023-05-01 PROCEDURE — 120N000002 HC R&B MED SURG/OB UMMC

## 2023-05-01 RX ORDER — FUROSEMIDE 10 MG/ML
40 INJECTION INTRAMUSCULAR; INTRAVENOUS ONCE
Status: COMPLETED | OUTPATIENT
Start: 2023-05-01 | End: 2023-05-01

## 2023-05-01 RX ORDER — PANTOPRAZOLE SODIUM 40 MG/1
40 TABLET, DELAYED RELEASE ORAL
Status: DISCONTINUED | OUTPATIENT
Start: 2023-05-02 | End: 2023-05-15 | Stop reason: HOSPADM

## 2023-05-01 RX ORDER — ACETYLCYSTEINE 200 MG/ML
2 SOLUTION ORAL; RESPIRATORY (INHALATION) 4 TIMES DAILY PRN
Status: DISCONTINUED | OUTPATIENT
Start: 2023-05-01 | End: 2023-05-12

## 2023-05-01 RX ORDER — METOPROLOL TARTRATE 25 MG/1
25 TABLET, FILM COATED ORAL 2 TIMES DAILY
Status: DISCONTINUED | OUTPATIENT
Start: 2023-05-01 | End: 2023-05-02

## 2023-05-01 RX ORDER — METOPROLOL TARTRATE 1 MG/ML
10 INJECTION, SOLUTION INTRAVENOUS EVERY 4 HOURS PRN
Status: CANCELLED | OUTPATIENT
Start: 2023-05-01

## 2023-05-01 RX ORDER — HEPARIN SODIUM 5000 [USP'U]/.5ML
5000 INJECTION, SOLUTION INTRAVENOUS; SUBCUTANEOUS EVERY 8 HOURS SCHEDULED
Status: DISCONTINUED | OUTPATIENT
Start: 2023-05-01 | End: 2023-05-15 | Stop reason: HOSPADM

## 2023-05-01 RX ORDER — FUROSEMIDE 10 MG/ML
20 INJECTION INTRAMUSCULAR; INTRAVENOUS ONCE
Status: COMPLETED | OUTPATIENT
Start: 2023-05-01 | End: 2023-05-01

## 2023-05-01 RX ORDER — AMIODARONE HYDROCHLORIDE 200 MG/1
200 TABLET ORAL 2 TIMES DAILY
Status: CANCELLED | OUTPATIENT
Start: 2023-05-01

## 2023-05-01 RX ADMIN — HEPARIN SODIUM 5000 UNITS: 5000 INJECTION, SOLUTION INTRAVENOUS; SUBCUTANEOUS at 22:14

## 2023-05-01 RX ADMIN — METOPROLOL TARTRATE 25 MG: 25 TABLET, FILM COATED ORAL at 19:35

## 2023-05-01 RX ADMIN — ASPIRIN 81 MG CHEWABLE TABLET 81 MG: 81 TABLET CHEWABLE at 07:50

## 2023-05-01 RX ADMIN — HEPARIN SODIUM 5000 UNITS: 5000 INJECTION, SOLUTION INTRAVENOUS; SUBCUTANEOUS at 14:20

## 2023-05-01 RX ADMIN — DORZOLAMIDE HYDROCHLORIDE AND TIMOLOL MALEATE 1 DROP: 22.3; 6.8 SOLUTION/ DROPS OPHTHALMIC at 19:39

## 2023-05-01 RX ADMIN — FUROSEMIDE 40 MG: 10 INJECTION, SOLUTION INTRAVENOUS at 13:26

## 2023-05-01 RX ADMIN — HEPARIN SODIUM 5000 UNITS: 5000 INJECTION, SOLUTION INTRAVENOUS; SUBCUTANEOUS at 07:51

## 2023-05-01 RX ADMIN — POTASSIUM & SODIUM PHOSPHATES POWDER PACK 280-160-250 MG 1 PACKET: 280-160-250 PACK at 09:55

## 2023-05-01 RX ADMIN — CALCIUM GLUCONATE 1 G: 20 INJECTION, SOLUTION INTRAVENOUS at 09:53

## 2023-05-01 RX ADMIN — ACETYLCYSTEINE 2 ML: 200 SOLUTION ORAL; RESPIRATORY (INHALATION) at 07:57

## 2023-05-01 RX ADMIN — DORZOLAMIDE HYDROCHLORIDE AND TIMOLOL MALEATE 1 DROP: 22.3; 6.8 SOLUTION/ DROPS OPHTHALMIC at 07:51

## 2023-05-01 RX ADMIN — FUROSEMIDE 20 MG: 10 INJECTION, SOLUTION INTRAVENOUS at 18:36

## 2023-05-01 RX ADMIN — METHOCARBAMOL 500 MG: 500 TABLET ORAL at 22:13

## 2023-05-01 RX ADMIN — LATANOPROST 1 DROP: 50 SOLUTION OPHTHALMIC at 22:17

## 2023-05-01 RX ADMIN — Medication 10 MG: at 22:13

## 2023-05-01 RX ADMIN — Medication 12.5 MG: at 07:50

## 2023-05-01 RX ADMIN — SIMVASTATIN 20 MG: 20 TABLET, FILM COATED ORAL at 22:13

## 2023-05-01 RX ADMIN — HYDRALAZINE HYDROCHLORIDE 10 MG: 20 INJECTION INTRAMUSCULAR; INTRAVENOUS at 15:57

## 2023-05-01 RX ADMIN — IPRATROPIUM BROMIDE AND ALBUTEROL SULFATE 3 ML: .5; 3 SOLUTION RESPIRATORY (INHALATION) at 07:57

## 2023-05-01 RX ADMIN — PANTOPRAZOLE SODIUM 40 MG: 40 TABLET, DELAYED RELEASE ORAL at 07:50

## 2023-05-01 RX ADMIN — POTASSIUM & SODIUM PHOSPHATES POWDER PACK 280-160-250 MG 1 PACKET: 280-160-250 PACK at 16:33

## 2023-05-01 RX ADMIN — Medication 12.5 MG: at 09:55

## 2023-05-01 ASSESSMENT — ACTIVITIES OF DAILY LIVING (ADL)
ADLS_ACUITY_SCORE: 26

## 2023-05-01 NOTE — PROGRESS NOTES
"SPIRITUAL HEALTH SERVICES  SPIRITUAL ASSESSMENT Progress Note  [facility] ([place] 4C     REFERRAL SOURCE: Follow up     provided brief visit to pt Cydney before she began work with OT.  Cydney reported she is still tired, and that she is \"having some anxiety about dying\" but said that she was not anxious at this moment and she could not articulate specifically what she was feeling anxious about with regards to death/dying. She did say that she is not feeling that she wants to \"be done\" today, although she acknowledged stating that in the past.  Cydney said that today she feels \"better\" and that her sleep has been improving which has helped.  She kept her eyes mostly closed throughout the visit, and was a bit slow to respond to questions. OT arrived to work with pt, and Cydney said that she appreciated  support and would reach out with further needs.     PLAN: SHS will remain available     Mattie Stanford  Pager: [phone]    "

## 2023-05-01 NOTE — PROGRESS NOTES
ICU End of Shift Summary. See flowsheets for vital signs and detailed assessment.    Changes this shift: Pt alert and oriented, standing with therapy today, up in the chair for 3 hours this morning. Switched back into sinus from afib at 10am. Gave hydralazine x1 for HTN. 4L NC, encouraged incentive spirometer. Good uop, one bm. Replaced calcium.     Ordered Chest Xray due to pt now complaining of SOB.     Plan: Continue POC. Transfer to  when able.

## 2023-05-01 NOTE — PROGRESS NOTES
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     Hypertension goal BP (blood pressure) < 140/90     Seborrheic keratosis     Malignant neoplasm of overlapping sites of left female breast (H)     Scoliosis     Compression fracture of thoracic vertebra (H)     Mass of left hand     Primary open angle glaucoma of both eyes, unspecified glaucoma stage     Osteoporosis, unspecified osteoporosis type, unspecified pathological fracture presence     Nuclear sclerosis of left eye     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)     Actinic keratosis     History of nonmelanoma skin cancer     Combined forms of age-related cataract of right eye     Status post coronary angiogram     Aortic valve stenosis     ASSESSMENT: Cydney Christiansen is a 88 y/o female with PMH significant for HTN, HLD, bilateral glaucoma, scoliosis, compression fracture of thoracic vertebrae, osteoporosis, history of bladder cancer, invasive ductal carcinoma of left breast s/p lumpectomy (2015), and multiple other skin cancer sites s/p removal who has severe aortic stenosis with bicuspid aortic valve and an ascending aortic aneurysm.  Presented 4/25 for ascending aortic arch repair tissue graft and AVR with Dr. Solis. Patient required repair stitches around valve d/t bleeding post-op. Noted to have moderate drop in platelet count coming off bypass requiring 2000 Kcentra, 1g fibryga.  Received 2pRBC, 2 platelets, 325 cell saver, 1.5L fluid.    OVERNIGHT EVENTS:   -- A fib with  controlled rates     UPDATES and EVENTS TODAY:   -- Continue diuresis, furosemide 40mg IV x1  -- plan for repeat echo when back to pre op weight 67kg  -- discussed risk/benefit of anticoagulation and CHADs score, will not start anticoagulation despite high CHADs  - increase metoprolol    PLAN:  Neuro/ pain/ sedation:  # Acute Postoperative pain, well controlled  - Monitor neurological status. Notify the MD for any acute changes in exam.  - Pain: PRN tylenol, oxycodone, dilaudid.    Pulmonary care:   # Acute hypoxic respiratory insufficiency  # Hx asthma   - Titrate supplemental oxygen to maintain saturation above 92%   - Pulmonary hygiene: Incentive spirometer every 15- 30 minutes when awake, flutter valve, C&DB  - PTA albuterol PRN    Cardiovascular:    # Severe aortic stenosis 2/2 bicuspid aortic S/P AVR 4/25   # Ascending aortic aneurysm s/p graft repair 4/25   # Hx HTN, HLD  # Severe tricuspid insufficiency post op  PreCBP: Normal BiV Fx. Trace TR/MR/PI, no AI. Severe Aortic Stenosis estimated at 0.68cm^2 by continuity LVOT/AV VTI. Partial effacement of Sinus. Max diameter 5.2cm. Distortion of overall CAL greater than would expect with 5.2 cm aneurysm.  - Recent echo on 1/18/23 with LVEF of 45-50%  - PostCBP: Good biventricular function, no wall motion abnormalities.   - Monitor hemodynamic status.    - Goal MAP>65, SBP<140 per Dr Solis   - PTA simvastatin  - ASA, HSQ daily    - 4/27: ECHO with 45-50% with new severe TI   - Lasix 40 mg IV  - repeat echo when weight is back to preop weight 67 kg    GI care/ Nutrition:   # Elevated LFTs  # Risk for malnutrition  - PPI  - Continue bowel regimen: miralax, senna  - Trend LFTs daily   - Encourage regular PO diet     Renal/ Fluid Balance/ Electrolytes:   # HELEN, resolved  BL creat appears to be ~ 0.6  - Strict I/O, daily weights  - Avoid/limit nephrotoxins as able  - Replete lytes PRN per protocol  - Lasix    Endocrine:    # Stress induced hyperglycemia  # Osteoporosis  "c/b compression fracture of spine  Preop A1c 6.0  - sliding scale insulin   - Goal BG <180 for optimal healing  - PTA med alendronate    ID/ Antibiotics:  # Stress induced leukocytosis, resolved    - To complete perioperative regimen  - Continue to monitor fever curve, WBC and inflammatory markers as appropriate    Heme:     # Stress induced leukocytosis, resolved  # Acute blood loss anemia  # thrombocytopenia 2/2 bypass   Monitor for s/sx of bleeding  - CBC  - Hemoglobin goal >7   - HSQ     MSK/ Skin:  # Sternotomy  # Surgical Incision  # deconditioning  - Sternal precautions  - Postoperative incision management per protocol  - PT/OT/CR    Prophylaxis:    - Mechanical prophylaxis for DVT  - Chemical DVT prophylaxis - subcutaneous heparin   - PPI     Lines/ tubes/ drains:  - Midline peripheral IV     Disposition:  - CVICU -- > Floor   Patient seen, findings and plan discussed with CVICU staff Dr. iRcks and CVTS fellow, attending.     I spent a total of 40 minutes providing critical care services at the bedside, and on the critical care unit, evaluating the patient, directing care and reviewing laboratory values and radiologic reports for the patient. Time spent separate from procedural time.     Raven Graham, KRISTIE CNP  5/1/23  ====================================    Interval updates:   Patient sitting in chair, no acute distress, mildly increased respiratory rate.  Reports she is tired and had a \"rough night.\"      OBJECTIVE:   Vital signs:  Temp: 98.6  F (37  C) Temp src: Oral BP: (!) 144/77 Pulse: 63   Resp: 24 SpO2: 100 % O2 Device: Nasal cannula Oxygen Delivery: 2 LPM Height: 149.9 cm (4' 11\") Weight: 71 kg (156 lb 8.4 oz)  Estimated body mass index is 31.61 kg/m  as calculated from the following:    Height as of this encounter: 1.499 m (4' 11\").    Weight as of this encounter: 71 kg (156 lb 8.4 oz).    2. INTAKE/ OUTPUT:   Intake/Output Summary (Last 24 hours) at 5/1/2023 1145  Last data filed at 5/1/2023 " 0900  Gross per 24 hour   Intake 75 ml   Output 600 ml   Net -525 ml     3. PHYSICAL EXAMINATION:   General: AO x 3. No distress.   Neuro:AO x 3, intermittent confusion. HERNANDEZ equally. 3-4/5 x 4.   Resp: BS equal and CTA bilaterally, diminished in bases. NO wheezing. On NC.   CV: S1, S2, irregular  Abdomen: Soft, non-distended, non-tender  Incisions: c/d/i. No drainage or oozing, covered with dressing which is dry  Extremities: warm and well perfused, trace edema.      4. INVESTIGATIONS:   Recent Labs   Lab 04/27/23  0344 04/26/23  0820 04/26/23  0401 04/25/23 2026   PH 7.37 7.36 7.38 7.40   PCO2 38 38 37 36   PO2 77* 98 94 89   HCO3 22 22 22 22     Complete Blood Count   Recent Labs   Lab 05/01/23  0729 04/30/23  0546 04/29/23  0536 04/28/23  1553   WBC 10.2 10.1 9.1 9.8   HGB 9.0* 8.7* 8.3* 8.5*    199 156 150     Basic Metabolic Panel  Recent Labs   Lab 05/01/23  0753 05/01/23  0729 04/30/23  2136 04/30/23  1953 04/30/23  1022 04/30/23  0546 04/29/23  0829 04/29/23  0536 04/28/23  1612 04/28/23  1553   NA  --  137  --   --   --  139  --  138  --  137   POTASSIUM  --  4.1  --   --   --  4.2  --  4.0  --  4.6   CHLORIDE  --  103  --   --   --  104  --  103  --  105   CO2  --  22  --   --   --  24  --  24  --  21*   BUN  --  38.7*  --   --   --  38.8*  --  40.8*  --  45.1*   CR  --  0.54  --   --   --  0.63  --  0.80  --  0.91   * 116* 123* 128*   < > 142*   < > 122*   < > 142*    < > = values in this interval not displayed.     Liver Function Tests  Recent Labs   Lab 05/01/23  0729 04/30/23  0546 04/29/23  0536 04/28/23  0344 04/27/23  0344   AST  --  169* 98* 80* 76*   * 159* 91* 58* 40*   ALKPHOS 50 48 41 38 42   BILITOTAL 0.4 0.5 0.3 0.3 0.5   ALBUMIN 3.1* 3.1* 3.0* 2.8* 3.1*   INR 1.34* 1.39* 1.32* 1.49* 1.65*     Pancreatic Enzymes  No lab results found in last 7 days.  Coagulation Profile  Recent Labs   Lab 05/01/23  0729 04/30/23  0546 04/29/23  0536 04/28/23  0344   INR 1.34* 1.39* 1.32*  1.49*   PTT 55* 58* 72* 36         5. RADIOLOGY:   No results found for this or any previous visit (from the past 24 hour(s)).    =========================================

## 2023-05-01 NOTE — PLAN OF CARE
ICU End of Shift Summary. See flowsheets for vital signs and detailed assessment.    Changes this shift: Hr stable in 1st degree block w/BBB underlying rhythm all day without amio which was turned off in the AM.  Hydralizine PRN given x2 for systolics over 140.  scheduled metoprolol in place of po amio.  Up in chair for 3 hours. Large bm x1.  Purewick in placed, good UOP, some unmeasured.  Appetite continues to be poor. Sons visited today. Anesthesia did not round today and remove the blocks, CVTS aware and requested they contact them.    Plan: Consider dietician consult d/t poor appetite or med to help improve.  For sleep patient requested Tylenol PM aka benadryl. Needs to work w/PT and OT. Has 6C orders. By AM get anesthesia to remove blocks and transition to PRN pain meds.    Goal Outcome Evaluation:      Plan of Care Reviewed With: patient, child    Overall Patient Progress: improvingOverall Patient Progress: improving    Outcome Evaluation: HR stable today, off amio gtt; using hydralizine PRN for hypertension, orders for 6C

## 2023-05-01 NOTE — INTERIM SUMMARY
Cydney Christiansen is a 87 year old female with b/l ESC for pain control.    S: patient feels pain is controlled. Does not feel pain coming from the catheter sites.    Temp: 36.7  C (98.1  F) Temp src: Oral BP: 110/75 Pulse: 111   Resp: 22 SpO2: 98 % O2 Device: Nasal cannula Oxygen Delivery: 2 LPM   O: catheter site without erythema or edema. Appear intact and at appropriate depth    A/P: catheters removed as CT are now out, pain has been well controlled. Tips intact, bandages placed over catheter sites.    Chapo Bailey DO CA-3  Department of Anesthesiology  Pain Pager [phone]

## 2023-05-01 NOTE — PLAN OF CARE
"ICU End of Shift Summary. See flowsheets for vital signs and detailed assessment.    Changes this shift: Nerve block pulled. One time dose of seroquel given for sleep. HR stable until ~1230am, rhythm flipped back into Afib, HR maintained between , team notified, BP stable.   Pt became restless at 1230 and 0430, wanting to get out of bed, removing NC and BP cuff. Pt repeatedly said she is \"done with this\" and \"didn't want to do this anymore.\" Other than the 2 times of restlessness, pt slept well. Up to chair at end of shift.     Plan: Diet consult d/t poor appetite, PT/OT, transfer to  when bed available.         Goal Outcome Evaluation:      Plan of Care Reviewed With: patient    Overall Patient Progress: no changeOverall Patient Progress: no change    Outcome Evaluation: Nerve block pulled, flipped back into afib      "

## 2023-05-02 ENCOUNTER — APPOINTMENT (OUTPATIENT)
Dept: OCCUPATIONAL THERAPY | Facility: CLINIC | Age: 88
DRG: 219 | End: 2023-05-02
Attending: SURGERY
Payer: COMMERCIAL

## 2023-05-02 ENCOUNTER — APPOINTMENT (OUTPATIENT)
Dept: GENERAL RADIOLOGY | Facility: CLINIC | Age: 88
DRG: 219 | End: 2023-05-02
Attending: NURSE PRACTITIONER
Payer: COMMERCIAL

## 2023-05-02 LAB
ALBUMIN SERPL BCG-MCNC: 3.2 G/DL (ref 3.5–5.2)
ALP SERPL-CCNC: 65 U/L (ref 35–104)
ALT SERPL W P-5'-P-CCNC: 128 U/L (ref 10–35)
ANION GAP SERPL CALCULATED.3IONS-SCNC: 13 MMOL/L (ref 7–15)
AST SERPL W P-5'-P-CCNC: 96 U/L (ref 10–35)
ATRIAL RATE - MUSE: NORMAL BPM
BASE EXCESS BLDV CALC-SCNC: 4.2 MMOL/L (ref -7.7–1.9)
BILIRUB SERPL-MCNC: 0.5 MG/DL
BUN SERPL-MCNC: 33.3 MG/DL (ref 8–23)
CA-I BLD-MCNC: 4.1 MG/DL (ref 4.4–5.2)
CALCIUM SERPL-MCNC: 8.6 MG/DL (ref 8.8–10.2)
CHLORIDE SERPL-SCNC: 101 MMOL/L (ref 98–107)
CREAT SERPL-MCNC: 0.57 MG/DL (ref 0.51–0.95)
DEPRECATED HCO3 PLAS-SCNC: 26 MMOL/L (ref 22–29)
DIASTOLIC BLOOD PRESSURE - MUSE: NORMAL MMHG
ERYTHROCYTE [DISTWIDTH] IN BLOOD BY AUTOMATED COUNT: 15.5 % (ref 10–15)
GFR SERPL CREATININE-BSD FRML MDRD: 89 ML/MIN/1.73M2
GLUCOSE BLDC GLUCOMTR-MCNC: 111 MG/DL (ref 70–99)
GLUCOSE BLDC GLUCOMTR-MCNC: 115 MG/DL (ref 70–99)
GLUCOSE BLDC GLUCOMTR-MCNC: 123 MG/DL (ref 70–99)
GLUCOSE BLDC GLUCOMTR-MCNC: 127 MG/DL (ref 70–99)
GLUCOSE BLDC GLUCOMTR-MCNC: 143 MG/DL (ref 70–99)
GLUCOSE BLDC GLUCOMTR-MCNC: 149 MG/DL (ref 70–99)
GLUCOSE BLDC GLUCOMTR-MCNC: 157 MG/DL (ref 70–99)
GLUCOSE SERPL-MCNC: 117 MG/DL (ref 70–99)
HCO3 BLDV-SCNC: 30 MMOL/L (ref 21–28)
HCT VFR BLD AUTO: 31.1 % (ref 35–47)
HGB BLD-MCNC: 9.3 G/DL (ref 11.7–15.7)
INTERPRETATION ECG - MUSE: NORMAL
MAGNESIUM SERPL-MCNC: 2.2 MG/DL (ref 1.7–2.3)
MCH RBC QN AUTO: 29.3 PG (ref 26.5–33)
MCHC RBC AUTO-ENTMCNC: 29.9 G/DL (ref 31.5–36.5)
MCV RBC AUTO: 98 FL (ref 78–100)
O2/TOTAL GAS SETTING VFR VENT: 2 %
P AXIS - MUSE: NORMAL DEGREES
PCO2 BLDV: 46 MM HG (ref 40–50)
PH BLDV: 7.42 [PH] (ref 7.32–7.43)
PHOSPHATE SERPL-MCNC: 2.8 MG/DL (ref 2.5–4.5)
PLATELET # BLD AUTO: 222 10E3/UL (ref 150–450)
PO2 BLDV: 57 MM HG (ref 25–47)
POTASSIUM SERPL-SCNC: 3.5 MMOL/L (ref 3.4–5.3)
PR INTERVAL - MUSE: NORMAL MS
PROT SERPL-MCNC: 5.9 G/DL (ref 6.4–8.3)
QRS DURATION - MUSE: 92 MS
QT - MUSE: 294 MS
QTC - MUSE: 383 MS
R AXIS - MUSE: -9 DEGREES
RBC # BLD AUTO: 3.17 10E6/UL (ref 3.8–5.2)
SODIUM SERPL-SCNC: 140 MMOL/L (ref 136–145)
SYSTOLIC BLOOD PRESSURE - MUSE: NORMAL MMHG
T AXIS - MUSE: 86 DEGREES
VENTRICULAR RATE- MUSE: 102 BPM
WBC # BLD AUTO: 11.5 10E3/UL (ref 4–11)

## 2023-05-02 PROCEDURE — 250N000011 HC RX IP 250 OP 636: Performed by: STUDENT IN AN ORGANIZED HEALTH CARE EDUCATION/TRAINING PROGRAM

## 2023-05-02 PROCEDURE — 250N000013 HC RX MED GY IP 250 OP 250 PS 637: Performed by: PHYSICIAN ASSISTANT

## 2023-05-02 PROCEDURE — 120N000002 HC R&B MED SURG/OB UMMC

## 2023-05-02 PROCEDURE — 250N000011 HC RX IP 250 OP 636: Performed by: NURSE PRACTITIONER

## 2023-05-02 PROCEDURE — 71045 X-RAY EXAM CHEST 1 VIEW: CPT

## 2023-05-02 PROCEDURE — 93005 ELECTROCARDIOGRAM TRACING: CPT

## 2023-05-02 PROCEDURE — 85027 COMPLETE CBC AUTOMATED: CPT | Performed by: STUDENT IN AN ORGANIZED HEALTH CARE EDUCATION/TRAINING PROGRAM

## 2023-05-02 PROCEDURE — 250N000013 HC RX MED GY IP 250 OP 250 PS 637: Performed by: STUDENT IN AN ORGANIZED HEALTH CARE EDUCATION/TRAINING PROGRAM

## 2023-05-02 PROCEDURE — 93010 ELECTROCARDIOGRAM REPORT: CPT | Performed by: INTERNAL MEDICINE

## 2023-05-02 PROCEDURE — 250N000009 HC RX 250: Performed by: SURGERY

## 2023-05-02 PROCEDURE — 97530 THERAPEUTIC ACTIVITIES: CPT | Mod: GO

## 2023-05-02 PROCEDURE — 36415 COLL VENOUS BLD VENIPUNCTURE: CPT | Performed by: NURSE PRACTITIONER

## 2023-05-02 PROCEDURE — 97535 SELF CARE MNGMENT TRAINING: CPT | Mod: GO

## 2023-05-02 PROCEDURE — 82803 BLOOD GASES ANY COMBINATION: CPT | Performed by: NURSE PRACTITIONER

## 2023-05-02 PROCEDURE — 99233 SBSQ HOSP IP/OBS HIGH 50: CPT | Mod: 24 | Performed by: NURSE PRACTITIONER

## 2023-05-02 PROCEDURE — 82330 ASSAY OF CALCIUM: CPT | Performed by: SURGERY

## 2023-05-02 PROCEDURE — 999N000157 HC STATISTIC RCP TIME EA 10 MIN

## 2023-05-02 PROCEDURE — 80053 COMPREHEN METABOLIC PANEL: CPT | Performed by: STUDENT IN AN ORGANIZED HEALTH CARE EDUCATION/TRAINING PROGRAM

## 2023-05-02 PROCEDURE — 250N000011 HC RX IP 250 OP 636: Performed by: SURGERY

## 2023-05-02 PROCEDURE — 94640 AIRWAY INHALATION TREATMENT: CPT

## 2023-05-02 PROCEDURE — 71045 X-RAY EXAM CHEST 1 VIEW: CPT | Mod: 26 | Performed by: RADIOLOGY

## 2023-05-02 PROCEDURE — 83735 ASSAY OF MAGNESIUM: CPT | Performed by: STUDENT IN AN ORGANIZED HEALTH CARE EDUCATION/TRAINING PROGRAM

## 2023-05-02 PROCEDURE — 84100 ASSAY OF PHOSPHORUS: CPT | Performed by: STUDENT IN AN ORGANIZED HEALTH CARE EDUCATION/TRAINING PROGRAM

## 2023-05-02 PROCEDURE — 250N000013 HC RX MED GY IP 250 OP 250 PS 637: Performed by: SURGERY

## 2023-05-02 PROCEDURE — 36415 COLL VENOUS BLD VENIPUNCTURE: CPT | Performed by: SURGERY

## 2023-05-02 RX ORDER — METOPROLOL TARTRATE 25 MG/1
25 TABLET, FILM COATED ORAL ONCE
Status: COMPLETED | OUTPATIENT
Start: 2023-05-02 | End: 2023-05-02

## 2023-05-02 RX ORDER — FUROSEMIDE 10 MG/ML
40 INJECTION INTRAMUSCULAR; INTRAVENOUS 2 TIMES DAILY
Status: DISCONTINUED | OUTPATIENT
Start: 2023-05-02 | End: 2023-05-02

## 2023-05-02 RX ORDER — CALCIUM GLUCONATE 20 MG/ML
2 INJECTION, SOLUTION INTRAVENOUS ONCE
Status: COMPLETED | OUTPATIENT
Start: 2023-05-02 | End: 2023-05-02

## 2023-05-02 RX ORDER — METOPROLOL TARTRATE 25 MG/1
50 TABLET, FILM COATED ORAL 2 TIMES DAILY
Status: DISCONTINUED | OUTPATIENT
Start: 2023-05-02 | End: 2023-05-06

## 2023-05-02 RX ORDER — FUROSEMIDE 10 MG/ML
40 INJECTION INTRAMUSCULAR; INTRAVENOUS ONCE
Status: COMPLETED | OUTPATIENT
Start: 2023-05-02 | End: 2023-05-02

## 2023-05-02 RX ORDER — FUROSEMIDE 10 MG/ML
40 INJECTION INTRAMUSCULAR; INTRAVENOUS 2 TIMES DAILY
Status: DISCONTINUED | OUTPATIENT
Start: 2023-05-02 | End: 2023-05-04

## 2023-05-02 RX ORDER — POTASSIUM CHLORIDE 7.45 MG/ML
10 INJECTION INTRAVENOUS
Status: COMPLETED | OUTPATIENT
Start: 2023-05-02 | End: 2023-05-02

## 2023-05-02 RX ADMIN — HEPARIN SODIUM 5000 UNITS: 5000 INJECTION, SOLUTION INTRAVENOUS; SUBCUTANEOUS at 05:59

## 2023-05-02 RX ADMIN — ALBUTEROL SULFATE 2 PUFF: 90 AEROSOL, METERED RESPIRATORY (INHALATION) at 03:41

## 2023-05-02 RX ADMIN — ALBUTEROL SULFATE 2 PUFF: 90 AEROSOL, METERED RESPIRATORY (INHALATION) at 17:20

## 2023-05-02 RX ADMIN — HEPARIN SODIUM 5000 UNITS: 5000 INJECTION, SOLUTION INTRAVENOUS; SUBCUTANEOUS at 21:21

## 2023-05-02 RX ADMIN — PANTOPRAZOLE SODIUM 40 MG: 40 TABLET, DELAYED RELEASE ORAL at 08:48

## 2023-05-02 RX ADMIN — FUROSEMIDE 40 MG: 10 INJECTION, SOLUTION INTRAVENOUS at 17:19

## 2023-05-02 RX ADMIN — METOPROLOL TARTRATE 50 MG: 50 TABLET, FILM COATED ORAL at 19:50

## 2023-05-02 RX ADMIN — ASPIRIN 81 MG CHEWABLE TABLET 81 MG: 81 TABLET CHEWABLE at 08:48

## 2023-05-02 RX ADMIN — POTASSIUM CHLORIDE 10 MEQ: 7.46 INJECTION, SOLUTION INTRAVENOUS at 08:48

## 2023-05-02 RX ADMIN — POTASSIUM CHLORIDE 10 MEQ: 7.46 INJECTION, SOLUTION INTRAVENOUS at 09:42

## 2023-05-02 RX ADMIN — HEPARIN SODIUM 5000 UNITS: 5000 INJECTION, SOLUTION INTRAVENOUS; SUBCUTANEOUS at 14:10

## 2023-05-02 RX ADMIN — CALCIUM GLUCONATE 2 G: 20 INJECTION, SOLUTION INTRAVENOUS at 11:20

## 2023-05-02 RX ADMIN — Medication 5 MG: at 17:19

## 2023-05-02 RX ADMIN — SIMVASTATIN 20 MG: 20 TABLET, FILM COATED ORAL at 21:21

## 2023-05-02 RX ADMIN — FUROSEMIDE 40 MG: 10 INJECTION, SOLUTION INTRAVENOUS at 11:20

## 2023-05-02 RX ADMIN — FUROSEMIDE 40 MG: 10 INJECTION, SOLUTION INTRAVENOUS at 13:31

## 2023-05-02 RX ADMIN — IPRATROPIUM BROMIDE AND ALBUTEROL SULFATE 3 ML: .5; 3 SOLUTION RESPIRATORY (INHALATION) at 04:12

## 2023-05-02 RX ADMIN — FUROSEMIDE 40 MG: 10 INJECTION, SOLUTION INTRAVENOUS at 21:22

## 2023-05-02 RX ADMIN — DORZOLAMIDE HYDROCHLORIDE AND TIMOLOL MALEATE 1 DROP: 22.3; 6.8 SOLUTION/ DROPS OPHTHALMIC at 08:50

## 2023-05-02 RX ADMIN — METOPROLOL TARTRATE 25 MG: 25 TABLET, FILM COATED ORAL at 07:05

## 2023-05-02 RX ADMIN — DORZOLAMIDE HYDROCHLORIDE AND TIMOLOL MALEATE 1 DROP: 22.3; 6.8 SOLUTION/ DROPS OPHTHALMIC at 19:50

## 2023-05-02 RX ADMIN — LATANOPROST 1 DROP: 50 SOLUTION OPHTHALMIC at 21:21

## 2023-05-02 RX ADMIN — METOPROLOL TARTRATE 25 MG: 25 TABLET, FILM COATED ORAL at 09:06

## 2023-05-02 RX ADMIN — SENNOSIDES AND DOCUSATE SODIUM 1 TABLET: 50; 8.6 TABLET ORAL at 08:48

## 2023-05-02 RX ADMIN — ALBUTEROL SULFATE 2 PUFF: 90 AEROSOL, METERED RESPIRATORY (INHALATION) at 09:13

## 2023-05-02 ASSESSMENT — ACTIVITIES OF DAILY LIVING (ADL)
ADLS_ACUITY_SCORE: 31
ADLS_ACUITY_SCORE: 31
ADLS_ACUITY_SCORE: 26
ADLS_ACUITY_SCORE: 26
ADLS_ACUITY_SCORE: 27
ADLS_ACUITY_SCORE: 31
ADLS_ACUITY_SCORE: 26
ADLS_ACUITY_SCORE: 27
ADLS_ACUITY_SCORE: 26
ADLS_ACUITY_SCORE: 27

## 2023-05-02 NOTE — PLAN OF CARE
ICU End of Shift Summary. See flowsheets for vital signs and detailed assessment.    Changes this shift: Alert and oriented and following commands. Forgetful @ time but easily redirectable. Has been complaining of SOB, chest Xray done yesterday evening, PRN neb given with some improvement, MD notified. On 2L NC, encourage use of IS. SR 80's, -140's. Purewick in place, good urine output, BM x1.   Early this morning patient flipped back into afib, 12 leads EKG done showed Afib with RVR, rate 's, BP stable. Morning metoprolol given per [facility] order.     Plan:  Continue with plan of care, notify provider with changes.

## 2023-05-02 NOTE — PROGRESS NOTES
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     Hypertension goal BP (blood pressure) < 140/90     Seborrheic keratosis     Malignant neoplasm of overlapping sites of left female breast (H)     Scoliosis     Compression fracture of thoracic vertebra (H)     Mass of left hand     Primary open angle glaucoma of both eyes, unspecified glaucoma stage     Osteoporosis, unspecified osteoporosis type, unspecified pathological fracture presence     Nuclear sclerosis of left eye     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)     Actinic keratosis     History of nonmelanoma skin cancer     Combined forms of age-related cataract of right eye     Status post coronary angiogram     Aortic valve stenosis     ASSESSMENT: Cydney Christiansen is a 86 y/o female with PMH significant for HTN, HLD, bilateral glaucoma, scoliosis, compression fracture of thoracic vertebrae, osteoporosis, history of bladder cancer, invasive ductal carcinoma of left breast s/p lumpectomy (2015), and multiple other skin cancer sites s/p removal who has severe aortic stenosis with bicuspid aortic valve and an ascending aortic aneurysm.  Presented 4/25 for ascending aortic arch repair tissue graft and AVR with Dr. Solis. Patient required repair stitches around valve d/t bleeding post-op. Noted to have moderate drop in platelet count coming off bypass requiring 2000 Kcentra, 1g fibryga.  Received 2pRBC, 2 platelets, 325 cell saver, 1.5L fluid.    OVERNIGHT EVENTS:   -- A fib with  controlled rates   -- restless night    UPDATES and EVENTS TODAY:   -- Continue diuresis, furosemide 40mg IV BID  -- plan for repeat echo when back to pre op weight 67kg  - increase metoprolol    PLAN:  Neuro/ pain/ sedation:  # Acute Postoperative pain, well controlled  - Monitor neurological status. Notify the MD for any acute changes in exam.  - Pain: PRN tylenol  - delirium precautions    Pulmonary care:   # Acute hypoxic respiratory insufficiency  # Hx asthma   # left sided pleural effusion  - Titrate supplemental oxygen to maintain saturation above 92%   - Pulmonary hygiene: Incentive spirometer every 15- 30 minutes when awake, flutter valve, C&DB  - PTA albuterol PRN  - increase diuresis    Cardiovascular:    # Severe aortic stenosis 2/2 bicuspid aortic S/P AVR 4/25   # Ascending aortic aneurysm s/p graft repair 4/25   # Hx HTN, HLD  # Severe tricuspid insufficiency post op  PreCBP: Normal BiV Fx. Trace TR/MR/PI, no AI. Severe Aortic Stenosis estimated at 0.68cm^2 by continuity LVOT/AV VTI. Partial effacement of Sinus. Max diameter 5.2cm. Distortion of overall CAL greater than would expect with 5.2 cm aneurysm.  - Recent echo on 1/18/23 with LVEF of 45-50%  - PostCBP: Good biventricular function, no wall motion abnormalities.   - Monitor hemodynamic status.    - Goal MAP>65, SBP<140 per Dr Solis   - PTA simvastatin  - ASA, HSQ daily    - 4/27: ECHO with 45-50% with new severe TI   - Lasix 40 mg IV  - repeat echo when weight is back to preop weight 67 kg    GI care/ Nutrition:   # Elevated LFTs  # Risk for malnutrition  - PPI  - Continue bowel regimen: miralax, senna  - Trend LFTs daily   - Encourage regular PO diet     Renal/ Fluid Balance/ Electrolytes:   # HELEN, resolved  BL creat appears to be ~ 0.6  - Strict I/O, daily weights  - Avoid/limit nephrotoxins as able  - Replete lytes PRN per protocol  - Lasix    Endocrine:    # Stress induced hyperglycemia  # Osteoporosis c/b compression fracture of spine  Preop  "A1c 6.0  - sliding scale insulin   - Goal BG <180 for optimal healing  - PTA med alendronate    ID/ Antibiotics:  # Stress induced leukocytosis, resolved    - To complete perioperative regimen  - Continue to monitor fever curve, WBC and inflammatory markers as appropriate    Heme:     # Stress induced leukocytosis, resolved  # Acute blood loss anemia  # thrombocytopenia 2/2 bypass   Monitor for s/sx of bleeding  - CBC  - Hemoglobin goal >7   - HSQ     MSK/ Skin:  # Sternotomy  # Surgical Incision  # deconditioning  - Sternal precautions  - Postoperative incision management per protocol  - PT/OT/CR    Prophylaxis:    - Mechanical prophylaxis for DVT  - Chemical DVT prophylaxis - subcutaneous heparin   - PPI     Lines/ tubes/ drains:  - Midline peripheral IV     Disposition:  - CVICU -- > Floor   Patient seen, findings and plan discussed with CVICU staff Dr. Ricks and CVTS fellow, attending.     I spent a total of 40 minutes providing care at the bedside, and on the critical care unit, evaluating the patient, directing care and reviewing laboratory values and radiologic reports for the patient. Time spent separate from procedural time.     Raven Graham, KRISTIE CNP  5/2/23  ====================================    Interval updates:   Patient says she is restless and miserable, she feels short of breath.  We discussed the need for her to eat, do therapy, and get out of bed    OBJECTIVE:   Vital signs:  Temp: 97.7  F (36.5  C) Temp src: Oral BP: 117/71 Pulse: 97   Resp: (!) 6 SpO2: 98 % O2 Device: Oxymask Oxygen Delivery: 2 LPM Height: 149.9 cm (4' 11\") Weight: 71.5 kg (157 lb 11.2 oz)  Estimated body mass index is 31.85 kg/m  as calculated from the following:    Height as of this encounter: 1.499 m (4' 11\").    Weight as of this encounter: 71.5 kg (157 lb 11.2 oz).    2. INTAKE/ OUTPUT:   Intake/Output Summary (Last 24 hours) at 5/1/2023 1145  Last data filed at 5/1/2023 0900  Gross per 24 hour   Intake 75 ml   Output 600 " ml   Net -525 ml     3. PHYSICAL EXAMINATION:   General: AO x 3. No distress. Mildly elevated respiratory rate  Neuro:AO x 3, intermittent confusion. HERNANDEZ equally. 3-4/5 x 4.   Resp: BS equal and CTA bilaterally, diminished in bases. NO wheezing. On NC.   CV: S1, S2, irregular  Abdomen: Soft, non-distended, non-tender  Incisions: c/d/i. No drainage or oozing, covered with dressing which is dry  Extremities: warm and well perfused, trace edema.      4. INVESTIGATIONS:   Recent Labs   Lab 04/27/23  0344 04/26/23  0820 04/26/23  0401 04/25/23 2026   PH 7.37 7.36 7.38 7.40   PCO2 38 38 37 36   PO2 77* 98 94 89   HCO3 22 22 22 22     Complete Blood Count   Recent Labs   Lab 05/02/23  0549 05/01/23  0729 04/30/23  0546 04/29/23  0536   WBC 11.5* 10.2 10.1 9.1   HGB 9.3* 9.0* 8.7* 8.3*    217 199 156     Basic Metabolic Panel  Recent Labs   Lab 05/02/23  1123 05/02/23  0818 05/02/23  0549 05/02/23  0349 05/01/23  0753 05/01/23  0729 04/30/23  1022 04/30/23  0546 04/29/23  0829 04/29/23  0536   NA  --   --  140  --   --  137  --  139  --  138   POTASSIUM  --   --  3.5  --   --  4.1  --  4.2  --  4.0   CHLORIDE  --   --  101  --   --  103  --  104  --  103   CO2  --   --  26  --   --  22  --  24  --  24   BUN  --   --  33.3*  --   --  38.7*  --  38.8*  --  40.8*   CR  --   --  0.57  --   --  0.54  --  0.63  --  0.80   * 111* 117* 115*   < > 116*   < > 142*   < > 122*    < > = values in this interval not displayed.     Liver Function Tests  Recent Labs   Lab 05/02/23  0549 05/01/23 0729 04/30/23  0546 04/29/23  0536 04/28/23  0344   AST 96*  --  169* 98* 80*   * 141* 159* 91* 58*   ALKPHOS 65 50 48 41 38   BILITOTAL 0.5 0.4 0.5 0.3 0.3   ALBUMIN 3.2* 3.1* 3.1* 3.0* 2.8*   INR  --  1.34* 1.39* 1.32* 1.49*     Pancreatic Enzymes  No lab results found in last 7 days.  Coagulation Profile  Recent Labs   Lab 05/01/23  0729 04/30/23  0546 04/29/23  0536 04/28/23  0344   INR 1.34* 1.39* 1.32* 1.49*   PTT 55*  58* 72* 36         5. RADIOLOGY:   Recent Results (from the past 24 hour(s))   XR Chest Port 1 View    Narrative    Portable chest    INDICATION: Short of breath    COMPARISON: Yesterday    FINDINGS: Heart is enlarged. Left costophrenic angle is obliterated  there is continued haziness in the right lower lung field. Trachea is  again minimally deviated to the right likely related to aortic knob  impression. Surgical clips in the upper midline thorax. Atelectasis in  the right upper lung. Aortic valve replacement.      Impression    IMPRESSION: Probable pleural effusions, left greater than right.  Clinical associated atelectasis as well. Cardiomegaly. Aortic valve  replacement.    ADONIS TROTTER MD         SYSTEM ID:  [id number]       =========================================

## 2023-05-03 ENCOUNTER — APPOINTMENT (OUTPATIENT)
Dept: PHYSICAL THERAPY | Facility: CLINIC | Age: 88
DRG: 219 | End: 2023-05-03
Attending: SURGERY
Payer: COMMERCIAL

## 2023-05-03 ENCOUNTER — APPOINTMENT (OUTPATIENT)
Dept: GENERAL RADIOLOGY | Facility: CLINIC | Age: 88
DRG: 219 | End: 2023-05-03
Attending: SURGERY
Payer: COMMERCIAL

## 2023-05-03 ENCOUNTER — APPOINTMENT (OUTPATIENT)
Dept: SPEECH THERAPY | Facility: CLINIC | Age: 88
DRG: 219 | End: 2023-05-03
Attending: SURGERY
Payer: COMMERCIAL

## 2023-05-03 ENCOUNTER — APPOINTMENT (OUTPATIENT)
Dept: OCCUPATIONAL THERAPY | Facility: CLINIC | Age: 88
DRG: 219 | End: 2023-05-03
Attending: SURGERY
Payer: COMMERCIAL

## 2023-05-03 ENCOUNTER — APPOINTMENT (OUTPATIENT)
Dept: INTERVENTIONAL RADIOLOGY/VASCULAR | Facility: CLINIC | Age: 88
DRG: 219 | End: 2023-05-03
Attending: PHYSICIAN ASSISTANT
Payer: COMMERCIAL

## 2023-05-03 LAB
ALBUMIN SERPL BCG-MCNC: 2.9 G/DL (ref 3.5–5.2)
ALP SERPL-CCNC: 44 U/L (ref 35–104)
ALT SERPL W P-5'-P-CCNC: 92 U/L (ref 10–35)
ANION GAP SERPL CALCULATED.3IONS-SCNC: 10 MMOL/L (ref 7–15)
AST SERPL W P-5'-P-CCNC: 67 U/L (ref 10–35)
ATRIAL RATE - MUSE: 288 BPM
ATRIAL RATE - MUSE: 61 BPM
ATRIAL RATE - MUSE: 66 BPM
ATRIAL RATE - MUSE: NORMAL BPM
BILIRUB SERPL-MCNC: 0.4 MG/DL
BUN SERPL-MCNC: 29.2 MG/DL (ref 8–23)
CALCIUM SERPL-MCNC: 8.6 MG/DL (ref 8.8–10.2)
CHLORIDE SERPL-SCNC: 98 MMOL/L (ref 98–107)
CREAT SERPL-MCNC: 0.55 MG/DL (ref 0.51–0.95)
DEPRECATED HCO3 PLAS-SCNC: 31 MMOL/L (ref 22–29)
DIASTOLIC BLOOD PRESSURE - MUSE: NORMAL MMHG
ERYTHROCYTE [DISTWIDTH] IN BLOOD BY AUTOMATED COUNT: 15.8 % (ref 10–15)
GFR SERPL CREATININE-BSD FRML MDRD: 89 ML/MIN/1.73M2
GLUCOSE BLDC GLUCOMTR-MCNC: 111 MG/DL (ref 70–99)
GLUCOSE BLDC GLUCOMTR-MCNC: 126 MG/DL (ref 70–99)
GLUCOSE BLDC GLUCOMTR-MCNC: 130 MG/DL (ref 70–99)
GLUCOSE BLDC GLUCOMTR-MCNC: 151 MG/DL (ref 70–99)
GLUCOSE BLDC GLUCOMTR-MCNC: 159 MG/DL (ref 70–99)
GLUCOSE BLDC GLUCOMTR-MCNC: 172 MG/DL (ref 70–99)
GLUCOSE SERPL-MCNC: 125 MG/DL (ref 70–99)
HCT VFR BLD AUTO: 28.4 % (ref 35–47)
HGB BLD-MCNC: 8.8 G/DL (ref 11.7–15.7)
INTERPRETATION ECG - MUSE: NORMAL
MAGNESIUM SERPL-MCNC: 1.9 MG/DL (ref 1.7–2.3)
MCH RBC QN AUTO: 29.2 PG (ref 26.5–33)
MCHC RBC AUTO-ENTMCNC: 31 G/DL (ref 31.5–36.5)
MCV RBC AUTO: 94 FL (ref 78–100)
P AXIS - MUSE: 10 DEGREES
P AXIS - MUSE: 14 DEGREES
P AXIS - MUSE: 264 DEGREES
P AXIS - MUSE: NORMAL DEGREES
PHOSPHATE SERPL-MCNC: 3.2 MG/DL (ref 2.5–4.5)
PLATELET # BLD AUTO: 225 10E3/UL (ref 150–450)
POTASSIUM SERPL-SCNC: 3.4 MMOL/L (ref 3.4–5.3)
POTASSIUM SERPL-SCNC: 3.6 MMOL/L (ref 3.4–5.3)
PR INTERVAL - MUSE: 240 MS
PR INTERVAL - MUSE: 256 MS
PR INTERVAL - MUSE: NORMAL MS
PR INTERVAL - MUSE: NORMAL MS
PROT SERPL-MCNC: 5.4 G/DL (ref 6.4–8.3)
QRS DURATION - MUSE: 102 MS
QRS DURATION - MUSE: 146 MS
QRS DURATION - MUSE: 150 MS
QRS DURATION - MUSE: 90 MS
QT - MUSE: 324 MS
QT - MUSE: 362 MS
QT - MUSE: 438 MS
QT - MUSE: 438 MS
QTC - MUSE: 385 MS
QTC - MUSE: 440 MS
QTC - MUSE: 459 MS
QTC - MUSE: 464 MS
R AXIS - MUSE: -16 DEGREES
R AXIS - MUSE: -2 DEGREES
R AXIS - MUSE: -21 DEGREES
R AXIS - MUSE: 1 DEGREES
RBC # BLD AUTO: 3.01 10E6/UL (ref 3.8–5.2)
SODIUM SERPL-SCNC: 139 MMOL/L (ref 136–145)
SYSTOLIC BLOOD PRESSURE - MUSE: NORMAL MMHG
T AXIS - MUSE: -60 DEGREES
T AXIS - MUSE: 29 DEGREES
T AXIS - MUSE: 43 DEGREES
T AXIS - MUSE: 50 DEGREES
VENTRICULAR RATE- MUSE: 61 BPM
VENTRICULAR RATE- MUSE: 66 BPM
VENTRICULAR RATE- MUSE: 85 BPM
VENTRICULAR RATE- MUSE: 99 BPM
WBC # BLD AUTO: 10.4 10E3/UL (ref 4–11)

## 2023-05-03 PROCEDURE — 272N000502 HC NEEDLE CR3

## 2023-05-03 PROCEDURE — 250N000013 HC RX MED GY IP 250 OP 250 PS 637: Performed by: STUDENT IN AN ORGANIZED HEALTH CARE EDUCATION/TRAINING PROGRAM

## 2023-05-03 PROCEDURE — 83735 ASSAY OF MAGNESIUM: CPT | Performed by: STUDENT IN AN ORGANIZED HEALTH CARE EDUCATION/TRAINING PROGRAM

## 2023-05-03 PROCEDURE — 32555 ASPIRATE PLEURA W/ IMAGING: CPT

## 2023-05-03 PROCEDURE — 36415 COLL VENOUS BLD VENIPUNCTURE: CPT | Performed by: SURGERY

## 2023-05-03 PROCEDURE — 250N000009 HC RX 250: Performed by: PHYSICIAN ASSISTANT

## 2023-05-03 PROCEDURE — 250N000011 HC RX IP 250 OP 636: Performed by: STUDENT IN AN ORGANIZED HEALTH CARE EDUCATION/TRAINING PROGRAM

## 2023-05-03 PROCEDURE — 36415 COLL VENOUS BLD VENIPUNCTURE: CPT | Performed by: STUDENT IN AN ORGANIZED HEALTH CARE EDUCATION/TRAINING PROGRAM

## 2023-05-03 PROCEDURE — 120N000003 HC R&B IMCU UMMC

## 2023-05-03 PROCEDURE — 97530 THERAPEUTIC ACTIVITIES: CPT | Mod: GO

## 2023-05-03 PROCEDURE — 82310 ASSAY OF CALCIUM: CPT | Performed by: STUDENT IN AN ORGANIZED HEALTH CARE EDUCATION/TRAINING PROGRAM

## 2023-05-03 PROCEDURE — 0W993ZZ DRAINAGE OF RIGHT PLEURAL CAVITY, PERCUTANEOUS APPROACH: ICD-10-PCS | Performed by: PHYSICIAN ASSISTANT

## 2023-05-03 PROCEDURE — 250N000013 HC RX MED GY IP 250 OP 250 PS 637: Performed by: PHYSICIAN ASSISTANT

## 2023-05-03 PROCEDURE — 250N000011 HC RX IP 250 OP 636: Performed by: NURSE PRACTITIONER

## 2023-05-03 PROCEDURE — 71045 X-RAY EXAM CHEST 1 VIEW: CPT

## 2023-05-03 PROCEDURE — 32555 ASPIRATE PLEURA W/ IMAGING: CPT | Mod: RT | Performed by: PHYSICIAN ASSISTANT

## 2023-05-03 PROCEDURE — 71045 X-RAY EXAM CHEST 1 VIEW: CPT | Mod: 26 | Performed by: RADIOLOGY

## 2023-05-03 PROCEDURE — 80053 COMPREHEN METABOLIC PANEL: CPT | Performed by: STUDENT IN AN ORGANIZED HEALTH CARE EDUCATION/TRAINING PROGRAM

## 2023-05-03 PROCEDURE — 84100 ASSAY OF PHOSPHORUS: CPT | Performed by: STUDENT IN AN ORGANIZED HEALTH CARE EDUCATION/TRAINING PROGRAM

## 2023-05-03 PROCEDURE — 84132 ASSAY OF SERUM POTASSIUM: CPT | Performed by: SURGERY

## 2023-05-03 PROCEDURE — 92526 ORAL FUNCTION THERAPY: CPT | Mod: GN

## 2023-05-03 PROCEDURE — 99233 SBSQ HOSP IP/OBS HIGH 50: CPT | Mod: 24 | Performed by: STUDENT IN AN ORGANIZED HEALTH CARE EDUCATION/TRAINING PROGRAM

## 2023-05-03 PROCEDURE — 85014 HEMATOCRIT: CPT | Performed by: STUDENT IN AN ORGANIZED HEALTH CARE EDUCATION/TRAINING PROGRAM

## 2023-05-03 PROCEDURE — 250N000013 HC RX MED GY IP 250 OP 250 PS 637: Performed by: SURGERY

## 2023-05-03 PROCEDURE — 97530 THERAPEUTIC ACTIVITIES: CPT | Mod: GP | Performed by: PHYSICAL THERAPIST

## 2023-05-03 RX ORDER — POTASSIUM CHLORIDE 750 MG/1
40 TABLET, EXTENDED RELEASE ORAL ONCE
Status: COMPLETED | OUTPATIENT
Start: 2023-05-03 | End: 2023-05-03

## 2023-05-03 RX ORDER — POTASSIUM CHLORIDE 750 MG/1
20 TABLET, EXTENDED RELEASE ORAL ONCE
Status: COMPLETED | OUTPATIENT
Start: 2023-05-03 | End: 2023-05-03

## 2023-05-03 RX ORDER — FUROSEMIDE 10 MG/ML
40 INJECTION INTRAMUSCULAR; INTRAVENOUS ONCE
Status: COMPLETED | OUTPATIENT
Start: 2023-05-03 | End: 2023-05-03

## 2023-05-03 RX ORDER — MAGNESIUM SULFATE HEPTAHYDRATE 40 MG/ML
2 INJECTION, SOLUTION INTRAVENOUS ONCE
Status: COMPLETED | OUTPATIENT
Start: 2023-05-03 | End: 2023-05-03

## 2023-05-03 RX ORDER — LIDOCAINE HYDROCHLORIDE 10 MG/ML
1-30 INJECTION, SOLUTION EPIDURAL; INFILTRATION; INTRACAUDAL; PERINEURAL
Status: COMPLETED | OUTPATIENT
Start: 2023-05-03 | End: 2023-05-03

## 2023-05-03 RX ADMIN — ALBUTEROL SULFATE 2 PUFF: 90 AEROSOL, METERED RESPIRATORY (INHALATION) at 03:27

## 2023-05-03 RX ADMIN — POTASSIUM CHLORIDE 20 MEQ: 750 TABLET, EXTENDED RELEASE ORAL at 21:05

## 2023-05-03 RX ADMIN — FUROSEMIDE 40 MG: 10 INJECTION, SOLUTION INTRAVENOUS at 07:50

## 2023-05-03 RX ADMIN — ASPIRIN 81 MG CHEWABLE TABLET 81 MG: 81 TABLET CHEWABLE at 07:50

## 2023-05-03 RX ADMIN — METOPROLOL TARTRATE 50 MG: 50 TABLET, FILM COATED ORAL at 07:50

## 2023-05-03 RX ADMIN — LATANOPROST 1 DROP: 50 SOLUTION OPHTHALMIC at 22:37

## 2023-05-03 RX ADMIN — DORZOLAMIDE HYDROCHLORIDE AND TIMOLOL MALEATE 1 DROP: 22.3; 6.8 SOLUTION/ DROPS OPHTHALMIC at 07:51

## 2023-05-03 RX ADMIN — HEPARIN SODIUM 5000 UNITS: 5000 INJECTION, SOLUTION INTRAVENOUS; SUBCUTANEOUS at 22:37

## 2023-05-03 RX ADMIN — Medication 25 MG: at 21:06

## 2023-05-03 RX ADMIN — MAGNESIUM SULFATE IN WATER 2 G: 40 INJECTION, SOLUTION INTRAVENOUS at 08:20

## 2023-05-03 RX ADMIN — SIMVASTATIN 20 MG: 20 TABLET, FILM COATED ORAL at 22:37

## 2023-05-03 RX ADMIN — FUROSEMIDE 40 MG: 10 INJECTION, SOLUTION INTRAVENOUS at 16:53

## 2023-05-03 RX ADMIN — PANTOPRAZOLE SODIUM 40 MG: 40 TABLET, DELAYED RELEASE ORAL at 07:50

## 2023-05-03 RX ADMIN — POTASSIUM CHLORIDE 40 MEQ: 750 TABLET, EXTENDED RELEASE ORAL at 08:20

## 2023-05-03 RX ADMIN — FUROSEMIDE 40 MG: 10 INJECTION, SOLUTION INTRAVENOUS at 09:51

## 2023-05-03 RX ADMIN — LIDOCAINE HYDROCHLORIDE 4 ML: 10 INJECTION, SOLUTION EPIDURAL; INFILTRATION; INTRACAUDAL; PERINEURAL at 11:18

## 2023-05-03 RX ADMIN — DORZOLAMIDE HYDROCHLORIDE AND TIMOLOL MALEATE 1 DROP: 22.3; 6.8 SOLUTION/ DROPS OPHTHALMIC at 21:06

## 2023-05-03 RX ADMIN — SENNOSIDES AND DOCUSATE SODIUM 1 TABLET: 50; 8.6 TABLET ORAL at 21:05

## 2023-05-03 RX ADMIN — HEPARIN SODIUM 5000 UNITS: 5000 INJECTION, SOLUTION INTRAVENOUS; SUBCUTANEOUS at 05:55

## 2023-05-03 RX ADMIN — HEPARIN SODIUM 5000 UNITS: 5000 INJECTION, SOLUTION INTRAVENOUS; SUBCUTANEOUS at 14:45

## 2023-05-03 ASSESSMENT — ACTIVITIES OF DAILY LIVING (ADL)
ADLS_ACUITY_SCORE: 31

## 2023-05-03 NOTE — PLAN OF CARE
ICU End of Shift Summary. See flowsheets for vital signs and detailed assessment.    Changes this shift: C/o Breathlessness and SOB all shift. Furosamide 40 mg given x 3 (responded fair), CXR done ( bilateral pleural effusions & hazy pulm opacities likely pulmonary edema) and VBG done. Encouraged frequent IS and flutter valve use. Lung sounds clear occasional coarse, frequent cough occasional productive.  Up to the chair via sling x 2  hours.  Son(s) at BS most of the day and updated throughout the shift.    Plan: Continue to monitor respiratory status closely and notify the team of any changes in status. Awaiting an open bed on 6C. Continue w/goals of care.    Goal Outcome Evaluation: Ongoing, no change

## 2023-05-03 NOTE — PROGRESS NOTES
Transferred to: 6B at 1244  Status at time of transfer: Stable  Belongings: With patient  Helms removed? (if no, why?): N/A  Chart and medications: With patient  Family notified: Yes, called Patel    ICU End of Shift Summary. See flowsheets for vital signs and detailed assessment.    Changes this shift: To IR for thoracentesis of R pleural effusion. Diuresed with lasix.

## 2023-05-03 NOTE — PROGRESS NOTES
Called Lab at 430AM and 530AM to follow up on blood draw for 4AM. Per Lab they will send someone.

## 2023-05-03 NOTE — PHARMACY-ADMISSION MEDICATION HISTORY
Pharmacy Intern Admission Medication History    Admission medication history is complete. The information provided in this note is only as accurate as the sources available at the time of the update.    Medication reconciliation/reorder completed by provider prior to medication history? Yes    Information Source(s): Patient and CareEverywhere/SureScripts via in-person    Pertinent Information: Spoke with patient who knew her medications fairly well. She stated that she had stopped taking either Vitamin B12 or Vitamin D, but wasn't sure which one. She also stated that she hadn't started the albuterol inhaler at home but did start using it at the hospital.    Changes made to PTA medication list:    Added: None    Deleted: None    Changed: per patient  o Ferrous sulfate 325 mg tablet: Take 1 tablet (325 mg) by mouth daily (with breakfast) --> Ferrous sulfate 325 mg tablet: Take 1 tablet (325 mg) by mouth every other day with breakfast  o Multiple vitamins-minerals tabs: [added directions]  o Vitamin D-1000 unit tabs: 1 daily --> Take 1 tablet by mouth daily       Allergies reviewed with patient and updates made in EHR: yes    Medication History Completed By: Eleni Cuevas 5/3/2023 6:13 PM    Prior to Admission medications    Medication Sig Last Dose Taking? Auth Provider Long Term End Date   alendronate (FOSAMAX) 70 MG tablet TAKE 1 TABLET BY MOUTH EVERY 7 DAYS  Patient taking differently: Take 70 mg by mouth every 7 days Monday Past Month Yes Estrellita Hernandez MD Yes    co-enzyme Q-10 100 MG CAPS capsule Take 200 mg by mouth every morning 4/15/2023 Yes Reported, Patient     cyanocobalamin (VITAMIN B-12) 500 MCG tablet Take 1 tablet (500 mcg) by mouth daily Unknown Yes Estrellita Hernandez MD     dorzolamide-timolol (COSOPT) 2-0.5 % ophthalmic solution Place 1 drop into both eyes 2 times daily 4/24/2023 Yes Kvng Garcia MD     ferrous sulfate (FEROSUL) 325 (65 Fe) MG tablet Take 1 tablet (325 mg) by mouth daily (with  breakfast)  Patient taking differently: Take 325 mg by mouth every other day With breakfast 4/24/2023 Yes Usah Salgado MD     ibuprofen (ADVIL,MOTRIN) 200 MG tablet Take 200 mg by mouth every 4 hours as needed. More than a month Yes Reported, Patient     latanoprost (XALATAN) 0.005 % ophthalmic solution Place 1 drop into both eyes At Bedtime 4/24/2023 Yes Kvng Garcia MD Yes    losartan (COZAAR) 50 MG tablet TAKE 1 TABLET BY MOUTH EVERY DAY 4/24/2023 Yes Estrellita Hernandez MD Yes    Multiple Vitamins-Minerals (ONE DAILY ADULTS 50+) TABS Take 1 tablet by mouth every other day 4/24/2023 Yes Reported, Patient     polyethylene glycol (MIRALAX) 17 GM/Dose powder Take 17 g (1 capful) by mouth 2 times daily as needed for constipation More than a month Yes Jamel Gimenez PA     simvastatin (ZOCOR) 20 MG tablet Take 1 tablet (20 mg) by mouth At Bedtime 4/24/2023 Yes Estrellita Hernandez MD Yes    VITAMIN D-1000 MAX -1000 MG-UNIT OR TABS Take 1 tablet by mouth daily Unknown Yes Reported, Patient     albuterol (PROAIR HFA/PROVENTIL HFA/VENTOLIN HFA) 108 (90 Base) MCG/ACT inhaler Inhale 1-2 puffs into the lungs every 4 hours as needed for shortness of breath or wheezing   Jamel Gimenez PA Yes    triamcinolone (KENALOG) 0.1 % external cream Apply twice daily for 2 weeks  Patient not taking: Reported on 5/3/2023 Not Taking  Qi Barba MD

## 2023-05-03 NOTE — PROGRESS NOTES
Cardiovascular Surgery Progress Note  05/04/2023         Assessment and Plan:     Cydney Christiansen is an 87 year old female with a PMH of HTN, HLD, bilateral glaucoma, scoliosis, compression fracture of thoracic vertebrae, osteoporosis, history of bladder cancer, invasive ductal carcinoma of left breast s/p lumpectomy (2015), and multiple other skin cancer sites s/p removal, severe aortic stenosis with bicuspid aortic valve and an ascending aortic aneurysm. Patient is now s/p AVR (Aponte [place] 21 mm valve) and ascending aorta repair on 4/27/23 with Dr. Solis.    Cardiovascular:   Hx of severe AORTIC STENOSIS 2/2 to bicuspid aortic valve  - s/p AVR 4/25  Ascending aortic aneurysm s/p graft repair 4/25  Hx HTN, HLD  Severe tricuspid insufficiency post op   Afib - rate controlled  HD stable, in rate controlled Afib. ICU discussed no anticoagulation with Dr. Solis  TTE 4/27 showed LV EF 45-50%; Mild right ventricular dilation with normal global right ventricular function. A bioprosthetic aortic valve with PV of 2.2m/s and MG of 12mmHg. Severe tricuspid insufficiency.   - plan to repeat TTE once closer to pre-op weight  - Goal MAP>65, SBP<140 per Dr Solis   - ASA 81 mg daily  - PTA simvastatin  - Metoprolol tartrate 50 mg BID  - Diuresis as below    CT/PW removed in the ICU    Pulmonary:  Acute hypoxic respiratory insufficiency  Hx asthma  Bilateral pleural effusions   - S/p right thoracentesis 5/3 with 600 ml removed  Extubated POD 1. Now saturating well on 4 lpm.   - Supplemental O2 PRN to keep sats > 92%. Wean off as tolerated.  - Pulm hygiene, IS, activity and deep breathing  - PTA albuterol PRN    Neurology / MSK:  Acute post-operative pain   Pain well controlled with current regimen:  - Acetaminophen, PO oxycodone PRN  - delirium precautions  - trazodone at bedtime for sleep     / Renal / Fluid / Electrolytes:  HELEN, resolved  BL creat ~ 0.6, most recent creatinine 0.65; adequate UOP.   FLUID STATUS: Pre-op  weight 137 lbs, weight today 152; I/O past 24 hours: -570ml  - Diuresis: will increase diuresis today due to wt up 25 lbs from admission and on supplemental O2.  Will start bumex 2 mg IV BID  - Replete lytes per protocol  - Strict I/O, daily weights  - Avoid/limit nephrotoxins as able    GI / Nutrition:   Transaminitis, downtrending  - Tolerating regular diet, however continues to have low oral intake  - discussed with patient and family if intake remains low, may need NG for TF admin  - Continue bowel regimen, last BM 5/2    Endocrine:  Stress induced hyperglycemia   Osteoporosis c/b compression fracture of spine  Hgb A1c 6.0  - Initially managed on insulin drip postop, transitioned to sliding scale; goal BG <180 for optimal healing  - PTA med alendronate    Infectious Disease:  Stress induced leukocytosis  WBC 9.8, remains afebrile, no signs or symptoms of infection  - Completed perioperative antibiotics  - Continue to monitor fever curve, CBC    Hematology:   Acute blood loss anemia and thrombocytopenia  Hgb 8.8; Plt 206, no signs or symptoms of active bleeding  - CBC daily    Anticoagulation:   - ASA only  - Discussion had with Dr. Solis regarding anticoagulation for AF stroke risk. Will not start anticoagulation at this time.     MSK/Skin:  Sternotomy  Surgical incision  - Sternal precautions  - Incisional cares per protocol    Prophylaxis:   - Stress ulcer prophylaxis: Pantoprazole 40 mg daily for 30 days  - DVT prophylaxis: Subcutaneous heparin, SCD    Disposition:   - Transferred to  on 5/3  - Therapies recommending discharge to TCU    Discussed with Dr. Will Saenz PANigelC   Cardiothoracic Surgery   Pager #[phone]  May 4, 2023 at 8:12 AM          Interval History:     No overnight events. Very sleepy on exam but wakes and answers questions appropriately  States pain is well managed on current regimen.   Tolerating diet, but low intake, is passing flatus, + BM. No nausea or vomiting.  Breathing  well without complaints on 4L NC.   Working with therapies, stood next to bed yesterday. Encouraged patient to sit in the chair today and work with therapies as able  Denies chest pain, palpitations, dizziness, syncopal symptoms, fevers, chills, myalgias, or sternal popping/clicking.         Physical Exam:     Gen: A&Ox4, NAD  Neuro: no focal deficits   CV: RRR, normal S1 S2, no murmurs, rubs or gallops  Pulm: CTA, no wheezing or rhonchi, normal breathing on 4 lpm  Abd: nondistended, normal BS, soft, nontender  Ext: moderate peripheral edema, 1+ pitting  Incision: clean, dry, intact, no erythema, sternum stable  Tubes/drain sites: dressing clean and dry         Data:    Imaging:  reviewed recent imaging, no acute concerns    Recent Results (from the past 24 hour(s))   XR Chest Port 1 View    Narrative    Portable chest    INDICATION: Shortness of breath post arch repair    COMPARISON: Yesterday    FINDINGS: Low inspiratory volume likely related to portable technique.  Heart is enlarged. Median sternotomy again noted. Bilateral  costophrenic angle blunting unchanged with lower lung zone haziness an  perihilar haziness also unchanged.      Impression    IMPRESSION: Postoperative chest. Cardiomegaly with pleural effusions  and probable associated edema our atelectasis, recommend follow-up to  clearing to exclude superimposed infection.    ADONIS TROTTER MD         SYSTEM ID:  [id number]   IR Thoracentesis    Narrative    PROCEDURE DIAGNOSIS: Right Pleural effusion    PROCEDURE: IR therapeutic right Thoracentesis      Impression    IMPRESSION: Completed ultrasound-guided therapeutic right  thoracentesis. 600 mL serosanguinous fluid aspirated from the right  pleural space. Small residual pleural fluid remains.  Diagnostic  studies were not requested.  Formal pass off provided to Dr. Taylor at  11:40A.  No immediate complication.    ----------    CLINICAL HISTORY: 87 year old female with aortic root repair, new  right  pleural effusion for thoracentesis.  IR inpt consult note on  5/3/2023.     PERFORMED BY: Janina Escudero PA-C    CONSENT: Written informed consent was obtained and is documented in  the patient record.    MEDICATIONS: 1% lidocaine for local anesthesia      NURSING: The patient was placed on continuous vital signs monitoring.  Vital signs were monitored by nursing staff under my supervision.      DESCRIPTION: Patient positioned upright and the skin overlying the  right pleural effusion was prepped and draped in the usual sterile  fashion.    Under continuous ultrasound visualization, the skin and pleura was  anesthetized before a centesis needle/catheter system was advanced  into the pleural space. The catheter was advanced off the needle into  the fluid and the needle was removed. Catheter connected to vacuum  drainage and fluid aspirated. Upon completion of drainage, the  catheter was removed on expiration. Occlusive dressing applied.     COMPLICATIONS: No immediate concerns, the patient remained stable  throughout the procedure and tolerated it well.    ESTIMATED BLOOD LOSS: Minimal    SPECIMENS: None requested    JANINA ESCUDERO PA-C         SYSTEM ID:  [id number]        Labs:  Recent Labs   Lab 05/04/23  0733 05/04/23  0546 05/04/23  0423 05/03/23  1533 05/03/23  1355 05/03/23  0753 05/03/23  0705 05/02/23  0818 05/02/23  0549 05/01/23  0753 05/01/23  0729 04/30/23  1022 04/30/23  0546 04/29/23  0829 04/29/23  0536   WBC  --  9.8  --   --   --   --  10.4  --  11.5*  --  10.2  --  10.1  --  9.1   HGB  --  8.8*  --   --   --   --  8.8*  --  9.3*  --  9.0*  --  8.7*  --  8.3*   MCV  --  97  --   --   --   --  94  --  98  --  96  --  95  --  95   PLT  --  206  --   --   --   --  225  --  222  --  217  --  199  --  156   INR  --   --   --   --   --   --   --   --   --   --  1.34*  --  1.39*  --  1.32*   NA  --  140  --   --   --   --  139  --  140  --  137  --  139  --  138   POTASSIUM  --  3.8  --   --  3.6  --  3.4  --  3.5   --  4.1  --  4.2  --  4.0   CHLORIDE  --  98  --   --   --   --  98  --  101  --  103  --  104  --  103   CO2  --  33*  --   --   --   --  31*  --  26  --  22  --  24  --  24   BUN  --  26.1*  --   --   --   --  29.2*  --  33.3*  --  38.7*  --  38.8*  --  40.8*   CR  --  0.65  --   --   --   --  0.55  --  0.57  --  0.54  --  0.63  --  0.80   ANIONGAP  --  9  --   --   --   --  10  --  13  --  12  --  11  --  11   SHERYL  --  8.5*  --   --   --   --  8.6*  --  8.6*  --  8.4*  --  8.3*  --  8.7*   * 129* 116*   < >  --    < > 125*   < > 117*   < > 116*   < > 142*   < > 122*   ALBUMIN  --  2.9*  --   --   --   --  2.9*  --  3.2*  --  3.1*  --  3.1*  --  3.0*   PROTTOTAL  --  5.4*  --   --   --   --  5.4*  --  5.9*  --  5.6*  --  5.6*  --  5.3*   BILITOTAL  --  0.4  --   --   --   --  0.4  --  0.5  --  0.4  --  0.5  --  0.3   ALKPHOS  --  44  --   --   --   --  44  --  65  --  50  --  48  --  41   ALT  --  73*  --   --   --   --  92*  --  128*  --  141*  --  159*  --  91*   AST  --  55*  --   --   --   --  67*  --  96*  --   --   --  169*  --  98*    < > = values in this interval not displayed.      GLUCOSE:   Recent Labs   Lab 05/04/23  0733 05/04/23  0546 05/04/23  0423 05/04/23  0023 05/03/23  2104 05/03/23  1728   * 129* 116* 114* 151* 172*

## 2023-05-03 NOTE — PLAN OF CARE
Neuro: A&Ox4. Forgetful at times  Cardiac: afib in the 80s . VSS.   Respiratory: Sating >95 on 4-6 nc.  GI/: Adequate urine output. Via purewick. Passing gas no stool.   Diet/appetite: Tolerating regular diet. Very poor appeittie.  Eating well.  Activity:  Assist of 2 with lift. Stood with PT   Pain: At acceptable level on current regimen.   Skin: No new deficits noted. Scattered moles, rash near adarsh area, stomach is bruised from insulin pens.   LDA's: double lumen PICC. Saline locked.     Plan: thoracentesis today. Removed 600ml. IV lasix.    Continue with POC. Notify primary team with changes.

## 2023-05-03 NOTE — PROGRESS NOTES
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     Hypertension goal BP (blood pressure) < 140/90     Seborrheic keratosis     Malignant neoplasm of overlapping sites of left female breast (H)     Scoliosis     Compression fracture of thoracic vertebra (H)     Mass of left hand     Primary open angle glaucoma of both eyes, unspecified glaucoma stage     Osteoporosis, unspecified osteoporosis type, unspecified pathological fracture presence     Nuclear sclerosis of left eye     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)     Actinic keratosis     History of nonmelanoma skin cancer     Combined forms of age-related cataract of right eye     Status post coronary angiogram     Aortic valve stenosis     ASSESSMENT: Cydney Christiansen is a 88 y/o female with PMH significant for HTN, HLD, bilateral glaucoma, scoliosis, compression fracture of thoracic vertebrae, osteoporosis, history of bladder cancer, invasive ductal carcinoma of left breast s/p lumpectomy (2015), and multiple other skin cancer sites s/p removal who has severe aortic stenosis with bicuspid aortic valve and an ascending aortic aneurysm.  Presented 4/25 for ascending aortic arch repair tissue graft and AVR with Dr. Solis. Patient required repair stitches around valve d/t bleeding post-op. Noted to have moderate drop in platelet count coming off bypass requiring 2000 Kcentra, 1g fibryga.  Received 2pRBC, 2 platelets, 325 cell saver, 1.5L fluid.      UPDATES and EVENTS TODAY:   --  Continue diuresis, furosemide 40mg x1  -- plan for repeat echo when back to pre op weight 67kg  -- Add- trazodone at bedtime  -- IR consult for pleural effusion drainage    PLAN:  Neuro/ pain/ sedation:  # Acute Postoperative pain, well controlled  - Monitor neurological status. Notify the MD for any acute changes in exam.  - Pain: PRN tylenol  - delirium precautions  - trazodone at bedtime for sleep    Pulmonary care:   # Acute hypoxic respiratory insufficiency  # Hx asthma   # bilateral pleural effusions  - Titrate supplemental oxygen to maintain saturation above 92%   - Pulmonary hygiene: Incentive spirometer every 15- 30 minutes when awake, flutter valve, C&DB  - IR consult for R sided pleural effusion drainage  - PTA albuterol PRN  - continue diuresis    Cardiovascular:    # Severe aortic stenosis 2/2 bicuspid aortic S/P AVR 4/25   # Ascending aortic aneurysm s/p graft repair 4/25   # Hx HTN, HLD  # Severe tricuspid insufficiency post op  #Afib - rate controlled  PreCBP: Normal BiV Fx. Trace TR/MR/PI, no AI. Severe Aortic Stenosis estimated at 0.68cm^2 by continuity LVOT/AV VTI. Partial effacement of Sinus. Max diameter 5.2cm. Distortion of overall CAL greater than would expect with 5.2 cm aneurysm.  - Recent echo on 1/18/23 with LVEF of 45-50%  - PostCBP: Good biventricular function, no wall motion abnormalities.   - Monitor hemodynamic status.    - Goal MAP>65, SBP<140 per Dr Solis   - Afib with PVC. Rate controlled. Continue 50 metoprolol BID. Discussed anticoagulation, risk of bleeding high in her age. Discussed risk v benefit with patient and team and will not anticoagulate.   - PTA simvastatin  - ASA, HSQ daily    - 4/27: ECHO with 45-50% with new severe TI   - Lasix 40 mg IV  - repeat echo when weight is back to preop weight 67 kg    GI care/ Nutrition:   # Elevated LFTs  # Risk for malnutrition  - PPI  - Continue bowel regimen: miralax, senna  - Trend LFTs daily, imroving  - Encourage regular PO diet  "    Renal/ Fluid Balance/ Electrolytes:   # HELEN, resolved  BL creat appears to be ~ 0.6  - Strict I/O, daily weights  - Avoid/limit nephrotoxins as able  - Replete lytes PRN per protocol  - Lasix 40 x1 today    Endocrine:    # Stress induced hyperglycemia  # Osteoporosis c/b compression fracture of spine  Preop A1c 6.0  - sliding scale insulin   - Goal BG <180 for optimal healing  - PTA med alendronate    ID/ Antibiotics:  # Stress induced leukocytosis, resolved  - Continue to monitor fever curve, WBC and inflammatory markers as appropriate    Heme:     # Stress induced leukocytosis, resolved  # Acute blood loss anemia  # thrombocytopenia 2/2 bypass   Monitor for s/sx of bleeding  - CBC  - Hemoglobin goal >7   - HSQ     MSK/ Skin:  # Sternotomy  # Surgical Incision  # deconditioning  - Sternal precautions  - Postoperative incision management per protocol  - PT/OT/CR    Prophylaxis:    - Mechanical prophylaxis for DVT  - Chemical DVT prophylaxis - subcutaneous heparin   - PPI     Lines/ tubes/ drains:  - Midline - remove  - PIV    Disposition:  Floor    Patient seen, findings and plan discussed with CVICU staff Dr. Ricks and CVTS fellow, attending.     Tonya Noel DO  General Surgery PGY3    ====================================    Interval updates:   Patient reports difficulty sleeping. Feeling anxious overnight and a little this morning. Melatonin did not help. Mild shortness of breath, on 3L NC today. Ongoing afib with rate controlled . Poor appetitie but hungry this AM. +BM.     OBJECTIVE:   Vital signs:  Temp: 97.9  F (36.6  C) Temp src: Oral BP: 116/83 Pulse: 87   Resp: 25 SpO2: 95 % O2 Device: Nasal cannula Oxygen Delivery: 3 LPM Height: 149.9 cm (4' 11\") Weight: 70.2 kg (154 lb 12.8 oz)  Estimated body mass index is 31.27 kg/m  as calculated from the following:    Height as of this encounter: 1.499 m (4' 11\").    Weight as of this encounter: 70.2 kg (154 lb 12.8 oz).    2. INTAKE/ OUTPUT:   Intake/Output " Summary (Last 24 hours) at 5/1/2023 1145  Last data filed at 5/1/2023 0900  Gross per 24 hour   Intake 75 ml   Output 600 ml   Net -525 ml     3. PHYSICAL EXAMINATION:   General: AO x 3. No distress. Mildly elevated respiratory rate  Neuro:AO x 3. HERNANDEZ equally.4/5 x 4.   Resp: BS equal and CTA bilaterally, diminished in bases bilaterally. NO wheezing. On NC.   CV: S1, S2, irregular  Abdomen: Soft, non-distended, non-tender  Incisions: c/d/i. No drainage or surrounding erythema  Extremities: warm and well perfused, trace edema.      4. INVESTIGATIONS:   Recent Labs   Lab 04/27/23  0344 04/26/23  0820   PH 7.37 7.36   PCO2 38 38   PO2 77* 98   HCO3 22 22     Complete Blood Count   Recent Labs   Lab 05/02/23  0549 05/01/23  0729 04/30/23  0546 04/29/23  0536   WBC 11.5* 10.2 10.1 9.1   HGB 9.3* 9.0* 8.7* 8.3*    217 199 156     Basic Metabolic Panel  Recent Labs   Lab 05/03/23  0326 05/02/23  2312 05/02/23  2120 05/02/23  1949 05/02/23  0818 05/02/23  0549 05/01/23  0753 05/01/23  0729 04/30/23  1022 04/30/23  0546 04/29/23  0829 04/29/23  0536   NA  --   --   --   --   --  140  --  137  --  139  --  138   POTASSIUM  --   --   --   --   --  3.5  --  4.1  --  4.2  --  4.0   CHLORIDE  --   --   --   --   --  101  --  103  --  104  --  103   CO2  --   --   --   --   --  26  --  22  --  24  --  24   BUN  --   --   --   --   --  33.3*  --  38.7*  --  38.8*  --  40.8*   CR  --   --   --   --   --  0.57  --  0.54  --  0.63  --  0.80   * 127* 123* 143*   < > 117*   < > 116*   < > 142*   < > 122*    < > = values in this interval not displayed.     Liver Function Tests  Recent Labs   Lab 05/02/23  0549 05/01/23  0729 04/30/23  0546 04/29/23  0536 04/28/23  0344   AST 96*  --  169* 98* 80*   * 141* 159* 91* 58*   ALKPHOS 65 50 48 41 38   BILITOTAL 0.5 0.4 0.5 0.3 0.3   ALBUMIN 3.2* 3.1* 3.1* 3.0* 2.8*   INR  --  1.34* 1.39* 1.32* 1.49*     Pancreatic Enzymes  No lab results found in last 7 days.  Coagulation  Profile  Recent Labs   Lab 05/01/23  0729 04/30/23  0546 04/29/23  0536 04/28/23  0344   INR 1.34* 1.39* 1.32* 1.49*   PTT 55* 58* 72* 36         5. RADIOLOGY:   Recent Results (from the past 24 hour(s))   XR Chest Port 1 View    Narrative    Exam: XR CHEST PORT 1 VIEW, 5/2/2023 12:38 PM    Indication: shortness of breath    Comparison: 5/1/2023    Findings:   Single portable AP view of the chest. Postoperative changes with  intact midline sternotomy wires and aortic valve replacement. Right  upper extremity midline tip projects over the right axilla. Trachea is  midline. No pneumothorax. Small to moderate bilateral pleural  effusions. Perihilar and basilar predominant hazy pulmonary opacities.  Stable enlarged cardiac silhouette. No acute osseous abnormalities.      Impression    Impression:   1. Stable small to moderate bilateral pleural effusions and hazy  pulmonary opacities of likely mild pulmonary edema.  2. Stable cardiomegaly.    I have personally reviewed the examination and initial interpretation  and I agree with the findings.    JOSIAH HYLTON DO         SYSTEM ID:  [id number]       =========================================

## 2023-05-03 NOTE — PROCEDURES
[facility]    Procedure: IR Procedure Note    Date/Time: 5/3/2023 11:35 AM    Performed by: Ese Wilson PA-C  Authorized by: Ese Wilson PA-C      UNIVERSAL PROTOCOL   Site Marked: NA  Prior Images Obtained and Reviewed:  Yes  Required items: Required blood products, implants, devices and special equipment available    Patient identity confirmed:  Verbally with patient, arm band, provided demographic data and hospital-assigned identification number  Patient was reevaluated immediately before administering moderate or deep sedation or anesthesia  Confirmation Checklist:  Patient's identity using two indicators, relevant allergies, procedure was appropriate and matched the consent or emergent situation and correct equipment/implants were available  Time out: Immediately prior to the procedure a time out was called    Universal Protocol: the Joint Commission Universal Protocol was followed    Preparation: Patient was prepped and draped in usual sterile fashion       ANESTHESIA    Anesthesia: Local infiltration  Local Anesthetic:  Lidocaine 1% without epinephrine      SEDATION    Patient Sedated: No    See dictated procedure note for full details.  Findings: Local only.      Specimens: none    Complications: None    Condition: Stable    Plan: Transport to inpatient bed for recovery       PROCEDURE  Describe Procedure: Completed ultrasound-guided right therapeutic thoracentesis. A total of 600 mL serosanguinous fluid drained from the right pleural space.  No immediate complication.    Formal pass off provided to Dr. Brandon WELLER 11:40 AM      Patient Tolerance:  Patient tolerated the procedure well with no immediate complications  Length of time physician/provider present for 1:1 monitoring during sedation: 0

## 2023-05-03 NOTE — PLAN OF CARE
ICU End of Shift Summary. See flowsheets for vital signs and detailed assessment.    Changes this shift: Alert and Oriented x 4. Forgetful at times, but easily redirectable. SR with 1st degree AV block to A Fib with HR . With lots of ectopy and short runs of quadrigeminal on monitor. CVTS notified, pt asymptomatic, and EKG done. Per CVTS Provider - goal is A Fib rate controlled <120. Complaints of SOB occasionally and per Patient it's less frequent than what she had before - Provider notified, Lasix 40 mg IV given, IS performed, and PRN Albuterol inhaler given with relief noted post intervention. Sating >90% on O2 at 2-3 LNC. Still awaiting for  to collect 4AM laboratories.    Plan: Contact/Notify primary care team with questions or concerns.      Goal Outcome Evaluation:    Problem: Plan of Care - These are the overarching goals to be used throughout the patient stay.    Goal: Absence of Hospital-Acquired Illness or Injury  Description: Removed patient's traylor today and placed a purewick catheter.  Intervention: Prevent Skin Injury  Recent Flowsheet Documentation  Taken 5/2/2023 2200 by Sandhya Del Angel, RN  Body Position:    neutral body alignment    heels elevated    supine, head elevated    upper extremity elevated  Taken 5/2/2023 2000 by Sandhya Del Angel, RN  Body Position:    turned    left    heels elevated    legs elevated    side-lying 30 degrees    upper extremity elevated     Problem: Plan of Care - These are the overarching goals to be used throughout the patient stay.    Goal: Optimal Comfort and Wellbeing  Intervention: Monitor Pain and Promote Comfort  Recent Flowsheet Documentation  Taken 5/2/2023 2000 by Sandhya Del Angel, RN  Pain Management Interventions:    breathing exercises    care clustered

## 2023-05-03 NOTE — IR NOTE
Patient Name: Cydney Christiansen  Medical Record Number: [id number]  Today's Date: 5/3/2023    Procedure: Right Thoracentesis  Proceduralist: Ese Wilson PA-C  Pathology present: N/A    Procedure Start: 1113  Procedure end: 1131  Sedation medications administered: local only     Report given to: MARIEL Brar   : N/A    Other Notes: Pt arrived to IR room 6 from . Consent signed & reviewed. Pt denies any questions or concerns regarding procedure. Pt positioned sitting and monitored per protocol.     600 mL serosanguinous fluid removed. No labs ordered.     Pt became hypotensive but otherwise asymptomatic. BP improved and stabilized upon transfer to supine position. Pt denies pain or difficulty breathing. No further intervention required. Pt transferred to  once stabilized

## 2023-05-03 NOTE — CONSULTS
Patient is an 87 year old female status post aortic root repair, now with report of right-sided pleural effusion. Patient's team requesting thoracentesis.    Per established workflow, team to consult CAPS for thoracentesis.    Please place repeat IR inpatient consult order if CAPS fails or declines.        Anthony Perez PA-C  Interventional Radiology  [phone] pgr.

## 2023-05-03 NOTE — DISCHARGE INSTRUCTIONS
AFTER YOU GO HOME FROM YOUR HEART SURGERY  (S/p AVR and ascending aorta repair by Dr. Solis on 4/26)    You had a sternotomy, avoid lifting anything greater than ten pounds for 6 weeks after surgery and then less than 20 pounds for an additional 6 weeks. Do not reach backwards or use arms to push out of chair. Do not let people pull on your arms to assist with standing. Avoid twisting or reaching too far across your body.  Avoid strenuous activities such as bowling, vacuuming, raking, shoveling, golf or tennis for 12 weeks after your surgery. It is okay to resume sex if you feel comfortable in doing so. You may have to try different positions with your partner.  Splint your chest incision by hugging a pillow or bringing your arms across your chest when coughing or sneezing. Please try to sleep on your back for the first 4-6 weeks to avoid extra stress on your sternum (breastbone) while it is healing.      No driving for 4 weeks after surgery or while on pain medication.      Shower or wash your incisions twice daily with soap and water (or as instructed), pat dry. Keep wound clean and dry, showers are okay after discharge, but don't let spray hit directly on incision. No baths or swimming for 1 month. Cover chest tube sites with gauze until they stop draining, then leave open to air. It is not abnormal for chest tube sites to drain yellowish/clear fluid for up to 2-3 weeks after surgery.   Watch for signs of infection: increased redness, tenderness, warmth or any drainage from sternum incision.  Also a temperature > 100.5 F or chills. Call your surgeon or primary care provider's office immediately. Remove any skin glue left on incisions after 10-14 days. This will not affect your incision and can speed up healing.    Exercise is very important in your recovery. Please follow the guidelines set up for you in your cardiac rehab classes at the hospital. If outpatient cardiac rehab was ordered for you, we highly  "recommend you participate. If you have problems arranging your cardiac rehab, please call [phone] for all locations, with the exception of [place], please call [phone] and [facility], please call [phone].    Avoid sitting for prolonged periods of time, try to walk every hour during the day. If you have a leg incision, elevate your leg often when you are not walking.    Check your weight when you get home from the hospital and continue to check it daily through your recovery for at least a month. If you notice a weight gain of 2-3 pounds in a week, notify your primary care physician, cardiologist or surgeon.    Bowel activity may be slow after surgery. If necessary, you may take an over the counter laxative such as Milk of Magnesia or Miralax. You may have stool softeners prescribed (docusate sodium, Senokot). We recommend using stool softeners while using narcotics for pain (oxycodone/percocet, hydrocodone/vicodin, hydromorphone/dilaudid).      Wean OFF of narcotics (oxycodone, dilaudid, hydrocodone) as soon as possible. You should continue taking acetaminophen as long as you have any surgical pain as the first choice for pain control and add narcotics as necessary for pain to be tolerable.      DENTAL VISITS AFTER SURGERY  You have had your heart valve repaired or replaced, we do not recommend having any dental work done for 6 months and you will need to take an antibiotic prior to dental visits from now on.  Please notify your dentist before any procedure for the proper treatment needed. The antibiotic is taken by mouth one hour prior to visit. This includes routine cleanings.You can sometimes hear a mechanical valve \"clicking,\" this is normal and not a sign of something wrong.    DO NOT SMOKE.  IF YOU NEED HELP QUITTING, PLEASE TALK WITH YOUR CARDIOLOGIST OR PRIMARY DOCTOR.    You are not on a blood thinner.     REGARDING PRESCRIPTION REFILLS.  If you need a refill on your pain medication contact " us to discuss your pain and a possible one time refill.   All other medications will be adjusted, discontinued and re-filled by your primary care physician and/or your cardiologist as they were prior to your surgery. We have given you enough for one to three month with possibly one refill.    POST-OPERATIVE CLINIC VISITS  You have a follow up visit with [facility] Advance Care Practitioners on *** at the [facility]***.  You will then return to the care of your primary provider and your cardiologist. Future medication refills should come from your PCP or Cardiologist.   You should see your primary care provider in 2-4 weeks after discharge.   It is important to see your cardiologist about 4-6 weeks after discharge.    If you do not hear from a  in 7 days, please call [phone] (choose option 1) and request to be seen with a general cardiologist or someone that you have seen in the past.   If there is a need to return to see CT Surgery, please call our  Darya Boo at [phone].     SURGICAL QUESTIONS  Please call Romeo Sahu, Rahel Randall, Leisa Cazares, or Leatha Quan with surgical recovery and medication questions; phone numbers are listed below.  They will assist you with your needs and contact other surgery care team members as indicated.    On weekends or after hours, please call [phone] and ask the  to page the Cardiothoracic Surgery fellow on call.      Thank you,    Your Cardiothoracic Surgery Team   Romeo Sahu RN Care Coordinator - [phone]  Rahel Randall RN Care Coordinator - [phone]   Leatha Rivas, RN Care Coordinator - [phone]   Ana M Cazares, MARIEL Care Coordinator - [phone]

## 2023-05-03 NOTE — PROGRESS NOTES
Transfer  Transferred from: 4A  Via:bed  Reason for transfer: Pt appropriate for 6B- improved/ patient condition  Family: Aware of transfer  Belongings: Received with pt  Chart: Received with pt  Medications: Meds received from old unit with pt  Code Status verified on armband: yes  2 RN Skin Assessment Completed By: MARIEL Ramirez   Med rec completed: no  Bed surface reassessed with algorithm and charted: yes  New bed surface ordered: no  Suction/Ambu bag/Flowmeter at bedside: yes    Report received from: 4A nurse   Pt status: Stable 4 L NC on tele. LOCx4

## 2023-05-03 NOTE — CONSULTS
"    Interventional Radiology  [facility] Consult Service Note  05/03/23   9:50 AM    Consult Requested: Thoracentesis    Recommendations/Plan:    Patient is on IR schedule 5/3/23 for a right thoracentesis.   Labs WNL for procedure.  Procedure and pre procedure orders have been entered. No NPO required.  Consent will be done prior to procedure.     Please contact the IR charge RN at [phone] for estimated time of procedure.     Case and imaging discussed with IR attending. Recommendations were reviewed with requesting team.    This is a 87 year old female with past medical history of aortic root repair, now with small to moderate right pleural effusion. Team requests right thoracentesis due to concerns of hypoxia. CAPS consult bypassed due to size of effusion.    Diagnostic labs to be entered by: Requesting team.    Pertinent Imaging Reviewed: Chest X-ray (5/2/23, 5/3/23), thoracic ultrasound (5/3/23)    Expected date of discharge:  TBD    Vitals:   /86   Pulse 77   Temp 97.7  F (36.5  C) (Axillary)   Resp 21   Ht 1.499 m (4' 11\")   Wt 70.2 kg (154 lb 12.8 oz)   LMP  (LMP Unknown)   SpO2 96%   BMI 31.27 kg/m      Pertinent Labs:   Lab Results   Component Value Date    WBC 10.4 05/03/2023    WBC 11.5 (H) 05/02/2023    WBC 10.2 05/01/2023    WBC 6.1 09/22/2020    WBC 4.8 03/03/2020    WBC 5.1 09/17/2019     Lab Results   Component Value Date    HGB 8.8 05/03/2023    HGB 9.3 05/02/2023    HGB 9.0 05/01/2023    HGB 10.7 09/22/2020    HGB 10.9 03/03/2020    HGB 10.7 09/17/2019     Lab Results   Component Value Date     05/03/2023     05/02/2023     05/01/2023     09/22/2020     03/03/2020     09/17/2019     Lab Results   Component Value Date    INR 1.34 (H) 05/01/2023    PTT 55 (H) 05/01/2023     Lab Results   Component Value Date    POTASSIUM 3.4 05/03/2023    POTASSIUM 4.1 04/25/2023    POTASSIUM 4.5 05/03/2021        COVID-19 Antibody Results, " Testing for Immunity         No data to display            COVID-19 PCR Results        4/6/2022    14:54 4/21/2023    11:56   COVID-19 PCR Results   SARS CoV2 PCR Negative   Negative         Christian Perez PA-C  Interventional Radiology  Pager: [phone]

## 2023-05-04 ENCOUNTER — APPOINTMENT (OUTPATIENT)
Dept: PHYSICAL THERAPY | Facility: CLINIC | Age: 88
DRG: 219 | End: 2023-05-04
Attending: SURGERY
Payer: COMMERCIAL

## 2023-05-04 LAB
ALBUMIN SERPL BCG-MCNC: 2.9 G/DL (ref 3.5–5.2)
ALP SERPL-CCNC: 44 U/L (ref 35–104)
ALT SERPL W P-5'-P-CCNC: 73 U/L (ref 10–35)
ANION GAP SERPL CALCULATED.3IONS-SCNC: 9 MMOL/L (ref 7–15)
ANION GAP SERPL CALCULATED.3IONS-SCNC: 9 MMOL/L (ref 7–15)
AST SERPL W P-5'-P-CCNC: 55 U/L (ref 10–35)
BILIRUB SERPL-MCNC: 0.4 MG/DL
BUN SERPL-MCNC: 26.1 MG/DL (ref 8–23)
BUN SERPL-MCNC: 26.1 MG/DL (ref 8–23)
CALCIUM SERPL-MCNC: 8.5 MG/DL (ref 8.8–10.2)
CALCIUM SERPL-MCNC: 8.6 MG/DL (ref 8.8–10.2)
CHLORIDE SERPL-SCNC: 96 MMOL/L (ref 98–107)
CHLORIDE SERPL-SCNC: 98 MMOL/L (ref 98–107)
CREAT SERPL-MCNC: 0.64 MG/DL (ref 0.51–0.95)
CREAT SERPL-MCNC: 0.65 MG/DL (ref 0.51–0.95)
DEPRECATED HCO3 PLAS-SCNC: 33 MMOL/L (ref 22–29)
DEPRECATED HCO3 PLAS-SCNC: 33 MMOL/L (ref 22–29)
ERYTHROCYTE [DISTWIDTH] IN BLOOD BY AUTOMATED COUNT: 16 % (ref 10–15)
GFR SERPL CREATININE-BSD FRML MDRD: 86 ML/MIN/1.73M2
GFR SERPL CREATININE-BSD FRML MDRD: 86 ML/MIN/1.73M2
GLUCOSE BLDC GLUCOMTR-MCNC: 114 MG/DL (ref 70–99)
GLUCOSE BLDC GLUCOMTR-MCNC: 114 MG/DL (ref 70–99)
GLUCOSE BLDC GLUCOMTR-MCNC: 116 MG/DL (ref 70–99)
GLUCOSE BLDC GLUCOMTR-MCNC: 128 MG/DL (ref 70–99)
GLUCOSE BLDC GLUCOMTR-MCNC: 135 MG/DL (ref 70–99)
GLUCOSE BLDC GLUCOMTR-MCNC: 141 MG/DL (ref 70–99)
GLUCOSE SERPL-MCNC: 129 MG/DL (ref 70–99)
GLUCOSE SERPL-MCNC: 166 MG/DL (ref 70–99)
HCT VFR BLD AUTO: 28.9 % (ref 35–47)
HGB BLD-MCNC: 8.8 G/DL (ref 11.7–15.7)
MAGNESIUM SERPL-MCNC: 2.1 MG/DL (ref 1.7–2.3)
MCH RBC QN AUTO: 29.4 PG (ref 26.5–33)
MCHC RBC AUTO-ENTMCNC: 30.4 G/DL (ref 31.5–36.5)
MCV RBC AUTO: 97 FL (ref 78–100)
PHOSPHATE SERPL-MCNC: 2.9 MG/DL (ref 2.5–4.5)
PLATELET # BLD AUTO: 206 10E3/UL (ref 150–450)
POTASSIUM SERPL-SCNC: 3.6 MMOL/L (ref 3.4–5.3)
POTASSIUM SERPL-SCNC: 3.8 MMOL/L (ref 3.4–5.3)
PROT SERPL-MCNC: 5.4 G/DL (ref 6.4–8.3)
RBC # BLD AUTO: 2.99 10E6/UL (ref 3.8–5.2)
SODIUM SERPL-SCNC: 138 MMOL/L (ref 136–145)
SODIUM SERPL-SCNC: 140 MMOL/L (ref 136–145)
WBC # BLD AUTO: 9.8 10E3/UL (ref 4–11)

## 2023-05-04 PROCEDURE — 999N000157 HC STATISTIC RCP TIME EA 10 MIN

## 2023-05-04 PROCEDURE — 250N000009 HC RX 250: Performed by: NURSE PRACTITIONER

## 2023-05-04 PROCEDURE — 250N000013 HC RX MED GY IP 250 OP 250 PS 637: Performed by: STUDENT IN AN ORGANIZED HEALTH CARE EDUCATION/TRAINING PROGRAM

## 2023-05-04 PROCEDURE — 250N000009 HC RX 250: Performed by: SURGERY

## 2023-05-04 PROCEDURE — 250N000013 HC RX MED GY IP 250 OP 250 PS 637: Performed by: SURGERY

## 2023-05-04 PROCEDURE — 85027 COMPLETE CBC AUTOMATED: CPT | Performed by: STUDENT IN AN ORGANIZED HEALTH CARE EDUCATION/TRAINING PROGRAM

## 2023-05-04 PROCEDURE — 36415 COLL VENOUS BLD VENIPUNCTURE: CPT | Performed by: STUDENT IN AN ORGANIZED HEALTH CARE EDUCATION/TRAINING PROGRAM

## 2023-05-04 PROCEDURE — 250N000013 HC RX MED GY IP 250 OP 250 PS 637: Performed by: PHYSICIAN ASSISTANT

## 2023-05-04 PROCEDURE — 94640 AIRWAY INHALATION TREATMENT: CPT | Mod: 76

## 2023-05-04 PROCEDURE — 36415 COLL VENOUS BLD VENIPUNCTURE: CPT | Performed by: PHYSICIAN ASSISTANT

## 2023-05-04 PROCEDURE — 83735 ASSAY OF MAGNESIUM: CPT | Performed by: STUDENT IN AN ORGANIZED HEALTH CARE EDUCATION/TRAINING PROGRAM

## 2023-05-04 PROCEDURE — 250N000011 HC RX IP 250 OP 636: Performed by: PHYSICIAN ASSISTANT

## 2023-05-04 PROCEDURE — 250N000011 HC RX IP 250 OP 636: Performed by: NURSE PRACTITIONER

## 2023-05-04 PROCEDURE — 80053 COMPREHEN METABOLIC PANEL: CPT | Performed by: STUDENT IN AN ORGANIZED HEALTH CARE EDUCATION/TRAINING PROGRAM

## 2023-05-04 PROCEDURE — 97530 THERAPEUTIC ACTIVITIES: CPT | Mod: GP | Performed by: PHYSICAL THERAPIST

## 2023-05-04 PROCEDURE — 84100 ASSAY OF PHOSPHORUS: CPT | Performed by: STUDENT IN AN ORGANIZED HEALTH CARE EDUCATION/TRAINING PROGRAM

## 2023-05-04 PROCEDURE — 97110 THERAPEUTIC EXERCISES: CPT | Mod: GP | Performed by: PHYSICAL THERAPIST

## 2023-05-04 PROCEDURE — 120N000003 HC R&B IMCU UMMC

## 2023-05-04 PROCEDURE — 94640 AIRWAY INHALATION TREATMENT: CPT

## 2023-05-04 RX ORDER — AMOXICILLIN 250 MG
1 CAPSULE ORAL
Status: DISCONTINUED | OUTPATIENT
Start: 2023-05-04 | End: 2023-05-15 | Stop reason: HOSPADM

## 2023-05-04 RX ORDER — BUMETANIDE 0.25 MG/ML
2 INJECTION INTRAMUSCULAR; INTRAVENOUS 2 TIMES DAILY
Status: DISCONTINUED | OUTPATIENT
Start: 2023-05-04 | End: 2023-05-07

## 2023-05-04 RX ORDER — POLYETHYLENE GLYCOL 3350 17 G/17G
17 POWDER, FOR SOLUTION ORAL DAILY PRN
Status: DISCONTINUED | OUTPATIENT
Start: 2023-05-04 | End: 2023-05-15 | Stop reason: HOSPADM

## 2023-05-04 RX ORDER — POTASSIUM CHLORIDE 750 MG/1
20 TABLET, EXTENDED RELEASE ORAL ONCE
Status: COMPLETED | OUTPATIENT
Start: 2023-05-04 | End: 2023-05-04

## 2023-05-04 RX ADMIN — ACETYLCYSTEINE 2 ML: 200 INHALANT RESPIRATORY (INHALATION) at 10:10

## 2023-05-04 RX ADMIN — ASPIRIN 81 MG CHEWABLE TABLET 81 MG: 81 TABLET CHEWABLE at 08:23

## 2023-05-04 RX ADMIN — HEPARIN SODIUM 5000 UNITS: 5000 INJECTION, SOLUTION INTRAVENOUS; SUBCUTANEOUS at 15:21

## 2023-05-04 RX ADMIN — ACETAMINOPHEN 650 MG: 325 TABLET ORAL at 08:25

## 2023-05-04 RX ADMIN — METOPROLOL TARTRATE 50 MG: 50 TABLET, FILM COATED ORAL at 20:14

## 2023-05-04 RX ADMIN — SIMVASTATIN 20 MG: 20 TABLET, FILM COATED ORAL at 22:54

## 2023-05-04 RX ADMIN — DORZOLAMIDE HYDROCHLORIDE AND TIMOLOL MALEATE 1 DROP: 22.3; 6.8 SOLUTION/ DROPS OPHTHALMIC at 08:25

## 2023-05-04 RX ADMIN — POLYETHYLENE GLYCOL 3350 17 G: 17 POWDER, FOR SOLUTION ORAL at 08:23

## 2023-05-04 RX ADMIN — SENNOSIDES AND DOCUSATE SODIUM 1 TABLET: 50; 8.6 TABLET ORAL at 08:23

## 2023-05-04 RX ADMIN — LATANOPROST 1 DROP: 50 SOLUTION OPHTHALMIC at 22:54

## 2023-05-04 RX ADMIN — HEPARIN SODIUM 5000 UNITS: 5000 INJECTION, SOLUTION INTRAVENOUS; SUBCUTANEOUS at 06:37

## 2023-05-04 RX ADMIN — IPRATROPIUM BROMIDE AND ALBUTEROL SULFATE 3 ML: .5; 3 SOLUTION RESPIRATORY (INHALATION) at 18:05

## 2023-05-04 RX ADMIN — BUMETANIDE 2 MG: 0.25 INJECTION INTRAMUSCULAR; INTRAVENOUS at 10:01

## 2023-05-04 RX ADMIN — BUMETANIDE 2 MG: 0.25 INJECTION INTRAMUSCULAR; INTRAVENOUS at 15:21

## 2023-05-04 RX ADMIN — PANTOPRAZOLE SODIUM 40 MG: 40 TABLET, DELAYED RELEASE ORAL at 08:23

## 2023-05-04 RX ADMIN — DORZOLAMIDE HYDROCHLORIDE AND TIMOLOL MALEATE 1 DROP: 22.3; 6.8 SOLUTION/ DROPS OPHTHALMIC at 20:32

## 2023-05-04 RX ADMIN — IPRATROPIUM BROMIDE AND ALBUTEROL SULFATE 3 ML: .5; 3 SOLUTION RESPIRATORY (INHALATION) at 10:10

## 2023-05-04 RX ADMIN — POTASSIUM CHLORIDE 20 MEQ: 750 TABLET, EXTENDED RELEASE ORAL at 08:23

## 2023-05-04 RX ADMIN — METOPROLOL TARTRATE 50 MG: 50 TABLET, FILM COATED ORAL at 08:23

## 2023-05-04 RX ADMIN — HEPARIN SODIUM 5000 UNITS: 5000 INJECTION, SOLUTION INTRAVENOUS; SUBCUTANEOUS at 22:54

## 2023-05-04 RX ADMIN — ACETYLCYSTEINE 2 ML: 200 INHALANT RESPIRATORY (INHALATION) at 18:05

## 2023-05-04 ASSESSMENT — ACTIVITIES OF DAILY LIVING (ADL)
ADLS_ACUITY_SCORE: 31
ADLS_ACUITY_SCORE: 31
ADLS_ACUITY_SCORE: 35
ADLS_ACUITY_SCORE: 31
ADLS_ACUITY_SCORE: 35
ADLS_ACUITY_SCORE: 31

## 2023-05-04 NOTE — PLAN OF CARE
Neuro: A&Ox4. Forgetful but easily reoriented.  Cardiac: a-fib. SBP upper 90s. SBP goal < 130. HR 80s and 90s  Respiratory: Sating mid 90s on 4 L NC.  GI/: PVR at 0500 615. Straight cathed for 700 mLs. Purewick. BM X1.  Diet/appetite: Tolerating regular diet. Poor appetite.  Activity:  Lift. Repo q2h.  Pain: At acceptable level on current regimen.   Skin: Rash near adarsh area. Mepi on sacrum.  LDA's: R PICC double lumen - SL, R + L PIV - SL.    Critical lab: Heparin Xa: >1, notified MD.    Plan: Titrate O2. Continue with POC. Notify primary team with changes.

## 2023-05-04 NOTE — PROGRESS NOTES
Care Management Follow Up    Length of Stay (days): 9    Expected Discharge Date: 05/07/2023     Concerns to be Addressed: all concerns addressed in this encounter     Patient plan of care discussed at interdisciplinary rounds: Yes    Anticipated Discharge Disposition: transitional care      Anticipated Discharge Services: None  Anticipated Discharge DME: Linda Valenzuela Shower Chair    Patient/family educated on Medicare website which has current facility and service quality ratings: yes   Education Provided on the Discharge Plan:    Patient/Family in Agreement with the Plan:      Referrals Placed by CM/SW:      Pending Referrals 5/4:    [facility]  [address] [place] [address]  PH: [phone]    [facility]  [address] [place] [address]  PH: [phone]    [facility]  [address] [place] [address]  PH: [phone]    [facility]  [address] [place] [address]  PH: [phone]    [facility]  [address] [place] [address]  PH: [phone]    Private pay costs discussed: Not applicable    Additional Information:    SW was provided with initial TCU options. Referrals have been made. SW will continue to follow for discharge planning and TCU placement.     WILLARD Sigala, LGSW   6B    Phone: [phone]  Pager: [phone]

## 2023-05-04 NOTE — PLAN OF CARE
Goal Outcome Evaluation:    Problem: Plan of Care - These are the overarching goals to be used throughout the patient stay.    Goal: Absence of Hospital-Acquired Illness or Injury  Description: Removed patient's traylor today and placed a purewick catheter.  Intervention: Prevent Skin Injury  Recent Flowsheet Documentation  Taken 5/4/2023 1630 by Sheeba Johnson RN  Body Position:   turned   legs elevated  Taken 5/4/2023 1100 by Sheeba Johnson RN  Body Position:   turned   legs elevated  Taken 5/4/2023 0734 by Sheeba Johnson RN  Body Position:   turned   legs elevated   Neuro: A&Ox4. Forgetful but easily reoriented.  Cardiac: a-fib. SBP goal < 130.   Respiratory: Sating above 90% on 4 L NC.  GI/: Purewick in place. Adequate urine output. BM x5 d/t bowel meds this morning. Please hold next bowel meds.   Diet/appetite: Tolerating regular diet. Poor appetite.  Activity: q2h turns. Patient was up in chair for about an hour this morning. Mattress changed to pulsate.   Pain: Tylenol given x1 for sternal/incisional pain.   Skin: Rash in adarsh area. Red- barrier cream applied. Mepi in place.   LDA's: R DL midline. SL. R and L PIV- SL.   Plan:Continue with POC. Notify primary team with changes.

## 2023-05-04 NOTE — PROGRESS NOTES
CLINICAL NUTRITION SERVICES - ASSESSMENT NOTE     Nutrition Prescription     RECOMMENDATIONS FOR MDs/PROVIDERS TO ORDER:    Consider modifying Miralax/Senna to PRN only, per pt report of frequent loose stools. Currently scheduled to receive Miralax daily and Senna BID.       Monitor ability for pt to consume at least 800 Kcals and 40 gm protein PO (65% est needs) vs need to consider supplemental nutrition support if aligns with GOC/POC    Malnutrition Status:    Severe malnutrition in the context of acute illness     Recommendations already ordered by Registered Dietitian (RD):    Initiate calorie counts 5/5-5/7    Initiate room service with assistance. Encourage at least 1 high protein food per meal tray and encourage pt to consume high protein foods first.    Continue Magic Cup order BID; send variety of flavors    Add Ensure Plus once daily at 10 AM    Future/Additional Recommendations:  Monitor nutrition-related findings and follow pt per protocol     REASON FOR ASSESSMENT  Cydney Christiansen is a/an 87 year old female assessed by the dietitian for LOS  Note: RD consult received 5/1, but order was acknowledged by a non-RD clinician while pt in ICU which prevented RD from seeing consult.     CLINICAL HISTORY  Hx of HTN, HLD, bilateral glaucoma, scoliosis, compression fx of thoracic vertebrae, osteoporosis, invasive ductal carcinoma of L breast s/p lumpectomy (2015), skin cancer, bladder cancer, severe aortic stenosis with bicuspid aortic valve, and aortic aneurysm, admitted to [facility] for ascending aortic arch repair with tissue graft and AVR. Pt transferred from ICU to [facility] 5/3 afternoon.     NUTRITION HISTORY  Pt was vague about diet hx PTA. Reports UBW ~140 lbs and stable PTA. Reports appetite currently very low. Pt relates diarrhea is impacting her appetite.     CURRENT NUTRITION ORDERS  Diet: Regular  Supplements: Magic Cup, BID between meals (ordered by MD 5/2)   Intake/Tolerance: 25% intake most often  documented to I/O, since admission. Ordering 1-3 meals per day via room service (varies). Frequently ordering broth/soup. Otherwise ONS being sent with meal trays.     Per pt interview: Only eating bites of meal trays. Is taking the Magic Cup supplement currently ordered by MD, though doesn't like it very much. Was very willing to discuss intake, supplement/snack options, and meal time recommendations with writer today. Will modify supplement order per pt preference. Begin room service with assistance.     GI    1-6 unmeasured BMs daily per I/O. Is having daily BMs and reports incontinence.    LABS:    Reviewed.   Electrolytes  Potassium (mmol/L)   Date Value   05/04/2023 3.8   05/03/2023 3.6   05/03/2023 3.4   04/25/2023 4.1   01/12/2023 4.1   10/17/2022 3.9   03/28/2022 4.7   05/03/2021 4.5   09/22/2020 4.1   07/15/2020 4.6     Potassium POCT (mmol/L)   Date Value   04/25/2023 3.8   04/25/2023 3.8   04/25/2023 3.5     Phosphorus (mg/dL)   Date Value   05/04/2023 2.9   05/03/2023 3.2   05/02/2023 2.8   05/01/2023 2.6   04/30/2023 3.1    Blood Glucose  Glucose (mg/dL)   Date Value   05/04/2023 129 (H)   01/12/2023 95   10/17/2022 104 (H)   03/28/2022 99   05/03/2021 101 (H)   09/22/2020 99   07/15/2020 98   07/15/2019 91   07/01/2019 100 (H)     GLUCOSE BY METER POCT (mg/dL)   Date Value   05/04/2023 114 (H)   05/04/2023 116 (H)   05/04/2023 114 (H)   05/03/2023 151 (H)   05/03/2023 172 (H)     Hemoglobin A1C (%)   Date Value   04/13/2023 6.0 (H)    Inflammatory Markers  WBC (10e9/L)   Date Value   09/22/2020 6.1   03/03/2020 4.8   09/17/2019 5.1     WBC Count (10e3/uL)   Date Value   05/04/2023 9.8   05/03/2023 10.4   05/02/2023 11.5 (H)     Albumin (g/dL)   Date Value   05/04/2023 2.9 (L)   05/03/2023 2.9 (L)   05/02/2023 3.2 (L)   05/03/2021 3.8   09/22/2020 3.4   09/17/2019 3.4      Magnesium (mg/dL)   Date Value   05/04/2023 2.1   05/03/2023 1.9   05/02/2023 2.2     Sodium (mmol/L)   Date Value   05/04/2023 140  "  05/03/2023 139   05/02/2023 140   05/03/2021 137   09/22/2020 139   07/15/2020 138    Renal  Urea Nitrogen (mg/dL)   Date Value   05/04/2023 26.1 (H)   05/03/2023 29.2 (H)   05/02/2023 33.3 (H)   01/12/2023 13   10/17/2022 18   03/28/2022 15   05/03/2021 16   09/22/2020 19   07/15/2020 13     Creatinine (mg/dL)   Date Value   05/04/2023 0.65   05/03/2023 0.55   05/02/2023 0.57   05/03/2021 0.80   09/22/2020 0.70   07/15/2020 0.64     Additional  Triglycerides (mg/dL)   Date Value   08/03/2022 86   07/22/2021 95   07/15/2020 57   07/01/2019 73   06/28/2018 67     Ketones Urine (mg/dL)   Date Value   04/13/2023 Negative   05/06/2021 Negative        MEDICATIONS    Bumex, BID     Medium sliding scale insulin TID before meals and at HS     Miralax once daily, Senna BID (both given today, continue to monitor BM frequency/need)    SKIN    Bedside RN documentation - Rash near adarsh area. Mepi on sacrum.    WOCN not following pt      ANTHROPOMETRICS  Height: 149.9 cm (4' 11\")  Admit wt 62.2 kg (137 lbs) 4/25/23  Most Recent Weight: 69.3 kg (152 lb 12.5 oz)  IBW: 44.3 kg  140%IBW - Per admit wt  BMI: Overweight BMI 25-29.9    Weight History:   - Weight gain since admit, likely 2/2 fluid status  - PTA wt trends ~64-65 kg range  Wt Readings from Last 15 Encounters:   05/04/23 69.3 kg (152 lb 12.5 oz)   04/13/23 63.7 kg (140 lb 8 oz)   03/13/23 64.4 kg (141 lb 14.4 oz)   03/01/23 64.4 kg (142 lb)   02/27/23 64.6 kg (142 lb 6.4 oz)   02/17/23 62.4 kg (137 lb 8 oz)   02/02/23 63.7 kg (140 lb 6.4 oz)   01/12/23 65 kg (143 lb 6.4 oz)   12/29/22 65.8 kg (145 lb)   12/15/22 67.3 kg (148 lb 6.4 oz)   12/08/22 65.3 kg (144 lb)   11/10/22 65.3 kg (144 lb)   11/07/22 65.5 kg (144 lb 6.4 oz)   11/01/22 65.3 kg (144 lb)   10/24/22 65.6 kg (144 lb 9.6 oz)       Dosing Weight: 49 kg [adjusted per admit wt 62.2 kg and IBW 44.3 kg]     ASSESSED NUTRITION NEEDS  Estimated Energy Needs: 3546-9671 kcals/day (25 - 30 kcals/kg)  Justification: " Maintenance  Estimated Protein Needs: 60-70 grams protein/day (~1.2 - 1.5 grams of pro/kg)  Justification: Lean body mass preservation and increased needs post-op   Estimated Fluid Needs: (1 mL/kcal)   Justification: Maintenance, or per provider pending fluid status     PHYSICAL FINDINGS  See malnutrition section below.    MALNUTRITION  % Intake: </= 50% for >/= 5 days (severe)  % Weight Loss: Unable to assess d/t pt fluid status confounding   Subcutaneous Fat Loss: Upper arm:  moderate   Muscle Loss: Thoracic region (clavicle, acromium bone, deltoid, trapezius, pectoral):  Mild  Fluid Accumulation/Edema: Does not meet criteria (Trace)  Malnutrition Diagnosis: Severe malnutrition in the context of acute illness     NUTRITION DIAGNOSIS  Inadequate oral intake related to decreased appetite, diarrhea as evidenced by pt self-report, intake of 25% meal trays since admission, oral nutrition supplement and RS assist to optimize intake.       INTERVENTIONS  Implementation  Nutrition education for recommended modifications   Collaboration with other nutrition professionals - RS assist initiation   Medical food supplement therapy - Continue/increase    Goals  Patient to consume % of nutritionally adequate meal trays TID, or the equivalent with supplements/snacks.     Monitoring/Evaluation  Progress toward goals will be monitored and evaluated per protocol.  Ruslan Vázquez RDN, LD, CNSC  6B work-room RD phone: *[phone]   6B/Obs RD pager: [phone]    Weekend/Holiday RD pager [phone]

## 2023-05-05 ENCOUNTER — APPOINTMENT (OUTPATIENT)
Dept: SPEECH THERAPY | Facility: CLINIC | Age: 88
DRG: 219 | End: 2023-05-05
Attending: SURGERY
Payer: COMMERCIAL

## 2023-05-05 ENCOUNTER — APPOINTMENT (OUTPATIENT)
Dept: GENERAL RADIOLOGY | Facility: CLINIC | Age: 88
DRG: 219 | End: 2023-05-05
Attending: SURGERY
Payer: COMMERCIAL

## 2023-05-05 ENCOUNTER — APPOINTMENT (OUTPATIENT)
Dept: PHYSICAL THERAPY | Facility: CLINIC | Age: 88
DRG: 219 | End: 2023-05-05
Attending: SURGERY
Payer: COMMERCIAL

## 2023-05-05 LAB
ANION GAP SERPL CALCULATED.3IONS-SCNC: 11 MMOL/L (ref 7–15)
ANION GAP SERPL CALCULATED.3IONS-SCNC: 8 MMOL/L (ref 7–15)
BUN SERPL-MCNC: 24.5 MG/DL (ref 8–23)
BUN SERPL-MCNC: 24.8 MG/DL (ref 8–23)
CALCIUM SERPL-MCNC: 8.6 MG/DL (ref 8.8–10.2)
CALCIUM SERPL-MCNC: 8.7 MG/DL (ref 8.8–10.2)
CHLORIDE SERPL-SCNC: 96 MMOL/L (ref 98–107)
CHLORIDE SERPL-SCNC: 97 MMOL/L (ref 98–107)
CREAT SERPL-MCNC: 0.66 MG/DL (ref 0.51–0.95)
CREAT SERPL-MCNC: 0.7 MG/DL (ref 0.51–0.95)
DEPRECATED HCO3 PLAS-SCNC: 31 MMOL/L (ref 22–29)
DEPRECATED HCO3 PLAS-SCNC: 34 MMOL/L (ref 22–29)
ERYTHROCYTE [DISTWIDTH] IN BLOOD BY AUTOMATED COUNT: 15.9 % (ref 10–15)
GFR SERPL CREATININE-BSD FRML MDRD: 84 ML/MIN/1.73M2
GFR SERPL CREATININE-BSD FRML MDRD: 85 ML/MIN/1.73M2
GLUCOSE BLDC GLUCOMTR-MCNC: 106 MG/DL (ref 70–99)
GLUCOSE BLDC GLUCOMTR-MCNC: 111 MG/DL (ref 70–99)
GLUCOSE BLDC GLUCOMTR-MCNC: 127 MG/DL (ref 70–99)
GLUCOSE BLDC GLUCOMTR-MCNC: 141 MG/DL (ref 70–99)
GLUCOSE BLDC GLUCOMTR-MCNC: 148 MG/DL (ref 70–99)
GLUCOSE BLDC GLUCOMTR-MCNC: 169 MG/DL (ref 70–99)
GLUCOSE SERPL-MCNC: 131 MG/DL (ref 70–99)
GLUCOSE SERPL-MCNC: 176 MG/DL (ref 70–99)
HCT VFR BLD AUTO: 29.4 % (ref 35–47)
HGB BLD-MCNC: 8.9 G/DL (ref 11.7–15.7)
MCH RBC QN AUTO: 29.9 PG (ref 26.5–33)
MCHC RBC AUTO-ENTMCNC: 30.3 G/DL (ref 31.5–36.5)
MCV RBC AUTO: 99 FL (ref 78–100)
PLATELET # BLD AUTO: 216 10E3/UL (ref 150–450)
POTASSIUM SERPL-SCNC: 3.6 MMOL/L (ref 3.4–5.3)
POTASSIUM SERPL-SCNC: 3.9 MMOL/L (ref 3.4–5.3)
RBC # BLD AUTO: 2.98 10E6/UL (ref 3.8–5.2)
SODIUM SERPL-SCNC: 138 MMOL/L (ref 136–145)
SODIUM SERPL-SCNC: 139 MMOL/L (ref 136–145)
WBC # BLD AUTO: 10.1 10E3/UL (ref 4–11)

## 2023-05-05 PROCEDURE — 93010 ELECTROCARDIOGRAM REPORT: CPT | Performed by: INTERNAL MEDICINE

## 2023-05-05 PROCEDURE — 97116 GAIT TRAINING THERAPY: CPT | Mod: GP | Performed by: STUDENT IN AN ORGANIZED HEALTH CARE EDUCATION/TRAINING PROGRAM

## 2023-05-05 PROCEDURE — 71045 X-RAY EXAM CHEST 1 VIEW: CPT | Mod: 26 | Performed by: RADIOLOGY

## 2023-05-05 PROCEDURE — 93005 ELECTROCARDIOGRAM TRACING: CPT

## 2023-05-05 PROCEDURE — 120N000003 HC R&B IMCU UMMC

## 2023-05-05 PROCEDURE — 250N000013 HC RX MED GY IP 250 OP 250 PS 637: Performed by: PHYSICIAN ASSISTANT

## 2023-05-05 PROCEDURE — 92526 ORAL FUNCTION THERAPY: CPT | Mod: GN

## 2023-05-05 PROCEDURE — 250N000009 HC RX 250: Performed by: NURSE PRACTITIONER

## 2023-05-05 PROCEDURE — 999N000157 HC STATISTIC RCP TIME EA 10 MIN

## 2023-05-05 PROCEDURE — 250N000009 HC RX 250: Performed by: SURGERY

## 2023-05-05 PROCEDURE — 36415 COLL VENOUS BLD VENIPUNCTURE: CPT | Performed by: STUDENT IN AN ORGANIZED HEALTH CARE EDUCATION/TRAINING PROGRAM

## 2023-05-05 PROCEDURE — 71045 X-RAY EXAM CHEST 1 VIEW: CPT

## 2023-05-05 PROCEDURE — 250N000013 HC RX MED GY IP 250 OP 250 PS 637: Performed by: STUDENT IN AN ORGANIZED HEALTH CARE EDUCATION/TRAINING PROGRAM

## 2023-05-05 PROCEDURE — 250N000011 HC RX IP 250 OP 636: Performed by: NURSE PRACTITIONER

## 2023-05-05 PROCEDURE — 36415 COLL VENOUS BLD VENIPUNCTURE: CPT | Performed by: PHYSICIAN ASSISTANT

## 2023-05-05 PROCEDURE — 250N000013 HC RX MED GY IP 250 OP 250 PS 637: Performed by: SURGERY

## 2023-05-05 PROCEDURE — 250N000009 HC RX 250: Performed by: PHYSICIAN ASSISTANT

## 2023-05-05 PROCEDURE — 80048 BASIC METABOLIC PNL TOTAL CA: CPT | Performed by: PHYSICIAN ASSISTANT

## 2023-05-05 PROCEDURE — 94640 AIRWAY INHALATION TREATMENT: CPT

## 2023-05-05 PROCEDURE — 97530 THERAPEUTIC ACTIVITIES: CPT | Mod: GP | Performed by: STUDENT IN AN ORGANIZED HEALTH CARE EDUCATION/TRAINING PROGRAM

## 2023-05-05 PROCEDURE — 85014 HEMATOCRIT: CPT | Performed by: STUDENT IN AN ORGANIZED HEALTH CARE EDUCATION/TRAINING PROGRAM

## 2023-05-05 PROCEDURE — 250N000011 HC RX IP 250 OP 636: Performed by: PHYSICIAN ASSISTANT

## 2023-05-05 RX ORDER — LANOLIN ALCOHOL/MO/W.PET/CERES
3 CREAM (GRAM) TOPICAL
Status: DISCONTINUED | OUTPATIENT
Start: 2023-05-05 | End: 2023-05-14

## 2023-05-05 RX ORDER — HYDROXYZINE HYDROCHLORIDE 25 MG/1
25 TABLET, FILM COATED ORAL EVERY 6 HOURS PRN
Status: DISCONTINUED | OUTPATIENT
Start: 2023-05-05 | End: 2023-05-15 | Stop reason: HOSPADM

## 2023-05-05 RX ORDER — LEVALBUTEROL INHALATION SOLUTION 0.63 MG/3ML
0.63 SOLUTION RESPIRATORY (INHALATION)
Status: DISCONTINUED | OUTPATIENT
Start: 2023-05-05 | End: 2023-05-15 | Stop reason: HOSPADM

## 2023-05-05 RX ORDER — HYDROXYZINE HYDROCHLORIDE 25 MG/1
50 TABLET, FILM COATED ORAL EVERY 6 HOURS PRN
Status: DISCONTINUED | OUTPATIENT
Start: 2023-05-05 | End: 2023-05-15 | Stop reason: HOSPADM

## 2023-05-05 RX ORDER — POTASSIUM CHLORIDE 750 MG/1
20 TABLET, EXTENDED RELEASE ORAL ONCE
Status: COMPLETED | OUTPATIENT
Start: 2023-05-05 | End: 2023-05-05

## 2023-05-05 RX ADMIN — HEPARIN SODIUM 5000 UNITS: 5000 INJECTION, SOLUTION INTRAVENOUS; SUBCUTANEOUS at 06:29

## 2023-05-05 RX ADMIN — DORZOLAMIDE HYDROCHLORIDE AND TIMOLOL MALEATE 1 DROP: 22.3; 6.8 SOLUTION/ DROPS OPHTHALMIC at 08:33

## 2023-05-05 RX ADMIN — LEVALBUTEROL HYDROCHLORIDE 0.63 MG: 0.63 SOLUTION RESPIRATORY (INHALATION) at 21:41

## 2023-05-05 RX ADMIN — METOPROLOL TARTRATE 50 MG: 50 TABLET, FILM COATED ORAL at 21:28

## 2023-05-05 RX ADMIN — HEPARIN SODIUM 5000 UNITS: 5000 INJECTION, SOLUTION INTRAVENOUS; SUBCUTANEOUS at 21:29

## 2023-05-05 RX ADMIN — HYDROXYZINE HYDROCHLORIDE 25 MG: 25 TABLET, FILM COATED ORAL at 21:29

## 2023-05-05 RX ADMIN — BUMETANIDE 2 MG: 0.25 INJECTION INTRAMUSCULAR; INTRAVENOUS at 08:33

## 2023-05-05 RX ADMIN — LATANOPROST 1 DROP: 50 SOLUTION OPHTHALMIC at 22:59

## 2023-05-05 RX ADMIN — PANTOPRAZOLE SODIUM 40 MG: 40 TABLET, DELAYED RELEASE ORAL at 06:30

## 2023-05-05 RX ADMIN — IPRATROPIUM BROMIDE AND ALBUTEROL SULFATE 3 ML: .5; 3 SOLUTION RESPIRATORY (INHALATION) at 06:34

## 2023-05-05 RX ADMIN — SIMVASTATIN 20 MG: 20 TABLET, FILM COATED ORAL at 21:28

## 2023-05-05 RX ADMIN — HEPARIN SODIUM 5000 UNITS: 5000 INJECTION, SOLUTION INTRAVENOUS; SUBCUTANEOUS at 15:05

## 2023-05-05 RX ADMIN — ASPIRIN 81 MG CHEWABLE TABLET 81 MG: 81 TABLET CHEWABLE at 08:33

## 2023-05-05 RX ADMIN — ACETYLCYSTEINE 2 ML: 200 INHALANT RESPIRATORY (INHALATION) at 06:34

## 2023-05-05 RX ADMIN — POTASSIUM CHLORIDE 20 MEQ: 750 TABLET, EXTENDED RELEASE ORAL at 06:29

## 2023-05-05 RX ADMIN — ACETAMINOPHEN 650 MG: 325 TABLET ORAL at 21:41

## 2023-05-05 RX ADMIN — METOPROLOL TARTRATE 50 MG: 50 TABLET, FILM COATED ORAL at 08:33

## 2023-05-05 RX ADMIN — ACETAMINOPHEN 650 MG: 325 TABLET ORAL at 08:33

## 2023-05-05 RX ADMIN — BUMETANIDE 2 MG: 0.25 INJECTION INTRAMUSCULAR; INTRAVENOUS at 17:05

## 2023-05-05 RX ADMIN — DORZOLAMIDE HYDROCHLORIDE AND TIMOLOL MALEATE 1 DROP: 22.3; 6.8 SOLUTION/ DROPS OPHTHALMIC at 21:29

## 2023-05-05 ASSESSMENT — ACTIVITIES OF DAILY LIVING (ADL)
ADLS_ACUITY_SCORE: 31
ADLS_ACUITY_SCORE: 35
ADLS_ACUITY_SCORE: 31

## 2023-05-05 NOTE — PROGRESS NOTES
Cardiovascular Surgery Progress Note  05/05/2023         Assessment and Plan:     Cydney Christiansen is an 87 year old female with a PMH of HTN, HLD, bilateral glaucoma, scoliosis, compression fracture of thoracic vertebrae, osteoporosis, history of bladder cancer, invasive ductal carcinoma of left breast s/p lumpectomy (2015), and multiple other skin cancer sites s/p removal, severe aortic stenosis with bicuspid aortic valve and an ascending aortic aneurysm. Patient is now s/p AVR (Aponte Resilia 21 mm valve) and ascending aorta repair on 4/27/23 with Dr. Solis.    Cardiovascular:   Hx of severe AORTIC STENOSIS 2/2 to bicuspid aortic valve  - s/p AVR 4/25  Ascending aortic aneurysm s/p graft repair 4/25  Hx HTN, HLD  Severe tricuspid insufficiency post op   Afib - rate controlled  HD stable, in rate controlled Afib. ICU discussed no anticoagulation with Dr. Solis and patient refused d/t bleeding risk. HR , SBP .   TTE 4/27 showed LV EF 45-50%; Mild right ventricular dilation with normal global right ventricular function. A bioprosthetic aortic valve with PV of 2.2m/s and MG of 12mmHg. Severe tricuspid insufficiency.   - plan to repeat TTE once closer to pre-op weight  - Goal MAP>65, SBP<140 per Dr Solis   - ASA 81 mg daily  - PTA simvastatin  - Metoprolol tartrate 50 mg BID  - Diuresis as below    CT/PW removed in the ICU    Pulmonary:  Acute hypoxic respiratory insufficiency  Hx asthma  Bilateral pleural effusions   - S/p right thoracentesis 5/3 with 600 ml removed  Extubated POD 1. Now saturating well on 1 lpm.   - Supplemental O2 PRN to keep sats > 92%. Wean off as tolerated.  - Pulm hygiene, IS, activity and deep breathing  - PTA albuterol PRN  - Mucomyst neb PRN  - Patient complaining of SOB 5/5 even though sating in high 90s. Will schedule BID nebs for the next day or two to help this feeling and with her hx of asthma.     Neurology / MSK:  Acute post-operative pain   Pain well controlled with  current regimen:  - Acetaminophen, PO oxycodone PRN  - delirium precautions  - trazodone at bedtime for sleep  - Melatonin 3mg q HS   - Added Atarax to help with anxiety     / Renal / Fluid / Electrolytes:  HELEN, resolved  BL creat ~ 0.6, most recent creatinine 0.66; adequate UOP.   FLUID STATUS: Pre-op weight 137 lbs, weight today 152; I/O past 24 hours: -2050 ml  - Diuresis: Continue Bumex 2mg IV BID 5/5. Need to continue aggressive diuresis as weight still up 15lbs from admission. Continues on oxygen via NC.   - Replete lytes per protocol  - Strict I/O, daily weights  - Avoid/limit nephrotoxins as able  - Patient needed to be straight cathed 4x 5/4-5/5, traylor order placed 5/5 afternoon.    GI / Nutrition:   Transaminitis, downtrending  Severe malnutrition in the context of acute illness  - Tolerating regular diet, however continues to have low oral intake  - discussed with patient and family if intake remains low, may need NG for TF admin  - Continue bowel regimen, last BM 5/5    Endocrine:  Stress induced hyperglycemia   Osteoporosis c/b compression fracture of spine  Hgb A1c 6.0  - Initially managed on insulin drip postop, transitioned to sliding scale; goal BG <180 for optimal healing  - PTA med alendronate    Infectious Disease:  Stress induced leukocytosis  WBC 10.1, remains afebrile, no signs or symptoms of infection  - Completed perioperative antibiotics  - Continue to monitor fever curve, CBC    Hematology:   Acute blood loss anemia and thrombocytopenia  Hgb 8.9; Plt 216, no signs or symptoms of active bleeding  - CBC daily    Anticoagulation:   - ASA only  - Discussion had with Dr. Solis regarding anticoagulation for AF stroke risk. Will not start anticoagulation at this time.     MSK/Skin:  Sternotomy  Surgical incision  - Sternal precautions  - Incisional cares per protocol    Prophylaxis:   - Stress ulcer prophylaxis: Pantoprazole 40 mg daily for 30 days  - DVT prophylaxis: Subcutaneous heparin,  SCD    Disposition:   - Transferred to  on 5/3  - Therapies recommending discharge to TCU. There is possibly an accepting facility now. Patient not medically ready as needs further IV aggressive diuresis. Still up 15lbs at this time and needing supplemental oxygen, but improving so will follow.     Discussed with Dr. Solis verbally    Kiana Alvarez PA-C  Cardiothoracic Surgery  Pager: [phone]        Interval History:     No acute events overnight. Tired this morning as she had already been up with therapy this morning. Slept ok overnight. Pain is controlled on current regimen. Tolerating diet but low intake so will continue calorie counts, +flatulence, +BM. Breathing stable on 1LPM this afternoon and need is decreasing. Has no complaints and encouraged to use IS. Working with therapies, walked in room to bathroom this morning. Continue to encourage patient up to chair for all meals today. Denies CP, SOB, N/V, dizziness, syncope, palpitations, fever, chills, myalgias, and sternal popping/clicking.          Physical Exam:     Gen: A&Ox4, NAD  Neuro: no focal deficits   CV: RRR, normal S1 S2, no murmurs, rubs or gallops  Pulm: CTA, no wheezing or rhonchi, normal breathing on 1 lpm  Abd: nondistended, normal BS, soft, nontender  Ext: moderate peripheral edema, 1+ pitting  Incision: clean, dry, intact, no erythema, sternum stable  Tubes/drain sites: dressing clean and dry         Data:    Imaging:  reviewed recent imaging, no acute concerns    Recent Results (from the past 24 hour(s))   XR Chest Port 1 View    Narrative    Portable chest    INDICATION: Postoperative monitoring for effusions    COMPARISON: 5/3/2023    FINDINGS: Cardiomegaly. Left costophrenic meniscus and right  costophrenic meniscus again noted. Loss of the hemidiaphragm discrete  shadows again noted. Median sternotomy again present. Hazy opacities  throughout the left greater than right midlungs also unchanged. Aortic  valve prosthesis.       Impression    IMPRESSION: Continued cardiomegaly, left larger than right pleural  effusions and left more than right pulmonary edema. Aortic valve  replacement.    ADONIS TROTTER MD         SYSTEM ID:  [id number]        Labs:  Recent Labs   Lab 05/05/23  1508 05/05/23  1136 05/05/23  0846 05/05/23  0449 05/04/23  2031 05/04/23  1619 05/04/23  0733 05/04/23  0546 05/03/23  0753 05/03/23  0705 05/01/23  0753 05/01/23  0729 04/30/23  1022 04/30/23  0546 04/29/23  0829 04/29/23  0536   WBC  --   --   --  10.1  --   --   --  9.8  --  10.4   < > 10.2  --  10.1  --  9.1   HGB  --   --   --  8.9*  --   --   --  8.8*  --  8.8*   < > 9.0*  --  8.7*  --  8.3*   MCV  --   --   --  99  --   --   --  97  --  94   < > 96  --  95  --  95   PLT  --   --   --  216  --   --   --  206  --  225   < > 217  --  199  --  156   INR  --   --   --   --   --   --   --   --   --   --   --  1.34*  --  1.39*  --  1.32*   NA  --   --   --  138  --  138  --  140  --  139   < > 137  --  139  --  138   POTASSIUM  --   --   --  3.6  --  3.6  --  3.8   < > 3.4   < > 4.1  --  4.2  --  4.0   CHLORIDE  --   --   --  96*  --  96*  --  98  --  98   < > 103  --  104  --  103   CO2  --   --   --  34*  --  33*  --  33*  --  31*   < > 22  --  24  --  24   BUN  --   --   --  24.8*  --  26.1*  --  26.1*  --  29.2*   < > 38.7*  --  38.8*  --  40.8*   CR  --   --   --  0.66  --  0.64  --  0.65  --  0.55   < > 0.54  --  0.63  --  0.80   ANIONGAP  --   --   --  8  --  9  --  9  --  10   < > 12  --  11  --  11   SHERYL  --   --   --  8.7*  --  8.6*  --  8.5*  --  8.6*   < > 8.4*  --  8.3*  --  8.7*   * 169* 106* 131*   < > 166*   < > 129*   < > 125*   < > 116*   < > 142*   < > 122*   ALBUMIN  --   --   --   --   --   --   --  2.9*  --  2.9*   < > 3.1*  --  3.1*  --  3.0*   PROTTOTAL  --   --   --   --   --   --   --  5.4*  --  5.4*   < > 5.6*  --  5.6*  --  5.3*   BILITOTAL  --   --   --   --   --   --   --  0.4  --  0.4   < > 0.4  --  0.5  --  0.3   ALKPHOS   --   --   --   --   --   --   --  44  --  44   < > 50  --  48  --  41   ALT  --   --   --   --   --   --   --  73*  --  92*   < > 141*  --  159*  --  91*   AST  --   --   --   --   --   --   --  55*  --  67*   < >  --   --  169*  --  98*    < > = values in this interval not displayed.      GLUCOSE:   Recent Labs   Lab 05/05/23  1508 05/05/23  1136 05/05/23  0846 05/05/23  0449 05/05/23  0259 05/04/23  2209   * 169* 106* 131* 127* 128*

## 2023-05-05 NOTE — PLAN OF CARE
"Neuro: A&Ox4. Forgetful but easily reoriented.  Cardiac: a-fib. SBP goal < 130.   Respiratory: Sating above 90% on 4 L NC.  GI/: Purewick in place. Inadequate output. Oliguric. Bladder scanned 1x, MD notified @ 0045 \"6B rm 34-2 L.F.: Bladder scan volume 550 mL. Pt has been straight cathed 3x in past 24 hours. Traylor vs straight cath? Purewick in place. Marsha #[id number]\". MD discussed consulting day shift for further assessment, straight cath 1x this shift. BM smear 2x this shift. Bowel meds held.   Diet/appetite: Tolerating regular diet. Pt ate ice cream at 0400. Poor appetite.  Activity: q2h turns.   Pain: Denies. Pt slightly restless overnight, antsy.   Skin: Rash in adarsh area. Red- barrier cream applied. Mepi in place.   LDA's: R midline & PICC in place SL. Caps changed. R and L PIV- SL.   Plan: Continue with POC. Notify primary team with changes.      Problem: Plan of Care - These are the overarching goals to be used throughout the patient stay.    Goal: Absence of Hospital-Acquired Illness or Injury  Description: Removed patient's traylor today and placed a purewick catheter.  Intervention: Identify and Manage Fall Risk  Recent Flowsheet Documentation  Taken 5/4/2023 2300 by Marsha Iniguez RN  Safety Promotion/Fall Prevention:   activity supervised   increase visualization of patient   lighting adjusted   room door open   room near nurse's station   room organization consistent   safety round/check completed   supervised activity  Taken 5/4/2023 2000 by Marsha Iniguez RN  Safety Promotion/Fall Prevention:   activity supervised   increase visualization of patient   lighting adjusted   room door open   room near nurse's station   room organization consistent   safety round/check completed   supervised activity  Intervention: Prevent and Manage VTE (Venous Thromboembolism) Risk  Recent Flowsheet Documentation  Taken 5/4/2023 2300 by Marsha Iniguez RN  VTE Prevention/Management: SCDs (sequential " compression devices) off  Taken 5/4/2023 2000 by Marsha Iniguez RN  VTE Prevention/Management: SCDs (sequential compression devices) off  Goal: Optimal Comfort and Wellbeing  Intervention: Provide Person-Centered Care  Recent Flowsheet Documentation  Taken 5/4/2023 2300 by Marsha Iniguez RN  Trust Relationship/Rapport:   care explained   choices provided   emotional support provided   empathic listening provided   questions answered   questions encouraged   reassurance provided   thoughts/feelings acknowledged  Taken 5/4/2023 2000 by Marsha Iniguez RN  Trust Relationship/Rapport:   care explained   choices provided   emotional support provided   empathic listening provided   questions answered   questions encouraged   reassurance provided   thoughts/feelings acknowledged     Problem: Risk for Delirium  Goal: Improved Behavioral Control  Intervention: Minimize Safety Risk  Recent Flowsheet Documentation  Taken 5/4/2023 2300 by Marsha Iniguez RN  Enhanced Safety Measures: room near unit station  Trust Relationship/Rapport:   care explained   choices provided   emotional support provided   empathic listening provided   questions answered   questions encouraged   reassurance provided   thoughts/feelings acknowledged  Taken 5/4/2023 2000 by Marsha Iniguez RN  Enhanced Safety Measures: room near unit station  Trust Relationship/Rapport:   care explained   choices provided   emotional support provided   empathic listening provided   questions answered   questions encouraged   reassurance provided   thoughts/feelings acknowledged     Problem: Cardiovascular Surgery  Goal: Anesthesia/Sedation Recovery  Intervention: Optimize Anesthesia Recovery  Recent Flowsheet Documentation  Taken 5/4/2023 2300 by Marsha Iniguez RN  Safety Promotion/Fall Prevention:   activity supervised   increase visualization of patient   lighting adjusted   room door open   room near nurse's station   room  organization consistent   safety round/check completed   supervised activity  Administration (IS): self-administered  Taken 5/4/2023 2000 by Marsha Iniguez RN  Safety Promotion/Fall Prevention:   activity supervised   increase visualization of patient   lighting adjusted   room door open   room near nurse's station   room organization consistent   safety round/check completed   supervised activity  Administration (IS): self-administered  Goal: Effective Oxygenation and Ventilation  Intervention: Promote Airway Secretion Clearance  Recent Flowsheet Documentation  Taken 5/4/2023 2300 by Marsha Iniguez, RN  Administration (IS): self-administered  Cough And Deep Breathing: done independently per patient  Taken 5/4/2023 2000 by Marsha Iniguez RN  Administration (IS): self-administered  Cough And Deep Breathing: done independently per patient

## 2023-05-05 NOTE — CARE PLAN
Speech Language Therapy Discharge Summary    Reason for therapy discharge:    All goals and outcomes met, no further needs identified.    Progress towards therapy goal(s). See goals on Care Plan in [facility] electronic health record for goal details.  Goals met    Therapy recommendation(s):    No further therapy is recommended. Recommend regular textures diet and thin liquids w/ close supervision. Meds OK as tolerated. Ensure pt is fully upright and alert, taking small sips/bites at a slow rate. Pt may benefit from volitional throat clear/coughing during meals to clear any residue.

## 2023-05-06 ENCOUNTER — APPOINTMENT (OUTPATIENT)
Dept: GENERAL RADIOLOGY | Facility: CLINIC | Age: 88
DRG: 219 | End: 2023-05-06
Attending: SURGERY
Payer: COMMERCIAL

## 2023-05-06 LAB
ALBUMIN UR-MCNC: NEGATIVE MG/DL
ANION GAP SERPL CALCULATED.3IONS-SCNC: 10 MMOL/L (ref 7–15)
ANION GAP SERPL CALCULATED.3IONS-SCNC: 9 MMOL/L (ref 7–15)
APPEARANCE UR: CLEAR
BILIRUB UR QL STRIP: NEGATIVE
BUN SERPL-MCNC: 22.6 MG/DL (ref 8–23)
BUN SERPL-MCNC: 23.1 MG/DL (ref 8–23)
CALCIUM SERPL-MCNC: 8.7 MG/DL (ref 8.8–10.2)
CALCIUM SERPL-MCNC: 8.8 MG/DL (ref 8.8–10.2)
CHLORIDE SERPL-SCNC: 96 MMOL/L (ref 98–107)
CHLORIDE SERPL-SCNC: 97 MMOL/L (ref 98–107)
COLOR UR AUTO: ABNORMAL
CREAT SERPL-MCNC: 0.68 MG/DL (ref 0.51–0.95)
CREAT SERPL-MCNC: 0.71 MG/DL (ref 0.51–0.95)
DEPRECATED HCO3 PLAS-SCNC: 34 MMOL/L (ref 22–29)
DEPRECATED HCO3 PLAS-SCNC: 34 MMOL/L (ref 22–29)
ERYTHROCYTE [DISTWIDTH] IN BLOOD BY AUTOMATED COUNT: 16 % (ref 10–15)
GFR SERPL CREATININE-BSD FRML MDRD: 83 ML/MIN/1.73M2
GFR SERPL CREATININE-BSD FRML MDRD: 85 ML/MIN/1.73M2
GLUCOSE BLDC GLUCOMTR-MCNC: 109 MG/DL (ref 70–99)
GLUCOSE BLDC GLUCOMTR-MCNC: 118 MG/DL (ref 70–99)
GLUCOSE BLDC GLUCOMTR-MCNC: 119 MG/DL (ref 70–99)
GLUCOSE SERPL-MCNC: 116 MG/DL (ref 70–99)
GLUCOSE SERPL-MCNC: 133 MG/DL (ref 70–99)
GLUCOSE UR STRIP-MCNC: NEGATIVE MG/DL
HCT VFR BLD AUTO: 28.3 % (ref 35–47)
HGB BLD-MCNC: 8.5 G/DL (ref 11.7–15.7)
HGB UR QL STRIP: NEGATIVE
KETONES UR STRIP-MCNC: NEGATIVE MG/DL
LEUKOCYTE ESTERASE UR QL STRIP: ABNORMAL
MAGNESIUM SERPL-MCNC: 1.8 MG/DL (ref 1.7–2.3)
MCH RBC QN AUTO: 29.3 PG (ref 26.5–33)
MCHC RBC AUTO-ENTMCNC: 30 G/DL (ref 31.5–36.5)
MCV RBC AUTO: 98 FL (ref 78–100)
MUCOUS THREADS #/AREA URNS LPF: PRESENT /LPF
NITRATE UR QL: NEGATIVE
PH UR STRIP: 6.5 [PH] (ref 5–7)
PLATELET # BLD AUTO: 204 10E3/UL (ref 150–450)
POTASSIUM SERPL-SCNC: 3.8 MMOL/L (ref 3.4–5.3)
POTASSIUM SERPL-SCNC: 4.1 MMOL/L (ref 3.4–5.3)
RBC # BLD AUTO: 2.9 10E6/UL (ref 3.8–5.2)
RBC URINE: 2 /HPF
SODIUM SERPL-SCNC: 139 MMOL/L (ref 136–145)
SODIUM SERPL-SCNC: 141 MMOL/L (ref 136–145)
SP GR UR STRIP: 1.01 (ref 1–1.03)
TRANSITIONAL EPI: <1 /HPF
UROBILINOGEN UR STRIP-MCNC: NORMAL MG/DL
WBC # BLD AUTO: 11.6 10E3/UL (ref 4–11)
WBC URINE: 7 /HPF

## 2023-05-06 PROCEDURE — 250N000011 HC RX IP 250 OP 636: Performed by: PHYSICIAN ASSISTANT

## 2023-05-06 PROCEDURE — 71045 X-RAY EXAM CHEST 1 VIEW: CPT

## 2023-05-06 PROCEDURE — 94640 AIRWAY INHALATION TREATMENT: CPT

## 2023-05-06 PROCEDURE — 250N000013 HC RX MED GY IP 250 OP 250 PS 637: Performed by: STUDENT IN AN ORGANIZED HEALTH CARE EDUCATION/TRAINING PROGRAM

## 2023-05-06 PROCEDURE — 85027 COMPLETE CBC AUTOMATED: CPT | Performed by: STUDENT IN AN ORGANIZED HEALTH CARE EDUCATION/TRAINING PROGRAM

## 2023-05-06 PROCEDURE — 36415 COLL VENOUS BLD VENIPUNCTURE: CPT | Performed by: PHYSICIAN ASSISTANT

## 2023-05-06 PROCEDURE — 250N000013 HC RX MED GY IP 250 OP 250 PS 637: Performed by: PHYSICIAN ASSISTANT

## 2023-05-06 PROCEDURE — 250N000009 HC RX 250: Performed by: PHYSICIAN ASSISTANT

## 2023-05-06 PROCEDURE — 250N000011 HC RX IP 250 OP 636: Performed by: NURSE PRACTITIONER

## 2023-05-06 PROCEDURE — 71045 X-RAY EXAM CHEST 1 VIEW: CPT | Mod: 26 | Performed by: RADIOLOGY

## 2023-05-06 PROCEDURE — 80048 BASIC METABOLIC PNL TOTAL CA: CPT | Performed by: PHYSICIAN ASSISTANT

## 2023-05-06 PROCEDURE — 120N000003 HC R&B IMCU UMMC

## 2023-05-06 PROCEDURE — 250N000013 HC RX MED GY IP 250 OP 250 PS 637: Performed by: SURGERY

## 2023-05-06 PROCEDURE — 83735 ASSAY OF MAGNESIUM: CPT | Performed by: SURGERY

## 2023-05-06 PROCEDURE — 81001 URINALYSIS AUTO W/SCOPE: CPT | Performed by: PHYSICIAN ASSISTANT

## 2023-05-06 RX ORDER — METOPROLOL TARTRATE 25 MG/1
25 TABLET, FILM COATED ORAL 2 TIMES DAILY
Status: DISCONTINUED | OUTPATIENT
Start: 2023-05-06 | End: 2023-05-09

## 2023-05-06 RX ORDER — POTASSIUM CHLORIDE 750 MG/1
20 TABLET, EXTENDED RELEASE ORAL ONCE
Status: COMPLETED | OUTPATIENT
Start: 2023-05-06 | End: 2023-05-06

## 2023-05-06 RX ADMIN — LEVALBUTEROL HYDROCHLORIDE 0.63 MG: 0.63 SOLUTION RESPIRATORY (INHALATION) at 08:53

## 2023-05-06 RX ADMIN — BUMETANIDE 2 MG: 0.25 INJECTION INTRAMUSCULAR; INTRAVENOUS at 08:16

## 2023-05-06 RX ADMIN — HEPARIN SODIUM 5000 UNITS: 5000 INJECTION, SOLUTION INTRAVENOUS; SUBCUTANEOUS at 22:06

## 2023-05-06 RX ADMIN — METOPROLOL TARTRATE 50 MG: 50 TABLET, FILM COATED ORAL at 08:13

## 2023-05-06 RX ADMIN — BUMETANIDE 2 MG: 0.25 INJECTION INTRAMUSCULAR; INTRAVENOUS at 15:58

## 2023-05-06 RX ADMIN — DORZOLAMIDE HYDROCHLORIDE AND TIMOLOL MALEATE 1 DROP: 22.3; 6.8 SOLUTION/ DROPS OPHTHALMIC at 08:20

## 2023-05-06 RX ADMIN — DORZOLAMIDE HYDROCHLORIDE AND TIMOLOL MALEATE 1 DROP: 22.3; 6.8 SOLUTION/ DROPS OPHTHALMIC at 20:34

## 2023-05-06 RX ADMIN — LEVALBUTEROL HYDROCHLORIDE 0.63 MG: 0.63 SOLUTION RESPIRATORY (INHALATION) at 20:38

## 2023-05-06 RX ADMIN — LATANOPROST 1 DROP: 50 SOLUTION OPHTHALMIC at 22:06

## 2023-05-06 RX ADMIN — HEPARIN SODIUM 5000 UNITS: 5000 INJECTION, SOLUTION INTRAVENOUS; SUBCUTANEOUS at 06:16

## 2023-05-06 RX ADMIN — ACETAMINOPHEN 650 MG: 325 TABLET ORAL at 05:03

## 2023-05-06 RX ADMIN — ASPIRIN 81 MG CHEWABLE TABLET 81 MG: 81 TABLET CHEWABLE at 08:13

## 2023-05-06 RX ADMIN — HEPARIN SODIUM 5000 UNITS: 5000 INJECTION, SOLUTION INTRAVENOUS; SUBCUTANEOUS at 14:49

## 2023-05-06 RX ADMIN — SIMVASTATIN 20 MG: 20 TABLET, FILM COATED ORAL at 22:06

## 2023-05-06 RX ADMIN — POTASSIUM CHLORIDE 20 MEQ: 750 TABLET, EXTENDED RELEASE ORAL at 06:50

## 2023-05-06 RX ADMIN — PANTOPRAZOLE SODIUM 40 MG: 40 TABLET, DELAYED RELEASE ORAL at 06:20

## 2023-05-06 ASSESSMENT — ACTIVITIES OF DAILY LIVING (ADL)
ADLS_ACUITY_SCORE: 31

## 2023-05-06 NOTE — PROGRESS NOTES
Cardiovascular Surgery Progress Note  05/06/2023         Assessment and Plan:     Cydney Christiansen is an 87 year old female with a PMH of HTN, HLD, bilateral glaucoma, scoliosis, compression fracture of thoracic vertebrae, osteoporosis, history of bladder cancer, invasive ductal carcinoma of left breast s/p lumpectomy (2015), and multiple other skin cancer sites s/p removal, severe aortic stenosis with bicuspid aortic valve and an ascending aortic aneurysm. Patient is now s/p AVR (Aponte Resilia 21 mm valve) and ascending aorta repair on 4/27/23 with Dr. Solis.    Cardiovascular:   Hx of severe AORTIC STENOSIS 2/2 to bicuspid aortic valve  - s/p AVR 4/25  Ascending aortic aneurysm s/p graft repair 4/25  Hx HTN, HLD  Severe tricuspid insufficiency post op   Afib - rate controlled  HD stable, in rate controlled Afib. ICU discussed no anticoagulation with Dr. Solis and patient refused d/t bleeding risk. HR , SBP .   TTE 4/27 showed LV EF 45-50%; Mild right ventricular dilation with normal global right ventricular function. A bioprosthetic aortic valve with PV of 2.2m/s and MG of 12mmHg. Severe tricuspid insufficiency.   - plan to repeat TTE once closer to pre-op weight  - Goal MAP>65, SBP<140 per Dr Solis   - ASA 81 mg daily  - PTA simvastatin  - Metoprolol tartrate 50 mg BID, decrease to 25 mg po bid with hold parameters.  Systolic blood pressure goal of 130-140 due to age. Systolic's in 80's late morning.   - Diuresis as below    CT/PW removed in the ICU    Pulmonary:  Acute hypoxic respiratory insufficiency  Hx asthma  Bilateral pleural effusions   - S/p right thoracentesis 5/3 with 600 ml removed  Extubated POD 1. Now saturating well on 1 lpm.   - Supplemental O2 PRN to keep sats > 92%. Wean off as tolerated.  - Pulm hygiene, IS, activity and deep breathing  - PTA albuterol PRN  - Mucomyst neb PRN  - Patient complaining of SOB 5/5 even though sating in high 90s. Will schedule BID nebs for the next day  or two to help this feeling and with her hx of asthma.   - On diuresis   - More SOB: Bilateral pleural effusions, will consult medicine team for thoracentesis     Neurology / MSK:  Acute post-operative pain   Pain well controlled with current regimen:  - Acetaminophen, PO oxycodone PRN  - delirium precautions  - trazodone at bedtime for sleep  - Melatonin 3mg q HS   - Added Atarax to help with anxiety     / Renal / Fluid / Electrolytes:  HELEN, resolved  BL creat ~ 0.6, most recent creatinine 0.66; adequate UOP.   FLUID STATUS: Pre-op weight 137 lbs, weight today 142 standing scale; urine output slowing down  - Diuresis: Bumex 2mg IV BID 5/5.  Continues on oxygen via NC. Get standing weight today.   - Replete lytes per protocol  - Strict I/O, daily weights  - Avoid/limit nephrotoxins as able  - Patient needed to be straight cathed 4x 5/4-5/5, traylor order placed 5/5 afternoon.  - Low urine output overnight. Traylor replaced 5/5 for retention. Check UA-negative.  - On Bumex 2 mg IV bid. Blood pressure softer this afternoon. Got both dosages of Bumex, may need fluid back if hypotensive. Hold morning Bumex and reassess.     GI / Nutrition:   Transaminitis, downtrending  Severe malnutrition in the context of acute illness  - Tolerating regular diet, however continues to have low oral intake  - discussed with patient and family if intake remains low, may need NG for TF admin  - Continue bowel regimen, last BM 5/5    Endocrine:  Stress induced hyperglycemia   Osteoporosis c/b compression fracture of spine  Hgb A1c 6.0  - Initially managed on insulin drip postop, transitioned to sliding scale; goal BG <180 for optimal healing  - PTA med alendronate    Infectious Disease:  Stress induced leukocytosis  WBC 10.1, remains afebrile, no signs or symptoms of infection  - Completed perioperative antibiotics  - Continue to monitor fever curve, CBC    Hematology:   Acute blood loss anemia and thrombocytopenia  Hgb stable; Plt stable,  no signs or symptoms of active bleeding  - CBC daily    Anticoagulation:   - ASA only  - Discussion had with Dr. Solis regarding anticoagulation for AF stroke risk. Will not start anticoagulation at this time.     MSK/Skin:  Sternotomy  Surgical incision  - Sternal precautions  - Incisional cares per protocol    Prophylaxis:   - Stress ulcer prophylaxis: Pantoprazole 40 mg daily for 30 days  - DVT prophylaxis: Subcutaneous heparin, SCD    Disposition:   - Transferred to  on 5/3  - Therapies recommending discharge to TCU. There is possibly an accepting facility now. Patient not medically ready as needs further IV aggressive diuresis. Still up 15lbs at this time and needing supplemental oxygen, but improving so will follow.     Discussed with Dr. Lisbet Daly PA-C  Cardiothoracic Surgery  Pager [phone]  May 6, 2023          Interval History:     More SOB over night. Pain is controlled on current regimen. Tolerating diet but low intake so will continue calorie counts, +flatulence, +BM. Breathing stable on 1-2LPM this afternoon and need is decreasing. Has no complaints and encouraged to use IS. Working with therapies, walked in room to bathroom this morning. Continue to encourage patient up to chair for all meals today. Denies CP, N/V, dizziness, syncope, palpitations, fever, chills, myalgias, and sternal popping/clicking.          Physical Exam:     Gen: A&Ox4, NAD  Neuro: no focal deficits   CV: RRR, normal S1 S2, no murmurs, rubs or gallops  Pulm: CTA, no wheezing or rhonchi, normal breathing on 1-2 lpm  Abd: nondistended, normal BS, soft, nontender  Ext: moderate peripheral edema, 1+ pitting  Incision: clean, dry, intact, no erythema, sternum stable  Tubes/drain sites: dressing clean and dry         Data:    Imaging:  reviewed recent imaging, no acute concerns    Recent Results (from the past 24 hour(s))   XR Chest Port 1 View    Narrative    Portable chest    INDICATION: Postoperative  monitoring for effusions    COMPARISON: 5/3/2023    FINDINGS: Cardiomegaly. Left costophrenic meniscus and right  costophrenic meniscus again noted. Loss of the hemidiaphragm discrete  shadows again noted. Median sternotomy again present. Hazy opacities  throughout the left greater than right midlungs also unchanged. Aortic  valve prosthesis.      Impression    IMPRESSION: Continued cardiomegaly, left larger than right pleural  effusions and left more than right pulmonary edema. Aortic valve  replacement.    ADONIS TROTTER MD         SYSTEM ID:  [id number]        Labs:  Recent Labs   Lab 05/06/23  0751 05/06/23  0435 05/05/23  2138 05/05/23  2027 05/05/23  1607 05/05/23  0846 05/05/23  0449 05/04/23  0733 05/04/23  0546 05/03/23  0753 05/03/23  0705 05/01/23  0753 05/01/23  0729 04/30/23  1022 04/30/23  0546   WBC  --  11.6*  --   --   --   --  10.1  --  9.8  --  10.4   < > 10.2  --  10.1   HGB  --  8.5*  --   --   --   --  8.9*  --  8.8*  --  8.8*   < > 9.0*  --  8.7*   MCV  --  98  --   --   --   --  99  --  97  --  94   < > 96  --  95   PLT  --  204  --   --   --   --  216  --  206  --  225   < > 217  --  199   INR  --   --   --   --   --   --   --   --   --   --   --   --  1.34*  --  1.39*   NA  --  141  --   --  139  --  138   < > 140  --  139   < > 137  --  139   POTASSIUM  --  3.8  --   --  3.9  --  3.6   < > 3.8   < > 3.4   < > 4.1  --  4.2   CHLORIDE  --  97*  --   --  97*  --  96*   < > 98  --  98   < > 103  --  104   CO2  --  34*  --   --  31*  --  34*   < > 33*  --  31*   < > 22  --  24   BUN  --  23.1*  --   --  24.5*  --  24.8*   < > 26.1*  --  29.2*   < > 38.7*  --  38.8*   CR  --  0.68  --   --  0.70  --  0.66   < > 0.65  --  0.55   < > 0.54  --  0.63   ANIONGAP  --  10  --   --  11  --  8   < > 9  --  10   < > 12  --  11   SHERYL  --  8.7*  --   --  8.6*  --  8.7*   < > 8.5*  --  8.6*   < > 8.4*  --  8.3*   * 116* 111*   < > 176*   < > 131*   < > 129*   < > 125*   < > 116*   < > 142*    ALBUMIN  --   --   --   --   --   --   --   --  2.9*  --  2.9*   < > 3.1*  --  3.1*   PROTTOTAL  --   --   --   --   --   --   --   --  5.4*  --  5.4*   < > 5.6*  --  5.6*   BILITOTAL  --   --   --   --   --   --   --   --  0.4  --  0.4   < > 0.4  --  0.5   ALKPHOS  --   --   --   --   --   --   --   --  44  --  44   < > 50  --  48   ALT  --   --   --   --   --   --   --   --  73*  --  92*   < > 141*  --  159*   AST  --   --   --   --   --   --   --   --  55*  --  67*   < >  --   --  169*    < > = values in this interval not displayed.      GLUCOSE:   Recent Labs   Lab 05/06/23  0751 05/06/23  0435 05/05/23  2138 05/05/23  2027 05/05/23  1607 05/05/23  1508   * 116* 111* 141* 176* 148*

## 2023-05-06 NOTE — PROGRESS NOTES
Calorie Count  Intake recorded for: 5/5  Total Kcals: 0 Total Protein: 0g  Kcals from Hospital Food: 0   Protein: 0g  Kcals from Outside Food (average):0 Protein: 0g  # Meals Ordered from Kitchen: 3 meals ordered  # Meals Recorded: 0 meals/supps recorded

## 2023-05-06 NOTE — PLAN OF CARE
"BP 91/73 (BP Location: Left arm, Cuff Size: Adult Regular)   Pulse 90   Temp 98.5  F (36.9  C) (Oral)   Resp 18   Ht 1.499 m (4' 11\")   Wt 64.7 kg (142 lb 9.6 oz)   LMP  (LMP Unknown)   SpO2 97%   BMI 28.80 kg/m       Hours of Care: 1566-1776.     Vitals: VSS.   Activity: Up with A1 and walker.  Pain: Denies.  Neuro: AO x 4.  Anxious at times.  Cardiac: Rate controlled A. Fib.    Respiratory: On 2L NC.    GI/: Helms placed this PM per team as pt needed straight cath x3 in last 24 hrs.  Diet: Regular,  poor appetite.  Lines: R DL midline.  Skin/Wounds: Erythema of adarsh area, otherwise WDL.  Plan: Possible TCU placement at discharge.      Continue to monitor and follow POC     Ruben Valente RN on 5/6/2023 at 6:29 PM        "

## 2023-05-06 NOTE — PROGRESS NOTES
Care Management Follow Up    Length of Stay (days): 11    Expected Discharge Date: 05/07/2023     Concerns to be Addressed: all concerns addressed in this encounter     Patient plan of care discussed at interdisciplinary rounds: No    Anticipated Discharge Disposition: TCU     Anticipated Discharge Services: PT/OT  Anticipated Discharge DME: Linda Valenzuela, Shower Chair    Patient/family educated on Medicare website which has current facility and service quality ratings:  yes  Education Provided on the Discharge Plan:  yes  Patient/Family in Agreement with the Plan:  yes    Referrals Placed by CM/SW:  See below  Private pay costs discussed: Not applicable    Additional Information:  SW Called following facilities for updates.    [facility]  [address] [place] [address]  PH: [phone]  5/6: left voicemail message for call back on Monday 5/8     [facility]  [address] [place] [address]  PH: [phone]  5/6: left voicemail message for call back on Monday 5/8     [facility]  [address] [place] [address]  PH: [phone]   5/6: No admissions on weekend, left voicemail message for call back on Monday 5/8    [facility]  [address] [place] [address]  PH: [phone]  5/6: no admissions on weekend, left voicemail message for call back on 5/8     [facility]  [address] [place] [address]  PH: [phone]  5/6: left voicemail message for call back      ATIYA Mckeon, WILLARD        Social Work and Care Management Department       SEARCHABLE in [employer] - search SOCIAL WORK       [place] (0311 [phone] 7085) Saturday and Sunday     Units: 4A, 4C, & 4E Pager: [phone]     Units: 5A & 5B Pager: [phone]     Units: 6A & 6B   Pager: [phone]     Units: 6C & 6D Pager: [phone]     Units: 7A &7B   Pager: [phone]     Units: 7C, 7D, & 5C Pager: [phone]     Unit: [place] [facility] Pager: [phone]      [facility] [place] (1035-6521) Saturday and Sunday      Units: 5 Ortho, 5 Med/Surg & WB ED  Pager:[phone]     Units: 6 Med/Surg, 8A, & 10A ICU  Pager: [phone]        After hours (1630 - 0000) Saturday & Sunday; (6803-4161) Mon-Fri; (6559-9354) [facility] Recognized Holidays     Units: ALL  Pager: [phone]

## 2023-05-06 NOTE — PROVIDER NOTIFICATION
"Time of notification: 9:50 PM  Provider notified: Ge James MD  Patient status: Urine noted to be cloudy and malodorous. Provider paged and writer questioned if UA/UC was indicated.  Orders received:  Provider notes that he will defer to day shift team to see if they would like UA/UC. He notes this may be because she was retaining urine t/o the day. No new orders received.    Time of notification: 12:30 AM  Provider notified:  Ge James MD  Patient status: UOP noted to be marginal @25mL/hr over the last few hrs. Provider paged and notified.  Orders received:  No callback received.      Time of notification: 5:05 AM  Provider notified: Ge James MD  Patient status: Pt has become increasingly restless and agitated overnight. Frequently trying to get out of bed and confused. Noted that pt is easy to re-orient. Pt describes herself as \"agitated\" this am and states, \"why can't I just relax?\" Also of note, UOP has been 10-20mL/hr over the last few hrs. Provider also notified of that change and writer questioned UA/UC.  Orders received:  Call back received from provider, who notes that confusion and agitation is likely delirium. He notes that no more atarax should be given and no other medications will likely help. He also notes that he will pass along to the day team about UOP. No new orders received.    Pt very agitated this am. Writer provided active listening and emotional support, until pt fell asleep.  "

## 2023-05-06 NOTE — PLAN OF CARE
"/69 (BP Location: Left arm)   Pulse 99   Temp 97.9  F (36.6  C) (Oral)   Resp 20   Ht 1.499 m (4' 11\")   Wt 69.3 kg (152 lb 12.5 oz)   LMP  (LMP Unknown)   SpO2 97%   BMI 30.86 kg/m      Hours of Care: 9148-8093.    Vitals: VSS.   Activity: Up with A1 and walker.  Pain: Denies.  Neuro: AO x 4.  Anxious at times.  Cardiac: Rate controlled A. Fib.    Respiratory: On 2L NC.  Pt reports dyspnea at times but no increased WOB.  GI/: Helms placed this PM per team as pt needed straight cath x3 in last 24 hrs.  Diet: Regular,  poor appetite.  Lines: R DL midline.  Skin/Wounds: Erythema of adarsh area, otherwise WDL.  Plan: Possible TCU placement at discharge.      Continue to monitor and follow POC    Ruben Valente RN on 5/5/2023 at 8:00 PM            "

## 2023-05-06 NOTE — PLAN OF CARE
"Neuro: A&Ox4. Multiple times overnight, pt woke up restless and confused, but able to answer orientation questions correctly. Pt voiced that she was \"agitated\" this morning. See provider notification note from overnight.  Cardiac: Controlled afib. Other VSS.  Respiratory: Sating >90% on 2L NC. Pt notes that she feels like it is \"difficult to breath.\" This improved with levalbuterol neb and atarax.  GI/: Decreased UOP with traylor in place. Urine cloudy and malodorous. See provider notification note on request for UA/UC (no new orders placed).  Diet/appetite: Tolerating regular diet.   Activity:  Assist of 1 up to chair, and Ax2 to turn and reposition in bed.  Pain: At acceptable level on current regimen.   Skin: No new deficits noted.  LDA's: PIV x2; R) midline - all SL; traylor catheter in place    Plan: Continue with POC. Notify primary team with changes.     "

## 2023-05-07 ENCOUNTER — APPOINTMENT (OUTPATIENT)
Dept: CARDIOLOGY | Facility: CLINIC | Age: 88
DRG: 219 | End: 2023-05-07
Attending: PHYSICIAN ASSISTANT
Payer: COMMERCIAL

## 2023-05-07 ENCOUNTER — APPOINTMENT (OUTPATIENT)
Dept: CT IMAGING | Facility: CLINIC | Age: 88
DRG: 219 | End: 2023-05-07
Attending: PHYSICIAN ASSISTANT
Payer: COMMERCIAL

## 2023-05-07 ENCOUNTER — APPOINTMENT (OUTPATIENT)
Dept: GENERAL RADIOLOGY | Facility: CLINIC | Age: 88
DRG: 219 | End: 2023-05-07
Attending: SURGERY
Payer: COMMERCIAL

## 2023-05-07 LAB
ANION GAP SERPL CALCULATED.3IONS-SCNC: 8 MMOL/L (ref 7–15)
ANION GAP SERPL CALCULATED.3IONS-SCNC: 9 MMOL/L (ref 7–15)
BUN SERPL-MCNC: 21.3 MG/DL (ref 8–23)
BUN SERPL-MCNC: 21.8 MG/DL (ref 8–23)
CALCIUM SERPL-MCNC: 8.6 MG/DL (ref 8.8–10.2)
CALCIUM SERPL-MCNC: 8.8 MG/DL (ref 8.8–10.2)
CHLORIDE SERPL-SCNC: 94 MMOL/L (ref 98–107)
CHLORIDE SERPL-SCNC: 96 MMOL/L (ref 98–107)
CREAT SERPL-MCNC: 0.58 MG/DL (ref 0.51–0.95)
CREAT SERPL-MCNC: 0.6 MG/DL (ref 0.51–0.95)
DEPRECATED HCO3 PLAS-SCNC: 33 MMOL/L (ref 22–29)
DEPRECATED HCO3 PLAS-SCNC: 33 MMOL/L (ref 22–29)
ERYTHROCYTE [DISTWIDTH] IN BLOOD BY AUTOMATED COUNT: 16 % (ref 10–15)
GFR SERPL CREATININE-BSD FRML MDRD: 87 ML/MIN/1.73M2
GFR SERPL CREATININE-BSD FRML MDRD: 88 ML/MIN/1.73M2
GLUCOSE BLDC GLUCOMTR-MCNC: 115 MG/DL (ref 70–99)
GLUCOSE BLDC GLUCOMTR-MCNC: 136 MG/DL (ref 70–99)
GLUCOSE BLDC GLUCOMTR-MCNC: 170 MG/DL (ref 70–99)
GLUCOSE SERPL-MCNC: 120 MG/DL (ref 70–99)
GLUCOSE SERPL-MCNC: 177 MG/DL (ref 70–99)
HCT VFR BLD AUTO: 28.7 % (ref 35–47)
HGB BLD-MCNC: 8.5 G/DL (ref 11.7–15.7)
INR PPP: 1.25 (ref 0.85–1.15)
LVEF ECHO: NORMAL
MAGNESIUM SERPL-MCNC: 1.7 MG/DL (ref 1.7–2.3)
MCH RBC QN AUTO: 29.2 PG (ref 26.5–33)
MCHC RBC AUTO-ENTMCNC: 29.6 G/DL (ref 31.5–36.5)
MCV RBC AUTO: 99 FL (ref 78–100)
PLATELET # BLD AUTO: 187 10E3/UL (ref 150–450)
POTASSIUM SERPL-SCNC: 3.8 MMOL/L (ref 3.4–5.3)
POTASSIUM SERPL-SCNC: 4.2 MMOL/L (ref 3.4–5.3)
RBC # BLD AUTO: 2.91 10E6/UL (ref 3.8–5.2)
SODIUM SERPL-SCNC: 135 MMOL/L (ref 136–145)
SODIUM SERPL-SCNC: 138 MMOL/L (ref 136–145)
WBC # BLD AUTO: 11.8 10E3/UL (ref 4–11)

## 2023-05-07 PROCEDURE — 82310 ASSAY OF CALCIUM: CPT | Performed by: PHYSICIAN ASSISTANT

## 2023-05-07 PROCEDURE — 999N000157 HC STATISTIC RCP TIME EA 10 MIN

## 2023-05-07 PROCEDURE — 999N000208 ECHOCARDIOGRAM LIMITED

## 2023-05-07 PROCEDURE — 85610 PROTHROMBIN TIME: CPT | Performed by: INTERNAL MEDICINE

## 2023-05-07 PROCEDURE — 250N000011 HC RX IP 250 OP 636: Performed by: NURSE PRACTITIONER

## 2023-05-07 PROCEDURE — 71250 CT THORAX DX C-: CPT | Mod: 26 | Performed by: RADIOLOGY

## 2023-05-07 PROCEDURE — 85027 COMPLETE CBC AUTOMATED: CPT | Performed by: STUDENT IN AN ORGANIZED HEALTH CARE EDUCATION/TRAINING PROGRAM

## 2023-05-07 PROCEDURE — 83735 ASSAY OF MAGNESIUM: CPT | Performed by: SURGERY

## 2023-05-07 PROCEDURE — 36415 COLL VENOUS BLD VENIPUNCTURE: CPT | Performed by: INTERNAL MEDICINE

## 2023-05-07 PROCEDURE — 80048 BASIC METABOLIC PNL TOTAL CA: CPT | Performed by: PHYSICIAN ASSISTANT

## 2023-05-07 PROCEDURE — 93308 TTE F-UP OR LMTD: CPT | Mod: 26 | Performed by: STUDENT IN AN ORGANIZED HEALTH CARE EDUCATION/TRAINING PROGRAM

## 2023-05-07 PROCEDURE — 71045 X-RAY EXAM CHEST 1 VIEW: CPT

## 2023-05-07 PROCEDURE — 94640 AIRWAY INHALATION TREATMENT: CPT | Mod: 76

## 2023-05-07 PROCEDURE — 255N000002 HC RX 255 OP 636: Performed by: STUDENT IN AN ORGANIZED HEALTH CARE EDUCATION/TRAINING PROGRAM

## 2023-05-07 PROCEDURE — 93325 DOPPLER ECHO COLOR FLOW MAPG: CPT | Mod: 26 | Performed by: STUDENT IN AN ORGANIZED HEALTH CARE EDUCATION/TRAINING PROGRAM

## 2023-05-07 PROCEDURE — 94640 AIRWAY INHALATION TREATMENT: CPT

## 2023-05-07 PROCEDURE — 71250 CT THORAX DX C-: CPT

## 2023-05-07 PROCEDURE — C8924 2D TTE W OR W/O FOL W/CON,FU: HCPCS

## 2023-05-07 PROCEDURE — 250N000009 HC RX 250: Performed by: PHYSICIAN ASSISTANT

## 2023-05-07 PROCEDURE — 999N000044 HC STATISTIC CVC DRESSING CHANGE

## 2023-05-07 PROCEDURE — 250N000013 HC RX MED GY IP 250 OP 250 PS 637: Performed by: SURGERY

## 2023-05-07 PROCEDURE — 250N000013 HC RX MED GY IP 250 OP 250 PS 637: Performed by: STUDENT IN AN ORGANIZED HEALTH CARE EDUCATION/TRAINING PROGRAM

## 2023-05-07 PROCEDURE — 71045 X-RAY EXAM CHEST 1 VIEW: CPT | Mod: 26 | Performed by: RADIOLOGY

## 2023-05-07 PROCEDURE — 120N000003 HC R&B IMCU UMMC

## 2023-05-07 PROCEDURE — 250N000013 HC RX MED GY IP 250 OP 250 PS 637: Performed by: PHYSICIAN ASSISTANT

## 2023-05-07 PROCEDURE — 36415 COLL VENOUS BLD VENIPUNCTURE: CPT | Performed by: PHYSICIAN ASSISTANT

## 2023-05-07 PROCEDURE — 93321 DOPPLER ECHO F-UP/LMTD STD: CPT | Mod: 26 | Performed by: STUDENT IN AN ORGANIZED HEALTH CARE EDUCATION/TRAINING PROGRAM

## 2023-05-07 RX ORDER — BUMETANIDE 1 MG/1
1 TABLET ORAL
Status: DISCONTINUED | OUTPATIENT
Start: 2023-05-07 | End: 2023-05-09

## 2023-05-07 RX ORDER — POTASSIUM CHLORIDE 750 MG/1
20 TABLET, EXTENDED RELEASE ORAL 2 TIMES DAILY
Status: DISCONTINUED | OUTPATIENT
Start: 2023-05-07 | End: 2023-05-15 | Stop reason: HOSPADM

## 2023-05-07 RX ORDER — POTASSIUM CHLORIDE 750 MG/1
20 TABLET, EXTENDED RELEASE ORAL ONCE
Status: COMPLETED | OUTPATIENT
Start: 2023-05-07 | End: 2023-05-07

## 2023-05-07 RX ORDER — MAGNESIUM OXIDE 400 MG/1
400 TABLET ORAL EVERY 4 HOURS
Status: COMPLETED | OUTPATIENT
Start: 2023-05-07 | End: 2023-05-07

## 2023-05-07 RX ADMIN — LEVALBUTEROL HYDROCHLORIDE 0.63 MG: 0.63 SOLUTION RESPIRATORY (INHALATION) at 09:55

## 2023-05-07 RX ADMIN — POTASSIUM CHLORIDE 20 MEQ: 750 TABLET, EXTENDED RELEASE ORAL at 10:44

## 2023-05-07 RX ADMIN — BUMETANIDE 1 MG: 1 TABLET ORAL at 10:44

## 2023-05-07 RX ADMIN — HEPARIN SODIUM 5000 UNITS: 5000 INJECTION, SOLUTION INTRAVENOUS; SUBCUTANEOUS at 21:08

## 2023-05-07 RX ADMIN — HEPARIN SODIUM 5000 UNITS: 5000 INJECTION, SOLUTION INTRAVENOUS; SUBCUTANEOUS at 16:43

## 2023-05-07 RX ADMIN — POTASSIUM CHLORIDE 20 MEQ: 750 TABLET, EXTENDED RELEASE ORAL at 20:02

## 2023-05-07 RX ADMIN — ACETAMINOPHEN 650 MG: 325 TABLET ORAL at 20:02

## 2023-05-07 RX ADMIN — METOPROLOL TARTRATE 25 MG: 25 TABLET, FILM COATED ORAL at 08:39

## 2023-05-07 RX ADMIN — HUMAN ALBUMIN MICROSPHERES AND PERFLUTREN 5 ML: 10; .22 INJECTION, SOLUTION INTRAVENOUS at 08:40

## 2023-05-07 RX ADMIN — Medication 400 MG: at 23:34

## 2023-05-07 RX ADMIN — HEPARIN SODIUM 5000 UNITS: 5000 INJECTION, SOLUTION INTRAVENOUS; SUBCUTANEOUS at 06:36

## 2023-05-07 RX ADMIN — PANTOPRAZOLE SODIUM 40 MG: 40 TABLET, DELAYED RELEASE ORAL at 06:36

## 2023-05-07 RX ADMIN — Medication 400 MG: at 20:05

## 2023-05-07 RX ADMIN — DORZOLAMIDE HYDROCHLORIDE AND TIMOLOL MALEATE 1 DROP: 22.3; 6.8 SOLUTION/ DROPS OPHTHALMIC at 08:41

## 2023-05-07 RX ADMIN — LATANOPROST 1 DROP: 50 SOLUTION OPHTHALMIC at 21:09

## 2023-05-07 RX ADMIN — METOPROLOL TARTRATE 25 MG: 25 TABLET, FILM COATED ORAL at 20:02

## 2023-05-07 RX ADMIN — ASPIRIN 81 MG CHEWABLE TABLET 81 MG: 81 TABLET CHEWABLE at 08:39

## 2023-05-07 RX ADMIN — BUMETANIDE 1 MG: 1 TABLET ORAL at 16:43

## 2023-05-07 RX ADMIN — LEVALBUTEROL HYDROCHLORIDE 0.63 MG: 0.63 SOLUTION RESPIRATORY (INHALATION) at 21:22

## 2023-05-07 RX ADMIN — SIMVASTATIN 20 MG: 20 TABLET, FILM COATED ORAL at 21:08

## 2023-05-07 RX ADMIN — POTASSIUM CHLORIDE 20 MEQ: 750 TABLET, EXTENDED RELEASE ORAL at 06:35

## 2023-05-07 RX ADMIN — DORZOLAMIDE HYDROCHLORIDE AND TIMOLOL MALEATE 1 DROP: 22.3; 6.8 SOLUTION/ DROPS OPHTHALMIC at 20:03

## 2023-05-07 ASSESSMENT — ACTIVITIES OF DAILY LIVING (ADL)
ADLS_ACUITY_SCORE: 27

## 2023-05-07 NOTE — PLAN OF CARE
"Neuro: A&Ox4. Neurs were WNL, but pt restless overnight and ruminating over her thoughts. Pt seemed to be confused with nonsensical ideas, but was able to answer all orientation questions accurately. Pt states, \"I just feel funny in the head.\" Emotional support and active listening provided, along with frequent repositioning overnight.  Cardiac: Afib, rate controlled. VSS. Metoprolol held overnight for SBP 90s.   Respiratory: Sating >90% on 2L NC overnight  GI/: Helms. UOP low overnight, <20mL/hr this am.  Diet/appetite: Poor appetite  Activity:  Assist of 1-2 to reposition in bed. Ax1 up to chair early this am.  Pain: No c/o pain overnight  Skin: No new deficits noted.  LDA's: PIV x2, midline x1 double lumen, all SL.    Plan: Continue with POC. Notify primary team with changes.     "

## 2023-05-07 NOTE — PLAN OF CARE
"/84 (BP Location: Left arm)   Pulse 69   Temp 97.7  F (36.5  C) (Oral)   Resp 20   Ht 1.499 m (4' 11\")   Wt 64.2 kg (141 lb 8.6 oz)   LMP  (LMP Unknown)   SpO2 99%   BMI 28.59 kg/m      Hours of Care: 4288-2114.     Vitals: VSS.   Activity: Up with A1 and walker.  Pain: Denies.  Neuro: AO x 4.  Anxious at times.  Cardiac: Rate controlled A. Fib.    Respiratory: On 2L NC.    GI/: Helms in place with varying UOP depending upon timing of diuretics.  Diet: Regular,  poor appetite.  Lines: R DL midline. PIV x 2  Skin/Wounds: Erythema of adarsh area, otherwise WDL.  Plan: Possible TCU placement at discharge.      Continue to monitor and follow POC     Ruben Valente RN on 5/7/2023 at 6:11 PM          "

## 2023-05-07 NOTE — PROGRESS NOTES
Cardiovascular Surgery Progress Note  05/07/2023         Assessment and Plan:     Cydney Christiansen is an 87 year old female with a PMH of HTN, HLD, bilateral glaucoma, scoliosis, compression fracture of thoracic vertebrae, osteoporosis, history of bladder cancer, invasive ductal carcinoma of left breast s/p lumpectomy (2015), and multiple other skin cancer sites s/p removal, severe aortic stenosis with bicuspid aortic valve and an ascending aortic aneurysm. Patient is now s/p AVR (Aponte Resilia 21 mm valve) and ascending aorta repair on 4/27/23 with Dr. Solis.    Cardiovascular:   Hx of severe AORTIC STENOSIS 2/2 to bicuspid aortic valve  - s/p AVR 4/25  Ascending aortic aneurysm s/p graft repair 4/25  Hx HTN, HLD  Severe tricuspid insufficiency post op   Afib - rate controlled  HD stable, in rate controlled Afib. ICU discussed no anticoagulation with Dr. Solis and patient refused d/t bleeding risk. HR , SBP .   TTE 4/27 showed LV EF 45-50%; Mild right ventricular dilation with normal global right ventricular function. A bioprosthetic aortic valve with PV of 2.2m/s and MG of 12mmHg. Severe tricuspid insufficiency.   - plan to repeat TTE once closer to pre-op weight  - Goal MAP>65, SBP<140 per Dr Solis   - ASA 81 mg daily  - PTA simvastatin  - Metoprolol tartrate 50 mg BID, decrease to 25 mg po bid with hold parameters.  Systolic blood pressure goal of 130-140 due to age. Systolic's in 80's late morning 5/6.   - Diuresis as below  - Echo 5/7: EF 40-45%, Aortic valve with mean gradient of 15 mmHg, mild to moderate TR, RA 8, trace pericardial effusion.     CT/PW removed in the ICU    Pulmonary:  Acute hypoxic respiratory insufficiency  Hx asthma  Bilateral pleural effusions   - S/p right thoracentesis 5/3 with 600 ml removed  Extubated POD 1. Now saturating well on 1 lpm.   - Supplemental O2 PRN to keep sats > 92%. Wean off as tolerated.  - Pulm hygiene, IS, activity and deep breathing  - PTA albuterol  PRN  - Mucomyst neb PRN  - Patient complaining of SOB 5/5 even though sating in high 90s. Will schedule BID nebs for the next day or two to help this feeling and with her hx of asthma.   - On diuresis   - More SOB: Bilateral pleural effusions, will consult medicine team for thoracentesis-not comfortable with performing due to possible loculation and posterior location. Will consult IR.   - CT chest w/o ordered     Neurology / MSK:  Acute post-operative pain   Pain well controlled with current regimen:  - Acetaminophen, PO oxycodone PRN  - delirium precautions  - trazodone at bedtime for sleep  - Melatonin 3mg q HS   - Added Atarax to help with anxiety     / Renal / Fluid / Electrolytes:  HELEN, resolved  BL creat ~ 0.6, most recent creatinine 0.66; adequate UOP.   FLUID STATUS: Pre-op weight 137 lbs, weight today 141 standing scale; urine output slowing down  - Diuresis: Bumex 2mg IV BID 5/5.  Continues on oxygen via NC. Get standing weight today.   - Replete lytes per protocol  - Strict I/O, daily weights  - Avoid/limit nephrotoxins as able  - Patient needed to be straight cathed 4x 5/4-5/5, traylor order placed 5/5 afternoon.  - Low urine output overnight. Traylor replaced 5/5 for retention. Check UA-negative.  - 5/6 On Bumex 2 mg IV bid. Blood pressure softer this afternoon. Got both dosages of Bumex, may need fluid back if hypotensive. Hold morning Bumex and reassess.   - 5/7 Stop IV Bumex, gently diurese with Bumex 1 mg po bid. Likely third spacing fluid. Order lymphedema for compression stockings.     GI / Nutrition:   Transaminitis, downtrending  Severe malnutrition in the context of acute illness  - Tolerating regular diet, however continues to have low oral intake  - discussed with patient and family if intake remains low, may need NG for TF admin  - Continue bowel regimen, last BM 5/5    Endocrine:  Stress induced hyperglycemia   Osteoporosis c/b compression fracture of spine  Hgb A1c 6.0  - Initially managed  on insulin drip postop, transitioned to sliding scale; goal BG <180 for optimal healing  - PTA med alendronate    Infectious Disease:  Stress induced leukocytosis  WBC 11.8, remains afebrile, no signs or symptoms of infection  - Completed perioperative antibiotics  - Continue to monitor fever curve, CBC    Hematology:   Acute blood loss anemia and thrombocytopenia  Hgb stable; Plt stable, no signs or symptoms of active bleeding  - CBC daily    Anticoagulation:   - ASA only  - Discussion had with Dr. Solis regarding anticoagulation for AF stroke risk. Will not start anticoagulation at this time.     MSK/Skin:  Sternotomy  Surgical incision  - Sternal precautions  - Incisional cares per protocol    Prophylaxis:   - Stress ulcer prophylaxis: Pantoprazole 40 mg daily for 30 days  - DVT prophylaxis: Subcutaneous heparin, SCD    Disposition:   - Transferred to  on 5/3  - Therapies recommending discharge to TCU. There is possibly an accepting facility now. Patient not medically ready as needs further diuresis and drainage of effusions.     Discussed with Dr. Lisbet Daly PA-C  Cardiothoracic Surgery  Pager [phone]  May 7, 2023          Interval History:     Continued  SOB over night. Patient reports not sleeping for days. Pain is controlled on current regimen. Tolerating diet but low intake so will continue calorie counts, +flatulence, +BM. Breathing stable on 1-2LPM this afternoon.  Encouraged to use IS. Working with therapies, walked in room to bathroom this morning. Continue to encourage patient up to chair for all meals today. Denies CP, N/V, dizziness, syncope, palpitations, fever, chills, myalgias, and sternal popping/clicking.          Physical Exam:     Gen: A&Ox4, NAD  Neuro: no focal deficits   CV: RRR, normal S1 S2, no murmurs, rubs or gallops  Pulm: CTA, no wheezing or rhonchi, normal breathing on 1-2 lpm  Abd: nondistended, normal BS, soft, nontender  Ext: moderate peripheral  edema, 1+ pitting  Incision: clean, dry, intact, no erythema, sternum stable  Tubes/drain sites: dressing clean and dry         Data:    Imaging:  reviewed recent imaging, no acute concerns    Recent Results (from the past 24 hour(s))   XR Chest Port 1 View    Narrative    Exam: XR CHEST PORT 1 VIEW, 5/7/2023 8:45 AM    Indication: post-op monitoring for effusions    Comparison: Chest radiograph 5/6/2023, chest CT 3/1/2023    Findings:   Right midline terminates over the axilla. Median sternotomy wires and  aortic valve prosthesis. Unchanged cardiomegaly. Stable left greater  than right pleural effusions. Unchanged left basilar pulmonary opacity  and hazy bilateral pulmonary opacities.      Impression    Impression:   Unchanged cardiomegaly, bilateral pulmonary opacities, and left  greater than right pleural effusions. Continued retrocardiac  atelectasis/consolidation. Recommend follow-up to clearing.    I have personally reviewed the examination and initial interpretation  and I agree with the findings.    ADONIS TROTTER MD         SYSTEM ID:  [id number]      Echocardiogram 5/7/23  Interpretation Summary  Ascending aorta aneurysm repair with 32 mm Hemashield graft and aortic valve  replacement with 21 mm Aponte Resilia on 04/25/2023.  Left ventricular function is decreased. The ejection fraction is 40-45%  (mildly reduced). There is significant [id number] variability in the LVEF  due to the patient's AF.  Global right ventricular function is normal. The right ventricle is normal  size.  The bioprosthetic aortic valve is well-seated. Leaflet opening is not clearly  visualized. There is no valvular or paravalvular regurgitation. The Vmax is  2.6 m/s, the mean gradient is 15 mmHg, the dimensionless index is 0.30, and  the acceleration time is 80 ms.  Mild to moderate tricuspid insufficiency is present.  IVC diameter and respiratory changes fall into an intermediate range  suggesting an RA pressure of 8  mmHg.  There is a small pericardial effusion adjacent LV lateral segments. The  maximal depth is 1.4 cm next to the left atrioventricular groove. There are no  signs of tamponade.  This study was compared with the study from 04/27/2023. The LV function has  declined modestly due to the patient's AF. There is no significant change in  the RV function. The TR is less severe.  ______________________________________________________________________________  Left Ventricle  Left ventricular function is decreased. The ejection fraction is 40-45%  (mildly reduced). Diastolic function not assessed due to atrial fibrillation.  There is significant yqzv-bo-tqhp variability in the LVEF due to the patient's  AF. Abnormal non-specific septal motion is present.     Right Ventricle  Global right ventricular function is normal. The right ventricle is normal  size.     Atria  Moderate biatrial enlargement is present.     Mitral Valve  Mild to moderate mitral insufficiency is present.     Aortic Valve  The bioprosthetic aortic valve is well-seated. Leaflet opening is not clearly  visualized. There is no valvular or paravalvular regurgitation. The Vmax is  2.6 m/s, the mean gradient is 15 mmHg, the dimensionless index is 0.30, and  the acceleration time is 80 ms.     Tricuspid Valve  The valve leaflets are not well visualized. Mild to moderate tricuspid  insufficiency is present. The right ventricular systolic pressure is  approximated at 19.5 mmHg plus the right atrial pressure.     Pulmonic Valve  The pulmonic valve cannot be assessed.     Vessels  The aorta root is normal. The thoracic aorta cannot be assessed. IVC diameter  and respiratory changes fall into an intermediate range suggesting an RA  pressure of 8 mmHg.     Pericardium  There is a small pericardial effusion adjacent LV lateral segments. The  maximal depth is 1.4 cm next to the left atrioventricular groove. There are no  signs of tamponade.     Compared to Previous  Study  This study was compared with the study from 2023 . The LV function has  declined modestly due to the patient's AF. There is no significant change in  the RV function. The TR is less severe.  ______________________________________________________________________________  Doppler Measurements & Calculations  Ao V2 max: 261.0 cm/sec  Ao max P.2 mmHg  Ao V2 mean: 170.0 cm/sec  Ao mean P.0 mmHg  Ao V2 VTI: 40.9 cm  LV V1 max P.4 mmHg  LV V1 max: 77.4 cm/sec  LV V1 VTI: 12.2 cm  TR max chavez: 220.7 cm/sec  TR max P.5 mmHg  AV Chavez Ratio (DI): 0.30     ______________________________________________________________________________  Report approved by: Diana Beltran 2023 09:48 AM    Labs:  Recent Labs   Lab 23  0458 23  2211 23  1617 23  0751 23  0435 23  0846 23  0449 23  0733 23  0546 23  0753 23  0705 23  0753 23  0729   WBC 11.8*  --   --   --  11.6*  --  10.1  --  9.8  --  10.4   < > 10.2   HGB 8.5*  --   --   --  8.5*  --  8.9*  --  8.8*  --  8.8*   < > 9.0*   MCV 99  --   --   --  98  --  99  --  97  --  94   < > 96     --   --   --  204  --  216  --  206  --  225   < > 217   INR  --   --   --   --   --   --   --   --   --   --   --   --  1.34*     --  139  --  141   < > 138   < > 140  --  139   < > 137   POTASSIUM 3.8  --  4.1  --  3.8   < > 3.6   < > 3.8   < > 3.4   < > 4.1   CHLORIDE 96*  --  96*  --  97*   < > 96*   < > 98  --  98   < > 103   CO2 33*  --  34*  --  34*   < > 34*   < > 33*  --  31*   < > 22   BUN 21.3  --  22.6  --  23.1*   < > 24.8*   < > 26.1*  --  29.2*   < > 38.7*   CR 0.58  --  0.71  --  0.68   < > 0.66   < > 0.65  --  0.55   < > 0.54   ANIONGAP 9  --  9  --  10   < > 8   < > 9  --  10   < > 12   SHERYL 8.6*  --  8.8  --  8.7*   < > 8.7*   < > 8.5*  --  8.6*   < > 8.4*   * 118* 133*   < > 116*   < > 131*   < > 129*   < > 125*   < > 116*   ALBUMIN  --   --   --    --   --   --   --   --  2.9*  --  2.9*   < > 3.1*   PROTTOTAL  --   --   --   --   --   --   --   --  5.4*  --  5.4*   < > 5.6*   BILITOTAL  --   --   --   --   --   --   --   --  0.4  --  0.4   < > 0.4   ALKPHOS  --   --   --   --   --   --   --   --  44  --  44   < > 50   ALT  --   --   --   --   --   --   --   --  73*  --  92*   < > 141*   AST  --   --   --   --   --   --   --   --  55*  --  67*   < >  --     < > = values in this interval not displayed.      GLUCOSE:   Recent Labs   Lab 05/07/23  0458 05/06/23  2211 05/06/23  1617 05/06/23  1224 05/06/23  0751 05/06/23  0435   * 118* 133* 119* 109* 116*

## 2023-05-07 NOTE — PROGRESS NOTES
Calorie Count  Intake recorded for: 5/6  Total Kcals: 783 Total Protein: 21g  Kcals from Hospital Food: 783   Protein: 21g  Kcals from Outside Food (average):0 Protein: 0g  # Meals Ordered from Kitchen: 3 meals ordered  # Meals Recorded: 3 meals recorded (First- 100% 1 sorto slice, 50% pancake, OJ)       (Second- 100% chocolate ice cream, 50% pineapple, regular [employer], 10% chicken salad sandwich)        (Third- 25% veggie pizza)  # Supplements Recorded: 1 (50% of 1 Magic Cup berry)

## 2023-05-08 ENCOUNTER — APPOINTMENT (OUTPATIENT)
Dept: INTERVENTIONAL RADIOLOGY/VASCULAR | Facility: CLINIC | Age: 88
DRG: 219 | End: 2023-05-08
Attending: NURSE PRACTITIONER
Payer: COMMERCIAL

## 2023-05-08 ENCOUNTER — APPOINTMENT (OUTPATIENT)
Dept: GENERAL RADIOLOGY | Facility: CLINIC | Age: 88
DRG: 219 | End: 2023-05-08
Attending: PHYSICIAN ASSISTANT
Payer: COMMERCIAL

## 2023-05-08 ENCOUNTER — APPOINTMENT (OUTPATIENT)
Dept: OCCUPATIONAL THERAPY | Facility: CLINIC | Age: 88
DRG: 219 | End: 2023-05-08
Attending: PHYSICIAN ASSISTANT
Payer: COMMERCIAL

## 2023-05-08 ENCOUNTER — APPOINTMENT (OUTPATIENT)
Dept: GENERAL RADIOLOGY | Facility: CLINIC | Age: 88
DRG: 219 | End: 2023-05-08
Attending: SURGERY
Payer: COMMERCIAL

## 2023-05-08 LAB
ANION GAP SERPL CALCULATED.3IONS-SCNC: 9 MMOL/L (ref 7–15)
ANION GAP SERPL CALCULATED.3IONS-SCNC: 9 MMOL/L (ref 7–15)
ATRIAL RATE - MUSE: NORMAL BPM
BUN SERPL-MCNC: 18.7 MG/DL (ref 8–23)
BUN SERPL-MCNC: 21.5 MG/DL (ref 8–23)
CALCIUM SERPL-MCNC: 8.9 MG/DL (ref 8.8–10.2)
CALCIUM SERPL-MCNC: 8.9 MG/DL (ref 8.8–10.2)
CHLORIDE SERPL-SCNC: 95 MMOL/L (ref 98–107)
CHLORIDE SERPL-SCNC: 95 MMOL/L (ref 98–107)
CREAT SERPL-MCNC: 0.62 MG/DL (ref 0.51–0.95)
CREAT SERPL-MCNC: 0.62 MG/DL (ref 0.51–0.95)
DEPRECATED HCO3 PLAS-SCNC: 33 MMOL/L (ref 22–29)
DEPRECATED HCO3 PLAS-SCNC: 33 MMOL/L (ref 22–29)
DIASTOLIC BLOOD PRESSURE - MUSE: NORMAL MMHG
ERYTHROCYTE [DISTWIDTH] IN BLOOD BY AUTOMATED COUNT: 16.2 % (ref 10–15)
GFR SERPL CREATININE-BSD FRML MDRD: 87 ML/MIN/1.73M2
GFR SERPL CREATININE-BSD FRML MDRD: 87 ML/MIN/1.73M2
GLUCOSE BLDC GLUCOMTR-MCNC: 107 MG/DL (ref 70–99)
GLUCOSE BLDC GLUCOMTR-MCNC: 111 MG/DL (ref 70–99)
GLUCOSE BLDC GLUCOMTR-MCNC: 114 MG/DL (ref 70–99)
GLUCOSE BLDC GLUCOMTR-MCNC: 115 MG/DL (ref 70–99)
GLUCOSE SERPL-MCNC: 112 MG/DL (ref 70–99)
GLUCOSE SERPL-MCNC: 125 MG/DL (ref 70–99)
HCT VFR BLD AUTO: 29.7 % (ref 35–47)
HGB BLD-MCNC: 8.9 G/DL (ref 11.7–15.7)
INTERPRETATION ECG - MUSE: NORMAL
MAGNESIUM SERPL-MCNC: 1.8 MG/DL (ref 1.7–2.3)
MCH RBC QN AUTO: 29.4 PG (ref 26.5–33)
MCHC RBC AUTO-ENTMCNC: 30 G/DL (ref 31.5–36.5)
MCV RBC AUTO: 98 FL (ref 78–100)
P AXIS - MUSE: NORMAL DEGREES
PLATELET # BLD AUTO: 204 10E3/UL (ref 150–450)
POTASSIUM SERPL-SCNC: 4.3 MMOL/L (ref 3.4–5.3)
POTASSIUM SERPL-SCNC: 4.5 MMOL/L (ref 3.4–5.3)
PR INTERVAL - MUSE: NORMAL MS
QRS DURATION - MUSE: 76 MS
QT - MUSE: 270 MS
QTC - MUSE: 350 MS
R AXIS - MUSE: -7 DEGREES
RBC # BLD AUTO: 3.03 10E6/UL (ref 3.8–5.2)
SODIUM SERPL-SCNC: 137 MMOL/L (ref 136–145)
SODIUM SERPL-SCNC: 137 MMOL/L (ref 136–145)
SYSTOLIC BLOOD PRESSURE - MUSE: NORMAL MMHG
T AXIS - MUSE: 33 DEGREES
VENTRICULAR RATE- MUSE: 101 BPM
WBC # BLD AUTO: 10.6 10E3/UL (ref 4–11)

## 2023-05-08 PROCEDURE — 36415 COLL VENOUS BLD VENIPUNCTURE: CPT

## 2023-05-08 PROCEDURE — 85014 HEMATOCRIT: CPT

## 2023-05-08 PROCEDURE — 80048 BASIC METABOLIC PNL TOTAL CA: CPT | Performed by: PHYSICIAN ASSISTANT

## 2023-05-08 PROCEDURE — 999N000065 XR CHEST PORT 1 VIEW

## 2023-05-08 PROCEDURE — 250N000013 HC RX MED GY IP 250 OP 250 PS 637: Performed by: SURGERY

## 2023-05-08 PROCEDURE — 94640 AIRWAY INHALATION TREATMENT: CPT | Mod: 76

## 2023-05-08 PROCEDURE — 71045 X-RAY EXAM CHEST 1 VIEW: CPT | Mod: 26 | Performed by: RADIOLOGY

## 2023-05-08 PROCEDURE — 36415 COLL VENOUS BLD VENIPUNCTURE: CPT | Performed by: PHYSICIAN ASSISTANT

## 2023-05-08 PROCEDURE — 97535 SELF CARE MNGMENT TRAINING: CPT | Mod: GO | Performed by: OCCUPATIONAL THERAPIST

## 2023-05-08 PROCEDURE — 83735 ASSAY OF MAGNESIUM: CPT | Performed by: SURGERY

## 2023-05-08 PROCEDURE — 85027 COMPLETE CBC AUTOMATED: CPT | Performed by: STUDENT IN AN ORGANIZED HEALTH CARE EDUCATION/TRAINING PROGRAM

## 2023-05-08 PROCEDURE — 71045 X-RAY EXAM CHEST 1 VIEW: CPT | Mod: 77

## 2023-05-08 PROCEDURE — 250N000009 HC RX 250: Performed by: PHYSICIAN ASSISTANT

## 2023-05-08 PROCEDURE — 250N000013 HC RX MED GY IP 250 OP 250 PS 637: Performed by: STUDENT IN AN ORGANIZED HEALTH CARE EDUCATION/TRAINING PROGRAM

## 2023-05-08 PROCEDURE — 120N000003 HC R&B IMCU UMMC

## 2023-05-08 PROCEDURE — 250N000011 HC RX IP 250 OP 636: Performed by: NURSE PRACTITIONER

## 2023-05-08 PROCEDURE — 0W9B30Z DRAINAGE OF LEFT PLEURAL CAVITY WITH DRAINAGE DEVICE, PERCUTANEOUS APPROACH: ICD-10-PCS | Performed by: PHYSICIAN ASSISTANT

## 2023-05-08 PROCEDURE — 32557 INSERT CATH PLEURA W/ IMAGE: CPT | Mod: 50

## 2023-05-08 PROCEDURE — 32557 INSERT CATH PLEURA W/ IMAGE: CPT | Mod: 50 | Performed by: PHYSICIAN ASSISTANT

## 2023-05-08 PROCEDURE — 0W9930Z DRAINAGE OF RIGHT PLEURAL CAVITY WITH DRAINAGE DEVICE, PERCUTANEOUS APPROACH: ICD-10-PCS | Performed by: PHYSICIAN ASSISTANT

## 2023-05-08 PROCEDURE — 250N000013 HC RX MED GY IP 250 OP 250 PS 637: Performed by: PHYSICIAN ASSISTANT

## 2023-05-08 PROCEDURE — 71045 X-RAY EXAM CHEST 1 VIEW: CPT

## 2023-05-08 RX ORDER — BUMETANIDE 1 MG/1
1 TABLET ORAL ONCE
Status: COMPLETED | OUTPATIENT
Start: 2023-05-08 | End: 2023-05-08

## 2023-05-08 RX ORDER — MAGNESIUM OXIDE 400 MG/1
400 TABLET ORAL EVERY 4 HOURS
Status: DISCONTINUED | OUTPATIENT
Start: 2023-05-08 | End: 2023-05-08

## 2023-05-08 RX ORDER — LIDOCAINE HYDROCHLORIDE 10 MG/ML
1-30 INJECTION, SOLUTION EPIDURAL; INFILTRATION; INTRACAUDAL; PERINEURAL
Status: COMPLETED | OUTPATIENT
Start: 2023-05-08 | End: 2023-05-08

## 2023-05-08 RX ORDER — MAGNESIUM OXIDE 400 MG/1
400 TABLET ORAL EVERY 4 HOURS
Status: COMPLETED | OUTPATIENT
Start: 2023-05-08 | End: 2023-05-08

## 2023-05-08 RX ADMIN — ASPIRIN 81 MG CHEWABLE TABLET 81 MG: 81 TABLET CHEWABLE at 08:22

## 2023-05-08 RX ADMIN — ACETAMINOPHEN 650 MG: 325 TABLET ORAL at 12:56

## 2023-05-08 RX ADMIN — LIDOCAINE HYDROCHLORIDE 15 ML: 10 INJECTION, SOLUTION EPIDURAL; INFILTRATION; INTRACAUDAL; PERINEURAL at 09:33

## 2023-05-08 RX ADMIN — ACETAMINOPHEN 650 MG: 325 TABLET ORAL at 22:02

## 2023-05-08 RX ADMIN — DORZOLAMIDE HYDROCHLORIDE AND TIMOLOL MALEATE 1 DROP: 22.3; 6.8 SOLUTION/ DROPS OPHTHALMIC at 08:23

## 2023-05-08 RX ADMIN — Medication 400 MG: at 06:31

## 2023-05-08 RX ADMIN — HEPARIN SODIUM 5000 UNITS: 5000 INJECTION, SOLUTION INTRAVENOUS; SUBCUTANEOUS at 06:32

## 2023-05-08 RX ADMIN — BUMETANIDE 1 MG: 1 TABLET ORAL at 08:22

## 2023-05-08 RX ADMIN — BUMETANIDE 1 MG: 1 TABLET ORAL at 17:01

## 2023-05-08 RX ADMIN — DORZOLAMIDE HYDROCHLORIDE AND TIMOLOL MALEATE 1 DROP: 22.3; 6.8 SOLUTION/ DROPS OPHTHALMIC at 21:45

## 2023-05-08 RX ADMIN — POTASSIUM CHLORIDE 20 MEQ: 750 TABLET, EXTENDED RELEASE ORAL at 22:01

## 2023-05-08 RX ADMIN — LEVALBUTEROL HYDROCHLORIDE 0.63 MG: 0.63 SOLUTION RESPIRATORY (INHALATION) at 21:07

## 2023-05-08 RX ADMIN — PANTOPRAZOLE SODIUM 40 MG: 40 TABLET, DELAYED RELEASE ORAL at 06:32

## 2023-05-08 RX ADMIN — LATANOPROST 1 DROP: 50 SOLUTION OPHTHALMIC at 21:46

## 2023-05-08 RX ADMIN — HEPARIN SODIUM 5000 UNITS: 5000 INJECTION, SOLUTION INTRAVENOUS; SUBCUTANEOUS at 21:46

## 2023-05-08 RX ADMIN — Medication 400 MG: at 11:22

## 2023-05-08 RX ADMIN — SIMVASTATIN 20 MG: 20 TABLET, FILM COATED ORAL at 21:46

## 2023-05-08 RX ADMIN — POTASSIUM CHLORIDE 20 MEQ: 750 TABLET, EXTENDED RELEASE ORAL at 08:22

## 2023-05-08 RX ADMIN — HEPARIN SODIUM 5000 UNITS: 5000 INJECTION, SOLUTION INTRAVENOUS; SUBCUTANEOUS at 13:04

## 2023-05-08 RX ADMIN — TRAMADOL HYDROCHLORIDE 25 MG: 50 TABLET, COATED ORAL at 17:37

## 2023-05-08 ASSESSMENT — ACTIVITIES OF DAILY LIVING (ADL)
ADLS_ACUITY_SCORE: 29
ADLS_ACUITY_SCORE: 29
ADLS_ACUITY_SCORE: 27
ADLS_ACUITY_SCORE: 29
ADLS_ACUITY_SCORE: 27

## 2023-05-08 NOTE — PROGRESS NOTES
Cardiovascular Surgery Progress Note  05/08/2023         Assessment and Plan:     Cydney Christiansen is an 87 year old female with a PMH of HTN, HLD, bilateral glaucoma, scoliosis, compression fracture of thoracic vertebrae, osteoporosis, history of bladder cancer, invasive ductal carcinoma of left breast s/p lumpectomy (2015), and multiple other skin cancer sites s/p removal, severe aortic stenosis with bicuspid aortic valve and an ascending aortic aneurysm. Patient is now s/p AVR (Aponte Resilia 21 mm valve) and ascending aorta repair on 4/27/23 with Dr. Solis.    Cardiovascular:   Hx of severe AORTIC STENOSIS 2/2 to bicuspid aortic valve  - s/p AVR 4/25  Ascending aortic aneurysm s/p graft repair 4/25  Hx HTN, HLD  Severe tricuspid insufficiency post op   Afib - rate controlled  HD stable, in rate controlled Afib. ICU discussed no anticoagulation with Dr. Solis and patient refused d/t bleeding risk. HR , SBP .   TTE 4/27 showed LV EF 45-50%; Mild right ventricular dilation with normal global right ventricular function. A bioprosthetic aortic valve with PV of 2.2m/s and MG of 12mmHg. Severe tricuspid insufficiency.   - plan to repeat TTE once closer to pre-op weight  - Goal MAP>65, SBP<140 per Dr Solis   - ASA 81 mg daily  - PTA simvastatin  - Metoprolol tartrate 50 mg BID, decrease to 25 mg po bid with hold parameters.  Systolic blood pressure goal of 130-140 due to age. Systolic's in 80's late morning 5/6.   - Diuresis as below  - Echo 5/7: EF 40-45%, Aortic valve with mean gradient of 15 mmHg, mild to moderate TR, RA 8, trace pericardial effusion.     CT/PW removed in the ICU    Pulmonary:  Acute hypoxic respiratory insufficiency  Hx asthma  Bilateral pleural effusions   - S/p right thoracentesis 5/3 with 600 ml removed  Extubated POD 1. Now saturating well on 1 lpm.   - Supplemental O2 PRN to keep sats > 92%. Wean off as tolerated.  - Pulm hygiene, IS, activity and deep breathing  - PTA albuterol  PRN  - Mucomyst neb PRN  - Patient complaining of SOB 5/5 even though sating in high 90s. Will schedule BID nebs for the next day or two to help this feeling and with her hx of asthma.   - On diuresis   - CT chest showed moderate bilateral effusions 5/7.   - More SOB: Bilateral pleural effusions, will consult medicine team for thoracentesis-not comfortable with performing due to possible loculation and posterior location. IR placed bilateral pigtails 5/8, 500cc out on left and 600 on right. Patient having severe pain from placement. Will get CXR stat and remove pigtails.       Neurology / MSK:  Acute post-operative pain   Pain well controlled with current regimen:  - Acetaminophen, PO oxycodone PRN  - delirium precautions  - trazodone at bedtime for sleep  - Melatonin 3mg q HS   - Added Atarax to help with anxiety     / Renal / Fluid / Electrolytes:  HELEN, resolved  BL creat ~ 0.6, most recent creatinine 0.66; adequate UOP.   FLUID STATUS: Pre-op weight 137 lbs, weight today 141 standing scale; urine output slowing down  - Diuresis: Bumex 2mg IV BID 5/5.  Continues on oxygen via NC. Get standing weight today.   - Replete lytes per protocol  - Strict I/O, daily weights  - Avoid/limit nephrotoxins as able  - Patient needed to be straight cathed 4x 5/4-5/5, traylor order placed 5/5 afternoon.  - Low urine output overnight. Traylor replaced 5/5 for retention. Check UA-negative.  - 5/6 On Bumex 2 mg IV bid. Blood pressure softer this afternoon. Got both dosages of Bumex,  - 5/7 Stop IV Bumex, gently diurese with Bumex 1 mg po bid. Likely third spacing fluid. Order lymphedema for compression stockings.   - 5/8 Bumex 1 mg po bid, still orthopneic, give extra dose today.     GI / Nutrition:   Transaminitis, downtrending  Severe malnutrition in the context of acute illness  - Tolerating regular diet, however continues to have low oral intake  - discussed with patient and family if intake remains low, may need NG for TF admin  -  Continue bowel regimen, last BM 5/5    Endocrine:  Stress induced hyperglycemia   Osteoporosis c/b compression fracture of spine  Hgb A1c 6.0  - Initially managed on insulin drip postop, transitioned to sliding scale; goal BG <180 for optimal healing  - PTA med alendronate    Infectious Disease:  Stress induced leukocytosis  WBC WNL , remains afebrile, no signs or symptoms of infection  - Completed perioperative antibiotics  - Continue to monitor fever curve, CBC    Hematology:   Acute blood loss anemia and thrombocytopenia  Hgb stable; Plt stable, no signs or symptoms of active bleeding  - CBC daily    Anticoagulation:   - ASA only  - Discussion had with Dr. Solis regarding anticoagulation for AF stroke risk. Will not start anticoagulation at this time.     MSK/Skin:  Sternotomy  Surgical incision  - Sternal precautions  - Incisional cares per protocol    Prophylaxis:   - Stress ulcer prophylaxis: Pantoprazole 40 mg daily for 30 days  - DVT prophylaxis: Subcutaneous heparin, SCD    Disposition:   - Transferred to  on 5/3  - Therapies recommending discharge to TCU. There is possibly an accepting facility now. Patient not medically ready as needs further diuresis and drainage of effusions.     Discussed with Dr. Will Daly PABURKE  Cardiothoracic Surgery  Pager [phone]  May 8, 2023          Interval History:     Continued  SOB over night. Worse today. Patient reports not sleeping for days. Pain is controlled on current regimen. Tolerating diet but low intake so will continue calorie counts, +flatulence, +BM. Increasing O2 demand, 2L  Encouraged to use IS. Working with therapies, walked in room to bathroom this morning. Continue to encourage patient up to chair for all meals today. Denies CP, N/V, dizziness, syncope, palpitations, fever, chills, myalgias, and sternal popping/clicking.          Physical Exam:     Gen: A&Ox4, NAD  Neuro: no focal deficits   CV: RRR, normal S1 S2, no murmurs, rubs  or gallops  Pulm: CTA, no wheezing or rhonchi, normal breathing on 2 lpm, diminished bases.   Abd: nondistended, normal BS, soft, nontender  Ext: LE  peripheral edema, 1+ pitting improving   Incision: clean, dry, intact, no erythema, sternum stable  Tubes/drain sites: dressing clean and dry         Data:    Imaging:  reviewed recent imaging, no acute concerns    Recent Results (from the past 24 hour(s))   IR Chest Tube Place Non Tunneled Bilateral    Narrative    PRE-PROCEDURE DIAGNOSIS: Pleural effusion    POST-PROCEDURE DIAGNOSIS: Same    PROCEDURE: Chest tube placement non-tunneled    Impression    IMPRESSION: Completed ultrasound-guided bilateral chest tube  placement. 10 Kazakh chest tube placed in the bilateral pleural space  and connected to water seal drainage. No immediate complication.    ----------    CLINICAL HISTORY: The patient has a history of pleural effusion after  cardiac surgery. Bilateral chest tube placement requested.    PERFORMED BY: Dameon Mccann PA-C    CONSENT: Written informed consent was obtained and is documented in  the patient record.    MEDICATIONS: 1% lidocaine for local anesthesia    NURSING: The patient was placed on continuous vital signs monitoring.  Vital signs were monitored by nursing staff under my supervision.      DESCRIPTION: The skin overlying the pleural effusion was prepped and  draped in the usual sterile fashion. Under continuous ultrasound  visualization, the skin and pleural was anesthetized before a small  skin nick was made with a #11 blade. A 5 Kazakh centesis  needle/catheter was advanced into the pleural space before the  catheter was advanced off the needle and the needle was removed. A  0.035 superstiff Amplatzwire was advanced through the catheter and 5  Kazakh catheter was exchanged over wire for a non-locking pigtail  chest tube after tissue dilation. The drain was secured to the skin  with a 2-0 nylon suture and a sterile bandage was applied. The  chest  tube drain was connected to drainage system under water seal.  Ultrasound images were stored in the patient's record.    The right-sided chest tube was placed first followed in the same  manner by left-sided chest tube placement.    COMPLICATIONS: No immediate concerns, the patient remained stable  throughout the procedure and tolerated it well.    ESTIMATED BLOOD LOSS: Minimal    SPECIMENS: None    SONDRA ROUSE PA-C         SYSTEM ID:  [id number]   XR Chest Port 1 View    Narrative    Portable chest    INDICATION: Postoperative monitored for effusions    COMPARISON: 5/7/2023    FINDINGS: Heart is enlarged. Left hemidiaphragm remains obscured and  there is continued meniscus formation at the left costophrenic angle.  Mild blunting of the right costophrenic angle also noted. Median  sternotomy again present. Bones are osteopenic. Well-defined small  nodular density in the left upper lung field appears similar. This  consists with a small centrally calcified nodule on the recent chest  CT of yesterday. Right midline catheter again terminates in the  axilla.      Impression    IMPRESSION: Unchanged bilateral pleural effusions with extensive  retrocardiac atelectasis, recommend follow-up to clearing to exclude  infection.    ADONIS TROTTER MD         SYSTEM ID:  [id number]     5/7/23  1:25 PM [id number] [facility]      PACS Images     Show images for CT Chest w/o Contrast     Study Result    Narrative & Impression   EXAMINATION: CT CHEST W/O CONTRAST, 5/7/2023 1:25 PM     TECHNIQUE:  Helical CT images from the thoracic inlet through the  symphysis pubis were obtained without contrast.      COMPARISON: Chest CT 3/1/2023, thoracentesis 5/7/2023     HISTORY: plerual effusions     FINDINGS:     Thorax     Lungs: Central tracheobronchial wall thickening. Mucous plugging of  some distal bronchioles in the right lower lobe. Moderate bilateral  pleural effusions with adjacent compressive  atelectasis, predominantly  in the lower lobes. Unchanged 1.4 cm grouping of adjacent solid  pulmonary nodules in the left upper lobe, series 4 image 64.     Remainder of chest: Surgical changes of ascending aortic aneurysm  repair. Aortic valve prosthesis. Crescentic surrounding the ascending  aorta measuring 21 mm in thickness. Small pericardial effusion. Aortic  atherosclerosis. Unremarkable thyroid gland.     Upper abdomen: No evidence of acute pathology or  suspicious mass in  the visualized upper abdomen. Aortic atherosclerotic calcifications  are present.     Bones/Soft Tissues:  Kyphotic deformity of the thoracic spine.  Unchanged anterior wedge compression deformities of T5, T7, T8, and  T11. Anterior right ununited first and second rib fractures. Median  sternotomy wires intact. Small anterior mediastinal hematoma  underlying the sternotomy wires. Mild dextrocurvature mid thoracic  spine and compensatory levocurvature lumbar spine.                                                                      IMPRESSION:   1. Moderate bilateral pleural effusions with associated compressive  atelectasis.  2. Surgical changes of ascending aortic aneurysm repair with  crescentic hematoma surrounding the ascending aorta graft measuring 21  mm in thickness.  3. Small pericardial effusion.  4. Pulmonary nodule measuring 1.4 cm and the left upper lobe,  unchanged since 2/17/2023. Recommend additional tissue sampling,  PET/CT, or additional 3 month follow-up chest CT according to  Fleischner Society guidelines.  5. Unchanged appearance of multiple osteopenic wedge compression  fractures of the thoracic spine.     I have personally reviewed the examination and initial interpretation  and I agree with the findings.     ADONIS TROTTER MD         SYSTEM ID:  [id number]       Echocardiogram 5/7/23  Interpretation Summary  Ascending aorta aneurysm repair with 32 mm Hemashield graft and aortic valve  replacement with 21 mm  Aponte Resilia on 04/25/2023.  Left ventricular function is decreased. The ejection fraction is 40-45%  (mildly reduced). There is significant acib-zw-[id number] variability in the LVEF  due to the patient's AF.  Global right ventricular function is normal. The right ventricle is normal  size.  The bioprosthetic aortic valve is well-seated. Leaflet opening is not clearly  visualized. There is no valvular or paravalvular regurgitation. The Vmax is  2.6 m/s, the mean gradient is 15 mmHg, the dimensionless index is 0.30, and  the acceleration time is 80 ms.  Mild to moderate tricuspid insufficiency is present.  IVC diameter and respiratory changes fall into an intermediate range  suggesting an RA pressure of 8 mmHg.  There is a small pericardial effusion adjacent LV lateral segments. The  maximal depth is 1.4 cm next to the left atrioventricular groove. There are no  signs of tamponade.  This study was compared with the study from 04/27/2023. The LV function has  declined modestly due to the patient's AF. There is no significant change in  the RV function. The TR is less severe.  ______________________________________________________________________________  Left Ventricle  Left ventricular function is decreased. The ejection fraction is 40-45%  (mildly reduced). Diastolic function not assessed due to atrial fibrillation.  There is significant upcv-vi-zvzn variability in the LVEF due to the patient's  AF. Abnormal non-specific septal motion is present.     Right Ventricle  Global right ventricular function is normal. The right ventricle is normal  size.     Atria  Moderate biatrial enlargement is present.     Mitral Valve  Mild to moderate mitral insufficiency is present.     Aortic Valve  The bioprosthetic aortic valve is well-seated. Leaflet opening is not clearly  visualized. There is no valvular or paravalvular regurgitation. The Vmax is  2.6 m/s, the mean gradient is 15 mmHg, the dimensionless index is 0.30, and  the  acceleration time is 80 ms.     Tricuspid Valve  The valve leaflets are not well visualized. Mild to moderate tricuspid  insufficiency is present. The right ventricular systolic pressure is  approximated at 19.5 mmHg plus the right atrial pressure.     Pulmonic Valve  The pulmonic valve cannot be assessed.     Vessels  The aorta root is normal. The thoracic aorta cannot be assessed. IVC diameter  and respiratory changes fall into an intermediate range suggesting an RA  pressure of 8 mmHg.     Pericardium  There is a small pericardial effusion adjacent LV lateral segments. The  maximal depth is 1.4 cm next to the left atrioventricular groove. There are no  signs of tamponade.     Compared to Previous Study  This study was compared with the study from 2023 . The LV function has  declined modestly due to the patient's AF. There is no significant change in  the RV function. The TR is less severe.  ______________________________________________________________________________  Doppler Measurements & Calculations  Ao V2 max: 261.0 cm/sec  Ao max P.2 mmHg  Ao V2 mean: 170.0 cm/sec  Ao mean P.0 mmHg  Ao V2 VTI: 40.9 cm  LV V1 max P.4 mmHg  LV V1 max: 77.4 cm/sec  LV V1 VTI: 12.2 cm  TR max chavez: 220.7 cm/sec  TR max P.5 mmHg  AV Chavez Ratio (DI): 0.30     ______________________________________________________________________________  Report approved by: Diana Beltran 2023 09:48 AM    Labs:  Recent Labs   Lab 23  1300 23  0844 23  0438 23  2124 23  1748 23  1236 23  1042 23  0458 23  0751 23  0435 23  0733 23  0546 23  0753 23  0705   WBC  --   --  10.6  --   --   --   --  11.8*  --  11.6*   < > 9.8  --  10.4   HGB  --   --  8.9*  --   --   --   --  8.5*  --  8.5*   < > 8.8*  --  8.8*   MCV  --   --  98  --   --   --   --  99  --  98   < > 97  --  94   PLT  --   --  204  --   --   --   --  187  --  204   < >  206  --  225   INR  --   --   --   --   --   --  1.25*  --   --   --   --   --   --   --    NA  --   --  137  --  135*  --   --  138   < > 141   < > 140  --  139   POTASSIUM  --   --  4.3  --  4.2  --   --  3.8   < > 3.8   < > 3.8   < > 3.4   CHLORIDE  --   --  95*  --  94*  --   --  96*   < > 97*   < > 98  --  98   CO2  --   --  33*  --  33*  --   --  33*   < > 34*   < > 33*  --  31*   BUN  --   --  21.5  --  21.8  --   --  21.3   < > 23.1*   < > 26.1*  --  29.2*   CR  --   --  0.62  --  0.60  --   --  0.58   < > 0.68   < > 0.65  --  0.55   ANIONGAP  --   --  9  --  8  --   --  9   < > 10   < > 9  --  10   SHERYL  --   --  8.9  --  8.8  --   --  8.6*   < > 8.7*   < > 8.5*  --  8.6*   * 111* 125*   < > 177*   < >  --  120*   < > 116*   < > 129*   < > 125*   ALBUMIN  --   --   --   --   --   --   --   --   --   --   --  2.9*  --  2.9*   PROTTOTAL  --   --   --   --   --   --   --   --   --   --   --  5.4*  --  5.4*   BILITOTAL  --   --   --   --   --   --   --   --   --   --   --  0.4  --  0.4   ALKPHOS  --   --   --   --   --   --   --   --   --   --   --  44  --  44   ALT  --   --   --   --   --   --   --   --   --   --   --  73*  --  92*   AST  --   --   --   --   --   --   --   --   --   --   --  55*  --  67*    < > = values in this interval not displayed.      GLUCOSE:   Recent Labs   Lab 05/08/23  1300 05/08/23  0844 05/08/23  0438 05/07/23  2124 05/07/23  1748 05/07/23  1730   * 111* 125* 136* 177* 170*

## 2023-05-08 NOTE — PROGRESS NOTES
Calorie Count  Intake recorded for: 5/7  Total Kcals: 503 Total Protein: 15g  Kcals from Hospital Food: 503   Protein: 15g  Kcals from Outside Food (average):0 Protein: 0g  # Meals Ordered from Kitchen: 3 meals  # Meals Recorded: 1 meal (50% 1 Arabic toast w/ syrup, 2 strips of sorto, 25% coffee w/ cream and sugar)  # Supplements Recorded: 100% 1 Magic cup

## 2023-05-08 NOTE — CONSULTS
"    Interventional Radiology  [facility] Consult Service Note  05/08/23   8:13 AM    Consult Requested: \"plerual effusions, possible drainage, pigtail placement\"    Recommendations/Plan:    Patient is on IR schedule 5/8 for a bilateral chest tube placement for pleural effusion.   Labs WNL for procedure   No NPO required.  Consent will be done prior to procedure.     Please contact the IR charge RN at [phone] for estimated time of procedure.     Case and imaging discussed with IR attending, Dr. Jackson. Recommendations were reviewed with Elizabet Rojas PA-C.    This is a 87 year old female with a PMH of HTN, HLD, bilateral glaucoma, scoliosis, compression fracture of thoracic vertebrae, osteoporosis, history of bladder cancer, invasive ductal carcinoma of left breast s/p lumpectomy (2015), and multiple other skin cancer sites s/p removal, severe aortic stenosis with bicuspid aortic valve and an ascending aortic aneurysm. Patient is now s/p AVR (Aponte Resilia 21 mm valve) and ascending aorta repair on 4/27/23 with Dr. Solis. Pt with bilateral pleural effusions post operatively. Right thoracentesis with IR 5/3 with 600 mLs drained. IR is now consulted for bilateral chest tube placement for pleural effusions. Imaging confirms effusions bilaterally, R>L. Pt is SOB. No dx labs requested.    Pertinent Imaging Reviewed:         Expected date of discharge:  TBD    Vitals:   /81 (BP Location: Left arm)   Pulse 82   Temp 98  F (36.7  C) (Oral)   Resp 18   Ht 1.499 m (4' 11\")   Wt 63.3 kg (139 lb 8.8 oz)   LMP  (LMP Unknown)   SpO2 99%   BMI 28.19 kg/m      Pertinent Labs:   Lab Results   Component Value Date    WBC 10.6 05/08/2023    WBC 11.8 (H) 05/07/2023    WBC 11.6 (H) 05/06/2023    WBC 6.1 09/22/2020    WBC 4.8 03/03/2020    WBC 5.1 09/17/2019     Lab Results   Component Value Date    HGB 8.9 05/08/2023    HGB 8.5 05/07/2023    HGB 8.5 05/06/2023    HGB 10.7 09/22/2020    HGB 10.9 " 03/03/2020    HGB 10.7 09/17/2019     Lab Results   Component Value Date     05/08/2023     05/07/2023     05/06/2023     09/22/2020     03/03/2020     09/17/2019     Lab Results   Component Value Date    INR 1.25 (H) 05/07/2023    PTT 55 (H) 05/01/2023     Lab Results   Component Value Date    POTASSIUM 4.3 05/08/2023    POTASSIUM 4.1 04/25/2023    POTASSIUM 4.5 05/03/2021        COVID-19 Antibody Results, Testing for Immunity         No data to display            COVID-19 PCR Results        4/6/2022    14:54 4/21/2023    11:56   COVID-19 PCR Results   SARS CoV2 PCR Negative   Negative         KRISTIE Baker CNP  Interventional Radiology  Pager: [phone]

## 2023-05-08 NOTE — PROGRESS NOTES
"CLINICAL NUTRITION SERVICES - BRIEF NOTE     Nutrition Prescription    RECOMMENDATIONS FOR MDs/PROVIDERS TO ORDER:    Monitor ability for pt to consistently consume at least 800 Kcals and 40 gm protein PO (65% est needs) vs need to consider supplemental nutrition support if aligns with GOC/POC      Recommendations already ordered by Registered Dietitian (RD):    Extend ceasar cts 5/9-5/11    Continue Magic Cup supplements, increase frequency to TID    Modify from regular Ensure to Ensure mixed with Ice Cream, send with meal trays BID     Continue room service with assistance as ordered to support meal stability      Future/Additional Recommendations:  Continue to monitor nutrition related findings per protocol    For last full RD assessment, see note dated 5/4/23    NEW FINDINGS   Calorie count completion:   5/5         Total Kcals: 0           Total Protein: 0g - 3 meals ordered, none documented to sheet   5/6         Total Kcals: 783       Total Protein: 21g - 3 meals ordered, and 3 meals documented to sheet. 50% of 1 Magic Cup supplement noted.   5/7         Total Kcals: 503       Total Protein: 15g - 3 meals ordered; 1 meal documented to sheet. 100% of 1 Magic Cup supplement noted.     - Poor appetite, ongoing. Pain is a barrier today. No longer having loose stools, which was previously also contributing to poor appetite last week. Pt was able to take 2 Magic Cup supplements yesterday, per son report. Son also offering some Belvita bars from home, which pt likes. Pt agreed to increasing frequency of Magic Cup to TID. Discussed Ensure Mixed with Ice Cream; plan to send with meals BID instead of regular Ensure.    - Pt reports \"I don't think I can eat until my pain is better controlled\" Pt later stated \"i'm not sure if I even want to get better\" . Will monitor nutrition POC and pt GOC. Paged primary team to discuss/collaborate.     INTERVENTIONS  Implementation  Collaboration with other nutrition professionals - Ceasar " cts, RS assist  Supplements: Modify as above    Monitoring/Evaluation  Will continue to monitor and evaluate per protocol.    Ruslan Vázquez RDN, LD, CNSC  6B work-room RD phone: *[phone]   6B/Obs RD pager: [phone]    Weekend/Holiday RD pager [phone]

## 2023-05-08 NOTE — PROCEDURES
Interventional Radiology Brief Post Procedure Note    Procedure: IR CHEST TUBE PLACEMENT NON-TUNNELED BILATERAL    Proceduralist: Dameon Mccann PA-C    Assistant: None    Time Out: Prior to the start of the procedure and with procedural staff participation, I verbally confirmed the patient s identity using two indicators, relevant allergies, that the procedure was appropriate and matched the consent or emergent situation, and that the correct equipment/implants were available. Immediately prior to starting the procedure I conducted the Time Out with the procedural staff and re-confirmed the patient s name, procedure, and site/side. (The Joint Commission universal protocol was followed.)  Yes    Medications   Medication Event Details Admin User Admin Time       Sedation: None. Local Anesthestic used    Findings: Completed image guided bilateral nontunneled nonlocking chest tube placement.  Bilateral 10 [place] nonlocking chest tubes placed.  Clear dark red fluid noted from the right pleural space and clear pink fluid noted from the left pleural space.  Patient tolerated procedure well.  Chest tubes connected to drainage.  Patient tolerated procedure well.    Estimated Blood Loss: Minimal    SPECIMENS: None    Complications: 1. None     Condition: Stable    Plan: 1 hour bedrest - follow-up per primary team.     Comments: See dictated procedure note for full details.    Dameon Mccann PA-C

## 2023-05-08 NOTE — IR NOTE
Patient Name: Cydney Chirstiansen  Medical Record Number: [id number]  Today's Date: 5/8/2023    Procedure: Bilateral Chest Tube Placement  Proceduralist: Dameon Mccann PA-C  Pathology present: N/A    Procedure Start: 0921  Procedure end: 0940  Sedation medications administered: local only    Report given to: MARIEL Ramirez 6B  : N/A    Other Notes: Pt arrived to IR room 2  from . Consent signed & reviewed. Pt denies any questions or concerns regarding procedure. Pt positioned sitting up and monitored per protocol. No dx labs ordered.    420 mL serosanguinous fluid out on right.  20 mL serosanguinous fluid out on left.     Pt tolerated procedure without any noted complications. Pt transferred back to .

## 2023-05-08 NOTE — PLAN OF CARE
"Neuro: A&Ox4. Very anxious, agitated and frustrated overnight. Pt woke multiple times trying to get out of bed, taking tele patches off, taking gown off, etc. But each time, pt able to answer orientation questions correctly.  Cardiac: Afib, rate controlled. VSS.  Respiratory: Sating >90% on 2L NC overnight, but pt notes breathing feels \"heavy\"  GI/: Helms. UOP low overnight. No BM this shift.  Diet/appetite: Poor appetite. Ceasar counts.  Activity:  Assist of 1-2 to reposition in bed. Ax1 up to chair early this am  Pain: No c/o pain overnight  Skin: No new deficits noted.  LDA's: PIV x2, midline x1 double lumen, all SL.    Pt continues with delirium overnight, not much improved from the previous night.    Plan: Continue with POC. Notify primary team with changes.        "

## 2023-05-09 ENCOUNTER — APPOINTMENT (OUTPATIENT)
Dept: GENERAL RADIOLOGY | Facility: CLINIC | Age: 88
DRG: 219 | End: 2023-05-09
Attending: NURSE PRACTITIONER
Payer: COMMERCIAL

## 2023-05-09 ENCOUNTER — APPOINTMENT (OUTPATIENT)
Dept: GENERAL RADIOLOGY | Facility: CLINIC | Age: 88
DRG: 219 | End: 2023-05-09
Attending: SURGERY
Payer: COMMERCIAL

## 2023-05-09 ENCOUNTER — APPOINTMENT (OUTPATIENT)
Dept: OCCUPATIONAL THERAPY | Facility: CLINIC | Age: 88
DRG: 219 | End: 2023-05-09
Attending: SURGERY
Payer: COMMERCIAL

## 2023-05-09 LAB
ALBUMIN SERPL BCG-MCNC: 2.8 G/DL (ref 3.5–5.2)
ANION GAP SERPL CALCULATED.3IONS-SCNC: 6 MMOL/L (ref 7–15)
ANION GAP SERPL CALCULATED.3IONS-SCNC: 9 MMOL/L (ref 7–15)
BUN SERPL-MCNC: 19.3 MG/DL (ref 8–23)
BUN SERPL-MCNC: 20.9 MG/DL (ref 8–23)
CALCIUM SERPL-MCNC: 8.6 MG/DL (ref 8.8–10.2)
CALCIUM SERPL-MCNC: 8.6 MG/DL (ref 8.8–10.2)
CHLORIDE SERPL-SCNC: 97 MMOL/L (ref 98–107)
CHLORIDE SERPL-SCNC: 98 MMOL/L (ref 98–107)
CREAT SERPL-MCNC: 0.7 MG/DL (ref 0.51–0.95)
CREAT SERPL-MCNC: 0.7 MG/DL (ref 0.51–0.95)
DEPRECATED HCO3 PLAS-SCNC: 30 MMOL/L (ref 22–29)
DEPRECATED HCO3 PLAS-SCNC: 33 MMOL/L (ref 22–29)
ERYTHROCYTE [DISTWIDTH] IN BLOOD BY AUTOMATED COUNT: 16.2 % (ref 10–15)
ERYTHROCYTE [DISTWIDTH] IN BLOOD BY AUTOMATED COUNT: 16.5 % (ref 10–15)
GFR SERPL CREATININE-BSD FRML MDRD: 84 ML/MIN/1.73M2
GFR SERPL CREATININE-BSD FRML MDRD: 84 ML/MIN/1.73M2
GLUCOSE BLDC GLUCOMTR-MCNC: 113 MG/DL (ref 70–99)
GLUCOSE BLDC GLUCOMTR-MCNC: 99 MG/DL (ref 70–99)
GLUCOSE SERPL-MCNC: 116 MG/DL (ref 70–99)
GLUCOSE SERPL-MCNC: 157 MG/DL (ref 70–99)
HCT VFR BLD AUTO: 28.1 % (ref 35–47)
HCT VFR BLD AUTO: 31.1 % (ref 35–47)
HGB BLD-MCNC: 8.6 G/DL (ref 11.7–15.7)
HGB BLD-MCNC: 9.6 G/DL (ref 11.7–15.7)
MAGNESIUM SERPL-MCNC: 1.9 MG/DL (ref 1.7–2.3)
MCH RBC QN AUTO: 29.7 PG (ref 26.5–33)
MCH RBC QN AUTO: 30 PG (ref 26.5–33)
MCHC RBC AUTO-ENTMCNC: 30.6 G/DL (ref 31.5–36.5)
MCHC RBC AUTO-ENTMCNC: 30.9 G/DL (ref 31.5–36.5)
MCV RBC AUTO: 97 FL (ref 78–100)
MCV RBC AUTO: 97 FL (ref 78–100)
PLATELET # BLD AUTO: 184 10E3/UL (ref 150–450)
PLATELET # BLD AUTO: 207 10E3/UL (ref 150–450)
POTASSIUM SERPL-SCNC: 4.5 MMOL/L (ref 3.4–5.3)
POTASSIUM SERPL-SCNC: 4.6 MMOL/L (ref 3.4–5.3)
RBC # BLD AUTO: 2.9 10E6/UL (ref 3.8–5.2)
RBC # BLD AUTO: 3.2 10E6/UL (ref 3.8–5.2)
SODIUM SERPL-SCNC: 136 MMOL/L (ref 136–145)
SODIUM SERPL-SCNC: 137 MMOL/L (ref 136–145)
WBC # BLD AUTO: 10.8 10E3/UL (ref 4–11)
WBC # BLD AUTO: 10.9 10E3/UL (ref 4–11)

## 2023-05-09 PROCEDURE — 94660 CPAP INITIATION&MGMT: CPT

## 2023-05-09 PROCEDURE — 71045 X-RAY EXAM CHEST 1 VIEW: CPT | Mod: 77

## 2023-05-09 PROCEDURE — 80048 BASIC METABOLIC PNL TOTAL CA: CPT | Performed by: PHYSICIAN ASSISTANT

## 2023-05-09 PROCEDURE — 250N000013 HC RX MED GY IP 250 OP 250 PS 637

## 2023-05-09 PROCEDURE — 258N000003 HC RX IP 258 OP 636

## 2023-05-09 PROCEDURE — 250N000011 HC RX IP 250 OP 636: Performed by: NURSE PRACTITIONER

## 2023-05-09 PROCEDURE — 120N000003 HC R&B IMCU UMMC

## 2023-05-09 PROCEDURE — 36415 COLL VENOUS BLD VENIPUNCTURE: CPT | Performed by: PHYSICIAN ASSISTANT

## 2023-05-09 PROCEDURE — 97110 THERAPEUTIC EXERCISES: CPT | Mod: GO

## 2023-05-09 PROCEDURE — 250N000013 HC RX MED GY IP 250 OP 250 PS 637: Performed by: STUDENT IN AN ORGANIZED HEALTH CARE EDUCATION/TRAINING PROGRAM

## 2023-05-09 PROCEDURE — 71045 X-RAY EXAM CHEST 1 VIEW: CPT

## 2023-05-09 PROCEDURE — 999N000157 HC STATISTIC RCP TIME EA 10 MIN

## 2023-05-09 PROCEDURE — 97535 SELF CARE MNGMENT TRAINING: CPT | Mod: GO

## 2023-05-09 PROCEDURE — 250N000009 HC RX 250: Performed by: PHYSICIAN ASSISTANT

## 2023-05-09 PROCEDURE — 71045 X-RAY EXAM CHEST 1 VIEW: CPT | Mod: 26 | Performed by: RADIOLOGY

## 2023-05-09 PROCEDURE — 250N000013 HC RX MED GY IP 250 OP 250 PS 637: Performed by: SURGERY

## 2023-05-09 PROCEDURE — 250N000013 HC RX MED GY IP 250 OP 250 PS 637: Performed by: NURSE PRACTITIONER

## 2023-05-09 PROCEDURE — 83735 ASSAY OF MAGNESIUM: CPT | Performed by: SURGERY

## 2023-05-09 PROCEDURE — 250N000013 HC RX MED GY IP 250 OP 250 PS 637: Performed by: PHYSICIAN ASSISTANT

## 2023-05-09 PROCEDURE — 85027 COMPLETE CBC AUTOMATED: CPT | Performed by: STUDENT IN AN ORGANIZED HEALTH CARE EDUCATION/TRAINING PROGRAM

## 2023-05-09 PROCEDURE — 94640 AIRWAY INHALATION TREATMENT: CPT

## 2023-05-09 PROCEDURE — 82040 ASSAY OF SERUM ALBUMIN: CPT

## 2023-05-09 PROCEDURE — 36415 COLL VENOUS BLD VENIPUNCTURE: CPT | Performed by: STUDENT IN AN ORGANIZED HEALTH CARE EDUCATION/TRAINING PROGRAM

## 2023-05-09 RX ORDER — ALENDRONATE SODIUM 70 MG/1
70 TABLET ORAL WEEKLY
Status: DISCONTINUED | OUTPATIENT
Start: 2023-05-15 | End: 2023-05-09

## 2023-05-09 RX ORDER — CARBOXYMETHYLCELLULOSE SODIUM 5 MG/ML
1 SOLUTION/ DROPS OPHTHALMIC
Status: DISCONTINUED | OUTPATIENT
Start: 2023-05-09 | End: 2023-05-15 | Stop reason: HOSPADM

## 2023-05-09 RX ORDER — MAGNESIUM OXIDE 400 MG/1
400 TABLET ORAL EVERY 4 HOURS
Status: DISCONTINUED | OUTPATIENT
Start: 2023-05-09 | End: 2023-05-09

## 2023-05-09 RX ORDER — OLANZAPINE 5 MG/1
5 TABLET, ORALLY DISINTEGRATING ORAL ONCE
Status: COMPLETED | OUTPATIENT
Start: 2023-05-09 | End: 2023-05-09

## 2023-05-09 RX ORDER — MAGNESIUM OXIDE 400 MG/1
400 TABLET ORAL EVERY 4 HOURS
Status: COMPLETED | OUTPATIENT
Start: 2023-05-09 | End: 2023-05-09

## 2023-05-09 RX ADMIN — Medication 400 MG: at 09:52

## 2023-05-09 RX ADMIN — SIMVASTATIN 20 MG: 20 TABLET, FILM COATED ORAL at 22:32

## 2023-05-09 RX ADMIN — BUMETANIDE 1 MG: 1 TABLET ORAL at 09:51

## 2023-05-09 RX ADMIN — Medication 400 MG: at 05:10

## 2023-05-09 RX ADMIN — DORZOLAMIDE HYDROCHLORIDE AND TIMOLOL MALEATE 1 DROP: 22.3; 6.8 SOLUTION/ DROPS OPHTHALMIC at 09:52

## 2023-05-09 RX ADMIN — HEPARIN SODIUM 5000 UNITS: 5000 INJECTION, SOLUTION INTRAVENOUS; SUBCUTANEOUS at 05:10

## 2023-05-09 RX ADMIN — POTASSIUM CHLORIDE 20 MEQ: 750 TABLET, EXTENDED RELEASE ORAL at 09:52

## 2023-05-09 RX ADMIN — LATANOPROST 1 DROP: 50 SOLUTION OPHTHALMIC at 22:32

## 2023-05-09 RX ADMIN — ASPIRIN 81 MG CHEWABLE TABLET 81 MG: 81 TABLET CHEWABLE at 09:51

## 2023-05-09 RX ADMIN — OLANZAPINE 5 MG: 5 TABLET, ORALLY DISINTEGRATING ORAL at 18:06

## 2023-05-09 RX ADMIN — HEPARIN SODIUM 5000 UNITS: 5000 INJECTION, SOLUTION INTRAVENOUS; SUBCUTANEOUS at 14:17

## 2023-05-09 RX ADMIN — SODIUM CHLORIDE 500 ML: 9 INJECTION, SOLUTION INTRAVENOUS at 00:17

## 2023-05-09 RX ADMIN — HEPARIN SODIUM 5000 UNITS: 5000 INJECTION, SOLUTION INTRAVENOUS; SUBCUTANEOUS at 22:32

## 2023-05-09 RX ADMIN — HYDROXYZINE HYDROCHLORIDE 25 MG: 25 TABLET, FILM COATED ORAL at 15:23

## 2023-05-09 RX ADMIN — PANTOPRAZOLE SODIUM 40 MG: 40 TABLET, DELAYED RELEASE ORAL at 09:51

## 2023-05-09 RX ADMIN — POTASSIUM CHLORIDE 20 MEQ: 750 TABLET, EXTENDED RELEASE ORAL at 22:32

## 2023-05-09 RX ADMIN — HYDROXYZINE HYDROCHLORIDE 25 MG: 25 TABLET, FILM COATED ORAL at 09:50

## 2023-05-09 RX ADMIN — LEVALBUTEROL HYDROCHLORIDE 0.63 MG: 0.63 SOLUTION RESPIRATORY (INHALATION) at 08:07

## 2023-05-09 RX ADMIN — DORZOLAMIDE HYDROCHLORIDE AND TIMOLOL MALEATE 1 DROP: 22.3; 6.8 SOLUTION/ DROPS OPHTHALMIC at 22:32

## 2023-05-09 RX ADMIN — Medication 12.5 MG: at 22:32

## 2023-05-09 ASSESSMENT — ACTIVITIES OF DAILY LIVING (ADL)
ADLS_ACUITY_SCORE: 31
ADLS_ACUITY_SCORE: 29
ADLS_ACUITY_SCORE: 31
ADLS_ACUITY_SCORE: 29
ADLS_ACUITY_SCORE: 31
ADLS_ACUITY_SCORE: 29
ADLS_ACUITY_SCORE: 31

## 2023-05-09 NOTE — PHARMACY
"The following home medications were NOT continued on inpatient admission per \"Discontinuation of nonessential home medications during hospitalization\" policy: Alendronate    If a therapeutic holiday is deemed inappropriate per the prescriber, please notify the pharmacist regarding the medication order.    The pharmacist is available to answer any questions and/or concerns the patient may have regarding discontinuation of non-essential medications.    Please ensure that these medications are restarted as needed upon discharge via the medication reconciliation discharge process and included on the discharge medication reconciliation report.    Thank you,  Zonia Hernández RPH    "

## 2023-05-09 NOTE — PROVIDER NOTIFICATION
Time of notification: 5:07 PM  Provider notified:  cvts  Patient status: Pt c/o of not being able to breathe, she feels tightness in her chest. pt is now on 3L for NC for comfort vitals stable.   Temp:  [98  F (36.7  C)-98.2  F (36.8  C)] 98  F (36.7  C)  Pulse:  [] 112  Resp:  [18] 18  BP: ()/(57-76) 95/65  FiO2 (%):  [30 %] 30 %  SpO2:  [95 %-100 %] 97 %  Orders received: no new orders

## 2023-05-09 NOTE — PROGRESS NOTES
Cardiovascular Surgery Progress Note  05/09/2023         Assessment and Plan:     Cydney Christiansen is an 87 year old female with a PMH of HTN, HLD, bilateral glaucoma, scoliosis, compression fracture of thoracic vertebrae, osteoporosis, history of bladder cancer, invasive ductal carcinoma of left breast s/p lumpectomy (2015), and multiple other skin cancer sites s/p removal, severe aortic stenosis with bicuspid aortic valve and an ascending aortic aneurysm. Patient is now s/p AVR (Aponte Resilia 21 mm valve) and ascending aorta repair on 4/27/23 with Dr. Solis.    Cardiovascular:   Severe aortic stenosis with bicuspid aortic valve  - s/p bioprosthetic AVR 4/25  Ascending aortic aneurysm - s/p graft repair 4/25  HTN  HLD  Severe tricuspid insufficiency, post-operative   Postoperative atrial fibrillation  - TTE 4/27: LV EF 45-50%; Mild right ventricular dilation with normal global right ventricular function. A bioprosthetic aortic valve with PV of 2.2m/s and MG of 12mmHg. Severe tricuspid insufficiency.  - Repeat TTE 5/7: LV EF 40-45%, Aortic valve with mean gradient of 15 mmHg, mild to moderate TR, RA 8, trace pericardial effusion  - New post-operative atrial fibrillation. ICU discussed no anticoagulation with Dr. Solis and patient refused d/t bleeding risk.   - Reduce metoprolol to 12.5 mg PO BID with hold parameters. Have been holding beta blocker due to soft blood pressures. Goal MAP>65, SBP<140 per Dr Solis   - ASA 81 mg daily  - PTA simvastatin  - Diuresis as below    CT/PW removed in the ICU    Pulmonary:  Acute hypoxic respiratory insufficiency  Bilateral pleural effusions   Extubated POD 1. Persistently requiring 2 lpm.   - Supplemental O2 PRN to keep sats > 92%. Wean off as tolerated.  - Pulm hygiene, IS, activity and deep breathing  - Mucomyst neb PRN  - S/p right thoracentesis 5/3 with 600 ml removed  - Diuresis as below  - CT chest showed moderate bilateral effusions 5/7. Pigtails placed by IR 5/8 with  500cc out on left and 600 on right. Removed same day as patient could not tolerate associated pain.   - BiPAP 20-30 mins QID and at night    Neurology / MSK:  Acute post-operative pain   Delirium  Pain well controlled with current regimen:  - Acetaminophen, PO oxycodone PRN  - delirium precautions  - Melatonin 3mg q HS   - Atarax PRN for anxiety and during BiPAP     / Renal / Fluid / Electrolytes:  HELEN, resolved  Urinary retention  BL creat ~ 0.6, most recent creatinine 0.7; adequate UOP.   FLUID STATUS: Pre-op weight 137 lbs, weight today 138 standing scale  - Diuresis: Decrease bumex to 1 mg PO daily from BID on 5/9. Reassess daily.   - Replete lytes per protocol  - Strict I/O, daily weights  - Avoid/limit nephrotoxins as able  - Helms placed 5/5 for retention. UA negative. Look to remove 5/10.    GI / Nutrition:   Transaminitis, resolved  Severe malnutrition in the context of acute illness  - Tolerating regular diet. Low oral intake.  - Calorie counts through 5/11 to determine intake. May need to consider tube feeds.  - Continue bowel regimen, last BM 5/9    Endocrine:  Stress induced hyperglycemia   Osteoporosis c/b compression fracture of spine  Pre-operative Hgb A1c 6.0  - Initially managed on insulin drip postop, transitioned to sliding scale; goal BG <180 for optimal healing  - Continue PTA med alendronate    Infectious Disease:  Stress induced leukocytosis  WBC WNL , most recent 10.8  - Remains afebrile, no signs or symptoms of infection  - Completed perioperative antibiotics  - Continue to monitor fever curve, CBC    Hematology:   Acute blood loss anemia and thrombocytopenia  - Hgb stable, 5/9 9.6  - Plt stable, no signs or symptoms of active bleeding  - CBC daily    Anticoagulation:   - ASA only  - Discussion had with Dr. Solis regarding anticoagulation for atrial fibrillation stroke risk. Will not start anticoagulation at this time.     MSK/Skin:  Sternotomy  Surgical incision  - Sternal precautions  -  "Incisional cares per protocol    Prophylaxis:   - Stress ulcer prophylaxis: Pantoprazole 40 mg daily  - DVT prophylaxis: Subcutaneous heparin, SCD    Disposition:   - Transferred to  on 5/3  - Therapies recommending discharge to TCU. There is possibly an accepting facility now. Patient not medically ready as needs further diuresis and drainage of effusions.     Discussed with Dr. Solis via written communication.    Gabriela Welch PA-C  Cardiothoracic Surgery  Pager [phone]    Resident/Fellow Attestation   I, Sheeba White NP, was present with the EFFIE trainee who participated in the service and in the documentation of the note.  I have verified the history and personally performed the physical exam and medical decision making.  I agree with the assessment and plan of care as documented in the note.      Sheeba White NP  Date of Service (when I saw the patient): 05/09/23          Interval History:     Patient endorses continued shortness of breath. She feels as though she cannot catch her breath which causes a great deal of distress. She mentions wanting to  \"give up\" several times during our conversation, especially when she feels she is not getting ay better. She also reports poor sleep due to waking up frequently and feeling disoriented, not knowing where she is, and feeling trapped. She denies any pain. No sternal popping, clicking, or snapping. No nausea, vomiting, abdominal pain. She is tolerating diet but has no appetite and finds eating to be a lot of work. Working with therapies.          Physical Exam:     Gen: A&Ox4, NAD  Neuro: no focal deficits   CV: atrial fibrillation, normal S1 S2, no murmurs, rubs or gallops  Pulm: crackles at bilateral lung bases, purposeful breathing with normal respiratory rate on 2 lpm, diminished bases.   Abd: nondistended, normal BS, soft, nontender  Ext: mild lower extremity  peripheral edema, 1+ pitting improving   Incision: clean, dry, intact, no erythema, sternum " stable  Tubes/drain sites: dressing clean and dry         Data:    Imaging:  reviewed recent imaging, no acute concerns    Recent Results (from the past 24 hour(s))   XR Chest Port 1 View    Narrative    Portable chest    INDICATION: Postoperative monitored for effusions    COMPARISON: 5/7/2023    FINDINGS: Heart is enlarged. Left hemidiaphragm remains obscured and  there is continued meniscus formation at the left costophrenic angle.  Mild blunting of the right costophrenic angle also noted. Median  sternotomy again present. Bones are osteopenic. Well-defined small  nodular density in the left upper lung field appears similar. This  consists with a small centrally calcified nodule on the recent chest  CT of yesterday. Right midline catheter again terminates in the  axilla.      Impression    IMPRESSION: Unchanged bilateral pleural effusions with extensive  retrocardiac atelectasis, recommend follow-up to clearing to exclude  infection.    ADONIS TROTTER MD         SYSTEM ID:  [id number]   XR Chest Port 1 View    Narrative    Portable chest 5/8/2023 at 1501 hours    INDICATION: Post bilateral pigtails    COMPARISON: Earlier today 0826 hours    FINDINGS: Interval placement of bilateral basilar chest catheters.  Decreased right costophrenic angle blunting. Decreased left  costophrenic angle meniscus formation but there is continued  silhouetting the left hemidiaphragm. Median sternotomy again noted.  Heart size enlarged. No discrete pneumothorax.  Midline catheter tip in the right axilla.      Impression    Impression: Decreased pleural effusions. Cardiomegaly..    ADONIS TROTTER MD         SYSTEM ID:  [id number]   XR Chest Port 1 View    Narrative    Exam: XR CHEST PORT 1 VIEW, 5/8/2023 5:06 PM    Indication: pigtail removal    Comparison: Chest radiograph 5/8/2022, chest CT 5/7/2022    Findings:   Bilateral pigtail chest tubes have been removed. Unchanged low lung  volumes. Stable small pleural effusions.  Stable perihilar/basilar  pulmonary opacities.      Impression    Impression:   1. Bilateral pigtail chest tubes removed.  2. Stable small pleural effusions.  3. Unchanged perihilar/basilar opacities representing  atelectasis/edema.    I have personally reviewed the examination and initial interpretation  and I agree with the findings.    ZULEIMA GAMBINO MD         SYSTEM ID:  [id number]   XR Chest Port 1 View    Narrative    EXAM: XR CHEST PORT 1 VIEW  5/9/2023 5:50 AM     HISTORY:  Patient describes difficulty breathing, vital signs stable  and normal on 2 LPM NC.       COMPARISON:  5/8/2023    FINDINGS:   AP portable view of chest. Postoperative changes with sternotomy wires  and prosthetic aortic valve. Cardiomediastinal silhouette is stable.  No pneumothorax. Stable small bilateral pleural effusions. Similar  perihilar and bibasilar opacities. Visualized upper abdomen is  unremarkable.      Impression    IMPRESSION:   Stable small bilateral pleural effusions and perihilar/bibasilar  opacities, likely atelectasis/edema.    I have personally reviewed the examination and initial interpretation  and I agree with the findings.    JOSIAH HYLTON DO         SYSTEM ID:  [id number]     5/7/23  1:25 PM [id number] [facility]      PACS Images     Show images for CT Chest w/o Contrast     Study Result    Narrative & Impression   EXAMINATION: CT CHEST W/O CONTRAST, 5/7/2023 1:25 PM     TECHNIQUE:  Helical CT images from the thoracic inlet through the  symphysis pubis were obtained without contrast.      COMPARISON: Chest CT 3/1/2023, thoracentesis 5/7/2023     HISTORY: plerual effusions     FINDINGS:     Thorax     Lungs: Central tracheobronchial wall thickening. Mucous plugging of  some distal bronchioles in the right lower lobe. Moderate bilateral  pleural effusions with adjacent compressive atelectasis, predominantly  in the lower lobes. Unchanged 1.4 cm grouping of adjacent solid  pulmonary nodules in the  left upper lobe, series 4 image 64.     Remainder of chest: Surgical changes of ascending aortic aneurysm  repair. Aortic valve prosthesis. Crescentic surrounding the ascending  aorta measuring 21 mm in thickness. Small pericardial effusion. Aortic  atherosclerosis. Unremarkable thyroid gland.     Upper abdomen: No evidence of acute pathology or  suspicious mass in  the visualized upper abdomen. Aortic atherosclerotic calcifications  are present.     Bones/Soft Tissues:  Kyphotic deformity of the thoracic spine.  Unchanged anterior wedge compression deformities of T5, T7, T8, and  T11. Anterior right ununited first and second rib fractures. Median  sternotomy wires intact. Small anterior mediastinal hematoma  underlying the sternotomy wires. Mild dextrocurvature mid thoracic  spine and compensatory levocurvature lumbar spine.                                                                      IMPRESSION:   1. Moderate bilateral pleural effusions with associated compressive  atelectasis.  2. Surgical changes of ascending aortic aneurysm repair with  crescentic hematoma surrounding the ascending aorta graft measuring 21  mm in thickness.  3. Small pericardial effusion.  4. Pulmonary nodule measuring 1.4 cm and the left upper lobe,  unchanged since 2/17/2023. Recommend additional tissue sampling,  PET/CT, or additional 3 month follow-up chest CT according to  Fleischner Society guidelines.  5. Unchanged appearance of multiple osteopenic wedge compression  fractures of the thoracic spine.     I have personally reviewed the examination and initial interpretation  and I agree with the findings.     ADONIS TROTTER MD         SYSTEM ID:  [id number]       Echocardiogram 5/7/23  Interpretation Summary  Ascending aorta aneurysm repair with 32 mm Hemashield graft and aortic valve  replacement with 21 mm Aponte Resilia on 04/25/2023.  Left ventricular function is decreased. The ejection fraction is 40-45%  (mildly reduced).  There is significant [id number]-vn-[id number] variability in the LVEF  due to the patient's AF.  Global right ventricular function is normal. The right ventricle is normal  size.  The bioprosthetic aortic valve is well-seated. Leaflet opening is not clearly  visualized. There is no valvular or paravalvular regurgitation. The Vmax is  2.6 m/s, the mean gradient is 15 mmHg, the dimensionless index is 0.30, and  the acceleration time is 80 ms.  Mild to moderate tricuspid insufficiency is present.  IVC diameter and respiratory changes fall into an intermediate range  suggesting an RA pressure of 8 mmHg.  There is a small pericardial effusion adjacent LV lateral segments. The  maximal depth is 1.4 cm next to the left atrioventricular groove. There are no  signs of tamponade.  This study was compared with the study from 04/27/2023. The LV function has  declined modestly due to the patient's AF. There is no significant change in  the RV function. The TR is less severe.  ______________________________________________________________________________  Left Ventricle  Left ventricular function is decreased. The ejection fraction is 40-45%  (mildly reduced). Diastolic function not assessed due to atrial fibrillation.  There is significant czye-zv-zfga variability in the LVEF due to the patient's  AF. Abnormal non-specific septal motion is present.     Right Ventricle  Global right ventricular function is normal. The right ventricle is normal  size.     Atria  Moderate biatrial enlargement is present.     Mitral Valve  Mild to moderate mitral insufficiency is present.     Aortic Valve  The bioprosthetic aortic valve is well-seated. Leaflet opening is not clearly  visualized. There is no valvular or paravalvular regurgitation. The Vmax is  2.6 m/s, the mean gradient is 15 mmHg, the dimensionless index is 0.30, and  the acceleration time is 80 ms.     Tricuspid Valve  The valve leaflets are not well visualized. Mild to moderate  tricuspid  insufficiency is present. The right ventricular systolic pressure is  approximated at 19.5 mmHg plus the right atrial pressure.     Pulmonic Valve  The pulmonic valve cannot be assessed.     Vessels  The aorta root is normal. The thoracic aorta cannot be assessed. IVC diameter  and respiratory changes fall into an intermediate range suggesting an RA  pressure of 8 mmHg.     Pericardium  There is a small pericardial effusion adjacent LV lateral segments. The  maximal depth is 1.4 cm next to the left atrioventricular groove. There are no  signs of tamponade.     Compared to Previous Study  This study was compared with the study from 2023 . The LV function has  declined modestly due to the patient's AF. There is no significant change in  the RV function. The TR is less severe.  ______________________________________________________________________________  Doppler Measurements & Calculations  Ao V2 max: 261.0 cm/sec  Ao max P.2 mmHg  Ao V2 mean: 170.0 cm/sec  Ao mean P.0 mmHg  Ao V2 VTI: 40.9 cm  LV V1 max P.4 mmHg  LV V1 max: 77.4 cm/sec  LV V1 VTI: 12.2 cm  TR max chavez: 220.7 cm/sec  TR max P.5 mmHg  AV Chavez Ratio (DI): 0.30     ______________________________________________________________________________  Report approved by: Diana Beltran 2023 09:48 AM    Labs:  Recent Labs   Lab 23  0921 23  0438 23  2359 23  2150 23  1705 23  1551 23  0844 23  0438 23  1236 23  1042 23  0733 23  0546 23  0753 23  0705   WBC  --  10.8 10.9  --   --   --   --  10.6  --   --    < > 9.8  --  10.4   HGB  --  9.6* 8.6*  --   --   --   --  8.9*  --   --    < > 8.8*  --  8.8*   MCV  --  97 97  --   --   --   --  98  --   --    < > 97  --  94   PLT  --  207 184  --   --   --   --  204  --   --    < > 206  --  225   INR  --   --   --   --   --   --   --   --   --  1.25*  --   --   --   --    NA  --  137  --   --    --  137  --  137   < >  --    < > 140  --  139   POTASSIUM  --  4.6  --   --   --  4.5  --  4.3   < >  --    < > 3.8   < > 3.4   CHLORIDE  --  98  --   --   --  95*  --  95*   < >  --    < > 98  --  98   CO2  --  33*  --   --   --  33*  --  33*   < >  --    < > 33*  --  31*   BUN  --  19.3  --   --   --  18.7  --  21.5   < >  --    < > 26.1*  --  29.2*   CR  --  0.70  --   --   --  0.62  --  0.62   < >  --    < > 0.65  --  0.55   ANIONGAP  --  6*  --   --   --  9  --  9   < >  --    < > 9  --  10   SHERYL  --  8.6*  --   --   --  8.9  --  8.9   < >  --    < > 8.5*  --  8.6*   GLC 99 116*  --  114*   < > 112*   < > 125*   < >  --    < > 129*   < > 125*   ALBUMIN  --   --   --   --   --   --   --   --   --   --   --  2.9*  --  2.9*   PROTTOTAL  --   --   --   --   --   --   --   --   --   --   --  5.4*  --  5.4*   BILITOTAL  --   --   --   --   --   --   --   --   --   --   --  0.4  --  0.4   ALKPHOS  --   --   --   --   --   --   --   --   --   --   --  44  --  44   ALT  --   --   --   --   --   --   --   --   --   --   --  73*  --  92*   AST  --   --   --   --   --   --   --   --   --   --   --  55*  --  67*    < > = values in this interval not displayed.      GLUCOSE:   Recent Labs   Lab 05/09/23  0921 05/09/23  0438 05/08/23  2150 05/08/23  1705 05/08/23  1551 05/08/23  1300   GLC 99 116* 114* 107* 112* 115*

## 2023-05-09 NOTE — PROVIDER NOTIFICATION
Time of notification: 2:15 PM  Provider notified: cvts blanca   Patient status: pt states she feels like she cant breathe, bp 92/67 hr 110 pt said she did not like the bipap, it is now off.     Temp:  [98.1  F (36.7  C)-98.2  F (36.8  C)] 98.2  F (36.8  C)  Pulse:  [] 131  Resp:  [18] 18  BP: ()/(57-76) 108/76  FiO2 (%):  [30 %] 30 %  SpO2:  [95 %-100 %] 97 %  Orders received: xray ordered

## 2023-05-09 NOTE — PROGRESS NOTES
Surgery Night Coverage Brief Note  May 9, 2023    I was in communication with nursing overnight regarding Cydney Christiansen's asymptomatic, persistently low blood pressures 78-80s/60s, below the patient's baseline SBP in 110s this admission. Intermittently tachycardic 90s-100s.    The patient received one 500 mL bolus of normal saline and blood pressures were monitored q15 mins. Blood pressures currently in 90s/60s-70s (97/60, 90/67, 90/75). Heart rate currently in 90s.    - Continue to monitor vital signs overnight; notify Surgery if develops lower blood pressures    Page Surgery with questions or concerns.    Lucero Abernathy M.D.  General Surgery Resident PGY1  [facility]

## 2023-05-09 NOTE — PLAN OF CARE
Hours of care 1900 - 0700    Temp: 98.1  F (36.7  C) Temp src: Oral BP: 106/71 Pulse: 83   Resp: 18 SpO2: 98 % O2 Device: Nasal cannula Oxygen Delivery: 2 LPM    I: Monitored vitals and assessed pt status.   Changed: Pt running soft pressures, SBP 70s-80s, provider notified, 500mL/hr sodium chloride bolus ordered.   PRN: Tylenol x1     Neuro: A&O x4. Intermittent confusion. Denies numbness/tingling.   Cardiac:  Afib w/ RVR HR 90s-120s.. Metoprolol held due to low SBP.   Respiratory: On 2L NC sating >98%. Pt reported difficulty breathing around 5 am, provider notified, stat x-ray ordered.   GI/: Helms cath for retention. Fecal incontinent. 1 small BM overnight.   Diet: Regular. No straws.  Skin: See adult PCS. No new deficits noted.  LDAs: R PIV, R midline, L PIV.   Activity: Assist of 1.  Pain: Pt endorses back pain, some relief found with PRN tylenol.      A: VSS. Afebrile.      P: Will continue plan of care and report changes to treatment team.

## 2023-05-10 ENCOUNTER — APPOINTMENT (OUTPATIENT)
Dept: GENERAL RADIOLOGY | Facility: CLINIC | Age: 88
DRG: 219 | End: 2023-05-10
Attending: SURGERY
Payer: COMMERCIAL

## 2023-05-10 ENCOUNTER — APPOINTMENT (OUTPATIENT)
Dept: PHYSICAL THERAPY | Facility: CLINIC | Age: 88
DRG: 219 | End: 2023-05-10
Attending: SURGERY
Payer: COMMERCIAL

## 2023-05-10 LAB
ANION GAP SERPL CALCULATED.3IONS-SCNC: 13 MMOL/L (ref 7–15)
ANION GAP SERPL CALCULATED.3IONS-SCNC: 6 MMOL/L (ref 7–15)
BUN SERPL-MCNC: 20.9 MG/DL (ref 8–23)
BUN SERPL-MCNC: 23.4 MG/DL (ref 8–23)
CALCIUM SERPL-MCNC: 8.4 MG/DL (ref 8.8–10.2)
CALCIUM SERPL-MCNC: 8.9 MG/DL (ref 8.8–10.2)
CHLORIDE SERPL-SCNC: 101 MMOL/L (ref 98–107)
CHLORIDE SERPL-SCNC: 101 MMOL/L (ref 98–107)
CREAT SERPL-MCNC: 0.69 MG/DL (ref 0.51–0.95)
CREAT SERPL-MCNC: 0.72 MG/DL (ref 0.51–0.95)
DEPRECATED HCO3 PLAS-SCNC: 25 MMOL/L (ref 22–29)
DEPRECATED HCO3 PLAS-SCNC: 31 MMOL/L (ref 22–29)
ERYTHROCYTE [DISTWIDTH] IN BLOOD BY AUTOMATED COUNT: 16.4 % (ref 10–15)
GFR SERPL CREATININE-BSD FRML MDRD: 81 ML/MIN/1.73M2
GFR SERPL CREATININE-BSD FRML MDRD: 85 ML/MIN/1.73M2
GLUCOSE BLDC GLUCOMTR-MCNC: 93 MG/DL (ref 70–99)
GLUCOSE SERPL-MCNC: 117 MG/DL (ref 70–99)
GLUCOSE SERPL-MCNC: 160 MG/DL (ref 70–99)
HCT VFR BLD AUTO: 26.8 % (ref 35–47)
HGB BLD-MCNC: 8.1 G/DL (ref 11.7–15.7)
MAGNESIUM SERPL-MCNC: 1.9 MG/DL (ref 1.7–2.3)
MCH RBC QN AUTO: 29.9 PG (ref 26.5–33)
MCHC RBC AUTO-ENTMCNC: 30.2 G/DL (ref 31.5–36.5)
MCV RBC AUTO: 99 FL (ref 78–100)
PHOSPHATE SERPL-MCNC: 3.2 MG/DL (ref 2.5–4.5)
PLATELET # BLD AUTO: 195 10E3/UL (ref 150–450)
POTASSIUM SERPL-SCNC: 4.6 MMOL/L (ref 3.4–5.3)
POTASSIUM SERPL-SCNC: 4.8 MMOL/L (ref 3.4–5.3)
POTASSIUM SERPL-SCNC: 5.4 MMOL/L (ref 3.4–5.3)
RBC # BLD AUTO: 2.71 10E6/UL (ref 3.8–5.2)
SODIUM SERPL-SCNC: 138 MMOL/L (ref 136–145)
SODIUM SERPL-SCNC: 139 MMOL/L (ref 136–145)
WBC # BLD AUTO: 6.8 10E3/UL (ref 4–11)

## 2023-05-10 PROCEDURE — 97530 THERAPEUTIC ACTIVITIES: CPT | Mod: GP

## 2023-05-10 PROCEDURE — 85027 COMPLETE CBC AUTOMATED: CPT | Performed by: STUDENT IN AN ORGANIZED HEALTH CARE EDUCATION/TRAINING PROGRAM

## 2023-05-10 PROCEDURE — 250N000013 HC RX MED GY IP 250 OP 250 PS 637: Performed by: SURGERY

## 2023-05-10 PROCEDURE — 94640 AIRWAY INHALATION TREATMENT: CPT

## 2023-05-10 PROCEDURE — 36416 COLLJ CAPILLARY BLOOD SPEC: CPT | Performed by: PHYSICIAN ASSISTANT

## 2023-05-10 PROCEDURE — 250N000011 HC RX IP 250 OP 636

## 2023-05-10 PROCEDURE — 80048 BASIC METABOLIC PNL TOTAL CA: CPT | Performed by: PHYSICIAN ASSISTANT

## 2023-05-10 PROCEDURE — 250N000013 HC RX MED GY IP 250 OP 250 PS 637: Performed by: PHYSICIAN ASSISTANT

## 2023-05-10 PROCEDURE — 83735 ASSAY OF MAGNESIUM: CPT | Performed by: SURGERY

## 2023-05-10 PROCEDURE — 94640 AIRWAY INHALATION TREATMENT: CPT | Mod: 76

## 2023-05-10 PROCEDURE — 84132 ASSAY OF SERUM POTASSIUM: CPT

## 2023-05-10 PROCEDURE — 84100 ASSAY OF PHOSPHORUS: CPT

## 2023-05-10 PROCEDURE — 94660 CPAP INITIATION&MGMT: CPT

## 2023-05-10 PROCEDURE — 120N000003 HC R&B IMCU UMMC

## 2023-05-10 PROCEDURE — P9047 ALBUMIN (HUMAN), 25%, 50ML: HCPCS

## 2023-05-10 PROCEDURE — 250N000013 HC RX MED GY IP 250 OP 250 PS 637: Performed by: STUDENT IN AN ORGANIZED HEALTH CARE EDUCATION/TRAINING PROGRAM

## 2023-05-10 PROCEDURE — 999N000157 HC STATISTIC RCP TIME EA 10 MIN

## 2023-05-10 PROCEDURE — 250N000009 HC RX 250: Performed by: PHYSICIAN ASSISTANT

## 2023-05-10 PROCEDURE — 71045 X-RAY EXAM CHEST 1 VIEW: CPT

## 2023-05-10 PROCEDURE — 250N000013 HC RX MED GY IP 250 OP 250 PS 637

## 2023-05-10 PROCEDURE — 71045 X-RAY EXAM CHEST 1 VIEW: CPT | Mod: 26 | Performed by: RADIOLOGY

## 2023-05-10 PROCEDURE — 250N000011 HC RX IP 250 OP 636: Performed by: NURSE PRACTITIONER

## 2023-05-10 PROCEDURE — 258N000003 HC RX IP 258 OP 636

## 2023-05-10 PROCEDURE — 36416 COLLJ CAPILLARY BLOOD SPEC: CPT

## 2023-05-10 PROCEDURE — 36592 COLLECT BLOOD FROM PICC: CPT | Performed by: PHYSICIAN ASSISTANT

## 2023-05-10 RX ORDER — MAGNESIUM OXIDE 400 MG/1
400 TABLET ORAL EVERY 4 HOURS
Status: COMPLETED | OUTPATIENT
Start: 2023-05-10 | End: 2023-05-10

## 2023-05-10 RX ORDER — ALBUMIN (HUMAN) 12.5 G/50ML
12.5 SOLUTION INTRAVENOUS
Status: COMPLETED | OUTPATIENT
Start: 2023-05-10 | End: 2023-05-10

## 2023-05-10 RX ORDER — BUMETANIDE 1 MG/1
1 TABLET ORAL 2 TIMES DAILY
Status: COMPLETED | OUTPATIENT
Start: 2023-05-10 | End: 2023-05-10

## 2023-05-10 RX ADMIN — ALTEPLASE 2 MG: 2.2 INJECTION, POWDER, LYOPHILIZED, FOR SOLUTION INTRAVENOUS at 02:10

## 2023-05-10 RX ADMIN — DORZOLAMIDE HYDROCHLORIDE AND TIMOLOL MALEATE 1 DROP: 22.3; 6.8 SOLUTION/ DROPS OPHTHALMIC at 19:53

## 2023-05-10 RX ADMIN — POTASSIUM CHLORIDE 20 MEQ: 750 TABLET, EXTENDED RELEASE ORAL at 08:22

## 2023-05-10 RX ADMIN — HEPARIN SODIUM 5000 UNITS: 5000 INJECTION, SOLUTION INTRAVENOUS; SUBCUTANEOUS at 13:37

## 2023-05-10 RX ADMIN — SODIUM CHLORIDE 500 ML: 9 INJECTION, SOLUTION INTRAVENOUS at 02:57

## 2023-05-10 RX ADMIN — Medication 12.5 MG: at 19:57

## 2023-05-10 RX ADMIN — DORZOLAMIDE HYDROCHLORIDE AND TIMOLOL MALEATE 1 DROP: 22.3; 6.8 SOLUTION/ DROPS OPHTHALMIC at 08:21

## 2023-05-10 RX ADMIN — ALBUMIN HUMAN 12.5 G: 0.25 SOLUTION INTRAVENOUS at 16:01

## 2023-05-10 RX ADMIN — ASPIRIN 81 MG CHEWABLE TABLET 81 MG: 81 TABLET CHEWABLE at 08:22

## 2023-05-10 RX ADMIN — LATANOPROST 1 DROP: 50 SOLUTION OPHTHALMIC at 21:36

## 2023-05-10 RX ADMIN — SIMVASTATIN 20 MG: 20 TABLET, FILM COATED ORAL at 21:37

## 2023-05-10 RX ADMIN — ALTEPLASE 2 MG: 2.2 INJECTION, POWDER, LYOPHILIZED, FOR SOLUTION INTRAVENOUS at 01:02

## 2023-05-10 RX ADMIN — ALBUMIN HUMAN 12.5 G: 0.25 SOLUTION INTRAVENOUS at 11:25

## 2023-05-10 RX ADMIN — Medication 400 MG: at 10:38

## 2023-05-10 RX ADMIN — HYDROXYZINE HYDROCHLORIDE 25 MG: 25 TABLET, FILM COATED ORAL at 19:53

## 2023-05-10 RX ADMIN — HEPARIN SODIUM 5000 UNITS: 5000 INJECTION, SOLUTION INTRAVENOUS; SUBCUTANEOUS at 21:37

## 2023-05-10 RX ADMIN — LEVALBUTEROL HYDROCHLORIDE 0.63 MG: 0.63 SOLUTION RESPIRATORY (INHALATION) at 20:54

## 2023-05-10 RX ADMIN — PANTOPRAZOLE SODIUM 40 MG: 40 TABLET, DELAYED RELEASE ORAL at 08:22

## 2023-05-10 RX ADMIN — HYDROXYZINE HYDROCHLORIDE 25 MG: 25 TABLET, FILM COATED ORAL at 10:38

## 2023-05-10 RX ADMIN — LEVALBUTEROL HYDROCHLORIDE 0.63 MG: 0.63 SOLUTION RESPIRATORY (INHALATION) at 08:04

## 2023-05-10 RX ADMIN — BUMETANIDE 1 MG: 1 TABLET ORAL at 12:15

## 2023-05-10 RX ADMIN — Medication 400 MG: at 05:52

## 2023-05-10 RX ADMIN — ACETAMINOPHEN 650 MG: 325 TABLET ORAL at 19:53

## 2023-05-10 RX ADMIN — HEPARIN SODIUM 5000 UNITS: 5000 INJECTION, SOLUTION INTRAVENOUS; SUBCUTANEOUS at 05:52

## 2023-05-10 RX ADMIN — BUMETANIDE 1 MG: 1 TABLET ORAL at 16:58

## 2023-05-10 RX ADMIN — ACETAMINOPHEN 650 MG: 325 TABLET ORAL at 11:41

## 2023-05-10 ASSESSMENT — ACTIVITIES OF DAILY LIVING (ADL)
ADLS_ACUITY_SCORE: 31
ADLS_ACUITY_SCORE: 28
ADLS_ACUITY_SCORE: 26
ADLS_ACUITY_SCORE: 28
ADLS_ACUITY_SCORE: 26
ADLS_ACUITY_SCORE: 28
ADLS_ACUITY_SCORE: 28
ADLS_ACUITY_SCORE: 26
ADLS_ACUITY_SCORE: 28
ADLS_ACUITY_SCORE: 26
ADLS_ACUITY_SCORE: 28
ADLS_ACUITY_SCORE: 28

## 2023-05-10 NOTE — PLAN OF CARE
Neuro: A&Ox4.   Cardiac: Afib. VSS.   Respiratory: Sating 98% on 0.5 LPM. Attempted RA throughout the day, pt tolerated it well but requested oxygen back on for comfort. Pt wearing Bipap throughout the day per orders. Tolerating well, approx 30-45mins per time x3 times.  GI/: Adequate urine output, traylor in place. Oral bumex and IV albumin started today.    Diet/appetite: Tolerating regular diet. Eating well.  Activity:  Assist of 1-2, up to chair and in halls.  Pain: At acceptable level on current regimen.   Skin: No new deficits noted.  LDA's: PIVx2, Right DL midline.     Plan: Continue with POC. Notify primary team with changes. Mag replaced today per protocol.

## 2023-05-10 NOTE — PROGRESS NOTES
Calorie Count  Intake recorded for: 5/9  Total Kcals: 0 Total Protein: 0g  Kcals from Hospital Food: 0   Protein: 0g  Kcals from Outside Food (average):0 Protein: 0g  # Meals Ordered from Kitchen: 3 meals   # Meals Recorded: no intake recorded.   # Supplements Recorded: no intake recorded.

## 2023-05-10 NOTE — PROGRESS NOTES
Cardiovascular Surgery Progress Note  05/10/2023         Assessment and Plan:     Cydney Christiansen is an 87 year old female with a PMH of HTN, HLD, bilateral glaucoma, scoliosis, compression fracture of thoracic vertebrae, osteoporosis, history of bladder cancer, invasive ductal carcinoma of left breast s/p lumpectomy (2015), and multiple other skin cancer sites s/p removal, severe aortic stenosis with bicuspid aortic valve and an ascending aortic aneurysm. Patient is now s/p AVR (Aponte Resilia 21 mm valve) and ascending aorta repair on 4/27/23 with Dr. Solis.    Cardiovascular:   Severe aortic stenosis with bicuspid aortic valve  - s/p bioprosthetic AVR 4/25  Ascending aortic aneurysm - s/p graft repair 4/25  HTN  HLD  Severe tricuspid insufficiency, post-operative   Postoperative atrial fibrillation  Acute systolic heart failure  - TTE 4/27: LV EF 45-50%; Mild right ventricular dilation with normal global right ventricular function. A bioprosthetic aortic valve with PV of 2.2m/s and MG of 12mmHg. Severe tricuspid insufficiency.  - Repeat TTE 5/7: LV EF 40-45%, Aortic valve with mean gradient of 15 mmHg, mild to moderate TR, RA 8, trace pericardial effusion  - New post-operative atrial fibrillation. ICU discussed no anticoagulation with Dr. Solis and patient refused d/t bleeding risk.   - Reduce metoprolol to 12.5 mg PO BID with hold parameters. Have been holding beta blocker due to soft blood pressures. Goal MAP>65, SBP<140 per Dr Solis   - ASA 81 mg daily  - PTA simvastatin  - Diuresis as below    CT/PW removed in the ICU    Pulmonary:  Acute hypoxic respiratory insufficiency  Bilateral pleural effusions   Extubated POD 1. Persistently requiring 2 lpm.   - Supplemental O2 PRN to keep sats > 92%. Wean off as tolerated.  - Pulm hygiene, IS, activity and deep breathing  - Mucomyst neb PRN  - S/p right thoracentesis 5/3 with 600 ml removed  - Diuresis as below  - CT chest showed moderate bilateral effusions 5/7.  Pigtails placed by IR 5/8 with 500cc out on left and 600 on right. Removed same day as patient could not tolerate associated pain.   - BiPAP 20-30 mins QID and at night. Useful to time BiPAP with atarax dosing.    Neurology / MSK:  Acute post-operative pain   Delirium  Pain well controlled with current regimen:  - Acetaminophen, PO oxycodone PRN  - delirium precautions  - Melatonin 3mg q HS   - Atarax PRN for anxiety and during BiPAP     / Renal / Fluid / Electrolytes:  HELEN, resolved  Urinary retention  BL creat ~ 0.6, most recent creatinine 0.7; adequate UOP.   FLUID STATUS: Pre-op weight 137 lbs, weight today 140 standing scale  - Diuresis: Bumex 1 mg BID, given with albumin on 5/10. Reassess daily.   - Albumin 2.8 on 5/9; administered albumin 25% 12.5 g X2 in conjunction with diuresis on 5/10.  - Replete lytes per protocol  - Strict I/O, daily weights  - Avoid/limit nephrotoxins as able  - Helms placed 5/5 for retention. UA negative. Look to remove 5/11.  - Bladder scan 5/10 for low urine output to assess Helms patency    GI / Nutrition:   Transaminitis, resolved  Severe malnutrition in the context of acute illness  - Tolerating regular diet. Low oral intake.  - Calorie counts through 5/11 to determine intake. May need to consider tube feeds. Added Ensure clear 5/10 - reassess if helpful.   - Continue bowel regimen, last BM 5/10    Endocrine:  Stress induced hyperglycemia   Osteoporosis c/b compression fracture of spine  Pre-operative Hgb A1c 6.0  - Initially managed on insulin drip postop, transitioned to sliding scale; goal BG <180 for optimal healing  - Continue PTA med alendronate    Infectious Disease:  Stress induced leukocytosis  WBC WNL , most recent 6.8  - Remains afebrile, no signs or symptoms of infection  - Completed perioperative antibiotics  - Continue to monitor fever curve, CBC    Hematology:   Acute blood loss anemia and thrombocytopenia  - Hgb stable, 5/10 8.1  - Plt stable, no signs or  symptoms of active bleeding  - CBC daily    Anticoagulation:   - ASA only  - Discussion had with Dr. Solis regarding anticoagulation for atrial fibrillation stroke risk. Will not start anticoagulation at this time.     MSK/Skin:  Sternotomy  Surgical incision  - Sternal precautions  - Incisional cares per protocol    Prophylaxis:   - Stress ulcer prophylaxis: Pantoprazole 40 mg daily  - DVT prophylaxis: Subcutaneous heparin, SCD    Disposition:   - Transferred to  on 5/3  - Therapies recommending discharge to TCU. There is possibly an accepting facility now. Patient not medically ready as needs further diuresis and improved respiratory staff.    Discussed with Dr. Solis via written communication.    Gabriela Welch PA-C  Cardiothoracic Surgery  Pager [phone]    Resident/Fellow Attestation   I, Sheeba White NP, was present with the EFFIE trainee who participated in the service and in the documentation of the note.  I have verified the history and personally performed the physical exam and medical decision making.  I agree with the assessment and plan of care as documented in the note.      Sheeba White NP  Date of Service (when I saw the patient): 05/10/23        Interval History:     Received 500 mL bolus overnight for low urine output.    Patient reports mild improvement in her breathing. She endorses improved mood this morning. She reports sleeping better last night with addition of Atarax than she had previously. Finds the BiPAP difficult to tolerate but we discussed importance of continuing treatments and she expressed willingness to do so. She has only occasional sternal pain and is not taking any analgesics. No sternal popping, clicking, or snapping. No nausea, vomiting, abdominal pain. She is tolerating diet but is still struggling to meet calorie goals. Working with therapies.          Physical Exam:     Gen: A&Ox4, NAD  Neuro: no focal deficits   CV: atrial fibrillation, normal S1 S2, no murmurs, rubs  or gallops  Pulm: crackles at bilateral lung bases, normal respiratory rate on 2 lpm, diminished bases.   Abd: nondistended, normal BS, soft, nontender  Ext: mild lower extremity  peripheral edema, 1+ pitting  Incision: clean, dry, intact, no erythema, sternum stable  Tubes/drain sites: dry, clot present, no drainage         Data:    Imaging:  reviewed recent imaging, no acute concerns    Recent Results (from the past 24 hour(s))   XR Chest Port 1 View    Narrative    EXAM: XR CHEST PORT 1 VIEW  5/9/2023 4:28 PM     HISTORY:  patient endorsing worsening SOB       COMPARISON:  Same day chest radiograph    FINDINGS:     Portable upright view of the chest. Postoperative changes with intact  sternotomy wire and prosthetic aortic valve. Stable rightward  deviation of the trachea. Cardiac mediastinal silhouette is stable..No  significant change in bilateral pleural effusions with overlying  perihilar/bibasilar opacities.    Surgical clips in the density over the left lower thoracic cavity.  Mild leftward scoliotic curvature of the thoracolumbar spine. No acute  osseous abnormality. Visualized upper abdomen is unremarkable.        Impression    IMPRESSION: No significant interval change. Stable bilateral pleural  effusions with perihilar/bibasilar opacities, likely atelectasis  and/or edema..     I have personally reviewed the examination and initial interpretation  and I agree with the findings.    GILDA MILES MD         SYSTEM ID:  [id number]     5/7/23  1:25 PM [id number] [facility]      PACS Images     Show images for CT Chest w/o Contrast     Study Result    Narrative & Impression   EXAMINATION: CT CHEST W/O CONTRAST, 5/7/2023 1:25 PM     TECHNIQUE:  Helical CT images from the thoracic inlet through the  symphysis pubis were obtained without contrast.      COMPARISON: Chest CT 3/1/2023, thoracentesis 5/7/2023     HISTORY: plerual effusions     FINDINGS:     Thorax     Lungs: Central  tracheobronchial wall thickening. Mucous plugging of  some distal bronchioles in the right lower lobe. Moderate bilateral  pleural effusions with adjacent compressive atelectasis, predominantly  in the lower lobes. Unchanged 1.4 cm grouping of adjacent solid  pulmonary nodules in the left upper lobe, series 4 image 64.     Remainder of chest: Surgical changes of ascending aortic aneurysm  repair. Aortic valve prosthesis. Crescentic surrounding the ascending  aorta measuring 21 mm in thickness. Small pericardial effusion. Aortic  atherosclerosis. Unremarkable thyroid gland.     Upper abdomen: No evidence of acute pathology or  suspicious mass in  the visualized upper abdomen. Aortic atherosclerotic calcifications  are present.     Bones/Soft Tissues:  Kyphotic deformity of the thoracic spine.  Unchanged anterior wedge compression deformities of T5, T7, T8, and  T11. Anterior right ununited first and second rib fractures. Median  sternotomy wires intact. Small anterior mediastinal hematoma  underlying the sternotomy wires. Mild dextrocurvature mid thoracic  spine and compensatory levocurvature lumbar spine.                                                                      IMPRESSION:   1. Moderate bilateral pleural effusions with associated compressive  atelectasis.  2. Surgical changes of ascending aortic aneurysm repair with  crescentic hematoma surrounding the ascending aorta graft measuring 21  mm in thickness.  3. Small pericardial effusion.  4. Pulmonary nodule measuring 1.4 cm and the left upper lobe,  unchanged since 2/17/2023. Recommend additional tissue sampling,  PET/CT, or additional 3 month follow-up chest CT according to  Fleischner Society guidelines.  5. Unchanged appearance of multiple osteopenic wedge compression  fractures of the thoracic spine.     I have personally reviewed the examination and initial interpretation  and I agree with the findings.     ADONIS TROTTER MD         SYSTEM ID:   [id number]       Echocardiogram 5/7/23  Interpretation Summary  Ascending aorta aneurysm repair with 32 mm Hemashield graft and aortic valve  replacement with 21 mm Aponte Resilia on 04/25/2023.  Left ventricular function is decreased. The ejection fraction is 40-45%  (mildly reduced). There is significant [id number] variability in the LVEF  due to the patient's AF.  Global right ventricular function is normal. The right ventricle is normal  size.  The bioprosthetic aortic valve is well-seated. Leaflet opening is not clearly  visualized. There is no valvular or paravalvular regurgitation. The Vmax is  2.6 m/s, the mean gradient is 15 mmHg, the dimensionless index is 0.30, and  the acceleration time is 80 ms.  Mild to moderate tricuspid insufficiency is present.  IVC diameter and respiratory changes fall into an intermediate range  suggesting an RA pressure of 8 mmHg.  There is a small pericardial effusion adjacent LV lateral segments. The  maximal depth is 1.4 cm next to the left atrioventricular groove. There are no  signs of tamponade.  This study was compared with the study from 04/27/2023. The LV function has  declined modestly due to the patient's AF. There is no significant change in  the RV function. The TR is less severe.  ______________________________________________________________________________  Left Ventricle  Left ventricular function is decreased. The ejection fraction is 40-45%  (mildly reduced). Diastolic function not assessed due to atrial fibrillation.  There is significant vzft-fs-[id number] variability in the LVEF due to the patient's  AF. Abnormal non-specific septal motion is present.     Right Ventricle  Global right ventricular function is normal. The right ventricle is normal  size.     Atria  Moderate biatrial enlargement is present.     Mitral Valve  Mild to moderate mitral insufficiency is present.     Aortic Valve  The bioprosthetic aortic valve is well-seated. Leaflet opening is not  clearly  visualized. There is no valvular or paravalvular regurgitation. The Vmax is  2.6 m/s, the mean gradient is 15 mmHg, the dimensionless index is 0.30, and  the acceleration time is 80 ms.     Tricuspid Valve  The valve leaflets are not well visualized. Mild to moderate tricuspid  insufficiency is present. The right ventricular systolic pressure is  approximated at 19.5 mmHg plus the right atrial pressure.     Pulmonic Valve  The pulmonic valve cannot be assessed.     Vessels  The aorta root is normal. The thoracic aorta cannot be assessed. IVC diameter  and respiratory changes fall into an intermediate range suggesting an RA  pressure of 8 mmHg.     Pericardium  There is a small pericardial effusion adjacent LV lateral segments. The  maximal depth is 1.4 cm next to the left atrioventricular groove. There are no  signs of tamponade.     Compared to Previous Study  This study was compared with the study from 2023 . The LV function has  declined modestly due to the patient's AF. There is no significant change in  the RV function. The TR is less severe.  ______________________________________________________________________________  Doppler Measurements & Calculations  Ao V2 max: 261.0 cm/sec  Ao max P.2 mmHg  Ao V2 mean: 170.0 cm/sec  Ao mean P.0 mmHg  Ao V2 VTI: 40.9 cm  LV V1 max P.4 mmHg  LV V1 max: 77.4 cm/sec  LV V1 VTI: 12.2 cm  TR max chavez: 220.7 cm/sec  TR max P.5 mmHg  AV Chavez Ratio (DI): 0.30     ______________________________________________________________________________  Report approved by: Diana Beltran 2023 09:48 AM    Labs:  [facility]   Lab 05/10/23  0846 05/10/23  0423 23  2243 23  1624 23  0921 23  0438 23  2359 23  1236 23  1042 23  0733 23  0546   WBC  --  6.8  --   --   --  10.8 10.9   < >  --    < > 9.8   HGB  --  8.1*  --   --   --  9.6* 8.6*   < >  --    < > 8.8*   MCV  --  99  --   --   --  97 97    < >  --    < > 97   PLT  --  195  --   --   --  207 184   < >  --    < > 206   INR  --   --   --   --   --   --   --   --  1.25*  --   --    NA  --  138  --  136  --  137  --    < >  --    < > 140   POTASSIUM  --  4.6  --  4.5  --  4.6  --    < >  --    < > 3.8   CHLORIDE  --  101  --  97*  --  98  --    < >  --    < > 98   CO2  --  31*  --  30*  --  33*  --    < >  --    < > 33*   BUN  --  20.9  --  20.9  --  19.3  --    < >  --    < > 26.1*   CR  --  0.72  --  0.70  --  0.70  --    < >  --    < > 0.65   ANIONGAP  --  6*  --  9  --  6*  --    < >  --    < > 9   SHERYL  --  8.4*  --  8.6*  --  8.6*  --    < >  --    < > 8.5*   GLC 93 117* 113* 157*   < > 116*  --    < >  --    < > 129*   ALBUMIN  --   --   --   --   --  2.8*  --   --   --   --  2.9*   PROTTOTAL  --   --   --   --   --   --   --   --   --   --  5.4*   BILITOTAL  --   --   --   --   --   --   --   --   --   --  0.4   ALKPHOS  --   --   --   --   --   --   --   --   --   --  44   ALT  --   --   --   --   --   --   --   --   --   --  73*   AST  --   --   --   --   --   --   --   --   --   --  55*    < > = values in this interval not displayed.      GLUCOSE:   Recent Labs   Lab 05/10/23  0846 05/10/23  0423 05/09/23  2243 05/09/23  1624 05/09/23  0921 05/09/23  0438   GLC 93 117* 113* 157* 99 116*

## 2023-05-10 NOTE — PLAN OF CARE
Neuro: A&Ox4.   Cardiac: A-fib RVR HR , and Systolic 92/108.   Respiratory: Sating >95%  on 3L NC. Bipap was tried during the night but patient only tolerated it for about 30min, she was put back on NC.  GI/: Small amount of urine output in the first 8 hours of the shift, provider paged and a NS bolus was ordered. Urine output increased for the last 4 hours of the shift. No BM this shift.  Diet/appetite: Tolerating regular diet well, did not eat on this shift. Takes meds with applesauce.  Activity:  Assist of 1 up to chair and commode  Pain: At acceptable level on current regimen - no pain reported this shift.   Skin: No new deficits noted.   LDA's: 2PIV both saline locked. DL-midline was flushed and TPA was administered in both lumens to get blood return. Both are working fine now. Scooter.    Plan: Continue with POC, keep trialing bipap. Notify primary team with changes.

## 2023-05-11 ENCOUNTER — APPOINTMENT (OUTPATIENT)
Dept: PHYSICAL THERAPY | Facility: CLINIC | Age: 88
DRG: 219 | End: 2023-05-11
Attending: SURGERY
Payer: COMMERCIAL

## 2023-05-11 ENCOUNTER — APPOINTMENT (OUTPATIENT)
Dept: OCCUPATIONAL THERAPY | Facility: CLINIC | Age: 88
DRG: 219 | End: 2023-05-11
Attending: SURGERY
Payer: COMMERCIAL

## 2023-05-11 ENCOUNTER — APPOINTMENT (OUTPATIENT)
Dept: GENERAL RADIOLOGY | Facility: CLINIC | Age: 88
DRG: 219 | End: 2023-05-11
Attending: SURGERY
Payer: COMMERCIAL

## 2023-05-11 LAB
ALBUMIN SERPL BCG-MCNC: 3.1 G/DL (ref 3.5–5.2)
ANION GAP SERPL CALCULATED.3IONS-SCNC: 10 MMOL/L (ref 7–15)
ANION GAP SERPL CALCULATED.3IONS-SCNC: 11 MMOL/L (ref 7–15)
BUN SERPL-MCNC: 21.3 MG/DL (ref 8–23)
BUN SERPL-MCNC: 22.4 MG/DL (ref 8–23)
CALCIUM SERPL-MCNC: 8.9 MG/DL (ref 8.8–10.2)
CALCIUM SERPL-MCNC: 8.9 MG/DL (ref 8.8–10.2)
CHLORIDE SERPL-SCNC: 98 MMOL/L (ref 98–107)
CHLORIDE SERPL-SCNC: 98 MMOL/L (ref 98–107)
CREAT SERPL-MCNC: 0.69 MG/DL (ref 0.51–0.95)
CREAT SERPL-MCNC: 0.74 MG/DL (ref 0.51–0.95)
DEPRECATED HCO3 PLAS-SCNC: 28 MMOL/L (ref 22–29)
DEPRECATED HCO3 PLAS-SCNC: 29 MMOL/L (ref 22–29)
ERYTHROCYTE [DISTWIDTH] IN BLOOD BY AUTOMATED COUNT: 16.3 % (ref 10–15)
GFR SERPL CREATININE-BSD FRML MDRD: 79 ML/MIN/1.73M2
GFR SERPL CREATININE-BSD FRML MDRD: 85 ML/MIN/1.73M2
GLUCOSE SERPL-MCNC: 104 MG/DL (ref 70–99)
GLUCOSE SERPL-MCNC: 131 MG/DL (ref 70–99)
HCT VFR BLD AUTO: 28.2 % (ref 35–47)
HGB BLD-MCNC: 8.4 G/DL (ref 11.7–15.7)
MAGNESIUM SERPL-MCNC: 2 MG/DL (ref 1.7–2.3)
MCH RBC QN AUTO: 28.8 PG (ref 26.5–33)
MCHC RBC AUTO-ENTMCNC: 29.8 G/DL (ref 31.5–36.5)
MCV RBC AUTO: 97 FL (ref 78–100)
PHOSPHATE SERPL-MCNC: 3.3 MG/DL (ref 2.5–4.5)
PLATELET # BLD AUTO: 206 10E3/UL (ref 150–450)
POTASSIUM SERPL-SCNC: 4 MMOL/L (ref 3.4–5.3)
POTASSIUM SERPL-SCNC: 4.4 MMOL/L (ref 3.4–5.3)
RBC # BLD AUTO: 2.92 10E6/UL (ref 3.8–5.2)
SODIUM SERPL-SCNC: 137 MMOL/L (ref 136–145)
SODIUM SERPL-SCNC: 137 MMOL/L (ref 136–145)
WBC # BLD AUTO: 5.9 10E3/UL (ref 4–11)

## 2023-05-11 PROCEDURE — 250N000013 HC RX MED GY IP 250 OP 250 PS 637: Performed by: STUDENT IN AN ORGANIZED HEALTH CARE EDUCATION/TRAINING PROGRAM

## 2023-05-11 PROCEDURE — 36415 COLL VENOUS BLD VENIPUNCTURE: CPT | Performed by: PHYSICIAN ASSISTANT

## 2023-05-11 PROCEDURE — 80069 RENAL FUNCTION PANEL: CPT

## 2023-05-11 PROCEDURE — 250N000011 HC RX IP 250 OP 636: Performed by: NURSE PRACTITIONER

## 2023-05-11 PROCEDURE — 82040 ASSAY OF SERUM ALBUMIN: CPT

## 2023-05-11 PROCEDURE — 97116 GAIT TRAINING THERAPY: CPT | Mod: GP

## 2023-05-11 PROCEDURE — 93005 ELECTROCARDIOGRAM TRACING: CPT

## 2023-05-11 PROCEDURE — 71045 X-RAY EXAM CHEST 1 VIEW: CPT | Mod: 26 | Performed by: RADIOLOGY

## 2023-05-11 PROCEDURE — 76604 US EXAM CHEST: CPT | Mod: 26 | Performed by: STUDENT IN AN ORGANIZED HEALTH CARE EDUCATION/TRAINING PROGRAM

## 2023-05-11 PROCEDURE — 120N000003 HC R&B IMCU UMMC

## 2023-05-11 PROCEDURE — 999N000157 HC STATISTIC RCP TIME EA 10 MIN

## 2023-05-11 PROCEDURE — 94640 AIRWAY INHALATION TREATMENT: CPT | Mod: 76

## 2023-05-11 PROCEDURE — 250N000013 HC RX MED GY IP 250 OP 250 PS 637

## 2023-05-11 PROCEDURE — 93010 ELECTROCARDIOGRAM REPORT: CPT | Performed by: INTERNAL MEDICINE

## 2023-05-11 PROCEDURE — 250N000011 HC RX IP 250 OP 636

## 2023-05-11 PROCEDURE — 97530 THERAPEUTIC ACTIVITIES: CPT | Mod: GP

## 2023-05-11 PROCEDURE — 97535 SELF CARE MNGMENT TRAINING: CPT | Mod: GO | Performed by: OCCUPATIONAL THERAPIST

## 2023-05-11 PROCEDURE — 250N000013 HC RX MED GY IP 250 OP 250 PS 637: Performed by: PHYSICIAN ASSISTANT

## 2023-05-11 PROCEDURE — 258N000003 HC RX IP 258 OP 636

## 2023-05-11 PROCEDURE — 97110 THERAPEUTIC EXERCISES: CPT | Mod: GO | Performed by: OCCUPATIONAL THERAPIST

## 2023-05-11 PROCEDURE — 71045 X-RAY EXAM CHEST 1 VIEW: CPT

## 2023-05-11 PROCEDURE — 85027 COMPLETE CBC AUTOMATED: CPT | Performed by: STUDENT IN AN ORGANIZED HEALTH CARE EDUCATION/TRAINING PROGRAM

## 2023-05-11 PROCEDURE — 250N000009 HC RX 250: Performed by: PHYSICIAN ASSISTANT

## 2023-05-11 PROCEDURE — 250N000013 HC RX MED GY IP 250 OP 250 PS 637: Performed by: SURGERY

## 2023-05-11 PROCEDURE — 83735 ASSAY OF MAGNESIUM: CPT | Performed by: SURGERY

## 2023-05-11 RX ORDER — MAGNESIUM OXIDE 400 MG/1
400 TABLET ORAL EVERY 4 HOURS
Status: COMPLETED | OUTPATIENT
Start: 2023-05-11 | End: 2023-05-11

## 2023-05-11 RX ORDER — BUMETANIDE 1 MG/1
1 TABLET ORAL ONCE
Status: COMPLETED | OUTPATIENT
Start: 2023-05-11 | End: 2023-05-11

## 2023-05-11 RX ADMIN — ACETAMINOPHEN 650 MG: 325 TABLET ORAL at 15:56

## 2023-05-11 RX ADMIN — DORZOLAMIDE HYDROCHLORIDE AND TIMOLOL MALEATE 1 DROP: 22.3; 6.8 SOLUTION/ DROPS OPHTHALMIC at 19:49

## 2023-05-11 RX ADMIN — HEPARIN SODIUM 5000 UNITS: 5000 INJECTION, SOLUTION INTRAVENOUS; SUBCUTANEOUS at 06:28

## 2023-05-11 RX ADMIN — DORZOLAMIDE HYDROCHLORIDE AND TIMOLOL MALEATE 1 DROP: 22.3; 6.8 SOLUTION/ DROPS OPHTHALMIC at 07:39

## 2023-05-11 RX ADMIN — Medication 400 MG: at 13:59

## 2023-05-11 RX ADMIN — PANTOPRAZOLE SODIUM 40 MG: 40 TABLET, DELAYED RELEASE ORAL at 07:39

## 2023-05-11 RX ADMIN — Medication 400 MG: at 09:45

## 2023-05-11 RX ADMIN — HYDROXYZINE HYDROCHLORIDE 25 MG: 25 TABLET, FILM COATED ORAL at 07:41

## 2023-05-11 RX ADMIN — AMIODARONE HYDROCHLORIDE 150 MG: 1.5 INJECTION, SOLUTION INTRAVENOUS at 14:16

## 2023-05-11 RX ADMIN — POTASSIUM CHLORIDE 20 MEQ: 750 TABLET, EXTENDED RELEASE ORAL at 19:49

## 2023-05-11 RX ADMIN — AMIODARONE HYDROCHLORIDE 1 MG/MIN: 50 INJECTION, SOLUTION INTRAVENOUS at 14:33

## 2023-05-11 RX ADMIN — HEPARIN SODIUM 5000 UNITS: 5000 INJECTION, SOLUTION INTRAVENOUS; SUBCUTANEOUS at 21:52

## 2023-05-11 RX ADMIN — SIMVASTATIN 20 MG: 20 TABLET, FILM COATED ORAL at 21:52

## 2023-05-11 RX ADMIN — HEPARIN SODIUM 5000 UNITS: 5000 INJECTION, SOLUTION INTRAVENOUS; SUBCUTANEOUS at 13:59

## 2023-05-11 RX ADMIN — Medication 12.5 MG: at 19:49

## 2023-05-11 RX ADMIN — BUMETANIDE 1 MG: 1 TABLET ORAL at 13:59

## 2023-05-11 RX ADMIN — LATANOPROST 1 DROP: 50 SOLUTION OPHTHALMIC at 21:57

## 2023-05-11 RX ADMIN — ACETAMINOPHEN 650 MG: 325 TABLET ORAL at 07:38

## 2023-05-11 RX ADMIN — ASPIRIN 81 MG CHEWABLE TABLET 81 MG: 81 TABLET CHEWABLE at 07:39

## 2023-05-11 RX ADMIN — POTASSIUM CHLORIDE 20 MEQ: 750 TABLET, EXTENDED RELEASE ORAL at 07:39

## 2023-05-11 RX ADMIN — LEVALBUTEROL HYDROCHLORIDE 0.63 MG: 0.63 SOLUTION RESPIRATORY (INHALATION) at 20:30

## 2023-05-11 ASSESSMENT — ACTIVITIES OF DAILY LIVING (ADL)
ADLS_ACUITY_SCORE: 28

## 2023-05-11 NOTE — PLAN OF CARE
Neuro: A&Ox4. Anxious - prn Atarax given.  Cardiac: A-fib, HR 80s-110s; up to 130 x1 very briefly. SBPs 90s-110s. Map>65.    Respiratory: Sating high 90s on 1 L NC. Bipap on for fourth time today for 20 min before bed. CO more SOB in AM, -sating 96% on 2 L NC; Pt sat up and placed on Bipap for 1 hour; Pt stated this helped a little bit.  GI/: Adequate urine output via traylor. 800 over first 8 hours and 75 ml over last four hours. No BM overnight.  Diet/appetite: Regular diet.  Activity:  Assist of 2.   Pain: At acceptable level on current regimen. CO slight chest pain - same quality as previous days. PRN Tylenol given.   Skin: No new deficits noted.  LDA's: Traylor, PIVx2. DL midline.

## 2023-05-11 NOTE — PROGRESS NOTES
[facility]  CAPS NOTE  Date of Admission:  4/25/2023  Consult Requested by: Cardiothoracic Surgery  Reason for Consult: management of symptomatic pleural effusion    POC US Guide for Thorcentesis     Impression  Limited point of care pleural/lung ultrasound to evaluate for thoracentesis.    Thoracentesis Indication:pleural effusion    Views/Acquisition: Bilateral pleural space(s): supine.    Findings/Interpretation: No left sided pleural fluid with minimal R sided fluid not safe for bedside CAPS procedure.    Chester Paez MD        Assessment and Plan:  Requested procedure was not performed.  Insufficient fluid for safe bedside paracentesis. No Left and minimal Right sided pleural effusion (not safe for bedside thoracentesis). Patient currently on RA. Discussed findings with primary team.       Chester Paez MD  [facility]  Securely message with [employer] (more info)  Text page via [facility] Paging/Directory   See signed in provider for up to date coverage information

## 2023-05-11 NOTE — PLAN OF CARE
Goal Outcome Evaluation:      Plan of Care Reviewed With: patient    Overall Patient Progress: improvingOverall Patient Progress: improving    Outcome Evaluation: PO intake starting to improve with small frequent meals, room service assistance, and oral nutrition supplements in place. Pt feels appetite improving every day which is a great improvement even from earlier in the week. Modified supps 5/11. See RD note 5/11 for full assessment.

## 2023-05-11 NOTE — PROGRESS NOTES
Care Management Follow Up    Length of Stay (days): 16    Expected Discharge Date: 05/15/2023     Concerns to be Addressed: all concerns addressed in this encounter     Patient plan of care discussed at interdisciplinary rounds: Yes    Anticipated Discharge Disposition: TCU     Anticipated Discharge Services: None  Anticipated Discharge DME: Linda Valenzuela, Shower Chair    Patient/family educated on Medicare website which has current facility and service quality ratings:  Yes  Education Provided on the Discharge Plan: Yes   Patient/Family in Agreement with the Plan:  Yes    Referrals Placed by CM/SW:    Pending  [facility]  [address] [place] [address]  PH: [phone]  5/11: Referral resent    [facility]  [address] [place] [address]  PH: [phone]  5/11: referral resent     [facility]  [address] [place] [address]  PH: [phone]  5/11: Referral resent     [facility]  [address] [place] [address]  PH: [phone]  5/11: Referral resent    [facility]  [address] [place] [address]  PH: [phone]  5/11: Referral resent    Private pay costs discussed: Not applicable    Additional Information:  SW notified by CVTS that pt is nearing medical readiness for discharge. PT/OT currently recommending TCU. SW resent updated referrals to TCUs that had been given previously.     SW met with pt at bedside to update her on discharge plan and progress. Pt still agreeable to go to TCU at discharge. SW shared TCU list where referrals were previously sent and pt confirmed that her and pt son had picked these out. SW answered pt's questions about TCU placement and progress. SW to keep pt updated on TCU progress.     May Salinas MAGGIE  [facility]   [facility]

## 2023-05-11 NOTE — PROGRESS NOTES
CLINICAL NUTRITION SERVICES - REASSESSMENT NOTE     Nutrition Prescription    RECOMMENDATIONS FOR MDs/PROVIDERS TO ORDER:  - Given progression with intake and report of appetite improving, will likely not need a FT at this time (continue small frequent meals, snacks/supplements to optimize intake)    Malnutrition Status:    Moderate malnutrition in the context of acute illness (improving)    Recommendations already ordered by Registered Dietitian (RD):    Continue room service with assistance as ordered    Modify Magic Cup from TID to BID between meals    Modify Ensure with Ice Cream to once daily (breakfast); add Ensure Clear once daily with dinner trays    Continue to encourage PO intake/support meal time needs    Future/Additional Recommendations:  Monitor nutrition-related findings and follow pt per protocol     EVALUATION OF THE PROGRESS TOWARD GOALS   Diet: Regular - Room service with assistance and ceasar cts 5/9-5/11   Supplement: Magic Cup TID between meals, Chocolate Ensure mixed with Ice Cream, BID      PO Intake: 0-25-50% of meal trays consumed per I/O. Snacks and/or takes supplements more often than takes full meals. Supplements last adjusted in collaboration w/ pt on 5/8. Ceasar ct results 5/9 incomplete (no intake documented to sheet) and ceasar ct intake 5/10 documented as 910 Kcals (74% of goal) and 26 gm protein (43% of goal).      Per pt interview: Cydney reports that her appetite is slowly getting better, day by day. Continues on room service with assistance which has been going well. Would like to reduce the frequency of her Magic Cup supplement but still willing to receive BID. Tolerating Ensure Plus but would like to modify one of her Ensure supplements to Ensure Clear. Continue to encourage intake and snacks/supplements between meals. Given progression with intake and appetite, will likely not need a FT unless this changes.     NEW FINDINGS   GI:    0-3 unmeasured BMs per day over past week, per I/O.      Weights:    Down 6.5 kg over past week, however this is at least partially attributed to a fluid loss. Pt I/O pt is down 9.6L fluid since admission. Diuresis.   o Current weight is stable when compared to admission weight (weight from today and admit wt both obtained from standing scale)    Labs:    Reviewed   Electrolytes  Potassium (mmol/L)   Date Value   05/11/2023 4.0   05/10/2023 4.8   05/10/2023 5.4 (H)   04/25/2023 4.1   01/12/2023 4.1   10/17/2022 3.9   03/28/2022 4.7   05/03/2021 4.5   09/22/2020 4.1   07/15/2020 4.6     Potassium POCT (mmol/L)   Date Value   04/25/2023 3.8   04/25/2023 3.8   04/25/2023 3.5     Phosphorus (mg/dL)   Date Value   05/11/2023 3.3   05/10/2023 3.2   05/04/2023 2.9   05/03/2023 3.2   05/02/2023 2.8    Blood Glucose  Glucose (mg/dL)   Date Value   05/11/2023 104 (H)   05/10/2023 160 (H)   05/10/2023 117 (H)   05/09/2023 157 (H)   05/09/2023 116 (H)   01/12/2023 95   10/17/2022 104 (H)   03/28/2022 99   05/03/2021 101 (H)   09/22/2020 99   07/15/2020 98   07/15/2019 91   07/01/2019 100 (H)     GLUCOSE BY METER POCT (mg/dL)   Date Value   05/10/2023 93   05/09/2023 113 (H)   05/09/2023 99   05/08/2023 114 (H)   05/08/2023 107 (H)     Hemoglobin A1C (%)   Date Value   04/13/2023 6.0 (H)    Inflammatory Markers  WBC (10e9/L)   Date Value   09/22/2020 6.1   03/03/2020 4.8   09/17/2019 5.1     WBC Count (10e3/uL)   Date Value   05/11/2023 5.9   05/10/2023 6.8   05/09/2023 10.8     Albumin (g/dL)   Date Value   05/11/2023 3.1 (L)   05/09/2023 2.8 (L)   05/04/2023 2.9 (L)   05/03/2021 3.8   09/22/2020 3.4   09/17/2019 3.4      Magnesium (mg/dL)   Date Value   05/11/2023 2.0   05/10/2023 1.9   05/09/2023 1.9     Sodium (mmol/L)   Date Value   05/11/2023 137   05/10/2023 139   05/10/2023 138   05/03/2021 137   09/22/2020 139   07/15/2020 138    Renal  Urea Nitrogen (mg/dL)   Date Value   05/11/2023 21.3   05/10/2023 23.4 (H)   05/10/2023 20.9   01/12/2023 13   10/17/2022 18   03/28/2022  15   05/03/2021 16   09/22/2020 19   07/15/2020 13     Creatinine (mg/dL)   Date Value   05/11/2023 0.69   05/10/2023 0.69   05/10/2023 0.72   05/03/2021 0.80   09/22/2020 0.70   07/15/2020 0.64     Additional  Triglycerides (mg/dL)   Date Value   08/03/2022 86   07/22/2021 95   07/15/2020 57   07/01/2019 73   06/28/2018 67     Ketones Urine (mg/dL)   Date Value   05/06/2023 Negative   05/06/2021 Negative        Medications:    Reviewed     Skin:    Incision site to abdomen (4/29/23) from CT which has been removed    No new deficits per bedside RN documentation     MALNUTRITION  % Intake: < 75% for > 7 days (moderate)  % Weight Loss: None noted - Likely at least partially confounded by fluid status   Subcutaneous Fat Loss: Facial region:  mild, Upper arm:  moderate and Thoracic/intercostal:  mild  Muscle Loss: Temporal:  mild, Facial & jaw region:  mild and Thoracic region (clavicle, acromium bone, deltoid, trapezius, pectoral):  mild  Fluid Accumulation/Edema: Does not meet criteria (trace)  Malnutrition Diagnosis: Moderate malnutrition in the context of acute illness (improving)    Previous Goals   Patient to consume % of nutritionally adequate meal trays TID, or the equivalent with supplements/snacks.  Evaluation: Not met but improving    Previous Nutrition Diagnosis  Inadequate oral intake related to decreased appetite, diarrhea as evidenced by pt self-report, intake of 25% meal trays since admission, oral nutrition supplement and RS assist to optimize intake.     Evaluation: Improving    CURRENT NUTRITION DIAGNOSIS  Inadequate oral intake related to decreased appetite with prolonged hospital LOS and post-op status as evidenced by pt-self report, low intake per I/O/luisana cts, but improving with small frequent meals/supplements.       INTERVENTIONS  Implementation  Collaboration with other nutrition professionals - RS with assist   Modified supplements     Goals  Patient to consume % of nutritionally  adequate meal trays TID, or the equivalent with supplements/snacks.    Monitoring/Evaluation  Progress toward goals will be monitored and evaluated per protocol.    Ruslan Vázquez RDN, LD, CNSC  6B work-room RD phone: [phone]   6B/Obs RD pager: [phone]    Weekend/Holiday RD pager [phone]

## 2023-05-11 NOTE — PROGRESS NOTES
Cardiovascular Surgery Progress Note  05/11/2023         Assessment and Plan:     Cydney Christiansen is an 87 year old female with a PMH of HTN, HLD, bilateral glaucoma, scoliosis, compression fracture of thoracic vertebrae, osteoporosis, history of bladder cancer, invasive ductal carcinoma of left breast s/p lumpectomy (2015), and multiple other skin cancer sites s/p removal, severe aortic stenosis with bicuspid aortic valve and an ascending aortic aneurysm. Patient is now s/p AVR (Aponte Resilia 21 mm valve) and ascending aorta repair on 4/27/23 with Dr. Solis.    Cardiovascular:   Severe aortic stenosis with bicuspid aortic valve  - s/p bioprosthetic AVR 4/25  Ascending aortic aneurysm - s/p graft repair 4/25  HTN  HLD  Severe tricuspid insufficiency, post-operative   Postoperative atrial fibrillation  - TTE 4/27: LV EF 45-50%; Mild right ventricular dilation with normal global right ventricular function. A bioprosthetic aortic valve with PV of 2.2m/s and MG of 12mmHg. Severe tricuspid insufficiency.  - Repeat TTE 5/7: LV EF 40-45%, Aortic valve with mean gradient of 15 mmHg, mild to moderate TR, RA 8, trace pericardial effusion  - New post-operative atrial fibrillation. Patient converted in ICU on amiodarone, which was discontinued on 4/30 in the ICU. Reverted to atrial fibrillation 4/30. ICU discussed no anticoagulation with Dr. Solis and patient refused d/t bleeding risk.   - Reduce metoprolol to 12.5 mg PO BID with hold parameters. Have been holding beta blocker due to soft blood pressures. Goal MAP>65, SBP<140 per Dr Solis   - Amiodarone bolus and gtt  - ASA 81 mg daily  - PTA simvastatin  - Diuresis as below    CT/PW removed in the ICU    Pulmonary:  Acute hypoxic respiratory insufficiency  Bilateral pleural effusions   Extubated POD 1. Persistently requiring 2 lpm.   - Supplemental O2 PRN to keep sats > 92%. Wean off as tolerated.  - Pulm hygiene, IS, activity and deep breathing  - Mucomyst neb PRN  - S/p  right thoracentesis 5/3 with 600 ml removed.   - Diuresis as below  - CT chest showed moderate bilateral effusions 5/7. Pigtails placed by IR 5/8 with 500cc out on left and 600 on right. Removed same day as patient could not tolerate associated pain.   - BiPAP 20-30 mins QID and at night. Useful to time BiPAP with atarax dosing.    Neurology / MSK:  Acute post-operative pain   Delirium  Pain well controlled with current regimen:  - Acetaminophen, PO oxycodone PRN  - delirium precautions  - Melatonin 3mg q HS   - Atarax PRN for anxiety and during BiPAP     / Renal / Fluid / Electrolytes:  HELEN, resolved  Urinary retention  BL creat ~ 0.6, most recent creatinine 0.69; adequate UOP.   FLUID STATUS: Pre-op weight 137 lbs, most recent weight 138 standing scale  - Diuresis: Bumex 1 mg BID on 5/11. Reassess daily.   - Albumin 2.8 on 5/9; administered albumin 25% 12.5 g X2 in conjunction with diuresis on 5/10.  - Replete lytes per protocol  - Strict I/O, daily weights  - Avoid/limit nephrotoxins as able  - Helms placed 5/5 for retention. UA negative. Removed 5/11.     GI / Nutrition:   Transaminitis, resolved  Severe malnutrition in the context of acute illness  - Tolerating regular diet. Low oral intake.  - Calorie counts through 5/11 to determine intake. Per RD recs, encouraging PO intake & giving supplements. No current need for TF.  - Continue bowel regimen, last BM 5/11    Endocrine:  Stress induced hyperglycemia   Osteoporosis c/b compression fracture of spine  Pre-operative Hgb A1c 6.0  - Managed on insulin drip postop, transitioned to sliding scale goal BG <180 and has now also been discontinued due to good BG control with no insulin need.   - Continue PTA med alendronate    Infectious Disease:  Stress induced leukocytosis  WBC WNL , most recent 5.9  - Remains afebrile, no signs or symptoms of infection  - Completed perioperative antibiotics  - Continue to monitor fever curve, CBC    Hematology:   Acute blood loss  "anemia and thrombocytopenia  - Hgb stable, 5/11 8.4  - Plt stable, no signs or symptoms of active bleeding  - CBC daily    Anticoagulation:   - ASA only  - Discussion had with Dr. Solis regarding anticoagulation for atrial fibrillation stroke risk. Will not start anticoagulation at this time.     MSK/Skin:  Sternotomy  Surgical incision  - Sternal precautions  - Incisional cares per protocol    Prophylaxis:   - Stress ulcer prophylaxis: Pantoprazole 40 mg daily  - DVT prophylaxis: Subcutaneous heparin, SCD    Disposition:   - Transferred to  on 5/3  - Therapies recommending discharge to TCU. There is possibly an accepting facility now. Patient not medically ready as needs further diuresis and improved respiratory staff.    Discussed with Dr. Solis via written communication.    Gabriela Welch PA-C  Cardiothoracic Surgery  Pager [phone]    Yuri Quiroz PA-C  Cardiothoracic Surgery  Pager [phone]    3:10 PM   May 11, 2023          Interval History:     Overnight resident notified of K+ of 5.2 after replacement. On recheck K+ was 4.8.     Patient feels her breathing is the same or worse today. She is not on any supplemental oxygen at time of conversation. She slept poorly overnight due to the beds being uncomfortable. She continues to report minimal sternal/incisional pain with only occasional \"tightness\" right at incision associated with movement. Participating in physical therapy actively. Discussed possible thoracentesis today - she is willing to try this again. Also discussed attempting amiodarone for rhythm conversation of atrial fibrillation - she expressed understanding of relative risks/benefits of this and is amenable to attempting medical conversion.    No sternal popping, clicking, or snapping. No nausea, vomiting, abdominal pain. She is tolerating diet and has improved oral intake.         Physical Exam:     Gen: A&Ox4, NAD  Neuro: no focal deficits   CV: atrial fibrillation, did not appreciate any " murmurs, rubs or gallops  Pulm: crackles at bilateral lung bases, normal respiratory rate on room air at time of exam, diminished bases.   Abd: nondistended, normal BS, soft, nontender  Ext: mild lower extremity  peripheral edema, 1+ pitting/improving  Incision: clean, dry, intact, no erythema, sternum stable  Tubes/drain sites: dry, clot present, no drainage         Data:    Imaging:  reviewed recent imaging, no acute concerns    Recent Results (from the past 24 hour(s))   XR Chest Port 1 View    Narrative    Portable chest 5/10/2023 at 0832 hours    INDICATION: Postoperative monitoring for effusions    COMPARISON: Yesterday's 1611 hours    FINDINGS: Heart remains enlarged. Median sternotomy again noted.  Aortic valve prosthesis. Low inspiratory volumes. Midline catheter on  the right terminates in the low axilla.      Impression    IMPRESSION: Mild edema right lower lung zone. Cardiomegaly. Aortic  valve prosthesis.    ADONIS TROTTER MD         SYSTEM ID:  [id number]   XR Chest Port 1 View    Narrative    XR CHEST PORT 1 VIEW  5/11/2023 9:09 AM      HISTORY: post-op monitoring for effusions    COMPARISON: Chest x-ray 5/10/2023 05/09/2023.    FINDINGS:   Portable AP 65 degree upright chest x-ray. Postsurgical aortic valve  replacement changes.    Trachea is midline. Cardiac silhouette is enlarged. Pulmonary  vasculature is distinct. Retrocardiac opacities. Moderate right and  small left pleural effusions, increased. No pneumothorax..    Bony structures appear intact.       Impression    IMPRESSION:   Cardiomegaly with increased moderate right and small left pleural  effusions.    I have personally reviewed the examination and initial interpretation  and I agree with the findings.    ADONIS TROTTER MD         SYSTEM ID:  [id number]     5/7/23  1:25 PM [id number] [facility]      PACS Images     Show images for CT Chest w/o Contrast     Study Result    Narrative & Impression   EXAMINATION: CT  CHEST W/O CONTRAST, 5/7/2023 1:25 PM     TECHNIQUE:  Helical CT images from the thoracic inlet through the  symphysis pubis were obtained without contrast.      COMPARISON: Chest CT 3/1/2023, thoracentesis 5/7/2023     HISTORY: plerual effusions     FINDINGS:     Thorax     Lungs: Central tracheobronchial wall thickening. Mucous plugging of  some distal bronchioles in the right lower lobe. Moderate bilateral  pleural effusions with adjacent compressive atelectasis, predominantly  in the lower lobes. Unchanged 1.4 cm grouping of adjacent solid  pulmonary nodules in the left upper lobe, series 4 image 64.     Remainder of chest: Surgical changes of ascending aortic aneurysm  repair. Aortic valve prosthesis. Crescentic surrounding the ascending  aorta measuring 21 mm in thickness. Small pericardial effusion. Aortic  atherosclerosis. Unremarkable thyroid gland.     Upper abdomen: No evidence of acute pathology or  suspicious mass in  the visualized upper abdomen. Aortic atherosclerotic calcifications  are present.     Bones/Soft Tissues:  Kyphotic deformity of the thoracic spine.  Unchanged anterior wedge compression deformities of T5, T7, T8, and  T11. Anterior right ununited first and second rib fractures. Median  sternotomy wires intact. Small anterior mediastinal hematoma  underlying the sternotomy wires. Mild dextrocurvature mid thoracic  spine and compensatory levocurvature lumbar spine.                                                                      IMPRESSION:   1. Moderate bilateral pleural effusions with associated compressive  atelectasis.  2. Surgical changes of ascending aortic aneurysm repair with  crescentic hematoma surrounding the ascending aorta graft measuring 21  mm in thickness.  3. Small pericardial effusion.  4. Pulmonary nodule measuring 1.4 cm and the left upper lobe,  unchanged since 2/17/2023. Recommend additional tissue sampling,  PET/CT, or additional 3 month follow-up chest CT  according to  Fleischner Society guidelines.  5. Unchanged appearance of multiple osteopenic wedge compression  fractures of the thoracic spine.     I have personally reviewed the examination and initial interpretation  and I agree with the findings.     ADONIS TROTTER MD         SYSTEM ID:  [id number]       Echocardiogram 5/7/23  Interpretation Summary  Ascending aorta aneurysm repair with 32 mm Hemashield graft and aortic valve  replacement with 21 mm Aponte Resilia on 04/25/2023.  Left ventricular function is decreased. The ejection fraction is 40-45%  (mildly reduced). There is significant shax-bo-nsxy variability in the LVEF  due to the patient's AF.  Global right ventricular function is normal. The right ventricle is normal  size.  The bioprosthetic aortic valve is well-seated. Leaflet opening is not clearly  visualized. There is no valvular or paravalvular regurgitation. The Vmax is  2.6 m/s, the mean gradient is 15 mmHg, the dimensionless index is 0.30, and  the acceleration time is 80 ms.  Mild to moderate tricuspid insufficiency is present.  IVC diameter and respiratory changes fall into an intermediate range  suggesting an RA pressure of 8 mmHg.  There is a small pericardial effusion adjacent LV lateral segments. The  maximal depth is 1.4 cm next to the left atrioventricular groove. There are no  signs of tamponade.  This study was compared with the study from 04/27/2023. The LV function has  declined modestly due to the patient's AF. There is no significant change in  the RV function. The TR is less severe.  ______________________________________________________________________________  Left Ventricle  Left ventricular function is decreased. The ejection fraction is 40-45%  (mildly reduced). Diastolic function not assessed due to atrial fibrillation.  There is significant [id number] variability in the LVEF due to the patient's  AF. Abnormal non-specific septal motion is present.     Right  Ventricle  Global right ventricular function is normal. The right ventricle is normal  size.     Atria  Moderate biatrial enlargement is present.     Mitral Valve  Mild to moderate mitral insufficiency is present.     Aortic Valve  The bioprosthetic aortic valve is well-seated. Leaflet opening is not clearly  visualized. There is no valvular or paravalvular regurgitation. The Vmax is  2.6 m/s, the mean gradient is 15 mmHg, the dimensionless index is 0.30, and  the acceleration time is 80 ms.     Tricuspid Valve  The valve leaflets are not well visualized. Mild to moderate tricuspid  insufficiency is present. The right ventricular systolic pressure is  approximated at 19.5 mmHg plus the right atrial pressure.     Pulmonic Valve  The pulmonic valve cannot be assessed.     Vessels  The aorta root is normal. The thoracic aorta cannot be assessed. IVC diameter  and respiratory changes fall into an intermediate range suggesting an RA  pressure of 8 mmHg.     Pericardium  There is a small pericardial effusion adjacent LV lateral segments. The  maximal depth is 1.4 cm next to the left atrioventricular groove. There are no  signs of tamponade.     Compared to Previous Study  This study was compared with the study from 2023 . The LV function has  declined modestly due to the patient's AF. There is no significant change in  the RV function. The TR is less severe.  ______________________________________________________________________________  Doppler Measurements & Calculations  Ao V2 max: 261.0 cm/sec  Ao max P.2 mmHg  Ao V2 mean: 170.0 cm/sec  Ao mean P.0 mmHg  Ao V2 VTI: 40.9 cm  LV V1 max P.4 mmHg  LV V1 max: 77.4 cm/sec  LV V1 VTI: 12.2 cm  TR max chavez: 220.7 cm/sec  TR max P.5 mmHg  AV Chavez Ratio (DI): 0.30     ______________________________________________________________________________  Report approved by: Diana Beltran 2023 09:48 AM    Labs:  Recent Labs   Lab 23  0603  05/10/23  2032 05/10/23  1556 05/10/23  0846 05/10/23  0423 05/09/23  0921 05/09/23  0438 05/07/23  1236 05/07/23  1042   WBC 5.9  --   --   --  6.8  --  10.8   < >  --    HGB 8.4*  --   --   --  8.1*  --  9.6*   < >  --    MCV 97  --   --   --  99  --  97   < >  --      --   --   --  195  --  207   < >  --    INR  --   --   --   --   --   --   --   --  1.25*     --  139  --  138   < > 137   < >  --    POTASSIUM 4.0 4.8 5.4*  --  4.6   < > 4.6   < >  --    CHLORIDE 98  --  101  --  101   < > 98   < >  --    CO2 29  --  25  --  31*   < > 33*   < >  --    BUN 21.3  --  23.4*  --  20.9   < > 19.3   < >  --    CR 0.69  --  0.69  --  0.72   < > 0.70   < >  --    ANIONGAP 10  --  13  --  6*   < > 6*   < >  --    SHERYL 8.9  --  8.9  --  8.4*   < > 8.6*   < >  --    *  --  160* 93 117*   < > 116*   < >  --    ALBUMIN 3.1*  --   --   --   --   --  2.8*  --   --     < > = values in this interval not displayed.      GLUCOSE:   Recent Labs   Lab 05/11/23  0603 05/10/23  1556 05/10/23  0846 05/10/23  0423 05/09/23  2243 05/09/23  1624   * 160* 93 117* 113* 157*

## 2023-05-11 NOTE — PROGRESS NOTES
Calorie Count  Intake recorded for: 5/10  Total Kcals: 910 Total Protein: 26g  Kcals from Hospital Food: 910   Protein: 26g  Kcals from Outside Food (average):0 Protein: 0g  # Meals Ordered from Kitchen: 3 meals  # Meals Recorded: 2 meals     (First- 50% Ukrainian toast w/ syrup, 2 sorto slice)     (Second-100% Tomato soup w/ 1 saltine cracker, ice cream)  # Supplements Recorded: 50% 1 Enlive shake

## 2023-05-11 NOTE — PLAN OF CARE
Neuro: A&Ox4.   Cardiac: Afib. VSS.   Respiratory: Sating 97% on RA. Intermittent Bipap 30% use three times this shift.  GI/: Minimal urine output, bladder scan x1. Helms was removed this shift. BM X1  Diet/appetite: Tolerating regular diet. Eating well.  Activity:  Assist of 1, up to chair and in halls.  Pain: At acceptable level on current regimen.   Skin: No new deficits noted.  LDA's: Right DL Midline, PIVx2.    Plan: Continue with POC. Notify primary team with changes. Amiodarone started this shift.

## 2023-05-12 ENCOUNTER — APPOINTMENT (OUTPATIENT)
Dept: PHYSICAL THERAPY | Facility: CLINIC | Age: 88
DRG: 219 | End: 2023-05-12
Attending: SURGERY
Payer: COMMERCIAL

## 2023-05-12 LAB
ANION GAP SERPL CALCULATED.3IONS-SCNC: 10 MMOL/L (ref 7–15)
ANION GAP SERPL CALCULATED.3IONS-SCNC: 11 MMOL/L (ref 7–15)
BUN SERPL-MCNC: 20.8 MG/DL (ref 8–23)
BUN SERPL-MCNC: 22.6 MG/DL (ref 8–23)
CALCIUM SERPL-MCNC: 8.6 MG/DL (ref 8.8–10.2)
CALCIUM SERPL-MCNC: 9.3 MG/DL (ref 8.8–10.2)
CHLORIDE SERPL-SCNC: 100 MMOL/L (ref 98–107)
CHLORIDE SERPL-SCNC: 99 MMOL/L (ref 98–107)
CREAT SERPL-MCNC: 0.69 MG/DL (ref 0.51–0.95)
CREAT SERPL-MCNC: 0.77 MG/DL (ref 0.51–0.95)
DEPRECATED HCO3 PLAS-SCNC: 27 MMOL/L (ref 22–29)
DEPRECATED HCO3 PLAS-SCNC: 27 MMOL/L (ref 22–29)
ERYTHROCYTE [DISTWIDTH] IN BLOOD BY AUTOMATED COUNT: 16.1 % (ref 10–15)
GFR SERPL CREATININE-BSD FRML MDRD: 75 ML/MIN/1.73M2
GFR SERPL CREATININE-BSD FRML MDRD: 85 ML/MIN/1.73M2
GLUCOSE SERPL-MCNC: 120 MG/DL (ref 70–99)
GLUCOSE SERPL-MCNC: 137 MG/DL (ref 70–99)
HCT VFR BLD AUTO: 28.1 % (ref 35–47)
HGB BLD-MCNC: 8.3 G/DL (ref 11.7–15.7)
MAGNESIUM SERPL-MCNC: 2 MG/DL (ref 1.7–2.3)
MCH RBC QN AUTO: 28.9 PG (ref 26.5–33)
MCHC RBC AUTO-ENTMCNC: 29.5 G/DL (ref 31.5–36.5)
MCV RBC AUTO: 98 FL (ref 78–100)
PHOSPHATE SERPL-MCNC: 3.7 MG/DL (ref 2.5–4.5)
PLATELET # BLD AUTO: 204 10E3/UL (ref 150–450)
POTASSIUM SERPL-SCNC: 4.3 MMOL/L (ref 3.4–5.3)
POTASSIUM SERPL-SCNC: 4.8 MMOL/L (ref 3.4–5.3)
RBC # BLD AUTO: 2.87 10E6/UL (ref 3.8–5.2)
SODIUM SERPL-SCNC: 137 MMOL/L (ref 136–145)
SODIUM SERPL-SCNC: 137 MMOL/L (ref 136–145)
TSH SERPL DL<=0.005 MIU/L-ACNC: 7.2 UIU/ML (ref 0.3–4.2)
WBC # BLD AUTO: 5.9 10E3/UL (ref 4–11)

## 2023-05-12 PROCEDURE — 36415 COLL VENOUS BLD VENIPUNCTURE: CPT | Performed by: PHYSICIAN ASSISTANT

## 2023-05-12 PROCEDURE — 85027 COMPLETE CBC AUTOMATED: CPT | Performed by: STUDENT IN AN ORGANIZED HEALTH CARE EDUCATION/TRAINING PROGRAM

## 2023-05-12 PROCEDURE — 250N000013 HC RX MED GY IP 250 OP 250 PS 637: Performed by: STUDENT IN AN ORGANIZED HEALTH CARE EDUCATION/TRAINING PROGRAM

## 2023-05-12 PROCEDURE — 120N000003 HC R&B IMCU UMMC

## 2023-05-12 PROCEDURE — 93010 ELECTROCARDIOGRAM REPORT: CPT | Performed by: INTERNAL MEDICINE

## 2023-05-12 PROCEDURE — 83735 ASSAY OF MAGNESIUM: CPT | Performed by: SURGERY

## 2023-05-12 PROCEDURE — 250N000013 HC RX MED GY IP 250 OP 250 PS 637: Performed by: PHYSICIAN ASSISTANT

## 2023-05-12 PROCEDURE — 82310 ASSAY OF CALCIUM: CPT | Performed by: PHYSICIAN ASSISTANT

## 2023-05-12 PROCEDURE — 250N000009 HC RX 250: Performed by: PHYSICIAN ASSISTANT

## 2023-05-12 PROCEDURE — 97530 THERAPEUTIC ACTIVITIES: CPT | Mod: GP

## 2023-05-12 PROCEDURE — 84100 ASSAY OF PHOSPHORUS: CPT

## 2023-05-12 PROCEDURE — 80048 BASIC METABOLIC PNL TOTAL CA: CPT | Performed by: PHYSICIAN ASSISTANT

## 2023-05-12 PROCEDURE — 250N000011 HC RX IP 250 OP 636: Performed by: NURSE PRACTITIONER

## 2023-05-12 PROCEDURE — 97116 GAIT TRAINING THERAPY: CPT | Mod: GP

## 2023-05-12 PROCEDURE — 94640 AIRWAY INHALATION TREATMENT: CPT | Mod: 76

## 2023-05-12 PROCEDURE — 250N000013 HC RX MED GY IP 250 OP 250 PS 637

## 2023-05-12 PROCEDURE — 999N000157 HC STATISTIC RCP TIME EA 10 MIN

## 2023-05-12 PROCEDURE — 93005 ELECTROCARDIOGRAM TRACING: CPT

## 2023-05-12 PROCEDURE — 84443 ASSAY THYROID STIM HORMONE: CPT | Performed by: SURGERY

## 2023-05-12 PROCEDURE — 250N000013 HC RX MED GY IP 250 OP 250 PS 637: Performed by: SURGERY

## 2023-05-12 RX ORDER — AMIODARONE HYDROCHLORIDE 200 MG/1
400 TABLET ORAL 2 TIMES DAILY
Status: DISCONTINUED | OUTPATIENT
Start: 2023-05-12 | End: 2023-05-15 | Stop reason: HOSPADM

## 2023-05-12 RX ORDER — MAGNESIUM OXIDE 400 MG/1
400 TABLET ORAL EVERY 4 HOURS
Status: COMPLETED | OUTPATIENT
Start: 2023-05-12 | End: 2023-05-12

## 2023-05-12 RX ORDER — BUMETANIDE 1 MG/1
1 TABLET ORAL ONCE
Status: COMPLETED | OUTPATIENT
Start: 2023-05-12 | End: 2023-05-12

## 2023-05-12 RX ORDER — MAGNESIUM SULFATE 1 G/100ML
1 INJECTION INTRAVENOUS ONCE
Status: DISCONTINUED | OUTPATIENT
Start: 2023-05-12 | End: 2023-05-12

## 2023-05-12 RX ADMIN — DORZOLAMIDE HYDROCHLORIDE AND TIMOLOL MALEATE 1 DROP: 22.3; 6.8 SOLUTION/ DROPS OPHTHALMIC at 19:46

## 2023-05-12 RX ADMIN — POTASSIUM CHLORIDE 20 MEQ: 750 TABLET, EXTENDED RELEASE ORAL at 19:46

## 2023-05-12 RX ADMIN — POTASSIUM CHLORIDE 20 MEQ: 750 TABLET, EXTENDED RELEASE ORAL at 08:25

## 2023-05-12 RX ADMIN — Medication 12.5 MG: at 19:46

## 2023-05-12 RX ADMIN — MAGNESIUM OXIDE TAB 400 MG (240 MG ELEMENTAL MG) 400 MG: 400 (240 MG) TAB at 11:17

## 2023-05-12 RX ADMIN — HYDROXYZINE HYDROCHLORIDE 25 MG: 25 TABLET, FILM COATED ORAL at 03:16

## 2023-05-12 RX ADMIN — AMIODARONE HYDROCHLORIDE 400 MG: 200 TABLET ORAL at 11:16

## 2023-05-12 RX ADMIN — BUMETANIDE 1 MG: 1 TABLET ORAL at 11:17

## 2023-05-12 RX ADMIN — ASPIRIN 81 MG CHEWABLE TABLET 81 MG: 81 TABLET CHEWABLE at 08:24

## 2023-05-12 RX ADMIN — HEPARIN SODIUM 5000 UNITS: 5000 INJECTION, SOLUTION INTRAVENOUS; SUBCUTANEOUS at 15:43

## 2023-05-12 RX ADMIN — LATANOPROST 1 DROP: 50 SOLUTION OPHTHALMIC at 21:48

## 2023-05-12 RX ADMIN — DORZOLAMIDE HYDROCHLORIDE AND TIMOLOL MALEATE 1 DROP: 22.3; 6.8 SOLUTION/ DROPS OPHTHALMIC at 08:40

## 2023-05-12 RX ADMIN — AMIODARONE HYDROCHLORIDE 400 MG: 200 TABLET ORAL at 19:46

## 2023-05-12 RX ADMIN — HYDROXYZINE HYDROCHLORIDE 25 MG: 25 TABLET, FILM COATED ORAL at 21:48

## 2023-05-12 RX ADMIN — LEVALBUTEROL HYDROCHLORIDE 0.63 MG: 0.63 SOLUTION RESPIRATORY (INHALATION) at 21:10

## 2023-05-12 RX ADMIN — PANTOPRAZOLE SODIUM 40 MG: 40 TABLET, DELAYED RELEASE ORAL at 08:25

## 2023-05-12 RX ADMIN — SIMVASTATIN 20 MG: 20 TABLET, FILM COATED ORAL at 21:48

## 2023-05-12 RX ADMIN — ACETAMINOPHEN 650 MG: 325 TABLET ORAL at 03:09

## 2023-05-12 RX ADMIN — MAGNESIUM OXIDE TAB 400 MG (240 MG ELEMENTAL MG) 400 MG: 400 (240 MG) TAB at 08:25

## 2023-05-12 RX ADMIN — HEPARIN SODIUM 5000 UNITS: 5000 INJECTION, SOLUTION INTRAVENOUS; SUBCUTANEOUS at 21:49

## 2023-05-12 RX ADMIN — HEPARIN SODIUM 5000 UNITS: 5000 INJECTION, SOLUTION INTRAVENOUS; SUBCUTANEOUS at 08:25

## 2023-05-12 RX ADMIN — Medication 12.5 MG: at 08:25

## 2023-05-12 ASSESSMENT — ACTIVITIES OF DAILY LIVING (ADL)
ADLS_ACUITY_SCORE: 28
ADLS_ACUITY_SCORE: 29
ADLS_ACUITY_SCORE: 28

## 2023-05-12 NOTE — PROGRESS NOTES
Calorie Count  Intake recorded for: 5/11  Total Kcals: 145 Total Protein: 5g  Kcals from Hospital Food: 145   Protein: 5g  Kcals from Outside Food (average):0 Protein: 0g  # Meals Ordered from Kitchen: 3 meals  # Meals Recorded: 0 meals  # Supplements Recorded: 50% 1 Magic Cup

## 2023-05-12 NOTE — DISCHARGE SUMMARY
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 - s/p bioprosthetic AVR 4/25    Ascending aortic aneurysm - s/p graft repair 4/25    Post-operative atrial fibrillation s/p medical conversion 5/12 - not on anticoagulation after shared decision making & informed patient choice          Secondary Diagnoses:     Moderate tricuspid insufficiency, post-operative    Acute hypoxic respiratory insuffiencey, improving    Bilateral pleural effusions, improving    Delirium, resolved    Transaminitis, resolved    Severe malnutrition in context of acute illness, improving    HELEN, resolved    Urinary retention, resolved    PROCEDURES PERFORMED:   Date: 4/25/2023.  Surgeon: Dr. Gm Solis    1.  Aortic valve replacement using Aponte Resilia 21 mm valve.  2.  Ascending aortic aneurysm repair using 32 mm Hemashield graft.      INTRAOPERATIVE FINDINGS:    The patient's sternum was mildly osteoporotic.  Pericardial space was free of any adhesions.  The aortic valve was severely calcified and fused between the left and the right leaflets.  The ascending aortic aneurysm was enlarged.  The root was relatively normal.    PATHOLOGY RESULTS:      Part A)   - Aortic wall with myxoid degenerative changes     Part B)   - Aortic valve with dystrophic calcification and myxoid change    CULTURE RESULTS:    none    CONSULTS:      PT/OT    Intensivist    Interventional radiology    Cardiac electrophysiology    BRIEF HISTORY OF ILLNESS:  Cydney Christiansen is an 87 year old female with a PMH of HTN,  HLD, bilateral glaucoma, scoliosis, compression fracture of thoracic vertebrae, osteoporosis, history of bladder cancer, invasive ductal carcinoma of left breast s/p lumpectomy (2015), and multiple other skin cancer sites s/p removal, severe aortic stenosis with bicuspid aortic valve and an ascending aortic aneurysm. Patient is now s/p AVR (Aponte Resilia 21 mm valve) and ascending aorta repair on 4/25/23 with Dr. Solis.    HOSPITAL COURSE: Cydney Christiansen is a 87 year old female who on 4/25/23 underwent the above-named procedures and tolerated the operation well.     Postoperatively was admitted to the CVICU.  Patient was extubated within protocol on POD #1.  Blood pressure and cardiac index were managed with vasopressors and inotropic agents which were continuously weaned until no longer needed.  Patient was subsequently  transferred to the surgical telemetry floor.    While on the surgical unit, the patient continued to progress well. Chest tubes and temporary pacemaker wires were removed when deemed appropriate.    Patient stay was prolonged by the following factors:    Acute hypoxic respiratory insuffiencey: likely multi-factoral with recurrent bilateral pleural effusions, post-operative atelectasis, hypervolemia, and history of asthma. Bilateral pleural effusions were drained via thoracentesis and pigtails, and were stable for multiple days prior to discharge. She had a repeat right thoracentesis on day of discharge with 650 mL serous fluid removed. Right effusion was loculated and too thick to drain. Patient responded well to intermittent BiPAP to assist with lung expansion and was slowly weaned from supplemental oxygen to room air prior to dischage.    Post-operative atrial fibrillation/atrial flutter: First occurrence on 4/29 and was medically converted with amiodarone. Amiodarone was discontinued and patient was transitioned to metoprolol during ICU stay. Patient had recurrent afib on 4/30, with  "associated soft blood pressure and difficulty maintaining regular metoprolol dosing. Medical conversion with amiodarone was successful on 5/12. Patient was discharged with oral amiodarone taper. No anticoagulation as patient refused due to risks.     Hypervolemia: Patient was fluid overloaded and treated with diuretics. She is  1lbs below preoperative weight and will discharge with daily diuretic therapy to maintain euvolemia; will re-evaluate need in clinic follow-up.     Urinary retention: Patient's traylor catheter was initially removed without issue post-op but she required replacement on urinary catheter on 5/5. This was subsequently discontinued several days before discharge with appropriate voiding of urine.     Patient was transiently hyperglycemic and treated with insulin infusion then transitioned to sliding scale insulin per protocol. Blood sugars remained stable. No further glycemic control agents needed at this time.     Prior to discharge, her pain was controlled well, she was working well with therapies, ambulate with assistance, and had full return of bowel and bladder function.  On May 15, 2023, she was discharged to ARU in stable condition. Follow up with cardiology and cardiac surgery have been arranged. Pt encouraged to follow up with PCP and cardiac rehab upon discharge.    Patient discharged on aspirin:  Yes 81 mg  Patient discharged on beta blocker: yes    Patient discharged on ACE Inhibitor/ARB: yes      Patient discharged on statin: yes          Discharge Disposition:     Discharged to short-term care facility            Condition on Discharge:     Discharge condition: Stable   Discharge vitals: Blood pressure 111/65, pulse 81, temperature 98.1  F (36.7  C), temperature source Oral, resp. rate 20, height 1.499 m (4' 11\"), weight 62.1 kg (136 lb 14.5 oz), SpO2 96 %, not currently breastfeeding.     Code status on discharge: Full Code     Vitals:    05/13/23 0545 05/14/23 0500 05/15/23 0200 "   Weight: 63.6 kg (140 lb 3.4 oz) 61.9 kg (136 lb 7.4 oz) 62.1 kg (136 lb 14.5 oz)       DAY OF DISCHARGE PHYSICAL EXAM:    Gen: A&Ox4, NAD  Neuro: Intact with no focal deficits   CV: RRR, normal S1 S2, no murmurs, rubs or gallops. no JVD  Pulm: CTA, no wheezing or rhonchi, normal breathing on RA  Abd: nondistended, normal BS, soft, nontender  Ext: no peripheral edema, no pitting  Incision: clean, dry, intact, no erythema, sternum stable  Tubes/drain sites: dressing clean and dry      LABS  Most Recent 3 CBC's:  Recent Labs   Lab Test 05/15/23  0452 05/14/23  0349 05/13/23  0513   WBC 6.3 6.2 6.9   HGB 9.0* 8.7* 8.5*    96 99    222 217      Most Recent 3 BMP's:  Recent Labs   Lab Test 05/15/23  0452 05/14/23  1621 05/14/23  0349    141 138   POTASSIUM 4.4 4.2 4.1   CHLORIDE 103 102 101   CO2 28 28 30*   BUN 17.3 16.5 15.4   CR 0.76 0.83 0.78   ANIONGAP 9 11 7   SHERYL 9.0 9.0 8.6*   * 122* 115*     Most Recent 2 LFT's:  Recent Labs   Lab Test 05/04/23  0546 05/03/23  0705   AST 55* 67*   ALT 73* 92*   ALKPHOS 44 44   BILITOTAL 0.4 0.4     Most Recent INR's and Anticoagulation Dosing History:  Anticoagulation Dose History         Latest Ref Rng & Units 4/26/2023 4/27/2023 4/28/2023 4/29/2023   Recent Dosing and Labs   INR 0.85 - 1.15 1.53      1.49   1.65   1.49   1.32         4/30/2023 5/1/2023 5/7/2023   Recent Dosing and Labs   INR 1.39   1.34   1.25           Multiple values from one day are sorted in reverse-chronological order           Most Recent 3 Troponin's:No lab results found.  Most Recent Cholesterol Panel:  Recent Labs   Lab Test 08/03/22  1035   CHOL 175   LDL 82   HDL 76   TRIG 86     Most Recent 6 Bacteria Isolates From Any Culture (See EPIC Reports for Culture Details):No lab results found.  Most Recent TSH, T4 and A1c Labs:  Recent Labs   Lab Test 05/12/23  0501 04/13/23  1139   TSH 7.20*  --    A1C  --  6.0*      Recent Labs   Lab 05/15/23  0452 05/14/23  1621  05/14/23  0349 05/13/23  1609 05/13/23  0513 05/12/23  1850   * 122* 115* 147* 115* 137*       Imaging:  Post-operative echocardiogram 5/7/2023  Interpretation Summary  Ascending aorta aneurysm repair with 32 mm Hemashield graft and aortic valve  replacement with 21 mm Aponte Resilia on 04/25/2023.  Left ventricular function is decreased. The ejection fraction is 40-45%  (mildly reduced). There is significant gyil-hd-wrdz variability in the LVEF  due to the patient's AF.  Global right ventricular function is normal. The right ventricle is normal  size.  The bioprosthetic aortic valve is well-seated. Leaflet opening is not clearly  visualized. There is no valvular or paravalvular regurgitation. The Vmax is  2.6 m/s, the mean gradient is 15 mmHg, the dimensionless index is 0.30, and  the acceleration time is 80 ms.  Mild to moderate tricuspid insufficiency is present.  IVC diameter and respiratory changes fall into an intermediate range  suggesting an RA pressure of 8 mmHg.  There is a small pericardial effusion adjacent LV lateral segments. The  maximal depth is 1.4 cm next to the left atrioventricular groove. There are no  signs of tamponade.  This study was compared with the study from 04/27/2023. The LV function has  declined modestly due to the patient's AF. There is no significant change in  the RV function. The TR is less severe.    Chest radiograph 5/11/2023  IMPRESSION:   Cardiomegaly with increased moderate right and small left pleural  effusions.    EKG 5/12/2023  Sinus rhythm with 1st degree A-V block   Left bundle branch block   Abnormal ECG   When compared with ECG of 11-MAY-2023 23:45, (unconfirmed)   Sinus rhythm has replaced Atrial fibrillation     PRE-ADMISSION MEDICATIONS:  Facility-Administered Medications Prior to Admission   Medication Dose Route Frequency Provider Last Rate Last Admin     denosumab (PROLIA) injection 60 mg  60 mg Subcutaneous Q6 Months Estrellita Hernandez MD          Medications Prior to Admission   Medication Sig Dispense Refill Last Dose     alendronate (FOSAMAX) 70 MG tablet TAKE 1 TABLET BY MOUTH EVERY 7 DAYS (Patient taking differently: Take 70 mg by mouth every 7 days Monday) 12 tablet 11 Past Month     co-enzyme Q-10 100 MG CAPS capsule Take 200 mg by mouth every morning   4/15/2023     cyanocobalamin (VITAMIN B-12) 500 MCG tablet Take 1 tablet (500 mcg) by mouth daily 150 tablet 3 Unknown     dorzolamide-timolol (COSOPT) 2-0.5 % ophthalmic solution Place 1 drop into both eyes 2 times daily 10 mL 4 4/24/2023     ferrous sulfate (FEROSUL) 325 (65 Fe) MG tablet Take 1 tablet (325 mg) by mouth daily (with breakfast) 90 tablet 3 4/24/2023     latanoprost (XALATAN) 0.005 % ophthalmic solution Place 1 drop into both eyes At Bedtime 7.5 mL 3 4/24/2023     Multiple Vitamins-Minerals (ONE DAILY ADULTS 50+) TABS Take 1 tablet by mouth every other day   4/24/2023     polyethylene glycol (MIRALAX) 17 GM/Dose powder Take 17 g (1 capful) by mouth 2 times daily as needed for constipation 507 g 0 More than a month     simvastatin (ZOCOR) 20 MG tablet Take 1 tablet (20 mg) by mouth At Bedtime 90 tablet 3 4/24/2023     VITAMIN D-1000 MAX -1000 MG-UNIT OR TABS Take 1 tablet by mouth daily   Unknown     albuterol (PROAIR HFA/PROVENTIL HFA/VENTOLIN HFA) 108 (90 Base) MCG/ACT inhaler Inhale 1-2 puffs into the lungs every 4 hours as needed for shortness of breath or wheezing 6.7 g 0      triamcinolone (KENALOG) 0.1 % external cream Apply twice daily for 2 weeks (Patient not taking: Reported on 5/3/2023) 30 g 0 Not Taking     [DISCONTINUED] ibuprofen (ADVIL,MOTRIN) 200 MG tablet Take 200 mg by mouth every 4 hours as needed.   More than a month     [DISCONTINUED] losartan (COZAAR) 50 MG tablet TAKE 1 TABLET BY MOUTH EVERY DAY 90 tablet 0 4/24/2023       DISCHARGE MEDICATIONS:   Current Discharge Medication List      START taking these medications    Details   acetaminophen (TYLENOL) 325  MG tablet Take 2 tablets (650 mg) by mouth every 4 hours as needed for other (For optimal non-opioid multimodal pain management to improve pain control.)    Associated Diagnoses: S/P AVR (aortic valve replacement); S/P ascending aortic aneurysm repair      !! amiodarone (PACERONE) 200 MG tablet Take 1 tablet (200 mg) by mouth daily for 30 days  Refills: 0    Associated Diagnoses: S/P AVR (aortic valve replacement); S/P ascending aortic aneurysm repair      !! amiodarone (PACERONE) 400 MG tablet Take 1 tablet (400 mg) by mouth 2 times daily for 3 days    Associated Diagnoses: S/P AVR (aortic valve replacement); S/P ascending aortic aneurysm repair      aspirin (ASA) 81 MG EC tablet Take 1 tablet (81 mg) by mouth daily    Associated Diagnoses: S/P AVR (aortic valve replacement); S/P ascending aortic aneurysm repair      bumetanide (BUMEX) 1 MG tablet Take 1 tablet (1 mg) by mouth daily    Associated Diagnoses: S/P AVR (aortic valve replacement); S/P ascending aortic aneurysm repair      enalapril (VASOTEC) 2.5 MG tablet Take 1 tablet (2.5 mg) by mouth daily    Associated Diagnoses: S/P AVR (aortic valve replacement); S/P ascending aortic aneurysm repair      !! hydrOXYzine (ATARAX) 25 MG tablet Take 1 tablet (25 mg) by mouth every 6 hours as needed for other (adjuvant pain)    Associated Diagnoses: S/P AVR (aortic valve replacement); S/P ascending aortic aneurysm repair      !! hydrOXYzine (ATARAX) 25 MG tablet Take 1 tablet (25 mg) by mouth At Bedtime    Associated Diagnoses: S/P AVR (aortic valve replacement); S/P ascending aortic aneurysm repair      levalbuterol (XOPENEX) 0.63 MG/3ML neb solution Take 3 mLs (0.63 mg) by nebulization 2 times daily  Qty: 90 mL    Associated Diagnoses: S/P AVR (aortic valve replacement); S/P ascending aortic aneurysm repair      melatonin 1 MG TABS tablet Take 1 tablet (1 mg) by mouth At Bedtime    Associated Diagnoses: S/P AVR (aortic valve replacement); S/P ascending aortic  aneurysm repair      metoprolol tartrate (LOPRESSOR) 25 MG tablet Take 0.5 tablets (12.5 mg) by mouth 2 times daily    Associated Diagnoses: S/P AVR (aortic valve replacement); S/P ascending aortic aneurysm repair      miconazole (MICATIN) 2 % external powder Apply topically 3 times daily as needed for other (irritation, friction, maceration)    Associated Diagnoses: S/P AVR (aortic valve replacement); S/P ascending aortic aneurysm repair      potassium chloride ER (KLOR-CON M) 20 MEQ CR tablet Take 1 tablet (20 mEq) by mouth 2 times daily    Comments: While on bumex  Associated Diagnoses: S/P AVR (aortic valve replacement); S/P ascending aortic aneurysm repair      senna-docusate (SENOKOT-S/PERICOLACE) 8.6-50 MG tablet 1 tablet by Oral or Feeding Tube route nightly as needed for constipation    Associated Diagnoses: S/P AVR (aortic valve replacement); S/P ascending aortic aneurysm repair      traMADol (ULTRAM) 50 MG tablet Take 0.5 tablets (25 mg) by mouth every 6 hours as needed for moderate pain    Associated Diagnoses: S/P AVR (aortic valve replacement); S/P ascending aortic aneurysm repair       !! - Potential duplicate medications found. Please discuss with provider.      CONTINUE these medications which have NOT CHANGED    Details   alendronate (FOSAMAX) 70 MG tablet TAKE 1 TABLET BY MOUTH EVERY 7 DAYS  Qty: 12 tablet, Refills: 11    Associated Diagnoses: Age related osteoporosis, unspecified pathological fracture presence      co-enzyme Q-10 100 MG CAPS capsule Take 200 mg by mouth every morning      cyanocobalamin (VITAMIN B-12) 500 MCG tablet Take 1 tablet (500 mcg) by mouth daily  Qty: 150 tablet, Refills: 3    Associated Diagnoses: B12 deficiency      dorzolamide-timolol (COSOPT) 2-0.5 % ophthalmic solution Place 1 drop into both eyes 2 times daily  Qty: 10 mL, Refills: 4    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)      ferrous sulfate (FEROSUL) 325 (65 Fe) MG tablet Take 1 tablet  (325 mg) by mouth daily (with breakfast)  Qty: 90 tablet, Refills: 3    Associated Diagnoses: Normocytic anemia      latanoprost (XALATAN) 0.005 % ophthalmic solution Place 1 drop into both eyes At Bedtime  Qty: 7.5 mL, Refills: 3    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)      Multiple Vitamins-Minerals (ONE DAILY ADULTS 50+) TABS Take 1 tablet by mouth every other day      polyethylene glycol (MIRALAX) 17 GM/Dose powder Take 17 g (1 capful) by mouth 2 times daily as needed for constipation  Qty: 507 g, Refills: 0    Associated Diagnoses: Constipation, unspecified constipation type      simvastatin (ZOCOR) 20 MG tablet Take 1 tablet (20 mg) by mouth At Bedtime  Qty: 90 tablet, Refills: 3    Associated Diagnoses: Hyperlipidemia LDL goal <160      VITAMIN D-1000 MAX -1000 MG-UNIT OR TABS Take 1 tablet by mouth daily      albuterol (PROAIR HFA/PROVENTIL HFA/VENTOLIN HFA) 108 (90 Base) MCG/ACT inhaler Inhale 1-2 puffs into the lungs every 4 hours as needed for shortness of breath or wheezing  Qty: 6.7 g, Refills: 0    Associated Diagnoses: FREITAS (dyspnea on exertion)      triamcinolone (KENALOG) 0.1 % external cream Apply twice daily for 2 weeks  Qty: 30 g, Refills: 0    Associated Diagnoses: Dermatitis         STOP taking these medications       ibuprofen (ADVIL,MOTRIN) 200 MG tablet Comments:   Reason for Stopping:         losartan (COZAAR) 50 MG tablet Comments:   Reason for Stopping:                CC:Estrellita Love      [facility]   Cardiothoracic Surgery  Office phone: [phone]  Office fax: [phone]

## 2023-05-12 NOTE — PLAN OF CARE
Neuro: A&Ox4.   Cardiac: 1st deg HB with BBB. VSS.   Respiratory: Sating > 92% on RA, wore her BiPAP 3 x this shift. Unable to take very deep breaths, used IS with encouragement.   GI/: Adequate urine output. BM X 3  Diet/appetite: Tolerating regular diet. Fair appetite.  Activity:  Assist of 1 with gb/walker, up to chair and in halls.  Pain: At acceptable level on current regimen.   Skin: No new deficits noted.  LDA's: PIV x 2 SL, R DL midline SL.     Plan: Continue with POC. Notify primary team with changes.

## 2023-05-12 NOTE — PROGRESS NOTES
Cardiovascular Surgery Progress Note  05/12/2023         Assessment and Plan:     Cydney Christiansen is an 87 year old female with a PMH of HTN, HLD, bilateral glaucoma, scoliosis, compression fracture of thoracic vertebrae, osteoporosis, history of bladder cancer, invasive ductal carcinoma of left breast s/p lumpectomy (2015), and multiple other skin cancer sites s/p removal, severe aortic stenosis with bicuspid aortic valve and an ascending aortic aneurysm. Patient is now s/p AVR (Aponte Resilia 21 mm valve) and ascending aorta repair on 4/27/23 with Dr. Solis.    Cardiovascular:   Severe aortic stenosis with bicuspid aortic valve  - s/p bioprosthetic AVR 4/25  Ascending aortic aneurysm - s/p graft repair 4/25  HTN  HLD  Severe tricuspid insufficiency, post-operative   Postoperative atrial fibrillation  - TTE 4/27: LV EF 45-50%; Mild right ventricular dilation with normal global right ventricular function. A bioprosthetic aortic valve with PV of 2.2m/s and MG of 12mmHg. Severe tricuspid insufficiency.  - Repeat TTE 5/7: LV EF 40-45%, Aortic valve with mean gradient of 15 mmHg, mild to moderate TR, RA 8, trace pericardial effusion  - New post-operative atrial fibrillation. Patient converted in ICU on amiodarone, which was discontinued on 4/30 in the ICU. Reverted to atrial fibrillation 4/30. ICU discussed no anticoagulation with Dr. Solis and patient refused d/t bleeding risk.   - Amiodarone gtt and bolus started 5/11 -> patient converted at 0100 on 5/12 -> transitioned to  400 mg PO BID X 7 day on 5/12 with plan to taper at discharge   - Metoprolol 12.5 mg PO BID with hold parameters. Goal MAP>65, SBP<140 per Dr Solis   - ASA 81 mg daily  - PTA simvastatin  - Diuresis as below    CT/PW removed in the ICU    Pulmonary:  Acute hypoxic respiratory insufficiency  Bilateral pleural effusions   Extubated POD 1. On room air since 5/11.   - Supplemental O2 PRN to keep sats > 92%.   - Pulm hygiene, IS, activity and deep  breathing  - S/p right thoracentesis 5/3 with 600 ml removed.   - Diuresis as below  - CT chest showed moderate bilateral effusions 5/7. Pigtails placed by IR 5/8 with 500cc out on left and 600 on right. Removed same day as patient could not tolerate associated pain.   - BiPAP 20-30 mins QID and at night. Useful to time BiPAP with atarax dosing.    Neurology / MSK:  Acute post-operative pain   Delirium  Pain well controlled with current regimen:  - Acetaminophen, PO oxycodone PRN  - delirium precautions  - Melatonin 3mg q HS   - Atarax PRN for anxiety and during BiPAP     / Renal / Fluid / Electrolytes:  HELEN, resolved  Urinary retention  Possible Oliguria  BL creat ~ 0.6, most recent creatinine 0.77  FLUID STATUS: Pre-op weight 137 lbs, most recent weight 137 standing scale  - Diuresis: Bumex 1 mg BID on 5/12. Reassess daily.   - Albumin 2.8 on 5/9; administered albumin 25% 12.5 g X2 in conjunction with diuresis on 5/10.  - Replete lytes per protocol  - Strict I/O, daily weights  - Avoid/limit nephrotoxins as able  - Helms placed 5/5 for retention. UA negative. Removed 5/11. Monitor for retention.  - Oliguric overnight 5/11; monitor.     GI / Nutrition:   Transaminitis, resolved  Severe malnutrition in the context of acute illness  - Tolerating regular diet. Low oral intake.  - Calorie counts through 5/11 to determine intake. Per RD recs, encouraging PO intake & giving supplements. No current need for TF.  - Continue bowel regimen, last BM 5/11    Endocrine:  Stress induced hyperglycemia, resolved  Osteoporosis c/b compression fracture of spine  Pre-operative Hgb A1c 6.0  - Managed on insulin drip postop, transitioned to sliding scale goal BG <180 and has now also been discontinued due to good BG control with no insulin need.   - Continue PTA med alendronate    Infectious Disease:  Stress induced leukocytosis, resolved  WBC WNL , most recent 5.9  - Remains afebrile, no signs or symptoms of infection  - Completed  perioperative antibiotics  - Continue to monitor fever curve, CBC    Hematology:   Acute blood loss anemia and thrombocytopenia  - Hgb stable, 5/12 8.3  - Plt stable, no signs or symptoms of active bleeding  - CBC daily    Anticoagulation:   - ASA only  - Discussion had with Dr. Solis regarding anticoagulation for atrial fibrillation stroke risk. Will not start anticoagulation at this time.     MSK/Skin:  Sternotomy  Surgical incision  - Sternal precautions  - Incisional cares per protocol    Prophylaxis:   - Stress ulcer prophylaxis: Pantoprazole 40 mg daily  - DVT prophylaxis: Subcutaneous heparin, SCD    Disposition:   - Transferred to  on 5/3  - Therapies recommending discharge to TCU. Anticipate discharged over the weekend vs Monday 5/15 pending accepting facility.     Discussed with Dr. Solis via written communication.    Gabriela Welch PA-C  Cardiothoracic Surgery  Pager [phone]    Yuri Quiroz PA-C  Cardiothoracic Surgery  Pager [phone]        Interval History:     Patient was on room air overnight. Converted into sinus rhythm at 0100 on 5/12.    She reports poor sleep, probably multifactorial per her description. She denies any chest pain today. Reports breathing is stable although she gets more short of breath with movement. Participating in physical therapy and moving more since Helms catheter removal on 5/11. Having bowel movements. No sternal popping, clicking, or snapping. No nausea, vomiting, abdominal pain. She is tolerating diet and has improved oral intake.             Physical Exam:     Gen: A&Ox4, NAD  Neuro: no focal deficits   CV: regular rate and rhythm, did not appreciate any murmurs, rubs or gallops  Pulm: fine crackles at bilateral lung bases, normal respiratory rate on room air at time of exam, diminished bases.   Abd: nondistended, normal BS, soft, nontender  Ext: mild lower extremity  peripheral edema, no pitting  Incision: clean, dry, intact, no erythema, sternum  stable  Tubes/drain sites: dry, clot present, no drainage         Data:    Imaging:  reviewed recent imaging, no acute concerns    Recent Results (from the past 24 hour(s))   POC US Guide for Thorcentesis    Impression    Limited point of care pleural/lung ultrasound to evaluate for thoracentesis.    Thoracentesis Indication:pleural effusion     Views/Acquisition: Bilateral pleural space(s): supine.      Findings/Interpretation: No left sided pleural fluid with minimal R sided fluid not safe for bedside CAPS procedure.    Chester Paez MD      5/7/23  1:25 PM [id number] [facility]      PACS Images     Show images for CT Chest w/o Contrast     Study Result    Narrative & Impression   EXAMINATION: CT CHEST W/O CONTRAST, 5/7/2023 1:25 PM     TECHNIQUE:  Helical CT images from the thoracic inlet through the  symphysis pubis were obtained without contrast.      COMPARISON: Chest CT 3/1/2023, thoracentesis 5/7/2023     HISTORY: plerual effusions     FINDINGS:     Thorax     Lungs: Central tracheobronchial wall thickening. Mucous plugging of  some distal bronchioles in the right lower lobe. Moderate bilateral  pleural effusions with adjacent compressive atelectasis, predominantly  in the lower lobes. Unchanged 1.4 cm grouping of adjacent solid  pulmonary nodules in the left upper lobe, series 4 image 64.     Remainder of chest: Surgical changes of ascending aortic aneurysm  repair. Aortic valve prosthesis. Crescentic surrounding the ascending  aorta measuring 21 mm in thickness. Small pericardial effusion. Aortic  atherosclerosis. Unremarkable thyroid gland.     Upper abdomen: No evidence of acute pathology or  suspicious mass in  the visualized upper abdomen. Aortic atherosclerotic calcifications  are present.     Bones/Soft Tissues:  Kyphotic deformity of the thoracic spine.  Unchanged anterior wedge compression deformities of T5, T7, T8, and  T11. Anterior right ununited first and second rib  fractures. Median  sternotomy wires intact. Small anterior mediastinal hematoma  underlying the sternotomy wires. Mild dextrocurvature mid thoracic  spine and compensatory levocurvature lumbar spine.                                                                      IMPRESSION:   1. Moderate bilateral pleural effusions with associated compressive  atelectasis.  2. Surgical changes of ascending aortic aneurysm repair with  crescentic hematoma surrounding the ascending aorta graft measuring 21  mm in thickness.  3. Small pericardial effusion.  4. Pulmonary nodule measuring 1.4 cm and the left upper lobe,  unchanged since 2/17/2023. Recommend additional tissue sampling,  PET/CT, or additional 3 month follow-up chest CT according to  Fleischner Society guidelines.  5. Unchanged appearance of multiple osteopenic wedge compression  fractures of the thoracic spine.     I have personally reviewed the examination and initial interpretation  and I agree with the findings.     ADONIS TROTTER MD         SYSTEM ID:  [id number]       Echocardiogram 5/7/23  Interpretation Summary  Ascending aorta aneurysm repair with 32 mm Hemashield graft and aortic valve  replacement with 21 mm Aponte Resilia on 04/25/2023.  Left ventricular function is decreased. The ejection fraction is 40-45%  (mildly reduced). There is significant [id number]-xp-[id number] variability in the LVEF  due to the patient's AF.  Global right ventricular function is normal. The right ventricle is normal  size.  The bioprosthetic aortic valve is well-seated. Leaflet opening is not clearly  visualized. There is no valvular or paravalvular regurgitation. The Vmax is  2.6 m/s, the mean gradient is 15 mmHg, the dimensionless index is 0.30, and  the acceleration time is 80 ms.  Mild to moderate tricuspid insufficiency is present.  IVC diameter and respiratory changes fall into an intermediate range  suggesting an RA pressure of 8 mmHg.  There is a small pericardial effusion  adjacent LV lateral segments. The  maximal depth is 1.4 cm next to the left atrioventricular groove. There are no  signs of tamponade.  This study was compared with the study from 04/27/2023. The LV function has  declined modestly due to the patient's AF. There is no significant change in  the RV function. The TR is less severe.  ______________________________________________________________________________  Left Ventricle  Left ventricular function is decreased. The ejection fraction is 40-45%  (mildly reduced). Diastolic function not assessed due to atrial fibrillation.  There is significant uzee-vo-tglm variability in the LVEF due to the patient's  AF. Abnormal non-specific septal motion is present.     Right Ventricle  Global right ventricular function is normal. The right ventricle is normal  size.     Atria  Moderate biatrial enlargement is present.     Mitral Valve  Mild to moderate mitral insufficiency is present.     Aortic Valve  The bioprosthetic aortic valve is well-seated. Leaflet opening is not clearly  visualized. There is no valvular or paravalvular regurgitation. The Vmax is  2.6 m/s, the mean gradient is 15 mmHg, the dimensionless index is 0.30, and  the acceleration time is 80 ms.     Tricuspid Valve  The valve leaflets are not well visualized. Mild to moderate tricuspid  insufficiency is present. The right ventricular systolic pressure is  approximated at 19.5 mmHg plus the right atrial pressure.     Pulmonic Valve  The pulmonic valve cannot be assessed.     Vessels  The aorta root is normal. The thoracic aorta cannot be assessed. IVC diameter  and respiratory changes fall into an intermediate range suggesting an RA  pressure of 8 mmHg.     Pericardium  There is a small pericardial effusion adjacent LV lateral segments. The  maximal depth is 1.4 cm next to the left atrioventricular groove. There are no  signs of tamponade.     Compared to Previous Study  This study was compared with the study  from 2023 . The LV function has  declined modestly due to the patient's AF. There is no significant change in  the RV function. The TR is less severe.  ______________________________________________________________________________  Doppler Measurements & Calculations  Ao V2 max: 261.0 cm/sec  Ao max P.2 mmHg  Ao V2 mean: 170.0 cm/sec  Ao mean P.0 mmHg  Ao V2 VTI: 40.9 cm  LV V1 max P.4 mmHg  LV V1 max: 77.4 cm/sec  LV V1 VTI: 12.2 cm  TR max chavez: 220.7 cm/sec  TR max P.5 mmHg  AV Chavez Ratio (DI): 0.30     ______________________________________________________________________________  Report approved by: Diana Beltran 2023 09:48 AM    Labs:  Recent Labs   Lab 23  0501 23  1558 23  0603 05/10/23  0846 05/10/23  0423 23  0921 23  0438 23  1236 23  1042   WBC 5.9  --  5.9  --  6.8  --  10.8   < >  --    HGB 8.3*  --  8.4*  --  8.1*  --  9.6*   < >  --    MCV 98  --  97  --  99  --  97   < >  --      --  206  --  195  --  207   < >  --    INR  --   --   --   --   --   --   --   --  1.25*    137 137   < > 138   < > 137   < >  --    POTASSIUM 4.8 4.4 4.0   < > 4.6   < > 4.6   < >  --    CHLORIDE 100 98 98   < > 101   < > 98   < >  --    CO2 27 28 29   < > 31*   < > 33*   < >  --    BUN 22.6 22.4 21.3   < > 20.9   < > 19.3   < >  --    CR 0.77 0.74 0.69   < > 0.72   < > 0.70   < >  --    ANIONGAP 10 11 10   < > 6*   < > 6*   < >  --    SHERYL 8.6* 8.9 8.9   < > 8.4*   < > 8.6*   < >  --    * 131* 104*   < > 117*   < > 116*   < >  --    ALBUMIN  --   --  3.1*  --   --   --  2.8*  --   --     < > = values in this interval not displayed.      GLUCOSE:   Recent Labs   Lab 23  0501 23  1558 23  0603 05/10/23  1556 05/10/23  0846 05/10/23  0423   * 131* 104* 160* 93 117*

## 2023-05-12 NOTE — PROGRESS NOTES
Care Management Follow Up    Length of Stay (days): 17    Expected Discharge Date:       Concerns to be Addressed: all concerns addressed in this encounter     Patient plan of care discussed at interdisciplinary rounds: Yes    Anticipated Discharge Disposition: Home     Anticipated Discharge Services: None  Anticipated Discharge DME: WalkerLinda, Shower Chair    Patient/family educated on Medicare website which has current facility and service quality ratings:    Education Provided on the Discharge Plan:    Patient/Family in Agreement with the Plan:      Referrals Placed by CM/SW:    Pending    [facility]  [address] [place] [address]  PH: [phone]  5/11: referral resent  5/12: Spoke with Caitlin in Admissions (ph: [phone]) who asked if pt would be on any chemo meds, noting that if she would need chemo during TCU stay they would not be able to accept pt.   ADDENDUM 1600: MAGGIE followed up with Caitlin about pt not needing chemo medications at discharge. Caitlin reported that he would need to confirm with their DON and if confirmed, would start the prior auth process. Caitlin reported that they could do weekend admits but a few things may need weekday admissions to set things up first. Caitlin will follow up with MAGGIE.   ADDENDUM 3724: MAGGIE received phone call back from Caitlin reporting that they could accept pt when medically ready this weekend. Caitlin requested that MAGGIE call his personal cell phone (ph: [phone]) to update on ride time when pt is med ready. Reports that pt cannot come between [id number]. Would need discharge orders 2 hours in advance.      [facility]  [address] [place] [address]  PH: [phone]  5/11: Referral resent  5/12: Spoke with Lucero in Admissions who reported they will not have any bed availability until Monday (5/15) and requested SW call back then.      [facility] [address] [place] [address]  PH: [phone]  5/11: Referral resent    [facility] ([facility])  [address]  [place] [address]  PH: [phone]  5/11: Referral resent    [facility]  [address]  [place] [address]  PH: [phone]  5/11: Referral resent  5/12: Spoke with Nixon in Admissions who reported that they are not anticipating any bed availability until the end of next week.    Private pay costs discussed: Not applicable    Additional Information:  SW following for discharge planning. PT/OT currently recommending TCU at discharge. Anticipating that pt will be medically ready over the weekend.     MAGGIE spoke with [facility] (Yuri Quiroz PA-C) to follow up on [facility] question about pt going on chemo medication. Confirmed that pt will not discharge on any chemo medication.     SW will continue to follow for discharge planning needs.     May Salinas MAGGIE  [facility]   [facility]   6B  Coverage  6B  Ph: [phone]

## 2023-05-12 NOTE — PLAN OF CARE
Goal Outcome Evaluation:                Neuro: A&Ox4. Able to make needs know, quite anxious with exertional breathing especially after up to commode. Atarax given with little help.  Cardiac: Afib with pvc's at times, continues on amiodarone gtt, pt did convert to SR last night.  VSS. No chest pain   Respiratory: Sating mid 90's on Room air, gets quite short of breath with exertion, encouraged bipap she wore for an hour and replaced again this am due to exertional shortness of breath.   GI/: Bladder scanned for over 300, voided 150 with post void 9cc  Diet/appetite: Tolerating oral liquids. No nausea.  Activity:  Assist of 1, up to commode, Sternal precautions, does well. She is anxious about it but does better than she thinks.   Pain: At acceptable level on current regimen.   Skin: Mid chest tube old sites, cleaned and DIANNE. Perineal rash forming, cleaned and MD asked for miconazole.   LDA's:Piv x 2 with amiodarone gtt infusing and good blood return every two hours checked on that IV.     Plan: Amiodarone infusion. Reinforced IS and acapella as she has low long capacity. Sleep wake cycle, keep pt AWAKE today, as she was up all night with anxiety, continue with POC. Notify primary team with changes.

## 2023-05-13 ENCOUNTER — APPOINTMENT (OUTPATIENT)
Dept: GENERAL RADIOLOGY | Facility: CLINIC | Age: 88
DRG: 219 | End: 2023-05-13
Attending: PHYSICIAN ASSISTANT
Payer: COMMERCIAL

## 2023-05-13 LAB
ANION GAP SERPL CALCULATED.3IONS-SCNC: 9 MMOL/L (ref 7–15)
ANION GAP SERPL CALCULATED.3IONS-SCNC: 9 MMOL/L (ref 7–15)
BUN SERPL-MCNC: 17.4 MG/DL (ref 8–23)
BUN SERPL-MCNC: 19.6 MG/DL (ref 8–23)
CALCIUM SERPL-MCNC: 9 MG/DL (ref 8.8–10.2)
CALCIUM SERPL-MCNC: 9.1 MG/DL (ref 8.8–10.2)
CHLORIDE SERPL-SCNC: 101 MMOL/L (ref 98–107)
CHLORIDE SERPL-SCNC: 99 MMOL/L (ref 98–107)
CREAT SERPL-MCNC: 0.68 MG/DL (ref 0.51–0.95)
CREAT SERPL-MCNC: 0.75 MG/DL (ref 0.51–0.95)
DEPRECATED HCO3 PLAS-SCNC: 27 MMOL/L (ref 22–29)
DEPRECATED HCO3 PLAS-SCNC: 29 MMOL/L (ref 22–29)
ERYTHROCYTE [DISTWIDTH] IN BLOOD BY AUTOMATED COUNT: 16.1 % (ref 10–15)
GFR SERPL CREATININE-BSD FRML MDRD: 78 ML/MIN/1.73M2
GFR SERPL CREATININE-BSD FRML MDRD: 85 ML/MIN/1.73M2
GLUCOSE SERPL-MCNC: 115 MG/DL (ref 70–99)
GLUCOSE SERPL-MCNC: 147 MG/DL (ref 70–99)
HCT VFR BLD AUTO: 29.2 % (ref 35–47)
HGB BLD-MCNC: 8.5 G/DL (ref 11.7–15.7)
MAGNESIUM SERPL-MCNC: 1.9 MG/DL (ref 1.7–2.3)
MCH RBC QN AUTO: 28.8 PG (ref 26.5–33)
MCHC RBC AUTO-ENTMCNC: 29.1 G/DL (ref 31.5–36.5)
MCV RBC AUTO: 99 FL (ref 78–100)
PLATELET # BLD AUTO: 217 10E3/UL (ref 150–450)
POTASSIUM SERPL-SCNC: 4.4 MMOL/L (ref 3.4–5.3)
POTASSIUM SERPL-SCNC: 4.7 MMOL/L (ref 3.4–5.3)
RBC # BLD AUTO: 2.95 10E6/UL (ref 3.8–5.2)
SODIUM SERPL-SCNC: 137 MMOL/L (ref 136–145)
SODIUM SERPL-SCNC: 137 MMOL/L (ref 136–145)
WBC # BLD AUTO: 6.9 10E3/UL (ref 4–11)

## 2023-05-13 PROCEDURE — 250N000009 HC RX 250: Performed by: PHYSICIAN ASSISTANT

## 2023-05-13 PROCEDURE — 85027 COMPLETE CBC AUTOMATED: CPT | Performed by: STUDENT IN AN ORGANIZED HEALTH CARE EDUCATION/TRAINING PROGRAM

## 2023-05-13 PROCEDURE — 94640 AIRWAY INHALATION TREATMENT: CPT

## 2023-05-13 PROCEDURE — 250N000013 HC RX MED GY IP 250 OP 250 PS 637

## 2023-05-13 PROCEDURE — 999N000157 HC STATISTIC RCP TIME EA 10 MIN

## 2023-05-13 PROCEDURE — 250N000013 HC RX MED GY IP 250 OP 250 PS 637: Performed by: SURGERY

## 2023-05-13 PROCEDURE — 250N000013 HC RX MED GY IP 250 OP 250 PS 637: Performed by: PHYSICIAN ASSISTANT

## 2023-05-13 PROCEDURE — 120N000003 HC R&B IMCU UMMC

## 2023-05-13 PROCEDURE — 74018 RADEX ABDOMEN 1 VIEW: CPT

## 2023-05-13 PROCEDURE — 71046 X-RAY EXAM CHEST 2 VIEWS: CPT | Mod: 26 | Performed by: RADIOLOGY

## 2023-05-13 PROCEDURE — 74018 RADEX ABDOMEN 1 VIEW: CPT | Mod: 26 | Performed by: RADIOLOGY

## 2023-05-13 PROCEDURE — 250N000011 HC RX IP 250 OP 636: Performed by: SURGERY

## 2023-05-13 PROCEDURE — 82310 ASSAY OF CALCIUM: CPT | Performed by: PHYSICIAN ASSISTANT

## 2023-05-13 PROCEDURE — 71046 X-RAY EXAM CHEST 2 VIEWS: CPT

## 2023-05-13 PROCEDURE — 250N000013 HC RX MED GY IP 250 OP 250 PS 637: Performed by: STUDENT IN AN ORGANIZED HEALTH CARE EDUCATION/TRAINING PROGRAM

## 2023-05-13 PROCEDURE — 36415 COLL VENOUS BLD VENIPUNCTURE: CPT | Performed by: PHYSICIAN ASSISTANT

## 2023-05-13 PROCEDURE — 250N000011 HC RX IP 250 OP 636: Performed by: NURSE PRACTITIONER

## 2023-05-13 PROCEDURE — 83735 ASSAY OF MAGNESIUM: CPT | Performed by: SURGERY

## 2023-05-13 PROCEDURE — 80048 BASIC METABOLIC PNL TOTAL CA: CPT | Performed by: PHYSICIAN ASSISTANT

## 2023-05-13 PROCEDURE — 999N000044 HC STATISTIC CVC DRESSING CHANGE

## 2023-05-13 RX ORDER — ASPIRIN 81 MG/1
81 TABLET ORAL DAILY
Status: DISCONTINUED | OUTPATIENT
Start: 2023-05-14 | End: 2023-05-15 | Stop reason: HOSPADM

## 2023-05-13 RX ORDER — ENALAPRIL MALEATE 2.5 MG/1
2.5 TABLET ORAL DAILY
Status: DISCONTINUED | OUTPATIENT
Start: 2023-05-13 | End: 2023-05-15 | Stop reason: HOSPADM

## 2023-05-13 RX ORDER — BUMETANIDE 1 MG/1
1 TABLET ORAL ONCE
Status: COMPLETED | OUTPATIENT
Start: 2023-05-13 | End: 2023-05-13

## 2023-05-13 RX ORDER — MAGNESIUM SULFATE HEPTAHYDRATE 40 MG/ML
2 INJECTION, SOLUTION INTRAVENOUS ONCE
Status: COMPLETED | OUTPATIENT
Start: 2023-05-13 | End: 2023-05-13

## 2023-05-13 RX ADMIN — POTASSIUM CHLORIDE 20 MEQ: 750 TABLET, EXTENDED RELEASE ORAL at 20:15

## 2023-05-13 RX ADMIN — DORZOLAMIDE HYDROCHLORIDE AND TIMOLOL MALEATE 1 DROP: 22.3; 6.8 SOLUTION/ DROPS OPHTHALMIC at 09:57

## 2023-05-13 RX ADMIN — DORZOLAMIDE HYDROCHLORIDE AND TIMOLOL MALEATE 1 DROP: 22.3; 6.8 SOLUTION/ DROPS OPHTHALMIC at 20:15

## 2023-05-13 RX ADMIN — ACETAMINOPHEN 650 MG: 325 TABLET ORAL at 20:30

## 2023-05-13 RX ADMIN — Medication 12.5 MG: at 20:15

## 2023-05-13 RX ADMIN — PANTOPRAZOLE SODIUM 40 MG: 40 TABLET, DELAYED RELEASE ORAL at 09:57

## 2023-05-13 RX ADMIN — POTASSIUM CHLORIDE 20 MEQ: 750 TABLET, EXTENDED RELEASE ORAL at 09:57

## 2023-05-13 RX ADMIN — AMIODARONE HYDROCHLORIDE 400 MG: 200 TABLET ORAL at 20:15

## 2023-05-13 RX ADMIN — HEPARIN SODIUM 5000 UNITS: 5000 INJECTION, SOLUTION INTRAVENOUS; SUBCUTANEOUS at 05:53

## 2023-05-13 RX ADMIN — MAGNESIUM SULFATE IN WATER 2 G: 40 INJECTION, SOLUTION INTRAVENOUS at 09:58

## 2023-05-13 RX ADMIN — ASPIRIN 81 MG CHEWABLE TABLET 81 MG: 81 TABLET CHEWABLE at 09:57

## 2023-05-13 RX ADMIN — AMIODARONE HYDROCHLORIDE 400 MG: 200 TABLET ORAL at 09:57

## 2023-05-13 RX ADMIN — BUMETANIDE 1 MG: 1 TABLET ORAL at 12:00

## 2023-05-13 RX ADMIN — BUMETANIDE 1 MG: 1 TABLET ORAL at 18:29

## 2023-05-13 RX ADMIN — LEVALBUTEROL HYDROCHLORIDE 0.63 MG: 0.63 SOLUTION RESPIRATORY (INHALATION) at 08:29

## 2023-05-13 RX ADMIN — SIMVASTATIN 20 MG: 20 TABLET, FILM COATED ORAL at 21:40

## 2023-05-13 RX ADMIN — Medication 12.5 MG: at 09:57

## 2023-05-13 RX ADMIN — HEPARIN SODIUM 5000 UNITS: 5000 INJECTION, SOLUTION INTRAVENOUS; SUBCUTANEOUS at 21:40

## 2023-05-13 RX ADMIN — ENALAPRIL MALEATE 2.5 MG: 2.5 TABLET ORAL at 15:47

## 2023-05-13 RX ADMIN — HEPARIN SODIUM 5000 UNITS: 5000 INJECTION, SOLUTION INTRAVENOUS; SUBCUTANEOUS at 15:47

## 2023-05-13 RX ADMIN — LATANOPROST 1 DROP: 50 SOLUTION OPHTHALMIC at 21:40

## 2023-05-13 RX ADMIN — MICONAZOLE NITRATE: 20 POWDER TOPICAL at 01:14

## 2023-05-13 ASSESSMENT — ACTIVITIES OF DAILY LIVING (ADL)
ADLS_ACUITY_SCORE: 29

## 2023-05-13 NOTE — PLAN OF CARE
Neuro: A&Ox4.   Cardiac: SR with 1st degree and BBB. VSS.   Respiratory: Sating > 92% on RA, wore BiPAP x 3 today when short of breath.  GI/: Adequate urine output. BM X1  Diet/appetite: Tolerating regular diet. Appetite improving.  Activity:  Assist of 1 with gait belt, up to chair and commode.  Pain: At acceptable level on current regimen.   Skin: No new deficits noted.  LDA's: PIV and midline.    Plan: Continue with POC. Notify primary team with changes.

## 2023-05-13 NOTE — PLAN OF CARE
Neuro: A&Ox4.   Cardiac: SR w/ first degree AV block & BBB.  VSS.   Respiratory: Sating >90% on RA while awake.  Requiring 1-2L NC while sleeping, dropping to 82% at lowest on RA when sleeping without O2.  BiPAP attempted 4x, worn for about 3 hrs.  Encouraged pt to continue to wear BiPAP, pt anxious w/ mask.  PRN Atarax given & minimally effective.  GI/: Oliguric, up to BSC.  Bladder scanned post void this AM for 18mL, see flowsheets.  No BM this shift.  Diet/appetite: Tolerating Regular diet.  Ate about half of dinner.  Activity:  Assist of 1 w/ GB & walker, up to BSC.  Pain: Denies.  Skin: No new deficits noted.  LDA's: 2 PIVs SL.  R DL Midline SL.    Plan: Continue with POC. Notify primary team with changes.

## 2023-05-13 NOTE — PROGRESS NOTES
Care Management Follow Up    Length of Stay (days): 18    Expected Discharge Date:       Concerns to be Addressed: all concerns addressed in this encounter     Patient plan of care discussed at interdisciplinary rounds: Yes    Anticipated Discharge Disposition:    [facility]  [address] [place] [address]  PH: [phone]   RN to RN PH: [phone]  FAX: [phone]  Admissions Cell PH: [phone]     Anticipated Discharge Services: transportation     Pt has wheelchair ride scheduled for ride window of 1:06-1:51PM tomorrow Sunday 5/14 through [facility].     Anticipated Discharge DME: Walker, Commode, Shower Chair    Patient/family educated on Medicare website which has current facility and service quality ratings: yes  Education Provided on the Discharge Plan:    Patient/Family in Agreement with the Plan:      Referrals Placed by CM/SW:    Private pay costs discussed: Not applicable    Additional Information:    Per team (Cardiothoracic Surgery) pt is medically ready to discharge. Pt can discharge to [facility] tomorrow and they would prefer pt to discharge at 1:30PM. SW will reach out to [facility] transport to schedule a wheelchair ride. Pt will need a PAS completed ([id number]) and discharge orders faxed tomorrow morning. Pt's IMM is completed.     Addendum 2:50PM: SW was informed by pt's primary team that pt's discharge tomorrow may need to be canceled. SW will follow up with cardiothoracics tomorrow morning to see if pt is medically ready or not.     SW will continue to follow for discharge planning and TCU placement.     WILLARD Sigala MAGGIE PH: [phone]  5/13/2023       Social Work and Care Management Department       SEARCHABLE in [employer] - search SOCIAL WORK       [place] (0800 - 1630) Saturday and Sunday     Units: 4A, 4C, & 4E Pager: [phone]     Units: 5A & 5B Pager: [phone]     Units: 6A & 6B   Pager: [phone]      Units: 6C & 6D Pager: [phone]     Units: 7A &7B  Pager: [phone]     Units: 7C, 7D, & 5C Pager: [phone]     Unit: [place] [facility] Pager: [phone]      [facility] (6917-1302) Saturday and Sunday      Units: 5 Ortho, 5 Med/Surg & WB ED  Pager:[phone]     Units: 6 Med/Surg, 8A, & 10A ICU  Pager: [phone]        After hours (1630 - 0000) Saturday & Sunday; (2057-0584) Mon-Fri; (8875-6610) [facility] Recognized Holidays     Units: ALL  Pager: [phone]

## 2023-05-13 NOTE — PROGRESS NOTES
Cardiovascular Surgery Progress Note  05/13/2023         Assessment and Plan:     Cydney Christiansen is an 87 year old female with a PMH of HTN, HLD, bilateral glaucoma, scoliosis, compression fracture of thoracic vertebrae, osteoporosis, history of bladder cancer, invasive ductal carcinoma of left breast s/p lumpectomy (2015), and multiple other skin cancer sites s/p removal, severe aortic stenosis with bicuspid aortic valve and an ascending aortic aneurysm. Patient is now s/p AVR (Aponte Resilia 21 mm valve) and ascending aorta repair on 4/27/23 with Dr. Solis.     Cardiovascular:   Severe aortic stenosis with bicuspid aortic valve  - s/p bioprosthetic AVR 4/25  Ascending aortic aneurysm - s/p graft repair 4/25  HTN  HLD  Severe tricuspid insufficiency, post-operative   Postoperative atrial fibrillation  - TTE 4/27: LV EF 45-50%; Mild right ventricular dilation with normal global right ventricular function. A bioprosthetic aortic valve with PV of 2.2m/s and MG of 12mmHg. Severe tricuspid insufficiency.  - Repeat TTE 5/7: LV EF 40-45%, Aortic valve with mean gradient of 15 mmHg, mild to moderate TR, RA 8, trace pericardial effusion  - New post-operative atrial fibrillation. Patient converted in ICU on amiodarone, which was discontinued on 4/30 in the ICU. Reverted to atrial fibrillation 4/30. ICU discussed no anticoagulation with Dr. Solis and patient refused d/t bleeding risk.   - Amiodarone gtt and bolus started 5/11 -> patient converted at 0100 on 5/12 -> transitioned to  400 mg PO BID X 7 day on 5/12 with plan to taper at discharge   - Metoprolol 12.5 mg PO BID with hold parameters. Goal MAP>65, SBP<140 per Dr Solis   - Enalapril 2.5 mg 5/13 for Heart failure and HTN  - ASA 81 mg daily  - PTA simvastatin  - Diuresis as below     CT/PW removed in the ICU     Pulmonary:  Acute hypoxic respiratory insufficiency  Bilateral pleural effusions   Extubated POD 1. On room air since 5/11.   - Supplemental O2 PRN to keep  sats > 92%.   - Pulm hygiene, IS, activity and deep breathing  - S/p right thoracentesis 5/3 with 600 ml removed.   - Diuresis as below  - CT chest showed moderate bilateral effusions 5/7. Pigtails placed by IR 5/8 with 500cc out on left and 600 on right. Removed same day as patient could not tolerate associated pain.   - BiPAP 20-30 mins QID and at night. Useful to time BiPAP with atarax dosing.     Neurology / MSK:  Acute post-operative pain   Delirium  Pain well controlled with current regimen:  - Acetaminophen, PO oxycodone PRN  - delirium precautions  - Melatonin 3mg q HS   - Atarax PRN for anxiety and during BiPAP      / Renal / Fluid / Electrolytes:  HELEN, resolved  Urinary retention  Possible Oliguria  BL creat ~ 0.6, most recent creatinine 0.77  FLUID STATUS: Pre-op weight 137 lbs, most recent weight 137 standing scale  - Diuresis: Bumex 1 mg BID on 5/12. Reassess daily.   - Albumin 2.8 on 5/9; administered albumin 25% 12.5 g X2 in conjunction with diuresis on 5/10.  - Replete lytes per protocol  - Strict I/O, daily weights  - Avoid/limit nephrotoxins as able  - Helms placed 5/5 for retention. UA negative. Removed 5/11. Monitor for retention.  - Oliguric overnight 5/11; monitor.      GI / Nutrition:   Transaminitis, resolved  Severe malnutrition in the context of acute illness  - Tolerating regular diet. Low oral intake.  - Calorie counts through 5/11 to determine intake. Per RD recs, encouraging PO intake & giving supplements. No current need for TF.  - Continue bowel regimen, last BM 5/11     Endocrine:  Stress induced hyperglycemia, resolved  Osteoporosis c/b compression fracture of spine  Pre-operative Hgb A1c 6.0  - Managed on insulin drip postop, transitioned to sliding scale goal BG <180 and has now also been discontinued due to good BG control with no insulin need.   - Continue PTA med alendronate     Infectious Disease:  Stress induced leukocytosis, resolved  WBC WNL , most recent 5.9  - Remains  "afebrile, no signs or symptoms of infection  - Completed perioperative antibiotics  - Continue to monitor fever curve, CBC     Hematology:   Acute blood loss anemia and thrombocytopenia  - Hgb stable 8's  - Plt WNL, no signs or symptoms of active bleeding  - CBC daily     Anticoagulation:   - ASA only  - Discussion had with Dr. Solis regarding anticoagulation for atrial fibrillation stroke risk. Will not start anticoagulation at this time.      MSK/Skin:  Sternotomy  Surgical incision  - Sternal precautions  - Incisional cares per protocol     Prophylaxis:   - Stress ulcer prophylaxis: Pantoprazole 40 mg daily  - DVT prophylaxis: Subcutaneous heparin, SCD     Disposition:   - Transferred to  on 5/3  - Therapies recommending discharge to TCU. Anticipate discharged over the weekend vs Monday 5/15 pending accepting facility.     Discussed with Dr Chiu through verbal communication.      Yuri Quiroz PA-C  Cardiothoracic Surgery  Pager [phone]    10:35 AM   May 13, 2023          Interval History:     No overnight events.  Feels like she sleeps well on CPAP.     States pain is well managed on current regimen. Slept well overnight.  Tolerating diet but not very hungry, is passing flatus, + BM but not sure if hard or soft, no bloating, nausea or vomiting.  Breathing well without complaints on 2 lpm.  Working with therapies and ambulating in halls with heavy  assistance.   Denies chest pain, palpitations, dizziness, syncopal symptoms, fevers, chills, myalgias, or sternal popping/clicking.         Physical Exam:   Blood pressure 135/76, pulse 67, temperature 97.7  F (36.5  C), temperature source Oral, resp. rate 18, height 1.499 m (4' 11\"), weight 63.6 kg (140 lb 3.4 oz), SpO2 99 %, not currently breastfeeding.  Vitals:    05/11/23 0630 05/12/23 0500 05/13/23 0545   Weight: 62.8 kg (138 lb 7.2 oz) 62.4 kg (137 lb 9.1 oz) 63.6 kg (140 lb 3.4 oz)      Weight; + 3 lbs since admit and trending up.   24 hr Fluid status; " net loss 30 mL.  mL  MAPs: 83 - 94     Gen: A&Ox4, NAD  Neuro: no focal deficits   CV: RRR, normal S1 S2, no murmurs, rubs or gallops.   Pulm: CTA, no wheezing or rhonchi, normal breathing on 2 lpm  Abd: nondistended, normal BS, soft, nontender  Ext: trace peripheral edema, 1+ pitting  Incision: clean, dry, intact, no erythema, sternum stable  Tubes/drain sites: dressing clean and dry         Data:    Imaging:  reviewed recent imaging    CXR-   Moderate bilateral pleural effusions    AbdXr-   Moderate stool burden, no obstructive signs    Labs:  BMP  Recent Labs   Lab 05/13/23  0513 05/12/23  1850 05/12/23  0501 05/11/23  1558    137 137 137   POTASSIUM 4.7 4.3 4.8 4.4   CHLORIDE 101 99 100 98   SHERYL 9.0 9.3 8.6* 8.9   CO2 27 27 27 28   BUN 19.6 20.8 22.6 22.4   CR 0.68 0.69 0.77 0.74   * 137* 120* 131*     CBC  Recent Labs   Lab 05/13/23 0513 05/12/23  0501 05/11/23  0603 05/10/23  0423   WBC 6.9 5.9 5.9 6.8   RBC 2.95* 2.87* 2.92* 2.71*   HGB 8.5* 8.3* 8.4* 8.1*   HCT 29.2* 28.1* 28.2* 26.8*   MCV 99 98 97 99   MCH 28.8 28.9 28.8 29.9   MCHC 29.1* 29.5* 29.8* 30.2*   RDW 16.1* 16.1* 16.3* 16.4*    204 206 195     INR  Recent Labs   Lab 05/07/23  1042   INR 1.25*      Hepatic Panel  Recent Labs   Lab 05/11/23  0603 05/09/23  0438   ALBUMIN 3.1* 2.8*     GLUCOSE:   Recent Labs   Lab 05/13/23  0513 05/12/23  1850 05/12/23  0501 05/11/23  1558 05/11/23  0603 05/10/23  1556   * 137* 120* 131* 104* 160*

## 2023-05-14 ENCOUNTER — ANCILLARY PROCEDURE (OUTPATIENT)
Dept: ULTRASOUND IMAGING | Facility: CLINIC | Age: 88
End: 2023-05-14
Attending: STUDENT IN AN ORGANIZED HEALTH CARE EDUCATION/TRAINING PROGRAM
Payer: COMMERCIAL

## 2023-05-14 LAB
ANION GAP SERPL CALCULATED.3IONS-SCNC: 11 MMOL/L (ref 7–15)
ANION GAP SERPL CALCULATED.3IONS-SCNC: 7 MMOL/L (ref 7–15)
ATRIAL RATE - MUSE: 62 BPM
ATRIAL RATE - MUSE: NORMAL BPM
ATRIAL RATE - MUSE: NORMAL BPM
BUN SERPL-MCNC: 15.4 MG/DL (ref 8–23)
BUN SERPL-MCNC: 16.5 MG/DL (ref 8–23)
CALCIUM SERPL-MCNC: 8.6 MG/DL (ref 8.8–10.2)
CALCIUM SERPL-MCNC: 9 MG/DL (ref 8.8–10.2)
CHLORIDE SERPL-SCNC: 101 MMOL/L (ref 98–107)
CHLORIDE SERPL-SCNC: 102 MMOL/L (ref 98–107)
CREAT SERPL-MCNC: 0.78 MG/DL (ref 0.51–0.95)
CREAT SERPL-MCNC: 0.83 MG/DL (ref 0.51–0.95)
DEPRECATED HCO3 PLAS-SCNC: 28 MMOL/L (ref 22–29)
DEPRECATED HCO3 PLAS-SCNC: 30 MMOL/L (ref 22–29)
DIASTOLIC BLOOD PRESSURE - MUSE: NORMAL MMHG
ERYTHROCYTE [DISTWIDTH] IN BLOOD BY AUTOMATED COUNT: 16.5 % (ref 10–15)
GFR SERPL CREATININE-BSD FRML MDRD: 69 ML/MIN/1.73M2
GFR SERPL CREATININE-BSD FRML MDRD: 74 ML/MIN/1.73M2
GLUCOSE SERPL-MCNC: 115 MG/DL (ref 70–99)
GLUCOSE SERPL-MCNC: 122 MG/DL (ref 70–99)
HCT VFR BLD AUTO: 28.9 % (ref 35–47)
HGB BLD-MCNC: 8.7 G/DL (ref 11.7–15.7)
INR PPP: 1.26 (ref 0.85–1.15)
INTERPRETATION ECG - MUSE: NORMAL
MAGNESIUM SERPL-MCNC: 2.2 MG/DL (ref 1.7–2.3)
MCH RBC QN AUTO: 29 PG (ref 26.5–33)
MCHC RBC AUTO-ENTMCNC: 30.1 G/DL (ref 31.5–36.5)
MCV RBC AUTO: 96 FL (ref 78–100)
P AXIS - MUSE: 15 DEGREES
P AXIS - MUSE: NORMAL DEGREES
P AXIS - MUSE: NORMAL DEGREES
PLATELET # BLD AUTO: 222 10E3/UL (ref 150–450)
POTASSIUM SERPL-SCNC: 4.1 MMOL/L (ref 3.4–5.3)
POTASSIUM SERPL-SCNC: 4.2 MMOL/L (ref 3.4–5.3)
PR INTERVAL - MUSE: 298 MS
PR INTERVAL - MUSE: NORMAL MS
PR INTERVAL - MUSE: NORMAL MS
QRS DURATION - MUSE: 132 MS
QRS DURATION - MUSE: 132 MS
QRS DURATION - MUSE: 76 MS
QT - MUSE: 302 MS
QT - MUSE: 418 MS
QT - MUSE: 452 MS
QTC - MUSE: 406 MS
QTC - MUSE: 458 MS
QTC - MUSE: 493 MS
R AXIS - MUSE: -21 DEGREES
R AXIS - MUSE: -7 DEGREES
R AXIS - MUSE: -9 DEGREES
RBC # BLD AUTO: 3 10E6/UL (ref 3.8–5.2)
SODIUM SERPL-SCNC: 138 MMOL/L (ref 136–145)
SODIUM SERPL-SCNC: 141 MMOL/L (ref 136–145)
SYSTOLIC BLOOD PRESSURE - MUSE: NORMAL MMHG
T AXIS - MUSE: -7 DEGREES
T AXIS - MUSE: 131 DEGREES
T AXIS - MUSE: 58 DEGREES
VENTRICULAR RATE- MUSE: 109 BPM
VENTRICULAR RATE- MUSE: 62 BPM
VENTRICULAR RATE- MUSE: 84 BPM
WBC # BLD AUTO: 6.2 10E3/UL (ref 4–11)

## 2023-05-14 PROCEDURE — 250N000013 HC RX MED GY IP 250 OP 250 PS 637: Performed by: PHYSICIAN ASSISTANT

## 2023-05-14 PROCEDURE — 250N000011 HC RX IP 250 OP 636: Performed by: NURSE PRACTITIONER

## 2023-05-14 PROCEDURE — 94640 AIRWAY INHALATION TREATMENT: CPT

## 2023-05-14 PROCEDURE — 83735 ASSAY OF MAGNESIUM: CPT | Performed by: SURGERY

## 2023-05-14 PROCEDURE — 250N000013 HC RX MED GY IP 250 OP 250 PS 637: Performed by: SURGERY

## 2023-05-14 PROCEDURE — 250N000009 HC RX 250: Performed by: PHYSICIAN ASSISTANT

## 2023-05-14 PROCEDURE — 80048 BASIC METABOLIC PNL TOTAL CA: CPT | Performed by: PHYSICIAN ASSISTANT

## 2023-05-14 PROCEDURE — 250N000013 HC RX MED GY IP 250 OP 250 PS 637

## 2023-05-14 PROCEDURE — 85610 PROTHROMBIN TIME: CPT | Performed by: PHYSICIAN ASSISTANT

## 2023-05-14 PROCEDURE — 36415 COLL VENOUS BLD VENIPUNCTURE: CPT | Performed by: PHYSICIAN ASSISTANT

## 2023-05-14 PROCEDURE — 120N000003 HC R&B IMCU UMMC

## 2023-05-14 PROCEDURE — 999N000157 HC STATISTIC RCP TIME EA 10 MIN

## 2023-05-14 PROCEDURE — 250N000013 HC RX MED GY IP 250 OP 250 PS 637: Performed by: STUDENT IN AN ORGANIZED HEALTH CARE EDUCATION/TRAINING PROGRAM

## 2023-05-14 PROCEDURE — 76604 US EXAM CHEST: CPT | Mod: 26 | Performed by: STUDENT IN AN ORGANIZED HEALTH CARE EDUCATION/TRAINING PROGRAM

## 2023-05-14 PROCEDURE — 85027 COMPLETE CBC AUTOMATED: CPT | Performed by: STUDENT IN AN ORGANIZED HEALTH CARE EDUCATION/TRAINING PROGRAM

## 2023-05-14 RX ORDER — BUMETANIDE 1 MG/1
1 TABLET ORAL ONCE
Status: COMPLETED | OUTPATIENT
Start: 2023-05-14 | End: 2023-05-14

## 2023-05-14 RX ORDER — HYDROXYZINE HYDROCHLORIDE 25 MG/1
25 TABLET, FILM COATED ORAL AT BEDTIME
Status: DISCONTINUED | OUTPATIENT
Start: 2023-05-14 | End: 2023-05-15 | Stop reason: HOSPADM

## 2023-05-14 RX ORDER — BUMETANIDE 1 MG/1
1 TABLET ORAL DAILY
Status: DISCONTINUED | OUTPATIENT
Start: 2023-05-15 | End: 2023-05-15 | Stop reason: HOSPADM

## 2023-05-14 RX ADMIN — HEPARIN SODIUM 5000 UNITS: 5000 INJECTION, SOLUTION INTRAVENOUS; SUBCUTANEOUS at 21:37

## 2023-05-14 RX ADMIN — Medication 12.5 MG: at 07:59

## 2023-05-14 RX ADMIN — ENALAPRIL MALEATE 2.5 MG: 2.5 TABLET ORAL at 07:59

## 2023-05-14 RX ADMIN — LEVALBUTEROL HYDROCHLORIDE 0.63 MG: 0.63 SOLUTION RESPIRATORY (INHALATION) at 08:40

## 2023-05-14 RX ADMIN — DORZOLAMIDE HYDROCHLORIDE AND TIMOLOL MALEATE 1 DROP: 22.3; 6.8 SOLUTION/ DROPS OPHTHALMIC at 19:32

## 2023-05-14 RX ADMIN — HYDROXYZINE HYDROCHLORIDE 25 MG: 25 TABLET, FILM COATED ORAL at 04:07

## 2023-05-14 RX ADMIN — BUMETANIDE 1 MG: 1 TABLET ORAL at 09:34

## 2023-05-14 RX ADMIN — SIMVASTATIN 20 MG: 20 TABLET, FILM COATED ORAL at 21:37

## 2023-05-14 RX ADMIN — DORZOLAMIDE HYDROCHLORIDE AND TIMOLOL MALEATE 1 DROP: 22.3; 6.8 SOLUTION/ DROPS OPHTHALMIC at 07:59

## 2023-05-14 RX ADMIN — HYDROXYZINE HYDROCHLORIDE 25 MG: 25 TABLET, FILM COATED ORAL at 19:34

## 2023-05-14 RX ADMIN — HEPARIN SODIUM 5000 UNITS: 5000 INJECTION, SOLUTION INTRAVENOUS; SUBCUTANEOUS at 14:25

## 2023-05-14 RX ADMIN — PANTOPRAZOLE SODIUM 40 MG: 40 TABLET, DELAYED RELEASE ORAL at 08:00

## 2023-05-14 RX ADMIN — ASPIRIN 81 MG: 81 TABLET ORAL at 07:59

## 2023-05-14 RX ADMIN — Medication 12.5 MG: at 19:32

## 2023-05-14 RX ADMIN — AMIODARONE HYDROCHLORIDE 400 MG: 200 TABLET ORAL at 19:32

## 2023-05-14 RX ADMIN — AMIODARONE HYDROCHLORIDE 400 MG: 200 TABLET ORAL at 08:00

## 2023-05-14 RX ADMIN — POTASSIUM CHLORIDE 20 MEQ: 750 TABLET, EXTENDED RELEASE ORAL at 08:00

## 2023-05-14 RX ADMIN — HEPARIN SODIUM 5000 UNITS: 5000 INJECTION, SOLUTION INTRAVENOUS; SUBCUTANEOUS at 05:41

## 2023-05-14 RX ADMIN — POTASSIUM CHLORIDE 20 MEQ: 750 TABLET, EXTENDED RELEASE ORAL at 19:32

## 2023-05-14 RX ADMIN — LATANOPROST 1 DROP: 50 SOLUTION OPHTHALMIC at 21:37

## 2023-05-14 ASSESSMENT — ACTIVITIES OF DAILY LIVING (ADL)
ADLS_ACUITY_SCORE: 30
ADLS_ACUITY_SCORE: 30
ADLS_ACUITY_SCORE: 29
ADLS_ACUITY_SCORE: 29
ADLS_ACUITY_SCORE: 30
ADLS_ACUITY_SCORE: 29
ADLS_ACUITY_SCORE: 30

## 2023-05-14 NOTE — PLAN OF CARE
Neuro: A&Ox4.   Cardiac: SR w/ first degree AV block & BBB.  VSS.   Respiratory: Sating >90% on RA.  BiPAP worn 4x overnight for about 4 hours total.  PRN Atarax given this AM w/ minimal change in anxiety.  GI/: Adequate urine output.  No BM this shift.  Diet/appetite: Tolerating Regular diet.  Activity:  Assist of 1 w/ GB, up to BSC.  Pain: At acceptable level on current regimen.  Skin: No new deficits noted.  LDA's: 2 PIVs SL.     Plan: Continue with POC. Notify primary team with changes.

## 2023-05-14 NOTE — PROGRESS NOTES
Cardiovascular Surgery Progress Note  05/14/2023         Assessment and Plan:     Cydney Christiansen is an 87 year old female with a PMH of HTN, HLD, bilateral glaucoma, scoliosis, compression fracture of thoracic vertebrae, osteoporosis, history of bladder cancer, invasive ductal carcinoma of left breast s/p lumpectomy (2015), and multiple other skin cancer sites s/p removal, severe aortic stenosis with bicuspid aortic valve and an ascending aortic aneurysm. Patient is now s/p AVR (Aponte Resilia 21 mm valve) and ascending aorta repair on 4/27/23 with Dr. Solis.     Cardiovascular:   Severe aortic stenosis with bicuspid aortic valve  - s/p bioprosthetic AVR 4/25  Ascending aortic aneurysm - s/p graft repair 4/25  HTN  HLD  Severe tricuspid insufficiency, post-operative   Postoperative atrial fibrillation  - TTE 4/27: LV EF 45-50%; Mild right ventricular dilation with normal global right ventricular function. A bioprosthetic aortic valve with PV of 2.2m/s and MG of 12mmHg. Severe tricuspid insufficiency.  - Repeat TTE 5/7: LV EF 40-45%, Aortic valve with mean gradient of 15 mmHg, mild to moderate TR, RA 8, trace pericardial effusion  - New post-operative atrial fibrillation. Patient converted in ICU on amiodarone, which was discontinued on 4/30 in the ICU. Reverted to atrial fibrillation 4/30. ICU discussed no anticoagulation with Dr. Solis and patient refused d/t bleeding risk.   - Amiodarone gtt and bolus started 5/11 -> patient converted at 0100 on 5/12 -> transitioned to  400 mg PO BID X 7 day on 5/12 with plan to taper at discharge   - Metoprolol 12.5 mg PO BID with hold parameters. Goal MAP>65, SBP<140 per Dr Solis   - Enalapril 2.5 mg 5/13 for Heart failure and HTN  - ASA 81 mg daily  - PTA simvastatin  - Diuresis as below     CT/PW removed in the ICU     Pulmonary:  Acute hypoxic respiratory insufficiency  - Extubated POD 1. On room air since 5/11.   - BiPAP 20-30 mins QID and at night. Useful to time BiPAP  with atarax dosing.  - Supplemental O2 PRN to keep sats > 92%.   - Pulm hygiene, IS, activity and deep breathing  Bilateral pleural effusions   - Diuresis as below  - S/p right thoracentesis 5/3 with 600 ml removed.   - CT chest showed moderate bilateral effusions 5/7. Pigtails placed by IR 5/8 with 500cc out on left and 600 on right. Removed same day as patient could not tolerate associated pain.   - CAPs reviewed again 5/14, not safe to tap. Dr Solis wants IR to attempt thoracentesis. IR consult 5/14 and hopefully to tap 5/15.      Neurology / MSK:  Acute post-operative pain   Delirium  Pain well controlled with current regimen:  - Acetaminophen, PO oxycodone PRN.  - delirium precautions  - Melatonin 1 mg q HS and Atarax 25 mg q HS.  - Atarax PRN for anxiety and during BiPAP      / Renal / Fluid / Electrolytes:  HELEN, resolved  Urinary retention  Possible Oliguria  BL creat ~ 0.6, most recent creatinine 0.77  FLUID STATUS: Pre-op weight 137 lbs, most recent weight 136 standing scale.  - Diuresis: Bumex 1 mg daily since 5/13. Reassess daily.   - Albumin 2.8 on 5/9; administered albumin 25% 12.5 g X2 in conjunction with diuresis on 5/10.  - Replete lytes per protocol  - Strict I/O, daily weights  - Avoid/limit nephrotoxins as able  - Helms placed 5/5 for retention. UA negative. Removed 5/11. Monitor for retention.     GI / Nutrition:   Transaminitis, resolved  Severe malnutrition in the context of acute illness  - Tolerating regular diet. Low oral intake.  - Calorie counts through 5/11 to determine intake. Per RD recs, encouraging PO intake & giving supplements. No current need for TF.  - Continue bowel regimen, last BM 5/11     Endocrine:  Stress induced hyperglycemia, resolved  Osteoporosis c/b compression fracture of spine  Pre-operative Hgb A1c 6.0  - Managed on insulin drip postop, transitioned to sliding scale goal BG <180 and has now also been discontinued due to good BG control with no insulin need.   -  "Continue PTA med alendronate     Infectious Disease:  Stress induced leukocytosis, resolved  - WBC WNL. Remains afebrile, no signs or symptoms of infection  - Completed perioperative antibiotics.  Continue to monitor fever curve, CBC     Hematology:   Acute blood loss anemia and thrombocytopenia  - Hgb stable 8's  - Plt WNL, no signs or symptoms of active bleeding  - CBC daily     Anticoagulation:   - ASA only  - Discussion had with Dr. Solis regarding anticoagulation for atrial fibrillation stroke risk. Will not start anticoagulation at this time.      MSK/Skin:  Sternotomy  Surgical incision  - Sternal precautions  - Incisional cares per protocol     Prophylaxis:   - Stress ulcer prophylaxis: Pantoprazole 40 mg daily  - DVT prophylaxis: Subcutaneous heparin, SCD     Disposition:   - Transferred to  on 5/3  - Therapies recommending discharge to TCU. Had a ride for 5/14, cancelled until thoracentesis can be done.     Discussed with Dr Solis through written and verbal communication.      Yuri Quiroz PA-C  Cardiothoracic Surgery  Pager [phone]    8:16 AM   May 14, 2023        Interval History:     No overnight events.   States pain is well managed on current regimen. Slept poorly again overnight.  Tolerating diet, is passing flatus, + BM. No nausea or vomiting.  Breathing well without complaints and remained on RA since 11 am yesterday.   Working with therapies and ambulating in halls with assistance, but only about once per day.  Denies chest pain, palpitations, dizziness, syncopal symptoms, fevers, chills, myalgias, or sternal popping/clicking.         Physical Exam:   Blood pressure 127/80, pulse 65, temperature 97.6  F (36.4  C), temperature source Oral, resp. rate 16, height 1.499 m (4' 11\"), weight 61.9 kg (136 lb 7.4 oz), SpO2 92 %, not currently breastfeeding.  Vitals:    05/12/23 0500 05/13/23 0545 05/14/23 0500   Weight: 62.4 kg (137 lb 9.1 oz) 63.6 kg (140 lb 3.4 oz) 61.9 kg (136 lb 7.4 oz)    "   Weight; - 1 lbs since admit and trending down.   24 hr Fluid status; net loss 1.1 L. UOP 1.3 L  MAPs: 85 - 94    Gen: A&Ox4, NAD  Neuro: no focal deficits   CV: RRR, normal S1 S2, no murmurs, rubs or gallops.   Pulm: diminished bases, otherwise CTA, no wheezing or rhonchi, normal breathing on RA  Abd: nondistended, normal BS, soft, nontender  Ext: trace peripheral edema  Incision: clean, dry, intact, no erythema, sternum stable  Tubes/drain sites: dressing clean and dry         Data:    Imaging:  reviewed recent imaging, no acute concerns but has bilat pleural effusions    Labs:  BMP  Recent Labs   Lab 05/14/23 0349 05/13/23 1609 05/13/23  0513 05/12/23  1850    137 137 137   POTASSIUM 4.1 4.4 4.7 4.3   CHLORIDE 101 99 101 99   SHERYL 8.6* 9.1 9.0 9.3   CO2 30* 29 27 27   BUN 15.4 17.4 19.6 20.8   CR 0.78 0.75 0.68 0.69   * 147* 115* 137*     CBC  Recent Labs   Lab 05/14/23 0349 05/13/23  0513 05/12/23  0501 05/11/23  0603   WBC 6.2 6.9 5.9 5.9   RBC 3.00* 2.95* 2.87* 2.92*   HGB 8.7* 8.5* 8.3* 8.4*   HCT 28.9* 29.2* 28.1* 28.2*   MCV 96 99 98 97   MCH 29.0 28.8 28.9 28.8   MCHC 30.1* 29.1* 29.5* 29.8*   RDW 16.5* 16.1* 16.1* 16.3*    217 204 206     INR  Recent Labs   Lab 05/14/23 0349 05/07/23  1042   INR 1.26* 1.25*      Hepatic Panel  Recent Labs   Lab 05/11/23  0603 05/09/23  0438   ALBUMIN 3.1* 2.8*     GLUCOSE:   Recent Labs   Lab 05/14/23 0349 05/13/23  1609 05/13/23  0513 05/12/23  1850 05/12/23  0501 05/11/23  1558   * 147* 115* 137* 120* 131*

## 2023-05-14 NOTE — PROGRESS NOTES
[facility]  CAPS NOTE  Date of Admission:  4/25/2023  Consult Requested by: Cardiothoracic Surgery  Reason for Consult: management of symptomatic pleural effusion    POC US Guide for Thorcentesis     Impression  Limited point of care pleural/lung ultrasound to evaluate for thoracentesis.    Thoracentesis Indication:pleural effusion    Views/Acquisition: Bilateral pleural space(s): upright.    Findings/Interpretation: Upright scans bilaterally with pleural fluid on R side.    Chester Paez MD        Assessment and Plan:  Requested procedure was not performed.  Fluid removal would have limited benefit on patient condition. Therapeutic thoracentesis requested. R side with pleural effusion but increased risk due to smaller fluid pocket. Patient on room air and currently not endorsing any shortness of breath. Discussed increased risk with patient and primary team. Primary team will consider continuing diuresis vs IR consult for thoracentesis.    Chester Paez MD  [facility]  Securely message with [employer] (more info)  Text page via [facility] Paging/Directory   See signed in provider for up to date coverage information

## 2023-05-14 NOTE — PLAN OF CARE
Neuro: A&Ox4.   Cardiac: SR with BBB. VSS.   Respiratory: Sating > 90% on RA. Does endorse feeling short of breath at times, encouraged to wear BiPAP when feeling short of breath.   GI/: Adequate urine output. BM X1  Diet/appetite: Tolerating regular diet. Fair intake.   Activity:  Assist of 1 with GB/walker, up in chair and walking to doorway and back 3 times today.   Pain: At acceptable level on current regimen.   Skin: No new deficits noted.  LDA's: PIV x 2.     Plan: Continue with POC. Notify primary team with changes.       Problem: Cardiovascular Surgery  Goal: Improved Activity Tolerance  Outcome: Progressing

## 2023-05-14 NOTE — PROGRESS NOTES
Care Management Follow Up    Length of Stay (days): 19    Expected Discharge Date:       Concerns to be Addressed: all concerns addressed in this encounter     Patient plan of care discussed at interdisciplinary rounds: Yes    Anticipated Discharge Disposition: transitional care     [facility]  [address] [place] [address]  PH: [phone]   RN to RN PH: [phone]  FAX: [phone]  Admissions Cell PH: [phone]     Anticipated Discharge Services: transportation   Anticipated Discharge DME: NA    Patient/family educated on Medicare website which has current facility and service quality ratings:    Education Provided on the Discharge Plan:    Patient/Family in Agreement with the Plan:      Referrals Placed by CM/SW:    Private pay costs discussed: Not applicable    Additional Information:    SW spoke with pt's primary team (cardiothoracic surgery) who reports that pt is no longer medically ready to discharge today. SW will cancel pt's ride and alert [facility] that pt is no longer ready for discharge.     SW spoke with admissions at [place] who reports that they may still have bed availability if pt is ready tomorrow or the next day, but it is dependent on other admissions. SW will update when more information about discharge is known.     SW will continue to follow for discharge planning and TCU placement.     WILLARD Sigala [facility] PH: [phone]  5/14/2023       Social Work and Care Management Department       SEARCHABLE in [employer] - search SOCIAL WORK       [place] (0800 - 1630) Saturday and Sunday     Units: 4A, 4C, & 4E Pager: [phone]     Units: 5A & 5B Pager: [phone]     Units: 6A & 6B   Pager: [phone]     Units: 6C & 6D Pager: [phone]     Units: 7A &7B  Pager: [phone]     Units: 7C, 7D, & 5C Pager: [phone]     Unit: [place] ED Pager: [phone]      [facility] ([phone]) Saturday and Sunday       Units: 5 Ortho, 5 Med/Surg & WB ED  Pager:[phone]     Units: 6 Med/Surg, 8A, & 10A ICU  Pager: [phone]        After hours (1630 - 0000) Saturday & Sunday; (2434-3123) Mon-Fri; ([phone]) [facility] Recognized Holidays     Units: ALL  Pager: [phone]

## 2023-05-14 NOTE — CONSULTS
"INTERVENTIONAL RADIOLOGY CONSULT      Cydney Christiansen is an 87 year old female with a PMH of HTN, HLD, bilateral glaucoma, scoliosis, compression fracture of thoracic vertebrae, osteoporosis, history of bladder cancer, invasive ductal carcinoma of left breast s/p lumpectomy (2015), and multiple other skin cancer sites s/p removal, severe aortic stenosis with bicuspid aortic valve and an ascending aortic aneurysm. Patient is now s/p AVR (Aponte Resilia 21 mm valve) and ascending aorta repair on 4/27/23 with Dr. Solis.    Patient with bl pleural effusions.     S/p right thoracentesis 5/3 with 600 ml removed.   - CT chest showed moderate bilateral effusions 5/7. Pigtails placed by IR 5/8 with 500cc out on left and 600 on right. Removed same day as patient could not tolerate associated pain.   - CAPs reviewed again 5/14, not safe to tap. Dr Solis wants IR to attempt thoracentesis. IR consult for thoracentesis.     /65 (BP Location: Left arm)   Pulse 59   Temp 97.8  F (36.6  C) (Oral)   Resp 16   Ht 1.499 m (4' 11\")   Wt 61.9 kg (136 lb 7.4 oz)   LMP  (LMP Unknown)   SpO2 93%   BMI 27.56 kg/m    Recent Labs   Lab Test 05/14/23  0349   WBC 6.2   HGB 8.7*        Lab Results   Component Value Date    INR 1.26 05/14/2023    INR 1.25 05/07/2023       Plan for bl thoracentesis with IR early next week. IF patient is discharged by then, can arrange for outpatient thoracentesis.     PORTIA Ballesteros MD  Interventional Radiology Fellow  IR pass pager: [phone]    IMiguel, discussed the case with the resident/fellow and agree with the findings as documented in the assessment and plan.    Miguel Taylor MD    "

## 2023-05-15 ENCOUNTER — APPOINTMENT (OUTPATIENT)
Dept: INTERVENTIONAL RADIOLOGY/VASCULAR | Facility: CLINIC | Age: 88
DRG: 219 | End: 2023-05-15
Attending: NURSE PRACTITIONER
Payer: COMMERCIAL

## 2023-05-15 VITALS
WEIGHT: 136.91 LBS | OXYGEN SATURATION: 99 % | HEIGHT: 59 IN | RESPIRATION RATE: 20 BRPM | BODY MASS INDEX: 27.6 KG/M2 | SYSTOLIC BLOOD PRESSURE: 109 MMHG | DIASTOLIC BLOOD PRESSURE: 62 MMHG | HEART RATE: 76 BPM | TEMPERATURE: 98.1 F

## 2023-05-15 LAB
ANION GAP SERPL CALCULATED.3IONS-SCNC: 9 MMOL/L (ref 7–15)
BUN SERPL-MCNC: 17.3 MG/DL (ref 8–23)
CALCIUM SERPL-MCNC: 9 MG/DL (ref 8.8–10.2)
CHLORIDE SERPL-SCNC: 103 MMOL/L (ref 98–107)
CREAT SERPL-MCNC: 0.76 MG/DL (ref 0.51–0.95)
DEPRECATED HCO3 PLAS-SCNC: 28 MMOL/L (ref 22–29)
ERYTHROCYTE [DISTWIDTH] IN BLOOD BY AUTOMATED COUNT: 16.5 % (ref 10–15)
GFR SERPL CREATININE-BSD FRML MDRD: 76 ML/MIN/1.73M2
GLUCOSE SERPL-MCNC: 111 MG/DL (ref 70–99)
HCT VFR BLD AUTO: 30.8 % (ref 35–47)
HGB BLD-MCNC: 9 G/DL (ref 11.7–15.7)
MAGNESIUM SERPL-MCNC: 2.1 MG/DL (ref 1.7–2.3)
MCH RBC QN AUTO: 29.1 PG (ref 26.5–33)
MCHC RBC AUTO-ENTMCNC: 29.2 G/DL (ref 31.5–36.5)
MCV RBC AUTO: 100 FL (ref 78–100)
PLATELET # BLD AUTO: 218 10E3/UL (ref 150–450)
POTASSIUM SERPL-SCNC: 4.4 MMOL/L (ref 3.4–5.3)
RBC # BLD AUTO: 3.09 10E6/UL (ref 3.8–5.2)
SODIUM SERPL-SCNC: 140 MMOL/L (ref 136–145)
WBC # BLD AUTO: 6.3 10E3/UL (ref 4–11)

## 2023-05-15 PROCEDURE — 999N000157 HC STATISTIC RCP TIME EA 10 MIN

## 2023-05-15 PROCEDURE — 32555 ASPIRATE PLEURA W/ IMAGING: CPT | Mod: 50

## 2023-05-15 PROCEDURE — 94640 AIRWAY INHALATION TREATMENT: CPT

## 2023-05-15 PROCEDURE — 250N000013 HC RX MED GY IP 250 OP 250 PS 637: Performed by: PHYSICIAN ASSISTANT

## 2023-05-15 PROCEDURE — 36415 COLL VENOUS BLD VENIPUNCTURE: CPT | Performed by: PHYSICIAN ASSISTANT

## 2023-05-15 PROCEDURE — 250N000009 HC RX 250: Performed by: PHYSICIAN ASSISTANT

## 2023-05-15 PROCEDURE — 85027 COMPLETE CBC AUTOMATED: CPT | Performed by: STUDENT IN AN ORGANIZED HEALTH CARE EDUCATION/TRAINING PROGRAM

## 2023-05-15 PROCEDURE — 32555 ASPIRATE PLEURA W/ IMAGING: CPT

## 2023-05-15 PROCEDURE — 0W993ZZ DRAINAGE OF RIGHT PLEURAL CAVITY, PERCUTANEOUS APPROACH: ICD-10-PCS

## 2023-05-15 PROCEDURE — 250N000013 HC RX MED GY IP 250 OP 250 PS 637

## 2023-05-15 PROCEDURE — 83735 ASSAY OF MAGNESIUM: CPT | Performed by: SURGERY

## 2023-05-15 PROCEDURE — 250N000013 HC RX MED GY IP 250 OP 250 PS 637: Performed by: SURGERY

## 2023-05-15 PROCEDURE — 80048 BASIC METABOLIC PNL TOTAL CA: CPT | Performed by: PHYSICIAN ASSISTANT

## 2023-05-15 PROCEDURE — 250N000011 HC RX IP 250 OP 636: Performed by: NURSE PRACTITIONER

## 2023-05-15 PROCEDURE — 0W9B3ZZ DRAINAGE OF LEFT PLEURAL CAVITY, PERCUTANEOUS APPROACH: ICD-10-PCS

## 2023-05-15 RX ORDER — TRAMADOL HYDROCHLORIDE 50 MG/1
25 TABLET ORAL EVERY 6 HOURS PRN
Status: SHIPPED | DISCHARGE
Start: 2023-05-15 | End: 2023-05-15

## 2023-05-15 RX ORDER — LIDOCAINE HYDROCHLORIDE 10 MG/ML
1-30 INJECTION, SOLUTION EPIDURAL; INFILTRATION; INTRACAUDAL; PERINEURAL
Status: COMPLETED | OUTPATIENT
Start: 2023-05-15 | End: 2023-05-15

## 2023-05-15 RX ORDER — AMIODARONE HYDROCHLORIDE 400 MG/1
400 TABLET ORAL 2 TIMES DAILY
DISCHARGE
Start: 2023-05-15 | End: 2023-07-04

## 2023-05-15 RX ORDER — AMOXICILLIN 250 MG
1 CAPSULE ORAL
DISCHARGE
Start: 2023-05-15 | End: 2023-08-08

## 2023-05-15 RX ORDER — TRAMADOL HYDROCHLORIDE 50 MG/1
25 TABLET ORAL EVERY 6 HOURS PRN
Qty: 15 TABLET | Refills: 0 | Status: SHIPPED | OUTPATIENT
Start: 2023-05-15 | End: 2023-07-25

## 2023-05-15 RX ORDER — POTASSIUM CHLORIDE 1500 MG/1
20 TABLET, EXTENDED RELEASE ORAL 2 TIMES DAILY
DISCHARGE
Start: 2023-05-15 | End: 2023-07-05

## 2023-05-15 RX ORDER — ACETAMINOPHEN 325 MG/1
650 TABLET ORAL EVERY 4 HOURS PRN
DISCHARGE
Start: 2023-05-15

## 2023-05-15 RX ORDER — HYDROXYZINE HYDROCHLORIDE 25 MG/1
25 TABLET, FILM COATED ORAL EVERY 6 HOURS PRN
DISCHARGE
Start: 2023-05-15 | End: 2023-07-04

## 2023-05-15 RX ORDER — BUMETANIDE 1 MG/1
1 TABLET ORAL DAILY
DISCHARGE
Start: 2023-05-16 | End: 2023-07-05

## 2023-05-15 RX ORDER — AMIODARONE HYDROCHLORIDE 200 MG/1
200 TABLET ORAL DAILY
Refills: 0 | DISCHARGE
Start: 2023-05-19 | End: 2023-07-04

## 2023-05-15 RX ORDER — ENALAPRIL MALEATE 2.5 MG/1
2.5 TABLET ORAL DAILY
DISCHARGE
Start: 2023-05-16 | End: 2023-07-04

## 2023-05-15 RX ORDER — HYDROXYZINE HYDROCHLORIDE 25 MG/1
25 TABLET, FILM COATED ORAL AT BEDTIME
DISCHARGE
Start: 2023-05-15 | End: 2023-07-04

## 2023-05-15 RX ORDER — METOPROLOL TARTRATE 25 MG/1
12.5 TABLET, FILM COATED ORAL 2 TIMES DAILY
DISCHARGE
Start: 2023-05-15 | End: 2023-07-05

## 2023-05-15 RX ORDER — LEVALBUTEROL INHALATION SOLUTION 0.63 MG/3ML
0.63 SOLUTION RESPIRATORY (INHALATION) 2 TIMES DAILY
Qty: 90 ML | DISCHARGE
Start: 2023-05-15 | End: 2023-07-04

## 2023-05-15 RX ADMIN — LEVALBUTEROL HYDROCHLORIDE 0.63 MG: 0.63 SOLUTION RESPIRATORY (INHALATION) at 10:10

## 2023-05-15 RX ADMIN — ASPIRIN 81 MG: 81 TABLET ORAL at 08:43

## 2023-05-15 RX ADMIN — HEPARIN SODIUM 5000 UNITS: 5000 INJECTION, SOLUTION INTRAVENOUS; SUBCUTANEOUS at 05:45

## 2023-05-15 RX ADMIN — AMIODARONE HYDROCHLORIDE 400 MG: 200 TABLET ORAL at 08:43

## 2023-05-15 RX ADMIN — PANTOPRAZOLE SODIUM 40 MG: 40 TABLET, DELAYED RELEASE ORAL at 08:43

## 2023-05-15 RX ADMIN — LIDOCAINE HYDROCHLORIDE 16 ML: 10 INJECTION, SOLUTION EPIDURAL; INFILTRATION; INTRACAUDAL; PERINEURAL at 11:49

## 2023-05-15 RX ADMIN — POTASSIUM CHLORIDE 20 MEQ: 750 TABLET, EXTENDED RELEASE ORAL at 08:42

## 2023-05-15 RX ADMIN — DORZOLAMIDE HYDROCHLORIDE AND TIMOLOL MALEATE 1 DROP: 22.3; 6.8 SOLUTION/ DROPS OPHTHALMIC at 08:43

## 2023-05-15 RX ADMIN — BUMETANIDE 1 MG: 1 TABLET ORAL at 08:42

## 2023-05-15 RX ADMIN — ENALAPRIL MALEATE 2.5 MG: 2.5 TABLET ORAL at 08:43

## 2023-05-15 RX ADMIN — Medication 12.5 MG: at 08:43

## 2023-05-15 RX ADMIN — HEPARIN SODIUM 5000 UNITS: 5000 INJECTION, SOLUTION INTRAVENOUS; SUBCUTANEOUS at 14:21

## 2023-05-15 ASSESSMENT — ACTIVITIES OF DAILY LIVING (ADL)
ADLS_ACUITY_SCORE: 30

## 2023-05-15 NOTE — PROCEDURES
[facility]    Procedure: IR bilateral therapeutic thoracentesis    Date/Time: 5/15/2023 12:11 PM    Performed by: Anne Singleton PA-C  Authorized by: Anne Singleton PA-C  IR Fellow Physician: Dr. Miguel Taylor  Other(s) attending procedure: Paty Singleton PA-C      UNIVERSAL PROTOCOL   Site Marked: NA  Prior Images Obtained and Reviewed:  Yes  Required items: Required blood products, implants, devices and special equipment available    Patient identity confirmed:  Verbally with patient, arm band, provided demographic data and hospital-assigned identification number  Patient was reevaluated immediately before administering moderate or deep sedation or anesthesia  Confirmation Checklist:  Patient's identity using two indicators, relevant allergies, procedure was appropriate and matched the consent or emergent situation and correct equipment/implants were available  Time out: Immediately prior to the procedure a time out was called    Universal Protocol: the Joint Commission Universal Protocol was followed    Preparation: Patient was prepped and draped in usual sterile fashion    ESBL (mL):  0     ANESTHESIA    Anesthesia: Local infiltration  Local Anesthetic:  Lidocaine 1% without epinephrine  Anesthetic Total (mL):  5      SEDATION    Patient Sedated: No    See dictated procedure note for full details.  Findings: Patient tolerated procedure well.     Specimens: none    Complications: None    Condition: Stable    Plan: Follow-up per primary team.      PROCEDURE  Describe Procedure: US-guided bilateral therapeutic thoracentesis. Left thoracentesis revealed bloody loculated pleural fluid that was too thick to aspirate. Right thoracentesis revealed rigo with blood tinge pleural fluid with 650 ml removed. 5 Fr.   Patient Tolerance:  Patient tolerated the procedure well with no immediate complications  Length of time physician/provider present for 1:1 monitoring during  sedation: 15

## 2023-05-15 NOTE — PLAN OF CARE
Physical Therapy Discharge Summary    Reason for therapy discharge:    Discharged to transitional care facility.    Progress towards therapy goal(s). See goals on Care Plan in [facility] electronic health record for goal details.  Goals not met.  Barriers to achieving goals:   discharge from facility.    Therapy recommendation(s):    Continued therapy is recommended.  Rationale/Recommendations:  To progress to functional independence.

## 2023-05-15 NOTE — PLAN OF CARE
Neuro: A&Ox4.  Anxious at times.  Cardiac: SR w/ first degree AV block & BBB.  VSS.   Respiratory: Sating >90% on RA.  Occasional shortness of breath, BiPAP worn 4x overnight.  GI/: Adequate urine output, up to BSC.  No BM this shift.  Diet/appetite: Tolerating Regular diet.  Activity:  Assist of 1 w/ GB & walker.  Encouraged pt to walk overnight, no walks this shift.  Pain: At acceptable level on current regimen.  Skin: No new deficits noted.  LDA's: 2 PIVs SL.     Plan: Continue with POC. Notify primary team with changes.

## 2023-05-15 NOTE — PROGRESS NOTES
Care Management Discharge Note    Discharge Date: 05/15/2023       Discharge Disposition:     [facility]  [address] [place] [address]  PH: [phone]   RN to RN PH: [phone]  FAX: [phone]  Admissions Cell PH: [phone]    Discharge Services: transportation     Discharge DME: NA    Discharge Transportation: [facility] wheelchair ride is scheduled between 3-3:50PM today    Private pay costs discussed: transportation costs    Does the patient's insurance plan have a 3 day qualifying hospital stay waiver?  No    PAS Confirmation Code: [id number]  Patient/family educated on Medicare website which has current facility and service quality ratings: yes    Education Provided on the Discharge Plan: yes  Persons Notified of Discharge Plans: pt, bedside nurse, charge nurse, medical team (Cardiothoracic Surgery), NST  Patient/Family in Agreement with the Plan: yes     Handoff Referral Completed: Yes    Additional Information:    Pt's IMM has been completed. One controlled substance script was faxed to [facility]. Pt has no questions. SW will continue to follow as needed.     WILLARD Sigala, LGSW   6B    Phone: [phone]  Pager: [phone]

## 2023-05-15 NOTE — PLAN OF CARE
"Goal Outcome Evaluation:    /62   Pulse 76   Temp 98.1  F (36.7  C) (Oral)   Resp 20   Ht 1.499 m (4' 11\")   Wt 62.1 kg (136 lb 14.5 oz)   LMP  (LMP Unknown)   SpO2 99%   BMI 27.65 kg/m      VSS. SR with first degree AV block. BPs stable. RA. A&o x4. Denies pain. Intermittent SOB. Tolerating diet. Voiding adequately. 1 BM this shift. Sternal incision DIANNE. Up with 1 assist and walker. Pt discharged to [facility] transport at 1530. RN report given.     "

## 2023-05-15 NOTE — IR NOTE
Patient Name: Cydney Christiansen  Medical Record Number: [id number]  Today's Date: 5/15/2023    Procedure: Bilateral Throacentesis  Proceduralist: Dr. Taylor   Pathology present: N/A    Procedure Start: 1148  Procedure end: 1208  Sedation medications administered: N/A, local only      625 ml removed     Report given to: Concha REYNOSO RN  : N/A    Other Notes: Pt arrived to IR room 6 from 6B. Consent reviewed. Pt denies any questions or concerns regarding procedure. Pt positioned sitting and monitored per protocol. Pt tolerated procedure without any noted complications. Pt transferred back to 6B.

## 2023-05-16 ENCOUNTER — PATIENT OUTREACH (OUTPATIENT)
Dept: CARE COORDINATION | Facility: CLINIC | Age: 88
End: 2023-05-16
Payer: COMMERCIAL

## 2023-05-18 ENCOUNTER — TRANSFERRED RECORDS (OUTPATIENT)
Dept: HEALTH INFORMATION MANAGEMENT | Facility: CLINIC | Age: 88
End: 2023-05-18

## 2023-05-23 ENCOUNTER — LAB REQUISITION (OUTPATIENT)
Dept: LAB | Facility: CLINIC | Age: 88
End: 2023-05-23
Payer: COMMERCIAL

## 2023-05-23 DIAGNOSIS — I10 ESSENTIAL (PRIMARY) HYPERTENSION: ICD-10-CM

## 2023-05-23 DIAGNOSIS — I50.9 HEART FAILURE, UNSPECIFIED (H): ICD-10-CM

## 2023-05-23 LAB
ANION GAP SERPL CALCULATED.3IONS-SCNC: 11 MMOL/L (ref 7–15)
BUN SERPL-MCNC: 25.5 MG/DL (ref 8–23)
CALCIUM SERPL-MCNC: 8.9 MG/DL (ref 8.8–10.2)
CHLORIDE SERPL-SCNC: 104 MMOL/L (ref 98–107)
CREAT SERPL-MCNC: 0.77 MG/DL (ref 0.51–0.95)
DEPRECATED HCO3 PLAS-SCNC: 28 MMOL/L (ref 22–29)
ERYTHROCYTE [DISTWIDTH] IN BLOOD BY AUTOMATED COUNT: 15.8 % (ref 10–15)
GFR SERPL CREATININE-BSD FRML MDRD: 75 ML/MIN/1.73M2
GLUCOSE SERPL-MCNC: 118 MG/DL (ref 70–99)
HCT VFR BLD AUTO: 35.5 % (ref 35–47)
HGB BLD-MCNC: 10.3 G/DL (ref 11.7–15.7)
MCH RBC QN AUTO: 28.6 PG (ref 26.5–33)
MCHC RBC AUTO-ENTMCNC: 29 G/DL (ref 31.5–36.5)
MCV RBC AUTO: 99 FL (ref 78–100)
PLATELET # BLD AUTO: 260 10E3/UL (ref 150–450)
POTASSIUM SERPL-SCNC: 4.6 MMOL/L (ref 3.4–5.3)
RBC # BLD AUTO: 3.6 10E6/UL (ref 3.8–5.2)
SODIUM SERPL-SCNC: 143 MMOL/L (ref 136–145)
WBC # BLD AUTO: 5.9 10E3/UL (ref 4–11)

## 2023-05-23 PROCEDURE — 80048 BASIC METABOLIC PNL TOTAL CA: CPT | Mod: ORL | Performed by: NURSE PRACTITIONER

## 2023-05-23 PROCEDURE — 85027 COMPLETE CBC AUTOMATED: CPT | Mod: ORL | Performed by: NURSE PRACTITIONER

## 2023-05-31 ENCOUNTER — PATIENT OUTREACH (OUTPATIENT)
Dept: CARE COORDINATION | Facility: CLINIC | Age: 88
End: 2023-05-31

## 2023-05-31 NOTE — PROGRESS NOTES
Clinic Care Coordination Contact  [facility]/Voicemail       Clinical Data: Care Coordinator Outreach  Outreach attempted x 1.  Left message on TCU voicemail with call back information and requested return call.  Plan: Care Coordinator requested a call when discharged. Care Coordinator will try to reach patient again in 10 business days.    Issa Irene MSN, RN, PHN, [facility]   Primary Care Clinical RN Care Coordinator  [facility]  5/31/2023   3:27 PM  [email or web address]  Office: [phone]

## 2023-06-14 ENCOUNTER — TELEPHONE (OUTPATIENT)
Dept: CARDIOLOGY | Facility: CLINIC | Age: 88
End: 2023-06-14

## 2023-06-14 ENCOUNTER — PATIENT OUTREACH (OUTPATIENT)
Dept: CARE COORDINATION | Facility: CLINIC | Age: 88
End: 2023-06-14
Payer: COMMERCIAL

## 2023-06-14 NOTE — PROGRESS NOTES
Clinic Care Coordination Contact  Care Coordination Transition Communication           Clinical Data: Patient was hospitalized at [facility] from 4/25/23 to 5/15/23 with diagnosis of Aortic valve stenosis, with aortic valve replacement.     6/14/23  The patient is still a resident at the TCU.     Transition to Facility:     [address] [address]  PH: [phone]   RN to RN PH: [phone]  FAX: [phone]  Admissions Cell PH: [phone]     Plan: RN/SW Care Coordinator will await notification from facility staff informing RN/SW Care Coordinator of patient's discharge plans/needs. RN/SW Care Coordinator will review chart and outreach to facility staff every 4 weeks and as needed.        Issa Irene MSN, RN, PHN, [facility]   Primary Care Clinical RN Care Coordinator  [facility]  6/14/2023   3:16 PM  [email or web address]  Office: [phone]

## 2023-06-16 ENCOUNTER — TRANSFERRED RECORDS (OUTPATIENT)
Dept: HEALTH INFORMATION MANAGEMENT | Facility: CLINIC | Age: 88
End: 2023-06-16
Payer: COMMERCIAL

## 2023-06-26 ENCOUNTER — TELEPHONE (OUTPATIENT)
Dept: FAMILY MEDICINE | Facility: CLINIC | Age: 88
End: 2023-06-26
Payer: COMMERCIAL

## 2023-06-26 NOTE — TELEPHONE ENCOUNTER
Home Care is calling regarding an established patient with [facility].       Requesting orders from: Estrellita Hernandez  Provider is following patient: Yes  Is this a 60-day recertification request?  No    Orders Requested  General health assessment for 9 weeks  Skilled Nursing  Request for continuation of care with increase in frequency  Frequency: 1x/wk for 9 wks      Physical Therapy  Request for initial evaluation and treatment (one time)     Occupational Therapy  Request for initial evaluation and treatment (one time)     Home Health Aide for bathing assistance     Confirmed ok to leave a detailed message with call back.  Contact information confirmed and updated as needed.    Cecile Carlos RN

## 2023-06-29 ENCOUNTER — PATIENT OUTREACH (OUTPATIENT)
Dept: CARE COORDINATION | Facility: CLINIC | Age: 88
End: 2023-06-29
Payer: COMMERCIAL

## 2023-06-29 ENCOUNTER — OFFICE VISIT (OUTPATIENT)
Dept: CARDIOLOGY | Facility: CLINIC | Age: 88
End: 2023-06-29
Attending: INTERNAL MEDICINE
Payer: COMMERCIAL

## 2023-06-29 VITALS
WEIGHT: 130.6 LBS | SYSTOLIC BLOOD PRESSURE: 157 MMHG | BODY MASS INDEX: 26.38 KG/M2 | HEART RATE: 80 BPM | DIASTOLIC BLOOD PRESSURE: 90 MMHG | OXYGEN SATURATION: 95 %

## 2023-06-29 DIAGNOSIS — R06.09 DOE (DYSPNEA ON EXERTION): ICD-10-CM

## 2023-06-29 DIAGNOSIS — E78.01 FAMILIAL HYPERCHOLESTEROLEMIA: ICD-10-CM

## 2023-06-29 DIAGNOSIS — Z95.2 H/O AORTIC VALVE REPLACEMENT: Primary | ICD-10-CM

## 2023-06-29 PROCEDURE — G0463 HOSPITAL OUTPT CLINIC VISIT: HCPCS | Performed by: INTERNAL MEDICINE

## 2023-06-29 PROCEDURE — 99214 OFFICE O/P EST MOD 30 MIN: CPT | Mod: 24 | Performed by: INTERNAL MEDICINE

## 2023-06-29 PROCEDURE — 93010 ELECTROCARDIOGRAM REPORT: CPT | Performed by: INTERNAL MEDICINE

## 2023-06-29 PROCEDURE — 93005 ELECTROCARDIOGRAM TRACING: CPT

## 2023-06-29 RX ORDER — TRAZODONE HYDROCHLORIDE 50 MG/1
TABLET, FILM COATED ORAL
COMMUNITY
Start: 2023-06-20 | End: 2023-07-04

## 2023-06-29 ASSESSMENT — PAIN SCALES - GENERAL: PAINLEVEL: NO PAIN (0)

## 2023-06-29 NOTE — LETTER
"6/29/2023      RE: Cydney Christiansen  [address]  [place] [address]       Dear Colleague,    Thank you for the opportunity to participate in the care of your patient, Cydney Christiasnen, at the [facility] [place] [facility]. Please see a copy of my visit note below.    June 29, 2023    [facility]     I had the privilege to evaluate and examine Mrs Cydney Christiansen is an 87 year old female with a PMH of HTN, HLD, bilateral glaucoma, scoliosis, compression fracture of thoracic vertebrae, osteoporosis, history of bladder cancer, invasive ductal carcinoma of left breast s/p lumpectomy (2015), and multiple other skin cancer sites s/p removal, severe aortic stenosis with bicuspid aortic valve and an ascending aortic aneurysm. Patient is s/p AVR (Aponte Resilia 21 mm valve) and ascending aorta repair on 4/25/23 with Dr. Solis. Patient had post-operative atrial fibrillation s/p medical conversion 5/12 - not on anticoagulation after shared decision making & informed patient choice. She presents today to the preventive cardiology clinically to establish care for management of hypertension and hyperlipidemia.     She was discharged from TCU about a week ago.  Patient notes increasing lower extremity edema since she got home.  She also notes gradual increase in her weight, gaining about a pound daily.  She denies significant shortness of breath, although she reports feeling \"winded\" occasionally.  No chest pain.  No shortness of breath at rest.  No dizziness or palpitations.    Per patient report and per chart review, she was on Bumex 1 mg daily at the TCU, however, she was not continued on Bumex when she was discharged home.  Her last echocardiogram on 5/7/2023 showed mildly reduced LVEF of 40 to 45%.        PAST MEDICAL HISTORY:  Past Medical History:   Diagnosis Date    Actinic keratosis     Aortic valve stenosis " 2023    Basal cell cancer 2011    bcc of the L back.    Basal cell carcinoma 04' , 06'    Basal cell carcinoma 2011    L neck    Breast cancer (H)     Cataract     Colon polyps     Precancer    Glaucoma (increased eye pressure)     Heart murmur     HLD (hyperlipidemia)     Hypertension goal BP (blood pressure) < 140/90 2013    Invasive ductal carcinoma of breast (H) 2015    left    Osteoporosis     Scoliosis     Skin cancer     Skin cancer 2013    sccis R cheek    Squamous cell carcinoma 2011    R upper back    Squamous cell carcinoma 10/2012    R dorsal hand    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     left leg    Transitional cell carcinoma of the bladder     Vertigo     takes meclizine prn when she ocassionally has bouts of vertigo       FAMILY HISTORY:  Family History   Problem Relation Age of Onset    Diabetes Mother     Breast Cancer Mother     Eye Disorder Mother     Osteoporosis Mother     Glaucoma Mother     Macular Degeneration Mother     Prostate Cancer Father     Anesthesia Reaction Sister         PONV    Thyroid Disease Sister     Blood Disease Sister         lupus    Heart Disease Sister     Prostate Cancer Brother     Glaucoma Brother     Macular Degeneration Brother     Prostate Cancer Brother     Glaucoma Brother     Asthma Son     Glaucoma Other     Melanoma No family hx of     Skin Cancer No family hx of     Bleeding Disorder No family hx of     Venous thrombosis No family hx of        SOCIAL HISTORY:  Social History     Socioeconomic History    Marital status:     Number of children: 2   Occupational History     Employer: [employer]     Employer: RETIRED   Tobacco Use    Smoking status: Former     Packs/day: 0.50     Years: 20.00     Pack years: 10.00     Types: Cigarettes     Quit date: 1976     Years since quittin.4    Smokeless tobacco: Never   Vaping Use    Vaping Use: Never used   Substance and Sexual Activity    Alcohol use: Yes      Comment: Weekends 4 drink total per week    Drug use: No    Sexual activity: Never       CURRENT MEDICATIONS:  Current Outpatient Medications   Medication Sig Dispense Refill    acetaminophen (TYLENOL) 325 MG tablet Take 2 tablets (650 mg) by mouth every 4 hours as needed for other (For optimal non-opioid multimodal pain management to improve pain control.)      albuterol (PROAIR HFA/PROVENTIL HFA/VENTOLIN HFA) 108 (90 Base) MCG/ACT inhaler Inhale 1-2 puffs into the lungs every 4 hours as needed for shortness of breath or wheezing 6.7 g 0    alendronate (FOSAMAX) 70 MG tablet TAKE 1 TABLET BY MOUTH EVERY 7 DAYS (Patient taking differently: Take 70 mg by mouth every 7 days Monday) 12 tablet 11    aspirin (ASA) 81 MG EC tablet Take 1 tablet (81 mg) by mouth daily      co-enzyme Q-10 100 MG CAPS capsule Take 200 mg by mouth every morning      cyanocobalamin (VITAMIN B-12) 500 MCG tablet Take 1 tablet (500 mcg) by mouth daily 150 tablet 3    dorzolamide-timolol (COSOPT) 2-0.5 % ophthalmic solution Place 1 drop into both eyes 2 times daily 10 mL 4    enalapril (VASOTEC) 2.5 MG tablet Take 1 tablet (2.5 mg) by mouth daily      ferrous sulfate (FEROSUL) 325 (65 Fe) MG tablet Take 1 tablet (325 mg) by mouth daily (with breakfast) 90 tablet 3    hydrOXYzine (ATARAX) 25 MG tablet Take 1 tablet (25 mg) by mouth every 6 hours as needed for other (adjuvant pain)      hydrOXYzine (ATARAX) 25 MG tablet Take 1 tablet (25 mg) by mouth At Bedtime      latanoprost (XALATAN) 0.005 % ophthalmic solution Place 1 drop into both eyes At Bedtime 7.5 mL 3    levalbuterol (XOPENEX) 0.63 MG/3ML neb solution Take 3 mLs (0.63 mg) by nebulization 2 times daily 90 mL     melatonin 1 MG TABS tablet Take 1 tablet (1 mg) by mouth At Bedtime      metoprolol tartrate (LOPRESSOR) 25 MG tablet Take 0.5 tablets (12.5 mg) by mouth 2 times daily      miconazole (MICATIN) 2 % external powder Apply topically 3 times daily as needed for other (irritation,  friction, maceration)      Multiple Vitamins-Minerals (ONE DAILY ADULTS 50+) TABS Take 1 tablet by mouth every other day      polyethylene glycol (MIRALAX) 17 GM/Dose powder Take 17 g (1 capful) by mouth 2 times daily as needed for constipation 507 g 0    potassium chloride ER (KLOR-CON M) 20 MEQ CR tablet Take 1 tablet (20 mEq) by mouth 2 times daily      senna-docusate (SENOKOT-S/PERICOLACE) 8.6-50 MG tablet 1 tablet by Oral or Feeding Tube route nightly as needed for constipation      simvastatin (ZOCOR) 20 MG tablet Take 1 tablet (20 mg) by mouth At Bedtime 90 tablet 3    traMADol (ULTRAM) 50 MG tablet Take 0.5 tablets (25 mg) by mouth every 6 hours as needed for moderate pain 15 tablet 0    traZODone (DESYREL) 50 MG tablet TAKE 1/2 TABLET BY MOUTH EVERY BEDTIME FOR INSOMNIA      VITAMIN D-1000 MAX -1000 MG-UNIT OR TABS Take 1 tablet by mouth daily      amiodarone (PACERONE) 200 MG tablet Take 1 tablet (200 mg) by mouth daily for 30 days  0    amiodarone (PACERONE) 400 MG tablet Take 1 tablet (400 mg) by mouth 2 times daily for 3 days      bumetanide (BUMEX) 1 MG tablet Take 1 tablet (1 mg) by mouth daily (Patient not taking: Reported on 6/29/2023)      triamcinolone (KENALOG) 0.1 % external cream Apply twice daily for 2 weeks (Patient not taking: Reported on 5/3/2023) 30 g 0       ROS:   Comprehensive ROS negative except HPI    EXAM:  BP (!) 157/90 (BP Location: Right arm, Patient Position: Chair, Cuff Size: Adult Regular)   Pulse 80   Wt 59.2 kg (130 lb 9.6 oz)   LMP  (LMP Unknown)   SpO2 95%   BMI 26.38 kg/m    General: appears comfortable, alert and articulate  Head: normocephalic, atraumatic  Eyes: anicteric sclera, EOMI  Neck: no adenopathy  Orophyarynx: moist mucosa, no lesions, dentition intact  Heart: regular, S1/S2, no murmur, gallop, rub, estimated JVP 8 cm  Lungs: clear, no rales or wheezing  Abdomen: soft, non-tender, bowel sounds present, no hepatosplenomegaly  Extremities: 1+ LE  edema  Neurological: normal speech and affect, no gross motor deficits    BP at the end of the visit: 146/84 mmHg    Labs:  Recent Labs   Lab Test 05/23/23  1000 05/15/23  0452   WBC 5.9 6.3   HGB 10.3* 9.0*   HCT 35.5 30.8*    218     Recent Labs   Lab Test 05/23/23  1000 05/15/23  0452    140   POTASSIUM 4.6 4.4   CHLORIDE 104 103   CO2 28 28   BUN 25.5* 17.3   CR 0.77 0.76   GFRESTIMATED 75 76     Recent Labs   Lab Test 08/03/22  1035 07/22/21  1122 06/23/16  1111 06/22/15  1145   CHOL 175 194   < > 157   HDL 76 63   < > 55   LDL 82 112*   < > 84   TRIG 86 95   < > 89   CHOLHDLRATIO  --   --   --  2.9    < > = values in this interval not displayed.       05/07/2023 Echocardiogram:  Interpretation Summary  Ascending aorta aneurysm repair with 32 mm Hemashield graft and aortic valve  replacement with 21 mm Aponte Resilia on 04/25/2023.  Left ventricular function is decreased. The ejection fraction is 40-45%  (mildly reduced). There is significant xnlb-sn-xyxo variability in the LVEF  due to the patient's AF.  Global right ventricular function is normal. The right ventricle is normal  size.  The bioprosthetic aortic valve is well-seated. Leaflet opening is not clearly  visualized. There is no valvular or paravalvular regurgitation. The Vmax is  2.6 m/s, the mean gradient is 15 mmHg, the dimensionless index is 0.30, and  the acceleration time is 80 ms.  Mild to moderate tricuspid insufficiency is present.  IVC diameter and respiratory changes fall into an intermediate range  suggesting an RA pressure of 8 mmHg.  There is a small pericardial effusion adjacent LV lateral segments. The  maximal depth is 1.4 cm next to the left atrioventricular groove. There are no  signs of tamponade.  This study was compared with the study from 04/27/2023. The LV function has  declined modestly due to the patient's AF. There is no significant change in  the RV function. The TR is less severe.    02/17/2023 Coronary  Angiogram/RHC:  Right sided filling pressures are normal.  Normal PA pressures.  Left sided filling pressures are normal.  Left ventricular filling pressures are normal.  Normal cardiac output level.  Extreme ortuosity of R subclavian requiring a Destinathion catheter.  Normal coronary angiogram.  Normal right heart catheterizations.  Severe aortic stenosis, with a mean gradient of 37 mmHg , aortic valve area 0.7 cm2 in favor of severe aortic stenosis.    Assessment and Plan:    I discussed the results with patient and CV fellow    1.  Aortic valve replacement using Aponte Resilia 21 mm valve.  2.  Ascending aortic aneurysm repair using 32 mm Hemashield graft  3.  Hypertension, elevated in clinic today but reports good control at home.  Advised to measure blood pressure daily and send a message to Dr. Evans if values persistently over 150/90.  4.  Hyperlipidemia.  Continue PTA simvastatin.  Will obtain lipid panel today  5.  Lower extremity edema.  Suspect due to underlying mild cardiomyopathy.  No significant shortness of breath or hypoxia.  Appears she was on Bumex 1 mg daily prior to discharge home, however, was not continued on Bumex upon discharge.  She continues to take potassium supplement 20 mEq twice daily.  Will obtain CMP today and consider restarting Bumex if no concerns.  Patient has an upcoming appointment with her primary care provider in a week so she can be reassessed at that time with repeat labs.        Medication Changes: None        Follow Up:   -Fasting labs when able  -Follow up with Dr. Byrne in 6 months with repeat fasting labs and echocardiogram prior to visit      We discussed the importance of a heart-healthy diet and lifestyle.  Follow the American Heart Association Diet and Lifestyle Recommendations:  -Limit saturated fat, trans fat, sodium, red meat, sweets and sugar-sweetened beverages. If you choose to eat red meat, compare labels and select the leanest cuts available.  -Aim for  at least 150 minutes of moderate physical activity or 75 minutes of vigorous physical activity - or an equal combination of both - each week.    Follow up: with Dr Byrne in 6 months    Discussed with Dr. Chavez Lopez MD  Cardiology Fellow  [phone]    I saw and evaluated patient with CV fellow. I examined patient with CV fellow. I discussed the results with patient and CV fellow. I discussed our plan with patient and CV fellow.  I agree with CV fellow's note and I edited the CV fellow's note to make it a more comprehensive document.    David Byrne MD, PhD  Professor of Medicine  Division of Cardiology        Please do not hesitate to contact me if you have any questions/concerns.     Sincerely,     David Byrne MD     Referred To Otolaryngology For Closure Text (Leave Blank If You Do Not Want): After obtaining clear surgical margins the patient was sent to otolaryngology for surgical repair.  The patient understands they will receive post-surgical care and follow-up from the referring physician's office.

## 2023-06-29 NOTE — LETTER
[facility] CARE COORDINATION  [address] [place] [address]    June 29, 2023    [facility]  [address] [place] [address]      Dear Cydney,        I am a  clinic care coordinator who works with Estrellita Hernandez MD with the [facility]. I wanted to introduce myself and provide you with my contact information for you to be able to call me with any questions or concerns. Below is a description of clinic care coordination and how I can further assist you.       The clinic care coordination team is made up of a registered nurse, , financial resource worker and community health worker who understand the health care system. The goal of clinic care coordination is to help you manage your health and improve access to the health care system. Our team works alongside your provider to assist you in determining your health and social needs. We can help you obtain health care and community resources, providing you with necessary information and education. We can work with you through any barriers and develop a care plan that helps coordinate and strengthen the communication between you and your care team.  Our services are voluntary and are offered without charge to you personally.    Please feel free to contact me with any questions or concerns regarding care coordination and what we can offer.      We are focused on providing you with the highest-quality healthcare experience possible.    Sincerely,     Issa Irene MSN, RN, PHN, CCM   Primary Care Clinical RN Care Coordinator  [facility]  6/29/2023   9:20 AM  [email or web address]  Office: [phone]

## 2023-06-29 NOTE — PROGRESS NOTES
Clinic Care Coordination Contact  [facility]/Voicemail       Clinical Data: Care Coordinator Outreach  Outreach attempted x 1.  Left message on patient's voicemail with call back information and requested return call.  Plan: Care Coordinator will send care coordination introduction letter with care coordinator contact information and explanation of care coordination services via [place]. Care Coordinator will try to reach patient again in 1-2 business days.    Issa Irene MSN, RN, PHN, [facility]   Primary Care Clinical RN Care Coordinator  [facility]  6/29/2023   9:21 AM  [email or web address]  Office: [phone]

## 2023-06-29 NOTE — PROGRESS NOTES
"June 29, 2023    [facility]     I had the privilege to evaluate and examine Mrs Cydney Christiansen is an 87 year old female with a PMH of HTN, HLD, bilateral glaucoma, scoliosis, compression fracture of thoracic vertebrae, osteoporosis, history of bladder cancer, invasive ductal carcinoma of left breast s/p lumpectomy (2015), and multiple other skin cancer sites s/p removal, severe aortic stenosis with bicuspid aortic valve and an ascending aortic aneurysm. Patient is s/p AVR (Aponte Resilia 21 mm valve) and ascending aorta repair on 4/25/23 with Dr. Solis. Patient had post-operative atrial fibrillation s/p medical conversion 5/12 - not on anticoagulation after shared decision making & informed patient choice. She presents today to the preventive cardiology clinically to establish care for management of hypertension and hyperlipidemia.     She was discharged from TCU about a week ago.  Patient notes increasing lower extremity edema since she got home.  She also notes gradual increase in her weight, gaining about a pound daily.  She denies significant shortness of breath, although she reports feeling \"winded\" occasionally.  No chest pain.  No shortness of breath at rest.  No dizziness or palpitations.    Per patient report and per chart review, she was on Bumex 1 mg daily at the TCU, however, she was not continued on Bumex when she was discharged home.  Her last echocardiogram on 5/7/2023 showed mildly reduced LVEF of 40 to 45%.        PAST MEDICAL HISTORY:  Past Medical History:   Diagnosis Date     Actinic keratosis      Aortic valve stenosis 4/25/2023     Basal cell cancer 02/2011    bcc of the L back.     Basal cell carcinoma 04' , 06'     Basal cell carcinoma 06/2011    L neck     Breast cancer (H)      Cataract      Colon polyps     Precancer     Glaucoma (increased eye pressure)      Heart murmur      HLD (hyperlipidemia)      Hypertension goal BP (blood pressure) < 140/90 " 2013     Invasive ductal carcinoma of breast (H) 2015    left     Osteoporosis      Scoliosis      Skin cancer      Skin cancer 2013    sccis R cheek     Squamous cell carcinoma 2011    R upper back     Squamous cell carcinoma 10/2012    R dorsal hand     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     left leg     Transitional cell carcinoma of the bladder      Vertigo     takes meclizine prn when she ocassionally has bouts of vertigo       FAMILY HISTORY:  Family History   Problem Relation Age of Onset     Diabetes Mother      Breast Cancer Mother      Eye Disorder Mother      Osteoporosis Mother      Glaucoma Mother      Macular Degeneration Mother      Prostate Cancer Father      Anesthesia Reaction Sister         PONV     Thyroid Disease Sister      Blood Disease Sister         lupus     Heart Disease Sister      Prostate Cancer Brother      Glaucoma Brother      Macular Degeneration Brother      Prostate Cancer Brother      Glaucoma Brother      Asthma Son      Glaucoma Other      Melanoma No family hx of      Skin Cancer No family hx of      Bleeding Disorder No family hx of      Venous thrombosis No family hx of        SOCIAL HISTORY:  Social History     Socioeconomic History     Marital status:      Number of children: 2   Occupational History     Employer: [employer]     Employer: RETIRED   Tobacco Use     Smoking status: Former     Packs/day: 0.50     Years: 20.00     Pack years: 10.00     Types: Cigarettes     Quit date: 1976     Years since quittin.4     Smokeless tobacco: Never   Vaping Use     Vaping Use: Never used   Substance and Sexual Activity     Alcohol use: Yes     Comment: Weekends 4 drink total per week     Drug use: No     Sexual activity: Never       CURRENT MEDICATIONS:  Current Outpatient Medications   Medication Sig Dispense Refill     acetaminophen (TYLENOL) 325 MG tablet Take 2 tablets (650 mg) by mouth every 4 hours as needed for other (For  optimal non-opioid multimodal pain management to improve pain control.)       albuterol (PROAIR HFA/PROVENTIL HFA/VENTOLIN HFA) 108 (90 Base) MCG/ACT inhaler Inhale 1-2 puffs into the lungs every 4 hours as needed for shortness of breath or wheezing 6.7 g 0     alendronate (FOSAMAX) 70 MG tablet TAKE 1 TABLET BY MOUTH EVERY 7 DAYS (Patient taking differently: Take 70 mg by mouth every 7 days Monday) 12 tablet 11     aspirin (ASA) 81 MG EC tablet Take 1 tablet (81 mg) by mouth daily       co-enzyme Q-10 100 MG CAPS capsule Take 200 mg by mouth every morning       cyanocobalamin (VITAMIN B-12) 500 MCG tablet Take 1 tablet (500 mcg) by mouth daily 150 tablet 3     dorzolamide-timolol (COSOPT) 2-0.5 % ophthalmic solution Place 1 drop into both eyes 2 times daily 10 mL 4     enalapril (VASOTEC) 2.5 MG tablet Take 1 tablet (2.5 mg) by mouth daily       ferrous sulfate (FEROSUL) 325 (65 Fe) MG tablet Take 1 tablet (325 mg) by mouth daily (with breakfast) 90 tablet 3     hydrOXYzine (ATARAX) 25 MG tablet Take 1 tablet (25 mg) by mouth every 6 hours as needed for other (adjuvant pain)       hydrOXYzine (ATARAX) 25 MG tablet Take 1 tablet (25 mg) by mouth At Bedtime       latanoprost (XALATAN) 0.005 % ophthalmic solution Place 1 drop into both eyes At Bedtime 7.5 mL 3     levalbuterol (XOPENEX) 0.63 MG/3ML neb solution Take 3 mLs (0.63 mg) by nebulization 2 times daily 90 mL      melatonin 1 MG TABS tablet Take 1 tablet (1 mg) by mouth At Bedtime       metoprolol tartrate (LOPRESSOR) 25 MG tablet Take 0.5 tablets (12.5 mg) by mouth 2 times daily       miconazole (MICATIN) 2 % external powder Apply topically 3 times daily as needed for other (irritation, friction, maceration)       Multiple Vitamins-Minerals (ONE DAILY ADULTS 50+) TABS Take 1 tablet by mouth every other day       polyethylene glycol (MIRALAX) 17 GM/Dose powder Take 17 g (1 capful) by mouth 2 times daily as needed for constipation 507 g 0     potassium chloride  ER (KLOR-CON M) 20 MEQ CR tablet Take 1 tablet (20 mEq) by mouth 2 times daily       senna-docusate (SENOKOT-S/PERICOLACE) 8.6-50 MG tablet 1 tablet by Oral or Feeding Tube route nightly as needed for constipation       simvastatin (ZOCOR) 20 MG tablet Take 1 tablet (20 mg) by mouth At Bedtime 90 tablet 3     traMADol (ULTRAM) 50 MG tablet Take 0.5 tablets (25 mg) by mouth every 6 hours as needed for moderate pain 15 tablet 0     traZODone (DESYREL) 50 MG tablet TAKE 1/2 TABLET BY MOUTH EVERY BEDTIME FOR INSOMNIA       VITAMIN D-1000 MAX -1000 MG-UNIT OR TABS Take 1 tablet by mouth daily       amiodarone (PACERONE) 200 MG tablet Take 1 tablet (200 mg) by mouth daily for 30 days  0     amiodarone (PACERONE) 400 MG tablet Take 1 tablet (400 mg) by mouth 2 times daily for 3 days       bumetanide (BUMEX) 1 MG tablet Take 1 tablet (1 mg) by mouth daily (Patient not taking: Reported on 6/29/2023)       triamcinolone (KENALOG) 0.1 % external cream Apply twice daily for 2 weeks (Patient not taking: Reported on 5/3/2023) 30 g 0       ROS:   Comprehensive ROS negative except HPI    EXAM:  BP (!) 157/90 (BP Location: Right arm, Patient Position: Chair, Cuff Size: Adult Regular)   Pulse 80   Wt 59.2 kg (130 lb 9.6 oz)   LMP  (LMP Unknown)   SpO2 95%   BMI 26.38 kg/m    General: appears comfortable, alert and articulate  Head: normocephalic, atraumatic  Eyes: anicteric sclera, EOMI  Neck: no adenopathy  Orophyarynx: moist mucosa, no lesions, dentition intact  Heart: regular, S1/S2, no murmur, gallop, rub, estimated JVP 8 cm  Lungs: clear, no rales or wheezing  Abdomen: soft, non-tender, bowel sounds present, no hepatosplenomegaly  Extremities: 1+ LE edema  Neurological: normal speech and affect, no gross motor deficits    BP at the end of the visit: 146/84 mmHg    Labs:  Recent Labs   Lab Test 05/23/23  1000 05/15/23  0452   WBC 5.9 6.3   HGB 10.3* 9.0*   HCT 35.5 30.8*    218     Recent Labs   Lab Test  05/23/23  1000 05/15/23  0452    140   POTASSIUM 4.6 4.4   CHLORIDE 104 103   CO2 28 28   BUN 25.5* 17.3   CR 0.77 0.76   GFRESTIMATED 75 76     Recent Labs   Lab Test 08/03/22  1035 07/22/21  1122 06/23/16  1111 06/22/15  1145   CHOL 175 194   < > 157   HDL 76 63   < > 55   LDL 82 112*   < > 84   TRIG 86 95   < > 89   CHOLHDLRATIO  --   --   --  2.9    < > = values in this interval not displayed.       05/07/2023 Echocardiogram:  Interpretation Summary  Ascending aorta aneurysm repair with 32 mm Hemashield graft and aortic valve  replacement with 21 mm Aponte Resilia on 04/25/2023.  Left ventricular function is decreased. The ejection fraction is 40-45%  (mildly reduced). There is significant afzc-al-anrm variability in the LVEF  due to the patient's AF.  Global right ventricular function is normal. The right ventricle is normal  size.  The bioprosthetic aortic valve is well-seated. Leaflet opening is not clearly  visualized. There is no valvular or paravalvular regurgitation. The Vmax is  2.6 m/s, the mean gradient is 15 mmHg, the dimensionless index is 0.30, and  the acceleration time is 80 ms.  Mild to moderate tricuspid insufficiency is present.  IVC diameter and respiratory changes fall into an intermediate range  suggesting an RA pressure of 8 mmHg.  There is a small pericardial effusion adjacent LV lateral segments. The  maximal depth is 1.4 cm next to the left atrioventricular groove. There are no  signs of tamponade.  This study was compared with the study from 04/27/2023. The LV function has  declined modestly due to the patient's AF. There is no significant change in  the RV function. The TR is less severe.    02/17/2023 Coronary Angiogram/RHC:    Right sided filling pressures are normal.    Normal PA pressures.    Left sided filling pressures are normal.    Left ventricular filling pressures are normal.    Normal cardiac output level.  Extreme ortuosity of R subclavian requiring a Destinathion  catheter.  Normal coronary angiogram.  Normal right heart catheterizations.  Severe aortic stenosis, with a mean gradient of 37 mmHg , aortic valve area 0.7 cm2 in favor of severe aortic stenosis.    Assessment and Plan:    I discussed the results with patient and CV fellow    1.  Aortic valve replacement using Aponte Resilia 21 mm valve.  2.  Ascending aortic aneurysm repair using 32 mm Hemashield graft  3.  Hypertension, elevated in clinic today but reports good control at home.  Advised to measure blood pressure daily and send a message to Dr. Evans if values persistently over 150/90.  4.  Hyperlipidemia.  Continue PTA simvastatin.  Will obtain lipid panel today  5.  Lower extremity edema.  Suspect due to underlying mild cardiomyopathy.  No significant shortness of breath or hypoxia.  Appears she was on Bumex 1 mg daily prior to discharge home, however, was not continued on Bumex upon discharge.  She continues to take potassium supplement 20 mEq twice daily.  Will obtain CMP today and consider restarting Bumex if no concerns.  Patient has an upcoming appointment with her primary care provider in a week so she can be reassessed at that time with repeat labs.        Medication Changes: None        Follow Up:   -Fasting labs when able  -Follow up with Dr. Byrne in 6 months with repeat fasting labs and echocardiogram prior to visit      We discussed the importance of a heart-healthy diet and lifestyle.  Follow the American Heart Association Diet and Lifestyle Recommendations:  -Limit saturated fat, trans fat, sodium, red meat, sweets and sugar-sweetened beverages. If you choose to eat red meat, compare labels and select the leanest cuts available.  -Aim for at least 150 minutes of moderate physical activity or 75 minutes of vigorous physical activity - or an equal combination of both - each week.    Follow up: with Dr Byrne in 6 months    Discussed with Dr. Chavez Lopez MD  Cardiology  Fellow  [phone]    I saw and evaluated patient with CV fellow. I examined patient with CV fellow. I discussed the results with patient and CV fellow. I discussed our plan with patient and CV fellow.  I agree with CV fellow's note and I edited the CV fellow's note to make it a more comprehensive document.    David Byrne MD, PhD  Professor of Medicine  Division of Cardiology

## 2023-06-29 NOTE — NURSING NOTE
June 29, 2023    Medication Changes: None      Follow Up:   -Fasting labs when able  -Follow up with Dr. Byrne in 6 months with repeat fasting labs and echocardiogram prior to visit    Patient verbalized understanding of all health information given and agreed to call with further questions or concerns.      Geneva Daniel RN

## 2023-06-29 NOTE — PATIENT INSTRUCTIONS
June 29, 2023    Cardiology Provider You Saw During Your Visit: Dr. Byrne      Medication Changes: None      Follow Up:   -Fasting labs when able  -Follow up with Dr. Byrne in 6 months with repeat fasting labs and echocardiogram prior to visit      Follow the American Heart Association Diet and Lifestyle Recommendations:  -Limit saturated fat, trans fat, sodium, red meat, sweets and sugar-sweetened beverages. If you choose to eat red meat, compare labels and select the leanest cuts available.  -Aim for at least 150 minutes of moderate physical activity or 75 minutes of vigorous physical activity - or an equal combination of both - each week.      To Reach Us:  -During business hours: [phone], press option # 1 to schedule an appointment or to leave a message for your care team.     -After hours, weekends or holidays: [phone], press option #4 and ask to speak to the on-call cardiologist. Inform them you are a patient at the [facility].        **If you have a cardiac device, please make sure you schedule an in-person device check just prior to your cardiology provider appointments**        Geneva Daniel RN  Cardiology Care Coordinator - General Cardiology  [facility]

## 2023-06-29 NOTE — NURSING NOTE
Chief Complaint   Patient presents with     New Patient     New [facility] visit, pt referred to F/U after valve replacement and to [facility]. Currently lipids and BP suboptimal.     Vitals were taken, medications reconciled, and EKG was performed.    Hari Doe, EMT  4:02 PM

## 2023-06-30 ENCOUNTER — MYC MEDICAL ADVICE (OUTPATIENT)
Dept: CARDIOLOGY | Facility: CLINIC | Age: 88
End: 2023-06-30

## 2023-06-30 LAB
ATRIAL RATE - MUSE: 81 BPM
DIASTOLIC BLOOD PRESSURE - MUSE: NORMAL MMHG
INTERPRETATION ECG - MUSE: NORMAL
P AXIS - MUSE: -5 DEGREES
PR INTERVAL - MUSE: 224 MS
QRS DURATION - MUSE: 80 MS
QT - MUSE: 350 MS
QTC - MUSE: 406 MS
R AXIS - MUSE: -13 DEGREES
SYSTOLIC BLOOD PRESSURE - MUSE: NORMAL MMHG
T AXIS - MUSE: 27 DEGREES
VENTRICULAR RATE- MUSE: 81 BPM

## 2023-07-01 ENCOUNTER — NURSE TRIAGE (OUTPATIENT)
Dept: NURSING | Facility: CLINIC | Age: 88
End: 2023-07-01
Payer: COMMERCIAL

## 2023-07-01 NOTE — TELEPHONE ENCOUNTER
Caller is  8 week post  aortic valve  and aortic aneurysm  repair   Discharge from TCU  10 days ago.   Reporting sudden onset of  pain in  right  lower back  radiating across back  late a.m.  yesterday  and today it is also  in lower abdomen . Pain rated 7/10; worsens with activity. Increased dependent edema; on- going shortness of breath..   History of  kidney stones in past .   Triage protocol reviewed   Advised ED assessment now   Caller will comply and son will take her to  [facility] ED   Mariajose Phillips RN  FNA     Reason for Disposition    [1] SEVERE back pain (e.g., excruciating) AND [2] sudden onset AND [3] age > 60 years    Additional Information    Negative: Passed out (i.e., lost consciousness, collapsed and was not responding)    Negative: Shock suspected (e.g., cold/pale/clammy skin, too weak to stand, low BP, rapid pulse)    Negative: Sounds like a life-threatening emergency to the triager    Negative: Major injury to the back (e.g., MVA, fall > 10 feet or 3 meters, penetrating injury, etc.)    Negative: Followed a tailbone injury    Negative: [1] Pain in the upper back over the ribs (rib cage) AND [2] radiates (travels, goes) into chest    Negative: [1] Pain in the upper back over the ribs (rib cage) AND [2] worsened by coughing (or clearly increases with breathing)    Negative: Back pain during pregnancy    Negative: Pain mainly in flank (i.e., in the side, over the lower ribs or just below the ribs)    Protocols used: BACK PAIN-A-

## 2023-07-04 RX ORDER — TRAZODONE HYDROCHLORIDE 50 MG/1
TABLET, FILM COATED ORAL
COMMUNITY
Start: 2023-07-04 | End: 2023-07-25

## 2023-07-04 RX ORDER — LOSARTAN POTASSIUM 50 MG/1
50 TABLET ORAL DAILY
COMMUNITY
Start: 2023-07-04 | End: 2023-10-05

## 2023-07-05 ENCOUNTER — PATIENT OUTREACH (OUTPATIENT)
Dept: CARE COORDINATION | Facility: CLINIC | Age: 88
End: 2023-07-05

## 2023-07-05 ENCOUNTER — OFFICE VISIT (OUTPATIENT)
Dept: FAMILY MEDICINE | Facility: CLINIC | Age: 88
End: 2023-07-05
Payer: COMMERCIAL

## 2023-07-05 VITALS
DIASTOLIC BLOOD PRESSURE: 80 MMHG | HEART RATE: 79 BPM | BODY MASS INDEX: 25.37 KG/M2 | SYSTOLIC BLOOD PRESSURE: 130 MMHG | TEMPERATURE: 98 F | WEIGHT: 125.6 LBS | OXYGEN SATURATION: 96 %

## 2023-07-05 DIAGNOSIS — Z92.29 HISTORY OF BISPHOSPHONATE THERAPY: ICD-10-CM

## 2023-07-05 DIAGNOSIS — C50.912 INVASIVE DUCTAL CARCINOMA OF LEFT BREAST (H): ICD-10-CM

## 2023-07-05 DIAGNOSIS — Z95.2 H/O AORTIC VALVE REPLACEMENT: ICD-10-CM

## 2023-07-05 DIAGNOSIS — Z98.890 S/P ASCENDING AORTIC ANEURYSM REPAIR: ICD-10-CM

## 2023-07-05 DIAGNOSIS — E78.5 HYPERLIPIDEMIA, UNSPECIFIED HYPERLIPIDEMIA TYPE: ICD-10-CM

## 2023-07-05 DIAGNOSIS — M80.00XD AGE-RELATED OSTEOPOROSIS WITH CURRENT PATHOLOGICAL FRACTURE WITH ROUTINE HEALING, SUBSEQUENT ENCOUNTER: ICD-10-CM

## 2023-07-05 DIAGNOSIS — Z23 NEED FOR SHINGLES VACCINE: ICD-10-CM

## 2023-07-05 DIAGNOSIS — S32.040D COMPRESSION FRACTURE OF L4 LUMBAR VERTEBRA, WITH ROUTINE HEALING, SUBSEQUENT ENCOUNTER: ICD-10-CM

## 2023-07-05 DIAGNOSIS — Z86.79 S/P ASCENDING AORTIC ANEURYSM REPAIR: ICD-10-CM

## 2023-07-05 DIAGNOSIS — Z95.2 S/P AVR (AORTIC VALVE REPLACEMENT): ICD-10-CM

## 2023-07-05 DIAGNOSIS — T45.8X5D ADVERSE EFFECT OF BISPHOSPHONATE, SUBSEQUENT ENCOUNTER: ICD-10-CM

## 2023-07-05 LAB
ALBUMIN SERPL BCG-MCNC: 3.6 G/DL (ref 3.5–5.2)
ALP SERPL-CCNC: 56 U/L (ref 35–104)
ALT SERPL W P-5'-P-CCNC: 9 U/L (ref 0–50)
ANION GAP SERPL CALCULATED.3IONS-SCNC: 10 MMOL/L (ref 7–15)
AST SERPL W P-5'-P-CCNC: 27 U/L (ref 0–45)
BILIRUB SERPL-MCNC: 0.3 MG/DL
BUN SERPL-MCNC: 16.5 MG/DL (ref 8–23)
CALCIUM SERPL-MCNC: 9.2 MG/DL (ref 8.8–10.2)
CHLORIDE SERPL-SCNC: 98 MMOL/L (ref 98–107)
CHOLEST SERPL-MCNC: 151 MG/DL
CREAT SERPL-MCNC: 0.59 MG/DL (ref 0.51–0.95)
DEPRECATED HCO3 PLAS-SCNC: 25 MMOL/L (ref 22–29)
ERYTHROCYTE [DISTWIDTH] IN BLOOD BY AUTOMATED COUNT: 15.7 % (ref 10–15)
FERRITIN SERPL-MCNC: 301 NG/ML (ref 11–328)
GFR SERPL CREATININE-BSD FRML MDRD: 88 ML/MIN/1.73M2
GLUCOSE SERPL-MCNC: 98 MG/DL (ref 70–99)
HCT VFR BLD AUTO: 35.1 % (ref 35–47)
HDLC SERPL-MCNC: 53 MG/DL
HGB BLD-MCNC: 11.1 G/DL (ref 11.7–15.7)
IRON BINDING CAPACITY (ROCHE): 278 UG/DL (ref 240–430)
IRON SATN MFR SERPL: 12 % (ref 15–46)
IRON SERPL-MCNC: 32 UG/DL (ref 37–145)
LDLC SERPL CALC-MCNC: 76 MG/DL
LDLC SERPL DIRECT ASSAY-MCNC: 71 MG/DL
MCH RBC QN AUTO: 27.2 PG (ref 26.5–33)
MCHC RBC AUTO-ENTMCNC: 31.6 G/DL (ref 31.5–36.5)
MCV RBC AUTO: 86 FL (ref 78–100)
NONHDLC SERPL-MCNC: 98 MG/DL
PLATELET # BLD AUTO: 256 10E3/UL (ref 150–450)
POTASSIUM SERPL-SCNC: 4.5 MMOL/L (ref 3.4–5.3)
PROT SERPL-MCNC: 6.9 G/DL (ref 6.4–8.3)
RBC # BLD AUTO: 4.08 10E6/UL (ref 3.8–5.2)
SODIUM SERPL-SCNC: 133 MMOL/L (ref 136–145)
TRIGL SERPL-MCNC: 108 MG/DL
WBC # BLD AUTO: 5.3 10E3/UL (ref 4–11)

## 2023-07-05 PROCEDURE — 36415 COLL VENOUS BLD VENIPUNCTURE: CPT | Performed by: FAMILY MEDICINE

## 2023-07-05 PROCEDURE — 82728 ASSAY OF FERRITIN: CPT | Performed by: FAMILY MEDICINE

## 2023-07-05 PROCEDURE — 85027 COMPLETE CBC AUTOMATED: CPT | Performed by: FAMILY MEDICINE

## 2023-07-05 PROCEDURE — 83540 ASSAY OF IRON: CPT | Performed by: FAMILY MEDICINE

## 2023-07-05 PROCEDURE — 83721 ASSAY OF BLOOD LIPOPROTEIN: CPT | Mod: 59 | Performed by: FAMILY MEDICINE

## 2023-07-05 PROCEDURE — 83550 IRON BINDING TEST: CPT | Performed by: FAMILY MEDICINE

## 2023-07-05 PROCEDURE — 80053 COMPREHEN METABOLIC PANEL: CPT | Performed by: FAMILY MEDICINE

## 2023-07-05 PROCEDURE — 80061 LIPID PANEL: CPT | Performed by: FAMILY MEDICINE

## 2023-07-05 PROCEDURE — 99495 TRANSJ CARE MGMT MOD F2F 14D: CPT | Performed by: FAMILY MEDICINE

## 2023-07-05 RX ORDER — POTASSIUM CHLORIDE 1500 MG/1
20 TABLET, EXTENDED RELEASE ORAL 2 TIMES DAILY
Qty: 60 TABLET | Refills: 3 | Status: SHIPPED | OUTPATIENT
Start: 2023-07-05 | End: 2023-07-27

## 2023-07-05 RX ORDER — CALCITONIN SALMON 200 [IU]/.09ML
1 SPRAY, METERED NASAL DAILY
Qty: 3.7 ML | Refills: 11 | Status: SHIPPED | OUTPATIENT
Start: 2023-07-05 | End: 2023-08-04

## 2023-07-05 RX ORDER — METHOCARBAMOL 500 MG/1
250 TABLET, FILM COATED ORAL 3 TIMES DAILY PRN
COMMUNITY
Start: 2023-07-05 | End: 2023-07-18

## 2023-07-05 RX ORDER — LOSARTAN POTASSIUM 25 MG/1
50 TABLET ORAL
COMMUNITY
End: 2023-07-05

## 2023-07-05 RX ORDER — METOPROLOL TARTRATE 25 MG/1
12.5 TABLET, FILM COATED ORAL 2 TIMES DAILY
Qty: 90 TABLET | Refills: 3 | Status: SHIPPED | OUTPATIENT
Start: 2023-07-05 | End: 2023-07-05

## 2023-07-05 RX ORDER — CEPHALEXIN 500 MG/1
500 CAPSULE ORAL
COMMUNITY
Start: 2023-07-03 | End: 2023-07-08

## 2023-07-05 RX ORDER — METOPROLOL TARTRATE 25 MG/1
12.5 TABLET, FILM COATED ORAL 2 TIMES DAILY
Qty: 180 TABLET | Refills: 3 | Status: SHIPPED | OUTPATIENT
Start: 2023-07-05 | End: 2023-07-25

## 2023-07-05 RX ORDER — METHOCARBAMOL 500 MG/1
250 TABLET, FILM COATED ORAL
COMMUNITY
Start: 2023-07-03 | End: 2023-07-05

## 2023-07-05 RX ORDER — BUMETANIDE 1 MG/1
1 TABLET ORAL DAILY
Qty: 90 TABLET | Refills: 3 | Status: SHIPPED | OUTPATIENT
Start: 2023-07-05 | End: 2024-06-10

## 2023-07-05 RX ORDER — CALCITONIN SALMON 200 [IU]/.09ML
1 SPRAY, METERED NASAL DAILY
COMMUNITY
Start: 2023-07-04 | End: 2023-07-05

## 2023-07-05 ASSESSMENT — PAIN SCALES - GENERAL: PAINLEVEL: SEVERE PAIN (7)

## 2023-07-05 NOTE — PROGRESS NOTES
Assessment & Plan     Compression fracture of L4 , L5, L! lumbar vertebra, with routine healing, subsequent encounter  Pt was seen by neuro surgeon in Hospital  Notes reviewed   Advised PT/OT  Pt needs help with bathing  She Lives with her son  Uses walker  Able to go to Bathroom    - Home Care Referral  - calcitonin, salmon, (MIACALCIN) 200 UNIT/ACT nasal spray; Spray 1 spray in nostril daily    S/P AVR (aortic valve replacement)  refilled  - bumetanide (BUMEX) 1 MG tablet; Take 1 tablet (1 mg) by mouth daily  - potassium chloride ER (KLOR-CON M) 20 MEQ CR tablet; Take 1 tablet (20 mEq) by mouth 2 times daily    - Home Care Referral  - metoprolol tartrate (LOPRESSOR) 25 MG tablet; Take 0.5 tablets (12.5 mg) by mouth 2 times daily  - Basic metabolic panel  (Ca, Cl, CO2, Creat, Gluc, K, Na, BUN); Future  - CBC with platelets; Future    S/P ascending aortic aneurysm repair  Stable   - bumetanide (BUMEX) 1 MG tablet; Take 1 tablet (1 mg) by mouth daily  - potassium chloride ER (KLOR-CON M) 20 MEQ CR tablet; Take 1 tablet (20 mEq) by mouth 2 times daily  - zoster vaccine recombinant adjuvanted (SHINGRIX) injection; Inject 0.5 mLs into the muscle once for 1 dose Pharmacist administered  - Home Care Referral  - metoprolol tartrate (LOPRESSOR) 25 MG tablet; Take 0.5 tablets (12.5 mg) by mouth 2 times daily    HLD (hyperlipidemia)  Pending   - LDL cholesterol direct  - Lipid panel reflex to direct LDL Fasting    Age-related osteoporosis with current pathological fracture with routine healing, subsequent encounter  Pt used to take Fosamax which has Not helped  Was switched to Prolia  The potential side effects of this medication have been discussed with the patient.  Call if any significant problems with these are experienced.    - denosumab (PROLIA) injection 60 mg  - Albumin level (Order only if Calcium level is <8.5); Future  - Calcium; Future  - Basic metabolic panel  (Ca, Cl, CO2, Creat, Gluc, K, Na, BUN);  "Future    Adverse effect of bisphosphonate, subsequent encounter    - denosumab (PROLIA) injection 60 mg  - Albumin level (Order only if Calcium level is <8.5); Future  - Calcium; Future    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)  Sees Oncology     H/O aortic valve replacement  Stable   Labs pending   - CBC with platelets  - Comprehensive metabolic panel  - LDL cholesterol direct  - Lipid panel reflex to direct LDL Fasting  - IRON AND IRON BINDING CAPACITY  - FERRITIN    Need for shingles vaccine  Prescription sent if pt decides to take  - zoster vaccine recombinant adjuvanted (SHINGRIX) injection; Inject 0.5 mLs into the muscle once for 1 dose Pharmacist administered    Ordering of each unique test  Prescription drug management  [id number]}   MED REC REQUIRED  Post Medication Reconciliation Status: discharge medications reconciled, continue medications without change  BMI:   Estimated body mass index is 25.37 kg/m  as calculated from the following:    Height as of 4/25/23: 1.499 m (4' 11\").    Weight as of this encounter: 57 kg (125 lb 9.6 oz).       Follow up 3 months    Estrellita Hernandez MD  [facility] CLAUDIA Lamas is a 87 year old, presenting for the following health issues:  Cydney Christiansen is a 87 y.o. female with a past medical history significant for AoVR and ascending aortic aneurysm repair on 4/25/23, HTN, hyperlipidemia, lumbar and thoracic spine compression fractures and breast ca.    Hospital F/U  Compression Fracture of L4     Hospital Problem List   Principal Problem:  Compression fracture of L4 vertebra (HC)  Active Problems:  Hyperlipidemia LDL goal <160  Hypertension goal BP (blood pressure) < 140/90  Anemia  S/P aortic valve replacement with bioprosthetic valve  S/P ascending aortic aneurysm repair  History of breast cancer        7/5/2023    11:42 AM   Additional Questions   Roomed by Veronique     [facility] Follow-up Visit:    Hospital/Nursing Home/IP Rehab " Facility: [facility]  Date of Admission: July 1, 2023  Date of Discharge: July 3, 2023  Reason(s) for Admission:   Compression fractures of L4 and L5 (Primary Dx);   Acute low back pain without sciatica, unspecified back pain laterality;   Acute cystitis without hematuria  Discharge Disposition: Home Health    Was your hospitalization related to COVID-19? No   Problems taking medications regularly:  None  Medication changes since discharge: None  Problems adhering to non-medication therapy:  None    Summary of hospitalization:  CareEverywhere information obtained and reviewed  Diagnostic Tests/Treatments reviewed.  Follow up needed: 1 month  Other Healthcare Providers Involved in Patient s Care:         Homecare  Update since discharge: stable.   Plan of care communicated with patient       Review of Systems   CONSTITUTIONAL: NEGATIVE for fever, chills, change in weight  ENT/MOUTH: NEGATIVE for ear, mouth and throat problems  RESP: NEGATIVE for significant cough or SOB  CV: NEGATIVE for chest pain, palpitations or peripheral edema  GI: NEGATIVE for nausea, abdominal pain, heartburn, or change in bowel habits  : none  MUSCULOSKELETAL: low back pain when she moves    Rest of the ROS is Negative except see above and Problem list [stable]    Objective    /80   Pulse 79   Temp 98  F (36.7  C) (Temporal)   Wt 57 kg (125 lb 9.6 oz)   LMP  (LMP Unknown)   SpO2 96%   BMI 25.37 kg/m    Body mass index is 25.37 kg/m .  Physical Exam   GENERAL: alert, no distress, frail and elderly  EYES: Eyes grossly normal to inspection  RESP: lungs clear to auscultation - no rales, rhonchi or wheezes  CV: regular rate and rhythm, normal S1 S2, no S3 or S4, no murmur, click or rub, no peripheral edema and peripheral pulses strong  ABDOMEN: soft, nontender, no hepatosplenomegaly, no masses and bowel sounds normal  MS: in a wheelchair-walks at Home with a walker-no tenderness back  Psych normal   CT scan Lumbar    1.   Age-indeterminate anterior compression fractures of the L3, L4, and L5 vertebra.   2.  Chronic T11 and fracture with severe height loss and bony retropulsion, unchanged   3.  No severe spinal canal stenosis.    Pending

## 2023-07-05 NOTE — PROGRESS NOTES
Clinic Care Coordination Contact  [facility]/Voicemail       Clinical Data: Care Coordinator Outreach  Outreach attempted x 3.  Left message on patient's voicemail with call back information and requested return call.  Plan: Care Coordinator sent care coordination introduction letter on 6/29/23 via mail. Care Coordinator will do no further outreaches at this time.    Issa Irene MSN, RN, PHN, CCM   Primary Care Clinical RN Care Coordinator  [facility]  7/5/2023   9:07 AM  [email or web address]  Office: [phone]

## 2023-07-06 ENCOUNTER — TELEPHONE (OUTPATIENT)
Dept: FAMILY MEDICINE | Facility: CLINIC | Age: 88
End: 2023-07-06
Payer: COMMERCIAL

## 2023-07-06 DIAGNOSIS — Z53.9 DIAGNOSIS NOT YET DEFINED: Primary | ICD-10-CM

## 2023-07-06 PROCEDURE — G0180 MD CERTIFICATION HHA PATIENT: HCPCS | Performed by: FAMILY MEDICINE

## 2023-07-06 RX ORDER — UBIDECARENONE 100 MG
100 CAPSULE ORAL EVERY MORNING
COMMUNITY
Start: 2023-07-06 | End: 2023-07-25

## 2023-07-06 NOTE — TELEPHONE ENCOUNTER
Reason for Call:  Form, our goal is to have forms completed with 72 hours, however, some forms may require a visit or additional information.    Type of letter, form or note:  Home Health Certification    Who is the form from?: Home care    Where did the form come from: form was faxed in    What clinic location was the form placed at?: [facility]    Where the form was placed: Given to physician    What number is listed as a contact on the form?: [phone]       Call taken on 7/6/2023 at 2:36 PM by Juliette Longoria

## 2023-07-17 ENCOUNTER — TELEPHONE (OUTPATIENT)
Dept: FAMILY MEDICINE | Facility: CLINIC | Age: 88
End: 2023-07-17
Payer: COMMERCIAL

## 2023-07-17 NOTE — TELEPHONE ENCOUNTER
Yanely Seth    Did the referrals changes for the prolia shots? I do not see in referral if insurance has been checked off so we can schedule. Please help.     Thanks,  MARIEL Jara  [facility]

## 2023-07-17 NOTE — TELEPHONE ENCOUNTER
Patient states Dr. Hernandez had recommended a prolia shot, however, I was not able to locate order. Please confirm if order has been placed.     Please call patient back when orders are placed so she can schedule for appt. [phone]- Okay to leave [facility].

## 2023-07-18 DIAGNOSIS — M62.838 MUSCLE SPASM: Primary | ICD-10-CM

## 2023-07-18 RX ORDER — METHOCARBAMOL 500 MG/1
250 TABLET, FILM COATED ORAL 3 TIMES DAILY PRN
Qty: 25 TABLET | Refills: 0 | Status: SHIPPED | OUTPATIENT
Start: 2023-07-18 | End: 2023-10-05

## 2023-07-18 NOTE — TELEPHONE ENCOUNTER
Medication Question or Refill    Contacts       Type Contact Phone/Fax    07/18/2023 10:33 AM CDT Phone (Incoming) Cydney Christiansen (Self) [phone] (M)      What medication are you calling about (include dose and sig)?:   methocarbamol (ROBAXIN) 500 MG tablet   7/5/2023  No   Sig - Route: Take 0.5 tablets (250 mg) by mouth 3 times daily as needed for muscle spasms - Oral     Preferred Pharmacy:   [facility] [id number] [facility] [employer] - [place] - [address]  [address]  [place] [address]  Phone: [phone] Fax: [phone]  Controlled Substance Agreement on file:   CSA -- Patient Level:    CSA: None found at the patient level.       Who prescribed the medication?: [facility] June 30 and discharged 7/3/2023  Do you need a refill? Yes  When did you use the medication last? This morning 1/2 of a one  Do you have any questions or concerns?  Yes: she is nearly out and would like a refill as this is helpful for her. She had her hospital follow up with Dr. Hernandez on 7/5/2023    Could we send this information to you in [place] or would you prefer to receive a phone call?:   Patient would prefer a phone call   Okay to leave a detailed message?: Yes at Cell number on file:    Telephone Information:   Mobile [phone]

## 2023-07-19 ENCOUNTER — PATIENT OUTREACH (OUTPATIENT)
Dept: CARE COORDINATION | Facility: CLINIC | Age: 88
End: 2023-07-19
Payer: COMMERCIAL

## 2023-07-24 ENCOUNTER — TELEPHONE (OUTPATIENT)
Dept: FAMILY MEDICINE | Facility: CLINIC | Age: 88
End: 2023-07-24
Payer: COMMERCIAL

## 2023-07-24 NOTE — TELEPHONE ENCOUNTER
Pt's BP today:  OT at 11 am: BP-105/62  Heart rate-73    12:30pm -80/50    pluse-74    No issues to warrant this big change in short amount of time.       [phone]-[facility].

## 2023-07-24 NOTE — TELEPHONE ENCOUNTER
Patient is scheduled. But stated Home care is coming to house tomorrow at 1 PM.     Patient is not sure if she should keep appointment or not  for tomorrow at 12:20? Or just consult with Home Care. Please advise- sent to triage.     Scarlett Cedillo -    [facility]

## 2023-07-24 NOTE — TELEPHONE ENCOUNTER
Received call transferred from  line.    Patient would like to know if she should be seen tomorrow and reschedule home care, or if she should keep home care appointment and come to Dr. Hernandez for assessment another day.    Advised patient should be seen by Dr. Hernandez tomorrow as advised, and home care should be rescheduled due to concern of low blood pressure. Patient verbalized understanding and in agreement with plan. RN offered to call home care back to inform of request to reschedule home care visit, however patient would like to call them herself.     Patient scheduled for appointment tomorrow at 12:20 pm.    KEVIN Simpson RN  [facility]

## 2023-07-24 NOTE — TELEPHONE ENCOUNTER
Writer called Elizabeth ( OT) back to discuss BP note further. Elizabeth states that patient's BP has typically been on lower range, but never this low.    11am: /62, pulse 73  1230pm: BP 80/50, pulse 74    Elizabeth states that patient denies any symptoms of low BP including dizziness/lightheadedness, blurred vision, weakness. Patient states that she feels fatigue but is baseline for her. Elizabeth discussed with patient that if any symptoms begin or if BP lowers to proceed to ED. Patient denied ED at this time. Patient states understanding, but will continue to monitor.    Elizabeth states that patient does have BP cuff at home and will be monitoring daily. Patient has appointment with PCP 8/8/23. Any current intervention or should patient continue to monitor?    Routing to PCP to please advise.    Thanks,  KEVIN Finley RN  [facility]

## 2023-07-24 NOTE — TELEPHONE ENCOUNTER
Called patient, left message to call back at [phone]. Called to relay PCP message below regarding low BP:      Call pt  Hold Metoprolol  See me Tomorrow  If fatigues-go to ER  Drink more fluids   -Dr. Estrellita Hernandez     Writer huddled with provider MA team and was given ok to add on to PCP schedule at 1220 (arrival time of 1200) tomorrow 7/25/23 if ok per patient. Please assist patient in scheduling.    LAY FinleyN RN  [facility]

## 2023-07-25 ENCOUNTER — OFFICE VISIT (OUTPATIENT)
Dept: FAMILY MEDICINE | Facility: CLINIC | Age: 88
End: 2023-07-25
Payer: COMMERCIAL

## 2023-07-25 VITALS
DIASTOLIC BLOOD PRESSURE: 63 MMHG | OXYGEN SATURATION: 97 % | BODY MASS INDEX: 24.32 KG/M2 | TEMPERATURE: 97.8 F | SYSTOLIC BLOOD PRESSURE: 99 MMHG | HEART RATE: 76 BPM | WEIGHT: 120.4 LBS

## 2023-07-25 DIAGNOSIS — D50.9 IRON DEFICIENCY ANEMIA, UNSPECIFIED IRON DEFICIENCY ANEMIA TYPE: ICD-10-CM

## 2023-07-25 DIAGNOSIS — I10 HYPERTENSION GOAL BP (BLOOD PRESSURE) < 140/90: Primary | ICD-10-CM

## 2023-07-25 PROCEDURE — 99213 OFFICE O/P EST LOW 20 MIN: CPT | Performed by: FAMILY MEDICINE

## 2023-07-25 ASSESSMENT — PAIN SCALES - GENERAL: PAINLEVEL: NO PAIN (0)

## 2023-07-25 NOTE — PROGRESS NOTES
Assessment & Plan     Hypertension goal BP (blood pressure) < 140/90  Advised hold metoprolol  She will check her BP at Home and let me Know if any Lower     Iron deficiency anemia, unspecified iron deficiency anemia type  On Iron tabs at Home  Pt has a follow up appointment in 10 days  Estrellita Hernandez MD  [facility] CLAUDIA Lamas is a 87 year old, presenting for the following health issues:  Hypotension        7/25/2023    12:21 PM   Additional Questions   Roomed by Veronique     History of Present Illness       Reason for visit:  Blood pressure low    She eats 2-3 servings of fruits and vegetables daily.She consumes 1 sweetened beverage(s) daily.She exercises with enough effort to increase her heart rate 9 or less minutes per day.  She exercises with enough effort to increase her heart rate 3 or less days per week.   She is taking medications regularly.       Hypertension Follow-up    Do you check your blood pressure regularly outside of the clinic? No   Are you following a low salt diet? Yes  Are your blood pressures ever more than 140 on the top number (systolic) OR more   than 90 on the bottom number (diastolic), for example 140/90? No  Blood Pressures have been Low  She feels Fine  No chest pain  No sob  Pt is on Iron tablets -takes it ones daily for Iron def anemia    Review of Systems   CONSTITUTIONAL: NEGATIVE for fever, chills, change in weight  RESP: NEGATIVE for significant cough or SOB  CV: NEGATIVE for chest pain, palpitations or peripheral edema  GI: NEGATIVE for nausea, abdominal pain, heartburn, or change in bowel habits  ROS otherwise negative      Objective    LMP  (LMP Unknown)   There is no height or weight on file to calculate BMI.  Physical Exam   GENERAL: healthy, alert and no distress  RESP: lungs clear to auscultation - no rales, rhonchi or wheezes  CV: regular rate and rhythm, normal S1 S2, no S3 or S4, no murmur, click or rub, no peripheral edema and peripheral  pulses strong  ABDOMEN: soft, nontender, no hepatosplenomegaly, no masses and bowel sounds normal  MS: no gross musculoskeletal defects noted, no edema    Office Visit on 07/05/2023   Component Date Value Ref Range Status    WBC Count 07/05/2023 5.3  4.0 - 11.0 10e3/uL Final    RBC Count 07/05/2023 4.08  3.80 - 5.20 10e6/uL Final    Hemoglobin 07/05/2023 11.1 (L)  11.7 - 15.7 g/dL Final    Hematocrit 07/05/2023 35.1  35.0 - 47.0 % Final    MCV 07/05/2023 86  78 - 100 fL Final    MCH 07/05/2023 27.2  26.5 - 33.0 pg Final    MCHC 07/05/2023 31.6  31.5 - 36.5 g/dL Final    RDW 07/05/2023 15.7 (H)  10.0 - 15.0 % Final    Platelet Count 07/05/2023 256  150 - 450 10e3/uL Final    Sodium 07/05/2023 133 (L)  136 - 145 mmol/L Final    Potassium 07/05/2023 4.5  3.4 - 5.3 mmol/L Final    Chloride 07/05/2023 98  98 - 107 mmol/L Final    Carbon Dioxide (CO2) 07/05/2023 25  22 - 29 mmol/L Final    Anion Gap 07/05/2023 10  7 - 15 mmol/L Final    Urea Nitrogen 07/05/2023 16.5  8.0 - 23.0 mg/dL Final    Creatinine 07/05/2023 0.59  0.51 - 0.95 mg/dL Final    Calcium 07/05/2023 9.2  8.8 - 10.2 mg/dL Final    Glucose 07/05/2023 98  70 - 99 mg/dL Final    Alkaline Phosphatase 07/05/2023 56  35 - 104 U/L Final    AST 07/05/2023 27  0 - 45 U/L Final    Reference intervals for this test were updated on 6/12/2023 to more accurately reflect our healthy population. There may be differences in the flagging of prior results with similar values performed with this method. Interpretation of those prior results can be made in the context of the updated reference intervals.    ALT 07/05/2023 9  0 - 50 U/L Final    Reference intervals for this test were updated on 6/12/2023 to more accurately reflect our healthy population. There may be differences in the flagging of prior results with similar values performed with this method. Interpretation of those prior results can be made in the context of the updated reference intervals.      Protein Total  07/05/2023 6.9  6.4 - 8.3 g/dL Final    Albumin 07/05/2023 3.6  3.5 - 5.2 g/dL Final    Bilirubin Total 07/05/2023 0.3  <=1.2 mg/dL Final    GFR Estimate 07/05/2023 88  >60 mL/min/1.73m2 Final    LDL Cholesterol Direct 07/05/2023 71  <100 mg/dL Final    Age 2-19 years:  Desirable: 0-110 mg/dL   Borderline high: 110-129 mg/dL   High: >= 130 mg/dL    Age 20 years and older:  Desirable: <100mg/dL  Above desirable: 100-129 mg/dL   Borderline high: 130-159 mg/dL   High: 160-189 mg/dL   Very high: >= 190 mg/dL    Cholesterol 07/05/2023 151  <200 mg/dL Final    Triglycerides 07/05/2023 108  <150 mg/dL Final    Direct Measure HDL 07/05/2023 53  >=50 mg/dL Final    LDL Cholesterol Calculated 07/05/2023 76  <=100 mg/dL Final    Non HDL Cholesterol 07/05/2023 98  <130 mg/dL Final    Iron 07/05/2023 32 (L)  37 - 145 ug/dL Final    Iron Binding Capacity 07/05/2023 278  240 - 430 ug/dL Final    Iron Sat Index 07/05/2023 12 (L)  15 - 46 % Final    Ferritin 07/05/2023 301  11 - 328 ng/mL Final

## 2023-07-27 DIAGNOSIS — Z95.2 S/P AVR (AORTIC VALVE REPLACEMENT): ICD-10-CM

## 2023-07-27 DIAGNOSIS — Z98.890 S/P ASCENDING AORTIC ANEURYSM REPAIR: ICD-10-CM

## 2023-07-27 DIAGNOSIS — Z86.79 S/P ASCENDING AORTIC ANEURYSM REPAIR: ICD-10-CM

## 2023-07-27 RX ORDER — POTASSIUM CHLORIDE 1500 MG/1
TABLET, EXTENDED RELEASE ORAL
Qty: 60 TABLET | Refills: 3 | Status: SHIPPED | OUTPATIENT
Start: 2023-07-27 | End: 2023-11-01

## 2023-07-28 NOTE — TELEPHONE ENCOUNTER
Called and spoke with pt. Scheduled prolia for 8/8/23 before she has her appt with PCP. Per pt will arrive for prolia around 230pm.     Thanks,  MARIEL Jara  [facility]

## 2023-08-04 DIAGNOSIS — S32.040D COMPRESSION FRACTURE OF L4 LUMBAR VERTEBRA, WITH ROUTINE HEALING, SUBSEQUENT ENCOUNTER: ICD-10-CM

## 2023-08-04 RX ORDER — CALCITONIN SALMON 200 [IU]/.09ML
1 SPRAY, METERED NASAL DAILY
Qty: 3.7 ML | Refills: 11 | Status: SHIPPED | OUTPATIENT
Start: 2023-08-04 | End: 2023-10-05

## 2023-08-07 ENCOUNTER — TELEPHONE (OUTPATIENT)
Dept: FAMILY MEDICINE | Facility: CLINIC | Age: 88
End: 2023-08-07
Payer: COMMERCIAL

## 2023-08-07 DIAGNOSIS — H26.8 PXF (PSEUDOEXFOLIATION OF LENS CAPSULE): ICD-10-CM

## 2023-08-07 RX ORDER — LATANOPROST 50 UG/ML
1 SOLUTION/ DROPS OPHTHALMIC AT BEDTIME
Qty: 7.5 ML | Refills: 0 | Status: SHIPPED | OUTPATIENT
Start: 2023-08-07 | End: 2023-12-08

## 2023-08-07 NOTE — TELEPHONE ENCOUNTER
"Refilling drops per Dr. Garcia's last visit note 11/25/22: Continue Latanoprost (green top) at bedtime both eyes. Patient is past due to be seen. Sam tried to reach out to schedule. Per Sam \"Talked with the patient and she is recovering from open heart surgery so she won't be able to come in for a little bit yet.\" Will refill and forward to Dr. Garcia so she is aware.   "

## 2023-08-07 NOTE — TELEPHONE ENCOUNTER
Received call from patient. She is calling as she has an appointment tomorrow for a Prolia injection at 2 pm, and appointment with PCP tomorrow at 3 pm.    She is hoping to clarify whether it would be appropriate to come in at 2:30 pm with her injection to decrease the wait time between the two appointment times.    Confirmed with RN of the day appointment time appropriate.    KEVIN Morgan RN  [facility]

## 2023-08-08 ENCOUNTER — ALLIED HEALTH/NURSE VISIT (OUTPATIENT)
Dept: FAMILY MEDICINE | Facility: CLINIC | Age: 88
End: 2023-08-08
Payer: COMMERCIAL

## 2023-08-08 ENCOUNTER — OFFICE VISIT (OUTPATIENT)
Dept: FAMILY MEDICINE | Facility: CLINIC | Age: 88
End: 2023-08-08
Payer: COMMERCIAL

## 2023-08-08 ENCOUNTER — TELEPHONE (OUTPATIENT)
Dept: FAMILY MEDICINE | Facility: CLINIC | Age: 88
End: 2023-08-08

## 2023-08-08 VITALS
TEMPERATURE: 97.5 F | OXYGEN SATURATION: 95 % | WEIGHT: 122.2 LBS | SYSTOLIC BLOOD PRESSURE: 112 MMHG | DIASTOLIC BLOOD PRESSURE: 72 MMHG | HEART RATE: 82 BPM | BODY MASS INDEX: 24.68 KG/M2

## 2023-08-08 DIAGNOSIS — I10 HYPERTENSION GOAL BP (BLOOD PRESSURE) < 140/90: ICD-10-CM

## 2023-08-08 DIAGNOSIS — M80.00XD AGE-RELATED OSTEOPOROSIS WITH CURRENT PATHOLOGICAL FRACTURE WITH ROUTINE HEALING, SUBSEQUENT ENCOUNTER: Primary | ICD-10-CM

## 2023-08-08 DIAGNOSIS — S22.000S COMPRESSION FRACTURE OF THORACIC VERTEBRA, UNSPECIFIED THORACIC VERTEBRAL LEVEL, SEQUELA: Primary | ICD-10-CM

## 2023-08-08 DIAGNOSIS — M80.00XD AGE-RELATED OSTEOPOROSIS WITH CURRENT PATHOLOGICAL FRACTURE WITH ROUTINE HEALING, SUBSEQUENT ENCOUNTER: ICD-10-CM

## 2023-08-08 PROBLEM — M81.0 OSTEOPOROSIS, UNSPECIFIED OSTEOPOROSIS TYPE, UNSPECIFIED PATHOLOGICAL FRACTURE PRESENCE: Status: RESOLVED | Noted: 2017-06-26 | Resolved: 2023-08-08

## 2023-08-08 PROCEDURE — 99213 OFFICE O/P EST LOW 20 MIN: CPT | Mod: 25 | Performed by: FAMILY MEDICINE

## 2023-08-08 PROCEDURE — 99207 PR NO CHARGE NURSE ONLY: CPT

## 2023-08-08 PROCEDURE — 96372 THER/PROPH/DIAG INJ SC/IM: CPT | Performed by: FAMILY MEDICINE

## 2023-08-08 ASSESSMENT — PAIN SCALES - GENERAL: PAINLEVEL: NO PAIN (0)

## 2023-08-08 NOTE — TELEPHONE ENCOUNTER
Application for Disability Parking Certificate received and given to Dr. Hernandez to complete and sign.  Juliette Longoria,

## 2023-08-08 NOTE — TELEPHONE ENCOUNTER
Reason for Call:  Form, our goal is to have forms completed with 72 hours, however, some forms may require a visit or additional information.    Type of letter, form or note:  handicap    Who is the form from?: Patient    Where did the form come from: Patient or family brought in       What clinic location was the form placed at?: [facility]    Where the form was placed: Given to physician    What number is listed as a contact on the form?: none       Additional comments: Form was brought in at today's appointment.    Call taken on 8/8/2023 at 4:35 PM by Juliette Longoria

## 2023-08-08 NOTE — TELEPHONE ENCOUNTER
Noted. Heart surgery was in April 2023. Has seen Dr. Hernandez in July twice for follow up in [facility].    Kvng Garcia MD  [phone]

## 2023-08-08 NOTE — NURSING NOTE
Clinic Administered Medication Documentation      Prolia Documentation    Indication: Prolia  (denosumab) is a prescription medicine used to treat osteoporosis in patients who:   Are at high risk for fracture, meaning patients who have had a fracture related to osteoporosis, or who have multiple risk factors for fracture.  Cannot use another osteoporosis medicine or other osteoporosis medicines did not work well.  The timeline for early/late injections would be 4 weeks early and any time after the 6 month sangita. If a patient receives their injection late, then the subsequent injection would be 6 months from the date that they actually received the injection.    When was the last injection?  None  Was the last injection at least 6 months ago? Yes  Has the prior authorization been completed?  Yes  Is there an active order (written within the past 365 days, with administrations remaining, not ) in the chart?  Yes  Patient denies any dental work involving the bone (e.g. tooth extraction or dental implants) in the past 4 weeks?  Yes  Patient denies plans for any dental work involving the bone (e.g. tooth extraction or dental implants) in the next 4 weeks? Yes    The following steps were completed to comply with the REMS program for Prolia:  Reviewed information in the Medication Guide and Patient Counseling Chart, including the serious risks of Prolia  and the symptoms of each risk.  Advised patient to seek prompt medical attention if they have signs or symptoms of any of the serious risks.  Provided each patient a copy of the Medication Guide and Patient Brochure.    Prior to injection, verified patient identity using patient's name and date of birth. Medication was administered. Please see MAR and medication order for additional information. Patient instructed to remain in clinic for 15 minutes and report any adverse reaction to staff immediately.    Vial/Syringe: Syringe  Was this medication supplied by the  patient? No  Verified that the patient has refills remaining in their prescription.    Bina Engel RN  [facility]

## 2023-08-08 NOTE — PROGRESS NOTES
Assessment & Plan     Compression fracture of thoracic vertebra, unspecified thoracic vertebral level, sequela  Doing better    Hypertension goal BP (blood pressure) < 140/90  BP better Today  Discussed if BP less than 100 systolic -she can reduce cozaar to 25 mg daily-1/2 tablet daily    Age-related osteoporosis with current pathological fracture with routine healing, subsequent encounter  Started Prolia Today  Follow up medicare wellness  Estrellita Hernandez MD  [facility] CLAUDIA Lamas is a 87 year old, presenting for the following health issues:  No chief complaint on file.        8/8/2023     3:00 PM   Additional Questions   Roomed by Veronique       History of Present Illness       Reason for visit:  Blood pressure low    She eats 2-3 servings of fruits and vegetables daily.She consumes 1 sweetened beverage(s) daily.She exercises with enough effort to increase her heart rate 9 or less minutes per day.  She exercises with enough effort to increase her heart rate 3 or less days per week.   She is taking medications regularly.     Today BP is Good  No chest pain  Nno sob  Feels fine  Had her Prolia Injection Today  Pain back is better  Wants a handicap sticker        Review of Systems   CONSTITUTIONAL: NEGATIVE for fever, chills, change in weight  RESP: NEGATIVE for significant cough or SOB  CV: NEGATIVE for chest pain, palpitations or peripheral edema  GI: NEGATIVE for nausea, abdominal pain, heartburn, or change in bowel habits  MUSCULOSKELETAL: back is better  PSYCHIATRIC: NEGATIVE for changes in mood or affect      Objective    /72   Pulse 82   Temp 97.5  F (36.4  C) (Temporal)   Wt 55.4 kg (122 lb 3.2 oz)   LMP  (LMP Unknown)   SpO2 95%   BMI 24.68 kg/m    Body mass index is 24.68 kg/m .  Physical Exam   GENERAL: alert, no distress, and elderly  RESP: lungs clear to auscultation - no rales, rhonchi or wheezes  CV: regular rate and rhythm, normal S1 S2, no S3 or S4, no murmur,  click or rub, no peripheral edema and peripheral pulses strong  ABDOMEN: soft, nontender, no hepatosplenomegaly, no masses and bowel sounds normal  MS: no gross musculoskeletal defects noted, no edema  PSYCH: mentation appears normal

## 2023-08-10 NOTE — TELEPHONE ENCOUNTER
Form completed, signed and mailed to the DM[employer].  Patient called and informed.  Juliette Longoria,

## 2023-08-19 DIAGNOSIS — E78.5 HYPERLIPIDEMIA LDL GOAL <160: ICD-10-CM

## 2023-08-21 RX ORDER — SIMVASTATIN 20 MG
20 TABLET ORAL AT BEDTIME
Qty: 90 TABLET | Refills: 0 | Status: SHIPPED | OUTPATIENT
Start: 2023-08-21 | End: 2023-11-14

## 2023-08-24 ENCOUNTER — TELEPHONE (OUTPATIENT)
Dept: FAMILY MEDICINE | Facility: CLINIC | Age: 88
End: 2023-08-24
Payer: COMMERCIAL

## 2023-08-24 NOTE — TELEPHONE ENCOUNTER
Received call from patient. She states that she is returning a call to schedule for BP, then patient realized that the message that she had on her answering machine was old. Note that pt to schedule annual wellness visit. Appt made for 10/05.    Cassia Grayson RN   [facility]

## 2023-08-28 ENCOUNTER — NURSE TRIAGE (OUTPATIENT)
Dept: FAMILY MEDICINE | Facility: CLINIC | Age: 88
End: 2023-08-28
Payer: COMMERCIAL

## 2023-08-28 NOTE — TELEPHONE ENCOUNTER
Patient calling for 5 days of dizziness, worst upon getting up in the morning or rising from laying down. Hx of dizzy episodes with inner ear problems, but denies any vertigo sxs and states this feels different-more like lightheadedness. Able to ambulate normally with her walker. Home NEVA's have been slightly lower (104-110/82-89) over the past few days, so she decreased her losartan from 50 mg to 25 mg yesterday, HR has been in the 80's. She is also on Bumex daily. No CP or SOB. Does have some intermittent bilateral arm aching in the evening. Recommended drinking more fluids, and resting as needed (goal of 8 glasses per day). Routing to PCP team for review/recommendations and to call back patient at [phone].    Cathryn Poon RN        Reason for Disposition   Taking a medicine that could cause dizziness (e.g., blood pressure medications, diuretics)    Additional Information   Negative: Lightheadedness (dizziness) present now, after 2 hours of rest and fluids   Negative: Spinning or tilting sensation (vertigo) present now   Negative: Fever > 103 F (39.4 C)   Negative: Fever > 100.0 F (37.8 C) and has diabetes mellitus or a weak immune system (e.g., HIV positive, cancer chemotherapy, organ transplant, splenectomy, chronic steroids)   Negative: MODERATE dizziness (e.g., interferes with normal activities) (Exception: dizziness caused by heat exposure, sudden standing, or poor fluid intake)   Negative: Vomiting occurs with dizziness   Negative: Patient wants to be seen   Negative: Loss of vision or double vision  (Exception: Similar to previous migraines.)   Negative: Extra heart beats OR irregular heart beating (i.e., 'palpitations')   Negative: Difficulty breathing   Negative: Drinking very little and has signs of dehydration (e.g., no urine > 12 hours, very dry mouth, very lightheaded)   Negative: Follows bleeding (e.g., stomach, rectum, vagina)  (Exception: Became dizzy from sight of small amount blood.)    "Negative: Patient sounds very sick or weak to the triager   Negative: SEVERE dizziness (e.g., unable to stand, requires support to walk, feels like passing out now)   Negative: SEVERE headache or neck pain   Negative: Spinning or tilting sensation (vertigo) present now and one or more stroke risk factors (i.e., hypertension, diabetes mellitus, prior stroke/TIA, heart attack, age over 60) (Exception: prior physician evaluation for this AND no different/worse than usual)   Negative: Neurologic deficit that was brief (now gone), ANY of the following:* Weakness of the face, arm, or leg on one side of the body* Numbness of the face, arm, or leg on one side of the body* Loss of speech or garbled speech    Answer Assessment - Initial Assessment Questions  1. DESCRIPTION: \"Describe your dizziness.\"      Dizziness for 5 days, worst when getting up in the morning  2. LIGHTHEADED: \"Do you feel lightheaded?\" (e.g., somewhat faint, woozy, weak upon standing)      Yes  3. VERTIGO: \"Do you feel like either you or the room is spinning or tilting?\" (i.e. vertigo)      No  4. SEVERITY: \"How bad is it?\"  \"Do you feel like you are going to faint?\" \"Can you stand and walk?\"    - MILD: Feels slightly dizzy, but walking normally.    - MODERATE: Feels unsteady when walking, but not falling; interferes with normal activities (e.g., school, work).    - SEVERE: Unable to walk without falling, or requires assistance to walk without falling; feels like passing out now.       Mild  5. ONSET:  \"When did the dizziness begin?\"      Thursday morning, 5 days ago  6. AGGRAVATING FACTORS: \"Does anything make it worse?\" (e.g., standing, change in head position)      Laying to standing  7. HEART RATE: \"Can you tell me your heart rate?\" \"How many beats in 15 seconds?\"  (Note: not all patients can do this)        No palpitations per patient  8. CAUSE: \"What do you think is causing the dizziness?\"      Possibly related to blood pressure mediation. Patient " "cut her losartan in half starting yesterday  9. RECURRENT SYMPTOM: \"Have you had dizziness before?\" If Yes, ask: \"When was the last time?\" \"What happened that time?\"      Yes, hx of inner ear problems, but patient states this seems different  10. OTHER SYMPTOMS: \"Do you have any other symptoms?\" (e.g., fever, chest pain, vomiting, diarrhea, bleeding)        No, but does have a runny nose and some intermittent aching in her arms late in the day only  11. PREGNANCY: \"Is there any chance you are pregnant?\" \"When was your last menstrual period?\"        N/A    Protocols used: Dizziness-A-OH    "

## 2023-08-29 ENCOUNTER — OFFICE VISIT (OUTPATIENT)
Dept: FAMILY MEDICINE | Facility: CLINIC | Age: 88
End: 2023-08-29
Payer: COMMERCIAL

## 2023-08-29 VITALS
TEMPERATURE: 98.1 F | HEIGHT: 59 IN | BODY MASS INDEX: 24.92 KG/M2 | WEIGHT: 123.6 LBS | HEART RATE: 79 BPM | SYSTOLIC BLOOD PRESSURE: 111 MMHG | OXYGEN SATURATION: 99 % | DIASTOLIC BLOOD PRESSURE: 73 MMHG

## 2023-08-29 DIAGNOSIS — I95.1 POSTURAL HYPOTENSION: Primary | ICD-10-CM

## 2023-08-29 PROCEDURE — 99214 OFFICE O/P EST MOD 30 MIN: CPT | Performed by: NURSE PRACTITIONER

## 2023-08-29 NOTE — PROGRESS NOTES
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  (primary encounter diagnosis)  Comment: BP has dropped since her heart surgery, still on losartan but has decreased the dose to 25mg starting on Thursday and continues to have dizziness with posture changes, afraid she will fall   Plan: decrease losartan to 25mg every other day, monitor hypotension, take care when changing positions, consider stopping losartan, has PCP appt next month        Patient Instructions   Please decrease your losartan to 25mg every other day until you see your primary     April Mari, NP, APRN CNP  [facility] CLAUDIA Lamas is a 87 year old, presenting for the following health issues:  Dizziness (X 5 days.)      8/29/2023    11:49 AM   Additional Questions   Roomed by An V.   Accompanied by Son: Patel         8/29/2023    11:49 AM   Patient Reported Additional Medications   Patient reports taking the following new medications none       HPI     Dizziness  Onset/Duration: 5 days, BP has been low around 100 and below, recently decreased her losartan to 25mg daily, day 3 of decreased dose   Description:   Do you feel faint: No  Does it feel like the surroundings (bed, room) are moving: No  Unsteady/off balance: YES- slightly  Have you passed out or fallen: No  Intensity: moderate  Progression of Symptoms: same  Accompanying Signs & Symptoms:  Heart palpitations or chest pain: No  Nausea, vomiting: No  Weakness or lack of coordination in arms or legs: No  Vision or speech changes: No  Numbness or tingling: YES- slightly in hands, go to sleep   Ringing in ears (Tinnitus): No  Hearing Loss: No  History:   Head trauma/concussion history: No  Previous similar symptoms: No  Recent bleeding history: No  Any new medications (BP?): YES- Metoprolol on hold per Dr. Hernandez on 07/25/2023,   Precipitating factors:   Worse with activity: No  Worse with head movement: YES- sometimes  Alleviating factors:   Does  "staying in a fixed position give relief: YES  Therapies tried and outcome: None        Review of Systems   Constitutional, HEENT, cardiovascular, pulmonary, gi and gu systems are negative, except as otherwise noted.      Objective    /73   Pulse 79   Temp 98.1  F (36.7  C) (Oral)   Ht 1.499 m (4' 11.02\")   Wt 56.1 kg (123 lb 9.6 oz)   LMP  (LMP Unknown)   SpO2 99%   BMI 24.95 kg/m    Body mass index is 24.95 kg/m .  Physical Exam   GENERAL: healthy, alert and no distress  RESP: lungs clear to auscultation - no rales, rhonchi or wheezes  CV: regular rate and rhythm, normal S1 S2, no S3 or S4, no murmur, click or rub, no peripheral edema and peripheral pulses strong  MS: no gross musculoskeletal defects noted, no edema  PSYCH: mentation appears normal, affect normal/bright                      "

## 2023-08-31 PROBLEM — J44.9 COPD (CHRONIC OBSTRUCTIVE PULMONARY DISEASE) (H): Status: ACTIVE | Noted: 2023-08-31

## 2023-08-31 PROBLEM — I50.9 CHF (CONGESTIVE HEART FAILURE) (H): Status: ACTIVE | Noted: 2023-08-31

## 2023-09-19 DIAGNOSIS — H26.8 PXF (PSEUDOEXFOLIATION OF LENS CAPSULE): ICD-10-CM

## 2023-09-19 RX ORDER — DORZOLAMIDE HYDROCHLORIDE AND TIMOLOL MALEATE 20; 5 MG/ML; MG/ML
1 SOLUTION/ DROPS OPHTHALMIC 2 TIMES DAILY
Qty: 10 ML | Refills: 4 | Status: SHIPPED | OUTPATIENT
Start: 2023-09-19 | End: 2024-04-18

## 2023-09-19 NOTE — TELEPHONE ENCOUNTER
Refilling per Dr. Garcia's last visit note: Continue Cosopt (dorzolamide-timolol -- dark blue top) twice a day both eyes.  Patient past due to be seen, but has appointment scheduled 10/13/23 with Dr. Garcia.

## 2023-09-24 ENCOUNTER — HEALTH MAINTENANCE LETTER (OUTPATIENT)
Age: 88
End: 2023-09-24

## 2023-10-05 ENCOUNTER — OFFICE VISIT (OUTPATIENT)
Dept: FAMILY MEDICINE | Facility: CLINIC | Age: 88
End: 2023-10-05
Payer: COMMERCIAL

## 2023-10-05 VITALS
HEIGHT: 58 IN | OXYGEN SATURATION: 100 % | WEIGHT: 125 LBS | DIASTOLIC BLOOD PRESSURE: 72 MMHG | HEART RATE: 77 BPM | BODY MASS INDEX: 26.24 KG/M2 | TEMPERATURE: 98.6 F | SYSTOLIC BLOOD PRESSURE: 114 MMHG

## 2023-10-05 DIAGNOSIS — M80.00XD AGE-RELATED OSTEOPOROSIS WITH CURRENT PATHOLOGICAL FRACTURE WITH ROUTINE HEALING, SUBSEQUENT ENCOUNTER: ICD-10-CM

## 2023-10-05 DIAGNOSIS — I50.9 CONGESTIVE HEART FAILURE, UNSPECIFIED HF CHRONICITY, UNSPECIFIED HEART FAILURE TYPE (H): ICD-10-CM

## 2023-10-05 DIAGNOSIS — Z17.0 MALIGNANT NEOPLASM OF OVERLAPPING SITES OF LEFT BREAST IN FEMALE, ESTROGEN RECEPTOR POSITIVE (H): ICD-10-CM

## 2023-10-05 DIAGNOSIS — Z23 NEED FOR SHINGLES VACCINE: ICD-10-CM

## 2023-10-05 DIAGNOSIS — Z00.00 ENCOUNTER FOR MEDICARE ANNUAL WELLNESS EXAM: ICD-10-CM

## 2023-10-05 DIAGNOSIS — C50.812 MALIGNANT NEOPLASM OF OVERLAPPING SITES OF LEFT BREAST IN FEMALE, ESTROGEN RECEPTOR POSITIVE (H): ICD-10-CM

## 2023-10-05 DIAGNOSIS — I10 HYPERTENSION GOAL BP (BLOOD PRESSURE) < 140/90: ICD-10-CM

## 2023-10-05 DIAGNOSIS — Z23 NEED FOR TDAP VACCINATION: ICD-10-CM

## 2023-10-05 DIAGNOSIS — J44.9 CHRONIC OBSTRUCTIVE PULMONARY DISEASE, UNSPECIFIED COPD TYPE (H): ICD-10-CM

## 2023-10-05 DIAGNOSIS — Z12.11 SCREEN FOR COLON CANCER: ICD-10-CM

## 2023-10-05 PROCEDURE — G0439 PPPS, SUBSEQ VISIT: HCPCS | Performed by: FAMILY MEDICINE

## 2023-10-05 RX ORDER — LOSARTAN POTASSIUM 25 MG/1
12.5 TABLET ORAL DAILY
Qty: 45 TABLET | Refills: 3 | Status: SHIPPED | OUTPATIENT
Start: 2023-10-05 | End: 2024-09-05

## 2023-10-05 RX ORDER — CHOLECALCIFEROL (VITAMIN D3) 50 MCG
1 TABLET ORAL DAILY
Qty: 90 TABLET | Refills: 3 | Status: SHIPPED | OUTPATIENT
Start: 2023-10-05

## 2023-10-05 ASSESSMENT — ENCOUNTER SYMPTOMS
DIARRHEA: 0
COUGH: 0
DIZZINESS: 0
HEADACHES: 0
NAUSEA: 0
NERVOUS/ANXIOUS: 0
CONSTIPATION: 0
EYE PAIN: 0
SHORTNESS OF BREATH: 0
DYSURIA: 0
HEMATURIA: 0
CHILLS: 0
ABDOMINAL PAIN: 0
HEARTBURN: 0
BREAST MASS: 0
JOINT SWELLING: 0
FEVER: 0
FREQUENCY: 0
SORE THROAT: 0
WEAKNESS: 0
HEMATOCHEZIA: 0
PALPITATIONS: 0
MYALGIAS: 1
ARTHRALGIAS: 0
PARESTHESIAS: 0

## 2023-10-05 ASSESSMENT — PAIN SCALES - GENERAL: PAINLEVEL: NO PAIN (0)

## 2023-10-05 ASSESSMENT — ACTIVITIES OF DAILY LIVING (ADL): CURRENT_FUNCTION: HOUSEWORK REQUIRES ASSISTANCE

## 2023-10-05 NOTE — PROGRESS NOTES
"SUBJECTIVE:   Cydney is a 87 year old who presents for Preventive Visit.      10/5/2023     9:02 AM   Additional Questions   Roomed by Veronique       Are you in the first 12 months of your Medicare coverage?  No    Healthy Habits:     In general, how would you rate your overall health?  Fair    Frequency of exercise:  6-7 days/week    Duration of exercise:  Less than 15 minutes    Do you usually eat at least 4 servings of fruit and vegetables a day, include whole grains    & fiber and avoid regularly eating high fat or \"junk\" foods?  Yes    Taking medications regularly:  Yes    Medication side effects:  None    Ability to successfully perform activities of daily living:  Housework requires assistance    Home Safety:  No safety concerns identified    Hearing Impairment:  Need to ask people to speak up or repeat themselves and difficulty understanding soft or whispered speech    In the past 6 months, have you been bothered by leaking of urine?  No    In general, how would you rate your overall mental or emotional health?  Good    Additional concerns today:  No      Today's PHQ-2 Score:       10/5/2023     8:54 AM   PHQ-2 ( 1999 Pf[employer])   Q1: Little interest or pleasure in doing things 1   Q2: Feeling down, depressed or hopeless 0   PHQ-2 Score 1   Q1: Little interest or pleasure in doing things Several days   Q2: Feeling down, depressed or hopeless Not at all   PHQ-2 Score 1           Have you ever done Advance Care Planning? (For example, a Health Directive, POLST, or a discussion with a medical provider or your loved ones about your wishes): No, advance care planning information given to patient to review.  Advanced care planning was discussed at today's visit.       Fall risk  Fallen 2 or more times in the past year?: No  Any fall with injury in the past year?: No    Cognitive Screening   1) Repeat 3 items (Leader, Season, Table)    2) Clock draw: NORMAL  3) 3 item recall: Recalls 3 objects  Results: 3 items recalled: " COGNITIVE IMPAIRMENT LESS LIKELY    Mini-CogTM Copyright CHELLE Pelayo. Licensed by the author for use in [facility]; reprinted with permission (cathy@.[facility]). All rights reserved.          Reviewed and updated as needed this visit by clinical staff   Tobacco  Allergies  Meds  Problems  Med Hx  Surg Hx  Fam Hx          Reviewed and updated as needed this visit by Provider   Tobacco  Allergies  Meds  Problems  Med Hx  Surg Hx  Fam Hx         Social History     Tobacco Use    Smoking status: Former     Packs/day: 0.50     Years: 20.00     Pack years: 10.00     Types: Cigarettes     Quit date: 1976     Years since quittin.7    Smokeless tobacco: Never   Substance Use Topics    Alcohol use: Yes     Comment: Weekends 4 drink total per week             10/5/2023     8:54 AM   Alcohol Use   Prescreen: >3 drinks/day or >7 drinks/week? No     Do you have a current opioid prescription? No  Do you use any other controlled substances or medications that are not prescribed by a provider? None          Hyperlipidemia Follow-Up    Are you regularly taking any medication or supplement to lower your cholesterol?   Yes- statins  Are you having muscle aches or other side effects that you think could be caused by your cholesterol lowering medication?  No    Hypertension Follow-up    Do you check your blood pressure regularly outside of the clinic? No   Are you following a low salt diet? Yes  Are your blood pressures ever more than 140 on the top number (systolic) OR more   than 90 on the bottom number (diastolic), for example 140/90? No    Heart Failure Follow-up  Are you experiencing any shortness of breath? No  Are you experiencing any swelling in your legs or feet?  No  Are you using more pillows than usual? No  Do you cough at night?  No  Do you check your weight daily?  Yes  Have you had a weight change recently?  No  Are you having any of the following side effects from your medications? (Select all  that apply)  The patient does not report symptoms of dizziness, fatigue, cough, swelling, or slow heart beat.  Since your last visit, how many times have you gone to the cardiologist, urgent care, emergency room, or hospital because of your heart failure?   1 time  Last Echo: Echo result w/o MOPS: Interpretation SummaryAscending aorta aneurysm repair with 32 mm Hemashield graft and aortic valvereplacement with 21 mm Aponte Resilia on 04/25/2023.Left ventricular function is decreased. The ejection fraction is 40-45%(mildly reduced). There is significant vjpr-de-blsg variability in the LVEFdue to the patient's AF.Global right ventricular function is normal. The right ventricle is normalsize.The bioprosthetic aortic valve is well-seated. Leaflet opening is not clearlyvisualized. There is no valvular or paravalvular regurgitation. The Vmax is2.6 m/s, the mean gradient is 15 mmHg, the dimensionless index is 0.30, andthe acceleration time is 80 ms.Mild to moderate tricuspid insufficiency is present.IVC diameter and respiratory changes fall into an intermediate rangesuggesting an RA pressure of 8 mmHg.There is a small pericardial effusion adjacent LV lateral segments. Themaximal depth is 1.4 cm next to the left atrioventricular groove. There are nosigns of tamponade.This study was compared with the study from 04/27/2023. The LV function hasdeclined modestly due to the patient's AF. There is no significant change inthe RV function. The TR is less severe.      COPD Follow-Up  Overall, how are your COPD symptoms since your last clinic visit?  No change  How much fatigue or shortness of breath do you have when you are walking?  Same as usual  How much shortness of breath do you have when you are resting?  Same as usual  How often do you cough? Rarely  Have you noticed any change in your sputum/phlegm?  No  Have you experienced a recent fever? No  Please describe how far you can walk without stopping to rest:  The length of  3-5 rooms  How many flights of stairs are you able to walk up without stopping?  None  Have you had any Emergency Room Visits, Urgent Care Visits, or Hospital Admissions because of your COPD since your last office visit?  See notes    History   Smoking Status    Former    Packs/day: 0.50    Years: 20.00    Types: Cigarettes    Quit date: 2/1/1976   Smokeless Tobacco    Never     No results found for: FEV1, RHO4CRR    Current providers sharing in care for this patient include:   Patient Care Team:  Estrellita Hernandez MD as PCP - General (Family Practice)  Usha Salgado MD as MD (Hematology & Oncology)  Estrellita Hernandez MD as Assigned PCP  Ac Bailey MD as Assigned Musculoskeletal Provider  Kvng Garcia MD as MD (Ophthalmology)  Deandra Covarrubias RN as Specialty Care Coordinator (Cardiology)  Gm Solis MD as Assigned Heart and Vascular Provider  Jamel Yeager MD as Assigned Pulmonology Provider  Ian Haro MD as Assigned Surgical Provider  Geneva Daniel, RN as Specialty Care Coordinator (Cardiology)    The following health maintenance items are reviewed in Epic and correct as of today:  Health Maintenance   Topic Date Due    HF ACTION PLAN  Never done    COPD ACTION PLAN  Never done    ZOSTER IMMUNIZATION (2 of 3) 07/22/2008    DTAP/TDAP/TD IMMUNIZATION (2 - Td or Tdap) 10/05/2022    COLORECTAL CANCER SCREENING  06/29/2023    MEDICARE ANNUAL WELLNESS VISIT  08/03/2023    INFLUENZA VACCINE (1) 09/01/2023    COVID-19 Vaccine (6 - 2023-24 season) 09/01/2023    EYE EXAM  11/25/2023    ALT  07/05/2024    BMP  07/05/2024    LIPID  07/05/2024    ANNUAL REVIEW OF HM ORDERS  07/05/2024    CBC  07/05/2024    FALL RISK ASSESSMENT  10/05/2024    ADVANCE CARE PLANNING  10/05/2028    TSH W/FREE T4 REFLEX  Completed    SPIROMETRY  Completed    PHQ-2 (once per calendar year)  Completed    Pneumococcal Vaccine: 65+ Years  Completed    IPV IMMUNIZATION  Aged Out    HPV IMMUNIZATION  Aged Out     "MENINGITIS IMMUNIZATION  Aged Out    MAMMO SCREENING  Discontinued       Pertinent mammograms are reviewed under the imaging tab.    Review of Systems   Constitutional:  Negative for chills and fever.   HENT:  Negative for congestion, ear pain, hearing loss and sore throat.    Eyes:  Negative for pain and visual disturbance.   Respiratory:  Negative for cough and shortness of breath.    Cardiovascular:  Negative for chest pain, palpitations and peripheral edema.   Gastrointestinal:  Negative for abdominal pain, constipation, diarrhea, heartburn, hematochezia and nausea.   Breasts:  Negative for tenderness, breast mass and discharge.   Genitourinary:  Negative for dysuria, frequency, genital sores, hematuria, pelvic pain, urgency, vaginal bleeding and vaginal discharge.   Musculoskeletal:  Positive for myalgias. Negative for arthralgias and joint swelling.   Skin:  Negative for rash.   Neurological:  Negative for dizziness, weakness, headaches and paresthesias.   Psychiatric/Behavioral:  Negative for mood changes. The patient is not nervous/anxious.    Rest of the ROS is Negative except see above and Problem list [stable]    OBJECTIVE:   /72   Pulse 77   Temp 98.6  F (37  C) (Temporal)   Ht 1.478 m (4' 10.19\")   Wt 56.7 kg (125 lb)   LMP  (LMP Unknown)   SpO2 100%   BMI 25.96 kg/m   Estimated body mass index is 25.96 kg/m  as calculated from the following:    Height as of this encounter: 1.478 m (4' 10.19\").    Weight as of this encounter: 56.7 kg (125 lb).  Physical Exam  GENERAL APPEARANCE: alert, no distress, elderly, and frail  EYES: grossly normal   HENT: ear canals and TM's normal, nose and mouth without ulcers or lesions, oropharynx clear and oral mucous membranes moist  NECK: no adenopathy, no asymmetry, masses, or scars and thyroid normal to palpation  RESP: lungs clear to auscultation - no rales, rhonchi or wheezes  BREAST: normal without masses, tenderness or nipple discharge and no palpable " axillary masses or adenopathy  CV: regular rate and rhythm, normal S1 S2, no S3 or S4, no murmur, click or rub, no peripheral edema and peripheral pulses strong  ABDOMEN: soft, nontender, no hepatosplenomegaly, no masses and bowel sounds normal  MS: walks slowly with walker   SKIN: no suspicious lesions or rashes  NEURO: Normal strength and tone, sensory exam grossly normal, mentation intact and speech normal  PSYCH: mentation appears normal    Diagnostic Test Results:  Labs reviewed in Epic    ASSESSMENT / PLAN:   (Z00.00) Encounter for Medicare annual wellness exam  Comment:   Plan:     (J44.9) Chronic obstructive pulmonary disease, unspecified COPD type (H)  Comment: stable   Plan:     (I50.9) Congestive heart failure, unspecified HF chronicity, unspecified heart failure type (H)  Comment: doing well   Plan: wt is stable     (I10) Hypertension goal BP (blood pressure) < 140/90  Comment: advised change dose to daily  Plan: losartan (COZAAR) 25 MG tablet            (M80.00XD) Age-related osteoporosis with current pathological fracture with routine healing, subsequent encounter  Comment: on meds   Plan: vitamin D3 (CHOLECALCIFEROL) 50 mcg (2000         units) tablet            (C50.812,  Z17.0) Malignant neoplasm of overlapping sites of left breast in female, estrogen receptor positive (H)  Comment: stable       (Z12.11) Screen for colon cancer  Comment: she will check with [facility]      (Z23) Need for shingles vaccine  Comment: advised   Plan: zoster vaccine recombinant adjuvanted         (SHINGRIX) injection            (Z23) Need for Tdap vaccination  Comment: advised   Plan: Tdap, tetanus-diptheria-acell pertussis,         (BOOSTRIX) 5-2.5-18.5 LF-MCG/0.5 MISHEL injection        At pharmacy        COUNSELING:  Reviewed preventive health counseling, as reflected in patient instructions       Regular exercise       Healthy diet/nutrition       Vision screening       Hearing screening       Dental care       Bladder  control       Fall risk prevention       Osteoporosis prevention/bone health       Advanced Planning         She reports that she quit smoking about 47 years ago. Her smoking use included cigarettes. She has a 10.00 pack-year smoking history. She has never used smokeless tobacco.      Appropriate preventive services were discussed with this patient, including applicable screening as appropriate for fall prevention, nutrition, physical activity, Tobacco-use cessation, weight loss and cognition.  Checklist reviewing preventive services available has been given to the patient.    Reviewed patients plan of care and provided an AVS. The Complex Care Plan (for patients with higher acuity and needing more deliberate coordination of services) for Cydney meets the Care Plan requirement. This Care Plan has been established and reviewed with the Patient.  Pt also sees Cardiology  Notes have been reviewed       Estrellita Hernandez MD  [facility]    Identified Health Risks:

## 2023-10-05 NOTE — PROGRESS NOTES
"The patient was provided with suggestions to help her develop a healthy physical lifestyle.  The patient reports that she has difficulty with activities of daily living. I have asked that the patient make a follow up appointment in 24 weeks where this issue will be further evaluated and addressed.  The patient was provided with written information regarding signs of hearing loss.Answers submitted by the patient for this visit:  Annual Preventive Visit (Submitted on 10/5/2023)  Chief Complaint: Annual Exam:  In general, how would you rate your overall physical health?: fair  Frequency of exercise:: 6-7 days/week  Do you usually eat at least 4 servings of fruit and vegetables a day, include whole grains & fiber, and avoid regularly eating high fat or \"junk\" foods? : Yes  Taking medications regularly:: Yes  Medication side effects:: None  Activities of Daily Living: housework requires assistance  Home safety: no safety concerns identified  Hearing Impairment:: need to ask people to speak up or repeat themselves, difficulty understanding soft or whispered speech  In the past 6 months, have you been bothered by leaking of urine?: No  abdominal pain: No  Blood in stool: No  Blood in urine: No  chest pain: No  chills: No  congestion: No  constipation: No  cough: No  diarrhea: No  dizziness: No  ear pain: No  eye pain: No  nervous/anxious: No  fever: No  frequency: No  genital sores: No  headaches: No  hearing loss: No  heartburn: No  arthralgias: No  joint swelling: No  peripheral edema: No  mood changes: No  myalgias: Yes  nausea: No  dysuria: No  palpitations: No  Skin sensation changes: No  sore throat: No  urgency: No  rash: No  shortness of breath: No  visual disturbance: No  weakness: No  pelvic pain: No  vaginal bleeding: No  vaginal discharge: No  tenderness: No  breast mass: No  breast discharge: No  In general, how would you rate your overall mental or emotional health?: good  Additional concerns today:: " No  Exercise outside of work (Submitted on 10/5/2023)  Chief Complaint: Annual Exam:  Duration of exercise:: Less than 15 minutes

## 2023-10-12 NOTE — PROVIDER NOTIFICATION
1000 - Team called and made aware of low BP. Pt symptomatic with low BPs. 500 ml LR bolus ordered. Pt has been encouraged to drink when more alert.     1100 - Pt still sleepy and hypotensive, can we get another 500? Or Levo? -- Team ok with both.     1200 - levo started   [facility]   Physical Therapy Treatment / Discharge      Name: Gerda Lai  Clinic Number: [id number]    Therapy Diagnosis:   Encounter Diagnosis   Name Primary?    Difficulty walking Yes       Physician: Milton Cobian III, *    Visit Date: 10/12/2023    Physician Orders: PT Eval and Treat, R knee Note:    Eval and treat with modalities: s/p R TKA, focus on general gait training/balance/stability, does not need aggressive R TKA rehab (would normally be done with knee PT) 2x/week x4 weeks  Medical Diagnosis from Referral: Status post right knee replacement [Z96.651]  Evaluation Date: 7/12/2023  Authorization Period Expiration: 7/5/2024  Plan of Care Expiration: 10/12/2024  Progress Note Due: 11/2/2023   Visit # / Visits authorized: 17/ 40   FOTO: 2/3     Precautions: Standard  and R TKA      Time In: 11:02 a   Time Out: 11:40 a   Total Billable Time: 38 minutes    PTA Visit #: 0/5       Subjective     Pt reports: she is doing well. She is confident doing her exercises at home.   She was compliant with home exercise program.    SP RIGHT TKA; DOS 5/22/2023     Response to previous treatment: ongoing   Functional change: ongoing    Pain: 0/10  Location: na    Objective      Objective Measures updated at progress report unless specified.   (See last visit for objective details)  Updated 10/2/2023      KNEE AROM in deg    RIGHT 0 -125  LEFT 0-125    MOBILITY    RIGHT   PF 3/6  sup/inf    FXN TESTS   Tug : 15.70s, SPC   5 x sit to stand: min UE usage 14.77s     R KNEE MMT   Flex 5/5  Ext 5/5     GAIT  Decreased stride and speed with SPC  Without SPC, decreased stride and speed, WBOS, lacking coordination, infrequent need to stop.        Treatment     Gerda received the treatments listed below:      therapeutic exercises to develop strength, endurance, ROM, flexibility, posture, and core stabilization for 38 minutes including:    Recumbent bike full revolutions 10 minutes, 00  resistance  LAQ 3 x 10   Supine bridge 3s holds. 2 x 10   Standing heel raises 3 x 10 (counter support)   Mini squats 3 x 10 (counter support)   Standing hs curl 3 x 10 (counter support)         Patient Education and Home Exercises       Education provided:   - HEP handout provided     Written Home Exercises Provided: yes. Exercises were reviewed and Gerda was able to demonstrate them prior to the end of the session.  Gerda demonstrated good  understanding of the education provided. See EMR under Patient Instructions for exercises provided during therapy sessions    Assessment     Pt has been seen at physical therapy for approximately 17 visits. She demonstrates normalized knee strength and equalized knee active range of motion. The pt was recommended to continue to use SPC for ambulation. She demonstrates confidence taking over her poc. HEP was performed, reviewed, and print out provided. Pt demonstrates good tolerance to interventions and good recall of HEP. Plan to DC.     Gerda Is progressing well towards her goals.   Pt prognosis is Excellent.     Pt will continue to benefit from skilled outpatient physical therapy to address the deficits listed in the problem list box on initial evaluation, provide pt/family education and to maximize pt's level of independence in the home and community environment.     Pt's spiritual, cultural and educational needs considered and pt agreeable to plan of care and goals.     Anticipated barriers to physical therapy: none      Goals:  Short Term Goals:   Pt demonstrate indep with initial hep 4 weeks (MET)  Pt demonstrate equalized knee arom to normalize gait  4 weeks (MET)     Long Term Goals:  Pt demonstrate tug test 13 sec or less to decrease fall risk  8 weeks (PARTIALLY MET)  Pt demonstrate 5 x sit to stand 13 secs or less to improve LE strength for community ambulation  8 weeks (PARTIALLY MET)    Plan     Plan of care Certification: 7/12/2023 to 10/12/2023.     Outpatient  Physical Therapy 2 times weekly for 6 weeks to include the following interventions: Gait Training, Manual Therapy, Moist Heat/ Ice, Neuromuscular Re-ed, Patient Education, Self Care, Therapeutic Activities, and Therapeutic Exercise.    Carlos Bravo, PT

## 2023-10-13 ENCOUNTER — OFFICE VISIT (OUTPATIENT)
Dept: OPHTHALMOLOGY | Facility: CLINIC | Age: 88
End: 2023-10-13
Payer: COMMERCIAL

## 2023-10-13 DIAGNOSIS — H43.812 PVD (POSTERIOR VITREOUS DETACHMENT), LEFT: ICD-10-CM

## 2023-10-13 DIAGNOSIS — Z96.1 PSEUDOPHAKIA: ICD-10-CM

## 2023-10-13 DIAGNOSIS — H40.1431 PSEUDOEXFOLIATIVE GLAUCOMA, BOTH EYES, MILD STAGE: Primary | ICD-10-CM

## 2023-10-13 DIAGNOSIS — H26.8 PXF (PSEUDOEXFOLIATION OF LENS CAPSULE): ICD-10-CM

## 2023-10-13 PROCEDURE — 92083 EXTENDED VISUAL FIELD XM: CPT | Performed by: STUDENT IN AN ORGANIZED HEALTH CARE EDUCATION/TRAINING PROGRAM

## 2023-10-13 PROCEDURE — 92133 CPTRZD OPH DX IMG PST SGM ON: CPT | Performed by: STUDENT IN AN ORGANIZED HEALTH CARE EDUCATION/TRAINING PROGRAM

## 2023-10-13 PROCEDURE — 92012 INTRM OPH EXAM EST PATIENT: CPT | Performed by: STUDENT IN AN ORGANIZED HEALTH CARE EDUCATION/TRAINING PROGRAM

## 2023-10-13 ASSESSMENT — SLIT LAMP EXAM - LIDS
COMMENTS: NORMAL
COMMENTS: NORMAL

## 2023-10-13 ASSESSMENT — REFRACTION_WEARINGRX
SPECS_TYPE: BIFOCAL
OS_CYLINDER: +1.00
OS_AXIS: 170
OD_CYLINDER: +0.50
OD_HPRISM: 2.0
OS_SPHERE: -0.50
OD_AXIS: 134
OD_SPHERE: -1.25
OD_ADD: +3.00
OD_HBASE: IN
OS_ADD: +3.00

## 2023-10-13 ASSESSMENT — TONOMETRY
OS_IOP_MMHG: 13
OD_IOP_MMHG: 13
IOP_METHOD: APPLANATION

## 2023-10-13 ASSESSMENT — VISUAL ACUITY
OD_CC: 20/25
OD_CC+: -3
METHOD: SNELLEN - LINEAR
CORRECTION_TYPE: GLASSES
OS_CC+: +2
OS_CC: 20/30

## 2023-10-13 ASSESSMENT — EXTERNAL EXAM - RIGHT EYE: OD_EXAM: NORMAL

## 2023-10-13 ASSESSMENT — EXTERNAL EXAM - LEFT EYE: OS_EXAM: NORMAL

## 2023-10-13 NOTE — PATIENT INSTRUCTIONS
"Continue Cosopt (dorzolamide-timolol -- dark blue top) twice a day both eyes    Continue Latanoprost (green top) every evening both eyes     Continue artificial tears up to four times a day as needed (Refresh Optive or Systane Balance. Avoid \"get the red out\" drops).     Kvng Garcia MD  [phone]   "

## 2023-10-13 NOTE — PROGRESS NOTES
" Current Eye Medications:  Cosopt BID both eyes and Xalatan at bedtime both eyes and artificial tears up to four times a day as needed       Subjective:  here for OCT/HVF and IOP for glaucoma. She is about five months overdue, because she had open heart surgery in April.  Was given her drops in the hospital, hasn't missed hardly any.  Does feel the vision is slightly more blurred since aorta surgery. She sometimes sees a black line in the vision of both eyes.  Has had some trouble with Postural hypotension      Objective:  See Ophthalmology Exa Postural hypotension m.       Assessment:  Cydney Christiansen is a 87 year old female who presents with:   Encounter Diagnoses   Name Primary?    Pseudoexfoliative glaucoma, both eyes, mild stage Intraocular pressure 13/13 today. Continue same medications.    OCT optic nerve: avg retinal nerve fiber layer 69/69; thin sup and borderline temp right eye; thin temp and borderline inf left eye; stable both eyes.     Brock visual field (HVF) 24-2: inf loss right eye; within normal limits/mild scattered loss left eye.     PXF (PSEUDOEXFOLIATION OF LENS CAPSULE) OU     Pseudophakia - Both Eyes     PVD (posterior vitreous detachment), left        Plan:  Continue Cosopt (dorzolamide-timolol -- dark blue top) twice a day both eyes    Continue Latanoprost (green top) every evening both eyes     Continue artificial tears up to four times a day as needed (Refresh Optive or Systane Balance. Avoid \"get the red out\" drops).     Kvng Garcia MD  [phone]     "

## 2023-10-13 NOTE — LETTER
"    10/13/2023         RE: Cydney Christiansen  [address]  [place] [address]        Dear Colleague,    Thank you for referring your patient, Cydney Christiansen, to the [facility]. Please see a copy of my visit note below.     Current Eye Medications:  Cosopt BID both eyes and Xalatan at bedtime both eyes and artificial tears up to four times a day as needed       Subjective:  here for OCT/HVF and IOP for glaucoma. She is about five months overdue, because she had open heart surgery in April.  Was given her drops in the hospital, hasn't missed hardly any.  Does feel the vision is slightly more blurred since aorta surgery. She sometimes sees a black line in the vision of both eyes.  Has had some trouble with Postural hypotension      Objective:  See Ophthalmology Exa Postural hypotension teresa.       Assessment:  Cydney Christiansen is a 87 year old female who presents with:   Encounter Diagnoses   Name Primary?     Pseudoexfoliative glaucoma, both eyes, mild stage Intraocular pressure 13/13 today. Continue same medications.    OCT optic nerve: avg retinal nerve fiber layer 69/69; thin sup and borderline temp right eye; thin temp and borderline inf left eye; stable both eyes.     Brock visual field (HVF) 24-2: inf loss right eye; within normal limits/mild scattered loss left eye.      PXF (PSEUDOEXFOLIATION OF LENS CAPSULE) OU      Pseudophakia - Both Eyes      PVD (posterior vitreous detachment), left        Plan:  Continue Cosopt (dorzolamide-timolol -- dark blue top) twice a day both eyes    Continue Latanoprost (green top) every evening both eyes     Continue artificial tears up to four times a day as needed (Refresh Optive or Systane Balance. Avoid \"get the red out\" drops).     Kvng Garcia MD  [phone]       Again, thank you for allowing me to participate in the care of your patient.        Sincerely,        Kvng Garcia MD  "

## 2023-11-01 DIAGNOSIS — Z95.2 S/P AVR (AORTIC VALVE REPLACEMENT): ICD-10-CM

## 2023-11-01 DIAGNOSIS — Z98.890 S/P ASCENDING AORTIC ANEURYSM REPAIR: ICD-10-CM

## 2023-11-01 DIAGNOSIS — Z86.79 S/P ASCENDING AORTIC ANEURYSM REPAIR: ICD-10-CM

## 2023-11-01 RX ORDER — POTASSIUM CHLORIDE 1500 MG/1
20 TABLET, EXTENDED RELEASE ORAL 2 TIMES DAILY
Qty: 180 TABLET | Refills: 0 | Status: SHIPPED | OUTPATIENT
Start: 2023-11-01 | End: 2024-01-29

## 2023-11-14 DIAGNOSIS — E78.5 HYPERLIPIDEMIA LDL GOAL <160: ICD-10-CM

## 2023-11-14 RX ORDER — SIMVASTATIN 20 MG
20 TABLET ORAL AT BEDTIME
Qty: 90 TABLET | Refills: 1 | Status: SHIPPED | OUTPATIENT
Start: 2023-11-14 | End: 2024-05-01

## 2023-11-27 ENCOUNTER — TELEPHONE (OUTPATIENT)
Dept: DERMATOLOGY | Facility: CLINIC | Age: 88
End: 2023-11-27
Payer: COMMERCIAL

## 2023-11-27 NOTE — TELEPHONE ENCOUNTER
[facility] Call Center    Phone Message    May a detailed message be left on voicemail: yes     Reason for Call: Symptoms or Concerns     If patient has red-flag symptoms, warm transfer to triage line    Current symptom or concern: Lesion that gets crusted, Pt pulls it off, then it comes back, is painful.     Symptoms have been present for:  3 month(s)    Has patient previously been seen for this? No    By : Dr. Barba    Date: last seen on 10/28/22    Are there any new or worsening symptoms? Yes: new, recurring lesion that is painful    Action Taken: Message routed to:  Adult Clinics: Dermatology p [phone]    Travel Screening: Not Applicable

## 2023-11-27 NOTE — TELEPHONE ENCOUNTER
Pt called and scheduled for a spot check on 11/29 with Douglas.    Cassia Low RN on 11/27/2023 at 12:17 PM

## 2023-11-29 ENCOUNTER — OFFICE VISIT (OUTPATIENT)
Dept: DERMATOLOGY | Facility: CLINIC | Age: 88
End: 2023-11-29
Payer: COMMERCIAL

## 2023-11-29 DIAGNOSIS — D49.2 NEOPLASM OF SKIN: Primary | ICD-10-CM

## 2023-11-29 PROCEDURE — 88305 TISSUE EXAM BY PATHOLOGIST: CPT | Performed by: DERMATOLOGY

## 2023-11-29 PROCEDURE — 11102 TANGNTL BX SKIN SINGLE LES: CPT | Performed by: PHYSICIAN ASSISTANT

## 2023-11-29 ASSESSMENT — PAIN SCALES - GENERAL: PAINLEVEL: NO PAIN (0)

## 2023-11-29 NOTE — NURSING NOTE
Dermatology Rooming Note    Cydney Christiansen's goals for this visit include:   Chief Complaint   Patient presents with    Derm Problem     Spot check - left cheek spot of concern that crusts.         - Does patient need refills? No    - Last skin check was N/A    Kaylyn Cabrera

## 2023-11-29 NOTE — PROGRESS NOTES
[facility] Dermatology Note  Encounter Date: Nov 29, 2023  Office Visit      Dermatology Problem List:  #NUB, Left medial cheek, S/p Biopsy 11/29/23    1. 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 AK  - HAK, left lateral forehead, bx 9/28/21. treated with LN2 5/4/22  - AK, right cheek, s/p bx 9/28/21 - treated with LN2 5/4/22  - HAK, left mid eyebrow, base not seen, 6/2014  - left posterior lower leg, s/p bx 2/18/21, s/p cryo 9/28/21  - s/p cryotherapy  - left forearm, s/p biopsy 3/15/19   5. GA, right angle of jaw: resolves with triamcinolone cream  6. History of benign biopsy  - Verrucous keratosis - right dorsal hand posterior, s/p bx 4/1/22 and 6/8/22  - ISK, right dorsal hand, s/p bx 9/28/21  - C/w rosacea, right posterior shoulder, s/p bx 9/28/21  - SK, right posterior lower leg, s/p bx 2/18/21  - verucca, left forehead, s/p bx 2/18/21  - Arthropod bite, left 4th digit, bx 2/18/21  - SK, right abdomen, s/p bx 2/18/2020  7. Eczematous patch R dorsal hand  - previous Tx: triamcinolone 0.01% cream, Vaseline    ____________________________________________    Assessment & Plan:  # Neoplasm of unspecified behavior of the skin (D49.2) on the Left medial cheek. The differential diagnosis includes HAK vs NMSC vs other. .   - Shave biopsy performed today, see procedure note below.  - Photographed today    Procedures Performed:   - Shave biopsy procedure note, location(s): See above. After discussion of benefits and risks including but not limited to bleeding, infection, scar, incomplete removal, recurrence, and non-diagnostic biopsy, written consent and photographs were obtained. The area was cleaned with isopropyl alcohol. 0.5mL of 1% lidocaine with epinephrine was injected to obtain adequate anesthesia of lesion(s). Shave biopsy at site(s) performed. Hemostasis was achieved with aluminium chloride. Petrolatum ointment and a sterile dressing were applied. The patient tolerated the procedure and no complications were noted. The patient was provided with verbal and written post care instructions.      Follow-up: pending path results    Staff and scribe     Scribe disclosure  I, Kathleen Hardy, am serving as a scribe to document services personally performed by Yaneth Cole PA-C based on data collection and the provider's statements to me.     All risks, benefits and alternatives were discussed with patient.  Patient is in agreement and understands the assessment and plan.  All questions were answered.    Yaneth Cole PA-C, [facility]  [facility] [place]: Phone: [phone], Fax: [phone]  [facility]: Phone: [phone],  Fax: [phone]  [facility]: Phone: [phone], Fax: [phone]  ____________________________________________    CC: Derm Problem (Spot check - right cheek spot of concern that crusts. )      Reviewed patients past medical history and pertinent chart review prior to patient's visit today.      HPI:  Ms. Cydney Christiansen is a 88 year old female who presents today as a return patient for spot check.     Today patient reports a spot of concern on her left cheek. Reports that she picks it off and It comes back crusted.     Patient is otherwise feeling well, without additional concerns.    Labs:  None    Physical Exam:  Vitals: LMP  (LMP Unknown)   SKIN: Focused examination of L Cheek was performed.   - There is a L medial cheek there is a 5 mm hypertrophic pink tender papule  - No other lesions of concern on areas examined.         Medications:  Current Outpatient Medications   Medication    acetaminophen (TYLENOL) 325 MG tablet    albuterol (PROAIR HFA/PROVENTIL HFA/VENTOLIN HFA) 108 (90 Base) MCG/ACT inhaler    aspirin (ASA) 81 MG EC tablet    bumetanide (BUMEX) 1 MG tablet    cyanocobalamin (VITAMIN B-12) 500 MCG tablet    dorzolamide-timolol (COSOPT) 2-0.5 % ophthalmic solution    ferrous sulfate (FEROSUL) 325 (65 Fe) MG tablet    latanoprost (XALATAN) 0.005 % ophthalmic solution    losartan (COZAAR) 25 MG tablet    potassium chloride ER (KLOR-CON) 20 MEQ CR tablet    simvastatin (ZOCOR) 20 MG tablet    vitamin D3 (CHOLECALCIFEROL) 50 mcg (2000 units) tablet     Current Facility-Administered Medications   Medication    denosumab (PROLIA) injection 60 mg      Past Medical/Surgical History:   Patient Active Problem List   Diagnosis    History of basal cell carcinoma    PXF  OU    Personal history of malignant neoplasm of bladder    Advanced directives, counseling/discussion    Hyperlipidemia LDL goal <160    Family history of diabetes mellitus    Health Care Home    Squamous cell carcinoma    Basal cell carcinoma    Skin lesion of left leg    Viral warts    PVD (posterior vitreous detachment), OS    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    Hypertension goal BP (blood pressure) < 140/90    Seborrheic keratosis    Malignant neoplasm of overlapping sites of left female breast (H)     Scoliosis    Compression fracture of thoracic vertebra (H)    Mass of left hand    Primary open angle glaucoma of both eyes, unspecified glaucoma stage    Nuclear sclerosis of left eye    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)    Actinic keratosis    History of nonmelanoma skin cancer    Combined forms of age-related cataract of right eye    Status post coronary angiogram    Aortic valve stenosis    Age-related osteoporosis with current pathological fracture with routine healing, subsequent encounter    CHF (congestive heart failure) (H)    COPD (chronic obstructive pulmonary disease) (H)     Past Medical History:   Diagnosis Date    Actinic keratosis     Aortic valve stenosis 04/25/2023    Basal cell cancer 02/2011    bcc of the L back.    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    left    Osteoporosis     Osteoporosis, unspecified osteoporosis type, unspecified pathological fracture presence 06/26/2017    Scoliosis     Skin cancer     Skin cancer 05/2013    sccis R cheek    Squamous cell carcinoma 09/2011    R upper back    Squamous cell carcinoma 10/2012    R dorsal hand    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

## 2023-11-29 NOTE — PATIENT INSTRUCTIONS

## 2023-11-29 NOTE — LETTER
11/29/2023         RE: Cydney Christiansen  [address]  [place] [address]        Dear Colleague,    Thank you for referring your patient, Cydney Christiansen, to the [facility]. Please see a copy of my visit note below.    [facility] Dermatology Note  Encounter Date: Nov 29, 2023  Office Visit      Dermatology Problem List:  #NUB, Left medial cheek, S/p Biopsy 11/29/23    1. 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 AK  - HAK, left lateral forehead, bx 9/28/21. treated with LN2 5/4/22  - AK, right cheek, s/p bx 9/28/21 - treated with LN2 5/4/22  - HAK, left mid eyebrow, base not seen, 6/2014  - left posterior lower leg, s/p bx 2/18/21, s/p cryo 9/28/21  - s/p cryotherapy  - left forearm, s/p biopsy 3/15/19   5. GA, right angle of jaw: resolves with triamcinolone cream  6. History of benign biopsy  - Verrucous keratosis - right dorsal hand posterior, s/p bx 4/1/22 and 6/8/22  - ISK, right dorsal hand, s/p bx 9/28/21  - C/w rosacea, right posterior shoulder, s/p bx 9/28/21  - SK, right posterior  lower leg, s/p bx 2/18/21  - verucca, left forehead, s/p bx 2/18/21  - Arthropod bite, left 4th digit, bx 2/18/21  - SK, right abdomen, s/p bx 2/18/2020  7. Eczematous patch R dorsal hand  - previous Tx: triamcinolone 0.01% cream, Vaseline   ____________________________________________    Assessment & Plan:  # Neoplasm of unspecified behavior of the skin (D49.2) on the Left medial cheek. The differential diagnosis includes HAK vs NMSC vs other. .   - Shave biopsy performed today, see procedure note below.  - Photographed today    Procedures Performed:   - Shave biopsy procedure note, location(s): See above. After discussion of benefits and risks including but not limited to bleeding, infection, scar, incomplete removal, recurrence, and non-diagnostic biopsy, written consent and photographs were obtained. The area was cleaned with isopropyl alcohol. 0.5mL of 1% lidocaine with epinephrine was injected to obtain adequate anesthesia of lesion(s). Shave biopsy at site(s) performed. Hemostasis was achieved with aluminium chloride. Petrolatum ointment and a sterile dressing were applied. The patient tolerated the procedure and no complications were noted. The patient was provided with verbal and written post care instructions.      Follow-up: pending path results    Staff and scribe     Scribe disclosure  I, Kathleen Hardy, am serving as a scribe to document services personally performed by Yaneth Cole PA-C based on data collection and the provider's statements to me.     All risks, benefits and alternatives were discussed with patient.  Patient is in agreement and understands the assessment and plan.  All questions were answered.    Yaneth Cole PA-C, [facility]  [facility] [place]: Phone: [phone], Fax: [phone]  [facility]: Phone: [phone],  Fax: [phone]  [facility]: Phone: [phone], Fax:  [phone]  ____________________________________________    CC: Derm Problem (Spot check - right cheek spot of concern that crusts. )      Reviewed patients past medical history and pertinent chart review prior to patient's visit today.     HPI:  Ms. Cydney Christiansen is a 88 year old female who presents today as a return patient for spot check.     Today patient reports a spot of concern on her left cheek. Reports that she picks it off and It comes back crusted.     Patient is otherwise feeling well, without additional concerns.    Labs:  None    Physical Exam:  Vitals: LMP  (LMP Unknown)   SKIN: Focused examination of L Cheek was performed.   - There is a L medial cheek there is a 5 mm hypertrophic pink tender papule  - No other lesions of concern on areas examined.         Medications:  Current Outpatient Medications   Medication     acetaminophen (TYLENOL) 325 MG tablet     albuterol (PROAIR HFA/PROVENTIL HFA/VENTOLIN HFA) 108 (90 Base) MCG/ACT inhaler     aspirin (ASA) 81 MG EC tablet     bumetanide (BUMEX) 1 MG tablet     cyanocobalamin (VITAMIN B-12) 500 MCG tablet     dorzolamide-timolol (COSOPT) 2-0.5 % ophthalmic solution     ferrous sulfate (FEROSUL) 325 (65 Fe) MG tablet     latanoprost (XALATAN) 0.005 % ophthalmic solution     losartan (COZAAR) 25 MG tablet     potassium chloride ER (KLOR-CON) 20 MEQ CR tablet     simvastatin (ZOCOR) 20 MG tablet     vitamin D3 (CHOLECALCIFEROL) 50 mcg (2000 units) tablet     Current Facility-Administered Medications   Medication     denosumab (PROLIA) injection 60 mg      Past Medical/Surgical History:   Patient Active Problem List   Diagnosis     History of basal cell carcinoma     PXF  OU     Personal history of malignant neoplasm of bladder     Advanced directives, counseling/discussion     Hyperlipidemia LDL goal <160     Family history of diabetes mellitus     Health Care Home     Squamous cell carcinoma     Basal cell carcinoma     Skin lesion of left leg      Viral warts     PVD (posterior vitreous detachment), OS     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     Hypertension goal BP (blood pressure) < 140/90     Seborrheic keratosis     Malignant neoplasm of overlapping sites of left female breast (H)     Scoliosis     Compression fracture of thoracic vertebra (H)     Mass of left hand     Primary open angle glaucoma of both eyes, unspecified glaucoma stage     Nuclear sclerosis of left eye     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)     Actinic keratosis     History of nonmelanoma skin cancer     Combined forms of age-related cataract of right eye     Status post coronary angiogram     Aortic valve stenosis     Age-related osteoporosis with current pathological fracture with routine healing, subsequent encounter     CHF (congestive heart failure) (H)     COPD (chronic obstructive pulmonary disease) (H)     Past Medical History:   Diagnosis Date     Actinic keratosis      Aortic valve stenosis 04/25/2023     Basal cell cancer 02/2011    bcc of the L back.     Basal cell carcinoma 04' , 06'     Basal cell carcinoma 06/2011    L neck     Breast cancer (H)      Cataract      Colon polyps     Precancer     Glaucoma (increased eye pressure)      Heart murmur      HLD (hyperlipidemia)      Hypertension goal BP (blood pressure) < 140/90 12/19/2013     Invasive ductal carcinoma of breast (H) 06/2015    left     Osteoporosis      Osteoporosis, unspecified osteoporosis type, unspecified pathological fracture presence 06/26/2017     Scoliosis      Skin cancer      Skin cancer 05/2013    sccis R cheek     Squamous cell carcinoma 09/2011    R upper back     Squamous cell carcinoma 10/2012    R dorsal hand     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        Sincerely,        Yaneth Cole PA-C

## 2023-12-04 ENCOUNTER — TELEPHONE (OUTPATIENT)
Dept: DERMATOLOGY | Facility: CLINIC | Age: 88
End: 2023-12-04
Payer: COMMERCIAL

## 2023-12-04 NOTE — TELEPHONE ENCOUNTER
Writer called pt to discuss pathology results. Pt stated that the wound is healing well with no signs of infection. Pt scheduled for cryo in January. Pt denied having any questions or concerns at this time.         Final Diagnosis  A. Left medial cheek:  - Most consistent with hypertrophic actinic keratosis with overlying cutaneous horn - (see comment)  Electronically signed by David Catherine MD on 12/1/2023 at  3:30 PM      Result Notes     Yaneth Cole PA-C  12/2/2023  8:26 AM CST Back to Top    Needs to come back for LN2, ok to double book

## 2023-12-06 NOTE — PROGRESS NOTES
[facility] Dermatology Note  Encounter Date: Dec 7, 2023  Office Visit     Dermatology Problem List:  0. NUB's, s/p bx 12/07/23  - Right mid jaw line  - Right shin  - Left lateral forehead  - Right medial thigh  1. 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 AK  - HAK, left lateral forehead, bx 9/28/21. treated with LN2 5/4/22  - AK, right cheek, s/p bx 9/28/21 - treated with LN2 5/4/22  - HAK, left mid eyebrow, base not seen, 6/2014  - left posterior lower leg, s/p bx 2/18/21, s/p cryo 9/28/21  - s/p cryotherapy  - left forearm, s/p biopsy 3/15/19   - HAK, L medial cheek, s/p bx 12/6/23  5. GA, right angle of jaw: resolves with triamcinolone cream  6. History of benign biopsy  - Verrucous keratosis - right dorsal hand posterior, s/p bx 4/1/22 and 6/8/22  - ISK, right dorsal hand, s/p bx 9/28/21  - C/w rosacea, right posterior shoulder, s/p bx 9/28/21  - SK, right posterior lower leg, s/p bx 2/18/21  - verucca, left forehead, s/p bx 2/18/21  - Arthropod bite, left 4th digit, bx 2/18/21  - SK, right abdomen, s/p bx 2/18/2020  7.  Eczematous patch R dorsal hand  - previous Tx: triamcinolone 0.01% cream, Vaseline   8. HAK, Left medial cheek, S/p Biopsy 11/29/23  - S/p cryo 12/07/23  9. SK, right thigh, s/p 12/07/23  ____________________________________________    Assessment & Plan:    # SK, right thigh  - Cryotherapy performed today. See procedure note below.    # AK, left medial cheek, s/p bx 11/29/23  - Cryotherapy performed today. See procedure note below.    # Neoplasm of unspecified behavior of the skin (D49.2) on the right mid jawline. The differential diagnosis includes SCC vs. Other.  - Shave biopsy performed today (see procedure note(s) below).    # Neoplasm of unspecified behavior of the skin (D49.2) on the left lateral forehead. The differential diagnosis includes SCC vs. Other.   - Shave biopsy performed today (see procedure note(s) below).    # Neoplasm of unspecified behavior of the skin (D49.2) on the right medial thigh. The differential diagnosis includes SCC vs. Other.   - Shave biopsy performed today (see procedure note(s) below).    # Neoplasm of unspecified behavior of the skin (D49.2) on the right shin. The differential diagnosis includes SCC vs. Other.   - Shave biopsy performed today (see procedure note(s) below).    # History of NMSC  - ABCDEs: Counseled ABCDEs of melanoma: Asymmetry, Border (irregularity), Color (not uniform, changes in color), Diameter (greater than 6 mm which is about the size of a pencil eraser), and Evolving (any changes in preexisting moles).  - Sun protection: Counseled SPF30+ sunscreen, UPF clothing, sun avoidance, tanning bed avoidance.      Procedures Performed:   - Shave biopsy procedure note, location(s): see above. After discussion of benefits and risks including but not limited to bleeding, infection, scar, incomplete removal, recurrence, and non-diagnostic biopsy, written consent and photographs were obtained. The area was cleaned with isopropyl alcohol. 0.5mL of 1% lidocaine with  epinephrine was injected to obtain adequate anesthesia of lesion(s). Shave biopsy at site(s) performed. Hemostasis was achieved with aluminium chloride. Petrolatum ointment and a sterile dressing were applied. The patient tolerated the procedure and no complications were noted. The patient was provided with verbal and written post care instructions.     - Cryotherapy procedure note, location(s): see above. After verbal consent and discussion of risks and benefits including, but not limited to, dyspigmentation/scar, blister, and pain, 1 lesion(s) was(were) treated with 1-2 mm freeze border for 1-2 cycles with liquid nitrogen. Post cryotherapy instructions were provided.    Follow-up: 6 months in person    Staff and Scribe:     Scribe Disclosure:   IJustina, am serving as a scribe to document services personally performed by Qi Barba MD based on data collection and the provider's statements to me.     Scribe Disclosure:   I, ALVARO PAUL, am serving as a scribe; to document services personally performed by Qi Barba MD -based on data collection and the provider's statements to me.    Provider Disclosure:   The documentation recorded by the scribe accurately reflects the services I personally performed and the decisions made by me.    Qi Barba MD    Department of Dermatology  [facility]: Phone: [phone], Fax:[phone]  [facility]: Phone: [phone], Fax: [phone]   ____________________________________________    CC: Skin Check (History NMSC areas of concern right upper thigh and left cheek (biopsied 11/29/2023 for HAK) )    HPI:  Ms. Cydney Christiansen is a(n) 88 year old female who presents today as a return patient for skin check.    Last seen by John Cole PA-C on 11/29/23 for a spot check on the L medial cheek. Shave biopsy performed.    The patient  presents with concerns for left cheek. Previously biopsied.    She also would like her right upper thigh checked.    Patient is otherwise feeling well, without additional skin concerns.    Labs Reviewed:  Last Derm Path 11/29/23  A. Left medial cheek:  - Most consistent with hypertrophic actinic keratosis with overlying cutaneous horn     Physical Exam:  Vitals: LMP  (LMP Unknown)   SKIN: Total skin excluding the undergarment areas was performed. The exam included the head/face, neck, both arms, chest, back, abdomen, both legs, digits and/or nails.   - Left medial cheek, faint scale  - Right mid jaw line, keratotic papule   - Left lateral forehead, 5 mm keratotic papule  - Right medial thigh, keratotic papule  - Right shin, 1 mm red patch  - There is no erythema, telangectasias, nodularity, or pigmentation on the site of prior NMSC..   - No other lesions of concern on areas examined.     Medications:  Current Outpatient Medications   Medication    acetaminophen (TYLENOL) 325 MG tablet    aspirin (ASA) 81 MG EC tablet    bumetanide (BUMEX) 1 MG tablet    cyanocobalamin (VITAMIN B-12) 500 MCG tablet    dorzolamide-timolol (COSOPT) 2-0.5 % ophthalmic solution    ferrous sulfate (FEROSUL) 325 (65 Fe) MG tablet    latanoprost (XALATAN) 0.005 % ophthalmic solution    losartan (COZAAR) 25 MG tablet    potassium chloride ER (KLOR-CON) 20 MEQ CR tablet    simvastatin (ZOCOR) 20 MG tablet    vitamin D3 (CHOLECALCIFEROL) 50 mcg (2000 units) tablet    albuterol (PROAIR HFA/PROVENTIL HFA/VENTOLIN HFA) 108 (90 Base) MCG/ACT inhaler     Current Facility-Administered Medications   Medication    denosumab (PROLIA) injection 60 mg      Past Medical History:   Patient Active Problem List   Diagnosis    History of basal cell carcinoma    PXF  OU    Personal history of malignant neoplasm of bladder    Advanced directives, counseling/discussion    Hyperlipidemia LDL goal <160    Family history of diabetes mellitus    Health Care Home     Squamous cell carcinoma    Basal cell carcinoma    Skin lesion of left leg    Viral warts    PVD (posterior vitreous detachment), OS    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    Hypertension goal BP (blood pressure) < 140/90    Seborrheic keratosis    Malignant neoplasm of overlapping sites of left female breast (H)    Scoliosis    Compression fracture of thoracic vertebra (H)    Mass of left hand    Primary open angle glaucoma of both eyes, unspecified glaucoma stage    Nuclear sclerosis of left eye    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)    Actinic keratosis    History of nonmelanoma skin cancer    Combined forms of age-related cataract of right eye    Status post coronary angiogram    Aortic valve stenosis    Age-related osteoporosis with current pathological fracture with routine healing, subsequent encounter    CHF (congestive heart failure) (H)    COPD (chronic obstructive pulmonary disease) (H)     Past Medical History:   Diagnosis Date    Actinic keratosis     Aortic valve stenosis 04/25/2023    Basal cell cancer 02/2011    bcc of the L back.    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    left    Osteoporosis     Osteoporosis, unspecified osteoporosis type, unspecified pathological fracture presence 06/26/2017    Scoliosis     Skin cancer     Skin cancer 05/2013    sccis R cheek    Squamous cell carcinoma 09/2011    R upper back    Squamous cell carcinoma 10/2012    R dorsal hand    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 on close of this encounter.

## 2023-12-07 ENCOUNTER — OFFICE VISIT (OUTPATIENT)
Dept: DERMATOLOGY | Facility: CLINIC | Age: 88
End: 2023-12-07
Payer: COMMERCIAL

## 2023-12-07 DIAGNOSIS — D48.9 NEOPLASM OF UNCERTAIN BEHAVIOR: ICD-10-CM

## 2023-12-07 DIAGNOSIS — L57.0 ACTINIC KERATOSIS: ICD-10-CM

## 2023-12-07 DIAGNOSIS — L82.1 SEBORRHEIC KERATOSIS: ICD-10-CM

## 2023-12-07 DIAGNOSIS — Z85.828 HISTORY OF NONMELANOMA SKIN CANCER: Primary | ICD-10-CM

## 2023-12-07 DIAGNOSIS — L57.0 AK (ACTINIC KERATOSIS): ICD-10-CM

## 2023-12-07 DIAGNOSIS — D48.5 NEOPLASM OF UNCERTAIN BEHAVIOR OF SKIN: ICD-10-CM

## 2023-12-07 PROCEDURE — 11103 TANGNTL BX SKIN EA SEP/ADDL: CPT | Mod: XS | Performed by: DERMATOLOGY

## 2023-12-07 PROCEDURE — 88305 TISSUE EXAM BY PATHOLOGIST: CPT | Performed by: DERMATOLOGY

## 2023-12-07 PROCEDURE — 17000 DESTRUCT PREMALG LESION: CPT | Mod: XS | Performed by: DERMATOLOGY

## 2023-12-07 PROCEDURE — 11102 TANGNTL BX SKIN SINGLE LES: CPT | Mod: XS | Performed by: DERMATOLOGY

## 2023-12-07 PROCEDURE — 17110 DESTRUCTION B9 LES UP TO 14: CPT | Performed by: DERMATOLOGY

## 2023-12-07 ASSESSMENT — PAIN SCALES - GENERAL: PAINLEVEL: NO PAIN (0)

## 2023-12-07 NOTE — NURSING NOTE
Cydney Christiansen's goals for this visit include:   Chief Complaint   Patient presents with    Skin Check     History NMSC areas of concern right upper thigh and left cheek (biopsied 11/29/2023 for HAK)        She requests these members of her care team be copied on today's visit information:     PCP: Estrellita Hernandez    Referring Provider:  No referring provider defined for this encounter.    LMP  (LMP Unknown)     Do you need any medication refills at today's visit? Jennifer Love LPN

## 2023-12-07 NOTE — LETTER
12/7/2023         RE: Cydney Christiansen  [address]  [place] [address]        Dear Colleague,    Thank you for referring your patient, Cydney Christiansen, to the [facility]. Please see a copy of my visit note below.      [facility] Dermatology Note  Encounter Date: Dec 7, 2023  Office Visit     Dermatology Problem List:  0. NUB's, s/p bx 12/07/23  - Right mid jaw line  - Right shin  - Left lateral forehead  - Right medial thigh  1. 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 AK  - HAK, left lateral forehead, bx 9/28/21. treated with LN2 5/4/22  - AK, right cheek, s/p bx 9/28/21 - treated with LN2 5/4/22  - HAK, left mid eyebrow, base not seen, 6/2014  - left posterior lower leg, s/p bx 2/18/21, s/p cryo 9/28/21  - s/p cryotherapy  - left forearm, s/p biopsy 3/15/19   - HAK, L medial cheek, s/p bx 12/6/23  5. GA, right angle of jaw: resolves with triamcinolone cream  6. History of benign biopsy  - Verrucous keratosis - right dorsal hand posterior, s/p bx 4/1/22 and 6/8/22  - ISK, right dorsal  hand, s/p bx 9/28/21  - C/w rosacea, right posterior shoulder, s/p bx 9/28/21  - SK, right posterior lower leg, s/p bx 2/18/21  - verucca, left forehead, s/p bx 2/18/21  - Arthropod bite, left 4th digit, bx 2/18/21  - SK, right abdomen, s/p bx 2/18/2020  7. Eczematous patch R dorsal hand  - previous Tx: triamcinolone 0.01% cream, Vaseline   8. HAK, Left medial cheek, S/p Biopsy 11/29/23  - S/p cryo 12/07/23  9. SK, right thigh, s/p 12/07/23  ____________________________________________    Assessment & Plan:    # SK, right thigh  - Cryotherapy performed today. See procedure note below.    # AK, left medial cheek, s/p bx 11/29/23  - Cryotherapy performed today. See procedure note below.    # Neoplasm of unspecified behavior of the skin (D49.2) on the right mid jawline. The differential diagnosis includes SCC vs. Other.  - Shave biopsy performed today (see procedure note(s) below).    # Neoplasm of unspecified behavior of the skin (D49.2) on the left lateral forehead. The differential diagnosis includes SCC vs. Other.   - Shave biopsy performed today (see procedure note(s) below).    # Neoplasm of unspecified behavior of the skin (D49.2) on the right medial thigh. The differential diagnosis includes SCC vs. Other.   - Shave biopsy performed today (see procedure note(s) below).    # Neoplasm of unspecified behavior of the skin (D49.2) on the right shin. The differential diagnosis includes SCC vs. Other.   - Shave biopsy performed today (see procedure note(s) below).    # History of NMSC  - ABCDEs: Counseled ABCDEs of melanoma: Asymmetry, Border (irregularity), Color (not uniform, changes in color), Diameter (greater than 6 mm which is about the size of a pencil eraser), and Evolving (any changes in preexisting moles).  - Sun protection: Counseled SPF30+ sunscreen, UPF clothing, sun avoidance, tanning bed avoidance.      Procedures Performed:   - Shave biopsy procedure note, location(s): see above. After discussion of  benefits and risks including but not limited to bleeding, infection, scar, incomplete removal, recurrence, and non-diagnostic biopsy, written consent and photographs were obtained. The area was cleaned with isopropyl alcohol. 0.5mL of 1% lidocaine with epinephrine was injected to obtain adequate anesthesia of lesion(s). Shave biopsy at site(s) performed. Hemostasis was achieved with aluminium chloride. Petrolatum ointment and a sterile dressing were applied. The patient tolerated the procedure and no complications were noted. The patient was provided with verbal and written post care instructions.     - Cryotherapy procedure note, location(s): see above. After verbal consent and discussion of risks and benefits including, but not limited to, dyspigmentation/scar, blister, and pain, 1 lesion(s) was(were) treated with 1-2 mm freeze border for 1-2 cycles with liquid nitrogen. Post cryotherapy instructions were provided.    Follow-up: 6 months in person    Staff and Scribe:     Scribe Disclosure:   IJustina, am serving as a scribe to document services personally performed by Qi Barba MD based on data collection and the provider's statements to me.     Scribe Disclosure:   IALVRAO, am serving as a scribe; to document services personally performed by Qi Barba MD -based on data collection and the provider's statements to me.    Provider Disclosure:   The documentation recorded by the scribe accurately reflects the services I personally performed and the decisions made by me.    Qi Barba MD    Department of Dermatology  [facility]: Phone: [phone], Fax:[phone]  [facility]: Phone: [phone], Fax: [phone]   ____________________________________________    CC: Skin Check (History NMSC areas of concern right upper thigh and left cheek (biopsied 11/29/2023  for HAK) )    HPI:  Ms. Cydney Christiansen is a(n) 88 year old female who presents today as a return patient for skin check.    Last seen by John Cole PA-C on 11/29/23 for a spot check on the L medial cheek. Shave biopsy performed.    The patient presents with concerns for left cheek. Previously biopsied.    She also would like her right upper thigh checked.    Patient is otherwise feeling well, without additional skin concerns.    Labs Reviewed:  Last Derm Path 11/29/23  A. Left medial cheek:  - Most consistent with hypertrophic actinic keratosis with overlying cutaneous horn     Physical Exam:  Vitals: LMP  (LMP Unknown)   SKIN: Total skin excluding the undergarment areas was performed. The exam included the head/face, neck, both arms, chest, back, abdomen, both legs, digits and/or nails.   - Left medial cheek, faint scale  - Right mid jaw line, keratotic papule   - Left lateral forehead, 5 mm keratotic papule  - Right medial thigh, keratotic papule  - Right shin, 1 mm red patch  - There is no erythema, telangectasias, nodularity, or pigmentation on the site of prior NMSC..   - No other lesions of concern on areas examined.     Medications:  Current Outpatient Medications   Medication     acetaminophen (TYLENOL) 325 MG tablet     aspirin (ASA) 81 MG EC tablet     bumetanide (BUMEX) 1 MG tablet     cyanocobalamin (VITAMIN B-12) 500 MCG tablet     dorzolamide-timolol (COSOPT) 2-0.5 % ophthalmic solution     ferrous sulfate (FEROSUL) 325 (65 Fe) MG tablet     latanoprost (XALATAN) 0.005 % ophthalmic solution     losartan (COZAAR) 25 MG tablet     potassium chloride ER (KLOR-CON) 20 MEQ CR tablet     simvastatin (ZOCOR) 20 MG tablet     vitamin D3 (CHOLECALCIFEROL) 50 mcg (2000 units) tablet     albuterol (PROAIR HFA/PROVENTIL HFA/VENTOLIN HFA) 108 (90 Base) MCG/ACT inhaler     Current Facility-Administered Medications   Medication     denosumab (PROLIA) injection 60 mg      Past Medical History:   Patient Active  Problem List   Diagnosis     History of basal cell carcinoma     PXF  OU     Personal history of malignant neoplasm of bladder     Advanced directives, counseling/discussion     Hyperlipidemia LDL goal <160     Family history of diabetes mellitus     Health Care Home     Squamous cell carcinoma     Basal cell carcinoma     Skin lesion of left leg     Viral warts     PVD (posterior vitreous detachment), OS     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     Hypertension goal BP (blood pressure) < 140/90     Seborrheic keratosis     Malignant neoplasm of overlapping sites of left female breast (H)     Scoliosis     Compression fracture of thoracic vertebra (H)     Mass of left hand     Primary open angle glaucoma of both eyes, unspecified glaucoma stage     Nuclear sclerosis of left eye     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)     Actinic keratosis     History of nonmelanoma skin cancer     Combined forms of age-related cataract of right eye     Status post coronary angiogram     Aortic valve stenosis     Age-related osteoporosis with current pathological fracture with routine healing, subsequent encounter     CHF (congestive heart failure) (H)     COPD (chronic obstructive pulmonary disease) (H)     Past Medical History:   Diagnosis Date     Actinic keratosis      Aortic valve stenosis 04/25/2023     Basal cell cancer 02/2011    bcc of the L back.     Basal cell carcinoma 04' , 06'     Basal cell carcinoma 06/2011    L neck     Breast cancer (H)      Cataract      Colon polyps     Precancer     Glaucoma (increased eye pressure)      Heart murmur      HLD (hyperlipidemia)      Hypertension goal BP (blood pressure) < 140/90 12/19/2013     Invasive ductal carcinoma of breast (H) 06/2015    left     Osteoporosis      Osteoporosis, unspecified osteoporosis type, unspecified pathological fracture presence 06/26/2017     Scoliosis      Skin cancer      Skin cancer 05/2013    sccis R cheek     Squamous cell  carcinoma 09/2011    R upper back     Squamous cell carcinoma 10/2012    R dorsal hand     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 on close of this encounter.      The following medication was given:     MEDICATION:  Lidocaine with epinephrine 1% [id number]  ROUTE: SQ  SITE: see procedure note  DOSE: 2cc  LOT #: [id number]  : [employer] You [employer]  EXPIRATION DATE: 02-28-25  NDC#: 39117-617-30  Was there drug waste? No  Multi-dose vial: Yes    Cassia Low RN  December 7, 2023       Again, thank you for allowing me to participate in the care of your patient.        Sincerely,        Qi Barba MD

## 2023-12-07 NOTE — PATIENT INSTRUCTIONS
Wound Care After a Biopsy    What is a skin biopsy?  A skin biopsy allows the doctor to examine a very small piece of tissue under the microscope to determine the diagnosis and the best treatment for the skin condition. A local anesthetic (numbing medicine) is injected with a very small needle into the skin area to be tested. A small piece of skin is taken from the area. Sometimes a suture (stitch) is used.     What are the risks of a skin biopsy?  I will experience scar, bleeding, swelling, pain, crusting and redness. I may experience incomplete removal or recurrence. Risks of this procedure are excessive bleeding, bruising, infection, nerve damage, numbness, thick (hypertrophic or keloidal) scar and non-diagnostic biopsy.    How should I care for my wound for the first 24 hours?  Keep the wound dry and covered for 24 hours  If it bleeds, hold direct pressure on the area for 15 minutes. If bleeding does not stop, call us or go to the emergency room  Avoid strenuous exercise the first 1-2 days or as your doctor instructs you    How should I care for the wound after 24 hours?  After 24 hours, remove the bandage  You may bathe or shower as normal  If you had a scalp biopsy, you can shampoo as usual and can use shower water to clean the biopsy site daily  Clean the wound once a day with gentle soap and water  Do not scrub, be gentle  Apply white petroleum/Vaseline after cleaning the wound with a cotton swab or a clean finger, and keep the site covered with a Bandaid /bandage. Bandages are not necessary with a scalp biopsy  If you are unable to cover the site with a Bandaid /bandage, re-apply ointment 2-3 times a day to keep the site moist. Moisture will help with healing  Avoid strenuous activity for first 1-2 days  Avoid lakes, rivers, pools, and oceans until the stitches are removed or the site is healed    How do I clean my wound?  Wash hands thoroughly with soap or use hand  before all wound care  Clean  the wound with gentle soap and water  Apply white petroleum/Vaseline  to wound after it is clean  Replace the Bandaid /bandage to keep the wound covered for the first few days or as instructed by your doctor  If you had a scalp biopsy, warm shower water to the area on a daily basis should suffice    What should I use to clean my wound?   Cotton-tipped applicators (Qtips )  White petroleum jelly (Vaseline ). Use a clean new container and use Q-tips to apply.  Bandaids  as needed  Gentle soap     How should I care for my wound long term?  Do not get your wound dirty  Keep up with wound care for one week or until the area is healed.  A small scab will form and fall off by itself when the area is completely healed. The area will be red and will become pink in color as it heals. Sun protection is very important for how your scar will turn out. Sunscreen with an SPF 30 or greater is recommended once the area is healed.  You should have some soreness but it should be mild and slowly go away over several days. Talk to your doctor about using tylenol for pain,    When should I call my doctor?  If you have increased:   Pain or swelling  Pus or drainage (clear or slightly yellow drainage is ok)  Temperature over 100F  Spreading redness or warmth around wound    When will I hear about my results?  The biopsy results can take 2 weeks to come back.  Your results will automatically release to [employer] before your provider has even reviewed them.  The clinic will call you with the results, send you a [employer] message, or have you schedule a follow-up clinic or phone time to discuss the results.  Contact our clinics if you do not hear from us in 2 weeks.    Who should I call with questions?  [facility]: [phone]  [facility]: [phone]  For urgent needs outside of business hours call the [facility] at [phone] and ask for the dermatology resident on call    Cryotherapy    What is it?  Use of a very cold liquid, such as liquid nitrogen, to freeze and destroy abnormal skin cells that need to be removed    What should I expect?  Tenderness and redness  A small blister that might grow and fill with dark purple blood. There may be crusting.  More than one treatment may be needed if the lesions do not go away.    How do I care for the treated area?  Gently wash the area with your hands when bathing.  Use a thin layer of Vaseline to help with healing. You may use a Band-Aid.   The area should heal within 7-10 days and may leave behind a pink or lighter color.   Do not use an antibiotic or Neosporin ointment.   You may take acetaminophen (Tylenol) for pain.     Call your doctor if you have:  Severe pain  Signs of infection (warmth, redness, cloudy yellow drainage, and or a bad smell)  Questions or concerns    Who should I call with questions?      [facility]: [phone]      [facility]: [phone]      For urgent needs outside of business hours call the [facility] at [phone] and ask for the dermatology resident on call

## 2023-12-08 DIAGNOSIS — H26.8 PXF (PSEUDOEXFOLIATION OF LENS CAPSULE): ICD-10-CM

## 2023-12-08 RX ORDER — LATANOPROST 50 UG/ML
1 SOLUTION/ DROPS OPHTHALMIC AT BEDTIME
Qty: 7.5 ML | Refills: 4 | Status: SHIPPED | OUTPATIENT
Start: 2023-12-08

## 2023-12-08 NOTE — TELEPHONE ENCOUNTER
Received refill request for Latanoprost. WIll send to Dr. Garcia to refill. Patient was in Oct. 23

## 2023-12-11 LAB
PATH REPORT.COMMENTS IMP SPEC: ABNORMAL
PATH REPORT.COMMENTS IMP SPEC: YES
PATH REPORT.FINAL DX SPEC: ABNORMAL
PATH REPORT.GROSS SPEC: ABNORMAL
PATH REPORT.MICROSCOPIC SPEC OTHER STN: ABNORMAL
PATH REPORT.RELEVANT HX SPEC: ABNORMAL

## 2023-12-31 NOTE — RESULT ENCOUNTER NOTE
A. Right mid jawline:  - Most consistent with hypertrophic actinic keratosis - possibly needs a rebiopsy, recheck at excision visit    B. 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 Right shin:  - Basal cell carcinoma, superficial and nodular types - have mohs team preview. Most likely excision  Qi Barba MD    Department of Dermatology  [facility]: Phone: [phone], Fax:[phone]  [facility]: Phone: [phone], Fax: [phone]

## 2024-01-03 ENCOUNTER — TELEPHONE (OUTPATIENT)
Dept: DERMATOLOGY | Facility: CLINIC | Age: 89
End: 2024-01-03
Payer: COMMERCIAL

## 2024-01-03 NOTE — TELEPHONE ENCOUNTER
[facility] Call Center    Phone Message    May a detailed message be left on voicemail: yes     Reason for Call: Pt missed call, please call pt back. Pt is now home, call anytime. Thanks     Action Taken: Message routed to:  Adult Clinics: Dermatology p [id number]    Travel Screening: Not Applicable

## 2024-01-03 NOTE — TELEPHONE ENCOUNTER
Attempted to call patient regarding biopsy results, no answer. LVM for patient to call clinic back, number was provided.   Ellie Low RN

## 2024-01-04 ENCOUNTER — TELEPHONE (OUTPATIENT)
Dept: DERMATOLOGY | Facility: CLINIC | Age: 89
End: 2024-01-04
Payer: COMMERCIAL

## 2024-01-04 NOTE — TELEPHONE ENCOUNTER
Excision/Mohs previsit information                                                    Diagnosis: basal cell carcinoma & SCCIS  Site(s): Right shin & Right medial thigh     Over the counter Chlorhexidine surgical soap to wash all skin below the belly button twice before surgery should be recommended for the following:  - Surgical sites below the waist  - Immunosuppressed  - Previous surgical site infection  - Anticipated wound care challenges    Medication & Allergy Information                                                      Review and update allergy and medication list.    Do you take the following medications:  Coumadin, Eliquis, Pradaxa, Xarelto:  NO   -If on Coumadin, INR should be checked within 7 days of surgery.  Range should be 3.5 or less or within therapeutic range.    Past Medical History                                                    Do you currently or have you previously had any of the following conditions:    Hepatitis:  NO  HIV/AIDS:  NO  Prolonged bleeding or bleeding disorder:  NO  Pacemaker or Defibrillator:  NO.    History of artificial or heart valve replacement:  YES, valve replaced aortic  Endocarditis (inflammation of the inner lining of the heart's chambers and valves):  NO  Have you ever had a prosthetic joint infection:  NO  Pregnant or Breastfeeding:  NO  Mobility device (wheelchair, transfer difficulty): YES, walker    Important Reminders:                                                      Ok to take all of their medications as prescribed  Patients can eat, no need to be fasting  Patient will not be able to get the site wet for 48 hrs  No submerging wound in standing water (lake, pool, bathtub, hot tub) for 2 weeks  No physical activity for 48 hrs (further restrictions will be discussed by MD at time of visit)    If any positives, send to RN for further review  Cassia Low RN   Writer called pt to discuss pathology results. Pt scheduled for mohs & excision procedure.  Excision checklist complete, pt had aortic valve replaced. Pt instructed to  Chlorhexidine soap and cleanse both sites twice prior to appointment. Pt denied having any questions or concerns at this time.     Cassia Low RN on 1/4/2024 at 11:01 AM      Final Diagnosis  A. Right mid jawline:  - Most consistent with hypertrophic actinic keratosis - (see comment)     B. 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 Right medial thigh:  - Squamous cell carcinoma in situ - (see description)     D. Right shin:  - Basal cell carcinoma, superficial and nodular types - (see description)  Electronically signed by David Catherine MD on 12/11/2023 at  4:54 PM        Result Notes     Ellie Low LPN  1/3/2024  2:58 PM CST Back to Top    LVM for patient to call clinic back for biopsy results.  MARIEL Meadows MD  1/2/2024  9:06 AM CST     Schedule Mohs rather than excision for R shin BCC.     MD Qi King MD  12/30/2023  8:14 PM CST     A. Right mid jawline:  - Most consistent with hypertrophic actinic keratosis - possibly needs a rebiopsy, recheck at excision visit     B. 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 Right shin:  - Basal cell carcinoma, superficial and nodular types - have mohs team preview. Most likely excision  Qi Barba MD    Department of Dermatology  [facility]: Phone: [phone], Fax:[phone]  [facility]: Phone: [phone], Fax: [phone]

## 2024-01-08 ENCOUNTER — ANCILLARY PROCEDURE (OUTPATIENT)
Dept: CARDIOLOGY | Facility: CLINIC | Age: 89
End: 2024-01-08
Attending: INTERNAL MEDICINE
Payer: COMMERCIAL

## 2024-01-08 ENCOUNTER — LAB (OUTPATIENT)
Dept: LAB | Facility: CLINIC | Age: 89
End: 2024-01-08
Attending: INTERNAL MEDICINE
Payer: COMMERCIAL

## 2024-01-08 VITALS
SYSTOLIC BLOOD PRESSURE: 122 MMHG | HEART RATE: 70 BPM | DIASTOLIC BLOOD PRESSURE: 75 MMHG | OXYGEN SATURATION: 95 % | WEIGHT: 128.5 LBS | BODY MASS INDEX: 26.68 KG/M2

## 2024-01-08 DIAGNOSIS — E78.5 HYPERLIPIDEMIA LDL GOAL <160: Primary | ICD-10-CM

## 2024-01-08 DIAGNOSIS — Z95.2 H/O AORTIC VALVE REPLACEMENT: ICD-10-CM

## 2024-01-08 DIAGNOSIS — I50.20 SYSTOLIC CONGESTIVE HEART FAILURE, UNSPECIFIED HF CHRONICITY (H): ICD-10-CM

## 2024-01-08 DIAGNOSIS — I10 HYPERTENSION GOAL BP (BLOOD PRESSURE) < 140/90: ICD-10-CM

## 2024-01-08 DIAGNOSIS — E78.01 FAMILIAL HYPERCHOLESTEROLEMIA: ICD-10-CM

## 2024-01-08 DIAGNOSIS — I35.0 AORTIC VALVE STENOSIS, ETIOLOGY OF CARDIAC VALVE DISEASE UNSPECIFIED: ICD-10-CM

## 2024-01-08 DIAGNOSIS — I48.91 POSTOPERATIVE ATRIAL FIBRILLATION (H): ICD-10-CM

## 2024-01-08 DIAGNOSIS — I97.89 POSTOPERATIVE ATRIAL FIBRILLATION (H): ICD-10-CM

## 2024-01-08 LAB
ALBUMIN SERPL BCG-MCNC: 4 G/DL (ref 3.5–5.2)
ALP SERPL-CCNC: 36 U/L (ref 40–150)
ALT SERPL W P-5'-P-CCNC: <5 U/L (ref 0–50)
ANION GAP SERPL CALCULATED.3IONS-SCNC: 7 MMOL/L (ref 7–15)
AST SERPL W P-5'-P-CCNC: 21 U/L (ref 0–45)
BILIRUB SERPL-MCNC: 0.4 MG/DL
BUN SERPL-MCNC: 19.4 MG/DL (ref 8–23)
CALCIUM SERPL-MCNC: 9 MG/DL (ref 8.8–10.2)
CHLORIDE SERPL-SCNC: 103 MMOL/L (ref 98–107)
CHOLEST SERPL-MCNC: 152 MG/DL
CREAT SERPL-MCNC: 0.75 MG/DL (ref 0.51–0.95)
DEPRECATED HCO3 PLAS-SCNC: 28 MMOL/L (ref 22–29)
EGFRCR SERPLBLD CKD-EPI 2021: 77 ML/MIN/1.73M2
ERYTHROCYTE [DISTWIDTH] IN BLOOD BY AUTOMATED COUNT: 13.2 % (ref 10–15)
FASTING STATUS PATIENT QL REPORTED: YES
GLUCOSE SERPL-MCNC: 102 MG/DL (ref 70–99)
HCT VFR BLD AUTO: 37.1 % (ref 35–47)
HDLC SERPL-MCNC: 58 MG/DL
HGB BLD-MCNC: 11.8 G/DL (ref 11.7–15.7)
LDLC SERPL CALC-MCNC: 76 MG/DL
LDLC SERPL DIRECT ASSAY-MCNC: 85 MG/DL
LVEF ECHO: NORMAL
MCH RBC QN AUTO: 30.3 PG (ref 26.5–33)
MCHC RBC AUTO-ENTMCNC: 31.8 G/DL (ref 31.5–36.5)
MCV RBC AUTO: 95 FL (ref 78–100)
NONHDLC SERPL-MCNC: 94 MG/DL
PLATELET # BLD AUTO: 160 10E3/UL (ref 150–450)
POTASSIUM SERPL-SCNC: 4.2 MMOL/L (ref 3.4–5.3)
PROT SERPL-MCNC: 6.6 G/DL (ref 6.4–8.3)
RBC # BLD AUTO: 3.9 10E6/UL (ref 3.8–5.2)
SODIUM SERPL-SCNC: 138 MMOL/L (ref 135–145)
TRIGL SERPL-MCNC: 90 MG/DL
WBC # BLD AUTO: 4.8 10E3/UL (ref 4–11)

## 2024-01-08 PROCEDURE — 80053 COMPREHEN METABOLIC PANEL: CPT | Performed by: PATHOLOGY

## 2024-01-08 PROCEDURE — 36415 COLL VENOUS BLD VENIPUNCTURE: CPT | Performed by: PATHOLOGY

## 2024-01-08 PROCEDURE — 80061 LIPID PANEL: CPT | Performed by: PATHOLOGY

## 2024-01-08 PROCEDURE — 99000 SPECIMEN HANDLING OFFICE-LAB: CPT | Performed by: PATHOLOGY

## 2024-01-08 PROCEDURE — 83721 ASSAY OF BLOOD LIPOPROTEIN: CPT | Performed by: INTERNAL MEDICINE

## 2024-01-08 PROCEDURE — 99214 OFFICE O/P EST MOD 30 MIN: CPT | Mod: 25 | Performed by: INTERNAL MEDICINE

## 2024-01-08 PROCEDURE — 93306 TTE W/DOPPLER COMPLETE: CPT | Performed by: INTERNAL MEDICINE

## 2024-01-08 PROCEDURE — G0463 HOSPITAL OUTPT CLINIC VISIT: HCPCS | Performed by: INTERNAL MEDICINE

## 2024-01-08 PROCEDURE — 85027 COMPLETE CBC AUTOMATED: CPT | Performed by: PATHOLOGY

## 2024-01-08 ASSESSMENT — PAIN SCALES - GENERAL: PAINLEVEL: NO PAIN (0)

## 2024-01-08 NOTE — NURSING NOTE
Chief Complaint   Patient presents with    Follow Up     Chavez follow up     Vitals were taken and medications reconciled.    Hari Doe, EMT  1:02 PM

## 2024-01-08 NOTE — PATIENT INSTRUCTIONS
January 8, 2024    Cardiology Provider You Saw During Your Visit: Dr. Byrne      Medication Changes: None      Follow Up: With Dr. Byrne in 1 year with fasting labs prior to visit        Follow the American Heart Association Diet and Lifestyle Recommendations:  -Limit saturated fat, trans fat, sodium, red meat, sweets and sugar-sweetened beverages. If you choose to eat red meat, compare labels and select the leanest cuts available.  -Aim for at least 150 minutes of moderate physical activity or 75 minutes of vigorous physical activity - or an equal combination of both - each week.      To Reach Us:  -During business hours: [phone], press option # 1 to schedule an appointment or to leave a message for your care team.     -After hours, weekends or holidays: [phone], press option #4 and ask to speak to the on-call cardiologist. Inform them you are a patient at the [facility].        **If you have a cardiac device, please make sure you schedule an in-person device check just prior to your cardiology provider appointments**        Geneva Daniel RN  Cardiology Care Coordinator - General Cardiology  [facility]

## 2024-01-08 NOTE — PROGRESS NOTES
Cardiology Clinic Return Patient Visit    HPI:  Cydney Christiansen is a 88 year old female who was last seen in clinic in June 2023.     Her past medical history significant for HTN, HLD, and severe aortic stenosis with bicuspid aortic valve and an ascending aortic aneurysm. Patient is s/p AVR (Aponte Resilia 21 mm valve) and ascending aorta repair on 4/25/23 with Dr. Solis. Patient had post-operative atrial fibrillation s/p medical conversion 5/12 - not on anticoagulation after shared decision making & informed patient choice.  She established with Dr. Byrne following this surgery.    Since last visit, has been doing well overall.  Her only new symptom is this about once a day episode of palpitations that lasted approximately 30 seconds.  It is not associated with dyspnea, chest pain, or presyncope.  She is not overly bothered by this.  Otherwise she reports trying to stay active with a recumbent bike and lifting weights in her home.  She is asymptomatic in terms of cardiopulmonary symptoms when achieving this.  She is otherwise adherent to her medications without adverse effects.  Her blood pressures at home are within goal range.      ASSESSMENT AND PLAN  Cydney Christiansen is a 88 year old female with hypertension, hyperlipidemia, and a bicuspid aortic valve complicated by aortic stenosis and an ascending aortic aneurysm s/p AVR (Aponte Resilia 21 mm valve) and ascending aorta repair on 4/25/23 who presents for ongoing management over cardiac comorbidities.  Our visit today addressed the following concerns:    # Bicuspid Aortic valve c/b:   ## Severe Aortic Stenosis   ## Ascending Aortic Aneurysm   ## s/p bioprosthetic AVR (Aponte Resilia 21 mm valve)   ## s/p ascending aorta repair using 32 mm Hemashield graft   Most recent TTE 1/8/24 revealed a normal doppler interrogation. No active issues. Would continue yearly TTE at this point unless gradients increase or valve morphology changes. Is on ASA.   - TTE  yearly to assess gradients and morphology  - asa 81mg daily  - would follow up and ensure her family has had screening TTE at next visit    # HTN  Goal is systolic blood pressure less than 130 mmHg. Currently maintained on Losartan 25.   - continue losartan 25    # HLD  Last LDL of 76. Stable.  Maintained on simvastatin.  Age precludes ASCVD risk calculation.  Currently managing as primary prevention.  Reviewed AHA exercise and dietary guidelines.  No changes today.  - continue simvastatin    # Postoperative Atrial Fibrillation  Occurred as a complication of her aortic valve and ascending aortic repair. While she does have symptoms that could represent recurrence of atrial fibrillation, she is adamant she would not want to be on anticoagulation despite the fact that she would have an elevated stroke risk.  We discussed the risks and benefits of this at length and she verbalized understanding of the potential risks of this decision.  As her ejection fraction is otherwise normal and her symptoms are minimal we will defer cardiac monitor testing at this time.  Follow-up on symptom burden at next visit.  - monitor    # Stage B Heart Failure iso LV Dysfunction, now recovered  Postoperatively had an LVEF of 45%. Asymptomatic and therefore Stage B. Repeat echocardiogram done today is within normal limits.  Is otherwise maintained on losartan and Bumex without adverse effect.  -Continue losartan  -Continue Bumex  - monitor      Thank you for the opportunity to participate in the care of Cydney. Please feel free to contact me with any additional questions or concerns.    Juan Antonio Garcia MD  Cardiology Fellow      Follow up with Dr Byrne in 1 year with fasting labs.     This patient was discussed with Dr David Byrne, supervising cardiologist, who agrees with the assessment and plan as outlined above.       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    Hypertension goal BP (blood pressure) < 140/90    Seborrheic keratosis    Malignant neoplasm of overlapping sites of left female breast (H)    Scoliosis    Compression fracture of thoracic vertebra (H)    Mass of left hand    Primary open angle glaucoma of both eyes, unspecified glaucoma stage    Nuclear sclerosis of left eye    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)    Actinic keratosis    History of nonmelanoma skin cancer    Combined forms of age-related cataract of right eye    Status post coronary angiogram    Aortic valve stenosis    Age-related osteoporosis with current pathological fracture with routine healing, subsequent encounter    CHF (congestive heart failure) (H)    COPD (chronic obstructive pulmonary disease) (H)       CURRENT MEDICATIONS:  Current Outpatient Medications   Medication Sig Dispense Refill    acetaminophen (TYLENOL) 325 MG tablet Take 2 tablets (650 mg) by mouth every 4 hours as needed for other (For optimal non-opioid multimodal pain management to improve pain control.)      albuterol (PROAIR HFA/PROVENTIL HFA/VENTOLIN HFA) 108 (90 Base) MCG/ACT inhaler Inhale 1-2 puffs into the lungs every 4 hours as needed for shortness of breath or wheezing (Patient not taking: Reported on 12/7/2023) 6.7 g 0    aspirin (ASA) 81 MG EC tablet Take 1 tablet (81 mg) by mouth daily      bumetanide (BUMEX) 1 MG tablet Take 1 tablet (1 mg) by mouth daily 90 tablet 3    cyanocobalamin (VITAMIN B-12) 500 MCG tablet Take 1 tablet (500 mcg) by mouth daily 150 tablet 3    dorzolamide-timolol (COSOPT) 2-0.5 % ophthalmic solution Place 1  drop into both eyes 2 times daily 10 mL 4    ferrous sulfate (FEROSUL) 325 (65 Fe) MG tablet Take 1 tablet (325 mg) by mouth daily (with breakfast) 90 tablet 3    latanoprost (XALATAN) 0.005 % ophthalmic solution Place 1 drop into both eyes at bedtime 7.5 mL 4    losartan (COZAAR) 25 MG tablet Take 0.5 tablets (12.5 mg) by mouth daily 45 tablet 3    potassium chloride ER (KLOR-CON) 20 MEQ CR tablet TAKE 1 TABLET BY MOUTH TWICE A  tablet 0    simvastatin (ZOCOR) 20 MG tablet TAKE 1 TABLET BY MOUTH EVERYDAY AT BEDTIME 90 tablet 1    vitamin D3 (CHOLECALCIFEROL) 50 mcg (2000 units) tablet Take 1 tablet (50 mcg) by mouth daily 90 tablet 3       PAST SURGICAL HISTORY:  Past Surgical History:   Procedure Laterality Date    BIOPSY BREAST      CATARACT IOL, RT/LT      COLONOSCOPY  5/2008, 5/13, 6/14, 6/17    Q 3 years for advanced adenomatous polyp    CV CORONARY ANGIOGRAM N/A 02/17/2023    Procedure: Coronary Angiogram [[id number]];  Surgeon: James Jo MD;  Location:  [facility]    CV LEFT HEART CATH N/A 02/17/2023    Procedure: Left Heart Cath;  Surgeon: James Jo MD;  [facility]    CV RIGHT HEART CATH MEASUREMENTS RECORDED N/A 02/17/2023    Procedure: Right Heart Cath;  Surgeon: James Jo MD;  [facility]    CYSTOSCOPY  12/31/2008    D & C      GENITOURINARY SURGERY      TURBT    H STATISTIC PICC LINE INSERTION >5YR, FAILED Right 04/27/2023    Unable to advance guidewire. Left with lumpectomy with lymph nodes removed.    HC REMOVAL GALLBLADDER      open pan    HC TRABECULOPLASTY BY LASER SURGERY Left 04/18/2007    SLT #1 OS (inf 180)    HC TRABECULOPLASTY BY LASER SURGERY Right 05/04/2011    SLT #1 OD (inf 180?)    HC TRABECULOPLASTY BY LASER SURGERY Left 03/17/2015    SLT #2 OS (sup 180)    HC TRABECULOPLASTY BY LASER SURGERY Right 06/23/2015    SLT #2 OD (sup 180?)    IR CHEST TUBE PLACEMENT NON-TUNNELED BILATERAL   5/8/2023    IR THORACENTESIS  5/3/2023    IR THORACENTESIS  5/15/2023    LASER ARGON TREATMENT      SLT left eye x 2    LUMPECTOMY BREAST      LUMPECTOMY BREAST WITH SENTINEL NODE, COMBINED Left 07/29/2015    Procedure: COMBINED LUMPECTOMY BREAST WITH SENTINEL NODE;  Surgeon: Ifeanyi Sunshine MD;  Location: [facility]    PHACOEMULSIFICATION CLEAR CORNEA WITH STANDARD INTRAOCULAR LENS IMPLANT Left 12/08/2017    Procedure: PHACOEMULSIFICATION CLEAR CORNEA WITH STANDARD INTRAOCULAR LENS IMPLANT;   COMPLEX LEFT PHACOEMULSIFICATION CLEAR CORNEA WITH STANDARD INTRAOCULAR LENS IMPLANT ;  Surgeon: Kvng Garcia MD;  Location:  [facility]    PHACOEMULSIFICATION CLEAR CORNEA WITH STANDARD INTRAOCULAR LENS IMPLANT Right 10/19/2020    Procedure: RIGHT COMPLEX PHACOEMULSIFICATION, CATARACT, WITH INTRAOCULAR LENS IMPLANT ;  Surgeon: Kvng Garcia MD;  [place] [facility] [place]    REPAIR ANEURYSM ASCENDING AORTA N/A 04/25/2023    Procedure: MEDIAN STERNOTOMY, CARDIOPULMONARY BYPASS, AORTIC VALVE REPLACEMENT USING PIERRE RESILA 21 MM VALVE, ASCENDING AORTA  ANEURYSM REPAIR USING 32 MM HEMASHIELD GRAFT, TRANSESOPHAGEAL ECHOCARDIOGRAM PERFORMED BY ANESTHESIA STAFF;  Surgeon: Gm Solis MD;  Location:  OR    SURGICAL HISTORY OF -   09/2011    squamous cell CA excised from back    TUBAL LIGATION         ALLERGIES  Lisinopril    FAMILY HX:  Family History   Problem Relation Age of Onset    Diabetes Mother     Breast Cancer Mother     Eye Disorder Mother     Osteoporosis Mother     Glaucoma Mother     Macular Degeneration Mother     Prostate Cancer Father     Anesthesia Reaction Sister         PONV    Thyroid Disease Sister     Blood Disease Sister         lupus    Heart Disease Sister     Prostate Cancer Brother     Glaucoma Brother     Macular Degeneration Brother     Prostate Cancer Brother     Glaucoma Brother     Asthma Son     Glaucoma Other     Melanoma No family hx of     Skin Cancer No family hx of     Bleeding Disorder No family  hx of     Venous thrombosis No family hx of        SOCIAL HX:  Social History     Tobacco Use    Smoking status: Former     Packs/day: 0.50     Years: 20.00     Additional pack years: 0.00     Total pack years: 10.00     Types: Cigarettes     Quit date: 1976     Years since quittin.9    Smokeless tobacco: Never   Vaping Use    Vaping Use: Never used   Substance Use Topics    Alcohol use: Yes     Comment: Weekends 4 drink total per week    Drug use: No        VITAL SIGNS:  LMP  (LMP Unknown)   There is no height or weight on file to calculate BMI.  Wt Readings from Last 2 Encounters:   10/05/23 56.7 kg (125 lb)   23 56.1 kg (123 lb 9.6 oz)       PHYSICAL EXAM  Gen: pleasant  sitting comfortably in NAD  Head: nc/at  Eyes: EOMI  ENT: no erythema  Neck: no lymphadenopathy  CV: nml s1/s2, + systolic murmur; no JVD  Chest: clear lungs  Abd: nondistended  Ext: warm, no LE edema  Skin: no rash on limited exam  Neuro: awake, alert, oriented, nml speech      LABS: personally reviewed  CMP  Recent Labs   Lab Test 23  1248 23  1000 05/15/23  0452 23  1621 23  0349 23  1609 23  0513 23  1850 23  0501 23  1558 23  0603 05/10/23  0846 05/10/23  0423 23  0921 23  0438 23  0733 23  0546 23  0753 23  0705 23  0818 23  0549 22  1127 21  1041 20  1318 07/15/20  1014 19  1442   * 143 140 141 138   < > 137   < > 137   < > 137   < > 138   < > 137   < > 140  --  139  --  140   < > 137 139 138  --    POTASSIUM 4.5 4.6 4.4 4.2 4.1   < > 4.7   < > 4.8   < > 4.0   < > 4.6   < > 4.6   < > 3.8   < > 3.4  --  3.5   < > 4.5 4.1 4.6  --    CHLORIDE 98 104 103 102 101   < > 101   < > 100   < > 98   < > 101   < > 98   < > 98  --  98  --  101   < > 106 108 107  --    CO2 25 28 28 28 30*   < > 27   < > 27   < > 29   < > 31*   < > 33*   < > 33*  --  31*  --  26   < > 28 28 28  --    ANIONGAP 10 11 9 11  7   < > 9   < > 10   < > 10   < > 6*   < > 6*   < > 9  --  10  --  13   < > 3 3 3  --    GLC 98 118* 111* 122* 115*   < > 115*   < > 120*   < > 104*   < > 117*   < > 116*   < > 129*   < > 125*   < > 117*   < > 101* 99 98  --    BUN 16.5 25.5* 17.3 16.5 15.4   < > 19.6   < > 22.6   < > 21.3   < > 20.9   < > 19.3   < > 26.1*  --  29.2*  --  33.3*   < > 16 19 13  --    CR 0.59 0.77 0.76 0.83 0.78   < > 0.68   < > 0.77   < > 0.69   < > 0.72   < > 0.70   < > 0.65  --  0.55  --  0.57   < > 0.80 0.70 0.64 0.65   GFRESTIMATED 88 75 76 69 74   < > 85   < > 75   < > 85   < > 81   < > 84   < > 86  --  89  --  89   < > 68 80 83 83   GFRESTBLACK  --   --   --   --   --   --   --   --   --   --   --   --   --   --   --   --   --   --   --   --   --   --  79 >90 >90 >90   SHERYL 9.2 8.9 9.0 9.0 8.6*   < > 9.0   < > 8.6*   < > 8.9   < > 8.4*   < > 8.6*   < > 8.5*  --  8.6*  --  8.6*   < > 8.8 8.7 8.3*  --    MAG  --   --  2.1  --  2.2  --  1.9  --  2.0  --  2.0  --  1.9  --  1.9   < > 2.1  --  1.9  --  2.2   < >  --   --   --   --    PHOS  --   --   --   --   --   --   --   --  3.7  --  3.3  --  3.2  --   --   --  2.9  --  3.2  --  2.8   < >  --   --   --   --    PROTTOTAL 6.9  --   --   --   --   --   --   --   --   --   --   --   --   --   --   --  5.4*  --  5.4*  --  5.9*   < > 7.0 6.4*  --  6.4*   ALBUMIN 3.6  --   --   --   --   --   --   --   --   --  3.1*  --   --   --  2.8*  --  2.9*  --  2.9*  --  3.2*   < > 3.8 3.4  --  3.4   BILITOTAL 0.3  --   --   --   --   --   --   --   --   --   --   --   --   --   --   --  0.4  --  0.4  --  0.5   < > 0.6 0.2  --  0.2   ALKPHOS 56  --   --   --   --   --   --   --   --   --   --   --   --   --   --   --  44  --  44  --  65   < > 52 47  --  40   AST 27  --   --   --   --   --   --   --   --   --   --   --   --   --   --   --  55*  --  67*  --  96*   < > 40 42  --  25   ALT 9  --   --   --   --   --   --   --   --   --   --   --   --   --   --   --  73*  --  92*  --  128*   < > 25 21  --   20    < > = values in this interval not displayed.     CBC  Recent Labs   Lab Test 07/05/23  1248 05/23/23  1000 05/15/23  0452 05/14/23  0349   WBC 5.3 5.9 6.3 6.2   RBC 4.08 3.60* 3.09* 3.00*   HGB 11.1* 10.3* 9.0* 8.7*   HCT 35.1 35.5 30.8* 28.9*   MCV 86 99 100 96   MCH 27.2 28.6 29.1 29.0   MCHC 31.6 29.0* 29.2* 30.1*   RDW 15.7* 15.8* 16.5* 16.5*    260 218 222     INR  Recent Labs   Lab Test 05/14/23  0349 05/07/23  1042 05/01/23  0729 04/30/23  0546   INR 1.26* 1.25* 1.34* 1.39*     Recent Labs   Lab Test 07/05/23  1248 08/03/22  1035   CHOL 151 175   HDL 53 76   LDL 76  71 82   TRIG 108 86     Recent Labs   Lab Test 04/13/23  1139   A1C 6.0*     1/8/2024  Interpretation Summary  Ascending aorta aneurysm repair with 32 mm Hemashield graft and aortic valve  replacement with 21 mm Aponte Resilia on 04/25/2023.  Doppler interrogation of the aortic valve is normal.  Trace to mild tricuspid insufficiency is present.  Global and regional left ventricular function is normal with an EF of 55-60%.  Global right ventricular function is normal.  IVC diameter <2.1 cm collapsing >50% with sniff suggests a normal RA pressure  of 3 mmHg.  No pericardial effusion is present.  Compared to prior, sinus rhytm now, LVEF and TR are improved.        05/07/2023 Echocardiogram:  Interpretation Summary  Ascending aorta aneurysm repair with 32 mm Hemashield graft and aortic valve  replacement with 21 mm Aponet Resilia on 04/25/2023.  Left ventricular function is decreased. The ejection fraction is 40-45%  (mildly reduced). There is significant eend-vm-pbyc variability in the LVEF  due to the patient's AF.  Global right ventricular function is normal. The right ventricle is normal  size.  The bioprosthetic aortic valve is well-seated. Leaflet opening is not clearly  visualized. There is no valvular or paravalvular regurgitation. The Vmax is  2.6 m/s, the mean gradient is 15 mmHg, the dimensionless index is 0.30, and  the  acceleration time is 80 ms.  Mild to moderate tricuspid insufficiency is present.  IVC diameter and respiratory changes fall into an intermediate range  suggesting an RA pressure of 8 mmHg.  There is a small pericardial effusion adjacent LV lateral segments. The  maximal depth is 1.4 cm next to the left atrioventricular groove. There are no  signs of tamponade.  This study was compared with the study from 04/27/2023. The LV function has  declined modestly due to the patient's AF. There is no significant change in  the RV function. The TR is less severe.     02/17/2023 Coronary Angiogram/RHC:  Right sided filling pressures are normal.  Normal PA pressures.  Left sided filling pressures are normal.  Left ventricular filling pressures are normal.  Normal cardiac output level.  Extreme ortuosity of R subclavian requiring a Destinathion catheter.  Normal coronary angiogram.  Normal right heart catheterizations.  Severe aortic stenosis, with a mean gradient of 37 mmHg , aortic valve area 0.7 cm2 in favor of severe aortic stenosis.      I saw and evaluated patient with CV fellow. I examined patient with CV fellow. I discussed the results with patient and CV fellow. I discussed our plan with patient and CV fellow.  I agree with CV fellow's note and I edited the CV fellow's note to make it a more comprehensive document.    David Byrne MD, PhD  Professor of Medicine  Division of Cardiology

## 2024-01-08 NOTE — NURSING NOTE
January 8, 2024    Medication Changes: None      Follow Up: With Dr. Byrne in 1 year with fasting labs prior to visit      Patient verbalized understanding of all health information given and agreed to call with further questions or concerns.      Geneva Daniel RN

## 2024-01-22 ENCOUNTER — TELEPHONE (OUTPATIENT)
Dept: FAMILY MEDICINE | Facility: CLINIC | Age: 89
End: 2024-01-22
Payer: COMMERCIAL

## 2024-01-22 DIAGNOSIS — Z29.89 INDICATION PRESENT FOR ENDOCARDITIS PROPHYLAXIS: Primary | ICD-10-CM

## 2024-01-22 NOTE — TELEPHONE ENCOUNTER
Dr. Hernandez,     Can patient please have prophylactic antibiotic for dental appointment?      Thanks,  MARIEL Jara  [facility]

## 2024-01-22 NOTE — TELEPHONE ENCOUNTER
New Medication Request    Contacts         Type Contact Phone/Fax    01/22/2024 03:05 PM CST Phone (Incoming) Cydney Christiansen (Self) [phone] (M)            What medication are you requesting?: antibiotic having dental work 1.25     Reason for medication request: dental work    Have you taken this medication before?: No    Controlled Substance Agreement on file:   CSA -- Patient Level:    CSA: None found at the patient level.         Patient offered an appointment? No    Preferred Pharmacy:   [facility] [address] [place] [facility] [place] - [address]  [address] [facility] [address]  Phone: [phone] Fax: [phone]      Could we send this information to you in [facility] or would you prefer to receive a phone call?:   Patient would prefer a phone call   Okay to leave a detailed message?: Yes at Home number on file [phone] (home)

## 2024-01-23 RX ORDER — AMOXICILLIN 500 MG/1
CAPSULE ORAL
Qty: 4 CAPSULE | Refills: 3 | Status: SHIPPED | OUTPATIENT
Start: 2024-01-23 | End: 2024-10-07

## 2024-01-23 NOTE — TELEPHONE ENCOUNTER
"Patient calling about this, she is supposed to see her dentist on Thursday for a routine check.    She had an aneurysm treated in April, aorta valve replaced, has \"cow valve\".   Says dentist won't see her until she has antibiotic to take prior to visit (she thought the dentis told her 4 hours prior).    She will also have a cleaning in April as well, would like Rx sent for the visit this week and refills for future dental work.    Pharmacy selected.    Routed high priority to Dr. Hernandez to address, patient hoping to see dentist to check her partial device in 2 days.    April PHAM RN  [facility] Triage    "

## 2024-01-26 DIAGNOSIS — M80.8AXG: ICD-10-CM

## 2024-01-26 DIAGNOSIS — S22.000S COMPRESSION FRACTURE OF THORACIC VERTEBRA, UNSPECIFIED THORACIC VERTEBRAL LEVEL, SEQUELA: ICD-10-CM

## 2024-01-26 DIAGNOSIS — M80.80XP: ICD-10-CM

## 2024-01-26 DIAGNOSIS — Z92.29 PERSONAL HISTORY OF OTHER DRUG THERAPY: Primary | ICD-10-CM

## 2024-01-27 DIAGNOSIS — Z95.2 S/P AVR (AORTIC VALVE REPLACEMENT): ICD-10-CM

## 2024-01-27 DIAGNOSIS — Z98.890 S/P ASCENDING AORTIC ANEURYSM REPAIR: ICD-10-CM

## 2024-01-27 DIAGNOSIS — Z86.79 S/P ASCENDING AORTIC ANEURYSM REPAIR: ICD-10-CM

## 2024-01-29 RX ORDER — POTASSIUM CHLORIDE 1500 MG/1
20 TABLET, EXTENDED RELEASE ORAL 2 TIMES DAILY
Qty: 180 TABLET | Refills: 0 | Status: SHIPPED | OUTPATIENT
Start: 2024-01-29 | End: 2024-04-25

## 2024-02-08 ENCOUNTER — ALLIED HEALTH/NURSE VISIT (OUTPATIENT)
Dept: FAMILY MEDICINE | Facility: CLINIC | Age: 89
End: 2024-02-08
Payer: COMMERCIAL

## 2024-02-08 DIAGNOSIS — M80.8AXG: Primary | ICD-10-CM

## 2024-02-08 DIAGNOSIS — M80.80XP: ICD-10-CM

## 2024-02-08 DIAGNOSIS — S22.000S COMPRESSION FRACTURE OF THORACIC VERTEBRA, UNSPECIFIED THORACIC VERTEBRAL LEVEL, SEQUELA: ICD-10-CM

## 2024-02-08 PROCEDURE — 99207 PR NO CHARGE NURSE ONLY: CPT

## 2024-02-08 PROCEDURE — 96372 THER/PROPH/DIAG INJ SC/IM: CPT | Performed by: FAMILY MEDICINE

## 2024-02-08 NOTE — TELEPHONE ENCOUNTER
Postponing this encounter-    Patient due for Prolia injection around 8/8/2024    1. RN- Ensure there is a current CAM order for Prolia.     2. Scheduling team- Please contact patient to schedule the injection with RN around due date above.     Make sure patient has NOT had any dental work involving the jaw bone (i.e teeth extraction) 1 month prior to injection.   Schedule 1 month out from any dental procedure involving the jaw bone.    For questions about the cost, patient may contact our CONSUMER PRICE LINE at [phone] for an estimate.     CHECK STATUS PRIOR TO ADMINISTRATION- Chart Review > Referrals tab - Select the Referral to view the report    Kiki Canseco RN  [facility]

## 2024-02-08 NOTE — PROGRESS NOTES
Clinic Administered Medication Documentation      Prolia Documentation    Indication: Prolia  (denosumab) is a prescription medicine used to treat osteoporosis in patients who:   Are at high risk for fracture, meaning patients who have had a fracture related to osteoporosis, or who have multiple risk factors for fracture.  Cannot use another osteoporosis medicine or other osteoporosis medicines did not work well.  The timeline for early/late injections would be 4 weeks early and any time after the 6 month sangita. If a patient receives their injection late, then the subsequent injection would be 6 months from the date that they actually received the injection.    When was the last injection?  2023  Was the last injection at least 6 months ago? Yes  Has the prior authorization been completed?  Yes  Is there an active order (written within the past 365 days, with administrations remaining, not ) in the chart?  Yes  Patient denies any dental work involving the bone (e.g. tooth extraction or dental implants) in the past 4 weeks?  Yes  Patient denies plans for any dental work involving the bone (e.g. tooth extraction or dental implants) in the next 4 weeks? Yes    The following steps were completed to comply with the REMS program for Prolia:  Reviewed information in the Medication Guide and Patient Counseling Chart, including the serious risks of Prolia  and the symptoms of each risk.  Advised patient to seek prompt medical attention if they have signs or symptoms of any of the serious risks.  Provided each patient a copy of the Medication Guide and Patient Brochure.    Prior to injection, verified patient identity using patient's name and date of birth. Medication was administered. Please see MAR and medication order for additional information. Patient instructed to remain in clinic for 15 minutes and report any adverse reaction to staff immediately.    Vial/Syringe: Syringe  Was this medication supplied by the  patient? No  Med supplied by clinic    Kiki Canseco RN  [facility]

## 2024-02-10 ENCOUNTER — HEALTH MAINTENANCE LETTER (OUTPATIENT)
Age: 89
End: 2024-02-10

## 2024-02-21 ENCOUNTER — OFFICE VISIT (OUTPATIENT)
Dept: DERMATOLOGY | Facility: CLINIC | Age: 89
End: 2024-02-21
Payer: COMMERCIAL

## 2024-02-21 VITALS — HEART RATE: 62 BPM | OXYGEN SATURATION: 98 % | DIASTOLIC BLOOD PRESSURE: 82 MMHG | SYSTOLIC BLOOD PRESSURE: 146 MMHG

## 2024-02-21 DIAGNOSIS — D04.71 SQUAMOUS CELL CARCINOMA IN SITU (SCCIS) OF SKIN OF RIGHT THIGH: ICD-10-CM

## 2024-02-21 DIAGNOSIS — C44.712 BASAL CELL CARCINOMA (BCC) OF RIGHT LOWER LEG: Primary | ICD-10-CM

## 2024-02-21 DIAGNOSIS — L57.0 AK (ACTINIC KERATOSIS): ICD-10-CM

## 2024-02-21 PROCEDURE — 12034 INTMD RPR S/TR/EXT 7.6-12.5: CPT | Performed by: DERMATOLOGY

## 2024-02-21 PROCEDURE — 11602 EXC TR-EXT MAL+MARG 1.1-2 CM: CPT | Mod: XS | Performed by: DERMATOLOGY

## 2024-02-21 PROCEDURE — 88305 TISSUE EXAM BY PATHOLOGIST: CPT | Mod: XE | Performed by: DERMATOLOGY

## 2024-02-21 PROCEDURE — 17003 DESTRUCT PREMALG LES 2-14: CPT | Mod: XS | Performed by: DERMATOLOGY

## 2024-02-21 PROCEDURE — 17313 MOHS 1 STAGE T/A/L: CPT | Performed by: DERMATOLOGY

## 2024-02-21 PROCEDURE — 17000 DESTRUCT PREMALG LESION: CPT | Mod: XS | Performed by: DERMATOLOGY

## 2024-02-21 NOTE — PROGRESS NOTES
[facility] [place] Procedure Note    Dermatology Problem list:     1. 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 AK  - HAK, L Lateral forehead, s/p bx 12/7/24, s/p LN2 2/21/24  - HAK, L lateral forehead, bx 9/28/21. treated with LN2 5/4/22  - AK, right cheek, s/p bx 9/28/21 - treated with LN2 5/4/22  - HAK, left mid eyebrow, base not seen, 6/2014  - left posterior lower leg, s/p bx 2/18/21, s/p cryo 9/28/21  - s/p cryotherapy  - left forearm, s/p biopsy 3/15/19   - HAK, L medial cheek, s/p bx 12/6/23  5. GA, right angle of jaw: resolves with triamcinolone cream  6. History of benign biopsy  - Verrucous keratosis - right dorsal hand posterior, s/p bx 4/1/22 and 6/8/22  - ISK, right dorsal hand, s/p bx 9/28/21  - C/w rosacea, right posterior shoulder, s/p bx 9/28/21  - SK, right posterior lower leg, s/p bx 2/18/21  - verucca, left forehead, s/p bx 2/18/21  - Arthropod bite, left 4th digit, bx 2/18/21  - SK, right abdomen, s/p bx  2/18/2020  7. Eczematous patch R dorsal hand  - previous Tx: triamcinolone 0.01% cream, Vaseline   8. HAK, Left medial cheek, S/p Biopsy 11/29/23  - S/p cryo 12/07/23  9. SK, right thigh, s/p 12/07/23  ___________________________________________________________    Date of Service:  Feb 21, 2024  Surgery: Mohs micrographic surgery    Case 1  Repair Type: Intermediate  Repair Size: 3.8 cm  Suture Material: 4- 0 Monocryl, 5-0 Fast gut   Tumor Type: BCC  Location: R shin   Derm-Path Accession #: [id number]  PreOp Size: 0.8 x 0.7 cm  PostOp Size: 1.6 x 1.5 cm cm  Mohs Accession #: [id number]  Level of Defect: Fat      Procedure:  We discussed the principles of treatment and most likely complications including scarring, bleeding, infection, swelling, pain, crusting, nerve damage, large wound,  incomplete excision, wound dehiscence,  nerve damage, recurrence, and a second procedure may be recommended to obtain the best cosmetic or functional result.    Informed consent was obtained and the patient underwent the procedure as follows:  The patient was placed Sitting on the operating table.  The cancer was identified, outlined with a marker, and verified by the patient.  The entire surgical field was prepped with Chlorhexidine .  The surgical site was anesthetized using 1%lido w/ epi.      The area of clinically apparent tumor was debulked. The layer of tissue was then surgically excised using a #15 blade and was then transferred onto a specimen sheet maintaining the orientation of the specimen. Hemostasis was obtained using Bipolar electrocoagulation. The wound site was then covered with a dressing while the tissue samples were processed for examination.    The excised tissue was transported to the Mohs histology laboratory maintaining the tissue orientation.  The tissue specimen was relaxed so that the entire surgical margin was in a a single horizontal plane for sectioning and inked for precise mapping.  A precise reference  map was drawn to reflect the sectioning of the specimen, colored inking of the margins, and orientation on the patient. The tissue was processed using horizontal sectioning of the base and continuous peripheral margins.  The histopathologic sections were reviewed in conjunction with the reference map.    Total blocks: 1    Total slides:  1    There were no cancer cells visualized on examination, therefore Mohs surgery was complete.     Reconstruction: Intermediate Linear Closure    The patient was taken to the operative suite and placed supine on the operating room table.  The defect was identified.  Appropriate markings were made with a marking pen to plan the repair.  The area was infiltrated with Lidocaine 1% with epi 1:100,000 and prepped with Hibiclens and draped with sterile towels.     The wound was debeveled and undermined widely.  Cones were excised within relaxed skin tension lines on both sides of the defect.  Hemostasis was obtained using electrofulguration.  Deep subcutaneous tissues were then approximated using 4-0 Monocrcyl buried vertical mattress sutures.  The wound edges were then approximated additional  buried sutures were placed in a similar fashion where needed.  Percutaneous simple running 5-0 FAG sutures were carefully placed for maximum eversion and meticulous approximation.    Repair Size: 3.8 cm    The wound was cleansed with saline and ointment was applied along the wound surface.     A sterile pressure dressing was applied.  Wound care instructions were given verbally and in writing.  The patient left the operating suite in stable condition.  Patient was informed that additional refinement of the resulting surgical scar may be used as a second stage of this reconstruction.     The attending surgeon was present for entire procedure and always immediately available.    Case 2  NAME OF PROCEDURE: Excision intermediate layered linear closure  Staff surgeon:   Resident: Leora Spencer  PA  Scrub Nurse: Ayla EMT    PRE-OPERATIVE DIAGNOSIS:  Squamous cell carcinoma in situ  POST-OPERATIVE DIAGNOSIS: Same   LOCATION: R Medial Thigh  FINAL EXCISION SIZE(DEFECT SIZE): 1.9 X 1.3 cm  MARGIN: 4 cm  FINAL REPAIR LENGTH: 5.4 cm   ANESTHESIA: 9 mls 1% lidocaine with 1:100,000 epinephrine    INDICATIONS: This patient presented with a 1.1cm x 0.5cm Squamous cell carcinoma. Excision was indicated. We discussed the principles of treatment and most likely complications including scarring, bleeding, infection, incomplete excision, wound dehiscence, pain, nerve damage, and recurrence. Informed consent was obtained and the patient underwent the procedure as follows:    PROCEDURE: The patient was taken to the operative suite. Time-out was performed.  The treatment area was anesthetized with 1% lidocaine with epinephrine. The area was prepped with Chlorhexidine and rinsed with sterile saline and draped with sterile towels. The lesion was delineated and excised down to subcutaneous fat in a elliptical manner. Hemostasis was obtained by electrocoagulation.     REPAIR: An intermediate layered linear closure was selected as the procedure which would maximally preserve both function and cosmesis.    After the excision of the tumor, the area was carefully  undermined. Hemostasis was obtained with Heat electrocoagulation.  Closure was oriented so that the wound was in the patient's natural skin tension lines. The deep subcutaneous and dermal layers were then closed with 4-0 Monocryl sutures. The epidermis was then carefully approximated along the length of the wound using 5-0 Fast gut simple running sutures.     Estimated blood loss was less than 10 ml for all surgical sites. A sterile pressure dressing was applied and wound care instructions, with a written handout, were given. The patient was discharged from the [facility] alert and ambulatory.    The patient elected for pathology results to automatically  release and understands that the clinical staff will contact them as soon as possible to notify them of the results.    Follow-up in 2 weeks in person    Dr. Haor was immediately available for the entire surgery and was physicially present for the key portions of the procedure.    Anatomic Pathology Results: pending      Additional procedure   HAK, L lateral forehead s/p bx 12/7/24  Precancerous nature reviewed.     Cryotherapy procedure note: After verbal consent and discussion of risks and benefits including but no limited to dyspigmentation/scar, blister, and pain, 2 AK's were treated with 1-2mm freeze border for 2 cycles with liquid nitrogen. Post cryotherapy instructions were provided.       Staff Involved:  Scribe/Staff    Provider Disclosure:   The documentation recorded by the scribe accurately reflects the services I personally performed and the decisions made by me. I personally performed the procedures today.    Ian Haro DO    Department of Dermatology  [facility]: Phone: [phone], Fax:[phone]  [facility]: Phone: [phone], Fax: [phone]    Care and Laboratory Testing Performed at:  [facility]   Dermatology Clinic  [address] [place] [address]

## 2024-02-21 NOTE — NURSING NOTE
Cydney Christiansen's goals for this visit include:   Chief Complaint   Patient presents with    Procedure     Mohs BCC right shin, Excision SCCIS right medial thigh. Recheck AKs on right jaw and left lateral forehead       She requests these members of her care team be copied on today's visit information:     PCP: Estrellita Hernandez    Referring Provider:  No referring provider defined for this encounter.    BP (!) 146/82   Pulse 62   LMP  (LMP Unknown)   SpO2 98%     Do you need any medication refills at today's visit?         Ayla Mejia EMT    The following medication was given:     MEDICATION:  Lidocaine with epinephrine 1% 1:[id number]  ROUTE: SQ  SITE: see procedure note  DOSE: 18ml  LOT #: [id number]  : Gamma [employer]  EXPIRATION DATE: 4/30/2025  NDC#: 75822-312-24  Was there drug waste? no  Multi-dose vial: Yes    Ayla Mejia  February 21, 2024     Paper tape, Tegaderm and pressure dressing applied to Mohs site on R Velásquez.  Wound care instructions reviewed with patient and AVS provided.  Patient verbalized understanding.  Patient will follow up for suture removal: N/A.  No further questions or concerns at this time.        Paper tape, Tegaderm and pressure dressing applied to excision site on R medial thigh.  Wound care instructions reviewed with patient and AVS provided.  Patient verbalized understanding.  Patient will follow up for suture removal: N/A.  No further questions or concerns at this time.]

## 2024-02-21 NOTE — LETTER
2/21/2024         RE: Cydney Christiansen  [address]  [place] [address]        Dear Colleague,    Thank you for referring your patient, Cydney Christiansen, to the [facility]. Please see a copy of my visit note below.    [facility] [place] Procedure Note    Dermatology Problem list:     1. 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 AK  - HAK, L Lateral forehead, s/p bx 12/7/24, s/p LN2 2/21/24  - HAK, L lateral forehead, bx 9/28/21. treated with LN2 5/4/22  - AK, right cheek, s/p bx 9/28/21 - treated with LN2 5/4/22  - HAK, left mid eyebrow, base not seen, 6/2014  - left posterior lower leg, s/p bx 2/18/21, s/p cryo 9/28/21  - s/p cryotherapy  - left forearm, s/p biopsy 3/15/19   - HAK, L medial cheek, s/p bx 12/6/23  5. GA, right angle of jaw: resolves with triamcinolone cream  6. History of benign biopsy  - Verrucous keratosis - right dorsal hand posterior, s/p bx 4/1/22 and 6/8/22  - ISK, right  dorsal hand, s/p bx 9/28/21  - C/w rosacea, right posterior shoulder, s/p bx 9/28/21  - SK, right posterior lower leg, s/p bx 2/18/21  - verucca, left forehead, s/p bx 2/18/21  - Arthropod bite, left 4th digit, bx 2/18/21  - SK, right abdomen, s/p bx 2/18/2020  7. Eczematous patch R dorsal hand  - previous Tx: triamcinolone 0.01% cream, Vaseline   8. HAK, Left medial cheek, S/p Biopsy 11/29/23  - S/p cryo 12/07/23  9. SK, right thigh, s/p 12/07/23  ___________________________________________________________    Date of Service:  Feb 21, 2024  Surgery: Mohs micrographic surgery    Case 1  Repair Type: Intermediate  Repair Size: 3.8 cm  Suture Material: 4- 0 Monocryl, 5-0 Fast gut   Tumor Type: BCC  Location: R shin   Derm-Path Accession #: [id number]  PreOp Size: 0.8 x 0.7 cm  PostOp Size: 1.6 x 1.5 cm cm  Mohs Accession #: [id number]  Level of Defect: Fat      Procedure:  We discussed the principles of treatment and most likely complications including scarring, bleeding, infection, swelling, pain, crusting, nerve damage, large wound,  incomplete excision, wound dehiscence,  nerve damage, recurrence, and a second procedure may be recommended to obtain the best cosmetic or functional result.    Informed consent was obtained and the patient underwent the procedure as follows:  The patient was placed Sitting on the operating table.  The cancer was identified, outlined with a marker, and verified by the patient.  The entire surgical field was prepped with Chlorhexidine .  The surgical site was anesthetized using 1%lido w/ epi.      The area of clinically apparent tumor was debulked. The layer of tissue was then surgically excised using a #15 blade and was then transferred onto a specimen sheet maintaining the orientation of the specimen. Hemostasis was obtained using Bipolar electrocoagulation. The wound site was then covered with a dressing while the tissue samples were processed for examination.    The excised tissue was  transported to the Mohs histology laboratory maintaining the tissue orientation.  The tissue specimen was relaxed so that the entire surgical margin was in a a single horizontal plane for sectioning and inked for precise mapping.  A precise reference map was drawn to reflect the sectioning of the specimen, colored inking of the margins, and orientation on the patient. The tissue was processed using horizontal sectioning of the base and continuous peripheral margins.  The histopathologic sections were reviewed in conjunction with the reference map.    Total blocks: 1    Total slides:  1    There were no cancer cells visualized on examination, therefore Mohs surgery was complete.     Reconstruction: Intermediate Linear Closure    The patient was taken to the operative suite and placed supine on the operating room table.  The defect was identified.  Appropriate markings were made with a marking pen to plan the repair.  The area was infiltrated with Lidocaine 1% with epi 1:100,000 and prepped with Hibiclens and draped with sterile towels.     The wound was debeveled and undermined widely.  Cones were excised within relaxed skin tension lines on both sides of the defect.  Hemostasis was obtained using electrofulguration.  Deep subcutaneous tissues were then approximated using 4-0 Monocrcyl buried vertical mattress sutures.  The wound edges were then approximated additional  buried sutures were placed in a similar fashion where needed.  Percutaneous simple running 5-0 FAG sutures were carefully placed for maximum eversion and meticulous approximation.    Repair Size: 3.8 cm    The wound was cleansed with saline and ointment was applied along the wound surface.     A sterile pressure dressing was applied.  Wound care instructions were given verbally and in writing.  The patient left the operating suite in stable condition.  Patient was informed that additional refinement of the resulting surgical scar may be used as a  second stage of this reconstruction.     The attending surgeon was present for entire procedure and always immediately available.    Case 2  NAME OF PROCEDURE: Excision intermediate layered linear closure  Staff surgeon:   Resident: Leora VASQUES  Scrub Nurse: Ayla GREENE    PRE-OPERATIVE DIAGNOSIS:  Squamous cell carcinoma in situ  POST-OPERATIVE DIAGNOSIS: Same   LOCATION: R Medial Thigh  FINAL EXCISION SIZE(DEFECT SIZE): 1.9 X 1.3 cm  MARGIN: 4 cm  FINAL REPAIR LENGTH: 5.4 cm   ANESTHESIA: 9 mls 1% lidocaine with 1:100,000 epinephrine    INDICATIONS: This patient presented with a 1.1cm x 0.5cm Squamous cell carcinoma. Excision was indicated. We discussed the principles of treatment and most likely complications including scarring, bleeding, infection, incomplete excision, wound dehiscence, pain, nerve damage, and recurrence. Informed consent was obtained and the patient underwent the procedure as follows:    PROCEDURE: The patient was taken to the operative suite. Time-out was performed.  The treatment area was anesthetized with 1% lidocaine with epinephrine. The area was prepped with Chlorhexidine and rinsed with sterile saline and draped with sterile towels. The lesion was delineated and excised down to subcutaneous fat in a elliptical manner. Hemostasis was obtained by electrocoagulation.     REPAIR: An intermediate layered linear closure was selected as the procedure which would maximally preserve both function and cosmesis.    After the excision of the tumor, the area was carefully  undermined. Hemostasis was obtained with Heat electrocoagulation.  Closure was oriented so that the wound was in the patient's natural skin tension lines. The deep subcutaneous and dermal layers were then closed with 4-0 Monocryl sutures. The epidermis was then carefully approximated along the length of the wound using 5-0 Fast gut simple running sutures.     Estimated blood loss was less than 10 ml for all surgical  sites. A sterile pressure dressing was applied and wound care instructions, with a written handout, were given. The patient was discharged from the [facility] Center alert and ambulatory.    The patient elected for pathology results to automatically release and understands that the clinical staff will contact them as soon as possible to notify them of the results.    Follow-up in 2 weeks in person    Dr. Haro was immediately available for the entire surgery and was physicially present for the key portions of the procedure.    Anatomic Pathology Results: pending      Additional procedure   HAK, L lateral forehead s/p bx 12/7/24  Precancerous nature reviewed.     Cryotherapy procedure note: After verbal consent and discussion of risks and benefits including but no limited to dyspigmentation/scar, blister, and pain, 2 AK's were treated with 1-2mm freeze border for 2 cycles with liquid nitrogen. Post cryotherapy instructions were provided.       Staff Involved:  Scribe/Staff    Provider Disclosure:   The documentation recorded by the scribe accurately reflects the services I personally performed and the decisions made by me. I personally performed the procedures today.    Ian Haro DO    Department of Dermatology  [facility]: Phone: [phone], Fax:[phone]  [facility]: Phone: [phone], Fax: [phone]    Care and Laboratory Testing Performed at:  [facility]   Dermatology Clinic  [address] [place] [address]      Again, thank you for allowing me to participate in the care of your patient.        Sincerely,        Ian Haro MD

## 2024-02-21 NOTE — PATIENT INSTRUCTIONS
Excision/Mohs Wound Care Instructions  I will experience scar, altered skin color, bleeding, swelling, pain, crusting and redness. I may experience altered sensation. Risks are excessive bleeding, infection, muscle weakness, thick (hypertrophic or keloidal) scar, and recurrence. A second procedure may be recommended to obtain the best cosmetic or functional result.  Possible complications of any surgical procedure are bleeding, infection, scarring, alteration in skin color and sensation, muscle weakness in the area, wound dehiscence or seperation, or recurrence of the lesion or disease. On occasion, after healing, a secondary procedure or revision may be recommended in order to obtain the best cosmetic or functional result.   After your surgery, a pressure bandage will be placed over the area that has sutures. This will help prevent bleeding. Please follow these instructions until you come back to clinic as they will help you to prevent complications as your wound heals.  For the First 48 hours After Surgery:  Leave the pressure bandage on and keep it dry. If it should come loose, you may retape it, but do not take it off.  Relax and take it easy. Do not do any vigorous exercise, heavy lifting, or bending forward. This could cause the wound to bleed.  Post-operative pain is usually mild. You may alternate between 1000 mg of Tylenol (acetaminophen) and 400 mg of Ibuprofen every 4 hours.  Do not take more than 4,000mg of acetaminophen in a 24 hour period or 3200 mg of Ibuprofen in a 24 hr period.  Avoid alcohol and vitamin E as these may increase your tendency to bleed.  You may put an ice pack around the bandaged area for 20 minutes every 2-3 hours. This may help reduce swelling, bruising, and pain. Make sure the ice pack is waterproof so that the pressure bandage does not get wet.   You may see a small amount of drainage or blood on your pressure bandage. This is normal. However, if drainage or bleeding continues or  saturates the bandage, you will need to apply firm pressure over the bandage with a washcloth for 15 minutes. If bleeding continues after applying pressure for 15 minutes then go to the nearest emergency room.  48 Hours After Surgery  Carefully remove the bandage and start daily wound care and dressing changes. You may also now shower and get the wound wet.  Daily Wound Care:  Wash wound with a mild soap and water.  Use caution when washing the wound, be gentle and do not let the forceful shower stream hit the wound directly.  Pat dry.  Apply Vaseline (from a new container or tube) over the suture line with a Q-tip. It is very important to keep the wound continuously moist, as wounds heal best in a moist environment.  Keep the site covered until sutures have either been removed or dissolved.  You can cover it with a Telfa (non-stick) dressing and tape or a band-aid.    If you are unable to keep wound covered, you must apply Vaseline every 2-3 hours (while awake) to ensure it is being kept moist for optimal healing. A dressing overnight is recommended to keep the area moist.  Call Us If:  You have pain that is not controlled with Tylenol/Ibuprofen  You have signs or symptoms of an infection, such as: fever over 100 degrees F, redness, warmth, or foul-smelling or yellow drainage from the wound.  Who should I call with questions?  [facility]: [phone]   [facility]: [phone]  For urgent needs outside of business hours call the [facility] at [phone] and ask to speak with the dermatology resident on call Excision Wound Care Instructions with Steri-Strips    Possible complications of any surgical procedure are bleeding, infection, scarring, alteration in skin color and sensation, muscle weakness in the area, wound dehiscence or seperation, or recurrence of the lesion or disease. On occasion, after healing, a secondary procedure or revision  may be recommended in order to obtain the best cosmetic or functional result.     After your surgery, a pressure bandage will be placed over the area that has sutures. This will help prevent bleeding. Please, follow these instructions as they will help you to prevent complications as your wound heals.    For the First 48 hours After Surgery:    Leave the pressure bandage on and keep it dry. If it should come loose, you may retape it, but do not take it off.    Relax and take it easy. Do not do any vigorous exercise, heavy lifting, or bending forward. This could cause the wound to bleed.    Post-operative pain is usually mild. You may alternate between 1000 mg of Tylenol (acetaminophen) and 400 mg of Ibuprofen every 4 hours.  Do not take more than 4,000 mg of acetaminophen and more than 3200 mg of Ibuprofen in a 24 hr period.  Avoid alcohol and vitamin E as these may increase your tendency to bleed.    You may put an ice pack around the bandaged area for 20 minutes every 2-3 hours. This may help reduce swelling, bruising, and pain. Make sure the ice pack is waterproof so that the pressure bandage does not get wet.     If your bandage is saturated with blood apply firm pressure over the bandage with a washcloth for 15 minutes. If bleeding continues after applying pressure for 15 minutes then go to the nearest emergency room.      48 Hours After Surgery:    Remove outer white bandage down to clear plastic film (Tegaderm).  You may notice dark brown, dried blood under the Tegaderm.  This is normal.    Leave the clear plastic film (Tegaderm) on for up to 2 weeks, as long as it is intact.  If it falls off prior to 2 weeks follow daily wound care below.  If it stays intact for the full 2 weeks, then remove and treat as normal, healthy skin.      Daily Wound Care (if Tegaderm and Steri-Strips fall off prior to 2 weeks):    Wash wound with a mild soap and water.  Use caution when washing the wound, be gentle and do not let  the forceful shower stream hit the wound directly. DO NOT WASH WITH HYDROGEN PEROXIDE AS THIS MIGHT CAUSE THE STITCHES TO DISSOLVE FASTER THAN WHAT WE WANT.    Pat dry.    Apply Vaseline (from a new container or tube) over the suture line with a Q-tip until it is completely healed. It is very important to keep the wound continuously moist, as wounds heal best in a moist environment.    Keep the site covered until it's healed.  You can cover it with a Telfa (non-stick) dressing and tape or a band-aid until healed with normal, healthy skin.      Call Us If:    You have pain that is not controlled with Tylenol/Ibuprofen.    You have signs or symptoms of an infection, such as: fever over 100 degrees F, redness, warmth, or foul-smelling or yellow/creamy drainage from the wound.      Who should I call with questions?  [facility]: [phone]  [facility]: [phone]  For urgent needs outside of business hours call the [facility] at [phone] and ask for the dermatology resident on call

## 2024-03-07 ENCOUNTER — OFFICE VISIT (OUTPATIENT)
Dept: DERMATOLOGY | Facility: CLINIC | Age: 89
End: 2024-03-07
Payer: COMMERCIAL

## 2024-03-07 DIAGNOSIS — Z85.828 HISTORY OF BASAL CELL CARCINOMA OF SKIN: Primary | ICD-10-CM

## 2024-03-07 DIAGNOSIS — Z51.89 VISIT FOR WOUND CHECK: ICD-10-CM

## 2024-03-07 DIAGNOSIS — Z86.007 HISTORY OF SQUAMOUS CELL CARCINOMA IN SITU OF SKIN: ICD-10-CM

## 2024-03-07 PROCEDURE — 99207 PR NO CHARGE LOS: CPT | Performed by: DERMATOLOGY

## 2024-03-07 NOTE — LETTER
3/7/2024         RE: Cydney Christiansen  [address]  [place] [address]        Dear Colleague,    Thank you for referring your patient, Cydney Christiansen, to the [facility]. Please see a copy of my visit note below.    Dermatologic Surgery Post-Op Wound Check     CC: Wound Check (Patient denies bleeding, pain, swelling or redness from right shin wound and right thigh wound.)    Dermatology Problem List:  1. 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 AK  - HAK, L Lateral forehead, s/p bx 12/7/24, s/p LN2 2/21/24  - HAK, L lateral forehead, bx 9/28/21. treated with LN2 5/4/22  - AK, right cheek, s/p bx 9/28/21 - treated with LN2 5/4/22  - HAK, left mid eyebrow, base not seen, 6/2014  - left posterior lower leg, s/p bx 2/18/21, s/p cryo 9/28/21  - s/p cryotherapy  - left forearm, s/p biopsy 3/15/19   - HAK, L medial cheek, s/p bx 12/6/23  5. GA, right angle of jaw: resolves with triamcinolone cream  6. History of benign biopsy  -  Verrucous keratosis - right dorsal hand posterior, s/p bx 4/1/22 and 6/8/22  - ISK, right dorsal hand, s/p bx 9/28/21  - C/w rosacea, right posterior shoulder, s/p bx 9/28/21  - SK, right posterior lower leg, s/p bx 2/18/21  - verucca, left forehead, s/p bx 2/18/21  - Arthropod bite, left 4th digit, bx 2/18/21  - SK, right abdomen, s/p bx 2/18/2020  7. Eczematous patch R dorsal hand  - previous Tx: triamcinolone 0.01% cream, Vaseline   8. HAK, Left medial cheek, S/p Biopsy 11/29/23  - S/p cryo 12/07/23  9. SK, right thigh, s/p 12/07/23  ____________________________________________________________    Subjective: Cydney Christiansen is a 88 year old female who presents today for wound check after MOHs of the right shin and excision on the right medial thigh on 02/21/24.  - The patient denies having swelling, redness, bleeding, or pain. Denies symptoms of infection.   - She mentions she took off the Tegaderm dressing a few days ago and transition to daily dressing.  - Has been applying Vaseline.   - no other concerns today    Objective: An exam of the right shin and right medial thigh was performed today   - The surgical site noted above is clean, dry, and intact. There is no surrounding erythema, purulence, or significant tenderness to palpation. No clinical evidence of infection noted today.    Assessment and Plan:     1. BCC, R shin, S/p Mohs 2/21/24 & SCCIS, R medial thigh, s/p excision 2/21/24  - The patient's surgery site(s) is/are healing very well. No evidence of infection on examination today.  - The patient was told to continue with wound cares until the area(s) is/are no longer crusted.   - The patient should follow up with dermatologic surgery PRN, as well as continue with regular skin exams in general dermatology clinic.  - Follow up with Dr. Barba in July.      Patient was discussed with and evaluated by attending physician Dr. Ian Haro.    Scribe Disclosure:   ALVARO ARAUJO, am serving as a  scribe; to document services personally performed by Ian Haro MD -based on data collection and the provider's statements to me.     Provider Disclosure:   The documentation recorded by the scribe accurately reflects the services I personally performed and the decisions made by me.    Ian Haro DO    Department of Dermatology  [facility]: Phone: [phone], Fax:[phone]  [facility] [place]: Phone: [phone], Fax: [phone]                Again, thank you for allowing me to participate in the care of your patient.        Sincerely,        Ian Haro MD

## 2024-03-07 NOTE — PROGRESS NOTES
Dermatologic Surgery Post-Op Wound Check     CC: Wound Check (Patient denies bleeding, pain, swelling or redness from right shin wound and right thigh wound.)    Dermatology Problem List:  1. 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 AK  - HAK, L Lateral forehead, s/p bx 12/7/24, s/p LN2 2/21/24  - HAK, L lateral forehead, bx 9/28/21. treated with LN2 5/4/22  - AK, right cheek, s/p bx 9/28/21 - treated with LN2 5/4/22  - HAK, left mid eyebrow, base not seen, 6/2014  - left posterior lower leg, s/p bx 2/18/21, s/p cryo 9/28/21  - s/p cryotherapy  - left forearm, s/p biopsy 3/15/19   - HAK, L medial cheek, s/p bx 12/6/23  5. GA, right angle of jaw: resolves with triamcinolone cream  6. History of benign biopsy  - Verrucous keratosis - right dorsal hand posterior, s/p bx 4/1/22 and 6/8/22  - ISK, right dorsal hand, s/p bx 9/28/21  - C/w rosacea, right posterior shoulder, s/p bx 9/28/21  - SK, right posterior lower leg, s/p bx 2/18/21  - verucca, left forehead, s/p bx  2/18/21  - Arthropod bite, left 4th digit, bx 2/18/21  - SK, right abdomen, s/p bx 2/18/2020  7. Eczematous patch R dorsal hand  - previous Tx: triamcinolone 0.01% cream, Vaseline   8. HAK, Left medial cheek, S/p Biopsy 11/29/23  - S/p cryo 12/07/23  9. SK, right thigh, s/p 12/07/23  ____________________________________________________________    Subjective: Cydney Christiansen is a 88 year old female who presents today for wound check after MOHs of the right shin and excision on the right medial thigh on 02/21/24.  - The patient denies having swelling, redness, bleeding, or pain. Denies symptoms of infection.   - She mentions she took off the Tegaderm dressing a few days ago and transition to daily dressing.  - Has been applying Vaseline.   - no other concerns today    Objective: An exam of the right shin and right medial thigh was performed today   - The surgical site noted above is clean, dry, and intact. There is no surrounding erythema, purulence, or significant tenderness to palpation. No clinical evidence of infection noted today.    Assessment and Plan:     1. BCC, R shin, S/p Mohs 2/21/24 & SCCIS, R medial thigh, s/p excision 2/21/24  - The patient's surgery site(s) is/are healing very well. No evidence of infection on examination today.  - The patient was told to continue with wound cares until the area(s) is/are no longer crusted.   - The patient should follow up with dermatologic surgery PRN, as well as continue with regular skin exams in general dermatology clinic.  - Follow up with Dr. Barba in July.      Patient was discussed with and evaluated by attending physician Dr. Ian Haro.    Scribe Disclosure:   ALVARO ARAUJO, am serving as a scribe; to document services personally performed by Ian Haro MD -based on data collection and the provider's statements to me.     Provider Disclosure:   The documentation recorded by the scribe accurately reflects the services I personally performed  and the decisions made by me.    Ian Haro DO    Department of Dermatology  [facility]: Phone: [phone], Fax:[phone]  [facility]: Phone: [phone], Fax: [phone]

## 2024-03-07 NOTE — NURSING NOTE
Cydney Christiansen's goals for this visit include:   Chief Complaint   Patient presents with    Wound Check     Patient denies bleeding, pain, swelling or redness from right shin wound and right thigh wound.       She requests these members of her care team be copied on today's visit information: n/a    PCP: Estrellita Hernandez    Referring Provider:  No referring provider defined for this encounter.    LMP  (LMP Unknown)     Do you need any medication refills at today's visit? No  Quin Webber RN

## 2024-04-08 NOTE — PROGRESS NOTES
04/28/23 1502   Appointment Info   Signing Clinician's Name / Credentials (SLP) Holly Quintanilla MA CCC-SLP   General Information   Onset of Illness/Injury or Date of Surgery 04/25/23   Referring Physician Jamel Ahn PA-C   Patient/Family Therapy Goal Statement (SLP) None stated   Pertinent History of Current Problem Pt is a 86 y/o female with a PMH significant for HTN, HLD, bilateral glaucoma, scoliosis, compression fracture of thoracic vertebrae, osteoporosis, history of bladder cancer, invasive ductal carcinoma of left breast s/p lumpectomy (2015), and multiple other skin cancer sites s/p removal who has severe aortic stenosis with bicuspid aortic valve and an ascending aortic aneurysm who presents 4/25 for ascending aortic arch repair with tissue graft and AVR with Dr. Solis. Patient required repair stitches around valve d/t bleeding post-op. Additionally, noted to have moderate drop in platelet count coming off bypass requiring 2000 Kcentra, 1g fibryga.  Received 2pRBC, 2 platelets, 325 cell saver, 1.5L fluid. Arrived to [facility] ICU for further hemodynamic monitoring/management on precedex infusion. Videofluoroscopic swallow study (VFSS) completed per PA order.   General Observations Upon SLP arrival, pt positioned upright in the chair, alert, on 4L O2 via nasal cannula and willing to participate. RN present.   Type of Evaluation   Type of Evaluation Swallow Evaluation   General Swallowing Observations   Past History of Dysphagia No hx of dysphagia per chart review and pt report.   Respiratory Support (General Swallowing Observations) nasal cannula  (4L)   Current Diet/Method of Nutritional Intake (General Swallowing Observations, NIS) regular diet;thin liquids (level 0)   Swallowing Evaluation Videofluoroscopic swallow study (VFSS)   VFSS Evaluation   Radiologist Resident   Views Taken left lateral   Physical Location of Procedure [facility] [address]   VFSS Textures Trialed thin liquids;mildly  Pharmacy Note    This patient was ordered raloxifene. Per the Pharmacy & Therapeutics Committee, this medication is non-formulary and not stocked by pharmacy for the reason indicated below. The medication can be reordered at discharge.     Medications in which risks outweigh benefits during hospitalization:           -  oral bisphosphonates         -  raloxifene (Evista)        -  SGLT2 inhibitors (ordered in the hospital for an indication other than heart failure or chronic kidney disease)    Medications that lack necessity during an acute hospital stay:        -  nasal antihistamines        -  nasal ipratropium 0.03% and 0.06%        -  nasal miacalcin        -  acyclovir topical cream/ointment orders for herpes labialis (cold sores)      Bianka Castro, PharmD, [facility], BCPS 4/8/2024 2:46 AM     thick liquids;pureed;solid foods   VFSS Eval: Thin Liquid Texture Trial   Mode of Presentation, Thin Liquid cup;straw;self-fed;fed by clinician   Order of Presentation 2, 3, 4, 5, 15   Preparatory Phase WFL   Oral Phase, Thin Liquid residue in oral cavity   Bolus Location When Swallow Triggered pyriforms   Pharyngeal Phase, Thin Liquid impaired hyolaryngeal excursion;impaired tongue base retraction;pharyngeal wall coating;residue in vallecula;residue in pyriform sinus   Rosenbek's Penetration Aspiration Scale: Thin Liquid Trial Results 3 - contrast remains above the vocal cords, visible residue remains (penetration)   Strategies and Compensations chin tuck  (ineffective)   Diagnostic Statement Penetration evident   VFSS Eval: Mildly Thick Liquids   Mode of Presentation cup;straw;self-fed;fed by clinician   Order of Presentation 6, 7, 8, 9, 13   Preparatory Phase WFL   Oral Phase residue in oral cavity   Bolus Location When Swallow Triggered pyriforms   Pharyngeal Phase impaired hyolaryngel excursion;impaired tongue base retraction;pharyngeal wall coating;residue in vallecula;residue in pyriform sinus   Rosenbek's Penetration Aspiration Scale 3 - contrast remains above the vocal cords, visible residue remains (penetration)   Diagnostic Statement Penetration evident   VFSS Evaluation: Puree Solid Texture Trial   Mode of Presentation, Puree spoon;fed by clinician   Order of Presentation 10, 11   Preparatory Phase WFL   Oral Phase, Puree residue in oral cavity   Bolus Location When Swallow Triggered posterior laryngeal surface of epiglottis   Pharyngeal Phase, Puree impaired hyolaryngel excursion;pharyngeal wall coating;residue in vallecula;residue in pyriform sinus;impaired tongue base retraction   Rosenbek's Penetration Aspiration Scale: Puree Food Trial Results 1 - no aspiration, contrast does not enter airway   Diagnostic Statement No penetration/aspiration evident   VFSS Evaluation: Solid Food Texture Trial   Mode  of Presentation, Solid self-fed   Order of Presentation 14   Preparatory Phase prolonged bolus preparation   Oral Phase, Solid residue in oral cavity  (Prolonged mastication)   Bolus Location When Swallow Triggered posterior laryngeal surface of epiglottis   Pharyngeal Phase, Solid impaired hyolaryngel excursion;pharyngeal wall coating;residue in vallecula;residue in pyriform sinus;impaired tongue base retraction   Rosenbek's Penetration Aspiration Scale: Solid Food Trial Results 1 - no aspiration, contrast does not enter airway   Diagnostic Statement No penetration/aspiration evident   Esophageal Phase of Swallow   Patient reports or presents with symptoms of esophageal dysphagia Yes  (Trace backflow from UES into pyriforms)   Swallowing Recommendations   Diet Consistency Recommendations regular diet;thin liquids (level 0)   Supervision Level for Intake close supervision needed   Mode of Delivery Recommendations bolus size, small;slow rate of intake   Swallowing Maneuver Recommendations alternate food and liquid intake   Monitoring/Assistance Required (Eating/Swallowing) stop eating activities when fatigue is present;monitor for cough or change in vocal quality with intake   Recommended Feeding/Eating Techniques (Swallow Eval) maintain upright sitting position for eating;maintain upright posture during/after eating for 30 minutes;set-up and prepare tray   Medication Administration Recommendations, Swallowing (SLP) As tolerated   Instrumental Assessment Recommendations instrumental evaluation not recommended at this time   General Therapy Interventions   Planned Therapy Interventions Dysphagia Treatment   Dysphagia treatment Instruction of safe swallow strategies   Clinical Impression   Criteria for Skilled Therapeutic Interventions Met (SLP Eval) Yes, treatment indicated   SLP Diagnosis WFL swallow mechanism, poor secretion management   Risks & Benefits of therapy have been explained evaluation/treatment results  reviewed;care plan/treatment goals reviewed;risks/benefits reviewed;current/potential barriers reviewed;participants voiced agreement with care plan;participants included;patient   Clinical Impression Comments Videofluoroscopic swallow study (VFSS) completed per PA order. Under fluorocopy, oropharyngeal swallow mechanism deemed WFL. Pt assessed w/ thin and mildly thick liquids via cup and straw, puree, and solids. Oral pahse remarkable for prolonged bolus preparation and mastication. Mild oral residue. Swallow trigger delayed w/ the bolus head in the pyriforms before the swallow is triggered. Once triggered, incomplete laryngeal vestibular closure, partial anterior movement of the hyoid bone, and narrow column of contrast b/w TB and PW. Complete epiglottic inversion noted. Penetration w/ thin and mildly thick liquids where the material enters the airway, remains above the cords and is not ejected. No aspiration evident. To note: pt was coughing throughout evaluation and expectorating thick secretions. Secretions were eventually coated w/ barium and visible on exam. Recommend regular diet and thin liquids w/ close supervision. Meds OK as tolerated. Ensure pt is fully upright and alert, taking small sips/bites at a slow rate. Pt may benefit from volitional throat clear/coughing during meals to clear any residue. Please encourage adequate oral cares and pulmonary toilet. SLP to follow to ensure diet tolerance.   SLP Total Evaluation Time   Evaluation, videofluoroscopic eval of swallow function Minutes (06487) 22   SLP Goals   Therapy Frequency (SLP Eval) 3 times/wk   SLP Predicted Duration/Target Date for Goal Attainment 05/19/23   SLP Goals Swallow   SLP: Safely tolerate diet without signs/symptoms of aspiration Regular diet;Thin liquids;Independently   SLP Discharge Planning   SLP Plan Diet tolerance, train strategies, ?secretion management   SLP Discharge Recommendation home with assist  (Suspect pt will not require  ST at time of d/c)   SLP Rationale for DC Rec WFL swallow mechanism, secretion management   SLP Brief overview of current status  Recommend regular diet and thin liquids w/ close supervision. Meds OK as tolerated. Ensure pt is fully upright and alert, taking small sips/bites at a slow rate. Pt may benefit from volitional throat clear/coughing during meals to clear any residue. Please encourage adequate oral cares and pulmonary toilet. SLP to follow to ensure diet tolerance.   Total Session Time   Total Session Time (sum of timed and untimed services) 36

## 2024-04-10 ENCOUNTER — ANCILLARY ORDERS (OUTPATIENT)
Dept: FAMILY MEDICINE | Facility: CLINIC | Age: 89
End: 2024-04-10

## 2024-04-10 DIAGNOSIS — Z12.31 VISIT FOR SCREENING MAMMOGRAM: Primary | ICD-10-CM

## 2024-04-12 NOTE — PATIENT INSTRUCTIONS
The office order for PCP removal request is Approved and finalized on April 12, 2024.    Thanks,  [facility] Team         Continue Cosopt both eyes twice a day, Latanoprost both eyes every evening.  Offered cataract surgery left eye at anytime. Call Anika HALL @ [phone] to schedule.     Kvng Garcia MD  [phone]

## 2024-04-18 ENCOUNTER — TELEPHONE (OUTPATIENT)
Dept: OPHTHALMOLOGY | Facility: CLINIC | Age: 89
End: 2024-04-18

## 2024-04-18 ENCOUNTER — OFFICE VISIT (OUTPATIENT)
Dept: OPHTHALMOLOGY | Facility: CLINIC | Age: 89
End: 2024-04-18
Payer: COMMERCIAL

## 2024-04-18 DIAGNOSIS — Z01.00 EXAMINATION OF EYES AND VISION: Primary | ICD-10-CM

## 2024-04-18 DIAGNOSIS — H40.1431 PSEUDOEXFOLIATIVE GLAUCOMA, BOTH EYES, MILD STAGE: ICD-10-CM

## 2024-04-18 DIAGNOSIS — Z96.1 PSEUDOPHAKIA: ICD-10-CM

## 2024-04-18 DIAGNOSIS — H26.8 PXF (PSEUDOEXFOLIATION OF LENS CAPSULE): ICD-10-CM

## 2024-04-18 DIAGNOSIS — H52.4 MYOPIA OF BOTH EYES WITH REGULAR ASTIGMATISM AND PRESBYOPIA: ICD-10-CM

## 2024-04-18 DIAGNOSIS — H52.223 MYOPIA OF BOTH EYES WITH REGULAR ASTIGMATISM AND PRESBYOPIA: ICD-10-CM

## 2024-04-18 DIAGNOSIS — H52.13 MYOPIA OF BOTH EYES WITH REGULAR ASTIGMATISM AND PRESBYOPIA: ICD-10-CM

## 2024-04-18 DIAGNOSIS — H26.492 LEFT POSTERIOR CAPSULAR OPACIFICATION: ICD-10-CM

## 2024-04-18 DIAGNOSIS — H43.812 PVD (POSTERIOR VITREOUS DETACHMENT), LEFT: ICD-10-CM

## 2024-04-18 PROCEDURE — 92014 COMPRE OPH EXAM EST PT 1/>: CPT | Performed by: STUDENT IN AN ORGANIZED HEALTH CARE EDUCATION/TRAINING PROGRAM

## 2024-04-18 PROCEDURE — 92015 DETERMINE REFRACTIVE STATE: CPT | Performed by: STUDENT IN AN ORGANIZED HEALTH CARE EDUCATION/TRAINING PROGRAM

## 2024-04-18 RX ORDER — DORZOLAMIDE HYDROCHLORIDE AND TIMOLOL MALEATE 20; 5 MG/ML; MG/ML
1 SOLUTION/ DROPS OPHTHALMIC 2 TIMES DAILY
Qty: 20 ML | Refills: 4 | Status: SHIPPED | OUTPATIENT
Start: 2024-04-18

## 2024-04-18 ASSESSMENT — CONF VISUAL FIELD
OD_NORMAL: 1
OS_NORMAL: 1
OS_SUPERIOR_TEMPORAL_RESTRICTION: 0
OD_SUPERIOR_NASAL_RESTRICTION: 0
OD_INFERIOR_TEMPORAL_RESTRICTION: 0
OD_SUPERIOR_TEMPORAL_RESTRICTION: 0
OD_INFERIOR_NASAL_RESTRICTION: 0
OS_INFERIOR_TEMPORAL_RESTRICTION: 0
OS_INFERIOR_NASAL_RESTRICTION: 0
OS_SUPERIOR_NASAL_RESTRICTION: 0

## 2024-04-18 ASSESSMENT — TONOMETRY
IOP_METHOD: APPLANATION
OD_IOP_MMHG: 16
OS_IOP_MMHG: 14

## 2024-04-18 ASSESSMENT — REFRACTION_MANIFEST
OS_ADD: +3.00
OD_SPHERE: -0.25
OD_CYLINDER: +0.50
OS_CYLINDER: +1.50
OD_ADD: +3.00
OD_AXIS: 131
OS_SPHERE: -1.00
OS_AXIS: 007

## 2024-04-18 ASSESSMENT — CUP TO DISC RATIO
OS_RATIO: 0.5
OD_RATIO: 0.6

## 2024-04-18 ASSESSMENT — VISUAL ACUITY
OS_CC+: +1
OD_PH_CC: 20/25
CORRECTION_TYPE: GLASSES
OS_CC: 20/50
OS_PH_CC+: -1
OD_CC: 20/30
OD_CC+: -2
OS_PH_CC: 20/40
METHOD: SNELLEN - LINEAR

## 2024-04-18 ASSESSMENT — REFRACTION_WEARINGRX
OD_HBASE: IN
OS_SPHERE: -0.50
OS_CYLINDER: +1.00
OS_ADD: +3.00
OD_ADD: +3.00
OD_SPHERE: -1.25
SPECS_TYPE: BIFOCAL
OS_AXIS: 175
OD_HPRISM: 2.0
OD_CYLINDER: +0.50
OD_AXIS: 111

## 2024-04-18 ASSESSMENT — EXTERNAL EXAM - RIGHT EYE: OD_EXAM: NORMAL

## 2024-04-18 ASSESSMENT — SLIT LAMP EXAM - LIDS
COMMENTS: NORMAL
COMMENTS: NORMAL

## 2024-04-18 ASSESSMENT — EXTERNAL EXAM - LEFT EYE: OS_EXAM: NORMAL

## 2024-04-18 NOTE — LETTER
"    4/18/2024         RE: Cydney Christiansen  [address]  [place] [address]        Dear Colleague,    Thank you for referring your patient, Cydney Christiansen, to the [facility]. Please see a copy of my visit note below.     Current Eye Medications:  Latanoprost at bedtime both eyes, Cosopt BID both eyes and Artificial tears up to four times a day as needed      Subjective: here for complete eye exam. Has been noticing that while driving she is having blurred vision on and off. She also is noticing sharp lights off to the left occasionally. Harder to read with these glasses recently as well. While checking vision today, noticed left eye is much more blurry, like looking through saran wrap.      Objective:  See Ophthalmology Exam.    Assessment:  Cydney Christiansen is a 88 year old female who presents with:   Encounter Diagnoses   Name Primary?     Examination of eyes and vision      PXF (PSEUDOEXFOLIATION OF LENS CAPSULE) OU Intraocular pressure 16/14 today. Continue same medications.          Left posterior capsular opacification Recommend YAG cap left eye.      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 Avoid generic artificial tears or \"get the red out\" drops).     Recommend YAG capsulotomy (laser to help clear the vision) in the left eye at your convenience.     Kvng Garcia MD  [phone]       Again, thank you for allowing me to participate in the care of your patient.        Sincerely,        Kvng Garcia MD  "
no concerns

## 2024-04-18 NOTE — PATIENT INSTRUCTIONS
"Continue Latanoprost (green top) every evening both eyes     Continue Cosopt (dorzolamide-timolol -- dark blue top) twice a day both eyes    Continue artificial tears - use more often, ok up to four times a day  (Refresh Optive, Systane Balance, or TheraTears. Avoid generic artificial tears or \"get the red out\" drops).     Recommend YAG capsulotomy (laser to help clear the vision) in the left eye at your convenience.     Kvng Garcia MD  [phone]   "

## 2024-04-18 NOTE — PROGRESS NOTES
" Current Eye Medications:  Latanoprost at bedtime both eyes, Cosopt BID both eyes and Artificial tears up to four times a day as needed      Subjective: here for complete eye exam. Has been noticing that while driving she is having blurred vision on and off. She also is noticing sharp lights off to the left occasionally. Harder to read with these glasses recently as well. While checking vision today, noticed left eye is much more blurry, like looking through saran wrap.      Objective:  See Ophthalmology Exam.    Assessment:  Cydney Christiansen is a 88 year old female who presents with:   Encounter Diagnoses   Name Primary?    Examination of eyes and vision     PXF (PSEUDOEXFOLIATION OF LENS CAPSULE) OU Intraocular pressure 16/14 today. Continue same medications.         Left posterior capsular opacification Recommend YAG cap left eye.     Pseudoexfoliative glaucoma, both eyes, mild stage     Pseudophakia - Both Eyes     PVD (posterior vitreous detachment), left     Myopia of both eyes with regular astigmatism and presbyopia         Plan:  Continue Latanoprost (green top) every evening both eyes     Continue Cosopt (dorzolamide-timolol -- dark blue top) twice a day both eyes    Continue artificial tears - use more often, ok up to four times a day  (Refresh Optive, Systane Balance, or TheraTears. Avoid generic artificial tears or \"get the red out\" drops).     Recommend YAG capsulotomy (laser to help clear the vision) in the left eye at your convenience.     Kvng Garcia MD  [phone]     "

## 2024-04-18 NOTE — TELEPHONE ENCOUNTER
[facility] Call Center    Phone Message    May a detailed message be left on voicemail: yes     Reason for Call: Other: pt is wanting to schedule her YAG appt please contact pt to discuss scheduling. Writer unable to schedule per guidelines      Action Taken: Message routed to:  [facility] eye [facility]    Travel Screening: Not Applicable

## 2024-04-25 DIAGNOSIS — Z98.890 S/P ASCENDING AORTIC ANEURYSM REPAIR: ICD-10-CM

## 2024-04-25 DIAGNOSIS — Z86.79 S/P ASCENDING AORTIC ANEURYSM REPAIR: ICD-10-CM

## 2024-04-25 DIAGNOSIS — Z95.2 S/P AVR (AORTIC VALVE REPLACEMENT): ICD-10-CM

## 2024-04-25 RX ORDER — POTASSIUM CHLORIDE 1500 MG/1
20 TABLET, EXTENDED RELEASE ORAL 2 TIMES DAILY
Qty: 180 TABLET | Refills: 0 | Status: SHIPPED | OUTPATIENT
Start: 2024-04-25 | End: 2024-05-13

## 2024-05-01 DIAGNOSIS — E78.5 HYPERLIPIDEMIA LDL GOAL <160: ICD-10-CM

## 2024-05-01 RX ORDER — SIMVASTATIN 20 MG
20 TABLET ORAL AT BEDTIME
Qty: 90 TABLET | Refills: 1 | Status: SHIPPED | OUTPATIENT
Start: 2024-05-01 | End: 2024-10-07

## 2024-05-10 ENCOUNTER — OFFICE VISIT (OUTPATIENT)
Dept: OPHTHALMOLOGY | Facility: CLINIC | Age: 89
End: 2024-05-10
Payer: COMMERCIAL

## 2024-05-10 DIAGNOSIS — H26.8 PXF (PSEUDOEXFOLIATION OF LENS CAPSULE): ICD-10-CM

## 2024-05-10 DIAGNOSIS — H26.492 LEFT POSTERIOR CAPSULAR OPACIFICATION: Primary | ICD-10-CM

## 2024-05-10 DIAGNOSIS — Z96.1 PSEUDOPHAKIA: ICD-10-CM

## 2024-05-10 DIAGNOSIS — H40.1431 PSEUDOEXFOLIATIVE GLAUCOMA, BOTH EYES, MILD STAGE: ICD-10-CM

## 2024-05-10 PROCEDURE — 66821 AFTER CATARACT LASER SURGERY: CPT | Mod: LT | Performed by: STUDENT IN AN ORGANIZED HEALTH CARE EDUCATION/TRAINING PROGRAM

## 2024-05-10 ASSESSMENT — TONOMETRY
OD_IOP_MMHG: 15
OS_IOP_MMHG: 13
OS_IOP_MMHG: 11
IOP_METHOD: APPLANATION
IOP_METHOD: APPLANATION

## 2024-05-10 ASSESSMENT — VISUAL ACUITY
OD_CC+: -1
METHOD: SNELLEN - LINEAR
OD_CC: 20/30
OS_CC+: -2
CORRECTION_TYPE: GLASSES
OS_CC: 20/50

## 2024-05-10 ASSESSMENT — EXTERNAL EXAM - LEFT EYE: OS_EXAM: NORMAL

## 2024-05-10 ASSESSMENT — EXTERNAL EXAM - RIGHT EYE: OD_EXAM: NORMAL

## 2024-05-10 ASSESSMENT — SLIT LAMP EXAM - LIDS
COMMENTS: NORMAL
COMMENTS: NORMAL

## 2024-05-10 NOTE — PROGRESS NOTES
" Current Eye Medications:  Cosopt twice a day both eyes @ 7:15 AM and Latanoprost at bedtime both eyes @ 10:15 PM and artificial tears twice a day when she remembers!     Subjective:  Yag Capsulotomy left eye: No changes since last visit, her left eye is still blurry.  (Consent signed)     Objective:  See Ophthalmology Exam.       Assessment:  Cydney Christiansen is a 88 year old female who presents with:   Encounter Diagnoses   Name Primary?    Left posterior capsular opacification YAG capsulotomy  left eye performed today without complication.      PXF (PSEUDOEXFOLIATION OF LENS CAPSULE) OU     Pseudoexfoliative glaucoma, both eyes, mild stage     Pseudophakia - Both Eyes        Plan:  You may notice more floaters for the next few days.  Return in 2-3 weeks for Intraocular pressure check and refraction.    Continue Latanoprost (green top) every evening both eyes     Continue Cosopt (dorzolamide-timolol -- dark blue top) twice a day both eyes    Continue artificial tears up to four times a day as needed (Refresh Optive, Systane Balance, or TheraTears. Avoid generic artificial tears or \"get the red out\" drops).     Kvng Garcia MD  [phone]     "

## 2024-05-10 NOTE — PATIENT INSTRUCTIONS
"You may notice more floaters for the next few days.  Return in 2-3 weeks for Intraocular pressure check and refraction.    Continue Latanoprost (green top) every evening both eyes     Continue Cosopt (dorzolamide-timolol -- dark blue top) twice a day both eyes    Continue artificial tears up to four times a day as needed (Refresh Optive, Systane Balance, or TheraTears. Avoid generic artificial tears or \"get the red out\" drops).     Kvng Garcia MD  [phone]   "

## 2024-05-10 NOTE — LETTER
"    5/10/2024         RE: Cydney Christiansen  [address]  [place] [address]        Dear Colleague,    Thank you for referring your patient, Cydney Christiansen, to the [facility]. Please see a copy of my visit note below.     Current Eye Medications:  Cosopt twice a day both eyes @ 7:15 AM and Latanoprost at bedtime both eyes @ 10:15 PM and artificial tears twice a day when she remembers!     Subjective:  Yag Capsulotomy left eye: No changes since last visit, her left eye is still blurry.  (Consent signed)     Objective:  See Ophthalmology Exam.       Assessment:  Cydney Christiansen is a 88 year old female who presents with:   Encounter Diagnoses   Name Primary?     Left posterior capsular opacification YAG capsulotomy  left eye performed today without complication.       PXF (PSEUDOEXFOLIATION OF LENS CAPSULE) OU      Pseudoexfoliative glaucoma, both eyes, mild stage      Pseudophakia - Both Eyes        Plan:  You may notice more floaters for the next few days.  Return in 2-3 weeks for Intraocular pressure check and refraction.    Continue Latanoprost (green top) every evening both eyes     Continue Cosopt (dorzolamide-timolol -- dark blue top) twice a day both eyes    Continue artificial tears up to four times a day as needed (Refresh Optive, Systane Balance, or TheraTears. Avoid generic artificial tears or \"get the red out\" drops).     Kvng Garcia MD  [phone]       Again, thank you for allowing me to participate in the care of your patient.        Sincerely,        Kvng Garcia MD  "

## 2024-05-13 ENCOUNTER — TELEPHONE (OUTPATIENT)
Dept: FAMILY MEDICINE | Facility: CLINIC | Age: 89
End: 2024-05-13
Payer: COMMERCIAL

## 2024-05-13 DIAGNOSIS — Z98.890 S/P ASCENDING AORTIC ANEURYSM REPAIR: ICD-10-CM

## 2024-05-13 DIAGNOSIS — Z95.2 S/P AVR (AORTIC VALVE REPLACEMENT): ICD-10-CM

## 2024-05-13 DIAGNOSIS — Z86.79 S/P ASCENDING AORTIC ANEURYSM REPAIR: ICD-10-CM

## 2024-05-13 NOTE — TELEPHONE ENCOUNTER
Medication Question or Refill    Contacts         Type Contact Phone/Fax    05/13/2024 01:12 PM CDT Phone (Incoming) Елена Cydney RAFAEL (Self) [phone] (M)            What medication are you calling about (include dose and sig)?:   KLOR-CON M20 20 MEQ CR tablet     Preferred Pharmacy:   [facility] [address] [facility] [employer] - [place], [place] - [address]  [address]  [facility] [address]  Phone: [phone] Fax: [phone]      Controlled Substance Agreement on file:   CSA -- Patient Level:    CSA: None found at the patient level.       Who prescribed the medication?: Dr. Hernandez    Do you need a refill? Yes    When did you use the medication last? 5/13/2024    Patient offered an appointment? No    Do you have any questions or concerns?  Yes: more refills. Only have medication to last 6 days.       Could we send this information to you in [place] [facility] or would you prefer to receive a phone call?:   Patient would prefer a phone call   Okay to leave a detailed message?: Yes at Cell number on file:    Telephone Information:   Mobile [phone]

## 2024-05-14 RX ORDER — POTASSIUM CHLORIDE 1500 MG/1
20 TABLET, EXTENDED RELEASE ORAL 2 TIMES DAILY
Qty: 180 TABLET | Refills: 0 | Status: SHIPPED | OUTPATIENT
Start: 2024-05-14 | End: 2024-08-08

## 2024-05-22 ENCOUNTER — ANCILLARY PROCEDURE (OUTPATIENT)
Dept: MAMMOGRAPHY | Facility: CLINIC | Age: 89
End: 2024-05-22
Attending: FAMILY MEDICINE
Payer: COMMERCIAL

## 2024-05-22 DIAGNOSIS — Z12.31 VISIT FOR SCREENING MAMMOGRAM: ICD-10-CM

## 2024-05-22 PROCEDURE — 77063 BREAST TOMOSYNTHESIS BI: CPT | Mod: GC | Performed by: RADIOLOGY

## 2024-05-22 PROCEDURE — 77067 SCR MAMMO BI INCL CAD: CPT | Mod: GC | Performed by: RADIOLOGY

## 2024-06-05 ENCOUNTER — OFFICE VISIT (OUTPATIENT)
Dept: OPHTHALMOLOGY | Facility: CLINIC | Age: 89
End: 2024-06-05
Payer: COMMERCIAL

## 2024-06-05 DIAGNOSIS — H40.1431 PSEUDOEXFOLIATIVE GLAUCOMA, BOTH EYES, MILD STAGE: ICD-10-CM

## 2024-06-05 DIAGNOSIS — H26.8 PXF (PSEUDOEXFOLIATION OF LENS CAPSULE): ICD-10-CM

## 2024-06-05 DIAGNOSIS — Z96.1 PSEUDOPHAKIA: Primary | ICD-10-CM

## 2024-06-05 PROCEDURE — 99024 POSTOP FOLLOW-UP VISIT: CPT | Performed by: STUDENT IN AN ORGANIZED HEALTH CARE EDUCATION/TRAINING PROGRAM

## 2024-06-05 ASSESSMENT — VISUAL ACUITY
OS_CC: 20/50
OD_CC+: -1
OS_PH_CC: 20/40
CORRECTION_TYPE: GLASSES
OS_CC+: -2
OD_PH_CC+: -1
METHOD: SNELLEN - LINEAR
OS_PH_CC+: -1
OD_PH_CC: 20/30
OD_CC: 20/50

## 2024-06-05 ASSESSMENT — REFRACTION_WEARINGRX
OS_CYLINDER: +1.00
SPECS_TYPE: BIFOCAL
OS_ADD: +3.00
OS_SPHERE: -0.50
OD_SPHERE: -1.25
OD_CYLINDER: +0.50
OD_HPRISM: 2.0
OS_AXIS: 175
OD_HBASE: IN
OD_AXIS: 111
OD_ADD: +3.00

## 2024-06-05 ASSESSMENT — REFRACTION_MANIFEST
OS_SPHERE: -0.75
OD_ADD: +3.00
OD_AXIS: 145
OD_SPHERE: -0.25
OD_CYLINDER: +0.50
OS_ADD: +3.00
OS_AXIS: 158
OS_CYLINDER: +1.25

## 2024-06-05 ASSESSMENT — EXTERNAL EXAM - RIGHT EYE: OD_EXAM: NORMAL

## 2024-06-05 ASSESSMENT — SLIT LAMP EXAM - LIDS
COMMENTS: NORMAL
COMMENTS: NORMAL

## 2024-06-05 ASSESSMENT — TONOMETRY
OS_IOP_MMHG: 11
IOP_METHOD: APPLANATION
OD_IOP_MMHG: 11

## 2024-06-05 ASSESSMENT — EXTERNAL EXAM - LEFT EYE: OS_EXAM: NORMAL

## 2024-06-05 NOTE — PROGRESS NOTES
Current Eye Medications:  Dorz/timolol twice a day both eyes, last took at 8 am. No-tears 1-2 times daily both eyes.     Subjective:  S/P Yag cap 5/10/24 left eye. 3 week follow up for Intraocular pressure check and refraction. Vision is little blurry in distance both eyes. Sometimes feels film comes and goes over both eyes. Sometimes has flashes of light left eye > right eye since before Yag laser, not happening a lot. Not having any floaters No eye pain or discomfort in either eye.      Objective:  See Ophthalmology Exam.       Assessment:  Cydney Christiansen is a 88 year old female who presents with:   Encounter Diagnoses   Name Primary?    Pseudophakia - Both Eyes s/p YAG cap OS Doing well s/p YAG cap left eye.       PXF (PSEUDOEXFOLIATION OF LENS CAPSULE) OU Intraocular pressure 11/11 today. Continue same medications.     Pseudoexfoliative glaucoma, both eyes, mild stage        Plan:  Continue Cosopt (dorzolamide-timolol -- dark blue top) twice a day both eyes    Use your artificial tears more often (up to four times a day) to see if that helps get rid of the occasional filmy vision    Glasses prescription given     Kvng Garcia MD  [phone]

## 2024-06-05 NOTE — LETTER
6/5/2024      Cydney Christiansen  [address]  [place] [address]      Dear Colleague,    Thank you for referring your patient, Cydney Christiansen, to the [facility]. Please see a copy of my visit note below.     Current Eye Medications:  Dorz/timolol twice a day both eyes, last took at 8 am. No-tears 1-2 times daily both eyes.     Subjective:  S/P Yag cap 5/10/24 left eye. 3 week follow up for Intraocular pressure check and refraction. Vision is little blurry in distance both eyes. Sometimes feels film comes and goes over both eyes. Sometimes has flashes of light left eye > right eye since before Yag laser, not happening a lot. Not having any floaters No eye pain or discomfort in either eye.      Objective:  See Ophthalmology Exam.       Assessment:  Cydney Christiansen is a 88 year old female who presents with:   Encounter Diagnoses   Name Primary?     Pseudophakia - Both Eyes s/p YAG cap OS Doing well s/p YAG cap left eye.        PXF (PSEUDOEXFOLIATION OF LENS CAPSULE) OU Intraocular pressure 11/11 today. Continue same medications.      Pseudoexfoliative glaucoma, both eyes, mild stage        Plan:  Continue Cosopt (dorzolamide-timolol -- dark blue top) twice a day both eyes    Use your artificial tears more often (up to four times a day) to see if that helps get rid of the occasional filmy vision    Glasses prescription given     Kvng Garcia MD  [phone]     Again, thank you for allowing me to participate in the care of your patient.        Sincerely,        Kvng Garcia MD

## 2024-06-05 NOTE — PATIENT INSTRUCTIONS
Continue Cosopt (dorzolamide-timolol -- dark blue top) twice a day both eyes    Use your artificial tears more often (up to four times a day) to see if that helps get rid of the occasional filmy vision    Glasses prescription given     Kvng Garcia MD  [phone]

## 2024-06-10 DIAGNOSIS — Z86.79 S/P ASCENDING AORTIC ANEURYSM REPAIR: ICD-10-CM

## 2024-06-10 DIAGNOSIS — Z95.2 S/P AVR (AORTIC VALVE REPLACEMENT): ICD-10-CM

## 2024-06-10 DIAGNOSIS — Z98.890 S/P ASCENDING AORTIC ANEURYSM REPAIR: ICD-10-CM

## 2024-06-10 RX ORDER — BUMETANIDE 1 MG/1
1 TABLET ORAL DAILY
Qty: 90 TABLET | Refills: 3 | Status: SHIPPED | OUTPATIENT
Start: 2024-06-10

## 2024-07-15 ENCOUNTER — TELEPHONE (OUTPATIENT)
Dept: FAMILY MEDICINE | Facility: CLINIC | Age: 89
End: 2024-07-15
Payer: COMMERCIAL

## 2024-07-15 DIAGNOSIS — M80.00XD AGE-RELATED OSTEOPOROSIS WITH CURRENT PATHOLOGICAL FRACTURE WITH ROUTINE HEALING, SUBSEQUENT ENCOUNTER: Primary | ICD-10-CM

## 2024-07-17 NOTE — TELEPHONE ENCOUNTER
"Hi Ka,    It looks like the referral entered 6 mo ago stated \"request 1, approval 1\". Does this mean we need to ask provider for a new order?    Thanks,  MAIREL Jara  [facility]     "

## 2024-07-17 NOTE — PROGRESS NOTES
[facility] Dermatology Note  Encounter Date: Jul 18, 2024  Office Visit     Dermatology Problem List:  Last Skin Check 7/18/24  0. NUBs, R lateral elbow superior, R lateral elbow inferior.  1. 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 AK  - HAK, R mid jawline, s/p bx 12/7/23  - HAK, L Lateral forehead, s/p bx 12/7/23, s/p LN2 2/21/24  - HAK with overlying cutaneous horn, L medial cheek, s/p bx 11/29/23  - HAK, L lateral forehead, bx 9/28/21. treated with LN2 5/4/22  - AK, right cheek, s/p bx 9/28/21 - treated with LN2 5/4/22  - HAK, left mid eyebrow, base not seen, 6/2014  - left posterior lower leg, s/p bx 2/18/21, s/p cryo 9/28/21  - s/p cryotherapy  - left forearm, s/p biopsy 3/15/19     5. GA, right angle of jaw: resolves with triamcinolone cream  6. History of benign biopsy  - Verrucous keratosis - right dorsal hand posterior, s/p bx 4/1/22 and 6/8/22  - ISK, right dorsal hand, s/p bx 9/28/21  - C/w rosacea, right posterior shoulder, s/p bx  9/28/21  - SK, right posterior lower leg, s/p bx 2/18/21  - verucca, left forehead, s/p bx 2/18/21  - Arthropod bite, left 4th digit, bx 2/18/21  - SK, right abdomen, s/p bx 2/18/2020  7. Eczematous patch R dorsal hand  - previous Tx: triamcinolone 0.01% cream, Vaseline   8. HAK, Left medial cheek, S/p Biopsy 11/29/23  - S/p cryo 12/07/23  9. SK, right thigh, s/p 12/07/23    ____________________________________________    Assessment & Plan:    # History of nonmelanoma skin cancer, no clincial evidence of recurrence.   - ABCDEs: Counseled ABCDEs of melanoma: Asymmetry, Border (irregularity), Color (not uniform, changes in color), Diameter (greater than 6 mm which is about the size of a pencil eraser), and Evolving (any changes in preexisting moles).  - Sun protection: Counseled SPF30+ sunscreen, UPF clothing, sun avoidance, tanning bed avoidance.  - Recommended regular skin exams.      # HAK, L lateral forehead, s/p bx 12/7/23. Retreated with cryo today. See procedure section.    # Actinic keratosis. L cheek    -see below       # Neoplasm of unspecified behavior of the skin (D49.2) on the R lateral elbow superior. The differential diagnosis includes BCC or other. .    - 5 mm pearly papule   - Shave biopsy today. See procedure section.    # Neoplasm of unspecified behavior of the skin (D49.2) on the R lateral elbow inferior. The differential diagnosis includes BCC or other. .    - 5 mm pearly papule   - Shave biopsy today. See procedure section.       Procedures Performed:   - Shave biopsy procedure note, location(s): R lateral elbow superior, R lateral elbow inferior. After discussion of benefits and risks including but not limited to bleeding, infection, scar, incomplete removal, recurrence, and non-diagnostic biopsy, verbal consent and photographs were obtained. The area was cleaned with isopropyl alcohol. 0.5mL of 1% lidocaine with epinephrine was injected to obtain adequate anesthesia of lesion(s). Shave biopsy  at site(s) performed. Hemostasis was achieved with aluminium chloride. Petrolatum ointment and a sterile dressing were applied. The patient tolerated the procedure and no complications were noted. The patient was provided with verbal and written post care instructions.   - Cryotherapy procedure note, location(s): L lateral forehead, R cheek. After verbal consent and discussion of risks and benefits including, but not limited to, dyspigmentation/scar, blister, and pain, 2 lesion(s) was(were) treated with 1-2 mm freeze border for 1-2 cycles with liquid nitrogen. Post cryotherapy instructions were provided.    Follow-up: 6 month(s) in-person, or earlier for new or changing lesions    Staff and Scribe:     Scribe Disclosure:   I, Justina Jin, am serving as a scribe to document services personally performed by Qi Barba MD based on data collection and the provider's statements to me.     Provider Disclosure:   The documentation recorded by the scribe accurately reflects the services I personally performed and the decisions made by me.    Qi Barba MD    Department of Dermatology  [facility]: Phone: [phone], Fax:[phone]  [facility]: Phone: [phone], Fax: [phone]   ____________________________________________    CC: Skin Check (No areas of concern for skin check)    HPI:  Ms. Cydney Christiansen is a(n) 88 year old female who presents today as a return patient for skin check.    Today, patient reports no spots of concern. Nothing bleeding, crusting, or changing.       Patient is otherwise feeling well, without additional skin concerns.    Labs Reviewed:  Last Derm Path 12/7/23  Final Diagnosis   A. Right mid jawline:  - Most consistent with hypertrophic actinic keratosis      B. Left lateral forehead:  - Hypertrophic actinic keratosis with overlying cutaneous horn      C.  Right medial thigh:  - Squamous cell carcinoma in situ -     D. Right shin:  - Basal cell carcinoma, superficial and nodular types        Physical Exam:  Vitals: LMP  (LMP Unknown)   SKIN: Total skin including buttock areas was performed. The exam included the head/face, neck, both arms, chest, back, abdomen, both legs, digits and/or nails.      - There is a 5 mm pearly papule on the R lateral elbow superior..  - There is a 5 mm pearly papule on the R lateral elbow inferior..   -  red macules with scale left forehead and left cheek  - There is no erythema, telangectasias, nodularity, or pigmentation on the site of prior NMSCs ..   - No other lesions of concern on areas examined.     Medications:  Current Outpatient Medications   Medication Sig Dispense Refill    acetaminophen (TYLENOL) 325 MG tablet Take 2 tablets (650 mg) by mouth every 4 hours as needed for other (For optimal non-opioid multimodal pain management to improve pain control.)      amoxicillin (AMOXIL) 500 MG capsule 4 tablets -30 minute before dental wotrk 4 capsule 3    aspirin (ASA) 81 MG EC tablet Take 1 tablet (81 mg) by mouth daily      bumetanide (BUMEX) 1 MG tablet TAKE 1 TABLET BY MOUTH EVERY DAY 90 tablet 3    cyanocobalamin (VITAMIN B-12) 500 MCG tablet Take 1 tablet (500 mcg) by mouth daily 150 tablet 3    dorzolamide-timolol (COSOPT) 2-0.5 % ophthalmic solution Place 1 drop into both eyes 2 times daily 20 mL 4    ferrous sulfate (FEROSUL) 325 (65 Fe) MG tablet Take 1 tablet (325 mg) by mouth daily (with breakfast) 90 tablet 3    latanoprost (XALATAN) 0.005 % ophthalmic solution Place 1 drop into both eyes at bedtime 7.5 mL 4    losartan (COZAAR) 25 MG tablet Take 0.5 tablets (12.5 mg) by mouth daily 45 tablet 3    potassium chloride chichi ER (KLOR-CON M20) 20 MEQ CR tablet Take 1 tablet (20 mEq) by mouth 2 times daily 180 tablet 0    simvastatin (ZOCOR) 20 MG tablet TAKE 1 TABLET BY MOUTH EVERYDAY AT BEDTIME 90 tablet 1    vitamin D3  (CHOLECALCIFEROL) 50 mcg (2000 units) tablet Take 1 tablet (50 mcg) by mouth daily 90 tablet 3    albuterol (PROAIR HFA/PROVENTIL HFA/VENTOLIN HFA) 108 (90 Base) MCG/ACT inhaler Inhale 1-2 puffs into the lungs every 4 hours as needed for shortness of breath or wheezing (Patient not taking: Reported on 7/18/2024) 6.7 g 0     Current Facility-Administered Medications   Medication Dose Route Frequency Provider Last Rate Last Admin    denosumab (PROLIA) injection 60 mg  60 mg Subcutaneous Q6 Months Estrellita Hernandez MD   60 mg at 02/08/24 1034      Past Medical History:   Patient Active Problem List   Diagnosis    History of basal cell carcinoma    PXF  OU    Personal history of malignant neoplasm of bladder    Hyperlipidemia LDL goal <160    Family history of diabetes mellitus    Squamous cell carcinoma    Basal cell carcinoma    Skin lesion of left leg    Viral warts    PVD (posterior vitreous detachment), OS    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    Hypertension goal BP (blood pressure) < 140/90    Seborrheic keratosis    Malignant neoplasm of overlapping sites of left female breast (H)    Scoliosis    Compression fracture of thoracic vertebra (H)    Mass of left hand    Primary open angle glaucoma of both eyes, unspecified glaucoma stage    Nuclear sclerosis of left eye    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    left    Osteoporosis     Osteoporosis, unspecified osteoporosis type, unspecified pathological fracture presence 06/26/2017    Scoliosis     Skin cancer     Skin cancer 05/2013    sccis R cheek    Squamous cell carcinoma 09/2011    R upper back    Squamous cell carcinoma 10/2012    R dorsal hand    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

## 2024-07-17 NOTE — PATIENT INSTRUCTIONS
Checking for Skin Cancer  You can help find cancer early by checking your skin each month. There are 3 main kinds of skin cancer: melanoma, basal cell carcinoma, and squamous cell carcinoma. Doing monthly skin checks is the best way to find new marks, sores, or skin changes. Follow these instructions for checking your skin.   The ABCDEs of checking moles for melanoma   Check your moles or growths for signs of melanoma using ABCDE:   Asymmetry: The sides of the mole or growth don t match.  Border: The edges are ragged, notched, or blurred.  Color: The color within the mole or growth varies. It could be black, brown, tan, white, or shades of red, gray, or blue.  Diameter: The mole or growth is larger than   inch or 6 mm (size of a pencil eraser).  Evolving: The size, shape, texture, or color of the mole or growth is changing.     ABCDE's of moles on light skin.        ABCDE's of moles on dark skin may be harder to identify.     Checking for other types of skin cancer  Basal cell carcinoma or squamous cell carcinoma cause symptoms like:     A spot or mole that looks different from all other marks on your skin  Changes in how an area feels, such as itching, tenderness, or pain  Changes in the skin's surface, such as oozing, bleeding, or scaliness  A sore that doesn't heal  New swelling, redness, or spread of color beyond the border of a mole    Who s at risk?  Anyone of any skin color can get skin cancer. But you're at greater risk if you have:   Fair skin that freckles easily and burns instead of tanning  Light-colored or red hair  Light-colored eyes  Many moles or abnormal moles on your skin  A long history of unprotected exposure to sunlight or tanning beds  A history of many blistering sunburns as a child or teen  A family history of skin cancer  Been exposed to radiation or chemicals  A weakened immune system  Been exposed to arsenic  If you've had skin cancer in the past, you're at high risk of having it again.    How to check your skin  Do your monthly skin checkups in front of a full-length mirror. Use a room with good lighting so it's easier to see. Use a hand mirror to look at hard-to-see places like your buttocks and back. You can also have a trusted friend or family member help you with these checks. Check every part of your body, including your:   Head (ears, face, neck, and scalp)  Torso (front, back, sides, and under breasts)  Arms (tops, undersides, and armpits)  Hands (palms, backs, and fingers, including under the nails)  Lower back, buttocks, and genitals  Legs (front, back, and sides)  Feet (tops, soles, toes, including under the nails, and between toes)  Watch for new spots on your skin or a spot that's changing in color, shape, size.   If you have a lot of moles, take digital photos of them each month. Make sure to take photos both up close and from a distance. These can help you see if any moles change over time.   Know your skin  Most skin changes aren't cancer. But if you see any changes in your skin, call your healthcare provider right away. Only they can tell you if a change is a problem. If you have skin cancer, seeing your provider can be the first step to getting the treatment that could save your life.   [employer] last reviewed this educational content on 10/1/2021    8839-3174 [employer]. All rights reserved. This information is not intended as a substitute for professional medical care. Always follow your healthcare professional's instructions.            Wound Care After a Biopsy    What is a skin biopsy?  A skin biopsy allows the doctor to examine a very small piece of tissue under the microscope to determine the diagnosis and the best treatment for the skin condition. A local anesthetic (numbing medicine) is injected with a very small needle into the skin area to be tested. A small piece of skin is taken from the area. Sometimes a suture (stitch) is used.     What are the risks of  a skin biopsy?  I will experience scar, bleeding, swelling, pain, crusting and redness. I may experience incomplete removal or recurrence. Risks of this procedure are excessive bleeding, bruising, infection, nerve damage, numbness, thick (hypertrophic or keloidal) scar and non-diagnostic biopsy.    How should I care for my wound for the first 24 hours?  Keep the wound dry and covered for 24 hours  If it bleeds, hold direct pressure on the area for 15 minutes. If bleeding does not stop, call us or go to the emergency room  Avoid strenuous exercise the first 1-2 days or as your doctor instructs you    How should I care for the wound after 24 hours?  After 24 hours, remove the bandage  You may bathe or shower as normal  If you had a scalp biopsy, you can shampoo as usual and can use shower water to clean the biopsy site daily  Clean the wound once a day with gentle soap and water  Do not scrub, be gentle  Apply white petroleum/Vaseline after cleaning the wound with a cotton swab or a clean finger, and keep the site covered with a Bandaid /bandage. Bandages are not necessary with a scalp biopsy  If you are unable to cover the site with a Bandaid /bandage, re-apply ointment 2-3 times a day to keep the site moist. Moisture will help with healing  Avoid strenuous activity for first 1-2 days  Avoid lakes, rivers, pools, and oceans until the stitches are removed or the site is healed    How do I clean my wound?  Wash hands thoroughly with soap or use hand  before all wound care  Clean the wound with gentle soap and water  Apply white petroleum/Vaseline  to wound after it is clean  Replace the Bandaid /bandage to keep the wound covered for the first few days or as instructed by your doctor  If you had a scalp biopsy, warm shower water to the area on a daily basis should suffice    What should I use to clean my wound?   Cotton-tipped applicators (Qtips )  White petroleum jelly (Vaseline ). Use a clean new container  and use Q-tips to apply.  Bandaids  as needed  Gentle soap     How should I care for my wound long term?  Do not get your wound dirty  Keep up with wound care for one week or until the area is healed.  If you have stitches, stitches need to be removed in NA days. You may return to our clinic for this or you may have it done locally at your doctor s office.  A small scab will form and fall off by itself when the area is completely healed. The area will be red and will become pink in color as it heals. Sun protection is very important for how your scar will turn out. Sunscreen with an SPF 30 or greater is recommended once the area is healed.  You should have some soreness but it should be mild and slowly go away over several days. Talk to your doctor about using tylenol for pain,    When should I call my doctor?  If you have increased:   Pain or swelling  Pus or drainage (clear or slightly yellow drainage is ok)  Temperature over 100F  Spreading redness or warmth around wound    When will I hear about my results?  The biopsy results can take 2 weeks to come back.  Your results will automatically release to [facility] before your provider has even reviewed them.  The clinic will call you with the results, send you a Stio message, or have you schedule a follow-up clinic or phone time to discuss the results.  Contact our clinics if you do not hear from us in 2 weeks.    Who should I call with questions?  [facility]: [phone]  [facility]: [phone]  For urgent needs outside of business hours call the [facility] at [phone] and ask for the dermatology resident on call  Cryotherapy    What is it?  Use of a very cold liquid, such as liquid nitrogen, to freeze and destroy abnormal skin cells that need to be removed    What should I expect?  Tenderness and redness  A small blister that might grow and fill with dark purple blood. There may be  crusting.  More than one treatment may be needed if the lesions do not go away.    How do I care for the treated area?  Gently wash the area with your hands when bathing.  Use a thin layer of Vaseline to help with healing. You may use a Band-Aid.   The area should heal within 7-10 days and may leave behind a pink or lighter color.   Do not use an antibiotic or Neosporin ointment.   You may take acetaminophen (Tylenol) for pain.     Call your doctor if you have:  Severe pain  Signs of infection (warmth, redness, cloudy yellow drainage, and or a bad smell)  Questions or concerns    Who should I call with questions?      [facility]: [phone]      [facility]: [phone]      For urgent needs outside of business hours call the [facility] at [phone] and ask for the dermatology resident on call

## 2024-07-18 ENCOUNTER — OFFICE VISIT (OUTPATIENT)
Dept: DERMATOLOGY | Facility: CLINIC | Age: 89
End: 2024-07-18
Attending: DERMATOLOGY
Payer: COMMERCIAL

## 2024-07-18 DIAGNOSIS — L57.0 ACTINIC KERATOSIS: ICD-10-CM

## 2024-07-18 DIAGNOSIS — D48.5 NEOPLASM OF UNCERTAIN BEHAVIOR OF SKIN: ICD-10-CM

## 2024-07-18 DIAGNOSIS — Z85.828 HISTORY OF NONMELANOMA SKIN CANCER: Primary | ICD-10-CM

## 2024-07-18 PROCEDURE — 17000 DESTRUCT PREMALG LESION: CPT | Mod: XS | Performed by: DERMATOLOGY

## 2024-07-18 PROCEDURE — 11102 TANGNTL BX SKIN SINGLE LES: CPT | Performed by: DERMATOLOGY

## 2024-07-18 PROCEDURE — 11103 TANGNTL BX SKIN EA SEP/ADDL: CPT | Performed by: DERMATOLOGY

## 2024-07-18 PROCEDURE — 99213 OFFICE O/P EST LOW 20 MIN: CPT | Mod: 25 | Performed by: DERMATOLOGY

## 2024-07-18 PROCEDURE — 88305 TISSUE EXAM BY PATHOLOGIST: CPT | Performed by: DERMATOLOGY

## 2024-07-18 PROCEDURE — 17003 DESTRUCT PREMALG LES 2-14: CPT | Performed by: DERMATOLOGY

## 2024-07-18 ASSESSMENT — PAIN SCALES - GENERAL: PAINLEVEL: NO PAIN (0)

## 2024-07-18 NOTE — LETTER
7/18/2024      Cydney Christiansen  [address]  [place] [address]      Dear Colleague,    Thank you for referring your patient, Cydney Christiansen, to the [facility]. Please see a copy of my visit note below.      [facility] Dermatology Note  Encounter Date: Jul 18, 2024  Office Visit     Dermatology Problem List:  Last Skin Check 7/18/24  0. NUBs, R lateral elbow superior, R lateral elbow inferior.  1. 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 AK  - HAK, R mid jawline, s/p bx 12/7/23  - HAK, L Lateral forehead, s/p bx 12/7/23, s/p LN2 2/21/24  - HAK with overlying cutaneous horn, L medial cheek, s/p bx 11/29/23  - HAK, L lateral forehead, bx 9/28/21. treated with LN2 5/4/22  - AK, right cheek, s/p bx 9/28/21 - treated with LN2 5/4/22  - HAK, left mid eyebrow, base not seen, 6/2014  - left posterior lower leg, s/p bx 2/18/21, s/p cryo 9/28/21  - s/p cryotherapy  - left forearm, s/p biopsy 3/15/19     5. GA, right angle  of jaw: resolves with triamcinolone cream  6. History of benign biopsy  - Verrucous keratosis - right dorsal hand posterior, s/p bx 4/1/22 and 6/8/22  - ISK, right dorsal hand, s/p bx 9/28/21  - C/w rosacea, right posterior shoulder, s/p bx 9/28/21  - SK, right posterior lower leg, s/p bx 2/18/21  - verucca, left forehead, s/p bx 2/18/21  - Arthropod bite, left 4th digit, bx 2/18/21  - SK, right abdomen, s/p bx 2/18/2020  7. Eczematous patch R dorsal hand  - previous Tx: triamcinolone 0.01% cream, Vaseline   8. HAK, Left medial cheek, S/p Biopsy 11/29/23  - S/p cryo 12/07/23  9. SK, right thigh, s/p 12/07/23    ____________________________________________    Assessment & Plan:    # History of nonmelanoma skin cancer, no clincial evidence of recurrence.   - ABCDEs: Counseled ABCDEs of melanoma: Asymmetry, Border (irregularity), Color (not uniform, changes in color), Diameter (greater than 6 mm which is about the size of a pencil eraser), and Evolving (any changes in preexisting moles).  - Sun protection: Counseled SPF30+ sunscreen, UPF clothing, sun avoidance, tanning bed avoidance.  - Recommended regular skin exams.      # HAK, L lateral forehead, s/p bx 12/7/23. Retreated with cryo today. See procedure section.    # Actinic keratosis. L cheek    -see below       # Neoplasm of unspecified behavior of the skin (D49.2) on the R lateral elbow superior. The differential diagnosis includes BCC or other. .    - 5 mm pearly papule   - Shave biopsy today. See procedure section.    # Neoplasm of unspecified behavior of the skin (D49.2) on the R lateral elbow inferior. The differential diagnosis includes BCC or other. .    - 5 mm pearly papule   - Shave biopsy today. See procedure section.       Procedures Performed:   - Shave biopsy procedure note, location(s): R lateral elbow superior, R lateral elbow inferior. After discussion of benefits and risks including but not limited to bleeding, infection, scar, incomplete removal,  recurrence, and non-diagnostic biopsy, verbal consent and photographs were obtained. The area was cleaned with isopropyl alcohol. 0.5mL of 1% lidocaine with epinephrine was injected to obtain adequate anesthesia of lesion(s). Shave biopsy at site(s) performed. Hemostasis was achieved with aluminium chloride. Petrolatum ointment and a sterile dressing were applied. The patient tolerated the procedure and no complications were noted. The patient was provided with verbal and written post care instructions.   - Cryotherapy procedure note, location(s): L lateral forehead, R cheek. After verbal consent and discussion of risks and benefits including, but not limited to, dyspigmentation/scar, blister, and pain, 2 lesion(s) was(were) treated with 1-2 mm freeze border for 1-2 cycles with liquid nitrogen. Post cryotherapy instructions were provided.    Follow-up: 6 month(s) in-person, or earlier for new or changing lesions    Staff and Scribe:     Scribe Disclosure:   I, Justina Jin, am serving as a scribe to document services personally performed by Qi Barba MD based on data collection and the provider's statements to me.     Provider Disclosure:   The documentation recorded by the scribe accurately reflects the services I personally performed and the decisions made by me.    Qi Barba MD    Department of Dermatology  [facility] [place]: Phone: [phone], Fax:[phone]  [facility]: Phone: [phone], Fax: [phone]   ____________________________________________    CC: Skin Check (No areas of concern for skin check)    HPI:  Ms. Cydney Christiansen is a(n) 88 year old female who presents today as a return patient for skin check.    Today, patient reports no spots of concern. Nothing bleeding, crusting, or changing.       Patient is otherwise feeling well, without additional skin  concerns.    Labs Reviewed:  Last Derm Path 12/7/23  Final Diagnosis   A. Right mid jawline:  - Most consistent with hypertrophic actinic keratosis      B. Left lateral forehead:  - Hypertrophic actinic keratosis with overlying cutaneous horn      C. Right medial thigh:  - Squamous cell carcinoma in situ -     D. Right shin:  - Basal cell carcinoma, superficial and nodular types        Physical Exam:  Vitals: LMP  (LMP Unknown)   SKIN: Total skin including buttock areas was performed. The exam included the head/face, neck, both arms, chest, back, abdomen, both legs, digits and/or nails.      - There is a 5 mm pearly papule on the R lateral elbow superior..  - There is a 5 mm pearly papule on the R lateral elbow inferior..   -  red macules with scale left forehead and left cheek  - There is no erythema, telangectasias, nodularity, or pigmentation on the site of prior NMSCs ..   - No other lesions of concern on areas examined.     Medications:  Current Outpatient Medications   Medication Sig Dispense Refill     acetaminophen (TYLENOL) 325 MG tablet Take 2 tablets (650 mg) by mouth every 4 hours as needed for other (For optimal non-opioid multimodal pain management to improve pain control.)       amoxicillin (AMOXIL) 500 MG capsule 4 tablets -30 minute before dental wotrk 4 capsule 3     aspirin (ASA) 81 MG EC tablet Take 1 tablet (81 mg) by mouth daily       bumetanide (BUMEX) 1 MG tablet TAKE 1 TABLET BY MOUTH EVERY DAY 90 tablet 3     cyanocobalamin (VITAMIN B-12) 500 MCG tablet Take 1 tablet (500 mcg) by mouth daily 150 tablet 3     dorzolamide-timolol (COSOPT) 2-0.5 % ophthalmic solution Place 1 drop into both eyes 2 times daily 20 mL 4     ferrous sulfate (FEROSUL) 325 (65 Fe) MG tablet Take 1 tablet (325 mg) by mouth daily (with breakfast) 90 tablet 3     latanoprost (XALATAN) 0.005 % ophthalmic solution Place 1 drop into both eyes at bedtime 7.5 mL 4     losartan (COZAAR) 25 MG tablet Take 0.5 tablets (12.5 mg)  by mouth daily 45 tablet 3     potassium chloride chichi ER (KLOR-CON M20) 20 MEQ CR tablet Take 1 tablet (20 mEq) by mouth 2 times daily 180 tablet 0     simvastatin (ZOCOR) 20 MG tablet TAKE 1 TABLET BY MOUTH EVERYDAY AT BEDTIME 90 tablet 1     vitamin D3 (CHOLECALCIFEROL) 50 mcg (2000 units) tablet Take 1 tablet (50 mcg) by mouth daily 90 tablet 3     albuterol (PROAIR HFA/PROVENTIL HFA/VENTOLIN HFA) 108 (90 Base) MCG/ACT inhaler Inhale 1-2 puffs into the lungs every 4 hours as needed for shortness of breath or wheezing (Patient not taking: Reported on 7/18/2024) 6.7 g 0     Current Facility-Administered Medications   Medication Dose Route Frequency Provider Last Rate Last Admin     denosumab (PROLIA) injection 60 mg  60 mg Subcutaneous Q6 Months Estrellita Hernandez MD   60 mg at 02/08/24 1034      Past Medical History:   Patient Active Problem List   Diagnosis     History of basal cell carcinoma     PXF  OU     Personal history of malignant neoplasm of bladder     Hyperlipidemia LDL goal <160     Family history of diabetes mellitus     Squamous cell carcinoma     Basal cell carcinoma     Skin lesion of left leg     Viral warts     PVD (posterior vitreous detachment), OS     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     Hypertension goal BP (blood pressure) < 140/90     Seborrheic keratosis     Malignant neoplasm of overlapping sites of left female breast (H)     Scoliosis     Compression fracture of thoracic vertebra (H)     Mass of left hand     Primary open angle glaucoma of both eyes, unspecified glaucoma stage     Nuclear sclerosis of left eye     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)     Actinic keratosis     History of nonmelanoma skin cancer     Combined forms of age-related cataract of right eye     Status post coronary angiogram     Aortic valve stenosis     Age-related osteoporosis with current pathological fracture with routine healing, subsequent encounter     CHF (congestive heart failure)  (H)     COPD (chronic obstructive pulmonary disease) (H)     H/O aortic valve replacement     Postoperative atrial fibrillation (H)     Past Medical History:   Diagnosis Date     Actinic keratosis      Aortic valve stenosis 04/25/2023     Basal cell cancer 02/2011    bcc of the L back.     Basal cell carcinoma 04' , 06'     Basal cell carcinoma 06/2011    L neck     Breast cancer (H)      Cataract      Colon polyps     Precancer     Glaucoma (increased eye pressure)      Heart murmur      HLD (hyperlipidemia)      Hypertension goal BP (blood pressure) < 140/90 12/19/2013     Invasive ductal carcinoma of breast (H) 06/2015    left     Osteoporosis      Osteoporosis, unspecified osteoporosis type, unspecified pathological fracture presence 06/26/2017     Scoliosis      Skin cancer      Skin cancer 05/2013    sccis R cheek     Squamous cell carcinoma 09/2011    R upper back     Squamous cell carcinoma 10/2012    R dorsal hand     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      Again, thank you for allowing me to participate in the care of your patient.        Sincerely,        Qi Barba MD

## 2024-07-18 NOTE — NURSING NOTE
The following medication was given:     MEDICATION:  Lidocaine with epinephrine 1% 1:[id number]  ROUTE: SQ  SITE: see procedure note  DOSE: 1 cc  LOT #: [id number]  : [employer]  EXPIRATION DATE: 01-  NDC#: 55341-044-92  Was there drug waste? No  Multi-dose vial: Yes    Reanna Cain LPN  July 18, 2024

## 2024-07-18 NOTE — NURSING NOTE
Cydney Christiansen's goals for this visit include:   Chief Complaint   Patient presents with    Skin Check     No areas of concern for skin check     She requests these members of her care team be copied on today's visit information:     PCP: Estrellita Heranndez    Referring Provider:  Qi Barba MD  [address] [place] [address] [address]  [place] [address]    LMP  (LMP Unknown)     Do you need any medication refills at today's visit? Jennifer Cain LPN on 7/18/2024 at 2:22 PM

## 2024-07-20 LAB
PATH REPORT.COMMENTS IMP SPEC: ABNORMAL
PATH REPORT.COMMENTS IMP SPEC: ABNORMAL
PATH REPORT.COMMENTS IMP SPEC: YES
PATH REPORT.FINAL DX SPEC: ABNORMAL
PATH REPORT.GROSS SPEC: ABNORMAL
PATH REPORT.MICROSCOPIC SPEC OTHER STN: ABNORMAL
PATH REPORT.RELEVANT HX SPEC: ABNORMAL

## 2024-07-22 ENCOUNTER — TELEPHONE (OUTPATIENT)
Dept: DERMATOLOGY | Facility: CLINIC | Age: 89
End: 2024-07-22
Payer: COMMERCIAL

## 2024-07-22 NOTE — TELEPHONE ENCOUNTER
"Case Report   Surgical Pathology Report                         Case: [id number]                                   Authorizing Provider:  Qi Barba MD           Collected:           07/18/2024 02:45 PM           Ordering Location:     [facility]   Received:            07/18/2024 03:51 PM                                  [place]                                                                   Pathologist:           David Catherine MD                                                         Specimens:   A) - Skin, right lateral elbow superior                                                              B) - Skin, right lateral elbow inferior                                                    Final Diagnosis   A. Right lateral elbow superior:  - Basal cell carcinoma, nodular type - (see description)     B. Right lateral elbow inferior:  - Basal cell carcinoma, nodular and micronodular types - (see description)   Electronically signed by David Catherine MD on 7/20/2024 at  2:44 PM   Clinical Information  [facility]   The patient is an 88 year old female.    Gross Description  [facility]   A(1). Skin, right lateral elbow superior:  The specimen is received in formalin with proper patient identification, labeled \"right lateral elbow superior\".  The specimen consists of a 0.5 x 0.4 cm skin shave covered by a white irregular surface.  The resection margin is inked blue.  The specimen is bisected and entirely submitted in cassette A1.             B(2). Skin, right lateral elbow inferior:  The specimen is received in formalin with proper patient identification, labeled \"right lateral elbow inferior\".  The specimen consists of a 0.7 x 0.5 cm skin shave covered by a white irregular surface.  The resection margin is inked blue.  The specimen is bisected and entirely submitted in cassette B1.                Microscopic Description  [facility]   A. The specimen exhibits a tumor of atypical basaloid epithelium " arranged as islands in the dermis with fibromyxoid stroma and clefting artifact.     B. The specimen exhibits a tumor of atypical basaloid epithelium arranged as small islands in the dermis with fibromyxoid stroma and clefting artifact.

## 2024-07-22 NOTE — RESULT ENCOUNTER NOTE
A. Right lateral elbow superior:  - Basal cell carcinoma, nodular type - Pt could have ED and C or excision    B. Right lateral elbow inferior:  - Basal cell carcinoma, nodular and micronodular types -this must be an excision given the subtype    Recommend scheduling for 2 excision so surgeon has time to decide in person   [FreeTextEntry1] : Patient is a 79 year old female with a past medical history as above who presents for fasting blood work, Rx refill, and general follow-up.\par \par

## 2024-07-25 NOTE — TELEPHONE ENCOUNTER
Patient has Prolia scheduled for 8/8/2024    Estrellita Berry - any labs needed prior to Prolia?    Kiki Canseco RN  [facility]

## 2024-08-01 NOTE — TELEPHONE ENCOUNTER
Called patient to schedule lab prior to prolia injection. No answer. LM to call back. If patient calls back please assist in scheduling     Yina-

## 2024-08-02 ENCOUNTER — LAB (OUTPATIENT)
Dept: LAB | Facility: CLINIC | Age: 89
End: 2024-08-02
Payer: COMMERCIAL

## 2024-08-02 DIAGNOSIS — M80.00XD AGE-RELATED OSTEOPOROSIS WITH CURRENT PATHOLOGICAL FRACTURE WITH ROUTINE HEALING, SUBSEQUENT ENCOUNTER: ICD-10-CM

## 2024-08-02 LAB
ALBUMIN SERPL BCG-MCNC: 4.3 G/DL (ref 3.5–5.2)
ANION GAP SERPL CALCULATED.3IONS-SCNC: 8 MMOL/L (ref 7–15)
BUN SERPL-MCNC: 21.5 MG/DL (ref 8–23)
CALCIUM SERPL-MCNC: 9.4 MG/DL (ref 8.8–10.4)
CHLORIDE SERPL-SCNC: 103 MMOL/L (ref 98–107)
CREAT SERPL-MCNC: 0.86 MG/DL (ref 0.51–0.95)
EGFRCR SERPLBLD CKD-EPI 2021: 65 ML/MIN/1.73M2
GLUCOSE SERPL-MCNC: 109 MG/DL (ref 70–99)
HCO3 SERPL-SCNC: 28 MMOL/L (ref 22–29)
MAGNESIUM SERPL-MCNC: 2.2 MG/DL (ref 1.7–2.3)
POTASSIUM SERPL-SCNC: 4.8 MMOL/L (ref 3.4–5.3)
SODIUM SERPL-SCNC: 139 MMOL/L (ref 135–145)

## 2024-08-02 PROCEDURE — 83735 ASSAY OF MAGNESIUM: CPT

## 2024-08-02 PROCEDURE — 82040 ASSAY OF SERUM ALBUMIN: CPT

## 2024-08-02 PROCEDURE — 80048 BASIC METABOLIC PNL TOTAL CA: CPT

## 2024-08-02 PROCEDURE — 36415 COLL VENOUS BLD VENIPUNCTURE: CPT

## 2024-08-02 NOTE — TELEPHONE ENCOUNTER
Patient is scheduled for a lab appointment today, Friday, August 2nd at 11:45 am.  Juliette Longoria,

## 2024-08-08 ENCOUNTER — ALLIED HEALTH/NURSE VISIT (OUTPATIENT)
Dept: FAMILY MEDICINE | Facility: CLINIC | Age: 89
End: 2024-08-08
Payer: COMMERCIAL

## 2024-08-08 DIAGNOSIS — Z98.890 S/P ASCENDING AORTIC ANEURYSM REPAIR: ICD-10-CM

## 2024-08-08 DIAGNOSIS — Z86.79 S/P ASCENDING AORTIC ANEURYSM REPAIR: ICD-10-CM

## 2024-08-08 DIAGNOSIS — Z95.2 S/P AVR (AORTIC VALVE REPLACEMENT): ICD-10-CM

## 2024-08-08 DIAGNOSIS — M80.00XD AGE-RELATED OSTEOPOROSIS WITH CURRENT PATHOLOGICAL FRACTURE WITH ROUTINE HEALING, SUBSEQUENT ENCOUNTER: Primary | ICD-10-CM

## 2024-08-08 PROCEDURE — 96372 THER/PROPH/DIAG INJ SC/IM: CPT | Performed by: FAMILY MEDICINE

## 2024-08-08 PROCEDURE — 99207 PR NO CHARGE NURSE ONLY: CPT

## 2024-08-08 RX ORDER — POTASSIUM CHLORIDE 1500 MG/1
20 TABLET, EXTENDED RELEASE ORAL 2 TIMES DAILY
Qty: 180 TABLET | Refills: 0 | Status: SHIPPED | OUTPATIENT
Start: 2024-08-08

## 2024-08-08 NOTE — PATIENT INSTRUCTIONS
You received your Prolia injection today  Your next Injection is due in 6 months (around 2/8/2025)  If you plan on having any dental work done within the next 6 months, please let your dentist know that you are on this medication.  Make sure you do not have any dental work completed involving the jaw bone within 2 month prior to your scheduled injection

## 2024-08-08 NOTE — Clinical Note
Last dexa was done in 2015. Pt is wondering if she is due for another dexa? Per health maintenance, bone density is done every 15 years. She is wondering if she should have the bone density to see if the prolia has helped her osteoporosis. Please advise. Thanks!  Also, pt is taking potassium twice a day and pills are large and has to cut them to swallow. Can she discontinue the evening potassium dose and just take once a day?  Nika Doan RN [facility]

## 2024-08-08 NOTE — PROGRESS NOTES
Clinic Administered Medication Documentation      Prolia Documentation    Indication: Prolia  (denosumab) is a prescription medicine used to treat osteoporosis in patients who:   Are at high risk for fracture, meaning patients who have had a fracture related to osteoporosis, or who have multiple risk factors for fracture.  Cannot use another osteoporosis medicine or other osteoporosis medicines did not work well.  The timeline for early/late injections would be 4 weeks early and any time after the 6 month sangita. If a patient receives their injection late, then the subsequent injection would be 6 months from the date that they actually received the injection.    When was the last injection?  2024  Was the last injection at least 6 months ago? Yes  Has the prior authorization been completed?  Yes  Is there an active order (written within the past 365 days, with administrations remaining, not ) in the chart?  Yes   GFR Estimate   Date Value Ref Range Status   2024 65 >60 mL/min/1.73m2 Final     Comment:     eGFR calculated using  CKD-EPI equation.   2021 68 >60 mL/min/[1.73_m2] Final     Comment:     Non  GFR Calc  Starting 2018, serum creatinine based estimated GFR (eGFR) will be   calculated using the Chronic Kidney Disease Epidemiology Collaboration   (CKD-EPI) equation.       Has patient had a GFR within the last 12 months? Yes   Is GFR under 30, or patient has a diagnosis of CKD4 or CKD5? No   Patient denies gastric bypass or parathyroid surgery in past 6 months? Yes - patient denies.   Patient denies dental work in the past two months involving drilling into the bone, such as implants/extractions, oral surgery or a tooth extraction that has not healed yet?  Yes  Patient denies plans for an emergency tooth extraction within the next week? Yes    The following steps were completed to comply with the REMS program for Prolia:  Reviewed information in the Medication  Guide, including the serious risks of Prolia  and the symptoms of each risk.  Advised patient to seek prompt medical attention if they have signs or symptoms of any of the serious risks.  Provided each patient a copy of the Medication Guide and Patient Guide.    Prior to injection, verified patient identity using patient's name and date of birth. Medication was administered. Please see MAR and medication order for additional information. Patient instructed to remain in clinic for 15 minutes and report any adverse reaction to staff immediately.    Vial/Syringe: Syringe  Was this medication supplied by the patient? No  Verified that the patient has refills remaining in their prescription.

## 2024-09-05 DIAGNOSIS — I10 HYPERTENSION GOAL BP (BLOOD PRESSURE) < 140/90: ICD-10-CM

## 2024-09-05 RX ORDER — LOSARTAN POTASSIUM 25 MG/1
12.5 TABLET ORAL DAILY
Qty: 45 TABLET | Refills: 0 | Status: SHIPPED | OUTPATIENT
Start: 2024-09-05 | End: 2024-10-07

## 2024-09-13 ENCOUNTER — TELEPHONE (OUTPATIENT)
Dept: CARDIOLOGY | Facility: CLINIC | Age: 89
End: 2024-09-13
Payer: COMMERCIAL

## 2024-09-13 NOTE — TELEPHONE ENCOUNTER
Left Voicemail (1st Attempt) for the patient to call back and schedule the following:    Appointment type:  rtn cardio   Provider: silvana   Return date: next available   Specialty phone number: [phone] opt 1   Additional appointment(s) needed: n/a   Additonal Notes: n/a

## 2024-09-16 ENCOUNTER — TELEPHONE (OUTPATIENT)
Dept: CARDIOLOGY | Facility: CLINIC | Age: 89
End: 2024-09-16
Payer: COMMERCIAL

## 2024-09-16 NOTE — TELEPHONE ENCOUNTER
9/16 Patient confirmed scheduled appointment:  Date: 1/8/2024  Time: 11:40 am  Visit type: Return Cardiology  Provider: Jessica  Location: [facility]  Testing/imaging: Fasting labs prior   Additional notes: n/a

## 2024-09-16 NOTE — TELEPHONE ENCOUNTER
[facility] Call Center    Phone Message    May a detailed message be left on voicemail: yes     Reason for Call: Other: Patient called regarding Neela voice mail to schedule appt in January with Dr. Byrne. Patient did not want to schedule appt with my cause she was told by Neela salcedo in January. No available appt until April. Offered patient Dr. Byrne in April. Please reach out to discuss that Dr. Byrne gives the ok to schedule later. Thank you      Action Taken: Other: cardiology     Travel Screening: Not Applicable    Thank you!  Specialty Access Center       Date of Service:

## 2024-09-18 ENCOUNTER — NURSE TRIAGE (OUTPATIENT)
Dept: FAMILY MEDICINE | Facility: CLINIC | Age: 89
End: 2024-09-18
Payer: COMMERCIAL

## 2024-09-18 NOTE — TELEPHONE ENCOUNTER
"Reason for Disposition    [1] MODERATE dizziness (e.g., interferes with normal activities) AND [2] has NOT been evaluated by doctor (or NP/PA) for this  (Exception: Dizziness caused by heat exposure, sudden standing, or poor fluid intake.)    Additional Information    Negative: SEVERE difficulty breathing (e.g., struggling for each breath, speaks in single words)    Negative: [1] Difficulty breathing or swallowing AND [2] started suddenly after medicine, an allergic food or bee sting    Negative: Shock suspected (e.g., cold/pale/clammy skin, too weak to stand, low BP, rapid pulse)    Negative: Difficult to awaken or acting confused (e.g., disoriented, slurred speech)    Negative: [1] Weakness (i.e., paralysis, loss of muscle strength) of the face, arm or leg on one side of the body AND [2] sudden onset AND [3] present now    Negative: [1] Numbness (i.e., loss of sensation) of the face, arm or leg on one side of the body AND [2] sudden onset AND [3] present now    Negative: [1] Loss of speech or garbled speech AND [2] sudden onset AND [3] present now    Negative: Overdose (accidental or intentional) of medications    Negative: [1] Fainted > 15 minutes ago AND [2] still feels too weak or dizzy to stand    Negative: Heart beating < 50 beats per minute OR > 140 beats per minute    Negative: Chest pain    Negative: Rectal bleeding, bloody stool, or tarry-black stool    Negative: Headache is main symptom    Negative: Diarrhea is main symptom    Negative: Vomiting is main symptom    Negative: [1] Vomiting AND [2] contains red blood or black (\"coffee ground\") material    Negative: Difficulty breathing    Negative: SEVERE dizziness (e.g., unable to stand, requires support to walk, feels like passing out now)    Negative: Extra heartbeats, irregular heart beating, or heart is beating very fast  (i.e., \"palpitations\")    Negative: [1] Drinking very little AND [2] dehydration suspected (e.g., no urine > 12 hours, very dry " "mouth, very lightheaded)    Negative: [1] Weakness (i.e., paralysis, loss of muscle strength) of the face, arm / hand, or leg / foot on one side of the body AND [2] sudden onset AND [3] brief (now gone)    Negative: [1] Numbness (i.e., loss of sensation) of the face, arm / hand, or leg / foot on one side of the body AND [2] sudden onset AND [3] brief (now gone)    Negative: [1] Loss of speech or garbled speech AND [2] sudden onset AND [3] brief (now gone)    Negative: Loss of vision or double vision  (Exception: Similar to previous migraines.)    Negative: Patient sounds very sick or weak to the triager    Answer Assessment - Initial Assessment Questions  1. DESCRIPTION: \"Describe your dizziness.\"      If I lift head up and look up, I get very dizzy  2. LIGHTHEADED: \"Do you feel lightheaded?\" (e.g., somewhat faint, woozy, weak upon standing)      It is a lot worse when I stand up, I slipped off bed yesterday  3. VERTIGO: \"Do you feel like either you or the room is spinning or tilting?\" (i.e. vertigo)      At times, it comes and goes  4. SEVERITY: \"How bad is it?\"  \"Do you feel like you are going to faint?\" \"Can you stand and walk?\"    - MILD: Feels slightly dizzy, but walking normally.    - MODERATE: Feels unsteady when walking, but not falling; interferes with normal activities (e.g., school, work).    - SEVERE: Unable to walk without falling, or requires assistance to walk without falling;   moderate  5. ONSET:  \"When did the dizziness begin?\"      yesterday  6. AGGRAVATING FACTORS: \"Does anything make it worse?\" (e.g., standing, change in head position)      Looking up a the myra  7. HEART RATE: \"Can you tell me your heart rate?\" \"How many beats in 15 seconds?\"  (Note: not all patients can do this)        It feels normal, patient stated that her FIT BIT days 62 BPM  8. CAUSE: \"What do you think is causing the dizziness?\"      Not sure  9. RECURRENT SYMPTOM: \"Have you had dizziness before?\" If Yes, ask: \"When was " "the last time?\" \"What happened that time?\"      I have had inner ear issues  10. OTHER SYMPTOMS: \"Do you have any other symptoms?\" (e.g., fever, chest pain, vomiting, diarrhea, bleeding)        Denies, patient does have a history of open heart      SEE PCP WITHIN 24 HOURS:   ADVISED TO ER now, due to age, and patients history of open heart surgery one year ago.  Patient is also feeling fatigued.    Patient will get a ride  Patient stated understanding and agreeable with the plan of care.     Kathy BROWN RN  Triage Nurse  [facility]    Protocols used: Dizziness - [id number]    "

## 2024-09-19 ENCOUNTER — OFFICE VISIT (OUTPATIENT)
Dept: FAMILY MEDICINE | Facility: CLINIC | Age: 89
End: 2024-09-19
Payer: COMMERCIAL

## 2024-09-19 VITALS
HEART RATE: 55 BPM | SYSTOLIC BLOOD PRESSURE: 125 MMHG | BODY MASS INDEX: 27.29 KG/M2 | DIASTOLIC BLOOD PRESSURE: 76 MMHG | HEIGHT: 58 IN | WEIGHT: 130 LBS | TEMPERATURE: 96.7 F | OXYGEN SATURATION: 99 % | RESPIRATION RATE: 16 BRPM

## 2024-09-19 DIAGNOSIS — E43 UNSPECIFIED SEVERE PROTEIN-CALORIE MALNUTRITION (H): ICD-10-CM

## 2024-09-19 DIAGNOSIS — I10 HYPERTENSION GOAL BP (BLOOD PRESSURE) < 140/90: ICD-10-CM

## 2024-09-19 DIAGNOSIS — I50.20 SYSTOLIC CONGESTIVE HEART FAILURE, UNSPECIFIED HF CHRONICITY (H): ICD-10-CM

## 2024-09-19 DIAGNOSIS — R42 DIZZINESS: Primary | ICD-10-CM

## 2024-09-19 DIAGNOSIS — Z17.0 MALIGNANT NEOPLASM OF OVERLAPPING SITES OF LEFT BREAST IN FEMALE, ESTROGEN RECEPTOR POSITIVE (H): ICD-10-CM

## 2024-09-19 DIAGNOSIS — C50.812 MALIGNANT NEOPLASM OF OVERLAPPING SITES OF LEFT BREAST IN FEMALE, ESTROGEN RECEPTOR POSITIVE (H): ICD-10-CM

## 2024-09-19 DIAGNOSIS — J44.9 CHRONIC OBSTRUCTIVE PULMONARY DISEASE, UNSPECIFIED COPD TYPE (H): ICD-10-CM

## 2024-09-19 PROCEDURE — 99214 OFFICE O/P EST MOD 30 MIN: CPT | Performed by: FAMILY MEDICINE

## 2024-09-19 PROCEDURE — G2211 COMPLEX E/M VISIT ADD ON: HCPCS | Performed by: FAMILY MEDICINE

## 2024-09-19 ASSESSMENT — PAIN SCALES - GENERAL: PAINLEVEL: NO PAIN (0)

## 2024-09-19 NOTE — PROGRESS NOTES
"  Assessment & Plan     Dizziness   Getting better  Drink fluids     Unspecified severe protein-calorie malnutrition (H24)  Stable     Chronic obstructive pulmonary disease, unspecified COPD type (H)  Doing well  Has not beeded albuterol inh  Hypertension goal BP (blood pressure) < 140/90  Controlled     Systolic congestive heart failure, unspecified HF chronicity (H)  Stable         MED REC REQUIRED  Post Medication Reconciliation Status: discharge medications reconciled, continue medications without change  BMI  Estimated body mass index is 26.99 kg/m  as calculated from the following:    Height as of this encounter: 1.478 m (4' 10.19\").    Weight as of this encounter: 59 kg (130 lb).     Pt has follow up appointment for medicare wellness  Advised walk slowly  Use cane or walker at all Times  Avoid activities that can cause falls  as discussed     Subjective   Cydney is a 88 year old, presenting for the following health issues:  ER F/U        9/19/2024    10:04 AM   Additional Questions   Roomed by Jovani longitudinal plan of care for the diagnosis(es)/condition(s) as documented were addressed during this visit. Due to the added complexity in care, I will continue to support Cydney in the subsequent management and with ongoing continuity of care.The longitudinal plan of care for the diagnosis(es)/condition(s) as documented were addressed during this visit. Due to the added complexity in care, I will continue to support Cydney in the subsequent management and with ongoing continuity of care.   Accompanied by Son     JENNI     Cydney Christiansen is a 88 y.o. female with a past medical history of CHF, COPD, breast cancer, hypertension, status post AVR who presents to the emergency department for evaluation of lightheadedness   ED/UC Followup:    Facility:  [facility]   Date of visit: September 18, 2024  Reason for visit: Lightheadedness  Current Status: a little better today, fatigue  She feels better  Says some " "dizziness when she Moves Head  ER notes reviewed   The patient denies any symptoms of neurological impairment or TIA's; no amaurosis, diplopia, dysphasia, or unilateral disturbance of motor or sensory function. No loss of balance or vertigo.  Blood Pressures have been stable   CHF is stable   No sob  No chest pain      Review of Systems  CONSTITUTIONAL: NEGATIVE for fever, chills, change in weight  ENT/MOUTH: NEGATIVE for ear, mouth and throat problems  RESP: NEGATIVE for significant cough or SOB  CV: NEGATIVE for chest pain, palpitations or peripheral edema  GI: NEGATIVE for nausea, abdominal pain, heartburn, or change in bowel habits  MUSCULOSKELETAL: NEGATIVE for significant arthralgias or myalgia  NEURO: NEGATIVE for weakness,  or paresthesias  PSYCHIATRIC: NEGATIVE for changes in mood or affect      Objective    /76   Pulse 55   Temp (!) 96.7  F (35.9  C) (Temporal)   Resp 16   Ht 1.478 m (4' 10.19\")   Wt 59 kg (130 lb)   LMP  (LMP Unknown)   SpO2 99%   BMI 26.99 kg/m    Body mass index is 26.99 kg/m .  Physical Exam   GENERAL: alert, no distress, and elderly  EYES: Eyes grossly normal to inspection, PERRL and conjunctivae and sclerae normal  NECK: no adenopathy, no asymmetry, masses, or scars  RESP: lungs clear to auscultation - no rales, rhonchi or wheezes  CV: regular rate and rhythm, normal S1 S2, no S3 or S4, no murmur, click or rub, no peripheral edema  ABDOMEN: soft, nontender, no hepatosplenomegaly, no masses and bowel sounds normal  MS:no peripheral edema  Walks slowly with a walker   NEURO: Normal strength and tone, sensory exam grossly normal, mentation intact, cranial nerves 2-12 intact, and walks slowly with a walker   PSYCH: mentation appears normal    Labs reviewed [facility]        Signed Electronically by: Estrellita Hernandez MD    "

## 2024-10-07 ENCOUNTER — OFFICE VISIT (OUTPATIENT)
Dept: FAMILY MEDICINE | Facility: CLINIC | Age: 89
End: 2024-10-07
Payer: COMMERCIAL

## 2024-10-07 VITALS
WEIGHT: 131 LBS | OXYGEN SATURATION: 97 % | BODY MASS INDEX: 28.26 KG/M2 | TEMPERATURE: 97.1 F | DIASTOLIC BLOOD PRESSURE: 70 MMHG | HEIGHT: 57 IN | SYSTOLIC BLOOD PRESSURE: 132 MMHG | RESPIRATION RATE: 16 BRPM | HEART RATE: 56 BPM

## 2024-10-07 DIAGNOSIS — Z23 NEED FOR TDAP VACCINATION: ICD-10-CM

## 2024-10-07 DIAGNOSIS — I50.20 SYSTOLIC CONGESTIVE HEART FAILURE, UNSPECIFIED HF CHRONICITY (H): ICD-10-CM

## 2024-10-07 DIAGNOSIS — Z23 NEED FOR SHINGLES VACCINE: ICD-10-CM

## 2024-10-07 DIAGNOSIS — Z29.11 NEED FOR VACCINATION AGAINST RESPIRATORY SYNCYTIAL VIRUS: ICD-10-CM

## 2024-10-07 DIAGNOSIS — Z83.3 FAMILY HISTORY OF DIABETES MELLITUS: ICD-10-CM

## 2024-10-07 DIAGNOSIS — Z00.00 ENCOUNTER FOR MEDICARE ANNUAL WELLNESS EXAM: Primary | ICD-10-CM

## 2024-10-07 DIAGNOSIS — Z86.79 H/O AORTIC ANEURYSM REPAIR: ICD-10-CM

## 2024-10-07 DIAGNOSIS — M80.00XD AGE-RELATED OSTEOPOROSIS WITH CURRENT PATHOLOGICAL FRACTURE WITH ROUTINE HEALING, SUBSEQUENT ENCOUNTER: ICD-10-CM

## 2024-10-07 DIAGNOSIS — J44.9 CHRONIC OBSTRUCTIVE PULMONARY DISEASE, UNSPECIFIED COPD TYPE (H): ICD-10-CM

## 2024-10-07 DIAGNOSIS — E78.5 HYPERLIPIDEMIA LDL GOAL <160: ICD-10-CM

## 2024-10-07 DIAGNOSIS — Z85.3 PERSONAL HISTORY OF MALIGNANT NEOPLASM OF BREAST: ICD-10-CM

## 2024-10-07 DIAGNOSIS — Z29.89 INDICATION PRESENT FOR ENDOCARDITIS PROPHYLAXIS: ICD-10-CM

## 2024-10-07 DIAGNOSIS — I97.89 POSTOPERATIVE ATRIAL FIBRILLATION (H): ICD-10-CM

## 2024-10-07 DIAGNOSIS — I10 HYPERTENSION GOAL BP (BLOOD PRESSURE) < 140/90: ICD-10-CM

## 2024-10-07 DIAGNOSIS — I48.91 POSTOPERATIVE ATRIAL FIBRILLATION (H): ICD-10-CM

## 2024-10-07 DIAGNOSIS — Z98.890 H/O AORTIC ANEURYSM REPAIR: ICD-10-CM

## 2024-10-07 PROBLEM — C50.912 INVASIVE DUCTAL CARCINOMA OF LEFT BREAST (H): Status: RESOLVED | Noted: 2018-03-08 | Resolved: 2024-10-07

## 2024-10-07 PROCEDURE — 90662 IIV NO PRSV INCREASED AG IM: CPT | Performed by: FAMILY MEDICINE

## 2024-10-07 PROCEDURE — G0439 PPPS, SUBSEQ VISIT: HCPCS | Performed by: FAMILY MEDICINE

## 2024-10-07 PROCEDURE — 99214 OFFICE O/P EST MOD 30 MIN: CPT | Mod: 25 | Performed by: FAMILY MEDICINE

## 2024-10-07 PROCEDURE — G0008 ADMIN INFLUENZA VIRUS VAC: HCPCS | Performed by: FAMILY MEDICINE

## 2024-10-07 PROCEDURE — 91320 SARSCV2 VAC 30MCG TRS-SUC IM: CPT | Performed by: FAMILY MEDICINE

## 2024-10-07 PROCEDURE — 90480 ADMN SARSCOV2 VAC 1/ONLY CMP: CPT | Performed by: FAMILY MEDICINE

## 2024-10-07 RX ORDER — AMOXICILLIN 500 MG/1
CAPSULE ORAL
Qty: 4 CAPSULE | Refills: 3 | Status: SHIPPED | OUTPATIENT
Start: 2024-10-07

## 2024-10-07 RX ORDER — CHOLECALCIFEROL (VITAMIN D3) 50 MCG
1 TABLET ORAL DAILY
Qty: 90 TABLET | Refills: 3 | Status: SHIPPED | OUTPATIENT
Start: 2024-10-07

## 2024-10-07 RX ORDER — LOSARTAN POTASSIUM 25 MG/1
12.5 TABLET ORAL DAILY
Qty: 45 TABLET | Refills: 3 | Status: SHIPPED | OUTPATIENT
Start: 2024-10-07

## 2024-10-07 RX ORDER — SIMVASTATIN 20 MG
20 TABLET ORAL AT BEDTIME
Qty: 90 TABLET | Refills: 3 | Status: SHIPPED | OUTPATIENT
Start: 2024-10-07

## 2024-10-07 SDOH — HEALTH STABILITY: PHYSICAL HEALTH: ON AVERAGE, HOW MANY DAYS PER WEEK DO YOU ENGAGE IN MODERATE TO STRENUOUS EXERCISE (LIKE A BRISK WALK)?: 4 DAYS

## 2024-10-07 ASSESSMENT — PAIN SCALES - GENERAL: PAINLEVEL: NO PAIN (0)

## 2024-10-07 ASSESSMENT — SOCIAL DETERMINANTS OF HEALTH (SDOH): HOW OFTEN DO YOU GET TOGETHER WITH FRIENDS OR RELATIVES?: THREE TIMES A WEEK

## 2024-10-07 NOTE — PROGRESS NOTES
Preventive Care Visit  [facility] CLAUDIA Hernandez MD, Family Medicine  Oct 7, 2024      Assessment & Plan     Encounter for Medicare annual wellness exam      Chronic obstructive pulmonary disease, unspecified COPD type (H)  Stable   Says she does not use her Inhaler much as she feels fine    Systolic congestive heart failure, unspecified HF chronicity (H)  On losartan and Bumex  Stable   Echo reviewed in Cardiology Notes    Age-related osteoporosis with current pathological fracture with routine healing, subsequent encounter  Pt has Not wanted meds   - vitamin D3 (CHOLECALCIFEROL) 50 mcg (2000 units) tablet; Take 1 tablet (50 mcg) by mouth daily.    Hypertension goal BP (blood pressure) < 140/90  Controlled   - losartan (COZAAR) 25 MG tablet; Take 0.5 tablets (12.5 mg) by mouth daily.    Hyperlipidemia LDL goal <160  Stable   - simvastatin (ZOCOR) 20 MG tablet; Take 1 tablet (20 mg) by mouth at bedtime.    Family history of diabetes mellitus  Stable     Personal history of malignant neoplasm of breast  Doing well     Postoperative atrial fibrillation (H)  Pt has declined anticoagulants -see Cardiology notes    H/O  Ascending aortic aneurysm repair  PT gets TTE     Need for Tdap vaccination    - Tdap, tetanus-diptheria-acell pertussis, (BOOSTRIX) 5-2.5-18.5 LF-MCG/0.5 MISHEL injection; Inject 0.5 mLs into the muscle once for 1 dose.    Need for shingles vaccine  Advised   - zoster vaccine recombinant adjuvanted (SHINGRIX) injection; Inject 0.5 mLs into the muscle once for 1 dose. Pharmacist administered    Need for vaccination against respiratory syncytial virus  Advised   - RSV vaccine, bivalent, ABRYSVO, injection; Inject 0.5 mLs into the muscle once for 1 dose. Pharmacist administered    Indication present for endocarditis prophylaxis  Refilled   - amoxicillin (AMOXIL) 500 MG capsule; 4 tablets -30 minute before dental wotrk  Pt  has  had a AVR and doing well    Follow up 6 months  If vertigo  "not better 1 month follow up /sooner if worse   Pt has had no falls  Uses your assistive device for walking at all Times       BMI  Estimated body mass index is 28.03 kg/m  as calculated from the following:    Height as of this encounter: 1.456 m (4' 9.32\").    Weight as of this encounter: 59.4 kg (131 lb).       Counseling  Appropriate preventive services were addressed with this patient via screening, questionnaire, or discussion as appropriate for fall prevention, nutrition, physical activity, Tobacco-use cessation, social engagement, weight loss and cognition.  Checklist reviewing preventive services available has been given to the patient.  Reviewed patient's diet, addressing concerns and/or questions.   She is at risk for psychosocial distress and has been provided with information to reduce risk.   The patient was provided with written information regarding signs of hearing loss.           Destiny Lamas is a 88 year old, presenting for the following:  Physical        10/7/2024     8:57 AM   Additional Questions   Roomed by [facility] Care Directive  Patient does not have a Health Care Directive or Living Will: Discussed advance care planning with patient; information given to patient to review.    HPI  Pt here for a Physical and check on chronic medical conditions    Hyperlipidemia Follow-Up    Are you regularly taking any medication or supplement to lower your cholesterol?   Yes- statins  Are you having muscle aches or other side effects that you think could be caused by your cholesterol lowering medication?  No    Hypertension Follow-up    Do you check your blood pressure regularly outside of the clinic? No   Are you following a low salt diet? Yes  Are your blood pressures ever more than 140 on the top number (systolic) OR more   than 90 on the bottom number (diastolic), for example 140/90? No    Heart Failure Follow-up   Are you experiencing any shortness of breath? No  Are you experiencing " "any swelling in your legs or feet?  No    Do you cough at night?  No  Pt has had some Mild vertigo which is getting better  Since your last visit, how many times have you gone to the cardiologist, urgent care, emergency room, or hospital because of your heart failure?   Pt sees Cardiologist ones a year and is stable   Last Echo:   Echo result w/o MOPS: Interpretation SummaryAscending aorta aneurysm repair with 32 mm Hemashield graft and aortic valvereplacement with 21 mm Aponte Resilia on 04/25/2023.Doppler interrogation of the aortic valve is normal.Trace to mild tricuspid insufficiency is present.Global and regional left ventricular function is normal with an EF of 55-60%.Global right ventricular function is normal.IVC diameter <2.1 cm collapsing >50% with sniff suggests a normal RA pressureof 3 mmHg.No pericardial effusion is present.Compared to prior, sinus rhytm now, LVEF and TR are improved.      COPD Follow-Up  Overall, how are your COPD symptoms since your last clinic visit?     How much fatigue or shortness of breath do you have when you are walking?  None  How much shortness of breath do you have when you are resting?  None  How often do you cough? Never  Have you noticed any change in your sputum/phlegm?  No  Pt says her copd is doing well and never needs to use albuterol  She has some Mild Vertigo which comes and goes and more with Moving  It is getting better  No new symptoms    History   Smoking Status    Former    Types: Cigarettes   Smokeless Tobacco    Never     No results found for: \"FEV1\", \"ZHJ6VES\"        10/7/2024   General Health   How would you rate your overall physical health? Good   Feel stress (tense, anxious, or unable to sleep) Only a little      (!) STRESS CONCERN      10/7/2024   Nutrition   Diet: Regular (no restrictions)            10/7/2024   Exercise   Days per week of moderate/strenous exercise 4 days            10/7/2024   Social Factors   Frequency of gathering with friends or " relatives Three times a week   Worry food won't last until get money to buy more No   Food not last or not have enough money for food? No   Do you have housing? (Housing is defined as stable permanent housing and does not include staying ouside in a car, in a tent, in an abandoned building, in an overnight shelter, or couch-surfing.) Yes   Are you worried about losing your housing? No   Lack of transportation? No   Unable to get utilities (heat,electricity)? No            10/7/2024   Fall Risk   Fallen 2 or more times in the past year? No    No   Trouble with walking or balance? No    No       Multiple values from one day are sorted in reverse-chronological order          10/7/2024   Activities of Daily Living- Home Safety   Needs help with the following daily activites None of the above   Safety concerns in the home None of the above            10/7/2024   Dental   Dentist two times every year? Yes            10/7/2024   Hearing Screening   Hearing concerns? (!) I FEEL THAT PEOPLE ARE MUMBLING OR NOT SPEAKING CLEARLY.            10/7/2024   Driving Risk Screening   Patient/family members have concerns about driving No            10/7/2024   General Alertness/Fatigue Screening   Have you been more tired than usual lately? No            10/7/2024   Urinary Incontinence Screening   Bothered by leaking urine in past 6 months No            10/7/2024   TB Screening   Were you born outside of the [place]? Yes            Today's PHQ-2 Score:       10/7/2024     9:07 AM   PHQ-2 ( 1999 [employer])   Q1: Little interest or pleasure in doing things 0   Q2: Feeling down, depressed or hopeless 0   PHQ-2 Score 0   Q1: Little interest or pleasure in doing things Not at all   Q2: Feeling down, depressed or hopeless Not at all   PHQ-2 Score 0           10/7/2024   Substance Use   Alcohol more than 3/day or more than 7/wk No   Do you have a current opioid prescription? No   How severe/bad is pain from 1 to 10? 3/10   Do you use any other  substances recreationally? No        Social History     Tobacco Use    Smoking status: Former     Current packs/day: 0.00     Average packs/day: 0.5 packs/day for 20.0 years (10.0 ttl pk-yrs)     Types: Cigarettes     Start date: 1956     Quit date: 1976     Years since quittin.7    Smokeless tobacco: Never   Vaping Use    Vaping status: Never Used   Substance Use Topics    Alcohol use: Yes     Comment: Weekends 4 drink total per week    Drug use: No           2024   LAST FHS-7 RESULTS   1st degree relative breast or ovarian cancer Yes   Any relative bilateral breast cancer No   Any male have breast cancer No   Any ONE woman have BOTH breast AND ovarian cancer No   Any woman with breast cancer before 50yrs Yes   2 or more relatives with breast AND/OR ovarian cancer No   2 or more relatives with breast AND/OR bowel cancer No                      Reviewed and updated as needed this visit by Provider                    Past Medical History:   Diagnosis Date    Actinic keratosis     Aortic valve stenosis 2023    Basal cell cancer 2011    bcc of the L back.    Basal cell carcinoma  ,     Basal cell carcinoma 2011    L neck    Breast cancer (H)     Cataract     Colon polyps     Precancer    Glaucoma (increased eye pressure)     Heart murmur     HLD (hyperlipidemia)     Hypertension goal BP (blood pressure) < 140/90 2013    Invasive ductal carcinoma of breast (H) 2015    left    Osteoporosis     Osteoporosis, unspecified osteoporosis type, unspecified pathological fracture presence 2017    Scoliosis     Skin cancer     Skin cancer 2013    sccis R cheek    Squamous cell carcinoma 2011    R upper back    Squamous cell carcinoma 10/2012    R dorsal hand    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 2013    left leg    Transitional cell carcinoma of the bladder 1993    Vertigo     takes meclizine prn when she ocassionally has bouts of vertigo     Past  Surgical History:   Procedure Laterality Date    BIOPSY BREAST      CATARACT IOL, RT/LT      COLONOSCOPY  5/2008, 5/13, 6/14, 6/17    Q 3 years for advanced adenomatous polyp    CV CORONARY ANGIOGRAM N/A 02/17/2023    Procedure: Coronary Angiogram [[id number]];  Surgeon: James Jo MD;  Location:  [facility] CARDI[facility] CATH LAB    CV LEFT HEART CATH N/A 02/17/2023    Procedure: Left Heart Cath;  Surgeon: James Jo MD;  Location:  [facility]    CV RIGHT HEART CATH MEASUREMENTS RECORDED N/A 02/17/2023    Procedure: Right Heart Cath;  Surgeon: James Jo MD;  Location:  HEART CARDI[facility] CATH LAB    CYSTOSCOPY  12/31/2008    D & C      GENITOURINARY SURGERY      TURBT    H STATISTIC PICC LINE INSERTION >5YR, FAILED Right 04/27/2023    Unable to advance guidewire. Left with lumpectomy with lymph nodes removed.    HC REMOVAL GALLBLADDER      open pan    HC TRABECULOPLASTY BY LASER SURGERY Left 04/18/2007    SLT #1 OS (inf 180)    HC TRABECULOPLASTY BY LASER SURGERY Right 05/04/2011    SLT #1 OD (inf 180?)    HC TRABECULOPLASTY BY LASER SURGERY Left 03/17/2015    SLT #2 OS (sup 180)    HC TRABECULOPLASTY BY LASER SURGERY Right 06/23/2015    SLT #2 OD (sup 180?)    IR CHEST TUBE PLACEMENT NON-TUNNELED BILATERAL  05/08/2023    IR THORACENTESIS  05/03/2023    IR THORACENTESIS  05/15/2023    LASER ARGON TREATMENT      SLT left eye x 2    LUMPECTOMY BREAST      LUMPECTOMY BREAST WITH SENTINEL NODE, COMBINED Left 07/29/2015    Procedure: COMBINED LUMPECTOMY BREAST WITH SENTINEL NODE;  Surgeon: Ifeanyi Sunshine MD;  Location:  OR    PHACOEMULSIFICATION CLEAR CORNEA WITH STANDARD INTRAOCULAR LENS IMPLANT Left 12/08/2017    Procedure: PHACOEMULSIFICATION CLEAR CORNEA WITH STANDARD INTRAOCULAR LENS IMPLANT;   COMPLEX LEFT PHACOEMULSIFICATION CLEAR CORNEA WITH STANDARD INTRAOCULAR LENS IMPLANT ;  Surgeon: Kvng Garcia MD;  Location[facility]    PHACOEMULSIFICATION CLEAR CORNEA WITH STANDARD  INTRAOCULAR LENS IMPLANT Right 10/19/2020    Procedure: RIGHT COMPLEX PHACOEMULSIFICATION, CATARACT, WITH INTRAOCULAR LENS IMPLANT ;  Surgeon: Kvng Garcia MD;  Location: [facility] OR    REPAIR ANEURYSM ASCENDING AORTA N/A 04/25/2023    Procedure: MEDIAN STERNOTOMY, CARDIOPULMONARY BYPASS, AORTIC VALVE REPLACEMENT USING PIERRE RESILA 21 MM VALVE, ASCENDING AORTA  ANEURYSM REPAIR USING 32 MM HEMASHIELD GRAFT, TRANSESOPHAGEAL ECHOCARDIOGRAM PERFORMED BY ANESTHESIA STAFF;  Surgeon: Gm Solis MD;  Location: [facility] OR    SURGICAL HISTORY OF -   09/2011    squamous cell CA excised from back    TUBAL LIGATION      YAG CAPSULOTOMY OS (LEFT EYE) Left 05/10/2024    Dr. Garcia     OB History   No obstetric history on file.     Lab work is in process  Labs reviewed in EPIC  BP Readings from Last 3 Encounters:   10/07/24 132/70   09/19/24 125/76   02/21/24 (!) 146/82    Wt Readings from Last 3 Encounters:   10/07/24 59.4 kg (131 lb)   09/19/24 59 kg (130 lb)   01/08/24 58.3 kg (128 lb 8 oz)                  Patient Active Problem List   Diagnosis    History of basal cell carcinoma    PXF  OU    Personal history of malignant neoplasm of bladder    Hyperlipidemia LDL goal <160    Family history of diabetes mellitus    Squamous cell carcinoma    Basal cell carcinoma    Skin lesion of left leg    Viral warts    PVD (posterior vitreous detachment), OS    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    Hypertension goal BP (blood pressure) < 140/90    Seborrheic keratosis    Scoliosis    Compression fracture of thoracic vertebra (H)    Mass of left hand    Primary open angle glaucoma of both eyes, unspecified glaucoma stage    Nuclear sclerosis of left eye    Actinic keratosis    History of nonmelanoma skin cancer    Combined forms of age-related cataract of right eye    H/O aortic aneurysm repair    Aortic valve stenosis    Age-related osteoporosis with current pathological fracture with routine healing, subsequent  encounter    CHF (congestive heart failure) (H)    COPD (chronic obstructive pulmonary disease) (H)    H/O aortic valve replacement    Postoperative atrial fibrillation (H)    Unspecified severe protein-calorie malnutrition (H)     Past Surgical History:   Procedure Laterality Date    BIOPSY BREAST      CATARACT IOL, RT/LT      COLONOSCOPY  5/2008, 5/13, 6/14, 6/17    Q 3 years for advanced adenomatous polyp    CV CORONARY ANGIOGRAM N/A 02/17/2023    Procedure: Coronary Angiogram [[id number]];  Surgeon: James Jo MD;  [facility]    CV LEFT HEART CATH N/A 02/17/2023    Procedure: Left Heart Cath;  Surgeon: James Jo MD;  Location: [facility]    CV RIGHT HEART CATH MEASUREMENTS RECORDED N/A 02/17/2023    Procedure: Right Heart Cath;  Surgeon: James Jo MD;  Location:  HEART CARDIAC CATH LAB    CYSTOSCOPY  12/31/2008    D & C      GENITOURINARY SURGERY      TURBT    H STATISTIC PICC LINE INSERTION >5YR, FAILED Right 04/27/2023    Unable to advance guidewire. Left with lumpectomy with lymph nodes removed.    HC REMOVAL GALLBLADDER      open pan    HC TRABECULOPLASTY BY LASER SURGERY Left 04/18/2007    SLT #1 OS (inf 180)    HC TRABECULOPLASTY BY LASER SURGERY Right 05/04/2011    SLT #1 OD (inf 180?)    HC TRABECULOPLASTY BY LASER SURGERY Left 03/17/2015    SLT #2 OS (sup 180)    HC TRABECULOPLASTY BY LASER SURGERY Right 06/23/2015    SLT #2 OD (sup 180?)    IR CHEST TUBE PLACEMENT NON-TUNNELED BILATERAL  05/08/2023    IR THORACENTESIS  05/03/2023    IR THORACENTESIS  05/15/2023    LASER ARGON TREATMENT      SLT left eye x 2    LUMPECTOMY BREAST      LUMPECTOMY BREAST WITH SENTINEL NODE, COMBINED Left 07/29/2015    Procedure: COMBINED LUMPECTOMY BREAST WITH SENTINEL NODE;  Surgeon: Ifeanyi Sunshine MD;  Location: [facility] OR    PHACOEMULSIFICATION CLEAR CORNEA WITH STANDARD INTRAOCULAR LENS IMPLANT Left 12/08/2017    Procedure: PHACOEMULSIFICATION CLEAR  CORNEA WITH STANDARD INTRAOCULAR LENS IMPLANT;   COMPLEX LEFT PHACOEMULSIFICATION CLEAR CORNEA WITH STANDARD INTRAOCULAR LENS IMPLANT ;  Surgeon: Kvng Garcia MD;  Location:  [facility]    PHACOEMULSIFICATION CLEAR CORNEA WITH STANDARD INTRAOCULAR LENS IMPLANT Right 10/19/2020    Procedure: RIGHT COMPLEX PHACOEMULSIFICATION, CATARACT, WITH INTRAOCULAR LENS IMPLANT ;  Surgeon: Kvng Garcia MD;  [place] [facility] [place]    REPAIR ANEURYSM ASCENDING AORTA N/A 2023    Procedure: MEDIAN STERNOTOMY, CARDIOPULMONARY BYPASS, AORTIC VALVE REPLACEMENT USING PIERRE RESILA 21 MM VALVE, ASCENDING AORTA  ANEURYSM REPAIR USING 32 MM HEMASHIELD GRAFT, TRANSESOPHAGEAL ECHOCARDIOGRAM PERFORMED BY ANESTHESIA STAFF;  Surgeon: Gm Solis MD;  Location:  OR    SURGICAL HISTORY OF -   2011    squamous cell CA excised from back    TUBAL LIGATION      YAG CAPSULOTOMY OS (LEFT EYE) Left 05/10/2024    Dr. Garcai       Social History     Tobacco Use    Smoking status: Former     Current packs/day: 0.00     Average packs/day: 0.5 packs/day for 20.0 years (10.0 ttl pk-yrs)     Types: Cigarettes     Start date: 1956     Quit date: 1976     Years since quittin.7    Smokeless tobacco: Never   Substance Use Topics    Alcohol use: Yes     Comment: Weekends 4 drink total per week     Family History   Problem Relation Age of Onset    Diabetes Mother     Breast Cancer Mother     Eye Disorder Mother     Osteoporosis Mother     Glaucoma Mother     Macular Degeneration Mother     Prostate Cancer Father     Anesthesia Reaction Sister         PONV    Thyroid Disease Sister     Blood Disease Sister         lupus    Heart Disease Sister     Prostate Cancer Brother     Glaucoma Brother     Macular Degeneration Brother     Prostate Cancer Brother     Glaucoma Brother     Asthma Son     Glaucoma Other     Melanoma No family hx of     Skin Cancer No family hx of     Bleeding Disorder No family hx of     Venous thrombosis No family hx  of          Current Outpatient Medications   Medication Sig Dispense Refill    acetaminophen (TYLENOL) 325 MG tablet Take 2 tablets (650 mg) by mouth every 4 hours as needed for other (For optimal non-opioid multimodal pain management to improve pain control.)      albuterol (PROAIR HFA/PROVENTIL HFA/VENTOLIN HFA) 108 (90 Base) MCG/ACT inhaler Inhale 1-2 puffs into the lungs every 4 hours as needed for shortness of breath or wheezing 6.7 g 0    amoxicillin (AMOXIL) 500 MG capsule 4 tablets -30 minute before dental wotrk 4 capsule 3    aspirin (ASA) 81 MG EC tablet Take 1 tablet (81 mg) by mouth daily      bumetanide (BUMEX) 1 MG tablet TAKE 1 TABLET BY MOUTH EVERY DAY 90 tablet 3    cyanocobalamin (VITAMIN B-12) 500 MCG tablet Take 1 tablet (500 mcg) by mouth daily 150 tablet 3    dorzolamide-timolol (COSOPT) 2-0.5 % ophthalmic solution Place 1 drop into both eyes 2 times daily 20 mL 4    ferrous sulfate (FEROSUL) 325 (65 Fe) MG tablet Take 1 tablet (325 mg) by mouth daily (with breakfast) 90 tablet 3    latanoprost (XALATAN) 0.005 % ophthalmic solution Place 1 drop into both eyes at bedtime 7.5 mL 4    losartan (COZAAR) 25 MG tablet Take 0.5 tablets (12.5 mg) by mouth daily. 45 tablet 3    potassium chloride chichi ER (KLOR-CON M20) 20 MEQ CR tablet Take 1 tablet (20 mEq) by mouth 2 times daily 180 tablet 0    RSV vaccine, bivalent, ABRYSVO, injection Inject 0.5 mLs into the muscle once for 1 dose. Pharmacist administered 0.5 mL 0    simvastatin (ZOCOR) 20 MG tablet Take 1 tablet (20 mg) by mouth at bedtime. 90 tablet 3    Tdap, tetanus-diptheria-acell pertussis, (BOOSTRIX) 5-2.5-18.5 LF-MCG/0.5 MISHEL injection Inject 0.5 mLs into the muscle once for 1 dose. 0.5 mL 0    vitamin D3 (CHOLECALCIFEROL) 50 mcg (2000 units) tablet Take 1 tablet (50 mcg) by mouth daily. 90 tablet 3    zoster vaccine recombinant adjuvanted (SHINGRIX) injection Inject 0.5 mLs into the muscle once for 1 dose. Pharmacist administered 0.5 mL 0      Allergies   Allergen Reactions    Lisinopril Cough     Recent Labs   Lab Test 08/02/24  1200 01/08/24  1149 07/05/23  1248 05/12/23  1850 05/12/23  0501 05/04/23  1619 05/04/23  0546 04/25/23  0909 04/13/23  1139 10/17/22  1605 08/03/22  1035 07/22/21  1122 05/03/21  1041 09/22/20  1318 04/27/17  1452 12/22/16  1138   A1C  --   --   --   --   --   --   --   --  6.0*  --   --   --   --   --   --   --    LDL  --  76  85 76  71  --   --   --   --   --   --   --  82   < >  --   --    < >  --    HDL  --  58 53  --   --   --   --   --   --   --  76   < >  --   --    < >  --    TRIG  --  90 108  --   --   --   --   --   --   --  86   < >  --   --    < >  --    ALT  --  <5 9  --   --   --  73*   < > 13  --   --   --  25 21   < > 25   CR 0.86 0.75 0.59   < > 0.77   < > 0.65   < > 0.63   < >  --    < > 0.80 0.70   < > 0.76   GFRESTIMATED 65 77 88   < > 75   < > 86   < > 86   < >  --    < > 68 80   < > 74   GFRESTBLACK  --   --   --   --   --   --   --   --   --   --   --   --  79 >90   < > 89   POTASSIUM 4.8 4.2 4.5   < > 4.8   < > 3.8   < > 4.3   < >  --    < > 4.5 4.1   < >  --    TSH  --   --   --   --  7.20*  --   --   --   --   --   --   --   --   --   --  2.98    < > = values in this interval not displayed.      Current providers sharing in care for this patient include:  Patient Care Team:  Estrellita Hernandez MD as PCP - General (Family Practice)  Usha Salgado MD as MD (Hematology & Oncology)  Estrellita Hernandez MD as Assigned PCP  Kvng Garcia MD as MD (Ophthalmology)  Deandra Covarrubias RN as Specialty Care Coordinator (Cardiology)  Geneva Daniel RN as Specialty Care Coordinator (Cardiology)  Qi Barba MD as MD (Dermatology)  David Byrne MD as Assigned Heart and Vascular Provider  Kvng Garcia MD as Assigned Surgical Provider    The following health maintenance items are reviewed in Epic and correct as of today:  Health Maintenance   Topic Date Due    HF ACTION PLAN  Never done     "COPD ACTION PLAN  Never done    ZOSTER IMMUNIZATION (2 of 3) 07/22/2008    RSV VACCINE (1 - 1-dose 75+ series) Never done    DTAP/TDAP/TD IMMUNIZATION (2 - Td or Tdap) 10/05/2022    ALT  01/08/2025    LIPID  01/08/2025    CBC  01/08/2025    EYE EXAM  04/18/2025    MAMMO SCREENING  05/22/2025    BMP  08/02/2025    ANNUAL REVIEW OF HM ORDERS  09/19/2025    MEDICARE ANNUAL WELLNESS VISIT  10/07/2025    FALL RISK ASSESSMENT  10/07/2025    ADVANCE CARE PLANNING  10/07/2029    DEXA  07/20/2030    TSH W/FREE T4 REFLEX  Completed    SPIROMETRY  Completed    PHQ-2 (once per calendar year)  Completed    INFLUENZA VACCINE  Completed    Pneumococcal Vaccine: 65+ Years  Completed    COVID-19 Vaccine  Completed    HPV IMMUNIZATION  Aged Out    MENINGITIS IMMUNIZATION  Aged Out    RSV MONOCLONAL ANTIBODY  Aged Out    COLORECTAL CANCER SCREENING  Discontinued         Review of Systems  CONSTITUTIONAL: NEGATIVE for fever, chills, change in weight  INTEGUMENTARY/SKIN: NEGATIVE for worrisome rashes, moles or lesions  EYES: NEGATIVE for vision changes or irritation  ENT/MOUTH: NEGATIVE for ear, mouth and throat problems  RESP: NEGATIVE for significant cough or SOB  BREAST: NEGATIVE for masses, tenderness or discharge  CV: NEGATIVE for chest pain, palpitations or peripheral edema  GI: NEGATIVE for nausea, abdominal pain, heartburn, or change in bowel habits  : NEGATIVE for frequency, dysuria, or hematuria  MUSCULOSKELETAL: NEGATIVE for significant arthralgias or myalgia  NEURO: mild vertigo  ENDOCRINE: NEGATIVE for temperature intolerance, skin/hair changes  HEME: NEGATIVE for bleeding problems  PSYCHIATRIC: NEGATIVE for changes in mood or affect     Objective    Exam  /70   Pulse 56   Temp 97.1  F (36.2  C) (Temporal)   Resp 16   Ht 1.456 m (4' 9.32\")   Wt 59.4 kg (131 lb)   LMP  (LMP Unknown)   SpO2 97%   BMI 28.03 kg/m     Estimated body mass index is 28.03 kg/m  as calculated from the following:    Height as of this " "encounter: 1.456 m (4' 9.32\").    Weight as of this encounter: 59.4 kg (131 lb).    Physical Exam  GENERAL: alert and no distress  EYES: Eyes grossly normal to inspection, PERRL and conjunctivae and sclerae normal  HENT: ear canals and TM's normal, nose and mouth without ulcers or lesions  NECK: no adenopathy, no asymmetry, masses, or scars  RESP: lungs clear to auscultation - no rales, rhonchi or wheezes  CV: regular rate and rhythm, normal S1 S2, no S3 or S4, no murmur, click or rub, no peripheral edema  ABDOMEN: soft, nontender, no hepatosplenomegaly, no masses and bowel sounds normal  MS: no gross musculoskeletal defects noted, no edema  SKIN: no suspicious lesions or rashes  NEURO: Normal strength and tone, mentation intact and speech normal  PSYCH: mentation appears normal, affect normal/bright  LYMPH: no cervical, supraclavicular, axillary, or inguinal adenopathy         10/7/2024   Mini Cog   Clock Draw Score 2 Normal   3 Item Recall 1 object recalled   Mini Cog Total Score 3                 Signed Electronically by: Estrellita Hernandez MD    "

## 2024-10-07 NOTE — PATIENT INSTRUCTIONS
Patient Education   Preventive Care Advice   This is general advice given by our system to help you stay healthy. However, your care team may have specific advice just for you. Please talk to your care team about your preventive care needs.  Nutrition  Eat 5 or more servings of fruits and vegetables each day.  Try wheat bread, brown rice and whole grain pasta (instead of white bread, rice, and pasta).  Get enough calcium and vitamin D. Check the label on foods and aim for 100% of the RDA (recommended daily allowance).  Lifestyle  Exercise at least 150 minutes each week  (30 minutes a day, 5 days a week).  Do muscle strengthening activities 2 days a week. These help control your weight and prevent disease.  No smoking.  Wear sunscreen to prevent skin cancer.  Have a dental exam and cleaning every 6 months.  Yearly exams  See your health care team every year to talk about:  Any changes in your health.  Any medicines your care team has prescribed.  Preventive care, family planning, and ways to prevent chronic diseases.  Shots (vaccines)   HPV shots (up to age 26), if you've never had them before.  Hepatitis B shots (up to age 59), if you've never had them before.  COVID-19 shot: Get this shot when it's due.  Flu shot: Get a flu shot every year.  Tetanus shot: Get a tetanus shot every 10 years.  Pneumococcal, hepatitis A, and RSV shots: Ask your care team if you need these based on your risk.  Shingles shot (for age 50 and up)  General health tests  Diabetes screening:  Starting at age 35, Get screened for diabetes at least every 3 years.  If you are younger than age 35, ask your care team if you should be screened for diabetes.  Cholesterol test: At age 39, start having a cholesterol test every 5 years, or more often if advised.  Bone density scan (DEXA): At age 50, ask your care team if you should have this scan for osteoporosis (brittle bones).  Hepatitis C: Get tested at least once in your life.  STIs (sexually  transmitted infections)  Before age 24: Ask your care team if you should be screened for STIs.  After age 24: Get screened for STIs if you're at risk. You are at risk for STIs (including HIV) if:  You are sexually active with more than one person.  You don't use condoms every time.  You or a partner was diagnosed with a sexually transmitted infection.  If you are at risk for HIV, ask about PrEP medicine to prevent HIV.  Get tested for HIV at least once in your life, whether you are at risk for HIV or not.  Cancer screening tests  Cervical cancer screening: If you have a cervix, begin getting regular cervical cancer screening tests starting at age 21.  Breast cancer scan (mammogram): If you've ever had breasts, begin having regular mammograms starting at age 40. This is a scan to check for breast cancer.  Colon cancer screening: It is important to start screening for colon cancer at age 45.  Have a colonoscopy test every 10 years (or more often if you're at risk) Or, ask your provider about stool tests like a FIT test every year or Cologuard test every 3 years.  To learn more about your testing options, visit:   .  For help making a decision, visit:   https://bit.ly/xj89580.  Prostate cancer screening test: If you have a prostate, ask your care team if a prostate cancer screening test (PSA) at age 55 is right for you.  Lung cancer screening: If you are a current or former smoker ages 50 to 80, ask your care team if ongoing lung cancer screenings are right for you.  For informational purposes only. Not to replace the advice of your health care provider. Copyright   2023 [facility] [employer]. All rights reserved. Clinically reviewed by the [facility] Transitions Program. [employer] 552106 - REV 01/24.  Hearing Loss: Care Instructions  Overview     Hearing loss is a sudden or slow decrease in how well you hear. It can range from slight to profound. Permanent hearing loss can occur with aging. It also can  happen when you are exposed long-term to loud noise. Examples include listening to loud music, riding motorcycles, or being around other loud machines.  Hearing loss can affect your work and home life. It can make you feel lonely or depressed. You may feel that you have lost your independence. But hearing aids and other devices can help you hear better and feel connected to others.  Follow-up care is a key part of your treatment and safety. Be sure to make and go to all appointments, and call your doctor if you are having problems. It's also a good idea to know your test results and keep a list of the medicines you take.  How can you care for yourself at home?  Avoid loud noises whenever possible. This helps keep your hearing from getting worse.  Always wear hearing protection around loud noises.  Wear a hearing aid as directed.  A professional can help you pick a hearing aid that will work best for you.  You can also get hearing aids over the counter for mild to moderate hearing loss.  Have hearing tests as your doctor suggests. They can show whether your hearing has changed. Your hearing aid may need to be adjusted.  Use other devices as needed. These may include:  Telephone amplifiers and hearing aids that can connect to a television, stereo, radio, or microphone.  Devices that use lights or vibrations. These alert you to the doorbell, a ringing telephone, or a baby monitor.  Television closed-captioning. This shows the words at the bottom of the screen. Most new TVs can do this.  TTY (text telephone). This lets you type messages back and forth on the telephone instead of talking or listening. These devices are also called TDD. When messages are typed on the keyboard, they are sent over the phone line to a receiving TTY. The message is shown on a monitor.  Use text messaging, social media, and email if it is hard for you to communicate by telephone.  Try to learn a listening technique called speechreading. It is  "not lipreading. You pay attention to people's gestures, expressions, posture, and tone of voice. These clues can help you understand what a person is saying. Face the person you are talking to, and have them face you. Make sure the lighting is good. You need to see the other person's face clearly.  Think about counseling if you need help to adjust to your hearing loss.  When should you call for help?  Watch closely for changes in your health, and be sure to contact your doctor if:    You think your hearing is getting worse.     You have new symptoms, such as dizziness or nausea.   Where can you learn more?  Go to https://www.[employer].net/patiented  Enter R798 in the search box to learn more about \"Hearing Loss: Care Instructions.\"  Current as of: September 27, 2023  Content Version: 14.2 2024 [facility].   Care instructions adapted under license by your healthcare professional. If you have questions about a medical condition or this instruction, always ask your healthcare professional. [employer] disclaims any warranty or liability for your use of this information.       "

## 2024-10-08 ENCOUNTER — TELEPHONE (OUTPATIENT)
Dept: FAMILY MEDICINE | Facility: CLINIC | Age: 89
End: 2024-10-08
Payer: COMMERCIAL

## 2024-10-08 NOTE — TELEPHONE ENCOUNTER
Routing message to provider.    Patient bought dramamine 25 mg to take for dizziness as recommended by provider.    Label says to check with provider if you have glaucoma which she does.    Wants to know if it is OK to take dramamine.    Please advise.    Christine M Klisch, RN

## 2024-10-08 NOTE — TELEPHONE ENCOUNTER
Patient notified of provider's message as written.  Patient verbalized understanding.    Kiki Canseco RN  [facility]

## 2024-10-10 ENCOUNTER — VIRTUAL VISIT (OUTPATIENT)
Dept: DERMATOLOGY | Facility: CLINIC | Age: 89
End: 2024-10-10
Payer: COMMERCIAL

## 2024-10-10 ENCOUNTER — TELEPHONE (OUTPATIENT)
Dept: DERMATOLOGY | Facility: CLINIC | Age: 89
End: 2024-10-10

## 2024-10-10 DIAGNOSIS — C44.612 BASAL CELL CARCINOMA (BCC) OF RIGHT FOREARM: Primary | ICD-10-CM

## 2024-10-10 PROCEDURE — 99441 PR PHYSICIAN TELEPHONE EVALUATION 5-10 MIN: CPT | Mod: 93 | Performed by: DERMATOLOGY

## 2024-10-10 ASSESSMENT — PAIN SCALES - GENERAL: PAINLEVEL: NO PAIN (0)

## 2024-10-10 NOTE — PROGRESS NOTES
[facility] Dermatology Note  Encounter Date: Oct 10, 2024  Store-and-Forward and Telephone ([phone]). Location of teledermatologist: [facility].  Start time: 1215. End time: 1220.    Dermatology Problem List:  Last Skin Check 7/18/24      1. History of NMSC  - BCC R lateral elbow superior, s/p biopsy 7/18/24  - BCC, R lateral elbow inferior, s/p biopsy, 7/18/24    - SCCIS, R medial thigh, s/p excision 2/21/24  - BCC, R gunderson, S/p Mohs 2/21/24  - BCC, crown of scalp, s/p Mohs and linear repair 12/12/22  - BCC, mid back s/p ED&C 11/2020  - SCCIS, right upper arm s/p ED&C 11/2020  - SCCIS, left forearm s/p excision 3/6/2020  - SCCis Left superior forearm, s/p: ED and C 9/30/2019  - SCCis right temple, s/p:  Mohs 9/30/2019  - SCCIS, right nasal sidewall, s/p Mohs 4/1/19   - SCCIS, right upper arm, s/p excision 8/9/18   - SCC, left popliteal fossa, well differentiated with focal perineural invasion but with clear margins on path, s/p excision by Dr. 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 AK  - HAK, R mid jawline, s/p bx 12/7/23  - HAK, L Lateral forehead, s/p bx 12/7/23, s/p LN2 2/21/24  - HAK with overlying cutaneous horn, L medial cheek, s/p bx 11/29/23  - HAK, L lateral forehead, bx 9/28/21. treated with LN2 5/4/22  - AK, right cheek, s/p bx 9/28/21 - treated with LN2 5/4/22  - HAK, left mid eyebrow, base not seen, 6/2014  - left posterior lower leg, s/p bx 2/18/21, s/p cryo 9/28/21  - s/p cryotherapy  - left forearm, s/p biopsy 3/15/19      5. GA, right angle of jaw: resolves with triamcinolone cream  6. History of  benign biopsy  - Verrucous keratosis - right dorsal hand posterior, s/p bx 4/1/22 and 6/8/22  - ISK, right dorsal hand, s/p bx 9/28/21  - C/w rosacea, right posterior shoulder, s/p bx 9/28/21  - SK, right posterior lower leg, s/p bx 2/18/21  - verucca, left forehead, s/p bx 2/18/21  - Arthropod bite, left 4th digit, bx 2/18/21  - SK, right abdomen, s/p bx 2/18/2020  7. Eczematous patch R dorsal hand  - previous Tx: triamcinolone 0.01% cream, Vaseline   8. HAK, Left medial cheek, S/p Biopsy 11/29/23  - S/p cryo 12/07/23  9. SK, right thigh, s/p 12/07/23     ____________________________________________    Assessment & Plan:     # - BCC R lateral elbow superior, s/p biopsy 7/18/24     - BCC, R lateral elbow inferior, s/p biopsy, 7/18/24  - Both biopsy sites are close enough together that it may represent a single BCC >2cm.   - The nature, risks, benefits, and alternatives to Mohs surgery were discussed. The patient would like to proceed with Mohs surgery. We will plan to treat both sites as a single Mohs case.   - Likely repair will be linear.     Follow-up: Schedule Mohs    Staff:     Ian Haro DO    Department of Dermatology  [facility]: Phone: [phone], Fax:[phone]  [facility]: Phone: [phone], Fax: [phone]    ____________________________________________    CC: Basal Cell Carcinoma (Discuss different treatment options)    HPI:  Ms. Cydney Christiansen is a(n) 88 year old female who presents today as a return patient for surgery consult for BCC x2 on R forearm. The biopsy sites are close together. She cannot tell if there is normal appearing skin between them.     Patient is otherwise feeling well, without additional skin concerns.    Labs Reviewed:  N/A    Physical Exam:  Vitals: LMP  (LMP Unknown)   SKIN: Teledermatology photos were reviewed; image quality and  interpretability: acceptable. Image date: 7/18/24.  - Pink poorly defined scaly macules on the proximal R forearm.   - No other lesions of concern on areas examined.     Medications:  Current Outpatient Medications   Medication Sig Dispense Refill    acetaminophen (TYLENOL) 325 MG tablet Take 2 tablets (650 mg) by mouth every 4 hours as needed for other (For optimal non-opioid multimodal pain management to improve pain control.)      albuterol (PROAIR HFA/PROVENTIL HFA/VENTOLIN HFA) 108 (90 Base) MCG/ACT inhaler Inhale 1-2 puffs into the lungs every 4 hours as needed for shortness of breath or wheezing 6.7 g 0    amoxicillin (AMOXIL) 500 MG capsule 4 tablets -30 minute before dental wotrk 4 capsule 3    aspirin (ASA) 81 MG EC tablet Take 1 tablet (81 mg) by mouth daily      bumetanide (BUMEX) 1 MG tablet TAKE 1 TABLET BY MOUTH EVERY DAY 90 tablet 3    cyanocobalamin (VITAMIN B-12) 500 MCG tablet Take 1 tablet (500 mcg) by mouth daily 150 tablet 3    dorzolamide-timolol (COSOPT) 2-0.5 % ophthalmic solution Place 1 drop into both eyes 2 times daily 20 mL 4    ferrous sulfate (FEROSUL) 325 (65 Fe) MG tablet Take 1 tablet (325 mg) by mouth daily (with breakfast) 90 tablet 3    latanoprost (XALATAN) 0.005 % ophthalmic solution Place 1 drop into both eyes at bedtime 7.5 mL 4    losartan (COZAAR) 25 MG tablet Take 0.5 tablets (12.5 mg) by mouth daily. 45 tablet 3    potassium chloride chichi ER (KLOR-CON M20) 20 MEQ CR tablet Take 1 tablet (20 mEq) by mouth 2 times daily 180 tablet 0    simvastatin (ZOCOR) 20 MG tablet Take 1 tablet (20 mg) by mouth at bedtime. 90 tablet 3    vitamin D3 (CHOLECALCIFEROL) 50 mcg (2000 units) tablet Take 1 tablet (50 mcg) by mouth daily. 90 tablet 3     No current facility-administered medications for this visit.      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    Hypertension goal BP (blood pressure) < 140/90    Seborrheic keratosis    Scoliosis    Compression fracture of thoracic vertebra (H)    Mass of left hand    Primary open angle glaucoma of both eyes, unspecified glaucoma stage    Nuclear sclerosis of left eye    Actinic keratosis    History of nonmelanoma skin cancer    Combined forms of age-related cataract of right eye    H/O aortic aneurysm repair    Aortic valve stenosis    Age-related osteoporosis with current pathological fracture with routine healing, subsequent encounter    CHF (congestive heart failure) (H)    COPD (chronic obstructive pulmonary disease) (H)    H/O aortic valve replacement    Postoperative atrial fibrillation (H)    Unspecified severe protein-calorie malnutrition (H)     Past Medical History:   Diagnosis Date    Actinic keratosis     Aortic valve stenosis 04/25/2023    Basal cell cancer 02/2011    bcc of the L back.    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    left    Osteoporosis     Osteoporosis, unspecified osteoporosis type, unspecified pathological fracture presence 06/26/2017    Scoliosis     Skin cancer     Skin cancer 05/2013    sccis R cheek    Squamous cell carcinoma 09/2011    R upper back    Squamous cell carcinoma 10/2012    R dorsal hand    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

## 2024-10-10 NOTE — TELEPHONE ENCOUNTER
[facility] Call Center    Phone Message    May a detailed message be left on voicemail: yes     Reason for Call: Other: Pt is advising she was away from her phone initially when called for check in     Action Taken: Other: TE to [facility]    Travel Screening: Not Applicable     Date of Service:

## 2024-10-10 NOTE — LETTER
10/10/2024      Cydney Christiansen  [address]  [place] [address]      Dear Colleague,    Thank you for referring your patient, Cydney Christiansen, to the [facility]. Please see a copy of my visit note below.    [facility] Dermatology Note  Encounter Date: Oct 10, 2024  Store-and-Forward and Telephone ([phone]). Location of teledermatologist: [facility].  Start time: 1215. End time: 1220.    Dermatology Problem List:  Last Skin Check 7/18/24      1. History of NMSC  - BCC R lateral elbow superior, s/p biopsy 7/18/24  - BCC, R lateral elbow inferior, s/p biopsy, 7/18/24    - SCCIS, R medial thigh, s/p excision 2/21/24  - BCC, R gunderson, S/p Mohs 2/21/24  - BCC, crown of scalp, s/p Mohs and linear repair 12/12/22  - BCC, mid back s/p ED&C 11/2020  - SCCIS, right upper arm s/p ED&C 11/2020  - SCCIS, left forearm s/p excision 3/6/2020  - SCCis Left superior forearm, s/p: ED and C 9/30/2019  - SCCis right temple, s/p:  Mohs 9/30/2019  - SCCIS, right nasal sidewall, s/p Mohs 4/1/19   - SCCIS, right upper arm, s/p excision 8/9/18   - SCC, left popliteal fossa, well differentiated with focal perineural invasion but with clear margins on path, s/p excision by Dr. 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 AK  - HAK, R mid jawline, s/p bx 12/7/23  - HAK, L Lateral forehead, s/p bx 12/7/23, s/p LN2 2/21/24  - HAK with overlying cutaneous horn, L medial cheek, s/p bx 11/29/23  - HAK, L lateral forehead, bx 9/28/21. treated with LN2 5/4/22  - AK, right cheek, s/p bx 9/28/21 - treated with LN2  5/4/22  - HAK, left mid eyebrow, base not seen, 6/2014  - left posterior lower leg, s/p bx 2/18/21, s/p cryo 9/28/21  - s/p cryotherapy  - left forearm, s/p biopsy 3/15/19      5. GA, right angle of jaw: resolves with triamcinolone cream  6. History of benign biopsy  - Verrucous keratosis - right dorsal hand posterior, s/p bx 4/1/22 and 6/8/22  - ISK, right dorsal hand, s/p bx 9/28/21  - C/w rosacea, right posterior shoulder, s/p bx 9/28/21  - SK, right posterior lower leg, s/p bx 2/18/21  - verucca, left forehead, s/p bx 2/18/21  - Arthropod bite, left 4th digit, bx 2/18/21  - SK, right abdomen, s/p bx 2/18/2020  7. Eczematous patch R dorsal hand  - previous Tx: triamcinolone 0.01% cream, Vaseline   8. HAK, Left medial cheek, S/p Biopsy 11/29/23  - S/p cryo 12/07/23  9. SK, right thigh, s/p 12/07/23     ____________________________________________    Assessment & Plan:     # - BCC R lateral elbow superior, s/p biopsy 7/18/24     - BCC, R lateral elbow inferior, s/p biopsy, 7/18/24  - Both biopsy sites are close enough together that it may represent a single BCC >2cm.   - The nature, risks, benefits, and alternatives to Mohs surgery were discussed. The patient would like to proceed with Mohs surgery. We will plan to treat both sites as a single Mohs case.   - Likely repair will be linear.     Follow-up: Schedule Mohs    Staff:     Ian Haro DO    Department of Dermatology  [facility]: Phone: [phone], Fax:[phone]  [facility]: Phone: [phone], Fax: [phone]    ____________________________________________    CC: Basal Cell Carcinoma (Discuss different treatment options)    HPI:  Ms. Cydney Christiansen is a(n) 88 year old female who presents today as a return patient for surgery consult for BCC x2 on R forearm. The biopsy sites are close together. She cannot tell if there is  normal appearing skin between them.     Patient is otherwise feeling well, without additional skin concerns.    Labs Reviewed:  N/A    Physical Exam:  Vitals: LMP  (LMP Unknown)   SKIN: Teledermatology photos were reviewed; image quality and interpretability: acceptable. Image date: 7/18/24.  - Pink poorly defined scaly macules on the proximal R forearm.   - No other lesions of concern on areas examined.     Medications:  Current Outpatient Medications   Medication Sig Dispense Refill     acetaminophen (TYLENOL) 325 MG tablet Take 2 tablets (650 mg) by mouth every 4 hours as needed for other (For optimal non-opioid multimodal pain management to improve pain control.)       albuterol (PROAIR HFA/PROVENTIL HFA/VENTOLIN HFA) 108 (90 Base) MCG/ACT inhaler Inhale 1-2 puffs into the lungs every 4 hours as needed for shortness of breath or wheezing 6.7 g 0     amoxicillin (AMOXIL) 500 MG capsule 4 tablets -30 minute before dental wotrk 4 capsule 3     aspirin (ASA) 81 MG EC tablet Take 1 tablet (81 mg) by mouth daily       bumetanide (BUMEX) 1 MG tablet TAKE 1 TABLET BY MOUTH EVERY DAY 90 tablet 3     cyanocobalamin (VITAMIN B-12) 500 MCG tablet Take 1 tablet (500 mcg) by mouth daily 150 tablet 3     dorzolamide-timolol (COSOPT) 2-0.5 % ophthalmic solution Place 1 drop into both eyes 2 times daily 20 mL 4     ferrous sulfate (FEROSUL) 325 (65 Fe) MG tablet Take 1 tablet (325 mg) by mouth daily (with breakfast) 90 tablet 3     latanoprost (XALATAN) 0.005 % ophthalmic solution Place 1 drop into both eyes at bedtime 7.5 mL 4     losartan (COZAAR) 25 MG tablet Take 0.5 tablets (12.5 mg) by mouth daily. 45 tablet 3     potassium chloride chichi ER (KLOR-CON M20) 20 MEQ CR tablet Take 1 tablet (20 mEq) by mouth 2 times daily 180 tablet 0     simvastatin (ZOCOR) 20 MG tablet Take 1 tablet (20 mg) by mouth at bedtime. 90 tablet 3     vitamin D3 (CHOLECALCIFEROL) 50 mcg (2000 units) tablet Take 1 tablet (50 mcg) by mouth daily. 90  tablet 3     No current facility-administered medications for this visit.      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     Hypertension goal BP (blood pressure) < 140/90     Seborrheic keratosis     Scoliosis     Compression fracture of thoracic vertebra (H)     Mass of left hand     Primary open angle glaucoma of both eyes, unspecified glaucoma stage     Nuclear sclerosis of left eye     Actinic keratosis     History of nonmelanoma skin cancer     Combined forms of age-related cataract of right eye     H/O aortic aneurysm repair     Aortic valve stenosis     Age-related osteoporosis with current pathological fracture with routine healing, subsequent encounter     CHF (congestive heart failure) (H)     COPD (chronic obstructive pulmonary disease) (H)     H/O aortic valve replacement     Postoperative atrial fibrillation (H)     Unspecified severe protein-calorie malnutrition (H)     Past Medical History:   Diagnosis Date     Actinic keratosis      Aortic valve stenosis 04/25/2023     Basal cell cancer 02/2011    bcc of the L back.     Basal cell carcinoma 04' , 06'     Basal cell carcinoma 06/2011    L neck     Breast cancer (H)      Cataract      Colon polyps     Precancer     Glaucoma (increased eye pressure)      Heart murmur      HLD (hyperlipidemia)      Hypertension goal BP (blood pressure) < 140/90 12/19/2013     Invasive ductal carcinoma of breast (H) 06/2015    left     Osteoporosis      Osteoporosis, unspecified osteoporosis type, unspecified pathological fracture presence 06/26/2017     Scoliosis      Skin  cancer      Skin cancer 05/2013    sccis R cheek     Squamous cell carcinoma 09/2011    R upper back     Squamous cell carcinoma 10/2012    R dorsal hand     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      Again, thank you for allowing me to participate in the care of your patient.        Sincerely,        Ian Haro MD

## 2024-10-10 NOTE — Clinical Note
I spoke with the patient and we decided to convert her treatment to one Mohs case to treat both biopsy sites. May I have someone call her to reschedule. Thank you.

## 2024-10-10 NOTE — NURSING NOTE
Teledermatology Nurse Call Patients:     Are you in the [facility] at the time of the encounter? yes    Today's visit will be billed to you and your insurance.    A teledermatology visit is not as thorough as an in-person visit and the quality of the photograph sent may not be of the same quality as that taken by the dermatology clinic.

## 2024-11-05 DIAGNOSIS — Z86.79 S/P ASCENDING AORTIC ANEURYSM REPAIR: ICD-10-CM

## 2024-11-05 DIAGNOSIS — Z98.890 S/P ASCENDING AORTIC ANEURYSM REPAIR: ICD-10-CM

## 2024-11-05 DIAGNOSIS — Z95.2 S/P AVR (AORTIC VALVE REPLACEMENT): ICD-10-CM

## 2024-11-05 RX ORDER — POTASSIUM CHLORIDE 1500 MG/1
20 TABLET, EXTENDED RELEASE ORAL 2 TIMES DAILY
Qty: 180 TABLET | Refills: 0 | Status: SHIPPED | OUTPATIENT
Start: 2024-11-05

## 2024-11-21 ENCOUNTER — OFFICE VISIT (OUTPATIENT)
Dept: DERMATOLOGY | Facility: CLINIC | Age: 89
End: 2024-11-21
Payer: COMMERCIAL

## 2024-11-21 VITALS — DIASTOLIC BLOOD PRESSURE: 73 MMHG | OXYGEN SATURATION: 96 % | SYSTOLIC BLOOD PRESSURE: 122 MMHG | HEART RATE: 59 BPM

## 2024-11-21 DIAGNOSIS — D48.5 NEOPLASM OF UNCERTAIN BEHAVIOR OF SKIN: ICD-10-CM

## 2024-11-21 DIAGNOSIS — C44.612 BASAL CELL CARCINOMA (BCC) OF RIGHT FOREARM: Primary | ICD-10-CM

## 2024-11-21 PROCEDURE — 17313 MOHS 1 STAGE T/A/L: CPT | Mod: GC | Performed by: DERMATOLOGY

## 2024-11-21 PROCEDURE — 12032 INTMD RPR S/A/T/EXT 2.6-7.5: CPT | Mod: GC | Performed by: DERMATOLOGY

## 2024-11-21 ASSESSMENT — PAIN SCALES - GENERAL: PAINLEVEL_OUTOF10: NO PAIN (0)

## 2024-11-21 NOTE — PATIENT INSTRUCTIONS
Caring for your skin after surgery    After your surgery, a pressure bandage will be placed over the area. This will prevent bleeding. Please follow these instructions over the next 1 to 2 weeks. Following this regimen will help to prevent complications as your wound heals.     For the first 48 hours after your surgery:    Leave the pressure dressing on and keep it dry. If it should come loose, you may re-tape it, but do not take it off.  Relax and take it easy. Do not do any vigorous exercise, heavy lifting or bending forward. This could cause the wound to bleed.  If the wound is on your head, sleeping with your head elevated for the first few nights will help with swelling and bleeding. (Use linens/pillow cases that would be ok to get blood on in the event there is some oozing from the bandage.)  Post-operative pain is usually mild. You may alternate between 1000 mg of Tylenol (acetaminophen) and 400 mg of Ibuprofen every 4 hours.  Do not take more than 4,000 mg of acetaminophen in a 24-hour period or 3200 mg of Ibuprofen in a 24-hr period.  Avoid alcohol and vitamin E as these may increase your tendency to bleed.  If you are not able to take NSAIDS (ibuprofen), take 1000 mg of Tylenol (acetaminophen) every 6 hours, as directed on the bottle.  You may put an ice pack around the bandaged area for 20 minutes at a time as needed. This may help reduce swelling, bruising, and pain. Make sure the ice pack is waterproof so that the pressure bandage doesn't get wet.  You may see a small amount of drainage or blood on your pressure bandage. This is normal. However:  If drainage or bleeding continues or saturates the bandage, you will need to apply firm pressure over the bandage with a clean washcloth for 15 minutes.  Remove the saturated bandage.  If bleeding has stopped, apply Vaseline over the suture line and cover with a non-stick bandage. To add some pressure over the wound, fold a piece of gauze to tape over the  area.  If bleeding continues after applying pressure for 15 minutes, apply an ice pack with gentle pressure to the bandaged area for another 15 minutes.  If bleeding still continues, call our office or go to the nearest emergency room.    48 Hours After Surgery:  Carefully remove the pressure bandage. If it seems sticky or too difficult to get off, you may need to soak it off in the shower.  Wash wound with a mild soap and water.  Use caution when washing the wound, be gentle and do not let the forceful shower stream hit the wound directly.  Pat dry.  Apply Vaseline (from a new container or tube) over the suture line with a Q-tip. (Aquaphor is also acceptable)  Cover the site with a bandage.  Do this daily until the sutures have dissolved.      What to expect:    The first couple of days your wound may be tender and may bleed slightly when doing wound care.  There may be swelling and bruising around the wound, especially if it is near the eyes. For your comfort, you may apply ice or cold compresses to the area.  The area around your wound may be numb for several weeks or even months.  You may experience periodic sharp pain or mild itching around the wound as it heals.   The suture line will look dark pink at first and the edges of the wound will be reddened. This will lighten up each day.    Call Us If:    You have bleeding that will not stop after applying pressure and ice.  You have pain that is not controlled with Tylenol and Ibuprofen.  You have signs or symptoms of an infection such as fever over 100 degrees Fahrenheit, redness, swelling, or warmth spreading from the wound, increasing pain after the first 48-72 hours, or white/yellow/green drainage from the wound (may or may not have a foul odor).    [facility]: [phone] - ask for [facility]  [facility]: [phone]  For urgent needs outside of business hours call the [facility] at [phone] and ask to speak with/page the dermatology resident on call

## 2024-11-21 NOTE — LETTER
11/21/2024      Cydney Christiansen  [address]  [place] [address]      Dear Colleague,    Thank you for referring your patient, Cydney Christiansen, to the [facility]. Please see a copy of my visit note below.    [facility] [place] Procedure Note    Dermatology Problem List:  Last Skin Check 7/18/24        1. 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 AK  - HAK, R mid jawline, s/p bx 12/7/23  - HAK, L Lateral forehead, s/p bx 12/7/23, s/p LN2 2/21/24  - HAK with overlying cutaneous horn, L medial cheek, s/p bx 11/29/23  - HAK, L lateral forehead, bx 9/28/21. treated with LN2 5/4/22  - AK, right cheek, s/p bx 9/28/21 - treated with LN2 5/4/22  - HAK, left mid eyebrow, base not seen, 6/2014  - left posterior lower leg, s/p bx 2/18/21, s/p cryo 9/28/21  - s/p cryotherapy  - left forearm, s/p biopsy 3/15/19      5. GA,  right angle of jaw: resolves with triamcinolone cream  6. History of benign biopsy  - Verrucous keratosis - right dorsal hand posterior, s/p bx 4/1/22 and 6/8/22  - ISK, right dorsal hand, s/p bx 9/28/21  - C/w rosacea, right posterior shoulder, s/p bx 9/28/21  - SK, right posterior lower leg, s/p bx 2/18/21  - verucca, left forehead, s/p bx 2/18/21  - Arthropod bite, left 4th digit, bx 2/18/21  - SK, right abdomen, s/p bx 2/18/2020  7. Eczematous patch R dorsal hand  - previous Tx: triamcinolone 0.01% cream, Vaseline   8. HAK, Left medial cheek, S/p Biopsy 11/29/23  - S/p cryo 12/07/23  9. SK, right thigh, s/p 12/07/23     ___________________________________________      Date of Service:  Nov 21, 2024  Surgery: Mohs micrographic surgery    Case 1  Repair Type: intermediate  Repair Size: 4.0 cm  Suture Material: 4-0 Monocryl, Fast Absorbing Gut 5-0  Tumor Type: BCC - Basal cell carcinoma  Location: right lateral elbow Inferior and Superior (as one Mohs case)  Derm-Path Accession #: [id number]  PreOp Size: 2.1x1.2 cm  PostOp Size: 1.5x2.4 cm  Mohs Accession #: [id number]  Level of Defect: fat      Procedure:  We discussed the principles of treatment and most likely complications including scarring, bleeding, infection, swelling, pain, crusting, nerve damage, large wound,  incomplete excision, wound dehiscence,  nerve damage, recurrence, and a second procedure may be recommended to obtain the best cosmetic or functional result.    Informed consent was obtained and the patient underwent the procedure as follows:  The patient was placed supine on the operating table.  The cancer was identified, outlined with a marker, and verified by the patient.  The entire surgical field was prepped with ChoraPrep.  The surgical site was anesthetized using Lidocaine 1% with epi 1:100,000.      The area of clinically apparent tumor was debulked. The layer of tissue was then surgically excised using a #15 blade and was then transferred  onto a specimen sheet maintaining the orientation of the specimen. Hemostasis was obtained using bipolar electrocoagulation. The wound site was then covered with a dressing while the tissue samples were processed for examination.    The excised tissue was transported to the [facility] histology laboratory maintaining the tissue orientation.  The tissue specimen was relaxed so that the entire surgical margin was in a a single horizontal plane for sectioning and inked for precise mapping.  A precise reference map was drawn to reflect the sectioning of the specimen, colored inking of the margins, and orientation on the patient. The tissue was processed using horizontal sectioning of the base and continuous peripheral margins.  The histopathologic sections were reviewed in conjunction with the reference map.    Total blocks: 1    Total slides:  1    There were no cancer cells visualized on examination, therefore Mohs surgery was complete.    Reconstruction: Intermediate Linear Closure      The patient was taken to the operative suite and placed supine on the operating room table. The defect was identified. Appropriate markings were made with a marking pen to plan the repair. The area was infiltrated with Lidocaine 1% with epi 1:100,000 and prepped with ChoraPrep and draped with sterile towels.     The wound was debeveled and undermined widely. Cones were excised within relaxed skin tension lines on both sides of the defect. Hemostasis was obtained using bipolar electrocoagulation. The deeper layers of subcutaneous and superficial (nonmuscle) fascia tissues were then approximated using monocryl 4-0 buried vertical mattress sutures (deep layer suturing). The wound edges were then approximated; additional buried sutures were placed in a similar fashion where needed. Fast Absorbing Gut 5-0 simple running (superficial layer suturing) were carefully placed for maximum eversion and meticulous approximation.      Estimated blood loss  was less than 10 ml for all surgical sites. A sterile pressure dressing was applied and wound care instructions, with a written handout, were given. The patient was discharged from the [facility] alert and ambulatory.    The patient elected for pathology results to automatically release and understands that the clinical staff will contact them as soon as possible to notify them of the results.    Repair Size: 4.0 cm    The wound was cleansed with saline and ointment was applied along the wound surface.     A sterile pressure dressing was applied.  Wound care instructions were given verbally and in writing.  The patient left the operating suite in stable condition.  Patient was informed that additional refinement of the resulting surgical scar may be used as a second stage of this reconstruction.     The attending surgeon was present for entire minor procedure and examination.    Staff Involved:  Scribe/Staff    Scribe Disclosure:   I, Lianne Jackson, am serving as a scribe; to document services personally performed by Dr. Ian Haro - -based on data collection and the provider's statements to me.     Staff Physician Comments:   I saw and evaluated the patient with the Mohs Surgery Fellow (Dr. Agustin Herrera) and I agree with the assessment and plan and the above description of the procedure as documented by the scribe. I was present for the entire procedure and entire exam.    Ian Haro DO    Department of Dermatology  [facility]: Phone: [phone], Fax:[phone]  [facility]: Phone: [phone], Fax: [phone]    Care and Laboratory Testing Performed at:  [facility]  [address] [place] [address]      Again, thank you for allowing me to participate in the care of your patient.        Sincerely,      Ian TRUJILLO  MD [place]

## 2024-11-21 NOTE — NURSING NOTE
Cydney Christiansen's chief complaint for this visit includes:  Chief Complaint   Patient presents with    Mohs     Right lateral elbow superior & inferior  Check spot on right cheek     PCP: Est[facility] Hernandez    Referring Provider:  Referred Self, MD  No address on file    /73   Pulse 59   LMP  (LMP Unknown)   SpO2 96%   No Pain (0)        Allergies   Allergen Reactions    Lisinopril Cough         Do you need any medication refills at today's visit? No    Ness Cardona, CMA

## 2024-11-21 NOTE — NURSING NOTE
Patient would like to get her skin checks in [place] with Dr. Jameson zeng.  Will send message to [place] team to reach out to patient to schedule.      The following medication was given:     MEDICATION:  Lidocaine with epinephrine 1% 1:[id number]  ROUTE: SQ  SITE: see procedure note  DOSE: 5.5 mL  LOT #: [id number]  : [employer]  EXPIRATION DATE: 06/30/2026  NDC#: 60527-823-23  Was there drug waste? 0.5 mL  Multi-dose vial: Yes    Vaseline and pressure dressing applied to Mohs site on right lateral elbow.  Wound care instructions reviewed with patient and AVS provided.  Patient verbalized understanding.  Patient will follow up for suture removal: N/A.  No further questions or concerns at this time.      Ness Cardona CMA  November 21, 2024

## 2024-11-21 NOTE — PROGRESS NOTES
[facility] [place] Procedure Note    Dermatology Problem List:  Last Skin Check 7/18/24        1. 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 AK  - HAK, R mid jawline, s/p bx 12/7/23  - HAK, L Lateral forehead, s/p bx 12/7/23, s/p LN2 2/21/24  - HAK with overlying cutaneous horn, L medial cheek, s/p bx 11/29/23  - HAK, L lateral forehead, bx 9/28/21. treated with LN2 5/4/22  - AK, right cheek, s/p bx 9/28/21 - treated with LN2 5/4/22  - HAK, left mid eyebrow, base not seen, 6/2014  - left posterior lower leg, s/p bx 2/18/21, s/p cryo 9/28/21  - s/p cryotherapy  - left forearm, s/p biopsy 3/15/19      5. GA, right angle of jaw: resolves with triamcinolone cream  6. History of benign biopsy  - Verrucous keratosis - right dorsal hand posterior, s/p bx 4/1/22 and 6/8/22  - ISK, right dorsal hand, s/p bx 9/28/21  - C/w rosacea, right posterior shoulder,  s/p bx 9/28/21  - SK, right posterior lower leg, s/p bx 2/18/21  - verucca, left forehead, s/p bx 2/18/21  - Arthropod bite, left 4th digit, bx 2/18/21  - SK, right abdomen, s/p bx 2/18/2020  7. Eczematous patch R dorsal hand  - previous Tx: triamcinolone 0.01% cream, Vaseline   8. HAK, Left medial cheek, S/p Biopsy 11/29/23  - S/p cryo 12/07/23  9. SK, right thigh, s/p 12/07/23     ___________________________________________      Date of Service:  Nov 21, 2024  Surgery: Mohs micrographic surgery    Case 1  Repair Type: intermediate  Repair Size: 4.0 cm  Suture Material: 4-0 Monocryl, Fast Absorbing Gut 5-0  Tumor Type: BCC - Basal cell carcinoma  Location: right lateral elbow Inferior and Superior (as one Mohs case)  Derm-Path Accession #: [id number]  PreOp Size: 2.1x1.2 cm  PostOp Size: 1.5x2.4 cm  Mohs Accession #: [id number]  Level of Defect: fat      Procedure:  We discussed the principles of treatment and most likely complications including scarring, bleeding, infection, swelling, pain, crusting, nerve damage, large wound,  incomplete excision, wound dehiscence,  nerve damage, recurrence, and a second procedure may be recommended to obtain the best cosmetic or functional result.    Informed consent was obtained and the patient underwent the procedure as follows:  The patient was placed supine on the operating table.  The cancer was identified, outlined with a marker, and verified by the patient.  The entire surgical field was prepped with ChoraPrep.  The surgical site was anesthetized using Lidocaine 1% with epi 1:100,000.      The area of clinically apparent tumor was debulked. The layer of tissue was then surgically excised using a #15 blade and was then transferred onto a specimen sheet maintaining the orientation of the specimen. Hemostasis was obtained using bipolar electrocoagulation. The wound site was then covered with a dressing while the tissue samples were processed for examination.    The excised  tissue was transported to the Mohs histology laboratory maintaining the tissue orientation.  The tissue specimen was relaxed so that the entire surgical margin was in a a single horizontal plane for sectioning and inked for precise mapping.  A precise reference map was drawn to reflect the sectioning of the specimen, colored inking of the margins, and orientation on the patient. The tissue was processed using horizontal sectioning of the base and continuous peripheral margins.  The histopathologic sections were reviewed in conjunction with the reference map.    Total blocks: 1    Total slides:  1    There were no cancer cells visualized on examination, therefore Mohs surgery was complete.    Reconstruction: Intermediate Linear Closure      The patient was taken to the operative suite and placed supine on the operating room table. The defect was identified. Appropriate markings were made with a marking pen to plan the repair. The area was infiltrated with Lidocaine 1% with epi 1:100,000 and prepped with ChoraPrep and draped with sterile towels.     The wound was debeveled and undermined widely. Cones were excised within relaxed skin tension lines on both sides of the defect. Hemostasis was obtained using bipolar electrocoagulation. The deeper layers of subcutaneous and superficial (nonmuscle) fascia tissues were then approximated using monocryl 4-0 buried vertical mattress sutures (deep layer suturing). The wound edges were then approximated; additional buried sutures were placed in a similar fashion where needed. Fast Absorbing Gut 5-0 simple running (superficial layer suturing) were carefully placed for maximum eversion and meticulous approximation.      Estimated blood loss was less than 10 ml for all surgical sites. A sterile pressure dressing was applied and wound care instructions, with a written handout, were given. The patient was discharged from the [facility] alert and ambulatory.    The  patient elected for pathology results to automatically release and understands that the clinical staff will contact them as soon as possible to notify them of the results.    Repair Size: 4.0 cm    The wound was cleansed with saline and ointment was applied along the wound surface.     A sterile pressure dressing was applied.  Wound care instructions were given verbally and in writing.  The patient left the operating suite in stable condition.  Patient was informed that additional refinement of the resulting surgical scar may be used as a second stage of this reconstruction.     The attending surgeon was present for entire minor procedure and examination.    Staff Involved:  Scribe/Staff    Scribe Disclosure:   I, Lianne Jackson, am serving as a scribe; to document services personally performed by Dr. Ian Haro - -based on data collection and the provider's statements to me.     Staff Physician Comments:   I saw and evaluated the patient with the Mohs Surgery Fellow (Dr. Agustin Herrera) and I agree with the assessment and plan and the above description of the procedure as documented by the scribe. I was present for the entire procedure and entire exam.    Ian Haro DO    Department of Dermatology  [facility]: Phone: [phone], Fax:[phone]  [facility]: Phone: [phone], Fax: [phone]    Care and Laboratory Testing Performed at:  [facility]   Dermatology [facility]  [address] [place] [address]

## 2024-11-27 ENCOUNTER — TELEPHONE (OUTPATIENT)
Dept: DERMATOLOGY | Facility: CLINIC | Age: 89
End: 2024-11-27
Payer: COMMERCIAL

## 2024-11-27 NOTE — TELEPHONE ENCOUNTER
----- Message from Ness REYNOSO sent at 11/21/2024 12:02 PM CST -----  Regarding: Patient needs a 6 month skin check at the end of Jan. 2025  This patient is a prior Dr. Barba patient.  She has an appointment with Dr. Barba for a 6 month skin check (Hx of NMSC)  in the end of January.  Patient lives in [place] and sees other providers at the [place] location and would like to see dermatology in [place] as it is a shorter drive for her.    Can you please reach out to patient to assist in scheduling a skin check?    Thank you for your help!!  Ness ROMERO CMA for Dr. Estrellita HALL [facility] Dermatology Team - [place]

## 2024-11-27 NOTE — TELEPHONE ENCOUNTER
Patient Contact    Attempt #1    Was call answered? No    Left a voicemail asking patient to give us a call back at our main dermatology line at [phone]    Elsie HADLEY RN BSN  [facility]  [phone]

## 2024-11-29 NOTE — TELEPHONE ENCOUNTER
Pt LVM returning phone call saying she will be available all day.    Alexandria Haines on 11/29/2024 at 10:53 AM

## 2024-12-04 NOTE — TELEPHONE ENCOUNTER
Patient called to clarify if she wanted to schedule an earlier 6 month FBSC, appointment on 3/14/25 at 2:45 pm. Patient OK with this date and time, encounter closed.      G.Kayleigh MARIAN

## 2025-01-05 NOTE — PROGRESS NOTES
[facility] Cardiology - [facility]   Cardiology Clinic Note      HPI:   Ms. Cydney Christiansen is a pleasant 89 year old female with medical history pertinent for bicuspid aortic valve, severe aortic stenosis, and ascending aortic aneurysm s/p bioprosthetic AVR and Hemishield graft (4/2023), HTN, HFrecoveredEF, post-op AF, and skin cancer. She presents to cardiology clinic for annual follow up.    Cydney was most recently seen in cardiology clinic in January 2024 by Dr. Byrne with no changes to medications.    Today in clinic, she denies chest pain, palpitations, dizziness, syncope. She notes occasional ankle swelling for which she wears compression socks and elevates her ankles.    PAST MEDICAL HISTORY:  Past Medical History:   Diagnosis Date    Actinic keratosis     Aortic valve stenosis 04/25/2023    Basal cell cancer 02/2011    bcc of the L back.    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    left    Osteoporosis     Osteoporosis, unspecified osteoporosis type, unspecified pathological fracture presence 06/26/2017    Scoliosis     Skin cancer     Skin cancer 05/2013    sccis R cheek    Squamous cell carcinoma 09/2011    R upper back    Squamous cell carcinoma 10/2012    R dorsal hand    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       FAMILY HISTORY:  Family History   Problem Relation Age of Onset    Diabetes Mother     Breast Cancer Mother     Eye Disorder Mother     Osteoporosis Mother     Glaucoma Mother     Macular Degeneration Mother     Prostate Cancer Father     Anesthesia  Reaction Sister         PONV    Thyroid Disease Sister     Blood Disease Sister         lupus    Heart Disease Sister     Prostate Cancer Brother     Glaucoma Brother     Macular Degeneration Brother     Prostate Cancer Brother     Glaucoma Brother     Asthma Son     Glaucoma Other     Melanoma No family hx of     Skin Cancer No family hx of     Bleeding Disorder No family hx of     Venous thrombosis No family hx of        SOCIAL HISTORY:  Social History     Socioeconomic History    Marital status:     Number of children: 2   Occupational History     Employer: [employer]     Employer: RETIRED   Tobacco Use    Smoking status: Former     Current packs/day: 0.00     Average packs/day: 0.5 packs/day for 20.0 years (10.0 ttl pk-yrs)     Types: Cigarettes     Start date: 1956     Quit date: 1976     Years since quittin.9    Smokeless tobacco: Never   Vaping Use    Vaping status: Never Used   Substance and Sexual Activity    Alcohol use: Yes     Comment: Weekends 4 drink total per week    Drug use: No    Sexual activity: Never     Social Drivers of Health     Financial Resource Strain: Low Risk  (10/7/2024)    Financial Resource Strain     Within the past 12 months, have you or your family members you live with been unable to get utilities (heat, electricity) when it was really needed?: No   Food Insecurity: Low Risk  (10/7/2024)    Food Insecurity     Within the past 12 months, did you worry that your food would run out before you got money to buy more?: No     Within the past 12 months, did the food you bought just not last and you didn t have money to get more?: No   Transportation Needs: Low Risk  (10/7/2024)    Transportation Needs     Within the past 12 months, has lack of transportation kept you from medical appointments, getting your medicines, non-medical meetings or appointments, work, or from getting things that you need?: No   Physical Activity: Unknown (10/7/2024)    Exercise Vital Sign      Days of Exercise per Week: 4 days   Stress: No Stress Concern Present (10/7/2024)    [employer] [place] - Occupational Stress Questionnaire     Feeling of Stress : Only a little   Social Connections: Unknown (10/7/2024)    Social Connection and Isolation Panel [NHANES]     Frequency of Social Gatherings with Friends and Family: Three times a week   Interpersonal Safety: Low Risk  (10/5/2023)    Interpersonal Safety     Do you feel physically and emotionally safe where you currently live?: Yes     Within the past 12 months, have you been hit, slapped, kicked or otherwise physically hurt by someone?: No     Within the past 12 months, have you been humiliated or emotionally abused in other ways by your partner or ex-partner?: No   Housing Stability: Low Risk  (10/7/2024)    Housing Stability     Do you have housing? : Yes     Are you worried about losing your housing?: No       CURRENT MEDICATIONS:  Current Outpatient Medications   Medication Sig Dispense Refill    acetaminophen (TYLENOL) 325 MG tablet Take 2 tablets (650 mg) by mouth every 4 hours as needed for other (For optimal non-opioid multimodal pain management to improve pain control.)      albuterol (PROAIR HFA/PROVENTIL HFA/VENTOLIN HFA) 108 (90 Base) MCG/ACT inhaler Inhale 1-2 puffs into the lungs every 4 hours as needed for shortness of breath or wheezing 6.7 g 0    amoxicillin (AMOXIL) 500 MG capsule 4 tablets -30 minute before dental wotrk 4 capsule 3    aspirin (ASA) 81 MG EC tablet Take 1 tablet (81 mg) by mouth daily      bumetanide (BUMEX) 1 MG tablet TAKE 1 TABLET BY MOUTH EVERY DAY 90 tablet 3    cyanocobalamin (VITAMIN B-12) 500 MCG tablet Take 1 tablet (500 mcg) by mouth daily 150 tablet 3    dorzolamide-timolol (COSOPT) 2-0.5 % ophthalmic solution Place 1 drop into both eyes 2 times daily 20 mL 4    ferrous sulfate (FEROSUL) 325 (65 Fe) MG tablet Take 1 tablet (325 mg) by mouth daily (with breakfast) 90 tablet 3    KLOR-CON  M20 20 MEQ CR tablet TAKE 1 TABLET BY MOUTH 2 TIMES DAILY 180 tablet 0    latanoprost (XALATAN) 0.005 % ophthalmic solution Place 1 drop into both eyes at bedtime. 7.5 mL 4    losartan (COZAAR) 25 MG tablet Take 0.5 tablets (12.5 mg) by mouth daily. 45 tablet 3    simvastatin (ZOCOR) 20 MG tablet Take 1 tablet (20 mg) by mouth at bedtime. 90 tablet 3    vitamin D3 (CHOLECALCIFEROL) 50 mcg (2000 units) tablet Take 1 tablet (50 mcg) by mouth daily. 90 tablet 3     Current Facility-Administered Medications   Medication Dose Route Frequency Provider Last Rate Last Admin    [START ON 1/21/2025] denosumab (PROLIA) injection 60 mg  60 mg Subcutaneous Q6 Months            ROS:   Refer to HPI    EXAM:  /71 (BP Location: Right arm, Patient Position: Chair, Cuff Size: Adult Regular)   Pulse 58   Wt 60.6 kg (133 lb 9.6 oz)   LMP  (LMP Unknown)   SpO2 98%   BMI 28.59 kg/m    GENERAL: Appears comfortable, in no acute distress.   HEENT: Eye symmetrical, no discharge or icterus bilaterally. Mucous membranes moist and without lesions.  CV: RRR, +S1S2, no murmur, rub, or gallop.  RESPIRATORY: Respirations regular, even, and unlabored. Lungs CTA throughout.   GI: Soft and non distended with normoactive bowel sounds present in all quadrants. No tenderness, rebound, guarding.   EXTREMITIES: no peripheral edema. 2+ bilateral pedal pulses.   NEUROLOGIC: Alert and oriented x 3. No focal deficits.   MUSCULOSKELETAL: No joint swelling or tenderness.   SKIN: No jaundice. No rashes or lesions.     Labs, reviewed with patient in clinic today:  CBC RESULTS:  Lab Results   Component Value Date    WBC 4.8 01/08/2024    WBC 6.1 09/22/2020    RBC 3.90 01/08/2024    RBC 3.57 (L) 09/22/2020    HGB 11.8 01/08/2024    HGB 10.7 (L) 09/22/2020    HCT 37.1 01/08/2024    HCT 33.3 (L) 09/22/2020    MCV 95 01/08/2024    MCV 93 09/22/2020    MCH 30.3 01/08/2024    MCH 30.0 09/22/2020    MCHC 31.8 01/08/2024    MCHC 32.1 09/22/2020    RDW 13.2  "01/08/2024    RDW 13.1 09/22/2020     01/08/2024     09/22/2020       CMP RESULTS:  Lab Results   Component Value Date     08/02/2024     05/03/2021    POTASSIUM 4.8 08/02/2024    POTASSIUM 4.1 04/25/2023    POTASSIUM 4.5 05/03/2021    CHLORIDE 103 08/02/2024    CHLORIDE 103 01/12/2023    CHLORIDE 106 05/03/2021    CO2 28 08/02/2024    CO2 29 01/12/2023    CO2 28 05/03/2021    ANIONGAP 8 08/02/2024    ANIONGAP 6 01/12/2023    ANIONGAP 3 05/03/2021     (H) 08/02/2024    GLC 93 05/10/2023    GLC 95 01/12/2023     (H) 05/03/2021    BUN 21.5 08/02/2024    BUN 13 01/12/2023    BUN 16 05/03/2021    CR 0.86 08/02/2024    CR 0.80 05/03/2021    GFRESTIMATED 65 08/02/2024    GFRESTIMATED 68 05/03/2021    GFRESTBLACK 79 05/03/2021    SHERYL 9.4 08/02/2024    SHERYL 8.8 05/03/2021    BILITOTAL 0.4 01/08/2024    BILITOTAL 0.6 05/03/2021    ALBUMIN 4.3 08/02/2024    ALBUMIN 3.8 05/03/2021    ALKPHOS 36 (L) 01/08/2024    ALKPHOS 52 05/03/2021    ALT <5 01/08/2024    ALT 25 05/03/2021    AST 21 01/08/2024    AST 40 05/03/2021        INR RESULTS:  Lab Results   Component Value Date    INR 1.26 (H) 05/14/2023       Lab Results   Component Value Date    MAG 2.2 08/02/2024     No results found for: \"NTBNPI\"  Lab Results   Component Value Date    NTBNP 692 01/12/2023       LIPIDS:  Recent Labs   Lab Test 01/08/24  1149 07/05/23  1248   CHOL 152 151   HDL 58 53   LDL 76  85 76  71   TRIG 90 108     Echocardiogram 1/2024:  Interpretation Summary  Ascending aorta aneurysm repair with 32 mm Hemashield graft and aortic valve  replacement with 21 mm Aponte Resilia on 04/25/2023.  Doppler interrogation of the aortic valve is normal.  Trace to mild tricuspid insufficiency is present.  Global and regional left ventricular function is normal with an EF of 55-60%.  Global right ventricular function is normal.  IVC diameter <2.1 cm collapsing >50% with sniff suggests a normal RA pressure  of 3 mmHg.  No " pericardial effusion is present.  Compared to prior, sinus rhytm now, LVEF and TR are improved.      Assessment and Plan:   Ms. Christiansen is a 89 year old female with a PMH of bicuspid aortic valve, severe aortic stenosis, and ascending aortic aneurysm s/p bioprosthetic AVR and Hemishield graft (4/2023), HTN, HFrecoveredEF, post-op AF, and skin cancer.     # Bicuspid aortic valve, severe aortic stenosis, and ascending aortic aneurysm s/p bioprosthetic AVR and Hemishield graft (4/2023)  # Systolic heart failure with recovered EF (55-60%)  Most recent TTE: normal valve function  - repeat echo today   - continue asa 81mg daily   - Bumex 1mg daily     # HTN, well controlled  - losartan 25mg daily     # HLD  Most recent LDL 76  - continue simvastatin 20mg daily       Follow up:  1 year   Chart review time today: 10 minutes  Visit time today: 12 minutes  Total time spent today: 22 minutes      Chacha Lopez CNP  General Cardiology   01/08/25

## 2025-01-06 ENCOUNTER — TELEPHONE (OUTPATIENT)
Dept: FAMILY MEDICINE | Facility: CLINIC | Age: 89
End: 2025-01-06
Payer: COMMERCIAL

## 2025-01-06 ENCOUNTER — TELEPHONE (OUTPATIENT)
Dept: FAMILY MEDICINE | Facility: CLINIC | Age: 89
End: 2025-01-06

## 2025-01-06 DIAGNOSIS — M80.00XD AGE-RELATED OSTEOPOROSIS WITH CURRENT PATHOLOGICAL FRACTURE WITH ROUTINE HEALING, SUBSEQUENT ENCOUNTER: ICD-10-CM

## 2025-01-06 DIAGNOSIS — M80.8AXG: ICD-10-CM

## 2025-01-06 DIAGNOSIS — Z92.29 HISTORY OF BISPHOSPHONATE THERAPY: ICD-10-CM

## 2025-01-06 DIAGNOSIS — M85.80 OTHER SPECIFIED DISORDERS OF BONE DENSITY AND STRUCTURE, UNSPECIFIED SITE: ICD-10-CM

## 2025-01-06 DIAGNOSIS — S22.000S COMPRESSION FRACTURE OF THORACIC VERTEBRA, UNSPECIFIED THORACIC VERTEBRAL LEVEL, SEQUELA: Primary | ICD-10-CM

## 2025-01-06 NOTE — TELEPHONE ENCOUNTER
"Patient has upcoming appointment on 2/10 for Prolia. Current referral order is marked as \"pending\" for status.     Routing to  to please advise.      ALY MorganN RN  [facility]  "

## 2025-01-06 NOTE — TELEPHONE ENCOUNTER
Patient has upcoming appointment on 2/10 for Prolia injection. There is no active CAM order in place.     Routing to provider to please place CAM orders in, and advise if any labs are needed prior to appointment.     ALY MorganN RN  [facility]

## 2025-01-07 ENCOUNTER — DOCUMENTATION ONLY (OUTPATIENT)
Dept: CARDIOLOGY | Facility: CLINIC | Age: 89
End: 2025-01-07
Payer: COMMERCIAL

## 2025-01-07 PROBLEM — M80.8AXG: Status: ACTIVE | Noted: 2025-01-07

## 2025-01-07 PROBLEM — Z92.29 HISTORY OF BISPHOSPHONATE THERAPY: Status: ACTIVE | Noted: 2025-01-07

## 2025-01-07 NOTE — TELEPHONE ENCOUNTER
The following steps were completed to comply with the REMS program for Prolia:  Reviewed the serious risks of Prolia  and the symptoms of each risk.  Advised patient to seek prompt medical attention if they have signs or symptoms of any of the serious risks.  Patient will be provided a copy of the Medication Guide and Patient Brochure prior to first injection.    Estrellita Hernandez MD

## 2025-01-07 NOTE — PATIENT INSTRUCTIONS
You have selected the below site for your Prolia injection. If you did not schedule this appointment in clinic, please call the number listed below.  Karine VELASQUEZ) [phone]

## 2025-01-08 ENCOUNTER — OFFICE VISIT (OUTPATIENT)
Dept: CARDIOLOGY | Facility: CLINIC | Age: 89
End: 2025-01-08
Attending: CASE MANAGER/CARE COORDINATOR
Payer: COMMERCIAL

## 2025-01-08 ENCOUNTER — LAB (OUTPATIENT)
Dept: LAB | Facility: CLINIC | Age: 89
End: 2025-01-08
Attending: CASE MANAGER/CARE COORDINATOR
Payer: COMMERCIAL

## 2025-01-08 VITALS
HEART RATE: 58 BPM | SYSTOLIC BLOOD PRESSURE: 127 MMHG | BODY MASS INDEX: 28.59 KG/M2 | OXYGEN SATURATION: 98 % | WEIGHT: 133.6 LBS | DIASTOLIC BLOOD PRESSURE: 71 MMHG

## 2025-01-08 DIAGNOSIS — Z13.6 SCREENING FOR CARDIOVASCULAR CONDITION: ICD-10-CM

## 2025-01-08 DIAGNOSIS — I97.89 POSTOPERATIVE ATRIAL FIBRILLATION (H): ICD-10-CM

## 2025-01-08 DIAGNOSIS — I48.91 POSTOPERATIVE ATRIAL FIBRILLATION (H): ICD-10-CM

## 2025-01-08 DIAGNOSIS — I50.9 CHF (CONGESTIVE HEART FAILURE) (H): ICD-10-CM

## 2025-01-08 DIAGNOSIS — Z95.2 H/O AORTIC VALVE REPLACEMENT: ICD-10-CM

## 2025-01-08 DIAGNOSIS — M80.00XD AGE-RELATED OSTEOPOROSIS WITH CURRENT PATHOLOGICAL FRACTURE WITH ROUTINE HEALING, SUBSEQUENT ENCOUNTER: ICD-10-CM

## 2025-01-08 DIAGNOSIS — I10 HYPERTENSION GOAL BP (BLOOD PRESSURE) < 140/90: ICD-10-CM

## 2025-01-08 DIAGNOSIS — M80.8AXG: ICD-10-CM

## 2025-01-08 DIAGNOSIS — E78.01 FAMILIAL HYPERCHOLESTEROLEMIA: ICD-10-CM

## 2025-01-08 DIAGNOSIS — I35.0 AORTIC VALVE STENOSIS, ETIOLOGY OF CARDIAC VALVE DISEASE UNSPECIFIED: ICD-10-CM

## 2025-01-08 DIAGNOSIS — S22.000S COMPRESSION FRACTURE OF THORACIC VERTEBRA, UNSPECIFIED THORACIC VERTEBRAL LEVEL, SEQUELA: ICD-10-CM

## 2025-01-08 DIAGNOSIS — E78.5 HYPERLIPIDEMIA LDL GOAL <160: Primary | ICD-10-CM

## 2025-01-08 DIAGNOSIS — M85.80 OTHER SPECIFIED DISORDERS OF BONE DENSITY AND STRUCTURE, UNSPECIFIED SITE: ICD-10-CM

## 2025-01-08 DIAGNOSIS — I50.20 SYSTOLIC CONGESTIVE HEART FAILURE, UNSPECIFIED HF CHRONICITY (H): ICD-10-CM

## 2025-01-08 DIAGNOSIS — Z95.2 H/O AORTIC VALVE REPLACEMENT: Primary | ICD-10-CM

## 2025-01-08 DIAGNOSIS — E78.5 HYPERLIPIDEMIA LDL GOAL <160: ICD-10-CM

## 2025-01-08 DIAGNOSIS — Z92.29 HISTORY OF BISPHOSPHONATE THERAPY: ICD-10-CM

## 2025-01-08 LAB
ALBUMIN SERPL BCG-MCNC: 4.2 G/DL (ref 3.5–5.2)
ALT SERPL W P-5'-P-CCNC: 9 U/L (ref 0–50)
CALCIUM SERPL-MCNC: 9.1 MG/DL (ref 8.8–10.4)
CHOLEST SERPL-MCNC: 160 MG/DL
CREAT SERPL-MCNC: 0.85 MG/DL (ref 0.51–0.95)
EGFRCR SERPLBLD CKD-EPI 2021: 66 ML/MIN/1.73M2
ERYTHROCYTE [DISTWIDTH] IN BLOOD BY AUTOMATED COUNT: 13 % (ref 10–15)
FASTING STATUS PATIENT QL REPORTED: NORMAL
HCT VFR BLD AUTO: 36.2 % (ref 35–47)
HDLC SERPL-MCNC: 65 MG/DL
HGB BLD-MCNC: 11.6 G/DL (ref 11.7–15.7)
LDLC SERPL CALC-MCNC: 76 MG/DL
LVEF ECHO: NORMAL
MAGNESIUM SERPL-MCNC: 2.2 MG/DL (ref 1.7–2.3)
MCH RBC QN AUTO: 30.6 PG (ref 26.5–33)
MCHC RBC AUTO-ENTMCNC: 32 G/DL (ref 31.5–36.5)
MCV RBC AUTO: 96 FL (ref 78–100)
NONHDLC SERPL-MCNC: 95 MG/DL
PLATELET # BLD AUTO: 175 10E3/UL (ref 150–450)
RBC # BLD AUTO: 3.79 10E6/UL (ref 3.8–5.2)
TRIGL SERPL-MCNC: 94 MG/DL
VIT D+METAB SERPL-MCNC: 48 NG/ML (ref 20–50)
WBC # BLD AUTO: 5.2 10E3/UL (ref 4–11)

## 2025-01-08 PROCEDURE — 82040 ASSAY OF SERUM ALBUMIN: CPT | Performed by: PATHOLOGY

## 2025-01-08 PROCEDURE — G0463 HOSPITAL OUTPT CLINIC VISIT: HCPCS | Performed by: CASE MANAGER/CARE COORDINATOR

## 2025-01-08 PROCEDURE — 84460 ALANINE AMINO (ALT) (SGPT): CPT | Performed by: PATHOLOGY

## 2025-01-08 PROCEDURE — 80061 LIPID PANEL: CPT | Performed by: PATHOLOGY

## 2025-01-08 PROCEDURE — 85027 COMPLETE CBC AUTOMATED: CPT | Performed by: PATHOLOGY

## 2025-01-08 PROCEDURE — 82310 ASSAY OF CALCIUM: CPT | Performed by: PATHOLOGY

## 2025-01-08 PROCEDURE — 93306 TTE W/DOPPLER COMPLETE: CPT | Performed by: INTERNAL MEDICINE

## 2025-01-08 PROCEDURE — 83735 ASSAY OF MAGNESIUM: CPT | Performed by: PATHOLOGY

## 2025-01-08 PROCEDURE — 99000 SPECIMEN HANDLING OFFICE-LAB: CPT | Performed by: PATHOLOGY

## 2025-01-08 PROCEDURE — 36415 COLL VENOUS BLD VENIPUNCTURE: CPT | Performed by: PATHOLOGY

## 2025-01-08 PROCEDURE — 82306 VITAMIN D 25 HYDROXY: CPT | Performed by: FAMILY MEDICINE

## 2025-01-08 PROCEDURE — 82565 ASSAY OF CREATININE: CPT | Performed by: PATHOLOGY

## 2025-01-08 PROCEDURE — 99213 OFFICE O/P EST LOW 20 MIN: CPT | Mod: 25 | Performed by: CASE MANAGER/CARE COORDINATOR

## 2025-01-08 ASSESSMENT — PAIN SCALES - GENERAL: PAINLEVEL_OUTOF10: NO PAIN (0)

## 2025-01-08 NOTE — NURSING NOTE
Chief Complaint   Patient presents with    Follow Up     1/8/2025 visit with Chacha Lopez APRN CNP for RETURN CARDIOLOGY - annual follow up       Vitals were taken and medications reconciled.    Hari Doe, EMT  11:20 AM

## 2025-01-08 NOTE — LETTER
1/8/2025      RE: Cydney Christiansen  [address]  [place] [address]       Dear Colleague,    Thank you for the opportunity to participate in the care of your patient, Cydney Christiansen, at the [facility] [place] [facility]. Please see a copy of my visit note below.      [facility] Cardiology - [facility]   Cardiology Clinic Note      HPI:   Ms. Cydney Christiansen is a pleasant 89 year old female with medical history pertinent for bicuspid aortic valve, severe aortic stenosis, and ascending aortic aneurysm s/p bioprosthetic AVR and Hemishield graft (4/2023), HTN, HFrecoveredEF, post-op AF, and skin cancer. She presents to cardiology clinic for annual follow up.    Cydney was most recently seen in cardiology clinic in January 2024 by Dr. Byrne with no changes to medications.    Today in clinic, she denies chest pain, palpitations, dizziness, syncope. She notes occasional ankle swelling for which she wears compression socks and elevates her ankles.    PAST MEDICAL HISTORY:  Past Medical History:   Diagnosis Date     Actinic keratosis      Aortic valve stenosis 04/25/2023     Basal cell cancer 02/2011    bcc of the L back.     Basal cell carcinoma 04' , 06'     Basal cell carcinoma 06/2011    L neck     Breast cancer (H)      Cataract      Colon polyps     Precancer     Glaucoma (increased eye pressure)      Heart murmur      HLD (hyperlipidemia)      Hypertension goal BP (blood pressure) < 140/90 12/19/2013     Invasive ductal carcinoma of breast (H) 06/2015    left     Osteoporosis      Osteoporosis, unspecified osteoporosis type, unspecified pathological fracture presence 06/26/2017     Scoliosis      Skin cancer      Skin cancer 05/2013    sccis R cheek     Squamous cell carcinoma 09/2011    R upper back     Squamous cell carcinoma 10/2012    R dorsal hand     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 07/2013    left leg     Transitional cell  carcinoma of the bladder 1993     Vertigo     takes meclizine prn when she ocassionally has bouts of vertigo       FAMILY HISTORY:  Family History   Problem Relation Age of Onset     Diabetes Mother      Breast Cancer Mother      Eye Disorder Mother      Osteoporosis Mother      Glaucoma Mother      Macular Degeneration Mother      Prostate Cancer Father      Anesthesia Reaction Sister         PONV     Thyroid Disease Sister      Blood Disease Sister         lupus     Heart Disease Sister      Prostate Cancer Brother      Glaucoma Brother      Macular Degeneration Brother      Prostate Cancer Brother      Glaucoma Brother      Asthma Son      Glaucoma Other      Melanoma No family hx of      Skin Cancer No family hx of      Bleeding Disorder No family hx of      Venous thrombosis No family hx of        SOCIAL HISTORY:  Social History     Socioeconomic History     Marital status:      Number of children: 2   Occupational History     Employer: [employer]     Employer: RETIRED   Tobacco Use     Smoking status: Former     Current packs/day: 0.00     Average packs/day: 0.5 packs/day for 20.0 years (10.0 ttl pk-yrs)     Types: Cigarettes     Start date: 1956     Quit date: 1976     Years since quittin.9     Smokeless tobacco: Never   Vaping Use     Vaping status: Never Used   Substance and Sexual Activity     Alcohol use: Yes     Comment: Weekends 4 drink total per week     Drug use: No     Sexual activity: Never     Social Drivers of Health     Financial Resource Strain: Low Risk  (10/7/2024)    Financial Resource Strain      Within the past 12 months, have you or your family members you live with been unable to get utilities (heat, electricity) when it was really needed?: No   Food Insecurity: Low Risk  (10/7/2024)    Food Insecurity      Within the past 12 months, did you worry that your food would run out before you got money to buy more?: No      Within the past 12 months, did the food you bought  just not last and you didn t have money to get more?: No   Transportation Needs: Low Risk  (10/7/2024)    Transportation Needs      Within the past 12 months, has lack of transportation kept you from medical appointments, getting your medicines, non-medical meetings or appointments, work, or from getting things that you need?: No   Physical Activity: Unknown (10/7/2024)    Exercise Vital Sign      Days of Exercise per Week: 4 days   Stress: No Stress Concern Present (10/7/2024)    [employer] - Occupational Stress Questionnaire      Feeling of Stress : Only a little   Social Connections: Unknown (10/7/2024)    Social Connection and Isolation Panel [NHANES]      Frequency of Social Gatherings with Friends and Family: Three times a week   Interpersonal Safety: Low Risk  (10/5/2023)    Interpersonal Safety      Do you feel physically and emotionally safe where you currently live?: Yes      Within the past 12 months, have you been hit, slapped, kicked or otherwise physically hurt by someone?: No      Within the past 12 months, have you been humiliated or emotionally abused in other ways by your partner or ex-partner?: No   Housing Stability: Low Risk  (10/7/2024)    Housing Stability      Do you have housing? : Yes      Are you worried about losing your housing?: No       CURRENT MEDICATIONS:  Current Outpatient Medications   Medication Sig Dispense Refill     acetaminophen (TYLENOL) 325 MG tablet Take 2 tablets (650 mg) by mouth every 4 hours as needed for other (For optimal non-opioid multimodal pain management to improve pain control.)       albuterol (PROAIR HFA/PROVENTIL HFA/VENTOLIN HFA) 108 (90 Base) MCG/ACT inhaler Inhale 1-2 puffs into the lungs every 4 hours as needed for shortness of breath or wheezing 6.7 g 0     amoxicillin (AMOXIL) 500 MG capsule 4 tablets -30 minute before dental wotrk 4 capsule 3     aspirin (ASA) 81 MG EC tablet Take 1 tablet (81 mg) by mouth daily        bumetanide (BUMEX) 1 MG tablet TAKE 1 TABLET BY MOUTH EVERY DAY 90 tablet 3     cyanocobalamin (VITAMIN B-12) 500 MCG tablet Take 1 tablet (500 mcg) by mouth daily 150 tablet 3     dorzolamide-timolol (COSOPT) 2-0.5 % ophthalmic solution Place 1 drop into both eyes 2 times daily 20 mL 4     ferrous sulfate (FEROSUL) 325 (65 Fe) MG tablet Take 1 tablet (325 mg) by mouth daily (with breakfast) 90 tablet 3     KLOR-CON M20 20 MEQ CR tablet TAKE 1 TABLET BY MOUTH 2 TIMES DAILY 180 tablet 0     latanoprost (XALATAN) 0.005 % ophthalmic solution Place 1 drop into both eyes at bedtime. 7.5 mL 4     losartan (COZAAR) 25 MG tablet Take 0.5 tablets (12.5 mg) by mouth daily. 45 tablet 3     simvastatin (ZOCOR) 20 MG tablet Take 1 tablet (20 mg) by mouth at bedtime. 90 tablet 3     vitamin D3 (CHOLECALCIFEROL) 50 mcg (2000 units) tablet Take 1 tablet (50 mcg) by mouth daily. 90 tablet 3     Current Facility-Administered Medications   Medication Dose Route Frequency Provider Last Rate Last Admin     [START ON 1/21/2025] denosumab (PROLIA) injection 60 mg  60 mg Subcutaneous Q6 Months            ROS:   Refer to HPI    EXAM:  /71 (BP Location: Right arm, Patient Position: Chair, Cuff Size: Adult Regular)   Pulse 58   Wt 60.6 kg (133 lb 9.6 oz)   LMP  (LMP Unknown)   SpO2 98%   BMI 28.59 kg/m    GENERAL: Appears comfortable, in no acute distress.   HEENT: Eye symmetrical, no discharge or icterus bilaterally. Mucous membranes moist and without lesions.  CV: RRR, +S1S2, no murmur, rub, or gallop.  RESPIRATORY: Respirations regular, even, and unlabored. Lungs CTA throughout.   GI: Soft and non distended with normoactive bowel sounds present in all quadrants. No tenderness, rebound, guarding.   EXTREMITIES: no peripheral edema. 2+ bilateral pedal pulses.   NEUROLOGIC: Alert and oriented x 3. No focal deficits.   MUSCULOSKELETAL: No joint swelling or tenderness.   SKIN: No jaundice. No rashes or lesions.     Labs, reviewed  "with patient in clinic today:  CBC RESULTS:  Lab Results   Component Value Date    WBC 4.8 01/08/2024    WBC 6.1 09/22/2020    RBC 3.90 01/08/2024    RBC 3.57 (L) 09/22/2020    HGB 11.8 01/08/2024    HGB 10.7 (L) 09/22/2020    HCT 37.1 01/08/2024    HCT 33.3 (L) 09/22/2020    MCV 95 01/08/2024    MCV 93 09/22/2020    MCH 30.3 01/08/2024    MCH 30.0 09/22/2020    MCHC 31.8 01/08/2024    MCHC 32.1 09/22/2020    RDW 13.2 01/08/2024    RDW 13.1 09/22/2020     01/08/2024     09/22/2020       CMP RESULTS:  Lab Results   Component Value Date     08/02/2024     05/03/2021    POTASSIUM 4.8 08/02/2024    POTASSIUM 4.1 04/25/2023    POTASSIUM 4.5 05/03/2021    CHLORIDE 103 08/02/2024    CHLORIDE 103 01/12/2023    CHLORIDE 106 05/03/2021    CO2 28 08/02/2024    CO2 29 01/12/2023    CO2 28 05/03/2021    ANIONGAP 8 08/02/2024    ANIONGAP 6 01/12/2023    ANIONGAP 3 05/03/2021     (H) 08/02/2024    GLC 93 05/10/2023    GLC 95 01/12/2023     (H) 05/03/2021    BUN 21.5 08/02/2024    BUN 13 01/12/2023    BUN 16 05/03/2021    CR 0.86 08/02/2024    CR 0.80 05/03/2021    GFRESTIMATED 65 08/02/2024    GFRESTIMATED 68 05/03/2021    GFRESTBLACK 79 05/03/2021    SHERYL 9.4 08/02/2024    SHERYL 8.8 05/03/2021    BILITOTAL 0.4 01/08/2024    BILITOTAL 0.6 05/03/2021    ALBUMIN 4.3 08/02/2024    ALBUMIN 3.8 05/03/2021    ALKPHOS 36 (L) 01/08/2024    ALKPHOS 52 05/03/2021    ALT <5 01/08/2024    ALT 25 05/03/2021    AST 21 01/08/2024    AST 40 05/03/2021        INR RESULTS:  Lab Results   Component Value Date    INR 1.26 (H) 05/14/2023       Lab Results   Component Value Date    MAG 2.2 08/02/2024     No results found for: \"NTBNPI\"  Lab Results   Component Value Date    NTBNP 692 01/12/2023       LIPIDS:  Recent Labs   Lab Test 01/08/24  1149 07/05/23  1248   CHOL 152 151   HDL 58 53   LDL 76  85 76  71   TRIG 90 108     Echocardiogram 1/2024:  Interpretation Summary  Ascending aorta aneurysm repair with 32 mm " Hemashield graft and aortic valve  replacement with 21 mm [place]ilia on 04/25/2023.  Doppler interrogation of the aortic valve is normal.  Trace to mild tricuspid insufficiency is present.  Global and regional left ventricular function is normal with an EF of 55-60%.  Global right ventricular function is normal.  IVC diameter <2.1 cm collapsing >50% with sniff suggests a normal RA pressure  of 3 mmHg.  No pericardial effusion is present.  Compared to prior, sinus rhytm now, LVEF and TR are improved.      Assessment and Plan:   Ms. Christiansen is a 89 year old female with a PMH of bicuspid aortic valve, severe aortic stenosis, and ascending aortic aneurysm s/p bioprosthetic AVR and Hemishield graft (4/2023), HTN, HFrecoveredEF, post-op AF, and skin cancer.     # Bicuspid aortic valve, severe aortic stenosis, and ascending aortic aneurysm s/p bioprosthetic AVR and Hemishield graft (4/2023)  # Systolic heart failure with recovered EF (55-60%)  Most recent TTE: normal valve function  - repeat echo today   - continue asa 81mg daily   - Bumex 1mg daily     # HTN, well controlled  - losartan 25mg daily     # HLD  Most recent LDL 76  - continue simvastatin 20mg daily       Follow up:  1 year   Chart review time today: 10 minutes  Visit time today: 12 minutes  Total time spent today: 22 minutes      Chacha Lopez CNP  General Cardiology   01/08/25            Please do not hesitate to contact me if you have any questions/concerns.     Sincerely,     KRISTIE ANTONIO CNP

## 2025-01-17 DIAGNOSIS — Z86.79 S/P ASCENDING AORTIC ANEURYSM REPAIR: ICD-10-CM

## 2025-01-17 DIAGNOSIS — Z95.2 S/P AVR (AORTIC VALVE REPLACEMENT): ICD-10-CM

## 2025-01-17 DIAGNOSIS — Z98.890 S/P ASCENDING AORTIC ANEURYSM REPAIR: ICD-10-CM

## 2025-01-19 RX ORDER — POTASSIUM CHLORIDE 1500 MG/1
20 TABLET, EXTENDED RELEASE ORAL 2 TIMES DAILY
Qty: 180 TABLET | Refills: 1 | Status: SHIPPED | OUTPATIENT
Start: 2025-01-19

## 2025-01-27 ENCOUNTER — OFFICE VISIT (OUTPATIENT)
Dept: DERMATOLOGY | Facility: CLINIC | Age: 89
End: 2025-01-27
Payer: COMMERCIAL

## 2025-01-27 VITALS — HEART RATE: 58 BPM | OXYGEN SATURATION: 97 % | SYSTOLIC BLOOD PRESSURE: 130 MMHG | DIASTOLIC BLOOD PRESSURE: 66 MMHG

## 2025-01-27 DIAGNOSIS — D04.39 SQUAMOUS CELL CARCINOMA IN SITU (SCCIS) OF SKIN OF RIGHT CHEEK: Primary | ICD-10-CM

## 2025-01-27 PROCEDURE — 17311 MOHS 1 STAGE H/N/HF/G: CPT | Performed by: DERMATOLOGY

## 2025-01-27 PROCEDURE — 12052 INTMD RPR FACE/MM 2.6-5.0 CM: CPT | Performed by: DERMATOLOGY

## 2025-01-27 ASSESSMENT — PAIN SCALES - GENERAL: PAINLEVEL_OUTOF10: NO PAIN (0)

## 2025-01-27 NOTE — PROGRESS NOTES
[facility] [place] Procedure Note    Dermatology Problem List:  Last Skin Check 7/18/24     1. 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, s/p Mohs 1/27/25  - BCC, R lateral elbow superior and inferior, s/p biopsy 7/18/24, s/p Mohs 11/21/24  - SCCIS, R medial thigh, s/p excision 2/21/24  - BCC, R gunderson, S/p Mohs 2/21/24  - BCC, crown of scalp, s/p Mohs and linear repair 12/12/22  - BCC, mid back s/p ED&C 11/2020  - SCCIS, R upper arm s/p ED&C 11/2020  - SCCIS, L forearm s/p excision 3/6/2020  - SCCis L superior forearm, s/p: ED and C 9/30/2019  - SCCis R temple, s/p:  Mohs 9/30/2019  - SCCIS, R nasal sidewall, s/p Mohs 4/1/19   - SCCIS, R upper arm, s/p excision 8/9/18   - SCC, L popliteal fossa, well differentiated with focal perineural invasion but with clear margins on path, s/p excision by Dr. Mcgee 7/2013  - SCCIS arising from solar keratosis, R cheek, 4/2013  - SCC, R dorsal hand, s/p excision with SCCIS extending to the margin 1/7/13  - SCC, bowenoid type, upper back, s/p excision 2011  - BCC, superficial, left back, 2/2011  - BCC, superficial, left neck, 2011, elected for ED&C  - SCCIS, R upper forearm 11/5/2020 MOHS   2. 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 AK  - HAK, R mid jawline, s/p bx 12/7/23  - HAK, L Lateral forehead, s/p bx 12/7/23, s/p LN2 2/21/24  - HAK with overlying cutaneous horn, L medial cheek, s/p bx 11/29/23  - HAK, L lateral forehead, bx 9/28/21. treated with LN2 5/4/22  - AK, R cheek, s/p bx 9/28/21 - treated with LN2 5/4/22  - HAK, L mid eyebrow, base not seen, 6/2014  - L posterior lower leg, s/p bx 2/18/21, s/p cryo 9/28/21  - L forearm, s/p biopsy 3/15/19   5. GA, R angle of jaw: resolves with triamcinolone cream  6. History of benign biopsy  - Verrucous keratosis, R dorsal hand posterior, s/p bx 4/1/22 & 6/8/22  - ISK, R dorsal hand, s/p bx 9/28/21  - C/w rosacea, R posterior shoulder, s/p bx 9/28/21  - SK, R posterior  lower leg, s/p bx 2/18/21  - verucca, L forehead, s/p bx 2/18/21  - Arthropod bite, L 4th digit, bx 2/18/21  - SK, R abdomen, s/p bx 2/18/2020  7. Eczematous patch R dorsal hand  - previous Tx: triamcinolone 0.01% cream, Vaseline   8. HAK, L medial cheek, S/p Biopsy 11/29/23  - S/p cryo 12/07/23  9. 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    Informed consent was obtained and the patient underwent the procedure as follows:  The patient was placed supine on the operating table.  The cancer was identified, outlined with a marker, and verified by the patient.  The entire surgical field was prepped with ChoraPrep.  The surgical site was anesthetized using Lidocaine 1% with epi 1:100,000.      The area of clinically apparent tumor was debulked. The layer of tissue was then surgically excised using a #15 blade and was then transferred onto a specimen sheet maintaining the orientation of the specimen. Hemostasis was obtained using Other (comments) (hyfrecation). The wound site was then covered with a dressing while the tissue samples were processed for examination.    The excised tissue was transported to the Mohs histology laboratory maintaining the tissue orientation.  The tissue specimen was  relaxed so that the entire surgical margin was in a a single horizontal plane for sectioning and inked for precise mapping.  A precise reference map was drawn to reflect the sectioning of the specimen, colored inking of the margins, and orientation on the patient. The tissue was processed using horizontal sectioning of the base and continuous peripheral margins.  The histopathologic sections were reviewed in conjunction with the reference map.    Total blocks: 1    Total slides:  1    There were no cancer cells visualized on examination, therefore Mohs surgery was complete.    Reconstruction: Intermediate Linear Closure      The patient was taken to the operative suite and placed supine on the operating room table. The defect was identified. Appropriate markings were made with a marking pen to plan the repair. The area was infiltrated with Lidocaine 1% with epi 1:100,000 and prepped with ChoraPrep and draped with sterile towels.     The wound was debeveled and undermined widely. Cones were excised within relaxed skin tension lines on both sides of the defect. Hemostasis was obtained using Other (comments) (hyfrecation). The deeper layers of subcutaneous and superficial (nonmuscle) fascia tissues were then approximated using monocryl 4-0 buried vertical mattress sutures (deep layer suturing). The wound edges were then approximated; additional buried sutures were placed in a similar fashion where needed. vicryl rapide 5-0 simple running (superficial layer suturing) were carefully placed for maximum eversion and meticulous approximation.      Estimated blood loss was less than 10 ml for all surgical sites. A sterile pressure dressing was applied and wound care instructions, with a written handout, were given. The patient was discharged from the [facility] alert and ambulatory.    The patient elected for pathology results to automatically release and understands that the clinical staff will contact them  as soon as possible to notify them of the results.    Repair Size: 2.7 cm    The wound was cleansed with saline and ointment was applied along the wound surface.     A sterile pressure dressing was applied.  Wound care instructions were given verbally and in writing.  The patient left the operating suite in stable condition.  Patient was informed that additional refinement of the resulting surgical scar may be used as a second stage of this reconstruction.     The attending surgeon was present for key portions of the above major procedure and examination.    Staff Involved:  Scribe/Staff    Scribe Disclosure:   I, Lianne Jackson, am serving as a scribe; to document services personally performed by Dr. Ian Haro - -based on data collection and the provider's statements to me.     Staff Physician Comments:   I saw and evaluated the patient with the Physician Assistant (Leora Terry PA-C) and I agree with the assessment and plan and the above description of the procedure as documented by the scribe. I personally performed the key portions of the procedure and entire exam. I was immediately available in the clinic throughout the procedures.     Ian Haro DO    Department of Dermatology  [facility]: Phone: [phone], Fax:[phone]  [facility]: Phone: [phone], Fax: [phone]    Care and Laboratory Testing Performed at:  [facility]  [address] [place] [address]

## 2025-01-27 NOTE — LETTER
1/27/2025      Cydney Christiansen  [address]  [place] [address]      Dear Colleague,    Thank you for referring your patient, Cydney Christiansen, to the [facility]. Please see a copy of my visit note below.    [facility] [place] Procedure Note    Dermatology Problem List:  Last Skin Check 7/18/24     1. 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, s/p Mohs 1/27/25  - BCC, R lateral elbow superior and inferior, s/p biopsy 7/18/24, s/p Mohs 11/21/24  - SCCIS, R medial thigh, s/p excision 2/21/24  - BCC, R gunderson, S/p Mohs 2/21/24  - BCC, crown of scalp, s/p Mohs and linear repair 12/12/22  - BCC, mid back s/p ED&C 11/2020  - SCCIS, R upper arm s/p ED&C 11/2020  - SCCIS, L forearm s/p excision 3/6/2020  - SCCis L superior forearm, s/p: ED and C 9/30/2019  - SCCis R temple, s/p:  Mohs 9/30/2019  - SCCIS, R nasal sidewall, s/p Mohs 4/1/19   - SCCIS, R upper arm, s/p excision 8/9/18   - SCC, L popliteal fossa, well differentiated with focal perineural invasion but with clear margins on path, s/p excision by Dr. Mcgee 7/2013  - SCCIS arising from solar keratosis, R cheek, 4/2013  - SCC, R dorsal hand, s/p excision with SCCIS extending to the margin 1/7/13  - SCC, bowenoid type, upper back, s/p excision 2011  - BCC, superficial, left back, 2/2011  - BCC, superficial, left neck, 2011, elected for ED&C  - SCCIS, R upper forearm 11/5/2020 MOHS   2. 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 AK  - HAK, R mid jawline, s/p bx 12/7/23  - HAK, L Lateral forehead, s/p bx 12/7/23, s/p LN2 2/21/24  - HAK with overlying cutaneous horn, L medial cheek, s/p bx 11/29/23  - HAK, L lateral forehead, bx 9/28/21. treated with LN2 5/4/22  - AK, R cheek, s/p bx 9/28/21 - treated with LN2 5/4/22  - HAK, L mid eyebrow, base not seen, 6/2014  - L posterior lower leg, s/p bx 2/18/21, s/p cryo 9/28/21  - L forearm, s/p biopsy 3/15/19   5. GA, R angle of jaw:  resolves with triamcinolone cream  6. History of benign biopsy  - Verrucous keratosis, R dorsal hand posterior, s/p bx 4/1/22 & 6/8/22  - ISK, R dorsal hand, s/p bx 9/28/21  - C/w rosacea, R posterior shoulder, s/p bx 9/28/21  - SK, R posterior lower leg, s/p bx 2/18/21  - verucca, L forehead, s/p bx 2/18/21  - Arthropod bite, L 4th digit, bx 2/18/21  - SK, R abdomen, s/p bx 2/18/2020  7. Eczematous patch R dorsal hand  - previous Tx: triamcinolone 0.01% cream, Vaseline   8. HAK, L medial cheek, S/p Biopsy 11/29/23  - S/p cryo 12/07/23  9. 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    Informed consent was obtained and the patient underwent the procedure as follows:  The patient was placed supine on the operating table.  The cancer was identified, outlined with a marker, and verified by the patient.  The entire surgical field was prepped with ChoraPrep.  The surgical site was anesthetized using Lidocaine 1% with epi 1:100,000.      The area of clinically apparent tumor was debulked. The layer of tissue was then surgically excised using a #15 blade and was then transferred onto a specimen sheet maintaining the orientation of the specimen. Hemostasis was obtained using Other (comments)  (Surfly). The wound site was then covered with a dressing while the tissue samples were processed for examination.    The excised tissue was transported to the Mohs histology laboratory maintaining the tissue orientation.  The tissue specimen was relaxed so that the entire surgical margin was in a a single horizontal plane for sectioning and inked for precise mapping.  A precise reference map was drawn to reflect the sectioning of the specimen, colored inking of the margins, and orientation on the patient. The tissue was processed using horizontal sectioning of the base and continuous peripheral margins.  The histopathologic sections were reviewed in conjunction with the reference map.    Total blocks: 1    Total slides:  1    There were no cancer cells visualized on examination, therefore Mohs surgery was complete.    Reconstruction: Intermediate Linear Closure      The patient was taken to the operative suite and placed supine on the operating room table. The defect was identified. Appropriate markings were made with a marking pen to plan the repair. The area was infiltrated with Lidocaine 1% with epi 1:100,000 and prepped with ChoraPrep and draped with sterile towels.     The wound was debeveled and undermined widely. Cones were excised within relaxed skin tension lines on both sides of the defect. Hemostasis was obtained using Other (comments) (hyfrRush Points). The deeper layers of subcutaneous and superficial (nonmuscle) fascia tissues were then approximated using monocryl 4-0 buried vertical mattress sutures (deep layer suturing). The wound edges were then approximated; additional buried sutures were placed in a similar fashion where needed. vicryl rapide 5-0 simple running (superficial layer suturing) were carefully placed for maximum eversion and meticulous approximation.      Estimated blood loss was less than 10 ml for all surgical sites. A sterile pressure dressing was applied and wound care  instructions, with a written handout, were given. The patient was [facility] from the [facility] alert and ambulatory.    The patient elected for pathology results to automatically release and understands that the clinical staff will contact them as soon as possible to notify them of the results.    Repair Size: 2.7 cm    The wound was cleansed with saline and ointment was applied along the wound surface.     A sterile pressure dressing was applied.  Wound care instructions were given verbally and in writing.  The patient left the operating suite in stable condition.  Patient was informed that additional refinement of the resulting surgical scar may be used as a second stage of this reconstruction.     The attending surgeon was present for key portions of the above major procedure and examination.    Staff Involved:  Scribe/Staff    Scribe Disclosure:   I, Lianne Jackson, am serving as a scribe; to document services personally performed by Dr. Ian Haro - -based on data collection and the provider's statements to me.     Staff Physician Comments:   I saw and evaluated the patient with the Physician Assistant (Leora Terry PA-C) and I agree with the assessment and plan and the above description of the procedure as documented by the scribe. I personally performed the key portions of the procedure and entire exam. I was immediately available in the clinic throughout the procedures.     Ian Hrao DO    Department of Dermatology  [facility]: Phone: [phone], Fax:[phone]  [facility]: Phone: [phone], Fax: [phone]    Care and Laboratory Testing Performed at:  [facility]   Dermatology Clinic  [address] [place] [address]      Again, thank you for allowing me to participate in the care of your patient.         Sincerely,    Ian Haro MD    Electronically signed

## 2025-01-27 NOTE — NURSING NOTE
The following medication was given:     MEDICATION:  Lidocaine with epinephrine 1% [id number]  ROUTE: SQ  SITE: see procedure note  DOSE: 3.25 mL  LOT #: [id number]  : [employer]enBlastbeat  EXPIRATION DATE: 6/30/26  NDC#: 81117-785-53  Was there drug waste? No  Multi-dose vial: Yes    Dermabond and pressure bandage applied to Mohs site on right cheek.  Wound care instructions reviewed with patient and AVS provided.  Patient verbalized understanding.  Patient will follow up for suture removal: N/A.  No further questions or concerns at this time.    Quin Webber RN  January 27, 2025

## 2025-01-27 NOTE — PATIENT INSTRUCTIONS
Caring for your skin after surgery    For the first 48 hours after your surgery:  Leave the pressure dressing on and keep it dry. If it should come loose, you may re-tape it, but do not take it off.  Relax and take it easy.  Do not do any vigorous exercise, heavy lifting or bending forward. This could cause the wound to bleed.  If the wound is on your head, sleeping with your head elevated for the first few nights will help with swelling and bleeding. (Use linens/pillow cases that would be ok to get blood on in the event there is some oozing from the bandage).  Post-operative pain is usually mild.  You may alternate between 1000 mg of Tylenol (acetaminophen) and 400 mg of Ibuprofen every 4 hours.  This means, for example, that you could take the followin,000 mg of Tylenol, followed 4 hours later by 400 mg Ibuprofen, followed 4 hours later with 1,000 mg of Tylenol, and so forth.  Do not take more than 4,000 mg of acetaminophen in a 24-hour period or 3200 mg of Ibuprofen in a 24-hr period.    Avoid alcohol and vitamin E as these may increase your tendency to bleed.  Apply an ice pack for 20 min every 2-3 hours while awake.  This may help reduce swelling, bruising, and pain.  Make sure the ice pack is waterproof so that the pressure bandage doesn't get wet.  You may see a small amount of drainage or blood on your pressure bandage. This is normal. However:  If drainage or bleeding continues or saturates the bandage, you will need to apply firm pressure over the bandage with a clean washcloth for 15 minutes.    Remove the saturated bandage.  If bleeding has stopped, apply Vaseline over the suture line and cover with a non-stick bandage.  To add some pressure over the wound, fold a piece of gauze and tape over the area.  If bleeding continues after applying pressure for 15 minutes, apply an ice pack with gentle pressure to the bandaged area for another 15 minutes.  If bleeding still continues, call our office or go to  the nearest emergency room.    48 Hours After Surgery:  Your surgical site has been closed with DermaBond, a  super glue,   for skin procedures.  DermaBond seals your incision site.  Carefully remove the pressure bandage.  If it seems sticky or difficult to remove, you may need to soak it off in the shower.  Once the pressure bandage has been removed, avoid prolonged wetness to the area covered with DermaBond.  Pat dry when out of the shower.  Cover with a non-stick bandage.    Avoid rubbing or scrubbing the site.  If skin glue and sutures are still intact at 2 weeks after your procedure, you can start applying Vaseline daily to help soften up the skin glue. It will come off easier this way.   Avoid topical medications, lotions, creams, ointment, or oils.  Do not swim in chlorinated water. Chlorine dissolves the glue     What to expect:  The first couple of days your wound may be tender and may bleed slightly when doing wound care.  There may be swelling and bruising around the wound, especially if it is near the eyes. For your comfort, you may apply ice or cold compresses to the area.  The area around your wound may be numb for several weeks or even months.  You may experience periodic sharp pain or mild itching around the wound as it heals.   The suture line will look dark pink at first and the edges of the wound will be reddened. This will lighten up each day.    Call Us If:  You have bleeding that will not stop after applying pressure and ice.  You have pain that is not controlled with Tylenol and Ibuprofen.  You have signs or symptoms of an infection such as fever over 100 degrees Fahrenheit, redness, swelling, or warmth spreading from the wound, increasing pain after the first 48 hours, or white/yellow/green drainage from the wound (may or may not have a foul odor).    [facility], Dermatology [phone].  Ask to speak with the staff in [place].  For urgent needs outside of  business hours call the [facility] at [phone] and ask to speak with/page the dermatology resident on call.

## 2025-01-28 ENCOUNTER — TELEPHONE (OUTPATIENT)
Dept: DERMATOLOGY | Facility: CLINIC | Age: 89
End: 2025-01-28
Payer: COMMERCIAL

## 2025-01-28 NOTE — TELEPHONE ENCOUNTER
Cydney is 1 day s/p Mohs on right lower cheek.    What are you doing to manage your pain?  Patient denies pain or discomfort.    Have you had any noticeable bleeding through the bandage?  No, dressing is dry and intact.    Do you have any concerns?  No     Wound care directions reviewed.  Patient declined a post op appointment.  Next skin check has been scheduled.     Quin Webber RN

## 2025-01-30 ENCOUNTER — TELEPHONE (OUTPATIENT)
Dept: DERMATOLOGY | Facility: CLINIC | Age: 89
End: 2025-01-30
Payer: COMMERCIAL

## 2025-01-30 NOTE — TELEPHONE ENCOUNTER
Health Call Center    Phone Message    May a detailed message be left on voicemail: yes     Reason for Call: Other: Pt had MOHS with Dr. Haro on 1/27/25. She thinks the dermabond came off when she took the bandage off and would like to discuss if that is okay, or what she needs to do.     Please call back at [phone] to discuss. Thank you.     Action Taken: Message routed to:  Adult Clinics: Dermatology p [id number]    Travel Screening: Not Applicable     Date of Service:

## 2025-01-30 NOTE — TELEPHONE ENCOUNTER
Called the patient and let patient know that she should start daily wound care. Patient acknowledges that she knows how to do daily wound cares.         Ayla GREENE

## 2025-02-10 ENCOUNTER — TELEPHONE (OUTPATIENT)
Dept: FAMILY MEDICINE | Facility: CLINIC | Age: 89
End: 2025-02-10

## 2025-02-10 ENCOUNTER — ALLIED HEALTH/NURSE VISIT (OUTPATIENT)
Dept: FAMILY MEDICINE | Facility: CLINIC | Age: 89
End: 2025-02-10
Payer: COMMERCIAL

## 2025-02-10 DIAGNOSIS — M80.00XD AGE-RELATED OSTEOPOROSIS WITH CURRENT PATHOLOGICAL FRACTURE WITH ROUTINE HEALING, SUBSEQUENT ENCOUNTER: Primary | ICD-10-CM

## 2025-02-10 DIAGNOSIS — Z78.0 ASYMPTOMATIC POSTMENOPAUSAL STATUS: Primary | ICD-10-CM

## 2025-02-10 PROCEDURE — 99207 PR NO CHARGE NURSE ONLY: CPT

## 2025-02-10 PROCEDURE — 96372 THER/PROPH/DIAG INJ SC/IM: CPT | Performed by: FAMILY MEDICINE

## 2025-02-10 NOTE — TELEPHONE ENCOUNTER
Patient was in today for Prolia injection.    Patient notes that her last DEXA scan was in 2015. She is wondering why she is due for her next DEXA scan in 2030, as she knows another patient that receives Prolia injections with DEXA scan frequency of 2 years.    Please advise if patient should be receiving DEXA scan sooner.    KEVIN Morgan RN  [facility]

## 2025-02-10 NOTE — PATIENT INSTRUCTIONS
You received your Prolia injection today  Your next Injection is due in 6 months (around 8/10/2025)  If you plan on having any dental work done within the next 6 months, please let your dentist know that you are on this medication.  Make sure you do not have any dental work completed involving the jaw bone within 2 month prior to your scheduled injection     Please call [phone] for any concerns or questions.

## 2025-02-10 NOTE — PROGRESS NOTES
Clinic Administered Medication Documentation      Prolia Documentation    Indication: Prolia  (denosumab) is a prescription medicine used to treat osteoporosis in patients who:   Are at high risk for fracture, meaning patients who have had a fracture related to osteoporosis, or who have multiple risk factors for fracture.  Cannot use another osteoporosis medicine or other osteoporosis medicines did not work well.  The timeline for early/late injections would be 4 weeks early and any time after the 6 month sangita. If a patient receives their injection late, then the subsequent injection would be 6 months from the date that they actually received the injection.    When was the last injection?  2024  Was the last injection at least 6 months ago? Yes  Has the prior authorization been completed?  Yes  Is there an active order (written within the past 365 days, with administrations remaining, not ) in the chart?  Yes   GFR Estimate   Date Value Ref Range Status   2025 66 >60 mL/min/1.73m2 Final     Comment:     eGFR calculated using  CKD-EPI equation.   2021 68 >60 mL/min/[1.73_m2] Final     Comment:     Non  GFR Calc  Starting 2018, serum creatinine based estimated GFR (eGFR) will be   calculated using the Chronic Kidney Disease Epidemiology Collaboration   (CKD-EPI) equation.       Has patient had a GFR within the last 12 months? Yes   Is GFR under 30, or patient has a diagnosis of CKD4 or CKD5? No   Patient denies gastric bypass or parathyroid surgery in past 6 months? Yes - patient denies.   Patient denies undergoing any dental procedures involving drilling into the bone, such as implants, extractions, or oral surgery, within the past two months that have not yet healed?  Yes - patient denies  Patient denies plans for an emergency tooth extraction within the next week? Yes    The following steps were completed to comply with the REMS program for Prolia:  Reviewed  information in the Medication Guide, including the serious risks of Prolia  and the symptoms of each risk.  Advised patient to seek prompt medical attention if they have signs or symptoms of any of the serious risks.  Provided each patient a copy of the Medication Guide and Patient Guide.    Prior to injection, verified patient identity using patient's name and date of birth. Medication was administered. Please see MAR and medication order for additional information. Patient instructed to remain in clinic for 15 minutes and report any adverse reaction to staff immediately.    Vial/Syringe: Syringe  Was this medication supplied by the patient? No  Verified that the patient has administrations remaining in their prescription.    ALY MorganN RN  [facility]

## 2025-02-12 NOTE — TELEPHONE ENCOUNTER
Called patient. Left voice message to return call at [phone].    KEVIN Morgan RN  [facility]

## 2025-02-12 NOTE — TELEPHONE ENCOUNTER
Routing to Dr. Hernandez    Patient returned call. Reviewed with her the message from Dr. Hernandez.     Please place DEXA scan order so pt can schedule.    Tila AVENDAÑO RN  [facility]

## 2025-03-04 NOTE — TELEPHONE ENCOUNTER
"Noted Prolia order is marked as \"auth\" now. Closing encounter as no further needs at this time.    ALY MorganN MARIEL  [facility]  "

## 2025-03-19 ENCOUNTER — TELEPHONE (OUTPATIENT)
Dept: DERMATOLOGY | Facility: CLINIC | Age: 89
End: 2025-03-19

## 2025-03-19 DIAGNOSIS — C44.91 BCC (BASAL CELL CARCINOMA OF SKIN): Primary | ICD-10-CM

## 2025-03-19 NOTE — TELEPHONE ENCOUNTER
Spoke with patient: notified and educated on test results.   Procedure options explained and all questions answered.    Patient expressed understanding. She would like to do the ED&C.     -Appointment scheduled on 5/1/2025 1:15 PM at  Derm.   -Derm Surg referral placed and linked to appointment.     Elsie HADLEY RN BSN  [facility] Dermatology  [phone]

## 2025-03-19 NOTE — TELEPHONE ENCOUNTER
----- Message from Kesha Nick sent at 3/19/2025  9:45 AM CDT -----  Please call the patient and discuss options of ED&C versus excision for treatment of skin cancer.  Schedule ED&C with me or excision with dermatology surgery. She has had both in the past        Hi Cydney,     We got your biopsy results back regarding the left forearm. It showed a basal cell skin cancer. Basal Cell skin cancer is the most common type of skin cancer, and usually caused by years of sun exposure. This is a very slow growing skin cancer that rarely spreads elsewhere in the body. If left untreated it could slowly continue to grow over time and may cause issues with deformity or spread to the bone, therefore complete removal is recommended.     There are two options for treatment of this spot. I could see you back in clinic for follow up and treat with a procedure called electrodesiccation and curettage (ED&C). This is essentially scraping away the residual cancer cells and cauterizing the base. We do this right in clinic similar to the biopsy with local lidocaine and you will leave the clinic with a larger but similar type of sore that you have now. It will heal in with no stitches and nothing to send to the lab. We do not confirm anything through the lab and clinically just watch at an annual skin exam that nothing appears to be regrowing.       The second way of removing this would be through an excision by our dermatology surgeons. They also locally numb the area like we did with the biopsy, and cut the skin cancer out with a margin of normal appearing skin. It will be put back together with stitches and the area that was removed will be sent to lab for confirmation that the entire skin cancer was removed.     If you could let me know what technique sounds better to you we will help you proceed with scheduling follow up. Please let me know if you have any further questions. A nurse will be reaching out to you within the next  couple of days to get your preference of treatment and schedule accordingly.     Ways to reduce your risk of more skin cancer are to wear clothing and sunscreen when outdoors, and using a daily facial moisturizer with at least SPF 30 or higher on a daily basis on your face, ears and neck. Some examples of these are Cera Ve AM facial moisturizing lotion, Eucerin daily protectant facial lotion, La Roche Possay ultra light sunscreen fluid but there are many option at drug stores, [employer] etc.     Gabriela Nick CNP  Dermatology     Written by KRISTIE Garcia CNP on 3/19/2025  9:45 AM CDT    Final Diagnosis   A(1). Skin, left forearm, shave:  - Basal cell carcinoma, nodular type - (see description)

## 2025-05-01 ENCOUNTER — OFFICE VISIT (OUTPATIENT)
Dept: DERMATOLOGY | Facility: CLINIC | Age: 89
End: 2025-05-01
Payer: COMMERCIAL

## 2025-05-01 DIAGNOSIS — C44.619 BASAL CELL CARCINOMA (BCC) OF LEFT FOREARM: Primary | ICD-10-CM

## 2025-05-01 NOTE — PATIENT INSTRUCTIONS
Wound Care After a Biopsy    What is a skin biopsy?  A skin biopsy allows the doctor to examine a very small piece of tissue under the microscope to determine the diagnosis and the best treatment for the skin condition. A local anesthetic (numbing medicine) is injected with a very small needle into the skin area to be tested. A small piece of skin is taken from the area. Sometimes a suture (stitch) is used.     What are the risks of a skin biopsy?  I will experience scar, bleeding, swelling, pain, crusting and redness. I may experience incomplete removal or recurrence. Risks of this procedure are excessive bleeding, bruising, infection, nerve damage, numbness, thick (hypertrophic or keloidal) scar and non-diagnostic biopsy.    How should I care for my wound for the first 24 hours?  Keep the wound dry and covered for 24 hours  If it bleeds, hold direct pressure on the area for 15 minutes. If bleeding does not stop then go to the emergency room  Avoid strenuous exercise the first 1-2 days or as your doctor instructs you.    How should I care for the wound after 24 hours?  After 24 hours, remove the bandage  You may bathe or shower as normal  If you had a scalp biopsy, you can shampoo as usual and can use shower water to clean the biopsy site daily  Clean the wound twice a day with gentle soap and water  Do not scrub, be gentle  Apply white petroleum/Vaseline after cleaning the wound with a cotton swab or a clean finger, and keep the site covered with a Bandaid /bandage. Bandages are not necessary with a scalp biopsy  If you are unable to cover the site with a Bandaid /bandage, re-apply ointment 2-3 times a day to keep the site moist. Moisture will help with healing  Avoid strenuous activity for first 1-2 days  Avoid lakes, rivers, pools, and oceans until the stitches are removed or the site is healed    How do I clean my wound?  Wash hands thoroughly with soap or use hand  before all wound care  Clean the  wound with gentle soap and water  Apply white petroleum/Vaseline  to wound after it is clean  Replace the Bandaid /bandage to keep the wound covered for the first few days or as instructed by your doctor  If you had a scalp biopsy, warm shower water to the area on a daily basis should suffice    What should I use to clean my wound?   Cotton-tipped applicators (Qtips )  White petroleum jelly (Vaseline ). Use a clean new container and use Q-tips to apply.  Bandaids  as needed  Gentle soap     How should I care for my wound long term?  Do not get your wound dirty  Keep up with wound care for one week or until the area is healed.  A small scab will form and fall off by itself when the area is completely healed. The area will be red and will become pink in color as it heals. Sun protection is very important for how your scar will turn out. Sunscreen with an SPF 30 or greater is recommended once the area is healed.  If you have stitches, stitches need to be removed in 7 days on the face/neck, or 10-14 days on the body days. You may return to our clinic for this or you may have it done locally at your doctor's office.  You should have some soreness but it should be mild and slowly go away over several days. Talk to your doctor about using tylenol for pain,    When should I call my doctor?  If you have increased:   Pain or swelling  Pus or drainage (clear or slightly yellow drainage is ok)  Temperature over 100F  Spreading redness or warmth around wound    What to expect for wound healing?  One week after surgery a pink / red halo will form around the outside of the wound.   This is new skin.  The center of the wound will appear yellowish white and produce some drainage.  The pink halo will slowly migrate in toward the center of the wound until the wound is covered with new shiny pink skin.  There will be no more drainage when the wound is completely healed.    It will take six months to one year for the redness to  fade.  The scar may be itchy, tight, and sensitive to extreme temperatures for a year after the surgery.  Massaging the area several times a day for several minutes after the wound is completely healed will help the scar soften and normalize faster. Begin massage only after healing is complete.    When will I hear about my results?  The biopsy results can take 2 weeks to come back.  Your results will automatically release to [employer] before your provider has even reviewed them.  The clinic will call you with the results, send you a Netsk[employer] message, or have you schedule a follow-up clinic or phone time to discuss the results.  Contact our clinics if you do not hear from us in 2 weeks.    Who should I call with questions?  [facility]: [phone]   Please call or send a Netskope message for questions or concerns.  If sending a Netskope message, please attach a photo of the wound to your message, if possible. This will help us better assist you.     For urgent needs outside of business hours call the [facility] at [phone] and ask for the dermatology resident on call               Proper skin care from [facility]:    -Eliminate harsh soaps as they strip the natural oils from the skin, often resulting in dry itchy skin ( i.e. Dial, Zest, [place] Spring)  -Use mild soaps such as Cetaphil or Dove Sensitive Skin in the shower. You do not need to use soap on arms, legs, and trunk every time you shower unless visibly soiled.   -Avoid hot or cold showers.  -After showering, lightly dry off and apply moisturizing within 2-3 minutes. This will help trap moisture in the skin.   -Aggressive use of a moisturizer at least 1-2 times a day to the entire body (including -Vanicream, Cetaphil, Aquaphor or Cerave) and moisturize hands after every washing.  -We recommend using moisturizers that come in a tub that needs to be scooped out, not a pump. This has more of an oil base. It will hold moisture in  your skin much better than a water base moisturizer. The above recommended are non-pore clogging.      Wear a sunscreen with at least SPF 30 on your face, ears, neck and V of the chest daily. Wear sunscreen on other areas of the body if those areas are exposed to the sun throughout the day. Sunscreens can contain physical and/or chemical blockers. Physical blockers are less likely to clog pores, these include zinc oxide and titanium dioxide. Reapply every two hour and after swimming.     Sunscreen examples: https://www.ewg.org/sunscreen/    UV radiation  UVA radiation remains constant throughout the day and throughout the year. It is a longer wavelength than UVB and therefore penetrates deeper into the skin leading to immediate and delayed tanning, photoaging, and skin cancer. 70-80% of UVA and UVB radiation occurs between the hours of 10am-2pm.  UVB radiation  UVB radiation causes the most harmful effects and is more significant during the summer months. However, snow and ice can reflect UVB radiation leading to skin damage during the winter months as well. UVB radiation is responsible for tanning, burning, inflammation, delayed erythema (pinkness), pigmentation (brown spots), and skin cancer.     I recommend self monthly full body exams and yearly full body exams with a dermatology provider. If you develop a new or changing lesion please follow up for examination. Most skin cancers are pink and scaly or pink and pearly. However, we do see blue/brown/black skin cancers.  Consider the ABCDEs of melanoma when giving yourself your monthly full body exam ( don't forget the groin, buttocks, feet, toes, etc). A-asymmetry, B-borders, C-color, D-diameter, E-elevation or evolving. If you see any of these changes please follow up in clinic. If you cannot see your back I recommend purchasing a hand held mirror to use with a larger wall mirror.       Checking for Skin Cancer  You can find cancer early by checking your skin  each month. There are 3 kinds of skin cancer. They are melanoma, basal cell carcinoma, and squamous cell carcinoma. Doing monthly skin checks is the best way to find new marks or skin changes. Follow the instructions below for checking your skin.   The ABCDEs of checking moles for melanoma   Check your moles or growths for signs of melanoma using ABCDE:   Asymmetry: the sides of the mole or growth don t match  Border: the edges are ragged, notched, or blurred  Color: the color within the mole or growth varies  Diameter: the mole or growth is larger than 6 mm (size of a pencil eraser)  Evolving: the size, shape, or color of the mole or growth is changing (evolving is not shown in the images below)    Checking for other types of skin cancer  Basal cell carcinoma or squamous cell carcinoma have symptoms such as:     A spot or mole that looks different from all other marks on your skin  Changes in how an area feels, such as itching, tenderness, or pain  Changes in the skin's surface, such as oozing, bleeding, or scaliness  A sore that does not heal  New swelling or redness beyond the border of a mole    Who s at risk?  Anyone can get skin cancer. But you are at greater risk if you have:   Fair skin, light-colored hair, or light-colored eyes  Many moles or abnormal moles on your skin  A history of sunburns from sunlight or tanning beds  A family history of skin cancer  A history of exposure to radiation or chemicals  A weakened immune system  If you have had skin cancer in the past, you are at risk for recurring skin cancer.   How to check your skin  Do your monthly skin checkups in front of a full-length mirror. Check all parts of your body, including your:   Head (ears, face, neck, and scalp)  Torso (front, back, and sides)  Arms (tops, undersides, upper, and lower armpits)  Hands (palms, backs, and fingers, including under the nails)  Buttocks and genitals  Legs (front, back, and sides)  Feet (tops, soles, toes,  including under the nails, and between toes)  If you have a lot of moles, take digital photos of them each month. Make sure to take photos both up close and from a distance. These can help you see if any moles change over time.   Most skin changes are not cancer. But if you see any changes in your skin, call your doctor right away. Only he or she can diagnose a problem. If you have skin cancer, seeing your doctor can be the first step toward getting the treatment that could save your life.   [employer] last reviewed this educational content on 4/1/2019 2000-2020 [employer]. [address] [place] [address], [place] [address]. All rights reserved. This information is not intended as a substitute for professional medical care. Always follow your healthcare professional's instructions.       When should I call my doctor?  If you are worsening or not improving, please, contact us or seek urgent care as noted below.     Who should I call with questions (adults)?    [facility] [phone]  For urgent needs outside of business hours call the [facility] at [phone] and ask for the dermatology resident on call to be paged  If this is a medical emergency and you are unable to reach an ER, Call [phone]      If you need a prescription refill, please contact your pharmacy. Refills are approved or denied by our Physicians during normal business hours, Monday through Friday.  Per office policy, refills will not be granted if you have not been seen within the past year (or sooner depending on the condition).

## 2025-05-01 NOTE — LETTER
5/1/2025      Cydney Christiansen  [address]  [place] [address]      Dear Colleague,    Thank you for referring your patient, Cydney Christiansen, to the [facility]. Please see a copy of my visit note below.    [facility] Dermatology Note  Encounter Date: May 1, 2025  Office Visit     Reviewed patients past medical history and pertinent chart review prior to patients visit today.     Dermatology Problem List:  Last Skin Check 3/14/2025, recommend every 6 months     1. History of NMSC  -BCC left forearm, ED&C 5/1/2025   - SCCIS, R lower cheek, s/p bx 11/21/24, s/p Mohs 1/27/25  - BCC, R lateral elbow superior and inferior, s/p biopsy 7/18/24, s/p Mohs 11/21/24  - SCCIS, R medial thigh, s/p excision 2/21/24  - BCC, R gunderson, S/p Mohs 2/21/24  - BCC, crown of scalp, s/p Mohs and linear repair 12/12/22  - BCC, mid back s/p ED&C 11/2020  - SCCIS, R upper arm s/p ED&C 11/2020  - SCCIS, L forearm s/p excision 3/6/2020  - SCCis L superior forearm, s/p: ED and C 9/30/2019  - SCCis R temple, s/p:  Mohs 9/30/2019  - SCCIS, R nasal sidewall, s/p Mohs 4/1/19   - SCCIS, R upper arm, s/p excision 8/9/18   - SCC, L popliteal fossa, well differentiated with focal perineural invasion but with clear margins on path, s/p excision by Dr. Mcgee 7/2013  - SCCIS arising from solar keratosis, R cheek, 4/2013  - SCC, R dorsal hand, s/p excision with SCCIS extending to the margin 1/7/13  - SCC, bowenoid type, upper back, s/p excision 2011  - BCC, superficial, left back, 2/2011  - BCC, superficial, left neck, 2011, elected for ED&C  - SCCIS, R upper forearm 11/5/2020 MO[facility]   2. 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 AK  - HAK, R mid jawline, s/p bx 12/7/23  - HAK, L Lateral forehead, s/p bx 12/7/23, s/p LN2 2/21/24  - HAK with overlying cutaneous horn, L medial cheek, s/p bx 11/29/23  - HAK, L lateral forehead, bx 9/28/21. treated with LN2 5/4/22  - AK, R cheek, s/p bx 9/28/21 - treated with  LN2 5/4/22  - HAK, L mid eyebrow, base not seen, 6/2014  - L posterior lower leg, s/p bx 2/18/21, s/p cryo 9/28/21  - L forearm, s/p biopsy 3/15/19   5. GA, R angle of jaw: resolves with triamcinolone cream  6. History of benign biopsy  - Verrucous keratosis, R dorsal hand posterior, s/p bx 4/1/22 & 6/8/22  - ISK, R dorsal hand, s/p bx 9/28/21  - C/w rosacea, R posterior shoulder, s/p bx 9/28/21  - SK, R posterior lower leg, s/p bx 2/18/21  - verucca, L forehead, s/p bx 2/18/21  - Arthropod bite, L 4th digit, bx 2/18/21  - SK, R abdomen, s/p bx 2/18/2020  7. Eczematous patch R dorsal hand  - previous Tx: triamcinolone 0.01% cream, Vaseline   8. HAK, L medial cheek, S/p Biopsy 11/29/23  - S/p cryo 12/07/23  9. SK, R thigh, s/p 12/07/23    ____________________________________________    Assessment & Plan:     ELECTRODESSICATION AND CURETTAGE PROCEDURE NOTE    Site: left forearm  Size of lesion: 8 mm   Pathology:   Nodular basal cell carcinoma     A time out was taken to identify the patient and the correct site for the correct procedure.  Verbal informed consent was obtained.  Discussed risks including but not limited to: pain, bleeding, infection, scarring (the latter being essentially guaranteed)  The lesion was cleansed with a 70% isopropyl alcohol wipe and then injected with lidocaine 1% with [id number] epinephrine. After anesthesia was ensured, a 4 mm nondisposable curette was used to remove the epidermis and superficial dermis of the entire visible lesion. A 4 mm margin around the periphery was gently passed over using the curette to evaluate to subclinical disease. Three cycles of curettage follow by electrofulguration and electrodessication of the base in standard fashion was performed.  The wound was then dressed with vaseline and a bandage; subsequent wound care was discussed in detail.    The final size of the defect was 1.1 cm    Gabriela Nick CNP  Dermatology    _______________________________________    CC:  ed&c (Left forearm BCC)    HPI:  Ms. Cydney Christiansen is a(n) 89 year old female who presents today as a return patient for treatment of basal cell carcinoma on the left forearm with ED&C today.     Patient is otherwise feeling well, without additional skin concerns.      Physical Exam:  SKIN: Focused examination of biopsy site on left forearm was performed.  - left forearm, 8 mm crusted pink scarred papule    - No other lesions of concern on areas examined.     Medications:  Current Outpatient Medications   Medication Sig Dispense Refill     acetaminophen (TYLENOL) 325 MG tablet Take 2 tablets (650 mg) by mouth every 4 hours as needed for other (For optimal non-opioid multimodal pain management to improve pain control.)       albuterol (PROAIR HFA/PROVENTIL HFA/VENTOLIN HFA) 108 (90 Base) MCG/ACT inhaler Inhale 1-2 puffs into the lungs every 4 hours as needed for shortness of breath or wheezing (Patient not taking: Reported on 3/14/2025) 6.7 g 0     amoxicillin (AMOXIL) 500 MG capsule 4 tablets -30 minute before dental wotrk 4 capsule 3     aspirin (ASA) 81 MG EC tablet Take 1 tablet (81 mg) by mouth daily       bumetanide (BUMEX) 1 MG tablet TAKE 1 TABLET BY MOUTH EVERY DAY 90 tablet 3     cyanocobalamin (VITAMIN B-12) 500 MCG tablet Take 1 tablet (500 mcg) by mouth daily 150 tablet 3     dorzolamide-timolol (COSOPT) 2-0.5 % ophthalmic solution Place 1 drop into both eyes 2 times daily 20 mL 4     ferrous sulfate (FEROSUL) 325 (65 Fe) MG tablet Take 1 tablet (325 mg) by mouth daily (with breakfast) 90 tablet 3     latanoprost (XALATAN) 0.005 % ophthalmic solution Place 1 drop into both eyes at bedtime. 7.5 mL 4     losartan (COZAAR) 25 MG tablet Take 0.5 tablets (12.5 mg) by mouth daily. 45 tablet 3     potassium chloride chichi ER (KLOR-CON M20) 20 MEQ CR tablet TAKE 1 TABLET BY MOUTH 2 TIMES DAILY 180 tablet 1     simvastatin (ZOCOR) 20 MG tablet Take 1 tablet (20 mg) by mouth at bedtime. 90 tablet 3     vitamin  D3 (CHOLECALCIFEROL) 50 mcg (2000 units) tablet Take 1 tablet (50 mcg) by mouth daily. 90 tablet 3     Current Facility-Administered Medications   Medication Dose Route Frequency Provider Last Rate Last Admin     denosumab (PROLIA) injection 60 mg  60 mg Subcutaneous Q6 Months    60 mg at 02/10/25 1307      Past Medical History:   Patient Active Problem List   Diagnosis     History of basal cell carcinoma     PXF  OU     Personal history of malignant neoplasm of bladder     Hyperlipidemia LDL goal <160     Family history of diabetes mellitus     Squamous cell carcinoma     Basal cell carcinoma     Skin lesion of left leg     Viral warts     PVD (posterior vitreous detachment), OS     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     Hypertension goal BP (blood pressure) < 140/90     Seborrheic keratosis     Scoliosis     Compression fracture of thoracic vertebra (H)     Mass of left hand     Primary open angle glaucoma of both eyes, unspecified glaucoma stage     Nuclear sclerosis of left eye     Actinic keratosis     History of nonmelanoma skin cancer     Combined forms of age-related cataract of right eye     H/O aortic aneurysm repair     Aortic valve stenosis     Age-related osteoporosis with current pathological fracture with routine healing, subsequent encounter     CHF (congestive heart failure) (H)     COPD (chronic obstructive pulmonary disease) (H)     H/O aortic valve replacement     Postoperative atrial fibrillation (H)     Unspecified severe protein-calorie malnutrition     Other osteoporosis with current pathological fracture, other site, subsequent encounter for fracture with delayed healing     History of bisphosphonate therapy     Past Medical History:   Diagnosis Date     Actinic keratosis      Aortic valve stenosis 04/25/2023     Basal cell cancer 02/2011    bcc of the L back.     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       Again, thank you for allowing me to participate in the care of your patient.        Sincerely,        KRISTIE Jett CNP    Electronically signed

## 2025-05-01 NOTE — PROGRESS NOTES
[facility] Note  Encounter Date: May 1, 2025  Office Visit     Reviewed patients past medical history and pertinent chart review prior to patients visit today.     Dermatology Problem List:  Last Skin Check 3/14/2025, recommend every 6 months     1. History of NMSC  -BCC left forearm, ED&C 5/1/2025   - SCCIS, R lower cheek, s/p bx 11/21/24, s/p Mohs 1/27/25  - BCC, R lateral elbow superior and inferior, s/p biopsy 7/18/24, s/p Mohs 11/21/24  - SCCIS, R medial thigh, s/p excision 2/21/24  - BCC, R gunderson, S/p Mohs 2/21/24  - BCC, crown of scalp, s/p Mohs and linear repair 12/12/22  - BCC, mid back s/p ED&C 11/2020  - SCCIS, R upper arm s/p ED&C 11/2020  - SCCIS, L forearm s/p excision 3/6/2020  - SCCis L superior forearm, s/p: ED and C 9/30/2019  - SCCis R temple, s/p:  Mohs 9/30/2019  - SCCIS, R nasal sidewall, s/p Mohs 4/1/19   - SCCIS, R upper arm, s/p excision 8/9/18   - SCC, L popliteal fossa, well differentiated with focal perineural invasion but with clear margins on path, s/p excision by Dr. Mcgee 7/2013  - SCCIS arising from solar keratosis, R cheek, 4/2013  - SCC, R dorsal hand, s/p excision with SCCIS extending to the margin 1/7/13  - SCC, bowenoid type, upper back, s/p excision 2011  - BCC, superficial, left back, 2/2011  - BCC, superficial, left neck, 2011, elected for ED&C  - SCCIS, R upper forearm 11/5/2020 MOHS   2. 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 AK  - HAK, R mid jawline, s/p bx 12/7/23  - HAK, L Lateral forehead, s/p bx 12/7/23, s/p LN2 2/21/24  - HAK with overlying cutaneous horn, L medial cheek, s/p bx 11/29/23  - HAK, L lateral forehead, bx 9/28/21. treated with LN2 5/4/22  - AK, R cheek, s/p bx 9/28/21 - treated with LN2 5/4/22  - HAK, L mid eyebrow, base not seen, 6/2014  - L posterior lower leg, s/p bx 2/18/21, s/p cryo 9/28/21  - L forearm, s/p biopsy 3/15/19   5. GA, R angle of jaw: resolves with triamcinolone cream  6. History of benign biopsy  -  Verrucous keratosis, R dorsal hand posterior, s/p bx 4/1/22 & 6/8/22  - ISK, R dorsal hand, s/p bx 9/28/21  - C/w rosacea, R posterior shoulder, s/p bx 9/28/21  - SK, R posterior lower leg, s/p bx 2/18/21  - verucca, L forehead, s/p bx 2/18/21  - Arthropod bite, L 4th digit, bx 2/18/21  - SK, R abdomen, s/p bx 2/18/2020  7. Eczematous patch R dorsal hand  - previous Tx: triamcinolone 0.01% cream, Vaseline   8. HAK, L medial cheek, S/p Biopsy 11/29/23  - S/p cryo 12/07/23  9. SK, R thigh, s/p 12/07/23    ____________________________________________    Assessment & Plan:     ELECTRODESSICATION AND CURETTAGE PROCEDURE NOTE    Site: left forearm  Size of lesion: 8 mm   Pathology:   Nodular basal cell carcinoma     A time out was taken to identify the patient and the correct site for the correct procedure.  Verbal informed consent was obtained.  Discussed risks including but not limited to: pain, bleeding, infection, scarring (the latter being essentially guaranteed)  The lesion was cleansed with a 70% isopropyl alcohol wipe and then injected with lidocaine 1% with [id number] epinephrine. After anesthesia was ensured, a 4 mm nondisposable curette was used to remove the epidermis and superficial dermis of the entire visible lesion. A 4 mm margin around the periphery was gently passed over using the curette to evaluate to subclinical disease. Three cycles of curettage follow by electrofulguration and electrodessication of the base in standard fashion was performed.  The wound was then dressed with vaseline and a bandage; subsequent wound care was discussed in detail.    The final size of the defect was 1.1 cm    Gabriela Nick CNP  Dermatology    _______________________________________    CC: ed&c (Left forearm BCC)    HPI:  Ms. Cydney Christiansen is a(n) 89 year old female who presents today as a return patient for treatment of basal cell carcinoma on the left forearm with ED&C today.     Patient is otherwise feeling well,  without additional skin concerns.      Physical Exam:  SKIN: Focused examination of biopsy site on left forearm was performed.  - left forearm, 8 mm crusted pink scarred papule    - No other lesions of concern on areas examined.     Medications:  Current Outpatient Medications   Medication Sig Dispense Refill    acetaminophen (TYLENOL) 325 MG tablet Take 2 tablets (650 mg) by mouth every 4 hours as needed for other (For optimal non-opioid multimodal pain management to improve pain control.)      albuterol (PROAIR HFA/PROVENTIL HFA/VENTOLIN HFA) 108 (90 Base) MCG/ACT inhaler Inhale 1-2 puffs into the lungs every 4 hours as needed for shortness of breath or wheezing (Patient not taking: Reported on 3/14/2025) 6.7 g 0    amoxicillin (AMOXIL) 500 MG capsule 4 tablets -30 minute before dental wotrk 4 capsule 3    aspirin (ASA) 81 MG EC tablet Take 1 tablet (81 mg) by mouth daily      bumetanide (BUMEX) 1 MG tablet TAKE 1 TABLET BY MOUTH EVERY DAY 90 tablet 3    cyanocobalamin (VITAMIN B-12) 500 MCG tablet Take 1 tablet (500 mcg) by mouth daily 150 tablet 3    dorzolamide-timolol (COSOPT) 2-0.5 % ophthalmic solution Place 1 drop into both eyes 2 times daily 20 mL 4    ferrous sulfate (FEROSUL) 325 (65 Fe) MG tablet Take 1 tablet (325 mg) by mouth daily (with breakfast) 90 tablet 3    latanoprost (XALATAN) 0.005 % ophthalmic solution Place 1 drop into both eyes at bedtime. 7.5 mL 4    losartan (COZAAR) 25 MG tablet Take 0.5 tablets (12.5 mg) by mouth daily. 45 tablet 3    potassium chloride chichi ER (KLOR-CON M20) 20 MEQ CR tablet TAKE 1 TABLET BY MOUTH 2 TIMES DAILY 180 tablet 1    simvastatin (ZOCOR) 20 MG tablet Take 1 tablet (20 mg) by mouth at bedtime. 90 tablet 3    vitamin D3 (CHOLECALCIFEROL) 50 mcg (2000 units) tablet Take 1 tablet (50 mcg) by mouth daily. 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    Hypertension goal BP (blood pressure) < 140/90    Seborrheic keratosis    Scoliosis    Compression fracture of thoracic vertebra (H)    Mass of left hand    Primary open angle glaucoma of both eyes, unspecified glaucoma stage    Nuclear sclerosis of left eye    Actinic keratosis    History of nonmelanoma skin cancer    Combined forms of age-related cataract of right eye    H/O aortic aneurysm repair    Aortic valve stenosis    Age-related osteoporosis with current pathological fracture with routine healing, subsequent encounter    CHF (congestive heart failure) (H)    COPD (chronic obstructive pulmonary disease) (H)    H/O aortic valve replacement    Postoperative atrial fibrillation (H)    Unspecified severe protein-calorie malnutrition    Other osteoporosis with current pathological fracture, other site, subsequent encounter for fracture with delayed healing    History of bisphosphonate therapy     Past Medical History:   Diagnosis Date    Actinic keratosis     Aortic valve stenosis 04/25/2023    Basal cell cancer 02/2011    bcc of the L back.    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    left    Osteoporosis     Osteoporosis, unspecified  osteoporosis type, unspecified pathological fracture presence 06/26/2017    Scoliosis     Skin cancer     Skin cancer 05/2013    sccis R cheek    Squamous cell carcinoma 09/2011    R upper back    Squamous cell carcinoma 10/2012    R dorsal hand    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

## 2025-05-21 ENCOUNTER — OFFICE VISIT (OUTPATIENT)
Dept: OPHTHALMOLOGY | Facility: CLINIC | Age: 89
End: 2025-05-21
Payer: COMMERCIAL

## 2025-05-21 DIAGNOSIS — H43.812 PVD (POSTERIOR VITREOUS DETACHMENT), LEFT: ICD-10-CM

## 2025-05-21 DIAGNOSIS — H26.8 PXF (PSEUDOEXFOLIATION OF LENS CAPSULE): Primary | ICD-10-CM

## 2025-05-21 DIAGNOSIS — Z96.1 PSEUDOPHAKIA: ICD-10-CM

## 2025-05-21 DIAGNOSIS — H40.1431 PSEUDOEXFOLIATIVE GLAUCOMA, BOTH EYES, MILD STAGE: ICD-10-CM

## 2025-05-21 PROCEDURE — 92012 INTRM OPH EXAM EST PATIENT: CPT | Performed by: STUDENT IN AN ORGANIZED HEALTH CARE EDUCATION/TRAINING PROGRAM

## 2025-05-21 RX ORDER — DORZOLAMIDE HYDROCHLORIDE AND TIMOLOL MALEATE 20; 5 MG/ML; MG/ML
1 SOLUTION/ DROPS OPHTHALMIC 2 TIMES DAILY
Qty: 20 ML | Refills: 4 | Status: SHIPPED | OUTPATIENT
Start: 2025-05-21

## 2025-05-21 ASSESSMENT — TONOMETRY
OS_IOP_MMHG: 15
IOP_METHOD: APPLANATION
OD_IOP_MMHG: 16

## 2025-05-21 ASSESSMENT — VISUAL ACUITY
OD_CC: 20/25
METHOD: SNELLEN - LINEAR
CORRECTION_TYPE: GLASSES
OS_CC: 20/60
OS_CC+: +1
OD_CC+: -2
OS_PH_CC: 20/50

## 2025-05-21 ASSESSMENT — EXTERNAL EXAM - RIGHT EYE: OD_EXAM: NORMAL

## 2025-05-21 ASSESSMENT — SLIT LAMP EXAM - LIDS
COMMENTS: NORMAL
COMMENTS: NORMAL

## 2025-05-21 ASSESSMENT — EXTERNAL EXAM - LEFT EYE: OS_EXAM: NORMAL

## 2025-05-21 NOTE — LETTER
"5/21/2025      Cydney Christiansen  [address]  [place] [address]      Dear Colleague,    Thank you for referring your patient, Cydney Christiansen, to the [facility]. Please see a copy of my visit note below.     Current Eye Medications:  Latanoprost both eyes every evening, Cosopt both eyes twice a day.  Last drops:  8am.      Subjective:  Follow up Pseudoexfoliation Glaucoma.  The patient is here for a pressure check.  The patient complains of an intermittent \"blur\" that comes over her right eye, then spontaneously resolves.  She tends to watch TV without correction, but needs her glasses to read.      Objective:  See Ophthalmology Exam.       Assessment:  Cydney Christiansen is a 89 year old female who presents with:   Encounter Diagnoses   Name Primary?     PXF (PSEUDOEXFOLIATION OF LENS CAPSULE) OU Intraocular pressure 16/15 today. Continue same medications.     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 Avoid generic artificial tears or \"get the red out\" drops).     Kvng Garcia MD  [phone]      Again, thank you for allowing me to participate in the care of your patient.        Sincerely,        Kvng Garcia MD    Electronically signed"

## 2025-05-21 NOTE — PATIENT INSTRUCTIONS
"Continue Latanoprost (green top) every evening both eyes     Continue Cosopt (dorzolamide-timolol -- dark blue top) twice a day both eyes    Use artificial tears twice a day in the right eye if the intermittent blur happens more often (Refresh Plus or Refresh Optive or TheraTears. Avoid generic artificial tears or \"get the red out\" drops).     Kvng Garcia MD  [phone]    "

## 2025-05-21 NOTE — PROGRESS NOTES
" Current Eye Medications:  Latanoprost both eyes every evening, Cosopt both eyes twice a day.  Last drops:  8am.      Subjective:  Follow up Pseudoexfoliation Glaucoma.  The patient is here for a pressure check.  The patient complains of an intermittent \"blur\" that comes over her right eye, then spontaneously resolves.  She tends to watch TV without correction, but needs her glasses to read.      Objective:  See Ophthalmology Exam.       Assessment:  Cydney Christiansen is a 89 year old female who presents with:   Encounter Diagnoses   Name Primary?    PXF (PSEUDOEXFOLIATION OF LENS CAPSULE) OU Intraocular pressure 16/15 today. Continue same medications.    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 Avoid generic artificial tears or \"get the red out\" drops).     Kvng Garcia MD  [phone]      "

## 2025-05-29 DIAGNOSIS — Z98.890 S/P ASCENDING AORTIC ANEURYSM REPAIR: ICD-10-CM

## 2025-05-29 DIAGNOSIS — Z86.79 S/P ASCENDING AORTIC ANEURYSM REPAIR: ICD-10-CM

## 2025-05-29 DIAGNOSIS — Z95.2 S/P AVR (AORTIC VALVE REPLACEMENT): ICD-10-CM

## 2025-05-29 RX ORDER — BUMETANIDE 1 MG/1
1 TABLET ORAL DAILY
Qty: 90 TABLET | Refills: 3 | Status: SHIPPED | OUTPATIENT
Start: 2025-05-29

## 2025-06-25 ENCOUNTER — ANCILLARY PROCEDURE (OUTPATIENT)
Dept: MAMMOGRAPHY | Facility: CLINIC | Age: 89
End: 2025-06-25
Attending: FAMILY MEDICINE
Payer: COMMERCIAL

## 2025-06-25 DIAGNOSIS — Z12.31 VISIT FOR SCREENING MAMMOGRAM: ICD-10-CM

## 2025-06-25 PROCEDURE — 77067 SCR MAMMO BI INCL CAD: CPT | Performed by: RADIOLOGY

## 2025-06-25 PROCEDURE — 77063 BREAST TOMOSYNTHESIS BI: CPT | Performed by: RADIOLOGY

## 2025-07-15 PROBLEM — L57.0 ACTINIC KERATOSIS: Status: RESOLVED | Noted: 2019-08-08 | Resolved: 2025-07-15

## 2025-07-15 PROBLEM — I35.0 AORTIC VALVE STENOSIS: Status: RESOLVED | Noted: 2023-04-25 | Resolved: 2025-07-15

## 2025-07-15 PROBLEM — Z85.828 HISTORY OF NONMELANOMA SKIN CANCER: Status: RESOLVED | Noted: 2019-08-08 | Resolved: 2025-07-15

## 2025-07-29 ENCOUNTER — TELEPHONE (OUTPATIENT)
Dept: FAMILY MEDICINE | Facility: CLINIC | Age: 89
End: 2025-07-29
Payer: COMMERCIAL

## 2025-07-29 DIAGNOSIS — Z92.29 HISTORY OF BISPHOSPHONATE THERAPY: ICD-10-CM

## 2025-07-29 DIAGNOSIS — M80.8AXG: Primary | ICD-10-CM

## 2025-07-29 DIAGNOSIS — M89.9 BONE DISEASE: ICD-10-CM

## 2025-07-29 NOTE — TELEPHONE ENCOUNTER
"Patient is currently scheduled for Prolia injection on 25.    Current Prolia order  as of   \"  25 0000    \"    Please place new Prolia CAM order.    Cassia Grayson RN   "

## 2025-07-30 DIAGNOSIS — Z98.890 S/P ASCENDING AORTIC ANEURYSM REPAIR: ICD-10-CM

## 2025-07-30 DIAGNOSIS — Z86.79 S/P ASCENDING AORTIC ANEURYSM REPAIR: ICD-10-CM

## 2025-07-30 DIAGNOSIS — Z95.2 S/P AVR (AORTIC VALVE REPLACEMENT): ICD-10-CM

## 2025-07-30 RX ORDER — POTASSIUM CHLORIDE 1500 MG/1
20 TABLET, EXTENDED RELEASE ORAL 2 TIMES DAILY
Qty: 180 TABLET | Refills: 0 | Status: SHIPPED | OUTPATIENT
Start: 2025-07-30

## 2025-07-30 NOTE — PATIENT INSTRUCTIONS
You have selected the below site for your Prolia injection. If you did not schedule this appointment in clinic, please call the number listed below.  Karine VELASQUEZ) [phone]

## 2025-08-11 ENCOUNTER — LAB (OUTPATIENT)
Dept: LAB | Facility: CLINIC | Age: 89
End: 2025-08-11
Payer: COMMERCIAL

## 2025-08-11 ENCOUNTER — ALLIED HEALTH/NURSE VISIT (OUTPATIENT)
Dept: FAMILY MEDICINE | Facility: CLINIC | Age: 89
End: 2025-08-11
Payer: COMMERCIAL

## 2025-08-11 DIAGNOSIS — M80.00XD AGE-RELATED OSTEOPOROSIS WITH CURRENT PATHOLOGICAL FRACTURE WITH ROUTINE HEALING, SUBSEQUENT ENCOUNTER: Primary | ICD-10-CM

## 2025-08-11 DIAGNOSIS — I10 HYPERTENSION GOAL BP (BLOOD PRESSURE) < 140/90: Primary | ICD-10-CM

## 2025-08-11 LAB
ANION GAP SERPL CALCULATED.3IONS-SCNC: 11 MMOL/L (ref 7–15)
BUN SERPL-MCNC: 18.7 MG/DL (ref 8–23)
CALCIUM SERPL-MCNC: 9.5 MG/DL (ref 8.8–10.4)
CHLORIDE SERPL-SCNC: 101 MMOL/L (ref 98–107)
CREAT SERPL-MCNC: 0.86 MG/DL (ref 0.51–0.95)
EGFRCR SERPLBLD CKD-EPI 2021: 65 ML/MIN/1.73M2
GLUCOSE SERPL-MCNC: 127 MG/DL (ref 70–99)
HCO3 SERPL-SCNC: 28 MMOL/L (ref 22–29)
POTASSIUM SERPL-SCNC: 4.4 MMOL/L (ref 3.4–5.3)
SODIUM SERPL-SCNC: 140 MMOL/L (ref 135–145)

## 2025-08-11 PROCEDURE — 80048 BASIC METABOLIC PNL TOTAL CA: CPT

## 2025-08-11 PROCEDURE — 36415 COLL VENOUS BLD VENIPUNCTURE: CPT

## 2025-08-11 PROCEDURE — 99207 PR NO CHARGE NURSE ONLY: CPT

## 2025-08-11 PROCEDURE — 96372 THER/PROPH/DIAG INJ SC/IM: CPT | Performed by: FAMILY MEDICINE

## (undated) DEVICE — CANISTER WOUND VAC W/GEL 1000ML M8275093/5

## (undated) DEVICE — MANIFOLD KIT ANGIO AUTOMATED 014613

## (undated) DEVICE — Device

## (undated) DEVICE — DRSG TEGADERM 8X12" 1629

## (undated) DEVICE — EYE SHIELD PLASTIC

## (undated) DEVICE — EYE PACK CUSTOM ANTERIOR 30DEG TIP CENTURION PPK6682-04

## (undated) DEVICE — SU STEEL 6 CCS 4X18" M654G

## (undated) DEVICE — PACK CATARACT CUSTOM SO DALE SEY32CTFCX

## (undated) DEVICE — CATH ANGIO INFINITI PIGTAIL 6FRX110CM 6 SH 534650S

## (undated) DEVICE — EYE TIP IRRIGATION & ASPIRATION POLYMER 35D BENT 8065751511

## (undated) DEVICE — BLADE SAW STERNAL 20X30MM KM-32

## (undated) DEVICE — SU ETHIBOND 3-0 BBDA 36" X588H

## (undated) DEVICE — KIT HAND CONTROL ACIST 016795

## (undated) DEVICE — ESU PENCIL SMOKE EVAC W/ROCKER SWITCH 0703-047-000

## (undated) DEVICE — TUBING INSUFFLATION PNEUMOCLEAR 0620050100

## (undated) DEVICE — SU VICRYL 3-0 FS-1 27" J442H

## (undated) DEVICE — CATH ANGIO SUPERTORQUE AL1 6FRX100CM 532-645

## (undated) DEVICE — SU PROLENE 4-0 RB-1DA 36" 8557H

## (undated) DEVICE — TIES BANDING T50R

## (undated) DEVICE — CATH ANGIO INFINITI 3DRC 6FRX100CM 534676T

## (undated) DEVICE — SUCTION CATH AIRLIFE TRI-FLO W/CONTROL PORT 14FR  T60C

## (undated) DEVICE — SU DEVICE COR-KNOT MINI 4X14MM 031350

## (undated) DEVICE — CLIP HORIZON MED BLUE 002200

## (undated) DEVICE — INTRO SHEATH 7FRX10CM PINNACLE RSS702

## (undated) DEVICE — BONE WAX 2.5GM W31G

## (undated) DEVICE — GLIDEWIRE TERUMO .035X180CM 1.5,, J-TIP GR3525

## (undated) DEVICE — SPONGE RAY-TEC 4X8" 7318

## (undated) DEVICE — RX SURGIFLO HEMOSTATIC MATRIX W/THROMBIN 8ML 2994

## (undated) DEVICE — DRSG DRAIN 4X4" 7086

## (undated) DEVICE — ESU EYE LOW TEMP AA17

## (undated) DEVICE — GLOVE PROTEXIS W/NEU-THERA 7.0  2D73TE70

## (undated) DEVICE — SHTH INTRO 0.021IN ID 6FR DIA

## (undated) DEVICE — DRAPE FLUID WARMING 52 X 60" ORS-321

## (undated) DEVICE — GLOVE BIOGEL PI SZ 7.0 40870

## (undated) DEVICE — CATH ANGIO LANGSTRON 6FRX110CM 145DEG 4H 5540

## (undated) DEVICE — EYE KNIFE SLIT XSTAR VISITEC 2.4MM 45DEG BEVEL UP 373724

## (undated) DEVICE — SLEEVE TR BAND RADIAL COMPRESSION DEVICE 24CM TRB24-REG

## (undated) DEVICE — SU PROLENE 4-0 SHDA 36" 8521H

## (undated) DEVICE — SU ETHIBOND 0 CT-1 CR 8X18" CX21D

## (undated) DEVICE — LINEN TOWEL PACK X30 5481

## (undated) DEVICE — SU PROLENE 3-0 SHDA 36" 8522H

## (undated) DEVICE — CONNECTOR BLAKE DRAIN SGL BCC1

## (undated) DEVICE — ADH SKIN CLOSURE PREMIERPRO EXOFIN 1.0ML 3470

## (undated) DEVICE — SU DEVICE ENDO COR KNOT QUICK LOAD 030850

## (undated) DEVICE — DRSG ABDOMINAL 07 1/2X8" 7197D

## (undated) DEVICE — SU VICRYL 0 CTX 36" J370H

## (undated) DEVICE — EYE KNIFE STILETTO VISITEC 1.1MM ANG 45DEG SIDEPORT 376620

## (undated) DEVICE — WIPES FOLEY CARE SURESTEP PROVON DFC100

## (undated) DEVICE — DEFIB PRO-PADZ LVP LQD GEL ADULT 8900-2105-01

## (undated) DEVICE — SOL NACL 0.9% 10ML VIAL 0409-4888-02

## (undated) DEVICE — CATH GD ST+ 100CM 6FR JR5.0

## (undated) DEVICE — ESU ELEC BLADE E-SEP INSULATED NEPTUNE 70MM 0703-070-002

## (undated) DEVICE — LINEN TOWEL PACK X5 5464

## (undated) DEVICE — PACK HEART LEFT CUSTOM

## (undated) DEVICE — EYE SOL BSS 500ML

## (undated) DEVICE — TUBING PRESSURE 30"

## (undated) DEVICE — SU ETHIBOND 2-0 SHDA 30" X563H

## (undated) DEVICE — DRSG TELFA 3X8" 1238

## (undated) DEVICE — SOL WATER IRRIG 500ML BOTTLE 2F7113

## (undated) DEVICE — PROTECTOR ARM ONE-STEP TRENDELENBURG 40418

## (undated) DEVICE — DRAIN CHEST TUBE 36FR STR 8036

## (undated) DEVICE — EYE RING MALYUGIN PUPIL EXPANDER 6.25MM MAL-000-1

## (undated) DEVICE — CATH ANGIO 6FR AR MOD THRU   L

## (undated) DEVICE — SU SILK 0 TIE 6X30" A306H

## (undated) DEVICE — CATH ANGIO SUPERTORQUE PLUS JL5 6FRX100CM 533622

## (undated) DEVICE — KIT RIGHT HEART CATH 60130719

## (undated) DEVICE — SUCTION DRY CHEST DRAIN OASIS 3600-100

## (undated) DEVICE — SU PLEDGET SOFT TFE 3/8"X3/26"X1/16" PCP40

## (undated) DEVICE — GW VASC .035IN DIA 260CML 7CML 3 MM RADIUS J CURVE 502455

## (undated) DEVICE — SOL NACL 0.9% IRRIG 1000ML BOTTLE 2F7124

## (undated) DEVICE — SOL ADH LIQUID BENZOIN SWAB 0.6ML C1544

## (undated) DEVICE — PREP CHLORAPREP 26ML TINTED HI-LITE ORANGE 930815

## (undated) DEVICE — EYE PACK BVI READYPAK KIT #1

## (undated) DEVICE — PACK CATARACT CUSTOM ASC SEY15CPUMC

## (undated) DEVICE — SU PROLENE 6-0 C-1DA 30" 8706H

## (undated) DEVICE — GLOVE PROTEXIS MICRO 7.0  2D73PM70

## (undated) DEVICE — DRAPE IOBAN INCISE 23X17" 6650EZ

## (undated) DEVICE — SU PROLENE 5-0 RB-2DA 30" 8710H

## (undated) DEVICE — TUBING SUCTION DRAINAGE PLEURAL DUAL 8884714200

## (undated) DEVICE — TAPE MEDIPORE 4"X2YD 2864

## (undated) DEVICE — EYE CANN IRR 25GA CYSTOTOME 581610

## (undated) DEVICE — SUCTION MANIFOLD NEPTUNE 2 SYS 4 PORT 0702-020-000

## (undated) DEVICE — SHEATH GUIDING R2P DESTINATION 75CM 6FR STERIL GS-R6ST1C75W

## (undated) DEVICE — LINEN TOWEL PACK X6 WHITE 5487

## (undated) DEVICE — STRAP UNIVERSAL POSITIONING 2-PIECE 4X47X76" 91-287

## (undated) DEVICE — SURGICEL HEMOSTAT 4X8" 1952

## (undated) DEVICE — DECANTER BAG 2002S

## (undated) DEVICE — EYE CANN IRR 30GA  ANTERIOR CHAMBER 581273

## (undated) DEVICE — SU ETHIBOND 2-0 V-5SA 10X30" SXP52

## (undated) DEVICE — LEAD PACER MYOCARDIAL BIPOLAR TEMPORARY 53CM 6495F

## (undated) DEVICE — PACK ADULT HEART UMMC PV15CG92D

## (undated) DEVICE — ESU HOLSTER PLASTIC DISP E2400

## (undated) DEVICE — WIRE GUIDE 0.035"X150CM EMERALD STR 502542

## (undated) DEVICE — FASTENER CATH BALLOON CLAMPX2 STATLOCK 0684-00-493

## (undated) DEVICE — NDL COUNTER 40CT  31142311

## (undated) RX ORDER — HEPARIN SODIUM 1000 [USP'U]/ML
INJECTION, SOLUTION INTRAVENOUS; SUBCUTANEOUS
Status: DISPENSED
Start: 2023-02-17

## (undated) RX ORDER — CHLORHEXIDINE GLUCONATE ORAL RINSE 1.2 MG/ML
SOLUTION DENTAL
Status: DISPENSED
Start: 2023-04-25

## (undated) RX ORDER — SODIUM CHLORIDE 9 MG/ML
INJECTION, SOLUTION INTRAVENOUS
Status: DISPENSED
Start: 2023-02-17

## (undated) RX ORDER — FENTANYL CITRATE 50 UG/ML
INJECTION, SOLUTION INTRAMUSCULAR; INTRAVENOUS
Status: DISPENSED
Start: 2023-02-17

## (undated) RX ORDER — FENTANYL CITRATE 50 UG/ML
INJECTION, SOLUTION INTRAMUSCULAR; INTRAVENOUS
Status: DISPENSED
Start: 2023-04-25

## (undated) RX ORDER — PROPOFOL 10 MG/ML
INJECTION, EMULSION INTRAVENOUS
Status: DISPENSED
Start: 2023-04-25

## (undated) RX ORDER — LIDOCAINE HYDROCHLORIDE 10 MG/ML
INJECTION, SOLUTION EPIDURAL; INFILTRATION; INTRACAUDAL; PERINEURAL
Status: DISPENSED
Start: 2023-05-15

## (undated) RX ORDER — FAMOTIDINE 20 MG/1
TABLET, FILM COATED ORAL
Status: DISPENSED
Start: 2023-04-25

## (undated) RX ORDER — FENTANYL CITRATE-0.9 % NACL/PF 10 MCG/ML
PLASTIC BAG, INJECTION (ML) INTRAVENOUS
Status: DISPENSED
Start: 2023-04-25

## (undated) RX ORDER — GABAPENTIN 100 MG/1
CAPSULE ORAL
Status: DISPENSED
Start: 2023-04-25

## (undated) RX ORDER — CALCIUM CHLORIDE 100 MG/ML
INJECTION INTRAVENOUS; INTRAVENTRICULAR
Status: DISPENSED
Start: 2023-04-25

## (undated) RX ORDER — LIDOCAINE HYDROCHLORIDE 10 MG/ML
INJECTION, SOLUTION EPIDURAL; INFILTRATION; INTRACAUDAL; PERINEURAL
Status: DISPENSED
Start: 2023-05-08

## (undated) RX ORDER — HEPARIN SODIUM 1000 [USP'U]/ML
INJECTION, SOLUTION INTRAVENOUS; SUBCUTANEOUS
Status: DISPENSED
Start: 2023-04-25

## (undated) RX ORDER — ACETAMINOPHEN 325 MG/1
TABLET ORAL
Status: DISPENSED
Start: 2023-04-25

## (undated) RX ORDER — LIDOCAINE HYDROCHLORIDE 10 MG/ML
INJECTION, SOLUTION EPIDURAL; INFILTRATION; INTRACAUDAL; PERINEURAL
Status: DISPENSED
Start: 2017-12-08

## (undated) RX ORDER — CEFAZOLIN SODIUM 1 G/3ML
INJECTION, POWDER, FOR SOLUTION INTRAMUSCULAR; INTRAVENOUS
Status: DISPENSED
Start: 2023-04-25

## (undated) RX ORDER — ACETAMINOPHEN 325 MG/1
TABLET ORAL
Status: DISPENSED
Start: 2020-10-19

## (undated) RX ORDER — CEFAZOLIN SODIUM/WATER 2 G/20 ML
SYRINGE (ML) INTRAVENOUS
Status: DISPENSED
Start: 2023-04-25

## (undated) RX ORDER — LIDOCAINE HYDROCHLORIDE 10 MG/ML
INJECTION, SOLUTION EPIDURAL; INFILTRATION; INTRACAUDAL; PERINEURAL
Status: DISPENSED
Start: 2023-05-03

## (undated) RX ORDER — TETRACAINE HYDROCHLORIDE 5 MG/ML
SOLUTION OPHTHALMIC
Status: DISPENSED
Start: 2017-12-08